# Patient Record
Sex: MALE | Race: WHITE | URBAN - METROPOLITAN AREA
[De-identification: names, ages, dates, MRNs, and addresses within clinical notes are randomized per-mention and may not be internally consistent; named-entity substitution may affect disease eponyms.]

---

## 2017-04-08 ENCOUNTER — OUTPATIENT (OUTPATIENT)
Dept: OUTPATIENT SERVICES | Facility: HOSPITAL | Age: 32
LOS: 1 days | Discharge: HOME | End: 2017-04-08

## 2017-04-08 DIAGNOSIS — Z96.7 PRESENCE OF OTHER BONE AND TENDON IMPLANTS: Chronic | ICD-10-CM

## 2017-04-08 DIAGNOSIS — Z98.89 OTHER SPECIFIED POSTPROCEDURAL STATES: Chronic | ICD-10-CM

## 2017-04-12 DIAGNOSIS — Z23 ENCOUNTER FOR IMMUNIZATION: ICD-10-CM

## 2017-06-04 ENCOUNTER — INPATIENT (INPATIENT)
Facility: HOSPITAL | Age: 32
LOS: 8 days | Discharge: HOME | End: 2017-06-13
Attending: NEUROLOGICAL SURGERY | Admitting: INTERNAL MEDICINE
Payer: COMMERCIAL

## 2017-06-04 VITALS — HEIGHT: 71 IN | WEIGHT: 164.91 LBS

## 2017-06-04 DIAGNOSIS — Z96.7 PRESENCE OF OTHER BONE AND TENDON IMPLANTS: Chronic | ICD-10-CM

## 2017-06-04 DIAGNOSIS — Z98.89 OTHER SPECIFIED POSTPROCEDURAL STATES: Chronic | ICD-10-CM

## 2017-06-04 DIAGNOSIS — K60.3 ANAL FISTULA: Chronic | ICD-10-CM

## 2017-06-04 LAB
ALBUMIN SERPL ELPH-MCNC: 3.4 G/DL — SIGNIFICANT CHANGE UP (ref 3.3–5)
ALP SERPL-CCNC: 145 U/L — HIGH (ref 40–120)
ALT FLD-CCNC: 26 U/L — SIGNIFICANT CHANGE UP (ref 12–78)
ANION GAP SERPL CALC-SCNC: 9 MMOL/L — SIGNIFICANT CHANGE UP (ref 5–17)
APTT BLD: 28.4 SEC — SIGNIFICANT CHANGE UP (ref 27.5–37.4)
AST SERPL-CCNC: 20 U/L — SIGNIFICANT CHANGE UP (ref 15–37)
BASOPHILS # BLD AUTO: 0.1 K/UL — SIGNIFICANT CHANGE UP (ref 0–0.2)
BASOPHILS NFR BLD AUTO: 0.4 % — SIGNIFICANT CHANGE UP (ref 0–2)
BILIRUB SERPL-MCNC: 0.8 MG/DL — SIGNIFICANT CHANGE UP (ref 0.2–1.2)
BUN SERPL-MCNC: 4 MG/DL — LOW (ref 7–23)
CALCIUM SERPL-MCNC: 9.8 MG/DL — SIGNIFICANT CHANGE UP (ref 8.5–10.1)
CHLORIDE SERPL-SCNC: 98 MMOL/L — SIGNIFICANT CHANGE UP (ref 96–108)
CO2 SERPL-SCNC: 28 MMOL/L — SIGNIFICANT CHANGE UP (ref 22–31)
CREAT SERPL-MCNC: 0.78 MG/DL — SIGNIFICANT CHANGE UP (ref 0.5–1.3)
EOSINOPHIL # BLD AUTO: 0.1 K/UL — SIGNIFICANT CHANGE UP (ref 0–0.5)
EOSINOPHIL NFR BLD AUTO: 0.4 % — SIGNIFICANT CHANGE UP (ref 0–6)
GLUCOSE SERPL-MCNC: 143 MG/DL — HIGH (ref 70–99)
HCT VFR BLD CALC: 47.6 % — SIGNIFICANT CHANGE UP (ref 39–50)
HGB BLD-MCNC: 17 G/DL — SIGNIFICANT CHANGE UP (ref 13–17)
LIDOCAIN IGE QN: 3216 U/L — HIGH (ref 73–393)
LYMPHOCYTES # BLD AUTO: 1.7 K/UL — SIGNIFICANT CHANGE UP (ref 1–3.3)
LYMPHOCYTES # BLD AUTO: 9.2 % — LOW (ref 13–44)
MCHC RBC-ENTMCNC: 35.8 GM/DL — SIGNIFICANT CHANGE UP (ref 32–36)
MCHC RBC-ENTMCNC: 38.1 PG — HIGH (ref 27–34)
MCV RBC AUTO: 106.4 FL — HIGH (ref 80–100)
MONOCYTES # BLD AUTO: 0.9 K/UL — SIGNIFICANT CHANGE UP (ref 0–0.9)
MONOCYTES NFR BLD AUTO: 4.7 % — SIGNIFICANT CHANGE UP (ref 2–14)
NEUTROPHILS # BLD AUTO: 15.7 K/UL — HIGH (ref 1.8–7.4)
NEUTROPHILS NFR BLD AUTO: 85.4 % — HIGH (ref 43–77)
PLATELET # BLD AUTO: 355 K/UL — SIGNIFICANT CHANGE UP (ref 150–400)
POTASSIUM SERPL-MCNC: 3.2 MMOL/L — LOW (ref 3.5–5.3)
POTASSIUM SERPL-SCNC: 3.2 MMOL/L — LOW (ref 3.5–5.3)
PROT SERPL-MCNC: 8 GM/DL — SIGNIFICANT CHANGE UP (ref 6–8.3)
RBC # BLD: 4.47 M/UL — SIGNIFICANT CHANGE UP (ref 4.2–5.8)
RBC # FLD: 13.3 % — SIGNIFICANT CHANGE UP (ref 10.3–14.5)
SODIUM SERPL-SCNC: 135 MMOL/L — SIGNIFICANT CHANGE UP (ref 135–145)
WBC # BLD: 18.4 K/UL — HIGH (ref 3.8–10.5)
WBC # FLD AUTO: 18.4 K/UL — HIGH (ref 3.8–10.5)

## 2017-06-04 PROCEDURE — 74177 CT ABD & PELVIS W/CONTRAST: CPT | Mod: 26

## 2017-06-04 PROCEDURE — 99285 EMERGENCY DEPT VISIT HI MDM: CPT

## 2017-06-04 RX ORDER — THIAMINE MONONITRATE (VIT B1) 100 MG
100 TABLET ORAL DAILY
Qty: 0 | Refills: 0 | Status: COMPLETED | OUTPATIENT
Start: 2017-06-04 | End: 2017-06-07

## 2017-06-04 RX ORDER — SODIUM CHLORIDE 9 MG/ML
3 INJECTION INTRAMUSCULAR; INTRAVENOUS; SUBCUTANEOUS ONCE
Qty: 0 | Refills: 0 | Status: COMPLETED | OUTPATIENT
Start: 2017-06-04 | End: 2017-06-04

## 2017-06-04 RX ORDER — SODIUM CHLORIDE 9 MG/ML
2000 INJECTION INTRAMUSCULAR; INTRAVENOUS; SUBCUTANEOUS
Qty: 0 | Refills: 0 | Status: DISCONTINUED | OUTPATIENT
Start: 2017-06-04 | End: 2017-06-04

## 2017-06-04 RX ORDER — MORPHINE SULFATE 50 MG/1
4 CAPSULE, EXTENDED RELEASE ORAL EVERY 6 HOURS
Qty: 0 | Refills: 0 | Status: DISCONTINUED | OUTPATIENT
Start: 2017-06-04 | End: 2017-06-04

## 2017-06-04 RX ORDER — FOLIC ACID 0.8 MG
1 TABLET ORAL DAILY
Qty: 0 | Refills: 0 | Status: DISCONTINUED | OUTPATIENT
Start: 2017-06-04 | End: 2017-06-13

## 2017-06-04 RX ORDER — HYDROMORPHONE HYDROCHLORIDE 2 MG/ML
1 INJECTION INTRAMUSCULAR; INTRAVENOUS; SUBCUTANEOUS ONCE
Qty: 0 | Refills: 0 | Status: DISCONTINUED | OUTPATIENT
Start: 2017-06-04 | End: 2017-06-04

## 2017-06-04 RX ORDER — MORPHINE SULFATE 50 MG/1
8 CAPSULE, EXTENDED RELEASE ORAL ONCE
Qty: 0 | Refills: 0 | Status: DISCONTINUED | OUTPATIENT
Start: 2017-06-04 | End: 2017-06-04

## 2017-06-04 RX ORDER — POTASSIUM CHLORIDE 20 MEQ
10 PACKET (EA) ORAL ONCE
Qty: 0 | Refills: 0 | Status: COMPLETED | OUTPATIENT
Start: 2017-06-04 | End: 2017-06-04

## 2017-06-04 RX ORDER — ONDANSETRON 8 MG/1
4 TABLET, FILM COATED ORAL ONCE
Qty: 0 | Refills: 0 | Status: COMPLETED | OUTPATIENT
Start: 2017-06-04 | End: 2017-06-04

## 2017-06-04 RX ORDER — NICOTINE POLACRILEX 2 MG
1 GUM BUCCAL DAILY
Qty: 0 | Refills: 0 | Status: DISCONTINUED | OUTPATIENT
Start: 2017-06-04 | End: 2017-06-13

## 2017-06-04 RX ORDER — ONDANSETRON 8 MG/1
4 TABLET, FILM COATED ORAL EVERY 6 HOURS
Qty: 0 | Refills: 0 | Status: DISCONTINUED | OUTPATIENT
Start: 2017-06-04 | End: 2017-06-13

## 2017-06-04 RX ORDER — SODIUM CHLORIDE 9 MG/ML
1000 INJECTION INTRAMUSCULAR; INTRAVENOUS; SUBCUTANEOUS ONCE
Qty: 0 | Refills: 0 | Status: COMPLETED | OUTPATIENT
Start: 2017-06-04 | End: 2017-06-04

## 2017-06-04 RX ORDER — ACETAMINOPHEN 500 MG
650 TABLET ORAL EVERY 6 HOURS
Qty: 0 | Refills: 0 | Status: DISCONTINUED | OUTPATIENT
Start: 2017-06-04 | End: 2017-06-13

## 2017-06-04 RX ORDER — MORPHINE SULFATE 50 MG/1
8 CAPSULE, EXTENDED RELEASE ORAL ONCE
Qty: 0 | Refills: 0 | Status: DISCONTINUED | OUTPATIENT
Start: 2017-06-04 | End: 2017-06-05

## 2017-06-04 RX ORDER — POTASSIUM CHLORIDE 20 MEQ
10 PACKET (EA) ORAL
Qty: 0 | Refills: 0 | Status: COMPLETED | OUTPATIENT
Start: 2017-06-04 | End: 2017-06-05

## 2017-06-04 RX ORDER — HYDROMORPHONE HYDROCHLORIDE 2 MG/ML
1 INJECTION INTRAMUSCULAR; INTRAVENOUS; SUBCUTANEOUS
Qty: 0 | Refills: 0 | Status: DISCONTINUED | OUTPATIENT
Start: 2017-06-04 | End: 2017-06-05

## 2017-06-04 RX ORDER — PANTOPRAZOLE SODIUM 20 MG/1
40 TABLET, DELAYED RELEASE ORAL
Qty: 0 | Refills: 0 | Status: DISCONTINUED | OUTPATIENT
Start: 2017-06-04 | End: 2017-06-04

## 2017-06-04 RX ORDER — FAMOTIDINE 10 MG/ML
20 INJECTION INTRAVENOUS ONCE
Qty: 0 | Refills: 0 | Status: COMPLETED | OUTPATIENT
Start: 2017-06-04 | End: 2017-06-04

## 2017-06-04 RX ORDER — MORPHINE SULFATE 50 MG/1
4 CAPSULE, EXTENDED RELEASE ORAL ONCE
Qty: 0 | Refills: 0 | Status: DISCONTINUED | OUTPATIENT
Start: 2017-06-04 | End: 2017-06-04

## 2017-06-04 RX ORDER — SODIUM CHLORIDE 9 MG/ML
2000 INJECTION, SOLUTION INTRAVENOUS
Qty: 0 | Refills: 0 | Status: DISCONTINUED | OUTPATIENT
Start: 2017-06-04 | End: 2017-06-06

## 2017-06-04 RX ADMIN — SODIUM CHLORIDE 3 MILLILITER(S): 9 INJECTION INTRAMUSCULAR; INTRAVENOUS; SUBCUTANEOUS at 17:01

## 2017-06-04 RX ADMIN — HYDROMORPHONE HYDROCHLORIDE 1 MILLIGRAM(S): 2 INJECTION INTRAMUSCULAR; INTRAVENOUS; SUBCUTANEOUS at 17:01

## 2017-06-04 RX ADMIN — HYDROMORPHONE HYDROCHLORIDE 1 MILLIGRAM(S): 2 INJECTION INTRAMUSCULAR; INTRAVENOUS; SUBCUTANEOUS at 18:49

## 2017-06-04 RX ADMIN — HYDROMORPHONE HYDROCHLORIDE 1 MILLIGRAM(S): 2 INJECTION INTRAMUSCULAR; INTRAVENOUS; SUBCUTANEOUS at 20:59

## 2017-06-04 RX ADMIN — ONDANSETRON 4 MILLIGRAM(S): 8 TABLET, FILM COATED ORAL at 17:01

## 2017-06-04 RX ADMIN — Medication 100 MILLIEQUIVALENT(S): at 22:51

## 2017-06-04 RX ADMIN — FAMOTIDINE 20 MILLIGRAM(S): 10 INJECTION INTRAVENOUS at 17:01

## 2017-06-04 RX ADMIN — MORPHINE SULFATE 4 MILLIGRAM(S): 50 CAPSULE, EXTENDED RELEASE ORAL at 18:48

## 2017-06-04 RX ADMIN — SODIUM CHLORIDE 1000 MILLILITER(S): 9 INJECTION INTRAMUSCULAR; INTRAVENOUS; SUBCUTANEOUS at 17:01

## 2017-06-04 RX ADMIN — MORPHINE SULFATE 8 MILLIGRAM(S): 50 CAPSULE, EXTENDED RELEASE ORAL at 22:44

## 2017-06-04 RX ADMIN — MORPHINE SULFATE 4 MILLIGRAM(S): 50 CAPSULE, EXTENDED RELEASE ORAL at 19:30

## 2017-06-04 RX ADMIN — SODIUM CHLORIDE 1000 MILLILITER(S): 9 INJECTION INTRAMUSCULAR; INTRAVENOUS; SUBCUTANEOUS at 20:59

## 2017-06-04 RX ADMIN — HYDROMORPHONE HYDROCHLORIDE 1 MILLIGRAM(S): 2 INJECTION INTRAMUSCULAR; INTRAVENOUS; SUBCUTANEOUS at 21:30

## 2017-06-04 NOTE — ED PROVIDER NOTE - OBJECTIVE STATEMENT
30 y/o M with hx of Crohn's disease and rectal fistula repair presents to the ED c/o gradually worsening mid to left sharp, cramping abd pain x several hours. Pt also notes n/v and frequent belching. He notes SOB on arrival in ED that has resolved. Pt states pain improved with medication in ED, but not returning. Currently pt has no other complaints and denies diarrhea, fever, and chills.

## 2017-06-04 NOTE — H&P ADULT - ATTENDING COMMENTS
Patient seen and examined after initial evaluation above by SIMRAN Stuart. Case discussed and reviewed in detail. Please note my plan below.     32 yo M with PMH of crohn's disease, h/o perianal fistula s/p repair in 2002, p/w abdominal pain / nausea / vomiting and unable to tolerate oral intake. Patient found to have pancreatitis on CT scan and elevated lipase level.    *SIRS 2/2 acute pancreatitis - likely 2/2 Etoh abuse  -Aggressive hydration  cc/hr for 2L and reasseess  -Elevated alk phos - abdominal U/S for further evaluation of CBD  -Triglyceride level  -Pain control  -NPO for now  -Zofran PRN    *H/o crohn's disease with perianal fistula s/p repair in 2002  -Perianal fistula tract noted on CT - stable?  -GI consult for further evaluation as patient has had poor outpatient f/u and prefers to f/u w/ new gastroenterologist    *Leukocytosis  -Likely reactive 2/2 pancreatitis  -Continue to monitor for now    *Alcohol withdrawal  -[facility] protocol  -MVI / Thiamine / Folic Acid    *Hypokalemia  -Replete and recheck in AM    *DVT ppx  -SCDs

## 2017-06-04 NOTE — ED PROVIDER NOTE - CONSTITUTIONAL, MLM
normal... Well appearing, well nourished, awake, alert, oriented to person, place, time/situation and in no moderate distress secondary to pain. No respiratory discomfort.

## 2017-06-04 NOTE — H&P ADULT - NSHPSOCIALHISTORY_GEN_ALL_CORE
Smokes 1/2 PPD  Drinks 4-6 beers every day and occasionally more than that  Denies any other recreational drug use  Lives at home

## 2017-06-04 NOTE — ED ADULT NURSE NOTE - OBJECTIVE STATEMENT
sudden onset of abdominal pain after vomiting this morning. patient unable to tolerate solids or liquids

## 2017-06-04 NOTE — ED PROVIDER NOTE - NS ED MD SCRIBE ATTENDING SCRIBE SECTIONS
HISTORY OF PRESENT ILLNESS/RESULTS/REVIEW OF SYSTEMS/PHYSICAL EXAM/DISPOSITION/PROGRESS NOTE/PAST MEDICAL/SURGICAL/SOCIAL HISTORY

## 2017-06-04 NOTE — ED PROVIDER NOTE - DETAILS:
The scribe's documentation has been prepared under my direction and personally reviewed by me in its entirety. I confirm that the note above accurately reflects all work, treatment, procedures, and medical decision making performed by me (Dr. Frausto).

## 2017-06-04 NOTE — H&P ADULT - ASSESSMENT
32 yo M with PMH significant for Crohn's disease (not on any meds at home), Hx of perianal fistula repair in 2002 presents to the ED c/o upper abdominal pain x 1 day.    # Pancreatitis: likely 2/2 to ETOH use  - Admit to Med-surg  - NPO  - IVF NS gentle hydration  - Pain meds/Zofran PRN  - GI consult    # Leucocytosis  - Likely  2/2 to inflammation  - No s/s of infection at present  - f/u UA  - Monitor WBC    # Hypokalemia  - K: 3.2  - replete and recheck    # CTAP: found to have left 10th and 11th rib fracture sub acute  - Denies chest pain or SOB at present  - Pain meds PRN    # CTAP: low attenuation lesion within the left kidney  - outpatient f/u with PMD after discharge    # Crohn's disease  - stable    # Nicotine dependence  - Counselled on cessation  - Nicotine patch    # Etoh use  - counselled on cessation    # DVT Ppx  - SCD's    Case d/w      - 30 yo M with PMH significant for Crohn's disease (not on any meds at home), Hx of perianal fistula repair in 2002 presents to the ED c/o upper abdominal pain x 1 day.    # Pancreatitis: likely 2/2 to ETOH use  - Admit to Med-surg  - NPO  - IVF LR gentle hydration  - Pain meds/Zofran PRN  - GI consult - Pt is requesting new GI doctor  - CIWA prorocol    # Leucocytosis  - Likely  2/2 to inflammation  - No s/s of infection at present  - f/u UA  - Monitor WBC    # Hypokalemia  - K: 3.2  - replete and recheck    # CTAP: found to have left 10th and 11th rib fracture sub acute  - Denies chest pain or SOB at present  - Pain meds PRN    # CTAP: low attenuation lesion within the left kidney  - outpatient f/u with PMD after discharge    # Crohn's disease  - stable    # Nicotine dependence  - Counselled on cessation  - Nicotine patch    # Etoh use  - counselled on cessation  - CIWA prorocol  - MVA/Thiamine/FA  - Seizure precaution    # DVT Ppx  - SCD's    Case d/w Dr. Smith 32 yo M with PMH significant for Crohn's disease (not on any meds at home), Hx of perianal fistula repair in 2002 presents to the ED c/o upper abdominal pain x 1 day.    # Pancreatitis: likely 2/2 to ETOH use  - Admit to Med-surg  - NPO  - IVF LR gentle hydration  - Pain meds/Zofran PRN  - GI consult - Pt is requesting new GI doctor  - US abdomen - ALP elevated, to eval. for gall stones  - triglyceride level  - CIWA prorocol    # Leucocytosis  - Likely  2/2 to inflammation  - No s/s of infection at present  - f/u UA  - Monitor WBC    # Hypokalemia  - K: 3.2  - replete and recheck    # CTAP: found to have left 10th and 11th rib fracture sub acute  - Denies chest pain or SOB at present  - Pain meds PRN    # CTAP: low attenuation lesion within the left kidney  - outpatient f/u with PMD after discharge    # Crohn's disease  - stable    # Nicotine dependence  - Counselled on cessation  - Nicotine patch    # Etoh use  - counselled on cessation  - CIWA prorocol  - MVA/Thiamine/FA  - Seizure precaution    # DVT Ppx  - SCD's    Case d/w Dr. Smith

## 2017-06-04 NOTE — ED PROVIDER NOTE - GASTROINTESTINAL, MLM
+Diffuse abd tenderness, some guarding, no rebound. Hypoactive bowel sounds, normal pitch. No CVA tenderness.

## 2017-06-04 NOTE — H&P ADULT - PSH
History of colonoscopy  (2014)  Perianal fistula  repair in 2002  S/P ORIF (open reduction internal fixation) fracture  (Right ankle, 2014)

## 2017-06-04 NOTE — H&P ADULT - HISTORY OF PRESENT ILLNESS
32 yo M with PMH significant for Crohn's disease (not on any meds at home), Hx of perianal fistula repair in 2002 presents to the ED c/o upper abdominal pain x 1 day. Pt states that he is having sharp, bloating upper abdominal pain, 8/10 in severity, radiating to the back, associated with N/V. Pt could not tolerate any food or drink today.Denies blood in stool or urine. Denies fever or chills. Denies chest pain, palpitations or SOB. Pt drinks 4-6 beers every day and occasionally more than that. Smokes 1/2 PPD.    CTAP found to have acute edematous pancreatitis. Chronic inflammatory bowel disease and perianal fistula tracks noted.    In the ED:  Meds: Dilaudid 1 mg IV x 2, Morphine 4 mg IV x 1, Pepcid, Zofran, IVF NS 2L bolus

## 2017-06-04 NOTE — ED STATDOCS - MEDICAL DECISION MAKING DETAILS
I, Gregorio Perez, performed the initial face to face bedside interview with this patient regarding history of present illness and determined that the patient should be seen in the main ED.  The history, was documented by the scribe in my presence and I attest to the accuracy of the documentation.

## 2017-06-04 NOTE — H&P ADULT - NSHPPHYSICALEXAM_GEN_ALL_CORE
Vital Signs Last 24 Hrs  T(C): 36.8, Max: 36.8 (06-04 @ 15:51)  T(F): 98.3, Max: 98.3 (06-04 @ 15:51)  HR: 92 (92 - 92)  BP: 143/102 (143/102 - 143/102)  RR: 18 (18 - 18)  SpO2: 100% (100% - 100%)

## 2017-06-05 LAB
ALBUMIN SERPL ELPH-MCNC: 2.8 G/DL — LOW (ref 3.3–5)
ALP SERPL-CCNC: 110 U/L — SIGNIFICANT CHANGE UP (ref 40–120)
ALT FLD-CCNC: 22 U/L — SIGNIFICANT CHANGE UP (ref 12–78)
ANION GAP SERPL CALC-SCNC: 7 MMOL/L — SIGNIFICANT CHANGE UP (ref 5–17)
APTT BLD: 26.1 SEC — LOW (ref 27.5–37.4)
AST SERPL-CCNC: 16 U/L — SIGNIFICANT CHANGE UP (ref 15–37)
BASOPHILS # BLD AUTO: 0.1 K/UL — SIGNIFICANT CHANGE UP (ref 0–0.2)
BASOPHILS NFR BLD AUTO: 0.4 % — SIGNIFICANT CHANGE UP (ref 0–2)
BILIRUB DIRECT SERPL-MCNC: 0.2 MG/DL — SIGNIFICANT CHANGE UP (ref 0–0.2)
BILIRUB INDIRECT FLD-MCNC: 0.6 MG/DL — SIGNIFICANT CHANGE UP (ref 0.2–1)
BILIRUB SERPL-MCNC: 0.8 MG/DL — SIGNIFICANT CHANGE UP (ref 0.2–1.2)
BUN SERPL-MCNC: 4 MG/DL — LOW (ref 7–23)
CALCIUM SERPL-MCNC: 8.5 MG/DL — SIGNIFICANT CHANGE UP (ref 8.5–10.1)
CHLORIDE SERPL-SCNC: 103 MMOL/L — SIGNIFICANT CHANGE UP (ref 96–108)
CHOLEST SERPL-MCNC: 126 MG/DL — SIGNIFICANT CHANGE UP (ref 10–199)
CO2 SERPL-SCNC: 26 MMOL/L — SIGNIFICANT CHANGE UP (ref 22–31)
CREAT SERPL-MCNC: 0.54 MG/DL — SIGNIFICANT CHANGE UP (ref 0.5–1.3)
EOSINOPHIL # BLD AUTO: 0.1 K/UL — SIGNIFICANT CHANGE UP (ref 0–0.5)
EOSINOPHIL NFR BLD AUTO: 0.6 % — SIGNIFICANT CHANGE UP (ref 0–6)
FOLATE SERPL-MCNC: 3.3 NG/ML — LOW (ref 4.8–24.2)
GLUCOSE SERPL-MCNC: 89 MG/DL — SIGNIFICANT CHANGE UP (ref 70–99)
HCT VFR BLD CALC: 43.3 % — SIGNIFICANT CHANGE UP (ref 39–50)
HDLC SERPL-MCNC: 43 MG/DL — SIGNIFICANT CHANGE UP (ref 40–125)
HGB BLD-MCNC: 16 G/DL — SIGNIFICANT CHANGE UP (ref 13–17)
INR BLD: 1.03 RATIO — SIGNIFICANT CHANGE UP (ref 0.88–1.16)
LIPID PNL WITH DIRECT LDL SERPL: 60 MG/DL — SIGNIFICANT CHANGE UP
LYMPHOCYTES # BLD AUTO: 1.1 K/UL — SIGNIFICANT CHANGE UP (ref 1–3.3)
LYMPHOCYTES # BLD AUTO: 7.1 % — LOW (ref 13–44)
MAGNESIUM SERPL-MCNC: 2.2 MG/DL — SIGNIFICANT CHANGE UP (ref 1.6–2.6)
MCHC RBC-ENTMCNC: 36.9 GM/DL — HIGH (ref 32–36)
MCHC RBC-ENTMCNC: 39.5 PG — HIGH (ref 27–34)
MCV RBC AUTO: 107.1 FL — HIGH (ref 80–100)
MONOCYTES # BLD AUTO: 1.2 K/UL — HIGH (ref 0–0.9)
MONOCYTES NFR BLD AUTO: 7.8 % — SIGNIFICANT CHANGE UP (ref 2–14)
NEUTROPHILS # BLD AUTO: 12.6 K/UL — HIGH (ref 1.8–7.4)
NEUTROPHILS NFR BLD AUTO: 84.1 % — HIGH (ref 43–77)
PHOSPHATE SERPL-MCNC: 2.3 MG/DL — LOW (ref 2.5–4.5)
PLATELET # BLD AUTO: 316 K/UL — SIGNIFICANT CHANGE UP (ref 150–400)
POTASSIUM SERPL-MCNC: 3.7 MMOL/L — SIGNIFICANT CHANGE UP (ref 3.5–5.3)
POTASSIUM SERPL-SCNC: 3.7 MMOL/L — SIGNIFICANT CHANGE UP (ref 3.5–5.3)
PROT SERPL-MCNC: 6.5 GM/DL — SIGNIFICANT CHANGE UP (ref 6–8.3)
PROTHROM AB SERPL-ACNC: 11.1 SEC — SIGNIFICANT CHANGE UP (ref 9.8–12.7)
RBC # BLD: 4.05 M/UL — LOW (ref 4.2–5.8)
RBC # FLD: 13.6 % — SIGNIFICANT CHANGE UP (ref 10.3–14.5)
SODIUM SERPL-SCNC: 136 MMOL/L — SIGNIFICANT CHANGE UP (ref 135–145)
TOTAL CHOLESTEROL/HDL RATIO MEASUREMENT: 2.9 RATIO — LOW (ref 3.4–9.6)
TRIGL SERPL-MCNC: 113 MG/DL — SIGNIFICANT CHANGE UP (ref 10–149)
WBC # BLD: 15 K/UL — HIGH (ref 3.8–10.5)
WBC # FLD AUTO: 15 K/UL — HIGH (ref 3.8–10.5)

## 2017-06-05 PROCEDURE — 76700 US EXAM ABDOM COMPLETE: CPT | Mod: 26

## 2017-06-05 RX ORDER — HYDROMORPHONE HYDROCHLORIDE 2 MG/ML
1 INJECTION INTRAMUSCULAR; INTRAVENOUS; SUBCUTANEOUS EVERY 4 HOURS
Qty: 0 | Refills: 0 | Status: DISCONTINUED | OUTPATIENT
Start: 2017-06-05 | End: 2017-06-06

## 2017-06-05 RX ADMIN — Medication 2 MILLIGRAM(S): at 06:24

## 2017-06-05 RX ADMIN — SODIUM CHLORIDE 200 MILLILITER(S): 9 INJECTION, SOLUTION INTRAVENOUS at 06:25

## 2017-06-05 RX ADMIN — Medication 100 MILLIGRAM(S): at 12:10

## 2017-06-05 RX ADMIN — Medication 1 MILLIGRAM(S): at 12:10

## 2017-06-05 RX ADMIN — Medication 1 PATCH: at 12:12

## 2017-06-05 RX ADMIN — HYDROMORPHONE HYDROCHLORIDE 1 MILLIGRAM(S): 2 INJECTION INTRAMUSCULAR; INTRAVENOUS; SUBCUTANEOUS at 07:45

## 2017-06-05 RX ADMIN — HYDROMORPHONE HYDROCHLORIDE 1 MILLIGRAM(S): 2 INJECTION INTRAMUSCULAR; INTRAVENOUS; SUBCUTANEOUS at 11:01

## 2017-06-05 RX ADMIN — HYDROMORPHONE HYDROCHLORIDE 1 MILLIGRAM(S): 2 INJECTION INTRAMUSCULAR; INTRAVENOUS; SUBCUTANEOUS at 10:46

## 2017-06-05 RX ADMIN — HYDROMORPHONE HYDROCHLORIDE 1 MILLIGRAM(S): 2 INJECTION INTRAMUSCULAR; INTRAVENOUS; SUBCUTANEOUS at 00:09

## 2017-06-05 RX ADMIN — HYDROMORPHONE HYDROCHLORIDE 1 MILLIGRAM(S): 2 INJECTION INTRAMUSCULAR; INTRAVENOUS; SUBCUTANEOUS at 02:53

## 2017-06-05 RX ADMIN — Medication 2 MILLIGRAM(S): at 01:36

## 2017-06-05 RX ADMIN — HYDROMORPHONE HYDROCHLORIDE 1 MILLIGRAM(S): 2 INJECTION INTRAMUSCULAR; INTRAVENOUS; SUBCUTANEOUS at 17:27

## 2017-06-05 RX ADMIN — HYDROMORPHONE HYDROCHLORIDE 1 MILLIGRAM(S): 2 INJECTION INTRAMUSCULAR; INTRAVENOUS; SUBCUTANEOUS at 17:42

## 2017-06-05 RX ADMIN — SODIUM CHLORIDE 200 MILLILITER(S): 9 INJECTION, SOLUTION INTRAVENOUS at 14:26

## 2017-06-05 RX ADMIN — HYDROMORPHONE HYDROCHLORIDE 1 MILLIGRAM(S): 2 INJECTION INTRAMUSCULAR; INTRAVENOUS; SUBCUTANEOUS at 08:00

## 2017-06-05 RX ADMIN — ONDANSETRON 4 MILLIGRAM(S): 8 TABLET, FILM COATED ORAL at 23:56

## 2017-06-05 RX ADMIN — ONDANSETRON 4 MILLIGRAM(S): 8 TABLET, FILM COATED ORAL at 00:15

## 2017-06-05 RX ADMIN — HYDROMORPHONE HYDROCHLORIDE 1 MILLIGRAM(S): 2 INJECTION INTRAMUSCULAR; INTRAVENOUS; SUBCUTANEOUS at 20:34

## 2017-06-05 RX ADMIN — HYDROMORPHONE HYDROCHLORIDE 1 MILLIGRAM(S): 2 INJECTION INTRAMUSCULAR; INTRAVENOUS; SUBCUTANEOUS at 23:56

## 2017-06-05 RX ADMIN — HYDROMORPHONE HYDROCHLORIDE 1 MILLIGRAM(S): 2 INJECTION INTRAMUSCULAR; INTRAVENOUS; SUBCUTANEOUS at 13:55

## 2017-06-05 RX ADMIN — Medication 100 MILLIEQUIVALENT(S): at 01:36

## 2017-06-05 RX ADMIN — Medication 1 TABLET(S): at 12:10

## 2017-06-05 RX ADMIN — Medication 100 MILLIEQUIVALENT(S): at 00:20

## 2017-06-05 RX ADMIN — ONDANSETRON 4 MILLIGRAM(S): 8 TABLET, FILM COATED ORAL at 09:20

## 2017-06-05 RX ADMIN — SODIUM CHLORIDE 200 MILLILITER(S): 9 INJECTION, SOLUTION INTRAVENOUS at 20:33

## 2017-06-05 RX ADMIN — HYDROMORPHONE HYDROCHLORIDE 1 MILLIGRAM(S): 2 INJECTION INTRAMUSCULAR; INTRAVENOUS; SUBCUTANEOUS at 13:40

## 2017-06-05 RX ADMIN — ONDANSETRON 4 MILLIGRAM(S): 8 TABLET, FILM COATED ORAL at 15:43

## 2017-06-05 NOTE — CONSULT NOTE ADULT - ASSESSMENT
32yo male with etoh pancreatitis  hx crohns no on active treatment  IV fluids hydration  pain control  NPO for now

## 2017-06-05 NOTE — CONSULT NOTE ADULT - SUBJECTIVE AND OBJECTIVE BOX
Patient is a 31y old  Male who presents with a chief complaint of c/o abdominal pain x 1 day (04 Jun 2017 22:11)      HPI:  30 yo M with PMH significant for Crohn's disease (not on any meds at home), Hx of perianal fistula repair in 2002 presents to the ED c/o upper abdominal pain x 1 day. Pt states that he is having sharp, bloating upper abdominal pain, 8/10 in severity, radiating to the back, associated with N/V. Pt could not tolerate any food or drink today.Denies blood in stool or urine. Denies fever or chills. Denies chest pain, palpitations or SOB. Pt drinks 4-6 beers every day and occasionally more than that. Smokes 1/2 PPD.    CTAP found to have acute edematous pancreatitis. Chronic inflammatory bowel disease and perianal fistula tracks noted.  Pt still with significant pain  no issues with crohns recently by report      PAST MEDICAL & SURGICAL HISTORY:  Crohn&#x27;s disease of large intestine without complication: (No current flare up)  Perianal fistula: repair in 2002  History of colonoscopy: (2014)  S/P ORIF (open reduction internal fixation) fracture: (Right ankle, 2014)      MEDICATIONS  (STANDING):  nicotine -   7 mG/24Hr(s) Patch 1patch Transdermal daily  lactated ringers. 2000milliLiter(s) IV Continuous <Continuous>  folic acid 1milliGRAM(s) Oral daily  multivitamin 1Tablet(s) Oral daily  thiamine 100milliGRAM(s) Oral daily    MEDICATIONS  (PRN):  acetaminophen   Tablet 650milliGRAM(s) Oral every 6 hours PRN pain and fever  aluminum hydroxide/magnesium hydroxide/simethicone Suspension 30milliLiter(s) Oral every 4 hours PRN Dyspepsia  ondansetron Injectable 4milliGRAM(s) IV Push every 6 hours PRN Nausea and/or Vomiting  HYDROmorphone  Injectable 1milliGRAM(s) IV Push every 3 hours PRN Severe Pain (7 - 10)  LORazepam   Injectable 2milliGRAM(s) IV Push every 1 hour PRN CIWA-Ar score 8 or greater    Allergies  No Known All  SOCIAL HISTORY: signficant etoh for years, 1/2 ppd tob    FAMILY HISTORY:  Diabetes mellitus (Father)      REVIEW OF SYSTEMS:    CONSTITUTIONAL: No weakness, fevers or chills  EYES/ENT: No visual changes;  No vertigo or throat pain   NECK: No pain or stiffness  RESPIRATORY: No cough, wheezing, hemoptysis; No shortness of breath  CARDIOVASCULAR: No chest pain or palpitations  GASTROINTESTINAL: severe epigastric pain, nausea  GENITOURINARY: No dysuria, frequency or hematuria  NEUROLOGICAL: No numbness or weakness  SKIN: No itching, burning, rashes, or lesions   PSYCH: Normal mood and affect  All other review of systems is negative unless indicated above.    Vital Signs Last 24 Hrs  T(C): 37.1, Max: 37.1 (06-05 @ 15:01)  T(F): 98.7, Max: 98.7 (06-05 @ 15:01)  HR: 100 (84 - 106)  BP: 123/86 (123/86 - 141/99)  BP(mean): --  RR: 14 (14 - 18)  SpO2: 95% (95% - 100%)    PHYSICAL EXAM:    Constitutional: NAD, well-developed  HEENT: MMM  Neck: No LAD  Respiratory: CTAB  Cardiovascular: S1 and S2, RRR, no M/G/R  Gastrointestinal: BS+, mildly distended, tender epigastrium  Extremities: No peripheral edema  Vascular: 2+ peripheral pulses  Neurological: A/O x 3, no focal deficits  Psychiatric: Normal mood, normal affect  Skin: No rashes    LABS:                        16.0   15.0  )-----------( 316      ( 05 Jun 2017 07:09 )             43.3     06-05    136  |  103  |  4<L>  ----------------------------<  89  3.7   |  26  |  0.54    Ca    8.5      05 Jun 2017 07:09  Phos  2.3     06-05  Mg     2.2     06-05    TPro  6.5  /  Alb  2.8<L>  /  TBili  0.8  /  DBili  0.2  /  AST  16  /  ALT  22  /  AlkPhos  110  06-05    PT/INR - ( 05 Jun 2017 07:09 )   PT: 11.1 sec;   INR: 1.03 ratio         PTT - ( 05 Jun 2017 07:09 )  PTT:26.1 sec  LIVER FUNCTIONS - ( 05 Jun 2017 07:09 )  Alb: 2.8 g/dL / Pro: 6.5 gm/dL / ALK PHOS: 110 U/L / ALT: 22 U/L / AST: 16 U/L / GGT: x             RADIOLOGY & ADDITIONAL STUDIES:

## 2017-06-05 NOTE — PROGRESS NOTE ADULT - ASSESSMENT
30 yo M with PMH significant for Crohn's disease (not on any meds at home), Hx of perianal fistula repair in 2002 presents to the ED c/o upper abdominal pain x 1 day.    # Pancreatitis:   - NPO  - IVF  - Pain meds/Zofran PRN  - US abdomen - non obstructive  - triglyceride level : nl  - CIWA protocol. monitor for resp distress    # Leucocytosis  - Likely  2/2 to inflammation        # Hypokalemia      # CTAP: found to have left 10th and 11th rib fracture sub acute  - Denies chest pain or SOB at present      # CTAP: low attenuation lesion within the left kidney  - outpatient f/u with PMD after discharge    # Crohn's disease  - stable    # Nicotine dependence  - Counselled on cessation  - Nicotine patch    # Etoh use  - counselled on cessation  - [facility] protocol  - MVA/Thiamine/FA for suspected thiamine etoh related deficiency  - Seizure precaution

## 2017-06-05 NOTE — PROGRESS NOTE ADULT - SUBJECTIVE AND OBJECTIVE BOX
I:  32 yo M with PMH significant for Crohn's disease (not on any meds at home), Hx of perianal fistula repair in 2002 presents to the ED c/o upper abdominal pain x 1 day. Pt states that he is having sharp, bloating upper abdominal pain, 8/10 in severity, radiating to the back, associated with N/V. Drinks 4 25 0z beers daily. CT c/w pancreatitis. Lipase 3000.     U/S negative for obstructive findings. no ductal dilation.      Comfortable at [employer]. Had 1 epsiode of diarrhea today. No concern for withdrawal. Scored 3 this am. Denies HA. Visual disturbance, agitation/anxiety. LAst drink Saturday. Longest period of sobriety 6months of which he was able to stop quick etoh abruptly. Denies any hx of etoh withdrawal.               Vital Signs Last 24 Hrs  T(C): 37.1, Max: 37.1 (06-05 @ 15:01)  T(F): 98.7, Max: 98.7 (06-05 @ 15:01)  HR: 100 (84 - 106)  BP: 123/86 (123/86 - 141/99)  BP(mean): --  RR: 14 (14 - 18)  SpO2: 95% (95% - 100%)              PHYSICAL EXAM:    Constitutional: NAD, awake and alert, in NAD  HEENT: PERR EOMI  Respiratory: clear b/li  Cardiovascular: S1S2, RRR  Gastrointestinal: epigastric tenderness. BS+      MEDICATIONS:  MEDICATIONS  (STANDING):  nicotine -   7 mG/24Hr(s) Patch 1patch Transdermal daily  lactated ringers. 2000milliLiter(s) IV Continuous <Continuous>  folic acid 1milliGRAM(s) Oral daily  multivitamin 1Tablet(s) Oral daily  thiamine 100milliGRAM(s) Oral daily      LABS: All Labs Reviewed:                        16.0   15.0  )-----------( 316      ( 05 Jun 2017 07:09 )             43.3     06-05    136  |  103  |  4<L>  ----------------------------<  89  3.7   |  26  |  0.54    Ca    8.5      05 Jun 2017 07:09  Phos  2.3     06-05  Mg     2.2     06-05    TPro  6.5  /  Alb  2.8<L>  /  TBili  0.8  /  DBili  0.2  /  AST  16  /  ALT  22  /  AlkPhos  110  06-05    PT/INR - ( 05 Jun 2017 07:09 )   PT: 11.1 sec;   INR: 1.03 ratio         PTT - ( 05 Jun 2017 07:09 )  PTT:26.1 sec          Blood Culture:     RADIOLOGY/EKG:    CT AP  IMPRESSION:    Findings consistent with acute interstitial edematous pancreatitis.    Additional findings consistent with sequelae of chronic inflammatory   bowel disease, demonstrating relative stability since prior examination.    Left 10th and 11th posterior rib fractures with mild callus formation   suggesting sub-acuity, new since December 2016

## 2017-06-06 LAB
ANION GAP SERPL CALC-SCNC: 7 MMOL/L — SIGNIFICANT CHANGE UP (ref 5–17)
BUN SERPL-MCNC: 6 MG/DL — LOW (ref 7–23)
CALCIUM SERPL-MCNC: 8.3 MG/DL — LOW (ref 8.5–10.1)
CHLORIDE SERPL-SCNC: 101 MMOL/L — SIGNIFICANT CHANGE UP (ref 96–108)
CO2 SERPL-SCNC: 25 MMOL/L — SIGNIFICANT CHANGE UP (ref 22–31)
CREAT SERPL-MCNC: 0.51 MG/DL — SIGNIFICANT CHANGE UP (ref 0.5–1.3)
GLUCOSE SERPL-MCNC: 61 MG/DL — LOW (ref 70–99)
HCT VFR BLD CALC: 41.3 % — SIGNIFICANT CHANGE UP (ref 39–50)
HGB BLD-MCNC: 14.5 G/DL — SIGNIFICANT CHANGE UP (ref 13–17)
LIDOCAIN IGE QN: 779 U/L — HIGH (ref 73–393)
MCHC RBC-ENTMCNC: 35.1 GM/DL — SIGNIFICANT CHANGE UP (ref 32–36)
MCHC RBC-ENTMCNC: 38 PG — HIGH (ref 27–34)
MCV RBC AUTO: 108.1 FL — HIGH (ref 80–100)
PLATELET # BLD AUTO: 291 K/UL — SIGNIFICANT CHANGE UP (ref 150–400)
POTASSIUM SERPL-MCNC: 3.8 MMOL/L — SIGNIFICANT CHANGE UP (ref 3.5–5.3)
POTASSIUM SERPL-SCNC: 3.8 MMOL/L — SIGNIFICANT CHANGE UP (ref 3.5–5.3)
RBC # BLD: 3.82 M/UL — LOW (ref 4.2–5.8)
RBC # FLD: 13.6 % — SIGNIFICANT CHANGE UP (ref 10.3–14.5)
SODIUM SERPL-SCNC: 133 MMOL/L — LOW (ref 135–145)
WBC # BLD: 8.5 K/UL — SIGNIFICANT CHANGE UP (ref 3.8–10.5)
WBC # FLD AUTO: 8.5 K/UL — SIGNIFICANT CHANGE UP (ref 3.8–10.5)

## 2017-06-06 RX ORDER — HYDROMORPHONE HYDROCHLORIDE 2 MG/ML
1 INJECTION INTRAMUSCULAR; INTRAVENOUS; SUBCUTANEOUS
Qty: 0 | Refills: 0 | Status: DISCONTINUED | OUTPATIENT
Start: 2017-06-06 | End: 2017-06-08

## 2017-06-06 RX ORDER — SODIUM CHLORIDE 9 MG/ML
1000 INJECTION, SOLUTION INTRAVENOUS
Qty: 0 | Refills: 0 | Status: DISCONTINUED | OUTPATIENT
Start: 2017-06-06 | End: 2017-06-08

## 2017-06-06 RX ADMIN — HYDROMORPHONE HYDROCHLORIDE 1 MILLIGRAM(S): 2 INJECTION INTRAMUSCULAR; INTRAVENOUS; SUBCUTANEOUS at 20:29

## 2017-06-06 RX ADMIN — HYDROMORPHONE HYDROCHLORIDE 1 MILLIGRAM(S): 2 INJECTION INTRAMUSCULAR; INTRAVENOUS; SUBCUTANEOUS at 21:40

## 2017-06-06 RX ADMIN — SODIUM CHLORIDE 150 MILLILITER(S): 9 INJECTION, SOLUTION INTRAVENOUS at 17:05

## 2017-06-06 RX ADMIN — Medication 1 TABLET(S): at 11:36

## 2017-06-06 RX ADMIN — ONDANSETRON 4 MILLIGRAM(S): 8 TABLET, FILM COATED ORAL at 12:44

## 2017-06-06 RX ADMIN — HYDROMORPHONE HYDROCHLORIDE 1 MILLIGRAM(S): 2 INJECTION INTRAMUSCULAR; INTRAVENOUS; SUBCUTANEOUS at 05:18

## 2017-06-06 RX ADMIN — SODIUM CHLORIDE 200 MILLILITER(S): 9 INJECTION, SOLUTION INTRAVENOUS at 11:35

## 2017-06-06 RX ADMIN — HYDROMORPHONE HYDROCHLORIDE 1 MILLIGRAM(S): 2 INJECTION INTRAMUSCULAR; INTRAVENOUS; SUBCUTANEOUS at 23:37

## 2017-06-06 RX ADMIN — SODIUM CHLORIDE 150 MILLILITER(S): 9 INJECTION, SOLUTION INTRAVENOUS at 23:55

## 2017-06-06 RX ADMIN — Medication 1 MILLIGRAM(S): at 11:36

## 2017-06-06 RX ADMIN — HYDROMORPHONE HYDROCHLORIDE 1 MILLIGRAM(S): 2 INJECTION INTRAMUSCULAR; INTRAVENOUS; SUBCUTANEOUS at 09:49

## 2017-06-06 RX ADMIN — ONDANSETRON 4 MILLIGRAM(S): 8 TABLET, FILM COATED ORAL at 06:52

## 2017-06-06 RX ADMIN — SODIUM CHLORIDE 200 MILLILITER(S): 9 INJECTION, SOLUTION INTRAVENOUS at 01:36

## 2017-06-06 RX ADMIN — ONDANSETRON 4 MILLIGRAM(S): 8 TABLET, FILM COATED ORAL at 18:37

## 2017-06-06 RX ADMIN — Medication 1 PATCH: at 11:36

## 2017-06-06 RX ADMIN — HYDROMORPHONE HYDROCHLORIDE 1 MILLIGRAM(S): 2 INJECTION INTRAMUSCULAR; INTRAVENOUS; SUBCUTANEOUS at 17:04

## 2017-06-06 RX ADMIN — HYDROMORPHONE HYDROCHLORIDE 1 MILLIGRAM(S): 2 INJECTION INTRAMUSCULAR; INTRAVENOUS; SUBCUTANEOUS at 13:37

## 2017-06-06 RX ADMIN — SODIUM CHLORIDE 200 MILLILITER(S): 9 INJECTION, SOLUTION INTRAVENOUS at 06:43

## 2017-06-06 RX ADMIN — Medication 100 MILLIGRAM(S): at 11:36

## 2017-06-06 NOTE — PROGRESS NOTE ADULT - ASSESSMENT
32 yo M with PMH significant for Crohn's disease, Hx of perianal fistula repair in 2002 presents to the ED c/o upper abdominal pain x 1 day.    # Pancreatitis:   - NPO  - IVF  - Pain meds/Zofran PRN  - US abdomen - non obstructive  - triglyceride level : nl  - [facility] protocol. monitor for resp distress    # Leucocytosis  - Likely  2/2 to inflammation    # Hypokalemia  -resolved, potassium 3.8    # CTAP: found to have left 10th and 11th rib fracture sub acute  - Denies chest pain or SOB at present      # CTAP: low attenuation lesion within the left kidney  - outpatient f/u with PMD after discharge    # Crohn's disease  - stable, on no medications    # Nicotine dependence  - Counselled on cessation  - Nicotine patch    # Etoh use  - counselled on cessation  - [facility] protocol  - MVA/Thiamine/FA for suspected thiamine etoh related deficiency  - Seizure precaution

## 2017-06-06 NOTE — PROGRESS NOTE ADULT - ASSESSMENT
30 yo man admitted with pancreatitis. Suspected due to alcohol, but also has GB sludge. LFTs are normal. Still with pain.    -NPO  -IVF; can reduce rate to 150 cc/hr since nice improvement in hct and bun.  -Pain management  -Trend labs

## 2017-06-06 NOTE — PROGRESS NOTE ADULT - SUBJECTIVE AND OBJECTIVE BOX
HPI:  30 yo man admitted with abdominal pain, n/v. CT notable for acute pancreatitis, suspected related to alcohol use. Patient has history of Crohn's disease. Not currently on medication or following with a gastroenterologist. 3 loose BM today, no bleedin in the stool. Perirectal fistulas on CT, history of repair of fistulas in 2002.     No significant improvement in pain today, about the same. No further n/v. No fever.     MEDICATIONS  (STANDING):  nicotine -   7 mG/24Hr(s) Patch 1patch Transdermal daily  lactated ringers. 2000milliLiter(s) IV Continuous <Continuous>  folic acid 1milliGRAM(s) Oral daily  multivitamin 1Tablet(s) Oral daily  thiamine 100milliGRAM(s) Oral daily    MEDICATIONS  (PRN):  acetaminophen   Tablet 650milliGRAM(s) Oral every 6 hours PRN pain and fever  aluminum hydroxide/magnesium hydroxide/simethicone Suspension 30milliLiter(s) Oral every 4 hours PRN Dyspepsia  ondansetron Injectable 4milliGRAM(s) IV Push every 6 hours PRN Nausea and/or Vomiting  LORazepam   Injectable 2milliGRAM(s) IV Push every 1 hour PRN CIWA-Ar score 8 or greater  HYDROmorphone  Injectable 1milliGRAM(s) IV Push every 3 hours PRN Severe Pain (7 - 10)      Allergies    No Known Allergies    Intolerances        REVIEW OF SYSTEMS    General: no fever    HEENT: no icterus    Respiratory and Thorax: no SOB  	  Cardiovascular: no CP    Gastrointestinal: as above    Skin: no jaundice      Vital Signs Last 24 Hrs  T(C): 36.7, Max: 36.8 (06-05 @ 17:56)  T(F): 98.1, Max: 98.3 (06-05 @ 17:56)  HR: 103 (96 - 118)  BP: 116/70 (116/70 - 134/85)  BP(mean): --  RR: 17 (14 - 17)  SpO2: 98% (95% - 98%)    PHYSICAL EXAM:    Constitutional: NAD    HEENT: anicteric    Respiratory: CTA BL    Cardiovascular:  RRR    Gastrointestinal: soft ND +BS epigastric tenderness    Extremities: no LE edema    Neuro: no focal deficits    Skin: no jaundice      LABS:                        14.5   8.5   )-----------( 291      ( 06 Jun 2017 06:43 )             41.3     06-06    133<L>  |  101  |  6<L>  ----------------------------<  61<L>  3.8   |  25  |  0.51    Ca    8.3<L>      06 Jun 2017 06:43  Phos  2.3     06-05  Mg     2.2     06-05    TPro  6.5  /  Alb  2.8<L>  /  TBili  0.8  /  DBili  0.2  /  AST  16  /  ALT  22  /  AlkPhos  110  06-05    PT/INR - ( 05 Jun 2017 07:09 )   PT: 11.1 sec;   INR: 1.03 ratio         PTT - ( 05 Jun 2017 07:09 )  PTT:26.1 sec  LIVER FUNCTIONS - ( 05 Jun 2017 07:09 )  Alb: 2.8 g/dL / Pro: 6.5 gm/dL / ALK PHOS: 110 U/L / ALT: 22 U/L / AST: 16 U/L / GGT: x             RADIOLOGY & ADDITIONAL STUDIES:

## 2017-06-06 NOTE — PROGRESS NOTE ADULT - SUBJECTIVE AND OBJECTIVE BOX
HPI:  30 yo M with PMH significant for Crohn's disease, Hx of perianal fistula repair in 2002 presents to the ED c/o upper abdominal pain x 1 day. Initially pt had sharp, bloating upper abdominal pain, 8/10 in severity, radiating to the back, associated with N/V. Drinks 4 25 0z beers daily. CT c/w pancreatitis. Lipase 3000.     U/S negative for obstructive findings. no ductal dilation.      6/6/17: C/o epigastric pain on a scale of 13/10. Pain is less than yesterday. Denies tremors, fever, chills, diarrhea, vomiting, hx of etoh withdrawal.   ROS: all other ROS negative unless listed above.      Vital Signs Last 24 Hrs  T(C): 36.7, Max: 37.1 (06-05 @ 15:01)  T(F): 98.1, Max: 98.7 (06-05 @ 15:01)  HR: 103 (96 - 118)  BP: 116/70 (116/70 - 134/85)  BP(mean): --  RR: 17 (14 - 17)  SpO2: 98% (95% - 98%)      PHYSICAL EXAM:    Constitutional: NAD, awake and alert, in NAD  HEENT: PERR EOMI  Respiratory: clear b/li  Cardiovascular: S1S2, RRR  Gastrointestinal: epigastric tenderness. BS+      MEDICATIONS:  MEDICATIONS  (STANDING):  nicotine -   7 mG/24Hr(s) Patch 1patch Transdermal daily  lactated ringers. 2000milliLiter(s) IV Continuous <Continuous>  folic acid 1milliGRAM(s) Oral daily  multivitamin 1Tablet(s) Oral daily  thiamine 100milliGRAM(s) Oral daily    MEDICATIONS (PRN):  Tylenol 650mg po q 6 hours as needed for pain and fever  zofran 4mg IV q 6 hours for nausea   Dilaudid 4m IV push q 6 hours as needed for severe pain (7-10)      LABS: All labs reviewed:                              14.5   8.5   )-----------( 291      ( 06 Jun 2017 06:43 )             41.3     06-06    133<L>  |  101  |  6<L>  ----------------------------<  61<L>  3.8   |  25  |  0.51    Ca    8.3<L>      06 Jun 2017 06:43  Phos  2.3     06-05  Mg     2.2     06-05    TPro  6.5  /  Alb  2.8<L>  /  TBili  0.8  /  DBili  0.2  /  AST  16  /  ALT  22  /  AlkPhos  110  06-05        LIVER FUNCTIONS - ( 05 Jun 2017 07:09 )  Alb: 2.8 g/dL / Pro: 6.5 gm/dL / ALK PHOS: 110 U/L / ALT: 22 U/L / AST: 16 U/L / GGT: x           PT/INR - ( 05 Jun 2017 07:09 )   PT: 11.1 sec;   INR: 1.03 ratio         PTT - ( 05 Jun 2017 07:09 )  PTT:26.1 sec        RADIOLOGY/EKG:    CT AP  IMPRESSION:    Findings consistent with acute interstitial edematous pancreatitis.    Additional findings consistent with sequelae of chronic inflammatory   bowel disease, demonstrating relative stability since prior examination.    Left 10th and 11th posterior rib fractures with mild callus formation   suggesting sub-acuity, new since December 2016

## 2017-06-07 RX ADMIN — Medication 1 PATCH: at 11:12

## 2017-06-07 RX ADMIN — Medication 1 TABLET(S): at 11:12

## 2017-06-07 RX ADMIN — Medication 100 MILLIGRAM(S): at 11:12

## 2017-06-07 RX ADMIN — Medication 1 MILLIGRAM(S): at 11:12

## 2017-06-07 RX ADMIN — HYDROMORPHONE HYDROCHLORIDE 1 MILLIGRAM(S): 2 INJECTION INTRAMUSCULAR; INTRAVENOUS; SUBCUTANEOUS at 16:52

## 2017-06-07 RX ADMIN — HYDROMORPHONE HYDROCHLORIDE 1 MILLIGRAM(S): 2 INJECTION INTRAMUSCULAR; INTRAVENOUS; SUBCUTANEOUS at 04:20

## 2017-06-07 RX ADMIN — SODIUM CHLORIDE 150 MILLILITER(S): 9 INJECTION, SOLUTION INTRAVENOUS at 06:52

## 2017-06-07 RX ADMIN — HYDROMORPHONE HYDROCHLORIDE 1 MILLIGRAM(S): 2 INJECTION INTRAMUSCULAR; INTRAVENOUS; SUBCUTANEOUS at 20:33

## 2017-06-07 RX ADMIN — ONDANSETRON 4 MILLIGRAM(S): 8 TABLET, FILM COATED ORAL at 15:25

## 2017-06-07 RX ADMIN — ONDANSETRON 4 MILLIGRAM(S): 8 TABLET, FILM COATED ORAL at 22:34

## 2017-06-07 RX ADMIN — HYDROMORPHONE HYDROCHLORIDE 1 MILLIGRAM(S): 2 INJECTION INTRAMUSCULAR; INTRAVENOUS; SUBCUTANEOUS at 03:21

## 2017-06-07 RX ADMIN — Medication 1 PATCH: at 11:15

## 2017-06-07 RX ADMIN — ONDANSETRON 4 MILLIGRAM(S): 8 TABLET, FILM COATED ORAL at 09:29

## 2017-06-07 RX ADMIN — SODIUM CHLORIDE 150 MILLILITER(S): 9 INJECTION, SOLUTION INTRAVENOUS at 20:25

## 2017-06-07 RX ADMIN — HYDROMORPHONE HYDROCHLORIDE 1 MILLIGRAM(S): 2 INJECTION INTRAMUSCULAR; INTRAVENOUS; SUBCUTANEOUS at 13:21

## 2017-06-07 RX ADMIN — SODIUM CHLORIDE 150 MILLILITER(S): 9 INJECTION, SOLUTION INTRAVENOUS at 13:25

## 2017-06-07 RX ADMIN — HYDROMORPHONE HYDROCHLORIDE 1 MILLIGRAM(S): 2 INJECTION INTRAMUSCULAR; INTRAVENOUS; SUBCUTANEOUS at 13:50

## 2017-06-07 RX ADMIN — HYDROMORPHONE HYDROCHLORIDE 1 MILLIGRAM(S): 2 INJECTION INTRAMUSCULAR; INTRAVENOUS; SUBCUTANEOUS at 01:00

## 2017-06-07 RX ADMIN — HYDROMORPHONE HYDROCHLORIDE 1 MILLIGRAM(S): 2 INJECTION INTRAMUSCULAR; INTRAVENOUS; SUBCUTANEOUS at 17:56

## 2017-06-07 RX ADMIN — ONDANSETRON 4 MILLIGRAM(S): 8 TABLET, FILM COATED ORAL at 01:00

## 2017-06-07 RX ADMIN — HYDROMORPHONE HYDROCHLORIDE 1 MILLIGRAM(S): 2 INJECTION INTRAMUSCULAR; INTRAVENOUS; SUBCUTANEOUS at 07:36

## 2017-06-07 RX ADMIN — HYDROMORPHONE HYDROCHLORIDE 1 MILLIGRAM(S): 2 INJECTION INTRAMUSCULAR; INTRAVENOUS; SUBCUTANEOUS at 10:27

## 2017-06-07 RX ADMIN — HYDROMORPHONE HYDROCHLORIDE 1 MILLIGRAM(S): 2 INJECTION INTRAMUSCULAR; INTRAVENOUS; SUBCUTANEOUS at 06:47

## 2017-06-07 RX ADMIN — HYDROMORPHONE HYDROCHLORIDE 1 MILLIGRAM(S): 2 INJECTION INTRAMUSCULAR; INTRAVENOUS; SUBCUTANEOUS at 21:07

## 2017-06-07 RX ADMIN — HYDROMORPHONE HYDROCHLORIDE 1 MILLIGRAM(S): 2 INJECTION INTRAMUSCULAR; INTRAVENOUS; SUBCUTANEOUS at 10:45

## 2017-06-07 NOTE — PROGRESS NOTE ADULT - SUBJECTIVE AND OBJECTIVE BOX
HPI:  30 yo M with PMH significant for Crohn's disease, Hx of perianal fistula repair in 2002 presents to the ED c/o upper abdominal pain x 1 day. Initially pt had sharp, bloating upper abdominal pain, 8/10 in severity, radiating to the back, associated with N/V. Drinks 4 25 0z beers daily. CT c/w pancreatitis. Lipase 3000.     U/S negative for obstructive findings. no ductal dilation.      6/6/17: C/o epigastric pain on a scale of 13/10. Pain is less than yesterday. Denies tremors, fever, chills, diarrhea, vomiting, hx of etoh withdrawal.   6/7/17: States his pain is better than yesterday, now on a scale of 9/10. Had an episode of nausea relived by zofran. Denies tremors, fever, chills, vomiting, diarrhea.    ROS: all other ROS negative unless listed above.    Vital Signs Last 24 Hrs  T(C): 36.4, Max: 37.1 (06-06 @ 17:05)  T(F): 97.6, Max: 98.8 (06-06 @ 17:05)  HR: 87 (87 - 101)  BP: 113/64 (112/64 - 121/73)  BP(mean): --  RR: 16 (16 - 17)  SpO2: 96% (95% - 98%)        PHYSICAL EXAM:    Constitutional: NAD, awake and alert, in NAD  HEENT: PERR EOMI  Respiratory: clear b/li  Cardiovascular: S1S2, RRR  Gastrointestinal: epigastric tenderness. BS+      MEDICATIONS:  MEDICATIONS  (STANDING):  nicotine -   7 mG/24Hr(s) Patch 1patch Transdermal daily  lactated ringers. 2000milliLiter(s) IV Continuous <Continuous>  folic acid 1milliGRAM(s) Oral daily  multivitamin 1Tablet(s) Oral daily  thiamine 100milliGRAM(s) Oral daily    MEDICATIONS (PRN):  Tylenol 650mg po q 6 hours as needed for pain and fever  zofran 4mg IV q 6 hours for nausea   Dilaudid 4m IV push q 6 hours as needed for severe pain (7-10)      LABS: All labs reviewed:                                  14.5   8.5   )-----------( 291      ( 06 Jun 2017 06:43 )             41.3     06-06    133<L>  |  101  |  6<L>  ----------------------------<  61<L>  3.8   |  25  |  0.51    Ca    8.3<L>      06 Jun 2017 06:43              RADIOLOGY/EKG:    CT AP  IMPRESSION:    Findings consistent with acute interstitial edematous pancreatitis.    Additional findings consistent with sequelae of chronic inflammatory   bowel disease, demonstrating relative stability since prior examination.    Left 10th and 11th posterior rib fractures with mild callus formation   suggesting sub-acuity, new since December 2016

## 2017-06-07 NOTE — PROGRESS NOTE ADULT - SUBJECTIVE AND OBJECTIVE BOX
Patient is a 31y old  Male who presents with a chief complaint of c/o abdominal pain x 1 day (04 Jun 2017 22:11)      HPI:  30 yo M with PMH significant for Crohn's disease (not on any meds at home), Hx of perianal fistula repair in 2002 presents to the ED c/o upper abdominal pain x 1 day. Pt states that he is having sharp, bloating upper abdominal pain, 8/10 in severity, radiating to the back, associated with N/V. Pt could not tolerate any food or drink today.Denies blood in stool or urine. Denies fever or chills. Denies chest pain, palpitations or SOB. Pt drinks 4-6 beers every day and occasionally more than that. Smokes 1/2 PPD.    CTAP found to have acute edematous pancreatitis. Chronic inflammatory bowel disease and perianal fistula tracks noted.    Pain is improved, but not resolved. Lipase is down.     In the ED:  Meds: Dilaudid 1 mg IV x 2, Morphine 4 mg IV x 1, Pepcid, Zofran, IVF NS 2L bolus (04 Jun 2017 22:11)      PAST MEDICAL & SURGICAL HISTORY:  Crohn&#x27;s disease of large intestine without complication: (No current flare up)  Perianal fistula: repair in 2002  History of colonoscopy: (2014)  S/P ORIF (open reduction internal fixation) fracture: (Right ankle, 2014)      MEDICATIONS  (STANDING):  nicotine -   7 mG/24Hr(s) Patch 1patch Transdermal daily  folic acid 1milliGRAM(s) Oral daily  multivitamin 1Tablet(s) Oral daily  lactated ringers. 1000milliLiter(s) IV Continuous <Continuous>    MEDICATIONS  (PRN):  acetaminophen   Tablet 650milliGRAM(s) Oral every 6 hours PRN pain and fever  aluminum hydroxide/magnesium hydroxide/simethicone Suspension 30milliLiter(s) Oral every 4 hours PRN Dyspepsia  ondansetron Injectable 4milliGRAM(s) IV Push every 6 hours PRN Nausea and/or Vomiting  LORazepam   Injectable 2milliGRAM(s) IV Push every 1 hour PRN CIWA-Ar score 8 or greater  HYDROmorphone  Injectable 1milliGRAM(s) IV Push every 3 hours PRN Severe Pain (7 - 10)      Allergies    No Known Allergies    Intolerances        REVIEW OF SYSTEMS:    CONSTITUTIONAL: No weakness, fevers or chills  RESPIRATORY: No cough, wheezing, hemoptysis; No shortness of breath  CARDIOVASCULAR: No chest pain or palpitations  GASTROINTESTINAL: No abdominal or epigastric pain. No nausea, vomiting, or hematemesis; No diarrhea or constipation. No melena or hematochezia.  All other review of systems is negative unless indicated above.    Vital Signs Last 24 Hrs  T(C): 36.4, Max: 37.1 (06-06 @ 17:05)  T(F): 97.6, Max: 98.8 (06-06 @ 17:05)  HR: 87 (87 - 101)  BP: 113/64 (112/64 - 121/76)  BP(mean): --  RR: 16 (16 - 17)  SpO2: 96% (95% - 98%)    PHYSICAL EXAM:    Constitutional: NAD, well-developed  Respiratory: CTAB  Cardiovascular: S1 and S2, RRR, no M/G/R  Gastrointestinal: BS+, soft, NT/ND  Extremities: No peripheral edema  Psychiatric: Normal mood, normal affect  Skin: No rashes    LABS:                        14.5   8.5   )-----------( 291      ( 06 Jun 2017 06:43 )             41.3     06-06    133<L>  |  101  |  6<L>  ----------------------------<  61<L>  3.8   |  25  |  0.51    Ca    8.3<L>      06 Jun 2017 06:43            RADIOLOGY & ADDITIONAL STUDIES:

## 2017-06-07 NOTE — PROGRESS NOTE ADULT - ASSESSMENT
32 yo M with PMH significant for Crohn's disease, Hx of perianal fistula repair in 2002 presents to the ED c/o upper abdominal pain x 1 day.    # Pancreatitis:   - Keep NPO as per GI  - IVF  - Pain meds/Zofran PRN  - US abdomen - non obstructive  - triglyceride level : nl  - CIWA protocol. monitor for resp distress    # Leucocytosis  - resolved, wbc 8.5  -monitor cbc    # Hypokalemia  -resolved, potassium 3.8  -monitor cbc    # CTAP: found to have left 10th and 11th rib fracture sub acute  - Denies chest pain or SOB at present      # CTAP: low attenuation lesion within the left kidney  - outpatient f/u with PMD after discharge    # Crohn's disease  - stable, on no medications    # Nicotine dependence  - Counselled on cessation  - Nicotine patch    # Etoh use  - counselled on cessation  - [facility] protocol  - MVA/Thiamine/FA for suspected thiamine etoh related deficiency  - Seizure precaution 30 yo M with PMH significant for Crohn's disease, Hx of perianal fistula repair in 2002 presents to the ED c/o upper abdominal pain x 1 day.    # Pancreatitis:   - Keep NPO as per GI  - IVF  - Pain meds/Zofran PRN  - US abdomen - non obstructive  - triglyceride level : nl  - [facility] protocol. monitor for resp distress  - possible trial of clears tomorrow     # Leucocytosis  - resolved, wbc 8.5  -monitor cbc    # Hypokalemia  -resolved, potassium 3.8  -monitor cbc    # CTAP: found to have left 10th and 11th rib fracture sub acute  - Denies chest pain or SOB at present      # CTAP: low attenuation lesion within the left kidney  - outpatient f/u with PMD after discharge    # Crohn's disease  - stable, on no medications    # Nicotine dependence  - Counselled on cessation  - Nicotine patch    # Etoh use  - counselled on cessation  - [facility] protocol  - MVA/Thiamine/FA for suspected thiamine etoh related deficiency  - Seizure precaution

## 2017-06-08 RX ORDER — SODIUM CHLORIDE 9 MG/ML
1000 INJECTION INTRAMUSCULAR; INTRAVENOUS; SUBCUTANEOUS
Qty: 0 | Refills: 0 | Status: DISCONTINUED | OUTPATIENT
Start: 2017-06-08 | End: 2017-06-09

## 2017-06-08 RX ORDER — SODIUM,POTASSIUM PHOSPHATES 278-250MG
1 POWDER IN PACKET (EA) ORAL
Qty: 0 | Refills: 0 | Status: DISCONTINUED | OUTPATIENT
Start: 2017-06-08 | End: 2017-06-13

## 2017-06-08 RX ADMIN — HYDROMORPHONE HYDROCHLORIDE 1 MILLIGRAM(S): 2 INJECTION INTRAMUSCULAR; INTRAVENOUS; SUBCUTANEOUS at 09:30

## 2017-06-08 RX ADMIN — HYDROMORPHONE HYDROCHLORIDE 1 MILLIGRAM(S): 2 INJECTION INTRAMUSCULAR; INTRAVENOUS; SUBCUTANEOUS at 01:20

## 2017-06-08 RX ADMIN — Medication 500 MILLIGRAM(S): at 23:44

## 2017-06-08 RX ADMIN — Medication 1 TABLET(S): at 18:09

## 2017-06-08 RX ADMIN — ONDANSETRON 4 MILLIGRAM(S): 8 TABLET, FILM COATED ORAL at 05:04

## 2017-06-08 RX ADMIN — Medication 1 TABLET(S): at 11:08

## 2017-06-08 RX ADMIN — HYDROMORPHONE HYDROCHLORIDE 1 MILLIGRAM(S): 2 INJECTION INTRAMUSCULAR; INTRAVENOUS; SUBCUTANEOUS at 06:00

## 2017-06-08 RX ADMIN — HYDROMORPHONE HYDROCHLORIDE 1 MILLIGRAM(S): 2 INJECTION INTRAMUSCULAR; INTRAVENOUS; SUBCUTANEOUS at 05:04

## 2017-06-08 RX ADMIN — HYDROMORPHONE HYDROCHLORIDE 1 MILLIGRAM(S): 2 INJECTION INTRAMUSCULAR; INTRAVENOUS; SUBCUTANEOUS at 09:28

## 2017-06-08 RX ADMIN — Medication 1 TABLET(S): at 13:19

## 2017-06-08 RX ADMIN — SODIUM CHLORIDE 150 MILLILITER(S): 9 INJECTION, SOLUTION INTRAVENOUS at 10:36

## 2017-06-08 RX ADMIN — ONDANSETRON 4 MILLIGRAM(S): 8 TABLET, FILM COATED ORAL at 11:08

## 2017-06-08 RX ADMIN — ONDANSETRON 4 MILLIGRAM(S): 8 TABLET, FILM COATED ORAL at 16:44

## 2017-06-08 RX ADMIN — SODIUM CHLORIDE 85 MILLILITER(S): 9 INJECTION INTRAMUSCULAR; INTRAVENOUS; SUBCUTANEOUS at 16:57

## 2017-06-08 RX ADMIN — Medication 1 TABLET(S): at 21:19

## 2017-06-08 RX ADMIN — Medication 1 PATCH: at 11:08

## 2017-06-08 RX ADMIN — HYDROMORPHONE HYDROCHLORIDE 1 MILLIGRAM(S): 2 INJECTION INTRAMUSCULAR; INTRAVENOUS; SUBCUTANEOUS at 02:17

## 2017-06-08 RX ADMIN — ONDANSETRON 4 MILLIGRAM(S): 8 TABLET, FILM COATED ORAL at 23:10

## 2017-06-08 RX ADMIN — SODIUM CHLORIDE 150 MILLILITER(S): 9 INJECTION, SOLUTION INTRAVENOUS at 03:23

## 2017-06-08 RX ADMIN — Medication 1 MILLIGRAM(S): at 11:08

## 2017-06-08 RX ADMIN — Medication 1 PATCH: at 11:30

## 2017-06-08 RX ADMIN — Medication 650 MILLIGRAM(S): at 13:19

## 2017-06-08 NOTE — PROGRESS NOTE ADULT - SUBJECTIVE AND OBJECTIVE BOX
HPI:  30 yo man admitted with abdominal pain, n/v. CT notable for acute pancreatitis, suspected related to alcohol use. Patient has history of Crohn's disease. Not currently on medication or following with a gastroenterologist. 3 loose BM today, no bleedin in the stool. Perirectal fistulas on CT, history of repair of fistulas in 2002.     Pain improved, taking pain meds for sleep. No n/v. No fever      MEDICATIONS  (STANDING):  nicotine -   7 mG/24Hr(s) Patch 1patch Transdermal daily  folic acid 1milliGRAM(s) Oral daily  multivitamin 1Tablet(s) Oral daily  lactated ringers. 1000milliLiter(s) IV Continuous <Continuous>    MEDICATIONS  (PRN):  acetaminophen   Tablet 650milliGRAM(s) Oral every 6 hours PRN pain and fever  aluminum hydroxide/magnesium hydroxide/simethicone Suspension 30milliLiter(s) Oral every 4 hours PRN Dyspepsia  ondansetron Injectable 4milliGRAM(s) IV Push every 6 hours PRN Nausea and/or Vomiting  LORazepam   Injectable 2milliGRAM(s) IV Push every 1 hour PRN CIWA-Ar score 8 or greater  HYDROmorphone  Injectable 1milliGRAM(s) IV Push every 3 hours PRN Severe Pain (7 - 10)      Allergies    No Known Allergies    Intolerances        REVIEW OF SYSTEMS    General: no fever    HEENT: no icterus    Respiratory and Thorax: no SOB  	  Cardiovascular: no CP    Gastrointestinal: as above    Skin: no jaundice      Vital Signs Last 24 Hrs  T(C): 36.3, Max: 36.7 (06-07 @ 16:44)  T(F): 97.4, Max: 98 (06-07 @ 16:44)  HR: 82 (81 - 90)  BP: 131/77 (113/64 - 131/77)  BP(mean): --  RR: 16 (16 - 17)  SpO2: 97% (96% - 98%)    PHYSICAL EXAM:    Constitutional: NAD    HEENT: anicteric    Respiratory: CTA BL    Cardiovascular:  RRR    Gastrointestinal: soft ND +BS NTTP    Extremities: no LE edema    Neuro: no focal deficits    Skin: no jaundice      LABS:                RADIOLOGY & ADDITIONAL STUDIES:

## 2017-06-08 NOTE — DIETITIAN INITIAL EVALUATION ADULT. - NUTRITIONGOAL OUTCOME3
patient will adhere to low fat diet (<55-65gm/day), alcohol cessation Patient will adhere to low fat diet (<55-65gm/day), alcohol cessation

## 2017-06-08 NOTE — DIETITIAN INITIAL EVALUATION ADULT. - ORAL INTAKE PTA
abdominal pain prior to admission/poor poor/abdominal pain prior to admission and vomiting 6/4/17-6/5/17

## 2017-06-08 NOTE — DIETITIAN INITIAL EVALUATION ADULT. - PERTINENT LABORATORY DATA
6/6: Lipase: 779 (H), Na+: 133 (L), BUN: 6 (L), Glucose: 61 (L), Ca+: 8.3 (L), 6/5: Folate: 3.3 (L), Phos: 2.3 (L), Albumin: 2.8 (L)

## 2017-06-08 NOTE — PROGRESS NOTE ADULT - ASSESSMENT
32 yo man admitted with pancreatitis. Suspected due to alcohol, but also has GB sludge. LFTs are normal. Still with pain.    -Clear liquids  -Can DC IVF if tolerating clears  -Wean pain meds  -Trend labs

## 2017-06-08 NOTE — PROGRESS NOTE ADULT - SUBJECTIVE AND OBJECTIVE BOX
CHIEF COMPLAINT/diagnosis: abdompain/ pancreatitis    SUBJECTIVE: no compalints    REVIEW OF SYSTEMS:    CONSTITUTIONAL: No weakness, fevers or chills  EYES/ENT: No visual changes;  No vertigo or throat pain   NECK: No pain or stiffness  RESPIRATORY: No cough, wheezing, hemoptysis; No shortness of breath  CARDIOVASCULAR: No chest pain or palpitations  GASTROINTESTINAL: No abdominal or epigastric pain. No nausea, vomiting, or hematemesis; No diarrhea or constipation. No melena or hematochezia.  GENITOURINARY: No dysuria, frequency or hematuria  NEUROLOGICAL: No numbness or weakness  SKIN: No itching, burning, rashes, or lesions   All other review of systems is negative unless indicated above    Vital Signs Last 24 Hrs  T(C): 36.6, Max: 36.7 (06-07 @ 16:44)  T(F): 97.8, Max: 98 (06-07 @ 16:44)  HR: 78 (78 - 90)  BP: 117/66 (117/66 - 131/77)  BP(mean): --  RR: 16 (16 - 17)  SpO2: 98% (97% - 98%)    I&O's Summary    I & Os for current day (as of 08 Jun 2017 15:42)  =============================================  IN: 2000 ml / OUT: 0 ml / NET: 2000 ml      CAPILLARY BLOOD GLUCOSE      PHYSICAL EXAM:    Constitutional: NAD, awake and alert, well-developed  HEENT: PERR, EOMI, Normal Hearing, MMM  Neck: Soft and supple, No LAD, No JVD  Respiratory: Breath sounds are clear bilaterally, No wheezing, rales or rhonchi  Cardiovascular: S1 and S2, regular rate and rhythm, no Murmurs, gallops or rubs  Gastrointestinal: Bowel Sounds present, soft, nontender, nondistended, no guarding, no rebound  Extremities: No peripheral edema  Vascular: 2+ peripheral pulses  Neurological: A/O x 3, no focal deficits  Musculoskeletal: 5/5 strength b/l upper and lower extremities  Skin: No rashes    MEDICATIONS:  MEDICATIONS  (STANDING):  nicotine -   7 mG/24Hr(s) Patch 1patch Transdermal daily  folic acid 1milliGRAM(s) Oral daily  multivitamin 1Tablet(s) Oral daily  potassium acid phosphate/sodium acid phosphate tablet (K-PHOS No. 2) 1Tablet(s) Oral four times a day with meals  sodium chloride 0.9%. 1000milliLiter(s) IV Continuous <Continuous>      LABS: All Labs Reviewed:              HPI:  30 yo M with PMH significant for Crohn's disease, Hx of perianal fistula repair in 2002 presents to the ED c/o upper abdominal pain x 1 day. Initially pt had sharp, bloating upper abdominal pain, 8/10 in severity, radiating to the back, associated with N/V. Drinks 4 25 0z beers daily. CT c/w pancreatitis. Lipase 3000.   U/S negative for obstructive findings. no ductal dilation.        Assessment and plan: 		      1-Pancreatitis:   -Lipase trending down 3216 >> 779  -advance diet today  -c/w gentle hydration througth the night   -repeat labs in AM  -[patient still c/o mild nausea, no vommitting however  -continues to ask for pain medications, despite benign physical exam. Discontinue dialuadid, no morphine of any kind.   -percocet PRN and acetaminophen.   -will add naproxen prn as well    2-Leucocytosis  - resolved, wbc 8.5  -monitor cbc    3-Hypokalemia  -resolved,    4-found to have left 10th and 11th rib fracture sub acute  - Denies chest pain or SOB at present      5- low attenuation lesion within the left kidney  - outpatient f/u with PMD after discharge    6-Crohn's disease  - stable, on no medications    7-Nicotine dependence  - Counselled on cessation  - Nicotine patch    8-Etoh use  - counselled on cessation  - [facility] protocol  - MVA/Thiamine/FA for suspected thiamine etoh related deficiency  - Seizure precaution    9- DVt proph- scds, ambulate

## 2017-06-08 NOTE — DIETITIAN INITIAL EVALUATION ADULT. - OTHER INFO
32 yo M with PMH significant for Crohn's disease, Hx of perianal fistula repair in 2002 presents to the ED c/o upper abdominal pain x 1 day. Initially pt had sharp, bloating upper abdominal pain, 8/10 in severity, radiating to the back, associated with N/V. Drinks 4 25 oz beers daily. CT c/w pancreatitis. Lipase 3000. 32 yo M with PMH significant for Crohn's disease, Hx of perianal fistula repair in 2002 presents to the ED c/o upper abdominal pain x 1 day. Initially pt had sharp, bloating upper abdominal pain, 8/10 in severity, radiating to the back, associated with N/V. Drinks 4-5 12oz beers daily. CT c/w pancreatitis. Lipase 3000. 30 yo M with PMH significant for Crohn's disease, Hx of perianal fistula repair in 2002 presents to the ED c/o upper abdominal pain x 1 day. Initially pt had sharp, bloating upper abdominal pain, 8/10 in severity, radiating to the back, associated with N/V. Drinks 4-5 12oz beers daily. CT c/w pancreatitis. Lipase 3000. Patient has hx of crohns, does not report following any specific diet at home; normally has cereal/eggs for breakfast, sandwich for lunch and meat with vegetable for dinner- does not eat fried/fast food- no intolerance to milk products.

## 2017-06-08 NOTE — DIETITIAN INITIAL EVALUATION ADULT. - SIGNS/SYMPTOMS
self reported intake of 4 25 oz cans of beer daily elevated lipase (779), abdominal pain self reported intake of 4-5 12oz. cans of beer daily elevated lipase (779), abdominal pain and vomiting PTA

## 2017-06-09 LAB
ALBUMIN SERPL ELPH-MCNC: 2.6 G/DL — LOW (ref 3.3–5)
ALP SERPL-CCNC: 86 U/L — SIGNIFICANT CHANGE UP (ref 40–120)
ALT FLD-CCNC: 8 U/L — LOW (ref 12–78)
AMYLASE P1 CFR SERPL: 91 U/L — SIGNIFICANT CHANGE UP (ref 25–115)
ANION GAP SERPL CALC-SCNC: 6 MMOL/L — SIGNIFICANT CHANGE UP (ref 5–17)
AST SERPL-CCNC: 15 U/L — SIGNIFICANT CHANGE UP (ref 15–37)
BILIRUB SERPL-MCNC: 0.3 MG/DL — SIGNIFICANT CHANGE UP (ref 0.2–1.2)
BUN SERPL-MCNC: 2 MG/DL — LOW (ref 7–23)
CALCIUM SERPL-MCNC: 8.8 MG/DL — SIGNIFICANT CHANGE UP (ref 8.5–10.1)
CHLORIDE SERPL-SCNC: 104 MMOL/L — SIGNIFICANT CHANGE UP (ref 96–108)
CO2 SERPL-SCNC: 29 MMOL/L — SIGNIFICANT CHANGE UP (ref 22–31)
CREAT SERPL-MCNC: 0.69 MG/DL — SIGNIFICANT CHANGE UP (ref 0.5–1.3)
GLUCOSE SERPL-MCNC: 117 MG/DL — HIGH (ref 70–99)
LIDOCAIN IGE QN: 1238 U/L — HIGH (ref 73–393)
POTASSIUM SERPL-MCNC: 3.8 MMOL/L — SIGNIFICANT CHANGE UP (ref 3.5–5.3)
POTASSIUM SERPL-SCNC: 3.8 MMOL/L — SIGNIFICANT CHANGE UP (ref 3.5–5.3)
PROT SERPL-MCNC: 6.7 GM/DL — SIGNIFICANT CHANGE UP (ref 6–8.3)
SODIUM SERPL-SCNC: 139 MMOL/L — SIGNIFICANT CHANGE UP (ref 135–145)

## 2017-06-09 RX ORDER — MORPHINE SULFATE 50 MG/1
2 CAPSULE, EXTENDED RELEASE ORAL EVERY 6 HOURS
Qty: 0 | Refills: 0 | Status: DISCONTINUED | OUTPATIENT
Start: 2017-06-09 | End: 2017-06-12

## 2017-06-09 RX ORDER — ZOLPIDEM TARTRATE 10 MG/1
5 TABLET ORAL AT BEDTIME
Qty: 0 | Refills: 0 | Status: DISCONTINUED | OUTPATIENT
Start: 2017-06-09 | End: 2017-06-13

## 2017-06-09 RX ORDER — DIPHENHYDRAMINE HCL 50 MG
25 CAPSULE ORAL ONCE
Qty: 0 | Refills: 0 | Status: COMPLETED | OUTPATIENT
Start: 2017-06-09 | End: 2017-06-09

## 2017-06-09 RX ORDER — SODIUM CHLORIDE 9 MG/ML
1000 INJECTION INTRAMUSCULAR; INTRAVENOUS; SUBCUTANEOUS
Qty: 0 | Refills: 0 | Status: DISCONTINUED | OUTPATIENT
Start: 2017-06-09 | End: 2017-06-10

## 2017-06-09 RX ADMIN — MORPHINE SULFATE 2 MILLIGRAM(S): 50 CAPSULE, EXTENDED RELEASE ORAL at 18:52

## 2017-06-09 RX ADMIN — ONDANSETRON 4 MILLIGRAM(S): 8 TABLET, FILM COATED ORAL at 23:22

## 2017-06-09 RX ADMIN — Medication 1 TABLET(S): at 16:38

## 2017-06-09 RX ADMIN — Medication 1 PATCH: at 12:09

## 2017-06-09 RX ADMIN — Medication 1 TABLET(S): at 20:56

## 2017-06-09 RX ADMIN — Medication 1 TABLET(S): at 10:24

## 2017-06-09 RX ADMIN — SODIUM CHLORIDE 150 MILLILITER(S): 9 INJECTION INTRAMUSCULAR; INTRAVENOUS; SUBCUTANEOUS at 15:16

## 2017-06-09 RX ADMIN — Medication 1 TABLET(S): at 12:10

## 2017-06-09 RX ADMIN — Medication 1 TABLET(S): at 13:48

## 2017-06-09 RX ADMIN — SODIUM CHLORIDE 150 MILLILITER(S): 9 INJECTION INTRAMUSCULAR; INTRAVENOUS; SUBCUTANEOUS at 20:56

## 2017-06-09 RX ADMIN — MORPHINE SULFATE 2 MILLIGRAM(S): 50 CAPSULE, EXTENDED RELEASE ORAL at 12:19

## 2017-06-09 RX ADMIN — Medication 1 MILLIGRAM(S): at 12:10

## 2017-06-09 RX ADMIN — ONDANSETRON 4 MILLIGRAM(S): 8 TABLET, FILM COATED ORAL at 16:36

## 2017-06-09 RX ADMIN — Medication 25 MILLIGRAM(S): at 00:12

## 2017-06-09 RX ADMIN — Medication 1 PATCH: at 11:30

## 2017-06-09 RX ADMIN — ONDANSETRON 4 MILLIGRAM(S): 8 TABLET, FILM COATED ORAL at 10:19

## 2017-06-09 RX ADMIN — Medication 500 MILLIGRAM(S): at 20:56

## 2017-06-09 RX ADMIN — ZOLPIDEM TARTRATE 5 MILLIGRAM(S): 10 TABLET ORAL at 23:29

## 2017-06-09 RX ADMIN — Medication 1 PATCH: at 12:30

## 2017-06-09 NOTE — PROGRESS NOTE ADULT - ASSESSMENT
32 yo man admitted with pancreatitis. Suspected due to alcohol, but also has GB sludge. LFTs are normal.     -Low fat diet  -Can DC IVF if  -Fluctuating lipase is of unclear signficance. Not a good marker of progression/resolution of pancreatitis. It is also unclear if patient truly needs pain meds. No leukocytosis or fevers.   -Trend labs

## 2017-06-09 NOTE — PROGRESS NOTE ADULT - SUBJECTIVE AND OBJECTIVE BOX
CHIEF COMPLAINT/diagnosis: acute pancreatiitis    SUBJECTIVE: c/o ongoing epigastric and right upper abdominal pains    REVIEW OF SYSTEMS:    CONSTITUTIONAL: No weakness, fevers or chills  EYES/ENT: No visual changes;  No vertigo or throat pain   NECK: No pain or stiffness  RESPIRATORY: No cough, wheezing, hemoptysis; No shortness of breath  CARDIOVASCULAR: No chest pain or palpitations  GASTROINTESTINAL: No abdominal or epigastric pain. No nausea, vomiting, or hematemesis; No diarrhea or constipation. No melena or hematochezia.  GENITOURINARY: No dysuria, frequency or hematuria  NEUROLOGICAL: No numbness or weakness  SKIN: No itching, burning, rashes, or lesions   All other review of systems is negative unless indicated above    Vital Signs Last 24 Hrs  T(C): 36.8, Max: 36.9 (06-09 @ 11:22)  T(F): 98.2, Max: 98.4 (06-09 @ 11:22)  HR: 72 (72 - 81)  BP: 128/80 (117/82 - 128/80)  BP(mean): --  RR: 18 (18 - 18)  SpO2: 99% (98% - 99%)    I&O's Summary      CAPILLARY BLOOD GLUCOSE      PHYSICAL EXAM:    Constitutional: NAD, awake and alert, well-developed  HEENT: PERR, EOMI, Normal Hearing, MMM  Neck: Soft and supple, No LAD, No JVD  Respiratory: Breath sounds are clear bilaterally, No wheezing, rales or rhonchi  Cardiovascular: S1 and S2, regular rate and rhythm, no Murmurs, gallops or rubs  Gastrointestinal: Bowel Sounds present, soft, tender in epigastric region,  nondistended, no guarding, no rebound  Extremities: No peripheral edema  Vascular: 2+ peripheral pulses  Neurological: A/O x 3, no focal deficits  Musculoskeletal: 5/5 strength b/l upper and lower extremities  Skin: No rashes    MEDICATIONS:  MEDICATIONS  (STANDING):  nicotine -   7 mG/24Hr(s) Patch 1patch Transdermal daily  folic acid 1milliGRAM(s) Oral daily  multivitamin 1Tablet(s) Oral daily  potassium acid phosphate/sodium acid phosphate tablet (K-PHOS No. 2) 1Tablet(s) Oral four times a day with meals  sodium chloride 0.9%. 1000milliLiter(s) IV Continuous <Continuous>  zolpidem 5milliGRAM(s) Oral at bedtime      LABS: All Labs Reviewed:    06-09    139  |  104  |  2<L>  ----------------------------<  117<H>  3.8   |  29  |  0.69    Ca    8.8      09 Jun 2017 07:20    TPro  6.7  /  Alb  2.6<L>  /  TBili  0.3  /  DBili  x   /  AST  15  /  ALT  8<L>  /  AlkPhos  86  06-09          Blood Culture: 06-05 @ 00:33  Organism --  Gram Stain Blood -- Gram Stain --  Specimen Source .Blood None  Culture-Blood --          Assessment and plan:    32 yo M with PMH significant for Crohn's disease, Hx of perianal fistula repair in 2002 presents to the ED c/o upper abdominal pain x 1 day. Initially pt had sharp, bloating upper abdominal pain, 8/10 in severity, radiating to the back, associated with N/V. Drinks 4 25 0z beers daily. CT c/w pancreatitis. Lipase 3000.   U/S negative for obstructive findings. no ductal dilation.       		    1-Pancreatitis:   -Lipase trending down 3216 >> 779>>1200 after advancing diet  -taperback to full liquid diet  -c/w fluids 150cc/hr  -repeat labs in AM  -today increased pain in the epigastric region  -percocet PRN and acetaminophen.  ivp prn morphine  -will add naproxen prn as well    2-Leucocytosis  - resolved, wbc 8.5  -monitor cbc    3-Hypokalemia  -resolved,    4-found to have left 10th and 11th rib fracture sub acute  - Denies chest pain or SOB at present      5- low attenuation lesion within the left kidney  - outpatient f/u with PMD after discharge    6-Crohn's disease  - stable, on no medications    7-Nicotine dependence  - Counselled on cessation  - Nicotine patch    8-Etoh use  - counselled on cessation  - CIWA protocol  - MVA/Thiamine/FA for suspected thiamine etoh related deficiency  - Seizure precaution    9- DVt proph- scds, ambulate

## 2017-06-09 NOTE — PROGRESS NOTE ADULT - SUBJECTIVE AND OBJECTIVE BOX
HPI:    30 yo man admitted with abdominal pain, n/v. CT notable for acute pancreatitis, suspected related to alcohol use. Patient has history of Crohn's disease. Not currently on medication or following with a gastroenterologist.     Had regular diet this am. Had some pain after eating sour cream with eggs. 3 loose bowel movements this am.     MEDICATIONS  (STANDING):  nicotine -   7 mG/24Hr(s) Patch 1patch Transdermal daily  folic acid 1milliGRAM(s) Oral daily  multivitamin 1Tablet(s) Oral daily  potassium acid phosphate/sodium acid phosphate tablet (K-PHOS No. 2) 1Tablet(s) Oral four times a day with meals  sodium chloride 0.9%. 1000milliLiter(s) IV Continuous <Continuous>    MEDICATIONS  (PRN):  acetaminophen   Tablet 650milliGRAM(s) Oral every 6 hours PRN pain and fever  aluminum hydroxide/magnesium hydroxide/simethicone Suspension 30milliLiter(s) Oral every 4 hours PRN Dyspepsia  ondansetron Injectable 4milliGRAM(s) IV Push every 6 hours PRN Nausea and/or Vomiting  oxyCODONE  5 mG/acetaminophen 325 mG 1Tablet(s) Oral every 12 hours PRN Severe Pain (7 - 10)  naproxen 500milliGRAM(s) Oral every 8 hours PRN pain  morphine  - Injectable 2milliGRAM(s) IV Push every 6 hours PRN Severe Pain (7 - 10)      Allergies    No Known Allergies    Intolerances        REVIEW OF SYSTEMS    General: no fever    HEENT: no icterus    Respiratory and Thorax: no SOB  	  Cardiovascular: no CP    Gastrointestinal: as above    Skin: no jaundice      Vital Signs Last 24 Hrs  T(C): 36.3, Max: 37.2 (06-08 @ 17:14)  T(F): 97.4, Max: 98.9 (06-08 @ 17:14)  HR: 72 (72 - 75)  BP: 120/85 (117/76 - 120/85)  BP(mean): --  RR: 18 (17 - 18)  SpO2: 98% (98% - 98%)    PHYSICAL EXAM:    Constitutional: NAD    HEENT: anicteric    Respiratory: CTA BL    Cardiovascular:  RRR    Gastrointestinal: soft ND +BS NTTP    Extremities: no LE edema    Neuro: no focal deficits    Skin: no jaundice      LABS:    06-09    139  |  104  |  2<L>  ----------------------------<  117<H>  3.8   |  29  |  0.69    Ca    8.8      09 Jun 2017 07:20    TPro  6.7  /  Alb  2.6<L>  /  TBili  0.3  /  DBili  x   /  AST  15  /  ALT  8<L>  /  AlkPhos  86  06-09      LIVER FUNCTIONS - ( 09 Jun 2017 07:20 )  Alb: 2.6 g/dL / Pro: 6.7 gm/dL / ALK PHOS: 86 U/L / ALT: 8 U/L / AST: 15 U/L / GGT: x             RADIOLOGY & ADDITIONAL STUDIES:

## 2017-06-10 LAB
AMYLASE P1 CFR SERPL: 91 U/L — SIGNIFICANT CHANGE UP (ref 25–115)
CULTURE RESULTS: SIGNIFICANT CHANGE UP
CULTURE RESULTS: SIGNIFICANT CHANGE UP
LIDOCAIN IGE QN: 1465 U/L — HIGH (ref 73–393)
SPECIMEN SOURCE: SIGNIFICANT CHANGE UP
SPECIMEN SOURCE: SIGNIFICANT CHANGE UP

## 2017-06-10 RX ORDER — SODIUM CHLORIDE 9 MG/ML
1000 INJECTION INTRAMUSCULAR; INTRAVENOUS; SUBCUTANEOUS
Qty: 0 | Refills: 0 | Status: DISCONTINUED | OUTPATIENT
Start: 2017-06-10 | End: 2017-06-13

## 2017-06-10 RX ORDER — HYDROMORPHONE HYDROCHLORIDE 2 MG/ML
0.5 INJECTION INTRAMUSCULAR; INTRAVENOUS; SUBCUTANEOUS EVERY 6 HOURS
Qty: 0 | Refills: 0 | Status: DISCONTINUED | OUTPATIENT
Start: 2017-06-10 | End: 2017-06-11

## 2017-06-10 RX ADMIN — Medication 1 PATCH: at 11:32

## 2017-06-10 RX ADMIN — Medication 500 MILLIGRAM(S): at 21:00

## 2017-06-10 RX ADMIN — Medication 1 MILLIGRAM(S): at 11:32

## 2017-06-10 RX ADMIN — HYDROMORPHONE HYDROCHLORIDE 0.5 MILLIGRAM(S): 2 INJECTION INTRAMUSCULAR; INTRAVENOUS; SUBCUTANEOUS at 22:25

## 2017-06-10 RX ADMIN — ONDANSETRON 4 MILLIGRAM(S): 8 TABLET, FILM COATED ORAL at 05:23

## 2017-06-10 RX ADMIN — SODIUM CHLORIDE 85 MILLILITER(S): 9 INJECTION INTRAMUSCULAR; INTRAVENOUS; SUBCUTANEOUS at 18:18

## 2017-06-10 RX ADMIN — MORPHINE SULFATE 2 MILLIGRAM(S): 50 CAPSULE, EXTENDED RELEASE ORAL at 07:34

## 2017-06-10 RX ADMIN — Medication 1 TABLET(S): at 18:16

## 2017-06-10 RX ADMIN — Medication 1 TABLET(S): at 09:32

## 2017-06-10 RX ADMIN — ZOLPIDEM TARTRATE 5 MILLIGRAM(S): 10 TABLET ORAL at 22:24

## 2017-06-10 RX ADMIN — SODIUM CHLORIDE 150 MILLILITER(S): 9 INJECTION INTRAMUSCULAR; INTRAVENOUS; SUBCUTANEOUS at 05:11

## 2017-06-10 RX ADMIN — Medication 1 PATCH: at 11:34

## 2017-06-10 RX ADMIN — ONDANSETRON 4 MILLIGRAM(S): 8 TABLET, FILM COATED ORAL at 17:21

## 2017-06-10 RX ADMIN — MORPHINE SULFATE 2 MILLIGRAM(S): 50 CAPSULE, EXTENDED RELEASE ORAL at 01:43

## 2017-06-10 RX ADMIN — Medication 500 MILLIGRAM(S): at 20:23

## 2017-06-10 RX ADMIN — Medication 1 TABLET(S): at 15:25

## 2017-06-10 RX ADMIN — Medication 500 MILLIGRAM(S): at 09:32

## 2017-06-10 RX ADMIN — SODIUM CHLORIDE 150 MILLILITER(S): 9 INJECTION INTRAMUSCULAR; INTRAVENOUS; SUBCUTANEOUS at 11:32

## 2017-06-10 RX ADMIN — HYDROMORPHONE HYDROCHLORIDE 0.5 MILLIGRAM(S): 2 INJECTION INTRAMUSCULAR; INTRAVENOUS; SUBCUTANEOUS at 21:31

## 2017-06-10 RX ADMIN — HYDROMORPHONE HYDROCHLORIDE 0.5 MILLIGRAM(S): 2 INJECTION INTRAMUSCULAR; INTRAVENOUS; SUBCUTANEOUS at 15:24

## 2017-06-10 RX ADMIN — Medication 1 TABLET(S): at 21:31

## 2017-06-10 RX ADMIN — Medication 1 TABLET(S): at 11:32

## 2017-06-10 RX ADMIN — SODIUM CHLORIDE 85 MILLILITER(S): 9 INJECTION INTRAMUSCULAR; INTRAVENOUS; SUBCUTANEOUS at 15:27

## 2017-06-10 RX ADMIN — ONDANSETRON 4 MILLIGRAM(S): 8 TABLET, FILM COATED ORAL at 11:40

## 2017-06-10 NOTE — PROGRESS NOTE ADULT - SUBJECTIVE AND OBJECTIVE BOX
CHIEF COMPLAINT/Diagnosis: Acute pancreatitis/ abdominal pain    SUBJECTIVE: c/o ongoing abdominal pain, worse today    REVIEW OF SYSTEMS:    CONSTITUTIONAL: No weakness, fevers or chills  EYES/ENT: No visual changes;  No vertigo or throat pain   NECK: No pain or stiffness  RESPIRATORY: No cough, wheezing, hemoptysis; No shortness of breath  CARDIOVASCULAR: No chest pain or palpitations  GASTROINTESTINAL: No abdominal or epigastric pain. No nausea, vomiting, or hematemesis; No diarrhea or constipation. No melena or hematochezia.  GENITOURINARY: No dysuria, frequency or hematuria  NEUROLOGICAL: No numbness or weakness  SKIN: No itching, burning, rashes, or lesions   All other review of systems is negative unless indicated above    Vital Signs Last 24 Hrs  T(C): 36.7, Max: 36.8 (06-09 @ 17:44)  T(F): 98.1, Max: 98.2 (06-09 @ 17:44)  HR: 78 (71 - 78)  BP: 113/77 (101/71 - 128/80)  BP(mean): --  RR: 18 (17 - 18)  SpO2: 96% (96% - 99%)    I&O's Summary      CAPILLARY BLOOD GLUCOSE      PHYSICAL EXAM:    Constitutional: NAD, awake and alert, well-developed  HEENT: PERR, EOMI, Normal Hearing, MMM  Neck: Soft and supple, No LAD, No JVD  Respiratory: Breath sounds are clear bilaterally, No wheezing, rales or rhonchi  Cardiovascular: S1 and S2, regular rate and rhythm, no Murmurs, gallops or rubs  Gastrointestinal: tender in epigastric, no guarding  Extremities: No peripheral edema  Vascular: 2+ peripheral pulses  Neurological: A/O x 3, no focal deficits  Musculoskeletal: 5/5 strength b/l upper and lower extremities  Skin: No rashes    MEDICATIONS:  MEDICATIONS  (STANDING):  nicotine -   7 mG/24Hr(s) Patch 1patch Transdermal daily  folic acid 1milliGRAM(s) Oral daily  multivitamin 1Tablet(s) Oral daily  potassium acid phosphate/sodium acid phosphate tablet (K-PHOS No. 2) 1Tablet(s) Oral four times a day with meals  zolpidem 5milliGRAM(s) Oral at bedtime  sodium chloride 0.9%. 1000milliLiter(s) IV Continuous <Continuous>      LABS: All Labs Reviewed:    06-09    139  |  104  |  2<L>  ----------------------------<  117<H>  3.8   |  29  |  0.69    Ca    8.8      09 Jun 2017 07:20    TPro  6.7  /  Alb  2.6<L>  /  TBili  0.3  /  DBili  x   /  AST  15  /  ALT  8<L>  /  AlkPhos  86  06-09          Blood Culture:           Assessment and plan:    30 yo M with PMH significant for Crohn's disease, Hx of perianal fistula repair in 2002 presents to the ED c/o upper abdominal pain x 1 day. Initially pt had sharp, bloating upper abdominal pain, 8/10 in severity, radiating to the back, associated with N/V. Drinks 4 25 0z beers daily. CT c/w pancreatitis. Lipase 3000.   U/S negative for obstructive findings. no ductal dilation.       		    1-Pancreatitis:   -Lipase trending down 3216 >> 779>>1200 after advancing diet >> 1400 (full liquid diet)  -taperback full liquid diet to clears again  -c/w fluids 85cc/hr  -repeat labs in AM  -today increased pain in the epigastric region  -percocet PRN and acetaminophen.  ivp prn morphine  -will add naproxen prn as well    2-Leucocytosis  - resolved, wbc 8.5  -monitor cbc    3-Hypokalemia  -resolved,    4-found to have left 10th and 11th rib fracture sub acute  - Denies chest pain or SOB at present      5- low attenuation lesion within the left kidney  - outpatient f/u with PMD after discharge    6-Crohn's disease  - stable, on no medications    7-Nicotine dependence  - Counselled on cessation  - Nicotine patch    8-Etoh use  - counselled on cessation  - CIWA protocol  - MVA/Thiamine/FA for suspected thiamine etoh related deficiency  - Seizure precaution    9- DVt proph- scds, ambulate

## 2017-06-11 LAB
ALBUMIN SERPL ELPH-MCNC: 2.4 G/DL — LOW (ref 3.3–5)
ALP SERPL-CCNC: 81 U/L — SIGNIFICANT CHANGE UP (ref 40–120)
ALT FLD-CCNC: 13 U/L — SIGNIFICANT CHANGE UP (ref 12–78)
AMYLASE P1 CFR SERPL: 96 U/L — SIGNIFICANT CHANGE UP (ref 25–115)
ANION GAP SERPL CALC-SCNC: 6 MMOL/L — SIGNIFICANT CHANGE UP (ref 5–17)
AST SERPL-CCNC: 16 U/L — SIGNIFICANT CHANGE UP (ref 15–37)
BILIRUB SERPL-MCNC: 0.2 MG/DL — SIGNIFICANT CHANGE UP (ref 0.2–1.2)
BUN SERPL-MCNC: 2 MG/DL — LOW (ref 7–23)
CALCIUM SERPL-MCNC: 8.6 MG/DL — SIGNIFICANT CHANGE UP (ref 8.5–10.1)
CHLORIDE SERPL-SCNC: 106 MMOL/L — SIGNIFICANT CHANGE UP (ref 96–108)
CO2 SERPL-SCNC: 29 MMOL/L — SIGNIFICANT CHANGE UP (ref 22–31)
CREAT SERPL-MCNC: 0.68 MG/DL — SIGNIFICANT CHANGE UP (ref 0.5–1.3)
GLUCOSE SERPL-MCNC: 95 MG/DL — SIGNIFICANT CHANGE UP (ref 70–99)
HCT VFR BLD CALC: 40 % — SIGNIFICANT CHANGE UP (ref 39–50)
HGB BLD-MCNC: 14.7 G/DL — SIGNIFICANT CHANGE UP (ref 13–17)
LIDOCAIN IGE QN: 1263 U/L — HIGH (ref 73–393)
MCHC RBC-ENTMCNC: 36.6 GM/DL — HIGH (ref 32–36)
MCHC RBC-ENTMCNC: 38.6 PG — HIGH (ref 27–34)
MCV RBC AUTO: 105.5 FL — HIGH (ref 80–100)
PLATELET # BLD AUTO: 404 K/UL — HIGH (ref 150–400)
POTASSIUM SERPL-MCNC: 3.7 MMOL/L — SIGNIFICANT CHANGE UP (ref 3.5–5.3)
POTASSIUM SERPL-SCNC: 3.7 MMOL/L — SIGNIFICANT CHANGE UP (ref 3.5–5.3)
PROT SERPL-MCNC: 6.5 GM/DL — SIGNIFICANT CHANGE UP (ref 6–8.3)
RBC # BLD: 3.79 M/UL — LOW (ref 4.2–5.8)
RBC # FLD: 12.9 % — SIGNIFICANT CHANGE UP (ref 10.3–14.5)
SODIUM SERPL-SCNC: 141 MMOL/L — SIGNIFICANT CHANGE UP (ref 135–145)
WBC # BLD: 9.3 K/UL — SIGNIFICANT CHANGE UP (ref 3.8–10.5)
WBC # FLD AUTO: 9.3 K/UL — SIGNIFICANT CHANGE UP (ref 3.8–10.5)

## 2017-06-11 RX ADMIN — Medication 1 TABLET(S): at 09:50

## 2017-06-11 RX ADMIN — SODIUM CHLORIDE 85 MILLILITER(S): 9 INJECTION INTRAMUSCULAR; INTRAVENOUS; SUBCUTANEOUS at 16:40

## 2017-06-11 RX ADMIN — Medication 1 PATCH: at 12:20

## 2017-06-11 RX ADMIN — Medication 1 TABLET(S): at 12:20

## 2017-06-11 RX ADMIN — Medication 1 MILLIGRAM(S): at 12:20

## 2017-06-11 RX ADMIN — Medication 1 TABLET(S): at 18:39

## 2017-06-11 RX ADMIN — MORPHINE SULFATE 2 MILLIGRAM(S): 50 CAPSULE, EXTENDED RELEASE ORAL at 12:20

## 2017-06-11 RX ADMIN — MORPHINE SULFATE 2 MILLIGRAM(S): 50 CAPSULE, EXTENDED RELEASE ORAL at 18:37

## 2017-06-11 RX ADMIN — ONDANSETRON 4 MILLIGRAM(S): 8 TABLET, FILM COATED ORAL at 14:09

## 2017-06-11 RX ADMIN — Medication 1 TABLET(S): at 21:05

## 2017-06-11 RX ADMIN — ONDANSETRON 4 MILLIGRAM(S): 8 TABLET, FILM COATED ORAL at 21:04

## 2017-06-11 RX ADMIN — SODIUM CHLORIDE 85 MILLILITER(S): 9 INJECTION INTRAMUSCULAR; INTRAVENOUS; SUBCUTANEOUS at 05:05

## 2017-06-11 RX ADMIN — Medication 1 PATCH: at 14:13

## 2017-06-11 RX ADMIN — ONDANSETRON 4 MILLIGRAM(S): 8 TABLET, FILM COATED ORAL at 06:59

## 2017-06-11 RX ADMIN — ZOLPIDEM TARTRATE 5 MILLIGRAM(S): 10 TABLET ORAL at 21:05

## 2017-06-11 NOTE — PROGRESS NOTE ADULT - SUBJECTIVE AND OBJECTIVE BOX
CHIEF COMPLAINT:    SUBJECTIVE:     REVIEW OF SYSTEMS:    CONSTITUTIONAL: No weakness, fevers or chills  EYES/ENT: No visual changes;  No vertigo or throat pain   NECK: No pain or stiffness  RESPIRATORY: No cough, wheezing, hemoptysis; No shortness of breath  CARDIOVASCULAR: No chest pain or palpitations  GASTROINTESTINAL: No abdominal or epigastric pain. No nausea, vomiting, or hematemesis; No diarrhea or constipation. No melena or hematochezia.  GENITOURINARY: No dysuria, frequency or hematuria  NEUROLOGICAL: No numbness or weakness  SKIN: No itching, burning, rashes, or lesions   All other review of systems is negative unless indicated above    Vital Signs Last 24 Hrs  T(C): 36.7, Max: 36.8 (06-10 @ 17:15)  T(F): 98.1, Max: 98.3 (06-10 @ 17:15)  HR: 71 (71 - 78)  BP: 124/77 (104/82 - 124/77)  BP(mean): --  RR: 16 (16 - 18)  SpO2: 97% (97% - 100%)    I&O's Summary    I & Os for current day (as of 11 Jun 2017 14:48)  =============================================  IN: 606 ml / OUT: 0 ml / NET: 606 ml      CAPILLARY BLOOD GLUCOSE      PHYSICAL EXAM:    Constitutional: NAD, awake and alert, well-developed  HEENT: PERR, EOMI, Normal Hearing, MMM  Neck: Soft and supple, No LAD, No JVD  Respiratory: Breath sounds are clear bilaterally, No wheezing, rales or rhonchi  Cardiovascular: S1 and S2, regular rate and rhythm, no Murmurs, gallops or rubs  Gastrointestinal: epigastric pain on palpation  Extremities: No peripheral edema  Vascular: 2+ peripheral pulses  Neurological: A/O x 3, no focal deficits  Musculoskeletal: 5/5 strength b/l upper and lower extremities  Skin: No rashes    MEDICATIONS:  MEDICATIONS  (STANDING):  nicotine -   7 mG/24Hr(s) Patch 1patch Transdermal daily  folic acid 1milliGRAM(s) Oral daily  multivitamin 1Tablet(s) Oral daily  potassium acid phosphate/sodium acid phosphate tablet (K-PHOS No. 2) 1Tablet(s) Oral four times a day with meals  zolpidem 5milliGRAM(s) Oral at bedtime  sodium chloride 0.9%. 1000milliLiter(s) IV Continuous <Continuous>      LABS: All Labs Reviewed:                        14.7   9.3   )-----------( 404      ( 11 Jun 2017 06:48 )             40.0     06-11    141  |  106  |  2<L>  ----------------------------<  95  3.7   |  29  |  0.68    Ca    8.6      11 Jun 2017 06:48    TPro  6.5  /  Alb  2.4<L>  /  TBili  0.2  /  DBili  x   /  AST  16  /  ALT  13  /  AlkPhos  81  06-11            Assessment and plan:    32 yo M with PMH significant for Crohn's disease, Hx of perianal fistula repair in 2002 presents to the ED c/o upper abdominal pain x 1 day. Initially pt had sharp, bloating upper abdominal pain, 8/10 in severity, radiating to the back, associated with N/V. Drinks 4 25 0z beers daily. CT c/w pancreatitis. Lipase 3000.   U/S negative for obstructive findings. no ductal dilation.       		    1-Pancreatitis:   -Lipase trending down 3216 >> 779>>1200 (after advancing diet) >> 1400 (full liquid diet) >> 1263 (clears)  -c/w clears again  -c/w fluids 85cc/hr  -repeat labs in AM  -today increased pain in the epigastric region  -percocet PRN and acetaminophen.  ivp prn morphine  -will add naproxen prn as well  -ct abd/pelvis w/ oral contrast ordered by GI  -GI recco appreciated    2-Leucocytosis  - resolved, wbc 8.5  -monitor cbc    3-Hypokalemia  -resolved,    4-found to have left 10th and 11th rib fracture sub acute  - Denies chest pain or SOB at present      5- low attenuation lesion within the left kidney  - outpatient f/u with PMD after discharge    6-Crohn's disease  - stable, on no medications    7-Nicotine dependence  - Counselled on cessation  - Nicotine patch    8-Etoh use  - counselled on cessation  - [facility] protocol  - MVA/Thiamine/FA for suspected thiamine etoh related deficiency  - Seizure precaution    9- DVt proph- scds, ambulate CHIEF COMPLAINT/Diagnosis: abdominal pain/ pancreatitis    SUBJECTIVE: ongoing c/o abdominal pains    REVIEW OF SYSTEMS:    CONSTITUTIONAL: No weakness, fevers or chills  EYES/ENT: No visual changes;  No vertigo or throat pain   NECK: No pain or stiffness  RESPIRATORY: No cough, wheezing, hemoptysis; No shortness of breath  CARDIOVASCULAR: No chest pain or palpitations  GASTROINTESTINAL: No abdominal or epigastric pain. No nausea, vomiting, or hematemesis; No diarrhea or constipation. No melena or hematochezia.  GENITOURINARY: No dysuria, frequency or hematuria  NEUROLOGICAL: No numbness or weakness  SKIN: No itching, burning, rashes, or lesions   All other review of systems is negative unless indicated above    Vital Signs Last 24 Hrs  T(C): 36.7, Max: 36.8 (06-10 @ 17:15)  T(F): 98.1, Max: 98.3 (06-10 @ 17:15)  HR: 71 (71 - 78)  BP: 124/77 (104/82 - 124/77)  BP(mean): --  RR: 16 (16 - 18)  SpO2: 97% (97% - 100%)    I&O's Summary    I & Os for current day (as of 11 Jun 2017 14:48)  =============================================  IN: 606 ml / OUT: 0 ml / NET: 606 ml      CAPILLARY BLOOD GLUCOSE      PHYSICAL EXAM:    Constitutional: NAD, awake and alert, well-developed  HEENT: PERR, EOMI, Normal Hearing, MMM  Neck: Soft and supple, No LAD, No JVD  Respiratory: Breath sounds are clear bilaterally, No wheezing, rales or rhonchi  Cardiovascular: S1 and S2, regular rate and rhythm, no Murmurs, gallops or rubs  Gastrointestinal: epigastric pain on palpation  Extremities: No peripheral edema  Vascular: 2+ peripheral pulses  Neurological: A/O x 3, no focal deficits  Musculoskeletal: 5/5 strength b/l upper and lower extremities  Skin: No rashes    MEDICATIONS:  MEDICATIONS  (STANDING):  nicotine -   7 mG/24Hr(s) Patch 1patch Transdermal daily  folic acid 1milliGRAM(s) Oral daily  multivitamin 1Tablet(s) Oral daily  potassium acid phosphate/sodium acid phosphate tablet (K-PHOS No. 2) 1Tablet(s) Oral four times a day with meals  zolpidem 5milliGRAM(s) Oral at bedtime  sodium chloride 0.9%. 1000milliLiter(s) IV Continuous <Continuous>      LABS: All Labs Reviewed:                        14.7   9.3   )-----------( 404      ( 11 Jun 2017 06:48 )             40.0     06-11    141  |  106  |  2<L>  ----------------------------<  95  3.7   |  29  |  0.68    Ca    8.6      11 Jun 2017 06:48    TPro  6.5  /  Alb  2.4<L>  /  TBili  0.2  /  DBili  x   /  AST  16  /  ALT  13  /  AlkPhos  81  06-11            Assessment and plan:    32 yo M with PMH significant for Crohn's disease, Hx of perianal fistula repair in 2002 presents to the ED c/o upper abdominal pain x 1 day. Initially pt had sharp, bloating upper abdominal pain, 8/10 in severity, radiating to the back, associated with N/V. Drinks 4 25 0z beers daily. CT c/w pancreatitis. Lipase 3000.   U/S negative for obstructive findings. no ductal dilation.       		    1-Pancreatitis:   -Lipase trending down 3216 >> 779>>1200 (after advancing diet) >> 1400 (full liquid diet) >> 1263 (clears)  -c/w clears again  -c/w fluids 85cc/hr  -repeat labs in AM  -today increased pain in the epigastric region  -percocet PRN and acetaminophen.  ivp prn morphine  -will add naproxen prn as well  -ct abd/pelvis w/ oral contrast ordered by GI  -GI recco appreciated    2-Leucocytosis  - resolved, wbc 8.5  -monitor cbc    3-Hypokalemia  -resolved,    4-found to have left 10th and 11th rib fracture sub acute  - Denies chest pain or SOB at present      5- low attenuation lesion within the left kidney  - outpatient f/u with PMD after discharge    6-Crohn's disease  - stable, on no medications    7-Nicotine dependence  - Counselled on cessation  - Nicotine patch    8-Etoh use  - counselled on cessation  - [facility] protocol  - MVA/Thiamine/FA for suspected thiamine etoh related deficiency  - Seizure precaution    9- DVt proph- scds, ambulate    10-Social- wife at bedside, very concerned about  and his drinking habits. At this time they would like for inpatient rehab, at [facility] in [place] for rehab CHIEF COMPLAINT/Diagnosis: abdominal pain/ pancreatitis    SUBJECTIVE: ongoing c/o abdominal pains    REVIEW OF SYSTEMS:    CONSTITUTIONAL: No weakness, fevers or chills  EYES/ENT: No visual changes;  No vertigo or throat pain   NECK: No pain or stiffness  RESPIRATORY: No cough, wheezing, hemoptysis; No shortness of breath  CARDIOVASCULAR: No chest pain or palpitations  GASTROINTESTINAL: No abdominal or epigastric pain. No nausea, vomiting, or hematemesis; No diarrhea or constipation. No melena or hematochezia.  GENITOURINARY: No dysuria, frequency or hematuria  NEUROLOGICAL: No numbness or weakness  SKIN: No itching, burning, rashes, or lesions   All other review of systems is negative unless indicated above    Vital Signs Last 24 Hrs  T(C): 36.7, Max: 36.8 (06-10 @ 17:15)  T(F): 98.1, Max: 98.3 (06-10 @ 17:15)  HR: 71 (71 - 78)  BP: 124/77 (104/82 - 124/77)  BP(mean): --  RR: 16 (16 - 18)  SpO2: 97% (97% - 100%)    I&O's Summary    I & Os for current day (as of 11 Jun 2017 14:48)  =============================================  IN: 606 ml / OUT: 0 ml / NET: 606 ml      CAPILLARY BLOOD GLUCOSE      PHYSICAL EXAM:    Constitutional: NAD, awake and alert, well-developed  HEENT: PERR, EOMI, Normal Hearing, MMM  Neck: Soft and supple, No LAD, No JVD  Respiratory: Breath sounds are clear bilaterally, No wheezing, rales or rhonchi  Cardiovascular: S1 and S2, regular rate and rhythm, no Murmurs, gallops or rubs  Gastrointestinal: epigastric pain on palpation  Extremities: No peripheral edema  Vascular: 2+ peripheral pulses  Neurological: A/O x 3, no focal deficits  Musculoskeletal: 5/5 strength b/l upper and lower extremities  Skin: No rashes    MEDICATIONS:  MEDICATIONS  (STANDING):  nicotine -   7 mG/24Hr(s) Patch 1patch Transdermal daily  folic acid 1milliGRAM(s) Oral daily  multivitamin 1Tablet(s) Oral daily  potassium acid phosphate/sodium acid phosphate tablet (K-PHOS No. 2) 1Tablet(s) Oral four times a day with meals  zolpidem 5milliGRAM(s) Oral at bedtime  sodium chloride 0.9%. 1000milliLiter(s) IV Continuous <Continuous>      LABS: All Labs Reviewed:                        14.7   9.3   )-----------( 404      ( 11 Jun 2017 06:48 )             40.0     06-11    141  |  106  |  2<L>  ----------------------------<  95  3.7   |  29  |  0.68    Ca    8.6      11 Jun 2017 06:48    TPro  6.5  /  Alb  2.4<L>  /  TBili  0.2  /  DBili  x   /  AST  16  /  ALT  13  /  AlkPhos  81  06-11            Assessment and plan:    32 yo M with PMH significant for Crohn's disease, Hx of perianal fistula repair in 2002 presents to the ED c/o upper abdominal pain x 1 day. Initially pt had sharp, bloating upper abdominal pain, 8/10 in severity, radiating to the back, associated with N/V. Drinks 4 25 0z beers daily. CT c/w pancreatitis. Lipase 3000.   U/S negative for obstructive findings. no ductal dilation.       		    1-Pancreatitis:   -Lipase trending down 3216 >> 779>>1200 (after advancing diet) >> 1400 (full liquid diet) >> 1263 (clears) >>  1160 (clears)   -c/w clears again  -c/w fluids 85cc/hr  -repeat labs in AM  -today increased pain in the epigastric region  -percocet PRN and acetaminophen.  ivp prn morphine  -will add naproxen prn as well  -ct abd/pelvis w/ oral contrast shows resolution of pancreatitis and does not reveal any reasons for his abdominal ciomplaints.  -in light of CT findings, and downgoing lipse, d/c morphine and dilaudid (patient has been asking for this frequently despite looking comfrotable at bedside); advance diet to full liquid and for dinner to soft textured low fat diet  -GI recco appreciated    2-Leucocytosis  - resolved, wbc 8.5  -monitor cbc    3-Hypokalemia  -resolved,    4-found to have left 10th and 11th rib fracture sub acute  - Denies chest pain or SOB at present      5- low attenuation lesion within the left kidney  - outpatient f/u with PMD after discharge    6-Crohn's disease  - stable, on no medications    7-Nicotine dependence  - Counselled on cessation  - Nicotine patch    8-Etoh use  - counselled on cessation  - [facility] protocol  - MVA/Thiamine/FA for suspected thiamine etoh related deficiency  - Seizure precaution    9- DVt proph- scds, ambulate    10-Social- wife at bedside, very concerned about  and his drinking habits. At this time they would like for inpatient rehab, at [facility] in [place] for rehab

## 2017-06-11 NOTE — PROGRESS NOTE ADULT - SUBJECTIVE AND OBJECTIVE BOX
Patient is a 31y old  Male who presents with a chief complaint of c/o abdominal pain x 1 day (04 Jun 2017 22:11)      HPI:  30 yo M with PMH significant for Crohn's disease (not on any meds at home), Hx of perianal fistula repair in 2002 presents to the ED c/o upper abdominal pain x 1 day. Pt states that he is having sharp, bloating upper abdominal pain, 8/10 in severity, radiating to the back, associated with N/V. Pt could not tolerate any food or drink today.Denies blood in stool or urine. Denies fever or chills. Denies chest pain, palpitations or SOB. Pt drinks 4-6 beers every day and occasionally more than that. Smokes 1/2 PPD.    CTAP found to have acute edematous pancreatitis. Chronic inflammatory bowel disease and perianal fistula tracks noted.    Patient with recurrent pain after advancing diet.     In the ED:  Meds: Dilaudid 1 mg IV x 2, Morphine 4 mg IV x 1, Pepcid, Zofran, IVF NS 2L bolus (04 Jun 2017 22:11)      PAST MEDICAL & SURGICAL HISTORY:  Crohn&#x27;s disease of large intestine without complication: (No current flare up)  Perianal fistula: repair in 2002  History of colonoscopy: (2014)  S/P ORIF (open reduction internal fixation) fracture: (Right ankle, 2014)      MEDICATIONS  (STANDING):  nicotine -   7 mG/24Hr(s) Patch 1patch Transdermal daily  folic acid 1milliGRAM(s) Oral daily  multivitamin 1Tablet(s) Oral daily  potassium acid phosphate/sodium acid phosphate tablet (K-PHOS No. 2) 1Tablet(s) Oral four times a day with meals  zolpidem 5milliGRAM(s) Oral at bedtime  sodium chloride 0.9%. 1000milliLiter(s) IV Continuous <Continuous>    MEDICATIONS  (PRN):  acetaminophen   Tablet 650milliGRAM(s) Oral every 6 hours PRN pain and fever  aluminum hydroxide/magnesium hydroxide/simethicone Suspension 30milliLiter(s) Oral every 4 hours PRN Dyspepsia  ondansetron Injectable 4milliGRAM(s) IV Push every 6 hours PRN Nausea and/or Vomiting  oxyCODONE  5 mG/acetaminophen 325 mG 1Tablet(s) Oral every 12 hours PRN Severe Pain (7 - 10)  naproxen 500milliGRAM(s) Oral every 8 hours PRN pain  morphine  - Injectable 2milliGRAM(s) IV Push every 6 hours PRN Severe Pain (7 - 10)  HYDROmorphone  Injectable 0.5milliGRAM(s) IV Push every 6 hours PRN Severe Pain (7 - 10)      Allergies    No Known Allergies    Intolerances        REVIEW OF SYSTEMS:    CONSTITUTIONAL: No weakness, fevers or chills  RESPIRATORY: No cough, wheezing, hemoptysis; No shortness of breath  CARDIOVASCULAR: No chest pain or palpitations  GASTROINTESTINAL: No abdominal or epigastric pain. No nausea, vomiting, or hematemesis; No diarrhea or constipation. No melena or hematochezia.  All other review of systems is negative unless indicated above.    Vital Signs Last 24 Hrs  T(C): 36.4, Max: 36.8 (06-10 @ 17:15)  T(F): 97.6, Max: 98.3 (06-10 @ 17:15)  HR: 73 (73 - 78)  BP: 104/82 (104/82 - 116/73)  BP(mean): --  RR: 18 (18 - 18)  SpO2: 100% (96% - 100%)    PHYSICAL EXAM:    Constitutional: NAD, well-developed  Respiratory: CTAB  Cardiovascular: S1 and S2, RRR, no M/G/R  Gastrointestinal: tender in RUQ  Extremities: No peripheral edema  Psychiatric: Normal mood, normal affect  Skin: No rashes    LABS:                        14.7   9.3   )-----------( 404      ( 11 Jun 2017 06:48 )             40.0     06-11    141  |  106  |  2<L>  ----------------------------<  95  3.7   |  29  |  0.68    Ca    8.6      11 Jun 2017 06:48    TPro  6.5  /  Alb  2.4<L>  /  TBili  0.2  /  DBili  x   /  AST  16  /  ALT  13  /  AlkPhos  81  06-11      LIVER FUNCTIONS - ( 11 Jun 2017 06:48 )  Alb: 2.4 g/dL / Pro: 6.5 gm/dL / ALK PHOS: 81 U/L / ALT: 13 U/L / AST: 16 U/L / GGT: x             RADIOLOGY & ADDITIONAL STUDIES:

## 2017-06-12 LAB
ALBUMIN SERPL ELPH-MCNC: 2.8 G/DL — LOW (ref 3.3–5)
ALP SERPL-CCNC: 93 U/L — SIGNIFICANT CHANGE UP (ref 40–120)
ALT FLD-CCNC: 19 U/L — SIGNIFICANT CHANGE UP (ref 12–78)
AMYLASE P1 CFR SERPL: 106 U/L — SIGNIFICANT CHANGE UP (ref 25–115)
ANION GAP SERPL CALC-SCNC: 7 MMOL/L — SIGNIFICANT CHANGE UP (ref 5–17)
AST SERPL-CCNC: 17 U/L — SIGNIFICANT CHANGE UP (ref 15–37)
BILIRUB SERPL-MCNC: 0.3 MG/DL — SIGNIFICANT CHANGE UP (ref 0.2–1.2)
BUN SERPL-MCNC: 3 MG/DL — LOW (ref 7–23)
CALCIUM SERPL-MCNC: 9.2 MG/DL — SIGNIFICANT CHANGE UP (ref 8.5–10.1)
CHLORIDE SERPL-SCNC: 103 MMOL/L — SIGNIFICANT CHANGE UP (ref 96–108)
CO2 SERPL-SCNC: 28 MMOL/L — SIGNIFICANT CHANGE UP (ref 22–31)
CREAT SERPL-MCNC: 0.65 MG/DL — SIGNIFICANT CHANGE UP (ref 0.5–1.3)
GLUCOSE SERPL-MCNC: 77 MG/DL — SIGNIFICANT CHANGE UP (ref 70–99)
LIDOCAIN IGE QN: 1160 U/L — HIGH (ref 73–393)
POTASSIUM SERPL-MCNC: 3.8 MMOL/L — SIGNIFICANT CHANGE UP (ref 3.5–5.3)
POTASSIUM SERPL-SCNC: 3.8 MMOL/L — SIGNIFICANT CHANGE UP (ref 3.5–5.3)
PROT SERPL-MCNC: 7.6 GM/DL — SIGNIFICANT CHANGE UP (ref 6–8.3)
SODIUM SERPL-SCNC: 138 MMOL/L — SIGNIFICANT CHANGE UP (ref 135–145)

## 2017-06-12 PROCEDURE — 74176 CT ABD & PELVIS W/O CONTRAST: CPT | Mod: 26

## 2017-06-12 RX ORDER — MORPHINE SULFATE 50 MG/1
2 CAPSULE, EXTENDED RELEASE ORAL ONCE
Qty: 0 | Refills: 0 | Status: DISCONTINUED | OUTPATIENT
Start: 2017-06-12 | End: 2017-06-12

## 2017-06-12 RX ADMIN — MORPHINE SULFATE 2 MILLIGRAM(S): 50 CAPSULE, EXTENDED RELEASE ORAL at 21:37

## 2017-06-12 RX ADMIN — ZOLPIDEM TARTRATE 5 MILLIGRAM(S): 10 TABLET ORAL at 21:37

## 2017-06-12 RX ADMIN — MORPHINE SULFATE 2 MILLIGRAM(S): 50 CAPSULE, EXTENDED RELEASE ORAL at 10:13

## 2017-06-12 RX ADMIN — ONDANSETRON 4 MILLIGRAM(S): 8 TABLET, FILM COATED ORAL at 12:51

## 2017-06-12 RX ADMIN — MORPHINE SULFATE 2 MILLIGRAM(S): 50 CAPSULE, EXTENDED RELEASE ORAL at 17:58

## 2017-06-12 RX ADMIN — Medication 1 TABLET(S): at 21:37

## 2017-06-12 RX ADMIN — Medication 1 MILLIGRAM(S): at 12:47

## 2017-06-12 RX ADMIN — Medication 1 PATCH: at 12:55

## 2017-06-12 RX ADMIN — MORPHINE SULFATE 2 MILLIGRAM(S): 50 CAPSULE, EXTENDED RELEASE ORAL at 01:49

## 2017-06-12 RX ADMIN — ONDANSETRON 4 MILLIGRAM(S): 8 TABLET, FILM COATED ORAL at 05:54

## 2017-06-12 RX ADMIN — Medication 1 TABLET(S): at 17:51

## 2017-06-12 RX ADMIN — Medication 1 TABLET(S): at 12:53

## 2017-06-12 RX ADMIN — SODIUM CHLORIDE 85 MILLILITER(S): 9 INJECTION INTRAMUSCULAR; INTRAVENOUS; SUBCUTANEOUS at 17:50

## 2017-06-12 RX ADMIN — SODIUM CHLORIDE 85 MILLILITER(S): 9 INJECTION INTRAMUSCULAR; INTRAVENOUS; SUBCUTANEOUS at 05:20

## 2017-06-12 RX ADMIN — Medication 1 TABLET(S): at 12:46

## 2017-06-12 NOTE — PROGRESS NOTE ADULT - SUBJECTIVE AND OBJECTIVE BOX
Patient is a 31y old  Male who presents with a chief complaint of c/o abdominal pain x 1 day (04 Jun 2017 22:11)      HPI:  Pt still with signficant pain  for repeat ct scan today    MEDICATIONS  (STANDING):  nicotine -   7 mG/24Hr(s) Patch 1patch Transdermal daily  folic acid 1milliGRAM(s) Oral daily  multivitamin 1Tablet(s) Oral daily  potassium acid phosphate/sodium acid phosphate tablet (K-PHOS No. 2) 1Tablet(s) Oral four times a day with meals  zolpidem 5milliGRAM(s) Oral at bedtime  sodium chloride 0.9%. 1000milliLiter(s) IV Continuous <Continuous>    MEDICATIONS  (PRN):  acetaminophen   Tablet 650milliGRAM(s) Oral every 6 hours PRN pain and fever  aluminum hydroxide/magnesium hydroxide/simethicone Suspension 30milliLiter(s) Oral every 4 hours PRN Dyspepsia  ondansetron Injectable 4milliGRAM(s) IV Push every 6 hours PRN Nausea and/or Vomiting  oxyCODONE  5 mG/acetaminophen 325 mG 1Tablet(s) Oral every 12 hours PRN Severe Pain (7 - 10)  naproxen 500milliGRAM(s) Oral every 8 hours PRN pain  morphine  - Injectable 2milliGRAM(s) IV Push every 6 hours PRN Severe Pain (7 - 10)    Allergie  No Known Allergies    REVIEW OF SYSTEMS:    CONSTITUTIONAL: No weakness, fevers or chills  RESPIRATORY: No cough, wheezing, hemoptysis; No shortness of breath  CARDIOVASCULAR: No chest pain or palpitations  GASTROINTESTINAL: severe epigastric pain and some nausea  All other review of systems is negative unless indicated above.    Vital Signs Last 24 Hrs  T(C): 36.9, Max: 37.1 (06-11 @ 17:37)  T(F): 98.4, Max: 98.7 (06-11 @ 17:37)  HR: 73 (71 - 85)  BP: 116/69 (116/69 - 124/77)  BP(mean): --  RR: 18 (16 - 18)  SpO2: 100% (97% - 100%)    PHYSICAL EXAM:    Constitutional: NAD, well-developed  Respiratory: CTAB  Cardiovascular: S1 and S2, RRR, no M/G/R  Gastrointestinal: BS+, soft, tender epigastrium without change      LABS:                        14.7   9.3   )-----------( 404      ( 11 Jun 2017 06:48 )             40.0     06-11    141  |  106  |  2<L>  ----------------------------<  95  3.7   |  29  |  0.68    Ca    8.6      11 Jun 2017 06:48    TPro  6.5  /  Alb  2.4<L>  /  TBili  0.2  /  DBili  x   /  AST  16  /  ALT  13  /  AlkPhos  81  06-11      LIVER FUNCTIONS - ( 11 Jun 2017 06:48 )  Alb: 2.4 g/dL / Pro: 6.5 gm/dL / ALK PHOS: 81 U/L / ALT: 13 U/L / AST: 16 U/L / GGT: x             RADIOLOGY & ADDITIONAL STUDIES:

## 2017-06-13 VITALS
TEMPERATURE: 98 F | HEART RATE: 98 BPM | DIASTOLIC BLOOD PRESSURE: 67 MMHG | RESPIRATION RATE: 18 BRPM | SYSTOLIC BLOOD PRESSURE: 117 MMHG | OXYGEN SATURATION: 97 %

## 2017-06-13 RX ADMIN — Medication 1 TABLET(S): at 08:55

## 2017-06-13 RX ADMIN — Medication 1 TABLET(S): at 12:41

## 2017-06-13 RX ADMIN — Medication 1 TABLET(S): at 12:42

## 2017-06-13 RX ADMIN — SODIUM CHLORIDE 85 MILLILITER(S): 9 INJECTION INTRAMUSCULAR; INTRAVENOUS; SUBCUTANEOUS at 06:42

## 2017-06-13 RX ADMIN — Medication 1 MILLIGRAM(S): at 12:42

## 2017-06-13 RX ADMIN — ONDANSETRON 4 MILLIGRAM(S): 8 TABLET, FILM COATED ORAL at 05:26

## 2017-06-13 NOTE — DISCHARGE NOTE ADULT - HOSPITAL COURSE
Vital Signs Last 24 Hrs  T(C): 36.9, Max: 37.1 (06-12 @ 17:25)  T(F): 98.5, Max: 98.8 (06-12 @ 17:25)  HR: 98 (79 - 98)  BP: 117/67 (117/67 - 123/76)  BP(mean): --  RR: 18 (18 - 18)  SpO2: 97% (95% - 98%)    HEENT:   pupils equal and reactive, EOMI, no oropharyngeal lesions, erythema, exudates, oral thrush    NECK:   supple, no carotid bruits, no palpable lymph nodes, no thyromegaly    CV:  +S1, +S2, regular, no murmurs or rubs    RESP:   lungs clear to auscultation bilaterally, no wheezing, rales, rhonchi, good air entry bilaterally    BREAST:  not examined    GI:  abdomen soft, non-tender, non-distended, normal BS, no bruits, no abdominal masses, no palpable masses    RECTAL:  not examined    :  not examined    MSK:   normal muscle tone, no atrophy, no rigidity, no contractions    EXT:   no clubbing, no cyanosis, no edema, no calf pain, swelling or erythema    VASCULAR:  pulses equal and symmetric in the upper and lower extremities    NEURO:  AAOX3, no focal neurological deficits, follows all commands, able to move extremities spontaneously    SKIN:  no ulcers, lesions or rashes        Hospital course:    30 yo M with PMH significant for Crohn's disease, Hx of perianal fistula repair in 2002 presents to the ED c/o upper abdominal pain x 1 day. Initially pt had sharp, bloating upper abdominal pain, 8/10 in severity, radiating to the back, associated with N/V. Drinks 4 25 0z beers daily. CT c/w pancreatitis. Lipase 3000.   U/S negative for obstructive findings. no ductal dilation. Admitted for treatment of acute pancreatitis. Started on aggressive hydration. CT scan was repeated after several days for patient w/ongoing c/o pain and requesting frequent dilaudid. CT shows resolving pancreatitis and no intraabdominal pathology to explain his compalints. Examined by Gasteroenterology services. Anika is medically cleared by medicine and GI  services. He is advised for f/u outpatient w/ GI. Continue w/ oral hydration and increase consistency of solids as tolerated. Here is tolerating w/ no complaints, low fat cholesterol  low fiber diet. He is educated on ETOH cessation and ciggerette cessation.   Medically cleared for discharge.

## 2017-06-13 NOTE — DISCHARGE NOTE ADULT - MEDICATION SUMMARY - MEDICATIONS TO TAKE
I will START or STAY ON the medications listed below when I get home from the hospital:    Percocet 5/325 oral tablet  -- 1 tab(s) by mouth every 6 hours, As Needed  -- Indication: For Pain    Advil 200 mg oral tablet  -- 1 tab(s) by mouth every 6 hours, As Needed  -- Indication: For Pain

## 2017-06-13 NOTE — PROGRESS NOTE ADULT - SUBJECTIVE AND OBJECTIVE BOX
HPI:  30 yo man admitted with abdominal pain, n/v. CT notable for acute pancreatitis, suspected related to alcohol use. Patient has history of Crohn's disease. Not currently on medication or following with a gastroenterologist.     Patient complains of ongoing abdominal pain. CT shows resolved pancreatitis, chronic Crohn's findings. No n/v. Tolerating PO. No fever.       MEDICATIONS  (STANDING):  nicotine -   7 mG/24Hr(s) Patch 1patch Transdermal daily  folic acid 1milliGRAM(s) Oral daily  multivitamin 1Tablet(s) Oral daily  potassium acid phosphate/sodium acid phosphate tablet (K-PHOS No. 2) 1Tablet(s) Oral four times a day with meals  zolpidem 5milliGRAM(s) Oral at bedtime  sodium chloride 0.9%. 1000milliLiter(s) IV Continuous <Continuous>    MEDICATIONS  (PRN):  acetaminophen   Tablet 650milliGRAM(s) Oral every 6 hours PRN pain and fever  aluminum hydroxide/magnesium hydroxide/simethicone Suspension 30milliLiter(s) Oral every 4 hours PRN Dyspepsia  ondansetron Injectable 4milliGRAM(s) IV Push every 6 hours PRN Nausea and/or Vomiting  oxyCODONE  5 mG/acetaminophen 325 mG 1Tablet(s) Oral every 12 hours PRN Severe Pain (7 - 10)  naproxen 500milliGRAM(s) Oral every 8 hours PRN pain      Allergies    No Known Allergies    Intolerances        REVIEW OF SYSTEMS    General: no fever    HEENT: no icterus    Respiratory and Thorax: no SOB  	  Cardiovascular: no CP    Gastrointestinal: as above    Skin: no jaundice      Vital Signs Last 24 Hrs  T(C): 36.9, Max: 37.1 (06-12 @ 17:25)  T(F): 98.5, Max: 98.8 (06-12 @ 17:25)  HR: 98 (79 - 98)  BP: 117/67 (117/67 - 123/76)  BP(mean): --  RR: 18 (18 - 18)  SpO2: 97% (95% - 98%)    PHYSICAL EXAM:    Constitutional: NAD    HEENT: anicteric    Respiratory: CTA BL    Cardiovascular:  RRR    Gastrointestinal: soft ND +BS NTTP    Extremities: no LE edema    Neuro: no focal deficits    Skin: no jaundice      LABS:    06-12    138  |  103  |  3<L>  ----------------------------<  77  3.8   |  28  |  0.65    Ca    9.2      12 Jun 2017 12:12    TPro  7.6  /  Alb  2.8<L>  /  TBili  0.3  /  DBili  x   /  AST  17  /  ALT  19  /  AlkPhos  93  06-12      LIVER FUNCTIONS - ( 12 Jun 2017 12:12 )  Alb: 2.8 g/dL / Pro: 7.6 gm/dL / ALK PHOS: 93 U/L / ALT: 19 U/L / AST: 17 U/L / GGT: x             RADIOLOGY & ADDITIONAL STUDIES:  EXAM:  CT ABDOMEN AND PELVIS OC                            PROCEDURE DATE:  06/12/2017        INTERPRETATION:  Clinical information: Diffuse abdominal pain. Evaluate   for recurrent pancreatitis.    COMPARISON: June 04, 2017    PROCEDURE:   CT of the Abdomen and Pelvis was performed without intravenous contrast.   Intravenous contrast: None.  Oral contrast: positive contrast was administered.  Sagittal and coronal reformats were performed.    FINDINGS:    LOWER CHEST: Within normal limits.    LIVER: Within normal limits.  SPLEEN: Within normal limits.  PANCREAS: Resolution of peripancreatic inflammatory changes. No   peripancreatic fluid collection.  GALLBLADDER: Within normal limits.  BILE DUCTS: Normal caliber.  ADRENALS: Within normal limits.  KIDNEYS/URETERS: Within normal limits. No renal stones.    RETROPERITONEUM: No lymphadenopathy.    VESSELS:  Within normal limits.    BOWEL: No bowel obstruction. Appendix is normal. Unchanged wall   thickening involving a long segment of distal ileum, extending to the   ileocecal junction. No extraluminal fluid collections. No bowel   obstruction. Multiple sinus tracts in the right ischiorectal fossa.  PERITONEUM: No ascites or pneumoperitoneum. Multiple right-sided   mesenteric lymph nodes, most likely reactive.    REPRODUCTIVE ORGANS: Unremarkable prostate and seminal vesicles.  BLADDER: Underdistended, limiting evaluation.    ABDOMINAL WALL: Within normal limits.  BONES: Subacute or chronic left 10th and 11th rib fractures are unchanged.    IMPRESSION:   Resolution of acute pancreatitis as compared with June 04, 2017.    Unchanged ileitis compatible with stated history of Crohn's disease.    Multiple sinus tracts in the right right ischiorectal fossa compatible   with perianal Crohn's.

## 2017-06-13 NOTE — PROGRESS NOTE ADULT - ASSESSMENT
30 yo man admitted with pancreatitis. Suspected due to alcohol, but also has GB sludge. LFTs are normal.     -Low fat diet  -Patient needs outpt follow up for long term Crohn's management. Should see us in follow up within 2 weeks [phone]  -Will sign off, please recall with ongoing questions or concerns.

## 2017-06-13 NOTE — DISCHARGE NOTE ADULT - CARE PROVIDER_API CALL
Pam Moy (DO), Gastroenterology; Internal Medicine  [address] [place] [address]  Phone: [phone]  Fax: [phone]

## 2017-06-13 NOTE — DISCHARGE NOTE ADULT - CARE PLAN
Principal Discharge DX:	Alcohol-induced acute pancreatitis with uninfected necrosis  Goal:	c/w oral hydration and increase solid consistency as tolerated; low fat low fiber diet  Instructions for follow-up, activity and diet:	c/w oral hydration and increase solid consistency as tolerated; low fat low fiber diet

## 2017-06-13 NOTE — PROGRESS NOTE ADULT - PROVIDER SPECIALTY LIST ADULT
Gastroenterology
Hospitalist

## 2017-06-16 DIAGNOSIS — K50.90 CROHN'S DISEASE, UNSPECIFIED, WITHOUT COMPLICATIONS: ICD-10-CM

## 2017-06-16 DIAGNOSIS — F17.210 NICOTINE DEPENDENCE, CIGARETTES, UNCOMPLICATED: ICD-10-CM

## 2017-06-16 DIAGNOSIS — K85.21 ALCOHOL INDUCED ACUTE PANCREATITIS WITH UNINFECTED NECROSIS: ICD-10-CM

## 2017-06-16 DIAGNOSIS — E51.8 OTHER MANIFESTATIONS OF THIAMINE DEFICIENCY: ICD-10-CM

## 2017-06-16 DIAGNOSIS — K82.8 OTHER SPECIFIED DISEASES OF GALLBLADDER: ICD-10-CM

## 2017-06-16 DIAGNOSIS — N28.89 OTHER SPECIFIED DISORDERS OF KIDNEY AND URETER: ICD-10-CM

## 2017-06-16 DIAGNOSIS — R10.9 UNSPECIFIED ABDOMINAL PAIN: ICD-10-CM

## 2017-06-16 DIAGNOSIS — E87.6 HYPOKALEMIA: ICD-10-CM

## 2017-06-16 DIAGNOSIS — F10.239 ALCOHOL DEPENDENCE WITH WITHDRAWAL, UNSPECIFIED: ICD-10-CM

## 2017-08-31 NOTE — DISCHARGE NOTE ADULT - MEDICATION SUMMARY - MEDICATIONS TO CHANGE
I will SWITCH the dose or number of times a day I take the medications listed below when I get home from the hospital:  None rolling walker

## 2018-06-27 ENCOUNTER — EMERGENCY (EMERGENCY)
Facility: HOSPITAL | Age: 33
LOS: 0 days | Discharge: HOME | End: 2018-06-28
Attending: EMERGENCY MEDICINE | Admitting: EMERGENCY MEDICINE
Payer: COMMERCIAL

## 2018-06-27 VITALS — WEIGHT: 175.05 LBS | HEIGHT: 71 IN
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
## 2018-06-27 DIAGNOSIS — Z96.7 PRESENCE OF OTHER BONE AND TENDON IMPLANTS: Chronic | ICD-10-CM

## 2018-06-27 DIAGNOSIS — K60.3 ANAL FISTULA: Chronic | ICD-10-CM

## 2018-06-27 DIAGNOSIS — S61.531A PUNCTURE WOUND WITHOUT FOREIGN BODY OF RIGHT WRIST, INITIAL ENCOUNTER: ICD-10-CM

## 2018-06-27 DIAGNOSIS — Z98.89 OTHER SPECIFIED POSTPROCEDURAL STATES: Chronic | ICD-10-CM

## 2018-06-27 DIAGNOSIS — S69.81XA OTHER SPECIFIED INJURIES OF RIGHT WRIST, HAND AND FINGER(S), INITIAL ENCOUNTER: ICD-10-CM

## 2018-06-27 LAB
ALBUMIN SERPL ELPH-MCNC: 3.2 G/DL — LOW (ref 3.3–5)
ALP SERPL-CCNC: 98 U/L — SIGNIFICANT CHANGE UP (ref 40–120)
ALT FLD-CCNC: 21 U/L — SIGNIFICANT CHANGE UP (ref 12–78)
ANION GAP SERPL CALC-SCNC: 7 MMOL/L — SIGNIFICANT CHANGE UP (ref 5–17)
AST SERPL-CCNC: 23 U/L — SIGNIFICANT CHANGE UP (ref 15–37)
BASOPHILS # BLD AUTO: 0.05 K/UL — SIGNIFICANT CHANGE UP (ref 0–0.2)
BASOPHILS NFR BLD AUTO: 0.4 % — SIGNIFICANT CHANGE UP (ref 0–2)
BILIRUB SERPL-MCNC: 0.3 MG/DL — SIGNIFICANT CHANGE UP (ref 0.2–1.2)
BUN SERPL-MCNC: 5 MG/DL — LOW (ref 7–23)
CALCIUM SERPL-MCNC: 8.5 MG/DL — SIGNIFICANT CHANGE UP (ref 8.5–10.1)
CHLORIDE SERPL-SCNC: 104 MMOL/L — SIGNIFICANT CHANGE UP (ref 96–108)
CO2 SERPL-SCNC: 27 MMOL/L — SIGNIFICANT CHANGE UP (ref 22–31)
CREAT SERPL-MCNC: 0.78 MG/DL — SIGNIFICANT CHANGE UP (ref 0.5–1.3)
EOSINOPHIL # BLD AUTO: 0.17 K/UL — SIGNIFICANT CHANGE UP (ref 0–0.5)
EOSINOPHIL NFR BLD AUTO: 1.4 % — SIGNIFICANT CHANGE UP (ref 0–6)
ERYTHROCYTE [SEDIMENTATION RATE] IN BLOOD: 10 MM/HR — SIGNIFICANT CHANGE UP (ref 0–15)
GLUCOSE SERPL-MCNC: 96 MG/DL — SIGNIFICANT CHANGE UP (ref 70–99)
HCT VFR BLD CALC: 47.2 % — SIGNIFICANT CHANGE UP (ref 39–50)
HGB BLD-MCNC: 16.2 G/DL — SIGNIFICANT CHANGE UP (ref 13–17)
IMM GRANULOCYTES NFR BLD AUTO: 0.4 % — SIGNIFICANT CHANGE UP (ref 0–1.5)
LYMPHOCYTES # BLD AUTO: 2.86 K/UL — SIGNIFICANT CHANGE UP (ref 1–3.3)
LYMPHOCYTES # BLD AUTO: 24.3 % — SIGNIFICANT CHANGE UP (ref 13–44)
MCHC RBC-ENTMCNC: 34.3 GM/DL — SIGNIFICANT CHANGE UP (ref 32–36)
MCHC RBC-ENTMCNC: 35.4 PG — HIGH (ref 27–34)
MCV RBC AUTO: 103.3 FL — HIGH (ref 80–100)
MONOCYTES # BLD AUTO: 1.09 K/UL — HIGH (ref 0–0.9)
MONOCYTES NFR BLD AUTO: 9.3 % — SIGNIFICANT CHANGE UP (ref 2–14)
NEUTROPHILS # BLD AUTO: 7.55 K/UL — HIGH (ref 1.8–7.4)
NEUTROPHILS NFR BLD AUTO: 64.2 % — SIGNIFICANT CHANGE UP (ref 43–77)
NRBC # BLD: 0 /100 WBCS — SIGNIFICANT CHANGE UP (ref 0–0)
PLATELET # BLD AUTO: 301 K/UL — SIGNIFICANT CHANGE UP (ref 150–400)
POTASSIUM SERPL-MCNC: 4 MMOL/L — SIGNIFICANT CHANGE UP (ref 3.5–5.3)
POTASSIUM SERPL-SCNC: 4 MMOL/L — SIGNIFICANT CHANGE UP (ref 3.5–5.3)
PROT SERPL-MCNC: 7.5 GM/DL — SIGNIFICANT CHANGE UP (ref 6–8.3)
RBC # BLD: 4.57 M/UL — SIGNIFICANT CHANGE UP (ref 4.2–5.8)
RBC # FLD: 15.6 % — HIGH (ref 10.3–14.5)
SODIUM SERPL-SCNC: 138 MMOL/L — SIGNIFICANT CHANGE UP (ref 135–145)
WBC # BLD: 11.77 K/UL — HIGH (ref 3.8–10.5)
WBC # FLD AUTO: 11.77 K/UL — HIGH (ref 3.8–10.5)

## 2018-06-27 PROCEDURE — 29125 APPL SHORT ARM SPLINT STATIC: CPT | Mod: RT

## 2018-06-27 PROCEDURE — 73090 X-RAY EXAM OF FOREARM: CPT | Mod: 26,RT

## 2018-06-27 PROCEDURE — 99284 EMERGENCY DEPT VISIT MOD MDM: CPT | Mod: 25

## 2018-06-27 RX ORDER — TETANUS TOXOID, REDUCED DIPHTHERIA TOXOID AND ACELLULAR PERTUSSIS VACCINE, ADSORBED 5; 2.5; 8; 8; 2.5 [IU]/.5ML; [IU]/.5ML; UG/.5ML; UG/.5ML; UG/.5ML
0.5 SUSPENSION INTRAMUSCULAR ONCE
Qty: 0 | Refills: 0 | Status: DISCONTINUED | OUTPATIENT
Start: 2018-06-27 | End: 2018-06-27

## 2018-06-27 RX ORDER — CEPHALEXIN 500 MG
1 CAPSULE ORAL
Qty: 20 | Refills: 0 | OUTPATIENT
Start: 2018-06-27 | End: 2018-07-01

## 2018-06-27 RX ORDER — KETOROLAC TROMETHAMINE 30 MG/ML
30 SYRINGE (ML) INJECTION ONCE
Qty: 0 | Refills: 0 | Status: DISCONTINUED | OUTPATIENT
Start: 2018-06-27 | End: 2018-06-27

## 2018-06-27 RX ADMIN — Medication 30 MILLIGRAM(S): at 22:16

## 2018-06-27 RX ADMIN — Medication 100 MILLIGRAM(S): at 22:15

## 2018-06-27 NOTE — ED ADULT TRIAGE NOTE - CHIEF COMPLAINT QUOTE
pt states right wrist puncture wound 11AM after a piece of wood with a nail fell onto wrist. Pt removed nail and states wound began to bleed 20min after injury, c/o wrist pain and decreased ROM due to pain. Strong radial pulses.

## 2018-06-27 NOTE — ED STATDOCS - PROGRESS NOTE DETAILS
signed Meghana Torres PA-C Pt seen initially in intake by Dr. Thornton.   Pt c/o right wrist pain after a wood plan with a nail in it fell onto his right wrist, radial aspect around 10am at work today, puncturing his wrist. Right hand dominant. Nail did not get stuck or seem to penetrate very deeply, but pt c/o pain in wrist with movement. Gross motor/sensation intact +puncture wound over distal radius. 2+ radial pulse. No significant findings on labwork or imaging. tetanus UTD. Plan IV abx, splint for 1-3 days, DC home on PO abx with hand f/u. return precautions given. Pt feeling well, agrees with DC and plan of care. signed Meghana Torres PA-C Pt seen initially in intake by Dr. Thornton.   Pt c/o right wrist pain after a wood plan with a nail in it fell onto his right wrist, radial aspect around 10am at work today, puncturing his wrist. Right hand dominant. Nail did not get stuck or seem to penetrate very deeply, but pt c/o pain in wrist with movement. Gross motor/sensation intact +puncture wound over distal radius. 2+ radial pulse. No significant findings on labwork or imaging. tetanus UTD. Plan IV abx, splint for 2-3 days, DC home on PO abx with hand f/u. return precautions given. Pt feeling well, agrees with DC and plan of care.

## 2018-06-27 NOTE — ED STATDOCS - MUSCULOSKELETAL, MLM
+puncture wound to R wrist, with surrounding edema, and redness, pain with ROM. range of motion is not limited and there is no muscle tenderness.

## 2018-06-27 NOTE — ED STATDOCS - OBJECTIVE STATEMENT
33 y/o M presents to the ED c/o wound puncture to R wrist and R wrist pain. Pt notes this morning around 10am wood fell on him with the nail still intact. Patient is unable to move R wrist.

## 2018-06-27 NOTE — ED ADULT NURSE NOTE - FALLEN IN THE PAST
"[facility] Medicine  Discharge Summary      Patient Name: Douglas Melendez  MRN: [id number]  Admission Date: 6/25/2017  Hospital Length of Stay: 2 days  Discharge Date and Time: No discharge date for patient encounter.  Discharging Provider: Rupa Moreira MD  Primary Care Provider: Abimael Posada Jr, MD    Reason for Admission: Altered mental status    HPI:   Douglas is a 15m old M with a PMH breath holding spells and laryngomalacia s/p supraglottoplasty w Dr Lord in April 2017 who presents as transfer from [place] with complaint of first time seizure. Per mom, since the supraglottoplasty Douglas has had "a few colds" and otitis media during which he has staring spells. Spells are brief and she can usually pull him out of it by calling his name. This past Tuesday (6/20), mom noticed a cough so she brought him to PCP, who diagnosed bronchitis and gave him a daily rx for azithromycin. He took the medication on 6/21 and 6/22 only. Around 3pm on Friday 6/23, mom brought Douglas back to PCP's office because "he didn't look right" where he started having seizure-like activity. He was transferred directly to [facility] from PCP where he had a 15 minute long episode of upper and lower extremity contracture and "quivering" with a blank stare. No recent fevers, vomiting, new rashes, diarrhea at home. This was his first tonic-clonic seizure-like episode. He was febrile to 102.9 rectally upon arrival to ED. Per admit documentation from [facility] physician Dr Hanson, medications received in [facility] ED include: IV Phenobarbitol, IV Epinephrine, IV Valium, IV ketamine, and a failed intubation attempt. He was then air lifted to [facility], kept on non-rebreather facemask. Weaned to RA by early AM 6/24. He then continued to receive q4 racemic-epi, q6 IV decadron, and BID nexium. Transferred from [facility] to [facility] for neurology " evaluation.    Procedure(s) (LRB):  IMAGING-(MRI) (N/A)      Indwelling Lines/Drains at time of discharge:   Lines/Drains/Airways          No matching active lines, drains, or airways          Hospital Course: [facility] ED: IV Phenobarbitol, IV Epinephrine, IV Valium, IV ketamine, and a failed intubation attempt.    [facility]: racemic-epi q4, IV decadron q6, IV rocephin BID, Fosphenytoin x1 and BID nexium. Initial CMP within normal limits. CT head, CXR, Xray neck all read as normal.     [facility] inpatient course:  Patient initially somnolent and irritable likely 2/2 multiple medications administered at OSH. Per report, it seems he received multiple antiepileptic drugs in the two hospitals prior to his transfer- to include phenobarbital, valium at ketamine at [facility], then Fosphenytoin, as well as Dexamethasone at hospitals. At [facility], 1H vEEG during this oversedated state was interpreted as normal. MRI of brain showed benign arachnoid cyst, otherwise unremarkable. By time of discharge, he was still slightly less coordinated than usual, per mother, but interacting appropriately, with appropriate truncal control and neurologic status for age and developmental stage.  Dr. Antonio with Peds Neurology was consulted and recommendations were followed for Douglas's inpt workup: No initiation of antiepileptics at this time, imaging normal, and pt should be provided with rectal diazepam as an outpt. Will plan for outpt follow-up with his PCP and Neurology.    Most likely diagnosis at this time of initial event is complex febrile seizure, although fragmented records from outside hospitals make definitive assessment difficult.      Consults:   Consults         Status Ordering Provider     Inpatient consult to Pediatric Neurology  Once     Provider:  (Not yet assigned)    Acknowledged ROSA YOST          Significant Labs:     EE17  A waking EEG is submitted in  this 15-month-old.  The waking posterior rhythm is   5 cycles per second.  No stimulating procedures were done.  Sleep is not seen.    There are no significant asymmetries or paroxysmal discharges.  There is a great   deal of movement, muscle and electrode artifact.     IMPRESSION:  Normal waking EEG.    Significant Imaging:   MRI Brain W/WO  CLINICAL INDICATION: 1 year old M with altered mental status, possible seizure      TECHNIQUE: Multiplanar multisequence MR imaging of the brain was performed before and after the administration of 1 cc of gadavist intravenous contrast.    COMPARISON: No priors.    FINDINGS:    The ventricles are normal in size, without evidence of hydrocephalus.    There is a small arachnoid cyst along anterior margin of the left middle cranial fossa, without significant mass effect.  No extra-axial blood or mass lesion.    The brain otherwise appears within normal limits.  No neural migrational or cortical organization abnormalities identified.  The hippocampi are unremarkable.  No signal abnormality to suggest active seizures.  No parenchymal mass, hemorrhage, edema or infarction.  No abnormal enhancement.    The T2 skull base flow voids are preserved. Bone marrow signal intensity is unremarkable.   Impression       The brain appears within normal limits. No evidence of acute intracranial pathology.         Pending Diagnostic Studies:     None          Final Active Diagnoses:    Diagnosis Date Noted POA    PRINCIPAL PROBLEM:  Seizure [R56.9] 06/24/2017 Yes    Gastroesophageal reflux disease [K21.9] 02/15/2017 Yes      Problems Resolved During this Admission:    Diagnosis Date Noted Date Resolved POA    Transient alteration of awareness [R40.4] 06/25/2017 06/27/2017 Yes        Discharged Condition: stable    Disposition: Home or Self Care    Follow Up:  Follow-up Information     Abimael Posada Jr, MD On 6/30/2017.    Specialty:  Pediatrics  Why:  at 8:30  Contact  information:  [address] [place] [address]  [phone]             Vlad Antonio II, MD On 8/3/2017.    Specialty:  Pediatric Neurology  Why:  at 1:40 for follow up  Contact information:  [address]  [facility] [address]  [phone]                 Patient Instructions:     Diet general     Activity as tolerated     Call MD for:  temperature >100.4     Call MD for:  increased confusion or weakness     Call MD for:  severe uncontrolled pain     Call MD for:  persistent nausea and vomiting or diarrhea       Medications:  Reconciled Home Medications:   Current Discharge Medication List      START taking these medications    Details   diazePAM 5-7.5-10 mg (DIASTAT ACUDIAL) 5-7.5-10 mg Kit Administer 5mg per rectum for any seizure lasting longer than 5 minutes. Then go to the nearest ER for evaluation.  Qty: 1 each, Refills: 5         CONTINUE these medications which have NOT CHANGED    Details   acetaminophen (TYLENOL) 100 mg/mL suspension Take 1 mL (100 mg total) by mouth every 4 (four) hours as needed for Pain.  Refills: 0      ibuprofen 50 mg/1.25 mL DrpS Take 10 mg/kg by mouth every 8 (eight) hours as needed.  Refills: 0      NEXIUM PACKET 20 mg GrPS       omeprazole magnesium 2.5 mg SuDR Take 10 mg by mouth 2 (two) times daily.       SODIUM CHLORIDE FOR INHALATION (SODIUM CHLORIDE 0.9%) 0.9 % nebulizer solution               Rupa Moreira MD  Pediatric Hospital Medicine  [facility]   no

## 2018-06-27 NOTE — ED ADULT NURSE NOTE - OBJECTIVE STATEMENT
Pt alert and oriented x3. pt states screw punctured Rt wrist today, pt c/o pain at the site and decreased ROM. Pt denies fevers or chills. Bleeding controlled at this time.

## 2018-06-28 VITALS
SYSTOLIC BLOOD PRESSURE: 120 MMHG | OXYGEN SATURATION: 100 % | TEMPERATURE: 98 F | DIASTOLIC BLOOD PRESSURE: 70 MMHG | RESPIRATION RATE: 18 BRPM | HEART RATE: 84 BPM

## 2018-06-28 LAB — CRP SERPL-MCNC: 0.3 MG/DL — SIGNIFICANT CHANGE UP (ref 0–0.4)

## 2018-06-28 RX ADMIN — Medication 30 MILLIGRAM(S): at 00:16

## 2018-08-27 ENCOUNTER — INPATIENT (INPATIENT)
Facility: HOSPITAL | Age: 33
LOS: 3 days | Discharge: HOME | End: 2018-08-31
Attending: INTERNAL MEDICINE | Admitting: INTERNAL MEDICINE
Payer: COMMERCIAL

## 2018-08-27 VITALS — WEIGHT: 154.98 LBS | HEIGHT: 71 IN

## 2018-08-27 DIAGNOSIS — Z98.89 OTHER SPECIFIED POSTPROCEDURAL STATES: Chronic | ICD-10-CM

## 2018-08-27 DIAGNOSIS — K85.90 ACUTE PANCREATITIS WITHOUT NECROSIS OR INFECTION, UNSPECIFIED: ICD-10-CM

## 2018-08-27 DIAGNOSIS — Z96.7 PRESENCE OF OTHER BONE AND TENDON IMPLANTS: Chronic | ICD-10-CM

## 2018-08-27 DIAGNOSIS — K60.3 ANAL FISTULA: Chronic | ICD-10-CM

## 2018-08-27 LAB
ALBUMIN SERPL ELPH-MCNC: 3.2 G/DL — LOW (ref 3.3–5)
ALP SERPL-CCNC: 105 U/L — SIGNIFICANT CHANGE UP (ref 40–120)
ALT FLD-CCNC: 27 U/L — SIGNIFICANT CHANGE UP (ref 12–78)
ANION GAP SERPL CALC-SCNC: 6 MMOL/L — SIGNIFICANT CHANGE UP (ref 5–17)
APPEARANCE UR: CLEAR — SIGNIFICANT CHANGE UP
APTT BLD: 26.3 SEC — LOW (ref 27.5–37.4)
AST SERPL-CCNC: 18 U/L — SIGNIFICANT CHANGE UP (ref 15–37)
BASOPHILS # BLD AUTO: 0.04 K/UL — SIGNIFICANT CHANGE UP (ref 0–0.2)
BASOPHILS NFR BLD AUTO: 0.3 % — SIGNIFICANT CHANGE UP (ref 0–2)
BILIRUB SERPL-MCNC: 0.4 MG/DL — SIGNIFICANT CHANGE UP (ref 0.2–1.2)
BILIRUB UR-MCNC: NEGATIVE — SIGNIFICANT CHANGE UP
BUN SERPL-MCNC: 8 MG/DL — SIGNIFICANT CHANGE UP (ref 7–23)
CALCIUM SERPL-MCNC: 8.5 MG/DL — SIGNIFICANT CHANGE UP (ref 8.5–10.1)
CHLORIDE SERPL-SCNC: 104 MMOL/L — SIGNIFICANT CHANGE UP (ref 96–108)
CO2 SERPL-SCNC: 26 MMOL/L — SIGNIFICANT CHANGE UP (ref 22–31)
COLOR SPEC: YELLOW — SIGNIFICANT CHANGE UP
CREAT SERPL-MCNC: 0.8 MG/DL — SIGNIFICANT CHANGE UP (ref 0.5–1.3)
DIFF PNL FLD: NEGATIVE — SIGNIFICANT CHANGE UP
EOSINOPHIL # BLD AUTO: 0.16 K/UL — SIGNIFICANT CHANGE UP (ref 0–0.5)
EOSINOPHIL NFR BLD AUTO: 1.3 % — SIGNIFICANT CHANGE UP (ref 0–6)
GLUCOSE SERPL-MCNC: 128 MG/DL — HIGH (ref 70–99)
GLUCOSE UR QL: NEGATIVE MG/DL — SIGNIFICANT CHANGE UP
HCT VFR BLD CALC: 44.2 % — SIGNIFICANT CHANGE UP (ref 39–50)
HGB BLD-MCNC: 15.6 G/DL — SIGNIFICANT CHANGE UP (ref 13–17)
IMM GRANULOCYTES NFR BLD AUTO: 0.7 % — SIGNIFICANT CHANGE UP (ref 0–1.5)
INR BLD: 1.04 RATIO — SIGNIFICANT CHANGE UP (ref 0.88–1.16)
KETONES UR-MCNC: NEGATIVE — SIGNIFICANT CHANGE UP
LEUKOCYTE ESTERASE UR-ACNC: NEGATIVE — SIGNIFICANT CHANGE UP
LIDOCAIN IGE QN: 1147 U/L — HIGH (ref 73–393)
LYMPHOCYTES # BLD AUTO: 1.61 K/UL — SIGNIFICANT CHANGE UP (ref 1–3.3)
LYMPHOCYTES # BLD AUTO: 13.2 % — SIGNIFICANT CHANGE UP (ref 13–44)
MCHC RBC-ENTMCNC: 35.3 GM/DL — SIGNIFICANT CHANGE UP (ref 32–36)
MCHC RBC-ENTMCNC: 37.3 PG — HIGH (ref 27–34)
MCV RBC AUTO: 105.7 FL — HIGH (ref 80–100)
MONOCYTES # BLD AUTO: 1.09 K/UL — HIGH (ref 0–0.9)
MONOCYTES NFR BLD AUTO: 9 % — SIGNIFICANT CHANGE UP (ref 2–14)
NEUTROPHILS # BLD AUTO: 9.18 K/UL — HIGH (ref 1.8–7.4)
NEUTROPHILS NFR BLD AUTO: 75.5 % — SIGNIFICANT CHANGE UP (ref 43–77)
NITRITE UR-MCNC: NEGATIVE — SIGNIFICANT CHANGE UP
NRBC # BLD: 0 /100 WBCS — SIGNIFICANT CHANGE UP (ref 0–0)
PH UR: 7 — SIGNIFICANT CHANGE UP (ref 5–8)
PLATELET # BLD AUTO: 325 K/UL — SIGNIFICANT CHANGE UP (ref 150–400)
POTASSIUM SERPL-MCNC: 3.5 MMOL/L — SIGNIFICANT CHANGE UP (ref 3.5–5.3)
POTASSIUM SERPL-SCNC: 3.5 MMOL/L — SIGNIFICANT CHANGE UP (ref 3.5–5.3)
PROT SERPL-MCNC: 7.4 GM/DL — SIGNIFICANT CHANGE UP (ref 6–8.3)
PROT UR-MCNC: NEGATIVE MG/DL — SIGNIFICANT CHANGE UP
PROTHROM AB SERPL-ACNC: 11.2 SEC — SIGNIFICANT CHANGE UP (ref 9.8–12.7)
RBC # BLD: 4.18 M/UL — LOW (ref 4.2–5.8)
RBC # FLD: 14.7 % — HIGH (ref 10.3–14.5)
SODIUM SERPL-SCNC: 136 MMOL/L — SIGNIFICANT CHANGE UP (ref 135–145)
SP GR SPEC: 1 — LOW (ref 1.01–1.02)
UROBILINOGEN FLD QL: NEGATIVE MG/DL — SIGNIFICANT CHANGE UP
WBC # BLD: 12.16 K/UL — HIGH (ref 3.8–10.5)
WBC # FLD AUTO: 12.16 K/UL — HIGH (ref 3.8–10.5)

## 2018-08-27 PROCEDURE — 74177 CT ABD & PELVIS W/CONTRAST: CPT | Mod: 26

## 2018-08-27 PROCEDURE — 99285 EMERGENCY DEPT VISIT HI MDM: CPT

## 2018-08-27 RX ORDER — HYDROMORPHONE HYDROCHLORIDE 2 MG/ML
1 INJECTION INTRAMUSCULAR; INTRAVENOUS; SUBCUTANEOUS ONCE
Qty: 0 | Refills: 0 | Status: DISCONTINUED | OUTPATIENT
Start: 2018-08-27 | End: 2018-08-27

## 2018-08-27 RX ORDER — SODIUM CHLORIDE 9 MG/ML
1000 INJECTION INTRAMUSCULAR; INTRAVENOUS; SUBCUTANEOUS ONCE
Qty: 0 | Refills: 0 | Status: COMPLETED | OUTPATIENT
Start: 2018-08-27 | End: 2018-08-27

## 2018-08-27 RX ORDER — ACETAMINOPHEN 500 MG
650 TABLET ORAL EVERY 6 HOURS
Qty: 0 | Refills: 0 | Status: DISCONTINUED | OUTPATIENT
Start: 2018-08-27 | End: 2018-08-31

## 2018-08-27 RX ORDER — SODIUM CHLORIDE 9 MG/ML
1000 INJECTION, SOLUTION INTRAVENOUS
Qty: 0 | Refills: 0 | Status: DISCONTINUED | OUTPATIENT
Start: 2018-08-27 | End: 2018-08-28

## 2018-08-27 RX ORDER — SODIUM CHLORIDE 9 MG/ML
3 INJECTION INTRAMUSCULAR; INTRAVENOUS; SUBCUTANEOUS ONCE
Qty: 0 | Refills: 0 | Status: COMPLETED | OUTPATIENT
Start: 2018-08-27 | End: 2018-08-27

## 2018-08-27 RX ORDER — MORPHINE SULFATE 50 MG/1
8 CAPSULE, EXTENDED RELEASE ORAL ONCE
Qty: 0 | Refills: 0 | Status: DISCONTINUED | OUTPATIENT
Start: 2018-08-27 | End: 2018-08-27

## 2018-08-27 RX ORDER — ONDANSETRON 8 MG/1
4 TABLET, FILM COATED ORAL ONCE
Qty: 0 | Refills: 0 | Status: COMPLETED | OUTPATIENT
Start: 2018-08-27 | End: 2018-08-27

## 2018-08-27 RX ORDER — MORPHINE SULFATE 50 MG/1
4 CAPSULE, EXTENDED RELEASE ORAL EVERY 4 HOURS
Qty: 0 | Refills: 0 | Status: DISCONTINUED | OUTPATIENT
Start: 2018-08-27 | End: 2018-08-27

## 2018-08-27 RX ORDER — HYDROMORPHONE HYDROCHLORIDE 2 MG/ML
1 INJECTION INTRAMUSCULAR; INTRAVENOUS; SUBCUTANEOUS EVERY 4 HOURS
Qty: 0 | Refills: 0 | Status: DISCONTINUED | OUTPATIENT
Start: 2018-08-27 | End: 2018-08-28

## 2018-08-27 RX ORDER — ONDANSETRON 8 MG/1
4 TABLET, FILM COATED ORAL EVERY 6 HOURS
Qty: 0 | Refills: 0 | Status: DISCONTINUED | OUTPATIENT
Start: 2018-08-27 | End: 2018-08-28

## 2018-08-27 RX ORDER — LANOLIN ALCOHOL/MO/W.PET/CERES
3 CREAM (GRAM) TOPICAL AT BEDTIME
Qty: 0 | Refills: 0 | Status: DISCONTINUED | OUTPATIENT
Start: 2018-08-27 | End: 2018-08-31

## 2018-08-27 RX ORDER — HYDROMORPHONE HYDROCHLORIDE 2 MG/ML
0.5 INJECTION INTRAMUSCULAR; INTRAVENOUS; SUBCUTANEOUS ONCE
Qty: 0 | Refills: 0 | Status: DISCONTINUED | OUTPATIENT
Start: 2018-08-27 | End: 2018-08-27

## 2018-08-27 RX ADMIN — SODIUM CHLORIDE 1000 MILLILITER(S): 9 INJECTION INTRAMUSCULAR; INTRAVENOUS; SUBCUTANEOUS at 07:38

## 2018-08-27 RX ADMIN — HYDROMORPHONE HYDROCHLORIDE 1 MILLIGRAM(S): 2 INJECTION INTRAMUSCULAR; INTRAVENOUS; SUBCUTANEOUS at 14:40

## 2018-08-27 RX ADMIN — HYDROMORPHONE HYDROCHLORIDE 1 MILLIGRAM(S): 2 INJECTION INTRAMUSCULAR; INTRAVENOUS; SUBCUTANEOUS at 08:40

## 2018-08-27 RX ADMIN — MORPHINE SULFATE 4 MILLIGRAM(S): 50 CAPSULE, EXTENDED RELEASE ORAL at 14:19

## 2018-08-27 RX ADMIN — MORPHINE SULFATE 8 MILLIGRAM(S): 50 CAPSULE, EXTENDED RELEASE ORAL at 00:08

## 2018-08-27 RX ADMIN — HYDROMORPHONE HYDROCHLORIDE 1 MILLIGRAM(S): 2 INJECTION INTRAMUSCULAR; INTRAVENOUS; SUBCUTANEOUS at 19:02

## 2018-08-27 RX ADMIN — HYDROMORPHONE HYDROCHLORIDE 1 MILLIGRAM(S): 2 INJECTION INTRAMUSCULAR; INTRAVENOUS; SUBCUTANEOUS at 17:23

## 2018-08-27 RX ADMIN — HYDROMORPHONE HYDROCHLORIDE 1 MILLIGRAM(S): 2 INJECTION INTRAMUSCULAR; INTRAVENOUS; SUBCUTANEOUS at 00:00

## 2018-08-27 RX ADMIN — HYDROMORPHONE HYDROCHLORIDE 1 MILLIGRAM(S): 2 INJECTION INTRAMUSCULAR; INTRAVENOUS; SUBCUTANEOUS at 22:58

## 2018-08-27 RX ADMIN — ONDANSETRON 4 MILLIGRAM(S): 8 TABLET, FILM COATED ORAL at 10:32

## 2018-08-27 RX ADMIN — HYDROMORPHONE HYDROCHLORIDE 0.5 MILLIGRAM(S): 2 INJECTION INTRAMUSCULAR; INTRAVENOUS; SUBCUTANEOUS at 21:35

## 2018-08-27 RX ADMIN — MORPHINE SULFATE 8 MILLIGRAM(S): 50 CAPSULE, EXTENDED RELEASE ORAL at 07:37

## 2018-08-27 RX ADMIN — ONDANSETRON 4 MILLIGRAM(S): 8 TABLET, FILM COATED ORAL at 15:41

## 2018-08-27 RX ADMIN — SODIUM CHLORIDE 200 MILLILITER(S): 9 INJECTION, SOLUTION INTRAVENOUS at 19:44

## 2018-08-27 RX ADMIN — ONDANSETRON 4 MILLIGRAM(S): 8 TABLET, FILM COATED ORAL at 07:39

## 2018-08-27 RX ADMIN — SODIUM CHLORIDE 3 MILLILITER(S): 9 INJECTION INTRAMUSCULAR; INTRAVENOUS; SUBCUTANEOUS at 07:40

## 2018-08-27 RX ADMIN — HYDROMORPHONE HYDROCHLORIDE 1 MILLIGRAM(S): 2 INJECTION INTRAMUSCULAR; INTRAVENOUS; SUBCUTANEOUS at 17:32

## 2018-08-27 RX ADMIN — HYDROMORPHONE HYDROCHLORIDE 1 MILLIGRAM(S): 2 INJECTION INTRAMUSCULAR; INTRAVENOUS; SUBCUTANEOUS at 18:45

## 2018-08-27 RX ADMIN — Medication 3 MILLIGRAM(S): at 22:08

## 2018-08-27 RX ADMIN — MORPHINE SULFATE 4 MILLIGRAM(S): 50 CAPSULE, EXTENDED RELEASE ORAL at 12:43

## 2018-08-27 RX ADMIN — HYDROMORPHONE HYDROCHLORIDE 1 MILLIGRAM(S): 2 INJECTION INTRAMUSCULAR; INTRAVENOUS; SUBCUTANEOUS at 09:00

## 2018-08-27 RX ADMIN — HYDROMORPHONE HYDROCHLORIDE 1 MILLIGRAM(S): 2 INJECTION INTRAMUSCULAR; INTRAVENOUS; SUBCUTANEOUS at 10:32

## 2018-08-27 RX ADMIN — HYDROMORPHONE HYDROCHLORIDE 0.5 MILLIGRAM(S): 2 INJECTION INTRAMUSCULAR; INTRAVENOUS; SUBCUTANEOUS at 22:05

## 2018-08-27 RX ADMIN — SODIUM CHLORIDE 200 MILLILITER(S): 9 INJECTION, SOLUTION INTRAVENOUS at 14:24

## 2018-08-27 RX ADMIN — HYDROMORPHONE HYDROCHLORIDE 1 MILLIGRAM(S): 2 INJECTION INTRAMUSCULAR; INTRAVENOUS; SUBCUTANEOUS at 14:24

## 2018-08-27 NOTE — ED ADULT NURSE NOTE - OBJECTIVE STATEMENT
32 y/o male awake alert and oriented x4 presents to ED c/o generalized abd pain which woke him up from sleep around 2am this morning. Pt states he went to bed feeling well and woke up with generalized abd pain radiating from upper abd to lower abd. Denies nausea, vomiting, diarrhea, fever/chills, recent travel or urinary sx. pt describe pain as sharp shooting pain. No medication taken at home for pain. 32 y/o male awake alert and oriented x4 presents to ED c/o generalized abd pain which woke him up from sleep around 2am this morning. Pt states he went to bed feeling well and woke up with generalized abd pain radiating from upper abd to lower abd. Denies nausea, vomiting, diarrhea, fever/chills, recent travel or urinary sx. pt describe pain as sharp shooting pain. No medication taken at home for pain. hx of Crohn's

## 2018-08-27 NOTE — H&P ADULT - NSHPPHYSICALEXAM_GEN_ALL_CORE
Vital Signs Last 24 Hrs  T(C): 36.8 (27 Aug 2018 06:31), Max: 36.8 (27 Aug 2018 06:31)  T(F): 98.2 (27 Aug 2018 06:31), Max: 98.2 (27 Aug 2018 06:31)  HR: 96 (27 Aug 2018 06:31) (96 - 96)  BP: 151/96 (27 Aug 2018 06:31) (151/96 - 151/96)  BP(mean): --  RR: --  SpO2: 100% (27 Aug 2018 06:31) (100% - 100%)    PHYSICAL EXAM:    Constitutional: NAD, awake and alert, well-developed  HEENT: PERR, EOMI, Normal Hearing, MMM  Neck: Soft and supple  Respiratory: Breath sounds are clear bilaterally, No wheezing, rales or rhonchi  Cardiovascular: S1 and S2, regular rate and rhythm, no Murmurs, gallops or rubs  Gastrointestinal: Bowel Sounds present, soft, + abd tenderness, nondistended, no guarding, no rebound  Extremities: No peripheral edema  Neurological: A/O x 3, no focal deficits in my limited exam  Skin: No rashes

## 2018-08-27 NOTE — ED PROVIDER NOTE - OBJECTIVE STATEMENT
33M hx Crohns (no flares for several years) here with 2d of progressive abdominal pain, nausea, constipation.  No fever/chills. No urinary c/o.  No prior surgeries.

## 2018-08-27 NOTE — ED ADULT NURSE NOTE - NSIMPLEMENTINTERV_GEN_ALL_ED
Implemented All Universal Safety Interventions:  [place] to call system. Call bell, personal items and telephone within reach. Instruct patient to call for assistance. Room bathroom lighting operational. Non-slip footwear when patient is off stretcher. Physically safe environment: no spills, clutter or unnecessary equipment. Stretcher in lowest position, wheels locked, appropriate side rails in place.

## 2018-08-27 NOTE — H&P ADULT - ASSESSMENT
33 year old man with Crohn's p/w abdominal pain secondary to acute pancreatitis.     Acute pancreatitis: Unknown etiology  -IVFs  -NPO  -supportive care/pain control  -AM labs - trend leukocytosis  -trend lipase  -f/u lipid panel  -GI eval    Crohn's with chronic distal enteritis and right-sided perianal fistulae:  -GI eval    DVT ppx:  -early ambulation

## 2018-08-27 NOTE — ED PROVIDER NOTE - PRINCIPAL DIAGNOSIS
Lower abdominal pain Acute pancreatitis, unspecified complication status, unspecified pancreatitis type

## 2018-08-27 NOTE — ED ADULT TRIAGE NOTE - CHIEF COMPLAINT QUOTE
pt presents to ED with complaints of B/L upper abdominal pain. pt endorses nausea and denies vomiting. pt states symptoms began at approximately 0200 this am and has become progressively worsening.

## 2018-08-27 NOTE — H&P ADULT - HISTORY OF PRESENT ILLNESS
33 year old man with PMHx of Crohn's (not on any meds at home), perianal fistula repair in 2002, hx of pancreatitis presents with worsening abdominal pain since last night.  Pt states he was getting ready to go to work today but had persistent abdominal pain since last night, worsening so he came to ED for further evaluation.  Per pt he is not on any meds and has not made any dietary changes or took any new meds over last several days.   He admits to occasional alcohol use but no binge episodes recently.  He states he has associated chills but no fever, nausea or vomiting.  No new changes in bowel movements.      In ED: Found to have pancreatitis, IVFs and supportive care given.

## 2018-08-27 NOTE — ED PROVIDER NOTE - CARE PLAN
Principal Discharge DX:	Lower abdominal pain  Secondary Diagnosis:	Crohn's disease of large intestine without complication Principal Discharge DX:	Acute pancreatitis, unspecified complication status, unspecified pancreatitis type  Secondary Diagnosis:	Crohn's disease of large intestine without complication

## 2018-08-27 NOTE — CONSULT NOTE ADULT - ASSESSMENT
34 y/o male admitted with abdominal pain and nausea. Hx of crohn's disease and alcoholic induced pancreatitis.     Impression:  Acute mild pancreatitis of unknown etiology r/o ?gallstones, doubt alcohol induced/medication.  Crohn's Disease.    Plan:  NPO.  IVFs 200ml/hr.  Nausea- zofran as needed.  Will obtain sonogram to r/o gallstone pancreatitis.  Check lipid panel, IGG4 r/o AIP.    Discussed with patient, Dr. Elam, and Dr. Amaya.

## 2018-08-27 NOTE — CONSULT NOTE ADULT - SUBJECTIVE AND OBJECTIVE BOX
Patient is a 33y old  Male who presents with a chief complaint of Abdominal Pain. (27 Aug 2018 12:04)    HPI:  33 year old man with PMHx of Crohn's (not on any meds at home), perianal fistula repair in 2002, hx of pancreatitis presents with worsening abdominal pain since last night.  Pt states he was getting ready to go to work today but had persistent abdominal pain since last night, worsening so he came to ED for further evaluation.  Per pt he is not on any meds and has not made any dietary changes or took any new meds over last several days.   He admits to occasional alcohol use but no binge episodes recently.  He states he has associated chills but no fever, nausea or vomiting.  No new changes in bowel movements.      In ED: Found to have pancreatitis, IVFs and supportive care given. (27 Aug 2018 12:04)    8/27/2018-  Pt still with periumbilical pain with radiation to the right of his umbilicus area.  + nausea, denies any vomiting  Reports history of alcoholic induced pancreatitis, but now pt only drinks occasional.  No alcoholic beverages in the past several weeks.  Denies any new medications.  Reports last colonoscopy was about 1 year ago and was noted to have crohn's disease.  Currently on no crohn's medication at this time.  Reports 2-3 BMs per day that are soft, denies any diarrhea or rectal bleeding.     PAST MEDICAL & SURGICAL HISTORY:  Crohn's disease of large intestine without complication: (No current flare up)  Perianal fistula: repair in 2002  History of colonoscopy: (2014)  S/P ORIF (open reduction internal fixation) fracture: (Right ankle, 2014)    MEDICATIONS  (STANDING):  sodium chloride 0.9% 1000 milliLiter(s) (200 mL/Hr) IV Continuous <Continuous>    MEDICATIONS  (PRN):  acetaminophen   Tablet. 650 milliGRAM(s) Oral every 6 hours PRN Moderate Pain (4 - 6)  HYDROmorphone  Injectable 1 milliGRAM(s) IV Push every 4 hours PRN Severe Pain (7 - 10)    Allergies  No Known Allergies    FAMILY HISTORY:  Diabetes mellitus (Father)    REVIEW OF SYSTEMS:  CONSTITUTIONAL: No weakness, fevers or chills  EYES/ENT: No visual changes;  No vertigo or throat pain   NECK: No pain or stiffness  RESPIRATORY: No cough, wheezing, hemoptysis; No shortness of breath  CARDIOVASCULAR: No chest pain or palpitations  GASTROINTESTINAL: Per HPI.   GENITOURINARY: No dysuria, frequency or hematuria  NEUROLOGICAL: No numbness or weakness  SKIN: No itching, burning, rashes, or lesions   PSYCH: Normal mood and affect  All other review of systems is negative unless indicated above.    Vital Signs Last 24 Hrs  T(C): 36.7 (27 Aug 2018 14:19), Max: 36.8 (27 Aug 2018 06:31)  T(F): 98.1 (27 Aug 2018 14:19), Max: 98.2 (27 Aug 2018 06:31)  HR: 77 (27 Aug 2018 14:19) (77 - 96)  BP: 135/87 (27 Aug 2018 14:19) (135/87 - 151/96)  BP(mean): --  RR: 16 (27 Aug 2018 14:19) (16 - 16)  SpO2: 98% (27 Aug 2018 14:19) (98% - 100%)    PHYSICAL EXAM:  Constitutional: NAD, well-developed  HEENT: MMM  Neck: No LAD  Respiratory: CTAB  Cardiovascular: S1 and S2, RRR, no M/G/R  Gastrointestinal: Periumbilical/ right sided abdominal tenderness, BS+, soft, ND.   Extremities: No peripheral edema  Vascular: 2+ peripheral pulses  Neurological: A/O x 3, no focal deficits  Psychiatric: Normal mood, normal affect  Skin: No rashes    LABS:                        15.6   12.16 )-----------( 325      ( 27 Aug 2018 07:00 )             44.2     08-27    136  |  104  |  8   ----------------------------<  128<H>  3.5   |  26  |  0.80    Ca    8.5      27 Aug 2018 07:00    TPro  7.4  /  Alb  3.2<L>  /  TBili  0.4  /  DBili  x   /  AST  18  /  ALT  27  /  AlkPhos  105  08-27    PT/INR - ( 27 Aug 2018 07:00 )   PT: 11.2 sec;   INR: 1.04 ratio         PTT - ( 27 Aug 2018 07:00 )  PTT:26.3 sec  LIVER FUNCTIONS - ( 27 Aug 2018 07:00 )  Alb: 3.2 g/dL / Pro: 7.4 gm/dL / ALK PHOS: 105 U/L / ALT: 27 U/L / AST: 18 U/L / GGT: x             RADIOLOGY & ADDITIONAL STUDIES:

## 2018-08-27 NOTE — H&P ADULT - NSHPLABSRESULTS_GEN_ALL_CORE
15.6   12.16 )-----------( 325      ( 27 Aug 2018 07:00 )             44.2         136  |  104  |  8   ----------------------------<  128<H>  3.5   |  26  |  0.80    Ca    8.5      27 Aug 2018 07:00    TPro  7.4  /  Alb  3.2<L>  /  TBili  0.4  /  DBili  x   /  AST  18  /  ALT  27  /  AlkPhos  105      CAPILLARY BLOOD GLUCOSE        LIVER FUNCTIONS - ( 27 Aug 2018 07:00 )  Alb: 3.2 g/dL / Pro: 7.4 gm/dL / ALK PHOS: 105 U/L / ALT: 27 U/L / AST: 18 U/L / GGT: x           PT/INR - ( 27 Aug 2018 07:00 )   PT: 11.2 sec;   INR: 1.04 ratio         PTT - ( 27 Aug 2018 07:00 )  PTT:26.3 sec  Urinalysis Basic - ( 27 Aug 2018 09:53 )    Color: Yellow / Appearance: Clear / S.005 / pH: x  Gluc: x / Ketone: Negative  / Bili: Negative / Urobili: Negative mg/dL   Blood: x / Protein: Negative mg/dL / Nitrite: Negative   Leuk Esterase: Negative / RBC: x / WBC x   Sq Epi: x / Non Sq Epi: x / Bacteria: x      < from: CT Abdomen and Pelvis w/ Oral Cont and w/ IV Cont (18 @ 09:22) >    IMPRESSION:     Mild acute pancreatitis.  Chronic distal enteritis compatible with history of Crohn's disease.  Right-sided perianal fistulae.

## 2018-08-28 LAB
ADD ON TEST-SPECIMEN IN LAB: SIGNIFICANT CHANGE UP
ALBUMIN SERPL ELPH-MCNC: 2.9 G/DL — LOW (ref 3.3–5)
ALP SERPL-CCNC: 95 U/L — SIGNIFICANT CHANGE UP (ref 40–120)
ALT FLD-CCNC: 20 U/L — SIGNIFICANT CHANGE UP (ref 12–78)
AMYLASE P1 CFR SERPL: 389 U/L — HIGH (ref 25–115)
ANION GAP SERPL CALC-SCNC: 10 MMOL/L — SIGNIFICANT CHANGE UP (ref 5–17)
AST SERPL-CCNC: 16 U/L — SIGNIFICANT CHANGE UP (ref 15–37)
BASOPHILS # BLD AUTO: 0.04 K/UL — SIGNIFICANT CHANGE UP (ref 0–0.2)
BASOPHILS NFR BLD AUTO: 0.3 % — SIGNIFICANT CHANGE UP (ref 0–2)
BILIRUB SERPL-MCNC: 0.7 MG/DL — SIGNIFICANT CHANGE UP (ref 0.2–1.2)
BUN SERPL-MCNC: 5 MG/DL — LOW (ref 7–23)
CALCIUM SERPL-MCNC: 8.2 MG/DL — LOW (ref 8.5–10.1)
CHLORIDE SERPL-SCNC: 105 MMOL/L — SIGNIFICANT CHANGE UP (ref 96–108)
CHOLEST SERPL-MCNC: 126 MG/DL — SIGNIFICANT CHANGE UP (ref 10–199)
CO2 SERPL-SCNC: 23 MMOL/L — SIGNIFICANT CHANGE UP (ref 22–31)
CREAT SERPL-MCNC: 0.58 MG/DL — SIGNIFICANT CHANGE UP (ref 0.5–1.3)
EOSINOPHIL # BLD AUTO: 0.06 K/UL — SIGNIFICANT CHANGE UP (ref 0–0.5)
EOSINOPHIL NFR BLD AUTO: 0.4 % — SIGNIFICANT CHANGE UP (ref 0–6)
ERYTHROCYTE [SEDIMENTATION RATE] IN BLOOD: 17 MM/HR — HIGH (ref 0–15)
GLUCOSE SERPL-MCNC: 59 MG/DL — LOW (ref 70–99)
HCT VFR BLD CALC: 43.8 % — SIGNIFICANT CHANGE UP (ref 39–50)
HDLC SERPL-MCNC: 45 MG/DL — SIGNIFICANT CHANGE UP
HGB BLD-MCNC: 15.1 G/DL — SIGNIFICANT CHANGE UP (ref 13–17)
IMM GRANULOCYTES NFR BLD AUTO: 0.9 % — SIGNIFICANT CHANGE UP (ref 0–1.5)
LIDOCAIN IGE QN: 2679 U/L — HIGH (ref 73–393)
LIPID PNL WITH DIRECT LDL SERPL: 57 MG/DL — SIGNIFICANT CHANGE UP
LYMPHOCYTES # BLD AUTO: 1.5 K/UL — SIGNIFICANT CHANGE UP (ref 1–3.3)
LYMPHOCYTES # BLD AUTO: 9.8 % — LOW (ref 13–44)
MCHC RBC-ENTMCNC: 34.5 GM/DL — SIGNIFICANT CHANGE UP (ref 32–36)
MCHC RBC-ENTMCNC: 37 PG — HIGH (ref 27–34)
MCV RBC AUTO: 107.4 FL — HIGH (ref 80–100)
MONOCYTES # BLD AUTO: 1.14 K/UL — HIGH (ref 0–0.9)
MONOCYTES NFR BLD AUTO: 7.5 % — SIGNIFICANT CHANGE UP (ref 2–14)
NEUTROPHILS # BLD AUTO: 12.39 K/UL — HIGH (ref 1.8–7.4)
NEUTROPHILS NFR BLD AUTO: 81.1 % — HIGH (ref 43–77)
NRBC # BLD: 0 /100 WBCS — SIGNIFICANT CHANGE UP (ref 0–0)
PLATELET # BLD AUTO: 291 K/UL — SIGNIFICANT CHANGE UP (ref 150–400)
POTASSIUM SERPL-MCNC: 4.1 MMOL/L — SIGNIFICANT CHANGE UP (ref 3.5–5.3)
POTASSIUM SERPL-SCNC: 4.1 MMOL/L — SIGNIFICANT CHANGE UP (ref 3.5–5.3)
PROT SERPL-MCNC: 7.1 GM/DL — SIGNIFICANT CHANGE UP (ref 6–8.3)
RBC # BLD: 4.08 M/UL — LOW (ref 4.2–5.8)
RBC # FLD: 14.6 % — HIGH (ref 10.3–14.5)
SODIUM SERPL-SCNC: 138 MMOL/L — SIGNIFICANT CHANGE UP (ref 135–145)
TOTAL CHOLESTEROL/HDL RATIO MEASUREMENT: 2.8 RATIO — LOW (ref 3.4–9.6)
TRIGL SERPL-MCNC: 119 MG/DL — SIGNIFICANT CHANGE UP (ref 10–149)
WBC # BLD: 15.27 K/UL — HIGH (ref 3.8–10.5)
WBC # FLD AUTO: 15.27 K/UL — HIGH (ref 3.8–10.5)

## 2018-08-28 PROCEDURE — 76700 US EXAM ABDOM COMPLETE: CPT | Mod: 26

## 2018-08-28 RX ORDER — HYDROMORPHONE HYDROCHLORIDE 2 MG/ML
0.5 INJECTION INTRAMUSCULAR; INTRAVENOUS; SUBCUTANEOUS ONCE
Qty: 0 | Refills: 0 | Status: DISCONTINUED | OUTPATIENT
Start: 2018-08-28 | End: 2018-08-28

## 2018-08-28 RX ORDER — SODIUM CHLORIDE 9 MG/ML
1000 INJECTION, SOLUTION INTRAVENOUS
Qty: 0 | Refills: 0 | Status: DISCONTINUED | OUTPATIENT
Start: 2018-08-28 | End: 2018-08-31

## 2018-08-28 RX ORDER — HYDROMORPHONE HYDROCHLORIDE 2 MG/ML
1 INJECTION INTRAMUSCULAR; INTRAVENOUS; SUBCUTANEOUS
Qty: 0 | Refills: 0 | Status: DISCONTINUED | OUTPATIENT
Start: 2018-08-28 | End: 2018-08-31

## 2018-08-28 RX ORDER — ONDANSETRON 8 MG/1
8 TABLET, FILM COATED ORAL EVERY 6 HOURS
Qty: 0 | Refills: 0 | Status: DISCONTINUED | OUTPATIENT
Start: 2018-08-28 | End: 2018-08-31

## 2018-08-28 RX ADMIN — HYDROMORPHONE HYDROCHLORIDE 1 MILLIGRAM(S): 2 INJECTION INTRAMUSCULAR; INTRAVENOUS; SUBCUTANEOUS at 03:31

## 2018-08-28 RX ADMIN — SODIUM CHLORIDE 200 MILLILITER(S): 9 INJECTION, SOLUTION INTRAVENOUS at 05:39

## 2018-08-28 RX ADMIN — Medication 3 MILLIGRAM(S): at 21:11

## 2018-08-28 RX ADMIN — SODIUM CHLORIDE 150 MILLILITER(S): 9 INJECTION, SOLUTION INTRAVENOUS at 22:22

## 2018-08-28 RX ADMIN — SODIUM CHLORIDE 200 MILLILITER(S): 9 INJECTION, SOLUTION INTRAVENOUS at 07:27

## 2018-08-28 RX ADMIN — HYDROMORPHONE HYDROCHLORIDE 1 MILLIGRAM(S): 2 INJECTION INTRAMUSCULAR; INTRAVENOUS; SUBCUTANEOUS at 19:11

## 2018-08-28 RX ADMIN — HYDROMORPHONE HYDROCHLORIDE 1 MILLIGRAM(S): 2 INJECTION INTRAMUSCULAR; INTRAVENOUS; SUBCUTANEOUS at 03:01

## 2018-08-28 RX ADMIN — HYDROMORPHONE HYDROCHLORIDE 1 MILLIGRAM(S): 2 INJECTION INTRAMUSCULAR; INTRAVENOUS; SUBCUTANEOUS at 07:51

## 2018-08-28 RX ADMIN — ONDANSETRON 8 MILLIGRAM(S): 8 TABLET, FILM COATED ORAL at 22:23

## 2018-08-28 RX ADMIN — HYDROMORPHONE HYDROCHLORIDE 1 MILLIGRAM(S): 2 INJECTION INTRAMUSCULAR; INTRAVENOUS; SUBCUTANEOUS at 22:46

## 2018-08-28 RX ADMIN — HYDROMORPHONE HYDROCHLORIDE 1 MILLIGRAM(S): 2 INJECTION INTRAMUSCULAR; INTRAVENOUS; SUBCUTANEOUS at 15:52

## 2018-08-28 RX ADMIN — HYDROMORPHONE HYDROCHLORIDE 1 MILLIGRAM(S): 2 INJECTION INTRAMUSCULAR; INTRAVENOUS; SUBCUTANEOUS at 16:22

## 2018-08-28 RX ADMIN — HYDROMORPHONE HYDROCHLORIDE 1 MILLIGRAM(S): 2 INJECTION INTRAMUSCULAR; INTRAVENOUS; SUBCUTANEOUS at 07:21

## 2018-08-28 RX ADMIN — HYDROMORPHONE HYDROCHLORIDE 1 MILLIGRAM(S): 2 INJECTION INTRAMUSCULAR; INTRAVENOUS; SUBCUTANEOUS at 12:47

## 2018-08-28 RX ADMIN — HYDROMORPHONE HYDROCHLORIDE 1 MILLIGRAM(S): 2 INJECTION INTRAMUSCULAR; INTRAVENOUS; SUBCUTANEOUS at 19:41

## 2018-08-28 RX ADMIN — HYDROMORPHONE HYDROCHLORIDE 0.5 MILLIGRAM(S): 2 INJECTION INTRAMUSCULAR; INTRAVENOUS; SUBCUTANEOUS at 10:43

## 2018-08-28 RX ADMIN — HYDROMORPHONE HYDROCHLORIDE 1 MILLIGRAM(S): 2 INJECTION INTRAMUSCULAR; INTRAVENOUS; SUBCUTANEOUS at 22:16

## 2018-08-28 RX ADMIN — HYDROMORPHONE HYDROCHLORIDE 1 MILLIGRAM(S): 2 INJECTION INTRAMUSCULAR; INTRAVENOUS; SUBCUTANEOUS at 13:17

## 2018-08-28 RX ADMIN — ONDANSETRON 8 MILLIGRAM(S): 8 TABLET, FILM COATED ORAL at 16:01

## 2018-08-28 RX ADMIN — SODIUM CHLORIDE 150 MILLILITER(S): 9 INJECTION, SOLUTION INTRAVENOUS at 19:46

## 2018-08-28 RX ADMIN — ONDANSETRON 4 MILLIGRAM(S): 8 TABLET, FILM COATED ORAL at 07:27

## 2018-08-28 RX ADMIN — ONDANSETRON 4 MILLIGRAM(S): 8 TABLET, FILM COATED ORAL at 01:12

## 2018-08-28 RX ADMIN — HYDROMORPHONE HYDROCHLORIDE 0.5 MILLIGRAM(S): 2 INJECTION INTRAMUSCULAR; INTRAVENOUS; SUBCUTANEOUS at 10:13

## 2018-08-28 NOTE — PROGRESS NOTE ADULT - ASSESSMENT
32 y/o male admitted with abdominal pain and nausea. Hx of crohn's disease and alcoholic induced pancreatitis.     Impression:  Acute mild pancreatitis of unknown etiology r/o ?gallstones, doubt alcohol induced/medication.  Crohn's Disease.    Plan:  NPO/IVFs.   Nausea and pain control.  Ct scan noted.  Awaiting abdominal sonogram to r/o gallstone/sludge pancreatitis.  Pt had similar episode about 1 year ago and was noted to have sludge on sonogram-possible etiology of pancreatitis?    Discussed with patient, Dr. Elam. 32 y/o male admitted with abdominal pain and nausea. Hx of crohn's disease and alcoholic induced pancreatitis.     Impression:  Acute mild pancreatitis of unknown etiology r/o ?gallstones, doubt alcohol induced/medication.  Crohn's Disease.    Plan:  NPO/IVFs.   Nausea and pain control.  Ct scan noted.  WBC/lipase trending up.   Awaiting abdominal sonogram to r/o gallstone/sludge pancreatitis.  Pt had similar episode about 1 year ago and was noted to have sludge on sonogram-possible etiology of pancreatitis?    Discussed with patient, Dr. Elam. 32 y/o male admitted with abdominal pain and nausea. Hx of crohn's disease and alcoholic induced pancreatitis.     Impression:  Acute mild pancreatitis of unknown etiology r/o ?gallstones, doubt alcohol induced/medication.  Crohn's Disease.    Plan:  NPO/IVFs.   Nausea and pain control.  Ct scan noted.  WBC/lipase trending up-->will check sed rate, crohn's may be playing a role?   Awaiting abdominal sonogram to r/o gallstone/sludge pancreatitis.  Pt had similar episode about 1 year ago and was noted to have sludge on sonogram-possible etiology of pancreatitis?    Discussed with patient, Dr. Elam.

## 2018-08-29 LAB
ALBUMIN SERPL ELPH-MCNC: 2.6 G/DL — LOW (ref 3.3–5)
ALP SERPL-CCNC: 77 U/L — SIGNIFICANT CHANGE UP (ref 40–120)
ALT FLD-CCNC: 15 U/L — SIGNIFICANT CHANGE UP (ref 12–78)
AMYLASE P1 CFR SERPL: 138 U/L — HIGH (ref 25–115)
ANION GAP SERPL CALC-SCNC: 7 MMOL/L — SIGNIFICANT CHANGE UP (ref 5–17)
AST SERPL-CCNC: 12 U/L — LOW (ref 15–37)
BASOPHILS # BLD AUTO: 0.04 K/UL — SIGNIFICANT CHANGE UP (ref 0–0.2)
BASOPHILS NFR BLD AUTO: 0.4 % — SIGNIFICANT CHANGE UP (ref 0–2)
BILIRUB SERPL-MCNC: 0.5 MG/DL — SIGNIFICANT CHANGE UP (ref 0.2–1.2)
BUN SERPL-MCNC: 7 MG/DL — SIGNIFICANT CHANGE UP (ref 7–23)
CALCIUM SERPL-MCNC: 8.2 MG/DL — LOW (ref 8.5–10.1)
CHLORIDE SERPL-SCNC: 106 MMOL/L — SIGNIFICANT CHANGE UP (ref 96–108)
CO2 SERPL-SCNC: 22 MMOL/L — SIGNIFICANT CHANGE UP (ref 22–31)
CREAT SERPL-MCNC: 0.65 MG/DL — SIGNIFICANT CHANGE UP (ref 0.5–1.3)
EOSINOPHIL # BLD AUTO: 0.13 K/UL — SIGNIFICANT CHANGE UP (ref 0–0.5)
EOSINOPHIL NFR BLD AUTO: 1.2 % — SIGNIFICANT CHANGE UP (ref 0–6)
GLUCOSE SERPL-MCNC: 49 MG/DL — LOW (ref 70–99)
HCT VFR BLD CALC: 39.4 % — SIGNIFICANT CHANGE UP (ref 39–50)
HGB BLD-MCNC: 13.6 G/DL — SIGNIFICANT CHANGE UP (ref 13–17)
IGG SERPL-MCNC: 953 MG/DL — SIGNIFICANT CHANGE UP (ref 700–1600)
IGG1 SER-MCNC: 574 MG/DL — SIGNIFICANT CHANGE UP (ref 248–810)
IGG2 SER-MCNC: 388 MG/DL — SIGNIFICANT CHANGE UP (ref 130–555)
IGG3 SER-MCNC: 66 MG/DL — SIGNIFICANT CHANGE UP (ref 15–102)
IGG4 SER-MCNC: 57 MG/DL — SIGNIFICANT CHANGE UP (ref 2–96)
IMM GRANULOCYTES NFR BLD AUTO: 0.4 % — SIGNIFICANT CHANGE UP (ref 0–1.5)
LIDOCAIN IGE QN: 563 U/L — HIGH (ref 73–393)
LYMPHOCYTES # BLD AUTO: 1.93 K/UL — SIGNIFICANT CHANGE UP (ref 1–3.3)
LYMPHOCYTES # BLD AUTO: 18 % — SIGNIFICANT CHANGE UP (ref 13–44)
MAGNESIUM SERPL-MCNC: 2.1 MG/DL — SIGNIFICANT CHANGE UP (ref 1.6–2.6)
MCHC RBC-ENTMCNC: 34.5 GM/DL — SIGNIFICANT CHANGE UP (ref 32–36)
MCHC RBC-ENTMCNC: 37.3 PG — HIGH (ref 27–34)
MCV RBC AUTO: 107.9 FL — HIGH (ref 80–100)
MONOCYTES # BLD AUTO: 1.25 K/UL — HIGH (ref 0–0.9)
MONOCYTES NFR BLD AUTO: 11.7 % — SIGNIFICANT CHANGE UP (ref 2–14)
NEUTROPHILS # BLD AUTO: 7.31 K/UL — SIGNIFICANT CHANGE UP (ref 1.8–7.4)
NEUTROPHILS NFR BLD AUTO: 68.3 % — SIGNIFICANT CHANGE UP (ref 43–77)
NRBC # BLD: 0 /100 WBCS — SIGNIFICANT CHANGE UP (ref 0–0)
PHOSPHATE SERPL-MCNC: 2.4 MG/DL — LOW (ref 2.5–4.5)
PLATELET # BLD AUTO: 263 K/UL — SIGNIFICANT CHANGE UP (ref 150–400)
POTASSIUM SERPL-MCNC: 4.8 MMOL/L — SIGNIFICANT CHANGE UP (ref 3.5–5.3)
POTASSIUM SERPL-SCNC: 4.8 MMOL/L — SIGNIFICANT CHANGE UP (ref 3.5–5.3)
PROT SERPL-MCNC: 6.4 GM/DL — SIGNIFICANT CHANGE UP (ref 6–8.3)
RBC # BLD: 3.65 M/UL — LOW (ref 4.2–5.8)
RBC # FLD: 14.3 % — SIGNIFICANT CHANGE UP (ref 10.3–14.5)
SODIUM SERPL-SCNC: 135 MMOL/L — SIGNIFICANT CHANGE UP (ref 135–145)
WBC # BLD: 10.7 K/UL — HIGH (ref 3.8–10.5)
WBC # FLD AUTO: 10.7 K/UL — HIGH (ref 3.8–10.5)

## 2018-08-29 PROCEDURE — 74176 CT ABD & PELVIS W/O CONTRAST: CPT | Mod: 26

## 2018-08-29 RX ORDER — BUDESONIDE, MICRONIZED 100 %
9 POWDER (GRAM) MISCELLANEOUS
Qty: 0 | Refills: 0 | Status: DISCONTINUED | OUTPATIENT
Start: 2018-08-29 | End: 2018-08-31

## 2018-08-29 RX ADMIN — ONDANSETRON 8 MILLIGRAM(S): 8 TABLET, FILM COATED ORAL at 21:28

## 2018-08-29 RX ADMIN — ONDANSETRON 8 MILLIGRAM(S): 8 TABLET, FILM COATED ORAL at 04:24

## 2018-08-29 RX ADMIN — HYDROMORPHONE HYDROCHLORIDE 1 MILLIGRAM(S): 2 INJECTION INTRAMUSCULAR; INTRAVENOUS; SUBCUTANEOUS at 05:05

## 2018-08-29 RX ADMIN — HYDROMORPHONE HYDROCHLORIDE 1 MILLIGRAM(S): 2 INJECTION INTRAMUSCULAR; INTRAVENOUS; SUBCUTANEOUS at 09:04

## 2018-08-29 RX ADMIN — HYDROMORPHONE HYDROCHLORIDE 1 MILLIGRAM(S): 2 INJECTION INTRAMUSCULAR; INTRAVENOUS; SUBCUTANEOUS at 18:22

## 2018-08-29 RX ADMIN — Medication 3 MILLIGRAM(S): at 21:34

## 2018-08-29 RX ADMIN — HYDROMORPHONE HYDROCHLORIDE 1 MILLIGRAM(S): 2 INJECTION INTRAMUSCULAR; INTRAVENOUS; SUBCUTANEOUS at 04:35

## 2018-08-29 RX ADMIN — Medication 9 MILLIGRAM(S): at 18:17

## 2018-08-29 RX ADMIN — HYDROMORPHONE HYDROCHLORIDE 1 MILLIGRAM(S): 2 INJECTION INTRAMUSCULAR; INTRAVENOUS; SUBCUTANEOUS at 21:29

## 2018-08-29 RX ADMIN — HYDROMORPHONE HYDROCHLORIDE 1 MILLIGRAM(S): 2 INJECTION INTRAMUSCULAR; INTRAVENOUS; SUBCUTANEOUS at 01:35

## 2018-08-29 RX ADMIN — HYDROMORPHONE HYDROCHLORIDE 1 MILLIGRAM(S): 2 INJECTION INTRAMUSCULAR; INTRAVENOUS; SUBCUTANEOUS at 13:38

## 2018-08-29 RX ADMIN — HYDROMORPHONE HYDROCHLORIDE 1 MILLIGRAM(S): 2 INJECTION INTRAMUSCULAR; INTRAVENOUS; SUBCUTANEOUS at 08:49

## 2018-08-29 RX ADMIN — HYDROMORPHONE HYDROCHLORIDE 1 MILLIGRAM(S): 2 INJECTION INTRAMUSCULAR; INTRAVENOUS; SUBCUTANEOUS at 02:05

## 2018-08-29 RX ADMIN — HYDROMORPHONE HYDROCHLORIDE 1 MILLIGRAM(S): 2 INJECTION INTRAMUSCULAR; INTRAVENOUS; SUBCUTANEOUS at 21:59

## 2018-08-29 NOTE — PROGRESS NOTE ADULT - ASSESSMENT
32 y/o male admitted with abdominal pain and nausea. Hx of crohn's disease and alcoholic induced pancreatitis.     Impression:  Acute mild pancreatitis of unknown etiology?  Crohn's Disease.    Plan:  NPO/IVFs.   Nausea and pain control.  WBC/lipase overall improving.   Ct scan noted--->will repeat ct scan today as I feel crohn's may be playing a role as patient is still with 9/10 pain and no improvement from admission.   If ct scan is improved, will advance to clears.     Discussed with patient, Dr. Hunt.

## 2018-08-30 LAB
ADD ON TEST-SPECIMEN IN LAB: SIGNIFICANT CHANGE UP
AMYLASE P1 CFR SERPL: 62 U/L — SIGNIFICANT CHANGE UP (ref 25–115)
ANION GAP SERPL CALC-SCNC: 10 MMOL/L — SIGNIFICANT CHANGE UP (ref 5–17)
BASOPHILS # BLD AUTO: 0.02 K/UL — SIGNIFICANT CHANGE UP (ref 0–0.2)
BASOPHILS NFR BLD AUTO: 0.2 % — SIGNIFICANT CHANGE UP (ref 0–2)
BUN SERPL-MCNC: 3 MG/DL — LOW (ref 7–23)
CALCIUM SERPL-MCNC: 8.5 MG/DL — SIGNIFICANT CHANGE UP (ref 8.5–10.1)
CHLORIDE SERPL-SCNC: 105 MMOL/L — SIGNIFICANT CHANGE UP (ref 96–108)
CO2 SERPL-SCNC: 24 MMOL/L — SIGNIFICANT CHANGE UP (ref 22–31)
CREAT SERPL-MCNC: 0.49 MG/DL — LOW (ref 0.5–1.3)
EOSINOPHIL # BLD AUTO: 0.09 K/UL — SIGNIFICANT CHANGE UP (ref 0–0.5)
EOSINOPHIL NFR BLD AUTO: 1 % — SIGNIFICANT CHANGE UP (ref 0–6)
GLUCOSE SERPL-MCNC: 81 MG/DL — SIGNIFICANT CHANGE UP (ref 70–99)
HCT VFR BLD CALC: 39.2 % — SIGNIFICANT CHANGE UP (ref 39–50)
HGB BLD-MCNC: 13.6 G/DL — SIGNIFICANT CHANGE UP (ref 13–17)
IMM GRANULOCYTES NFR BLD AUTO: 0.4 % — SIGNIFICANT CHANGE UP (ref 0–1.5)
LIDOCAIN IGE QN: 566 U/L — HIGH (ref 73–393)
LYMPHOCYTES # BLD AUTO: 1.47 K/UL — SIGNIFICANT CHANGE UP (ref 1–3.3)
LYMPHOCYTES # BLD AUTO: 16.3 % — SIGNIFICANT CHANGE UP (ref 13–44)
MAGNESIUM SERPL-MCNC: 2 MG/DL — SIGNIFICANT CHANGE UP (ref 1.6–2.6)
MCHC RBC-ENTMCNC: 34.7 GM/DL — SIGNIFICANT CHANGE UP (ref 32–36)
MCHC RBC-ENTMCNC: 36.9 PG — HIGH (ref 27–34)
MCV RBC AUTO: 106.2 FL — HIGH (ref 80–100)
MONOCYTES # BLD AUTO: 0.96 K/UL — HIGH (ref 0–0.9)
MONOCYTES NFR BLD AUTO: 10.6 % — SIGNIFICANT CHANGE UP (ref 2–14)
NEUTROPHILS # BLD AUTO: 6.44 K/UL — SIGNIFICANT CHANGE UP (ref 1.8–7.4)
NEUTROPHILS NFR BLD AUTO: 71.5 % — SIGNIFICANT CHANGE UP (ref 43–77)
NRBC # BLD: 0 /100 WBCS — SIGNIFICANT CHANGE UP (ref 0–0)
PHOSPHATE SERPL-MCNC: 2.1 MG/DL — LOW (ref 2.5–4.5)
PLATELET # BLD AUTO: 282 K/UL — SIGNIFICANT CHANGE UP (ref 150–400)
POTASSIUM SERPL-MCNC: 4 MMOL/L — SIGNIFICANT CHANGE UP (ref 3.5–5.3)
POTASSIUM SERPL-SCNC: 4 MMOL/L — SIGNIFICANT CHANGE UP (ref 3.5–5.3)
RBC # BLD: 3.69 M/UL — LOW (ref 4.2–5.8)
RBC # FLD: 14.1 % — SIGNIFICANT CHANGE UP (ref 10.3–14.5)
SODIUM SERPL-SCNC: 139 MMOL/L — SIGNIFICANT CHANGE UP (ref 135–145)
WBC # BLD: 9.02 K/UL — SIGNIFICANT CHANGE UP (ref 3.8–10.5)
WBC # FLD AUTO: 9.02 K/UL — SIGNIFICANT CHANGE UP (ref 3.8–10.5)

## 2018-08-30 RX ORDER — SODIUM,POTASSIUM PHOSPHATES 278-250MG
2 POWDER IN PACKET (EA) ORAL ONCE
Qty: 0 | Refills: 0 | Status: COMPLETED | OUTPATIENT
Start: 2018-08-30 | End: 2018-08-30

## 2018-08-30 RX ADMIN — HYDROMORPHONE HYDROCHLORIDE 1 MILLIGRAM(S): 2 INJECTION INTRAMUSCULAR; INTRAVENOUS; SUBCUTANEOUS at 16:38

## 2018-08-30 RX ADMIN — SODIUM CHLORIDE 150 MILLILITER(S): 9 INJECTION, SOLUTION INTRAVENOUS at 05:51

## 2018-08-30 RX ADMIN — HYDROMORPHONE HYDROCHLORIDE 1 MILLIGRAM(S): 2 INJECTION INTRAMUSCULAR; INTRAVENOUS; SUBCUTANEOUS at 05:48

## 2018-08-30 RX ADMIN — ONDANSETRON 8 MILLIGRAM(S): 8 TABLET, FILM COATED ORAL at 11:50

## 2018-08-30 RX ADMIN — HYDROMORPHONE HYDROCHLORIDE 1 MILLIGRAM(S): 2 INJECTION INTRAMUSCULAR; INTRAVENOUS; SUBCUTANEOUS at 12:25

## 2018-08-30 RX ADMIN — HYDROMORPHONE HYDROCHLORIDE 1 MILLIGRAM(S): 2 INJECTION INTRAMUSCULAR; INTRAVENOUS; SUBCUTANEOUS at 11:55

## 2018-08-30 RX ADMIN — HYDROMORPHONE HYDROCHLORIDE 1 MILLIGRAM(S): 2 INJECTION INTRAMUSCULAR; INTRAVENOUS; SUBCUTANEOUS at 21:01

## 2018-08-30 RX ADMIN — HYDROMORPHONE HYDROCHLORIDE 1 MILLIGRAM(S): 2 INJECTION INTRAMUSCULAR; INTRAVENOUS; SUBCUTANEOUS at 20:31

## 2018-08-30 RX ADMIN — ONDANSETRON 8 MILLIGRAM(S): 8 TABLET, FILM COATED ORAL at 05:48

## 2018-08-30 RX ADMIN — ONDANSETRON 8 MILLIGRAM(S): 8 TABLET, FILM COATED ORAL at 20:30

## 2018-08-30 RX ADMIN — Medication 2 PACKET(S): at 13:43

## 2018-08-30 RX ADMIN — HYDROMORPHONE HYDROCHLORIDE 1 MILLIGRAM(S): 2 INJECTION INTRAMUSCULAR; INTRAVENOUS; SUBCUTANEOUS at 16:08

## 2018-08-30 RX ADMIN — HYDROMORPHONE HYDROCHLORIDE 1 MILLIGRAM(S): 2 INJECTION INTRAMUSCULAR; INTRAVENOUS; SUBCUTANEOUS at 06:18

## 2018-08-30 RX ADMIN — HYDROMORPHONE HYDROCHLORIDE 1 MILLIGRAM(S): 2 INJECTION INTRAMUSCULAR; INTRAVENOUS; SUBCUTANEOUS at 23:54

## 2018-08-30 RX ADMIN — Medication 9 MILLIGRAM(S): at 11:57

## 2018-08-30 RX ADMIN — HYDROMORPHONE HYDROCHLORIDE 1 MILLIGRAM(S): 2 INJECTION INTRAMUSCULAR; INTRAVENOUS; SUBCUTANEOUS at 23:39

## 2018-08-30 RX ADMIN — Medication 3 MILLIGRAM(S): at 23:39

## 2018-08-30 NOTE — PROGRESS NOTE ADULT - ASSESSMENT
32yo male with pancreatitis, Crohn's disease  recent ct scan with improving pancreatitis but worsening crohns disease  we added budesonide to treat crohns  d/w pt regarding decreasing pain meds, and increasing diet  if continues to improve, possible d/c tomorrow

## 2018-08-31 ENCOUNTER — TRANSCRIPTION ENCOUNTER (OUTPATIENT)
Age: 33
End: 2018-08-31

## 2018-08-31 VITALS
TEMPERATURE: 98 F | OXYGEN SATURATION: 100 % | DIASTOLIC BLOOD PRESSURE: 77 MMHG | SYSTOLIC BLOOD PRESSURE: 117 MMHG | RESPIRATION RATE: 15 BRPM | HEART RATE: 79 BPM

## 2018-08-31 LAB
AMYLASE P1 CFR SERPL: 54 U/L — SIGNIFICANT CHANGE UP (ref 25–115)
ANION GAP SERPL CALC-SCNC: 6 MMOL/L — SIGNIFICANT CHANGE UP (ref 5–17)
BASOPHILS # BLD AUTO: 0.02 K/UL — SIGNIFICANT CHANGE UP (ref 0–0.2)
BASOPHILS NFR BLD AUTO: 0.2 % — SIGNIFICANT CHANGE UP (ref 0–2)
BUN SERPL-MCNC: 3 MG/DL — LOW (ref 7–23)
CALCIUM SERPL-MCNC: 8.7 MG/DL — SIGNIFICANT CHANGE UP (ref 8.5–10.1)
CHLORIDE SERPL-SCNC: 105 MMOL/L — SIGNIFICANT CHANGE UP (ref 96–108)
CO2 SERPL-SCNC: 27 MMOL/L — SIGNIFICANT CHANGE UP (ref 22–31)
CREAT SERPL-MCNC: 0.64 MG/DL — SIGNIFICANT CHANGE UP (ref 0.5–1.3)
EOSINOPHIL # BLD AUTO: 0.08 K/UL — SIGNIFICANT CHANGE UP (ref 0–0.5)
EOSINOPHIL NFR BLD AUTO: 0.8 % — SIGNIFICANT CHANGE UP (ref 0–6)
GLUCOSE SERPL-MCNC: 111 MG/DL — HIGH (ref 70–99)
HCT VFR BLD CALC: 37.8 % — LOW (ref 39–50)
HGB BLD-MCNC: 13.1 G/DL — SIGNIFICANT CHANGE UP (ref 13–17)
IMM GRANULOCYTES NFR BLD AUTO: 0.3 % — SIGNIFICANT CHANGE UP (ref 0–1.5)
LIDOCAIN IGE QN: 491 U/L — HIGH (ref 73–393)
LYMPHOCYTES # BLD AUTO: 2.25 K/UL — SIGNIFICANT CHANGE UP (ref 1–3.3)
LYMPHOCYTES # BLD AUTO: 23.5 % — SIGNIFICANT CHANGE UP (ref 13–44)
MAGNESIUM SERPL-MCNC: 1.8 MG/DL — SIGNIFICANT CHANGE UP (ref 1.6–2.6)
MCHC RBC-ENTMCNC: 34.7 GM/DL — SIGNIFICANT CHANGE UP (ref 32–36)
MCHC RBC-ENTMCNC: 36.5 PG — HIGH (ref 27–34)
MCV RBC AUTO: 105.3 FL — HIGH (ref 80–100)
MONOCYTES # BLD AUTO: 1.06 K/UL — HIGH (ref 0–0.9)
MONOCYTES NFR BLD AUTO: 11.1 % — SIGNIFICANT CHANGE UP (ref 2–14)
NEUTROPHILS # BLD AUTO: 6.15 K/UL — SIGNIFICANT CHANGE UP (ref 1.8–7.4)
NEUTROPHILS NFR BLD AUTO: 64.1 % — SIGNIFICANT CHANGE UP (ref 43–77)
NRBC # BLD: 0 /100 WBCS — SIGNIFICANT CHANGE UP (ref 0–0)
PHOSPHATE SERPL-MCNC: 3.1 MG/DL — SIGNIFICANT CHANGE UP (ref 2.5–4.5)
PLATELET # BLD AUTO: 278 K/UL — SIGNIFICANT CHANGE UP (ref 150–400)
POTASSIUM SERPL-MCNC: 3.9 MMOL/L — SIGNIFICANT CHANGE UP (ref 3.5–5.3)
POTASSIUM SERPL-SCNC: 3.9 MMOL/L — SIGNIFICANT CHANGE UP (ref 3.5–5.3)
RBC # BLD: 3.59 M/UL — LOW (ref 4.2–5.8)
RBC # FLD: 14.1 % — SIGNIFICANT CHANGE UP (ref 10.3–14.5)
SODIUM SERPL-SCNC: 138 MMOL/L — SIGNIFICANT CHANGE UP (ref 135–145)
WBC # BLD: 9.59 K/UL — SIGNIFICANT CHANGE UP (ref 3.8–10.5)
WBC # FLD AUTO: 9.59 K/UL — SIGNIFICANT CHANGE UP (ref 3.8–10.5)

## 2018-08-31 RX ORDER — BUDESONIDE, MICRONIZED 100 %
1 POWDER (GRAM) MISCELLANEOUS
Qty: 30 | Refills: 0 | OUTPATIENT
Start: 2018-08-31 | End: 2018-09-29

## 2018-08-31 RX ORDER — BUDESONIDE, MICRONIZED 100 %
3 POWDER (GRAM) MISCELLANEOUS
Qty: 90 | Refills: 0 | OUTPATIENT
Start: 2018-08-31 | End: 2018-09-29

## 2018-08-31 RX ADMIN — ONDANSETRON 8 MILLIGRAM(S): 8 TABLET, FILM COATED ORAL at 09:04

## 2018-08-31 RX ADMIN — SODIUM CHLORIDE 150 MILLILITER(S): 9 INJECTION, SOLUTION INTRAVENOUS at 03:51

## 2018-08-31 RX ADMIN — HYDROMORPHONE HYDROCHLORIDE 1 MILLIGRAM(S): 2 INJECTION INTRAMUSCULAR; INTRAVENOUS; SUBCUTANEOUS at 02:55

## 2018-08-31 RX ADMIN — HYDROMORPHONE HYDROCHLORIDE 1 MILLIGRAM(S): 2 INJECTION INTRAMUSCULAR; INTRAVENOUS; SUBCUTANEOUS at 02:40

## 2018-08-31 RX ADMIN — Medication 9 MILLIGRAM(S): at 09:06

## 2018-08-31 RX ADMIN — HYDROMORPHONE HYDROCHLORIDE 1 MILLIGRAM(S): 2 INJECTION INTRAMUSCULAR; INTRAVENOUS; SUBCUTANEOUS at 09:04

## 2018-08-31 RX ADMIN — ONDANSETRON 8 MILLIGRAM(S): 8 TABLET, FILM COATED ORAL at 02:16

## 2018-08-31 RX ADMIN — HYDROMORPHONE HYDROCHLORIDE 1 MILLIGRAM(S): 2 INJECTION INTRAMUSCULAR; INTRAVENOUS; SUBCUTANEOUS at 09:34

## 2018-08-31 NOTE — DISCHARGE NOTE ADULT - MEDICATION SUMMARY - MEDICATIONS TO TAKE
I will START or STAY ON the medications listed below when I get home from the hospital:    budesonide 9 mg oral tablet, extended release  -- 1 tab(s) by mouth once a day (in the morning)  -- Indication: For Crohn's disease

## 2018-08-31 NOTE — PROGRESS NOTE ADULT - ASSESSMENT
32 y/o male admitted with abdominal pain and nausea. Hx of crohn's disease and alcoholic induced pancreatitis.     Impression:  Acute mild pancreatitis of unknown etiology?  Crohn's Disease.    Plan:  Overall improving.   Tolerating diet.   Ok from GI standpoint to d/c home today on budesonide 9mg daily.  Pt to f/u as outpatient for crohn's.    Discussed with pt, Dr. Stuart, and Dr. Hunt.

## 2018-08-31 NOTE — DISCHARGE NOTE ADULT - HOSPITAL COURSE
33 year old man with PMHx of Crohn's (not on any meds at home), perianal fistula repair in 2002, hx of pancreatitis presents with worsening abdominal pain. Per pt he is not on any meds and has not made any dietary changes or took any new meds over last several days.   He admits to occasional alcohol use but no binge episodes recently.  He states he has associated chills but no fever, nausea or vomiting.  No new changes in bowel movements.      He was admitted with acute pancreatitis. Kept NPO/Given IVF/pain medications. Due to persistent pain, underwent repeat CT a/p which showed slight worsening ileitis c/w crohn's. His diet was slowly advanced and his ivf were titrated off. He is now tolerating po and will be discharged home. He was seen by GI while here. Etiology of pancreatitis remains unclear at this time, but he will f/u with GI for further w/u. He will also be discharged on budesonide and f/u with GI for further mx of crohn's.    Vital Signs Last 24 Hrs  T(C): 36.7 (31 Aug 2018 10:57), Max: 37.1 (30 Aug 2018 17:03)  T(F): 98.1 (31 Aug 2018 10:57), Max: 98.8 (30 Aug 2018 17:03)  HR: 79 (31 Aug 2018 10:57) (66 - 79)  BP: 117/77 (31 Aug 2018 10:57) (117/77 - 136/79)  RR: 15 (31 Aug 2018 10:57) (15 - 16)  SpO2: 100% (31 Aug 2018 10:57) (96% - 100%)    PHYSICAL EXAM:    GENERAL: Comfortable, no acute distress   HEAD:  Normocephalic, atraumatic  EYES: EOMI, PERRLA  HEENT: Moist mucous membranes  NECK: Supple, No JVD  NERVOUS SYSTEM:  Alert & Oriented X3, Motor Strength 5/5 B/L upper and lower extremities  CHEST/LUNG: Clear to auscultation bilaterally  HEART: Regular rate and rhythm  ABDOMEN: Soft, Nontender, Nondistended, Bowel sounds present  GENITOURINARY: Voiding, no palpable bladder  EXTREMITIES:   No clubbing, cyanosis, or edema  MUSCULOSKELTAL- No muscle tenderness, no joint tenderness  SKIN-no rash    LABS:                        13.1   9.59  )-----------( 278      ( 31 Aug 2018 06:07 )             37.8     08-31    138  |  105  |  3<L>  ----------------------------<  111<H>  3.9   |  27  |  0.64    < from: US Abdomen Complete (08.28.18 @ 13:30) >    IMPRESSION:       The gallbladder is unremarkable, without cholelithiasis. CBD is of normal   caliber.     CT Abdomen and Pelvis w/ Oral Cont (08.29.18 @ 12:01) >  IMPRESSION:    Distal/Terminal ileitis worse since abdominal/pelvic CT study of 8/27/18,   consistent with known Crohn's disease. No adjacent small bowel fat   stranding, fistula formation, or abscess. Reactive subcentimeter right   lower abdominal lymph nodes.    Improvement of mild pancreatitis since CT study of 8/27/18.    Right sided perianal fistula extending to the right ischial/buttock   region, as visualized on CT study of 6/12/17.     FINAL DIAGNOSIS  1. Acute pancreatitis  2. Crohn's with chronic distal enteritis and right-sided perianal fistulae    TIME taken for dc 65 min  plan d/w patient.

## 2018-08-31 NOTE — DISCHARGE NOTE ADULT - PLAN OF CARE
prevent recurrence follow up with GI in office to determine cause of pancreatitis. prevent complications. follow up with GI for treatment.

## 2018-08-31 NOTE — DISCHARGE NOTE ADULT - CARE PLAN
Principal Discharge DX:	Acute pancreatitis, unspecified complication status, unspecified pancreatitis type  Goal:	prevent recurrence  Assessment and plan of treatment:	follow up with GI in office to determine cause of pancreatitis.  Secondary Diagnosis:	Crohn disease  Goal:	prevent complications.  Assessment and plan of treatment:	follow up with GI for treatment.

## 2018-08-31 NOTE — DISCHARGE NOTE ADULT - CARE PROVIDER_API CALL
Daniel Hunt), Gastroenterology; Internal Medicine  [address] [place] [address]  Phone: [phone]  Fax: [phone]

## 2018-08-31 NOTE — DISCHARGE NOTE ADULT - PATIENT PORTAL LINK FT
You can access the [facility] Patient Portal, offered by [facility], by registering with the following website: [email or web address]

## 2018-09-05 DIAGNOSIS — K85.90 ACUTE PANCREATITIS WITHOUT NECROSIS OR INFECTION, UNSPECIFIED: ICD-10-CM

## 2018-09-05 DIAGNOSIS — K50.10 CROHN'S DISEASE OF LARGE INTESTINE WITHOUT COMPLICATIONS: ICD-10-CM

## 2018-09-05 DIAGNOSIS — K60.3 ANAL FISTULA: ICD-10-CM

## 2019-02-15 NOTE — ED PROVIDER NOTE - MUSCULOSKELETAL NEGATIVE STATEMENT, MLM
Gen: A&O x 3, NAD  Chest: CTA B/L  Cardiac: S1,S2  RRR  Abdomen: +BS; soft; Nontender, nondistended, no rebound or guarding   Extremities: Nontender, no edema
no back pain, no gout, no musculoskeletal pain, no neck pain, and no weakness.

## 2019-04-01 ENCOUNTER — INPATIENT (INPATIENT)
Facility: HOSPITAL | Age: 34
LOS: 6 days | Discharge: HOME | End: 2019-04-08
Attending: FAMILY MEDICINE | Admitting: FAMILY MEDICINE
Payer: COMMERCIAL

## 2019-04-01 VITALS — WEIGHT: 156.97 LBS | HEIGHT: 71 IN

## 2019-04-01 DIAGNOSIS — Z96.7 PRESENCE OF OTHER BONE AND TENDON IMPLANTS: Chronic | ICD-10-CM

## 2019-04-01 DIAGNOSIS — Z98.89 OTHER SPECIFIED POSTPROCEDURAL STATES: Chronic | ICD-10-CM

## 2019-04-01 DIAGNOSIS — K50.90 CROHN'S DISEASE, UNSPECIFIED, WITHOUT COMPLICATIONS: ICD-10-CM

## 2019-04-01 DIAGNOSIS — K60.3 ANAL FISTULA: Chronic | ICD-10-CM

## 2019-04-01 DIAGNOSIS — E87.1 HYPO-OSMOLALITY AND HYPONATREMIA: ICD-10-CM

## 2019-04-01 DIAGNOSIS — K85.90 ACUTE PANCREATITIS WITHOUT NECROSIS OR INFECTION, UNSPECIFIED: ICD-10-CM

## 2019-04-01 DIAGNOSIS — D72.829 ELEVATED WHITE BLOOD CELL COUNT, UNSPECIFIED: ICD-10-CM

## 2019-04-01 DIAGNOSIS — E87.6 HYPOKALEMIA: ICD-10-CM

## 2019-04-01 LAB
ALBUMIN SERPL ELPH-MCNC: 3.2 G/DL — LOW (ref 3.3–5)
ALBUMIN SERPL ELPH-MCNC: 3.2 G/DL — LOW (ref 3.3–5)
ALP SERPL-CCNC: 142 U/L — HIGH (ref 40–120)
ALP SERPL-CCNC: 159 U/L — HIGH (ref 40–120)
ALT FLD-CCNC: 20 U/L — SIGNIFICANT CHANGE UP (ref 12–78)
ALT FLD-CCNC: 23 U/L — SIGNIFICANT CHANGE UP (ref 12–78)
ANION GAP SERPL CALC-SCNC: 10 MMOL/L — SIGNIFICANT CHANGE UP (ref 5–17)
ANION GAP SERPL CALC-SCNC: 9 MMOL/L — SIGNIFICANT CHANGE UP (ref 5–17)
APPEARANCE UR: CLEAR — SIGNIFICANT CHANGE UP
APTT BLD: 29.5 SEC — SIGNIFICANT CHANGE UP (ref 27.5–36.3)
AST SERPL-CCNC: 14 U/L — LOW (ref 15–37)
AST SERPL-CCNC: 18 U/L — SIGNIFICANT CHANGE UP (ref 15–37)
BACTERIA # UR AUTO: ABNORMAL
BASOPHILS # BLD AUTO: 0.05 K/UL — SIGNIFICANT CHANGE UP (ref 0–0.2)
BASOPHILS # BLD AUTO: 0.06 K/UL — SIGNIFICANT CHANGE UP (ref 0–0.2)
BASOPHILS NFR BLD AUTO: 0.3 % — SIGNIFICANT CHANGE UP (ref 0–2)
BASOPHILS NFR BLD AUTO: 0.4 % — SIGNIFICANT CHANGE UP (ref 0–2)
BILIRUB SERPL-MCNC: 0.7 MG/DL — SIGNIFICANT CHANGE UP (ref 0.2–1.2)
BILIRUB SERPL-MCNC: 0.7 MG/DL — SIGNIFICANT CHANGE UP (ref 0.2–1.2)
BILIRUB UR-MCNC: NEGATIVE — SIGNIFICANT CHANGE UP
BUN SERPL-MCNC: 4 MG/DL — LOW (ref 7–23)
BUN SERPL-MCNC: 6 MG/DL — LOW (ref 7–23)
CALCIUM SERPL-MCNC: 9.1 MG/DL — SIGNIFICANT CHANGE UP (ref 8.5–10.1)
CALCIUM SERPL-MCNC: 9.4 MG/DL — SIGNIFICANT CHANGE UP (ref 8.5–10.1)
CHLORIDE SERPL-SCNC: 101 MMOL/L — SIGNIFICANT CHANGE UP (ref 96–108)
CHLORIDE SERPL-SCNC: 101 MMOL/L — SIGNIFICANT CHANGE UP (ref 96–108)
CO2 SERPL-SCNC: 23 MMOL/L — SIGNIFICANT CHANGE UP (ref 22–31)
CO2 SERPL-SCNC: 27 MMOL/L — SIGNIFICANT CHANGE UP (ref 22–31)
COLOR SPEC: YELLOW — SIGNIFICANT CHANGE UP
CREAT SERPL-MCNC: 0.66 MG/DL — SIGNIFICANT CHANGE UP (ref 0.5–1.3)
CREAT SERPL-MCNC: 0.82 MG/DL — SIGNIFICANT CHANGE UP (ref 0.5–1.3)
DIFF PNL FLD: ABNORMAL
EOSINOPHIL # BLD AUTO: 0.04 K/UL — SIGNIFICANT CHANGE UP (ref 0–0.5)
EOSINOPHIL # BLD AUTO: 0.05 K/UL — SIGNIFICANT CHANGE UP (ref 0–0.5)
EOSINOPHIL NFR BLD AUTO: 0.2 % — SIGNIFICANT CHANGE UP (ref 0–6)
EOSINOPHIL NFR BLD AUTO: 0.3 % — SIGNIFICANT CHANGE UP (ref 0–6)
EPI CELLS # UR: SIGNIFICANT CHANGE UP
GLUCOSE SERPL-MCNC: 106 MG/DL — HIGH (ref 70–99)
GLUCOSE SERPL-MCNC: 127 MG/DL — HIGH (ref 70–99)
GLUCOSE UR QL: NEGATIVE MG/DL — SIGNIFICANT CHANGE UP
HCT VFR BLD CALC: 49.3 % — SIGNIFICANT CHANGE UP (ref 39–50)
HCT VFR BLD CALC: 49.6 % — SIGNIFICANT CHANGE UP (ref 39–50)
HGB BLD-MCNC: 16.8 G/DL — SIGNIFICANT CHANGE UP (ref 13–17)
HGB BLD-MCNC: 17 G/DL — SIGNIFICANT CHANGE UP (ref 13–17)
IMM GRANULOCYTES NFR BLD AUTO: 0.5 % — SIGNIFICANT CHANGE UP (ref 0–1.5)
IMM GRANULOCYTES NFR BLD AUTO: 0.7 % — SIGNIFICANT CHANGE UP (ref 0–1.5)
INR BLD: 1.08 RATIO — SIGNIFICANT CHANGE UP (ref 0.88–1.16)
KETONES UR-MCNC: ABNORMAL
LACTATE SERPL-SCNC: 1.7 MMOL/L — SIGNIFICANT CHANGE UP (ref 0.7–2)
LEUKOCYTE ESTERASE UR-ACNC: ABNORMAL
LIDOCAIN IGE QN: 1524 U/L — HIGH (ref 73–393)
LYMPHOCYTES # BLD AUTO: 1.41 K/UL — SIGNIFICANT CHANGE UP (ref 1–3.3)
LYMPHOCYTES # BLD AUTO: 1.66 K/UL — SIGNIFICANT CHANGE UP (ref 1–3.3)
LYMPHOCYTES # BLD AUTO: 10.9 % — LOW (ref 13–44)
LYMPHOCYTES # BLD AUTO: 7.7 % — LOW (ref 13–44)
MAGNESIUM SERPL-MCNC: 1.7 MG/DL — SIGNIFICANT CHANGE UP (ref 1.6–2.6)
MCHC RBC-ENTMCNC: 33.9 GM/DL — SIGNIFICANT CHANGE UP (ref 32–36)
MCHC RBC-ENTMCNC: 34.5 GM/DL — SIGNIFICANT CHANGE UP (ref 32–36)
MCHC RBC-ENTMCNC: 34.7 PG — HIGH (ref 27–34)
MCHC RBC-ENTMCNC: 34.9 PG — HIGH (ref 27–34)
MCV RBC AUTO: 100.6 FL — HIGH (ref 80–100)
MCV RBC AUTO: 102.9 FL — HIGH (ref 80–100)
MONOCYTES # BLD AUTO: 0.98 K/UL — HIGH (ref 0–0.9)
MONOCYTES # BLD AUTO: 1.05 K/UL — HIGH (ref 0–0.9)
MONOCYTES NFR BLD AUTO: 5.3 % — SIGNIFICANT CHANGE UP (ref 2–14)
MONOCYTES NFR BLD AUTO: 6.9 % — SIGNIFICANT CHANGE UP (ref 2–14)
NEUTROPHILS # BLD AUTO: 12.39 K/UL — HIGH (ref 1.8–7.4)
NEUTROPHILS # BLD AUTO: 15.79 K/UL — HIGH (ref 1.8–7.4)
NEUTROPHILS NFR BLD AUTO: 81 % — HIGH (ref 43–77)
NEUTROPHILS NFR BLD AUTO: 85.8 % — HIGH (ref 43–77)
NITRITE UR-MCNC: NEGATIVE — SIGNIFICANT CHANGE UP
NRBC # BLD: 0 /100 WBCS — SIGNIFICANT CHANGE UP (ref 0–0)
NRBC # BLD: 0 /100 WBCS — SIGNIFICANT CHANGE UP (ref 0–0)
PH UR: 7 — SIGNIFICANT CHANGE UP (ref 5–8)
PLATELET # BLD AUTO: 371 K/UL — SIGNIFICANT CHANGE UP (ref 150–400)
PLATELET # BLD AUTO: 394 K/UL — SIGNIFICANT CHANGE UP (ref 150–400)
POTASSIUM SERPL-MCNC: 3.4 MMOL/L — LOW (ref 3.5–5.3)
POTASSIUM SERPL-MCNC: 4.3 MMOL/L — SIGNIFICANT CHANGE UP (ref 3.5–5.3)
POTASSIUM SERPL-SCNC: 3.4 MMOL/L — LOW (ref 3.5–5.3)
POTASSIUM SERPL-SCNC: 4.3 MMOL/L — SIGNIFICANT CHANGE UP (ref 3.5–5.3)
PROT SERPL-MCNC: 7.6 GM/DL — SIGNIFICANT CHANGE UP (ref 6–8.3)
PROT SERPL-MCNC: 8 GM/DL — SIGNIFICANT CHANGE UP (ref 6–8.3)
PROT UR-MCNC: 15 MG/DL
PROTHROM AB SERPL-ACNC: 12 SEC — SIGNIFICANT CHANGE UP (ref 10–12.9)
RBC # BLD: 4.82 M/UL — SIGNIFICANT CHANGE UP (ref 4.2–5.8)
RBC # BLD: 4.9 M/UL — SIGNIFICANT CHANGE UP (ref 4.2–5.8)
RBC # FLD: 14.6 % — HIGH (ref 10.3–14.5)
RBC # FLD: 14.6 % — HIGH (ref 10.3–14.5)
RBC CASTS # UR COMP ASSIST: ABNORMAL /HPF (ref 0–4)
SODIUM SERPL-SCNC: 134 MMOL/L — LOW (ref 135–145)
SODIUM SERPL-SCNC: 137 MMOL/L — SIGNIFICANT CHANGE UP (ref 135–145)
SP GR SPEC: 1.01 — SIGNIFICANT CHANGE UP (ref 1.01–1.02)
UROBILINOGEN FLD QL: NEGATIVE MG/DL — SIGNIFICANT CHANGE UP
WBC # BLD: 15.29 K/UL — HIGH (ref 3.8–10.5)
WBC # BLD: 18.39 K/UL — HIGH (ref 3.8–10.5)
WBC # FLD AUTO: 15.29 K/UL — HIGH (ref 3.8–10.5)
WBC # FLD AUTO: 18.39 K/UL — HIGH (ref 3.8–10.5)
WBC UR QL: SIGNIFICANT CHANGE UP

## 2019-04-01 PROCEDURE — 76700 US EXAM ABDOM COMPLETE: CPT | Mod: 26

## 2019-04-01 PROCEDURE — 93010 ELECTROCARDIOGRAM REPORT: CPT

## 2019-04-01 PROCEDURE — 99285 EMERGENCY DEPT VISIT HI MDM: CPT

## 2019-04-01 PROCEDURE — 74177 CT ABD & PELVIS W/CONTRAST: CPT | Mod: 26

## 2019-04-01 RX ORDER — HYDROMORPHONE HYDROCHLORIDE 2 MG/ML
1 INJECTION INTRAMUSCULAR; INTRAVENOUS; SUBCUTANEOUS ONCE
Qty: 0 | Refills: 0 | Status: DISCONTINUED | OUTPATIENT
Start: 2019-04-01 | End: 2019-04-01

## 2019-04-01 RX ORDER — HYDROMORPHONE HYDROCHLORIDE 2 MG/ML
2 INJECTION INTRAMUSCULAR; INTRAVENOUS; SUBCUTANEOUS ONCE
Qty: 0 | Refills: 0 | Status: DISCONTINUED | OUTPATIENT
Start: 2019-04-01 | End: 2019-04-01

## 2019-04-01 RX ORDER — ENOXAPARIN SODIUM 100 MG/ML
40 INJECTION SUBCUTANEOUS EVERY 24 HOURS
Qty: 0 | Refills: 0 | Status: DISCONTINUED | OUTPATIENT
Start: 2019-04-01 | End: 2019-04-08

## 2019-04-01 RX ORDER — ONDANSETRON 8 MG/1
4 TABLET, FILM COATED ORAL ONCE
Qty: 0 | Refills: 0 | Status: COMPLETED | OUTPATIENT
Start: 2019-04-01 | End: 2019-04-01

## 2019-04-01 RX ORDER — HYDROMORPHONE HYDROCHLORIDE 2 MG/ML
1 INJECTION INTRAMUSCULAR; INTRAVENOUS; SUBCUTANEOUS EVERY 6 HOURS
Qty: 0 | Refills: 0 | Status: DISCONTINUED | OUTPATIENT
Start: 2019-04-01 | End: 2019-04-01

## 2019-04-01 RX ORDER — SODIUM CHLORIDE 9 MG/ML
1000 INJECTION INTRAMUSCULAR; INTRAVENOUS; SUBCUTANEOUS
Qty: 0 | Refills: 0 | Status: DISCONTINUED | OUTPATIENT
Start: 2019-04-01 | End: 2019-04-06

## 2019-04-01 RX ORDER — HYDROMORPHONE HYDROCHLORIDE 2 MG/ML
1 INJECTION INTRAMUSCULAR; INTRAVENOUS; SUBCUTANEOUS EVERY 6 HOURS
Qty: 0 | Refills: 0 | Status: DISCONTINUED | OUTPATIENT
Start: 2019-04-01 | End: 2019-04-06

## 2019-04-01 RX ORDER — METOCLOPRAMIDE HCL 10 MG
10 TABLET ORAL ONCE
Qty: 0 | Refills: 0 | Status: COMPLETED | OUTPATIENT
Start: 2019-04-01 | End: 2019-04-01

## 2019-04-01 RX ORDER — SODIUM CHLORIDE 9 MG/ML
1000 INJECTION INTRAMUSCULAR; INTRAVENOUS; SUBCUTANEOUS
Qty: 0 | Refills: 0 | Status: DISCONTINUED | OUTPATIENT
Start: 2019-04-01 | End: 2019-04-02

## 2019-04-01 RX ORDER — PANTOPRAZOLE SODIUM 20 MG/1
40 TABLET, DELAYED RELEASE ORAL DAILY
Qty: 0 | Refills: 0 | Status: DISCONTINUED | OUTPATIENT
Start: 2019-04-01 | End: 2019-04-08

## 2019-04-01 RX ORDER — HYDROMORPHONE HYDROCHLORIDE 2 MG/ML
0.5 INJECTION INTRAMUSCULAR; INTRAVENOUS; SUBCUTANEOUS ONCE
Qty: 0 | Refills: 0 | Status: DISCONTINUED | OUTPATIENT
Start: 2019-04-01 | End: 2019-04-01

## 2019-04-01 RX ORDER — HYDROMORPHONE HYDROCHLORIDE 2 MG/ML
1 INJECTION INTRAMUSCULAR; INTRAVENOUS; SUBCUTANEOUS
Qty: 0 | Refills: 0 | Status: DISCONTINUED | OUTPATIENT
Start: 2019-04-01 | End: 2019-04-06

## 2019-04-01 RX ORDER — ONDANSETRON 8 MG/1
4 TABLET, FILM COATED ORAL EVERY 6 HOURS
Qty: 0 | Refills: 0 | Status: DISCONTINUED | OUTPATIENT
Start: 2019-04-01 | End: 2019-04-08

## 2019-04-01 RX ORDER — SODIUM CHLORIDE 9 MG/ML
1000 INJECTION, SOLUTION INTRAVENOUS ONCE
Qty: 0 | Refills: 0 | Status: COMPLETED | OUTPATIENT
Start: 2019-04-01 | End: 2019-04-01

## 2019-04-01 RX ORDER — ACETAMINOPHEN 500 MG
1000 TABLET ORAL ONCE
Qty: 0 | Refills: 0 | Status: COMPLETED | OUTPATIENT
Start: 2019-04-01 | End: 2019-04-01

## 2019-04-01 RX ORDER — SODIUM CHLORIDE 9 MG/ML
1000 INJECTION, SOLUTION INTRAVENOUS
Qty: 0 | Refills: 0 | Status: DISCONTINUED | OUTPATIENT
Start: 2019-04-01 | End: 2019-04-01

## 2019-04-01 RX ADMIN — SODIUM CHLORIDE 1000 MILLILITER(S): 9 INJECTION, SOLUTION INTRAVENOUS at 14:27

## 2019-04-01 RX ADMIN — HYDROMORPHONE HYDROCHLORIDE 1 MILLIGRAM(S): 2 INJECTION INTRAMUSCULAR; INTRAVENOUS; SUBCUTANEOUS at 18:16

## 2019-04-01 RX ADMIN — HYDROMORPHONE HYDROCHLORIDE 1 MILLIGRAM(S): 2 INJECTION INTRAMUSCULAR; INTRAVENOUS; SUBCUTANEOUS at 13:27

## 2019-04-01 RX ADMIN — ONDANSETRON 4 MILLIGRAM(S): 8 TABLET, FILM COATED ORAL at 13:27

## 2019-04-01 RX ADMIN — ONDANSETRON 4 MILLIGRAM(S): 8 TABLET, FILM COATED ORAL at 11:34

## 2019-04-01 RX ADMIN — Medication 400 MILLIGRAM(S): at 10:32

## 2019-04-01 RX ADMIN — HYDROMORPHONE HYDROCHLORIDE 1 MILLIGRAM(S): 2 INJECTION INTRAMUSCULAR; INTRAVENOUS; SUBCUTANEOUS at 17:18

## 2019-04-01 RX ADMIN — SODIUM CHLORIDE 1000 MILLILITER(S): 9 INJECTION INTRAMUSCULAR; INTRAVENOUS; SUBCUTANEOUS at 22:00

## 2019-04-01 RX ADMIN — Medication 40 MILLIGRAM(S): at 21:59

## 2019-04-01 RX ADMIN — Medication 40 MILLIGRAM(S): at 15:59

## 2019-04-01 RX ADMIN — SODIUM CHLORIDE 125 MILLILITER(S): 9 INJECTION INTRAMUSCULAR; INTRAVENOUS; SUBCUTANEOUS at 16:48

## 2019-04-01 RX ADMIN — HYDROMORPHONE HYDROCHLORIDE 0.5 MILLIGRAM(S): 2 INJECTION INTRAMUSCULAR; INTRAVENOUS; SUBCUTANEOUS at 10:07

## 2019-04-01 RX ADMIN — HYDROMORPHONE HYDROCHLORIDE 1 MILLIGRAM(S): 2 INJECTION INTRAMUSCULAR; INTRAVENOUS; SUBCUTANEOUS at 16:48

## 2019-04-01 RX ADMIN — HYDROMORPHONE HYDROCHLORIDE 1 MILLIGRAM(S): 2 INJECTION INTRAMUSCULAR; INTRAVENOUS; SUBCUTANEOUS at 11:49

## 2019-04-01 RX ADMIN — ONDANSETRON 4 MILLIGRAM(S): 8 TABLET, FILM COATED ORAL at 16:48

## 2019-04-01 RX ADMIN — PANTOPRAZOLE SODIUM 40 MILLIGRAM(S): 20 TABLET, DELAYED RELEASE ORAL at 22:00

## 2019-04-01 RX ADMIN — HYDROMORPHONE HYDROCHLORIDE 2 MILLIGRAM(S): 2 INJECTION INTRAMUSCULAR; INTRAVENOUS; SUBCUTANEOUS at 20:50

## 2019-04-01 RX ADMIN — Medication 1000 MILLIGRAM(S): at 10:45

## 2019-04-01 RX ADMIN — SODIUM CHLORIDE 1000 MILLILITER(S): 9 INJECTION, SOLUTION INTRAVENOUS at 10:06

## 2019-04-01 RX ADMIN — HYDROMORPHONE HYDROCHLORIDE 1 MILLIGRAM(S): 2 INJECTION INTRAMUSCULAR; INTRAVENOUS; SUBCUTANEOUS at 13:42

## 2019-04-01 RX ADMIN — Medication 10 MILLIGRAM(S): at 10:32

## 2019-04-01 RX ADMIN — HYDROMORPHONE HYDROCHLORIDE 0.5 MILLIGRAM(S): 2 INJECTION INTRAMUSCULAR; INTRAVENOUS; SUBCUTANEOUS at 10:22

## 2019-04-01 RX ADMIN — HYDROMORPHONE HYDROCHLORIDE 1 MILLIGRAM(S): 2 INJECTION INTRAMUSCULAR; INTRAVENOUS; SUBCUTANEOUS at 14:29

## 2019-04-01 RX ADMIN — ONDANSETRON 4 MILLIGRAM(S): 8 TABLET, FILM COATED ORAL at 19:41

## 2019-04-01 RX ADMIN — HYDROMORPHONE HYDROCHLORIDE 1 MILLIGRAM(S): 2 INJECTION INTRAMUSCULAR; INTRAVENOUS; SUBCUTANEOUS at 11:34

## 2019-04-01 NOTE — H&P ADULT - NSHPPHYSICALEXAM_GEN_ALL_CORE
Physical Exam:   GENERAL APPEARANCE:  NAD, hemodynamically stable  T(C): 36.5 (04-01-19 @ 09:10), Max: 36.5 (04-01-19 @ 09:10)  HR: 104 (04-01-19 @ 09:10) (104 - 104)  BP: 149/100 (04-01-19 @ 09:10) (149/100 - 149/100)  RR: 22 (04-01-19 @ 09:10) (22 - 22)  SpO2: 100% (04-01-19 @ 09:10) (100% - 100%)  HEENT:  Head is normocephalic    Skin:  decreased skin turgor  NECK:  Supple without lymphadenopathy.   HEART:  Regular rate and rhythm. normal S1 and S2, No M/R/G  LUNGS:  Good ins/exp effort, no W/R/R/C  ABDOMEN: diffused abdominal pain upon deep palpation  EXTREMITIES:  Without cyanosis, clubbing or edema.   NEUROLOGICAL:  Gross nonfocal

## 2019-04-01 NOTE — ED PROVIDER NOTE - OBJECTIVE STATEMENT
32 y/o male with a PMHx of Crohn's disease, pancreatitis presents to the ED c/o abd pain starting 6am today. +diarrhea. Denies n/v, fever, testicular pain. No other complaints at this time.

## 2019-04-01 NOTE — H&P ADULT - NSHPREVIEWOFSYSTEMS_GEN_ALL_CORE
REVIEW OF SYSTEMS:  General: NAD, hemodynamically stable   HEENT:  Eyes:  No visual loss, blurred vision, double vision or yellow sclerae. Ears, Nose, Throat:  No hearing loss, sneezing, congestion, runny nose or sore throat.  SKIN:  No rash or itching.  CARDIOVASCULAR:  No chest pain, chest pressure or chest discomfort   RESPIRATORY:  No shortness of breath, cough or sputum.  GASTROINTESTINAL:  severe abdominal pain, diarrhea  NEUROLOGICAL:  No headache, dizziness, syncope, paralysis, ataxia, numbness or tingling in the extremities. No change in bowel or bladder control.  MUSCULOSKELETAL:  No muscle, back pain, joint pain or stiffness.  HEMATOLOGIC:  No anemia, bleeding or bruising.  LYMPHATICS:  No enlarged nodes. No history of splenectomy.  ENDOCRINOLOGIC:  No reports of sweating, cold or heat intolerance. No polyuria or polydipsia.  ALLERGIES:  No history of asthma, hives, eczema or rhinitis.

## 2019-04-01 NOTE — ED PROVIDER NOTE - CARE PLAN
Principal Discharge DX:	Abdominal pain Principal Discharge DX:	Pancreatitis  Secondary Diagnosis:	Crohns disease

## 2019-04-01 NOTE — H&P ADULT - NSHPLABSRESULTS_GEN_ALL_CORE
CBC Full  -  ( 01 Apr 2019 10:04 )  WBC Count : 15.29 K/uL  RBC Count : 4.90 M/uL  Hemoglobin : 17.0 g/dL  Hematocrit : 49.3 %  Platelet Count - Automated : 394 K/uL      PT/INR - ( 01 Apr 2019 10:04 )   PT: 12.0 sec;   INR: 1.08 ratio         PTT - ( 01 Apr 2019 10:04 )  PTT:29.5 sec    04-01    134<L>  |  101  |  6<L>  ----------------------------<  127<H>  3.4<L>   |  23  |  0.82    Ca    9.4      01 Apr 2019 10:04  Mg     1.7     04-01    TPro  8.0  /  Alb  3.2<L>  /  TBili  0.7  /  DBili  x   /  AST  18  /  ALT  23  /  AlkPhos  159<H>  04-01

## 2019-04-01 NOTE — H&P ADULT - NSICDXPASTMEDICALHX_GEN_ALL_CORE_FT
PAST MEDICAL HISTORY:  Crohn's disease of large intestine without complication (No current flare up)

## 2019-04-01 NOTE — CHART NOTE - NSCHARTNOTEFT_GEN_A_CORE
asked to see pt for increased pain    Brief chart review done  1) acute pancreatiis w prior hx  2) exacerbation of Crohn's disease in  distal transverse colon,  entire descending colon and sigmoid colon and rectum. Some   thickening in the distal loops of small bowel and cecum      ROS  +pain R mid epigastrium to R flank and back  + nausea    Vital Signs Last 24 Hrs  T(C): 36.8 (01 Apr 2019 20:44), Max: 36.8 (01 Apr 2019 20:44)  T(F): 98.3 (01 Apr 2019 20:44), Max: 98.3 (01 Apr 2019 20:44)  HR: 101 (01 Apr 2019 20:44) (74 - 104)  BP: 146/90 (01 Apr 2019 20:44) (124/89 - 151/91)  BP(mean): --  RR: 16 (01 Apr 2019 20:44) (16 - 22)  SpO2: 100% (01 Apr 2019 20:44) (96% - 100%)    Pt appears uncomfortable  HEENT + flushed face w +dry oral mucosa  Chest clear  Cor RR  Abd + bowel sounds +epigastric and RUQ and LUQ tenderness  no guarding or rebound    Imaging reviewed    1. Increased IVF 1 L bolus then 200 cc/hr  2. to get solumedrol tonight on floor  3. added protonix 40 mg IV  4. warm compresses to abd q 4 hrs  5. given macrocytosis added B12 and folate to am labs    25 min

## 2019-04-01 NOTE — H&P ADULT - NSICDXPASTSURGICALHX_GEN_ALL_CORE_FT
PAST SURGICAL HISTORY:  History of colonoscopy (2014)    Perianal fistula repair in 2002    S/P ORIF (open reduction internal fixation) fracture (Right ankle, 2014)

## 2019-04-01 NOTE — H&P ADULT - NSICDXFAMILYHX_GEN_ALL_CORE_FT
FAMILY HISTORY:  Father  Still living? Yes, Estimated age: 61-70  Diabetes mellitus, Age at diagnosis: 51-60

## 2019-04-01 NOTE — ED PROVIDER NOTE - PROGRESS NOTE DETAILS
pt not tolerating po contrast, will scan without po contrast Goyo PAIZ - patient with elevated lipase, vomiting in ER. pain severe. will give 3rd dose of Dilaudid, Zofran and await ct results prior to admission.

## 2019-04-01 NOTE — CONSULT NOTE ADULT - SUBJECTIVE AND OBJECTIVE BOX
Patient is a 33y old  Male who presents with a chief complaint of Abdominal pain (01 Apr 2019 14:41)      HPI:  Patient is a 33 year old man with PMHx of Crohn's (previously on Entocort, perianal fistula repair in 2002, denies any previous colonoscopies, hx of pancreatitis admitted 4/1 with increased abdominal pain since this am. Pt feels similar to how he did on prior admission last year.  He had been doing well on budesonide but ran out few months ago. Denies any associated symptoms of fevers, chills or shakes. No vomiting. (+) diarrhea, Notes of infrequent anal leakage due to anal fistula which is somewhat chronic for him    PAST MEDICAL & SURGICAL HISTORY:  Crohn's disease of large intestine without complication: (No current flare up)  Perianal fistula: repair in 2002  History of colonoscopy: (2014)  S/P ORIF (open reduction internal fixation) fracture: (Right ankle, 2014)    MEDICATIONS  (STANDING):  enoxaparin Injectable 40 milliGRAM(s) SubCutaneous every 24 hours  methylPREDNISolone sodium succinate Injectable 40 milliGRAM(s) IV Push every 8 hours  sodium chloride 0.9%. 1000 milliLiter(s) (125 mL/Hr) IV Continuous <Continuous>    MEDICATIONS  (PRN):  HYDROmorphone  Injectable 1 milliGRAM(s) IV Push every 6 hours PRN Severe Pain (7 - 10)  ondansetron Injectable 4 milliGRAM(s) IV Push every 6 hours PRN Nausea and/or Vomiting    Allergies  No Known Allergies    SOCIAL HISTORY: no significant etoh, drugs    FAMILY HISTORY:  Diabetes mellitus (Father)    REVIEW OF SYSTEMS:    CONSTITUTIONAL: No weakness, fevers or chills  EYES/ENT: No visual changes;  No vertigo or throat pain   NECK: No pain or stiffness  RESPIRATORY: No cough, wheezing, hemoptysis; No shortness of breath  CARDIOVASCULAR: No chest pain or palpitations  GASTROINTESTINAL: as above  GENITOURINARY: No dysuria, frequency or hematuria  NEUROLOGICAL: No numbness or weakness  SKIN: No itching, burning, rashes, or lesions   PSYCH: Normal mood and affect  All other review of systems is negative unless indicated above.    Vital Signs Last 24 Hrs  T(C): 36.2 (01 Apr 2019 16:52), Max: 36.5 (01 Apr 2019 09:10)  T(F): 97.2 (01 Apr 2019 16:52), Max: 97.7 (01 Apr 2019 09:10)  HR: 74 (01 Apr 2019 16:52) (74 - 104)  BP: 124/89 (01 Apr 2019 16:52) (124/89 - 150/84)  BP(mean): --  RR: 16 (01 Apr 2019 16:52) (16 - 22)  SpO2: 98% (01 Apr 2019 16:52) (96% - 100%)    PHYSICAL EXAM:    Constitutional: NAD, appears uncomfortable  HEENT: MMM  Neck: No LAD  Respiratory: CTAB  Cardiovascular: S1 and S2, RRR  Gastrointestinal: BS+, soft, tender to palpation  Extremities: No peripheral edema  Vascular: 2+ peripheral pulses  Neurological: A/O x 3, no focal deficits  Psychiatric: Normal mood, normal affect  Skin: No rashes    LABS:                        16.8   18.39 )-----------( 371      ( 01 Apr 2019 15:57 )             49.6     04-01    137  |  101  |  4<L>  ----------------------------<  106<H>  4.3   |  27  |  0.66    Ca    9.1      01 Apr 2019 15:57  Mg     1.7     04-01    TPro  7.6  /  Alb  3.2<L>  /  TBili  0.7  /  DBili  x   /  AST  14<L>  /  ALT  20  /  AlkPhos  142<H>  04-01    PT/INR - ( 01 Apr 2019 10:04 )   PT: 12.0 sec;   INR: 1.08 ratio         PTT - ( 01 Apr 2019 10:04 )  PTT:29.5 sec  LIVER FUNCTIONS - ( 01 Apr 2019 15:57 )  Alb: 3.2 g/dL / Pro: 7.6 gm/dL / ALK PHOS: 142 U/L / ALT: 20 U/L / AST: 14 U/L / GGT: x             RADIOLOGY & ADDITIONAL STUDIES:

## 2019-04-01 NOTE — ED PROVIDER NOTE - ATTENDING CONTRIBUTION TO CARE
I, Christine Pritchett MD, personally saw the patient with resident.  I have personally performed a face to face diagnostic evaluation on this patient.  I have reviewed the resident note and agree with the history, exam, and plan of care, except as noted.

## 2019-04-01 NOTE — ED PROVIDER NOTE - CONSTITUTIONAL, MLM
normal... Uncomfortable male laying in bed, well nourished, awake, alert, oriented to person, place, time/situation and in no apparent distress.

## 2019-04-01 NOTE — ED ADULT NURSE NOTE - ED STAT RN HANDOFF DETAILS
Principal Discharge DX:	Facial laceration, initial encounter  Assessment and plan of treatment:	-- Keep the wound completely dry for the first 2 days. After that, you may get it wet but do not scrub or soak.  -- Follow up in 3 days for suture removal. Keep in mind that even after sutures are removed, the skin will not be at full strength for several more weeks, so be cautious with activities that may cause the area to open up again.  -- To ensure that the scar is as small as possible, keep the area covered and out of the sun for the next 6 months.  -- Return to ER immediately for significant bleeding, swelling/redness/worsening pain, pus coming from the wound, fevers/chills, redness extending away from the wound, or for any concerns.
Kristel MATTHEWS

## 2019-04-01 NOTE — H&P ADULT - HISTORY OF PRESENT ILLNESS
Patient is a 33 year old man with PMHx of Crohn's (previously on Entocort, perianal fistula repair in 2002, denies any previous colonoscopies), hx of pancreatitis admitted 4/1 with increased abdominal pain since this am. Patient's onset of symptoms sudden this am. Similar to previous admission in the hospital. Patient denies of being on any meds. Notes of no previous colonoscopies. No dietary changes. No abdominal traumas. Patient denies any ETOH use / illicit drug use. Denies any associated symptoms of fevers, chills or shakes. No vomiting. 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 Patient is a 33 year old man with PMHx of Crohn's (previously on Entocort, perianal fistula repair in 2002, denies any previous colonoscopies), hx of pancreatitis admitted 4/1 with increased abdominal pain since this am. Patient's onset of symptoms sudden this am. Similar to previous admission in the hospital. Patient denies of being on any meds. Notes of no previous colonoscopies. No dietary changes. No abdominal traumas. Patient denies any ETOH use / illicit drug use. Denies any associated symptoms of fevers, chills or shakes. No vomiting. 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 Care discussed with Dr. Campo. Patient had a poor follow up as an outpatient.

## 2019-04-01 NOTE — ED PROVIDER NOTE - CLINICAL SUMMARY MEDICAL DECISION MAKING FREE TEXT BOX
32 y/o make with hx of Crohn's presents with abd pain and diarrhea. Plan: CT, labs, medicate, reassess. Differential includes exacerbation of Crohn's, abscess, bowel obstruction. Will reassess closely.

## 2019-04-01 NOTE — H&P ADULT - PROBLEM SELECTOR PLAN 2
# Acute pancreatitis  - this is a second episode of pancreatitis  - former etoh use ( denies any etoh use now)  - CT no gb pathology, pending sonogram of the abdomen  r/o gallstone pancreatitis.  - Check lipid panel, IGG4 r/o AIP  - aggressive hydration  - pain management  - NPO for now  -

## 2019-04-01 NOTE — ED ADULT TRIAGE NOTE - CHIEF COMPLAINT QUOTE
RUQ pain started last night worsening today. Denies nausea, vomiting, fever/chills.  hx pancreatitis, Crohn disease.

## 2019-04-01 NOTE — H&P ADULT - PROBLEM SELECTOR PLAN 1
Crohn's with chronic distal enteritis and right-sided perianal fistulae:  -slight worsening of ileitis on CT scan  - patient previously on entocort # Crohn's exacerbation with chronic distal enteritis and right-sided perianal fistulae  - patient previously on entocort  - notes of never having a colonoscopy  - Followed up with NP Gayla Beltrán in the past  - IV hydration  - NPO for now  - Initiated pain management  - Started on IV solumedrol # Crohn's exacerbation with chronic distal enteritis and right-sided perianal fistulae  - patient previously on entocort  - notes of never having a colonoscopy  - IV hydration  - NPO for now  - Initiated pain management  - Started on IV solumedrol

## 2019-04-02 LAB
CHOLEST SERPL-MCNC: 116 MG/DL — SIGNIFICANT CHANGE UP (ref 10–199)
CULTURE RESULTS: NO GROWTH — SIGNIFICANT CHANGE UP
FOLATE SERPL-MCNC: 4 NG/ML — LOW
HDLC SERPL-MCNC: 47 MG/DL — SIGNIFICANT CHANGE UP
LIPID PNL WITH DIRECT LDL SERPL: 55 MG/DL — SIGNIFICANT CHANGE UP
SPECIMEN SOURCE: SIGNIFICANT CHANGE UP
TOTAL CHOLESTEROL/HDL RATIO MEASUREMENT: 2.5 RATIO — LOW (ref 3.4–9.6)
TRIGL SERPL-MCNC: 71 MG/DL — SIGNIFICANT CHANGE UP (ref 10–149)
VIT B12 SERPL-MCNC: 249 PG/ML — SIGNIFICANT CHANGE UP (ref 232–1245)

## 2019-04-02 RX ORDER — ZOLPIDEM TARTRATE 10 MG/1
5 TABLET ORAL AT BEDTIME
Qty: 0 | Refills: 0 | Status: DISCONTINUED | OUTPATIENT
Start: 2019-04-02 | End: 2019-04-08

## 2019-04-02 RX ORDER — ACETAMINOPHEN 500 MG
1000 TABLET ORAL ONCE
Qty: 0 | Refills: 0 | Status: COMPLETED | OUTPATIENT
Start: 2019-04-02 | End: 2019-04-02

## 2019-04-02 RX ORDER — HYDROMORPHONE HYDROCHLORIDE 2 MG/ML
2 INJECTION INTRAMUSCULAR; INTRAVENOUS; SUBCUTANEOUS ONCE
Qty: 0 | Refills: 0 | Status: DISCONTINUED | OUTPATIENT
Start: 2019-04-02 | End: 2019-04-02

## 2019-04-02 RX ORDER — HYDROMORPHONE HYDROCHLORIDE 2 MG/ML
1 INJECTION INTRAMUSCULAR; INTRAVENOUS; SUBCUTANEOUS ONCE
Qty: 0 | Refills: 0 | Status: DISCONTINUED | OUTPATIENT
Start: 2019-04-02 | End: 2019-04-02

## 2019-04-02 RX ADMIN — HYDROMORPHONE HYDROCHLORIDE 1 MILLIGRAM(S): 2 INJECTION INTRAMUSCULAR; INTRAVENOUS; SUBCUTANEOUS at 11:45

## 2019-04-02 RX ADMIN — ONDANSETRON 4 MILLIGRAM(S): 8 TABLET, FILM COATED ORAL at 14:17

## 2019-04-02 RX ADMIN — Medication 40 MILLIGRAM(S): at 14:17

## 2019-04-02 RX ADMIN — Medication 40 MILLIGRAM(S): at 22:07

## 2019-04-02 RX ADMIN — ONDANSETRON 4 MILLIGRAM(S): 8 TABLET, FILM COATED ORAL at 02:50

## 2019-04-02 RX ADMIN — SODIUM CHLORIDE 1000 MILLILITER(S): 9 INJECTION INTRAMUSCULAR; INTRAVENOUS; SUBCUTANEOUS at 18:41

## 2019-04-02 RX ADMIN — HYDROMORPHONE HYDROCHLORIDE 1 MILLIGRAM(S): 2 INJECTION INTRAMUSCULAR; INTRAVENOUS; SUBCUTANEOUS at 11:28

## 2019-04-02 RX ADMIN — Medication 40 MILLIGRAM(S): at 05:10

## 2019-04-02 RX ADMIN — HYDROMORPHONE HYDROCHLORIDE 1 MILLIGRAM(S): 2 INJECTION INTRAMUSCULAR; INTRAVENOUS; SUBCUTANEOUS at 21:15

## 2019-04-02 RX ADMIN — HYDROMORPHONE HYDROCHLORIDE 1 MILLIGRAM(S): 2 INJECTION INTRAMUSCULAR; INTRAVENOUS; SUBCUTANEOUS at 17:12

## 2019-04-02 RX ADMIN — ONDANSETRON 4 MILLIGRAM(S): 8 TABLET, FILM COATED ORAL at 22:07

## 2019-04-02 RX ADMIN — Medication 400 MILLIGRAM(S): at 17:14

## 2019-04-02 RX ADMIN — ENOXAPARIN SODIUM 40 MILLIGRAM(S): 100 INJECTION SUBCUTANEOUS at 17:14

## 2019-04-02 RX ADMIN — SODIUM CHLORIDE 1000 MILLILITER(S): 9 INJECTION INTRAMUSCULAR; INTRAVENOUS; SUBCUTANEOUS at 07:08

## 2019-04-02 RX ADMIN — ZOLPIDEM TARTRATE 5 MILLIGRAM(S): 10 TABLET ORAL at 22:07

## 2019-04-02 RX ADMIN — HYDROMORPHONE HYDROCHLORIDE 1 MILLIGRAM(S): 2 INJECTION INTRAMUSCULAR; INTRAVENOUS; SUBCUTANEOUS at 09:51

## 2019-04-02 RX ADMIN — HYDROMORPHONE HYDROCHLORIDE 2 MILLIGRAM(S): 2 INJECTION INTRAMUSCULAR; INTRAVENOUS; SUBCUTANEOUS at 00:57

## 2019-04-02 RX ADMIN — Medication 1000 MILLIGRAM(S): at 17:30

## 2019-04-02 RX ADMIN — HYDROMORPHONE HYDROCHLORIDE 1 MILLIGRAM(S): 2 INJECTION INTRAMUSCULAR; INTRAVENOUS; SUBCUTANEOUS at 14:17

## 2019-04-02 RX ADMIN — HYDROMORPHONE HYDROCHLORIDE 1 MILLIGRAM(S): 2 INJECTION INTRAMUSCULAR; INTRAVENOUS; SUBCUTANEOUS at 14:35

## 2019-04-02 RX ADMIN — HYDROMORPHONE HYDROCHLORIDE 2 MILLIGRAM(S): 2 INJECTION INTRAMUSCULAR; INTRAVENOUS; SUBCUTANEOUS at 05:43

## 2019-04-02 RX ADMIN — PANTOPRAZOLE SODIUM 40 MILLIGRAM(S): 20 TABLET, DELAYED RELEASE ORAL at 11:28

## 2019-04-02 RX ADMIN — SODIUM CHLORIDE 1000 MILLILITER(S): 9 INJECTION INTRAMUSCULAR; INTRAVENOUS; SUBCUTANEOUS at 12:18

## 2019-04-02 RX ADMIN — ONDANSETRON 4 MILLIGRAM(S): 8 TABLET, FILM COATED ORAL at 08:28

## 2019-04-02 RX ADMIN — SODIUM CHLORIDE 1000 MILLILITER(S): 9 INJECTION INTRAMUSCULAR; INTRAVENOUS; SUBCUTANEOUS at 02:49

## 2019-04-02 RX ADMIN — HYDROMORPHONE HYDROCHLORIDE 1 MILLIGRAM(S): 2 INJECTION INTRAMUSCULAR; INTRAVENOUS; SUBCUTANEOUS at 17:30

## 2019-04-02 RX ADMIN — HYDROMORPHONE HYDROCHLORIDE 1 MILLIGRAM(S): 2 INJECTION INTRAMUSCULAR; INTRAVENOUS; SUBCUTANEOUS at 10:10

## 2019-04-02 RX ADMIN — HYDROMORPHONE HYDROCHLORIDE 1 MILLIGRAM(S): 2 INJECTION INTRAMUSCULAR; INTRAVENOUS; SUBCUTANEOUS at 20:45

## 2019-04-02 NOTE — PROGRESS NOTE ADULT - SUBJECTIVE AND OBJECTIVE BOX
Patient is a 33 year old man with PMHx of Crohn's (previously on Entocort, perianal fistula repair in , denies any previous colonoscopies), hx of pancreatitis admitted  with increased abdominal pain since this am. Patient's onset of symptoms sudden this am. Similar to previous admission in the hospital. Patient denies of being on any meds. Notes of no previous colonoscopies. No dietary changes. No abdominal traumas. Patient denies any ETOH use / illicit drug use. Denies any associated symptoms of fevers, chills or shakes. No vomiting. 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 Care discussed with Dr. Campo. Patient had a poor follow up as an outpatient.      - still with abd pain     ROS:   All 10 systems reviewed and found to be negative with the exception of what has been described above.    Vital Signs Last 24 Hrs  T(C): 37 (2019 11:26), Max: 37 (2019 11:26)  T(F): 98.6 (2019 11:26), Max: 98.6 (2019 11:26)  HR: 82 (2019 11:30) (68 - 101)  BP: 140/95 (2019 11:30) (118/60 - 151/91)  BP(mean): --  RR: 18 (2019 11:30) (16 - 19)  SpO2: 97% (2019 11:30) (96% - 100%)    GEN: lying in bed, NAD  HEENT:   NC/AT, pupils equal and reactive, EOMI  CV:  +S1, +S2, RRR  RESP:   lungs clear to auscultation bilaterally, no wheeze, rales, rhonchi   BREAST:  not examined  GI:  + BS diffuse epigastric tenderness, ND  MSK:   normal muscle tone  NEURO:  AAOX3, no focal neurological deficits  SKIN:  no rashes	                              16.8   18.39 )-----------( 371      ( 2019 15:57 )             49.6         137  |  101  |  4<L>  ----------------------------<  106<H>  4.3   |  27  |  0.66    Ca    9.1      2019 15:57  Mg     1.7     04-    TPro  7.6  /  Alb  3.2<L>  /  TBili  0.7  /  DBili  x   /  AST  14<L>  /  ALT  20  /  AlkPhos  142<H>  04        LIVER FUNCTIONS - ( 2019 15:57 )  Alb: 3.2 g/dL / Pro: 7.6 gm/dL / ALK PHOS: 142 U/L / ALT: 20 U/L / AST: 14 U/L / GGT: x           PT/INR - ( 2019 10:04 )   PT: 12.0 sec;   INR: 1.08 ratio         PTT - ( 2019 10:04 )  PTT:29.5 sec  Urinalysis Basic - ( 2019 21:25 )    Color: Yellow / Appearance: Clear / S.010 / pH: x  Gluc: x / Ketone: Moderate  / Bili: Negative / Urobili: Negative mg/dL   Blood: x / Protein: 15 mg/dL / Nitrite: Negative   Leuk Esterase: Trace / RBC: 3-5 /HPF / WBC 0-2   Sq Epi: x / Non Sq Epi: Occasional / Bacteria: Occasional      # Crohns disease with acute exacerbation with chronic distal enteritis and right-sided perianal fistulae  - patient previously on entocort  - notes of never having a colonoscopy  - IV hydration  - NPO for now  - Initiated pain management  - gi eval noted and will continue iv steroids    # acute Pancreatitis  - pt still with severe pain and will adjust to dilaudid 1 mg iv q3h  - iv tylenol X 1   - former etoh use ( denies any etoh use now)  - CT no gb pathology, pending sonogram of the abdomen  r/o gallstone pancreatitis.  - Check lipid panel, IGG4 r/o AIP  - aggressive hydration  - pain management  - NPO for now  - GI eval noted      # Leukocytosis  - secondary to underlying Crohn's dz and worsening 2/2 steroids    # Hyponatremia  - secondary to dehydration in the setting of poor po intake.     # Hypokalemia  - secondary to aggressive hydration in the ED / Poor po intake  - replete.

## 2019-04-02 NOTE — PROGRESS NOTE ADULT - SUBJECTIVE AND OBJECTIVE BOX
Patient is a 33y old  Male who presents with a chief complaint of Abdominal pain (01 Apr 2019 17:41)      HPI:  pt had tough night with pain  got some rest early this am  pain is upper and moves around to back    PAST MEDICAL & SURGICAL HISTORY:  Crohn's disease of large intestine without complication: (No current flare up)  Perianal fistula: repair in 2002  History of colonoscopy: (2014)  S/P ORIF (open reduction internal fixation) fracture: (Right ankle, 2014)    MEDICATIONS  (STANDING):  enoxaparin Injectable 40 milliGRAM(s) SubCutaneous every 24 hours  methylPREDNISolone sodium succinate Injectable 40 milliGRAM(s) IV Push every 8 hours  pantoprazole  Injectable 40 milliGRAM(s) IV Push daily  sodium chloride 0.9%. 1000 milliLiter(s) (1000 mL/Hr) IV Continuous <Continuous>  sodium chloride 0.9%. 1000 milliLiter(s) (125 mL/Hr) IV Continuous <Continuous>    MEDICATIONS  (PRN):  HYDROmorphone  Injectable 1 milliGRAM(s) IV Push every 6 hours PRN Moderate Pain (4 - 6)  HYDROmorphone  Injectable 2 milliGRAM(s) IV Push every 6 hours PRN Severe Pain (7 - 10)  ondansetron Injectable 4 milliGRAM(s) IV Push every 6 hours PRN Nausea and/or Vomiting    Allergies  No Known Allergies    REVIEW OF SYSTEMS:    CONSTITUTIONAL: No weakness, fevers or chills  RESPIRATORY: No cough, wheezing, hemoptysis; No shortness of breath  CARDIOVASCULAR: No chest pain or palpitations  GASTROINTESTINAL: as above  All other review of systems is negative unless indicated above.    Vital Signs Last 24 Hrs  T(C): 36.4 (02 Apr 2019 05:41), Max: 36.8 (01 Apr 2019 20:44)  T(F): 97.5 (02 Apr 2019 05:41), Max: 98.3 (01 Apr 2019 20:44)  HR: 84 (02 Apr 2019 05:41) (74 - 104)  BP: 118/60 (02 Apr 2019 05:41) (118/60 - 151/91)  BP(mean): --  RR: 18 (02 Apr 2019 05:41) (16 - 22)  SpO2: 97% (02 Apr 2019 05:41) (96% - 100%)    PHYSICAL EXAM:    Constitutional: NAD  Respiratory: CTAB  Cardiovascular: S1 and S2, RRR, no M/G/R  Gastrointestinal: BS+, mild distended, tender upper more than lower abdomen      LABS:                        16.8   18.39 )-----------( 371      ( 01 Apr 2019 15:57 )             49.6     04-01    137  |  101  |  4<L>  ----------------------------<  106<H>  4.3   |  27  |  0.66    Ca    9.1      01 Apr 2019 15:57  Mg     1.7     04-01    TPro  7.6  /  Alb  3.2<L>  /  TBili  0.7  /  DBili  x   /  AST  14<L>  /  ALT  20  /  AlkPhos  142<H>  04-01    PT/INR - ( 01 Apr 2019 10:04 )   PT: 12.0 sec;   INR: 1.08 ratio         PTT - ( 01 Apr 2019 10:04 )  PTT:29.5 sec  LIVER FUNCTIONS - ( 01 Apr 2019 15:57 )  Alb: 3.2 g/dL / Pro: 7.6 gm/dL / ALK PHOS: 142 U/L / ALT: 20 U/L / AST: 14 U/L / GGT: x             RADIOLOGY & ADDITIONAL STUDIES:

## 2019-04-02 NOTE — PROGRESS NOTE ADULT - ASSESSMENT
34yo male with Crohn's, pancreatitis  NPO  iv fluids at 200 cc/hr  pain meds, anti-emetics  iv steroids for crohn's flare

## 2019-04-03 LAB
ALBUMIN SERPL ELPH-MCNC: 2.4 G/DL — LOW (ref 3.3–5)
ALP SERPL-CCNC: 88 U/L — SIGNIFICANT CHANGE UP (ref 40–120)
ALT FLD-CCNC: 12 U/L — SIGNIFICANT CHANGE UP (ref 12–78)
AMYLASE P1 CFR SERPL: 266 U/L — HIGH (ref 25–115)
ANION GAP SERPL CALC-SCNC: 9 MMOL/L — SIGNIFICANT CHANGE UP (ref 5–17)
AST SERPL-CCNC: 8 U/L — LOW (ref 15–37)
BILIRUB SERPL-MCNC: 0.6 MG/DL — SIGNIFICANT CHANGE UP (ref 0.2–1.2)
BUN SERPL-MCNC: 9 MG/DL — SIGNIFICANT CHANGE UP (ref 7–23)
CALCIUM SERPL-MCNC: 8.2 MG/DL — LOW (ref 8.5–10.1)
CHLORIDE SERPL-SCNC: 103 MMOL/L — SIGNIFICANT CHANGE UP (ref 96–108)
CO2 SERPL-SCNC: 25 MMOL/L — SIGNIFICANT CHANGE UP (ref 22–31)
CREAT SERPL-MCNC: 0.57 MG/DL — SIGNIFICANT CHANGE UP (ref 0.5–1.3)
GLUCOSE SERPL-MCNC: 95 MG/DL — SIGNIFICANT CHANGE UP (ref 70–99)
IGG4 SER-MCNC: 54.8 MG/DL — SIGNIFICANT CHANGE UP (ref 2.4–121)
LIDOCAIN IGE QN: 1305 U/L — HIGH (ref 73–393)
POTASSIUM SERPL-MCNC: 4.2 MMOL/L — SIGNIFICANT CHANGE UP (ref 3.5–5.3)
POTASSIUM SERPL-SCNC: 4.2 MMOL/L — SIGNIFICANT CHANGE UP (ref 3.5–5.3)
PROT SERPL-MCNC: 6.2 GM/DL — SIGNIFICANT CHANGE UP (ref 6–8.3)
SODIUM SERPL-SCNC: 137 MMOL/L — SIGNIFICANT CHANGE UP (ref 135–145)

## 2019-04-03 RX ORDER — SIMETHICONE 80 MG/1
80 TABLET, CHEWABLE ORAL THREE TIMES A DAY
Qty: 0 | Refills: 0 | Status: DISCONTINUED | OUTPATIENT
Start: 2019-04-03 | End: 2019-04-08

## 2019-04-03 RX ADMIN — HYDROMORPHONE HYDROCHLORIDE 1 MILLIGRAM(S): 2 INJECTION INTRAMUSCULAR; INTRAVENOUS; SUBCUTANEOUS at 09:02

## 2019-04-03 RX ADMIN — Medication 40 MILLIGRAM(S): at 15:08

## 2019-04-03 RX ADMIN — HYDROMORPHONE HYDROCHLORIDE 1 MILLIGRAM(S): 2 INJECTION INTRAMUSCULAR; INTRAVENOUS; SUBCUTANEOUS at 10:37

## 2019-04-03 RX ADMIN — Medication 40 MILLIGRAM(S): at 05:50

## 2019-04-03 RX ADMIN — ZOLPIDEM TARTRATE 5 MILLIGRAM(S): 10 TABLET ORAL at 23:39

## 2019-04-03 RX ADMIN — PANTOPRAZOLE SODIUM 40 MILLIGRAM(S): 20 TABLET, DELAYED RELEASE ORAL at 12:07

## 2019-04-03 RX ADMIN — HYDROMORPHONE HYDROCHLORIDE 1 MILLIGRAM(S): 2 INJECTION INTRAMUSCULAR; INTRAVENOUS; SUBCUTANEOUS at 14:15

## 2019-04-03 RX ADMIN — HYDROMORPHONE HYDROCHLORIDE 1 MILLIGRAM(S): 2 INJECTION INTRAMUSCULAR; INTRAVENOUS; SUBCUTANEOUS at 04:50

## 2019-04-03 RX ADMIN — HYDROMORPHONE HYDROCHLORIDE 1 MILLIGRAM(S): 2 INJECTION INTRAMUSCULAR; INTRAVENOUS; SUBCUTANEOUS at 15:13

## 2019-04-03 RX ADMIN — SODIUM CHLORIDE 1000 MILLILITER(S): 9 INJECTION INTRAMUSCULAR; INTRAVENOUS; SUBCUTANEOUS at 22:01

## 2019-04-03 RX ADMIN — Medication 40 MILLIGRAM(S): at 21:58

## 2019-04-03 RX ADMIN — SODIUM CHLORIDE 1000 MILLILITER(S): 9 INJECTION INTRAMUSCULAR; INTRAVENOUS; SUBCUTANEOUS at 04:51

## 2019-04-03 RX ADMIN — HYDROMORPHONE HYDROCHLORIDE 1 MILLIGRAM(S): 2 INJECTION INTRAMUSCULAR; INTRAVENOUS; SUBCUTANEOUS at 21:59

## 2019-04-03 RX ADMIN — HYDROMORPHONE HYDROCHLORIDE 1 MILLIGRAM(S): 2 INJECTION INTRAMUSCULAR; INTRAVENOUS; SUBCUTANEOUS at 12:08

## 2019-04-03 RX ADMIN — HYDROMORPHONE HYDROCHLORIDE 1 MILLIGRAM(S): 2 INJECTION INTRAMUSCULAR; INTRAVENOUS; SUBCUTANEOUS at 22:30

## 2019-04-03 RX ADMIN — SODIUM CHLORIDE 1000 MILLILITER(S): 9 INJECTION INTRAMUSCULAR; INTRAVENOUS; SUBCUTANEOUS at 10:09

## 2019-04-03 RX ADMIN — ENOXAPARIN SODIUM 40 MILLIGRAM(S): 100 INJECTION SUBCUTANEOUS at 18:02

## 2019-04-03 RX ADMIN — ONDANSETRON 4 MILLIGRAM(S): 8 TABLET, FILM COATED ORAL at 04:51

## 2019-04-03 RX ADMIN — SODIUM CHLORIDE 1000 MILLILITER(S): 9 INJECTION INTRAMUSCULAR; INTRAVENOUS; SUBCUTANEOUS at 16:31

## 2019-04-03 RX ADMIN — HYDROMORPHONE HYDROCHLORIDE 1 MILLIGRAM(S): 2 INJECTION INTRAMUSCULAR; INTRAVENOUS; SUBCUTANEOUS at 18:43

## 2019-04-03 RX ADMIN — HYDROMORPHONE HYDROCHLORIDE 1 MILLIGRAM(S): 2 INJECTION INTRAMUSCULAR; INTRAVENOUS; SUBCUTANEOUS at 04:20

## 2019-04-03 RX ADMIN — ONDANSETRON 4 MILLIGRAM(S): 8 TABLET, FILM COATED ORAL at 10:09

## 2019-04-03 RX ADMIN — SIMETHICONE 80 MILLIGRAM(S): 80 TABLET, CHEWABLE ORAL at 18:10

## 2019-04-03 NOTE — PROGRESS NOTE ADULT - SUBJECTIVE AND OBJECTIVE BOX
Patient is a 33 year old man with PMHx of Crohn's (previously on Entocort, perianal fistula repair in , denies any previous colonoscopies), hx of pancreatitis admitted  with increased abdominal pain since this am. Patient's onset of symptoms sudden this am. Similar to previous admission in the hospital. Patient denies of being on any meds. Notes of no previous colonoscopies. No dietary changes. No abdominal traumas. Patient denies any ETOH use / illicit drug use. Denies any associated symptoms of fevers, chills or shakes. No vomiting. 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 Care discussed with Dr. Campo. Patient had a poor follow up as an outpatient.      - still with abd pain   4/3 - feeling better today but still requiring IV Dilaudid for pain control. no n/v/d    ROS:   All 10 systems reviewed and found to be negative with the exception of what has been described above.    Vital Signs Last 24 Hrs  T(C): 36.8 (2019 11:26), Max: 36.8 (2019 11:26)  T(F): 98.2 (2019 11:26), Max: 98.2 (2019 11:26)  HR: 79 (2019 11:26) (65 - 82)  BP: 109/69 (2019 11:26) (109/69 - 138/86)  BP(mean): --  RR: 17 (2019 11:26) (17 - 18)  SpO2: 96% (2019 11:26) (95% - 98%)    GEN: lying in bed, NAD  HEENT:   NC/AT, pupils equal and reactive, EOMI  CV:  +S1, +S2, RRR  RESP:   lungs clear to auscultation bilaterally, no wheeze, rales, rhonchi   BREAST:  not examined  GI:  + BS diffuse epigastric tenderness, ND  MSK:   normal muscle tone  NEURO:  AAOX3, no focal neurological deficits  SKIN:  no rashes	                              16.8   18.39 )-----------( 371      ( 2019 15:57 )             49.6     04-03    137  |  103  |  9   ----------------------------<  95  4.2   |  25  |  0.57    Ca    8.2<L>      2019 06:26    TPro  6.2  /  Alb  2.4<L>  /  TBili  0.6  /  DBili  x   /  AST  8<L>  /  ALT  12  /  AlkPhos  88  04-03        LIVER FUNCTIONS - ( 2019 06:26 )  Alb: 2.4 g/dL / Pro: 6.2 gm/dL / ALK PHOS: 88 U/L / ALT: 12 U/L / AST: 8 U/L / GGT: x             Urinalysis Basic - ( 2019 21:25 )    Color: Yellow / Appearance: Clear / S.010 / pH: x  Gluc: x / Ketone: Moderate  / Bili: Negative / Urobili: Negative mg/dL   Blood: x / Protein: 15 mg/dL / Nitrite: Negative   Leuk Esterase: Trace / RBC: 3-5 /HPF / WBC 0-2   Sq Epi: x / Non Sq Epi: Occasional / Bacteria: Occasional      # Crohns disease with acute exacerbation with chronic distal enteritis and right-sided perianal fistulae  - patient previously on entocort  - notes of never having a colonoscopy  - IV hydration  - NPO for now and possible clears in AM if pain improves  - gi eval noted and will continue iv steroids    # acute Pancreatitis  - pt still with severe pain and will continue on dilaudid 1 mg iv q3h  - iv tylenol X 1   - former etoh use ( denies any etoh use now)  - CT no gb pathology, pending sonogram of the abdomen  r/o gallstone pancreatitis.  - lipid panel, IGG4 r/o AIP - both nl   - aggressive hydration  - pain management  - NPO for now - if pain improves then clears in AM   - GI eval noted    # Leukocytosis  - secondary to underlying Crohn's dz and worsening 2/2 steroids    # Hyponatremia- resolved    # Hypokalemia - resolved

## 2019-04-03 NOTE — PROGRESS NOTE ADULT - SUBJECTIVE AND OBJECTIVE BOX
Patient is a 33y old  Male who presents with a chief complaint of Abdominal pain (02 Apr 2019 15:02)      HPI:  Patient is a 33 year old man with PMHx of Crohn's (previously on Entocort, perianal fistula repair in 2002, denies any previous colonoscopies), hx of pancreatitis admitted 4/1 with increased abdominal pain since this am. Patient's onset of symptoms sudden this am. Similar to previous admission in the hospital. Patient denies of being on any meds. Notes of no previous colonoscopies. No dietary changes. No abdominal traumas. Patient denies any ETOH use / illicit drug use. Denies any associated symptoms of fevers, chills or shakes. No vomiting. 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 Care discussed with Dr. Campo. Patient had a poor follow up as an outpatient. (01 Apr 2019 14:41)    Patient improved today. Less pain.      PAST MEDICAL & SURGICAL HISTORY:  Crohn's disease of large intestine without complication: (No current flare up)  Perianal fistula: repair in 2002  History of colonoscopy: (2014)  S/P ORIF (open reduction internal fixation) fracture: (Right ankle, 2014)      MEDICATIONS  (STANDING):  enoxaparin Injectable 40 milliGRAM(s) SubCutaneous every 24 hours  methylPREDNISolone sodium succinate Injectable 40 milliGRAM(s) IV Push every 8 hours  pantoprazole  Injectable 40 milliGRAM(s) IV Push daily  sodium chloride 0.9%. 1000 milliLiter(s) (1000 mL/Hr) IV Continuous <Continuous>    MEDICATIONS  (PRN):  HYDROmorphone  Injectable 1 milliGRAM(s) IV Push every 6 hours PRN Moderate Pain (4 - 6)  HYDROmorphone  Injectable 1 milliGRAM(s) IV Push every 3 hours PRN Severe Pain (7 - 10)  ondansetron Injectable 4 milliGRAM(s) IV Push every 6 hours PRN Nausea and/or Vomiting  zolpidem 5 milliGRAM(s) Oral at bedtime PRN Insomnia      Allergies    No Known Allergies    Intolerances            Vital Signs Last 24 Hrs  T(C): 36.6 (03 Apr 2019 04:20), Max: 37 (02 Apr 2019 11:26)  T(F): 97.9 (03 Apr 2019 04:20), Max: 98.6 (02 Apr 2019 11:26)  HR: 75 (03 Apr 2019 08:49) (65 - 92)  BP: 126/91 (03 Apr 2019 08:49) (126/91 - 140/95)  BP(mean): --  RR: 17 (03 Apr 2019 04:20) (17 - 18)  SpO2: 95% (03 Apr 2019 04:20) (95% - 98%)    Respiratory: CTAB  Cardiovascular: S1 and S2, RRR, no M/G/R  Gastrointestinal: BS+, soft, minimal tenderness  LABS:                        16.8   18.39 )-----------( 371      ( 01 Apr 2019 15:57 )             49.6     04-03    137  |  103  |  9   ----------------------------<  95  4.2   |  25  |  0.57    Ca    8.2<L>      03 Apr 2019 06:26  Mg     1.7     04-01    TPro  6.2  /  Alb  2.4<L>  /  TBili  0.6  /  DBili  x   /  AST  8<L>  /  ALT  12  /  AlkPhos  88  04-03    PT/INR - ( 01 Apr 2019 10:04 )   PT: 12.0 sec;   INR: 1.08 ratio         PTT - ( 01 Apr 2019 10:04 )  PTT:29.5 sec  LIVER FUNCTIONS - ( 03 Apr 2019 06:26 )  Alb: 2.4 g/dL / Pro: 6.2 gm/dL / ALK PHOS: 88 U/L / ALT: 12 U/L / AST: 8 U/L / GGT: x             RADIOLOGY & ADDITIONAL STUDIES:

## 2019-04-03 NOTE — PROGRESS NOTE ADULT - ASSESSMENT
34 yo male with pancreatitis and Crohn's disease. Patient feels significantly better. WBC is still elevated ?steroids. Would continue pain control and IVF.

## 2019-04-04 LAB
ALBUMIN SERPL ELPH-MCNC: 2.1 G/DL — LOW (ref 3.3–5)
ALP SERPL-CCNC: 72 U/L — SIGNIFICANT CHANGE UP (ref 40–120)
ALT FLD-CCNC: 12 U/L — SIGNIFICANT CHANGE UP (ref 12–78)
ANION GAP SERPL CALC-SCNC: 8 MMOL/L — SIGNIFICANT CHANGE UP (ref 5–17)
AST SERPL-CCNC: 6 U/L — LOW (ref 15–37)
BILIRUB SERPL-MCNC: 0.4 MG/DL — SIGNIFICANT CHANGE UP (ref 0.2–1.2)
BUN SERPL-MCNC: 9 MG/DL — SIGNIFICANT CHANGE UP (ref 7–23)
CALCIUM SERPL-MCNC: 7.9 MG/DL — LOW (ref 8.5–10.1)
CHLORIDE SERPL-SCNC: 103 MMOL/L — SIGNIFICANT CHANGE UP (ref 96–108)
CO2 SERPL-SCNC: 23 MMOL/L — SIGNIFICANT CHANGE UP (ref 22–31)
CREAT SERPL-MCNC: 0.51 MG/DL — SIGNIFICANT CHANGE UP (ref 0.5–1.3)
GLUCOSE SERPL-MCNC: 108 MG/DL — HIGH (ref 70–99)
HCT VFR BLD CALC: 44.3 % — SIGNIFICANT CHANGE UP (ref 39–50)
HGB BLD-MCNC: 14.5 G/DL — SIGNIFICANT CHANGE UP (ref 13–17)
LIDOCAIN IGE QN: 642 U/L — HIGH (ref 73–393)
MCHC RBC-ENTMCNC: 32.7 GM/DL — SIGNIFICANT CHANGE UP (ref 32–36)
MCHC RBC-ENTMCNC: 33.9 PG — SIGNIFICANT CHANGE UP (ref 27–34)
MCV RBC AUTO: 103.5 FL — HIGH (ref 80–100)
NRBC # BLD: 0 /100 WBCS — SIGNIFICANT CHANGE UP (ref 0–0)
PLATELET # BLD AUTO: 295 K/UL — SIGNIFICANT CHANGE UP (ref 150–400)
POTASSIUM SERPL-MCNC: 4.2 MMOL/L — SIGNIFICANT CHANGE UP (ref 3.5–5.3)
POTASSIUM SERPL-SCNC: 4.2 MMOL/L — SIGNIFICANT CHANGE UP (ref 3.5–5.3)
PROT SERPL-MCNC: 5.6 GM/DL — LOW (ref 6–8.3)
RBC # BLD: 4.28 M/UL — SIGNIFICANT CHANGE UP (ref 4.2–5.8)
RBC # FLD: 14.6 % — HIGH (ref 10.3–14.5)
SODIUM SERPL-SCNC: 134 MMOL/L — LOW (ref 135–145)
WBC # BLD: 8.06 K/UL — SIGNIFICANT CHANGE UP (ref 3.8–10.5)
WBC # FLD AUTO: 8.06 K/UL — SIGNIFICANT CHANGE UP (ref 3.8–10.5)

## 2019-04-04 RX ADMIN — SODIUM CHLORIDE 1000 MILLILITER(S): 9 INJECTION INTRAMUSCULAR; INTRAVENOUS; SUBCUTANEOUS at 03:04

## 2019-04-04 RX ADMIN — SODIUM CHLORIDE 1000 MILLILITER(S): 9 INJECTION INTRAMUSCULAR; INTRAVENOUS; SUBCUTANEOUS at 12:15

## 2019-04-04 RX ADMIN — HYDROMORPHONE HYDROCHLORIDE 1 MILLIGRAM(S): 2 INJECTION INTRAMUSCULAR; INTRAVENOUS; SUBCUTANEOUS at 12:25

## 2019-04-04 RX ADMIN — HYDROMORPHONE HYDROCHLORIDE 1 MILLIGRAM(S): 2 INJECTION INTRAMUSCULAR; INTRAVENOUS; SUBCUTANEOUS at 09:20

## 2019-04-04 RX ADMIN — ONDANSETRON 4 MILLIGRAM(S): 8 TABLET, FILM COATED ORAL at 21:31

## 2019-04-04 RX ADMIN — HYDROMORPHONE HYDROCHLORIDE 1 MILLIGRAM(S): 2 INJECTION INTRAMUSCULAR; INTRAVENOUS; SUBCUTANEOUS at 22:00

## 2019-04-04 RX ADMIN — Medication 40 MILLIGRAM(S): at 17:41

## 2019-04-04 RX ADMIN — HYDROMORPHONE HYDROCHLORIDE 1 MILLIGRAM(S): 2 INJECTION INTRAMUSCULAR; INTRAVENOUS; SUBCUTANEOUS at 09:11

## 2019-04-04 RX ADMIN — HYDROMORPHONE HYDROCHLORIDE 1 MILLIGRAM(S): 2 INJECTION INTRAMUSCULAR; INTRAVENOUS; SUBCUTANEOUS at 18:25

## 2019-04-04 RX ADMIN — PANTOPRAZOLE SODIUM 40 MILLIGRAM(S): 20 TABLET, DELAYED RELEASE ORAL at 09:10

## 2019-04-04 RX ADMIN — SODIUM CHLORIDE 1000 MILLILITER(S): 9 INJECTION INTRAMUSCULAR; INTRAVENOUS; SUBCUTANEOUS at 07:50

## 2019-04-04 RX ADMIN — HYDROMORPHONE HYDROCHLORIDE 1 MILLIGRAM(S): 2 INJECTION INTRAMUSCULAR; INTRAVENOUS; SUBCUTANEOUS at 06:57

## 2019-04-04 RX ADMIN — HYDROMORPHONE HYDROCHLORIDE 1 MILLIGRAM(S): 2 INJECTION INTRAMUSCULAR; INTRAVENOUS; SUBCUTANEOUS at 02:07

## 2019-04-04 RX ADMIN — HYDROMORPHONE HYDROCHLORIDE 1 MILLIGRAM(S): 2 INJECTION INTRAMUSCULAR; INTRAVENOUS; SUBCUTANEOUS at 12:15

## 2019-04-04 RX ADMIN — ENOXAPARIN SODIUM 40 MILLIGRAM(S): 100 INJECTION SUBCUTANEOUS at 17:41

## 2019-04-04 RX ADMIN — HYDROMORPHONE HYDROCHLORIDE 1 MILLIGRAM(S): 2 INJECTION INTRAMUSCULAR; INTRAVENOUS; SUBCUTANEOUS at 21:30

## 2019-04-04 RX ADMIN — Medication 40 MILLIGRAM(S): at 05:11

## 2019-04-04 RX ADMIN — HYDROMORPHONE HYDROCHLORIDE 1 MILLIGRAM(S): 2 INJECTION INTRAMUSCULAR; INTRAVENOUS; SUBCUTANEOUS at 01:26

## 2019-04-04 RX ADMIN — ZOLPIDEM TARTRATE 5 MILLIGRAM(S): 10 TABLET ORAL at 22:37

## 2019-04-04 RX ADMIN — SODIUM CHLORIDE 1000 MILLILITER(S): 9 INJECTION INTRAMUSCULAR; INTRAVENOUS; SUBCUTANEOUS at 17:40

## 2019-04-04 RX ADMIN — ONDANSETRON 4 MILLIGRAM(S): 8 TABLET, FILM COATED ORAL at 15:17

## 2019-04-04 RX ADMIN — ONDANSETRON 4 MILLIGRAM(S): 8 TABLET, FILM COATED ORAL at 05:11

## 2019-04-04 RX ADMIN — HYDROMORPHONE HYDROCHLORIDE 1 MILLIGRAM(S): 2 INJECTION INTRAMUSCULAR; INTRAVENOUS; SUBCUTANEOUS at 05:12

## 2019-04-04 RX ADMIN — HYDROMORPHONE HYDROCHLORIDE 1 MILLIGRAM(S): 2 INJECTION INTRAMUSCULAR; INTRAVENOUS; SUBCUTANEOUS at 15:35

## 2019-04-04 RX ADMIN — HYDROMORPHONE HYDROCHLORIDE 1 MILLIGRAM(S): 2 INJECTION INTRAMUSCULAR; INTRAVENOUS; SUBCUTANEOUS at 15:17

## 2019-04-04 RX ADMIN — SODIUM CHLORIDE 1000 MILLILITER(S): 9 INJECTION INTRAMUSCULAR; INTRAVENOUS; SUBCUTANEOUS at 22:40

## 2019-04-04 NOTE — PROGRESS NOTE ADULT - SUBJECTIVE AND OBJECTIVE BOX
Patient is a 33y old  Male who presents with a chief complaint of Abdominal pain (03 Apr 2019 14:36)      HPI:  pt resting comfortably  pain decreasing    PAST MEDICAL & SURGICAL HISTORY:  Crohn's disease of large intestine without complication: (No current flare up)  Perianal fistula: repair in 2002  History of colonoscopy: (2014)  S/P ORIF (open reduction internal fixation) fracture: (Right ankle, 2014)    MEDICATIONS  (STANDING):  enoxaparin Injectable 40 milliGRAM(s) SubCutaneous every 24 hours  methylPREDNISolone sodium succinate Injectable 40 milliGRAM(s) IV Push every 12 hours  pantoprazole  Injectable 40 milliGRAM(s) IV Push daily  sodium chloride 0.9%. 1000 milliLiter(s) (1000 mL/Hr) IV Continuous <Continuous>    MEDICATIONS  (PRN):  HYDROmorphone  Injectable 1 milliGRAM(s) IV Push every 6 hours PRN Moderate Pain (4 - 6)  HYDROmorphone  Injectable 1 milliGRAM(s) IV Push every 3 hours PRN Severe Pain (7 - 10)  ondansetron Injectable 4 milliGRAM(s) IV Push every 6 hours PRN Nausea and/or Vomiting  simethicone 80 milliGRAM(s) Chew three times a day PRN Gas  zolpidem 5 milliGRAM(s) Oral at bedtime PRN Insomnia    Allergies  No Known Allergies      REVIEW OF SYSTEMS:    CONSTITUTIONAL: No weakness, fevers or chills  RESPIRATORY: No cough, wheezing, hemoptysis; No shortness of breath  CARDIOVASCULAR: No chest pain or palpitations  GASTROINTESTINAL: less epigastric pain  All other review of systems is negative unless indicated above.    Vital Signs Last 24 Hrs  T(C): 36.4 (04 Apr 2019 04:57), Max: 36.9 (03 Apr 2019 18:49)  T(F): 97.6 (04 Apr 2019 04:57), Max: 98.4 (03 Apr 2019 18:49)  HR: 64 (04 Apr 2019 04:57) (58 - 79)  BP: 113/69 (04 Apr 2019 04:57) (104/68 - 134/85)  BP(mean): --  RR: 16 (04 Apr 2019 04:57) (16 - 17)  SpO2: 96% (04 Apr 2019 04:57) (96% - 97%)    PHYSICAL EXAM:    Constitutional: NAD  Respiratory: CTAB  Cardiovascular: S1 and S2  Gastrointestinal: BS+, soft, much less tender      LABS:    04-03    137  |  103  |  9   ----------------------------<  95  4.2   |  25  |  0.57    Ca    8.2<L>      03 Apr 2019 06:26    TPro  6.2  /  Alb  2.4<L>  /  TBili  0.6  /  DBili  x   /  AST  8<L>  /  ALT  12  /  AlkPhos  88  04-03      LIVER FUNCTIONS - ( 03 Apr 2019 06:26 )  Alb: 2.4 g/dL / Pro: 6.2 gm/dL / ALK PHOS: 88 U/L / ALT: 12 U/L / AST: 8 U/L / GGT: x             RADIOLOGY & ADDITIONAL STUDIES:

## 2019-04-04 NOTE — PROGRESS NOTE ADULT - ASSESSMENT
# Crohns disease with acute exacerbation with chronic distal enteritis and right-sided perianal fistulae  - patient previously on entocort  - notes of never having a colonoscopy  - IV hydration  - NPO for now and possible clears in AM if pain improves  - gi eval noted and will continue iv steroids  # acute Pancreatitis  - pt still with severe pain and will continue on dilaudid 1 mg iv q3h  - former etoh use ( denies any etoh use now)  - CT no gb pathology, pending sonogram of the abdomen  r/o gallstone pancreatitis.  - lipid panel, IGG4 r/o AIP - both nl   - aggressive hydration  - pain management  - NPO for now - if pain improves then clears in AM   - GI eval noted    # Leukocytosis  - secondary to underlying Crohn's dz and worsening 2/2 steroids  - resolved    # Hyponatremia- resolved    # Hypokalemia - resolved

## 2019-04-04 NOTE — PROGRESS NOTE ADULT - SUBJECTIVE AND OBJECTIVE BOX
INTERVAL HPI/OVERNIGHT EVENTS:  Patient is a 33 year old man with PMHx of Crohn's (previously on Entocort, perianal fistula repair in 2002, denies any previous colonoscopies), hx of pancreatitis admitted 4/1 with increased abdominal pain since this am. Patient's onset of symptoms sudden this am. Similar to previous admission in the hospital. Patient denies of being on any meds. Notes of no previous colonoscopies. No dietary changes. No abdominal traumas. Patient denies any ETOH use / illicit drug use. Denies any associated symptoms of fevers, chills or shakes. No vomiting. 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 Care discussed with Dr. Campo. Patient had a poor follow up as an outpatient.      4/2- still with abd pain   4/3 - feeling better today but still requiring IV Dilaudid for pain control. no n/v/d  4/4/19- patient seen and examined at bedside. States he had clears for breakfast and lunch and had increased nausea and abd pain, patient made NPO again. Still requiring IV pain meds, but appears comfortable    ROS:   All 10 systems reviewed and found to be negative with the exception of what has been described above.      MEDICATIONS  (STANDING):  enoxaparin Injectable 40 milliGRAM(s) SubCutaneous every 24 hours  methylPREDNISolone sodium succinate Injectable 40 milliGRAM(s) IV Push every 12 hours  pantoprazole  Injectable 40 milliGRAM(s) IV Push daily  sodium chloride 0.9%. 1000 milliLiter(s) (1000 mL/Hr) IV Continuous <Continuous>    MEDICATIONS  (PRN):  HYDROmorphone  Injectable 1 milliGRAM(s) IV Push every 6 hours PRN Moderate Pain (4 - 6)  HYDROmorphone  Injectable 1 milliGRAM(s) IV Push every 3 hours PRN Severe Pain (7 - 10)  ondansetron Injectable 4 milliGRAM(s) IV Push every 6 hours PRN Nausea and/or Vomiting  simethicone 80 milliGRAM(s) Chew three times a day PRN Gas  zolpidem 5 milliGRAM(s) Oral at bedtime PRN Insomnia      Allergies    No Known Allergies    Intolerances        Vital Signs Last 24 Hrs  T(C): 37 (04 Apr 2019 11:08), Max: 37 (04 Apr 2019 11:08)  T(F): 98.6 (04 Apr 2019 11:08), Max: 98.6 (04 Apr 2019 11:08)  HR: 77 (04 Apr 2019 11:08) (58 - 77)  BP: 137/90 (04 Apr 2019 11:08) (104/68 - 137/90)  BP(mean): --  RR: 16 (04 Apr 2019 11:08) (16 - 16)  SpO2: 98% (04 Apr 2019 11:08) (96% - 98%)    04-03 @ 07:01  -  04-04 @ 07:00  --------------------------------------------------------  IN: 2000 mL / OUT: 0 mL / NET: 2000 mL    04-04 @ 07:01  -  04-04 @ 15:50  --------------------------------------------------------  IN: 2000 mL / OUT: 0 mL / NET: 2000 mL      Physical Exam:  General: WN/WD NAD  Neurology: A&Ox3, nonfocal, CHANG x 4  Respiratory: CTA B/L  CV: RRR, S1S2, no murmurs, rubs or gallops  Abdominal: Soft, +tender to palpation epigastric region, no rebound, no guarding, ND +BS  Extremities: No edema, + peripheral pulses      LABS:                        14.5   8.06  )-----------( 295      ( 04 Apr 2019 07:11 )             44.3     04-04    134<L>  |  103  |  9   ----------------------------<  108<H>  4.2   |  23  |  0.51    Ca    7.9<L>      04 Apr 2019 07:11    TPro  5.6<L>  /  Alb  2.1<L>  /  TBili  0.4  /  DBili  x   /  AST  6<L>  /  ALT  12  /  AlkPhos  72  04-04          RADIOLOGY & ADDITIONAL TESTS:

## 2019-04-05 LAB
ANION GAP SERPL CALC-SCNC: 8 MMOL/L — SIGNIFICANT CHANGE UP (ref 5–17)
BUN SERPL-MCNC: 8 MG/DL — SIGNIFICANT CHANGE UP (ref 7–23)
CALCIUM SERPL-MCNC: 7.8 MG/DL — LOW (ref 8.5–10.1)
CHLORIDE SERPL-SCNC: 105 MMOL/L — SIGNIFICANT CHANGE UP (ref 96–108)
CO2 SERPL-SCNC: 25 MMOL/L — SIGNIFICANT CHANGE UP (ref 22–31)
CREAT SERPL-MCNC: 0.52 MG/DL — SIGNIFICANT CHANGE UP (ref 0.5–1.3)
GLUCOSE SERPL-MCNC: 112 MG/DL — HIGH (ref 70–99)
LIDOCAIN IGE QN: 568 U/L — HIGH (ref 73–393)
POTASSIUM SERPL-MCNC: 3.9 MMOL/L — SIGNIFICANT CHANGE UP (ref 3.5–5.3)
POTASSIUM SERPL-SCNC: 3.9 MMOL/L — SIGNIFICANT CHANGE UP (ref 3.5–5.3)
SODIUM SERPL-SCNC: 138 MMOL/L — SIGNIFICANT CHANGE UP (ref 135–145)

## 2019-04-05 RX ORDER — HYDROMORPHONE HYDROCHLORIDE 2 MG/ML
1 INJECTION INTRAMUSCULAR; INTRAVENOUS; SUBCUTANEOUS ONCE
Qty: 0 | Refills: 0 | Status: DISCONTINUED | OUTPATIENT
Start: 2019-04-05 | End: 2019-04-05

## 2019-04-05 RX ORDER — HYDROMORPHONE HYDROCHLORIDE 2 MG/ML
0.5 INJECTION INTRAMUSCULAR; INTRAVENOUS; SUBCUTANEOUS EVERY 4 HOURS
Qty: 0 | Refills: 0 | Status: DISCONTINUED | OUTPATIENT
Start: 2019-04-05 | End: 2019-04-08

## 2019-04-05 RX ADMIN — HYDROMORPHONE HYDROCHLORIDE 1 MILLIGRAM(S): 2 INJECTION INTRAMUSCULAR; INTRAVENOUS; SUBCUTANEOUS at 12:35

## 2019-04-05 RX ADMIN — HYDROMORPHONE HYDROCHLORIDE 1 MILLIGRAM(S): 2 INJECTION INTRAMUSCULAR; INTRAVENOUS; SUBCUTANEOUS at 19:17

## 2019-04-05 RX ADMIN — ONDANSETRON 4 MILLIGRAM(S): 8 TABLET, FILM COATED ORAL at 11:42

## 2019-04-05 RX ADMIN — HYDROMORPHONE HYDROCHLORIDE 1 MILLIGRAM(S): 2 INJECTION INTRAMUSCULAR; INTRAVENOUS; SUBCUTANEOUS at 15:45

## 2019-04-05 RX ADMIN — HYDROMORPHONE HYDROCHLORIDE 1 MILLIGRAM(S): 2 INJECTION INTRAMUSCULAR; INTRAVENOUS; SUBCUTANEOUS at 16:00

## 2019-04-05 RX ADMIN — ZOLPIDEM TARTRATE 5 MILLIGRAM(S): 10 TABLET ORAL at 22:38

## 2019-04-05 RX ADMIN — SODIUM CHLORIDE 1000 MILLILITER(S): 9 INJECTION INTRAMUSCULAR; INTRAVENOUS; SUBCUTANEOUS at 05:25

## 2019-04-05 RX ADMIN — HYDROMORPHONE HYDROCHLORIDE 1 MILLIGRAM(S): 2 INJECTION INTRAMUSCULAR; INTRAVENOUS; SUBCUTANEOUS at 06:26

## 2019-04-05 RX ADMIN — SODIUM CHLORIDE 100 MILLILITER(S): 9 INJECTION INTRAMUSCULAR; INTRAVENOUS; SUBCUTANEOUS at 11:43

## 2019-04-05 RX ADMIN — HYDROMORPHONE HYDROCHLORIDE 1 MILLIGRAM(S): 2 INJECTION INTRAMUSCULAR; INTRAVENOUS; SUBCUTANEOUS at 02:48

## 2019-04-05 RX ADMIN — HYDROMORPHONE HYDROCHLORIDE 1 MILLIGRAM(S): 2 INJECTION INTRAMUSCULAR; INTRAVENOUS; SUBCUTANEOUS at 17:56

## 2019-04-05 RX ADMIN — ONDANSETRON 4 MILLIGRAM(S): 8 TABLET, FILM COATED ORAL at 05:20

## 2019-04-05 RX ADMIN — HYDROMORPHONE HYDROCHLORIDE 1 MILLIGRAM(S): 2 INJECTION INTRAMUSCULAR; INTRAVENOUS; SUBCUTANEOUS at 02:08

## 2019-04-05 RX ADMIN — ENOXAPARIN SODIUM 40 MILLIGRAM(S): 100 INJECTION SUBCUTANEOUS at 17:57

## 2019-04-05 RX ADMIN — PANTOPRAZOLE SODIUM 40 MILLIGRAM(S): 20 TABLET, DELAYED RELEASE ORAL at 11:37

## 2019-04-05 RX ADMIN — ONDANSETRON 4 MILLIGRAM(S): 8 TABLET, FILM COATED ORAL at 17:32

## 2019-04-05 RX ADMIN — HYDROMORPHONE HYDROCHLORIDE 1 MILLIGRAM(S): 2 INJECTION INTRAMUSCULAR; INTRAVENOUS; SUBCUTANEOUS at 21:36

## 2019-04-05 RX ADMIN — HYDROMORPHONE HYDROCHLORIDE 1 MILLIGRAM(S): 2 INJECTION INTRAMUSCULAR; INTRAVENOUS; SUBCUTANEOUS at 18:52

## 2019-04-05 RX ADMIN — Medication 40 MILLIGRAM(S): at 17:31

## 2019-04-05 RX ADMIN — HYDROMORPHONE HYDROCHLORIDE 1 MILLIGRAM(S): 2 INJECTION INTRAMUSCULAR; INTRAVENOUS; SUBCUTANEOUS at 09:15

## 2019-04-05 RX ADMIN — HYDROMORPHONE HYDROCHLORIDE 1 MILLIGRAM(S): 2 INJECTION INTRAMUSCULAR; INTRAVENOUS; SUBCUTANEOUS at 05:19

## 2019-04-05 RX ADMIN — HYDROMORPHONE HYDROCHLORIDE 1 MILLIGRAM(S): 2 INJECTION INTRAMUSCULAR; INTRAVENOUS; SUBCUTANEOUS at 09:30

## 2019-04-05 RX ADMIN — HYDROMORPHONE HYDROCHLORIDE 1 MILLIGRAM(S): 2 INJECTION INTRAMUSCULAR; INTRAVENOUS; SUBCUTANEOUS at 22:13

## 2019-04-05 RX ADMIN — Medication 40 MILLIGRAM(S): at 05:19

## 2019-04-05 RX ADMIN — HYDROMORPHONE HYDROCHLORIDE 1 MILLIGRAM(S): 2 INJECTION INTRAMUSCULAR; INTRAVENOUS; SUBCUTANEOUS at 18:15

## 2019-04-05 RX ADMIN — HYDROMORPHONE HYDROCHLORIDE 1 MILLIGRAM(S): 2 INJECTION INTRAMUSCULAR; INTRAVENOUS; SUBCUTANEOUS at 12:21

## 2019-04-05 RX ADMIN — SODIUM CHLORIDE 100 MILLILITER(S): 9 INJECTION INTRAMUSCULAR; INTRAVENOUS; SUBCUTANEOUS at 15:47

## 2019-04-05 NOTE — PROGRESS NOTE ADULT - SUBJECTIVE AND OBJECTIVE BOX
Patient is a 33y old  Male who presents with a chief complaint of Abdominal pain (04 Apr 2019 15:49)      HPI:  Patient is a 33 year old man with PMHx of Crohn's (previously on Entocort, perianal fistula repair in 2002, denies any previous colonoscopies), hx of pancreatitis admitted 4/1 with increased abdominal pain since this am. Patient's onset of symptoms sudden this am. Similar to previous admission in the hospital. Patient denies of being on any meds. Notes of no previous colonoscopies. No dietary changes. No abdominal traumas. Patient denies any ETOH use / illicit drug use. Denies any associated symptoms of fevers, chills or shakes. No vomiting. 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 Care discussed with Dr. Campo. Patient had a poor follow up as an outpatient. (01 Apr 2019 14:41)    Patient had increased pain and diarrhea. Numbers improved.       PAST MEDICAL & SURGICAL HISTORY:  Crohn's disease of large intestine without complication: (No current flare up)  Perianal fistula: repair in 2002  History of colonoscopy: (2014)  S/P ORIF (open reduction internal fixation) fracture: (Right ankle, 2014)      MEDICATIONS  (STANDING):  enoxaparin Injectable 40 milliGRAM(s) SubCutaneous every 24 hours  methylPREDNISolone sodium succinate Injectable 40 milliGRAM(s) IV Push every 12 hours  pantoprazole  Injectable 40 milliGRAM(s) IV Push daily  sodium chloride 0.9%. 1000 milliLiter(s) (1000 mL/Hr) IV Continuous <Continuous>    MEDICATIONS  (PRN):  HYDROmorphone  Injectable 1 milliGRAM(s) IV Push every 6 hours PRN Moderate Pain (4 - 6)  HYDROmorphone  Injectable 1 milliGRAM(s) IV Push every 3 hours PRN Severe Pain (7 - 10)  ondansetron Injectable 4 milliGRAM(s) IV Push every 6 hours PRN Nausea and/or Vomiting  simethicone 80 milliGRAM(s) Chew three times a day PRN Gas  zolpidem 5 milliGRAM(s) Oral at bedtime PRN Insomnia      Allergies    No Known Allergies    Intolerances        Vital Signs Last 24 Hrs  T(C): 36.3 (05 Apr 2019 05:10), Max: 37 (04 Apr 2019 11:08)  T(F): 97.4 (05 Apr 2019 05:10), Max: 98.6 (04 Apr 2019 11:08)  HR: 58 (05 Apr 2019 09:10) (58 - 77)  BP: 147/81 (05 Apr 2019 09:10) (121/75 - 160/89)  BP(mean): --  RR: 14 (05 Apr 2019 09:10) (14 - 16)  SpO2: 100% (05 Apr 2019 05:10) (98% - 100%)    PHYSICAL EXAM:      Respiratory: CTAB  Cardiovascular: S1 and S2, RRR, no M/G/R  Gastrointestinal: BS+, soft, NT/ND      LABS:                        14.5   8.06  )-----------( 295      ( 04 Apr 2019 07:11 )             44.3     04-05    138  |  105  |  8   ----------------------------<  112<H>  3.9   |  25  |  0.52    Ca    7.8<L>      05 Apr 2019 07:25    TPro  5.6<L>  /  Alb  2.1<L>  /  TBili  0.4  /  DBili  x   /  AST  6<L>  /  ALT  12  /  AlkPhos  72  04-04      LIVER FUNCTIONS - ( 04 Apr 2019 07:11 )  Alb: 2.1 g/dL / Pro: 5.6 gm/dL / ALK PHOS: 72 U/L / ALT: 12 U/L / AST: 6 U/L / GGT: x             RADIOLOGY & ADDITIONAL STUDIES:

## 2019-04-05 NOTE — PROGRESS NOTE ADULT - ASSESSMENT
# Crohns disease with acute exacerbation with chronic distal enteritis and right-sided perianal fistulae  - patient previously on entocort  - notes of never having a colonoscopy  - IV hydration- will decrease dose  - low fat diet  - gi eval noted and will continue iv steroids    # acute Pancreatitis  - pt still with severe pain and will continue on dilaudid 1 mg iv q3h  - former etoh use ( denies any etoh use now)  - CT no gb pathology, pending sonogram of the abdomen  r/o gallstone pancreatitis.  - lipid panel, IGG4 r/o AIP - both nl   - aggressive hydration  - pain management  - GI eval noted    # Leukocytosis  - secondary to underlying Crohn's dz and worsening 2/2 steroids  - resolved

## 2019-04-05 NOTE — PROGRESS NOTE ADULT - ASSESSMENT
32 yo male with Crohn's disease and pancreatitis. Patient would like trial of low fat diet. Will hold on further taper of steroids at this time.

## 2019-04-05 NOTE — PROGRESS NOTE ADULT - SUBJECTIVE AND OBJECTIVE BOX
INTERVAL HPI/OVERNIGHT EVENTS:  Patient is a 33 year old man with PMHx of Crohn's (previously on Entocort, perianal fistula repair in 2002, denies any previous colonoscopies), hx of pancreatitis admitted 4/1 with increased abdominal pain since this am. Patient's onset of symptoms sudden this am. Similar to previous admission in the hospital. Patient denies of being on any meds. Notes of no previous colonoscopies. No dietary changes. No abdominal traumas. Patient denies any ETOH use / illicit drug use. Denies any associated symptoms of fevers, chills or shakes. No vomiting. 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 Care discussed with Dr. Campo. Patient had a poor follow up as an outpatient.      4/2- still with abd pain   4/3 - feeling better today but still requiring IV Dilaudid for pain control. no n/v/d  4/4/19- patient seen and examined at bedside. States he had clears for breakfast and lunch and had increased nausea and abd pain, patient made NPO again. Still requiring IV pain meds, but appears comfortable  4/5/19- Patient seen and examined at bedside. States he still has pain in his abdomen, but overall appears comfortable, patient tolerating regular low fat diet.    ROS:   All 10 systems reviewed and found to be negative with the exception of what has been described above.      MEDICATIONS  (STANDING):  enoxaparin Injectable 40 milliGRAM(s) SubCutaneous every 24 hours  methylPREDNISolone sodium succinate Injectable 40 milliGRAM(s) IV Push every 12 hours  pantoprazole  Injectable 40 milliGRAM(s) IV Push daily  sodium chloride 0.9%. 1000 milliLiter(s) (100 mL/Hr) IV Continuous <Continuous>    MEDICATIONS  (PRN):  HYDROmorphone  Injectable 1 milliGRAM(s) IV Push every 6 hours PRN Moderate Pain (4 - 6)  HYDROmorphone  Injectable 1 milliGRAM(s) IV Push every 3 hours PRN Severe Pain (7 - 10)  ondansetron Injectable 4 milliGRAM(s) IV Push every 6 hours PRN Nausea and/or Vomiting  simethicone 80 milliGRAM(s) Chew three times a day PRN Gas  zolpidem 5 milliGRAM(s) Oral at bedtime PRN Insomnia      Allergies    No Known Allergies    Intolerances      Vital Signs Last 24 Hrs  T(C): 36.3 (05 Apr 2019 15:32), Max: 36.9 (05 Apr 2019 11:04)  T(F): 97.4 (05 Apr 2019 15:32), Max: 98.5 (05 Apr 2019 11:04)  HR: 75 (05 Apr 2019 15:32) (58 - 75)  BP: 150/91 (05 Apr 2019 15:32) (112/68 - 160/89)  BP(mean): --  RR: 16 (05 Apr 2019 15:32) (14 - 16)  SpO2: 99% (05 Apr 2019 15:32) (98% - 100%)    04-04 @ 07:01  -  04-05 @ 07:00  --------------------------------------------------------  IN: 4950 mL / OUT: 0 mL / NET: 4950 mL    04-05 @ 07:01  -  04-05 @ 16:39  --------------------------------------------------------  IN: 900 mL / OUT: 0 mL / NET: 900 mL      Physical Exam:  General: WN/WD NAD  Neurology: A&Ox3, nonfocal, CHANG x 4  Respiratory: CTA B/L  CV: RRR, S1S2, no murmurs, rubs or gallops  Abdominal: Soft, +tenderness to palpation mid abdomen, no rebound, no guarding, ND +BS  Extremities: No edema, + peripheral pulses      LABS:                        14.5   8.06  )-----------( 295      ( 04 Apr 2019 07:11 )             44.3     04-05    138  |  105  |  8   ----------------------------<  112<H>  3.9   |  25  |  0.52    Ca    7.8<L>      05 Apr 2019 07:25    TPro  5.6<L>  /  Alb  2.1<L>  /  TBili  0.4  /  DBili  x   /  AST  6<L>  /  ALT  12  /  AlkPhos  72  04-04          RADIOLOGY & ADDITIONAL TESTS:

## 2019-04-06 PROCEDURE — 74177 CT ABD & PELVIS W/CONTRAST: CPT | Mod: 26

## 2019-04-06 RX ORDER — DOCUSATE SODIUM 100 MG
100 CAPSULE ORAL ONCE
Qty: 0 | Refills: 0 | Status: COMPLETED | OUTPATIENT
Start: 2019-04-06 | End: 2019-04-06

## 2019-04-06 RX ORDER — ACETAMINOPHEN 500 MG
1000 TABLET ORAL ONCE
Qty: 0 | Refills: 0 | Status: COMPLETED | OUTPATIENT
Start: 2019-04-06 | End: 2019-04-06

## 2019-04-06 RX ORDER — SENNA PLUS 8.6 MG/1
1 TABLET ORAL ONCE
Qty: 0 | Refills: 0 | Status: COMPLETED | OUTPATIENT
Start: 2019-04-06 | End: 2019-04-06

## 2019-04-06 RX ORDER — LANOLIN ALCOHOL/MO/W.PET/CERES
5 CREAM (GRAM) TOPICAL AT BEDTIME
Qty: 0 | Refills: 0 | Status: DISCONTINUED | OUTPATIENT
Start: 2019-04-06 | End: 2019-04-08

## 2019-04-06 RX ORDER — HYDROMORPHONE HYDROCHLORIDE 2 MG/ML
1 INJECTION INTRAMUSCULAR; INTRAVENOUS; SUBCUTANEOUS EVERY 4 HOURS
Qty: 0 | Refills: 0 | Status: DISCONTINUED | OUTPATIENT
Start: 2019-04-06 | End: 2019-04-07

## 2019-04-06 RX ORDER — SODIUM CHLORIDE 9 MG/ML
1000 INJECTION INTRAMUSCULAR; INTRAVENOUS; SUBCUTANEOUS
Qty: 0 | Refills: 0 | Status: DISCONTINUED | OUTPATIENT
Start: 2019-04-06 | End: 2019-04-08

## 2019-04-06 RX ADMIN — HYDROMORPHONE HYDROCHLORIDE 1 MILLIGRAM(S): 2 INJECTION INTRAMUSCULAR; INTRAVENOUS; SUBCUTANEOUS at 16:00

## 2019-04-06 RX ADMIN — PANTOPRAZOLE SODIUM 40 MILLIGRAM(S): 20 TABLET, DELAYED RELEASE ORAL at 11:33

## 2019-04-06 RX ADMIN — Medication 1000 MILLIGRAM(S): at 20:15

## 2019-04-06 RX ADMIN — HYDROMORPHONE HYDROCHLORIDE 1 MILLIGRAM(S): 2 INJECTION INTRAMUSCULAR; INTRAVENOUS; SUBCUTANEOUS at 23:04

## 2019-04-06 RX ADMIN — Medication 400 MILLIGRAM(S): at 19:33

## 2019-04-06 RX ADMIN — HYDROMORPHONE HYDROCHLORIDE 0.5 MILLIGRAM(S): 2 INJECTION INTRAMUSCULAR; INTRAVENOUS; SUBCUTANEOUS at 20:54

## 2019-04-06 RX ADMIN — HYDROMORPHONE HYDROCHLORIDE 0.5 MILLIGRAM(S): 2 INJECTION INTRAMUSCULAR; INTRAVENOUS; SUBCUTANEOUS at 18:00

## 2019-04-06 RX ADMIN — HYDROMORPHONE HYDROCHLORIDE 1 MILLIGRAM(S): 2 INJECTION INTRAMUSCULAR; INTRAVENOUS; SUBCUTANEOUS at 03:36

## 2019-04-06 RX ADMIN — HYDROMORPHONE HYDROCHLORIDE 1 MILLIGRAM(S): 2 INJECTION INTRAMUSCULAR; INTRAVENOUS; SUBCUTANEOUS at 23:25

## 2019-04-06 RX ADMIN — HYDROMORPHONE HYDROCHLORIDE 0.5 MILLIGRAM(S): 2 INJECTION INTRAMUSCULAR; INTRAVENOUS; SUBCUTANEOUS at 05:35

## 2019-04-06 RX ADMIN — SENNA PLUS 1 TABLET(S): 8.6 TABLET ORAL at 06:11

## 2019-04-06 RX ADMIN — HYDROMORPHONE HYDROCHLORIDE 1 MILLIGRAM(S): 2 INJECTION INTRAMUSCULAR; INTRAVENOUS; SUBCUTANEOUS at 04:07

## 2019-04-06 RX ADMIN — HYDROMORPHONE HYDROCHLORIDE 0.5 MILLIGRAM(S): 2 INJECTION INTRAMUSCULAR; INTRAVENOUS; SUBCUTANEOUS at 17:42

## 2019-04-06 RX ADMIN — HYDROMORPHONE HYDROCHLORIDE 1 MILLIGRAM(S): 2 INJECTION INTRAMUSCULAR; INTRAVENOUS; SUBCUTANEOUS at 01:05

## 2019-04-06 RX ADMIN — HYDROMORPHONE HYDROCHLORIDE 0.5 MILLIGRAM(S): 2 INJECTION INTRAMUSCULAR; INTRAVENOUS; SUBCUTANEOUS at 06:13

## 2019-04-06 RX ADMIN — Medication 5 MILLIGRAM(S): at 23:04

## 2019-04-06 RX ADMIN — HYDROMORPHONE HYDROCHLORIDE 0.5 MILLIGRAM(S): 2 INJECTION INTRAMUSCULAR; INTRAVENOUS; SUBCUTANEOUS at 21:13

## 2019-04-06 RX ADMIN — HYDROMORPHONE HYDROCHLORIDE 1 MILLIGRAM(S): 2 INJECTION INTRAMUSCULAR; INTRAVENOUS; SUBCUTANEOUS at 07:16

## 2019-04-06 RX ADMIN — ONDANSETRON 4 MILLIGRAM(S): 8 TABLET, FILM COATED ORAL at 19:48

## 2019-04-06 RX ADMIN — Medication 40 MILLIGRAM(S): at 05:35

## 2019-04-06 RX ADMIN — ENOXAPARIN SODIUM 40 MILLIGRAM(S): 100 INJECTION SUBCUTANEOUS at 23:14

## 2019-04-06 RX ADMIN — HYDROMORPHONE HYDROCHLORIDE 1 MILLIGRAM(S): 2 INJECTION INTRAMUSCULAR; INTRAVENOUS; SUBCUTANEOUS at 11:28

## 2019-04-06 RX ADMIN — HYDROMORPHONE HYDROCHLORIDE 1 MILLIGRAM(S): 2 INJECTION INTRAMUSCULAR; INTRAVENOUS; SUBCUTANEOUS at 15:46

## 2019-04-06 RX ADMIN — ONDANSETRON 4 MILLIGRAM(S): 8 TABLET, FILM COATED ORAL at 13:54

## 2019-04-06 RX ADMIN — HYDROMORPHONE HYDROCHLORIDE 1 MILLIGRAM(S): 2 INJECTION INTRAMUSCULAR; INTRAVENOUS; SUBCUTANEOUS at 11:45

## 2019-04-06 RX ADMIN — HYDROMORPHONE HYDROCHLORIDE 1 MILLIGRAM(S): 2 INJECTION INTRAMUSCULAR; INTRAVENOUS; SUBCUTANEOUS at 00:41

## 2019-04-06 RX ADMIN — SODIUM CHLORIDE 100 MILLILITER(S): 9 INJECTION INTRAMUSCULAR; INTRAVENOUS; SUBCUTANEOUS at 00:40

## 2019-04-06 RX ADMIN — Medication 20 MILLIGRAM(S): at 17:43

## 2019-04-06 RX ADMIN — ONDANSETRON 4 MILLIGRAM(S): 8 TABLET, FILM COATED ORAL at 00:45

## 2019-04-06 RX ADMIN — Medication 100 MILLIGRAM(S): at 06:11

## 2019-04-06 RX ADMIN — SODIUM CHLORIDE 75 MILLILITER(S): 9 INJECTION INTRAMUSCULAR; INTRAVENOUS; SUBCUTANEOUS at 15:45

## 2019-04-06 NOTE — PROGRESS NOTE ADULT - SUBJECTIVE AND OBJECTIVE BOX
Patient is a 33y old  Male who presents with a chief complaint of Abdominal pain (06 Apr 2019 13:06)      Subective:  Still with pain across upper abdomen. No vomiting or BMs.    PAST MEDICAL & SURGICAL HISTORY:  Crohn's disease of large intestine without complication: (No current flare up)  Perianal fistula: repair in 2002  History of colonoscopy: (2014)  S/P ORIF (open reduction internal fixation) fracture: (Right ankle, 2014)      MEDICATIONS  (STANDING):  enoxaparin Injectable 40 milliGRAM(s) SubCutaneous every 24 hours  melatonin 5 milliGRAM(s) Oral at bedtime  methylPREDNISolone sodium succinate Injectable 40 milliGRAM(s) IV Push every 12 hours  pantoprazole  Injectable 40 milliGRAM(s) IV Push daily    MEDICATIONS  (PRN):  HYDROmorphone  Injectable 0.5 milliGRAM(s) IV Push every 4 hours PRN breakthrough pain  HYDROmorphone  Injectable 1 milliGRAM(s) IV Push every 4 hours PRN Severe Pain (7 - 10)  ondansetron Injectable 4 milliGRAM(s) IV Push every 6 hours PRN Nausea and/or Vomiting  simethicone 80 milliGRAM(s) Chew three times a day PRN Gas  zolpidem 5 milliGRAM(s) Oral at bedtime PRN Insomnia      REVIEW OF SYSTEMS:    RESPIRATORY: No shortness of breath  CARDIOVASCULAR: No chest pain  All other review of systems is negative unless indicated above.    Vital Signs Last 24 Hrs  T(C): 36.3 (06 Apr 2019 11:37), Max: 36.7 (05 Apr 2019 21:35)  T(F): 97.3 (06 Apr 2019 11:37), Max: 98.1 (05 Apr 2019 21:35)  HR: 60 (06 Apr 2019 11:37) (50 - 75)  BP: 158/97 (06 Apr 2019 11:37) (129/75 - 158/97)  BP(mean): --  RR: 16 (06 Apr 2019 11:37) (16 - 16)  SpO2: 100% (06 Apr 2019 11:37) (97% - 100%)    PHYSICAL EXAM:    Constitutional: NAD, well-developed  Respiratory: CTAB  Cardiovascular: S1 and S2, RRR  Gastrointestinal: BS+, soft, mod epig tend  Extremities: No peripheral edema  Psychiatric: Normal mood, normal affect    LABS:    04-05    138  |  105  |  8   ----------------------------<  112<H>  3.9   |  25  |  0.52    Ca    7.8<L>      05 Apr 2019 07:25            RADIOLOGY & ADDITIONAL STUDIES:

## 2019-04-06 NOTE — PROGRESS NOTE ADULT - ASSESSMENT
Imp:  Acute pancreatitis  "Crohns flare" although doubt this is really the active issue    Rec:  Change steroids to PO  Resume fluids

## 2019-04-06 NOTE — PROGRESS NOTE ADULT - ASSESSMENT
# Crohns disease with acute exacerbation with chronic distal enteritis and right-sided perianal fistulae  - patient previously on entocort  - notes of never having a colonoscopy  - low fat diet- decreased to full liquid diet as patient complaining of pain  - gi eval noted and will continue iv steroids    # acute Pancreatitis  - pt still with severe pain, but ambulating halls, pain slightly improved, will decreased IV dilaudid to Q4H  - former etoh use ( denies any etoh use now)  - CT no gb pathology, pending sonogram of the abdomen  r/o gallstone pancreatitis.  - lipid panel, IGG4 r/o AIP - both nl   - aggressive hydration  - pain management  - GI eval noted    # Leukocytosis  - secondary to underlying Crohn's dz and worsening 2/2 steroids  - resolved

## 2019-04-07 DIAGNOSIS — N30.90 CYSTITIS, UNSPECIFIED WITHOUT HEMATURIA: ICD-10-CM

## 2019-04-07 LAB
ANION GAP SERPL CALC-SCNC: 6 MMOL/L — SIGNIFICANT CHANGE UP (ref 5–17)
BUN SERPL-MCNC: 7 MG/DL — SIGNIFICANT CHANGE UP (ref 7–23)
CALCIUM SERPL-MCNC: 8.6 MG/DL — SIGNIFICANT CHANGE UP (ref 8.5–10.1)
CHLORIDE SERPL-SCNC: 102 MMOL/L — SIGNIFICANT CHANGE UP (ref 96–108)
CO2 SERPL-SCNC: 32 MMOL/L — HIGH (ref 22–31)
CREAT SERPL-MCNC: 0.66 MG/DL — SIGNIFICANT CHANGE UP (ref 0.5–1.3)
GLUCOSE SERPL-MCNC: 97 MG/DL — SIGNIFICANT CHANGE UP (ref 70–99)
LIDOCAIN IGE QN: 598 U/L — HIGH (ref 73–393)
POTASSIUM SERPL-MCNC: 3.5 MMOL/L — SIGNIFICANT CHANGE UP (ref 3.5–5.3)
POTASSIUM SERPL-SCNC: 3.5 MMOL/L — SIGNIFICANT CHANGE UP (ref 3.5–5.3)
SODIUM SERPL-SCNC: 140 MMOL/L — SIGNIFICANT CHANGE UP (ref 135–145)

## 2019-04-07 PROCEDURE — 99221 1ST HOSP IP/OBS SF/LOW 40: CPT

## 2019-04-07 RX ORDER — HYDROMORPHONE HYDROCHLORIDE 2 MG/ML
2 INJECTION INTRAMUSCULAR; INTRAVENOUS; SUBCUTANEOUS EVERY 4 HOURS
Qty: 0 | Refills: 0 | Status: DISCONTINUED | OUTPATIENT
Start: 2019-04-07 | End: 2019-04-08

## 2019-04-07 RX ORDER — ACETAMINOPHEN 500 MG
1000 TABLET ORAL ONCE
Qty: 0 | Refills: 0 | Status: COMPLETED | OUTPATIENT
Start: 2019-04-07 | End: 2019-04-07

## 2019-04-07 RX ORDER — CEFTRIAXONE 500 MG/1
INJECTION, POWDER, FOR SOLUTION INTRAMUSCULAR; INTRAVENOUS
Qty: 0 | Refills: 0 | Status: DISCONTINUED | OUTPATIENT
Start: 2019-04-07 | End: 2019-04-08

## 2019-04-07 RX ORDER — CEFTRIAXONE 500 MG/1
1000 INJECTION, POWDER, FOR SOLUTION INTRAMUSCULAR; INTRAVENOUS ONCE
Qty: 0 | Refills: 0 | Status: COMPLETED | OUTPATIENT
Start: 2019-04-07 | End: 2019-04-07

## 2019-04-07 RX ORDER — CEFTRIAXONE 500 MG/1
1000 INJECTION, POWDER, FOR SOLUTION INTRAMUSCULAR; INTRAVENOUS EVERY 24 HOURS
Qty: 0 | Refills: 0 | Status: DISCONTINUED | OUTPATIENT
Start: 2019-04-08 | End: 2019-04-08

## 2019-04-07 RX ORDER — CEFTRIAXONE 500 MG/1
INJECTION, POWDER, FOR SOLUTION INTRAMUSCULAR; INTRAVENOUS
Qty: 0 | Refills: 0 | Status: DISCONTINUED | OUTPATIENT
Start: 2019-04-07 | End: 2019-04-07

## 2019-04-07 RX ADMIN — HYDROMORPHONE HYDROCHLORIDE 2 MILLIGRAM(S): 2 INJECTION INTRAMUSCULAR; INTRAVENOUS; SUBCUTANEOUS at 18:36

## 2019-04-07 RX ADMIN — Medication 1000 MILLIGRAM(S): at 06:44

## 2019-04-07 RX ADMIN — CEFTRIAXONE 1000 MILLIGRAM(S): 500 INJECTION, POWDER, FOR SOLUTION INTRAMUSCULAR; INTRAVENOUS at 15:23

## 2019-04-07 RX ADMIN — Medication 400 MILLIGRAM(S): at 05:35

## 2019-04-07 RX ADMIN — SODIUM CHLORIDE 75 MILLILITER(S): 9 INJECTION INTRAMUSCULAR; INTRAVENOUS; SUBCUTANEOUS at 21:39

## 2019-04-07 RX ADMIN — HYDROMORPHONE HYDROCHLORIDE 0.5 MILLIGRAM(S): 2 INJECTION INTRAMUSCULAR; INTRAVENOUS; SUBCUTANEOUS at 06:57

## 2019-04-07 RX ADMIN — HYDROMORPHONE HYDROCHLORIDE 1 MILLIGRAM(S): 2 INJECTION INTRAMUSCULAR; INTRAVENOUS; SUBCUTANEOUS at 03:30

## 2019-04-07 RX ADMIN — HYDROMORPHONE HYDROCHLORIDE 2 MILLIGRAM(S): 2 INJECTION INTRAMUSCULAR; INTRAVENOUS; SUBCUTANEOUS at 13:14

## 2019-04-07 RX ADMIN — ONDANSETRON 4 MILLIGRAM(S): 8 TABLET, FILM COATED ORAL at 06:57

## 2019-04-07 RX ADMIN — ENOXAPARIN SODIUM 40 MILLIGRAM(S): 100 INJECTION SUBCUTANEOUS at 18:06

## 2019-04-07 RX ADMIN — HYDROMORPHONE HYDROCHLORIDE 2 MILLIGRAM(S): 2 INJECTION INTRAMUSCULAR; INTRAVENOUS; SUBCUTANEOUS at 21:46

## 2019-04-07 RX ADMIN — HYDROMORPHONE HYDROCHLORIDE 0.5 MILLIGRAM(S): 2 INJECTION INTRAMUSCULAR; INTRAVENOUS; SUBCUTANEOUS at 11:43

## 2019-04-07 RX ADMIN — Medication 5 MILLIGRAM(S): at 21:40

## 2019-04-07 RX ADMIN — HYDROMORPHONE HYDROCHLORIDE 2 MILLIGRAM(S): 2 INJECTION INTRAMUSCULAR; INTRAVENOUS; SUBCUTANEOUS at 18:06

## 2019-04-07 RX ADMIN — HYDROMORPHONE HYDROCHLORIDE 2 MILLIGRAM(S): 2 INJECTION INTRAMUSCULAR; INTRAVENOUS; SUBCUTANEOUS at 22:30

## 2019-04-07 RX ADMIN — HYDROMORPHONE HYDROCHLORIDE 0.5 MILLIGRAM(S): 2 INJECTION INTRAMUSCULAR; INTRAVENOUS; SUBCUTANEOUS at 15:22

## 2019-04-07 RX ADMIN — HYDROMORPHONE HYDROCHLORIDE 0.5 MILLIGRAM(S): 2 INJECTION INTRAMUSCULAR; INTRAVENOUS; SUBCUTANEOUS at 07:27

## 2019-04-07 RX ADMIN — HYDROMORPHONE HYDROCHLORIDE 0.5 MILLIGRAM(S): 2 INJECTION INTRAMUSCULAR; INTRAVENOUS; SUBCUTANEOUS at 19:28

## 2019-04-07 RX ADMIN — HYDROMORPHONE HYDROCHLORIDE 0.5 MILLIGRAM(S): 2 INJECTION INTRAMUSCULAR; INTRAVENOUS; SUBCUTANEOUS at 20:01

## 2019-04-07 RX ADMIN — HYDROMORPHONE HYDROCHLORIDE 0.5 MILLIGRAM(S): 2 INJECTION INTRAMUSCULAR; INTRAVENOUS; SUBCUTANEOUS at 23:34

## 2019-04-07 RX ADMIN — HYDROMORPHONE HYDROCHLORIDE 1 MILLIGRAM(S): 2 INJECTION INTRAMUSCULAR; INTRAVENOUS; SUBCUTANEOUS at 04:00

## 2019-04-07 RX ADMIN — Medication 20 MILLIGRAM(S): at 06:44

## 2019-04-07 RX ADMIN — PANTOPRAZOLE SODIUM 40 MILLIGRAM(S): 20 TABLET, DELAYED RELEASE ORAL at 11:15

## 2019-04-07 RX ADMIN — HYDROMORPHONE HYDROCHLORIDE 0.5 MILLIGRAM(S): 2 INJECTION INTRAMUSCULAR; INTRAVENOUS; SUBCUTANEOUS at 02:15

## 2019-04-07 RX ADMIN — HYDROMORPHONE HYDROCHLORIDE 0.5 MILLIGRAM(S): 2 INJECTION INTRAMUSCULAR; INTRAVENOUS; SUBCUTANEOUS at 15:52

## 2019-04-07 RX ADMIN — ONDANSETRON 4 MILLIGRAM(S): 8 TABLET, FILM COATED ORAL at 13:09

## 2019-04-07 RX ADMIN — HYDROMORPHONE HYDROCHLORIDE 1 MILLIGRAM(S): 2 INJECTION INTRAMUSCULAR; INTRAVENOUS; SUBCUTANEOUS at 08:31

## 2019-04-07 RX ADMIN — HYDROMORPHONE HYDROCHLORIDE 0.5 MILLIGRAM(S): 2 INJECTION INTRAMUSCULAR; INTRAVENOUS; SUBCUTANEOUS at 11:13

## 2019-04-07 RX ADMIN — HYDROMORPHONE HYDROCHLORIDE 2 MILLIGRAM(S): 2 INJECTION INTRAMUSCULAR; INTRAVENOUS; SUBCUTANEOUS at 13:44

## 2019-04-07 RX ADMIN — HYDROMORPHONE HYDROCHLORIDE 0.5 MILLIGRAM(S): 2 INJECTION INTRAMUSCULAR; INTRAVENOUS; SUBCUTANEOUS at 02:00

## 2019-04-07 RX ADMIN — HYDROMORPHONE HYDROCHLORIDE 1 MILLIGRAM(S): 2 INJECTION INTRAMUSCULAR; INTRAVENOUS; SUBCUTANEOUS at 08:01

## 2019-04-07 NOTE — CONSULT NOTE ADULT - ASSESSMENT
Initial urine culture was negative.   Symptoms started now- repeat urine culture.   Continue antibiotics, follow cultures, treat per sensitivity.
32yo female with Crohns flare, pancreatitis    pt tends to have flare with associated pancreatitis  plan npo, iv steroids    pain control seems inadequate at this time, consider increase to dilaudid 2mg q6    plan to induce remission in hospital with plan for outpt f/u with his new gi md dr wong in [place] for insurance reasons

## 2019-04-07 NOTE — PROGRESS NOTE ADULT - SUBJECTIVE AND OBJECTIVE BOX
INTERVAL HPI/OVERNIGHT EVENTS:    Patient is a 33 year old man with PMHx of Crohn's (previously on Entocort, perianal fistula repair in 2002, denies any previous colonoscopies), hx of pancreatitis admitted 4/1 with increased abdominal pain since this am. Patient's onset of symptoms sudden this am. Similar to previous admission in the hospital. Patient denies of being on any meds. Notes of no previous colonoscopies. No dietary changes. No abdominal traumas. Patient denies any ETOH use / illicit drug use. Denies any associated symptoms of fevers, chills or shakes. No vomiting. 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 Care discussed with Dr. Campo. Patient had a poor follow up as an outpatient.      4/2- still with abd pain   4/3 - feeling better today but still requiring IV Dilaudid for pain control. no n/v/d  4/4/19- patient seen and examined at bedside. States he had clears for breakfast and lunch and had increased nausea and abd pain, patient made NPO again. Still requiring IV pain meds, but appears comfortable  4/5/19- Patient seen and examined at bedside. States he still has pain in his abdomen, but overall appears comfortable, patient tolerating regular low fat diet.  4/6/19- Patient seen and examined at bedside. States he still has sharp pains in his abdomen that come and go. States he was able to eat oatmeal today.  4/7/19- Patient seen and examined at bedside. Patient still complaining of severe abd pain. Had CT abd/pelvis yesterday which showed marked improvement in peripancreatic phlegmon. Still showed possible enteritis and possible cystitis/seminal vesiculitis. Patient does endorse some pelvic pain, urinary urgency.    ROS:   All 10 systems reviewed and found to be negative with the exception of what has been described above.      MEDICATIONS  (STANDING):  cefTRIAXone   IVPB      enoxaparin Injectable 40 milliGRAM(s) SubCutaneous every 24 hours  melatonin 5 milliGRAM(s) Oral at bedtime  pantoprazole  Injectable 40 milliGRAM(s) IV Push daily  predniSONE   Tablet 20 milliGRAM(s) Oral daily  sodium chloride 0.9%. 1000 milliLiter(s) (75 mL/Hr) IV Continuous <Continuous>    MEDICATIONS  (PRN):  HYDROmorphone   Tablet 2 milliGRAM(s) Oral every 4 hours PRN Severe Pain (7 - 10)  HYDROmorphone  Injectable 0.5 milliGRAM(s) IV Push every 4 hours PRN breakthrough pain  ondansetron Injectable 4 milliGRAM(s) IV Push every 6 hours PRN Nausea and/or Vomiting  simethicone 80 milliGRAM(s) Chew three times a day PRN Gas  zolpidem 5 milliGRAM(s) Oral at bedtime PRN Insomnia      Allergies    No Known Allergies    Intolerances        Vital Signs Last 24 Hrs  T(C): 36.2 (07 Apr 2019 11:23), Max: 36.8 (06 Apr 2019 21:06)  T(F): 97.2 (07 Apr 2019 11:23), Max: 98.3 (06 Apr 2019 21:06)  HR: 65 (07 Apr 2019 11:23) (55 - 65)  BP: 127/89 (07 Apr 2019 11:23) (123/73 - 154/94)  BP(mean): --  RR: 16 (07 Apr 2019 11:23) (16 - 16)  SpO2: 96% (07 Apr 2019 11:23) (96% - 100%)    04-06 @ 07:01  -  04-07 @ 07:00  --------------------------------------------------------  IN: 0 mL / OUT: 1 mL / NET: -1 mL      Physical Exam:  General: WN/WD NAD  Neurology: A&Ox3, nonfocal, CHANG x 4  Respiratory: CTA B/L  CV: RRR, S1S2, no murmurs, rubs or gallops  Abdominal: Soft, +tenderness to palpation mid abdomen, no rebound, no guarding, ND +BS  Extremities: No edema, + peripheral pulses      LABS:    04-07    140  |  102  |  7   ----------------------------<  97  3.5   |  32<H>  |  0.66    Ca    8.6      07 Apr 2019 05:42            RADIOLOGY & ADDITIONAL TESTS:

## 2019-04-07 NOTE — PROGRESS NOTE ADULT - ASSESSMENT
# Crohns disease with acute exacerbation with chronic distal enteritis and right-sided perianal fistulae  - patient previously on entocort  - notes of never having a colonoscopy  - low fat diet- decreased to full liquid diet as patient complaining of pain- now restarting low fiber diet  - gi eval noted and will continue iv steroids    # acute Pancreatitis  - pt still with severe pain, but ambulating halls, pain slightly improved, IV dilaudid switched to PO. Patient appears to be walking hallways without much discomfort  - former etoh use ( denies any etoh use now)  - CT no gb pathology, pending sonogram of the abdomen  r/o gallstone pancreatitis.  - lipid panel, IGG4 r/o AIP - both nl   - aggressive hydration  - pain management  - GI eval noted    # Leukocytosis  - secondary to underlying Crohn's dz and worsening 2/2 steroids  - resolved

## 2019-04-07 NOTE — CONSULT NOTE ADULT - SUBJECTIVE AND OBJECTIVE BOX
CHIEF COMPLAINT:    HISTORY OF PRESENT ILLNESS:    PAST MEDICAL & SURGICAL HISTORY:  Crohn's disease of large intestine without complication: (No current flare up)  Perianal fistula: repair in 2002  History of colonoscopy: (2014)  S/P ORIF (open reduction internal fixation) fracture: (Right ankle, 2014)      REVIEW OF SYSTEMS:    CONSTITUTIONAL: No weakness, fevers or chills  EYES/ENT: No visual changes;  No vertigo or throat pain   NECK: No pain or stiffness  RESPIRATORY: No cough, wheezing, hemoptysis, No shortness of breath  CARDIOVASCULAR: No chest pain or palpitations  GASTROINTESTINAL: No abdominal or flank pain, No nausea, vomiting, diarrhea or constipation  GENITOURINARY: See HPI  NEUROLOGICAL: No numbness or weakness  SKIN: No rashes or lesions   All other review of systems is negative unless indicated above.    MEDICATIONS  (STANDING):  cefTRIAXone Injectable.      enoxaparin Injectable 40 milliGRAM(s) SubCutaneous every 24 hours  melatonin 5 milliGRAM(s) Oral at bedtime  pantoprazole  Injectable 40 milliGRAM(s) IV Push daily  predniSONE   Tablet 20 milliGRAM(s) Oral daily  sodium chloride 0.9%. 1000 milliLiter(s) (75 mL/Hr) IV Continuous <Continuous>    MEDICATIONS  (PRN):  HYDROmorphone   Tablet 2 milliGRAM(s) Oral every 4 hours PRN Severe Pain (7 - 10)  HYDROmorphone  Injectable 0.5 milliGRAM(s) IV Push every 4 hours PRN breakthrough pain  ondansetron Injectable 4 milliGRAM(s) IV Push every 6 hours PRN Nausea and/or Vomiting  simethicone 80 milliGRAM(s) Chew three times a day PRN Gas  zolpidem 5 milliGRAM(s) Oral at bedtime PRN Insomnia      Allergies    No Known Allergies    Intolerances        SOCIAL HISTORY:    FAMILY HISTORY:  Diabetes mellitus (Father)      Vital Signs Last 24 Hrs  T(C): 36.7 (07 Apr 2019 21:06), Max: 36.7 (07 Apr 2019 21:06)  T(F): 98 (07 Apr 2019 21:06), Max: 98 (07 Apr 2019 21:06)  HR: 63 (07 Apr 2019 21:06) (55 - 76)  BP: 129/78 (07 Apr 2019 21:06) (123/73 - 145/85)  BP(mean): --  RR: 16 (07 Apr 2019 21:06) (16 - 16)  SpO2: 99% (07 Apr 2019 21:06) (96% - 99%)    PHYSICAL EXAM:    Constitutional: No acute distress  HEENT: EOMI, Normal Hearing  Neck: Supple  Back: No costovertebral angle tenderness  Respiratory: Normal respiratory effort    Cardiovascular: Normal peripheral circulation   Abd: Soft, non distended, non tender  : Normal urethra, ***circumcised penis, bilateral normal to palpate  SHELLIE: Prostate- *** gms, non tender, no nodules  Extremities: No peripheral edema  Neurological: No focal deficits  Psychiatric: Normal mood, normal affect  Musculoskeletal: Moving all 4 extremities  Skin: No rashes    LABS:    04-07    140  |  102  |  7   ----------------------------<  97  3.5   |  32<H>  |  0.66    Ca    8.6      07 Apr 2019 05:42          Urine Culture:     RADIOLOGY & ADDITIONAL STUDIES: CHIEF COMPLAINT:  Cystitis    HISTORY OF PRESENT ILLNESS:  33 year old man admitted with increased abdominal pain. Being managed for Pancreatitis.  Had CT abd/pelvis yesterday which showed marked improvement in peripancreatic phlegmon. Still showed possible enteritis and possible cystitis/seminal vesiculitis.   Pt c/o urinary urgency, frequency and suprapubic pain.    PAST MEDICAL & SURGICAL HISTORY:  Crohn's disease of large intestine without complication: (No current flare up)  Perianal fistula: repair in 2002  History of colonoscopy: (2014)  S/P ORIF (open reduction internal fixation) fracture: (Right ankle, 2014)      REVIEW OF SYSTEMS:  All other review of systems is negative unless indicated above.    MEDICATIONS  (STANDING):  cefTRIAXone Injectable.      enoxaparin Injectable 40 milliGRAM(s) SubCutaneous every 24 hours  melatonin 5 milliGRAM(s) Oral at bedtime  pantoprazole  Injectable 40 milliGRAM(s) IV Push daily  predniSONE   Tablet 20 milliGRAM(s) Oral daily  sodium chloride 0.9%. 1000 milliLiter(s) (75 mL/Hr) IV Continuous <Continuous>    MEDICATIONS  (PRN):  HYDROmorphone   Tablet 2 milliGRAM(s) Oral every 4 hours PRN Severe Pain (7 - 10)  HYDROmorphone  Injectable 0.5 milliGRAM(s) IV Push every 4 hours PRN breakthrough pain  ondansetron Injectable 4 milliGRAM(s) IV Push every 6 hours PRN Nausea and/or Vomiting  simethicone 80 milliGRAM(s) Chew three times a day PRN Gas  zolpidem 5 milliGRAM(s) Oral at bedtime PRN Insomnia      Allergies    No Known Allergies    Intolerances        SOCIAL HISTORY:    FAMILY HISTORY:  Diabetes mellitus (Father)      Vital Signs Last 24 Hrs  T(C): 36.7 (07 Apr 2019 21:06), Max: 36.7 (07 Apr 2019 21:06)  T(F): 98 (07 Apr 2019 21:06), Max: 98 (07 Apr 2019 21:06)  HR: 63 (07 Apr 2019 21:06) (55 - 76)  BP: 129/78 (07 Apr 2019 21:06) (123/73 - 145/85)  BP(mean): --  RR: 16 (07 Apr 2019 21:06) (16 - 16)  SpO2: 99% (07 Apr 2019 21:06) (96% - 99%)    PHYSICAL EXAM:    Constitutional: No acute distress  HEENT: EOMI, Normal Hearing  Neck: Supple  Back: No costovertebral angle tenderness  Respiratory: Normal respiratory effort    Cardiovascular: Normal peripheral circulation   Abd: Soft, non distended, non tender  : Normal urethra, penis, bilateral testis normal to palpate  Extremities: No peripheral edema  Neurological: No focal deficits  Psychiatric: Normal mood, normal affect  Musculoskeletal: Moving all 4 extremities  Skin: No rashes    LABS:    04-07    140  |  102  |  7   ----------------------------<  97  3.5   |  32<H>  |  0.66    Ca    8.6      07 Apr 2019 05:42

## 2019-04-07 NOTE — PROGRESS NOTE ADULT - ASSESSMENT
32yo male with Crohn's, pancreatitis    CT scan with improving pancreatitis, persistent crohns  continue iv steroids  pain control becoming an issue as still requiring high doses - we discussed need to taper to find oral regimen on which he can be d/c

## 2019-04-07 NOTE — PROGRESS NOTE ADULT - SUBJECTIVE AND OBJECTIVE BOX
Patient is a 33y old  Male who presents with a chief complaint of Abdominal pain (06 Apr 2019 14:49)    HPI:  pt overall improved with decreased pain  he gets waves of severe pain at times exacerbated by eating    PAST MEDICAL & SURGICAL HISTORY:  Crohn's disease of large intestine without complication: (No current flare up)  Perianal fistula: repair in 2002  History of colonoscopy: (2014)  S/P ORIF (open reduction internal fixation) fracture: (Right ankle, 2014)    MEDICATIONS  (STANDING):  enoxaparin Injectable 40 milliGRAM(s) SubCutaneous every 24 hours  melatonin 5 milliGRAM(s) Oral at bedtime  pantoprazole  Injectable 40 milliGRAM(s) IV Push daily  predniSONE   Tablet 20 milliGRAM(s) Oral daily  sodium chloride 0.9%. 1000 milliLiter(s) (75 mL/Hr) IV Continuous <Continuous>    MEDICATIONS  (PRN):  HYDROmorphone  Injectable 0.5 milliGRAM(s) IV Push every 4 hours PRN breakthrough pain  HYDROmorphone  Injectable 1 milliGRAM(s) IV Push every 4 hours PRN Severe Pain (7 - 10)  ondansetron Injectable 4 milliGRAM(s) IV Push every 6 hours PRN Nausea and/or Vomiting  simethicone 80 milliGRAM(s) Chew three times a day PRN Gas  zolpidem 5 milliGRAM(s) Oral at bedtime PRN Insomnia    Allergies  No Known Allergies  REVIEW OF SYSTEMS:    CONSTITUTIONAL: No weakness, fevers or chills  RESPIRATORY: No cough, wheezing, hemoptysis; No shortness of breath  CARDIOVASCULAR: No chest pain or palpitations  GASTROINTESTINAL: as above  All other review of systems is negative unless indicated above.    Vital Signs Last 24 Hrs  T(C): 36.4 (07 Apr 2019 03:57), Max: 36.8 (06 Apr 2019 21:06)  T(F): 97.5 (07 Apr 2019 03:57), Max: 98.3 (06 Apr 2019 21:06)  HR: 55 (07 Apr 2019 07:05) (55 - 64)  BP: 145/85 (07 Apr 2019 07:05) (123/73 - 158/97)  BP(mean): --  RR: 16 (07 Apr 2019 03:57) (16 - 16)  SpO2: 98% (07 Apr 2019 03:57) (96% - 100%)    PHYSICAL EXAM:  Constitutional: NAD, well-developed  Respiratory: CTA  Cardiovascular: S1 and S2  Gastrointestinal: BS+, soft, mildly tender epigastrium    LABS:    04-07    140  |  102  |  7   ----------------------------<  97  3.5   |  32<H>  |  0.66    Ca    8.6      07 Apr 2019 05:42            RADIOLOGY & ADDITIONAL STUDIES:

## 2019-04-08 ENCOUNTER — TRANSCRIPTION ENCOUNTER (OUTPATIENT)
Age: 34
End: 2019-04-08

## 2019-04-08 VITALS
SYSTOLIC BLOOD PRESSURE: 110 MMHG | TEMPERATURE: 98 F | OXYGEN SATURATION: 99 % | HEART RATE: 85 BPM | DIASTOLIC BLOOD PRESSURE: 67 MMHG | RESPIRATION RATE: 17 BRPM

## 2019-04-08 LAB
ANION GAP SERPL CALC-SCNC: 8 MMOL/L — SIGNIFICANT CHANGE UP (ref 5–17)
BUN SERPL-MCNC: 10 MG/DL — SIGNIFICANT CHANGE UP (ref 7–23)
CALCIUM SERPL-MCNC: 8.1 MG/DL — LOW (ref 8.5–10.1)
CHLORIDE SERPL-SCNC: 105 MMOL/L — SIGNIFICANT CHANGE UP (ref 96–108)
CO2 SERPL-SCNC: 27 MMOL/L — SIGNIFICANT CHANGE UP (ref 22–31)
CREAT SERPL-MCNC: 0.71 MG/DL — SIGNIFICANT CHANGE UP (ref 0.5–1.3)
GLUCOSE SERPL-MCNC: 116 MG/DL — HIGH (ref 70–99)
HCT VFR BLD CALC: 43.3 % — SIGNIFICANT CHANGE UP (ref 39–50)
HGB BLD-MCNC: 14.6 G/DL — SIGNIFICANT CHANGE UP (ref 13–17)
MCHC RBC-ENTMCNC: 33.7 GM/DL — SIGNIFICANT CHANGE UP (ref 32–36)
MCHC RBC-ENTMCNC: 34.2 PG — HIGH (ref 27–34)
MCV RBC AUTO: 101.4 FL — HIGH (ref 80–100)
NRBC # BLD: 0 /100 WBCS — SIGNIFICANT CHANGE UP (ref 0–0)
PLATELET # BLD AUTO: 343 K/UL — SIGNIFICANT CHANGE UP (ref 150–400)
POTASSIUM SERPL-MCNC: 3 MMOL/L — LOW (ref 3.5–5.3)
POTASSIUM SERPL-SCNC: 3 MMOL/L — LOW (ref 3.5–5.3)
RBC # BLD: 4.27 M/UL — SIGNIFICANT CHANGE UP (ref 4.2–5.8)
RBC # FLD: 14.2 % — SIGNIFICANT CHANGE UP (ref 10.3–14.5)
SODIUM SERPL-SCNC: 140 MMOL/L — SIGNIFICANT CHANGE UP (ref 135–145)
WBC # BLD: 10.77 K/UL — HIGH (ref 3.8–10.5)
WBC # FLD AUTO: 10.77 K/UL — HIGH (ref 3.8–10.5)

## 2019-04-08 RX ORDER — PANTOPRAZOLE SODIUM 20 MG/1
40 TABLET, DELAYED RELEASE ORAL DAILY
Qty: 0 | Refills: 0 | Status: DISCONTINUED | OUTPATIENT
Start: 2019-04-08 | End: 2019-04-08

## 2019-04-08 RX ORDER — CEFUROXIME AXETIL 250 MG
1 TABLET ORAL
Qty: 8 | Refills: 0 | OUTPATIENT
Start: 2019-04-08 | End: 2019-04-11

## 2019-04-08 RX ORDER — PANTOPRAZOLE SODIUM 20 MG/1
1 TABLET, DELAYED RELEASE ORAL
Qty: 14 | Refills: 0
Start: 2019-04-08 | End: 2019-04-21

## 2019-04-08 RX ORDER — HYDROMORPHONE HYDROCHLORIDE 2 MG/ML
1 INJECTION INTRAMUSCULAR; INTRAVENOUS; SUBCUTANEOUS
Qty: 20 | Refills: 0
Start: 2019-04-08 | End: 2019-04-12

## 2019-04-08 RX ORDER — SIMETHICONE 80 MG/1
1 TABLET, CHEWABLE ORAL
Qty: 30 | Refills: 0
Start: 2019-04-08 | End: 2019-04-17

## 2019-04-08 RX ORDER — LANOLIN ALCOHOL/MO/W.PET/CERES
1 CREAM (GRAM) TOPICAL
Qty: 0 | Refills: 0 | DISCHARGE
Start: 2019-04-08

## 2019-04-08 RX ORDER — ACETAMINOPHEN 500 MG
1000 TABLET ORAL ONCE
Qty: 0 | Refills: 0 | Status: COMPLETED | OUTPATIENT
Start: 2019-04-08 | End: 2019-04-08

## 2019-04-08 RX ORDER — DIPHENHYDRAMINE HCL 50 MG
1 CAPSULE ORAL
Qty: 0 | Refills: 0 | COMMUNITY

## 2019-04-08 RX ORDER — POTASSIUM CHLORIDE 20 MEQ
40 PACKET (EA) ORAL EVERY 4 HOURS
Qty: 0 | Refills: 0 | Status: COMPLETED | OUTPATIENT
Start: 2019-04-08 | End: 2019-04-08

## 2019-04-08 RX ORDER — ASPIRIN/ACETAMINOPHEN/CAFFEINE 250-250-65
30 TABLET ORAL
Qty: 0 | Refills: 0 | COMMUNITY

## 2019-04-08 RX ADMIN — SODIUM CHLORIDE 75 MILLILITER(S): 9 INJECTION INTRAMUSCULAR; INTRAVENOUS; SUBCUTANEOUS at 09:04

## 2019-04-08 RX ADMIN — Medication 20 MILLIGRAM(S): at 06:19

## 2019-04-08 RX ADMIN — PANTOPRAZOLE SODIUM 40 MILLIGRAM(S): 20 TABLET, DELAYED RELEASE ORAL at 13:28

## 2019-04-08 RX ADMIN — HYDROMORPHONE HYDROCHLORIDE 2 MILLIGRAM(S): 2 INJECTION INTRAMUSCULAR; INTRAVENOUS; SUBCUTANEOUS at 01:13

## 2019-04-08 RX ADMIN — Medication 40 MILLIEQUIVALENT(S): at 10:27

## 2019-04-08 RX ADMIN — ONDANSETRON 4 MILLIGRAM(S): 8 TABLET, FILM COATED ORAL at 10:27

## 2019-04-08 RX ADMIN — HYDROMORPHONE HYDROCHLORIDE 0.5 MILLIGRAM(S): 2 INJECTION INTRAMUSCULAR; INTRAVENOUS; SUBCUTANEOUS at 09:15

## 2019-04-08 RX ADMIN — HYDROMORPHONE HYDROCHLORIDE 2 MILLIGRAM(S): 2 INJECTION INTRAMUSCULAR; INTRAVENOUS; SUBCUTANEOUS at 06:22

## 2019-04-08 RX ADMIN — HYDROMORPHONE HYDROCHLORIDE 0.5 MILLIGRAM(S): 2 INJECTION INTRAMUSCULAR; INTRAVENOUS; SUBCUTANEOUS at 04:00

## 2019-04-08 RX ADMIN — Medication 40 MILLIEQUIVALENT(S): at 13:28

## 2019-04-08 RX ADMIN — Medication 400 MILLIGRAM(S): at 04:26

## 2019-04-08 RX ADMIN — HYDROMORPHONE HYDROCHLORIDE 0.5 MILLIGRAM(S): 2 INJECTION INTRAMUSCULAR; INTRAVENOUS; SUBCUTANEOUS at 09:01

## 2019-04-08 RX ADMIN — HYDROMORPHONE HYDROCHLORIDE 0.5 MILLIGRAM(S): 2 INJECTION INTRAMUSCULAR; INTRAVENOUS; SUBCUTANEOUS at 03:30

## 2019-04-08 RX ADMIN — HYDROMORPHONE HYDROCHLORIDE 2 MILLIGRAM(S): 2 INJECTION INTRAMUSCULAR; INTRAVENOUS; SUBCUTANEOUS at 02:20

## 2019-04-08 RX ADMIN — HYDROMORPHONE HYDROCHLORIDE 0.5 MILLIGRAM(S): 2 INJECTION INTRAMUSCULAR; INTRAVENOUS; SUBCUTANEOUS at 00:10

## 2019-04-08 RX ADMIN — Medication 1000 MILLIGRAM(S): at 04:50

## 2019-04-08 NOTE — DISCHARGE NOTE PROVIDER - CARE PROVIDER_API CALL
Daniel Freeman)  Gastroenterology; Internal Medicine  [address], [address]  [place] [address]  Phone: [phone]  Fax: [phone]  Follow Up Time: 1 week    Lito Crawley ()  Family Medicine; Geriatric Medicine  [address]  [place] [address]  Phone: [phone]  Fax: [phone]  Follow Up Time: 2 weeks

## 2019-04-08 NOTE — PROGRESS NOTE ADULT - REASON FOR ADMISSION
Abdominal pain

## 2019-04-08 NOTE — PROGRESS NOTE ADULT - SUBJECTIVE AND OBJECTIVE BOX
Patient is a 33y old  Male who presents with a chief complaint of Abdominal pain (07 Apr 2019 23:06)      HPI:  pt feeling better this AM  had BM yesterday  less pain    PAST MEDICAL & SURGICAL HISTORY:  Crohn's disease of large intestine without complication: (No current flare up)  Perianal fistula: repair in 2002  History of colonoscopy: (2014)  S/P ORIF (open reduction internal fixation) fracture: (Right ankle, 2014)    MEDICATIONS  (STANDING):  cefTRIAXone Injectable. 1000 milliGRAM(s) IV Push every 24 hours  cefTRIAXone Injectable.      enoxaparin Injectable 40 milliGRAM(s) SubCutaneous every 24 hours  melatonin 5 milliGRAM(s) Oral at bedtime  pantoprazole  Injectable 40 milliGRAM(s) IV Push daily  potassium chloride    Tablet ER 40 milliEquivalent(s) Oral every 4 hours  predniSONE   Tablet 20 milliGRAM(s) Oral daily  sodium chloride 0.9%. 1000 milliLiter(s) (75 mL/Hr) IV Continuous <Continuous>    MEDICATIONS  (PRN):  HYDROmorphone   Tablet 2 milliGRAM(s) Oral every 4 hours PRN Severe Pain (7 - 10)  HYDROmorphone  Injectable 0.5 milliGRAM(s) IV Push every 4 hours PRN breakthrough pain  ondansetron Injectable 4 milliGRAM(s) IV Push every 6 hours PRN Nausea and/or Vomiting  simethicone 80 milliGRAM(s) Chew three times a day PRN Gas  zolpidem 5 milliGRAM(s) Oral at bedtime PRN Insomnia    Allergies  No Known Allergies    REVIEW OF SYSTEMS:    CONSTITUTIONAL: No weakness, fevers or chills  RESPIRATORY: No cough, wheezing, hemoptysis; No shortness of breath  CARDIOVASCULAR: No chest pain or palpitations  GASTROINTESTINAL: as above  All other review of systems is negative unless indicated above.    Vital Signs Last 24 Hrs  T(C): 36.6 (08 Apr 2019 05:58), Max: 36.7 (07 Apr 2019 21:06)  T(F): 97.8 (08 Apr 2019 05:58), Max: 98 (07 Apr 2019 21:06)  HR: 79 (08 Apr 2019 09:15) (60 - 79)  BP: 115/77 (08 Apr 2019 09:15) (115/77 - 129/89)  BP(mean): --  RR: 18 (08 Apr 2019 09:15) (16 - 18)  SpO2: 99% (08 Apr 2019 09:15) (96% - 99%)    PHYSICAL EXAM:    Constitutional: NAD, well-developed  Respiratory: CTAB  Cardiovascular: S1 and S2, RRR  Gastrointestinal: BS+, soft, NT/ND      LABS:                        14.6   10.77 )-----------( 343      ( 08 Apr 2019 05:05 )             43.3     04-08    140  |  105  |  10  ----------------------------<  116<H>  3.0<L>   |  27  |  0.71    Ca    8.1<L>      08 Apr 2019 05:05            RADIOLOGY & ADDITIONAL STUDIES:

## 2019-04-08 NOTE — PROGRESS NOTE ADULT - ASSESSMENT
34yo male with Crohn's flare, pancreatitis  pt improving and anticipate d/c today  would plan for 7 days of pain meds, prednisone 40mg daily and to f/u with Dr Freeman for further w/u and mgmt  d/w pt and Dr Chan

## 2019-04-08 NOTE — DISCHARGE NOTE PROVIDER - PROVIDER TOKENS
PROVIDER:[TOKEN:[[id number]],FOLLOWUP:[1 week]],PROVIDER:[TOKEN:[[id number]],FOLLOWUP:[2 weeks]]

## 2019-04-08 NOTE — DISCHARGE NOTE PROVIDER - HOSPITAL COURSE
Patient is a 33 year old man with PMHx of Crohn's (previously on Entocort, perianal fistula repair in 2002, denies any previous colonoscopies), hx of pancreatitis admitted 4/1 with increased abdominal pain since this am. Patient's onset of symptoms sudden this am. Similar to previous admission in the hospital. Patient denies of being on any meds. Notes of no previous colonoscopies. No dietary changes. No abdominal traumas. Patient denies any ETOH use / illicit drug use. Denies any associated symptoms of fevers, chills or shakes. No vomiting. 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 Care discussed with Dr. Campo. Patient had a poor follow up as an outpatient.          4/2- still with abd pain     4/3 - feeling better today but still requiring IV Dilaudid for pain control. no n/v/d    4/4/19- patient seen and examined at bedside. States he had clears for breakfast and lunch and had increased nausea and abd pain, patient made NPO again. Still requiring IV pain meds, but appears comfortable    4/5/19- Patient seen and examined at bedside. States he still has pain in his abdomen, but overall appears comfortable, patient tolerating regular low fat diet.    4/6/19- Patient seen and examined at bedside. States he still has sharp pains in his abdomen that come and go. States he was able to eat oatmeal today.    4/7/19- Patient seen and examined at bedside. Patient still complaining of severe abd pain. Had CT abd/pelvis yesterday which showed marked improvement in peripancreatic phlegmon. Still showed possible enteritis and possible cystitis/seminal vesiculitis. Patient does endorse some pelvic pain, urinary urgency.    4/8/19- Patient seen and examined at bedside. States he feels better today, was able to sleep, was able to eat chicken last night, pain controlled. Spoke with GI, patient to follow up with his gastroenterologist as outpatient.        Physical Exam:    General: WN/WD NAD    Neurology: A&Ox3, nonfocal, CHANG x 4    Respiratory: CTA B/L    CV: RRR, S1S2, no murmurs, rubs or gallops    Abdominal: Soft, NT, no rebound, no guarding, ND +BS    Extremities: No edema, + peripheral pulses        # Crohns disease with acute exacerbation with chronic distal enteritis and right-sided perianal fistulae    - patient previously on entocort    - notes of never having a colonoscopy    - low fat diet- decreased to full liquid diet as patient complaining of pain- now restarting low fiber diet    - gi eval noted and will continue steroids    - patient to follow up with outpatient gi for likely colonoscopy and treatment of Crohns        # acute Pancreatitis    - pt still with severe pain, but ambulating halls, pain slightly improved, IV dilaudid switched to PO. Patient appears to be walking hallways without much discomfort    - former etoh use ( denies any etoh use now)    - CT no gb pathology, pending sonogram of the abdomen  r/o gallstone pancreatitis.    - lipid panel, IGG4 r/o AIP - both nl     - aggressive hydration    - pain management    - GI eval noted        # Leukocytosis    - secondary to underlying Crohn's dz and 2/2 steroids        #Cystitis    -Complete course of antibiotics    -Follow up with Urology within 1 week of discharge        Case discussed with GI, patient    Total time spent on discharge: 42 minutes

## 2019-04-08 NOTE — DISCHARGE NOTE PROVIDER - NSDCCPCAREPLAN_GEN_ALL_CORE_FT
PRINCIPAL DISCHARGE DIAGNOSIS  Diagnosis: Pancreatitis  Assessment and Plan of Treatment: -Resolved  -Encourage PO water intake  -Continue pain meds as needed  -Follow up with GI within 1 week of discharge, will likely need colonoscopy      SECONDARY DISCHARGE DIAGNOSES  Diagnosis: Crohns disease  Assessment and Plan of Treatment: -Continue steroids  -Follow up with GI within 1 week  -You will need to be tapered of the steroids

## 2019-04-08 NOTE — DISCHARGE NOTE NURSING/CASE MANAGEMENT/SOCIAL WORK - NSDCDPATPORTLINK_GEN_ALL_CORE
You can access the [facility] Patient Portal, offered by [facility], by registering with the following website: [email or web address]

## 2019-04-09 LAB
CULTURE RESULTS: NO GROWTH — SIGNIFICANT CHANGE UP
SPECIMEN SOURCE: SIGNIFICANT CHANGE UP

## 2019-04-10 DIAGNOSIS — R10.9 UNSPECIFIED ABDOMINAL PAIN: ICD-10-CM

## 2019-04-10 DIAGNOSIS — D72.829 ELEVATED WHITE BLOOD CELL COUNT, UNSPECIFIED: ICD-10-CM

## 2019-04-10 DIAGNOSIS — E87.6 HYPOKALEMIA: ICD-10-CM

## 2019-04-10 DIAGNOSIS — K60.3 ANAL FISTULA: ICD-10-CM

## 2019-04-10 DIAGNOSIS — N30.90 CYSTITIS, UNSPECIFIED WITHOUT HEMATURIA: ICD-10-CM

## 2019-04-10 DIAGNOSIS — K85.90 ACUTE PANCREATITIS WITHOUT NECROSIS OR INFECTION, UNSPECIFIED: ICD-10-CM

## 2019-04-10 DIAGNOSIS — E87.1 HYPO-OSMOLALITY AND HYPONATREMIA: ICD-10-CM

## 2019-04-10 DIAGNOSIS — K50.10 CROHN'S DISEASE OF LARGE INTESTINE WITHOUT COMPLICATIONS: ICD-10-CM

## 2019-05-03 ENCOUNTER — TRANSCRIPTION ENCOUNTER (OUTPATIENT)
Age: 34
End: 2019-05-03

## 2019-05-03 ENCOUNTER — INPATIENT (INPATIENT)
Facility: HOSPITAL | Age: 34
LOS: 1 days | Discharge: HOME | End: 2019-05-05
Attending: INTERNAL MEDICINE | Admitting: INTERNAL MEDICINE
Payer: COMMERCIAL

## 2019-05-03 VITALS — WEIGHT: 166.89 LBS | HEIGHT: 71 IN

## 2019-05-03 DIAGNOSIS — Z98.89 OTHER SPECIFIED POSTPROCEDURAL STATES: Chronic | ICD-10-CM

## 2019-05-03 DIAGNOSIS — Z96.7 PRESENCE OF OTHER BONE AND TENDON IMPLANTS: Chronic | ICD-10-CM

## 2019-05-03 DIAGNOSIS — K60.3 ANAL FISTULA: Chronic | ICD-10-CM

## 2019-05-03 LAB
ALBUMIN SERPL ELPH-MCNC: 3.5 G/DL — SIGNIFICANT CHANGE UP (ref 3.3–5)
ALP SERPL-CCNC: 115 U/L — SIGNIFICANT CHANGE UP (ref 40–120)
ALT FLD-CCNC: 57 U/L — SIGNIFICANT CHANGE UP (ref 12–78)
ANION GAP SERPL CALC-SCNC: 6 MMOL/L — SIGNIFICANT CHANGE UP (ref 5–17)
APPEARANCE UR: CLEAR — SIGNIFICANT CHANGE UP
APTT BLD: 26.5 SEC — LOW (ref 27.5–36.3)
AST SERPL-CCNC: 25 U/L — SIGNIFICANT CHANGE UP (ref 15–37)
BACTERIA # UR AUTO: ABNORMAL
BASOPHILS # BLD AUTO: 0.04 K/UL — SIGNIFICANT CHANGE UP (ref 0–0.2)
BASOPHILS NFR BLD AUTO: 0.3 % — SIGNIFICANT CHANGE UP (ref 0–2)
BILIRUB SERPL-MCNC: 0.4 MG/DL — SIGNIFICANT CHANGE UP (ref 0.2–1.2)
BILIRUB UR-MCNC: NEGATIVE — SIGNIFICANT CHANGE UP
BLD GP AB SCN SERPL QL: SIGNIFICANT CHANGE UP
BUN SERPL-MCNC: 10 MG/DL — SIGNIFICANT CHANGE UP (ref 7–23)
CALCIUM SERPL-MCNC: 9 MG/DL — SIGNIFICANT CHANGE UP (ref 8.5–10.1)
CHLORIDE SERPL-SCNC: 102 MMOL/L — SIGNIFICANT CHANGE UP (ref 96–108)
CO2 SERPL-SCNC: 27 MMOL/L — SIGNIFICANT CHANGE UP (ref 22–31)
COLOR SPEC: YELLOW — SIGNIFICANT CHANGE UP
COMMENT - URINE: SIGNIFICANT CHANGE UP
CREAT SERPL-MCNC: 0.88 MG/DL — SIGNIFICANT CHANGE UP (ref 0.5–1.3)
DIFF PNL FLD: NEGATIVE — SIGNIFICANT CHANGE UP
EOSINOPHIL # BLD AUTO: 0.03 K/UL — SIGNIFICANT CHANGE UP (ref 0–0.5)
EOSINOPHIL NFR BLD AUTO: 0.2 % — SIGNIFICANT CHANGE UP (ref 0–6)
EPI CELLS # UR: SIGNIFICANT CHANGE UP
GLUCOSE SERPL-MCNC: 135 MG/DL — HIGH (ref 70–99)
GLUCOSE UR QL: NEGATIVE MG/DL — SIGNIFICANT CHANGE UP
HCT VFR BLD CALC: 43.9 % — SIGNIFICANT CHANGE UP (ref 39–50)
HGB BLD-MCNC: 14.8 G/DL — SIGNIFICANT CHANGE UP (ref 13–17)
IMM GRANULOCYTES NFR BLD AUTO: 0.6 % — SIGNIFICANT CHANGE UP (ref 0–1.5)
INR BLD: 0.94 RATIO — SIGNIFICANT CHANGE UP (ref 0.88–1.16)
KETONES UR-MCNC: ABNORMAL
LACTATE SERPL-SCNC: 1.2 MMOL/L — SIGNIFICANT CHANGE UP (ref 0.7–2)
LEUKOCYTE ESTERASE UR-ACNC: ABNORMAL
LIDOCAIN IGE QN: 608 U/L — HIGH (ref 73–393)
LYMPHOCYTES # BLD AUTO: 1.49 K/UL — SIGNIFICANT CHANGE UP (ref 1–3.3)
LYMPHOCYTES # BLD AUTO: 9.4 % — LOW (ref 13–44)
MCHC RBC-ENTMCNC: 33.7 GM/DL — SIGNIFICANT CHANGE UP (ref 32–36)
MCHC RBC-ENTMCNC: 33.9 PG — SIGNIFICANT CHANGE UP (ref 27–34)
MCV RBC AUTO: 100.5 FL — HIGH (ref 80–100)
MONOCYTES # BLD AUTO: 0.85 K/UL — SIGNIFICANT CHANGE UP (ref 0–0.9)
MONOCYTES NFR BLD AUTO: 5.4 % — SIGNIFICANT CHANGE UP (ref 2–14)
NEUTROPHILS # BLD AUTO: 13.37 K/UL — HIGH (ref 1.8–7.4)
NEUTROPHILS NFR BLD AUTO: 84.1 % — HIGH (ref 43–77)
NITRITE UR-MCNC: NEGATIVE — SIGNIFICANT CHANGE UP
NRBC # BLD: 0 /100 WBCS — SIGNIFICANT CHANGE UP (ref 0–0)
PH UR: 5 — SIGNIFICANT CHANGE UP (ref 5–8)
PLATELET # BLD AUTO: 314 K/UL — SIGNIFICANT CHANGE UP (ref 150–400)
POTASSIUM SERPL-MCNC: 4 MMOL/L — SIGNIFICANT CHANGE UP (ref 3.5–5.3)
POTASSIUM SERPL-SCNC: 4 MMOL/L — SIGNIFICANT CHANGE UP (ref 3.5–5.3)
PROT SERPL-MCNC: 7.5 GM/DL — SIGNIFICANT CHANGE UP (ref 6–8.3)
PROT UR-MCNC: 15 MG/DL
PROTHROM AB SERPL-ACNC: 10.4 SEC — SIGNIFICANT CHANGE UP (ref 10–12.9)
RBC # BLD: 4.37 M/UL — SIGNIFICANT CHANGE UP (ref 4.2–5.8)
RBC # FLD: 14.2 % — SIGNIFICANT CHANGE UP (ref 10.3–14.5)
RBC CASTS # UR COMP ASSIST: NEGATIVE /HPF — SIGNIFICANT CHANGE UP (ref 0–4)
SODIUM SERPL-SCNC: 135 MMOL/L — SIGNIFICANT CHANGE UP (ref 135–145)
SP GR SPEC: 1.02 — SIGNIFICANT CHANGE UP (ref 1.01–1.02)
TYPE + AB SCN PNL BLD: SIGNIFICANT CHANGE UP
UROBILINOGEN FLD QL: 1 MG/DL
WBC # BLD: 15.87 K/UL — HIGH (ref 3.8–10.5)
WBC # FLD AUTO: 15.87 K/UL — HIGH (ref 3.8–10.5)
WBC UR QL: ABNORMAL

## 2019-05-03 PROCEDURE — 93010 ELECTROCARDIOGRAM REPORT: CPT

## 2019-05-03 PROCEDURE — 74177 CT ABD & PELVIS W/CONTRAST: CPT | Mod: 26

## 2019-05-03 PROCEDURE — 99285 EMERGENCY DEPT VISIT HI MDM: CPT | Mod: 25

## 2019-05-03 RX ORDER — PIPERACILLIN AND TAZOBACTAM 4; .5 G/20ML; G/20ML
3.38 INJECTION, POWDER, LYOPHILIZED, FOR SOLUTION INTRAVENOUS ONCE
Qty: 0 | Refills: 0 | Status: COMPLETED | OUTPATIENT
Start: 2019-05-03 | End: 2019-05-03

## 2019-05-03 RX ORDER — MORPHINE SULFATE 50 MG/1
4 CAPSULE, EXTENDED RELEASE ORAL ONCE
Qty: 0 | Refills: 0 | Status: DISCONTINUED | OUTPATIENT
Start: 2019-05-03 | End: 2019-05-03

## 2019-05-03 RX ORDER — FAMOTIDINE 10 MG/ML
20 INJECTION INTRAVENOUS ONCE
Qty: 0 | Refills: 0 | Status: COMPLETED | OUTPATIENT
Start: 2019-05-03 | End: 2019-05-03

## 2019-05-03 RX ORDER — POTASSIUM CHLORIDE 20 MEQ
10 PACKET (EA) ORAL ONCE
Qty: 0 | Refills: 0 | Status: COMPLETED | OUTPATIENT
Start: 2019-05-03 | End: 2019-05-03

## 2019-05-03 RX ORDER — ONDANSETRON 8 MG/1
4 TABLET, FILM COATED ORAL ONCE
Qty: 0 | Refills: 0 | Status: COMPLETED | OUTPATIENT
Start: 2019-05-03 | End: 2019-05-03

## 2019-05-03 RX ORDER — MORPHINE SULFATE 50 MG/1
6 CAPSULE, EXTENDED RELEASE ORAL ONCE
Qty: 0 | Refills: 0 | Status: DISCONTINUED | OUTPATIENT
Start: 2019-05-03 | End: 2019-05-03

## 2019-05-03 RX ORDER — SODIUM CHLORIDE 9 MG/ML
3 INJECTION INTRAMUSCULAR; INTRAVENOUS; SUBCUTANEOUS ONCE
Qty: 0 | Refills: 0 | Status: COMPLETED | OUTPATIENT
Start: 2019-05-03 | End: 2019-05-03

## 2019-05-03 RX ORDER — SODIUM CHLORIDE 9 MG/ML
1000 INJECTION INTRAMUSCULAR; INTRAVENOUS; SUBCUTANEOUS ONCE
Qty: 0 | Refills: 0 | Status: COMPLETED | OUTPATIENT
Start: 2019-05-03 | End: 2019-05-03

## 2019-05-03 RX ORDER — POTASSIUM CHLORIDE 20 MEQ
40 PACKET (EA) ORAL ONCE
Qty: 0 | Refills: 0 | Status: COMPLETED | OUTPATIENT
Start: 2019-05-03 | End: 2019-05-03

## 2019-05-03 RX ADMIN — ONDANSETRON 4 MILLIGRAM(S): 8 TABLET, FILM COATED ORAL at 22:34

## 2019-05-03 RX ADMIN — SODIUM CHLORIDE 3 MILLILITER(S): 9 INJECTION INTRAMUSCULAR; INTRAVENOUS; SUBCUTANEOUS at 22:35

## 2019-05-03 RX ADMIN — MORPHINE SULFATE 4 MILLIGRAM(S): 50 CAPSULE, EXTENDED RELEASE ORAL at 22:35

## 2019-05-03 RX ADMIN — FAMOTIDINE 20 MILLIGRAM(S): 10 INJECTION INTRAVENOUS at 22:34

## 2019-05-03 RX ADMIN — PIPERACILLIN AND TAZOBACTAM 200 GRAM(S): 4; .5 INJECTION, POWDER, LYOPHILIZED, FOR SOLUTION INTRAVENOUS at 23:43

## 2019-05-03 RX ADMIN — SODIUM CHLORIDE 1000 MILLILITER(S): 9 INJECTION INTRAMUSCULAR; INTRAVENOUS; SUBCUTANEOUS at 22:35

## 2019-05-03 RX ADMIN — Medication 40 MILLIEQUIVALENT(S): at 23:14

## 2019-05-03 RX ADMIN — MORPHINE SULFATE 6 MILLIGRAM(S): 50 CAPSULE, EXTENDED RELEASE ORAL at 23:42

## 2019-05-03 RX ADMIN — MORPHINE SULFATE 4 MILLIGRAM(S): 50 CAPSULE, EXTENDED RELEASE ORAL at 22:55

## 2019-05-03 NOTE — ED ADULT NURSE NOTE - OBJECTIVE STATEMENT
Pt presents to ED for abdominal pain beginning this afternoon accompanied by nausea and diarrhea. Pt denies recent sickness, and fever.  Pt has hx of pancreatitis and chron's disease.

## 2019-05-03 NOTE — ED PROVIDER NOTE - OBJECTIVE STATEMENT
32 yo male with a PMH of Crohn's and pancreatitis presents with epigastric pain radiating to the RUQ since this morning. Pain intermittent, Nothing that relieves or exacerbates the pain. +nausea and diarrhea. Feels similar to past episodes of pancreatitis but has been sober for 2 years. Denies fever, v/c, cp, sob, urinary complaints. OMD= martha 34 yo male with a PMH of Crohn's and pancreatitis presents with epigastric pain radiating to the RUQ since this morning. Pain intermittent, Nothing that relieves or exacerbates the pain. +nausea and diarrhea. Feels similar to past episodes of pancreatitis but has been sober for 2 years. Denies fever, v/c, cp, sob, urinary complaints. PMD= Misbah

## 2019-05-03 NOTE — ED PROVIDER NOTE - ATTENDING CONTRIBUTION TO CARE
I, Star Ngo, performed the initial face to face bedside interview with this patient regarding history of present illness, review of symptoms and relevant past medical, social and family history.  I completed an independent physical examination.  I was the initial provider who evaluated this patient. I have signed out the follow up of any pending tests (i.e. labs, radiological studies) to the ACP.  I have communicated the patient’s plan of care and disposition with the ACP.      pt with h/o pancreatitis c/o epigastric pain with n/v/d c/w his previous episodes.    pt with mild TTP of upper abd.   no rebound or guarding.    will check CT, EKG, labs, UA, meds, IVF and reeval

## 2019-05-03 NOTE — ED ADULT TRIAGE NOTE - CHIEF COMPLAINT QUOTE
Pt presents to ER c/o abd pain/diarrhea. Hx of pancreatitis. Onset of symptoms began today. Sent from urgent care

## 2019-05-03 NOTE — ED STATDOCS - PROGRESS NOTE DETAILS
Katerina GÓMEZ for ED attending, Doctor Contino. 34 y/o male with a PMHx of Crohn's disease, pancreatitis presents to the ED c/o gradual onset, constant, sharp, upper mid abd since this afternoon. +nausea. +diarrhea. No vomiting. Notes sx are similar to past pancreatitis. Pt was admitted to  for 8 days for pancreatitis last month. PMD- Dr. Neumann. Will send pt to main ED for further evaluation.

## 2019-05-03 NOTE — ED PROVIDER NOTE - CLINICAL SUMMARY MEDICAL DECISION MAKING FREE TEXT BOX
32 yo male presents with abd pain, nausea, diarrhea. CT, labs, IVF, meds, Reeval. -Roc Nguyen PA-C 34 yo male presents with abd pain, nausea, diarrhea. CT, labs, IVF, meds, Reeval. -Roc Nguyen PA-C    agree with A/P.  Dr Ngo 34 yo male presents with abd pain, nausea, diarrhea. CT, labs, IVF, meds, Reeval. -Roc Nguyen PA-C    Ct shows signs of pancreatitis and proctitis. Pt aware and states that he has been having some yellow discharge coming from the area as well. Pt was 4mg of morphine followed by 6mg of morphine without relief od the pain. Zosyn ordered, IVF given, Pt to be admitted for IVF, pain control and IV abx for proctitis. Pt aware and agrees with plan. -Roc Nguyen PA-C     agree with A/P.  Dr Ngo

## 2019-05-03 NOTE — ED ADULT NURSE NOTE - NSIMPLEMENTINTERV_GEN_ALL_ED
Implemented All Universal Safety Interventions:  [place] to call system. Call bell, personal items and telephone within reach. Instruct patient to call for assistance. Room bathroom lighting operational. Non-slip footwear when patient is off stretcher. Physically safe environment: no spills, clutter or unnecessary equipment. Stretcher in lowest position, wheels locked, appropriate side rails in place.

## 2019-05-03 NOTE — ED PROVIDER NOTE - CARE PLAN
Principal Discharge DX:	Acute pancreatitis, unspecified complication status, unspecified pancreatitis type  Secondary Diagnosis:	Proctitis

## 2019-05-04 RX ORDER — DOCUSATE SODIUM 100 MG
100 CAPSULE ORAL THREE TIMES A DAY
Qty: 0 | Refills: 0 | Status: DISCONTINUED | OUTPATIENT
Start: 2019-05-04 | End: 2019-05-05

## 2019-05-04 RX ORDER — MORPHINE SULFATE 50 MG/1
2 CAPSULE, EXTENDED RELEASE ORAL EVERY 4 HOURS
Qty: 0 | Refills: 0 | Status: DISCONTINUED | OUTPATIENT
Start: 2019-05-04 | End: 2019-05-04

## 2019-05-04 RX ORDER — ONDANSETRON 8 MG/1
4 TABLET, FILM COATED ORAL EVERY 6 HOURS
Qty: 0 | Refills: 0 | Status: DISCONTINUED | OUTPATIENT
Start: 2019-05-04 | End: 2019-05-05

## 2019-05-04 RX ORDER — SODIUM CHLORIDE 9 MG/ML
1000 INJECTION INTRAMUSCULAR; INTRAVENOUS; SUBCUTANEOUS ONCE
Qty: 0 | Refills: 0 | Status: COMPLETED | OUTPATIENT
Start: 2019-05-04 | End: 2019-05-04

## 2019-05-04 RX ORDER — PANTOPRAZOLE SODIUM 20 MG/1
40 TABLET, DELAYED RELEASE ORAL
Qty: 0 | Refills: 0 | Status: DISCONTINUED | OUTPATIENT
Start: 2019-05-04 | End: 2019-05-05

## 2019-05-04 RX ORDER — KETOROLAC TROMETHAMINE 30 MG/ML
30 SYRINGE (ML) INJECTION EVERY 6 HOURS
Qty: 0 | Refills: 0 | Status: DISCONTINUED | OUTPATIENT
Start: 2019-05-04 | End: 2019-05-05

## 2019-05-04 RX ORDER — ACETAMINOPHEN 500 MG
1000 TABLET ORAL ONCE
Qty: 0 | Refills: 0 | Status: COMPLETED | OUTPATIENT
Start: 2019-05-04 | End: 2019-05-04

## 2019-05-04 RX ORDER — HYDROMORPHONE HYDROCHLORIDE 2 MG/ML
1 INJECTION INTRAMUSCULAR; INTRAVENOUS; SUBCUTANEOUS ONCE
Qty: 0 | Refills: 0 | Status: DISCONTINUED | OUTPATIENT
Start: 2019-05-04 | End: 2019-05-04

## 2019-05-04 RX ORDER — ACETAMINOPHEN 500 MG
650 TABLET ORAL EVERY 6 HOURS
Qty: 0 | Refills: 0 | Status: DISCONTINUED | OUTPATIENT
Start: 2019-05-04 | End: 2019-05-05

## 2019-05-04 RX ORDER — ONDANSETRON 8 MG/1
4 TABLET, FILM COATED ORAL EVERY 6 HOURS
Qty: 0 | Refills: 0 | Status: DISCONTINUED | OUTPATIENT
Start: 2019-05-04 | End: 2019-05-04

## 2019-05-04 RX ORDER — LANOLIN ALCOHOL/MO/W.PET/CERES
5 CREAM (GRAM) TOPICAL AT BEDTIME
Qty: 0 | Refills: 0 | Status: DISCONTINUED | OUTPATIENT
Start: 2019-05-04 | End: 2019-05-05

## 2019-05-04 RX ORDER — SODIUM CHLORIDE 9 MG/ML
1000 INJECTION INTRAMUSCULAR; INTRAVENOUS; SUBCUTANEOUS
Qty: 0 | Refills: 0 | Status: DISCONTINUED | OUTPATIENT
Start: 2019-05-04 | End: 2019-05-05

## 2019-05-04 RX ORDER — HYDROMORPHONE HYDROCHLORIDE 2 MG/ML
0.5 INJECTION INTRAMUSCULAR; INTRAVENOUS; SUBCUTANEOUS EVERY 4 HOURS
Qty: 0 | Refills: 0 | Status: DISCONTINUED | OUTPATIENT
Start: 2019-05-04 | End: 2019-05-05

## 2019-05-04 RX ORDER — MORPHINE SULFATE 50 MG/1
6 CAPSULE, EXTENDED RELEASE ORAL ONCE
Qty: 0 | Refills: 0 | Status: DISCONTINUED | OUTPATIENT
Start: 2019-05-04 | End: 2019-05-04

## 2019-05-04 RX ORDER — TRAZODONE HCL 50 MG
100 TABLET ORAL AT BEDTIME
Qty: 0 | Refills: 0 | Status: COMPLETED | OUTPATIENT
Start: 2019-05-04 | End: 2019-05-04

## 2019-05-04 RX ADMIN — HYDROMORPHONE HYDROCHLORIDE 1 MILLIGRAM(S): 2 INJECTION INTRAMUSCULAR; INTRAVENOUS; SUBCUTANEOUS at 03:54

## 2019-05-04 RX ADMIN — Medication 1000 MILLIGRAM(S): at 06:17

## 2019-05-04 RX ADMIN — HYDROMORPHONE HYDROCHLORIDE 0.5 MILLIGRAM(S): 2 INJECTION INTRAMUSCULAR; INTRAVENOUS; SUBCUTANEOUS at 22:11

## 2019-05-04 RX ADMIN — Medication 30 MILLIGRAM(S): at 19:48

## 2019-05-04 RX ADMIN — SODIUM CHLORIDE 1000 MILLILITER(S): 9 INJECTION INTRAMUSCULAR; INTRAVENOUS; SUBCUTANEOUS at 01:58

## 2019-05-04 RX ADMIN — MORPHINE SULFATE 2 MILLIGRAM(S): 50 CAPSULE, EXTENDED RELEASE ORAL at 08:05

## 2019-05-04 RX ADMIN — ONDANSETRON 4 MILLIGRAM(S): 8 TABLET, FILM COATED ORAL at 07:50

## 2019-05-04 RX ADMIN — Medication 400 MILLIGRAM(S): at 05:58

## 2019-05-04 RX ADMIN — Medication 5 MILLIGRAM(S): at 21:40

## 2019-05-04 RX ADMIN — MORPHINE SULFATE 6 MILLIGRAM(S): 50 CAPSULE, EXTENDED RELEASE ORAL at 02:19

## 2019-05-04 RX ADMIN — PANTOPRAZOLE SODIUM 40 MILLIGRAM(S): 20 TABLET, DELAYED RELEASE ORAL at 12:20

## 2019-05-04 RX ADMIN — HYDROMORPHONE HYDROCHLORIDE 0.5 MILLIGRAM(S): 2 INJECTION INTRAMUSCULAR; INTRAVENOUS; SUBCUTANEOUS at 18:22

## 2019-05-04 RX ADMIN — Medication 10 MILLIEQUIVALENT(S): at 01:00

## 2019-05-04 RX ADMIN — HYDROMORPHONE HYDROCHLORIDE 1 MILLIGRAM(S): 2 INJECTION INTRAMUSCULAR; INTRAVENOUS; SUBCUTANEOUS at 03:31

## 2019-05-04 RX ADMIN — Medication 100 MILLIGRAM(S): at 21:40

## 2019-05-04 RX ADMIN — HYDROMORPHONE HYDROCHLORIDE 1 MILLIGRAM(S): 2 INJECTION INTRAMUSCULAR; INTRAVENOUS; SUBCUTANEOUS at 11:18

## 2019-05-04 RX ADMIN — SODIUM CHLORIDE 1000 MILLILITER(S): 9 INJECTION INTRAMUSCULAR; INTRAVENOUS; SUBCUTANEOUS at 02:23

## 2019-05-04 RX ADMIN — Medication 30 MILLIGRAM(S): at 14:28

## 2019-05-04 RX ADMIN — ONDANSETRON 4 MILLIGRAM(S): 8 TABLET, FILM COATED ORAL at 18:38

## 2019-05-04 RX ADMIN — HYDROMORPHONE HYDROCHLORIDE 0.5 MILLIGRAM(S): 2 INJECTION INTRAMUSCULAR; INTRAVENOUS; SUBCUTANEOUS at 13:52

## 2019-05-04 RX ADMIN — Medication 30 MILLIGRAM(S): at 11:38

## 2019-05-04 RX ADMIN — MORPHINE SULFATE 6 MILLIGRAM(S): 50 CAPSULE, EXTENDED RELEASE ORAL at 00:12

## 2019-05-04 RX ADMIN — SODIUM CHLORIDE 125 MILLILITER(S): 9 INJECTION INTRAMUSCULAR; INTRAVENOUS; SUBCUTANEOUS at 10:02

## 2019-05-04 RX ADMIN — HYDROMORPHONE HYDROCHLORIDE 1 MILLIGRAM(S): 2 INJECTION INTRAMUSCULAR; INTRAVENOUS; SUBCUTANEOUS at 09:35

## 2019-05-04 RX ADMIN — MORPHINE SULFATE 2 MILLIGRAM(S): 50 CAPSULE, EXTENDED RELEASE ORAL at 07:50

## 2019-05-04 RX ADMIN — HYDROMORPHONE HYDROCHLORIDE 0.5 MILLIGRAM(S): 2 INJECTION INTRAMUSCULAR; INTRAVENOUS; SUBCUTANEOUS at 17:55

## 2019-05-04 RX ADMIN — HYDROMORPHONE HYDROCHLORIDE 0.5 MILLIGRAM(S): 2 INJECTION INTRAMUSCULAR; INTRAVENOUS; SUBCUTANEOUS at 14:28

## 2019-05-04 RX ADMIN — Medication 20 MILLIGRAM(S): at 12:20

## 2019-05-04 RX ADMIN — HYDROMORPHONE HYDROCHLORIDE 0.5 MILLIGRAM(S): 2 INJECTION INTRAMUSCULAR; INTRAVENOUS; SUBCUTANEOUS at 22:41

## 2019-05-04 RX ADMIN — SODIUM CHLORIDE 125 MILLILITER(S): 9 INJECTION INTRAMUSCULAR; INTRAVENOUS; SUBCUTANEOUS at 17:19

## 2019-05-04 RX ADMIN — Medication 100 MILLIEQUIVALENT(S): at 00:02

## 2019-05-04 RX ADMIN — Medication 30 MILLIGRAM(S): at 20:37

## 2019-05-04 RX ADMIN — PIPERACILLIN AND TAZOBACTAM 3.38 GRAM(S): 4; .5 INJECTION, POWDER, LYOPHILIZED, FOR SOLUTION INTRAVENOUS at 00:00

## 2019-05-04 NOTE — ED ADULT NURSE REASSESSMENT NOTE - NS ED NURSE REASSESS COMMENT FT1
Assumed care of patient. No signs of acute distress noted. Patient resting comfortably.
Patient care received from BYRON OROZCO. Patient A&Ox4, hostile, resting in bed. Reports nausea and RUQ stabbing pain 9/10; MD Domígnuez notified; awaiting orders. Wait time explained, hospitalist consult pending. No s/s of distress present. Safety & comfort measures in place, will continue to monitor.
Patient remains as previously assessed. Resting in bed, no s/s of distress present. Hand-off report to RN Taryn, awaiting transport to . Safety & comfort measures in place, purposeful active rounding on my time.
pt received from previous RN Alisa. AOX3, denies pain. no s/s of acute distress noted. vss. pt changed, cleaned, turned and positioned. pending admission orders. POC reviewed with pt, verbalizes understanding. call bell in reach, instructed to call for assistance. will continue to monitor
pt received from previous RN Jahaira. AOX3, medicated for pain. vss. no s/s of acute distress noted. pt pending admission orders. POC reviewed with pt, verbalizes understanding. call bell in reach. will continue to monitor
rounded on pt. vss. pt c/o abdominal pain. MD Stokes notified, iv tylenol ordered and given. pt pending admission orders, POC reinforced with pt. will continue to monitor

## 2019-05-04 NOTE — H&P ADULT - ASSESSMENT
Patient is a 33 year old man with PMHx of Crohn's (previously on Entocort, perianal fistula repair in 2002, denies any previous colonoscopies), hx of pancreatitis , remote hx of etoh abuse (no relapse for several years), dishcarged 4/19 for acute flare up of chrons disease, now presents with abdominal pain.  Pain radiating to the RUQ since this morning. Pain intermittent, Nothing that relieves or exacerbates the pain. +nausea and diarrhea.  Feels similar to past episodes of pancreatitis but has been sober for 2 years. Denies fever, chills, dysuria.      Found to have wbc of 15, Lipase of 608  Found to have CT abd/pelvis consistent with acute pancreatitis and severe chronis disease with persistent, active proctitis with severe perianal Crohn's   disease as evidenced by multiple perianal fistulas, one of which may be extending anteriorly towards the prostate      1-Abdominal Pain likley secondary to flare of acute pancreatitis  -start iv fluids ns@125cc/hr   -dialudid for pain- at bedside patient in sign pain, clearly uncomfortable in bed  -zofran for nausea    2-Severe chrons disease, unclear if patient is in acute flare up?  -Ct findings with on going severe chrons disease with fistula developement in the prostate?? >> will consult GI Dr. Campo  and Dr. Kennedy for urology  -benignon currently on oral prednisone taper , he is down to 15mg. will c/w 20mg dialy prednisone for now.     3-DVt prophylaxis- young patient, to ambulate.

## 2019-05-04 NOTE — H&P ADULT - NSHPPHYSICALEXAM_GEN_ALL_CORE
Vital Signs Last 24 Hrs  T(C): 36.7 (04 May 2019 09:24), Max: 36.8 (04 May 2019 00:56)  T(F): 98 (04 May 2019 09:24), Max: 98.2 (04 May 2019 00:56)  HR: 68 (04 May 2019 09:24) (62 - 100)  BP: 110/74 (04 May 2019 09:24) (110/74 - 138/106)  BP(mean): --  RR: 16 (04 May 2019 09:24) (15 - 17)  SpO2: 94% (04 May 2019 09:24) (94% - 100%)    HEENT:   pupils equal and reactive, EOMI, no oropharyngeal lesions, erythema, exudates, oral thrush    NECK:   supple, no carotid bruits, no palpable lymph nodes, no thyromegaly    CV:  +S1, +S2, regular, no murmurs or rubs    RESP:   lungs clear to auscultation bilaterally, no wheezing, rales, rhonchi, good air entry bilaterally    BREAST:  not examined    GI:  abdomen soft, non-tender, non-distended, normal BS, no bruits, no abdominal masses, no palpable masses    RECTAL:  not examined    :  not examined    MSK:   normal muscle tone, no atrophy, no rigidity, no contractions    EXT:   no clubbing, no cyanosis, no edema, no calf pain, swelling or erythema    VASCULAR:  pulses equal and symmetric in the upper and lower extremities    NEURO:  AAOX3, no focal neurological deficits, follows all commands, able to move extremities spontaneously    SKIN:  no ulcers, lesions or rashes

## 2019-05-04 NOTE — H&P ADULT - HISTORY OF PRESENT ILLNESS
34 yo male with a PMH of Crohn's and pancreatitis presents with epigastric pain radiating to the RUQ since this morning. Pain intermittent, Nothing that relieves or exacerbates the pain. +nausea and diarrhea. Feels similar to past episodes of pancreatitis but has been sober for 2 years. Denies fever, v/c, cp, sob, urinary complaints. Patient is a 33 year old man with PMHx of Crohn's (previously on Entocort, perianal fistula repair in 2002, denies any previous colonoscopies), hx of pancreatitis , remote hx of etoh abuse (no relapse for several years), dishcarged 4/19 for acute flare up of chrons disease, now presents with abdominal pain.  Pain radiating to the RUQ since this morning. Pain intermittent, Nothing that relieves or exacerbates the pain. +nausea and diarrhea.  Feels similar to past episodes of pancreatitis but has been sober for 2 years. Denies fever, chills, dysuria.

## 2019-05-04 NOTE — H&P ADULT - NSHPSOCIALHISTORY_GEN_ALL_CORE
patient is non smoker, no illicit drug use; previous hx of etoh abuse has been clearn for several years

## 2019-05-04 NOTE — H&P ADULT - NSHPLABSRESULTS_GEN_ALL_CORE
Urinalysis Basic - ( 03 May 2019 22:21 )    Color: Yellow / Appearance: Clear / S.025 / pH: x  Gluc: x / Ketone: Trace  / Bili: Negative / Urobili: 1 mg/dL   Blood: x / Protein: 15 mg/dL / Nitrite: Negative   Leuk Esterase: Trace / RBC: Negative /HPF / WBC 6-10   Sq Epi: x / Non Sq Epi: Occasional / Bacteria: Occasional    03 May 2019 22:21    135    |  102    |  10     ----------------------------<  135    4.0     |  27     |  0.88     Ca    9.0        03 May 2019 22:21    TPro  7.5    /  Alb  3.5    /  TBili  0.4    /  DBili  x      /  AST  25     /  ALT  57     /  AlkPhos  115    03 May 2019 22:21  LIVER FUNCTIONS - ( 03 May 2019 22:21 )  Alb: 3.5 g/dL / Pro: 7.5 gm/dL / ALK PHOS: 115 U/L / ALT: 57 U/L / AST: 25 U/L / GGT: x         PT/INR - ( 03 May 2019 22:21 )   PT: 10.4 sec;   INR: 0.94 ratio         PTT - ( 03 May 2019 22:21 )  PTT:26.5 secCBC Full  -  ( 03 May 2019 22:21 )  WBC Count : 15.87 K/uL  Hemoglobin : 14.8 g/dL  Hematocrit : 43.9 %  Platelet Count - Automated : 314 K/uL  Mean Cell Volume : 100.5 fl  Mean Cell Hemoglobin : 33.9 pg  Mean Cell Hemoglobin Concentration : 33.7 gm/dL  Auto Neutrophil # : 13.37 K/uL  Auto Lymphocyte # : 1.49 K/uL  Auto Monocyte # : 0.85 K/uL  Auto Eosinophil # : 0.03 K/uL  Auto Basophil # : 0.04 K/uL  Auto Neutrophil % : 84.1 %  Auto Lymphocyte % : 9.4 %  Auto Monocyte % : 5.4 %  Auto Eosinophil % : 0.2 %  Auto Basophil % : 0.3 %

## 2019-05-05 ENCOUNTER — TRANSCRIPTION ENCOUNTER (OUTPATIENT)
Age: 34
End: 2019-05-05

## 2019-05-05 VITALS
SYSTOLIC BLOOD PRESSURE: 119 MMHG | HEART RATE: 78 BPM | TEMPERATURE: 99 F | OXYGEN SATURATION: 99 % | DIASTOLIC BLOOD PRESSURE: 79 MMHG | RESPIRATION RATE: 17 BRPM

## 2019-05-05 LAB
ANION GAP SERPL CALC-SCNC: 7 MMOL/L — SIGNIFICANT CHANGE UP (ref 5–17)
BASOPHILS # BLD AUTO: 0.03 K/UL — SIGNIFICANT CHANGE UP (ref 0–0.2)
BASOPHILS NFR BLD AUTO: 0.2 % — SIGNIFICANT CHANGE UP (ref 0–2)
BUN SERPL-MCNC: 11 MG/DL — SIGNIFICANT CHANGE UP (ref 7–23)
CALCIUM SERPL-MCNC: 8.1 MG/DL — LOW (ref 8.5–10.1)
CHLORIDE SERPL-SCNC: 105 MMOL/L — SIGNIFICANT CHANGE UP (ref 96–108)
CO2 SERPL-SCNC: 23 MMOL/L — SIGNIFICANT CHANGE UP (ref 22–31)
CREAT SERPL-MCNC: 0.59 MG/DL — SIGNIFICANT CHANGE UP (ref 0.5–1.3)
EOSINOPHIL # BLD AUTO: 0.05 K/UL — SIGNIFICANT CHANGE UP (ref 0–0.5)
EOSINOPHIL NFR BLD AUTO: 0.3 % — SIGNIFICANT CHANGE UP (ref 0–6)
GLUCOSE SERPL-MCNC: 57 MG/DL — LOW (ref 70–99)
HCT VFR BLD CALC: 37.7 % — LOW (ref 39–50)
HGB BLD-MCNC: 12.5 G/DL — LOW (ref 13–17)
IMM GRANULOCYTES NFR BLD AUTO: 0.8 % — SIGNIFICANT CHANGE UP (ref 0–1.5)
LIDOCAIN IGE QN: 963 U/L — HIGH (ref 73–393)
LYMPHOCYTES # BLD AUTO: 1.63 K/UL — SIGNIFICANT CHANGE UP (ref 1–3.3)
LYMPHOCYTES # BLD AUTO: 10.6 % — LOW (ref 13–44)
MCHC RBC-ENTMCNC: 33.2 GM/DL — SIGNIFICANT CHANGE UP (ref 32–36)
MCHC RBC-ENTMCNC: 34.1 PG — HIGH (ref 27–34)
MCV RBC AUTO: 102.7 FL — HIGH (ref 80–100)
MONOCYTES # BLD AUTO: 0.88 K/UL — SIGNIFICANT CHANGE UP (ref 0–0.9)
MONOCYTES NFR BLD AUTO: 5.7 % — SIGNIFICANT CHANGE UP (ref 2–14)
NEUTROPHILS # BLD AUTO: 12.63 K/UL — HIGH (ref 1.8–7.4)
NEUTROPHILS NFR BLD AUTO: 82.4 % — HIGH (ref 43–77)
NRBC # BLD: 0 /100 WBCS — SIGNIFICANT CHANGE UP (ref 0–0)
PLATELET # BLD AUTO: 272 K/UL — SIGNIFICANT CHANGE UP (ref 150–400)
POTASSIUM SERPL-MCNC: 3.8 MMOL/L — SIGNIFICANT CHANGE UP (ref 3.5–5.3)
POTASSIUM SERPL-SCNC: 3.8 MMOL/L — SIGNIFICANT CHANGE UP (ref 3.5–5.3)
RBC # BLD: 3.67 M/UL — LOW (ref 4.2–5.8)
RBC # FLD: 14 % — SIGNIFICANT CHANGE UP (ref 10.3–14.5)
SODIUM SERPL-SCNC: 135 MMOL/L — SIGNIFICANT CHANGE UP (ref 135–145)
WBC # BLD: 15.34 K/UL — HIGH (ref 3.8–10.5)
WBC # FLD AUTO: 15.34 K/UL — HIGH (ref 3.8–10.5)

## 2019-05-05 RX ORDER — SODIUM CHLORIDE 9 MG/ML
1000 INJECTION INTRAMUSCULAR; INTRAVENOUS; SUBCUTANEOUS
Qty: 0 | Refills: 0 | Status: DISCONTINUED | OUTPATIENT
Start: 2019-05-05 | End: 2019-05-05

## 2019-05-05 RX ORDER — HYDROMORPHONE HYDROCHLORIDE 2 MG/ML
0.5 INJECTION INTRAMUSCULAR; INTRAVENOUS; SUBCUTANEOUS ONCE
Qty: 0 | Refills: 0 | Status: DISCONTINUED | OUTPATIENT
Start: 2019-05-05 | End: 2019-05-05

## 2019-05-05 RX ORDER — ALPRAZOLAM 0.25 MG
0.5 TABLET ORAL ONCE
Qty: 0 | Refills: 0 | Status: DISCONTINUED | OUTPATIENT
Start: 2019-05-05 | End: 2019-05-05

## 2019-05-05 RX ORDER — SODIUM CHLORIDE 9 MG/ML
1000 INJECTION INTRAMUSCULAR; INTRAVENOUS; SUBCUTANEOUS ONCE
Qty: 0 | Refills: 0 | Status: COMPLETED | OUTPATIENT
Start: 2019-05-05 | End: 2019-05-05

## 2019-05-05 RX ADMIN — HYDROMORPHONE HYDROCHLORIDE 0.5 MILLIGRAM(S): 2 INJECTION INTRAMUSCULAR; INTRAVENOUS; SUBCUTANEOUS at 06:01

## 2019-05-05 RX ADMIN — HYDROMORPHONE HYDROCHLORIDE 0.5 MILLIGRAM(S): 2 INJECTION INTRAMUSCULAR; INTRAVENOUS; SUBCUTANEOUS at 10:49

## 2019-05-05 RX ADMIN — HYDROMORPHONE HYDROCHLORIDE 0.5 MILLIGRAM(S): 2 INJECTION INTRAMUSCULAR; INTRAVENOUS; SUBCUTANEOUS at 11:55

## 2019-05-05 RX ADMIN — HYDROMORPHONE HYDROCHLORIDE 0.5 MILLIGRAM(S): 2 INJECTION INTRAMUSCULAR; INTRAVENOUS; SUBCUTANEOUS at 06:38

## 2019-05-05 RX ADMIN — PANTOPRAZOLE SODIUM 40 MILLIGRAM(S): 20 TABLET, DELAYED RELEASE ORAL at 06:01

## 2019-05-05 RX ADMIN — Medication 20 MILLIGRAM(S): at 06:00

## 2019-05-05 RX ADMIN — SODIUM CHLORIDE 150 MILLILITER(S): 9 INJECTION INTRAMUSCULAR; INTRAVENOUS; SUBCUTANEOUS at 11:57

## 2019-05-05 RX ADMIN — HYDROMORPHONE HYDROCHLORIDE 0.5 MILLIGRAM(S): 2 INJECTION INTRAMUSCULAR; INTRAVENOUS; SUBCUTANEOUS at 02:08

## 2019-05-05 RX ADMIN — SODIUM CHLORIDE 1333.33 MILLILITER(S): 9 INJECTION INTRAMUSCULAR; INTRAVENOUS; SUBCUTANEOUS at 13:56

## 2019-05-05 RX ADMIN — HYDROMORPHONE HYDROCHLORIDE 0.5 MILLIGRAM(S): 2 INJECTION INTRAMUSCULAR; INTRAVENOUS; SUBCUTANEOUS at 02:39

## 2019-05-05 RX ADMIN — Medication 0.5 MILLIGRAM(S): at 11:55

## 2019-05-05 NOTE — DISCHARGE NOTE NURSING/CASE MANAGEMENT/SOCIAL WORK - NSDCDPATPORTLINK_GEN_ALL_CORE
You can access the [facility] Patient Portal, offered by [facility], by registering with the following website: [email or web address]

## 2019-05-05 NOTE — DISCHARGE NOTE PROVIDER - CARE PROVIDER_API CALL
Daniel Roberts (MD)  Gastroenterology; Internal Medicine  [address] [place] [address]  Phone: [phone]  Fax: [phone]  Follow Up Time:

## 2019-05-05 NOTE — CONSULT NOTE ADULT - ASSESSMENT
Imp:  CT and labs reviewed  Strongly doubt signficant active acute pancreatitis    Rec:  Advance diet  Assuming tolerates, food, can go to f/u with Dr. Roberts tomorrow for fistulizing crohn's dz treatment
1. Possible rectourethral/prostatic fistula 2/2 Crohn's disease    Plan:    -low likelihood of a rectourethral/prostatic fistula given lack of any clinical findings to support such a diagnosis. Thus, I do not believe any further work-up or any urologic intervention is warranted at this time. If he develops pneumaturia or fecaluria, then he may warrant further work-up.   -to follow up as outpatient 1 week after discharge for re-assessment    Thank you for allowing me to assist in the care of this patient  Please feel free to call with any questions/concerns    Bobby Lara MD  [facility]  W: [phone]

## 2019-05-05 NOTE — DISCHARGE NOTE PROVIDER - HOSPITAL COURSE
Vital Signs Last 24 Hrs    T(C): 37.2 (05 May 2019 11:22), Max: 37.2 (05 May 2019 11:22)    T(F): 99 (05 May 2019 11:22), Max: 99 (05 May 2019 11:22)    HR: 78 (05 May 2019 11:22) (60 - 78)    BP: 119/79 (05 May 2019 11:22) (101/62 - 125/77)    BP(mean): --    RR: 17 (05 May 2019 11:) (17 - 17)    SpO2: 99% (05 May 2019 11:22) (95% - 99%)        HEENT:   pupils equal and reactive, EOMI, no oropharyngeal lesions, erythema, exudates, oral thrush        NECK:   supple, no carotid bruits, no palpable lymph nodes, no thyromegaly        CV:  +S1, +S2, regular, no murmurs or rubs        RESP:   lungs clear to auscultation bilaterally, no wheezing, rales, rhonchi, good air entry bilaterally        BREAST:  not examined        GI:  abdomen soft, non-tender, non-distended, normal BS, no bruits, no abdominal masses, no palpable masses        RECTAL:  not examined        :  not examined        MSK:   normal muscle tone, no atrophy, no rigidity, no contractions        EXT:   no clubbing, no cyanosis, no edema, no calf pain, swelling or erythema        VASCULAR:  pulses equal and symmetric in the upper and lower extremities        NEURO:  AAOX3, no focal neurological deficits, follows all commands, able to move extremities spontaneously        SKIN:  no ulcers, lesions or rashes        Urinalysis Basic - ( 03 May 2019 22:21 )        Color: Yellow / Appearance: Clear / S.025 / pH: x    Gluc: x / Ketone: Trace  / Bili: Negative / Urobili: 1 mg/dL     Blood: x / Protein: 15 mg/dL / Nitrite: Negative     Leuk Esterase: Trace / RBC: Negative /HPF / WBC 6-10     Sq Epi: x / Non Sq Epi: Occasional / Bacteria: Occasional        05 May 2019 08:03        135    |  105    |  11       ----------------------------<  57       3.8     |  23     |  0.59         Ca    8.1        05 May 2019 08:03        TPro  7.5    /  Alb  3.5    /  TBili  0.4    /  DBili  x      /  AST  25     /  ALT  57     /  AlkPhos  115    03 May 2019 22:21    LIVER FUNCTIONS - ( 03 May 2019 22:21 )    Alb: 3.5 g/dL / Pro: 7.5 gm/dL / ALK PHOS: 115 U/L / ALT: 57 U/L / AST: 25 U/L / GGT: x           PT/INR - ( 03 May 2019 22:21 )   PT: 10.4 sec;   INR: 0.94 ratio               PTT - ( 03 May 2019 22:21 )  PTT:26.5 secCBC Full  -  ( 05 May 2019 08:03 )    WBC Count : 15.34 K/uL    Hemoglobin : 12.5 g/dL    Hematocrit : 37.7 %    Platelet Count - Automated : 272 K/uL    Mean Cell Volume : 102.7 fl    Mean Cell Hemoglobin : 34.1 pg    Mean Cell Hemoglobin Concentration : 33.2 gm/dL                Hospital Course:    	    Patient is a 33 year old man with PMHx of Crohn's (previously on Entocort, perianal fistula repair in , denies any previous colonoscopies), hx of pancreatitis , remote hx of etoh abuse (no relapse for several years), dishcarged  for acute flare up of chrons disease, now presents with abdominal pain.  Pain radiating to the RUQ since this morning. Pain intermittent, Nothing that relieves or exacerbates the pain. +nausea and diarrhea.  Feels similar to past episodes of pancreatitis but has been sober for 2 years.         Found to have wbc of 15, Lipase of 608    Found to have CT abd/pelvis consistent with acute pancreatitis and severe chronis disease with persistent, active proctitis with severe perianal Crohn's     disease as evidenced by multiple perianal fistulas, one of which may be extending anteriorly towards the prostate            Patient admitted with Abdominal Pain likley secondary to flare of acute pancreatitis vs ongoing chrons disease symptoms that have not been treated. Patient has appoitnment [place] with Dr. Roberts    from GI to start treatment.  Omainnan evaluated by urology and GI for his CT findings. Urolgist felt no acute treatment needed as patient has no urinary symptoms. Out patient f/u is reccomended.     GI felt patients does not have true pancreatitis and also recommend     outpatient followup.  Reccomended for davidsonn to stay another 24 hours for iv fluids but he is anxious to return home especially given that GI felt no acute intervention    to be done inpatient.  Patient will be discharged with outpatient f/u with his GI doctor, whom he has a appointment  with ben .

## 2019-05-05 NOTE — DISCHARGE NOTE PROVIDER - NSDCCPCAREPLAN_GEN_ALL_CORE_FT
PRINCIPAL DISCHARGE DIAGNOSIS  Diagnosis: Abdominal pain  Assessment and Plan of Treatment: follow up outpatient with your Gastroenterolgist for [facility] appointment with Dr. Roberts

## 2019-05-05 NOTE — CONSULT NOTE ADULT - SUBJECTIVE AND OBJECTIVE BOX
HPI:  Patient is a 33 year old man with PMHx of Crohn's (previously on Entocort, perianal fistula repair in 2002, denies any previous colonoscopies), hx of pancreatitis , remote hx of etoh abuse (no relapse for several years), dishcarged 4/19 for acute flare up of chrons disease, now presents with abdominal pain.  Pain radiating to the RUQ since this morning. Pain intermittent, Nothing that relieves or exacerbates the pain. +nausea and diarrhea.  Feels similar to past episodes of pancreatitis but has been sober for 2 years. Denies fever, chills, dysuria. (04 May 2019 08:20)  -------------------------------  Patient reports last drink was prior to last admission here. No N/V and he's hungry, wants to eat.  Has appointment tomorrow with Dr. Roberts.    PAST MEDICAL & SURGICAL HISTORY:  Crohn's disease of large intestine without complication: (No current flare up)  Perianal fistula: repair in 2002  History of colonoscopy: (2014)  S/P ORIF (open reduction internal fixation) fracture: (Right ankle, 2014)      Home Medications:  melatonin 5 mg oral tablet: 1 tab(s) orally once a day (at bedtime) (08 Apr 2019 12:52)      MEDICATIONS  (STANDING):  docusate sodium 100 milliGRAM(s) Oral three times a day  melatonin 5 milliGRAM(s) Oral at bedtime  pantoprazole    Tablet 40 milliGRAM(s) Oral before breakfast  predniSONE   Tablet 20 milliGRAM(s) Oral daily  sodium chloride 0.9%. 1000 milliLiter(s) (150 mL/Hr) IV Continuous <Continuous>    MEDICATIONS  (PRN):  acetaminophen   Tablet .. 650 milliGRAM(s) Oral every 6 hours PRN Temp greater or equal to 38C (100.4F), Mild Pain (1 - 3)  HYDROmorphone  Injectable 0.5 milliGRAM(s) IV Push every 4 hours PRN Severe Pain (7 - 10)  ketorolac   Injectable 30 milliGRAM(s) IV Push every 6 hours PRN Moderate Pain (4 - 6)  ondansetron Injectable 4 milliGRAM(s) IV Push every 6 hours PRN Nausea and/or Vomiting      Allergies    No Known Allergies    Intolerances        SOCIAL HISTORY:    FAMILY HISTORY:  Diabetes mellitus (Father)      ROS  As above  Otherwise unremarkable    Vital Signs Last 24 Hrs  T(C): 37.2 (05 May 2019 11:22), Max: 37.2 (05 May 2019 11:22)  T(F): 99 (05 May 2019 11:22), Max: 99 (05 May 2019 11:22)  HR: 78 (05 May 2019 11:22) (60 - 78)  BP: 119/79 (05 May 2019 11:22) (101/62 - 125/77)  BP(mean): --  RR: 17 (05 May 2019 11:22) (17 - 17)  SpO2: 99% (05 May 2019 11:22) (95% - 99%)    Constitutional: NAD, well-developed  Respiratory: CTAB  Cardiovascular: S1 and S2, RRR  Gastrointestinal: BS+, soft, NT/ND  Extremities: No peripheral edema  Psychiatric: Normal mood, normal affect  Skin: No rashes    LABS:                        12.5   15.34 )-----------( 272      ( 05 May 2019 08:03 )             37.7     05-05    135  |  105  |  11  ----------------------------<  57<L>  3.8   |  23  |  0.59    Ca    8.1<L>      05 May 2019 08:03    TPro  7.5  /  Alb  3.5  /  TBili  0.4  /  DBili  x   /  AST  25  /  ALT  57  /  AlkPhos  115  05-03    PT/INR - ( 03 May 2019 22:21 )   PT: 10.4 sec;   INR: 0.94 ratio         PTT - ( 03 May 2019 22:21 )  PTT:26.5 sec  LIVER FUNCTIONS - ( 03 May 2019 22:21 )  Alb: 3.5 g/dL / Pro: 7.5 gm/dL / ALK PHOS: 115 U/L / ALT: 57 U/L / AST: 25 U/L / GGT: x             RADIOLOGY & ADDITIONAL STUDIES:
UROLOGY CONSULT    HPI: patient is 33y Male with history of crohn's dx, acute pancreatitis, and perianal fistulas who presented to ER with abdominal pain, nausea, and diarrhea. CTAP showed a possible fistula involving prostate/urethra. He denies any dysuria, hematuria, incontinence, pneumaturia, fecaluria. No fevers, chills. No voiding complaints. No history of recurrent UTIs or prostatitis. UA unchanged from last admission and urine culture from 4/7/19 was negative      PMH/PSH  PANCREATITIS  Diabetes mellitus (Father)  Crohn's disease of large intestine without complication  Perianal fistula  History of colonoscopy  S/P ORIF (open reduction internal fixation) fracture      Family History:  Diabetes mellitus (Father)      Allergies  No Known Allergies      Medications  acetaminophen   Tablet .. 650 milliGRAM(s) Oral every 6 hours PRN  docusate sodium 100 milliGRAM(s) Oral three times a day  HYDROmorphone  Injectable 0.5 milliGRAM(s) IV Push every 4 hours PRN  ketorolac   Injectable 30 milliGRAM(s) IV Push every 6 hours PRN  melatonin 5 milliGRAM(s) Oral at bedtime  ondansetron Injectable 4 milliGRAM(s) IV Push every 6 hours PRN  pantoprazole    Tablet 40 milliGRAM(s) Oral before breakfast  predniSONE   Tablet 20 milliGRAM(s) Oral daily  sodium chloride 0.9%. 1000 milliLiter(s) IV Continuous <Continuous>      ROS    General: No weight change, fevers, chills, night sweats, fatigue, malaise  Skin: no new skin lesions, hair changes, prutitus  Ophthalmologic: No eye pain,  swelling,  redness, discharge, vision changes  ENMT: No hearing changes,  ear pain,  nasal congestion, sinus pain,  hoarseness, dysphagia, rhinorrhea	  Respiratory and Thorax: No cough, sputum, dyspnea, wheezing  Cardiovascular: No chest pain, SOB, PND, dyspnea on exertion, orthopnea, edema, palpitations  Gastrointestinal: +nausea No diarrhea, constipation.   Genitourinary: see above  Musculoskeletal:	No arthralgias, myalgias, joint swelling, joint stiffness, back pain  Neurological:	 No weakness, numbness, paresthesias, syncope, loss of consciousness, headache, dizziness  Psychiatric: No anxiety, depression, delusions. No SI/HI/AH/VH.  Hematology/Lymphatics:	No bruising, lymphadenopathy  Endocrine: No temperature intolerance    Vital Signs Last 24 Hrs  T(C): 36.7 (04 May 2019 09:24), Max: 36.8 (04 May 2019 00:56)  T(F): 98 (04 May 2019 09:24), Max: 98.2 (04 May 2019 00:56)  HR: 67 (04 May 2019 13:49) (62 - 100)  BP: 101/62 (04 May 2019 13:49) (101/62 - 138/106)  BP(mean): --  RR: 16 (04 May 2019 09:24) (15 - 17)  SpO2: 94% (04 May 2019 09:24) (94% - 100%)    PHYSICAL EXAM:  Constitutional:  alert and cooperative, NAD, lying in bed comfortably   Eyes: EOMI, vision is grossly intact  Neck: neck supple  Back: no CVA tenderness bilaterally  Respiratory: no labored breathing  Cardiovascular: no peripheral edema, cyanosis, or pallor. Extremities are warm and well perfused.   Gastrointestinal: soft, non-tender, non-distended, no guarding, no rebound tenderness. Bladder non-palpable  Genitourinary: normal phallus - no urethral discharge, bilaterally descended testicles - no masses, non-tender.   Extremities: no significant deformity or joint abnormality.   Neurological: no focal deficits. A&O x3  Skin: normal skin color, texture and turgor  Musculoskeletal: range of motion intact in all 4 extremities  Psychiatric: normal mood and affect        I/O      Labs:  CBC Full  -  ( 03 May 2019 22:21 )  WBC Count : 15.87 K/uL  Hemoglobin : 14.8 g/dL  Hematocrit : 43.9 %  Platelet Count - Automated : 314 K/uL  Mean Cell Volume : 100.5 fl  Mean Cell Hemoglobin : 33.9 pg  Mean Cell Hemoglobin Concentration : 33.7 gm/dL  Auto Neutrophil # : 13.37 K/uL  Auto Lymphocyte # : 1.49 K/uL  Auto Monocyte # : 0.85 K/uL  Auto Eosinophil # : 0.03 K/uL  Auto Basophil # : 0.04 K/uL  Auto Neutrophil % : 84.1 %  Auto Lymphocyte % : 9.4 %  Auto Monocyte % : 5.4 %  Auto Eosinophil % : 0.2 %  Auto Basophil % : 0.3 %    05-03    135  |  102  |  10  ----------------------------<  135<H>  4.0   |  27  |  0.88    Ca    9.0      03 May 2019 22:21    TPro  7.5  /  Alb  3.5  /  TBili  0.4  /  DBili  x   /  AST  25  /  ALT  57  /  AlkPhos  115  05-03      CT scan reviewed

## 2019-05-09 DIAGNOSIS — K50.113 CROHN'S DISEASE OF LARGE INTESTINE WITH FISTULA: ICD-10-CM

## 2019-05-09 DIAGNOSIS — R10.9 UNSPECIFIED ABDOMINAL PAIN: ICD-10-CM

## 2019-05-09 DIAGNOSIS — K85.90 ACUTE PANCREATITIS WITHOUT NECROSIS OR INFECTION, UNSPECIFIED: ICD-10-CM

## 2019-05-09 DIAGNOSIS — Z79.52 LONG TERM (CURRENT) USE OF SYSTEMIC STEROIDS: ICD-10-CM

## 2019-06-28 NOTE — PATIENT PROFILE ADULT. - PATIENT REPRESENTATIVE: ( YOU CAN CHOOSE ANY PERSON THAT CAN ASSIST YOU WITH YOUR HEALTH CARE PREFERENCES, DOES NOT HAVE TO BE A SPOUSE, IMMEDIATE FAMILY OR SIGNIFICANT OTHER/PARTNER)
Received patient on AIRVO 40L 95%, patient was stable and tolerating. Patient started to desaturate into the 70's per RN, patient was placed on BIPAP 12/6 100%, patient tolerated for about an hour.  Patient stated she felt like she could not catch her b Declines

## 2019-07-05 PROBLEM — K85.90 ACUTE PANCREATITIS WITHOUT NECROSIS OR INFECTION, UNSPECIFIED: Chronic | Status: ACTIVE | Noted: 2019-05-05

## 2019-07-12 ENCOUNTER — APPOINTMENT (OUTPATIENT)
Dept: GASTROENTEROLOGY | Facility: CLINIC | Age: 34
End: 2019-07-12
Payer: COMMERCIAL

## 2019-07-12 ENCOUNTER — LABORATORY RESULT (OUTPATIENT)
Age: 34
End: 2019-07-12

## 2019-07-12 VITALS
WEIGHT: 155 LBS | DIASTOLIC BLOOD PRESSURE: 64 MMHG | TEMPERATURE: 98.1 F | SYSTOLIC BLOOD PRESSURE: 118 MMHG | HEART RATE: 100 BPM | HEIGHT: 71 IN | OXYGEN SATURATION: 98 % | BODY MASS INDEX: 21.7 KG/M2 | RESPIRATION RATE: 16 BRPM

## 2019-07-12 DIAGNOSIS — K52.9 NONINFECTIVE GASTROENTERITIS AND COLITIS, UNSPECIFIED: ICD-10-CM

## 2019-07-12 DIAGNOSIS — Z87.898 PERSONAL HISTORY OF OTHER SPECIFIED CONDITIONS: ICD-10-CM

## 2019-07-12 DIAGNOSIS — Z87.19 PERSONAL HISTORY OF OTHER DISEASES OF THE DIGESTIVE SYSTEM: ICD-10-CM

## 2019-07-12 DIAGNOSIS — Z78.9 OTHER SPECIFIED HEALTH STATUS: ICD-10-CM

## 2019-07-12 DIAGNOSIS — F17.210 NICOTINE DEPENDENCE, CIGARETTES, UNCOMPLICATED: ICD-10-CM

## 2019-07-12 DIAGNOSIS — Z83.79 FAMILY HISTORY OF OTHER DISEASES OF THE DIGESTIVE SYSTEM: ICD-10-CM

## 2019-07-12 DIAGNOSIS — R10.9 UNSPECIFIED ABDOMINAL PAIN: ICD-10-CM

## 2019-07-12 PROCEDURE — 99205 OFFICE O/P NEW HI 60 MIN: CPT

## 2019-07-12 NOTE — REASON FOR VISIT
[Consultation] : a consultation visit [Spouse] : spouse [FreeTextEntry1] : Crohn's disease with fistula\par Pancreatitis

## 2019-07-12 NOTE — PHYSICAL EXAM
[General Appearance - In No Acute Distress] : in no acute distress [General Appearance - Alert] : alert [Extraocular Movements] : extraocular movements were intact [Sclera] : the sclera and conjunctiva were normal [PERRL With Normal Accommodation] : pupils were equal in size, round, and reactive to light [Outer Ear] : the ears and nose were normal in appearance [Oropharynx] : the oropharynx was normal [Neck Appearance] : the appearance of the neck was normal [Neck Cervical Mass (___cm)] : no neck mass was observed [Thyroid Nodule] : there were no palpable thyroid nodules [Jugular Venous Distention Increased] : there was no jugular-venous distention [Thyroid Diffuse Enlargement] : the thyroid was not enlarged [Auscultation Breath Sounds / Voice Sounds] : lungs were clear to auscultation bilaterally [Heart Sounds] : normal S1 and S2 [Heart Sounds Gallop] : no gallops [Heart Rate And Rhythm] : heart rate was normal and rhythm regular [Murmurs] : no murmurs [Heart Sounds Pericardial Friction Rub] : no pericardial rub [Edema] : there was no peripheral edema [Bowel Sounds] : normal bowel sounds [Abdomen Soft] : soft [Abdomen Tenderness] : non-tender [Abdomen Mass (___ Cm)] : no abdominal mass palpated [Cervical Lymph Nodes Enlarged Posterior Bilaterally] : posterior cervical [Cervical Lymph Nodes Enlarged Anterior Bilaterally] : anterior cervical [Axillary Lymph Nodes Enlarged Bilaterally] : axillary [Supraclavicular Lymph Nodes Enlarged Bilaterally] : supraclavicular [Inguinal Lymph Nodes Enlarged Bilaterally] : inguinal [Femoral Lymph Nodes Enlarged Bilaterally] : femoral [No CVA Tenderness] : no ~M costovertebral angle tenderness [No Spinal Tenderness] : no spinal tenderness [Nail Clubbing] : no clubbing  or cyanosis of the fingernails [Abnormal Walk] : normal gait [Motor Tone] : muscle strength and tone were normal [Skin Color & Pigmentation] : normal skin color and pigmentation [Musculoskeletal - Swelling] : no joint swelling seen [Skin Turgor] : normal skin turgor [] : no rash [Impaired Insight] : insight and judgment were intact [Oriented To Time, Place, And Person] : oriented to person, place, and time [Affect] : the affect was normal [FreeTextEntry1] : Extensive fistulas, drainage, erythema of surrounding skin, mucous

## 2019-07-12 NOTE — HISTORY OF PRESENT ILLNESS
[de-identified] : 33-year-old male, smoker, 20 year history of Crohn's disease presents for evaluation.\par Initial presentation with diarrhea pain vomiting weight loss. Has been treated on and off for many years with prednisone, Asacol, developing perianal fistula is approximately 7-8 years ago.\par Single treatment with Remicade, poorly tolerated, infusion followed several hours later by severe joint pains requiring return to the hospital. Patient cannot recall any efficacy.\par No other history of immunotherapy\par Patient generally without Crohn's hospitalizations\par Patient without history of Crohn surgery other than one time placement of seton by doctor in [facility]\par \par Patient currently reports 3 loose stools a day, lower abdominal pain, without bleeding\par Fluctuating weight\par \par Most recent scans show persistence of fistula A., thickening of the terminal ileum\par Colonoscopy last month, report unavailable at this time joint disease activity, possible stenosis of the terminal ileum\par \par Patient also with a history recently of pancreatitis. Initial bout last year associated with chronic heavy drinking of alcohol, recurrence is noted.  Most recent hospitalization in May, lipase 963.  Imaging showing thickening of the pancreas but resolution of phlegmon \par \par Patient states his most recent episode of pancreatitis occurred during a period of abstinence\par \par Patient states that he can tell the difference between his pancreatitis pain which is mid upper abdomen when it occurs, versus Crohn's pain which is lower abdomen\par \par Blood testing for IgG for normal, 57\par \par Social history smoker,

## 2019-07-12 NOTE — ASSESSMENT
[FreeTextEntry1] : Active Crohn's disease with perianal fistula\par Cannot rule out perianal abscess\par History of recurrent pancreatitis, even since abstinence from alcohol\par Suspect chronic pancreatitis\par \par Plan\par Advanced endoscopist, pancreas evaluation\par Consideration for endoscopic sonogram\par Blood testing to rule out tuberculosis hepatitis B as a prelude to biologic therapy.\par Despite his prior reaction to Remicade have recommended Humira as alternative, best supplemental evidence for healing of perianal fistulae in addition to luminal Crohn's disease\par Have recommended colorectal surgical evaluation for possible replacement of setons prior to initiation of any biologic therapy\par \par Patient referred by Dr. Daniel Roberts

## 2019-07-16 ENCOUNTER — MESSAGE (OUTPATIENT)
Age: 34
End: 2019-07-16

## 2019-07-16 DIAGNOSIS — K50.113 CROHN'S DISEASE OF LARGE INTESTINE WITH FISTULA: ICD-10-CM

## 2019-07-16 LAB
25(OH)D3 SERPL-MCNC: 56.1 NG/ML
ALBUMIN SERPL ELPH-MCNC: 4.2 G/DL
ALP BLD-CCNC: 78 U/L
ALT SERPL-CCNC: 43 U/L
ANION GAP SERPL CALC-SCNC: 16 MMOL/L
AST SERPL-CCNC: 44 U/L
BASOPHILS # BLD AUTO: 0.05 K/UL
BASOPHILS NFR BLD AUTO: 0.5 %
BILIRUB SERPL-MCNC: 0.4 MG/DL
BUN SERPL-MCNC: 6 MG/DL
CALCIUM SERPL-MCNC: 9.3 MG/DL
CHLORIDE SERPL-SCNC: 103 MMOL/L
CO2 SERPL-SCNC: 22 MMOL/L
CREAT SERPL-MCNC: 0.62 MG/DL
CRP SERPL-MCNC: 1.44 MG/DL
EOSINOPHIL # BLD AUTO: 0.14 K/UL
EOSINOPHIL NFR BLD AUTO: 1.3 %
GLUCOSE SERPL-MCNC: 101 MG/DL
HBV CORE IGG+IGM SER QL: NONREACTIVE
HBV SURFACE AB SER QL: NONREACTIVE
HBV SURFACE AG SER QL: NONREACTIVE
HCT VFR BLD CALC: 44.8 %
HGB BLD-MCNC: 14 G/DL
IMM GRANULOCYTES NFR BLD AUTO: 0.4 %
LYMPHOCYTES # BLD AUTO: 2.13 K/UL
LYMPHOCYTES NFR BLD AUTO: 19.5 %
M TB IFN-G BLD-IMP: NEGATIVE
MAN DIFF?: NORMAL
MCHC RBC-ENTMCNC: 31.3 GM/DL
MCHC RBC-ENTMCNC: 33 PG
MCV RBC AUTO: 105.7 FL
MONOCYTES # BLD AUTO: 0.93 K/UL
MONOCYTES NFR BLD AUTO: 8.5 %
NEUTROPHILS # BLD AUTO: 7.61 K/UL
NEUTROPHILS NFR BLD AUTO: 69.8 %
PLATELET # BLD AUTO: 310 K/UL
POTASSIUM SERPL-SCNC: 3.9 MMOL/L
PROT SERPL-MCNC: 7.3 G/DL
QUANTIFERON TB PLUS MITOGEN MINUS NIL: 2.85 IU/ML
QUANTIFERON TB PLUS NIL: 0.02 IU/ML
QUANTIFERON TB PLUS TB1 MINUS NIL: -0.01 IU/ML
QUANTIFERON TB PLUS TB2 MINUS NIL: -0.01 IU/ML
RBC # BLD: 4.24 M/UL
RBC # FLD: 16.2 %
SODIUM SERPL-SCNC: 141 MMOL/L
VIT B12 SERPL-MCNC: 287 PG/ML
WBC # FLD AUTO: 10.9 K/UL

## 2019-07-16 RX ORDER — ADALIMUMAB 40MG/0.8ML
40 KIT SUBCUTANEOUS
Qty: 1 | Refills: 0 | Status: ACTIVE | COMMUNITY
Start: 2019-07-16 | End: 1900-01-01

## 2019-07-18 ENCOUNTER — MESSAGE (OUTPATIENT)
Age: 34
End: 2019-07-18

## 2019-07-31 ENCOUNTER — APPOINTMENT (OUTPATIENT)
Dept: COLORECTAL SURGERY | Facility: CLINIC | Age: 34
End: 2019-07-31

## 2019-10-31 ENCOUNTER — EMERGENCY (EMERGENCY)
Facility: HOSPITAL | Age: 34
LOS: 0 days | Discharge: HOME | End: 2019-11-01
Attending: EMERGENCY MEDICINE
Payer: COMMERCIAL

## 2019-10-31 VITALS
DIASTOLIC BLOOD PRESSURE: 99 MMHG | RESPIRATION RATE: 18 BRPM | SYSTOLIC BLOOD PRESSURE: 141 MMHG | TEMPERATURE: 99 F | OXYGEN SATURATION: 99 % | HEART RATE: 124 BPM

## 2019-10-31 DIAGNOSIS — Z98.89 OTHER SPECIFIED POSTPROCEDURAL STATES: Chronic | ICD-10-CM

## 2019-10-31 DIAGNOSIS — K50.90 CROHN'S DISEASE, UNSPECIFIED, WITHOUT COMPLICATIONS: ICD-10-CM

## 2019-10-31 DIAGNOSIS — R68.84 JAW PAIN: ICD-10-CM
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
## 2019-10-31 DIAGNOSIS — F17.200 NICOTINE DEPENDENCE, UNSPECIFIED, UNCOMPLICATED: ICD-10-CM

## 2019-10-31 DIAGNOSIS — S02.5XXA FRACTURE OF TOOTH (TRAUMATIC), INITIAL ENCOUNTER FOR CLOSED FRACTURE: ICD-10-CM

## 2019-10-31 DIAGNOSIS — K60.3 ANAL FISTULA: Chronic | ICD-10-CM

## 2019-10-31 DIAGNOSIS — Z96.7 PRESENCE OF OTHER BONE AND TENDON IMPLANTS: Chronic | ICD-10-CM

## 2019-10-31 DIAGNOSIS — L02.411 CUTANEOUS ABSCESS OF RIGHT AXILLA: ICD-10-CM
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
## 2019-10-31 DIAGNOSIS — R10.9 UNSPECIFIED ABDOMINAL PAIN: ICD-10-CM

## 2019-10-31 LAB
BASOPHILS # BLD AUTO: 0.05 K/UL — SIGNIFICANT CHANGE UP (ref 0–0.2)
BASOPHILS NFR BLD AUTO: 0.4 % — SIGNIFICANT CHANGE UP (ref 0–2)
EOSINOPHIL # BLD AUTO: 0.15 K/UL — SIGNIFICANT CHANGE UP (ref 0–0.5)
EOSINOPHIL NFR BLD AUTO: 1.2 % — SIGNIFICANT CHANGE UP (ref 0–6)
HCT VFR BLD CALC: 38.9 % — LOW (ref 39–50)
HGB BLD-MCNC: 13 G/DL — SIGNIFICANT CHANGE UP (ref 13–17)
IMM GRANULOCYTES NFR BLD AUTO: 0.7 % — SIGNIFICANT CHANGE UP (ref 0–1.5)
LYMPHOCYTES # BLD AUTO: 19.9 % — SIGNIFICANT CHANGE UP (ref 13–44)
LYMPHOCYTES # BLD AUTO: 2.53 K/UL — SIGNIFICANT CHANGE UP (ref 1–3.3)
MCHC RBC-ENTMCNC: 33.3 PG — SIGNIFICANT CHANGE UP (ref 27–34)
MCHC RBC-ENTMCNC: 33.4 GM/DL — SIGNIFICANT CHANGE UP (ref 32–36)
MCV RBC AUTO: 99.7 FL — SIGNIFICANT CHANGE UP (ref 80–100)
MONOCYTES # BLD AUTO: 1.43 K/UL — HIGH (ref 0–0.9)
MONOCYTES NFR BLD AUTO: 11.2 % — SIGNIFICANT CHANGE UP (ref 2–14)
NEUTROPHILS # BLD AUTO: 8.48 K/UL — HIGH (ref 1.8–7.4)
NEUTROPHILS NFR BLD AUTO: 66.6 % — SIGNIFICANT CHANGE UP (ref 43–77)
PLATELET # BLD AUTO: 386 K/UL — SIGNIFICANT CHANGE UP (ref 150–400)
RBC # BLD: 3.9 M/UL — LOW (ref 4.2–5.8)
RBC # FLD: 14.3 % — SIGNIFICANT CHANGE UP (ref 10.3–14.5)
WBC # BLD: 12.73 K/UL — HIGH (ref 3.8–10.5)
WBC # FLD AUTO: 12.73 K/UL — HIGH (ref 3.8–10.5)

## 2019-10-31 PROCEDURE — 96376 TX/PRO/DX INJ SAME DRUG ADON: CPT

## 2019-10-31 PROCEDURE — 96361 HYDRATE IV INFUSION ADD-ON: CPT | Mod: XU

## 2019-10-31 PROCEDURE — 83690 ASSAY OF LIPASE: CPT

## 2019-10-31 PROCEDURE — 80053 COMPREHEN METABOLIC PANEL: CPT

## 2019-10-31 PROCEDURE — 96375 TX/PRO/DX INJ NEW DRUG ADDON: CPT

## 2019-10-31 PROCEDURE — 85025 COMPLETE CBC W/AUTO DIFF WBC: CPT

## 2019-10-31 PROCEDURE — 10060 I&D ABSCESS SIMPLE/SINGLE: CPT

## 2019-10-31 PROCEDURE — 96374 THER/PROPH/DIAG INJ IV PUSH: CPT | Mod: XU

## 2019-10-31 PROCEDURE — 99285 EMERGENCY DEPT VISIT HI MDM: CPT

## 2019-10-31 PROCEDURE — 71046 X-RAY EXAM CHEST 2 VIEWS: CPT | Mod: 26

## 2019-10-31 PROCEDURE — 99284 EMERGENCY DEPT VISIT MOD MDM: CPT | Mod: 25

## 2019-10-31 PROCEDURE — 36415 COLL VENOUS BLD VENIPUNCTURE: CPT

## 2019-10-31 PROCEDURE — 71046 X-RAY EXAM CHEST 2 VIEWS: CPT

## 2019-10-31 RX ORDER — SODIUM CHLORIDE 9 MG/ML
1000 INJECTION INTRAMUSCULAR; INTRAVENOUS; SUBCUTANEOUS ONCE
Refills: 0 | Status: COMPLETED | OUTPATIENT
Start: 2019-10-31 | End: 2019-10-31

## 2019-10-31 RX ORDER — ACETAMINOPHEN 500 MG
975 TABLET ORAL ONCE
Refills: 0 | Status: COMPLETED | OUTPATIENT
Start: 2019-10-31 | End: 2019-10-31

## 2019-10-31 RX ORDER — OXYCODONE HYDROCHLORIDE 5 MG/1
5 TABLET ORAL ONCE
Refills: 0 | Status: DISCONTINUED | OUTPATIENT
Start: 2019-10-31 | End: 2019-10-31

## 2019-10-31 RX ORDER — METRONIDAZOLE 500 MG
500 TABLET ORAL ONCE
Refills: 0 | Status: COMPLETED | OUTPATIENT
Start: 2019-10-31 | End: 2019-10-31

## 2019-10-31 RX ORDER — KETOROLAC TROMETHAMINE 30 MG/ML
15 SYRINGE (ML) INJECTION ONCE
Refills: 0 | Status: DISCONTINUED | OUTPATIENT
Start: 2019-10-31 | End: 2019-10-31

## 2019-10-31 RX ORDER — CIPROFLOXACIN LACTATE 400MG/40ML
500 VIAL (ML) INTRAVENOUS EVERY 12 HOURS
Refills: 0 | Status: DISCONTINUED | OUTPATIENT
Start: 2019-10-31 | End: 2019-11-01

## 2019-10-31 RX ADMIN — OXYCODONE HYDROCHLORIDE 5 MILLIGRAM(S): 5 TABLET ORAL at 00:48

## 2019-10-31 RX ADMIN — SODIUM CHLORIDE 1000 MILLILITER(S): 9 INJECTION INTRAMUSCULAR; INTRAVENOUS; SUBCUTANEOUS at 23:49

## 2019-10-31 RX ADMIN — OXYCODONE HYDROCHLORIDE 5 MILLIGRAM(S): 5 TABLET ORAL at 23:48

## 2019-10-31 RX ADMIN — Medication 15 MILLIGRAM(S): at 23:48

## 2019-10-31 NOTE — ED ADULT NURSE NOTE - OBJECTIVE STATEMENT
pt presents to the ED with, left jaw pain, left axillary pain, mid abdominal pain, pt has a history of crohn's disease, pt believes this is a crohn's flare up, pt reports diarrhea, denies nausea and vomiting, cyst noted to left axillary,

## 2019-10-31 NOTE — ED PROVIDER NOTE - CLINICAL SUMMARY MEDICAL DECISION MAKING FREE TEXT BOX
L lower 3rd molar is chipped no w/o surrounding erythema or abscess, will tx w/ ppx abx. has R axilla abscess that was drained. having mild abdominal pain, diarrhea & feels as if he's having a mild crohn's flare. pt is scheduled to start humera tomorrow & has GI f/u. pt doesn't feel this crohns flare is bad & he doesn't think it needs admission as it has needed in past

## 2019-10-31 NOTE — ED PROVIDER NOTE - NSFOLLOWUPINSTRUCTIONS_ED_ALL_ED_FT
FOLLOW UP WITH PMD & GI  WITHIN 1-2DAYS, CALL TO MAKE APPOINTMENT  COME BACK TO ED IF YOUR CONDITION WORSENS OR IF YOU DEVELOP FEVER GREATER THAN 100.4F, CHEST PAIN,  SHORTNESS OF BREATH OR ANY OTHER SYMPTOMS CONCERNING TO YOU  TAKE TYLENOL (ACETAMINOPHEN) 650 MG EVERY 6 HOURS AS NEEDED FOR PAIN  TAKE IBUPROFEN (MOTRIN)  600 MG EVERY 6 HOURS AS NEEDED FOR PAIN  IF YOU WERE PRESRCIBED ANY MEDICATIONS FROM TODAY'S VISIT, TAKE THEM AS DIRECTED (AUGMENTIN, FLAGYL, OXYCODONE)    Toothache    WHAT YOU NEED TO KNOW:    A toothache is pain that is caused by irritation of the nerves in the center of your tooth. The irritation may be caused by several problems, such as a cavity, an infection, a cracked tooth, or gum disease. Tooth Anatomy         DISCHARGE INSTRUCTIONS:    Return to the emergency department if:     You have trouble breathing or swallowing.       You have swelling in your face or neck.     Contact your dentist if:     You have a fever and chills.       You have trouble opening or closing your mouth.       You have swelling around your tooth.       You have questions or concerns about your condition or care.    Medicines: You may need any of the following:     NSAIDs, such as ibuprofen, help decrease swelling, pain, and fever. This medicine is available with or without a doctor's order. NSAIDs can cause stomach bleeding or kidney problems in certain people. If you take blood thinner medicine, always ask if NSAIDs are safe for you. Always read the medicine label and follow directions. Do not give these medicines to children under 6 months of age without direction from your child's healthcare provider.      Acetaminophen decreases pain and fever. It is available without a doctor's order. Ask how much to take and how often to take it. Follow directions. Acetaminophen can cause liver damage if not taken correctly.      Prescription pain medicine may be given. Ask your healthcare provider how to take this medicine safely. Some prescription pain medicines contain acetaminophen. Do not take other medicines that contain acetaminophen without talking to your healthcare provider. Too much acetaminophen may cause liver damage. Prescription pain medicine may cause constipation. Ask your healthcare provider how to prevent or treat constipation.       Antibiotics help treat or prevent a bacterial infection.       Take your medicine as directed. Contact your healthcare provider if you think your medicine is not helping or if you have side effects. Tell him of her if you are allergic to any medicine. Keep a list of the medicines, vitamins, and herbs you take. Include the amounts, and when and why you take them. Bring the list or the pill bottles to follow-up visits. Carry your medicine list with you in case of an emergency.    Self-care:     Rinse your mouth with warm salt water 4 times a day or as directed.       Eat soft foods to help relieve pain caused by chewing.       Apply ice on your jaw or cheek for 15 to 20 minutes every hour or as directed. Use an ice pack, or put crushed ice in a plastic bag. Cover it with a towel before you apply it. Ice helps prevent tissue damage and decreases swelling and pain.    Help prevent a toothache:     Brush your teeth at least 2 times a day.      Use dental floss to clean between your teeth at least 1 time a day.      See your dentist regularly every 6 months for dental cleanings and oral exams.    Follow up with your dentist as directed: You may be referred to a dental surgeon. Write down your questions so you remember to ask them during your visit.     Skin Abscess  ImageA skin abscess is an infected area on or under your skin that contains pus and other material. An abscess can happen almost anywhere on your body. Some abscesses break open (rupture) on their own. Most continue to get worse unless they are treated. The infection can spread deeper into the body and into your blood, which can make you feel sick. Treatment usually involves draining the abscess.    Follow these instructions at home:  Abscess Care     If you have an abscess that has not drained, place a warm, clean, wet washcloth over the abscess several times a day. Do this as told by your doctor.  Follow instructions from your doctor about how to take care of your abscess. Make sure you:    Cover the abscess with a bandage (dressing).  Change your bandage or gauze as told by your doctor.  Wash your hands with soap and water before you change the bandage or gauze. If you cannot use soap and water, use hand .    Check your abscess every day for signs that the infection is getting worse. Check for:    More redness, swelling, or pain.  More fluid or blood.  Warmth.  More pus or a bad smell.    Medicines     Image   Take over-the-counter and prescription medicines only as told by your doctor.  If you were prescribed an antibiotic medicine, take it as told by your doctor. Do not stop taking the antibiotic even if you start to feel better.  General instructions     To avoid spreading the infection:    Do not share personal care items, towels, or hot tubs with others.  Avoid making skin-to-skin contact with other people.    Keep all follow-up visits as told by your doctor. This is important.  Contact a doctor if:  You have more redness, swelling, or pain around your abscess.  You have more fluid or blood coming from your abscess.  Your abscess feels warm when you touch it.  You have more pus or a bad smell coming from your abscess.  You have a fever.  Your muscles ache.  You have chills.  You feel sick.  Get help right away if:  You have very bad (severe) pain.  You see red streaks on your skin spreading away from the abscess.

## 2019-10-31 NOTE — ED PROVIDER NOTE - PHYSICAL EXAMINATION
*GEN: No acute distress, well appearing; A+O x3   *HEAD: Normocephalic, Atraumatic  *EYES/NOSE: PERRL & EOMI b/l  *THROAT: airway patent, moist mucous membranes; L lower 3rd molar is chipped no w/o surrounding erythema or abscess  *NECK: Neck supple, no masses  *PULMONARY: CTA b/l, symmetric breath sounds.   *CARDIAC: s1s2, regular rhythm, no Murmur  *ABDOMEN:  Non Tender, Non Distended, soft, no guarding, no rebound, no masses   *BACK: no CVA tenderness, Normal  spine   *EXTREMITIES: symmetric pulses, 2+ dp & radial pulses, capillary refill < 2 seconds, no cyanosis, no edema   *SKIN: no rash or bruising   *NEUROLOGIC: alert, CN 2-12 intact, moves all 4 extremities, full active & passive ROM in all extremities, normal baseline gait  *PSYCH: insight and judgment nl, memory nl, affect nl, thought nl

## 2019-10-31 NOTE — ED PROVIDER NOTE - CARE PLAN
Principal Discharge DX:	Crohn disease  Secondary Diagnosis:	Abscess of axilla, right  Secondary Diagnosis:	Chipped tooth

## 2019-10-31 NOTE — ED ADULT NURSE NOTE - CHPI ED NUR SYMPTOMS NEG
no fever/no dizziness/no nausea/no weakness/no chills/no vomiting/no tingling/no decreased eating/drinking

## 2019-10-31 NOTE — ED PROVIDER NOTE - PATIENT PORTAL LINK FT
You can access the FollowMyHealth Patient Portal offered by [facility] by registering at the following website: [email or web address]. By joining [employer]’s FollowMyHealth portal, you will also be able to view your health information using other applications (apps) compatible with our system.

## 2019-10-31 NOTE — ED ADULT NURSE NOTE - NSIMPLEMENTINTERV_GEN_ALL_ED
Implemented All Universal Safety Interventions:  [place] to call system. Call bell, personal items and telephone within reach. Instruct patient to call for assistance. Room bathroom lighting operational. Non-slip footwear when patient is off stretcher. Physically safe environment: no spills, clutter or unnecessary equipment. Stretcher in lowest position, wheels locked, appropriate side rails in place.

## 2019-10-31 NOTE — ED PROVIDER NOTE - OBJECTIVE STATEMENT
abd pain, diarrhea from mild chrohn's, scheduled to start humera tomorrow & has GI f/u  R axilla abscess vs cyst  L lower 3rd molar chipped from cavity Pertinent HPI/PMH/PSH/FHx/SHx and Review of Systems contained within  HPI:  yo  p/w CC L sided jaw pain x1day, new onset. also feels like L lower 3rd molar is chipped. also states noted a painful cyst/abscess r axillary region. also having mild abdominal pain, diarrhea & feels as if he's having a mild crohn's flare. pt is scheduled to start humera tomorrow & has GI f/u  PMH/PSH relevant for:  ROS negative for: fever, Chest pain, SOB, Nausea, vomiting, dysuria    FamilyHx and SocialHx not otherwise contributory

## 2019-10-31 NOTE — ED ADULT TRIAGE NOTE - CHIEF COMPLAINT QUOTE
left side jaw pain radiating to ear since 10pm, possible related to cracked tooth.   also complains of abdominal pain left side jaw pain radiating to ear since 10pm, possible related to cracked tooth.   also complains of abdominal pain with diarrhea, posible crohns flare up

## 2019-10-31 NOTE — ED ADULT NURSE NOTE - CHIEF COMPLAINT QUOTE
left side jaw pain radiating to ear since 10pm, possible related to cracked tooth.   also complains of abdominal pain with diarrhea, posible crohns flare up

## 2019-10-31 NOTE — ED PROVIDER NOTE - NS ED ROS FT
Review of Systems:  	•	CONSTITUTIONAL: no fever  	•	SKIN: no rash; + dental pain  	•	RESPIRATORY: no shortness of breath  	•	CARDIAC: no chest pain, no palpitations  	•	GI:  +abd pain, +nausea, +vomiting, +diarrhea  	•	GENITO-URINARY:  no dysuria; no hematuria    	•	MUSCULOSKELETAL:  no back pain  	•	NEUROLOGIC: no weakness  	•	ALLERGY: no rhinitis  	•	PSYSCHIATRIC: no anxiety

## 2019-11-01 VITALS
TEMPERATURE: 98 F | RESPIRATION RATE: 16 BRPM | SYSTOLIC BLOOD PRESSURE: 113 MMHG | DIASTOLIC BLOOD PRESSURE: 72 MMHG | OXYGEN SATURATION: 100 % | HEART RATE: 89 BPM

## 2019-11-01 LAB
ALBUMIN SERPL ELPH-MCNC: 2.9 G/DL — LOW (ref 3.3–5)
ALP SERPL-CCNC: 79 U/L — SIGNIFICANT CHANGE UP (ref 40–120)
ALT FLD-CCNC: 21 U/L — SIGNIFICANT CHANGE UP (ref 12–78)
ANION GAP SERPL CALC-SCNC: 7 MMOL/L — SIGNIFICANT CHANGE UP (ref 5–17)
AST SERPL-CCNC: 17 U/L — SIGNIFICANT CHANGE UP (ref 15–37)
BILIRUB SERPL-MCNC: 0.1 MG/DL — LOW (ref 0.2–1.2)
BUN SERPL-MCNC: 9 MG/DL — SIGNIFICANT CHANGE UP (ref 7–23)
CALCIUM SERPL-MCNC: 9.1 MG/DL — SIGNIFICANT CHANGE UP (ref 8.5–10.1)
CHLORIDE SERPL-SCNC: 106 MMOL/L — SIGNIFICANT CHANGE UP (ref 96–108)
CO2 SERPL-SCNC: 27 MMOL/L — SIGNIFICANT CHANGE UP (ref 22–31)
CREAT SERPL-MCNC: 1.01 MG/DL — SIGNIFICANT CHANGE UP (ref 0.5–1.3)
GLUCOSE SERPL-MCNC: 121 MG/DL — HIGH (ref 70–99)
LIDOCAIN IGE QN: 131 U/L — SIGNIFICANT CHANGE UP (ref 73–393)
POTASSIUM SERPL-MCNC: 3.7 MMOL/L — SIGNIFICANT CHANGE UP (ref 3.5–5.3)
POTASSIUM SERPL-SCNC: 3.7 MMOL/L — SIGNIFICANT CHANGE UP (ref 3.5–5.3)
PROT SERPL-MCNC: 7.5 GM/DL — SIGNIFICANT CHANGE UP (ref 6–8.3)
SODIUM SERPL-SCNC: 140 MMOL/L — SIGNIFICANT CHANGE UP (ref 135–145)

## 2019-11-01 RX ORDER — METRONIDAZOLE 500 MG
1 TABLET ORAL
Qty: 21 | Refills: 0
Start: 2019-11-01 | End: 2019-11-07

## 2019-11-01 RX ORDER — IBUPROFEN 200 MG
1 TABLET ORAL
Qty: 20 | Refills: 0
Start: 2019-11-01 | End: 2019-11-05

## 2019-11-01 RX ORDER — KETOROLAC TROMETHAMINE 30 MG/ML
15 SYRINGE (ML) INJECTION ONCE
Refills: 0 | Status: DISCONTINUED | OUTPATIENT
Start: 2019-11-01 | End: 2019-11-01

## 2019-11-01 RX ORDER — ONDANSETRON 8 MG/1
4 TABLET, FILM COATED ORAL ONCE
Refills: 0 | Status: COMPLETED | OUTPATIENT
Start: 2019-11-01 | End: 2019-11-01

## 2019-11-01 RX ORDER — OXYCODONE HYDROCHLORIDE 5 MG/1
1 TABLET ORAL
Qty: 12 | Refills: 0
Start: 2019-11-01 | End: 2019-11-04

## 2019-11-01 RX ADMIN — Medication 500 MILLIGRAM(S): at 00:16

## 2019-11-01 RX ADMIN — SODIUM CHLORIDE 1000 MILLILITER(S): 9 INJECTION INTRAMUSCULAR; INTRAVENOUS; SUBCUTANEOUS at 00:49

## 2019-11-01 RX ADMIN — ONDANSETRON 4 MILLIGRAM(S): 8 TABLET, FILM COATED ORAL at 00:59

## 2019-11-01 RX ADMIN — Medication 15 MILLIGRAM(S): at 00:59

## 2019-11-01 RX ADMIN — Medication 975 MILLIGRAM(S): at 00:15

## 2019-11-01 RX ADMIN — Medication 15 MILLIGRAM(S): at 00:15

## 2019-11-02 ENCOUNTER — EMERGENCY (EMERGENCY)
Facility: HOSPITAL | Age: 34
LOS: 0 days | Discharge: HOME | End: 2019-11-02
Attending: EMERGENCY MEDICINE
Payer: COMMERCIAL

## 2019-11-02 VITALS
HEART RATE: 103 BPM | SYSTOLIC BLOOD PRESSURE: 142 MMHG | OXYGEN SATURATION: 100 % | DIASTOLIC BLOOD PRESSURE: 103 MMHG | RESPIRATION RATE: 16 BRPM

## 2019-11-02 VITALS
SYSTOLIC BLOOD PRESSURE: 138 MMHG | OXYGEN SATURATION: 96 % | HEART RATE: 91 BPM | RESPIRATION RATE: 18 BRPM | DIASTOLIC BLOOD PRESSURE: 95 MMHG

## 2019-11-02 DIAGNOSIS — Z96.7 PRESENCE OF OTHER BONE AND TENDON IMPLANTS: Chronic | ICD-10-CM

## 2019-11-02 DIAGNOSIS — K02.9 DENTAL CARIES, UNSPECIFIED: ICD-10-CM

## 2019-11-02 DIAGNOSIS — K04.7 PERIAPICAL ABSCESS WITHOUT SINUS: ICD-10-CM

## 2019-11-02 DIAGNOSIS — K60.3 ANAL FISTULA: Chronic | ICD-10-CM

## 2019-11-02 DIAGNOSIS — Z98.89 OTHER SPECIFIED POSTPROCEDURAL STATES: Chronic | ICD-10-CM

## 2019-11-02 DIAGNOSIS — R68.84 JAW PAIN: ICD-10-CM

## 2019-11-02 PROCEDURE — 99283 EMERGENCY DEPT VISIT LOW MDM: CPT

## 2019-11-02 PROCEDURE — 96372 THER/PROPH/DIAG INJ SC/IM: CPT

## 2019-11-02 PROCEDURE — 99283 EMERGENCY DEPT VISIT LOW MDM: CPT | Mod: 25

## 2019-11-02 RX ORDER — OXYCODONE AND ACETAMINOPHEN 5; 325 MG/1; MG/1
2 TABLET ORAL ONCE
Refills: 0 | Status: DISCONTINUED | OUTPATIENT
Start: 2019-11-02 | End: 2019-11-02

## 2019-11-02 RX ORDER — OXYCODONE HYDROCHLORIDE 5 MG/1
2 TABLET ORAL
Qty: 24 | Refills: 0
Start: 2019-11-02 | End: 2019-11-04

## 2019-11-02 RX ORDER — KETOROLAC TROMETHAMINE 30 MG/ML
60 SYRINGE (ML) INJECTION ONCE
Refills: 0 | Status: DISCONTINUED | OUTPATIENT
Start: 2019-11-02 | End: 2019-11-02

## 2019-11-02 RX ADMIN — OXYCODONE AND ACETAMINOPHEN 2 TABLET(S): 5; 325 TABLET ORAL at 08:09

## 2019-11-02 RX ADMIN — Medication 60 MILLIGRAM(S): at 08:01

## 2019-11-02 NOTE — ED PROVIDER NOTE - PATIENT PORTAL LINK FT
You can access the FollowMyHealth Patient Portal offered by [facility] by registering at the following website: [email or web address]. By joining [employer]’s FollowMyHealth portal, you will also be able to view your health information using other applications (apps) compatible with our system.

## 2019-11-02 NOTE — ED ADULT NURSE REASSESSMENT NOTE - NS ED NURSE REASSESS COMMENT FT1
Pt A&O x 3, recvd pt from RN in bed, c/o dental pain radiating to jaw and L ear x 3 days. Denies CP, dizziness, SOB or changes in vision. Hx Crohns

## 2019-11-02 NOTE — ED PROVIDER NOTE - CLINICAL SUMMARY MEDICAL DECISION MAKING FREE TEXT BOX
pt with dental abscess on amoxicillin will give pain medication and refer to dental at [place] today

## 2019-11-02 NOTE — ED ADULT NURSE NOTE - OBJECTIVE STATEMENT
Was seen in  [facility] 10/31/19 for cracked tooth to lower left. Was prescribed ABX, motrin & oxycodone. Return today with same c/o left jaw, tooth, facial pain. Took Motrin & oxycodone 5mg 0330 with no relief.

## 2019-11-02 NOTE — ED ADULT TRIAGE NOTE - CHIEF COMPLAINT QUOTE
pt returned to ED c/o worsening jaw pain from cracked tooth. pt states no relief from ibuprofen and oxycodone.

## 2019-11-02 NOTE — ED PROVIDER NOTE - ENMT, MLM
Airway patent, dental caries throughout teeth, left  posterior molar with fracture and significant decay with swelling of gumb and face

## 2019-11-02 NOTE — ED PROVIDER NOTE - OBJECTIVE STATEMENT
35 yo male pw left sided jaw pain for 1 week. Pt was seen here 1 day ago for the same complaint and placed on antibiotics, motrin and percocet. He is out of percocet as he received 4 pills.  Pt has multiple poor dentition with dental caries. no fever

## 2019-11-02 NOTE — ED ADULT NURSE NOTE - PSH
Intact
History of colonoscopy  (2014)  Perianal fistula  repair in 2002  S/P ORIF (open reduction internal fixation) fracture  (Right ankle, 2014)

## 2020-07-07 ENCOUNTER — EMERGENCY (EMERGENCY)
Facility: HOSPITAL | Age: 35
LOS: 0 days | Discharge: HOME | End: 2020-07-07
Payer: COMMERCIAL

## 2020-07-07 VITALS
DIASTOLIC BLOOD PRESSURE: 82 MMHG | OXYGEN SATURATION: 95 % | RESPIRATION RATE: 19 BRPM | SYSTOLIC BLOOD PRESSURE: 116 MMHG | HEART RATE: 101 BPM | TEMPERATURE: 98 F

## 2020-07-07 DIAGNOSIS — B34.9 VIRAL INFECTION, UNSPECIFIED: ICD-10-CM

## 2020-07-07 DIAGNOSIS — R05 COUGH: ICD-10-CM

## 2020-07-07 DIAGNOSIS — K60.3 ANAL FISTULA: Chronic | ICD-10-CM

## 2020-07-07 DIAGNOSIS — Z20.828 CONTACT WITH AND (SUSPECTED) EXPOSURE TO OTHER VIRAL COMMUNICABLE DISEASES: ICD-10-CM

## 2020-07-07 DIAGNOSIS — Z98.89 OTHER SPECIFIED POSTPROCEDURAL STATES: Chronic | ICD-10-CM

## 2020-07-07 DIAGNOSIS — Z96.7 PRESENCE OF OTHER BONE AND TENDON IMPLANTS: Chronic | ICD-10-CM

## 2020-07-07 PROCEDURE — 99283 EMERGENCY DEPT VISIT LOW MDM: CPT

## 2020-07-07 PROCEDURE — U0003: CPT

## 2020-07-07 NOTE — ED STATDOCS - OBJECTIVE STATEMENT
34 yr. old male PMH: Crohns Disease presents to ED with no fever, + cough, no  runny nose,no  body aches, no sore throat x 7days. Pt recently exposed to COVID-19. Pt here for testing.

## 2020-07-07 NOTE — ED ADULT NURSE NOTE - OBJECTIVE STATEMENT
PATIENT WAS TREATED, EVALUATED AND DISCHARGED BY INTAKE PROVIDER. PLEASE SEE PROVIDER NOTE FOR ASSESSMENT.  DISCHARGE INSTRUCTIONS REVIEWED WITH PATIENT VERBALLY, PT VERBALIZED UNDERSTANDING OF DISCHARGE INSTRUCTIONS. PAPER COPY OF DISCHARGE INSTRUCTIONS GIVEN TO PATIENT WITH SELF DIANA AND COVID 19 INFORMATION.

## 2020-07-08 LAB — SARS-COV-2 RNA SPEC QL NAA+PROBE: SIGNIFICANT CHANGE UP

## 2020-11-03 NOTE — PROGRESS NOTE ADULT - SUBJECTIVE AND OBJECTIVE BOX
Pt here to discuss plans for birth.  She has h/o shoulder dystocia in prior pregnancy, so knows she is at risk for recurrence in this pregnancy.  She is 38+5 today, though the rooming sheet was mismarked with GA 32+ wks rather than her actual GA.      She is worried about her decision and is looking for guidance re: spont vaginal delivery vs IOL vs scheduled c/s for her h/o shoulder dystocia in the past.     Past Medical History:   Diagnosis Date     Allergic rhinitis, cause unspecified      Uncomplicated asthma      Past Surgical History:   Procedure Laterality Date     APPENDECTOMY       appendicitis  2019     C ORAL SURGERY PROCEDURE      [place] teeth     ObHX:  w/ >100 second dystocia w/o birth injury.    Ultrasound from 10/16/2020: EFW: 80% w/ AC 98% and BPD 95%.    /79   Pulse 102   Wt 79.8 kg (176 lb)   LMP 2020   Breastfeeding No   BMI 29.29 kg/m    See flow    A/p: 29 yo G 3  at 38+5 wks, here for recommendations re: primary c/s vs IOL vs  d/t h/o shoulder dystocia in prior pregnancy.    We discussed at length the difficulty in predicting recurrent shoulder dystocia. Certainly patients with a prior shoulder dystocia can opt to schedule a primary . I did offer this to her today. She has a repeat ultrasound scheduled tomorrow. This ultrasound is to reevaluate fetal growth. Her prior ultrasound showed a normally grown baby in the 80th percentile, however abdominal circumference is measuring greater than the 99th percentile. This certainly is of concern given the prior shoulder dystocia and the body habitus of a baby with a large AC, which I explained today as well. She was not ready today to schedule a primary  section. Plan as of the end of this appointment is to follow-up after the ultrasound is complete to allow for scheduling of  section if she desires.    Barbara Castañeda MD, FACOG  (she/her/hers)    Department of Ob/Gyn/Women's  Health  [facility]  [place] [facility]  [address] [place] [address]  [email or web address]  p. [phone]  f. [phone]         INTERVAL HPI/OVERNIGHT EVENTS:  Patient is a 33 year old man with PMHx of Crohn's (previously on Entocort, perianal fistula repair in 2002, denies any previous colonoscopies), hx of pancreatitis admitted 4/1 with increased abdominal pain since this am. Patient's onset of symptoms sudden this am. Similar to previous admission in the hospital. Patient denies of being on any meds. Notes of no previous colonoscopies. No dietary changes. No abdominal traumas. Patient denies any ETOH use / illicit drug use. Denies any associated symptoms of fevers, chills or shakes. No vomiting. 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 Care discussed with Dr. Campo. Patient had a poor follow up as an outpatient.      4/2- still with abd pain   4/3 - feeling better today but still requiring IV Dilaudid for pain control. no n/v/d  4/4/19- patient seen and examined at bedside. States he had clears for breakfast and lunch and had increased nausea and abd pain, patient made NPO again. Still requiring IV pain meds, but appears comfortable  4/5/19- Patient seen and examined at bedside. States he still has pain in his abdomen, but overall appears comfortable, patient tolerating regular low fat diet.  4/6/19- Patient seen and examined at bedside. States he still has sharp pains in his abdomen that come and go. States he was able to eat oatmeal today.    ROS:   All 10 systems reviewed and found to be negative with the exception of what has been described above.        MEDICATIONS  (STANDING):  enoxaparin Injectable 40 milliGRAM(s) SubCutaneous every 24 hours  melatonin 5 milliGRAM(s) Oral at bedtime  methylPREDNISolone sodium succinate Injectable 40 milliGRAM(s) IV Push every 12 hours  pantoprazole  Injectable 40 milliGRAM(s) IV Push daily    MEDICATIONS  (PRN):  HYDROmorphone  Injectable 0.5 milliGRAM(s) IV Push every 4 hours PRN breakthrough pain  HYDROmorphone  Injectable 1 milliGRAM(s) IV Push every 4 hours PRN Severe Pain (7 - 10)  ondansetron Injectable 4 milliGRAM(s) IV Push every 6 hours PRN Nausea and/or Vomiting  simethicone 80 milliGRAM(s) Chew three times a day PRN Gas  zolpidem 5 milliGRAM(s) Oral at bedtime PRN Insomnia      Allergies    No Known Allergies    Intolerances        Vital Signs Last 24 Hrs  T(C): 36.3 (06 Apr 2019 11:37), Max: 36.7 (05 Apr 2019 21:35)  T(F): 97.3 (06 Apr 2019 11:37), Max: 98.1 (05 Apr 2019 21:35)  HR: 60 (06 Apr 2019 11:37) (50 - 75)  BP: 158/97 (06 Apr 2019 11:37) (129/75 - 158/97)  BP(mean): --  RR: 16 (06 Apr 2019 11:37) (16 - 16)  SpO2: 100% (06 Apr 2019 11:37) (97% - 100%)    04-05 @ 07:01  -  04-06 @ 07:00  --------------------------------------------------------  IN: 900 mL / OUT: 0 mL / NET: 900 mL      Physical Exam:  General: WN/WD NAD  Neurology: A&Ox3, nonfocal, CHANG x 4  Respiratory: CTA B/L  CV: RRR, S1S2, no murmurs, rubs or gallops  Abdominal: Soft, +tenderness to palpation mid abdomen, no rebound, no guarding, ND +BS  Extremities: No edema, + peripheral pulses      LABS:    04-05    138  |  105  |  8   ----------------------------<  112<H>  3.9   |  25  |  0.52    Ca    7.8<L>      05 Apr 2019 07:25            RADIOLOGY & ADDITIONAL TESTS:

## 2020-11-25 NOTE — ED PROVIDER NOTE - NS ED SCRIBE STATEMENT
Airway    Date/Time: 11/25/2020 10:08 AM  Performed by: Roberto Méndez M.D.  Authorized by: Roberto Méndez Attending

## 2021-01-11 NOTE — PATIENT PROFILE ADULT. - PROVIDER NOTIFICATION
----- Message from Jesse Thomas MD sent at 1/9/2021  2:00 PM EST -----  Normal mammogram-please notify
Patient called and results given.
Declines

## 2021-05-28 NOTE — ED PROVIDER NOTE - PROGRESS NOTE DETAILS
Discussed the importance of core strengthening, ROM, stretching exercises with the patient and how each of these entities is important in decreasing pain.  Explained to the patient that the purpose of physical therapy is to teach the patient a home exercise program.  These exercises need to be performed every day in order to decrease pain and prevent future occurrences of pain.        ~Please call Nurse Navigation line [phone] with any questions or concerns about your treatment plan, if symptoms worsen and you would like to be seen urgently, or if you have problems controlling bladder and bowel function.  ~Follow Up Appointment time slots with Swati Valencia CNP with [facility], are also available at the [facility] location near [facility] on the first and third THURSDAY afternoons of each month.    
Pt without urinary complaints, no flank pain, fever, no back pain. Even though, UA shows 6-10 WBC, likely not a UTI. -Roc Nguyen PA-C

## 2021-08-18 RX ORDER — OXYCODONE HYDROCHLORIDE 5 MG/1
2 TABLET ORAL
Qty: 0 | Refills: 0 | DISCHARGE
Start: 2021-08-18 | End: 2021-08-27

## 2021-08-18 RX ORDER — CYCLOBENZAPRINE HYDROCHLORIDE 10 MG/1
1 TABLET, FILM COATED ORAL
Qty: 0 | Refills: 0 | DISCHARGE
Start: 2021-08-18

## 2021-08-23 ENCOUNTER — INPATIENT (INPATIENT)
Facility: HOSPITAL | Age: 36
LOS: 2 days | Discharge: HOME | DRG: 387 | End: 2021-08-26
Attending: INTERNAL MEDICINE | Admitting: STUDENT IN AN ORGANIZED HEALTH CARE EDUCATION/TRAINING PROGRAM
Payer: COMMERCIAL

## 2021-08-23 VITALS — WEIGHT: 149.91 LBS | HEIGHT: 70 IN

## 2021-08-23 DIAGNOSIS — Z98.89 OTHER SPECIFIED POSTPROCEDURAL STATES: Chronic | ICD-10-CM

## 2021-08-23 DIAGNOSIS — Z96.7 PRESENCE OF OTHER BONE AND TENDON IMPLANTS: Chronic | ICD-10-CM

## 2021-08-23 DIAGNOSIS — K60.3 ANAL FISTULA: Chronic | ICD-10-CM

## 2021-08-23 LAB
ALBUMIN SERPL ELPH-MCNC: 3 G/DL — LOW (ref 3.3–5)
ALP SERPL-CCNC: 142 U/L — HIGH (ref 40–120)
ALT FLD-CCNC: 23 U/L — SIGNIFICANT CHANGE UP (ref 12–78)
ANION GAP SERPL CALC-SCNC: 4 MMOL/L — LOW (ref 5–17)
APTT BLD: 31 SEC — SIGNIFICANT CHANGE UP (ref 27.5–35.5)
AST SERPL-CCNC: 14 U/L — LOW (ref 15–37)
BASOPHILS # BLD AUTO: 0.42 K/UL — HIGH (ref 0–0.2)
BASOPHILS NFR BLD AUTO: 2 % — SIGNIFICANT CHANGE UP (ref 0–2)
BILIRUB SERPL-MCNC: 0.1 MG/DL — LOW (ref 0.2–1.2)
BUN SERPL-MCNC: 5 MG/DL — LOW (ref 7–23)
CALCIUM SERPL-MCNC: 9.5 MG/DL — SIGNIFICANT CHANGE UP (ref 8.5–10.1)
CHLORIDE SERPL-SCNC: 103 MMOL/L — SIGNIFICANT CHANGE UP (ref 96–108)
CO2 SERPL-SCNC: 29 MMOL/L — SIGNIFICANT CHANGE UP (ref 22–31)
CREAT SERPL-MCNC: 0.75 MG/DL — SIGNIFICANT CHANGE UP (ref 0.5–1.3)
EOSINOPHIL # BLD AUTO: 0 K/UL — SIGNIFICANT CHANGE UP (ref 0–0.5)
EOSINOPHIL NFR BLD AUTO: 0 % — SIGNIFICANT CHANGE UP (ref 0–6)
GLUCOSE SERPL-MCNC: 139 MG/DL — HIGH (ref 70–99)
HCT VFR BLD CALC: 33.8 % — LOW (ref 39–50)
HGB BLD-MCNC: 11.2 G/DL — LOW (ref 13–17)
INR BLD: 1.04 RATIO — SIGNIFICANT CHANGE UP (ref 0.88–1.16)
LACTATE SERPL-SCNC: 1.4 MMOL/L — SIGNIFICANT CHANGE UP (ref 0.7–2)
LYMPHOCYTES # BLD AUTO: 17 % — SIGNIFICANT CHANGE UP (ref 13–44)
LYMPHOCYTES # BLD AUTO: 3.6 K/UL — HIGH (ref 1–3.3)
MCHC RBC-ENTMCNC: 33.1 GM/DL — SIGNIFICANT CHANGE UP (ref 32–36)
MCHC RBC-ENTMCNC: 33.8 PG — SIGNIFICANT CHANGE UP (ref 27–34)
MCV RBC AUTO: 102.1 FL — HIGH (ref 80–100)
MONOCYTES # BLD AUTO: 0.85 K/UL — SIGNIFICANT CHANGE UP (ref 0–0.9)
MONOCYTES NFR BLD AUTO: 4 % — SIGNIFICANT CHANGE UP (ref 2–14)
NEUTROPHILS # BLD AUTO: 15.9 K/UL — HIGH (ref 1.8–7.4)
NEUTROPHILS NFR BLD AUTO: 75 % — SIGNIFICANT CHANGE UP (ref 43–77)
NRBC # BLD: SIGNIFICANT CHANGE UP /100 WBCS (ref 0–0)
PLATELET # BLD AUTO: 583 K/UL — HIGH (ref 150–400)
POTASSIUM SERPL-MCNC: 3.8 MMOL/L — SIGNIFICANT CHANGE UP (ref 3.5–5.3)
POTASSIUM SERPL-SCNC: 3.8 MMOL/L — SIGNIFICANT CHANGE UP (ref 3.5–5.3)
PROT SERPL-MCNC: 9 GM/DL — HIGH (ref 6–8.3)
PROTHROM AB SERPL-ACNC: 12 SEC — SIGNIFICANT CHANGE UP (ref 10.6–13.6)
RBC # BLD: 3.31 M/UL — LOW (ref 4.2–5.8)
RBC # FLD: 14.4 % — SIGNIFICANT CHANGE UP (ref 10.3–14.5)
SODIUM SERPL-SCNC: 136 MMOL/L — SIGNIFICANT CHANGE UP (ref 135–145)
WBC # BLD: 21.2 K/UL — HIGH (ref 3.8–10.5)
WBC # FLD AUTO: 21.2 K/UL — HIGH (ref 3.8–10.5)

## 2021-08-23 PROCEDURE — 99285 EMERGENCY DEPT VISIT HI MDM: CPT

## 2021-08-23 RX ORDER — SODIUM CHLORIDE 9 MG/ML
1000 INJECTION INTRAMUSCULAR; INTRAVENOUS; SUBCUTANEOUS ONCE
Refills: 0 | Status: COMPLETED | OUTPATIENT
Start: 2021-08-23 | End: 2021-08-23

## 2021-08-23 RX ORDER — ONDANSETRON 8 MG/1
4 TABLET, FILM COATED ORAL ONCE
Refills: 0 | Status: COMPLETED | OUTPATIENT
Start: 2021-08-23 | End: 2021-08-23

## 2021-08-23 RX ORDER — MORPHINE SULFATE 50 MG/1
4 CAPSULE, EXTENDED RELEASE ORAL ONCE
Refills: 0 | Status: DISCONTINUED | OUTPATIENT
Start: 2021-08-23 | End: 2021-08-23

## 2021-08-23 RX ORDER — ERTAPENEM SODIUM 1 G/1
1000 INJECTION, POWDER, LYOPHILIZED, FOR SOLUTION INTRAMUSCULAR; INTRAVENOUS ONCE
Refills: 0 | Status: COMPLETED | OUTPATIENT
Start: 2021-08-23 | End: 2021-08-23

## 2021-08-23 RX ORDER — CEFTRIAXONE 500 MG/1
1000 INJECTION, POWDER, FOR SOLUTION INTRAMUSCULAR; INTRAVENOUS ONCE
Refills: 0 | Status: COMPLETED | OUTPATIENT
Start: 2021-08-23 | End: 2021-08-23

## 2021-08-23 RX ADMIN — CEFTRIAXONE 1000 MILLIGRAM(S): 500 INJECTION, POWDER, FOR SOLUTION INTRAMUSCULAR; INTRAVENOUS at 22:26

## 2021-08-23 RX ADMIN — MORPHINE SULFATE 4 MILLIGRAM(S): 50 CAPSULE, EXTENDED RELEASE ORAL at 22:27

## 2021-08-23 RX ADMIN — ONDANSETRON 4 MILLIGRAM(S): 8 TABLET, FILM COATED ORAL at 22:27

## 2021-08-23 RX ADMIN — SODIUM CHLORIDE 1000 MILLILITER(S): 9 INJECTION INTRAMUSCULAR; INTRAVENOUS; SUBCUTANEOUS at 22:32

## 2021-08-23 RX ADMIN — SODIUM CHLORIDE 1000 MILLILITER(S): 9 INJECTION INTRAMUSCULAR; INTRAVENOUS; SUBCUTANEOUS at 22:28

## 2021-08-23 NOTE — ED STATDOCS - CLINICAL SUMMARY MEDICAL DECISION MAKING FREE TEXT BOX
CBC, blood culture, lactate, pain control prn, CT abd/pelvis with IV contrast CBC, blood culture, lactate, pain control prn, CT abd/pelvis with IV contrast.      PA: Patient is a 37 y/o male with hx of pancreatitis, hx of rectal fistula recently drained at [place] this month, also admitted for pancreatitis recently presents to [facility] c/o rectal pain. Labs/CT scan still pending. Care transitioned to SIMRAN Pearce. ~Jackson Dillon PA-C

## 2021-08-23 NOTE — ED STATDOCS - PHYSICAL EXAMINATION
PA NOTE: GEN: AOX3, NAD. HEENT: Throat clear. Airway is patent. EYES: PERRLA. EOMI. Head: NC/AT. NECK: Supple, No JVD. FROM. C-spine non-tender. CV:S1S2, RRR, LUNGS: Non-labored breathing, no tachypnea. O2sat 100% RA. CTA b/l. No w/r/r. CHEST: Equal chest expansion and rise. No deformity. ABD: +LLQ colostomy bag. ABD is soft. Mild diffuse tenderness. No rebound, no guarding. No CVAT. Rectal exam as per dr. Haque. EXT: No e/c/c. 2+ distal pulses. SKIN: No rashes. NEURO: No focal deficits. CN II-XII intact. FROM. 5/5 motor and sensory. ~Jackson Dillon PA-C

## 2021-08-23 NOTE — ED STATDOCS - OBJECTIVE STATEMENT
35 y/o male with hx of pancreatitis, hx of rectal fistula recently drained at [place] this month, also admitted for pancreatitis recently c/o rectal pain. Pt has colostomy placed. States he has been under increased stress recently and today pain intensified, no fever, no abx recently. Surgeon: Dr. Dimitri Ortega.

## 2021-08-23 NOTE — ED STATDOCS - NSICDXPASTMEDICALHX_GEN_ALL_CORE_FT
PAST MEDICAL HISTORY:  Crohn's disease of large intestine without complication (No current flare up)    Pancreatitis

## 2021-08-23 NOTE — ED ADULT TRIAGE NOTE - CHIEF COMPLAINT QUOTE
rectal pain started this afternoon. + chills, nausea Denies fever, vomiting, diarrhea. hx rectal fistula, crohn's disease

## 2021-08-23 NOTE — ED ADULT NURSE NOTE - OBJECTIVE STATEMENT
Patient having pain from his rectal fistulas.  Patient sleeping but arouseable and drowsy.  Patient denies taking any medications hx of crohns disease with colostomy bag.

## 2021-08-23 NOTE — ED STATDOCS - PROGRESS NOTE DETAILS
PA: Patient is a 37 y/o male with hx of pancreatitis, hx of rectal fistula recently drained at [place] this month, also admitted for pancreatitis recently presents to [facility] c/o rectal pain. Pt has a LLQ colostomy bag. Patient states he has been under increased stress recently and today pain intensified, no fever, no abx recently. Surgeon: Dr. Dimitri Ortega. ~Jackson Dillon PA-C   Patient seen and evaluated in [facility] Will get labs, cultures, CT, pain control, IVF. Reassess. ~Jackson Dillon PA-C Labs/CT still pending. Care transitioned to SIMRAN Pearce. ~Jackson Dillon PA-C Discussed case with Dr. Perez (colorectal surgery) and Dr. Elam (GI).  Colorectal surgery will eval patient but advise to admit to medicine.  Dr. Elam agrees with pain control, IV fluids and antibiotics.  Louis Haskins, DO

## 2021-08-23 NOTE — ED ADULT NURSE NOTE - NS ED NURSE RECORD ANOTHER VITAL SIGN
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  At the time of the patient's colonoscopy/polypectomy in July the polyp was removed with snare polypectomy technique and a residual portion of the polyp was cauterized.  The patient returned today to rule out the possibility of residual polyp with high-grade dysplasia.    The patient's bowel prep was very good with only small amounts of liquid stool, easily aspirated, found in the ascending colon.  The patient had no residual rectal polyp after careful examination of the rectum.    Recommendation:  1.   Follow-up colonoscopy in 1 year.    Hermann Patel MD    Yes

## 2021-08-23 NOTE — ED STATDOCS - MUSCULOSKELETAL, MLM
range of motion is not limited and there is no muscle tenderness. rectal exam performed with induration and tenderness around anus with drains placed

## 2021-08-23 NOTE — ED STATDOCS - ATTENDING CONTRIBUTION TO CARE
I, Louis Haskins, performed the initial face to face bedside interview with this patient regarding history of present illness, review of symptoms and relevant past medical, social and family history.  I completed an independent physical examination.  I was the initial provider who evaluated this patient. I have signed out the follow up of any pending tests (i.e. labs, radiological studies) to the ACP.  I have communicated the patient’s plan of care and disposition with the ACP.  The history, relevant review of systems, past medical and surgical history, medical decision making, and physical examination was documented by the scribe in my presence and I attest to the accuracy of the documentation.

## 2021-08-24 DIAGNOSIS — K50.113 CROHN'S DISEASE OF LARGE INTESTINE WITH FISTULA: ICD-10-CM

## 2021-08-24 LAB
ANION GAP SERPL CALC-SCNC: 5 MMOL/L — SIGNIFICANT CHANGE UP (ref 5–17)
APPEARANCE UR: CLEAR — SIGNIFICANT CHANGE UP
BILIRUB UR-MCNC: NEGATIVE — SIGNIFICANT CHANGE UP
BUN SERPL-MCNC: 3 MG/DL — LOW (ref 7–23)
CALCIUM SERPL-MCNC: 9.3 MG/DL — SIGNIFICANT CHANGE UP (ref 8.5–10.1)
CHLORIDE SERPL-SCNC: 111 MMOL/L — HIGH (ref 96–108)
CO2 SERPL-SCNC: 25 MMOL/L — SIGNIFICANT CHANGE UP (ref 22–31)
COLOR SPEC: YELLOW — SIGNIFICANT CHANGE UP
CREAT SERPL-MCNC: 0.49 MG/DL — LOW (ref 0.5–1.3)
DIFF PNL FLD: NEGATIVE — SIGNIFICANT CHANGE UP
GLUCOSE SERPL-MCNC: 106 MG/DL — HIGH (ref 70–99)
GLUCOSE UR QL: NEGATIVE MG/DL — SIGNIFICANT CHANGE UP
HCT VFR BLD CALC: 38.4 % — LOW (ref 39–50)
HGB BLD-MCNC: 12.8 G/DL — LOW (ref 13–17)
KETONES UR-MCNC: NEGATIVE — SIGNIFICANT CHANGE UP
LEUKOCYTE ESTERASE UR-ACNC: NEGATIVE — SIGNIFICANT CHANGE UP
MCHC RBC-ENTMCNC: 33.3 GM/DL — SIGNIFICANT CHANGE UP (ref 32–36)
MCHC RBC-ENTMCNC: 34 PG — SIGNIFICANT CHANGE UP (ref 27–34)
MCV RBC AUTO: 101.9 FL — HIGH (ref 80–100)
NITRITE UR-MCNC: NEGATIVE — SIGNIFICANT CHANGE UP
PH UR: 6 — SIGNIFICANT CHANGE UP (ref 5–8)
PLATELET # BLD AUTO: 422 K/UL — HIGH (ref 150–400)
POTASSIUM SERPL-MCNC: 3.7 MMOL/L — SIGNIFICANT CHANGE UP (ref 3.5–5.3)
POTASSIUM SERPL-SCNC: 3.7 MMOL/L — SIGNIFICANT CHANGE UP (ref 3.5–5.3)
PROT UR-MCNC: 15 MG/DL
RBC # BLD: 3.77 M/UL — LOW (ref 4.2–5.8)
RBC # FLD: 14.6 % — HIGH (ref 10.3–14.5)
SARS-COV-2 RNA SPEC QL NAA+PROBE: SIGNIFICANT CHANGE UP
SODIUM SERPL-SCNC: 141 MMOL/L — SIGNIFICANT CHANGE UP (ref 135–145)
SP GR SPEC: 1.01 — SIGNIFICANT CHANGE UP (ref 1.01–1.02)
UROBILINOGEN FLD QL: NEGATIVE MG/DL — SIGNIFICANT CHANGE UP
WBC # BLD: 17.06 K/UL — HIGH (ref 3.8–10.5)
WBC # FLD AUTO: 17.06 K/UL — HIGH (ref 3.8–10.5)

## 2021-08-24 PROCEDURE — 80048 BASIC METABOLIC PNL TOTAL CA: CPT

## 2021-08-24 PROCEDURE — 72197 MRI PELVIS W/O & W/DYE: CPT

## 2021-08-24 PROCEDURE — 85652 RBC SED RATE AUTOMATED: CPT

## 2021-08-24 PROCEDURE — 85027 COMPLETE CBC AUTOMATED: CPT

## 2021-08-24 PROCEDURE — 93010 ELECTROCARDIOGRAM REPORT: CPT

## 2021-08-24 PROCEDURE — A9579: CPT

## 2021-08-24 PROCEDURE — 74177 CT ABD & PELVIS W/CONTRAST: CPT | Mod: 26,MA

## 2021-08-24 PROCEDURE — 99223 1ST HOSP IP/OBS HIGH 75: CPT

## 2021-08-24 PROCEDURE — 81001 URINALYSIS AUTO W/SCOPE: CPT

## 2021-08-24 PROCEDURE — 87086 URINE CULTURE/COLONY COUNT: CPT

## 2021-08-24 PROCEDURE — 86769 SARS-COV-2 COVID-19 ANTIBODY: CPT

## 2021-08-24 PROCEDURE — 36415 COLL VENOUS BLD VENIPUNCTURE: CPT

## 2021-08-24 PROCEDURE — 86140 C-REACTIVE PROTEIN: CPT

## 2021-08-24 RX ORDER — PIPERACILLIN AND TAZOBACTAM 4; .5 G/20ML; G/20ML
3.38 INJECTION, POWDER, LYOPHILIZED, FOR SOLUTION INTRAVENOUS EVERY 8 HOURS
Refills: 0 | Status: DISCONTINUED | OUTPATIENT
Start: 2021-08-24 | End: 2021-08-26

## 2021-08-24 RX ORDER — OXYCODONE HYDROCHLORIDE 5 MG/1
5 TABLET ORAL EVERY 4 HOURS
Refills: 0 | Status: DISCONTINUED | OUTPATIENT
Start: 2021-08-24 | End: 2021-08-25

## 2021-08-24 RX ORDER — MORPHINE SULFATE 50 MG/1
4 CAPSULE, EXTENDED RELEASE ORAL ONCE
Refills: 0 | Status: DISCONTINUED | OUTPATIENT
Start: 2021-08-24 | End: 2021-08-24

## 2021-08-24 RX ORDER — NICOTINE POLACRILEX 2 MG
1 GUM BUCCAL DAILY
Refills: 0 | Status: DISCONTINUED | OUTPATIENT
Start: 2021-08-24 | End: 2021-08-26

## 2021-08-24 RX ORDER — ONDANSETRON 8 MG/1
4 TABLET, FILM COATED ORAL EVERY 8 HOURS
Refills: 0 | Status: DISCONTINUED | OUTPATIENT
Start: 2021-08-24 | End: 2021-08-26

## 2021-08-24 RX ORDER — MORPHINE SULFATE 50 MG/1
4 CAPSULE, EXTENDED RELEASE ORAL EVERY 4 HOURS
Refills: 0 | Status: DISCONTINUED | OUTPATIENT
Start: 2021-08-24 | End: 2021-08-25

## 2021-08-24 RX ORDER — KETOROLAC TROMETHAMINE 30 MG/ML
15 SYRINGE (ML) INJECTION EVERY 6 HOURS
Refills: 0 | Status: DISCONTINUED | OUTPATIENT
Start: 2021-08-24 | End: 2021-08-26

## 2021-08-24 RX ORDER — LANOLIN ALCOHOL/MO/W.PET/CERES
3 CREAM (GRAM) TOPICAL AT BEDTIME
Refills: 0 | Status: DISCONTINUED | OUTPATIENT
Start: 2021-08-24 | End: 2021-08-26

## 2021-08-24 RX ORDER — CYCLOBENZAPRINE HYDROCHLORIDE 10 MG/1
10 TABLET, FILM COATED ORAL THREE TIMES A DAY
Refills: 0 | Status: DISCONTINUED | OUTPATIENT
Start: 2021-08-24 | End: 2021-08-26

## 2021-08-24 RX ORDER — PIPERACILLIN AND TAZOBACTAM 4; .5 G/20ML; G/20ML
3.38 INJECTION, POWDER, LYOPHILIZED, FOR SOLUTION INTRAVENOUS ONCE
Refills: 0 | Status: COMPLETED | OUTPATIENT
Start: 2021-08-24 | End: 2021-08-24

## 2021-08-24 RX ORDER — SODIUM CHLORIDE 9 MG/ML
1000 INJECTION INTRAMUSCULAR; INTRAVENOUS; SUBCUTANEOUS
Refills: 0 | Status: DISCONTINUED | OUTPATIENT
Start: 2021-08-24 | End: 2021-08-25

## 2021-08-24 RX ORDER — ACETAMINOPHEN 500 MG
650 TABLET ORAL EVERY 6 HOURS
Refills: 0 | Status: DISCONTINUED | OUTPATIENT
Start: 2021-08-24 | End: 2021-08-26

## 2021-08-24 RX ORDER — MORPHINE SULFATE 50 MG/1
2 CAPSULE, EXTENDED RELEASE ORAL ONCE
Refills: 0 | Status: DISCONTINUED | OUTPATIENT
Start: 2021-08-24 | End: 2021-08-24

## 2021-08-24 RX ADMIN — MORPHINE SULFATE 4 MILLIGRAM(S): 50 CAPSULE, EXTENDED RELEASE ORAL at 19:41

## 2021-08-24 RX ADMIN — SODIUM CHLORIDE 125 MILLILITER(S): 9 INJECTION INTRAMUSCULAR; INTRAVENOUS; SUBCUTANEOUS at 22:10

## 2021-08-24 RX ADMIN — Medication 15 MILLIGRAM(S): at 21:27

## 2021-08-24 RX ADMIN — ERTAPENEM SODIUM 120 MILLIGRAM(S): 1 INJECTION, POWDER, LYOPHILIZED, FOR SOLUTION INTRAMUSCULAR; INTRAVENOUS at 00:54

## 2021-08-24 RX ADMIN — Medication 3 MILLIGRAM(S): at 21:29

## 2021-08-24 RX ADMIN — PIPERACILLIN AND TAZOBACTAM 200 GRAM(S): 4; .5 INJECTION, POWDER, LYOPHILIZED, FOR SOLUTION INTRAVENOUS at 09:43

## 2021-08-24 RX ADMIN — MORPHINE SULFATE 4 MILLIGRAM(S): 50 CAPSULE, EXTENDED RELEASE ORAL at 09:42

## 2021-08-24 RX ADMIN — MORPHINE SULFATE 4 MILLIGRAM(S): 50 CAPSULE, EXTENDED RELEASE ORAL at 15:16

## 2021-08-24 RX ADMIN — CYCLOBENZAPRINE HYDROCHLORIDE 10 MILLIGRAM(S): 10 TABLET, FILM COATED ORAL at 21:30

## 2021-08-24 RX ADMIN — MORPHINE SULFATE 4 MILLIGRAM(S): 50 CAPSULE, EXTENDED RELEASE ORAL at 23:56

## 2021-08-24 RX ADMIN — SODIUM CHLORIDE 125 MILLILITER(S): 9 INJECTION INTRAMUSCULAR; INTRAVENOUS; SUBCUTANEOUS at 14:09

## 2021-08-24 RX ADMIN — PIPERACILLIN AND TAZOBACTAM 25 GRAM(S): 4; .5 INJECTION, POWDER, LYOPHILIZED, FOR SOLUTION INTRAVENOUS at 15:38

## 2021-08-24 RX ADMIN — PIPERACILLIN AND TAZOBACTAM 25 GRAM(S): 4; .5 INJECTION, POWDER, LYOPHILIZED, FOR SOLUTION INTRAVENOUS at 21:27

## 2021-08-24 RX ADMIN — MORPHINE SULFATE 2 MILLIGRAM(S): 50 CAPSULE, EXTENDED RELEASE ORAL at 04:23

## 2021-08-24 NOTE — H&P ADULT - BIRTH SEX
Detail Level: Generalized Price (Do Not Change): 0.00 Instructions: This plan will send the code FBSE to the PM system.  DO NOT or CHANGE the price. Male

## 2021-08-24 NOTE — H&P ADULT - NSHPPHYSICALEXAM_GEN_ALL_CORE
Vital Signs Last 24 Hrs  T(C): 36.8 (24 Aug 2021 06:23), Max: 37 (23 Aug 2021 20:11)  T(F): 98.2 (24 Aug 2021 06:23), Max: 98.6 (23 Aug 2021 20:11)  HR: 80 (24 Aug 2021 06:23) (72 - 108)  BP: 120/90 (24 Aug 2021 06:23) (99/70 - 120/90)  BP(mean): 99 (24 Aug 2021 06:23) (71 - 99)  RR: 16 (24 Aug 2021 06:23) (14 - 18)  SpO2: 100% (24 Aug 2021 06:23) (99% - 100%)

## 2021-08-24 NOTE — CONSULT NOTE ADULT - ATTENDING COMMENTS
Patient seen and examined. He has severe Crohn's disease and underwent diverting ileostomy and placement of seton in June by Dr. Ortega at [facility]. He did well initially but states he has been in the hospital for the better part of the past month for similar issues he presented with on this admission. He was at [facility] followed by [facility] and was just discharged on 8/18. He has multiple perianal fistulas with bilateral induration and pus actively draining from the area. There were no undrained collections on the CT scan but recommend MRI for better evaluation. If he just has draining fistulas, then there is no need for surgical intervention especially if he is already diverted and has setons. However, if there are any undrained collections despite current setons, he may need exam under anesthesia with further drainage. Will await MRI. Agree with IV antibiotics. Sitz baths may help.

## 2021-08-24 NOTE — H&P ADULT - ASSESSMENT
#Rectal pain 2/2 proctitis with multiple perianal fistulas 2/2 Crohn's  Admit to med-surg  CRS eval appreciated  NPO  IVF  IV ZOsyn- ID eval for further abx  Pain meds prn  Cultures taken  OR if needed per CRS team  GI eval DR Elam    #DVT proph- on hold for OR    #COVID neg, NOT vaccinated    ##Dispo- inpatient admit. D/w pt

## 2021-08-24 NOTE — H&P ADULT - HISTORY OF PRESENT ILLNESS
37yo/M with [facility] Crohn's colitis s/p ileostomy currently not on any treatment presented with worsening of rectal pain for few days. Patient was diagnosed with Crohn's at age of 13yo, has been on Humira and Asacol before, but failed the treatment and currently not on any meds. He underwent ileostomy placement about 3 months ago. Came in last night for worsening of rectal pain. Denies fever/chills. NOT vaccinated

## 2021-08-24 NOTE — H&P ADULT - NSHPLABSRESULTS_GEN_ALL_CORE
11.2   21.20 )-----------( 583      ( 23 Aug 2021 22:09 )             33.8     23 Aug 2021 22:09    136    |  103    |  5      ----------------------------<  139    3.8     |  29     |  0.75     Ca    9.5        23 Aug 2021 22:09    TPro  9.0    /  Alb  3.0    /  TBili  0.1    /  DBili  x      /  AST  14     /  ALT  23     /  AlkPhos  142    23 Aug 2021 22:09    LIVER FUNCTIONS - ( 23 Aug 2021 22:09 )  Alb: 3.0 g/dL / Pro: 9.0 gm/dL / ALK PHOS: 142 U/L / ALT: 23 U/L / AST: 14 U/L / GGT: x           PT/INR - ( 23 Aug 2021 22:09 )   PT: 12.0 sec;   INR: 1.04 ratio      PTT - ( 23 Aug 2021 22:09 )  PTT:31.0 sec    Urinalysis Basic - ( 23 Aug 2021 04:25 )  Color: Yellow / Appearance: Clear / S.010 / pH: x  Gluc: x / Ketone: Negative  / Bili: Negative / Urobili: Negative mg/dL   Blood: x / Protein: 15 mg/dL / Nitrite: Negative   Leuk Esterase: Negative / RBC: Negative /HPF / WBC 0-2   Sq Epi: x / Non Sq Epi: Negative / Bacteria: Negative

## 2021-08-25 LAB
ANION GAP SERPL CALC-SCNC: 5 MMOL/L — SIGNIFICANT CHANGE UP (ref 5–17)
BUN SERPL-MCNC: 3 MG/DL — LOW (ref 7–23)
CALCIUM SERPL-MCNC: 9 MG/DL — SIGNIFICANT CHANGE UP (ref 8.5–10.1)
CHLORIDE SERPL-SCNC: 110 MMOL/L — HIGH (ref 96–108)
CO2 SERPL-SCNC: 25 MMOL/L — SIGNIFICANT CHANGE UP (ref 22–31)
COVID-19 SPIKE DOMAIN AB INTERP: NEGATIVE — SIGNIFICANT CHANGE UP
COVID-19 SPIKE DOMAIN ANTIBODY RESULT: 0.4 U/ML — SIGNIFICANT CHANGE UP
CREAT SERPL-MCNC: 0.55 MG/DL — SIGNIFICANT CHANGE UP (ref 0.5–1.3)
CULTURE RESULTS: NO GROWTH — SIGNIFICANT CHANGE UP
GLUCOSE SERPL-MCNC: 88 MG/DL — SIGNIFICANT CHANGE UP (ref 70–99)
HCT VFR BLD CALC: 35.6 % — LOW (ref 39–50)
HGB BLD-MCNC: 11.7 G/DL — LOW (ref 13–17)
MCHC RBC-ENTMCNC: 32.9 GM/DL — SIGNIFICANT CHANGE UP (ref 32–36)
MCHC RBC-ENTMCNC: 33.4 PG — SIGNIFICANT CHANGE UP (ref 27–34)
MCV RBC AUTO: 101.7 FL — HIGH (ref 80–100)
PLATELET # BLD AUTO: 446 K/UL — HIGH (ref 150–400)
POTASSIUM SERPL-MCNC: 3.5 MMOL/L — SIGNIFICANT CHANGE UP (ref 3.5–5.3)
POTASSIUM SERPL-SCNC: 3.5 MMOL/L — SIGNIFICANT CHANGE UP (ref 3.5–5.3)
RBC # BLD: 3.5 M/UL — LOW (ref 4.2–5.8)
RBC # FLD: 14.6 % — HIGH (ref 10.3–14.5)
SARS-COV-2 IGG+IGM SERPL QL IA: 0.4 U/ML — SIGNIFICANT CHANGE UP
SARS-COV-2 IGG+IGM SERPL QL IA: NEGATIVE — SIGNIFICANT CHANGE UP
SODIUM SERPL-SCNC: 140 MMOL/L — SIGNIFICANT CHANGE UP (ref 135–145)
SPECIMEN SOURCE: SIGNIFICANT CHANGE UP
WBC # BLD: 14.41 K/UL — HIGH (ref 3.8–10.5)
WBC # FLD AUTO: 14.41 K/UL — HIGH (ref 3.8–10.5)

## 2021-08-25 PROCEDURE — 99232 SBSQ HOSP IP/OBS MODERATE 35: CPT

## 2021-08-25 PROCEDURE — 72197 MRI PELVIS W/O & W/DYE: CPT | Mod: 26

## 2021-08-25 RX ORDER — HYDROMORPHONE HYDROCHLORIDE 2 MG/ML
1 INJECTION INTRAMUSCULAR; INTRAVENOUS; SUBCUTANEOUS EVERY 6 HOURS
Refills: 0 | Status: DISCONTINUED | OUTPATIENT
Start: 2021-08-25 | End: 2021-08-26

## 2021-08-25 RX ORDER — HYDROMORPHONE HYDROCHLORIDE 2 MG/ML
0.5 INJECTION INTRAMUSCULAR; INTRAVENOUS; SUBCUTANEOUS EVERY 6 HOURS
Refills: 0 | Status: DISCONTINUED | OUTPATIENT
Start: 2021-08-25 | End: 2021-08-26

## 2021-08-25 RX ADMIN — MORPHINE SULFATE 4 MILLIGRAM(S): 50 CAPSULE, EXTENDED RELEASE ORAL at 04:11

## 2021-08-25 RX ADMIN — HYDROMORPHONE HYDROCHLORIDE 1 MILLIGRAM(S): 2 INJECTION INTRAMUSCULAR; INTRAVENOUS; SUBCUTANEOUS at 17:32

## 2021-08-25 RX ADMIN — Medication 1 PATCH: at 21:44

## 2021-08-25 RX ADMIN — Medication 1 PATCH: at 09:20

## 2021-08-25 RX ADMIN — MORPHINE SULFATE 4 MILLIGRAM(S): 50 CAPSULE, EXTENDED RELEASE ORAL at 09:15

## 2021-08-25 RX ADMIN — CYCLOBENZAPRINE HYDROCHLORIDE 10 MILLIGRAM(S): 10 TABLET, FILM COATED ORAL at 05:38

## 2021-08-25 RX ADMIN — CYCLOBENZAPRINE HYDROCHLORIDE 10 MILLIGRAM(S): 10 TABLET, FILM COATED ORAL at 20:38

## 2021-08-25 RX ADMIN — HYDROMORPHONE HYDROCHLORIDE 0.5 MILLIGRAM(S): 2 INJECTION INTRAMUSCULAR; INTRAVENOUS; SUBCUTANEOUS at 19:47

## 2021-08-25 RX ADMIN — Medication 15 MILLIGRAM(S): at 05:38

## 2021-08-25 RX ADMIN — PIPERACILLIN AND TAZOBACTAM 25 GRAM(S): 4; .5 INJECTION, POWDER, LYOPHILIZED, FOR SOLUTION INTRAVENOUS at 13:35

## 2021-08-25 RX ADMIN — PIPERACILLIN AND TAZOBACTAM 25 GRAM(S): 4; .5 INJECTION, POWDER, LYOPHILIZED, FOR SOLUTION INTRAVENOUS at 05:29

## 2021-08-25 RX ADMIN — PIPERACILLIN AND TAZOBACTAM 25 GRAM(S): 4; .5 INJECTION, POWDER, LYOPHILIZED, FOR SOLUTION INTRAVENOUS at 21:38

## 2021-08-25 RX ADMIN — SODIUM CHLORIDE 125 MILLILITER(S): 9 INJECTION INTRAMUSCULAR; INTRAVENOUS; SUBCUTANEOUS at 05:38

## 2021-08-25 RX ADMIN — CYCLOBENZAPRINE HYDROCHLORIDE 10 MILLIGRAM(S): 10 TABLET, FILM COATED ORAL at 13:34

## 2021-08-25 RX ADMIN — Medication 15 MILLIGRAM(S): at 21:40

## 2021-08-25 RX ADMIN — HYDROMORPHONE HYDROCHLORIDE 1 MILLIGRAM(S): 2 INJECTION INTRAMUSCULAR; INTRAVENOUS; SUBCUTANEOUS at 11:39

## 2021-08-25 NOTE — CONSULT NOTE ADULT - SUBJECTIVE AND OBJECTIVE BOX
Patient is a 36y old  Male who presents with a chief complaint of Worsening of rectal pain (25 Aug 2021 09:10)    HPI:  37yo/M with PMH Crohn's colitis s/p ileostomy placement of seton 6/2021 - recently hospitalized at [facility] and discharged on 8/18, currently not on any treatment presented with worsening of rectal pain for few days. Patient was diagnosed with Crohn's at age of 13yo, has been on Humira and Asacol before, but failed the treatment and currently not on any meds. He underwent ileostomy placement about 3 months ago. Came in last night for worsening of rectal pain. Denies fever/chills. NOT vaccinated. Here wbc ct 21, Ct abd/pelvis remarkable for active proctitis with multiple perianal fistulas with extension toward the anterior perineum, was eval by CRS and noted with multiple perianal fistulas with induration and pus draining from area, was given IV zosyn and awaiting MRI for for further eval.    PMH: as above  PSH: as above  Meds: per reconciliation sheet, noted below  MEDICATIONS  (STANDING):  nicotine -  14 mG/24Hr(s) Patch 1 patch Transdermal daily  piperacillin/tazobactam IVPB.. 3.375 Gram(s) IV Intermittent every 8 hours  sodium chloride 0.9%. 1000 milliLiter(s) (125 mL/Hr) IV Continuous <Continuous>    Allergies    No Known Allergies    Intolerances      Social: no smoking, no alcohol, no illegal drugs; no recent travel, no exposure to TB  FAMILY HISTORY:  Diabetes mellitus (Father)       no history of premature cardiovascular disease in first degree relatives    ROS: the patient denies fever, no chills, no HA, no dizziness, no sore throat, no blurry vision, no CP, no palpitations, no SOB, no cough, no abdominal pain, no diarrhea, no N/V, no dysuria, no leg pain, no claudication, no rash, no joint aches, + rectal pain, no night sweats    All other systems reviewed and are negative    Vital Signs Last 24 Hrs  T(C): 36.5 (25 Aug 2021 07:38), Max: 36.7 (24 Aug 2021 15:32)  T(F): 97.7 (25 Aug 2021 07:38), Max: 98 (24 Aug 2021 15:32)  HR: 72 (25 Aug 2021 07:38) (71 - 79)  BP: 104/62 (25 Aug 2021 07:38) (102/66 - 113/72)  BP(mean): --  RR: 18 (25 Aug 2021 07:38) (17 - 18)  SpO2: 100% (25 Aug 2021 07:38) (99% - 100%)  Daily         PE:  Constitutional: NAD  HEENT: NC/AT, EOMI, PERRLA, conjunctivae clear; ears and nose atraumatic; pharynx benign  Neck: supple; thyroid not palpable  Back: no tenderness  Respiratory: respiratory effort normal; clear to auscultation  Cardiovascular: S1S2 regular, no murmurs  Abdomen: soft, not tender, not distended, positive BS; multiple draining perianal fistulas + induration  Genitourinary: no suprapubic tenderness  Lymphatic: no LN palpable  Musculoskeletal: no muscle tenderness, no joint swelling or tenderness  Extremities: no pedal edema  Neurological/ Psychiatric: AxOx3, Judgement and insight normal;  moving all extremities  Skin: no rashes; no palpable lesions    Labs: all available labs reviewed                        11.7   14.41 )-----------( 446      ( 25 Aug 2021 07:07 )             35.6     08-25    140  |  110<H>  |  3<L>  ----------------------------<  88  3.5   |  25  |  0.55    Ca    9.0      25 Aug 2021 07:07    TPro  9.0<H>  /  Alb  3.0<L>  /  TBili  0.1<L>  /  DBili  x   /  AST  14<L>  /  ALT  23  /  AlkPhos  142<H>  08-23     LIVER FUNCTIONS - ( 23 Aug 2021 22:09 )  Alb: 3.0 g/dL / Pro: 9.0 gm/dL / ALK PHOS: 142 U/L / ALT: 23 U/L / AST: 14 U/L / GGT: x                 Radiology: all available radiological tests reviewed  < from: CT Abdomen and Pelvis w/ IV Cont (08.24.21 @ 00:17) >  EXAM:  CT ABDOMEN AND PELVIS IC                            PROCEDURE DATE:  08/24/2021          INTERPRETATION:  CLINICAL INFORMATION: Abdominal pain. History of Crohn's disease as rectal fistula.    COMPARISON: CT abdomen pelvis 5/3/2019.    CONTRAST/COMPLICATIONS:  IV Contrast: Omnipaque 350  90 cc administered   10 cc discarded  Oral Contrast: NONE  Complications: None reported at time of study completion    PROCEDURE:  CT of the Abdomen and Pelvis was performed.  Sagittal and coronal reformats were performed.    FINDINGS:  LOWER CHEST: Right lower lobe air cyst.    LIVER: Within normal limits.  BILE DUCTS: Normal caliber.  GALLBLADDER: Within normal limits.  SPLEEN: Within normal limits.  PANCREAS: Within normal limits.  ADRENALS: Within normal limits.  KIDNEYS/URETERS: Within normal limits.    BLADDER: Underdistended urinary bladder to the level of the umbilicus.  REPRODUCTIVE ORGANS: Prostate within normal limits.    BOWEL: Right lower quadrant ileostomy. Rectal wall thickening and perirectal inflammation. Persistent multiple, bilateral perianal fistulas with new extension toward the anterior perineum. 2 new anal setons are in place. No bowel obstruction. Appendix is normal.  PERITONEUM: No ascites.  VESSELS: Within normal limits.  RETROPERITONEUM/LYMPH NODES: No lymphadenopathy.  ABDOMINAL WALL: Within normal limits.  BONES: Within normal limits.    IMPRESSION:    Active proctitis with multiple Crohn's-related perianal fistulas. There is new extension of tumor the fistulous towards the anterior perineum.    Marked urinary bladder distention.      Advanced directives addressed: full resuscitation  
37 y/o male with hx of pancreatitis, hx of rectal fistula recently drained at [place] this month, also admitted for pancreatitis recently c/o rectal pain. He has fistuletomy and seton placement on June. HE came with rectal pain for 1 day and increasing drainage from the perianal area. CT scan done and showed:     ICU Vital Signs Last 24 Hrs  T(C): 36.8 (24 Aug 2021 06:23), Max: 37 (23 Aug 2021 20:11)  T(F): 98.2 (24 Aug 2021 06:23), Max: 98.6 (23 Aug 2021 20:11)  HR: 80 (24 Aug 2021 06:23) (72 - 108)  BP: 120/90 (24 Aug 2021 06:23) (99/70 - 120/90)  BP(mean): 99 (24 Aug 2021 06:23) (71 - 99)  ABP: --  ABP(mean): --  RR: 16 (24 Aug 2021 06:23) (14 - 18)  SpO2: 100% (24 Aug 2021 06:23) (99% - 100%)      · CONSTITUTIONAL: well appearing and in no apparent distress.  · EYES: clear bilaterally.  Pupils equal, round, and reactive to light.  · ENMT: Nasal mucosa clear.  Mouth with normal mucosa  Throat has no vesicles, no oropharyngeal exudates and uvula is midline.  · CARDIAC: normal rate, regular rhythm, and no murmur.  · RESPIRATORY: breath sounds clear and equal bilaterally.  · GASTROINTESTINAL: abdomen soft, non-tender, and non-distended. Bowel sounds present. +LLQ colostomy  · MUSCULOSKELETAL: range of motion is not limited and there is no muscle tenderness. rectal exam performed with induration and tenderness around anus with drains placed  · NEUROLOGICAL: sensation is normal and strength is normal.  · SKIN: skin normal color for race, warm, dry and intact.                            11.2   21.20 )-----------( 583      ( 23 Aug 2021 22:09 )             33.8   08-23    136  |  103  |  5<L>  ----------------------------<  139<H>  3.8   |  29  |  0.75    Ca    9.5      23 Aug 2021 22:09    TPro  9.0<H>  /  Alb  3.0<L>  /  TBili  0.1<L>  /  DBili  x   /  AST  14<L>  /  ALT  23  /  AlkPhos  142<H>  08-23    PT/INR - ( 23 Aug 2021 22:09 )   PT: 12.0 sec;   INR: 1.04 ratio         PTT - ( 23 Aug 2021 22:09 )  PTT:31.0 sec    Imaging:     EXAM: CT ABDOMEN AND PELVIS IC      PROCEDURE DATE: 08/24/2021        INTERPRETATION: CLINICAL INFORMATION: Abdominal pain. History of Crohn's disease as rectal fistula.    COMPARISON: CT abdomen pelvis 5/3/2019.    CONTRAST/COMPLICATIONS:  IV Contrast: Omnipaque 350 90 cc administered 10 cc discarded  Oral Contrast: NONE  Complications: None reported at time of study completion    PROCEDURE:  CT of the Abdomen and Pelvis was performed.  Sagittal and coronal reformats were performed.    FINDINGS:  LOWER CHEST: Right lower lobe air cyst.    LIVER: Within normal limits.  BILE DUCTS: Normal caliber.  GALLBLADDER: Within normal limits.  SPLEEN: Within normal limits.  PANCREAS: Within normal limits.  ADRENALS: Within normal limits.  KIDNEYS/URETERS: Within normal limits.    BLADDER: Underdistended urinary bladder to the level of the umbilicus.  REPRODUCTIVE ORGANS: Prostate within normal limits.    BOWEL: Right lower quadrant ileostomy. Rectal wall thickening and perirectal inflammation. Persistent multiple, bilateral perianal fistulas with new extension toward the anterior perineum. 2 new anal setons are in place. No bowel obstruction. Appendix is normal.  PERITONEUM: No ascites.  VESSELS: Within normal limits.  RETROPERITONEUM/LYMPH NODES: No lymphadenopathy.  ABDOMINAL WALL: Within normal limits.  BONES: Within normal limits.    IMPRESSION:  Active proctitis with multiple Crohn's-related perianal fistulas. There is new extension of tumor the fistulous towards the anterior perineum.    Marked urinary bladder distention.    
Patient is a 36y old  Male who presents with a chief complaint of Worsening of rectal pain (24 Aug 2021 09:35)    36 year old man with fistulizing Crohn's colitis diagnosed 22 years ago, previously on humira with treatment failure no longer on meds, with recent ileostomy for diversion, multiple seton placements for ray-anal fistulas, here for rectal pain.     Patient not interested in talking too much during my evalation. States he has rectal pain. Not clear why he didn't go to the hospital where is primary GI/surgeon are. He denies fevers or chills. No nausea or vomiting. He is radha to sit/lie down it's just very painful      PAST MEDICAL & SURGICAL HISTORY:  Crohn's as above    Pancreatitis    S/P ORIF (open reduction internal fixation) fracture  (Right ankle, 2014)    History of colonoscopy  (2014)    PSH:   Perianal fistula  repair in 2002        MEDICATIONS  (STANDING):  nicotine -  14 mG/24Hr(s) Patch 1 patch Transdermal daily  piperacillin/tazobactam IVPB.. 3.375 Gram(s) IV Intermittent every 8 hours  sodium chloride 0.9%. 1000 milliLiter(s) (125 mL/Hr) IV Continuous <Continuous>    MEDICATIONS  (PRN):  acetaminophen   Tablet .. 650 milliGRAM(s) Oral every 6 hours PRN Temp greater or equal to 38.5C (101.3F), Mild Pain (1 - 3)  aluminum hydroxide/magnesium hydroxide/simethicone Suspension 30 milliLiter(s) Oral every 4 hours PRN Dyspepsia  cyclobenzaprine 10 milliGRAM(s) Oral three times a day PRN Muscle Spasm  ketorolac   Injectable 15 milliGRAM(s) IV Push every 6 hours PRN Moderate Pain (4 - 6)  melatonin 3 milliGRAM(s) Oral at bedtime PRN Insomnia  morphine  - Injectable 4 milliGRAM(s) IV Push every 4 hours PRN Severe Pain (7 - 10)  ondansetron Injectable 4 milliGRAM(s) IV Push every 8 hours PRN Nausea and/or Vomiting  oxyCODONE    IR 5 milliGRAM(s) Oral every 4 hours PRN Moderate Pain (4 - 6)      Allergies    No Known Allergies    Intolerances        SOCIAL HISTORY: +smoker, social drinking, no drugs    FAMILY HISTORY:  Diabetes mellitus (Father)        REVIEW OF SYSTEMS:  Rectal pain, otherwise denies an other symptoms    Vital Signs Last 24 Hrs  T(C): 36.7 (24 Aug 2021 15:32), Max: 37 (24 Aug 2021 09:47)  T(F): 98 (24 Aug 2021 15:32), Max: 98.6 (24 Aug 2021 09:47)  HR: 79 (24 Aug 2021 15:32) (72 - 80)  BP: 113/72 (24 Aug 2021 15:32) (101/67 - 127/79)  BP(mean): 99 (24 Aug 2021 06:23) (71 - 99)  RR: 18 (24 Aug 2021 15:32) (14 - 18)  SpO2: 99% (24 Aug 2021 15:32) (99% - 100%)    PHYSICAL EXAM:  Gen: NAD, lying in bed, not wanting to be woken up  Abd: soft  Rectal: would not let me examine      LABS:                        12.8   17.06 )-----------( 422      ( 24 Aug 2021 12:12 )             38.4     08-24    141  |  111<H>  |  3<L>  ----------------------------<  106<H>  3.7   |  25  |  0.49<L>    Ca    9.3      24 Aug 2021 12:12    TPro  9.0<H>  /  Alb  3.0<L>  /  TBili  0.1<L>  /  DBili  x   /  AST  14<L>  /  ALT  23  /  AlkPhos  142<H>  08-23    PT/INR - ( 23 Aug 2021 22:09 )   PT: 12.0 sec;   INR: 1.04 ratio         PTT - ( 23 Aug 2021 22:09 )  PTT:31.0 sec  LIVER FUNCTIONS - ( 23 Aug 2021 22:09 )  Alb: 3.0 g/dL / Pro: 9.0 gm/dL / ALK PHOS: 142 U/L / ALT: 23 U/L / AST: 14 U/L / GGT: x             RADIOLOGY & ADDITIONAL STUDIES: reviewed, multiple setons/fistulous tracts

## 2021-08-25 NOTE — CONSULT NOTE ADULT - ASSESSMENT
37 yo M with crhon's disease with multiple perianal fistulas    Plan:  NPO  IVF  IV abx  Pain control  May need surgical intervention    Plan was discussed with Dr. Perez  
35yo/M with PMH Crohn's colitis s/p ileostomy placement of seton 6/2021 - recently hospitalized at [facility] and discharged on 8/18, currently not on any treatment presented with worsening of rectal pain for few days. Patient was diagnosed with Crohn's at age of 13yo, has been on Humira and Asacol before, but failed the treatment and currently not on any meds. He underwent ileostomy placement about 3 months ago. Came in last night for worsening of rectal pain. Denies fever/chills. NOT vaccinated. Here wbc ct 21, Ct abd/pelvis remarkable for active proctitis with multiple perianal fistulas with extension toward the anterior perineum, was eval by CRS and noted with multiple perianal fistulas with induration and pus draining from area, was given IV zosyn and awaiting MRI for for further eval.    1. proctitis. perianal fistulas with drainage. leukocytosis. crohns colitis  - imaging reviewed  - CRS, GI eval noted   - f/u MRI for further eval  - agree with IV zosyn 3.3758qh  - f/u cultures  - monitor temps  - tolerating abx well so far; no side effects noted  - reason for abx use and side effects reviewed with patient  - supportive care  - fu cbc    2. other issues - care per medicine   
36 year old man with fistulizing Crohn's colitis diagnosed 22 years ago, previously on humira with treatment failure no longer on meds, with recent ileostomy for diversion, multiple seton placements for ray-anal fistulas, here for rectal pain.     Patient not wanting to participate much in history or exam. Based on imaging and his complaints, patient with risk for perirectal abscess or other intrapelvic/abdominal process. Would recommend pelvic MRI with contrast to evaluate fistulous tracts and to r/o abscess which CT's can miss in IBD in the pelvis/ray-rectal area.     Make sure patient on chemical dvt ppx as he has IBD and high risk for clot.     ESR/CRP.     Appreciate and agree iwth colorectal recs.

## 2021-08-26 ENCOUNTER — TRANSCRIPTION ENCOUNTER (OUTPATIENT)
Age: 36
End: 2021-08-26

## 2021-08-26 VITALS
HEART RATE: 76 BPM | OXYGEN SATURATION: 98 % | SYSTOLIC BLOOD PRESSURE: 115 MMHG | TEMPERATURE: 97 F | DIASTOLIC BLOOD PRESSURE: 72 MMHG | RESPIRATION RATE: 18 BRPM

## 2021-08-26 LAB
ANION GAP SERPL CALC-SCNC: 5 MMOL/L — SIGNIFICANT CHANGE UP (ref 5–17)
BUN SERPL-MCNC: 9 MG/DL — SIGNIFICANT CHANGE UP (ref 7–23)
CALCIUM SERPL-MCNC: 9.1 MG/DL — SIGNIFICANT CHANGE UP (ref 8.5–10.1)
CHLORIDE SERPL-SCNC: 107 MMOL/L — SIGNIFICANT CHANGE UP (ref 96–108)
CO2 SERPL-SCNC: 26 MMOL/L — SIGNIFICANT CHANGE UP (ref 22–31)
CREAT SERPL-MCNC: 0.8 MG/DL — SIGNIFICANT CHANGE UP (ref 0.5–1.3)
CRP SERPL-MCNC: 25 MG/L — HIGH
ERYTHROCYTE [SEDIMENTATION RATE] IN BLOOD: 88 MM/HR — HIGH (ref 0–15)
GLUCOSE SERPL-MCNC: 174 MG/DL — HIGH (ref 70–99)
HCT VFR BLD CALC: 38.5 % — LOW (ref 39–50)
HGB BLD-MCNC: 12.4 G/DL — LOW (ref 13–17)
MCHC RBC-ENTMCNC: 32.2 GM/DL — SIGNIFICANT CHANGE UP (ref 32–36)
MCHC RBC-ENTMCNC: 33 PG — SIGNIFICANT CHANGE UP (ref 27–34)
MCV RBC AUTO: 102.4 FL — HIGH (ref 80–100)
PLATELET # BLD AUTO: 497 K/UL — HIGH (ref 150–400)
POTASSIUM SERPL-MCNC: 3.6 MMOL/L — SIGNIFICANT CHANGE UP (ref 3.5–5.3)
POTASSIUM SERPL-SCNC: 3.6 MMOL/L — SIGNIFICANT CHANGE UP (ref 3.5–5.3)
RBC # BLD: 3.76 M/UL — LOW (ref 4.2–5.8)
RBC # FLD: 14.5 % — SIGNIFICANT CHANGE UP (ref 10.3–14.5)
SODIUM SERPL-SCNC: 138 MMOL/L — SIGNIFICANT CHANGE UP (ref 135–145)
WBC # BLD: 14.26 K/UL — HIGH (ref 3.8–10.5)
WBC # FLD AUTO: 14.26 K/UL — HIGH (ref 3.8–10.5)

## 2021-08-26 PROCEDURE — 99239 HOSP IP/OBS DSCHRG MGMT >30: CPT

## 2021-08-26 PROCEDURE — 99232 SBSQ HOSP IP/OBS MODERATE 35: CPT

## 2021-08-26 RX ORDER — OXYCODONE HYDROCHLORIDE 5 MG/1
1 TABLET ORAL
Qty: 3 | Refills: 0
Start: 2021-08-26 | End: 2021-08-26

## 2021-08-26 RX ORDER — METRONIDAZOLE 500 MG
1 TABLET ORAL
Qty: 15 | Refills: 0
Start: 2021-08-26 | End: 2021-08-30

## 2021-08-26 RX ORDER — MOXIFLOXACIN HYDROCHLORIDE TABLETS, 400 MG 400 MG/1
1 TABLET, FILM COATED ORAL
Qty: 10 | Refills: 0
Start: 2021-08-26 | End: 2021-08-30

## 2021-08-26 RX ADMIN — PIPERACILLIN AND TAZOBACTAM 25 GRAM(S): 4; .5 INJECTION, POWDER, LYOPHILIZED, FOR SOLUTION INTRAVENOUS at 05:35

## 2021-08-26 RX ADMIN — Medication 1 PATCH: at 09:34

## 2021-08-26 RX ADMIN — HYDROMORPHONE HYDROCHLORIDE 1 MILLIGRAM(S): 2 INJECTION INTRAMUSCULAR; INTRAVENOUS; SUBCUTANEOUS at 01:38

## 2021-08-26 RX ADMIN — PIPERACILLIN AND TAZOBACTAM 25 GRAM(S): 4; .5 INJECTION, POWDER, LYOPHILIZED, FOR SOLUTION INTRAVENOUS at 13:23

## 2021-08-26 RX ADMIN — Medication 1 PATCH: at 05:59

## 2021-08-26 RX ADMIN — HYDROMORPHONE HYDROCHLORIDE 1 MILLIGRAM(S): 2 INJECTION INTRAMUSCULAR; INTRAVENOUS; SUBCUTANEOUS at 10:30

## 2021-08-26 RX ADMIN — Medication 1 PATCH: at 09:33

## 2021-08-26 RX ADMIN — HYDROMORPHONE HYDROCHLORIDE 0.5 MILLIGRAM(S): 2 INJECTION INTRAMUSCULAR; INTRAVENOUS; SUBCUTANEOUS at 05:34

## 2021-08-26 RX ADMIN — HYDROMORPHONE HYDROCHLORIDE 1 MILLIGRAM(S): 2 INJECTION INTRAMUSCULAR; INTRAVENOUS; SUBCUTANEOUS at 09:32

## 2021-08-26 RX ADMIN — Medication 15 MILLIGRAM(S): at 13:23

## 2021-08-26 RX ADMIN — Medication 15 MILLIGRAM(S): at 14:23

## 2021-08-26 NOTE — PROGRESS NOTE ADULT - ASSESSMENT
37 y/o male w/ pmhx of crohn's disease presenting for severe rectal pain and discharge to the rectum.     #Rectal pain / leukocytosis / perianal fistulas / Crohn's dusease   MRI - Bilateral intersphincteric anal fistulas, each with multiple branching tracts.  cont w/ IV zosyn per ID   prn analgesia and antiemetics   blood cultures and urine culture negative   may require OR for fistula eradication - medically optimized for surgery   ID consult, Colorectal consult and GI consult     #COVID neg, NOT vaccinated  
37yo/M with PMH Crohn's colitis s/p ileostomy placement of seton 6/2021 - recently hospitalized at [facility] and discharged on 8/18, currently not on any treatment presented with worsening of rectal pain for few days. Patient was diagnosed with Crohn's at age of 15yo, has been on Humira and Asacol before, but failed the treatment and currently not on any meds. He underwent ileostomy placement about 3 months ago. Came in last night for worsening of rectal pain. Denies fever/chills. NOT vaccinated. Here wbc ct 21, Ct abd/pelvis remarkable for active proctitis with multiple perianal fistulas with extension toward the anterior perineum, was eval by CRS and noted with multiple perianal fistulas with induration and pus draining from area, was given IV zosyn and awaiting MRI for for further eval.    1. proctitis. perianal fistulas with drainage. leukocytosis. crohns colitis  - imaging reviewed  - CRS, GI.surgical eval noted   - on IV zosyn 3.3758qh #2-3  - urine cx/blood cx no growth  - monitor temps  - tolerating abx well so far; no side effects noted  - reason for abx use and side effects reviewed with patient  - dc with cipro/flagyl 10-14 day course - plan for gi/surgical follow up as outpt  - supportive care  - fu cbc    2. other issues - care per medicine   
35 yo M with Crohn's disease with multiple perianal fistulas with seton drain in place. MRI showed Bilateral intersphincteric anal fistulas, each with multiple branching tracts.  Rectosigmoid colitis/proctitis.    Plan:  Recommend regular diet  IV abx  Pain control PRN  DVT ppx: mechanical and chemical  Place abd pad to collect drainage in anal region  Replete electrolytes PRN  Ambulation  No surgical intervention at this time. Reconsult if needed. Thanks for the consult.  Patient can follow up with Colorectal Surgery as an outpatient upon discharge  Rest of management as per primary team.    Plan was discussed with Dr. Orellana.
37 y/o male w/ pmhx of crohn's disease presenting for severe rectal pain and discharge to the rectum.     #Rectal pain / leukocytosis / perianal fistulas / Crohn's   MRI - Bilateral intersphincteric anal fistulas, each with multiple branching tracts.  cont w/ IV zosyn per ID   prn analgesia and antiemetics   blood cultures pending   may require OR for fistula eradication    ID consult, Colorectal consult and GI consult     #COVID neg, NOT vaccinated  
37 yo M with Crohn's disease with multiple perianal fistulas with seton drain in place.     Plan:  Pelvic MRI with contrast  Recommend regular diet  DC IVF  IV abx  Pain control PRN  Recommend DVT ppx: mechanical and chemical  Pt may need surgical intervention  Replete electrolytes PRN  Ambulation  Rest of management as per primary team    Plan was discussed with Dr. Orellana.

## 2021-08-26 NOTE — DISCHARGE NOTE NURSING/CASE MANAGEMENT/SOCIAL WORK - NSDCPEFALRISK_GEN_ALL_CORE
For information on Fall & injury Prevention, visit [email or web address]

## 2021-08-26 NOTE — DISCHARGE NOTE PROVIDER - NSDCCPCAREPLAN_GEN_ALL_CORE_FT
PRINCIPAL DISCHARGE DIAGNOSIS  Diagnosis: Crohn's disease of perianal region with fistula  Assessment and Plan of Treatment: - you were admitted to the hospital due to abdominal pain and were found to have rectal fistulas due to chronic Crohns disease.   - please maintain a low fiber diet.   - please stay well hydrated.   - please continue to take antibiotics CIPRO 500mg TWICE A DAY and FLAGYL 500mg THREE TIMES A DAY for 5 days. (new medication sent to your pharmacy)   - Please follow up with your GI doctor in the next week.   *** Monitor for the follow signs and symptoms and contact your primary care provider or go to the ER if symptoms are severe. ***  fever, chills, weakness, fainting, vomiting, worsening abdominal pain, blood in stool or dark tarry stools, or if new symptoms arise.

## 2021-08-26 NOTE — DISCHARGE NOTE NURSING/CASE MANAGEMENT/SOCIAL WORK - PATIENT PORTAL LINK FT
You can access the FollowMyHealth Patient Portal offered by [facility] by registering at the following website: [email or web address]. By joining [email or web address]’s FollowMyHealth portal, you will also be able to view your health information using other applications (apps) compatible with our system.

## 2021-08-26 NOTE — DISCHARGE NOTE PROVIDER - NSDCMRMEDTOKEN_GEN_ALL_CORE_FT
aspirin 325 mg oral tablet: 1 tab(s) orally once a day, As Needed for pain  Cipro 500 mg oral tablet: 1 tab(s) orally 2 times a day   cyclobenzaprine 5 mg oral tablet: 1 tab(s) orally 3 times a day  oxyCODONE 5 mg oral tablet: 2 tab(s) orally every 4 hours  Tylenol 500 mg oral tablet: 2 tab(s) orally every other day, As Needed  ***per patient***   aspirin 325 mg oral tablet: 1 tab(s) orally once a day, As Needed for pain  Cipro 500 mg oral tablet: 1 tab(s) orally 2 times a day   cyclobenzaprine 5 mg oral tablet: 1 tab(s) orally 3 times a day  metroNIDAZOLE 500 mg oral tablet: 1 tab(s) orally 3 times a day   oxyCODONE 5 mg oral tablet: 2 tab(s) orally every 4 hours  Tylenol 500 mg oral tablet: 2 tab(s) orally every other day, As Needed  ***per patient***

## 2021-08-26 NOTE — PROGRESS NOTE ADULT - SUBJECTIVE AND OBJECTIVE BOX
HPI:  37yo/M with PMH Crohn's colitis s/p ileostomy currently not on any treatment presented with worsening of rectal pain for few days. Patient was diagnosed with Crohn's at age of 15yo, has been on Humira and Asacol before, but failed the treatment and currently not on any meds. He underwent ileostomy placement about 3 months ago as well as drainage tubes in rectum. Came in last night for worsening of rectal pain and purulent drainage. Denies fever/chills. NOT vaccinated (24 Aug 2021 09:35)    8/25/21: laying in bed, unable to lay on back due to severe rectal pain. normal ostomy output.     REVIEW OF SYSTEMS:    CONSTITUTIONAL: No weakness, fevers or chills  EYES/ENT: No visual changes;  No vertigo or throat pain   NECK: No pain or stiffness  RESPIRATORY: No cough, wheezing, hemoptysis; No shortness of breath  CARDIOVASCULAR: No chest pain or palpitations  GASTROINTESTINAL: + rectal pain with drainage.  No abdominal or epigastric pain. No nausea, vomiting, or hematemesis; No diarrhea or constipation. No melena or hematochezia.  GENITOURINARY: No dysuria, frequency or hematuria  NEUROLOGICAL: No numbness or weakness  SKIN: No itching, burning, rashes, or lesions   All other review of systems is negative unless indicated above      Vital Signs Last 24 Hrs  T(C): 36.6 (25 Aug 2021 16:49), Max: 36.6 (25 Aug 2021 16:49)  T(F): 97.9 (25 Aug 2021 16:49), Max: 97.9 (25 Aug 2021 16:49)  HR: 89 (25 Aug 2021 16:49) (71 - 89)  BP: 132/77 (25 Aug 2021 16:49) (102/66 - 132/77)  BP(mean): 88 (25 Aug 2021 16:49) (88 - 88)  RR: 18 (25 Aug 2021 16:49) (17 - 18)  SpO2: 100% (25 Aug 2021 16:49) (99% - 100%)      PHYSICAL EXAM:    Constitutional: NAD, awake and alert, well-developed  HEENT: PERR, EOMI, Normal Hearing, MMM  Neck: Soft and supple, No LAD, No JVD  Respiratory: Breath sounds are clear bilaterally, No wheezing, rales or rhonchi  Cardiovascular: S1 and S2, regular rate and rhythm, no Murmurs, gallops or rubs  Gastrointestinal: Bowel Sounds present, soft, nontender, nondistended, no guarding, no rebound. ostomy red, +stool. rectum with erythema, evidence of drain tube with surrounding purulence   Extremities: No peripheral edema  Vascular: 2+ peripheral pulses  Neurological: A/O x 3, no focal deficits  Musculoskeletal: 5/5 strength b/l upper and lower extremities  Skin: right axilla w/ raised cyst without drainage or discharge.  No rashes    MEDICATIONS  (STANDING):  nicotine -  14 mG/24Hr(s) Patch 1 patch Transdermal daily  piperacillin/tazobactam IVPB.. 3.375 Gram(s) IV Intermittent every 8 hours    MEDICATIONS  (PRN):  acetaminophen   Tablet .. 650 milliGRAM(s) Oral every 6 hours PRN Temp greater or equal to 38.5C (101.3F), Mild Pain (1 - 3)  aluminum hydroxide/magnesium hydroxide/simethicone Suspension 30 milliLiter(s) Oral every 4 hours PRN Dyspepsia  cyclobenzaprine 10 milliGRAM(s) Oral three times a day PRN Muscle Spasm  HYDROmorphone  Injectable 0.5 milliGRAM(s) IV Push every 6 hours PRN Moderate Pain (4 - 6)  HYDROmorphone  Injectable 1 milliGRAM(s) IV Push every 6 hours PRN Severe Pain (7 - 10)  ketorolac   Injectable 15 milliGRAM(s) IV Push every 6 hours PRN Moderate Pain (4 - 6)  melatonin 3 milliGRAM(s) Oral at bedtime PRN Insomnia  ondansetron Injectable 4 milliGRAM(s) IV Push every 8 hours PRN Nausea and/or Vomiting      LABS: All Labs Reviewed:                        11.7   14.41 )-----------( 446      ( 25 Aug 2021 07:07 )             35.6     08-25    140  |  110<H>  |  3<L>  ----------------------------<  88  3.5   |  25  |  0.55    Ca    9.0      25 Aug 2021 07:07    TPro  9.0<H>  /  Alb  3.0<L>  /  TBili  0.1<L>  /  DBili  x   /  AST  14<L>  /  ALT  23  /  AlkPhos  142<H>  08-23    PT/INR - ( 23 Aug 2021 22:09 )   PT: 12.0 sec;   INR: 1.04 ratio    PTT - ( 23 Aug 2021 22:09 )  PTT:31.0 sec      RADIOLOGY/EKG:  < from: MR Pelvis w/wo IV Cont (08.25.21 @ 15:26) >  IMPRESSION:  Bilateral intersphincteric anal fistulas, each with multiple branching tracts.  Rectosigmoid colitis/proctitis possibly due to Crohn's disease.    --- End of Report ---  MERLY MONTES DE OCA JR, MD; Attending Radiologist  This document has been electronically signed. Aug 25 2021  4:02PM    < end of copied text >          
Pt. seen and examined at bedside this morning. Patient complaining of drainage at anus. Patient denies fever, chest pain, SOB, nausea, vomiting, diarrhea or constipation. No acute events overnight.    Vitals:  T(C): 36.5 (08-25 @ 07:38), Max: 37 (08-24 @ 09:47)  HR: 72 (08-25 @ 07:38) (71 - 79)  BP: 104/62 (08-25 @ 07:38) (102/66 - 127/79)  RR: 18 (08-25 @ 07:38) (16 - 18)  SpO2: 100% (08-25 @ 07:38) (99% - 100%)      Physical Exam:  General: AAOx3, Well developed, NAD  Chest: Normal respiratory effort  Heart: RRR  Abdomen: Soft, NTND, ostomy with dark brown liquid stool  Rectal: seton in place with fibrinous exudate  Neuro/Psych: No localized deficits. Normal speech, normal tone  Skin: Normal, no rashes, no lesions noted.   Extremities: Warm, well perfused, no edema, Pulses intact    08-25 @ 07:07                    11.7  CBC: 14.41>)-------(<446                     35.6                 140 | 110 | 3    CMP:  ----------------------< 88               3.5 | 25 | 0.55                      Ca:9.0  Phos:-  Mg:-               -|      |-        LFTs:  ------|-|-----             -|      |-  08-24 @ 12:12                    12.8  CBC: 17.06>)-------(<422                     38.4                 141 | 111 | 3    CMP:  ----------------------< 106               3.7 | 25 | 0.49                      Ca:9.3  Phos:-  Mg:-               -|      |-        LFTs:  ------|-|-----             -|      |-  08-23 @ 22:09                    11.2  CBC: 21.20>)-------(<583                     33.8                 136 | 103 | 5    CMP:  ----------------------< 139               3.8 | 29 | 0.75                      Ca:9.5  Phos:-  Mg:-               0.1|      |14        LFTs:  ------|142|-----             -|      |-      Culture - Blood (collected 08-23-21 @ 22:09)  Source: .Blood None  Preliminary Report (08-25-21 @ 06:01):    No growth to date.    Culture - Blood (collected 08-23-21 @ 22:09)  Source: .Blood None  Preliminary Report (08-25-21 @ 06:01):    No growth to date.    Culture - Urine (collected 08-23-21 @ 04:25)  Source: Clean Catch Clean Catch (Midstream)  Final Report (08-25-21 @ 07:09):    No growth      Current Inpatient Medications:  acetaminophen   Tablet .. 650 milliGRAM(s) Oral every 6 hours PRN  aluminum hydroxide/magnesium hydroxide/simethicone Suspension 30 milliLiter(s) Oral every 4 hours PRN  cyclobenzaprine 10 milliGRAM(s) Oral three times a day PRN  ketorolac   Injectable 15 milliGRAM(s) IV Push every 6 hours PRN  melatonin 3 milliGRAM(s) Oral at bedtime PRN  morphine  - Injectable 4 milliGRAM(s) IV Push every 4 hours PRN  nicotine -  14 mG/24Hr(s) Patch 1 patch Transdermal daily  ondansetron Injectable 4 milliGRAM(s) IV Push every 8 hours PRN  oxyCODONE    IR 5 milliGRAM(s) Oral every 4 hours PRN  piperacillin/tazobactam IVPB.. 3.375 Gram(s) IV Intermittent every 8 hours  sodium chloride 0.9%. 1000 milliLiter(s) (125 mL/Hr) IV Continuous <Continuous>      
Date of service: 08-26-21 @ 10:32    pt seen and examined  has rectal pain, drainage from fistulas  laying in bed, nad  afebrile    ROS: no fever or chills; denies dizziness, no HA, no SOB or cough, no abdominal pain, no diarrhea or constipation; no dysuria, no urinary frequency, no legs pain, no rashes    Vital Signs Last 24 Hrs  T(C): 36.3 (26 Aug 2021 08:53), Max: 36.6 (25 Aug 2021 16:49)  T(F): 97.4 (26 Aug 2021 08:53), Max: 97.9 (25 Aug 2021 16:49)  HR: 76 (26 Aug 2021 08:53) (76 - 90)  BP: 115/72 (26 Aug 2021 08:53) (101/64 - 132/77)  BP(mean): 72 (25 Aug 2021 22:09) (72 - 88)  RR: 18 (26 Aug 2021 08:53) (18 - 18)  SpO2: 98% (26 Aug 2021 08:53) (97% - 100%)      PE:  Constitutional: NAD  HEENT: NC/AT, EOMI, PERRLA, conjunctivae clear; ears and nose atraumatic; pharynx benign  Neck: supple; thyroid not palpable  Back: no tenderness  Respiratory: respiratory effort normal; clear to auscultation  Cardiovascular: S1S2 regular, no murmurs  Abdomen: soft, not tender, not distended, positive BS; multiple draining perianal fistulas + induration  Genitourinary: no suprapubic tenderness  Lymphatic: no LN palpable  Musculoskeletal: no muscle tenderness, no joint swelling or tenderness  Extremities: no pedal edema  Neurological/ Psychiatric: AxOx3, Judgement and insight normal;  moving all extremities  Skin: no rashes; no palpable lesions    Labs: all available labs reviewed                                   12.4 14.26 )-----------( 497      ( 26 Aug 2021 09:00 )             38.5     08-26    138  |  107  |  9   ----------------------------<  174<H>  3.6   |  26  |  0.80    Ca    9.1      26 Aug 2021 09:00        Cultures:   Culture - Blood (08.23.21 @ 22:09)   Specimen Source: .Blood None   Culture Results:   No growth to date.   Culture - Blood (08.23.21 @ 22:09)   Specimen Source: .Blood None   Culture Results:   No growth to date.     Culture - Urine (08.23.21 @ 04:25)   Specimen Source: Clean Catch Clean Catch (Midstream)   Culture Results:   No growth       Radiology: all available radiological tests reviewed  < from: CT Abdomen and Pelvis w/ IV Cont (08.24.21 @ 00:17) >  EXAM:  CT ABDOMEN AND PELVIS IC                            PROCEDURE DATE:  08/24/2021          INTERPRETATION:  CLINICAL INFORMATION: Abdominal pain. History of Crohn's disease as rectal fistula.    COMPARISON: CT abdomen pelvis 5/3/2019.    CONTRAST/COMPLICATIONS:  IV Contrast: Omnipaque 350  90 cc administered   10 cc discarded  Oral Contrast: NONE  Complications: None reported at time of study completion    PROCEDURE:  CT of the Abdomen and Pelvis was performed.  Sagittal and coronal reformats were performed.    FINDINGS:  LOWER CHEST: Right lower lobe air cyst.    LIVER: Within normal limits.  BILE DUCTS: Normal caliber.  GALLBLADDER: Within normal limits.  SPLEEN: Within normal limits.  PANCREAS: Within normal limits.  ADRENALS: Within normal limits.  KIDNEYS/URETERS: Within normal limits.    BLADDER: Underdistended urinary bladder to the level of the umbilicus.  REPRODUCTIVE ORGANS: Prostate within normal limits.    BOWEL: Right lower quadrant ileostomy. Rectal wall thickening and perirectal inflammation. Persistent multiple, bilateral perianal fistulas with new extension toward the anterior perineum. 2 new anal setons are in place. No bowel obstruction. Appendix is normal.  PERITONEUM: No ascites.  VESSELS: Within normal limits.  RETROPERITONEUM/LYMPH NODES: No lymphadenopathy.  ABDOMINAL WALL: Within normal limits.  BONES: Within normal limits.    IMPRESSION:    Active proctitis with multiple Crohn's-related perianal fistulas. There is new extension of tumor the fistulous towards the anterior perineum.    Marked urinary bladder distention.    < from: MR Pelvis w/wo IV Cont (08.25.21 @ 15:26) >  EXAM:  MR PELVIS WAW IC                            PROCEDURE DATE:  08/25/2021          INTERPRETATION:  CLINICAL INFORMATION: Crohn's. Perianal fistula.    COMPARISON: CT dated August 24, 2021    CONTRAST/COMPLICATIONS:  IV Contrast: Gadavist  6.5cc administered   1 cc discarded  Oral Contrast: NONE  Complications: None reported at time of study completion    PROCEDURE:  MRI of the pelvis was performed as per Anal Fistula Protocol with attention to the anal canal.  -    FINDINGS:    ANAL FISTULA:  Present: Yes    LOCATION:  Fistula #1:  MUCOSAL OPENING (based on axial images): Right posterior quadrant, with seton in place  EXTERNAL OPENING: Right gluteal fold.  CLASSIFICATION: Intersphincteric  ACTIVITY: Active tract (Fluid with peripheral enhancement)  COURSE OF FISTULOUS TRACT: Multiple tracts originating from the fistula, extending  A. Posterior medial to the gluteal fold  B. Superolateral blind-ending  C. Superiorly and laterally, blind-ending  D. Superiorly and medially connecting with a left-sided tract.    Fistula #2:  MUCOSAL OPENING (based on axial images): Left lateral rectum, with seton in place.  EXTERNAL OPENING: Left gluteal fold.  CLASSIFICATION: Intersphincteric  ACTIVITY: Active tract (Fluid with peripheral enhancement)  COURSE OF FISTULOUS TRACT: Multiple tracts originating from the fistula, extending  A. Anteriorly, blind-ending  B. Posteriorly, blind-ending  C. Superiorly and medially connecting with a right-sided tract.    DISTANCE FROM MUCOSAL DEFECT TO THE PERIANAL SKIN (measured on coronal imaging):  Right-sided fistulous tract 4.7 cm.  Left-sided fistulous tract 4.4 cm.    ADDITIONAL COMMENTS: Wall thickening of the rectum and sigmoid colon.    REPRODUCTIVE ORGANS: Within normal limits.  BLADDER: Within normal limits.  LYMPH NODES: No pelvic lymphadenopathy.    VISUALIZED PORTIONS:  PERITONEUM: No ascites.  VESSELS: Within normal limits.  ABDOMINAL WALL: Mild bilateral inguinal lymphadenopathy, most likely reactive.  BONES: Within normal limits.    IMPRESSION:  Bilateral intersphincteric anal fistulas, each with multiple branching tracts.  Rectosigmoid colitis/proctitis possibly due to Crohn's disease.      Advanced directives addressed: full resuscitation  
HPI:  37yo/M with PMH Crohn's colitis s/p ileostomy currently not on any treatment presented with worsening of rectal pain for few days. Patient was diagnosed with Crohn's at age of 15yo, has been on Humira and Asacol before, but failed the treatment and currently not on any meds. He underwent ileostomy placement about 3 months ago as well as drainage tubes in rectum. Came in last night for worsening of rectal pain and purulent drainage. Denies fever/chills. NOT vaccinated (24 Aug 2021 09:35)    8/25/21: laying in bed, unable to lay on back due to severe rectal pain. normal ostomy output.   8/26     REVIEW OF SYSTEMS:    CONSTITUTIONAL: No weakness, fevers or chills  EYES/ENT: No visual changes;  No vertigo or throat pain   NECK: No pain or stiffness  RESPIRATORY: No cough, wheezing, hemoptysis; No shortness of breath  CARDIOVASCULAR: No chest pain or palpitations  GASTROINTESTINAL: + rectal pain with drainage.  No abdominal or epigastric pain. No nausea, vomiting, or hematemesis; No diarrhea or constipation. No melena or hematochezia.  GENITOURINARY: No dysuria, frequency or hematuria  NEUROLOGICAL: No numbness or weakness  SKIN: No itching, burning, rashes, or lesions   All other review of systems is negative unless indicated above    Vital Signs Last 24 Hrs  T(C): 36.3 (26 Aug 2021 08:53), Max: 36.6 (25 Aug 2021 16:49)  T(F): 97.4 (26 Aug 2021 08:53), Max: 97.9 (25 Aug 2021 16:49)  HR: 76 (26 Aug 2021 08:53) (76 - 90)  BP: 115/72 (26 Aug 2021 08:53) (101/64 - 132/77)  BP(mean): 72 (25 Aug 2021 22:09) (72 - 88)  RR: 18 (26 Aug 2021 08:53) (18 - 18)  SpO2: 98% (26 Aug 2021 08:53) (97% - 100%)      PHYSICAL EXAM:    Constitutional: NAD, awake and alert, well-developed  HEENT: PERR, EOMI, Normal Hearing, MMM  Neck: Soft and supple, No LAD, No JVD  Respiratory: Breath sounds are clear bilaterally, No wheezing, rales or rhonchi  Cardiovascular: S1 and S2, regular rate and rhythm, no Murmurs, gallops or rubs  Gastrointestinal: Bowel Sounds present, soft, nontender, nondistended, no guarding, no rebound. ostomy red, +stool. rectum with erythema, evidence of drain tube with surrounding purulence   Extremities: No peripheral edema  Vascular: 2+ peripheral pulses  Neurological: A/O x 3, no focal deficits  Musculoskeletal: 5/5 strength b/l upper and lower extremities  Skin: right axilla w/ raised cyst without drainage or discharge.  No rashes    MEDICATIONS  (STANDING):  nicotine -  14 mG/24Hr(s) Patch 1 patch Transdermal daily  piperacillin/tazobactam IVPB.. 3.375 Gram(s) IV Intermittent every 8 hours    MEDICATIONS  (PRN):  acetaminophen   Tablet .. 650 milliGRAM(s) Oral every 6 hours PRN Temp greater or equal to 38.5C (101.3F), Mild Pain (1 - 3)  aluminum hydroxide/magnesium hydroxide/simethicone Suspension 30 milliLiter(s) Oral every 4 hours PRN Dyspepsia  cyclobenzaprine 10 milliGRAM(s) Oral three times a day PRN Muscle Spasm  HYDROmorphone  Injectable 0.5 milliGRAM(s) IV Push every 6 hours PRN Moderate Pain (4 - 6)  HYDROmorphone  Injectable 1 milliGRAM(s) IV Push every 6 hours PRN Severe Pain (7 - 10)  ketorolac   Injectable 15 milliGRAM(s) IV Push every 6 hours PRN Moderate Pain (4 - 6)  melatonin 3 milliGRAM(s) Oral at bedtime PRN Insomnia  ondansetron Injectable 4 milliGRAM(s) IV Push every 8 hours PRN Nausea and/or Vomiting    LABS: All Labs Reviewed:                        12.4   14.26 )-----------( 497      ( 26 Aug 2021 09:00 )             38.5     08-25    140  |  110<H>  |  3<L>  ----------------------------<  88  3.5   |  25  |  0.55    Ca    9.0      25 Aug 2021 07:07        RADIOLOGY/EKG:  < from: MR Pelvis w/wo IV Cont (08.25.21 @ 15:26) >  IMPRESSION:  Bilateral intersphincteric anal fistulas, each with multiple branching tracts.  Rectosigmoid colitis/proctitis possibly due to Crohn's disease.    --- End of Report ---  MERLY MONTES DE OCA JR, MD; Attending Radiologist  This document has been electronically signed. Aug 25 2021  4:02PM    < end of copied text >  
Pt. seen and examined at bedside this morning. Patient reports anal pain is unchanged. Feels the area is getting "bigger". He denies fever, chest pain, SOB, nausea, vomiting, diarrhea or constipation. No acute events overnight.    Vitals:  T(C): 36.3 (08-26 @ 08:53), Max: 36.6 (08-25 @ 16:49)  HR: 76 (08-26 @ 08:53) (76 - 90)  BP: 115/72 (08-26 @ 08:53) (101/64 - 132/77)  RR: 18 (08-26 @ 08:53) (18 - 18)  SpO2: 98% (08-26 @ 08:53) (97% - 100%)    Physical Exam:  General: AAOx3, Well developed, NAD  Chest: Normal respiratory effort  Heart: RRR  Abdomen: Soft, NTND, ostomy with dark brown liquid stool  Rectal: seton in place with purulent to serosanguineous drainage  Neuro/Psych: No localized deficits. Normal speech, normal tone  Skin: Normal, no rashes, no lesions noted.   Extremities: Warm, well perfused, no edema, Pulses intact    08-26 @ 09:00                    12.4  CBC: 14.26>)-------(<497                     38.5                 138 | 107 | 9    CMP:  ----------------------< 174               3.6 | 26 | 0.80                      Ca:9.1  Phos:-  Mg:-               -|      |-        LFTs:  ------|-|-----             -|      |-  08-25 @ 07:07                    11.7  CBC: 14.41>)-------(<446                     35.6                 140 | 110 | 3    CMP:  ----------------------< 88               3.5 | 25 | 0.55                      Ca:9.0  Phos:-  Mg:-               -|      |-        LFTs:  ------|-|-----             -|      |-      Culture - Blood (collected 08-23-21 @ 22:09)  Source: .Blood None  Preliminary Report (08-25-21 @ 06:01):    No growth to date.    Culture - Blood (collected 08-23-21 @ 22:09)  Source: .Blood None  Preliminary Report (08-25-21 @ 06:01):    No growth to date.    Culture - Urine (collected 08-23-21 @ 04:25)  Source: Clean Catch Clean Catch (Midstream)  Final Report (08-25-21 @ 07:09):    No growth      Current Inpatient Medications:  acetaminophen   Tablet .. 650 milliGRAM(s) Oral every 6 hours PRN  aluminum hydroxide/magnesium hydroxide/simethicone Suspension 30 milliLiter(s) Oral every 4 hours PRN  cyclobenzaprine 10 milliGRAM(s) Oral three times a day PRN  HYDROmorphone  Injectable 0.5 milliGRAM(s) IV Push every 6 hours PRN  HYDROmorphone  Injectable 1 milliGRAM(s) IV Push every 6 hours PRN  ketorolac   Injectable 15 milliGRAM(s) IV Push every 6 hours PRN  melatonin 3 milliGRAM(s) Oral at bedtime PRN  nicotine -  14 mG/24Hr(s) Patch 1 patch Transdermal daily  ondansetron Injectable 4 milliGRAM(s) IV Push every 8 hours PRN  piperacillin/tazobactam IVPB.. 3.375 Gram(s) IV Intermittent every 8 hours

## 2021-08-26 NOTE — DISCHARGE NOTE PROVIDER - HOSPITAL COURSE
hospital summary: pt is a 37 y/o male w/ pmhx of crohns diease  currentlynot on disease modifying agents, hx of ileostomy and rectal abscess , presenting for eval of severe pain and drainage to the rectum over past week. evaled by ID -Dr Vargas was on zosyn Q 8 hrs, will be dc on cipro/flagyl x 5 days. pt evaled by colorectal, no surgical intervention, cont w/ abx and out pt follow up. pt with an appointment with GI on 8/27.     Vital Signs Last 24 Hrs  T(C): 36.3 (26 Aug 2021 08:53), Max: 36.6 (25 Aug 2021 16:49)  T(F): 97.4 (26 Aug 2021 08:53), Max: 97.9 (25 Aug 2021 16:49)  HR: 76 (26 Aug 2021 08:53) (76 - 90)  BP: 115/72 (26 Aug 2021 08:53) (101/64 - 132/77)  BP(mean): 72 (25 Aug 2021 22:09) (72 - 88)  RR: 18 (26 Aug 2021 08:53) (18 - 18)  SpO2: 98% (26 Aug 2021 08:53) (97% - 100%)    ROS: cont w/ rectal pain, unchanged from previous day. all other systems negative.     LABS:                         12.4   14.26 )-----------( 497      ( 26 Aug 2021 09:00 )             38.5     08-26    138  |  107  |  9   ----------------------------<  174<H>  3.6   |  26  |  0.80    Ca    9.1      26 Aug 2021 09:00    PHYSICAL EXAM:    Constitutional: NAD, awake and alert, well-developed  HEENT: PERR, EOMI, Normal Hearing, MMM  Neck: Soft and supple, No LAD, No JVD  Respiratory: Breath sounds are clear bilaterally, No wheezing, rales or rhonchi  Cardiovascular: S1 and S2, regular rate and rhythm, no Murmurs, gallops or rubs  Gastrointestinal: Bowel Sounds present, soft, nontender, nondistended, no guarding, no rebound. ostomy red, +stool. rectum with erythema, evidence of drain tube with surrounding purulence   Extremities: No peripheral edema  Vascular: 2+ peripheral pulses  Neurological: A/O x 3, no focal deficits  Musculoskeletal: 5/5 strength b/l upper and lower extremities  Skin: right axilla w/ raised cyst without drainage or discharge.  No rashes      A&P  #Rectal pain & leukocytosis due to perianal fistulas  from Crohn's disease   MRI - Bilateral intersphincteric anal fistulas, each with multiple branching tracts.  s/p IV zosyn per ID switch to cipro/flagyl x 5 days.   prn analgesia and antiemetics   blood cultures and urine culture negative   may require OR for fistula eradication - no surgical interventions, will need out pt follow up and disease modifying agents per out pt provider   s/p ID consult, Colorectal consult and GI consult     #COVID neg, NOT vaccinated      above plan discussed with pt, surgical team , bedside RN and MD Amaya   time spent preparing dc 43 mins                                  cc: pain  hospital summary: pt is a 35 y/o male w/ pmhx of crohns diease  currentlynot on disease modifying agents, hx of ileostomy and rectal abscess , presenting for eval of severe pain and drainage to the rectum over past week. evaled by ID -Dr Vargas was on zosyn Q 8 hrs, will be dc on cipro/flagyl x 5 days. pt evaled by colorectal, no surgical intervention, cont w/ abx and out pt follow up. pt with an appointment with GI on 8/27.     Vital Signs Last 24 Hrs  T(C): 36.3 (26 Aug 2021 08:53), Max: 36.6 (25 Aug 2021 16:49)  T(F): 97.4 (26 Aug 2021 08:53), Max: 97.9 (25 Aug 2021 16:49)  HR: 76 (26 Aug 2021 08:53) (76 - 90)  BP: 115/72 (26 Aug 2021 08:53) (101/64 - 132/77)  BP(mean): 72 (25 Aug 2021 22:09) (72 - 88)  RR: 18 (26 Aug 2021 08:53) (18 - 18)  SpO2: 98% (26 Aug 2021 08:53) (97% - 100%)    ROS: cont w/ rectal pain, unchanged from previous day. all other systems negative.     LABS:                         12.4   14.26 )-----------( 497      ( 26 Aug 2021 09:00 )             38.5     08-26    138  |  107  |  9   ----------------------------<  174<H>  3.6   |  26  |  0.80    Ca    9.1      26 Aug 2021 09:00    PHYSICAL EXAM:    Constitutional: NAD, awake and alert, well-developed  HEENT: PERR, EOMI, Normal Hearing, MMM  Neck: Soft and supple, No LAD, No JVD  Respiratory: Breath sounds are clear bilaterally, No wheezing, rales or rhonchi  Cardiovascular: S1 and S2, regular rate and rhythm, no Murmurs, gallops or rubs  Gastrointestinal: Bowel Sounds present, soft, nontender, nondistended, no guarding, no rebound. ostomy red, +stool. rectum with erythema, evidence of drain tube with surrounding purulence   Extremities: No peripheral edema  Vascular: 2+ peripheral pulses  Neurological: A/O x 3, no focal deficits  Musculoskeletal: 5/5 strength b/l upper and lower extremities  Skin: right axilla w/ raised cyst without drainage or discharge.  No rashes      A&P  #Rectal pain & leukocytosis due to perianal fistulas  from Crohn's disease   MRI - Bilateral intersphincteric anal fistulas, each with multiple branching tracts.  s/p IV zosyn per ID switch to cipro/flagyl x 5 days.   prn analgesia and antiemetics   blood cultures and urine culture negative   may require OR for fistula eradication - no surgical interventions, will need out pt follow up and disease modifying agents per out pt provider   s/p ID consult, Colorectal consult and GI consult     #COVID neg, NOT vaccinated      above plan discussed with pt, surgical team , bedside RN and MD Amaya   time spent preparing dc 43 mins     Attending Attestation:  I agree with the assessment and plan of JENNIFER Orellana as stated and discussed.

## 2021-08-26 NOTE — PROGRESS NOTE ADULT - REASON FOR ADMISSION
Worsening of rectal pain

## 2021-08-29 LAB
CULTURE RESULTS: SIGNIFICANT CHANGE UP
CULTURE RESULTS: SIGNIFICANT CHANGE UP
SPECIMEN SOURCE: SIGNIFICANT CHANGE UP
SPECIMEN SOURCE: SIGNIFICANT CHANGE UP

## 2021-08-30 DIAGNOSIS — F17.200 NICOTINE DEPENDENCE, UNSPECIFIED, UNCOMPLICATED: ICD-10-CM

## 2021-08-30 DIAGNOSIS — Z93.2 ILEOSTOMY STATUS: ICD-10-CM

## 2021-08-30 DIAGNOSIS — K62.89 OTHER SPECIFIED DISEASES OF ANUS AND RECTUM: ICD-10-CM

## 2021-08-30 DIAGNOSIS — D72.829 ELEVATED WHITE BLOOD CELL COUNT, UNSPECIFIED: ICD-10-CM

## 2021-08-30 DIAGNOSIS — K50.113 CROHN'S DISEASE OF LARGE INTESTINE WITH FISTULA: ICD-10-CM

## 2021-09-28 NOTE — ED ADULT NURSE NOTE - NURSING ED SKIN COLOR
(02/07/2018). Restrictions  Position Activity Restriction  Other position/activity restrictions: Up as tolerated     Subjective   General  Chart Reviewed: Yes  Additional Pertinent Hx: Pt is a 59 y.o. male adm 9/20 with pneumonia 2/2 COVID. Pt presented to ED after testing positive for Covid on 9/18/2021 at urgent care. Patient has had symptoms since approximately 1 week ago. He has had significant fatigue and loss of appetite. Now has worsening shortness of breath - pulse ox 84% in ED. CXR:Coarse bilateral basilar predominant consolidation is compatible with atypical/viral pneumonia. Chest CT: neg PE.  LE dopplers: neg. PMH: BPH, TURP  Family / Caregiver Present: No  Referring Practitioner: Ashlee Farley,   Subjective  Subjective: Pt found supine. Agreeable to PT.   Pain Screening  Patient Currently in Pain: Denies    Orientation  Orientation  Overall Orientation Status: Within Functional Limits     Objective   Bed mobility  Supine to Sit: Supervision (HOB elevated)  Scooting: Supervision     Transfers  Sit to Stand: Stand by assistance  Stand to sit: Stand by assistance  Comment: Cues for correct hand placement     Ambulation 1  Device: Standard Walker  Other Apparatus: O2 (2 L)  Assistance: Contact guard assistance  Quality of Gait: Narrow TANIKA  Gait Deviations: Slow Yeny;Decreased step length;Decreased step height  Distance: 40 feet  Comments: O2 sats decrease to 84% following gait on 2 L O2, O2 sats increase to 92% with rest     Balance  Sitting - Static: Good  Standing - Static: Fair  Standing - Dynamic: Fair;- (With walker)     Exercises  Hip Flexion: B x 10  Knee Long Arc Quad: B x 10  Ankle Pumps: B x 10     Goals  Short term goals  Time Frame for Short term goals: By discharge  Short term goal 1: Sup to sit independent  ONGOING  Short term goal 2: Pt will transfer sit to stand supervision  ONGOING  Short term goal 3: Pt will amb >100' with LRAD supervision  [address]  Times per week: 2-5  Current Treatment Recommendations: Functional Mobility Training, Gait Training, Endurance Training, Patient/Caregiver Education & Training, Safety Education & Training  Safety Devices  Type of devices: Call light within reach, Nurse notified, Left in chair, Chair alarm in place     Therapy Time   Individual Concurrent Group Co-treatment   Time In 1445         Time Out 1515         Minutes 30           Timed Code Treatment Minutes:   30    Total Treatment Minutes:  [address] [facility],  normal for race

## 2022-01-05 ENCOUNTER — INPATIENT (INPATIENT)
Facility: HOSPITAL | Age: 37
LOS: 2 days | Discharge: HOME | DRG: 438 | End: 2022-01-08
Attending: INTERNAL MEDICINE | Admitting: INTERNAL MEDICINE
Payer: COMMERCIAL

## 2022-01-05 VITALS — WEIGHT: 149.91 LBS | HEIGHT: 70 IN

## 2022-01-05 DIAGNOSIS — Z98.89 OTHER SPECIFIED POSTPROCEDURAL STATES: Chronic | ICD-10-CM

## 2022-01-05 DIAGNOSIS — K85.90 ACUTE PANCREATITIS WITHOUT NECROSIS OR INFECTION, UNSPECIFIED: ICD-10-CM

## 2022-01-05 DIAGNOSIS — K60.3 ANAL FISTULA: Chronic | ICD-10-CM

## 2022-01-05 DIAGNOSIS — Z96.7 PRESENCE OF OTHER BONE AND TENDON IMPLANTS: Chronic | ICD-10-CM

## 2022-01-05 LAB
ALBUMIN SERPL ELPH-MCNC: 3.5 G/DL — SIGNIFICANT CHANGE UP (ref 3.3–5)
ALP SERPL-CCNC: 99 U/L — SIGNIFICANT CHANGE UP (ref 40–120)
ALT FLD-CCNC: 23 U/L — SIGNIFICANT CHANGE UP (ref 12–78)
ANION GAP SERPL CALC-SCNC: 8 MMOL/L — SIGNIFICANT CHANGE UP (ref 5–17)
APTT BLD: 25.7 SEC — LOW (ref 27.5–35.5)
AST SERPL-CCNC: 24 U/L — SIGNIFICANT CHANGE UP (ref 15–37)
BASOPHILS # BLD AUTO: 0.05 K/UL — SIGNIFICANT CHANGE UP (ref 0–0.2)
BASOPHILS NFR BLD AUTO: 0.2 % — SIGNIFICANT CHANGE UP (ref 0–2)
BILIRUB SERPL-MCNC: 0.3 MG/DL — SIGNIFICANT CHANGE UP (ref 0.2–1.2)
BUN SERPL-MCNC: 7 MG/DL — SIGNIFICANT CHANGE UP (ref 7–23)
CALCIUM SERPL-MCNC: 9.6 MG/DL — SIGNIFICANT CHANGE UP (ref 8.5–10.1)
CHLORIDE SERPL-SCNC: 98 MMOL/L — SIGNIFICANT CHANGE UP (ref 96–108)
CO2 SERPL-SCNC: 29 MMOL/L — SIGNIFICANT CHANGE UP (ref 22–31)
CREAT SERPL-MCNC: 0.92 MG/DL — SIGNIFICANT CHANGE UP (ref 0.5–1.3)
EOSINOPHIL # BLD AUTO: 0 K/UL — SIGNIFICANT CHANGE UP (ref 0–0.5)
EOSINOPHIL NFR BLD AUTO: 0 % — SIGNIFICANT CHANGE UP (ref 0–6)
GLUCOSE SERPL-MCNC: 164 MG/DL — HIGH (ref 70–99)
HCT VFR BLD CALC: 44 % — SIGNIFICANT CHANGE UP (ref 39–50)
HGB BLD-MCNC: 15.3 G/DL — SIGNIFICANT CHANGE UP (ref 13–17)
IMM GRANULOCYTES NFR BLD AUTO: 0.5 % — SIGNIFICANT CHANGE UP (ref 0–1.5)
INR BLD: 1.05 RATIO — SIGNIFICANT CHANGE UP (ref 0.88–1.16)
LIDOCAIN IGE QN: 3246 U/L — HIGH (ref 73–393)
LYMPHOCYTES # BLD AUTO: 1.17 K/UL — SIGNIFICANT CHANGE UP (ref 1–3.3)
LYMPHOCYTES # BLD AUTO: 5.3 % — LOW (ref 13–44)
MCHC RBC-ENTMCNC: 34.8 GM/DL — SIGNIFICANT CHANGE UP (ref 32–36)
MCHC RBC-ENTMCNC: 35.4 PG — HIGH (ref 27–34)
MCV RBC AUTO: 101.9 FL — HIGH (ref 80–100)
MONOCYTES # BLD AUTO: 1.84 K/UL — HIGH (ref 0–0.9)
MONOCYTES NFR BLD AUTO: 8.3 % — SIGNIFICANT CHANGE UP (ref 2–14)
NEUTROPHILS # BLD AUTO: 19.09 K/UL — HIGH (ref 1.8–7.4)
NEUTROPHILS NFR BLD AUTO: 85.7 % — HIGH (ref 43–77)
PLATELET # BLD AUTO: 347 K/UL — SIGNIFICANT CHANGE UP (ref 150–400)
POTASSIUM SERPL-MCNC: 3.7 MMOL/L — SIGNIFICANT CHANGE UP (ref 3.5–5.3)
POTASSIUM SERPL-SCNC: 3.7 MMOL/L — SIGNIFICANT CHANGE UP (ref 3.5–5.3)
PROT SERPL-MCNC: 8.5 GM/DL — HIGH (ref 6–8.3)
PROTHROM AB SERPL-ACNC: 12.1 SEC — SIGNIFICANT CHANGE UP (ref 10.6–13.6)
RBC # BLD: 4.32 M/UL — SIGNIFICANT CHANGE UP (ref 4.2–5.8)
RBC # FLD: 13.9 % — SIGNIFICANT CHANGE UP (ref 10.3–14.5)
SARS-COV-2 RNA SPEC QL NAA+PROBE: SIGNIFICANT CHANGE UP
SODIUM SERPL-SCNC: 135 MMOL/L — SIGNIFICANT CHANGE UP (ref 135–145)
WBC # BLD: 22.27 K/UL — HIGH (ref 3.8–10.5)
WBC # FLD AUTO: 22.27 K/UL — HIGH (ref 3.8–10.5)

## 2022-01-05 PROCEDURE — A9579: CPT

## 2022-01-05 PROCEDURE — 71045 X-RAY EXAM CHEST 1 VIEW: CPT

## 2022-01-05 PROCEDURE — 87086 URINE CULTURE/COLONY COUNT: CPT

## 2022-01-05 PROCEDURE — 71045 X-RAY EXAM CHEST 1 VIEW: CPT | Mod: 26

## 2022-01-05 PROCEDURE — 80053 COMPREHEN METABOLIC PANEL: CPT

## 2022-01-05 PROCEDURE — 81001 URINALYSIS AUTO W/SCOPE: CPT

## 2022-01-05 PROCEDURE — 83690 ASSAY OF LIPASE: CPT

## 2022-01-05 PROCEDURE — 85027 COMPLETE CBC AUTOMATED: CPT

## 2022-01-05 PROCEDURE — 83735 ASSAY OF MAGNESIUM: CPT

## 2022-01-05 PROCEDURE — 36415 COLL VENOUS BLD VENIPUNCTURE: CPT

## 2022-01-05 PROCEDURE — 80307 DRUG TEST PRSMV CHEM ANLYZR: CPT

## 2022-01-05 PROCEDURE — 84478 ASSAY OF TRIGLYCERIDES: CPT

## 2022-01-05 PROCEDURE — 82787 IGG 1 2 3 OR 4 EACH: CPT

## 2022-01-05 PROCEDURE — 74183 MRI ABD W/O CNTR FLWD CNTR: CPT

## 2022-01-05 PROCEDURE — 74177 CT ABD & PELVIS W/CONTRAST: CPT | Mod: 26,MA

## 2022-01-05 PROCEDURE — 93010 ELECTROCARDIOGRAM REPORT: CPT

## 2022-01-05 PROCEDURE — 76705 ECHO EXAM OF ABDOMEN: CPT

## 2022-01-05 PROCEDURE — 84100 ASSAY OF PHOSPHORUS: CPT

## 2022-01-05 PROCEDURE — 99285 EMERGENCY DEPT VISIT HI MDM: CPT

## 2022-01-05 RX ORDER — PIPERACILLIN AND TAZOBACTAM 4; .5 G/20ML; G/20ML
3.38 INJECTION, POWDER, LYOPHILIZED, FOR SOLUTION INTRAVENOUS EVERY 8 HOURS
Refills: 0 | Status: DISCONTINUED | OUTPATIENT
Start: 2022-01-05 | End: 2022-01-06

## 2022-01-05 RX ORDER — HYDROMORPHONE HYDROCHLORIDE 2 MG/ML
1 INJECTION INTRAMUSCULAR; INTRAVENOUS; SUBCUTANEOUS ONCE
Refills: 0 | Status: DISCONTINUED | OUTPATIENT
Start: 2022-01-05 | End: 2022-01-06

## 2022-01-05 RX ORDER — HYDROMORPHONE HYDROCHLORIDE 2 MG/ML
1 INJECTION INTRAMUSCULAR; INTRAVENOUS; SUBCUTANEOUS ONCE
Refills: 0 | Status: DISCONTINUED | OUTPATIENT
Start: 2022-01-05 | End: 2022-01-05

## 2022-01-05 RX ORDER — SODIUM CHLORIDE 9 MG/ML
1000 INJECTION INTRAMUSCULAR; INTRAVENOUS; SUBCUTANEOUS ONCE
Refills: 0 | Status: COMPLETED | OUTPATIENT
Start: 2022-01-05 | End: 2022-01-05

## 2022-01-05 RX ORDER — ONDANSETRON 8 MG/1
4 TABLET, FILM COATED ORAL ONCE
Refills: 0 | Status: COMPLETED | OUTPATIENT
Start: 2022-01-05 | End: 2022-01-05

## 2022-01-05 RX ORDER — ASPIRIN/CALCIUM CARB/MAGNESIUM 324 MG
1 TABLET ORAL
Qty: 0 | Refills: 0 | DISCHARGE

## 2022-01-05 RX ORDER — ACETAMINOPHEN 500 MG
2 TABLET ORAL
Qty: 0 | Refills: 0 | DISCHARGE

## 2022-01-05 RX ORDER — PIPERACILLIN AND TAZOBACTAM 4; .5 G/20ML; G/20ML
3.38 INJECTION, POWDER, LYOPHILIZED, FOR SOLUTION INTRAVENOUS ONCE
Refills: 0 | Status: COMPLETED | OUTPATIENT
Start: 2022-01-05 | End: 2022-01-05

## 2022-01-05 RX ADMIN — SODIUM CHLORIDE 1000 MILLILITER(S): 9 INJECTION INTRAMUSCULAR; INTRAVENOUS; SUBCUTANEOUS at 20:59

## 2022-01-05 RX ADMIN — HYDROMORPHONE HYDROCHLORIDE 1 MILLIGRAM(S): 2 INJECTION INTRAMUSCULAR; INTRAVENOUS; SUBCUTANEOUS at 19:04

## 2022-01-05 RX ADMIN — HYDROMORPHONE HYDROCHLORIDE 1 MILLIGRAM(S): 2 INJECTION INTRAMUSCULAR; INTRAVENOUS; SUBCUTANEOUS at 23:15

## 2022-01-05 RX ADMIN — ONDANSETRON 4 MILLIGRAM(S): 8 TABLET, FILM COATED ORAL at 19:04

## 2022-01-05 RX ADMIN — SODIUM CHLORIDE 1000 MILLILITER(S): 9 INJECTION INTRAMUSCULAR; INTRAVENOUS; SUBCUTANEOUS at 20:04

## 2022-01-05 RX ADMIN — HYDROMORPHONE HYDROCHLORIDE 1 MILLIGRAM(S): 2 INJECTION INTRAMUSCULAR; INTRAVENOUS; SUBCUTANEOUS at 20:59

## 2022-01-05 RX ADMIN — SODIUM CHLORIDE 1000 MILLILITER(S): 9 INJECTION INTRAMUSCULAR; INTRAVENOUS; SUBCUTANEOUS at 19:04

## 2022-01-05 NOTE — ED STATDOCS - PROGRESS NOTE DETAILS
Katerina Avila for Dr. Winter: 37 y/o M with a PMHx of colostomy bad, pancreatitis, chron's disease presents to the ED c/o abd pain. Pt reports no urine output today. Pt reports chills and sweats. Concerned for SBo given decreased urine output. Surgeon: Dr. Dimitri Fuentes. Will send pt to main ED for further evaluation.

## 2022-01-05 NOTE — ED PROVIDER NOTE - PROGRESS NOTE DETAILS
Spoke to AMADO Zamora who is familiar with patient. States intestinal inflammation may be chronic and pt should be treated for pancreatitis with fluids, pain meds, npo. He will see pt in hospital if own gi not affiliated since he is on call. Candida PAIZ

## 2022-01-05 NOTE — ED PROVIDER NOTE - OBJECTIVE STATEMENT
37 y/o male with a OMhx of Chron's disease and pancreatitis presents to the ED epigastric adnominal pain, non-radiating, associated with vomiting x2 today. No diarrhea. Ileostomy is functioning. Pt reports he had 1 drink yesterday.

## 2022-01-05 NOTE — ED PROVIDER NOTE - IV ALTEPLASE DOOR HIDDEN
Assessment/Plan:    Dyspnea on exertion  Patient's shortness of breath on exertion is slightly better but not much  I hear no expiratory wheezing  Will stop but that has I would nebulizers  I still see lower extremity edema and will increase patient's Lasix to 80 mg in the morning  I reviewed chest x-ray that to me showed no CHF, masses or infiltrates  Official reading is pending  I encouraged patient to get the echocardiogram done to see how his heart valves and heart function    Essential hypertension  Blood pressure is stable, will continue lisinopril, metoprolol and will increase patient's Lasix to 80 mg in the morning because he still has lower extremity edema and still feels out of breath with exertion  Other specified hypothyroidism  Patient's TSH was 18, increase levothyroxine to 125 mcg and will check TSH in about 6 weeks    Stage 3b chronic kidney disease  Lab Results   Component Value Date    EGFR 41 01/12/2021    EGFR 41 12/09/2020    EGFR 47 09/26/2020    CREATININE 1 72 (H) 01/12/2021    CREATININE 1 69 (H) 12/09/2020    CREATININE 1 54 (H) 09/26/2020       Patient has stage IIIB chronic disease  Renal function is at baseline  I will check renal function after increasing Lasix to 80 mg in the morning       Diagnoses and all orders for this visit:    Essential hypertension  -     Basic metabolic panel; Future  -     furosemide (LASIX) 40 mg tablet; Take 2 tablets (80 mg total) by mouth every morning    Other specified hypothyroidism  -     levothyroxine 125 mcg tablet; Take 1 tablet (125 mcg total) by mouth every morning  -     TSH, 3rd generation with Free T4 reflex; Future    Stage 3b chronic kidney disease    Dyspnea on exertion    Grief reaction    Severe obesity (BMI 35 0-39  9) with comorbidity ([facility] [address] )          Subjective:      Patient ID: Humberto Campa is a 77 y o  male  Patient presents for follow-up of shortness of breath on exertion    At last appointment I prescribed DuoNeb nebulizers, Pulmicort and started patient on Lasix  He noticed a small ulcers on his tongue and was wondering whether the due to the new medications  His shortness of breath on exertion is slightly improved but not much  He avoids salty food  Denies fevers, cough, wheezing  Lower extremity edema is slightly better  The following portions of the patient's history were reviewed and updated as appropriate: current medications, past family history, past medical history, past social history, past surgical history and problem list     Review of Systems   Respiratory: Positive for shortness of breath (On exertion)  Negative for cough and wheezing  Cardiovascular: Positive for leg swelling  Negative for chest pain and palpitations  Gastrointestinal: Negative for abdominal pain  Genitourinary: Negative for difficulty urinating  Psychiatric/Behavioral: Positive for dysphoric mood ( patient's wife  about a year ago and he still Mrs  Her a lot, feels depressed, we discussed medications but he declines medications today, he will call me if he will feel worse at which point will start medications)  Negative for suicidal ideas  Objective:    /72   Pulse 64   Resp 22   Ht 5' 11" (1 803 m)   Wt 129 kg (284 lb)   BMI 39 61 kg/m²      Physical Exam  HENT:      Head: Normocephalic  Mouth/Throat:      Pharynx: Oropharynx is clear  No oropharyngeal exudate or posterior oropharyngeal erythema  Eyes:      Conjunctiva/sclera: Conjunctivae normal    Neck:      Musculoskeletal: Neck supple  Cardiovascular:      Rate and Rhythm: Normal rate and regular rhythm  Heart sounds: No murmur  Pulmonary:      Effort: No respiratory distress  Breath sounds: No wheezing or rales  Abdominal:      General: Bowel sounds are normal       Tenderness: There is no abdominal tenderness  Skin:     General: Skin is warm and dry        Comments: Patient has trace bilateral lower leg edema Neurological:      Mental Status: He is alert and oriented to person, place, and time  Cranial Nerves: No cranial nerve deficit  Sherice Leiva MD  Depression Screening Follow-up Plan: Patient's depression screening was positive with a PHQ-2 score of 2  Their PHQ-9 score was   Continue regular follow-up with their psychologist/therapist/psychiatrist who is managing their mental health condition(s)  BMI Counseling: Body mass index is 39 61 kg/m²  The BMI is above normal  Nutrition recommendations include reducing portion sizes  show

## 2022-01-05 NOTE — ED PROVIDER NOTE - CLINICAL SUMMARY MEDICAL DECISION MAKING FREE TEXT BOX
Pt with hx of pancreatitis, chrone's disease here with epigastric pain and vomiting since today. Pt has probably pancreatitis, will get CT, IV fluids, pain medication.

## 2022-01-06 LAB
ADD ON TEST-SPECIMEN IN LAB: SIGNIFICANT CHANGE UP
ALBUMIN SERPL ELPH-MCNC: 2.9 G/DL — LOW (ref 3.3–5)
ALP SERPL-CCNC: 87 U/L — SIGNIFICANT CHANGE UP (ref 40–120)
ALT FLD-CCNC: 19 U/L — SIGNIFICANT CHANGE UP (ref 12–78)
ANION GAP SERPL CALC-SCNC: 5 MMOL/L — SIGNIFICANT CHANGE UP (ref 5–17)
APPEARANCE UR: CLEAR — SIGNIFICANT CHANGE UP
AST SERPL-CCNC: 23 U/L — SIGNIFICANT CHANGE UP (ref 15–37)
BILIRUB SERPL-MCNC: 0.6 MG/DL — SIGNIFICANT CHANGE UP (ref 0.2–1.2)
BILIRUB UR-MCNC: NEGATIVE — SIGNIFICANT CHANGE UP
BUN SERPL-MCNC: 4 MG/DL — LOW (ref 7–23)
CALCIUM SERPL-MCNC: 9 MG/DL — SIGNIFICANT CHANGE UP (ref 8.5–10.1)
CHLORIDE SERPL-SCNC: 97 MMOL/L — SIGNIFICANT CHANGE UP (ref 96–108)
CO2 SERPL-SCNC: 28 MMOL/L — SIGNIFICANT CHANGE UP (ref 22–31)
COLOR SPEC: YELLOW — SIGNIFICANT CHANGE UP
CREAT SERPL-MCNC: 0.57 MG/DL — SIGNIFICANT CHANGE UP (ref 0.5–1.3)
DIFF PNL FLD: NEGATIVE — SIGNIFICANT CHANGE UP
GLUCOSE SERPL-MCNC: 119 MG/DL — HIGH (ref 70–99)
GLUCOSE UR QL: NEGATIVE MG/DL — SIGNIFICANT CHANGE UP
HCT VFR BLD CALC: 39.1 % — SIGNIFICANT CHANGE UP (ref 39–50)
HGB BLD-MCNC: 13.7 G/DL — SIGNIFICANT CHANGE UP (ref 13–17)
KETONES UR-MCNC: NEGATIVE — SIGNIFICANT CHANGE UP
LEUKOCYTE ESTERASE UR-ACNC: NEGATIVE — SIGNIFICANT CHANGE UP
LIDOCAIN IGE QN: 1129 U/L — HIGH (ref 73–393)
MCHC RBC-ENTMCNC: 35 GM/DL — SIGNIFICANT CHANGE UP (ref 32–36)
MCHC RBC-ENTMCNC: 35.7 PG — HIGH (ref 27–34)
MCV RBC AUTO: 101.8 FL — HIGH (ref 80–100)
NITRITE UR-MCNC: NEGATIVE — SIGNIFICANT CHANGE UP
PH UR: 7 — SIGNIFICANT CHANGE UP (ref 5–8)
PLATELET # BLD AUTO: 276 K/UL — SIGNIFICANT CHANGE UP (ref 150–400)
POTASSIUM SERPL-MCNC: 3.2 MMOL/L — LOW (ref 3.5–5.3)
POTASSIUM SERPL-SCNC: 3.2 MMOL/L — LOW (ref 3.5–5.3)
PROT SERPL-MCNC: 7.2 GM/DL — SIGNIFICANT CHANGE UP (ref 6–8.3)
PROT UR-MCNC: 15 MG/DL
RBC # BLD: 3.84 M/UL — LOW (ref 4.2–5.8)
RBC # FLD: 13.6 % — SIGNIFICANT CHANGE UP (ref 10.3–14.5)
SODIUM SERPL-SCNC: 130 MMOL/L — LOW (ref 135–145)
SP GR SPEC: 1.01 — SIGNIFICANT CHANGE UP (ref 1.01–1.02)
TRIGL SERPL-MCNC: 72 MG/DL — SIGNIFICANT CHANGE UP
UROBILINOGEN FLD QL: NEGATIVE MG/DL — SIGNIFICANT CHANGE UP
WBC # BLD: 16.16 K/UL — HIGH (ref 3.8–10.5)
WBC # FLD AUTO: 16.16 K/UL — HIGH (ref 3.8–10.5)

## 2022-01-06 PROCEDURE — 99253 IP/OBS CNSLTJ NEW/EST LOW 45: CPT

## 2022-01-06 PROCEDURE — 99222 1ST HOSP IP/OBS MODERATE 55: CPT

## 2022-01-06 PROCEDURE — 12345: CPT | Mod: NC

## 2022-01-06 PROCEDURE — 76705 ECHO EXAM OF ABDOMEN: CPT | Mod: 26

## 2022-01-06 RX ORDER — ACETAMINOPHEN 500 MG
1000 TABLET ORAL ONCE
Refills: 0 | Status: COMPLETED | OUTPATIENT
Start: 2022-01-06 | End: 2022-01-06

## 2022-01-06 RX ORDER — HYDROMORPHONE HYDROCHLORIDE 2 MG/ML
1 INJECTION INTRAMUSCULAR; INTRAVENOUS; SUBCUTANEOUS EVERY 4 HOURS
Refills: 0 | Status: DISCONTINUED | OUTPATIENT
Start: 2022-01-06 | End: 2022-01-06

## 2022-01-06 RX ORDER — SODIUM CHLORIDE 9 MG/ML
1000 INJECTION INTRAMUSCULAR; INTRAVENOUS; SUBCUTANEOUS
Refills: 0 | Status: DISCONTINUED | OUTPATIENT
Start: 2022-01-06 | End: 2022-01-07

## 2022-01-06 RX ORDER — HYDROMORPHONE HYDROCHLORIDE 2 MG/ML
2 INJECTION INTRAMUSCULAR; INTRAVENOUS; SUBCUTANEOUS EVERY 4 HOURS
Refills: 0 | Status: DISCONTINUED | OUTPATIENT
Start: 2022-01-06 | End: 2022-01-08

## 2022-01-06 RX ORDER — ONDANSETRON 8 MG/1
4 TABLET, FILM COATED ORAL EVERY 8 HOURS
Refills: 0 | Status: DISCONTINUED | OUTPATIENT
Start: 2022-01-06 | End: 2022-01-08

## 2022-01-06 RX ORDER — PROCHLORPERAZINE MALEATE 5 MG
10 TABLET ORAL EVERY 6 HOURS
Refills: 0 | Status: DISCONTINUED | OUTPATIENT
Start: 2022-01-06 | End: 2022-01-08

## 2022-01-06 RX ORDER — HYDROMORPHONE HYDROCHLORIDE 2 MG/ML
1 INJECTION INTRAMUSCULAR; INTRAVENOUS; SUBCUTANEOUS
Refills: 0 | Status: DISCONTINUED | OUTPATIENT
Start: 2022-01-06 | End: 2022-01-08

## 2022-01-06 RX ORDER — POTASSIUM CHLORIDE 20 MEQ
10 PACKET (EA) ORAL
Refills: 0 | Status: COMPLETED | OUTPATIENT
Start: 2022-01-06 | End: 2022-01-06

## 2022-01-06 RX ORDER — MAGNESIUM SULFATE 500 MG/ML
1 VIAL (ML) INJECTION ONCE
Refills: 0 | Status: COMPLETED | OUTPATIENT
Start: 2022-01-06 | End: 2022-01-06

## 2022-01-06 RX ORDER — LANOLIN ALCOHOL/MO/W.PET/CERES
3 CREAM (GRAM) TOPICAL AT BEDTIME
Refills: 0 | Status: DISCONTINUED | OUTPATIENT
Start: 2022-01-06 | End: 2022-01-08

## 2022-01-06 RX ORDER — ACETAMINOPHEN 500 MG
650 TABLET ORAL EVERY 6 HOURS
Refills: 0 | Status: DISCONTINUED | OUTPATIENT
Start: 2022-01-06 | End: 2022-01-08

## 2022-01-06 RX ADMIN — HYDROMORPHONE HYDROCHLORIDE 2 MILLIGRAM(S): 2 INJECTION INTRAMUSCULAR; INTRAVENOUS; SUBCUTANEOUS at 09:07

## 2022-01-06 RX ADMIN — Medication 10 MILLIGRAM(S): at 10:07

## 2022-01-06 RX ADMIN — HYDROMORPHONE HYDROCHLORIDE 1 MILLIGRAM(S): 2 INJECTION INTRAMUSCULAR; INTRAVENOUS; SUBCUTANEOUS at 11:31

## 2022-01-06 RX ADMIN — HYDROMORPHONE HYDROCHLORIDE 2 MILLIGRAM(S): 2 INJECTION INTRAMUSCULAR; INTRAVENOUS; SUBCUTANEOUS at 13:46

## 2022-01-06 RX ADMIN — SODIUM CHLORIDE 125 MILLILITER(S): 9 INJECTION INTRAMUSCULAR; INTRAVENOUS; SUBCUTANEOUS at 04:00

## 2022-01-06 RX ADMIN — SODIUM CHLORIDE 125 MILLILITER(S): 9 INJECTION INTRAMUSCULAR; INTRAVENOUS; SUBCUTANEOUS at 09:56

## 2022-01-06 RX ADMIN — Medication 10 MILLIGRAM(S): at 20:29

## 2022-01-06 RX ADMIN — Medication 400 MILLIGRAM(S): at 06:19

## 2022-01-06 RX ADMIN — SODIUM CHLORIDE 125 MILLILITER(S): 9 INJECTION INTRAMUSCULAR; INTRAVENOUS; SUBCUTANEOUS at 01:29

## 2022-01-06 RX ADMIN — HYDROMORPHONE HYDROCHLORIDE 1 MILLIGRAM(S): 2 INJECTION INTRAMUSCULAR; INTRAVENOUS; SUBCUTANEOUS at 00:22

## 2022-01-06 RX ADMIN — HYDROMORPHONE HYDROCHLORIDE 2 MILLIGRAM(S): 2 INJECTION INTRAMUSCULAR; INTRAVENOUS; SUBCUTANEOUS at 22:42

## 2022-01-06 RX ADMIN — Medication 100 GRAM(S): at 13:52

## 2022-01-06 RX ADMIN — Medication 30 MILLILITER(S): at 01:30

## 2022-01-06 RX ADMIN — PIPERACILLIN AND TAZOBACTAM 25 GRAM(S): 4; .5 INJECTION, POWDER, LYOPHILIZED, FOR SOLUTION INTRAVENOUS at 07:30

## 2022-01-06 RX ADMIN — Medication 100 MILLIEQUIVALENT(S): at 09:57

## 2022-01-06 RX ADMIN — HYDROMORPHONE HYDROCHLORIDE 1 MILLIGRAM(S): 2 INJECTION INTRAMUSCULAR; INTRAVENOUS; SUBCUTANEOUS at 16:07

## 2022-01-06 RX ADMIN — HYDROMORPHONE HYDROCHLORIDE 1 MILLIGRAM(S): 2 INJECTION INTRAMUSCULAR; INTRAVENOUS; SUBCUTANEOUS at 18:22

## 2022-01-06 RX ADMIN — HYDROMORPHONE HYDROCHLORIDE 1 MILLIGRAM(S): 2 INJECTION INTRAMUSCULAR; INTRAVENOUS; SUBCUTANEOUS at 07:33

## 2022-01-06 RX ADMIN — HYDROMORPHONE HYDROCHLORIDE 1 MILLIGRAM(S): 2 INJECTION INTRAMUSCULAR; INTRAVENOUS; SUBCUTANEOUS at 04:57

## 2022-01-06 RX ADMIN — Medication 3 MILLIGRAM(S): at 20:30

## 2022-01-06 RX ADMIN — HYDROMORPHONE HYDROCHLORIDE 2 MILLIGRAM(S): 2 INJECTION INTRAMUSCULAR; INTRAVENOUS; SUBCUTANEOUS at 22:49

## 2022-01-06 RX ADMIN — Medication 100 MILLIEQUIVALENT(S): at 10:55

## 2022-01-06 RX ADMIN — PIPERACILLIN AND TAZOBACTAM 200 GRAM(S): 4; .5 INJECTION, POWDER, LYOPHILIZED, FOR SOLUTION INTRAVENOUS at 00:23

## 2022-01-06 RX ADMIN — Medication 100 MILLIEQUIVALENT(S): at 11:32

## 2022-01-06 RX ADMIN — SODIUM CHLORIDE 125 MILLILITER(S): 9 INJECTION INTRAMUSCULAR; INTRAVENOUS; SUBCUTANEOUS at 23:31

## 2022-01-06 RX ADMIN — ONDANSETRON 4 MILLIGRAM(S): 8 TABLET, FILM COATED ORAL at 04:57

## 2022-01-06 RX ADMIN — Medication 650 MILLIGRAM(S): at 01:30

## 2022-01-06 RX ADMIN — HYDROMORPHONE HYDROCHLORIDE 2 MILLIGRAM(S): 2 INJECTION INTRAMUSCULAR; INTRAVENOUS; SUBCUTANEOUS at 03:44

## 2022-01-06 NOTE — H&P ADULT - HISTORY OF PRESENT ILLNESS
35 y/o Male with a PMhx of Chron's disease and pancreatitis presents to the ED with complain of epigastric adnominal pain for 1 day. Pain is non-radiating, associated with vomiting x2 today. No diarrhea. Ileostomy is functioning. Patient reports he had 1 drink yesterday. Denies any diarrhea. No increase ileostomy output. No injury or fall.

## 2022-01-06 NOTE — CONSULT NOTE ADULT - ASSESSMENT
Imp:  I favor idiopathic pancreaitits with associated ileus as explanation for presentation    Rec:  Cont IV fluids -- current rate seems adequate based on H/H, BUN and creatinine  Recommend DVT proph  Stop Zosyn (no indication for pancreatitis)  Antibiotics for non-pancreatitis reasons per primary team  Check abdominal ultrasound, TGs

## 2022-01-06 NOTE — H&P ADULT - NSHPPHYSICALEXAM_GEN_ALL_CORE
Vital Signs Last 24 Hrs  T(C): 37.1 (06 Jan 2022 00:00), Max: 37.1 (06 Jan 2022 00:00)  T(F): 98.7 (06 Jan 2022 00:00), Max: 98.7 (06 Jan 2022 00:00)  HR: 80 (06 Jan 2022 00:00) (80 - 126)  BP: 150/92 (06 Jan 2022 00:00) (150/92 - 162/109)  BP(mean): 119 (05 Jan 2022 18:23) (119 - 120)  RR: 18 (06 Jan 2022 00:37) (14 - 18)  SpO2: 100% (06 Jan 2022 00:37) (88% - 100%)

## 2022-01-06 NOTE — ED ADULT NURSE REASSESSMENT NOTE - NS ED NURSE REASSESS COMMENT FT1
care assumed form BYRON Reid at 2220. pt. in pain, MD made aware, no other needs or complains at this time. pt. is alert and oriented. chart reviewed.

## 2022-01-06 NOTE — CONSULT NOTE ADULT - ASSESSMENT
36M PMH significant for Crohn's disease including severe perianal Crohn's and complexed fistulas s/p fecal diversion who presented for evaluation of epigastric pain and was found to pancreatitis. Patient reports that his pain has improved since presentation  Continue to ensure adequate pain control  Continue IVF fluids resuscitation  PO intake as tolerated  No current colon and rectal surgery interventions warranted at this time  Continue medical management of Crohn's disease  Management of Pancreatitis per primary and GI  We will continue to follow patient's course

## 2022-01-06 NOTE — CONSULT NOTE ADULT - SUBJECTIVE AND OBJECTIVE BOX
HPI:  35 y/o Male with a PMhx of Chron's disease and pancreatitis presents to the ED with complain of epigastric adnominal pain for 1 day. Pain is non-radiating, associated with vomiting x2 today. No diarrhea. Ileostomy is functioning. Patient reports he had 1 drink yesterday. Denies any diarrhea. No increase ileostomy output. No injury or fall.  (06 Jan 2022 01:09)  --------------------------------  CT in ER showed acute pancreatitis as well as evidence of colitis and ileus.  Labs notable for elevated lipase.  Denies alcohol use to me since 6 months ago.  Of note patient is on stelara for IBD for < 1 year -- followed by Dr. Pearl at [place].    PAST MEDICAL & SURGICAL HISTORY:  Crohn&#x27;s disease of large intestine without complication  (No current flare up)    Pancreatitis    S/P ORIF (open reduction internal fixation) fracture  (Right ankle, 2014)    History of colonoscopy  (2014)    Perianal fistula  repair in 2002        Home Medications:  Fluzone Quadrivalent [id number] intramuscular suspension: 0.5 milliliter(s) intramuscular once  ***Dose given in September 2021*** (05 Jan 2022 23:44)  Stelara PFS 90 mg/mL subcutaneous solution: 1 milliliter(s) subcutaneous every 2 months  ***last dose was about 1 month ago*** (05 Jan 2022 23:44)  Tylenol 500 mg oral tablet: 2 tab(s) orally every 6 hours, As Needed (05 Jan 2022 23:44)      MEDICATIONS  (STANDING):  potassium chloride  10 mEq/100 mL IVPB 10 milliEquivalent(s) IV Intermittent every 1 hour  sodium chloride 0.9%. 1000 milliLiter(s) (125 mL/Hr) IV Continuous <Continuous>    MEDICATIONS  (PRN):  acetaminophen     Tablet .. 650 milliGRAM(s) Oral every 6 hours PRN Temp greater or equal to 38C (100.4F), Mild Pain (1 - 3)  aluminum hydroxide/magnesium hydroxide/simethicone Suspension 30 milliLiter(s) Oral every 4 hours PRN Dyspepsia  HYDROmorphone  Injectable 2 milliGRAM(s) IV Push every 4 hours PRN Severe Pain (7 - 10)  HYDROmorphone  Injectable 1 milliGRAM(s) IV Push every 2 hours PRN Moderate Pain (4 - 6)  melatonin 3 milliGRAM(s) Oral at bedtime PRN Insomnia  ondansetron Injectable 4 milliGRAM(s) IV Push every 8 hours PRN Nausea and/or Vomiting  prochlorperazine   Injectable 10 milliGRAM(s) IV Push every 6 hours PRN N/V      Allergies    No Known Allergies    Intolerances        SOCIAL HISTORY:    FAMILY HISTORY:  Diabetes mellitus (Father)        ROS  As above  Otherwise unremarkable    Vital Signs Last 24 Hrs  T(C): 36.3 (06 Jan 2022 08:36), Max: 37.1 (06 Jan 2022 00:00)  T(F): 97.4 (06 Jan 2022 08:36), Max: 98.7 (06 Jan 2022 00:00)  HR: 87 (06 Jan 2022 08:00) (65 - 126)  BP: 138/67 (06 Jan 2022 08:00) (127/75 - 162/109)  BP(mean): 84 (06 Jan 2022 08:00) (84 - 120)  RR: 20 (06 Jan 2022 08:00) (13 - 22)  SpO2: 99% (06 Jan 2022 08:00) (88% - 100%)    Constitutional: NAD, well-developed  Respiratory: CTAB  Cardiovascular: S1 and S2, RRR  Gastrointestinal: BS+, soft, diffusely tender, more epigastric. Ileostomy in RLQ with air but no stool.  Extremities: No peripheral edema  Psychiatric: Normal mood, normal affect  Skin: No rashes    LABS:                        13.7   16.16 )-----------( 276      ( 06 Jan 2022 06:05 )             39.1     01-06    130<L>  |  97  |  4<L>  ----------------------------<  119<H>  3.2<L>   |  28  |  0.57    Ca    9.0      06 Jan 2022 06:05    TPro  7.2  /  Alb  2.9<L>  /  TBili  0.6  /  DBili  x   /  AST  23  /  ALT  19  /  AlkPhos  87  01-06    PT/INR - ( 05 Jan 2022 18:57 )   PT: 12.1 sec;   INR: 1.05 ratio         PTT - ( 05 Jan 2022 18:57 )  PTT:25.7 sec  LIVER FUNCTIONS - ( 06 Jan 2022 06:05 )  Alb: 2.9 g/dL / Pro: 7.2 gm/dL / ALK PHOS: 87 U/L / ALT: 19 U/L / AST: 23 U/L / GGT: x             RADIOLOGY & ADDITIONAL STUDIES:

## 2022-01-06 NOTE — PROVIDER CONTACT NOTE (OTHER) - NAME OF MD/NP/PA/DO NOTIFIED:
Dr. Lito Crawley
Dr. Zamora
MD Angie
L. facial droop. Contracted extremities. Not able to follow commands

## 2022-01-06 NOTE — PROGRESS NOTE ADULT - SUBJECTIVE AND OBJECTIVE BOX
CHIEF COMPLAINT/DIAGNOSIS: abdominal pain, acute pancreatitis    HPI: 35yo male with a PMHx of Crohn's disease and hx of pancreatitis presents to the ED with complain of epigastric adnominal pain for 1 day. Pain is non-radiating, associated with vomiting x2 today. No HA, dizziness, chest pain, SOB, vomiting, diarrhea. Ileostomy is functioning. Patient reports he had 1 drink yesterday. No increase ileostomy output. No injury or fall.     SUBJECTIVE:  1/6: Pt found sleeping. C/o abdominal pain despite multiple dose of dilaudid overnight and nausea. lightheadedness resolved. States that he follows Dr. Kramer GI as outpatient at [facility]. Denies dysuria, hematuria.    REVIEW OF SYSTEMS:  All other review of systems is negative unless indicated above    PHYSICAL EXAM:  Constitutional: NAD, awake and alert  HEENT: PERR, EOMI, dry mucous membranes  Respiratory: Breath sounds are clear bilaterally, No wheezing, rales or rhonchi  Cardiovascular: S1 and S2, regular rate and rhythm, no Murmurs  Gastrointestinal: Bowel Sounds present, tenderness to palpation in epigastric region. soft, nondistended, no guarding, no rebound  Extremities: No peripheral edema  Vascular: 2+ peripheral pulses  Neurological: A/O x 3, no focal deficits  Musculoskeletal: 5/5 strength b/l upper and lower extremities  Skin: No rashes    Vital Signs Last 24 Hrs  T(C): 36.3 (06 Jan 2022 08:36), Max: 37.1 (06 Jan 2022 00:00)  T(F): 97.4 (06 Jan 2022 08:36), Max: 98.7 (06 Jan 2022 00:00)  HR: 76 (06 Jan 2022 11:33) (65 - 126)  BP: 137/93 (06 Jan 2022 11:33) (127/66 - 162/109)  BP(mean): 102 (06 Jan 2022 11:33) (80 - 120)  RR: 15 (06 Jan 2022 11:33) (13 - 22)  SpO2: 97% (06 Jan 2022 11:33) (88% - 100%)    LABS: All Labs Reviewed:                        13.7   16.16 )-----------( 276      ( 06 Jan 2022 06:05 )             39.1     01-06    130<L>  |  97  |  4<L>  ----------------------------<  119<H>  3.2<L>   |  28  |  0.57    Ca    9.0      06 Jan 2022 06:05  Phos  3.2     01-06  Mg     1.5     01-06    TPro  7.2  /  Alb  2.9<L>  /  TBili  0.6  /  DBili  x   /  AST  23  /  ALT  19  /  AlkPhos  87  01-06    PT/INR - ( 05 Jan 2022 18:57 )   PT: 12.1 sec;   INR: 1.05 ratio       PTT - ( 05 Jan 2022 18:57 )  PTT:25.7 sec    RADIOLOGY:  < from: CT Abdomen and Pelvis w/ Oral Cont and w/ IV Cont (01.05.22 @ 21:35) >  IMPRESSION:  CT findings compatible with acute interstitial edematous pancreatitis. No   loculated fluid collections.    Redemonstration of rectal wall thickening and perirectal inflammatory   changes compatible with active proctitis, including multiple perianal   fistulas. Diffuse thickening of the transverse through sigmoid colon   compatible with chronic inflammatory bowel disease.    Underdistended although mildly thick-walled urinary bladder, correlate   with urinalysis to exclude UTI.    Moderate to marked distention of the stomach, no evidence for obstructing   mass lesion, recommend aspiration precautions.    Mild patchy subpleural groundglass opacities along the anterior aspects   of the right middle lobe and lingula, nonspecific, although concerning   for an infectious versus inflammatory process including particular   consideration to atypical viral pneumonias.    < end of copied text >    MEDICATIONS:  MEDICATIONS  (STANDING):  magnesium sulfate  IVPB 1 Gram(s) IV Intermittent once  sodium chloride 0.9%. 1000 milliLiter(s) (125 mL/Hr) IV Continuous <Continuous>    MEDICATIONS  (PRN):  acetaminophen     Tablet .. 650 milliGRAM(s) Oral every 6 hours PRN Temp greater or equal to 38C (100.4F), Mild Pain (1 - 3)  aluminum hydroxide/magnesium hydroxide/simethicone Suspension 30 milliLiter(s) Oral every 4 hours PRN Dyspepsia  HYDROmorphone  Injectable 2 milliGRAM(s) IV Push every 4 hours PRN Severe Pain (7 - 10)  HYDROmorphone  Injectable 1 milliGRAM(s) IV Push every 2 hours PRN Moderate Pain (4 - 6)  melatonin 3 milliGRAM(s) Oral at bedtime PRN Insomnia  ondansetron Injectable 4 milliGRAM(s) IV Push every 8 hours PRN Nausea and/or Vomiting  prochlorperazine   Injectable 10 milliGRAM(s) IV Push every 6 hours PRN N/V    ASSESSMENT AND PLAN:   35yo male with a PMHx of Crohn's disease and hx of pancreatitis presents to the ED with complain of epigastric adnominal pain for 1 day. Admitted for acute pancreatitis and proctitis.     #acute pancreatitis likely idiopathic vs gallstone  #leukocytosis  - CT abd pelvis results above  - NPO  - cont IV fluids  - dc zosyn as per GI  - triglyceride level pending  - RUQ US pending to eval for gallstones  - zofran prn nausea  - IV dilaudid prn pain control  - UA, Urine Cx pending  - leukocytes trending down, monitor WBC    #hypokalemia, hypomagnesemia   - repleated, recheck in AM    #Crohn's disease   #active proctitis   - pt on stelara, however non-compliant with meds and follow up  - pt states he still has drains in rectal area and is due to follow up with outpt [facility] surgeon  - colorectal sx consulted    #current smoker  - refused nicotine patch    #DVT ppx  - Improve score 0  - venodynes, ambulate as tolerated       CHIEF COMPLAINT/DIAGNOSIS: abdominal pain, acute pancreatitis    HPI: 35yo male with a PMHx of Crohn's disease and hx of pancreatitis presents to the ED with complain of epigastric adnominal pain for 1 day. Pain is non-radiating, associated with vomiting x2 today. No HA, dizziness, chest pain, SOB, vomiting, diarrhea. Ileostomy is functioning. Patient reports he had 1 drink yesterday. No increase ileostomy output. No injury or fall.     SUBJECTIVE:  1/6: Pt found sleeping. C/o abdominal pain despite multiple dose of dilaudid overnight and nausea. lightheadedness resolved. States that he follows Dr. Kramer GI as outpatient at [facility]. Denies dysuria, hematuria.    REVIEW OF SYSTEMS:  All other review of systems is negative unless indicated above    PHYSICAL EXAM:  Constitutional: NAD, awake and alert  HEENT: PERR, EOMI, dry mucous membranes  Respiratory: Breath sounds are clear bilaterally, No wheezing, rales or rhonchi  Cardiovascular: S1 and S2, regular rate and rhythm, no Murmurs  Gastrointestinal: Bowel Sounds present, tenderness to palpation in epigastric region. soft, nondistended, no guarding, no rebound  Extremities: No peripheral edema  Vascular: 2+ peripheral pulses  Neurological: A/O x 3, no focal deficits  Musculoskeletal: 5/5 strength b/l upper and lower extremities  Skin: No rashes    Vital Signs Last 24 Hrs  T(C): 36.3 (06 Jan 2022 08:36), Max: 37.1 (06 Jan 2022 00:00)  T(F): 97.4 (06 Jan 2022 08:36), Max: 98.7 (06 Jan 2022 00:00)  HR: 76 (06 Jan 2022 11:33) (65 - 126)  BP: 137/93 (06 Jan 2022 11:33) (127/66 - 162/109)  BP(mean): 102 (06 Jan 2022 11:33) (80 - 120)  RR: 15 (06 Jan 2022 11:33) (13 - 22)  SpO2: 97% (06 Jan 2022 11:33) (88% - 100%)    LABS: All Labs Reviewed:                        13.7   16.16 )-----------( 276      ( 06 Jan 2022 06:05 )             39.1     01-06    130<L>  |  97  |  4<L>  ----------------------------<  119<H>  3.2<L>   |  28  |  0.57    Ca    9.0      06 Jan 2022 06:05  Phos  3.2     01-06  Mg     1.5     01-06    TPro  7.2  /  Alb  2.9<L>  /  TBili  0.6  /  DBili  x   /  AST  23  /  ALT  19  /  AlkPhos  87  01-06    PT/INR - ( 05 Jan 2022 18:57 )   PT: 12.1 sec;   INR: 1.05 ratio       PTT - ( 05 Jan 2022 18:57 )  PTT:25.7 sec    RADIOLOGY:  < from: CT Abdomen and Pelvis w/ Oral Cont and w/ IV Cont (01.05.22 @ 21:35) >  IMPRESSION:  CT findings compatible with acute interstitial edematous pancreatitis. No   loculated fluid collections.    Redemonstration of rectal wall thickening and perirectal inflammatory   changes compatible with active proctitis, including multiple perianal   fistulas. Diffuse thickening of the transverse through sigmoid colon   compatible with chronic inflammatory bowel disease.    Underdistended although mildly thick-walled urinary bladder, correlate   with urinalysis to exclude UTI.    Moderate to marked distention of the stomach, no evidence for obstructing   mass lesion, recommend aspiration precautions.    Mild patchy subpleural groundglass opacities along the anterior aspects   of the right middle lobe and lingula, nonspecific, although concerning   for an infectious versus inflammatory process including particular   consideration to atypical viral pneumonias.    < end of copied text >    MEDICATIONS:  MEDICATIONS  (STANDING):  magnesium sulfate  IVPB 1 Gram(s) IV Intermittent once  sodium chloride 0.9%. 1000 milliLiter(s) (125 mL/Hr) IV Continuous <Continuous>    MEDICATIONS  (PRN):  acetaminophen     Tablet .. 650 milliGRAM(s) Oral every 6 hours PRN Temp greater or equal to 38C (100.4F), Mild Pain (1 - 3)  aluminum hydroxide/magnesium hydroxide/simethicone Suspension 30 milliLiter(s) Oral every 4 hours PRN Dyspepsia  HYDROmorphone  Injectable 2 milliGRAM(s) IV Push every 4 hours PRN Severe Pain (7 - 10)  HYDROmorphone  Injectable 1 milliGRAM(s) IV Push every 2 hours PRN Moderate Pain (4 - 6)  melatonin 3 milliGRAM(s) Oral at bedtime PRN Insomnia  ondansetron Injectable 4 milliGRAM(s) IV Push every 8 hours PRN Nausea and/or Vomiting  prochlorperazine   Injectable 10 milliGRAM(s) IV Push every 6 hours PRN N/V    ASSESSMENT AND PLAN:   35yo male with a PMHx of Crohn's disease and hx of pancreatitis presents to the ED with complain of epigastric adnominal pain for 1 day. Admitted for acute pancreatitis and proctitis.     #acute pancreatitis likely idiopathic vs gallstone  #leukocytosis  - CT abd pelvis results above  - NPO  - cont IV fluids  - dc zosyn as per GI  - triglyceride level pending  - RUQ US pending to eval for gallstones  - zofran prn nausea  - IV dilaudid prn pain control  - UA, Urine Cx pending  - leukocytes trending down, monitor WBC    #hypokalemia, hypomagnesemia   - repleated, recheck in AM    #hyponatremia  - suspected SIADH etiology 2/2 pain, nausea, narcotic use which are secretagogues for ADH    #Crohn's disease   #active proctitis   - pt on stelara, however non-compliant with meds and follow up  - pt states he still has drains in rectal area and is due to follow up with outpt [facility] surgeon  - colorectal sx consulted    #current smoker  - refused nicotine patch    #DVT ppx  - Improve score 0  - venodynes, ambulate as tolerated

## 2022-01-06 NOTE — PROVIDER CONTACT NOTE (OTHER) - SITUATION
Consult called in to MD Alcaraz
Dr. Tirado spoke to Dr. Zamora
no bcx prior to zosyn as per MD order.
notified office; spoke to Flor

## 2022-01-06 NOTE — PROGRESS NOTE ADULT - ATTENDING COMMENTS
Pt seen and examined with PA. A&P agreed and reviewed.   Discussed with pt, Dr. Zamora. Will obtain CRS consult.  Pt reports severe pain but observed in no acute distress sleeping with demanding IV pain meds on awakening.  On exam abdomen soft with + BS and RLQ ileostomy with stool.  Pt reports plan to reverse ileostomy and remove drains from rectum  Labs and imaging personally reviewed.  Will keep NPO, IVF, start tapering off pain meds.   Abx stopped as no evidence of acute infectious process.  As per GI poorly compliant.    Time spent 54 min

## 2022-01-06 NOTE — PATIENT PROFILE ADULT - FALL HARM RISK - HARM RISK INTERVENTIONS
Assistance with ambulation/Assistance OOB with selected safe patient handling equipment/Communicate Risk of Fall with Harm to all staff/Reinforce activity limits and safety measures with patient and family/Review medications for side effects contributing to fall risk/Sit up slowly, dangle for a short time, stand at bedside before walking/Tailored Fall Risk Interventions/Toileting schedule using arm’s reach rule for commode and bathroom/Visual Cue: Yellow wristband and red socks/Bed in lowest position, wheels locked, appropriate side rails in place/Call bell, personal items and telephone in reach/Instruct patient to call for assistance before getting out of bed or chair/Non-slip footwear when patient is out of bed/[place] to call system/Physically safe environment - no spills, clutter or unnecessary equipment/Purposeful Proactive Rounding/Room/bathroom lighting operational, light cord in reach

## 2022-01-06 NOTE — H&P ADULT - ASSESSMENT
A/P:    1.  Acute Pancreatitis  -keep NPO  -on IVF  -started on IV Antibiotics and IV pain meds  -follow GI consult  -follow repeat labs  -follow UA/Urine Cx    2.  SCD for DVT ppx    3.  Code status: Patient is in full code status.

## 2022-01-06 NOTE — CONSULT NOTE ADULT - SUBJECTIVE AND OBJECTIVE BOX
36M PMH significant for Crohn's disease including severe perianal Crohn's and complexed fistulas requiring fecal diversion via loop ileostomy (~7 months ago) and placement of draining setons for fistula management. Patient also has a history of pancreatitis which is his current working diagnosis. Patient presented with epigastric pain which began the morning of 1/5 and worsened to the point of debilitation by the afternoon. Patient decided to come to the ED for further evaluation. Pain is non-radiating, and associated with nausea and vomiting. Otherwise patient denies CP and SOB. Patients ileostomy remains functional.  Patient reports currently on Stelara for his Crohn's disease  Patient's Colon and rectal surgeon is Dr. Dimitri Ortega at [place], Gastroenterologists is Dr. Pearl at [place].      I&O's Detail    06 Jan 2022 07:01  -  06 Jan 2022 22:14  --------------------------------------------------------  IN:    sodium chloride 0.9%: 1180 mL  Total IN: 1180 mL    OUT:    Voided (mL): 1300 mL  Total OUT: 1300 mL    Total NET: -120 mL      Vital Signs Last 24 Hrs  T(C): 36.9 (06 Jan 2022 16:00), Max: 37.1 (06 Jan 2022 00:00)  T(F): 98.4 (06 Jan 2022 16:00), Max: 98.7 (06 Jan 2022 00:00)  HR: 78 (06 Jan 2022 15:20) (65 - 99)  BP: 126/73 (06 Jan 2022 15:20) (126/73 - 150/92)  BP(mean): 85 (06 Jan 2022 15:20) (80 - 114)  RR: 15 (06 Jan 2022 11:33) (13 - 22)  SpO2: 97% (06 Jan 2022 15:20) (88% - 100%)    Physical Exam  GEN: Lying in bed in mild to moderate discomfort  PULM: Non-labored respiration  CVS: Normal rate  ABD: Soft, tender in the epigastrium, focal guarding and rebound tenderness. Mild to moderate tenderness in LLQ, minimal tenderness RLQ.  : 2 setons noted. Seton from posterior midline to opening at the left lateral perianal region. Second seton from the posterior midline to the right posterior perianal region                        13.7   16.16 )-----------( 276      ( 06 Jan 2022 06:05 )             39.1     01-06    130<L>  |  97  |  4<L>  ----------------------------<  119<H>  3.2<L>   |  28  |  0.57    Ca    9.0      06 Jan 2022 06:05  Phos  3.2     01-06  Mg     1.5     01-06    TPro  7.2  /  Alb  2.9<L>  /  TBili  0.6  /  DBili  x   /  AST  23  /  ALT  19  /  AlkPhos  87  01-06      MEDICATIONS  (STANDING):  sodium chloride 0.9%. 1000 milliLiter(s) (125 mL/Hr) IV Continuous <Continuous>    MEDICATIONS  (PRN):  acetaminophen     Tablet .. 650 milliGRAM(s) Oral every 6 hours PRN Temp greater or equal to 38C (100.4F), Mild Pain (1 - 3)  aluminum hydroxide/magnesium hydroxide/simethicone Suspension 30 milliLiter(s) Oral every 4 hours PRN Dyspepsia  HYDROmorphone  Injectable 2 milliGRAM(s) IV Push every 4 hours PRN Severe Pain (7 - 10)  HYDROmorphone  Injectable 1 milliGRAM(s) IV Push every 2 hours PRN Moderate Pain (4 - 6)  melatonin 3 milliGRAM(s) Oral at bedtime PRN Insomnia  ondansetron Injectable 4 milliGRAM(s) IV Push every 8 hours PRN Nausea and/or Vomiting  prochlorperazine   Injectable 10 milliGRAM(s) IV Push every 6 hours PRN N/V      Imaging:    CT A/P: 1/5/2022:   BOWEL: Right lower quadrant ileostomy. Moderate to marked distention of the stomach containing oral contrast, without evidence for obstructing mass lesion. Diffuse wall thickening of the transverse through sigmoid colon characterized definitely by submucosal fat attenuation compatible with chronic inflammatory bowel disease, as well as rectal wall thickening and perirectal inflammatory fat stranding, including redemonstration of bilateral perianal fistulas with two anal setons in place. Mild air-filled prominence of small bowel loops, suggesting ileus, although no evidence for bowel obstruction. Appendix is normal.  PERITONEUM: No ascites.  VESSELS: Within normal limits.  RETROPERITONEUM/LYMPH NODES: No lymphadenopathy.  ABDOMINAL WALL: Within normal limits.  BONES: Old bilateral rib fractures. No acute or aggressive osseous lesions.    IMPRESSION:  CT findings compatible with acute interstitial edematous pancreatitis. No loculated fluid collections.    Readministration of rectal wall thickening and perirectal inflammatory changes compatible with active proctitis, including multiple perianal fistulas. Diffuse thickening of the transverse through sigmoid colon compatible with chronic inflammatory bowel disease.    Underdistended although mildly thick-walled urinary bladder, correlate with urinalysis to exclude UTI.    Moderate to marked distention of the stomach, no evidence for obstructing mass lesion, recommend aspiration precautions.    Mild patchy subpleural groundglass opacities along the anterior aspects of the right middle lobe and lingula, nonspecific, although concerning for an infectious versus inflammatory process including particular consideration to atypical viral pneumonias.      MRI: 8/25  Fistula #1:  MUCOSAL OPENING (based on axial images): Right posterior quadrant, with seton in place  EXTERNAL OPENING: Right gluteal fold.  CLASSIFICATION: Intersphincteric  ACTIVITY: Active tract (Fluid with peripheral enhancement)  COURSE OF FISTULOUS TRACT: Multiple tracts originating from the fistula, extending  A. Posterior medial to the gluteal fold  B. Superolateral blind-ending  C. Superiorly and laterally, blind-ending  D. Superiorly and medially connecting with a left-sided tract.    Fistula #2:  MUCOSAL OPENING (based on axial images): Left lateral rectum, with seton in place.  EXTERNAL OPENING: Left gluteal fold.  CLASSIFICATION: Intersphincteric  ACTIVITY: Active tract (Fluid with peripheral enhancement)  COURSE OF FISTULOUS TRACT: Multiple tracts originating from the fistula, extending  A. Anteriorly, blind-ending  B. Posteriorly, blind-ending  C. Superiorly and medially connecting with a right-sided tract.    DISTANCE FROM MUCOSAL DEFECT TO THE PERIANAL SKIN (measured on coronal imaging):  Right-sided fistulous tract 4.7 cm.  Left-sided fistulous tract 4.4 cm.    ADDITIONAL COMMENTS: Wall thickening of the rectum and sigmoid colon.    REPRODUCTIVE ORGANS: Within normal limits.  BLADDER: Within normal limits.  LYMPH NODES: No pelvic lymphadenopathy.    VISUALIZED PORTIONS:  PERITONEUM: No ascites.  VESSELS: Within normal limits.  ABDOMINAL WALL: Mild bilateral inguinal lymphadenopathy, most likely reactive.  BONES: Within normal limits.    IMPRESSION:  Bilateral intersphincteric anal fistulas, each with multiple branching tracts.  Rectosigmoid colitis/proctitis possibly due to Crohn's disease.    CT 8/24:  BOWEL: Right lower quadrant ileostomy. Rectal wall thickening and perirectal inflammation. Persistent multiple, bilateral perianal fistulas with new extension toward the anterior perineum. 2 new anal setons are in place. No bowel obstruction. Appendix is normal.  PERITONEUM: No ascites.  VESSELS: Within normal limits.  RETROPERITONEUM/LYMPH NODES: No lymphadenopathy.  ABDOMINAL WALL: Within normal limits.  BONES: Within normal limits.    IMPRESSION:  Active proctitis with multiple Crohn's-related perianal fistulas. There is new extension of tumor the fistulous towards the anterior perineum.    Marked urinary bladder distention.

## 2022-01-07 LAB
ADD ON TEST-SPECIMEN IN LAB: SIGNIFICANT CHANGE UP
ALBUMIN SERPL ELPH-MCNC: 2.3 G/DL — LOW (ref 3.3–5)
ALP SERPL-CCNC: 77 U/L — SIGNIFICANT CHANGE UP (ref 40–120)
ALT FLD-CCNC: 14 U/L — SIGNIFICANT CHANGE UP (ref 12–78)
ANION GAP SERPL CALC-SCNC: 5 MMOL/L — SIGNIFICANT CHANGE UP (ref 5–17)
AST SERPL-CCNC: 15 U/L — SIGNIFICANT CHANGE UP (ref 15–37)
BILIRUB SERPL-MCNC: 0.6 MG/DL — SIGNIFICANT CHANGE UP (ref 0.2–1.2)
BUN SERPL-MCNC: 10 MG/DL — SIGNIFICANT CHANGE UP (ref 7–23)
CALCIUM SERPL-MCNC: 8.4 MG/DL — LOW (ref 8.5–10.1)
CHLORIDE SERPL-SCNC: 101 MMOL/L — SIGNIFICANT CHANGE UP (ref 96–108)
CO2 SERPL-SCNC: 28 MMOL/L — SIGNIFICANT CHANGE UP (ref 22–31)
CREAT SERPL-MCNC: 0.65 MG/DL — SIGNIFICANT CHANGE UP (ref 0.5–1.3)
CULTURE RESULTS: SIGNIFICANT CHANGE UP
GLUCOSE SERPL-MCNC: 65 MG/DL — LOW (ref 70–99)
HCT VFR BLD CALC: 40 % — SIGNIFICANT CHANGE UP (ref 39–50)
HGB BLD-MCNC: 13.4 G/DL — SIGNIFICANT CHANGE UP (ref 13–17)
MAGNESIUM SERPL-MCNC: 2.2 MG/DL — SIGNIFICANT CHANGE UP (ref 1.6–2.6)
MCHC RBC-ENTMCNC: 33.5 GM/DL — SIGNIFICANT CHANGE UP (ref 32–36)
MCHC RBC-ENTMCNC: 35.2 PG — HIGH (ref 27–34)
MCV RBC AUTO: 105 FL — HIGH (ref 80–100)
PHOSPHATE SERPL-MCNC: 2.9 MG/DL — SIGNIFICANT CHANGE UP (ref 2.5–4.5)
PLATELET # BLD AUTO: 233 K/UL — SIGNIFICANT CHANGE UP (ref 150–400)
POTASSIUM SERPL-MCNC: 3.6 MMOL/L — SIGNIFICANT CHANGE UP (ref 3.5–5.3)
POTASSIUM SERPL-SCNC: 3.6 MMOL/L — SIGNIFICANT CHANGE UP (ref 3.5–5.3)
PROT SERPL-MCNC: 6.5 GM/DL — SIGNIFICANT CHANGE UP (ref 6–8.3)
RBC # BLD: 3.81 M/UL — LOW (ref 4.2–5.8)
RBC # FLD: 13.5 % — SIGNIFICANT CHANGE UP (ref 10.3–14.5)
SODIUM SERPL-SCNC: 134 MMOL/L — LOW (ref 135–145)
SPECIMEN SOURCE: SIGNIFICANT CHANGE UP
WBC # BLD: 12.92 K/UL — HIGH (ref 3.8–10.5)
WBC # FLD AUTO: 12.92 K/UL — HIGH (ref 3.8–10.5)

## 2022-01-07 PROCEDURE — 74183 MRI ABD W/O CNTR FLWD CNTR: CPT | Mod: 26

## 2022-01-07 PROCEDURE — 99232 SBSQ HOSP IP/OBS MODERATE 35: CPT

## 2022-01-07 RX ORDER — ZOLPIDEM TARTRATE 10 MG/1
5 TABLET ORAL AT BEDTIME
Refills: 0 | Status: DISCONTINUED | OUTPATIENT
Start: 2022-01-07 | End: 2022-01-08

## 2022-01-07 RX ORDER — SODIUM CHLORIDE 9 MG/ML
1000 INJECTION INTRAMUSCULAR; INTRAVENOUS; SUBCUTANEOUS
Refills: 0 | Status: DISCONTINUED | OUTPATIENT
Start: 2022-01-07 | End: 2022-01-07

## 2022-01-07 RX ADMIN — HYDROMORPHONE HYDROCHLORIDE 2 MILLIGRAM(S): 2 INJECTION INTRAMUSCULAR; INTRAVENOUS; SUBCUTANEOUS at 22:35

## 2022-01-07 RX ADMIN — HYDROMORPHONE HYDROCHLORIDE 1 MILLIGRAM(S): 2 INJECTION INTRAMUSCULAR; INTRAVENOUS; SUBCUTANEOUS at 18:00

## 2022-01-07 RX ADMIN — HYDROMORPHONE HYDROCHLORIDE 2 MILLIGRAM(S): 2 INJECTION INTRAMUSCULAR; INTRAVENOUS; SUBCUTANEOUS at 08:12

## 2022-01-07 RX ADMIN — Medication 10 MILLIGRAM(S): at 15:38

## 2022-01-07 RX ADMIN — HYDROMORPHONE HYDROCHLORIDE 1 MILLIGRAM(S): 2 INJECTION INTRAMUSCULAR; INTRAVENOUS; SUBCUTANEOUS at 14:14

## 2022-01-07 RX ADMIN — HYDROMORPHONE HYDROCHLORIDE 2 MILLIGRAM(S): 2 INJECTION INTRAMUSCULAR; INTRAVENOUS; SUBCUTANEOUS at 12:03

## 2022-01-07 RX ADMIN — HYDROMORPHONE HYDROCHLORIDE 1 MILLIGRAM(S): 2 INJECTION INTRAMUSCULAR; INTRAVENOUS; SUBCUTANEOUS at 10:45

## 2022-01-07 RX ADMIN — HYDROMORPHONE HYDROCHLORIDE 1 MILLIGRAM(S): 2 INJECTION INTRAMUSCULAR; INTRAVENOUS; SUBCUTANEOUS at 21:05

## 2022-01-07 RX ADMIN — SODIUM CHLORIDE 100 MILLILITER(S): 9 INJECTION INTRAMUSCULAR; INTRAVENOUS; SUBCUTANEOUS at 12:03

## 2022-01-07 RX ADMIN — HYDROMORPHONE HYDROCHLORIDE 1 MILLIGRAM(S): 2 INJECTION INTRAMUSCULAR; INTRAVENOUS; SUBCUTANEOUS at 17:31

## 2022-01-07 RX ADMIN — Medication 10 MILLIGRAM(S): at 08:07

## 2022-01-07 RX ADMIN — ZOLPIDEM TARTRATE 5 MILLIGRAM(S): 10 TABLET ORAL at 22:02

## 2022-01-07 RX ADMIN — HYDROMORPHONE HYDROCHLORIDE 2 MILLIGRAM(S): 2 INJECTION INTRAMUSCULAR; INTRAVENOUS; SUBCUTANEOUS at 03:38

## 2022-01-07 RX ADMIN — HYDROMORPHONE HYDROCHLORIDE 2 MILLIGRAM(S): 2 INJECTION INTRAMUSCULAR; INTRAVENOUS; SUBCUTANEOUS at 12:30

## 2022-01-07 RX ADMIN — HYDROMORPHONE HYDROCHLORIDE 1 MILLIGRAM(S): 2 INJECTION INTRAMUSCULAR; INTRAVENOUS; SUBCUTANEOUS at 14:30

## 2022-01-07 RX ADMIN — SODIUM CHLORIDE 100 MILLILITER(S): 9 INJECTION INTRAMUSCULAR; INTRAVENOUS; SUBCUTANEOUS at 17:30

## 2022-01-07 RX ADMIN — SODIUM CHLORIDE 125 MILLILITER(S): 9 INJECTION INTRAMUSCULAR; INTRAVENOUS; SUBCUTANEOUS at 06:21

## 2022-01-07 RX ADMIN — ONDANSETRON 4 MILLIGRAM(S): 8 TABLET, FILM COATED ORAL at 03:37

## 2022-01-07 RX ADMIN — HYDROMORPHONE HYDROCHLORIDE 2 MILLIGRAM(S): 2 INJECTION INTRAMUSCULAR; INTRAVENOUS; SUBCUTANEOUS at 08:40

## 2022-01-07 RX ADMIN — HYDROMORPHONE HYDROCHLORIDE 1 MILLIGRAM(S): 2 INJECTION INTRAMUSCULAR; INTRAVENOUS; SUBCUTANEOUS at 11:15

## 2022-01-07 RX ADMIN — HYDROMORPHONE HYDROCHLORIDE 1 MILLIGRAM(S): 2 INJECTION INTRAMUSCULAR; INTRAVENOUS; SUBCUTANEOUS at 20:31

## 2022-01-07 RX ADMIN — HYDROMORPHONE HYDROCHLORIDE 1 MILLIGRAM(S): 2 INJECTION INTRAMUSCULAR; INTRAVENOUS; SUBCUTANEOUS at 01:09

## 2022-01-07 RX ADMIN — HYDROMORPHONE HYDROCHLORIDE 2 MILLIGRAM(S): 2 INJECTION INTRAMUSCULAR; INTRAVENOUS; SUBCUTANEOUS at 18:09

## 2022-01-07 RX ADMIN — HYDROMORPHONE HYDROCHLORIDE 1 MILLIGRAM(S): 2 INJECTION INTRAMUSCULAR; INTRAVENOUS; SUBCUTANEOUS at 07:22

## 2022-01-07 RX ADMIN — HYDROMORPHONE HYDROCHLORIDE 2 MILLIGRAM(S): 2 INJECTION INTRAMUSCULAR; INTRAVENOUS; SUBCUTANEOUS at 22:02

## 2022-01-07 NOTE — PROGRESS NOTE ADULT - SUBJECTIVE AND OBJECTIVE BOX
Cheif complaints and Diagnosis:  acute pancreatitis/ chrons with perianal fistuals    Subjective:       REVIEW OF SYSTEMS:    CONSTITUTIONAL: No weakness, fevers or chills  EYES/ENT: No visual changes;  No vertigo or throat pain   NECK: No pain or stiffness  RESPIRATORY: No cough, wheezing, hemoptysis; No shortness of breath  CARDIOVASCULAR: No chest pain or palpitations  GASTROINTESTINAL: No abdominal or epigastric pain. No nausea, vomiting, or hematemesis; No diarrhea or constipation. No melena or hematochezia.  GENITOURINARY: No dysuria, frequency or hematuria  NEUROLOGICAL: No numbness or weakness  SKIN: No itching, burning, rashes, or lesions   All other review of systems is negative unless indicated above      Vital Signs Last 24 Hrs  T(C): 36.7 (2022 06:55), Max: 36.9 (2022 16:00)  T(F): 98.1 (2022 06:55), Max: 98.4 (2022 16:00)  HR: 70 (2022 06:55) (70 - 82)  BP: 123/84 (2022 06:55) (123/84 - 137/93)  BP(mean): 85 (2022 15:20) (85 - 102)  RR: 17 (2022 06:55) (15 - 17)  SpO2: 96% (2022 06:55) (96% - 97%)    HEENT:   pupils equal and reactive, EOMI, no oropharyngeal lesions, erythema, exudates, oral thrush    NECK:   supple, no carotid bruits, no palpable lymph nodes, no thyromegaly    CV:  +S1, +S2, regular, no murmurs or rubs    RESP:   lungs clear to auscultation bilaterally, no wheezing, rales, rhonchi, good air entry bilaterally    BREAST:  not examined    GI:  abdomen soft, non-tender, non-distended, normal BS, no bruits, no abdominal masses, no palpable masses    RECTAL:  not examined    :  not examined    MSK:   normal muscle tone, no atrophy, no rigidity, no contractions    EXT:   no clubbing, no cyanosis, no edema, no calf pain, swelling or erythema    VASCULAR:  pulses equal and symmetric in the upper and lower extremities    NEURO:  AAOX3, no focal neurological deficits, follows all commands, able to move extremities spontaneously    SKIN:  no ulcers, lesions or rashes    MEDICATIONS  (STANDING):  sodium chloride 0.9%. 1000 milliLiter(s) (75 mL/Hr) IV Continuous <Continuous>    MEDICATIONS  (PRN):  acetaminophen     Tablet .. 650 milliGRAM(s) Oral every 6 hours PRN Temp greater or equal to 38C (100.4F), Mild Pain (1 - 3)  aluminum hydroxide/magnesium hydroxide/simethicone Suspension 30 milliLiter(s) Oral every 4 hours PRN Dyspepsia  HYDROmorphone  Injectable 2 milliGRAM(s) IV Push every 4 hours PRN Severe Pain (7 - 10)  HYDROmorphone  Injectable 1 milliGRAM(s) IV Push every 2 hours PRN Moderate Pain (4 - 6)  melatonin 3 milliGRAM(s) Oral at bedtime PRN Insomnia  ondansetron Injectable 4 milliGRAM(s) IV Push every 8 hours PRN Nausea and/or Vomiting  prochlorperazine   Injectable 10 milliGRAM(s) IV Push every 6 hours PRN N/V      Urinalysis Basic - ( 2022 13:55 )    Color: Yellow / Appearance: Clear / S.010 / pH: x  Gluc: x / Ketone: Negative  / Bili: Negative / Urobili: Negative mg/dL   Blood: x / Protein: 15 mg/dL / Nitrite: Negative   Leuk Esterase: Negative / RBC: 0-2 /HPF / WBC 0-2   Sq Epi: x / Non Sq Epi: Negative / Bacteria: Negative    2022 06:05    130    |  97     |  4      ----------------------------<  119    3.2     |  28     |  0.57     Ca    9.0        2022 06:05  Phos  3.2       2022 06:05  Mg     1.5       2022 06:05    TPro  7.2    /  Alb  2.9    /  TBili  0.6    /  DBili  x      /  AST  23     /  ALT  19     /  AlkPhos  87     2022 06:05  LIVER FUNCTIONS - ( 2022 06:05 )  Alb: 2.9 g/dL / Pro: 7.2 gm/dL / ALK PHOS: 87 U/L / ALT: 19 U/L / AST: 23 U/L / GGT: x         PT/INR - ( 2022 18:57 )   PT: 12.1 sec;   INR: 1.05 ratio         PTT - ( 2022 18:57 )  PTT:25.7 secCBC Full  -  ( 2022 06:05 )  WBC Count : 16.16 K/uL  Hemoglobin : 13.7 g/dL  Hematocrit : 39.1 %  Platelet Count - Automated : 276 K/uL  Mean Cell Volume : 101.8 fl  Mean Cell Hemoglobin : 35.7 pg  Mean Cell Hemoglobin Concentration : 35.0 gm/dL        Blood, Urine: Negative ( @ 13:55)      PT/INR - ( 2022 18:57 )   PT: 12.1 sec;   INR: 1.05 ratio         PTT - ( 2022 18:57 )  PTT:25.7 sec          ASSESSMENT AND PLAN:    35yo male with a PMHx of Crohn's disease and hx of pancreatitis presents to the ED with complain of epigastric adnominal pain for 1 day. Admitted for acute pancreatitis and proctitis.     #acute pancreatitis likely idiopathic vs gallstone  #leukocytosis  - CT abd pelvis results above  -c/w ivf  -MRCP  -GI, colorectal appreciated    #hypokalemia, hypomagnesemia   - repleated, recheck in AM    #hyponatremia  - suspected SIADH etiology 2/2 pain, nausea, narcotic use which are secretagogues for ADH    #Crohn's disease   #active proctitis   - pt on stelara, however non-compliant with meds and follow up  - pt states he still has drains in rectal area and is due to follow up with outpt [facility] surgeon  - colorectal sx consulted    #current smoker  - refused nicotine patch    #DVT ppx  - Improve score 0  - venodynes, ambulate as tolerated Cheif complaints and Diagnosis:  acute pancreatitis/ chrons with perianal fistuals    Subjective: patient still c/o abdominal pain      REVIEW OF SYSTEMS:    CONSTITUTIONAL: No weakness, fevers or chills  EYES/ENT: No visual changes;  No vertigo or throat pain   NECK: No pain or stiffness  RESPIRATORY: No cough, wheezing, hemoptysis; No shortness of breath  CARDIOVASCULAR: No chest pain or palpitations  GASTROINTESTINAL: No abdominal or epigastric pain. No nausea, vomiting, or hematemesis; No diarrhea or constipation. No melena or hematochezia.  GENITOURINARY: No dysuria, frequency or hematuria  NEUROLOGICAL: No numbness or weakness  SKIN: No itching, burning, rashes, or lesions   All other review of systems is negative unless indicated above      Vital Signs Last 24 Hrs  T(C): 36.7 (2022 06:55), Max: 36.9 (2022 16:00)  T(F): 98.1 (2022 06:55), Max: 98.4 (2022 16:00)  HR: 70 (2022 06:55) (70 - 82)  BP: 123/84 (2022 06:55) (123/84 - 137/93)  BP(mean): 85 (2022 15:20) (85 - 102)  RR: 17 (2022 06:55) (15 - 17)  SpO2: 96% (2022 06:55) (96% - 97%)    HEENT:   pupils equal and reactive, EOMI, no oropharyngeal lesions, erythema, exudates, oral thrush    NECK:   supple, no carotid bruits, no palpable lymph nodes, no thyromegaly    CV:  +S1, +S2, regular, no murmurs or rubs    RESP:   lungs clear to auscultation bilaterally, no wheezing, rales, rhonchi, good air entry bilaterally    BREAST:  not examined    GI:  abdomen soft, non-tender, non-distended, normal BS, no bruits, no abdominal masses, no palpable masses    RECTAL:  not examined    :  not examined    MSK:   normal muscle tone, no atrophy, no rigidity, no contractions    EXT:   no clubbing, no cyanosis, no edema, no calf pain, swelling or erythema    VASCULAR:  pulses equal and symmetric in the upper and lower extremities    NEURO:  AAOX3, no focal neurological deficits, follows all commands, able to move extremities spontaneously    SKIN:  no ulcers, lesions or rashes    MEDICATIONS  (STANDING):  sodium chloride 0.9%. 1000 milliLiter(s) (75 mL/Hr) IV Continuous <Continuous>    MEDICATIONS  (PRN):  acetaminophen     Tablet .. 650 milliGRAM(s) Oral every 6 hours PRN Temp greater or equal to 38C (100.4F), Mild Pain (1 - 3)  aluminum hydroxide/magnesium hydroxide/simethicone Suspension 30 milliLiter(s) Oral every 4 hours PRN Dyspepsia  HYDROmorphone  Injectable 2 milliGRAM(s) IV Push every 4 hours PRN Severe Pain (7 - 10)  HYDROmorphone  Injectable 1 milliGRAM(s) IV Push every 2 hours PRN Moderate Pain (4 - 6)  melatonin 3 milliGRAM(s) Oral at bedtime PRN Insomnia  ondansetron Injectable 4 milliGRAM(s) IV Push every 8 hours PRN Nausea and/or Vomiting  prochlorperazine   Injectable 10 milliGRAM(s) IV Push every 6 hours PRN N/V      Urinalysis Basic - ( 2022 13:55 )    Color: Yellow / Appearance: Clear / S.010 / pH: x  Gluc: x / Ketone: Negative  / Bili: Negative / Urobili: Negative mg/dL   Blood: x / Protein: 15 mg/dL / Nitrite: Negative   Leuk Esterase: Negative / RBC: 0-2 /HPF / WBC 0-2   Sq Epi: x / Non Sq Epi: Negative / Bacteria: Negative    2022 06:05    130    |  97     |  4      ----------------------------<  119    3.2     |  28     |  0.57     Ca    9.0        2022 06:05  Phos  3.2       2022 06:05  Mg     1.5       2022 06:05    TPro  7.2    /  Alb  2.9    /  TBili  0.6    /  DBili  x      /  AST  23     /  ALT  19     /  AlkPhos  87     2022 06:05  LIVER FUNCTIONS - ( 2022 06:05 )  Alb: 2.9 g/dL / Pro: 7.2 gm/dL / ALK PHOS: 87 U/L / ALT: 19 U/L / AST: 23 U/L / GGT: x         PT/INR - ( 2022 18:57 )   PT: 12.1 sec;   INR: 1.05 ratio         PTT - ( 2022 18:57 )  PTT:25.7 secCBC Full  -  ( 2022 06:05 )  WBC Count : 16.16 K/uL  Hemoglobin : 13.7 g/dL  Hematocrit : 39.1 %  Platelet Count - Automated : 276 K/uL  Mean Cell Volume : 101.8 fl  Mean Cell Hemoglobin : 35.7 pg  Mean Cell Hemoglobin Concentration : 35.0 gm/dL        Blood, Urine: Negative ( @ 13:55)      PT/INR - ( 2022 18:57 )   PT: 12.1 sec;   INR: 1.05 ratio         PTT - ( 2022 18:57 )  PTT:25.7 sec          ASSESSMENT AND PLAN:    37yo male with a PMHx of Crohn's disease and hx of pancreatitis presents to the ED with complain of epigastric adnominal pain for 1 day. Admitted for acute pancreatitis and proctitis.     #acute pancreatitis   -seems to be idiopathic   -MRCP is wnl's  -Lipase is now WNL  -DC IVF tonight        #leukocytosis  - CT abd pelvis results above  -improving ; likley related to reactive due to acute pancreatitis.       #hypokalemia, hypomagnesemia   - repleated, recheck in AM    #hyponatremia  - suspected SIADH etiology 2/2 pain, nausea, narcotic use which are secretagogues for ADH    #Crohn's disease   #active proctitis   - pt on stelara, however non-compliant with meds and follow up  - pt states he still has drains in rectal area and is due to follow up with outpt [facility] surgeon  - colorectal sx consulted    #current smoker  - refused nicotine patch    #DVT ppx  - Improve score 0  - venodynes, ambulate as tolerated

## 2022-01-07 NOTE — DIETITIAN NUTRITION RISK NOTIFICATION - ADDITIONAL COMMENTS/DIETITIAN RECOMMENDATIONS
) Suggest advance diet to Clear Liquids to Full Liquids to consistent CHO w/ evening snacks, when medically feasible and as tolerated, 2) encourage protein-rich foods and maximize food preferences, 3) MVI w/ minerals daily to ensure 100% RDA met, 4) Monitor bowel movements, if no BM for >3 days, consider implementing bowel regimen, 5) consider adding thiamine and folic acid supplements 2/2 ETOH abuse. RD will continue to monitor PO intake, labs, hydration, and wt prn

## 2022-01-07 NOTE — PROGRESS NOTE ADULT - SUBJECTIVE AND OBJECTIVE BOX
No acute events overnight  Pain slightly improved      I&O's Detail    06 Jan 2022 07:01  -  07 Jan 2022 07:00  --------------------------------------------------------  IN:    sodium chloride 0.9%: 1180 mL  Total IN: 1180 mL    OUT:    Voided (mL): 1300 mL  Total OUT: 1300 mL    Total NET: -120 mL      Vital Signs Last 24 Hrs  T(C): 36.7 (07 Jan 2022 06:55), Max: 36.9 (06 Jan 2022 16:00)  T(F): 98.1 (07 Jan 2022 06:55), Max: 98.4 (06 Jan 2022 16:00)  HR: 70 (07 Jan 2022 06:55) (70 - 82)  BP: 123/84 (07 Jan 2022 06:55) (123/84 - 137/93)  BP(mean): 85 (06 Jan 2022 15:20) (85 - 102)  RR: 17 (07 Jan 2022 06:55) (15 - 17)  SpO2: 96% (07 Jan 2022 06:55) (96% - 97%)      Physical Exam  GEN: Lying in bed  PULM: Non-labored respiration  CVS: Normal rate  ABD: Soft, tender in the epigastrium, focal guarding and rebound tenderness. Mild to moderate tenderness in LLQ, minimal tenderness RLQ. Exam largely unchanged  : 2 setons noted. Seton from posterior midline to opening at the left lateral perianal region. Second seton from the posterior midline to the right posterior perianal region                             13.7   16.16 )-----------( 276      ( 06 Jan 2022 06:05 )             39.1     01-06    130<L>  |  97  |  4<L>  ----------------------------<  119<H>  3.2<L>   |  28  |  0.57    Ca    9.0      06 Jan 2022 06:05  Phos  3.2     01-06  Mg     1.5     01-06    TPro  7.2  /  Alb  2.9<L>  /  TBili  0.6  /  DBili  x   /  AST  23  /  ALT  19  /  AlkPhos  87  01-06      MEDICATIONS  (STANDING):  sodium chloride 0.9%. 1000 milliLiter(s) (75 mL/Hr) IV Continuous <Continuous>    MEDICATIONS  (PRN):  acetaminophen     Tablet .. 650 milliGRAM(s) Oral every 6 hours PRN Temp greater or equal to 38C (100.4F), Mild Pain (1 - 3)  aluminum hydroxide/magnesium hydroxide/simethicone Suspension 30 milliLiter(s) Oral every 4 hours PRN Dyspepsia  HYDROmorphone  Injectable 2 milliGRAM(s) IV Push every 4 hours PRN Severe Pain (7 - 10)  HYDROmorphone  Injectable 1 milliGRAM(s) IV Push every 2 hours PRN Moderate Pain (4 - 6)  melatonin 3 milliGRAM(s) Oral at bedtime PRN Insomnia  ondansetron Injectable 4 milliGRAM(s) IV Push every 8 hours PRN Nausea and/or Vomiting  prochlorperazine   Injectable 10 milliGRAM(s) IV Push every 6 hours PRN N/V

## 2022-01-07 NOTE — PROGRESS NOTE ADULT - ASSESSMENT
36M PMH significant for Crohn's disease including severe perianal Crohn's and complexed fistulas s/p fecal diversion who presented for evaluation of epigastric pain and was found to pancreatitis. Patient reports that his pain has improved since presentation  Continue to ensure adequate pain control  Continue IVF fluids resuscitation  PO intake as tolerated  No current colon and rectal surgery interventions warranted at this time  Continue medical management of Crohn's disease  Management of Pancreatitis per primary and GI  We will continue to follow patient's hospital course  
Imp:  Acute idiopathic pancreatitis  No gallstones.  Fatty liver on sono could suggest EtOH although patient denies  TGs normal    Rec:  Clears  Drop IV fluids to 75 cc/hr  MRCP to eval for etiology of pancreatitis  DVT proph deferred to primary medical team

## 2022-01-07 NOTE — DIETITIAN INITIAL EVALUATION ADULT. - PERTINENT MEDS FT
MEDICATIONS  (STANDING):  sodium chloride 0.9%. 1000 milliLiter(s) (100 mL/Hr) IV Continuous <Continuous>  zolpidem 5 milliGRAM(s) Oral at bedtime    MEDICATIONS  (PRN):  acetaminophen     Tablet .. 650 milliGRAM(s) Oral every 6 hours PRN Temp greater or equal to 38C (100.4F), Mild Pain (1 - 3)  aluminum hydroxide/magnesium hydroxide/simethicone Suspension 30 milliLiter(s) Oral every 4 hours PRN Dyspepsia  HYDROmorphone  Injectable 2 milliGRAM(s) IV Push every 4 hours PRN Severe Pain (7 - 10)  HYDROmorphone  Injectable 1 milliGRAM(s) IV Push every 2 hours PRN Moderate Pain (4 - 6)  melatonin 3 milliGRAM(s) Oral at bedtime PRN Insomnia  ondansetron Injectable 4 milliGRAM(s) IV Push every 8 hours PRN Nausea and/or Vomiting  prochlorperazine   Injectable 10 milliGRAM(s) IV Push every 6 hours PRN N/V

## 2022-01-07 NOTE — PROGRESS NOTE ADULT - SUBJECTIVE AND OBJECTIVE BOX
Patient is a 36y old  Male who presents with a chief complaint of Abdominal pain (06 Jan 2022 22:04)      Subective:  Feels much better - colostomy filling up with stool, no bleeding. No N/V. Pain much better.    PAST MEDICAL & SURGICAL HISTORY:  Crohn&#x27;s disease of large intestine without complication  (No current flare up)    Pancreatitis    S/P ORIF (open reduction internal fixation) fracture  (Right ankle, 2014)    History of colonoscopy  (2014)    Perianal fistula  repair in 2002        MEDICATIONS  (STANDING):  sodium chloride 0.9%. 1000 milliLiter(s) (75 mL/Hr) IV Continuous <Continuous>    MEDICATIONS  (PRN):  acetaminophen     Tablet .. 650 milliGRAM(s) Oral every 6 hours PRN Temp greater or equal to 38C (100.4F), Mild Pain (1 - 3)  aluminum hydroxide/magnesium hydroxide/simethicone Suspension 30 milliLiter(s) Oral every 4 hours PRN Dyspepsia  HYDROmorphone  Injectable 2 milliGRAM(s) IV Push every 4 hours PRN Severe Pain (7 - 10)  HYDROmorphone  Injectable 1 milliGRAM(s) IV Push every 2 hours PRN Moderate Pain (4 - 6)  melatonin 3 milliGRAM(s) Oral at bedtime PRN Insomnia  ondansetron Injectable 4 milliGRAM(s) IV Push every 8 hours PRN Nausea and/or Vomiting  prochlorperazine   Injectable 10 milliGRAM(s) IV Push every 6 hours PRN N/V      REVIEW OF SYSTEMS:    RESPIRATORY: No shortness of breath  CARDIOVASCULAR: No chest pain  All other review of systems is negative unless indicated above.    Vital Signs Last 24 Hrs  T(C): 36.7 (07 Jan 2022 06:55), Max: 36.9 (06 Jan 2022 16:00)  T(F): 98.1 (07 Jan 2022 06:55), Max: 98.4 (06 Jan 2022 16:00)  HR: 70 (07 Jan 2022 06:55) (70 - 99)  BP: 123/84 (07 Jan 2022 06:55) (123/84 - 137/93)  BP(mean): 85 (06 Jan 2022 15:20) (80 - 102)  RR: 17 (07 Jan 2022 06:55) (15 - 17)  SpO2: 96% (07 Jan 2022 06:55) (96% - 97%)    PHYSICAL EXAM:    Constitutional: NAD, well-developed  Respiratory: CTAB  Cardiovascular: S1 and S2, RRR  Gastrointestinal: BS+, soft, mild epig tend  Extremities: No peripheral edema  Psychiatric: Normal mood, normal affect    LABS:                        13.7   16.16 )-----------( 276      ( 06 Jan 2022 06:05 )             39.1     01-06    130<L>  |  97  |  4<L>  ----------------------------<  119<H>  3.2<L>   |  28  |  0.57    Ca    9.0      06 Jan 2022 06:05  Phos  3.2     01-06  Mg     1.5     01-06    TPro  7.2  /  Alb  2.9<L>  /  TBili  0.6  /  DBili  x   /  AST  23  /  ALT  19  /  AlkPhos  87  01-06    PT/INR - ( 05 Jan 2022 18:57 )   PT: 12.1 sec;   INR: 1.05 ratio         PTT - ( 05 Jan 2022 18:57 )  PTT:25.7 sec  LIVER FUNCTIONS - ( 06 Jan 2022 06:05 )  Alb: 2.9 g/dL / Pro: 7.2 gm/dL / ALK PHOS: 87 U/L / ALT: 19 U/L / AST: 23 U/L / GGT: x             RADIOLOGY & ADDITIONAL STUDIES:

## 2022-01-07 NOTE — DIETITIAN INITIAL EVALUATION ADULT. - ADD RECOMMEND
1) Suggest advance diet to Clear Liquids to Full Liquids to consistent CHO w/ evening snacks, when medically feasible and as tolerated, 2) encourage protein-rich foods and maximize food preferences, 3) MVI w/ minerals daily to ensure 100% RDA met, 4) Monitor bowel movements, if no BM for >3 days, consider implementing bowel regimen. RD will continue to monitor PO intake, labs, hydration, and wt prn. 1) Suggest advance diet to Clear Liquids to Full Liquids to consistent CHO w/ evening snacks, when medically feasible and as tolerated, 2) encourage protein-rich foods and maximize food preferences, 3) MVI w/ minerals daily to ensure 100% RDA met, 4) Monitor bowel movements, if no BM for >3 days, consider implementing bowel regimen, 6) consider adding thiamine and folic acid supplements 2/2 ETOH abuse. RD will continue to monitor PO intake, labs, hydration, and wt prn.

## 2022-01-07 NOTE — DIETITIAN INITIAL EVALUATION ADULT. - MALNUTRITION
Pt meets criteria for severe protein-calorie malnutrition in context of acute on chronic illness r/t decreased ability to meet increased nutrient needs 2/2 acute pancreatitis/ ileostomy/ ETOH abuse AEB severe muscle/ fat wasting, 10.3% wt loss x 6 mos, <75% intake x ~6 mos

## 2022-01-07 NOTE — DIETITIAN INITIAL EVALUATION ADULT. - ORAL INTAKE PTA/DIET HISTORY
Had ileostomy ~6 months ago and since then has been eating less 2/2 dietary restrictions. States eats 3 meals/ day.

## 2022-01-07 NOTE — DIETITIAN INITIAL EVALUATION ADULT. - OTHER INFO
35 y/o Male with a PMhx of Chron's disease and pancreatitis presents to the ED with complain of epigastric adnominal pain for 1 day. Pain is non-radiating, associated with vomiting x2 today. No diarrhea. Ileostomy is functioning. Patient reports he had 1 drink yesterday.    Advanced to clear liquids today. #. Current # - unintentional wt loss of 17#/ 10% x 6 months (severe and clinically significant). Noted w/ some moderate to severe muscle/ fat wasting. Appears very thin and skinny. Suggest advance diet to Clear Liquids to Full Liquids to consistent CHO w/ evening snack when medically feasible and as tolerated See recommendations below.

## 2022-01-07 NOTE — DIETITIAN INITIAL EVALUATION ADULT. - PERTINENT LABORATORY DATA
01-07    134<L>  |  101  |  10  ----------------------------<  65<L>  3.6   |  28  |  0.65    Ca    8.4<L>      07 Jan 2022 11:45  Phos  2.9     01-07  Mg     2.2     01-07    TPro  6.5  /  Alb  2.3<L>  /  TBili  0.6  /  DBili  x   /  AST  15  /  ALT  14  /  AlkPhos  77  01-07

## 2022-01-08 ENCOUNTER — TRANSCRIPTION ENCOUNTER (OUTPATIENT)
Age: 37
End: 2022-01-08

## 2022-01-08 VITALS
TEMPERATURE: 98 F | DIASTOLIC BLOOD PRESSURE: 75 MMHG | SYSTOLIC BLOOD PRESSURE: 110 MMHG | HEART RATE: 70 BPM | OXYGEN SATURATION: 99 %

## 2022-01-08 LAB
ALBUMIN SERPL ELPH-MCNC: 2.6 G/DL — LOW (ref 3.3–5)
ALP SERPL-CCNC: 79 U/L — SIGNIFICANT CHANGE UP (ref 40–120)
ALT FLD-CCNC: 14 U/L — SIGNIFICANT CHANGE UP (ref 12–78)
ANION GAP SERPL CALC-SCNC: 5 MMOL/L — SIGNIFICANT CHANGE UP (ref 5–17)
AST SERPL-CCNC: 13 U/L — LOW (ref 15–37)
BILIRUB SERPL-MCNC: 0.5 MG/DL — SIGNIFICANT CHANGE UP (ref 0.2–1.2)
BUN SERPL-MCNC: 6 MG/DL — LOW (ref 7–23)
CALCIUM SERPL-MCNC: 8.8 MG/DL — SIGNIFICANT CHANGE UP (ref 8.5–10.1)
CHLORIDE SERPL-SCNC: 95 MMOL/L — LOW (ref 96–108)
CO2 SERPL-SCNC: 31 MMOL/L — SIGNIFICANT CHANGE UP (ref 22–31)
CREAT SERPL-MCNC: 0.8 MG/DL — SIGNIFICANT CHANGE UP (ref 0.5–1.3)
GLUCOSE SERPL-MCNC: 279 MG/DL — HIGH (ref 70–99)
HCT VFR BLD CALC: 40.9 % — SIGNIFICANT CHANGE UP (ref 39–50)
HGB BLD-MCNC: 14.3 G/DL — SIGNIFICANT CHANGE UP (ref 13–17)
LIDOCAIN IGE QN: 79 U/L — SIGNIFICANT CHANGE UP (ref 73–393)
MCHC RBC-ENTMCNC: 35 GM/DL — SIGNIFICANT CHANGE UP (ref 32–36)
MCHC RBC-ENTMCNC: 36.1 PG — HIGH (ref 27–34)
MCV RBC AUTO: 103.3 FL — HIGH (ref 80–100)
PLATELET # BLD AUTO: 239 K/UL — SIGNIFICANT CHANGE UP (ref 150–400)
POTASSIUM SERPL-MCNC: 3.1 MMOL/L — LOW (ref 3.5–5.3)
POTASSIUM SERPL-SCNC: 3.1 MMOL/L — LOW (ref 3.5–5.3)
PROT SERPL-MCNC: 7.2 GM/DL — SIGNIFICANT CHANGE UP (ref 6–8.3)
RBC # BLD: 3.96 M/UL — LOW (ref 4.2–5.8)
RBC # FLD: 13.2 % — SIGNIFICANT CHANGE UP (ref 10.3–14.5)
SODIUM SERPL-SCNC: 131 MMOL/L — LOW (ref 135–145)
WBC # BLD: 12.68 K/UL — HIGH (ref 3.8–10.5)
WBC # FLD AUTO: 12.68 K/UL — HIGH (ref 3.8–10.5)

## 2022-01-08 PROCEDURE — 99239 HOSP IP/OBS DSCHRG MGMT >30: CPT

## 2022-01-08 RX ORDER — POTASSIUM CHLORIDE 20 MEQ
40 PACKET (EA) ORAL ONCE
Refills: 0 | Status: COMPLETED | OUTPATIENT
Start: 2022-01-08 | End: 2022-01-08

## 2022-01-08 RX ORDER — TRAMADOL HYDROCHLORIDE 50 MG/1
25 TABLET ORAL EVERY 6 HOURS
Refills: 0 | Status: DISCONTINUED | OUTPATIENT
Start: 2022-01-08 | End: 2022-01-08

## 2022-01-08 RX ORDER — INFLUENZA VIRUS VACCINE 15; 15; 15; 15 UG/.5ML; UG/.5ML; UG/.5ML; UG/.5ML
0.5 SUSPENSION INTRAMUSCULAR
Qty: 0 | Refills: 0 | DISCHARGE

## 2022-01-08 RX ADMIN — HYDROMORPHONE HYDROCHLORIDE 2 MILLIGRAM(S): 2 INJECTION INTRAMUSCULAR; INTRAVENOUS; SUBCUTANEOUS at 03:15

## 2022-01-08 RX ADMIN — HYDROMORPHONE HYDROCHLORIDE 1 MILLIGRAM(S): 2 INJECTION INTRAMUSCULAR; INTRAVENOUS; SUBCUTANEOUS at 00:57

## 2022-01-08 RX ADMIN — Medication 10 MILLIGRAM(S): at 10:11

## 2022-01-08 RX ADMIN — HYDROMORPHONE HYDROCHLORIDE 2 MILLIGRAM(S): 2 INJECTION INTRAMUSCULAR; INTRAVENOUS; SUBCUTANEOUS at 06:15

## 2022-01-08 RX ADMIN — Medication 40 MILLIEQUIVALENT(S): at 13:37

## 2022-01-08 RX ADMIN — HYDROMORPHONE HYDROCHLORIDE 1 MILLIGRAM(S): 2 INJECTION INTRAMUSCULAR; INTRAVENOUS; SUBCUTANEOUS at 00:27

## 2022-01-08 RX ADMIN — HYDROMORPHONE HYDROCHLORIDE 2 MILLIGRAM(S): 2 INJECTION INTRAMUSCULAR; INTRAVENOUS; SUBCUTANEOUS at 10:08

## 2022-01-08 RX ADMIN — HYDROMORPHONE HYDROCHLORIDE 2 MILLIGRAM(S): 2 INJECTION INTRAMUSCULAR; INTRAVENOUS; SUBCUTANEOUS at 02:45

## 2022-01-08 RX ADMIN — ONDANSETRON 4 MILLIGRAM(S): 8 TABLET, FILM COATED ORAL at 06:16

## 2022-01-08 RX ADMIN — HYDROMORPHONE HYDROCHLORIDE 2 MILLIGRAM(S): 2 INJECTION INTRAMUSCULAR; INTRAVENOUS; SUBCUTANEOUS at 06:20

## 2022-01-08 NOTE — DISCHARGE NOTE PROVIDER - CARE PROVIDER_API CALL
SHARON GOYAL  Gastroenterology  [address]  [place] [address]  Phone: [phone]  Fax: [phone]  Follow Up Time:

## 2022-01-08 NOTE — DISCHARGE NOTE PROVIDER - DETAILS OF MALNUTRITION DIAGNOSIS/DIAGNOSES
This patient has been assessed with a concern for Malnutrition and was treated during this hospitalization for the following Nutrition diagnosis/diagnoses:     -  01/07/2022: Severe protein-calorie malnutrition

## 2022-01-08 NOTE — CHART NOTE - NSCHARTNOTEFT_GEN_A_CORE
Pt's ileus resolved.  Pt advanced from clear liquid diet to regular diet  Pt ate full meal, and will be discharged.    Will continue to monitor while pt is in hospital.

## 2022-01-08 NOTE — DISCHARGE NOTE PROVIDER - NSDCMRMEDTOKEN_GEN_ALL_CORE_FT
Kimmy PFS 90 mg/mL subcutaneous solution: 1 milliliter(s) subcutaneous every 2 months  ***last dose was about 1 month ago***  Tylenol 500 mg oral tablet: 2 tab(s) orally every 6 hours, As Needed

## 2022-01-08 NOTE — DISCHARGE NOTE PROVIDER - NSDCCPCAREPLAN_GEN_ALL_CORE_FT
PRINCIPAL DISCHARGE DIAGNOSIS  Diagnosis: Acute pancreatitis  Assessment and Plan of Treatment:   *unclear etiology  *MRCP with in normal limits  *Follow up outpatient with Gastroenterologist  in 1 week  *Your Lipase is normal on discharge.      SECONDARY DISCHARGE DIAGNOSES  Diagnosis: Crohn disease  Assessment and Plan of Treatment:   *Follow up outpatient with your Colorectal surgeon

## 2022-01-08 NOTE — DISCHARGE NOTE PROVIDER - HOSPITAL COURSE
Vital Signs Last 24 Hrs  T(C): 36.8 (08 Jan 2022 09:23), Max: 36.8 (07 Jan 2022 20:14)  T(F): 98.2 (08 Jan 2022 09:23), Max: 98.3 (07 Jan 2022 20:14)  HR: 70 (08 Jan 2022 09:23) (63 - 75)  BP: 110/75 (08 Jan 2022 09:23) (110/75 - 134/84)  BP(mean): --  RR: 18 (08 Jan 2022 06:06) (18 - 19)  SpO2: 99% (08 Jan 2022 09:23) (94% - 99%)    HEENT:   pupils equal and reactive, EOMI, no oropharyngeal lesions, erythema, exudates, oral thrush    NECK:   supple, no carotid bruits, no palpable lymph nodes, no thyromegaly    CV:  +S1, +S2, regular, no murmurs or rubs    RESP:   lungs clear to auscultation bilaterally, no wheezing, rales, rhonchi, good air entry bilaterally    BREAST:  not examined    GI:  abdomen soft, non-tender, non-distended, normal BS, no bruits, no abdominal masses, no palpable masses    RECTAL:  not examined    :  not examined    MSK:   normal muscle tone, no atrophy, no rigidity, no contractions    EXT:   no clubbing, no cyanosis, no edema, no calf pain, swelling or erythema    VASCULAR:  pulses equal and symmetric in the upper and lower extremities    NEURO:  AAOX3, no focal neurological deficits, follows all commands, able to move extremities spontaneously    SKIN:  no ulcers, lesions or rashes          Hospital course:    35yo male with a PMHx of Crohn's disease and hx of pancreatitis presents to the ED with complain of epigastric adnominal pain for 1 day. Admitted for acute pancreatitis and proctitis.     #acute pancreatitis   -seems to be idiopathic   -MRCP is wnl's  -Lipase is now WNL  -DC IVF tonight        #hyponatremia  - suspected SIADH etiology 2/2 pain, nausea, narcotic use which are secretagogues for ADH  -improved      #Crohn's disease   #active proctitis   - pt on stelara, however non-compliant with meds and follow up  - pt states he still has drains in rectal area and is due to follow up with outpt [facility] surgeon  - colorectal sx consulted; no acute intervention.   -patient already has appointment with his colorectal doctor

## 2022-01-08 NOTE — DISCHARGE NOTE NURSING/CASE MANAGEMENT/SOCIAL WORK - PATIENT PORTAL LINK FT
You can access the FollowMyHealth Patient Portal offered by [facility] by registering at the following website: [email or web address]. By joining [email or web address]’s FollowMyHealth portal, you will also be able to view your health information using other applications (apps) compatible with our system.

## 2022-01-13 DIAGNOSIS — K56.7 ILEUS, UNSPECIFIED: ICD-10-CM

## 2022-01-13 DIAGNOSIS — Z91.19 PATIENT'S NONCOMPLIANCE WITH OTHER MEDICAL TREATMENT AND REGIMEN: ICD-10-CM

## 2022-01-13 DIAGNOSIS — F17.210 NICOTINE DEPENDENCE, CIGARETTES, UNCOMPLICATED: ICD-10-CM

## 2022-01-13 DIAGNOSIS — K50.90 CROHN'S DISEASE, UNSPECIFIED, WITHOUT COMPLICATIONS: ICD-10-CM

## 2022-01-13 DIAGNOSIS — Z93.2 ILEOSTOMY STATUS: ICD-10-CM

## 2022-01-13 DIAGNOSIS — Z91.14 PATIENT'S OTHER NONCOMPLIANCE WITH MEDICATION REGIMEN: ICD-10-CM

## 2022-01-13 DIAGNOSIS — K85.00 IDIOPATHIC ACUTE PANCREATITIS WITHOUT NECROSIS OR INFECTION: ICD-10-CM

## 2022-01-13 DIAGNOSIS — E83.42 HYPOMAGNESEMIA: ICD-10-CM

## 2022-01-13 DIAGNOSIS — K62.89 OTHER SPECIFIED DISEASES OF ANUS AND RECTUM: ICD-10-CM

## 2022-01-13 DIAGNOSIS — Z79.82 LONG TERM (CURRENT) USE OF ASPIRIN: ICD-10-CM

## 2022-01-13 DIAGNOSIS — E87.6 HYPOKALEMIA: ICD-10-CM

## 2022-01-13 DIAGNOSIS — E22.2 SYNDROME OF INAPPROPRIATE SECRETION OF ANTIDIURETIC HORMONE: ICD-10-CM

## 2022-01-13 DIAGNOSIS — E43 UNSPECIFIED SEVERE PROTEIN-CALORIE MALNUTRITION: ICD-10-CM

## 2022-02-01 NOTE — PATIENT PROFILE ADULT. - TEACHING/LEARNING FACTORS INFLUENCE READINESS TO LEARN
Prescription approved per [facility] Refill Protocol.    Lorenzo Marroquin RN   none

## 2022-02-23 ENCOUNTER — INPATIENT (INPATIENT)
Facility: HOSPITAL | Age: 37
LOS: 3 days | Discharge: HOME | DRG: 438 | End: 2022-02-27
Attending: FAMILY MEDICINE | Admitting: HOSPITALIST
Payer: COMMERCIAL

## 2022-02-23 VITALS — HEIGHT: 70 IN | WEIGHT: 149.91 LBS

## 2022-02-23 DIAGNOSIS — Z96.7 PRESENCE OF OTHER BONE AND TENDON IMPLANTS: Chronic | ICD-10-CM

## 2022-02-23 DIAGNOSIS — K52.9 NONINFECTIVE GASTROENTERITIS AND COLITIS, UNSPECIFIED: ICD-10-CM

## 2022-02-23 DIAGNOSIS — K60.3 ANAL FISTULA: Chronic | ICD-10-CM

## 2022-02-23 DIAGNOSIS — Z98.89 OTHER SPECIFIED POSTPROCEDURAL STATES: Chronic | ICD-10-CM

## 2022-02-23 LAB
ALBUMIN SERPL ELPH-MCNC: 3.3 G/DL — SIGNIFICANT CHANGE UP (ref 3.3–5)
ALP SERPL-CCNC: 112 U/L — SIGNIFICANT CHANGE UP (ref 40–120)
ALT FLD-CCNC: 42 U/L — SIGNIFICANT CHANGE UP (ref 12–78)
ANION GAP SERPL CALC-SCNC: 9 MMOL/L — SIGNIFICANT CHANGE UP (ref 5–17)
APPEARANCE UR: CLEAR — SIGNIFICANT CHANGE UP
AST SERPL-CCNC: 32 U/L — SIGNIFICANT CHANGE UP (ref 15–37)
BASOPHILS # BLD AUTO: 0.03 K/UL — SIGNIFICANT CHANGE UP (ref 0–0.2)
BASOPHILS NFR BLD AUTO: 0.2 % — SIGNIFICANT CHANGE UP (ref 0–2)
BILIRUB SERPL-MCNC: 0.6 MG/DL — SIGNIFICANT CHANGE UP (ref 0.2–1.2)
BILIRUB UR-MCNC: NEGATIVE — SIGNIFICANT CHANGE UP
BUN SERPL-MCNC: 14 MG/DL — SIGNIFICANT CHANGE UP (ref 7–23)
CALCIUM SERPL-MCNC: 9 MG/DL — SIGNIFICANT CHANGE UP (ref 8.5–10.1)
CHLORIDE SERPL-SCNC: 98 MMOL/L — SIGNIFICANT CHANGE UP (ref 96–108)
CO2 SERPL-SCNC: 26 MMOL/L — SIGNIFICANT CHANGE UP (ref 22–31)
COLOR SPEC: YELLOW — SIGNIFICANT CHANGE UP
CREAT SERPL-MCNC: 1.02 MG/DL — SIGNIFICANT CHANGE UP (ref 0.5–1.3)
DIFF PNL FLD: NEGATIVE — SIGNIFICANT CHANGE UP
EOSINOPHIL # BLD AUTO: 0.03 K/UL — SIGNIFICANT CHANGE UP (ref 0–0.5)
EOSINOPHIL NFR BLD AUTO: 0.2 % — SIGNIFICANT CHANGE UP (ref 0–6)
GLUCOSE SERPL-MCNC: 155 MG/DL — HIGH (ref 70–99)
GLUCOSE UR QL: NEGATIVE — SIGNIFICANT CHANGE UP
HCT VFR BLD CALC: 32.4 % — LOW (ref 39–50)
HGB BLD-MCNC: 10.9 G/DL — LOW (ref 13–17)
IMM GRANULOCYTES NFR BLD AUTO: 0.6 % — SIGNIFICANT CHANGE UP (ref 0–1.5)
KETONES UR-MCNC: NEGATIVE — SIGNIFICANT CHANGE UP
LEUKOCYTE ESTERASE UR-ACNC: NEGATIVE — SIGNIFICANT CHANGE UP
LIDOCAIN IGE QN: 817 U/L — HIGH (ref 73–393)
LYMPHOCYTES # BLD AUTO: 1.01 K/UL — SIGNIFICANT CHANGE UP (ref 1–3.3)
LYMPHOCYTES # BLD AUTO: 7.6 % — LOW (ref 13–44)
MCHC RBC-ENTMCNC: 33.6 GM/DL — SIGNIFICANT CHANGE UP (ref 32–36)
MCHC RBC-ENTMCNC: 35.6 PG — HIGH (ref 27–34)
MCV RBC AUTO: 105.9 FL — HIGH (ref 80–100)
MONOCYTES # BLD AUTO: 0.95 K/UL — HIGH (ref 0–0.9)
MONOCYTES NFR BLD AUTO: 7.1 % — SIGNIFICANT CHANGE UP (ref 2–14)
NEUTROPHILS # BLD AUTO: 11.26 K/UL — HIGH (ref 1.8–7.4)
NEUTROPHILS NFR BLD AUTO: 84.3 % — HIGH (ref 43–77)
NITRITE UR-MCNC: NEGATIVE — SIGNIFICANT CHANGE UP
PH UR: 6.5 — SIGNIFICANT CHANGE UP (ref 5–8)
PLATELET # BLD AUTO: 254 K/UL — SIGNIFICANT CHANGE UP (ref 150–400)
POTASSIUM SERPL-MCNC: 3.7 MMOL/L — SIGNIFICANT CHANGE UP (ref 3.5–5.3)
POTASSIUM SERPL-SCNC: 3.7 MMOL/L — SIGNIFICANT CHANGE UP (ref 3.5–5.3)
PROT SERPL-MCNC: 8.2 GM/DL — SIGNIFICANT CHANGE UP (ref 6–8.3)
PROT UR-MCNC: 15
RBC # BLD: 3.06 M/UL — LOW (ref 4.2–5.8)
RBC # FLD: 13.7 % — SIGNIFICANT CHANGE UP (ref 10.3–14.5)
SARS-COV-2 RNA SPEC QL NAA+PROBE: SIGNIFICANT CHANGE UP
SODIUM SERPL-SCNC: 133 MMOL/L — LOW (ref 135–145)
SP GR SPEC: 1 — LOW (ref 1.01–1.02)
UROBILINOGEN FLD QL: NEGATIVE — SIGNIFICANT CHANGE UP
WBC # BLD: 13.36 K/UL — HIGH (ref 3.8–10.5)
WBC # FLD AUTO: 13.36 K/UL — HIGH (ref 3.8–10.5)

## 2022-02-23 PROCEDURE — 74183 MRI ABD W/O CNTR FLWD CNTR: CPT

## 2022-02-23 PROCEDURE — 36415 COLL VENOUS BLD VENIPUNCTURE: CPT

## 2022-02-23 PROCEDURE — 80048 BASIC METABOLIC PNL TOTAL CA: CPT

## 2022-02-23 PROCEDURE — 99223 1ST HOSP IP/OBS HIGH 75: CPT

## 2022-02-23 PROCEDURE — 87507 IADNA-DNA/RNA PROBE TQ 12-25: CPT

## 2022-02-23 PROCEDURE — A9579: CPT

## 2022-02-23 PROCEDURE — 85025 COMPLETE CBC W/AUTO DIFF WBC: CPT

## 2022-02-23 PROCEDURE — 87493 C DIFF AMPLIFIED PROBE: CPT

## 2022-02-23 PROCEDURE — 74177 CT ABD & PELVIS W/CONTRAST: CPT | Mod: 26,MA

## 2022-02-23 PROCEDURE — 80053 COMPREHEN METABOLIC PANEL: CPT

## 2022-02-23 PROCEDURE — 83690 ASSAY OF LIPASE: CPT

## 2022-02-23 PROCEDURE — 85027 COMPLETE CBC AUTOMATED: CPT

## 2022-02-23 PROCEDURE — 99497 ADVNCD CARE PLAN 30 MIN: CPT | Mod: 25

## 2022-02-23 PROCEDURE — 99285 EMERGENCY DEPT VISIT HI MDM: CPT

## 2022-02-23 RX ORDER — SODIUM CHLORIDE 9 MG/ML
1000 INJECTION INTRAMUSCULAR; INTRAVENOUS; SUBCUTANEOUS ONCE
Refills: 0 | Status: COMPLETED | OUTPATIENT
Start: 2022-02-23 | End: 2022-02-23

## 2022-02-23 RX ORDER — LANOLIN ALCOHOL/MO/W.PET/CERES
5 CREAM (GRAM) TOPICAL AT BEDTIME
Refills: 0 | Status: DISCONTINUED | OUTPATIENT
Start: 2022-02-23 | End: 2022-02-27

## 2022-02-23 RX ORDER — ONDANSETRON 8 MG/1
4 TABLET, FILM COATED ORAL ONCE
Refills: 0 | Status: COMPLETED | OUTPATIENT
Start: 2022-02-23 | End: 2022-02-23

## 2022-02-23 RX ORDER — PIPERACILLIN AND TAZOBACTAM 4; .5 G/20ML; G/20ML
3.38 INJECTION, POWDER, LYOPHILIZED, FOR SOLUTION INTRAVENOUS ONCE
Refills: 0 | Status: COMPLETED | OUTPATIENT
Start: 2022-02-23 | End: 2022-02-23

## 2022-02-23 RX ORDER — HYDROMORPHONE HYDROCHLORIDE 2 MG/ML
1 INJECTION INTRAMUSCULAR; INTRAVENOUS; SUBCUTANEOUS EVERY 4 HOURS
Refills: 0 | Status: DISCONTINUED | OUTPATIENT
Start: 2022-02-23 | End: 2022-02-26

## 2022-02-23 RX ORDER — HYDROMORPHONE HYDROCHLORIDE 2 MG/ML
1 INJECTION INTRAMUSCULAR; INTRAVENOUS; SUBCUTANEOUS ONCE
Refills: 0 | Status: DISCONTINUED | OUTPATIENT
Start: 2022-02-23 | End: 2022-02-23

## 2022-02-23 RX ORDER — HYDROMORPHONE HYDROCHLORIDE 2 MG/ML
0.5 INJECTION INTRAMUSCULAR; INTRAVENOUS; SUBCUTANEOUS EVERY 6 HOURS
Refills: 0 | Status: DISCONTINUED | OUTPATIENT
Start: 2022-02-23 | End: 2022-02-26

## 2022-02-23 RX ORDER — HYDROMORPHONE HYDROCHLORIDE 2 MG/ML
0.5 INJECTION INTRAMUSCULAR; INTRAVENOUS; SUBCUTANEOUS EVERY 4 HOURS
Refills: 0 | Status: DISCONTINUED | OUTPATIENT
Start: 2022-02-23 | End: 2022-02-23

## 2022-02-23 RX ORDER — SODIUM CHLORIDE 9 MG/ML
1000 INJECTION INTRAMUSCULAR; INTRAVENOUS; SUBCUTANEOUS
Refills: 0 | Status: DISCONTINUED | OUTPATIENT
Start: 2022-02-23 | End: 2022-02-27

## 2022-02-23 RX ORDER — ONDANSETRON 8 MG/1
4 TABLET, FILM COATED ORAL EVERY 6 HOURS
Refills: 0 | Status: DISCONTINUED | OUTPATIENT
Start: 2022-02-23 | End: 2022-02-27

## 2022-02-23 RX ORDER — ACETAMINOPHEN 500 MG
650 TABLET ORAL EVERY 6 HOURS
Refills: 0 | Status: DISCONTINUED | OUTPATIENT
Start: 2022-02-23 | End: 2022-02-27

## 2022-02-23 RX ORDER — PIPERACILLIN AND TAZOBACTAM 4; .5 G/20ML; G/20ML
3.38 INJECTION, POWDER, LYOPHILIZED, FOR SOLUTION INTRAVENOUS EVERY 8 HOURS
Refills: 0 | Status: DISCONTINUED | OUTPATIENT
Start: 2022-02-23 | End: 2022-02-27

## 2022-02-23 RX ADMIN — ONDANSETRON 4 MILLIGRAM(S): 8 TABLET, FILM COATED ORAL at 12:47

## 2022-02-23 RX ADMIN — HYDROMORPHONE HYDROCHLORIDE 1 MILLIGRAM(S): 2 INJECTION INTRAMUSCULAR; INTRAVENOUS; SUBCUTANEOUS at 17:08

## 2022-02-23 RX ADMIN — HYDROMORPHONE HYDROCHLORIDE 1 MILLIGRAM(S): 2 INJECTION INTRAMUSCULAR; INTRAVENOUS; SUBCUTANEOUS at 21:23

## 2022-02-23 RX ADMIN — HYDROMORPHONE HYDROCHLORIDE 1 MILLIGRAM(S): 2 INJECTION INTRAMUSCULAR; INTRAVENOUS; SUBCUTANEOUS at 12:46

## 2022-02-23 RX ADMIN — SODIUM CHLORIDE 2000 MILLILITER(S): 9 INJECTION INTRAMUSCULAR; INTRAVENOUS; SUBCUTANEOUS at 17:08

## 2022-02-23 RX ADMIN — SODIUM CHLORIDE 150 MILLILITER(S): 9 INJECTION INTRAMUSCULAR; INTRAVENOUS; SUBCUTANEOUS at 21:35

## 2022-02-23 RX ADMIN — PIPERACILLIN AND TAZOBACTAM 25 GRAM(S): 4; .5 INJECTION, POWDER, LYOPHILIZED, FOR SOLUTION INTRAVENOUS at 21:35

## 2022-02-23 RX ADMIN — ONDANSETRON 4 MILLIGRAM(S): 8 TABLET, FILM COATED ORAL at 17:08

## 2022-02-23 RX ADMIN — HYDROMORPHONE HYDROCHLORIDE 0.5 MILLIGRAM(S): 2 INJECTION INTRAMUSCULAR; INTRAVENOUS; SUBCUTANEOUS at 18:48

## 2022-02-23 RX ADMIN — HYDROMORPHONE HYDROCHLORIDE 1 MILLIGRAM(S): 2 INJECTION INTRAMUSCULAR; INTRAVENOUS; SUBCUTANEOUS at 21:53

## 2022-02-23 RX ADMIN — ONDANSETRON 4 MILLIGRAM(S): 8 TABLET, FILM COATED ORAL at 21:01

## 2022-02-23 RX ADMIN — PIPERACILLIN AND TAZOBACTAM 200 GRAM(S): 4; .5 INJECTION, POWDER, LYOPHILIZED, FOR SOLUTION INTRAVENOUS at 17:09

## 2022-02-23 RX ADMIN — HYDROMORPHONE HYDROCHLORIDE 1 MILLIGRAM(S): 2 INJECTION INTRAMUSCULAR; INTRAVENOUS; SUBCUTANEOUS at 14:38

## 2022-02-23 RX ADMIN — SODIUM CHLORIDE 1000 MILLILITER(S): 9 INJECTION INTRAMUSCULAR; INTRAVENOUS; SUBCUTANEOUS at 12:47

## 2022-02-23 NOTE — ED STATDOCS - CLINICAL SUMMARY MEDICAL DECISION MAKING FREE TEXT BOX
Pt with abdominal pain, proctocolitis, with elevated lipase.  Supportive care, abx, admit to medicine.

## 2022-02-23 NOTE — ED STATDOCS - GASTROINTESTINAL, MLM
abdomen soft, non-tender, and non-distended. Bowel sounds present. +Epigastric and umbilical tenderness. +RLQ ostomy in place, no surrounding erythema or edema, mild output which is non-bloody. No abdominal distension, guarding or rebound.

## 2022-02-23 NOTE — H&P ADULT - CONVERSATION DETAILS
advance care planning and goals of care discussed. pt is a full code and accepts intubation and cpr should it be required with acceptance of trial ivf, abx, pressors.

## 2022-02-23 NOTE — H&P ADULT - HISTORY OF PRESENT ILLNESS
Pt is a 37 yo male with a pmh/o current everyday smoker, hyponatremia, Crohn's complicated by ostomy and perianal fistulas, who present to ED secondary to abdominal pain which began approx. 3 days ago, associated with nausea, now 10/10 in severity, sharp/shooting in nature, radiates to right side of abdomen, alleviated partially by Dilaudid in ED. Pt states sx feel similar to pancreatitis flare for which he was admitted in January however states that this time he noted fevers at home with Tmax 101 today. Pt states he has been compliant with follow up and last received Stelara on 2/12/22. States approx. two weeks ago drains removed from perianal fistulas and have continued to drain per usual without noted difference in output. Pt reports decreased output from ostomy and denies any darkening of stool or bright red blood in ostomy.

## 2022-02-23 NOTE — ED STATDOCS - PROGRESS NOTE DETAILS
Pt. is a 36 year old Male Hx Crohn's disease, perianal fistula, pancreatitis, presents to Ed with epigastric, periumbilical pain starting this morning.  Pt. recent hospitalization for acute pancreatitis.  Pt. also with nausea and sweats.  Pain sharp, radiating to back.  Lower ostomy output as per patient.  PMD, GI, at [facility].  Anabel Lombardi PA-C LIpase 817.  nAabel Lombardi PA-C LIpase 817.  Awaiting CT results.  Anabel Lombardi PA-C Numerous calls placed to radiology attempting to obtain reading on CT.  Patient has not urinated since arrival in ED.  Pt. updated on labs/CT pending and he is requesting additional pain management.  Anabel Lombardi PA-C LIpase 817.  Awaiting CT results.  second dose of pain management administered.   Anabel Lombardi PA-C Zosyn ordered.  Pt. still c/o pain.  Will admit to medicine.  Anabel Lombardi PA-C

## 2022-02-23 NOTE — H&P ADULT - ASSESSMENT
Pt is a 35 yo male with a pmh/o Crohn's disease complicated by fistulas, who is s/p removal of drains two weeks ago with additional recent treatment with Stelara and admission for pancreatitis/proctitis in January, who presents to ED with reported fevers at home, nausea, and abdominal pain, admitted for:    #Intractable abdominal pain with nausea  #Acute pancreatitis  #Proctocolitis  admit to med surg  pain control  zofran  IVF x 10hrs with plan for evaluation for continued need in AM  abx continued as pt reports fever and p/w leukocytosis  NPO with plan to advance diet as tolerated  Triglycerides, MRCP wnl in Jan at last admission  GI consulted- seen by Dr. Zamora on last admission  SCD for DVT ppx    #Perianal fistulas  recent removal of drains performed  colorectal sx consulted    #Hyponatremia  corrected: 134  suspected SIADH etiology, improved from prior  monitor on serum chem  on NaCL IVF    #Hyperglycemia  non fasting  f/u HbA1c  monitor on serum chem, pending trend and A1c start restricted diet/accuchecks/ISS    #Smoker  cessation counseling  declines nicotine replacement therapy

## 2022-02-23 NOTE — PHARMACOTHERAPY INTERVENTION NOTE - COMMENTS
Medication reconciliation completed.  Reviewed Medication list and confirmed med allergies with patient; confirmed with Dr. First Mednestor.

## 2022-02-23 NOTE — ED ADULT TRIAGE NOTE - CHIEF COMPLAINT QUOTE
pt c/o mid abdominal/ epigastric pain beginning around 9a. +nausea. denies diarrhea. hx- Crohns disease with ostomy

## 2022-02-23 NOTE — ED STATDOCS - OBJECTIVE STATEMENT
35 y/o male with PMHx of Crohn's disease, pancreatitis, perianal fistula s/p repair presents to the ED 37 y/o male with PMHx of Crohn's disease, pancreatitis, perianal fistula s/p repair presents to the ED c/o gradually worsening epigastric/periumbilical abdominal pain which started around 9 AM this morning. Also with associated sweats and nausea. Pt was recently seen in January for pancreatitis. Pt also with Hx of Crohn's. States this pain feels more like pancreatitis. Describes pain as a sharp, shooting, stabbing pain. States pain is starting to radiate to his back. States he is als0 having low output to his ostomy.  Denies fever, vomiting, diarrhea. GI: Dr. Damon at [facility].

## 2022-02-24 LAB
A1C WITH ESTIMATED AVERAGE GLUCOSE RESULT: 6.2 % — HIGH (ref 4–5.6)
ADD ON TEST-SPECIMEN IN LAB: SIGNIFICANT CHANGE UP
ALBUMIN SERPL ELPH-MCNC: 2.3 G/DL — LOW (ref 3.3–5)
ALP SERPL-CCNC: 92 U/L — SIGNIFICANT CHANGE UP (ref 40–120)
ALT FLD-CCNC: 26 U/L — SIGNIFICANT CHANGE UP (ref 12–78)
ANION GAP SERPL CALC-SCNC: 5 MMOL/L — SIGNIFICANT CHANGE UP (ref 5–17)
AST SERPL-CCNC: 19 U/L — SIGNIFICANT CHANGE UP (ref 15–37)
BASOPHILS # BLD AUTO: 0.02 K/UL — SIGNIFICANT CHANGE UP (ref 0–0.2)
BASOPHILS NFR BLD AUTO: 0.2 % — SIGNIFICANT CHANGE UP (ref 0–2)
BILIRUB SERPL-MCNC: 0.9 MG/DL — SIGNIFICANT CHANGE UP (ref 0.2–1.2)
BUN SERPL-MCNC: 9 MG/DL — SIGNIFICANT CHANGE UP (ref 7–23)
CALCIUM SERPL-MCNC: 8.1 MG/DL — LOW (ref 8.5–10.1)
CHLORIDE SERPL-SCNC: 104 MMOL/L — SIGNIFICANT CHANGE UP (ref 96–108)
CO2 SERPL-SCNC: 27 MMOL/L — SIGNIFICANT CHANGE UP (ref 22–31)
CREAT SERPL-MCNC: 0.76 MG/DL — SIGNIFICANT CHANGE UP (ref 0.5–1.3)
CULTURE RESULTS: SIGNIFICANT CHANGE UP
EOSINOPHIL # BLD AUTO: 0.06 K/UL — SIGNIFICANT CHANGE UP (ref 0–0.5)
EOSINOPHIL NFR BLD AUTO: 0.5 % — SIGNIFICANT CHANGE UP (ref 0–6)
ESTIMATED AVERAGE GLUCOSE: 131 MG/DL — HIGH (ref 68–114)
GLUCOSE SERPL-MCNC: 97 MG/DL — SIGNIFICANT CHANGE UP (ref 70–99)
HCT VFR BLD CALC: 36.8 % — LOW (ref 39–50)
HGB BLD-MCNC: 12.3 G/DL — LOW (ref 13–17)
IMM GRANULOCYTES NFR BLD AUTO: 0.5 % — SIGNIFICANT CHANGE UP (ref 0–1.5)
LYMPHOCYTES # BLD AUTO: 1.5 K/UL — SIGNIFICANT CHANGE UP (ref 1–3.3)
LYMPHOCYTES # BLD AUTO: 13.6 % — SIGNIFICANT CHANGE UP (ref 13–44)
MCHC RBC-ENTMCNC: 33.4 GM/DL — SIGNIFICANT CHANGE UP (ref 32–36)
MCHC RBC-ENTMCNC: 35.9 PG — HIGH (ref 27–34)
MCV RBC AUTO: 107.3 FL — HIGH (ref 80–100)
MONOCYTES # BLD AUTO: 0.85 K/UL — SIGNIFICANT CHANGE UP (ref 0–0.9)
MONOCYTES NFR BLD AUTO: 7.7 % — SIGNIFICANT CHANGE UP (ref 2–14)
NEUTROPHILS # BLD AUTO: 8.51 K/UL — HIGH (ref 1.8–7.4)
NEUTROPHILS NFR BLD AUTO: 77.5 % — HIGH (ref 43–77)
PLATELET # BLD AUTO: 270 K/UL — SIGNIFICANT CHANGE UP (ref 150–400)
POTASSIUM SERPL-MCNC: 3.7 MMOL/L — SIGNIFICANT CHANGE UP (ref 3.5–5.3)
POTASSIUM SERPL-SCNC: 3.7 MMOL/L — SIGNIFICANT CHANGE UP (ref 3.5–5.3)
PROT SERPL-MCNC: 6.2 GM/DL — SIGNIFICANT CHANGE UP (ref 6–8.3)
RBC # BLD: 3.43 M/UL — LOW (ref 4.2–5.8)
RBC # FLD: 13.6 % — SIGNIFICANT CHANGE UP (ref 10.3–14.5)
SODIUM SERPL-SCNC: 136 MMOL/L — SIGNIFICANT CHANGE UP (ref 135–145)
SPECIMEN SOURCE: SIGNIFICANT CHANGE UP
WBC # BLD: 11 K/UL — HIGH (ref 3.8–10.5)
WBC # FLD AUTO: 11 K/UL — HIGH (ref 3.8–10.5)

## 2022-02-24 PROCEDURE — 99254 IP/OBS CNSLTJ NEW/EST MOD 60: CPT

## 2022-02-24 PROCEDURE — 99233 SBSQ HOSP IP/OBS HIGH 50: CPT

## 2022-02-24 RX ORDER — SODIUM CHLORIDE 9 MG/ML
1000 INJECTION INTRAMUSCULAR; INTRAVENOUS; SUBCUTANEOUS
Refills: 0 | Status: DISCONTINUED | OUTPATIENT
Start: 2022-02-24 | End: 2022-02-27

## 2022-02-24 RX ORDER — ENOXAPARIN SODIUM 100 MG/ML
40 INJECTION SUBCUTANEOUS DAILY
Refills: 0 | Status: DISCONTINUED | OUTPATIENT
Start: 2022-02-24 | End: 2022-02-27

## 2022-02-24 RX ORDER — ONDANSETRON 8 MG/1
4 TABLET, FILM COATED ORAL ONCE
Refills: 0 | Status: COMPLETED | OUTPATIENT
Start: 2022-02-24 | End: 2022-02-24

## 2022-02-24 RX ADMIN — ONDANSETRON 4 MILLIGRAM(S): 8 TABLET, FILM COATED ORAL at 09:29

## 2022-02-24 RX ADMIN — HYDROMORPHONE HYDROCHLORIDE 1 MILLIGRAM(S): 2 INJECTION INTRAMUSCULAR; INTRAVENOUS; SUBCUTANEOUS at 01:25

## 2022-02-24 RX ADMIN — HYDROMORPHONE HYDROCHLORIDE 1 MILLIGRAM(S): 2 INJECTION INTRAMUSCULAR; INTRAVENOUS; SUBCUTANEOUS at 08:22

## 2022-02-24 RX ADMIN — HYDROMORPHONE HYDROCHLORIDE 1 MILLIGRAM(S): 2 INJECTION INTRAMUSCULAR; INTRAVENOUS; SUBCUTANEOUS at 04:49

## 2022-02-24 RX ADMIN — HYDROMORPHONE HYDROCHLORIDE 1 MILLIGRAM(S): 2 INJECTION INTRAMUSCULAR; INTRAVENOUS; SUBCUTANEOUS at 20:55

## 2022-02-24 RX ADMIN — ONDANSETRON 4 MILLIGRAM(S): 8 TABLET, FILM COATED ORAL at 16:16

## 2022-02-24 RX ADMIN — HYDROMORPHONE HYDROCHLORIDE 0.5 MILLIGRAM(S): 2 INJECTION INTRAMUSCULAR; INTRAVENOUS; SUBCUTANEOUS at 11:52

## 2022-02-24 RX ADMIN — SODIUM CHLORIDE 100 MILLILITER(S): 9 INJECTION INTRAMUSCULAR; INTRAVENOUS; SUBCUTANEOUS at 11:55

## 2022-02-24 RX ADMIN — HYDROMORPHONE HYDROCHLORIDE 0.5 MILLIGRAM(S): 2 INJECTION INTRAMUSCULAR; INTRAVENOUS; SUBCUTANEOUS at 12:13

## 2022-02-24 RX ADMIN — HYDROMORPHONE HYDROCHLORIDE 1 MILLIGRAM(S): 2 INJECTION INTRAMUSCULAR; INTRAVENOUS; SUBCUTANEOUS at 00:55

## 2022-02-24 RX ADMIN — HYDROMORPHONE HYDROCHLORIDE 1 MILLIGRAM(S): 2 INJECTION INTRAMUSCULAR; INTRAVENOUS; SUBCUTANEOUS at 08:45

## 2022-02-24 RX ADMIN — HYDROMORPHONE HYDROCHLORIDE 1 MILLIGRAM(S): 2 INJECTION INTRAMUSCULAR; INTRAVENOUS; SUBCUTANEOUS at 14:42

## 2022-02-24 RX ADMIN — PIPERACILLIN AND TAZOBACTAM 25 GRAM(S): 4; .5 INJECTION, POWDER, LYOPHILIZED, FOR SOLUTION INTRAVENOUS at 21:01

## 2022-02-24 RX ADMIN — HYDROMORPHONE HYDROCHLORIDE 0.5 MILLIGRAM(S): 2 INJECTION INTRAMUSCULAR; INTRAVENOUS; SUBCUTANEOUS at 18:02

## 2022-02-24 RX ADMIN — ONDANSETRON 4 MILLIGRAM(S): 8 TABLET, FILM COATED ORAL at 20:54

## 2022-02-24 RX ADMIN — HYDROMORPHONE HYDROCHLORIDE 1 MILLIGRAM(S): 2 INJECTION INTRAMUSCULAR; INTRAVENOUS; SUBCUTANEOUS at 21:30

## 2022-02-24 RX ADMIN — ONDANSETRON 4 MILLIGRAM(S): 8 TABLET, FILM COATED ORAL at 03:12

## 2022-02-24 RX ADMIN — HYDROMORPHONE HYDROCHLORIDE 0.5 MILLIGRAM(S): 2 INJECTION INTRAMUSCULAR; INTRAVENOUS; SUBCUTANEOUS at 18:17

## 2022-02-24 RX ADMIN — HYDROMORPHONE HYDROCHLORIDE 1 MILLIGRAM(S): 2 INJECTION INTRAMUSCULAR; INTRAVENOUS; SUBCUTANEOUS at 14:22

## 2022-02-24 RX ADMIN — PIPERACILLIN AND TAZOBACTAM 25 GRAM(S): 4; .5 INJECTION, POWDER, LYOPHILIZED, FOR SOLUTION INTRAVENOUS at 05:26

## 2022-02-24 RX ADMIN — HYDROMORPHONE HYDROCHLORIDE 1 MILLIGRAM(S): 2 INJECTION INTRAMUSCULAR; INTRAVENOUS; SUBCUTANEOUS at 04:19

## 2022-02-24 RX ADMIN — Medication 5 MILLIGRAM(S): at 23:01

## 2022-02-24 RX ADMIN — PIPERACILLIN AND TAZOBACTAM 25 GRAM(S): 4; .5 INJECTION, POWDER, LYOPHILIZED, FOR SOLUTION INTRAVENOUS at 14:04

## 2022-02-24 NOTE — CONSULT NOTE ADULT - REASON FOR ADMISSION
Intractable abd pain secondary to proctocolitis, pancreatitis
Intractable abd pain secondary to proctocolitis, pancreatitis

## 2022-02-24 NOTE — PROGRESS NOTE ADULT - SUBJECTIVE AND OBJECTIVE BOX
Cheif complaints and Diagnosis: abdominal pain/ acute pancreatitis/ chrons disease with perianal fistulas    Subjective:       REVIEW OF SYSTEMS:    CONSTITUTIONAL: No weakness, fevers or chills  EYES/ENT: No visual changes;  No vertigo or throat pain   NECK: No pain or stiffness  RESPIRATORY: No cough, wheezing, hemoptysis; No shortness of breath  CARDIOVASCULAR: No chest pain or palpitations  GASTROINTESTINAL: No abdominal or epigastric pain. No nausea, vomiting, or hematemesis; No diarrhea or constipation. No melena or hematochezia.  GENITOURINARY: No dysuria, frequency or hematuria  NEUROLOGICAL: No numbness or weakness  SKIN: No itching, burning, rashes, or lesions   All other review of systems is negative unless indicated above      Vital Signs Last 24 Hrs  T(C): 36.4 (2022 07:50), Max: 37 (2022 23:33)  T(F): 97.6 (2022 07:50), Max: 98.6 (2022 23:33)  HR: 60 (2022 07:50) (60 - 108)  BP: 108/60 (2022 07:50) (101/62 - 157/105)  BP(mean): 90 (2022 20:30) (90 - 116)  RR: 18 (2022 07:50) (18 - 20)  SpO2: 98% (2022 07:50) (98% - 100%)    HEENT:   pupils equal and reactive, EOMI, no oropharyngeal lesions, erythema, exudates, oral thrush    NECK:   supple, no carotid bruits, no palpable lymph nodes, no thyromegaly    CV:  +S1, +S2, regular, no murmurs or rubs    RESP:   lungs clear to auscultation bilaterally, no wheezing, rales, rhonchi, good air entry bilaterally    BREAST:  not examined    GI:  abdomen soft, non-tender, non-distended, normal BS, no bruits, no abdominal masses, no palpable masses    RECTAL:  not examined    :  not examined    MSK:   normal muscle tone, no atrophy, no rigidity, no contractions    EXT:   no clubbing, no cyanosis, no edema, no calf pain, swelling or erythema    VASCULAR:  pulses equal and symmetric in the upper and lower extremities    NEURO:  AAOX3, no focal neurological deficits, follows all commands, able to move extremities spontaneously    SKIN:  no ulcers, lesions or rashes    MEDICATIONS  (STANDING):  piperacillin/tazobactam IVPB.. 3.375 Gram(s) IV Intermittent every 8 hours  sodium chloride 0.9%. 1000 milliLiter(s) (150 mL/Hr) IV Continuous <Continuous>  sodium chloride 0.9%. 1000 milliLiter(s) (100 mL/Hr) IV Continuous <Continuous>    MEDICATIONS  (PRN):  acetaminophen     Tablet .. 650 milliGRAM(s) Oral every 6 hours PRN Temp greater or equal to 38C (100.4F), Mild Pain (1 - 3)  HYDROmorphone  Injectable 0.5 milliGRAM(s) IV Push every 6 hours PRN Moderate Pain (4 - 6)  HYDROmorphone  Injectable 1 milliGRAM(s) IV Push every 4 hours PRN Severe Pain (7 - 10)  melatonin 5 milliGRAM(s) Oral at bedtime PRN Insomnia  ondansetron Injectable 4 milliGRAM(s) IV Push every 6 hours PRN Nausea and/or Vomiting      Urinalysis Basic - ( 2022 16:40 )    Color: Yellow / Appearance: Clear / S.005 / pH: x  Gluc: x / Ketone: Negative  / Bili: Negative / Urobili: Negative   Blood: x / Protein: 15 / Nitrite: Negative   Leuk Esterase: Negative / RBC: Negative /HPF / WBC Negative   Sq Epi: x / Non Sq Epi: Negative / Bacteria: Negative    2022 12:31    133    |  98     |  14     ----------------------------<  155    3.7     |  26     |  1.02     Ca    9.0        2022 12:31    TPro  8.2    /  Alb  3.3    /  TBili  0.6    /  DBili  x      /  AST  32     /  ALT  42     /  AlkPhos  112    2022 12:31  LIVER FUNCTIONS - ( 2022 12:31 )  Alb: 3.3 g/dL / Pro: 8.2 gm/dL / ALK PHOS: 112 U/L / ALT: 42 U/L / AST: 32 U/L / GGT: x         CBC Full  -  ( 2022 08:40 )  WBC Count : 11.00 K/uL  Hemoglobin : 12.3 g/dL  Hematocrit : 36.8 %  Platelet Count - Automated : 270 K/uL  Mean Cell Volume : 107.3 fl  Mean Cell Hemoglobin : 35.9 pg  Mean Cell Hemoglobin Concentration : 33.4 gm/dL  Auto Neutrophil # : 8.51 K/uL  Auto Lymphocyte # : 1.50 K/uL  Auto Monocyte # : 0.85 K/uL  Auto Eosinophil # : 0.06 K/uL  Auto Basophil # : 0.02 K/uL  Auto Neutrophil % : 77.5 %  Auto Lymphocyte % : 13.6 %  Auto Monocyte % : 7.7 %  Auto Eosinophil % : 0.5 %  Auto Basophil % : 0.2 %        Blood, Urine: Negative ( @ 16:40)            Assessment and Plan:        Pt is a 35 yo male with a pmh/o Crohn's disease complicated by fistulas, who is s/p removal of drains two weeks ago with additional recent treatment with Stelara and admission for pancreatitis/proctitis in January, who presents to ED with reported fevers at home, nausea, and abdominal pain, admitted for:    #Intractable abdominal pain with nausea secondary to Acute pancreatitis  -c/w IV fluids   -Gi consult pending  -Lipase from this  AM pending; last night 817        #Perianal fistulas and chrons disease  recent removal of drains performed  colorectal sx consulted    #Hyponatremia  corrected: 134  suspected SIADH etiology, improved from prior  monitor on serum chem  on NaCL IVF    #Hyperglycemia  non fasting  f/u HbA1c  monitor on serum chem, pending trend and A1c start restricted diet/accuchecks/ISS    #Smoker  cessation counseling  declines nicotine replacement therapy       #dvt prophy- sc lovenox     Cheif complaints and Diagnosis: abdominal pain/ acute pancreatitis/ chrons disease with perianal fistulas    Subjective: patient c/o pain in abdomen      REVIEW OF SYSTEMS:    CONSTITUTIONAL: No weakness, fevers or chills  EYES/ENT: No visual changes;  No vertigo or throat pain   NECK: No pain or stiffness  RESPIRATORY: No cough, wheezing, hemoptysis; No shortness of breath  CARDIOVASCULAR: No chest pain or palpitations  GASTROINTESTINAL: No abdominal or epigastric pain. No nausea, vomiting, or hematemesis; No diarrhea or constipation. No melena or hematochezia.  GENITOURINARY: No dysuria, frequency or hematuria  NEUROLOGICAL: No numbness or weakness  SKIN: No itching, burning, rashes, or lesions   All other review of systems is negative unless indicated above      Vital Signs Last 24 Hrs  T(C): 36.4 (2022 07:50), Max: 37 (2022 23:33)  T(F): 97.6 (2022 07:50), Max: 98.6 (2022 23:33)  HR: 60 (2022 07:50) (60 - 108)  BP: 108/60 (2022 07:50) (101/62 - 157/105)  BP(mean): 90 (2022 20:30) (90 - 116)  RR: 18 (2022 07:50) (18 - 20)  SpO2: 98% (2022 07:50) (98% - 100%)    HEENT:   pupils equal and reactive, EOMI, no oropharyngeal lesions, erythema, exudates, oral thrush    NECK:   supple, no carotid bruits, no palpable lymph nodes, no thyromegaly    CV:  +S1, +S2, regular, no murmurs or rubs    RESP:   lungs clear to auscultation bilaterally, no wheezing, rales, rhonchi, good air entry bilaterally    BREAST:  not examined    GI:  abdomen soft, non-tender, non-distended, normal BS, no bruits, no abdominal masses, no palpable masses    RECTAL:  not examined    :  not examined    MSK:   normal muscle tone, no atrophy, no rigidity, no contractions    EXT:   no clubbing, no cyanosis, no edema, no calf pain, swelling or erythema    VASCULAR:  pulses equal and symmetric in the upper and lower extremities    NEURO:  AAOX3, no focal neurological deficits, follows all commands, able to move extremities spontaneously    SKIN:  no ulcers, lesions or rashes    MEDICATIONS  (STANDING):  piperacillin/tazobactam IVPB.. 3.375 Gram(s) IV Intermittent every 8 hours  sodium chloride 0.9%. 1000 milliLiter(s) (150 mL/Hr) IV Continuous <Continuous>  sodium chloride 0.9%. 1000 milliLiter(s) (100 mL/Hr) IV Continuous <Continuous>    MEDICATIONS  (PRN):  acetaminophen     Tablet .. 650 milliGRAM(s) Oral every 6 hours PRN Temp greater or equal to 38C (100.4F), Mild Pain (1 - 3)  HYDROmorphone  Injectable 0.5 milliGRAM(s) IV Push every 6 hours PRN Moderate Pain (4 - 6)  HYDROmorphone  Injectable 1 milliGRAM(s) IV Push every 4 hours PRN Severe Pain (7 - 10)  melatonin 5 milliGRAM(s) Oral at bedtime PRN Insomnia  ondansetron Injectable 4 milliGRAM(s) IV Push every 6 hours PRN Nausea and/or Vomiting      Urinalysis Basic - ( 2022 16:40 )    Color: Yellow / Appearance: Clear / S.005 / pH: x  Gluc: x / Ketone: Negative  / Bili: Negative / Urobili: Negative   Blood: x / Protein: 15 / Nitrite: Negative   Leuk Esterase: Negative / RBC: Negative /HPF / WBC Negative   Sq Epi: x / Non Sq Epi: Negative / Bacteria: Negative    2022 12:31    133    |  98     |  14     ----------------------------<  155    3.7     |  26     |  1.02     Ca    9.0        2022 12:31    TPro  8.2    /  Alb  3.3    /  TBili  0.6    /  DBili  x      /  AST  32     /  ALT  42     /  AlkPhos  112    2022 12:31  LIVER FUNCTIONS - ( 2022 12:31 )  Alb: 3.3 g/dL / Pro: 8.2 gm/dL / ALK PHOS: 112 U/L / ALT: 42 U/L / AST: 32 U/L / GGT: x         CBC Full  -  ( 2022 08:40 )  WBC Count : 11.00 K/uL  Hemoglobin : 12.3 g/dL  Hematocrit : 36.8 %  Platelet Count - Automated : 270 K/uL  Mean Cell Volume : 107.3 fl  Mean Cell Hemoglobin : 35.9 pg  Mean Cell Hemoglobin Concentration : 33.4 gm/dL  Auto Neutrophil # : 8.51 K/uL  Auto Lymphocyte # : 1.50 K/uL  Auto Monocyte # : 0.85 K/uL  Auto Eosinophil # : 0.06 K/uL  Auto Basophil # : 0.02 K/uL  Auto Neutrophil % : 77.5 %  Auto Lymphocyte % : 13.6 %  Auto Monocyte % : 7.7 %  Auto Eosinophil % : 0.5 %  Auto Basophil % : 0.2 %        Blood, Urine: Negative ( @ 16:40)            Assessment and Plan:        Pt is a 35 yo male with a pmh/o Crohn's disease complicated by fistulas, who is s/p removal of drains two weeks ago with additional recent treatment with Stelara and admission for pancreatitis/proctitis in January, who presents to ED with reported fevers at home, nausea, and abdominal pain, admitted for:    #Intractable abdominal pain with nausea secondary to Acute pancreatitis  -c/w IV fluids   -Gi consult appreciated  -Lipase from this  AM pending; last night 817 >>300's       #Perianal fistulas and chrons disease  recent removal of drains performed  colorectal sx consulted  -may need steroids after c.diff and gi pcr return     #Hyponatremia  corrected: 134  suspected SIADH etiology, improved from prior  monitor on serum chem  on NaCL IVF    #Hyperglycemia  non fasting  f/u HbA1c  monitor on serum chem, pending trend and A1c start restricted diet/accuchecks/ISS    #Smoker  cessation counseling  declines nicotine replacement therapy       #dvt prophy- sc lovenox

## 2022-02-24 NOTE — PATIENT PROFILE ADULT - FALL HARM RISK - HARM RISK INTERVENTIONS
Assistance with ambulation/Assistance OOB with selected safe patient handling equipment/Communicate Risk of Fall with Harm to all staff/Reinforce activity limits and safety measures with patient and family/Review medications for side effects contributing to fall risk/Sit up slowly, dangle for a short time, stand at bedside before walking/Tailored Fall Risk Interventions/Toileting schedule using arm’s reach rule for commode and bathroom/Visual Cue: Yellow wristband and red socks/Bed in lowest position, wheels locked, appropriate side rails in place/Call bell, personal items and telephone in reach/Instruct patient to call for assistance before getting out of bed or chair/Non-slip footwear when patient is out of bed/[place] to call system/Physically safe environment - no spills, clutter or unnecessary equipment/Purposeful Proactive Rounding/Room/bathroom lighting operational, light cord in reach

## 2022-02-24 NOTE — CONSULT NOTE ADULT - ASSESSMENT
35 YO M with Crohn's and chronic perianal fistula    Pt has only abdominal complaints at this time  Pt has a colorectal Surgeon that he wishes to proceed with all his care  Pt has a follow appoint with Dr Mascorro at [place] on Tuesday and wishes to keep that appointment  Continue to hydrate pt  No current Surgical interventions planned for this pt.   Continue medical management as per primary team      Case discussed with Dr Kang

## 2022-02-24 NOTE — CONSULT NOTE ADULT - ASSESSMENT
37 y/o male with hx of Crohn's disease on stellara, perianal fistulas admitted for abdominal pain, nausea, fevers, fatigue. He had most recent Stelara x 2 weeks ago, and recently had removal of perianal fistula drains. He is followed closely by GI at [facility]. He had admission in January for pancreatitis, and feels pain is similar.     CT scan on admission shows improving inflammatory changes with interstitial pancreatitis. Multiple perianal fistulas and proctocolitis seen.    PLAN  Trend lipase  Will send Cdiff & GI PCR  from ostomy to r/o infection. If stool studies negative can initiate steroid therapy.   LR @ 250/hour x 24 hours for pancreatitis  Pain control PRN  Antiemetics PRN  Advance diet as tolerated    Discussed with Dr. Arellano  37 y/o male with hx of Crohn's disease on stellara, perianal fistulas admitted for abdominal pain, nausea, , fatigue. He had most recent Stelara x 2 weeks ago, and recently had removal of perianal fistula drains. He is followed closely by GI at [facility]. He had admission in January for pancreatitis, and feels pain is similar.     CT scan on admission shows improving inflammatory changes with interstitial pancreatitis. Multiple perianal fistulas and proctocolitis seen.    PLAN  Will send Cdiff & GI PCR  from ostomy to r/o infection.   LR @ 250/hour x 24 hours for pancreatitis  Pain control PRN  Antiemetics PRN  Advance diet as tolerated    Discussed with Dr. Arellano

## 2022-02-24 NOTE — CONSULT NOTE ADULT - SUBJECTIVE AND OBJECTIVE BOX
Pt  Pt is a 35 yo male with a pmh/o current everyday smoker, hyponatremia, Crohn's complicated by ostomy and perianal fistulas, who present to ED secondary to abdominal pain which began approx. 3 days ago, associated with nausea, now 10/10 in severity, sharp/shooting in nature, radiates to right side of abdomen, alleviated partially by Dilaudid in ED. Pt states sx feel similar to pancreatitis flare for which he was admitted in January however states that this time he noted fevers at home with Tmax 101 this time. Pt states he has been compliant with follow up and last received Stelara on 2/12/22. Pt is known to the colorectal service. Pt has a colorectal Surgeon, Dr Mascorro at [place]. Pt just had his Seton removed from perianal fistulas on 2/10.  Pt reports decreased output from ostomy and denies any darkening of stool or bright red blood in ostomy.    Vital Signs Last 24 Hrs  T(C): 36.9 (24 Feb 2022 15:03), Max: 37 (23 Feb 2022 23:33)  T(F): 98.5 (24 Feb 2022 15:03), Max: 98.6 (23 Feb 2022 23:33)  HR: 60 (24 Feb 2022 15:03) (60 - 66)  BP: 103/64 (24 Feb 2022 15:03) (101/62 - 137/79)  BP(mean): 90 (23 Feb 2022 20:30) (90 - 90)  ABP: --  ABP(mean): --  RR: 18 (24 Feb 2022 15:03) (18 - 18)  SpO2: 97% (24 Feb 2022 15:03) (97% - 100%)    General: NAD,   Neck: Supple  Chest: Equal expansion bilaterally, equal breath sounds  CV: S1S2 Present  Abdomen: Soft, non distended, mild epigastric tender to palpation, non-tympanic, bowel sounds present  Extremities: Grossly symmetric

## 2022-02-24 NOTE — CONSULT NOTE ADULT - ATTENDING COMMENTS
36M PMH significant for Crohn's disease including severe perianal Crohn's and complexed fistulas requiring fecal diversion via loop ileostomy (~9 months ago) and placement of draining setons for fistula management which have since been removed. Patient also has a history of pancreatitis which is his current working diagnosis. Patient's Colon and rectal surgeon is Dr. Dimitri Ortega at [place], Gastroenterologists is Dr. Pearl at [place]. Patient has a follow up appointment with Dr. Ortega in 1 week.    Vital Signs Last 24 Hrs  T(C): 36.9 (24 Feb 2022 15:03), Max: 37 (23 Feb 2022 23:33)  T(F): 98.5 (24 Feb 2022 15:03), Max: 98.6 (23 Feb 2022 23:33)  HR: 60 (24 Feb 2022 15:03) (60 - 74)  BP: 103/64 (24 Feb 2022 15:03) (101/62 - 137/79)  BP(mean): 90 (23 Feb 2022 20:30) (90 - 95)  RR: 18 (24 Feb 2022 15:03) (18 - 18)  SpO2: 97% (24 Feb 2022 15:03) (97% - 100%)    Physical Exam  GEN: Lying in bed in mild to moderate discomfort  PULM: Non-labored respiration  CVS: Normal rate  ABD: Soft, tender in the epigastrium                          12.3   11.00 )-----------( 270      ( 24 Feb 2022 08:40 )             36.8     02-24    136  |  104  |  9   ----------------------------<  97  3.7   |  27  |  0.76    Ca    8.1<L>      24 Feb 2022 08:40    TPro  6.2  /  Alb  2.3<L>  /  TBili  0.9  /  DBili  x   /  AST  19  /  ALT  26  /  AlkPhos  92  02-24        36M PMH significant for Crohn's disease including severe perianal Crohn's and complexed fistulas s/p fecal diversion who presented for evaluation of epigastric pain and was found to pancreatitis. Patient seen and examined at bedside.    Continue to ensure adequate pain control  Continue IVF fluids resuscitation  PO intake as tolerated  No current colon and rectal surgery interventions warranted at this time  Continue medical management of Crohn's disease  Management of Pancreatitis per primary and GI  Patient has a follow up appointment with his Colon and rectal surgeon in 1 week  Please reconsult as needed
36 year old man with fistulizing Crohn's colitis, admitted with recurrent pancreatitis.     Pain due to paincreatitis, but want to make sure not having a flare as well as BM's changed (they are less though). 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   Daily esr, crp.   C diff, culture to r/o infection  Lactated ringer's at high rate, 250/hr for 24 hours.   Clears ok.   Would obtain MRI/MRCP to r/o PD stricture as a cause of pancreatitis as prior history. Don't think biliary duct not dilated and lft's normal. ?autoimmune pancreatitis (would need EUS FNA, not in the acute setting) vs due to meds (stelara?). Send Igg4 level.  Ensure chemical dvt ppx even if young/mobile as IBD pro-inflammatory condition, high risk of clotting.

## 2022-02-24 NOTE — CONSULT NOTE ADULT - SUBJECTIVE AND OBJECTIVE BOX
Patient is a 36y old  Male who presents with a chief complaint of Intractable abd pain secondary to proctocolitis, pancreatitis.    HPI:  36 M hx of hyponatremia, Crohn's complicated by ostomy and perianal fistulas admitted for abdominal pain. Pain began about 2 days ago, but worsened and became sever yesterday AM which prompted him to come to ED for eval. Describes pain as sharp stabbing 10/10 radiating to his right side. Notes he has had some decreased output from his ostomy site, but has not noted any change in contents or blood. Reports he was admitted for pancreatitis in January, and this pain feels similar. Feels more weak and tired. He states he is followed closely by GI Dr. Muñoz @ [facility] for his Crohn's. Had Stelara about 2 weeks ago, and had perianal fistula drains removed at that time. Pt denies headache, dizziness, chest pain, palpitations, cough, SOB, urinary, at this time.     PAST MEDICAL & SURGICAL HISTORY:  Crohn&#x27;s disease of large intestine without complication  (No current flare up)  Pancreatitis  S/P ORIF (open reduction internal fixation) fracture  (Right ankle, 2014)  History of colonoscopy  (2014)  Perianal fistula  repair in 2002    MEDICATIONS  (STANDING):  enoxaparin Injectable 40 milliGRAM(s) SubCutaneous daily  piperacillin/tazobactam IVPB.. 3.375 Gram(s) IV Intermittent every 8 hours  sodium chloride 0.9%. 1000 milliLiter(s) (150 mL/Hr) IV Continuous <Continuous>  sodium chloride 0.9%. 1000 milliLiter(s) (100 mL/Hr) IV Continuous <Continuous>    MEDICATIONS  (PRN):  acetaminophen     Tablet .. 650 milliGRAM(s) Oral every 6 hours PRN Temp greater or equal to 38C (100.4F), Mild Pain (1 - 3)  HYDROmorphone  Injectable 0.5 milliGRAM(s) IV Push every 6 hours PRN Moderate Pain (4 - 6)  HYDROmorphone  Injectable 1 milliGRAM(s) IV Push every 4 hours PRN Severe Pain (7 - 10)  melatonin 5 milliGRAM(s) Oral at bedtime PRN Insomnia  ondansetron Injectable 4 milliGRAM(s) IV Push every 6 hours PRN Nausea and/or Vomiting    Allergies  No Known Allergies    SOCIAL HISTORY:  current smoker, occasional ETOH use, occasional marijuana use  FAMILY HISTORY:  Diabetes mellitus (Father)    REVIEW OF SYSTEMS:  CONSTITUTIONAL:+ weakness, fevers or chills  EYES/ENT: No visual changes;  No vertigo or throat pain   NECK: No pain or stiffness  RESPIRATORY: No cough, wheezing, hemoptysis; No shortness of breath  CARDIOVASCULAR: No chest pain or palpitations  GASTROINTESTINAL: see HPI  GENITOURINARY: No dysuria, frequency or hematuria  NEUROLOGICAL: No numbness or weakness  SKIN: No itching, burning, rashes, or lesions   PSYCH: Normal mood and affect  All other review of systems is negative unless indicated above.  Vital Signs Last 24 Hrs  T(C): 36.4 (24 Feb 2022 07:50), Max: 37 (23 Feb 2022 23:33)  T(F): 97.6 (24 Feb 2022 07:50), Max: 98.6 (23 Feb 2022 23:33)  HR: 60 (24 Feb 2022 07:50) (60 - 108)  BP: 108/60 (24 Feb 2022 07:50) (101/62 - 157/105)  BP(mean): 90 (23 Feb 2022 20:30) (90 - 116)  RR: 18 (24 Feb 2022 07:50) (18 - 20)  SpO2: 98% (24 Feb 2022 07:50) (98% - 100%)    PHYSICAL EXAM:  Constitutional: thin, chronically ill appearing, uncomfortable appearing due to pain  HEENT: MMM  Respiratory: CTAB  Cardiovascular: S1 and S2, RRR, no M/G/R  Gastrointestinal: BS+,soft, diffuse abdominal tenderness, ostomy to RLQ, no guarding  Extremities: No peripheral edema  Vascular: 2+ peripheral pulses  Neurological: A/O x 3  Psychiatric: Normal mood, normal affect  Skin: No rashes    LABS:                        12.3   11.00 )-----------( 270      ( 24 Feb 2022 08:40 )             36.8     02-24    136  |  104  |  9   ----------------------------<  97  3.7   |  27  |  0.76    Ca    8.1<L>      24 Feb 2022 08:40    TPro  6.2  /  Alb  2.3<L>  /  TBili  0.9  /  DBili  x   /  AST  19  /  ALT  26  /  AlkPhos  92  02-24      LIVER FUNCTIONS - ( 24 Feb 2022 08:40 )  Alb: 2.3 g/dL / Pro: 6.2 gm/dL / ALK PHOS: 92 U/L / ALT: 26 U/L / AST: 19 U/L / GGT: x             RADIOLOGY & ADDITIONAL STUDIES:< from: CT Abdomen and Pelvis w/ Oral Cont and w/ IV Cont (02.23.22 @ 14:23) >  ACC: [id number] EXAM:  CT ABDOMEN AND PELVIS OC IC                          PROCEDURE DATE:  02/23/2022          INTERPRETATION:  CLINICAL INFORMATION: Generalized abdominal pain,   decreased ostomy output    COMPARISON: CT abdomen pelvis 1/5/2022, ultrasound abdomen 1/6/2022, MRI   abdomen 1/17/2022    CONTRAST/COMPLICATIONS:  IV Contrast: Omnipaque 350  90 cc administered   10 cc discarded  Oral Contrast: Omnipaque 300  Complications: None reported at time of study completion    PROCEDURE:  CT of the Abdomen and Pelvis was performed.  Sagittal and coronal reformats were performed.    FINDINGS:  LOWER CHEST: Clear.    LIVER: Normal.  BILE DUCTS: Nondilated.  GALLBLADDER: Normal.  SPLEEN: Normal.  PANCREAS: Improving inflammatory changes in association with interstitial   edematous pancreatitis.  ADRENALS: Normal.  KIDNEYS/URETERS: No calculi, hydronephrosis, or renal mass.    BLADDER: Normal.  REPRODUCTIVE ORGANS: Nonenlarged.    BOWEL: No obstruction. Right lower quadrant ileostomy again noted.   Proctocolitis of the rectosigmoid again seen with multiple perianal   fistulas. Interval removal of setons.  PERITONEUM: No free air or ascites.  VESSELS: Normal caliber aorta.  RETROPERITONEUM/LYMPH NODES: No adenopathy.  ABDOMINAL WALL: Postsurgical changes.  BONES: No acute bony abnormality.    IMPRESSION:  *  Improving inflammatory changes in association with interstitial   edematous pancreatitis. No collections.  *  Proctocolitis of the rectosigmoid again seen with multiple perianal   fistulas.  *  No bowel obstruction.    --- End of Report ---    PRAVEEN VENTURA MD; Attending Radiologist  This document has been electronically signed. Feb 23 2022  4:33PM    < end of copied text >   Patient is a 36y old  Male who presents with a chief complaint of Intractable abd pain secondary to proctocolitis, pancreatitis.    HPI:  36 M hx of hyponatremia, Crohn's colits (on sterlara, with fistulizing disease) complicated by ostomy and perianal fistulas admitted for abdominal pain.     Patient states his pain was sudden onset and began about 2 days ago (5/10), but worsened and became severe yesterday AM which prompted him to come to ED for eval. Describes pain as sharp stabbing 10/10 radiating to his right side. Associated nausea but no vomiting. No fevers or chills. Pain was sudden without known exacerbating factors, no alleviating factors other than pain meds.  Notes he has had some decreased output from his ostomy site, but has not noted any change in contents or blood. Reports he was admitted for pancreatitis in January, and this pain feels similar. Feels more weak and tired. He states he is followed closely by GI Dr. Muñoz @ [facility] for his Crohn's. Had Stelara about 2 weeks ago, and had perianal fistula drains removed at that time. Pt denies headache, dizziness, chest pain, palpitations, cough, SOB, urinary, at this time.     PAST MEDICAL & SURGICAL HISTORY:  Crohn's colitis  Pancreatitis  S/P ORIF (open reduction internal fixation) fracture  (Right ankle, 2014)  History of colonoscopy  (2014)  Perianal fistula  repair in 2002    MEDICATIONS  (STANDING):  enoxaparin Injectable 40 milliGRAM(s) SubCutaneous daily  piperacillin/tazobactam IVPB.. 3.375 Gram(s) IV Intermittent every 8 hours  sodium chloride 0.9%. 1000 milliLiter(s) (150 mL/Hr) IV Continuous <Continuous>  sodium chloride 0.9%. 1000 milliLiter(s) (100 mL/Hr) IV Continuous <Continuous>    MEDICATIONS  (PRN):  acetaminophen     Tablet .. 650 milliGRAM(s) Oral every 6 hours PRN Temp greater or equal to 38C (100.4F), Mild Pain (1 - 3)  HYDROmorphone  Injectable 0.5 milliGRAM(s) IV Push every 6 hours PRN Moderate Pain (4 - 6)  HYDROmorphone  Injectable 1 milliGRAM(s) IV Push every 4 hours PRN Severe Pain (7 - 10)  melatonin 5 milliGRAM(s) Oral at bedtime PRN Insomnia  ondansetron Injectable 4 milliGRAM(s) IV Push every 6 hours PRN Nausea and/or Vomiting    Allergies  No Known Allergies    SOCIAL HISTORY:  current smoker, occasional ETOH use, occasional marijuana use  FAMILY HISTORY:  Diabetes mellitus (Father)    REVIEW OF SYSTEMS:  CONSTITUTIONAL:+ weakness, fevers or chills  EYES/ENT: No visual changes;  No vertigo or throat pain   NECK: No pain or stiffness  RESPIRATORY: No cough, wheezing, hemoptysis; No shortness of breath  CARDIOVASCULAR: No chest pain or palpitations  GASTROINTESTINAL: see HPI  GENITOURINARY: No dysuria, frequency or hematuria  NEUROLOGICAL: No numbness or weakness  SKIN: No itching, burning, rashes, or lesions   PSYCH: Normal mood and affect  All other review of systems is negative unless indicated above.  Vital Signs Last 24 Hrs  T(C): 36.4 (24 Feb 2022 07:50), Max: 37 (23 Feb 2022 23:33)  T(F): 97.6 (24 Feb 2022 07:50), Max: 98.6 (23 Feb 2022 23:33)  HR: 60 (24 Feb 2022 07:50) (60 - 108)  BP: 108/60 (24 Feb 2022 07:50) (101/62 - 157/105)  BP(mean): 90 (23 Feb 2022 20:30) (90 - 116)  RR: 18 (24 Feb 2022 07:50) (18 - 20)  SpO2: 98% (24 Feb 2022 07:50) (98% - 100%)    PHYSICAL EXAM:  Constitutional: thin, chronically ill appearing, uncomfortable appearing due to pain  HEENT: MMM  Respiratory: CTAB  Cardiovascular: S1 and S2, RRR, no M/G/R  Gastrointestinal: BS+,soft, diffuse abdominal tenderness, ostomy to RLQ, no guarding  Extremities: No peripheral edema  Vascular: 2+ peripheral pulses  Neurological: A/O x 3  Psychiatric: Normal mood, normal affect  Skin: No rashes    LABS:                        12.3   11.00 )-----------( 270      ( 24 Feb 2022 08:40 )             36.8     02-24    136  |  104  |  9   ----------------------------<  97  3.7   |  27  |  0.76    Ca    8.1<L>      24 Feb 2022 08:40    TPro  6.2  /  Alb  2.3<L>  /  TBili  0.9  /  DBili  x   /  AST  19  /  ALT  26  /  AlkPhos  92  02-24      LIVER FUNCTIONS - ( 24 Feb 2022 08:40 )  Alb: 2.3 g/dL / Pro: 6.2 gm/dL / ALK PHOS: 92 U/L / ALT: 26 U/L / AST: 19 U/L / GGT: x             RADIOLOGY & ADDITIONAL STUDIES:< from: CT Abdomen and Pelvis w/ Oral Cont and w/ IV Cont (02.23.22 @ 14:23) >  ACC: [id number] EXAM:  CT ABDOMEN AND PELVIS OC IC                          PROCEDURE DATE:  02/23/2022          INTERPRETATION:  CLINICAL INFORMATION: Generalized abdominal pain,   decreased ostomy output    COMPARISON: CT abdomen pelvis 1/5/2022, ultrasound abdomen 1/6/2022, MRI   abdomen 1/17/2022    CONTRAST/COMPLICATIONS:  IV Contrast: Omnipaque 350  90 cc administered   10 cc discarded  Oral Contrast: Omnipaque 300  Complications: None reported at time of study completion    PROCEDURE:  CT of the Abdomen and Pelvis was performed.  Sagittal and coronal reformats were performed.    FINDINGS:  LOWER CHEST: Clear.    LIVER: Normal.  BILE DUCTS: Nondilated.  GALLBLADDER: Normal.  SPLEEN: Normal.  PANCREAS: Improving inflammatory changes in association with interstitial   edematous pancreatitis.  ADRENALS: Normal.  KIDNEYS/URETERS: No calculi, hydronephrosis, or renal mass.    BLADDER: Normal.  REPRODUCTIVE ORGANS: Nonenlarged.    BOWEL: No obstruction. Right lower quadrant ileostomy again noted.   Proctocolitis of the rectosigmoid again seen with multiple perianal   fistulas. Interval removal of setons.  PERITONEUM: No free air or ascites.  VESSELS: Normal caliber aorta.  RETROPERITONEUM/LYMPH NODES: No adenopathy.  ABDOMINAL WALL: Postsurgical changes.  BONES: No acute bony abnormality.    IMPRESSION:  *  Improving inflammatory changes in association with interstitial   edematous pancreatitis. No collections.  *  Proctocolitis of the rectosigmoid again seen with multiple perianal   fistulas.  *  No bowel obstruction.    --- End of Report ---    PRAVEEN VENTURA MD; Attending Radiologist  This document has been electronically signed. Feb 23 2022  4:33PM    < end of copied text >

## 2022-02-25 LAB
ALBUMIN SERPL ELPH-MCNC: 2.4 G/DL — LOW (ref 3.3–5)
ALP SERPL-CCNC: 99 U/L — SIGNIFICANT CHANGE UP (ref 40–120)
ALT FLD-CCNC: 24 U/L — SIGNIFICANT CHANGE UP (ref 12–78)
ANION GAP SERPL CALC-SCNC: 8 MMOL/L — SIGNIFICANT CHANGE UP (ref 5–17)
AST SERPL-CCNC: 19 U/L — SIGNIFICANT CHANGE UP (ref 15–37)
BILIRUB SERPL-MCNC: 0.7 MG/DL — SIGNIFICANT CHANGE UP (ref 0.2–1.2)
BUN SERPL-MCNC: 11 MG/DL — SIGNIFICANT CHANGE UP (ref 7–23)
C DIFF BY PCR RESULT: SIGNIFICANT CHANGE UP
C DIFF TOX GENS STL QL NAA+PROBE: SIGNIFICANT CHANGE UP
CALCIUM SERPL-MCNC: 8.3 MG/DL — LOW (ref 8.5–10.1)
CHLORIDE SERPL-SCNC: 103 MMOL/L — SIGNIFICANT CHANGE UP (ref 96–108)
CO2 SERPL-SCNC: 23 MMOL/L — SIGNIFICANT CHANGE UP (ref 22–31)
CREAT SERPL-MCNC: 0.62 MG/DL — SIGNIFICANT CHANGE UP (ref 0.5–1.3)
CULTURE RESULTS: SIGNIFICANT CHANGE UP
GLUCOSE SERPL-MCNC: 75 MG/DL — SIGNIFICANT CHANGE UP (ref 70–99)
HCT VFR BLD CALC: 36.6 % — LOW (ref 39–50)
HGB BLD-MCNC: 12.4 G/DL — LOW (ref 13–17)
LIDOCAIN IGE QN: 119 U/L — SIGNIFICANT CHANGE UP (ref 73–393)
MCHC RBC-ENTMCNC: 33.9 GM/DL — SIGNIFICANT CHANGE UP (ref 32–36)
MCHC RBC-ENTMCNC: 35.7 PG — HIGH (ref 27–34)
MCV RBC AUTO: 105.5 FL — HIGH (ref 80–100)
PLATELET # BLD AUTO: 269 K/UL — SIGNIFICANT CHANGE UP (ref 150–400)
POTASSIUM SERPL-MCNC: 3.6 MMOL/L — SIGNIFICANT CHANGE UP (ref 3.5–5.3)
POTASSIUM SERPL-SCNC: 3.6 MMOL/L — SIGNIFICANT CHANGE UP (ref 3.5–5.3)
PROT SERPL-MCNC: 6.4 GM/DL — SIGNIFICANT CHANGE UP (ref 6–8.3)
RBC # BLD: 3.47 M/UL — LOW (ref 4.2–5.8)
RBC # FLD: 13.2 % — SIGNIFICANT CHANGE UP (ref 10.3–14.5)
SODIUM SERPL-SCNC: 134 MMOL/L — LOW (ref 135–145)
SPECIMEN SOURCE: SIGNIFICANT CHANGE UP
WBC # BLD: 9.37 K/UL — SIGNIFICANT CHANGE UP (ref 3.8–10.5)
WBC # FLD AUTO: 9.37 K/UL — SIGNIFICANT CHANGE UP (ref 3.8–10.5)

## 2022-02-25 PROCEDURE — 99232 SBSQ HOSP IP/OBS MODERATE 35: CPT

## 2022-02-25 PROCEDURE — 99233 SBSQ HOSP IP/OBS HIGH 50: CPT

## 2022-02-25 RX ORDER — SODIUM CHLORIDE 9 MG/ML
1000 INJECTION, SOLUTION INTRAVENOUS
Refills: 0 | Status: DISCONTINUED | OUTPATIENT
Start: 2022-02-25 | End: 2022-02-26

## 2022-02-25 RX ORDER — OXYCODONE AND ACETAMINOPHEN 5; 325 MG/1; MG/1
1 TABLET ORAL ONCE
Refills: 0 | Status: DISCONTINUED | OUTPATIENT
Start: 2022-02-25 | End: 2022-02-25

## 2022-02-25 RX ADMIN — OXYCODONE AND ACETAMINOPHEN 1 TABLET(S): 5; 325 TABLET ORAL at 14:39

## 2022-02-25 RX ADMIN — Medication 5 MILLIGRAM(S): at 23:43

## 2022-02-25 RX ADMIN — ONDANSETRON 4 MILLIGRAM(S): 8 TABLET, FILM COATED ORAL at 18:50

## 2022-02-25 RX ADMIN — PIPERACILLIN AND TAZOBACTAM 25 GRAM(S): 4; .5 INJECTION, POWDER, LYOPHILIZED, FOR SOLUTION INTRAVENOUS at 05:21

## 2022-02-25 RX ADMIN — SODIUM CHLORIDE 250 MILLILITER(S): 9 INJECTION, SOLUTION INTRAVENOUS at 21:30

## 2022-02-25 RX ADMIN — PIPERACILLIN AND TAZOBACTAM 25 GRAM(S): 4; .5 INJECTION, POWDER, LYOPHILIZED, FOR SOLUTION INTRAVENOUS at 21:26

## 2022-02-25 RX ADMIN — HYDROMORPHONE HYDROCHLORIDE 1 MILLIGRAM(S): 2 INJECTION INTRAMUSCULAR; INTRAVENOUS; SUBCUTANEOUS at 01:17

## 2022-02-25 RX ADMIN — HYDROMORPHONE HYDROCHLORIDE 0.5 MILLIGRAM(S): 2 INJECTION INTRAMUSCULAR; INTRAVENOUS; SUBCUTANEOUS at 00:03

## 2022-02-25 RX ADMIN — HYDROMORPHONE HYDROCHLORIDE 0.5 MILLIGRAM(S): 2 INJECTION INTRAMUSCULAR; INTRAVENOUS; SUBCUTANEOUS at 10:13

## 2022-02-25 RX ADMIN — HYDROMORPHONE HYDROCHLORIDE 0.5 MILLIGRAM(S): 2 INJECTION INTRAMUSCULAR; INTRAVENOUS; SUBCUTANEOUS at 19:54

## 2022-02-25 RX ADMIN — PIPERACILLIN AND TAZOBACTAM 25 GRAM(S): 4; .5 INJECTION, POWDER, LYOPHILIZED, FOR SOLUTION INTRAVENOUS at 14:16

## 2022-02-25 RX ADMIN — HYDROMORPHONE HYDROCHLORIDE 1 MILLIGRAM(S): 2 INJECTION INTRAMUSCULAR; INTRAVENOUS; SUBCUTANEOUS at 06:19

## 2022-02-25 RX ADMIN — HYDROMORPHONE HYDROCHLORIDE 1 MILLIGRAM(S): 2 INJECTION INTRAMUSCULAR; INTRAVENOUS; SUBCUTANEOUS at 12:22

## 2022-02-25 RX ADMIN — HYDROMORPHONE HYDROCHLORIDE 1 MILLIGRAM(S): 2 INJECTION INTRAMUSCULAR; INTRAVENOUS; SUBCUTANEOUS at 01:49

## 2022-02-25 RX ADMIN — HYDROMORPHONE HYDROCHLORIDE 1 MILLIGRAM(S): 2 INJECTION INTRAMUSCULAR; INTRAVENOUS; SUBCUTANEOUS at 17:23

## 2022-02-25 RX ADMIN — ONDANSETRON 4 MILLIGRAM(S): 8 TABLET, FILM COATED ORAL at 08:49

## 2022-02-25 RX ADMIN — SODIUM CHLORIDE 250 MILLILITER(S): 9 INJECTION, SOLUTION INTRAVENOUS at 14:16

## 2022-02-25 RX ADMIN — HYDROMORPHONE HYDROCHLORIDE 1 MILLIGRAM(S): 2 INJECTION INTRAMUSCULAR; INTRAVENOUS; SUBCUTANEOUS at 05:20

## 2022-02-25 RX ADMIN — SODIUM CHLORIDE 100 MILLILITER(S): 9 INJECTION INTRAMUSCULAR; INTRAVENOUS; SUBCUTANEOUS at 10:08

## 2022-02-25 RX ADMIN — HYDROMORPHONE HYDROCHLORIDE 0.5 MILLIGRAM(S): 2 INJECTION INTRAMUSCULAR; INTRAVENOUS; SUBCUTANEOUS at 01:00

## 2022-02-25 RX ADMIN — HYDROMORPHONE HYDROCHLORIDE 1 MILLIGRAM(S): 2 INJECTION INTRAMUSCULAR; INTRAVENOUS; SUBCUTANEOUS at 21:28
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
## 2022-02-25 NOTE — DIETITIAN NUTRITION RISK NOTIFICATION - ADDITIONAL COMMENTS/DIETITIAN RECOMMENDATIONS
1. Advance diet to low fiber when medically feasible and as tolerated, 2. Encourage protein-rich foods and maximize food preferences, 3. Monitor bowel movements, if no BM for >3 days, consider implementing bowel regimen, 4. MVI w/ minerals daily to ensure 100% RDA met, 5. Thiamine 100 mg daily 2/2 malnutrition. RD will continue to monitor PO intake, labs, hydration, and wt prn.

## 2022-02-25 NOTE — DIETITIAN NUTRITION RISK NOTIFICATION - TREATMENT: THE FOLLOWING DIET HAS BEEN RECOMMENDED
Diet, Low Fiber (02-25-22 @ 14:45) [Pending Verification By Attending]  Diet, Minced and Moist (02-25-22 @ 14:34) [Active]  Diet, Regular (02-23-22 @ 21:08) [Available for Activation]  Diet, Clear Liquid (02-23-22 @ 21:08) [Available for Activation]

## 2022-02-25 NOTE — DIETITIAN INITIAL EVALUATION ADULT. - OTHER INFO
Pt is a 35 y/o male with PMH current everyday smoker, ETOH abuse, hyponatremia, Crohn's complicated by ostomy and perianal fistulas. Admitted to ED on 2/23 due to abdominal pain x 3 days, associated with nausea, sharp/shooting pain radiating to right side of abdomen, and 101 fever. Admitted for pancreatitis flare in January. States ~2 wk ago drains removed from perianal fistulas and have continued to drain per usual w/o noted difference in output. Reports decreased output from ostomy and denies any darkening of stool or bright red blood in ostomy.     Pt known to RD service, has hx of PCM in the past. Appears thin w/ Pt is a 37 y/o male with PMH current everyday smoker, ETOH abuse, hyponatremia, Crohn's complicated by ostomy and perianal fistulas. Admitted to ED on 2/23 due to abdominal pain x 3 days, associated with nausea, sharp/shooting pain radiating to right side of abdomen, and 101 fever. Admitted for pancreatitis flare in January. States ~2 wk ago drains removed from perianal fistulas and have continued to drain per usual w/o noted difference in output. Reports decreased output from ostomy and denies any darkening of stool or bright red blood in ostomy.     Pt known to RD service, has hx of PCM in the past. Appeared thin w/ some moderate to severe muscle/fat wasting. Diet just advanced to clear liquid after being NPO x 2 days. UBW stated at 160#, current wt documented at 150# - unintentional wt loss of 10#/ 6.3% x 6 months (not clinical sig). Suggest advancing diet from clear liquid to low fiber when medically feasible and as tolerated. Pt is a 35 y/o male with PMH current everyday smoker, ETOH abuse, hyponatremia, Crohn's complicated by ostomy and perianal fistulas. Admitted to ED on 2/23 due to abdominal pain x 3 days, associated with nausea, sharp/shooting pain radiating to right side of abdomen, and 101 fever. Admitted for pancreatitis flare in January. States ~2 wk ago drains removed from perianal fistulas and have continued to drain per usual w/o noted difference in output. Reports decreased output from ostomy and denies any darkening of stool or bright red blood in ostomy.     Pt known to RD service, has hx of PCM in the past. Appeared thin w/ some varieddegrees of muscle/fat wasting. Diet just advanced to clear liquid after being NPO x 2 days. UBW stated at 160#, current wt documented at 150# - unintentional wt loss of 10#/ 6.3% x 6 months (not clinical sig). Suggest advancing diet from clear liquid to low fiber when medically feasible and as tolerated. See other recommendations below.

## 2022-02-25 NOTE — DIETITIAN INITIAL EVALUATION ADULT. - PERTINENT MEDS FT
MEDICATIONS  (STANDING):  enoxaparin Injectable 40 milliGRAM(s) SubCutaneous daily  lactated ringers. 1000 milliLiter(s) (250 mL/Hr) IV Continuous <Continuous>  oxycodone    5 mG/acetaminophen 325 mG 1 Tablet(s) Oral once  piperacillin/tazobactam IVPB.. 3.375 Gram(s) IV Intermittent every 8 hours  sodium chloride 0.9%. 1000 milliLiter(s) (150 mL/Hr) IV Continuous <Continuous>  sodium chloride 0.9%. 1000 milliLiter(s) (100 mL/Hr) IV Continuous <Continuous>    MEDICATIONS  (PRN):  acetaminophen     Tablet .. 650 milliGRAM(s) Oral every 6 hours PRN Temp greater or equal to 38C (100.4F), Mild Pain (1 - 3)  HYDROmorphone  Injectable 0.5 milliGRAM(s) IV Push every 6 hours PRN Moderate Pain (4 - 6)  HYDROmorphone  Injectable 1 milliGRAM(s) IV Push every 4 hours PRN Severe Pain (7 - 10)  melatonin 5 milliGRAM(s) Oral at bedtime PRN Insomnia  ondansetron Injectable 4 milliGRAM(s) IV Push every 6 hours PRN Nausea and/or Vomiting

## 2022-02-25 NOTE — DIETITIAN INITIAL EVALUATION ADULT. - ETIOLOGY
r/t decreased ability to meet increased nutrient needs 2/2 Crohn's flare up and proctocolitis r/t decreased ability to meet increased nutrient needs 2/2 Crohn's flare up and proctocolitis/ ETOH abuse

## 2022-02-25 NOTE — DIETITIAN INITIAL EVALUATION ADULT. - MALNUTRITION
Pt meets criteria for moderate malnutrition in context of acute on chronic illness r/t decreased ability to meet increased nutrient needs 2/2 Crohn's flare up and proctocolitis AEB moderate to severe muscle/fat wasting Pt meets criteria for moderate malnutrition in context of acute on chronic illness Pt meets criteria for moderate malnutrition in context of acute on chronic on chronic illness Pt meets criteria for severe malnutrition in context of acute on chronic illness r/t decreased ability to meet increased nutrient needs 2/2 Crohn's flare up and proctocolitis/ ETOH abuse AEB moderate to severe muscle/fat wasting

## 2022-02-25 NOTE — DIETITIAN INITIAL EVALUATION ADULT. - ADD RECOMMEND
1. Advance diet to low fiber when medically feasible and as tolerated, 2. Encourage protein-rich foods and maximize food preferences, 3. Monitor bowel movements, if no BM for >3 days, consider implementing bowel regimen, 4. MVI w/ minerals daily to ensure 100% RDA met, 5. Thiamine 100 mg daily 2/2 malnutrition. RD will continue to monitor PO intake, labs, hydration, and wt prn. 1. Advance diet to low fiber when medically feasible and as tolerated, 2. Encourage protein-rich foods and maximize food preferences, 3. Monitor bowel movements, if no BM for >3 days, consider implementing bowel regimen, 4. MVI w/ minerals daily to ensure 100% RDA met, 5. Consider Thiamine 100 mg daily 2/2 malnutrition. RD will continue to monitor PO intake, labs, hydration, and wt prn.

## 2022-02-25 NOTE — PROGRESS NOTE ADULT - ATTENDING COMMENTS
39 year old man with fistulizing (perinal) crohn's colitis and recurrent pancreatitis, admitted with pancreatitis.     F/u MRI.   Try and advance diet.   Can slow IVF to maintenance.

## 2022-02-25 NOTE — DIETITIAN INITIAL EVALUATION ADULT. - ORAL INTAKE PTA/DIET HISTORY
States appetite was fair PTA. Consumes 3 meals a day, consuming >75% of each meal. Likes a variety of foods, not a "picky eater". States appetite was good PTA. Likes a variety of foods, consumes 3 meals a day, consuming >75% of each meal.

## 2022-02-25 NOTE — PROGRESS NOTE ADULT - SUBJECTIVE AND OBJECTIVE BOX
SHARON FLORIAN  36y  [id number]    CHIEF COMPLAINT: abdominal pain    INTERVAL HISTORY: Patient AOx3, c/o severe abdominal pain 12/10 "even after my half dose of that pain medication". Patient reports he did not sleep well overnight, when prompted by patient reports "I just don't sleep at night." Patient reports he still has decreased output from his ileostomy. Patient denies fevers, chills, headache, throat pain, CP, SOB, N/V, urinary complaints, back or leg pain.    ROS: All other systems reviewed and found to be negative with the exception of what has been described above.    VITALS: T(C): 36.5 (- @ 07:35), Max: 36.9 (- @ 15:03)  HR: 84 (- @ 07:35) (53 - 84)  BP: 109/71 (-22 @ 07:35) (101/62 - 117/63)  RR: 18 (- @ 07:35) (17 - 18)  SpO2: 97% (22 @ 07:35) (97% - 99%)  PHYSICAL EXAM: VITALS:  T(F): 97.7 (-- @ 07:35), Max: 98.5 (- @ 15:03)  HR: 84 (- @ 07:35) (53 - 84)  BP: 109/71 (- @ 07:35) (101/62 - 117/63)  RR: 18 (- @ 07:35) (17 - 18)  SpO2: 97% (22 @ 07:35) (97% - 99%)  Wt(kg): --    I&O's Summary      CAPILLARY BLOOD GLUCOSE          PHYSICAL EXAM:  GENERAL: thin appearing 35yo M male in pain  HEENT:  pupils equal and reactive, EOMI, no oropharyngeal lesions, erythema, exudates, oral thrush  NECK:   supple, no carotid bruits, no palpable lymph nodes, no thyromegaly  CV:  +S1, +S2, regular, no murmurs or rubs  RESP:   lungs clear to auscultation bilaterally, no wheezing, rales, rhonchi, good air entry bilaterally  BREAST:  not examined  GI:  RUQ ileostomy in place, dressing C/D/I, unable to assess stoma @ this time due to dressing covering site, patient provides self care, small amount of dark green liquid stool present in pouch, abdomen TTP, non-distended, normal BS, no bruits, no abdominal masses, no palpable masses  RECTAL:  not examined  :  not examined  MSK:   normal muscle tone, no atrophy, no rigidity, no contractions  EXT:  no clubbing, no cyanosis, no edema, no calf pain, swelling or erythema  VASCULAR:  pulses equal and symmetric in the upper and lower extremities  NEURO:  AAOX3, no focal neurological deficits, follows all commands, able to move extremities spontaneously  SKIN:  no ulcers, lesions or rashes    LABS:                            12.4   9.37  )-----------( 269      ( 2022 10:57 )             36.6     02-    134<L>  |  103  |  11  ----------------------------<  75  3.6   |  23  |  0.62    Ca    8.3<L>      2022 10:57    TPro  6.4  /  Alb  2.4<L>  /  TBili  0.7  /  DBili  x   /  AST  19  /  ALT  24  /  AlkPhos  99  02-25        LIVER FUNCTIONS - ( 2022 10:57 )  Alb: 2.4 g/dL / Pro: 6.4 gm/dL / ALK PHOS: 99 U/L / ALT: 24 U/L / AST: 19 U/L / GGT: x             Urinalysis Basic - ( 2022 16:40 )    Color: Yellow / Appearance: Clear / S.005 / pH: x  Gluc: x / Ketone: Negative  / Bili: Negative / Urobili: Negative   Blood: x / Protein: 15 / Nitrite: Negative   Leuk Esterase: Negative / RBC: Negative /HPF / WBC Negative   Sq Epi: x / Non Sq Epi: Negative / Bacteria: Negative      MICRO:  Culture Results:   GI PCR Results: NOT detected  *******Please Note:*******  GI panel PCR evaluates for:  Campylobacter, Plesiomonas shigelloides, Salmonella,  Vibrio, Yersinia enterocolitica, Enteroaggregative  Escherichia coli (EAEC), Enteropathogenic E.coli (EPEC),  Enterotoxigenic E. coli (ETEC) lt/st, Shiga-like  toxin-producing E. coli (STEC) stx1/stx2,  Shigella/ Enteroinvasive E. coli (EIEC), Cryptosporidium,  Cyclospora cayetanensis, Entamoeba histolytica,  Giardia lamblia, Adenovirus F 40/41, Astrovirus,  Norovirus GI/GII, Rotavirus A, Sapovirus ( @ 05:30)      IMAGING:    CARDIAC TESTING:    PROCEDURES:    MEDS: MEDICATIONS  (STANDING):  enoxaparin Injectable 40 milliGRAM(s) SubCutaneous daily  piperacillin/tazobactam IVPB.. 3.375 Gram(s) IV Intermittent every 8 hours  sodium chloride 0.9%. 1000 milliLiter(s) (150 mL/Hr) IV Continuous <Continuous>  sodium chloride 0.9%. 1000 milliLiter(s) (100 mL/Hr) IV Continuous <Continuous>    MEDICATIONS  (PRN):  acetaminophen     Tablet .. 650 milliGRAM(s) Oral every 6 hours PRN Temp greater or equal to 38C (100.4F), Mild Pain (1 - 3)  HYDROmorphone  Injectable 0.5 milliGRAM(s) IV Push every 6 hours PRN Moderate Pain (4 - 6)  HYDROmorphone  Injectable 1 milliGRAM(s) IV Push every 4 hours PRN Severe Pain (7 - 10)  melatonin 5 milliGRAM(s) Oral at bedtime PRN Insomnia  ondansetron Injectable 4 milliGRAM(s) IV Push every 6 hours PRN Nausea and/or Vomiting          Code status: Full code  DVT px: Lovenox  Dispo:

## 2022-02-25 NOTE — DIETITIAN INITIAL EVALUATION ADULT. - PERTINENT LABORATORY DATA
02-25    134<L>  |  103  |  11  ----------------------------<  75  3.6   |  23  |  0.62    Ca    8.3<L>      25 Feb 2022 10:57    TPro  6.4  /  Alb  2.4<L>  /  TBili  0.7  /  DBili  x   /  AST  19  /  ALT  24  /  AlkPhos  99  02-25

## 2022-02-25 NOTE — PROGRESS NOTE ADULT - SUBJECTIVE AND OBJECTIVE BOX
Patient is a 36y old  Male who presents with a chief complaint of Intractable abd pain secondary to proctocolitis, pancreatitis.    HPI : 36 M admitted for abdominal pain. Continues to have nausea and pain on eval this AM. Due for pain/nausea medication. Has not tried to advance diet yet.     MEDICATIONS  (STANDING):  enoxaparin Injectable 40 milliGRAM(s) SubCutaneous daily  piperacillin/tazobactam IVPB.. 3.375 Gram(s) IV Intermittent every 8 hours  sodium chloride 0.9%. 1000 milliLiter(s) (150 mL/Hr) IV Continuous <Continuous>  sodium chloride 0.9%. 1000 milliLiter(s) (100 mL/Hr) IV Continuous <Continuous>    MEDICATIONS  (PRN):  acetaminophen     Tablet .. 650 milliGRAM(s) Oral every 6 hours PRN Temp greater or equal to 38C (100.4F), Mild Pain (1 - 3)  HYDROmorphone  Injectable 0.5 milliGRAM(s) IV Push every 6 hours PRN Moderate Pain (4 - 6)  HYDROmorphone  Injectable 1 milliGRAM(s) IV Push every 4 hours PRN Severe Pain (7 - 10)  melatonin 5 milliGRAM(s) Oral at bedtime PRN Insomnia  ondansetron Injectable 4 milliGRAM(s) IV Push every 6 hours PRN Nausea and/or Vomiting    Vital Signs Last 24 Hrs  T(C): 36.5 (25 Feb 2022 07:35), Max: 36.9 (24 Feb 2022 15:03)  T(F): 97.7 (25 Feb 2022 07:35), Max: 98.5 (24 Feb 2022 15:03)  HR: 84 (25 Feb 2022 07:35) (53 - 84)  BP: 109/71 (25 Feb 2022 07:35) (101/62 - 117/63)  BP(mean): --  RR: 18 (25 Feb 2022 07:35) (17 - 18)  SpO2: 97% (25 Feb 2022 07:35) (97% - 99%)    PHYSICAL EXAM:  Constitutional: thin, chronically ill appearing, uncomfortable appearing due to pain  HEENT: MMM  Respiratory: CTAB  Cardiovascular: S1 and S2, RRR, no M/G/R  Gastrointestinal: BS+,soft, diffuse abdominal tenderness, ostomy to RLQ, no guarding  Extremities: No peripheral edema  Vascular: 2+ peripheral pulses  Neurological: A/O x 3  Psychiatric: Normal mood, normal affect  Skin: No rashes    LABS:                        12.4   9.37  )-----------( 269      ( 25 Feb 2022 10:57 )             36.6     02-25    134<L>  |  103  |  11  ----------------------------<  75  3.6   |  23  |  0.62    Ca    8.3<L>      25 Feb 2022 10:57    TPro  6.4  /  Alb  2.4<L>  /  TBili  0.7  /  DBili  x   /  AST  19  /  ALT  24  /  AlkPhos  99  02-25      LIVER FUNCTIONS - ( 25 Feb 2022 10:57 )  Alb: 2.4 g/dL / Pro: 6.4 gm/dL / ALK PHOS: 99 U/L / ALT: 24 U/L / AST: 19 U/L / GGT: x             RADIOLOGY & ADDITIONAL STUDIES: reviewed

## 2022-02-26 LAB
ANION GAP SERPL CALC-SCNC: 3 MMOL/L — LOW (ref 5–17)
BUN SERPL-MCNC: 5 MG/DL — LOW (ref 7–23)
CALCIUM SERPL-MCNC: 8.5 MG/DL — SIGNIFICANT CHANGE UP (ref 8.5–10.1)
CHLORIDE SERPL-SCNC: 106 MMOL/L — SIGNIFICANT CHANGE UP (ref 96–108)
CO2 SERPL-SCNC: 28 MMOL/L — SIGNIFICANT CHANGE UP (ref 22–31)
CREAT SERPL-MCNC: 0.6 MG/DL — SIGNIFICANT CHANGE UP (ref 0.5–1.3)
GLUCOSE SERPL-MCNC: 139 MG/DL — HIGH (ref 70–99)
HCT VFR BLD CALC: 36.7 % — LOW (ref 39–50)
HGB BLD-MCNC: 12.5 G/DL — LOW (ref 13–17)
MCHC RBC-ENTMCNC: 34.1 GM/DL — SIGNIFICANT CHANGE UP (ref 32–36)
MCHC RBC-ENTMCNC: 35.8 PG — HIGH (ref 27–34)
MCV RBC AUTO: 105.2 FL — HIGH (ref 80–100)
PLATELET # BLD AUTO: 271 K/UL — SIGNIFICANT CHANGE UP (ref 150–400)
POTASSIUM SERPL-MCNC: 3.8 MMOL/L — SIGNIFICANT CHANGE UP (ref 3.5–5.3)
POTASSIUM SERPL-SCNC: 3.8 MMOL/L — SIGNIFICANT CHANGE UP (ref 3.5–5.3)
RBC # BLD: 3.49 M/UL — LOW (ref 4.2–5.8)
RBC # FLD: 13.2 % — SIGNIFICANT CHANGE UP (ref 10.3–14.5)
SODIUM SERPL-SCNC: 137 MMOL/L — SIGNIFICANT CHANGE UP (ref 135–145)
WBC # BLD: 9.72 K/UL — SIGNIFICANT CHANGE UP (ref 3.8–10.5)
WBC # FLD AUTO: 9.72 K/UL — SIGNIFICANT CHANGE UP (ref 3.8–10.5)

## 2022-02-26 PROCEDURE — 74183 MRI ABD W/O CNTR FLWD CNTR: CPT | Mod: 26

## 2022-02-26 PROCEDURE — 99233 SBSQ HOSP IP/OBS HIGH 50: CPT

## 2022-02-26 PROCEDURE — 99232 SBSQ HOSP IP/OBS MODERATE 35: CPT

## 2022-02-26 RX ORDER — HYDROMORPHONE HYDROCHLORIDE 2 MG/ML
1 INJECTION INTRAMUSCULAR; INTRAVENOUS; SUBCUTANEOUS EVERY 6 HOURS
Refills: 0 | Status: DISCONTINUED | OUTPATIENT
Start: 2022-02-26 | End: 2022-02-27

## 2022-02-26 RX ORDER — HYDROMORPHONE HYDROCHLORIDE 2 MG/ML
1.5 INJECTION INTRAMUSCULAR; INTRAVENOUS; SUBCUTANEOUS EVERY 4 HOURS
Refills: 0 | Status: DISCONTINUED | OUTPATIENT
Start: 2022-02-26 | End: 2022-02-27

## 2022-02-26 RX ORDER — ZOLPIDEM TARTRATE 10 MG/1
5 TABLET ORAL AT BEDTIME
Refills: 0 | Status: DISCONTINUED | OUTPATIENT
Start: 2022-02-26 | End: 2022-02-27

## 2022-02-26 RX ADMIN — HYDROMORPHONE HYDROCHLORIDE 1 MILLIGRAM(S): 2 INJECTION INTRAMUSCULAR; INTRAVENOUS; SUBCUTANEOUS at 14:08

## 2022-02-26 RX ADMIN — SODIUM CHLORIDE 250 MILLILITER(S): 9 INJECTION, SOLUTION INTRAVENOUS at 04:35

## 2022-02-26 RX ADMIN — PIPERACILLIN AND TAZOBACTAM 25 GRAM(S): 4; .5 INJECTION, POWDER, LYOPHILIZED, FOR SOLUTION INTRAVENOUS at 07:51

## 2022-02-26 RX ADMIN — HYDROMORPHONE HYDROCHLORIDE 1 MILLIGRAM(S): 2 INJECTION INTRAMUSCULAR; INTRAVENOUS; SUBCUTANEOUS at 08:37

## 2022-02-26 RX ADMIN — ONDANSETRON 4 MILLIGRAM(S): 8 TABLET, FILM COATED ORAL at 15:25

## 2022-02-26 RX ADMIN — PIPERACILLIN AND TAZOBACTAM 25 GRAM(S): 4; .5 INJECTION, POWDER, LYOPHILIZED, FOR SOLUTION INTRAVENOUS at 21:28

## 2022-02-26 RX ADMIN — HYDROMORPHONE HYDROCHLORIDE 0.5 MILLIGRAM(S): 2 INJECTION INTRAMUSCULAR; INTRAVENOUS; SUBCUTANEOUS at 01:40

## 2022-02-26 RX ADMIN — HYDROMORPHONE HYDROCHLORIDE 0.5 MILLIGRAM(S): 2 INJECTION INTRAMUSCULAR; INTRAVENOUS; SUBCUTANEOUS at 07:45

## 2022-02-26 RX ADMIN — PIPERACILLIN AND TAZOBACTAM 25 GRAM(S): 4; .5 INJECTION, POWDER, LYOPHILIZED, FOR SOLUTION INTRAVENOUS at 14:10

## 2022-02-26 RX ADMIN — SODIUM CHLORIDE 100 MILLILITER(S): 9 INJECTION INTRAMUSCULAR; INTRAVENOUS; SUBCUTANEOUS at 16:15

## 2022-02-26 RX ADMIN — HYDROMORPHONE HYDROCHLORIDE 1.5 MILLIGRAM(S): 2 INJECTION INTRAMUSCULAR; INTRAVENOUS; SUBCUTANEOUS at 20:26

## 2022-02-26 RX ADMIN — HYDROMORPHONE HYDROCHLORIDE 1.5 MILLIGRAM(S): 2 INJECTION INTRAMUSCULAR; INTRAVENOUS; SUBCUTANEOUS at 11:45

## 2022-02-26 RX ADMIN — SODIUM CHLORIDE 100 MILLILITER(S): 9 INJECTION INTRAMUSCULAR; INTRAVENOUS; SUBCUTANEOUS at 07:46

## 2022-02-26 RX ADMIN — HYDROMORPHONE HYDROCHLORIDE 1.5 MILLIGRAM(S): 2 INJECTION INTRAMUSCULAR; INTRAVENOUS; SUBCUTANEOUS at 16:13

## 2022-02-26 RX ADMIN — HYDROMORPHONE HYDROCHLORIDE 1 MILLIGRAM(S): 2 INJECTION INTRAMUSCULAR; INTRAVENOUS; SUBCUTANEOUS at 22:07

## 2022-02-26 RX ADMIN — ONDANSETRON 4 MILLIGRAM(S): 8 TABLET, FILM COATED ORAL at 06:58

## 2022-02-26 RX ADMIN — HYDROMORPHONE HYDROCHLORIDE 1 MILLIGRAM(S): 2 INJECTION INTRAMUSCULAR; INTRAVENOUS; SUBCUTANEOUS at 04:34

## 2022-02-26 NOTE — PROGRESS NOTE ADULT - SUBJECTIVE AND OBJECTIVE BOX
Patient seen and examined:  Still with pain, not well controlled  as per patient. No fever.  Some pain after eating solids  yesterday.  Awaiting MRI Abdomen.    ROS: Negative except for above    MEDICATIONS  (STANDING):  enoxaparin Injectable 40 milliGRAM(s) SubCutaneous daily  piperacillin/tazobactam IVPB.. 3.375 Gram(s) IV Intermittent every 8 hours  sodium chloride 0.9%. 1000 milliLiter(s) (150 mL/Hr) IV Continuous <Continuous>  sodium chloride 0.9%. 1000 milliLiter(s) (100 mL/Hr) IV Continuous <Continuous>    MEDICATIONS  (PRN):  acetaminophen     Tablet .. 650 milliGRAM(s) Oral every 6 hours PRN Temp greater or equal to 38C (100.4F), Mild Pain (1 - 3)  HYDROmorphone  Injectable 1.5 milliGRAM(s) IV Push every 4 hours PRN Severe Pain (7 - 10)  HYDROmorphone  Injectable 1 milliGRAM(s) IV Push every 6 hours PRN Moderate Pain (4 - 6)  melatonin 5 milliGRAM(s) Oral at bedtime PRN Insomnia  ondansetron Injectable 4 milliGRAM(s) IV Push every 6 hours PRN Nausea and/or Vomiting  zolpidem 5 milliGRAM(s) Oral at bedtime PRN Insomnia      VITALS:  T(F): 98.2 (02-26-22 @ 07:27), Max: 98.5 (02-25-22 @ 15:04)  HR: 60 (02-26-22 @ 07:27) (58 - 60)  BP: 102/55 (02-26-22 @ 07:27) (101/61 - 112/64)  RR: 18 (02-26-22 @ 07:27) (18 - 18)  SpO2: 100% (02-26-22 @ 07:27) (98% - 100%)  Wt(kg): --    I&O's Summary      CAPILLARY BLOOD GLUCOSE          PHYSICAL EXAM:  GEN: In pain   HEENT:  pupils equal and reactive, EOMI, no oropharyngeal lesions, erythema, exudates, oral thrush  NECK:   supple, no carotid bruits, no palpable lymph nodes, no thyromegaly  CV:  +S1, +S2, regular, no murmurs or rubs  RESP:   lungs clear to auscultation bilaterally, no wheezing, rales, rhonchi, good air entry bilaterally  BREAST:  not examined  GI:  abdomen soft, non-tender, non-distended, normal BS, no bruits, no abdominal masses, no palpable masses  RECTAL:  not examined  :  not examined  MSK:   normal muscle tone, no atrophy, no rigidity, no contractions  EXT:  no clubbing, no cyanosis, no edema, no calf pain, swelling or erythema  VASCULAR:  pulses equal and symmetric in the upper and lower extremities  NEURO:  AAOX3, no focal neurological deficits, follows all commands, able to move extremities spontaneously  SKIN:  no ulcers, lesions or rashes    LABS:                            12.5   9.72  )-----------( 271      ( 26 Feb 2022 07:58 )             36.7     02-26    137  |  106  |  5<L>  ----------------------------<  139<H>  3.8   |  28  |  0.60    Ca    8.5      26 Feb 2022 07:58    TPro  6.4  /  Alb  2.4<L>  /  TBili  0.7  /  DBili  x   /  AST  19  /  ALT  24  /  AlkPhos  99  02-25        LIVER FUNCTIONS - ( 25 Feb 2022 10:57 )  Alb: 2.4 g/dL / Pro: 6.4 gm/dL / ALK PHOS: 99 U/L / ALT: 24 U/L / AST: 19 U/L / GGT: x           GI PCR Negative  C-Diff Negative

## 2022-02-26 NOTE — PROGRESS NOTE ADULT - SUBJECTIVE AND OBJECTIVE BOX
Patient is a 36y old  Male who presents with a chief complaint of Intractable abd pain secondary to proctocolitis, pancreatitis (26 Feb 2022 09:48)    Pt had worsenign pain with solid diet last night so went back to fulls today. Is tolerating them. No vomiting. No fevers. Went for MRI this AM.       MEDICATIONS  (STANDING):  enoxaparin Injectable 40 milliGRAM(s) SubCutaneous daily  piperacillin/tazobactam IVPB.. 3.375 Gram(s) IV Intermittent every 8 hours  sodium chloride 0.9%. 1000 milliLiter(s) (150 mL/Hr) IV Continuous <Continuous>  sodium chloride 0.9%. 1000 milliLiter(s) (100 mL/Hr) IV Continuous <Continuous>    MEDICATIONS  (PRN):  acetaminophen     Tablet .. 650 milliGRAM(s) Oral every 6 hours PRN Temp greater or equal to 38C (100.4F), Mild Pain (1 - 3)  HYDROmorphone  Injectable 1.5 milliGRAM(s) IV Push every 4 hours PRN Severe Pain (7 - 10)  HYDROmorphone  Injectable 1 milliGRAM(s) IV Push every 6 hours PRN Moderate Pain (4 - 6)  melatonin 5 milliGRAM(s) Oral at bedtime PRN Insomnia  ondansetron Injectable 4 milliGRAM(s) IV Push every 6 hours PRN Nausea and/or Vomiting  zolpidem 5 milliGRAM(s) Oral at bedtime PRN Insomnia      Vital Signs Last 24 Hrs  T(C): 36.8 (26 Feb 2022 07:27), Max: 36.9 (25 Feb 2022 15:04)  T(F): 98.2 (26 Feb 2022 07:27), Max: 98.5 (25 Feb 2022 15:04)  HR: 60 (26 Feb 2022 07:27) (58 - 60)  BP: 102/55 (26 Feb 2022 07:27) (101/61 - 112/64)  BP(mean): --  RR: 18 (26 Feb 2022 07:27) (18 - 18)  SpO2: 100% (26 Feb 2022 07:27) (98% - 100%)    PHYSICAL EXAM:    Constitutional: No acute distress, well-developed, non-toxic appearing, comfortable  HEENT: masked, good phonation, not icteric, bearded  Neck: supple, no lymphadenopathy  Gastrointestinal: soft, non-tender, non-distended, +bowel sounds, no rebound or guarding, +ostomy  Extremities: No peripheral edema, no cyanosis or clubbing  Vascular: 2+ peripheral pulses, no venous stasis  Neurological: A/O x 3, no focal deficits, no asterixis  Psychiatric: Normal mood, normal affect  Skin: No rashes, not jaundiced    LABS:                        12.5   9.72  )-----------( 271      ( 26 Feb 2022 07:58 )             36.7     02-26    137  |  106  |  5<L>  ----------------------------<  139<H>  3.8   |  28  |  0.60    Ca    8.5      26 Feb 2022 07:58    TPro  6.4  /  Alb  2.4<L>  /  TBili  0.7  /  DBili  x   /  AST  19  /  ALT  24  /  AlkPhos  99  02-25      LIVER FUNCTIONS - ( 25 Feb 2022 10:57 )  Alb: 2.4 g/dL / Pro: 6.4 gm/dL / ALK PHOS: 99 U/L / ALT: 24 U/L / AST: 19 U/L / GGT: x             RADIOLOGY & ADDITIONAL STUDIES: MRI reviewed, ?GB sludge ?PD narrowing in neck but will await official recs

## 2022-02-27 ENCOUNTER — TRANSCRIPTION ENCOUNTER (OUTPATIENT)
Age: 37
End: 2022-02-27

## 2022-02-27 VITALS
TEMPERATURE: 97 F | HEART RATE: 58 BPM | RESPIRATION RATE: 18 BRPM | OXYGEN SATURATION: 100 % | DIASTOLIC BLOOD PRESSURE: 55 MMHG | SYSTOLIC BLOOD PRESSURE: 101 MMHG

## 2022-02-27 LAB
ALBUMIN SERPL ELPH-MCNC: 2.6 G/DL — LOW (ref 3.3–5)
ALP SERPL-CCNC: 99 U/L — SIGNIFICANT CHANGE UP (ref 40–120)
ALT FLD-CCNC: 35 U/L — SIGNIFICANT CHANGE UP (ref 12–78)
ANION GAP SERPL CALC-SCNC: 4 MMOL/L — LOW (ref 5–17)
AST SERPL-CCNC: 30 U/L — SIGNIFICANT CHANGE UP (ref 15–37)
BILIRUB SERPL-MCNC: 0.2 MG/DL — SIGNIFICANT CHANGE UP (ref 0.2–1.2)
BUN SERPL-MCNC: 6 MG/DL — LOW (ref 7–23)
CALCIUM SERPL-MCNC: 9.1 MG/DL — SIGNIFICANT CHANGE UP (ref 8.5–10.1)
CHLORIDE SERPL-SCNC: 105 MMOL/L — SIGNIFICANT CHANGE UP (ref 96–108)
CO2 SERPL-SCNC: 29 MMOL/L — SIGNIFICANT CHANGE UP (ref 22–31)
CREAT SERPL-MCNC: 0.77 MG/DL — SIGNIFICANT CHANGE UP (ref 0.5–1.3)
GLUCOSE SERPL-MCNC: 138 MG/DL — HIGH (ref 70–99)
HCT VFR BLD CALC: 39.3 % — SIGNIFICANT CHANGE UP (ref 39–50)
HGB BLD-MCNC: 13.2 G/DL — SIGNIFICANT CHANGE UP (ref 13–17)
MCHC RBC-ENTMCNC: 33.6 GM/DL — SIGNIFICANT CHANGE UP (ref 32–36)
MCHC RBC-ENTMCNC: 35.8 PG — HIGH (ref 27–34)
MCV RBC AUTO: 106.5 FL — HIGH (ref 80–100)
PLATELET # BLD AUTO: 283 K/UL — SIGNIFICANT CHANGE UP (ref 150–400)
POTASSIUM SERPL-MCNC: 4.1 MMOL/L — SIGNIFICANT CHANGE UP (ref 3.5–5.3)
POTASSIUM SERPL-SCNC: 4.1 MMOL/L — SIGNIFICANT CHANGE UP (ref 3.5–5.3)
PROT SERPL-MCNC: 6.9 GM/DL — SIGNIFICANT CHANGE UP (ref 6–8.3)
RBC # BLD: 3.69 M/UL — LOW (ref 4.2–5.8)
RBC # FLD: 13.4 % — SIGNIFICANT CHANGE UP (ref 10.3–14.5)
SODIUM SERPL-SCNC: 138 MMOL/L — SIGNIFICANT CHANGE UP (ref 135–145)
WBC # BLD: 9.89 K/UL — SIGNIFICANT CHANGE UP (ref 3.8–10.5)
WBC # FLD AUTO: 9.89 K/UL — SIGNIFICANT CHANGE UP (ref 3.8–10.5)

## 2022-02-27 PROCEDURE — 99233 SBSQ HOSP IP/OBS HIGH 50: CPT

## 2022-02-27 PROCEDURE — 99231 SBSQ HOSP IP/OBS SF/LOW 25: CPT

## 2022-02-27 RX ORDER — OXYCODONE AND ACETAMINOPHEN 5; 325 MG/1; MG/1
1 TABLET ORAL
Qty: 20 | Refills: 0
Start: 2022-02-27

## 2022-02-27 RX ORDER — LANOLIN ALCOHOL/MO/W.PET/CERES
1 CREAM (GRAM) TOPICAL
Qty: 0 | Refills: 0 | DISCHARGE

## 2022-02-27 RX ORDER — ZOLPIDEM TARTRATE 10 MG/1
1 TABLET ORAL
Qty: 5 | Refills: 0
Start: 2022-02-27

## 2022-02-27 RX ADMIN — HYDROMORPHONE HYDROCHLORIDE 1 MILLIGRAM(S): 2 INJECTION INTRAMUSCULAR; INTRAVENOUS; SUBCUTANEOUS at 08:03

## 2022-02-27 RX ADMIN — PIPERACILLIN AND TAZOBACTAM 25 GRAM(S): 4; .5 INJECTION, POWDER, LYOPHILIZED, FOR SOLUTION INTRAVENOUS at 06:24

## 2022-02-27 RX ADMIN — SODIUM CHLORIDE 100 MILLILITER(S): 9 INJECTION INTRAMUSCULAR; INTRAVENOUS; SUBCUTANEOUS at 08:05

## 2022-02-27 RX ADMIN — HYDROMORPHONE HYDROCHLORIDE 1.5 MILLIGRAM(S): 2 INJECTION INTRAMUSCULAR; INTRAVENOUS; SUBCUTANEOUS at 00:47

## 2022-02-27 RX ADMIN — HYDROMORPHONE HYDROCHLORIDE 1.5 MILLIGRAM(S): 2 INJECTION INTRAMUSCULAR; INTRAVENOUS; SUBCUTANEOUS at 10:40

## 2022-02-27 RX ADMIN — ZOLPIDEM TARTRATE 5 MILLIGRAM(S): 10 TABLET ORAL at 02:47

## 2022-02-27 RX ADMIN — HYDROMORPHONE HYDROCHLORIDE 1.5 MILLIGRAM(S): 2 INJECTION INTRAMUSCULAR; INTRAVENOUS; SUBCUTANEOUS at 06:24

## 2022-02-27 RX ADMIN — HYDROMORPHONE HYDROCHLORIDE 1.5 MILLIGRAM(S): 2 INJECTION INTRAMUSCULAR; INTRAVENOUS; SUBCUTANEOUS at 06:40

## 2022-02-27 RX ADMIN — HYDROMORPHONE HYDROCHLORIDE 1.5 MILLIGRAM(S): 2 INJECTION INTRAMUSCULAR; INTRAVENOUS; SUBCUTANEOUS at 01:05

## 2022-02-27 NOTE — DISCHARGE NOTE PROVIDER - NSDCCPCAREPLAN_GEN_ALL_CORE_FT
PRINCIPAL DISCHARGE DIAGNOSIS  Diagnosis: Proctocolitis  Assessment and Plan of Treatment: Fu with you GI doctor      SECONDARY DISCHARGE DIAGNOSES  Diagnosis: Painless pancreatitis  Assessment and Plan of Treatment:     Diagnosis: Pancreatitis  Assessment and Plan of Treatment: Fu with GI for Endoscopy

## 2022-02-27 NOTE — DISCHARGE NOTE PROVIDER - HOSPITAL COURSE
Patient is a 35 yo M with a PMHx Crohn's disease complicated by perianal fistulas (s/p removal of drains two weeks ago with additional recent treatment with Stelara), pancreatitis, and proctitis presents to ED with reported fevers at home, nausea, and abdominal pain. Patient was found to have pancreatitis and proctocolitis on abdominal CT. Patient started on IV Zosyn in the ED for concern for intraabdominal infection. Patient consulted by GI and colorectal surgery teams, no acute actions to be taken at this time. Patient has scheduled follow up with Colorectal surgery Dr Dimitri Fuentes next week. Stool PCR sent, results negative. CDIFF stool sample results pending. Plan for IVF, PO trials with clear liquids and advance as tolerated, lab trending, MRI abdomen pending.      #Intractable abdominal pain with nausea secondary to Acute pancreatitis  -c/w IV fluids   -Lipase downtrending 817 > 319 > 119  - MRI abdomen- negative  - Increase Dilaudid for  pain control. Discussed risk and benefits   - Stable for DC home and FU with GI     #Perianal fistulas and Crohns disease  -recent removal of drains performed  -colorectal sx consulted: recs IVF, PO trial clear liquids, and to keep scheduled outpatient follow up with Dr Mascorro next week     #Hyponatremia 2/2 diarrhea  - Resolved   -continue to monitor with BMP    #Hyperglycemia  -HgbA1c: 6.2  -non fasting  2/23; 97 AM labs (2/24); BGL 75 AM labs (2/25)  -monitor on serum chem, pending trend and A1c start restricted diet/accuchecks/ISS    #Smoker  -cessation counseling  -declines nicotine replacement therapy

## 2022-02-27 NOTE — PROGRESS NOTE ADULT - REASON FOR ADMISSION
Intractable abd pain secondary to proctocolitis, pancreatitis

## 2022-02-27 NOTE — PROGRESS NOTE ADULT - SUBJECTIVE AND OBJECTIVE BOX
Patient is a 36y old  Male who presents with a chief complaint of Intractable abd pain secondary to proctocolitis, pancreatitis (26 Feb 2022 12:12)      HPI:  Pt is a 35 yo male with a pmh/o current everyday smoker, hyponatremia, Crohn's complicated by ostomy and perianal fistulas, who present to ED secondary to abdominal pain which began approx. 3 days ago, associated with nausea, now 10/10 in severity, sharp/shooting in nature, radiates to right side of abdomen, alleviated partially by Dilaudid in ED. Pt states sx feel similar to pancreatitis flare for which he was admitted in January however states that this time he noted fevers at home with Tmax 101 today. Pt states he has been compliant with follow up and last received Stelara on 2/12/22. States approx. two weeks ago drains removed from perianal fistulas and have continued to drain per usual without noted difference in output. Pt reports decreased output from ostomy and denies any darkening of stool or bright red blood in ostomy.  (23 Feb 2022 21:10)    Patient feels much improved. No complaints of abdominal discomfort. MRI - resolution of pancreatitis.     PAST MEDICAL & SURGICAL HISTORY:  Crohn&#x27;s disease of large intestine without complication  (No current flare up)    Pancreatitis    S/P ORIF (open reduction internal fixation) fracture  (Right ankle, 2014)    History of colonoscopy  (2014)    Perianal fistula  repair in 2002        MEDICATIONS  (STANDING):  enoxaparin Injectable 40 milliGRAM(s) SubCutaneous daily  piperacillin/tazobactam IVPB.. 3.375 Gram(s) IV Intermittent every 8 hours  sodium chloride 0.9%. 1000 milliLiter(s) (150 mL/Hr) IV Continuous <Continuous>  sodium chloride 0.9%. 1000 milliLiter(s) (100 mL/Hr) IV Continuous <Continuous>    MEDICATIONS  (PRN):  acetaminophen     Tablet .. 650 milliGRAM(s) Oral every 6 hours PRN Temp greater or equal to 38C (100.4F), Mild Pain (1 - 3)  HYDROmorphone  Injectable 1.5 milliGRAM(s) IV Push every 4 hours PRN Severe Pain (7 - 10)  HYDROmorphone  Injectable 1 milliGRAM(s) IV Push every 6 hours PRN Moderate Pain (4 - 6)  melatonin 5 milliGRAM(s) Oral at bedtime PRN Insomnia  ondansetron Injectable 4 milliGRAM(s) IV Push every 6 hours PRN Nausea and/or Vomiting  zolpidem 5 milliGRAM(s) Oral at bedtime PRN Insomnia      Allergies    No Known Allergies    Intolerances        Vital Signs Last 24 Hrs  T(C): 36.3 (27 Feb 2022 08:00), Max: 37.6 (26 Feb 2022 22:50)  T(F): 97.4 (27 Feb 2022 08:00), Max: 99.6 (26 Feb 2022 22:50)  HR: 58 (27 Feb 2022 08:00) (58 - 59)  BP: 101/55 (27 Feb 2022 08:00) (101/55 - 116/66)  BP(mean): --  RR: 18 (27 Feb 2022 08:00) (18 - 18)  SpO2: 100% (27 Feb 2022 08:00) (100% - 100%)    PHYSICAL EXAM:    Constitutional: NAD, well-developed  Respiratory: CTAB  Cardiovascular: S1 and S2, RRR, no M/G/R  Gastrointestinal: BS+, soft, NT/ND    LABS:                        12.5   9.72  )-----------( 271      ( 26 Feb 2022 07:58 )             36.7     02-26    137  |  106  |  5<L>  ----------------------------<  139<H>  3.8   |  28  |  0.60    Ca    8.5      26 Feb 2022 07:58    TPro  6.4  /  Alb  2.4<L>  /  TBili  0.7  /  DBili  x   /  AST  19  /  ALT  24  /  AlkPhos  99  02-25      LIVER FUNCTIONS - ( 25 Feb 2022 10:57 )  Alb: 2.4 g/dL / Pro: 6.4 gm/dL / ALK PHOS: 99 U/L / ALT: 24 U/L / AST: 19 U/L / GGT: x             RADIOLOGY & ADDITIONAL STUDIES:

## 2022-02-27 NOTE — DISCHARGE NOTE NURSING/CASE MANAGEMENT/SOCIAL WORK - PATIENT PORTAL LINK FT
You can access the FollowMyHealth Patient Portal offered by [facility] by registering at the following website: [email or web address]. By joining [employer]’s FollowMyHealth portal, you will also be able to view your health information using other applications (apps) compatible with our system.
76

## 2022-02-27 NOTE — PROGRESS NOTE ADULT - NUTRITIONAL ASSESSMENT
Right arm;
This patient has been assessed with a concern for Malnutrition and has been determined to have a diagnosis/diagnoses of Severe protein-calorie malnutrition.    This patient is being managed with:   Diet Minced and Moist-  Entered: Feb 25 2022  2:34PM    The following pending diet order is being considered for treatment of Severe protein-calorie malnutrition:  Diet Low Fiber-  Entered: Feb 25 2022  2:45PM  
This patient has been assessed with a concern for Malnutrition and has been determined to have a diagnosis/diagnoses of Severe protein-calorie malnutrition.    This patient is being managed with:   Diet Regular-  Entered: Feb 26 2022  3:56PM    The following pending diet order is being considered for treatment of Severe protein-calorie malnutrition:  Diet Low Fiber-  Entered: Feb 25 2022  2:45PM

## 2022-02-27 NOTE — DISCHARGE NOTE NURSING/CASE MANAGEMENT/SOCIAL WORK - NSDCPEFALRISK_GEN_ALL_CORE
For information on Fall & Injury Prevention, visit: [email or web address] OR  [email or web address] OR  https://www.cdc.gov/steadi/patient.html

## 2022-02-27 NOTE — DISCHARGE NOTE PROVIDER - NSDCMRMEDTOKEN_GEN_ALL_CORE_FT
oxycodone-acetaminophen 5 mg-325 mg oral tablet: 1 tab(s) orally 4 times a day MDD:4 tabs  Stelara PFS 90 mg/mL subcutaneous solution: 1 milliliter(s) subcutaneous every 2 months  ***last dose 02/19/2022***  Tylenol 500 mg oral tablet: 2 tab(s) orally every 6 hours, As Needed  zolpidem 5 mg oral tablet: 1 tab(s) orally once a day (at bedtime), As needed, Insomnia MDD:5 mg

## 2022-02-27 NOTE — PROGRESS NOTE ADULT - ASSESSMENT
36 year old man with Crohn's colitis with perianal disease, admitted with recurrent pancreatitis.     His prior IgG4 was normal, but that doesn't rule out AIP.   Will need EUS. He follows with GI at [facility] in [place], I know the interventionalists there I told him that upon discharge he should discuss with his primary GI about having an endoscopic ultrasound at some point.   F/u official read of MRI, maybe this will give us a reason for his recurrence, unless it's medication induced.   Advance diet as tolerated, continue fulls but explained to patient the diet advancement is purely up to him.   Dvt PPX in hospitalized IBD patients even if mobile as pro-inflammatory condition. 
37 y/o male with hx of Crohn's disease on stellara, perianal fistulas admitted for abdominal pain, nausea, , fatigue. He had most recent Stelara x 2 weeks ago, and recently had removal of perianal fistula drains. He is followed closely by GI at [facility]. He had admission in January for pancreatitis, and feels pain is similar. COntinues to have pain and nausea this AM.     CT scan on admission shows improving inflammatory changes with interstitial pancreatitis. Multiple perianal fistulas and proctocolitis seen.    PLAN  Will obtain MR abdomen with and without contrast to further eval pancreatitis  GI PCR negative, pending Cdiff  LR @ 250/hour x 24 hours for pancreatitis  Pain control PRN  Antiemetics PRN  Advance diet as tolerated    Discussed with Dr. Arellano 
Patient is a 35 yo M with a PMHx Crohn's disease complicated by perianal fistulas (s/p removal of drains two weeks ago with additional recent treatment with Stelara), pancreatitis, and proctitis presents to ED with reported fevers at home, nausea, and abdominal pain. Patient was found to have pancreatitis and proctocolitis on abdominal CT. Patient started on IV Zosyn in the ED for concern for intraabdominal infection. Patient consulted by GI and colorectal surgery teams, no acute actions to be taken at this time. Patient has scheduled follow up with Colorectal surgery Dr Dimitri Fuentes next week. Stool PCR sent, results negative. CDIFF stool sample results pending. Plan for IVF, PO trials with clear liquids and advance as tolerated, lab trending, MRI abdomen pending.      #Intractable abdominal pain with nausea secondary to Acute pancreatitis  -c/w IV fluids   -Lipase downtrending 817 > 319 > 119  - MRI abdomen pending per GI recs, r/o cause of pancreatitis (r/o pancreatic duct stricture vs autoimmune pancreatitis)  - Increase Dilaudid for  pain control. Discussed risk and benefits     #Perianal fistulas and Crohns disease  -recent removal of drains performed  -colorectal sx consulted: recs IVF, PO trial clear liquids, and to keep scheduled outpatient follow up with Dr Mascorro next week     #Hyponatremia 2/2 diarrhea  - Resolved   -continue to monitor with BMP    #Hyperglycemia  -HgbA1c: 6.2  -non fasting  2/23; 97 AM labs (2/24); BGL 75 AM labs (2/25)  -monitor on serum chem, pending trend and A1c start restricted diet/accuchecks/ISS    #Smoker  -cessation counseling  -declines nicotine replacement therapy     #DVT ppx  -Lovenox  -Patient refusing Lovenox patient is ambulatory   
Patient is a 37 yo M with a PMHx Crohn's disease complicated by perianal fistulas (s/p removal of drains two weeks ago with additional recent treatment with Stelara), pancreatitis, and proctitis presents to ED with reported fevers at home, nausea, and abdominal pain. Patient was found to have pancreatitis and proctocolitis on abdominal CT. Patient started on IV Zosyn in the ED for concern for intraabdominal infection. Patient consulted by GI and colorectal surgery teams, no acute actions to be taken at this time. Patient has scheduled follow up with Colorectal surgery Dr Dimitri Fuentes next week. Stool PCR sent, results negative. CDIFF stool sample results pending. Plan for IVF, PO trials with clear liquids and advance as tolerated, lab trending, MRI abdomen pending.      #Intractable abdominal pain with nausea secondary to Acute pancreatitis  -c/w IV fluids   -Lipase downtrending 817 > 319 > 119  - MRI abdomen pending per GI recs, r/o cause of pancreatitis (r/o pancreatic duct stricture vs autoimmune pancreatitis)    #Perianal fistulas and Crohns disease  -recent removal of drains performed  -colorectal sx consulted: recs IVF, PO trial clear liquids, and to keep scheduled outpatient follow up with Dr Mascorro next week   -may need steroids after c.diff and GI PCR return     #Hyponatremia 2/2 diarrhea  - Na 134 on ED arrival  - 2/24 Na+ 136  - 2/25 Na+ 134  -continue to monitor with BMP    #Hyperglycemia  -HgbA1c: 6.2  -non fasting  2/23; 97 AM labs (2/24); BGL 75 AM labs (2/25)  -monitor on serum chem, pending trend and A1c start restricted diet/accuchecks/ISS    #Smoker  -cessation counseling  -declines nicotine replacement therapy       #DVT ppx  -Lovenox  -Patient refusing Lovenox patient is ambulatory   
35 yo male with resolved pancreatitis. Patient is stable for discharge. Patient has a primary gastroenterologist (Dr Pearl) at [facility], who will follow up. Thanks!
Patient is a 37 yo M with a PMHx Crohn's disease complicated by perianal fistulas (s/p removal of drains two weeks ago with additional recent treatment with Stelara), pancreatitis, and proctitis presents to ED with reported fevers at home, nausea, and abdominal pain. Patient was found to have pancreatitis and proctocolitis on abdominal CT. Patient started on IV Zosyn in the ED for concern for intraabdominal infection. Patient consulted by GI and colorectal surgery teams, no acute actions to be taken at this time. Patient has scheduled follow up with Colorectal surgery Dr Dimitri Fuentes next week. Stool PCR sent, results negative. CDIFF stool sample results pending. Plan for IVF, PO trials with clear liquids and advance as tolerated, lab trending, MRI abdomen pending.      #Intractable abdominal pain with nausea secondary to Acute pancreatitis  -c/w IV fluids   -Lipase downtrending 817 > 319 > 119  - MRI abdomen- negative  - Increase Dilaudid for  pain control. Discussed risk and benefits   - Stable for DC home and FU with GI     #Perianal fistulas and Crohns disease  -recent removal of drains performed  -colorectal sx consulted: recs IVF, PO trial clear liquids, and to keep scheduled outpatient follow up with Dr Mascorro next week     #Hyponatremia 2/2 diarrhea  - Resolved   -continue to monitor with BMP    #Hyperglycemia  -HgbA1c: 6.2  -non fasting  2/23; 97 AM labs (2/24); BGL 75 AM labs (2/25)  -monitor on serum chem, pending trend and A1c start restricted diet/accuchecks/ISS    #Smoker  -cessation counseling  -declines nicotine replacement therapy     #DVT ppx  -Lovenox  -Patient refusing Lovenox patient is ambulatory

## 2022-02-27 NOTE — PROGRESS NOTE ADULT - SUBJECTIVE AND OBJECTIVE BOX
Patient seen and examined:  No acute events overnight.  Tolerating po and improving  pain. No nausea or vomiting.    ROS: Negative except for above    MEDICATIONS  (STANDING):  enoxaparin Injectable 40 milliGRAM(s) SubCutaneous daily  piperacillin/tazobactam IVPB.. 3.375 Gram(s) IV Intermittent every 8 hours  sodium chloride 0.9%. 1000 milliLiter(s) (150 mL/Hr) IV Continuous <Continuous>  sodium chloride 0.9%. 1000 milliLiter(s) (100 mL/Hr) IV Continuous <Continuous>    MEDICATIONS  (PRN):  acetaminophen     Tablet .. 650 milliGRAM(s) Oral every 6 hours PRN Temp greater or equal to 38C (100.4F), Mild Pain (1 - 3)  HYDROmorphone  Injectable 1.5 milliGRAM(s) IV Push every 4 hours PRN Severe Pain (7 - 10)  HYDROmorphone  Injectable 1 milliGRAM(s) IV Push every 6 hours PRN Moderate Pain (4 - 6)  melatonin 5 milliGRAM(s) Oral at bedtime PRN Insomnia  ondansetron Injectable 4 milliGRAM(s) IV Push every 6 hours PRN Nausea and/or Vomiting  zolpidem 5 milliGRAM(s) Oral at bedtime PRN Insomnia      VITALS:  T(F): 97.4 (02-27-22 @ 08:00), Max: 99.6 (02-26-22 @ 22:50)  HR: 58 (02-27-22 @ 08:00) (58 - 59)  BP: 101/55 (02-27-22 @ 08:00) (101/55 - 116/66)  RR: 18 (02-27-22 @ 08:00) (18 - 18)  SpO2: 100% (02-27-22 @ 08:00) (100% - 100%)  Wt(kg): --    I&O's Summary      CAPILLARY BLOOD GLUCOSE          PHYSICAL EXAM:  GEN: NAD  HEENT:  pupils equal and reactive, EOMI, no oropharyngeal lesions, erythema, exudates, oral thrush  NECK:   supple, no carotid bruits, no palpable lymph nodes, no thyromegaly  CV:  +S1, +S2, regular, no murmurs or rubs  RESP:   lungs clear to auscultation bilaterally, no wheezing, rales, rhonchi, good air entry bilaterally  BREAST:  not examined  GI:  abdomen soft, non-tender, non-distended, normal BS, no bruits, no abdominal masses, no palpable masses  RECTAL:  not examined  :  not examined  MSK:   normal muscle tone, no atrophy, no rigidity, no contractions  EXT:  no clubbing, no cyanosis, no edema, no calf pain, swelling or erythema  VASCULAR:  pulses equal and symmetric in the upper and lower extremities  NEURO:  AAOX3, no focal neurological deficits, follows all commands, able to move extremities spontaneously  SKIN:  no ulcers, lesions or rashes    LABS:                            13.2   9.89  )-----------( 283      ( 27 Feb 2022 07:54 )             39.3     02-27    138  |  105  |  6<L>  ----------------------------<  138<H>  4.1   |  29  |  0.77    Ca    9.1      27 Feb 2022 07:54    TPro  6.9  /  Alb  2.6<L>  /  TBili  0.2  /  DBili  x   /  AST  30  /  ALT  35  /  AlkPhos  99  02-27        LIVER FUNCTIONS - ( 27 Feb 2022 07:54 )  Alb: 2.6 g/dL / Pro: 6.9 gm/dL / ALK PHOS: 99 U/L / ALT: 35 U/L / AST: 30 U/L / GGT: x             < from: MR Abdomen w/wo IV Cont (02.26.22 @ 09:32) >    IMPRESSION:  *  Resolution of inflammatory edema in association with acute   pancreatitis. No collections. No mass.    < end of copied text >

## 2022-03-03 DIAGNOSIS — E87.1 HYPO-OSMOLALITY AND HYPONATREMIA: ICD-10-CM

## 2022-03-03 DIAGNOSIS — R73.9 HYPERGLYCEMIA, UNSPECIFIED: ICD-10-CM

## 2022-03-03 DIAGNOSIS — F17.210 NICOTINE DEPENDENCE, CIGARETTES, UNCOMPLICATED: ICD-10-CM

## 2022-03-03 DIAGNOSIS — K50.90 CROHN'S DISEASE, UNSPECIFIED, WITHOUT COMPLICATIONS: ICD-10-CM

## 2022-03-03 DIAGNOSIS — E87.2 ACIDOSIS: ICD-10-CM

## 2022-03-03 DIAGNOSIS — E43 UNSPECIFIED SEVERE PROTEIN-CALORIE MALNUTRITION: ICD-10-CM

## 2022-03-03 DIAGNOSIS — K85.90 ACUTE PANCREATITIS WITHOUT NECROSIS OR INFECTION, UNSPECIFIED: ICD-10-CM

## 2022-03-03 DIAGNOSIS — K60.3 ANAL FISTULA: ICD-10-CM

## 2022-03-03 DIAGNOSIS — Z93.2 ILEOSTOMY STATUS: ICD-10-CM

## 2022-05-06 NOTE — ED PROVIDER NOTE - CPE EDP ENMT NORM
Patient ambulated in hallway with RN. Patient steady. Patient and spouse verbalize readiness for discharge.     normal...

## 2022-08-24 NOTE — ED ADULT NURSE NOTE - HOW OFTEN DO YOU HAVE A DRINK CONTAINING ALCOHOL?
Continue with HCTZ 12.5 daily and Coreg 6.25 daily  Counseled patient on  • low-sodium diet of less than 2 g  • Aerobic activity 30 minutes a day 5 times a week  • Weight loss       Never

## 2022-11-05 NOTE — PROGRESS NOTE ADULT - ASSESSMENT
34yo male with Crohn's flare, pancreatitis    pt improving with less pain  decrease iv steroids to 40 q12  will start clears  if continued improvement, possible d/c in next 24-48 hours No

## 2023-01-09 NOTE — PATIENT PROFILE ADULT - ARE SIGNIFICANT INDICATORS COMPLETE.
Yes

## 2023-03-14 RX ADMIN — MORPHINE SULFATE 2 MILLIGRAM(S): 50 CAPSULE, EXTENDED RELEASE ORAL at 18:58

## 2023-03-20 ENCOUNTER — INPATIENT (INPATIENT)
Facility: HOSPITAL | Age: 38
LOS: 7 days | Discharge: HOME | DRG: 871 | End: 2023-03-28
Attending: HOSPITALIST | Admitting: FAMILY MEDICINE
Payer: COMMERCIAL

## 2023-03-20 VITALS — HEIGHT: 71 IN | WEIGHT: 154.98 LBS

## 2023-03-20 DIAGNOSIS — Z96.7 PRESENCE OF OTHER BONE AND TENDON IMPLANTS: Chronic | ICD-10-CM

## 2023-03-20 DIAGNOSIS — K60.3 ANAL FISTULA: Chronic | ICD-10-CM

## 2023-03-20 DIAGNOSIS — Z98.89 OTHER SPECIFIED POSTPROCEDURAL STATES: Chronic | ICD-10-CM

## 2023-03-20 DIAGNOSIS — K85.90 ACUTE PANCREATITIS WITHOUT NECROSIS OR INFECTION, UNSPECIFIED: ICD-10-CM

## 2023-03-20 LAB
ALBUMIN SERPL ELPH-MCNC: 3.2 G/DL — LOW (ref 3.3–5)
ALP SERPL-CCNC: 108 U/L — SIGNIFICANT CHANGE UP (ref 40–120)
ALT FLD-CCNC: 21 U/L — SIGNIFICANT CHANGE UP (ref 12–78)
ANION GAP SERPL CALC-SCNC: 6 MMOL/L — SIGNIFICANT CHANGE UP (ref 5–17)
APTT BLD: 27.8 SEC — SIGNIFICANT CHANGE UP (ref 27.5–35.5)
AST SERPL-CCNC: 23 U/L — SIGNIFICANT CHANGE UP (ref 15–37)
BASOPHILS # BLD AUTO: 0.05 K/UL — SIGNIFICANT CHANGE UP (ref 0–0.2)
BASOPHILS NFR BLD AUTO: 0.2 % — SIGNIFICANT CHANGE UP (ref 0–2)
BILIRUB SERPL-MCNC: 0.5 MG/DL — SIGNIFICANT CHANGE UP (ref 0.2–1.2)
BLD GP AB SCN SERPL QL: SIGNIFICANT CHANGE UP
BUN SERPL-MCNC: 6 MG/DL — LOW (ref 7–23)
CALCIUM SERPL-MCNC: 9.4 MG/DL — SIGNIFICANT CHANGE UP (ref 8.5–10.1)
CHLORIDE SERPL-SCNC: 101 MMOL/L — SIGNIFICANT CHANGE UP (ref 96–108)
CO2 SERPL-SCNC: 27 MMOL/L — SIGNIFICANT CHANGE UP (ref 22–31)
CREAT SERPL-MCNC: 0.62 MG/DL — SIGNIFICANT CHANGE UP (ref 0.5–1.3)
EGFR: 126 ML/MIN/1.73M2 — SIGNIFICANT CHANGE UP
EOSINOPHIL # BLD AUTO: 0.01 K/UL — SIGNIFICANT CHANGE UP (ref 0–0.5)
EOSINOPHIL NFR BLD AUTO: 0 % — SIGNIFICANT CHANGE UP (ref 0–6)
ETHANOL SERPL-MCNC: <10 MG/DL — SIGNIFICANT CHANGE UP (ref 0–10)
FLUAV AG NPH QL: SIGNIFICANT CHANGE UP
FLUBV AG NPH QL: SIGNIFICANT CHANGE UP
GLUCOSE SERPL-MCNC: 176 MG/DL — HIGH (ref 70–99)
HCT VFR BLD CALC: 45.9 % — SIGNIFICANT CHANGE UP (ref 39–50)
HGB BLD-MCNC: 15.9 G/DL — SIGNIFICANT CHANGE UP (ref 13–17)
IMM GRANULOCYTES NFR BLD AUTO: 0.5 % — SIGNIFICANT CHANGE UP (ref 0–0.9)
INR BLD: 1.04 RATIO — SIGNIFICANT CHANGE UP (ref 0.88–1.16)
LACTATE SERPL-SCNC: 0.8 MMOL/L — SIGNIFICANT CHANGE UP (ref 0.7–2)
LIDOCAIN IGE QN: 3675 U/L — HIGH (ref 73–393)
LYMPHOCYTES # BLD AUTO: 0.84 K/UL — LOW (ref 1–3.3)
LYMPHOCYTES # BLD AUTO: 4 % — LOW (ref 13–44)
MCHC RBC-ENTMCNC: 34.6 GM/DL — SIGNIFICANT CHANGE UP (ref 32–36)
MCHC RBC-ENTMCNC: 34.9 PG — HIGH (ref 27–34)
MCV RBC AUTO: 100.9 FL — HIGH (ref 80–100)
MONOCYTES # BLD AUTO: 1.14 K/UL — HIGH (ref 0–0.9)
MONOCYTES NFR BLD AUTO: 5.5 % — SIGNIFICANT CHANGE UP (ref 2–14)
NEUTROPHILS # BLD AUTO: 18.73 K/UL — HIGH (ref 1.8–7.4)
NEUTROPHILS NFR BLD AUTO: 89.8 % — HIGH (ref 43–77)
NT-PROBNP SERPL-SCNC: 17 PG/ML — SIGNIFICANT CHANGE UP (ref 0–125)
PLATELET # BLD AUTO: 301 K/UL — SIGNIFICANT CHANGE UP (ref 150–400)
POTASSIUM SERPL-MCNC: 3.3 MMOL/L — LOW (ref 3.5–5.3)
POTASSIUM SERPL-SCNC: 3.3 MMOL/L — LOW (ref 3.5–5.3)
PROT SERPL-MCNC: 8.8 GM/DL — HIGH (ref 6–8.3)
PROTHROM AB SERPL-ACNC: 12.1 SEC — SIGNIFICANT CHANGE UP (ref 10.5–13.4)
RBC # BLD: 4.55 M/UL — SIGNIFICANT CHANGE UP (ref 4.2–5.8)
RBC # FLD: 14.6 % — HIGH (ref 10.3–14.5)
RSV RNA NPH QL NAA+NON-PROBE: SIGNIFICANT CHANGE UP
SARS-COV-2 RNA SPEC QL NAA+PROBE: SIGNIFICANT CHANGE UP
SODIUM SERPL-SCNC: 134 MMOL/L — LOW (ref 135–145)
TROPONIN I, HIGH SENSITIVITY RESULT: 3.38 NG/L — SIGNIFICANT CHANGE UP
WBC # BLD: 20.88 K/UL — HIGH (ref 3.8–10.5)
WBC # FLD AUTO: 20.88 K/UL — HIGH (ref 3.8–10.5)

## 2023-03-20 PROCEDURE — 83735 ASSAY OF MAGNESIUM: CPT

## 2023-03-20 PROCEDURE — 71045 X-RAY EXAM CHEST 1 VIEW: CPT | Mod: 26

## 2023-03-20 PROCEDURE — 84100 ASSAY OF PHOSPHORUS: CPT

## 2023-03-20 PROCEDURE — 99285 EMERGENCY DEPT VISIT HI MDM: CPT

## 2023-03-20 PROCEDURE — 71275 CT ANGIOGRAPHY CHEST: CPT | Mod: 26,MA

## 2023-03-20 PROCEDURE — 74174 CTA ABD&PLVS W/CONTRAST: CPT | Mod: 26,MA

## 2023-03-20 PROCEDURE — A9579: CPT

## 2023-03-20 PROCEDURE — 83690 ASSAY OF LIPASE: CPT

## 2023-03-20 PROCEDURE — 83036 HEMOGLOBIN GLYCOSYLATED A1C: CPT

## 2023-03-20 PROCEDURE — 83605 ASSAY OF LACTIC ACID: CPT

## 2023-03-20 PROCEDURE — 0241U: CPT

## 2023-03-20 PROCEDURE — 85025 COMPLETE CBC W/AUTO DIFF WBC: CPT

## 2023-03-20 PROCEDURE — 80061 LIPID PANEL: CPT

## 2023-03-20 PROCEDURE — 80053 COMPREHEN METABOLIC PANEL: CPT

## 2023-03-20 PROCEDURE — 36415 COLL VENOUS BLD VENIPUNCTURE: CPT

## 2023-03-20 PROCEDURE — 85610 PROTHROMBIN TIME: CPT

## 2023-03-20 PROCEDURE — 85730 THROMBOPLASTIN TIME PARTIAL: CPT

## 2023-03-20 PROCEDURE — 85027 COMPLETE CBC AUTOMATED: CPT

## 2023-03-20 PROCEDURE — 82962 GLUCOSE BLOOD TEST: CPT

## 2023-03-20 PROCEDURE — 80048 BASIC METABOLIC PNL TOTAL CA: CPT

## 2023-03-20 PROCEDURE — 87040 BLOOD CULTURE FOR BACTERIA: CPT

## 2023-03-20 PROCEDURE — 72197 MRI PELVIS W/O & W/DYE: CPT

## 2023-03-20 RX ORDER — HYDROMORPHONE HYDROCHLORIDE 2 MG/ML
1 INJECTION INTRAMUSCULAR; INTRAVENOUS; SUBCUTANEOUS ONCE
Refills: 0 | Status: DISCONTINUED | OUTPATIENT
Start: 2023-03-20 | End: 2023-03-20

## 2023-03-20 RX ORDER — SODIUM CHLORIDE 9 MG/ML
1000 INJECTION, SOLUTION INTRAVENOUS ONCE
Refills: 0 | Status: COMPLETED | OUTPATIENT
Start: 2023-03-20 | End: 2023-03-20

## 2023-03-20 RX ORDER — SODIUM CHLORIDE 9 MG/ML
1000 INJECTION INTRAMUSCULAR; INTRAVENOUS; SUBCUTANEOUS ONCE
Refills: 0 | Status: COMPLETED | OUTPATIENT
Start: 2023-03-20 | End: 2023-03-20

## 2023-03-20 RX ORDER — USTEKINUMAB 45 MG/.5ML
1 INJECTION, SOLUTION SUBCUTANEOUS
Qty: 0 | Refills: 0 | DISCHARGE

## 2023-03-20 RX ORDER — ONDANSETRON 8 MG/1
4 TABLET, FILM COATED ORAL ONCE
Refills: 0 | Status: COMPLETED | OUTPATIENT
Start: 2023-03-20 | End: 2023-03-20

## 2023-03-20 RX ORDER — MORPHINE SULFATE 50 MG/1
4 CAPSULE, EXTENDED RELEASE ORAL ONCE
Refills: 0 | Status: DISCONTINUED | OUTPATIENT
Start: 2023-03-20 | End: 2023-03-20

## 2023-03-20 RX ORDER — POTASSIUM CHLORIDE 20 MEQ
40 PACKET (EA) ORAL ONCE
Refills: 0 | Status: COMPLETED | OUTPATIENT
Start: 2023-03-20 | End: 2023-03-20

## 2023-03-20 RX ORDER — ACETAMINOPHEN 500 MG
2 TABLET ORAL
Qty: 0 | Refills: 0 | DISCHARGE

## 2023-03-20 RX ORDER — INFLUENZA VIRUS VACCINE 15; 15; 15; 15 UG/.5ML; UG/.5ML; UG/.5ML; UG/.5ML
0.5 SUSPENSION INTRAMUSCULAR ONCE
Refills: 0 | Status: DISCONTINUED | OUTPATIENT
Start: 2023-03-20 | End: 2023-03-28

## 2023-03-20 RX ADMIN — HYDROMORPHONE HYDROCHLORIDE 1 MILLIGRAM(S): 2 INJECTION INTRAMUSCULAR; INTRAVENOUS; SUBCUTANEOUS at 23:57

## 2023-03-20 RX ADMIN — SODIUM CHLORIDE 1000 MILLILITER(S): 9 INJECTION, SOLUTION INTRAVENOUS at 19:01

## 2023-03-20 RX ADMIN — Medication 40 MILLIEQUIVALENT(S): at 20:17

## 2023-03-20 RX ADMIN — HYDROMORPHONE HYDROCHLORIDE 1 MILLIGRAM(S): 2 INJECTION INTRAMUSCULAR; INTRAVENOUS; SUBCUTANEOUS at 16:39

## 2023-03-20 RX ADMIN — HYDROMORPHONE HYDROCHLORIDE 1 MILLIGRAM(S): 2 INJECTION INTRAMUSCULAR; INTRAVENOUS; SUBCUTANEOUS at 16:19

## 2023-03-20 RX ADMIN — MORPHINE SULFATE 4 MILLIGRAM(S): 50 CAPSULE, EXTENDED RELEASE ORAL at 13:54

## 2023-03-20 RX ADMIN — HYDROMORPHONE HYDROCHLORIDE 1 MILLIGRAM(S): 2 INJECTION INTRAMUSCULAR; INTRAVENOUS; SUBCUTANEOUS at 14:53

## 2023-03-20 RX ADMIN — SODIUM CHLORIDE 1000 MILLILITER(S): 9 INJECTION INTRAMUSCULAR; INTRAVENOUS; SUBCUTANEOUS at 16:20

## 2023-03-20 RX ADMIN — MORPHINE SULFATE 4 MILLIGRAM(S): 50 CAPSULE, EXTENDED RELEASE ORAL at 16:19

## 2023-03-20 RX ADMIN — HYDROMORPHONE HYDROCHLORIDE 1 MILLIGRAM(S): 2 INJECTION INTRAMUSCULAR; INTRAVENOUS; SUBCUTANEOUS at 20:18

## 2023-03-20 RX ADMIN — HYDROMORPHONE HYDROCHLORIDE 1 MILLIGRAM(S): 2 INJECTION INTRAMUSCULAR; INTRAVENOUS; SUBCUTANEOUS at 23:37

## 2023-03-20 RX ADMIN — Medication 1 MILLIGRAM(S): at 16:19

## 2023-03-20 RX ADMIN — HYDROMORPHONE HYDROCHLORIDE 1 MILLIGRAM(S): 2 INJECTION INTRAMUSCULAR; INTRAVENOUS; SUBCUTANEOUS at 19:01

## 2023-03-20 RX ADMIN — ONDANSETRON 4 MILLIGRAM(S): 8 TABLET, FILM COATED ORAL at 13:54

## 2023-03-20 RX ADMIN — SODIUM CHLORIDE 1000 MILLILITER(S): 9 INJECTION INTRAMUSCULAR; INTRAVENOUS; SUBCUTANEOUS at 19:02

## 2023-03-20 RX ADMIN — SODIUM CHLORIDE 1000 MILLILITER(S): 9 INJECTION INTRAMUSCULAR; INTRAVENOUS; SUBCUTANEOUS at 13:54

## 2023-03-20 NOTE — ED ADULT TRIAGE NOTE - HEIGHT IN CM
----- Message from Roberto Vaughan MD sent at 8/16/2022 10:51 AM CDT -----  Regarding: Prescription  Please call the [facility]'s pharmacy and tell them that the Suprax prescription was and error, and to please cancel the prescription.    [id number] - [place], [place] - [address] [place] [facility] [place]    
Spoke with pharmacy will be canceled.  
180.34

## 2023-03-20 NOTE — ED PROVIDER NOTE - OBJECTIVE STATEMENT
38 y/o male with a PMHx Crohn's s/p colostomy, pancreatitis presents to the ED c/o left sided abd pain and SOB starting at 11am. Pt was at work when he had onset of CP. Smoker, 2ppd. Last EtOH use last night. Occasional marijuana user. No other complaints at this time. PCP: Dr. Crawley.

## 2023-03-20 NOTE — ED PROVIDER NOTE - PROGRESS NOTE DETAILS
CC:  Signout received from DR. Watkins at shift change to f/u CT reports & expected admission.  CT reports: no dissection, + acute pancreatitis w/o complication.  Dr. Ortez aware of med admission.  Further IVF & IV/po Potassium being ordered. CC:  Signout received from DR. Watkins at shift change to f/u CT reports & expected admission.  CT reports: no dissection, + acute pancreatitis w/o complication.  Dr. Ortez aware of med admission.  Further IVF & po Potassium being ordered.

## 2023-03-20 NOTE — ED PROVIDER NOTE - NS ED ROS FT
Constitutional: No fever or chills  Eyes: No visual changes  HEENT: No throat pain  CV: no chest pain  Resp: +SOB no cough  GI: No  nausea or vomiting. +abd pain   : No dysuria  MSK: No musculoskeletal pain  Skin: No rash  Neuro: No headache

## 2023-03-20 NOTE — ED PROVIDER NOTE - NS_EDPROVIDERDISPOUSERTYPE_ED_A_ED
Addended by: YUNIEL HE on: 3/15/2023 08:45 AM     Modules accepted: Orders     Scribe Attestation (For Scribes USE Only)... Attending Attestation (For Attendings USE Only).../Scribe Attestation (For Scribes USE Only).../Medical/PA/NP Students Attestation (For Medical/PA/NP Student USE Only)...

## 2023-03-20 NOTE — PATIENT PROFILE ADULT - FALL HARM RISK - HARM RISK INTERVENTIONS
Communicate Risk of Fall with Harm to all staff/Monitor for mental status changes/Monitor gait and stability/Reinforce activity limits and safety measures with patient and family/Reorient to person, place and time as needed/Review medications for side effects contributing to fall risk/Sit up slowly, dangle for a short time, stand at bedside before walking/Tailored Fall Risk Interventions/Use of alarms - bed, chair and/or voice tab/Visual Cue: Yellow wristband and red socks/Bed in lowest position, wheels locked, appropriate side rails in place/Call bell, personal items and telephone in reach/Instruct patient to call for assistance before getting out of bed or chair/Non-slip footwear when patient is out of bed/[place] to call system/Physically safe environment - no spills, clutter or unnecessary equipment/Purposeful Proactive Rounding/Room/bathroom lighting operational, light cord in reach

## 2023-03-20 NOTE — ED PROVIDER NOTE - CARE PLAN
Principal Discharge DX:	Pancreatitis   1 Principal Discharge DX:	Pancreatitis  Secondary Diagnosis:	Hypokalemia

## 2023-03-20 NOTE — ED STATDOCS - PROGRESS NOTE DETAILS
Katerina Andersen:  36 y/o male PMHx of Crohn's disease and pancreatitis presents ambulatory to ED s/p syncopal episode at work c/o sudden onset of Left sided chest pain that radiates to his back and difficulty breathing that began at 11AM today. Pt denies personal cardiac hx but reports his father has CAD and stents. Pt smokes 2 packs of cigarettes per day. EKG 12:06, 87 BPM, NSR. Will send pt to main ED for further evaluation.

## 2023-03-20 NOTE — ED PROVIDER NOTE - CLINICAL SUMMARY MEDICAL DECISION MAKING FREE TEXT BOX
36 y/o male daily drinker with sudden abd pain. Differential diagnosis includes PE vs dissection vs ACS vs pancreatitis. Will obtain EKG, labs, CT, pain meds, closely observe, reeval. 36 y/o male daily drinker with sudden abd pain. Differential diagnosis includes PE vs dissection vs ACS vs pancreatitis. Will obtain EKG, labs, CT, pain meds, closely observe, reeval.    2:30 pm pt with elevated lipase, await ct abd pel, plan TBA after ct results. MD SISSY

## 2023-03-20 NOTE — ED ADULT TRIAGE NOTE - CHIEF COMPLAINT QUOTE
patient ambulatory to ED c/o chest pain.  patient reports sudden onset of Left sided chest pain and difficulty breathing x 45 minutes.  denies cardiac hx.  ekg in progress.

## 2023-03-20 NOTE — ED PROVIDER NOTE - PHYSICAL EXAMINATION
Constitutional: Thin, ill appearing, NAD AOx3  Eyes: PERRL EOMI  Head: Normocephalic atraumatic  Mouth: MMM  Cardiac: regular rate and rhythm  Resp: Lungs CTAB  GI: Abd s/nd. TTP RUQ and LUQ with guarding RLQ colostomy clean dry and intact.   Neuro: CN2-12 grossly intact, CHANG x 4  Skin: No visible rashes

## 2023-03-20 NOTE — ED ADULT NURSE REASSESSMENT NOTE - NS ED NURSE REASSESS COMMENT FT1
Report given to 5S, admitting  called, order for diet and PRN pain medicine requested. As per admitting Dr, orders to be entered soon

## 2023-03-20 NOTE — ED PROVIDER NOTE - INTERNATIONAL TRAVEL
Subjective:       Patient ID: Genesis Medeiros is a 47 y.o. female.    Chief Complaint: No chief complaint on file.    HPI  Review of Systems      Objective:      Physical Exam    Assessment:       Problem List Items Addressed This Visit    None  Visit Diagnoses       Encounter for physical examination related to employment    -  Primary    Relevant Orders    Influenza - Quadrivalent *Preferred* (6 months+) (PF) (Completed)    Tdap Vaccine (Completed)    Hepatitis B Surface Antibody, Qual/Quant    Mumps, IgG Screen    Rubeola antibody IgG    Rubella antibody, IgG    Quantiferon Gold TB    Varicella Zoster Antibody, IgG            Plan:       See scanned documents in .           
No

## 2023-03-21 LAB
A1C WITH ESTIMATED AVERAGE GLUCOSE RESULT: 6.9 % — HIGH (ref 4–5.6)
ADD ON TEST-SPECIMEN IN LAB: SIGNIFICANT CHANGE UP
ALBUMIN SERPL ELPH-MCNC: 2.5 G/DL — LOW (ref 3.3–5)
ALP SERPL-CCNC: 87 U/L — SIGNIFICANT CHANGE UP (ref 40–120)
ALT FLD-CCNC: 16 U/L — SIGNIFICANT CHANGE UP (ref 12–78)
ANION GAP SERPL CALC-SCNC: 4 MMOL/L — LOW (ref 5–17)
APTT BLD: 28.4 SEC — SIGNIFICANT CHANGE UP (ref 27.5–35.5)
AST SERPL-CCNC: 15 U/L — SIGNIFICANT CHANGE UP (ref 15–37)
BASOPHILS # BLD AUTO: 0.03 K/UL — SIGNIFICANT CHANGE UP (ref 0–0.2)
BASOPHILS NFR BLD AUTO: 0.3 % — SIGNIFICANT CHANGE UP (ref 0–2)
BILIRUB SERPL-MCNC: 0.9 MG/DL — SIGNIFICANT CHANGE UP (ref 0.2–1.2)
BUN SERPL-MCNC: 3 MG/DL — LOW (ref 7–23)
CALCIUM SERPL-MCNC: 8.8 MG/DL — SIGNIFICANT CHANGE UP (ref 8.5–10.1)
CHLORIDE SERPL-SCNC: 103 MMOL/L — SIGNIFICANT CHANGE UP (ref 96–108)
CHOLEST SERPL-MCNC: 139 MG/DL — SIGNIFICANT CHANGE UP
CO2 SERPL-SCNC: 28 MMOL/L — SIGNIFICANT CHANGE UP (ref 22–31)
CREAT SERPL-MCNC: 0.5 MG/DL — SIGNIFICANT CHANGE UP (ref 0.5–1.3)
EGFR: 135 ML/MIN/1.73M2 — SIGNIFICANT CHANGE UP
EOSINOPHIL # BLD AUTO: 0.05 K/UL — SIGNIFICANT CHANGE UP (ref 0–0.5)
EOSINOPHIL NFR BLD AUTO: 0.4 % — SIGNIFICANT CHANGE UP (ref 0–6)
ESTIMATED AVERAGE GLUCOSE: 151 MG/DL — HIGH (ref 68–114)
GLUCOSE SERPL-MCNC: 154 MG/DL — HIGH (ref 70–99)
HCT VFR BLD CALC: 42.5 % — SIGNIFICANT CHANGE UP (ref 39–50)
HDLC SERPL-MCNC: 83 MG/DL — SIGNIFICANT CHANGE UP
HGB BLD-MCNC: 14.6 G/DL — SIGNIFICANT CHANGE UP (ref 13–17)
IMM GRANULOCYTES NFR BLD AUTO: 0.3 % — SIGNIFICANT CHANGE UP (ref 0–0.9)
INR BLD: 1.09 RATIO — SIGNIFICANT CHANGE UP (ref 0.88–1.16)
LIDOCAIN IGE QN: 900 U/L — HIGH (ref 73–393)
LIPID PNL WITH DIRECT LDL SERPL: 44 MG/DL — SIGNIFICANT CHANGE UP
LYMPHOCYTES # BLD AUTO: 0.69 K/UL — LOW (ref 1–3.3)
LYMPHOCYTES # BLD AUTO: 5.9 % — LOW (ref 13–44)
MAGNESIUM SERPL-MCNC: 1.4 MG/DL — LOW (ref 1.6–2.6)
MCHC RBC-ENTMCNC: 34.4 GM/DL — SIGNIFICANT CHANGE UP (ref 32–36)
MCHC RBC-ENTMCNC: 35 PG — HIGH (ref 27–34)
MCV RBC AUTO: 101.9 FL — HIGH (ref 80–100)
MONOCYTES # BLD AUTO: 0.91 K/UL — HIGH (ref 0–0.9)
MONOCYTES NFR BLD AUTO: 7.8 % — SIGNIFICANT CHANGE UP (ref 2–14)
NEUTROPHILS # BLD AUTO: 9.93 K/UL — HIGH (ref 1.8–7.4)
NEUTROPHILS NFR BLD AUTO: 85.3 % — HIGH (ref 43–77)
NON HDL CHOLESTEROL: 56 MG/DL — SIGNIFICANT CHANGE UP
PHOSPHATE SERPL-MCNC: 2.2 MG/DL — LOW (ref 2.5–4.5)
PLATELET # BLD AUTO: 237 K/UL — SIGNIFICANT CHANGE UP (ref 150–400)
POTASSIUM SERPL-MCNC: 3.2 MMOL/L — LOW (ref 3.5–5.3)
POTASSIUM SERPL-SCNC: 3.2 MMOL/L — LOW (ref 3.5–5.3)
PROT SERPL-MCNC: 7 GM/DL — SIGNIFICANT CHANGE UP (ref 6–8.3)
PROTHROM AB SERPL-ACNC: 12.7 SEC — SIGNIFICANT CHANGE UP (ref 10.5–13.4)
RBC # BLD: 4.17 M/UL — LOW (ref 4.2–5.8)
RBC # FLD: 14.5 % — SIGNIFICANT CHANGE UP (ref 10.3–14.5)
SODIUM SERPL-SCNC: 135 MMOL/L — SIGNIFICANT CHANGE UP (ref 135–145)
TRIGL SERPL-MCNC: 63 MG/DL — SIGNIFICANT CHANGE UP
WBC # BLD: 11.65 K/UL — HIGH (ref 3.8–10.5)
WBC # FLD AUTO: 11.65 K/UL — HIGH (ref 3.8–10.5)

## 2023-03-21 PROCEDURE — 99223 1ST HOSP IP/OBS HIGH 75: CPT

## 2023-03-21 PROCEDURE — 99222 1ST HOSP IP/OBS MODERATE 55: CPT

## 2023-03-21 PROCEDURE — 99233 SBSQ HOSP IP/OBS HIGH 50: CPT

## 2023-03-21 PROCEDURE — 12345: CPT | Mod: NC

## 2023-03-21 RX ORDER — PIPERACILLIN AND TAZOBACTAM 4; .5 G/20ML; G/20ML
3.38 INJECTION, POWDER, LYOPHILIZED, FOR SOLUTION INTRAVENOUS EVERY 8 HOURS
Refills: 0 | Status: DISCONTINUED | OUTPATIENT
Start: 2023-03-21 | End: 2023-03-21

## 2023-03-21 RX ORDER — SODIUM CHLORIDE 9 MG/ML
1750 INJECTION INTRAMUSCULAR; INTRAVENOUS; SUBCUTANEOUS
Refills: 0 | Status: DISCONTINUED | OUTPATIENT
Start: 2023-03-21 | End: 2023-03-22

## 2023-03-21 RX ORDER — SODIUM CHLORIDE 9 MG/ML
1000 INJECTION INTRAMUSCULAR; INTRAVENOUS; SUBCUTANEOUS
Refills: 0 | Status: DISCONTINUED | OUTPATIENT
Start: 2023-03-21 | End: 2023-03-22

## 2023-03-21 RX ORDER — POTASSIUM CHLORIDE 20 MEQ
40 PACKET (EA) ORAL EVERY 4 HOURS
Refills: 0 | Status: DISCONTINUED | OUTPATIENT
Start: 2023-03-21 | End: 2023-03-21

## 2023-03-21 RX ORDER — ONDANSETRON 8 MG/1
8 TABLET, FILM COATED ORAL EVERY 8 HOURS
Refills: 0 | Status: DISCONTINUED | OUTPATIENT
Start: 2023-03-21 | End: 2023-03-21

## 2023-03-21 RX ORDER — HYDROMORPHONE HYDROCHLORIDE 2 MG/ML
0.5 INJECTION INTRAMUSCULAR; INTRAVENOUS; SUBCUTANEOUS EVERY 4 HOURS
Refills: 0 | Status: DISCONTINUED | OUTPATIENT
Start: 2023-03-21 | End: 2023-03-23

## 2023-03-21 RX ORDER — ONDANSETRON 8 MG/1
8 TABLET, FILM COATED ORAL EVERY 8 HOURS
Refills: 0 | Status: DISCONTINUED | OUTPATIENT
Start: 2023-03-21 | End: 2023-03-28

## 2023-03-21 RX ORDER — THIAMINE MONONITRATE (VIT B1) 100 MG
100 TABLET ORAL DAILY
Refills: 0 | Status: COMPLETED | OUTPATIENT
Start: 2023-03-21 | End: 2023-03-23

## 2023-03-21 RX ORDER — ENOXAPARIN SODIUM 100 MG/ML
40 INJECTION SUBCUTANEOUS EVERY 24 HOURS
Refills: 0 | Status: DISCONTINUED | OUTPATIENT
Start: 2023-03-21 | End: 2023-03-28

## 2023-03-21 RX ORDER — FOLIC ACID 0.8 MG
1 TABLET ORAL DAILY
Refills: 0 | Status: DISCONTINUED | OUTPATIENT
Start: 2023-03-21 | End: 2023-03-28

## 2023-03-21 RX ORDER — ONDANSETRON 8 MG/1
4 TABLET, FILM COATED ORAL ONCE
Refills: 0 | Status: COMPLETED | OUTPATIENT
Start: 2023-03-21 | End: 2023-03-21

## 2023-03-21 RX ORDER — LANOLIN ALCOHOL/MO/W.PET/CERES
5 CREAM (GRAM) TOPICAL AT BEDTIME
Refills: 0 | Status: DISCONTINUED | OUTPATIENT
Start: 2023-03-21 | End: 2023-03-22

## 2023-03-21 RX ORDER — MAGNESIUM OXIDE 400 MG ORAL TABLET 241.3 MG
400 TABLET ORAL
Refills: 0 | Status: DISCONTINUED | OUTPATIENT
Start: 2023-03-21 | End: 2023-03-28

## 2023-03-21 RX ORDER — PIPERACILLIN AND TAZOBACTAM 4; .5 G/20ML; G/20ML
3.38 INJECTION, POWDER, LYOPHILIZED, FOR SOLUTION INTRAVENOUS ONCE
Refills: 0 | Status: COMPLETED | OUTPATIENT
Start: 2023-03-21 | End: 2023-03-21

## 2023-03-21 RX ORDER — POTASSIUM CHLORIDE 20 MEQ
10 PACKET (EA) ORAL
Refills: 0 | Status: COMPLETED | OUTPATIENT
Start: 2023-03-21 | End: 2023-03-21

## 2023-03-21 RX ORDER — SODIUM,POTASSIUM PHOSPHATES 278-250MG
2 POWDER IN PACKET (EA) ORAL ONCE
Refills: 0 | Status: COMPLETED | OUTPATIENT
Start: 2023-03-21 | End: 2023-03-21

## 2023-03-21 RX ORDER — ACETAMINOPHEN 500 MG
1000 TABLET ORAL ONCE
Refills: 0 | Status: COMPLETED | OUTPATIENT
Start: 2023-03-21 | End: 2023-03-21

## 2023-03-21 RX ADMIN — Medication 100 MILLIEQUIVALENT(S): at 13:51

## 2023-03-21 RX ADMIN — Medication 400 MILLIGRAM(S): at 11:57

## 2023-03-21 RX ADMIN — HYDROMORPHONE HYDROCHLORIDE 0.5 MILLIGRAM(S): 2 INJECTION INTRAMUSCULAR; INTRAVENOUS; SUBCUTANEOUS at 20:07

## 2023-03-21 RX ADMIN — Medication 100 MILLIEQUIVALENT(S): at 15:44

## 2023-03-21 RX ADMIN — ONDANSETRON 8 MILLIGRAM(S): 8 TABLET, FILM COATED ORAL at 12:04

## 2023-03-21 RX ADMIN — Medication 100 MILLIEQUIVALENT(S): at 12:05

## 2023-03-21 RX ADMIN — HYDROMORPHONE HYDROCHLORIDE 0.5 MILLIGRAM(S): 2 INJECTION INTRAMUSCULAR; INTRAVENOUS; SUBCUTANEOUS at 20:37

## 2023-03-21 RX ADMIN — HYDROMORPHONE HYDROCHLORIDE 0.5 MILLIGRAM(S): 2 INJECTION INTRAMUSCULAR; INTRAVENOUS; SUBCUTANEOUS at 16:16

## 2023-03-21 RX ADMIN — Medication 1 TABLET(S): at 11:38

## 2023-03-21 RX ADMIN — Medication 2 PACKET(S): at 11:37

## 2023-03-21 RX ADMIN — SODIUM CHLORIDE 175 MILLILITER(S): 9 INJECTION INTRAMUSCULAR; INTRAVENOUS; SUBCUTANEOUS at 01:21

## 2023-03-21 RX ADMIN — Medication 1000 MILLIGRAM(S): at 12:45

## 2023-03-21 RX ADMIN — Medication 1 MILLIGRAM(S): at 11:38

## 2023-03-21 RX ADMIN — PIPERACILLIN AND TAZOBACTAM 200 GRAM(S): 4; .5 INJECTION, POWDER, LYOPHILIZED, FOR SOLUTION INTRAVENOUS at 01:37

## 2023-03-21 RX ADMIN — ONDANSETRON 4 MILLIGRAM(S): 8 TABLET, FILM COATED ORAL at 04:16

## 2023-03-21 RX ADMIN — MAGNESIUM OXIDE 400 MG ORAL TABLET 400 MILLIGRAM(S): 241.3 TABLET ORAL at 11:39

## 2023-03-21 RX ADMIN — SODIUM CHLORIDE 175 MILLILITER(S): 9 INJECTION INTRAMUSCULAR; INTRAVENOUS; SUBCUTANEOUS at 18:19

## 2023-03-21 RX ADMIN — HYDROMORPHONE HYDROCHLORIDE 0.5 MILLIGRAM(S): 2 INJECTION INTRAMUSCULAR; INTRAVENOUS; SUBCUTANEOUS at 15:46

## 2023-03-21 RX ADMIN — MAGNESIUM OXIDE 400 MG ORAL TABLET 400 MILLIGRAM(S): 241.3 TABLET ORAL at 18:17

## 2023-03-21 RX ADMIN — HYDROMORPHONE HYDROCHLORIDE 0.5 MILLIGRAM(S): 2 INJECTION INTRAMUSCULAR; INTRAVENOUS; SUBCUTANEOUS at 08:30

## 2023-03-21 RX ADMIN — HYDROMORPHONE HYDROCHLORIDE 0.5 MILLIGRAM(S): 2 INJECTION INTRAMUSCULAR; INTRAVENOUS; SUBCUTANEOUS at 03:04

## 2023-03-21 RX ADMIN — Medication 1 MILLIGRAM(S): at 11:37

## 2023-03-21 RX ADMIN — Medication 5 MILLIGRAM(S): at 22:35

## 2023-03-21 RX ADMIN — PIPERACILLIN AND TAZOBACTAM 25 GRAM(S): 4; .5 INJECTION, POWDER, LYOPHILIZED, FOR SOLUTION INTRAVENOUS at 05:59

## 2023-03-21 RX ADMIN — HYDROMORPHONE HYDROCHLORIDE 0.5 MILLIGRAM(S): 2 INJECTION INTRAMUSCULAR; INTRAVENOUS; SUBCUTANEOUS at 08:00

## 2023-03-21 RX ADMIN — Medication 100 MILLIGRAM(S): at 11:38

## 2023-03-21 RX ADMIN — HYDROMORPHONE HYDROCHLORIDE 0.5 MILLIGRAM(S): 2 INJECTION INTRAMUSCULAR; INTRAVENOUS; SUBCUTANEOUS at 03:24

## 2023-03-21 NOTE — PROGRESS NOTE ADULT - ASSESSMENT
38 y/o M w/ PMH of crohn's disease complicated by perianal fistulas S/p diversion, pancreatitis, proctitis, p/w abdominal pain    *Acute pancreatitis  -CT: Increased peripancreatic fat stranding suggestive of acute pancreatitis  -Lipase trending down   -IVF  -NPO  -GI consult appreciated   -Lipid panel  -Pain control   -ETOH cessation     *Questionable sepsis 2/2 Crohn's disease w/ rectal wall thickening seen with multiple perianal fistulas on CT  - Given significant leukocytosis -> will start zosyn until further recommendations by colorectal   - ID consult appreciated   - F/u colorectal surgery     *Etoh abuse  -Monitor CIWA  -MVI / thiamine / folate   -Etoh cessation     *3.4 cm basilar right lower lobe bleb  -F/u outpatient pulm     *Hyponatremia  -monitor     *Hypokalemia  -Replete and recheck     *Tobacco cessation counseling  -Declines nicoderm     *DVT ppx   -SCDs    Case d/w team on IDR.

## 2023-03-21 NOTE — CONSULT NOTE ADULT - NS ATTEND AMEND GEN_ALL_CORE FT
37 year old with ETOH induced pancreatitis, complicated crohn's disease, with pancreatitis.     LR high rate.   Advance diet as tolerated.   Alcohol cessation/counselling  Withdrawal treatment

## 2023-03-21 NOTE — PROGRESS NOTE ADULT - SUBJECTIVE AND OBJECTIVE BOX
38 y/o M w/ PMH of crohn's disease complicated by perianal fistulas S/p diversion, pancreatitis, proctitis, p/w abdominal pain. States abdominal pain started yesterday in the LUQ, and radiated to the back. Pain was sharp and constant. Patient admitted with acute pancreatitis- started on IVF and antibiotics.    3/21 patient was seen and examined- complaining of headache and abdominal pain.     ROS:   All 10 systems reviewed and found to be negative with the exception of what has been described above.     Vital Signs Last 24 Hrs  T(C): 36.9 (21 Mar 2023 07:46), Max: 36.9 (21 Mar 2023 07:46)  T(F): 98.4 (21 Mar 2023 07:46), Max: 98.4 (21 Mar 2023 07:46)  HR: 74 (21 Mar 2023 07:46) (74 - 75)  BP: 132/87 (21 Mar 2023 07:46) (132/87 - 157/95)  BP(mean): --  RR: 18 (21 Mar 2023 07:46) (18 - 20)  SpO2: 100% (21 Mar 2023 07:46) (98% - 100%)    Parameters below as of 21 Mar 2023 07:46  Patient On (Oxygen Delivery Method): room air    PE:  Constitutional: NAD, laying in bed  HEENT: NC/AT  Back: no tenderness  Respiratory: respirations even and non labored, clear to auscutation   Cardiovascular: S1S2 regular, no murmurs  Abdomen: soft, not tender, not distended, positive BS  Genitourinary: voiding  Musculoskeletal: no muscle tenderness, no joint swelling or tenderness  Extremities: no pedal edema   Neurological: no focal deficits    MEDICATIONS  (STANDING):  enoxaparin Injectable 40 milliGRAM(s) SubCutaneous every 24 hours  folic acid 1 milliGRAM(s) Oral daily  influenza   Vaccine 0.5 milliLiter(s) IntraMuscular once  magnesium oxide 400 milliGRAM(s) Oral three times a day with meals  multivitamin 1 Tablet(s) Oral daily  potassium chloride  10 mEq/100 mL IVPB 10 milliEquivalent(s) IV Intermittent every 1 hour  sodium chloride 0.9%. 1750 milliLiter(s) (175 mL/Hr) IV Continuous <Continuous>  sodium chloride 0.9%. 1000 milliLiter(s) (175 mL/Hr) IV Continuous <Continuous>  thiamine 100 milliGRAM(s) Oral daily    MEDICATIONS  (PRN):  HYDROmorphone  Injectable 0.5 milliGRAM(s) IV Push every 4 hours PRN Moderate Pain (4 - 6)  LORazepam     Tablet 1 milliGRAM(s) Oral every 4 hours PRN CIWA-Ar score increase by 2 points and a total score of 7 or less  LORazepam     Tablet 2 milliGRAM(s) Oral every 4 hours PRN CIWA-Ar score 8 or greater  ondansetron Injectable 8 milliGRAM(s) IV Push every 8 hours PRN Nausea and/or Vomiting      03-21    135  |  103  |  3<L>  ----------------------------<  154<H>  3.2<L>   |  28  |  0.50    Ca    8.8      21 Mar 2023 08:42  Phos  2.2     03-21  Mg     1.4     03-21    TPro  7.0  /  Alb  2.5<L>  /  TBili  0.9  /  DBili  x   /  AST  15  /  ALT  16  /  AlkPhos  87  03-21                            14.6   11.65 )-----------( 237      ( 21 Mar 2023 08:42 )             42.5     Lipase, Serum: 900 U/L (03.21.23 @ 08:42)   Lipase, Serum: 3675 U/L (03.20.23 @ 13:29)     CT Angio Chest Aorta w/wo IV Cont (03.20.23 @ 15:31)   IMPRESSION:  No dissection.  Increased peripancreatic fat stranding suggestive of acute pancreatitis.        `

## 2023-03-21 NOTE — H&P ADULT - ASSESSMENT
38 y/o M w/ PMH of crohn's disease complicated by perianal fistulas S/p diversion, pancreatitis, proctitis, p/w abdominal pain    *SIRS 2/2 Acute pancreatitis  -CT: Increased peripancreatic fat stranding suggestive of acute pancreatitis  -Lipase = 3675  -IVF  -NPO  -GI consult  -Lipid panel  -Pain control   -EToh cessation     *Crohn's disease w/ rectal wall thickening seen with multiple perianal fistulas on CT  -Given significant leukocytosis -> will start zosyn until further recommendations by colorectal   -F/u colorectal surgery     *Etoh abuse  -Monitor CIWA  -MVI / thiamine / folate   -Etoh cessation     *3.4 cm basilar right lower lobe bleb  -F/u outpatient pulm     *Hyponatremia  -Recheck s/p IVF    *Hypokalemia  -Replete and recheck     *Tobacco cessation counseling  -Declines nicoderm     *DVT ppx   -SCDs 38 y/o M w/ PMH of crohn's disease complicated by perianal fistulas S/p diversion, pancreatitis, proctitis, p/w abdominal pain    *Acute pancreatitis  -CT: Increased peripancreatic fat stranding suggestive of acute pancreatitis  -Lipase = 3675  -IVF  -NPO  -GI consult  -Lipid panel  -Pain control   -EToh cessation     *Questionable sepsis 2/2 Crohn's disease w/ rectal wall thickening seen with multiple perianal fistulas on CT  -Given significant leukocytosis -> will start zosyn until further recommendations by colorectal   -F/u colorectal surgery     *Etoh abuse  -Monitor CIWA  -MVI / thiamine / folate   -Etoh cessation     *3.4 cm basilar right lower lobe bleb  -F/u outpatient pulm     *Hyponatremia  -Recheck s/p IVF    *Hypokalemia  -Replete and recheck     *Tobacco cessation counseling  -Declines nicoderm     *DVT ppx   -SCDs

## 2023-03-21 NOTE — H&P ADULT - HISTORY OF PRESENT ILLNESS
36 y/o M w/ PMH of crohn's disease complicated by perianal fistulas S/p diversion, pancreatitis, proctitis, p/w abdominal pain. States abdominal pain started yesterday in the LUQ, and radiated to the back. Pain was sharp and constant. Patient had associated nausea and chills, but denies vomiting. Denies cough, runny nose, sore throat, SOB. Patient states he does not take any home medications     PSH: RLQ ileostomy, R ankle ORIF, clavicle surgery, perianal fistula s/p diversion     Social Hx: Tobacco - currently smokes 1 ppd, etoh - 4-5 drinks per day, drugs: +marijuana use     Family Hx: Father - DM

## 2023-03-21 NOTE — CONSULT NOTE ADULT - SUBJECTIVE AND OBJECTIVE BOX
Patient is a 37y old  Male who presents with a chief complaint of abdominal pain    HPI:  This is a 37 year old male with Crohn's colitis complicated by ostomy and perianal fistulas admitted for abdominal pain.   Evaluated at bedside this morning. Endorses that pain began yesterday in LUQ radiating around back and still present on exam now. Radiates around back wrapping around left side. Associated symptoms include nausea, loss of appetite. Denies any exacerbating factors "it's just constant." Does state that this pain is similar to past episodes of pancreatitis. Admits to daily drinking- tequila 5+ drinks daily. Scoring 5 on CIWA protocol with obvious irritability and agitation. Refusing ativan from RN. Reports little relief from IV dilaudid. Reports decreased ostomy output due to limited PO. Followed closely by GI Dr. Muñoz @ [facility] for his Crohn's. Denies any change in consistency, denies blood. Does state that this pain is similar to past episodes of pancreatitis. Pt denies any sick contacts, recent travel, or consumption of raw/undercooked foods. Lipase 3600 upon admission. CT with pancreatitis without necrosis or fluid collection.      PAST MEDICAL & SURGICAL HISTORY:  Crohns disease     Pancreatitis      S/P ORIF (open reduction internal fixation) fracture  (Right ankle, 2014)      History of colonoscopy  (2014)      Perianal fistula  repair in 2002    MEDICATIONS  (STANDING):  folic acid 1 milliGRAM(s) Oral daily  influenza   Vaccine 0.5 milliLiter(s) IntraMuscular once  magnesium oxide 400 milliGRAM(s) Oral three times a day with meals  multivitamin 1 Tablet(s) Oral daily  potassium chloride  10 mEq/100 mL IVPB 10 milliEquivalent(s) IV Intermittent every 1 hour  sodium chloride 0.9%. 1750 milliLiter(s) (175 mL/Hr) IV Continuous <Continuous>  sodium chloride 0.9%. 1000 milliLiter(s) (175 mL/Hr) IV Continuous <Continuous>  thiamine 100 milliGRAM(s) Oral daily    MEDICATIONS  (PRN):  HYDROmorphone  Injectable 0.5 milliGRAM(s) IV Push every 4 hours PRN Moderate Pain (4 - 6)  LORazepam     Tablet 1 milliGRAM(s) Oral every 4 hours PRN CIWA-Ar score increase by 2 points and a total score of 7 or less  LORazepam     Tablet 2 milliGRAM(s) Oral every 4 hours PRN CIWA-Ar score 8 or greater  ondansetron Injectable 8 milliGRAM(s) IV Push every 8 hours PRN Nausea and/or Vomiting      Allergies    No Known Allergies    Intolerances        SOCIAL HISTORY:    FAMILY HISTORY:  Diabetes mellitus (Father)        REVIEW OF SYSTEMS:    CONSTITUTIONAL: No weakness, fevers or chills  EYES/ENT: No visual changes;  No vertigo or throat pain   NECK: No pain or stiffness  RESPIRATORY: No cough, wheezing, hemoptysis; No shortness of breath  CARDIOVASCULAR: No chest pain or palpitations  GASTROINTESTINAL: See HPI  GENITOURINARY: No dysuria, frequency or hematuria  NEUROLOGICAL: No numbness or weakness  SKIN: No itching, burning, rashes, or lesions   PSYCH: Normal mood and affect  All other review of systems is negative unless indicated above.    Vital Signs Last 24 Hrs  T(C): 36.9 (21 Mar 2023 07:46), Max: 36.9 (21 Mar 2023 07:46)  T(F): 98.4 (21 Mar 2023 07:46), Max: 98.4 (21 Mar 2023 07:46)  HR: 74 (21 Mar 2023 07:46) (74 - 75)  BP: 132/87 (21 Mar 2023 07:46) (132/87 - 157/95)  BP(mean): --  RR: 18 (21 Mar 2023 07:46) (18 - 20)  SpO2: 100% (21 Mar 2023 07:46) (98% - 100%)    Parameters below as of 21 Mar 2023 07:46  Patient On (Oxygen Delivery Method): room air        PHYSICAL EXAM:    Constitutional: No acute distress, well-developed, non-toxic appearing  HEENT: masked, good phonation, not icteric  Neck: supple, no lymphadenopathy  Respiratory: clear to ascultation bilaterally, no wheezing  Cardiovascular: S1 and S2, regular rate and rhythm, no murmurs rubs or gallops  Gastrointestinal: soft, diffusely tender b/l UQ, non-distended, +bowel sounds, no rebound or guarding, no surgical scars, RLQ ileostomy  Extremities: No peripheral edema, no cyanosis or clubbing  Vascular: 2+ peripheral pulses, no venous stasis  Neurological: A/O x 3, no focal deficits, no asterixis  Psychiatric: Normal mood, normal affect  Skin: No rashes, not jaundiced    LABS:                        14.6   11.65 )-----------( 237      ( 21 Mar 2023 08:42 )             42.5     03-21    135  |  103  |  3<L>  ----------------------------<  154<H>  3.2<L>   |  28  |  0.50    Ca    8.8      21 Mar 2023 08:42  Phos  2.2     03-21  Mg     1.4     03-21    TPro  7.0  /  Alb  2.5<L>  /  TBili  0.9  /  DBili  x   /  AST  15  /  ALT  16  /  AlkPhos  87  03-21    PT/INR - ( 21 Mar 2023 08:42 )   PT: 12.7 sec;   INR: 1.09 ratio         PTT - ( 21 Mar 2023 08:42 )  PTT:28.4 sec  LIVER FUNCTIONS - ( 21 Mar 2023 08:42 )  Alb: 2.5 g/dL / Pro: 7.0 gm/dL / ALK PHOS: 87 U/L / ALT: 16 U/L / AST: 15 U/L / GGT: x             RADIOLOGY & ADDITIONAL STUDIES:  IMPRESSION:  No dissection.    Increased peripancreatic fat stranding suggestive of acute pancreatitis. Patient is a 37y old  Male who presents with a chief complaint of abdominal pain    37 year old man with Crohn's colitis with perinal disease and ostomy, prior pancreatitis, ETOH, admitted with pancreatitis.     Patient states that yesterday had sudden onset LUQ pain. Pain sharp, radiates around to the back. pain is 8/10, constant, without known exacerbating or alleviating factors. Also with nausea, no appetite.  Admits to daily drinking- tequila 5+ drinks daily. Scoring 5 on CIWA protocol with obvious irritability and agitation. Refusing ativan from RN. Reports little relief from IV dilaudid.  Pt denies any sick contacts, recent travel, or consumption of raw/undercooked foods. Of note, reports decreased ostomy output due to limited PO. Followed closely by GI Dr. Muñoz @ [facility] for his Crohn's. Denies overt signs of GI bleeding like hematemesis, melena, hematochezia. Does state that this pain is similar to past episodes of pancreatitis.    PAST MEDICAL & SURGICAL HISTORY:  Crohns disease     ETOH Pancreatitis    S/P ORIF (open reduction internal fixation) fracture  (Right ankle, 2014)      History of colonoscopy  (2014)      Perianal fistula  repair in 2002    MEDICATIONS  (STANDING):  folic acid 1 milliGRAM(s) Oral daily  influenza   Vaccine 0.5 milliLiter(s) IntraMuscular once  magnesium oxide 400 milliGRAM(s) Oral three times a day with meals  multivitamin 1 Tablet(s) Oral daily  potassium chloride  10 mEq/100 mL IVPB 10 milliEquivalent(s) IV Intermittent every 1 hour  sodium chloride 0.9%. 1750 milliLiter(s) (175 mL/Hr) IV Continuous <Continuous>  sodium chloride 0.9%. 1000 milliLiter(s) (175 mL/Hr) IV Continuous <Continuous>  thiamine 100 milliGRAM(s) Oral daily    MEDICATIONS  (PRN):  HYDROmorphone  Injectable 0.5 milliGRAM(s) IV Push every 4 hours PRN Moderate Pain (4 - 6)  LORazepam     Tablet 1 milliGRAM(s) Oral every 4 hours PRN CIWA-Ar score increase by 2 points and a total score of 7 or less  LORazepam     Tablet 2 milliGRAM(s) Oral every 4 hours PRN CIWA-Ar score 8 or greater  ondansetron Injectable 8 milliGRAM(s) IV Push every 8 hours PRN Nausea and/or Vomiting      Allergies    No Known Allergies    Intolerances    SOCIAL HISTORY:  no smoking, no drugs, ETOH drinks at least 5 shots of tequila daily    FAMILY HISTORY:  Diabetes mellitus (Father)  no colon or stomach cancer    REVIEW OF SYSTEMS:    CONSTITUTIONAL: No weakness, fevers or chills  EYES/ENT: No visual changes;  No vertigo or throat pain   NECK: No pain or stiffness  RESPIRATORY: No cough, wheezing, hemoptysis; No shortness of breath  CARDIOVASCULAR: No chest pain or palpitations  GASTROINTESTINAL: See HPI  GENITOURINARY: No dysuria, frequency or hematuria  NEUROLOGICAL: No numbness or weakness  SKIN: No itching, burning, rashes, or lesions   PSYCH: Normal mood and affect  All other review of systems is negative unless indicated above.    Vital Signs Last 24 Hrs  T(C): 36.9 (21 Mar 2023 07:46), Max: 36.9 (21 Mar 2023 07:46)  T(F): 98.4 (21 Mar 2023 07:46), Max: 98.4 (21 Mar 2023 07:46)  HR: 74 (21 Mar 2023 07:46) (74 - 75)  BP: 132/87 (21 Mar 2023 07:46) (132/87 - 157/95)  BP(mean): --  RR: 18 (21 Mar 2023 07:46) (18 - 20)  SpO2: 100% (21 Mar 2023 07:46) (98% - 100%)    Parameters below as of 21 Mar 2023 07:46  Patient On (Oxygen Delivery Method): room air        PHYSICAL EXAM:    Constitutional: No acute distress, well-developed, non-toxic appearing  HEENT: masked, good phonation, not icteric  Neck: supple, no lymphadenopathy  Respiratory: clear to ascultation bilaterally, no wheezing  Cardiovascular: S1 and S2, regular rate and rhythm, no murmurs rubs or gallops  Gastrointestinal: soft, diffusely tender b/l UQ, non-distended, +bowel sounds, no rebound or guarding, + surgical scars, RLQ ileostomy  Extremities: No peripheral edema, no cyanosis or clubbing  Vascular: 2+ peripheral pulses, no venous stasis  Neurological: A/O x 3, no focal deficits, no asterixis  Psychiatric: Normal mood, normal affect  Skin: No rashes, not jaundiced    LABS:                        14.6   11.65 )-----------( 237      ( 21 Mar 2023 08:42 )             42.5     03-21    135  |  103  |  3<L>  ----------------------------<  154<H>  3.2<L>   |  28  |  0.50    Ca    8.8      21 Mar 2023 08:42  Phos  2.2     03-21  Mg     1.4     03-21    TPro  7.0  /  Alb  2.5<L>  /  TBili  0.9  /  DBili  x   /  AST  15  /  ALT  16  /  AlkPhos  87  03-21    PT/INR - ( 21 Mar 2023 08:42 )   PT: 12.7 sec;   INR: 1.09 ratio         PTT - ( 21 Mar 2023 08:42 )  PTT:28.4 sec  LIVER FUNCTIONS - ( 21 Mar 2023 08:42 )  Alb: 2.5 g/dL / Pro: 7.0 gm/dL / ALK PHOS: 87 U/L / ALT: 16 U/L / AST: 15 U/L / GGT: x        lipase 3600       RADIOLOGY & ADDITIONAL STUDIES:  IMPRESSION:  No dissection,pancreatitis    Increased peripancreatic fat stranding suggestive of acute pancreatitis.

## 2023-03-21 NOTE — CONSULT NOTE ADULT - SUBJECTIVE AND OBJECTIVE BOX
Patient is a 37y old  Male who presents with a chief complaint of abdominal pain (21 Mar 2023 00:43)    HPI:  38 y/o M w/ PMH of crohn's disease complicated by perianal fistulas S/p diversion, pancreatitis, proctitis, p/w abdominal pain. States abdominal pain started 3/19 in the LUQ, and radiated to the back. Pain was sharp and constant. Patient had associated nausea and chills, but denies vomiting. Denies cough, runny nose, sore throat, SOB. Patient states he does not take any home medications. Here imaging shows acute pancreatitis, was given IV zosyn d/t concern for infection.     PSH: RLQ ileostomy, R ankle ORIF, clavicle surgery, perianal fistula s/p diversion   PMH: as above    Meds: per reconciliation sheet, noted below  MEDICATIONS  (STANDING):  folic acid 1 milliGRAM(s) Oral daily  influenza   Vaccine 0.5 milliLiter(s) IntraMuscular once  magnesium oxide 400 milliGRAM(s) Oral three times a day with meals  multivitamin 1 Tablet(s) Oral daily  potassium chloride  10 mEq/100 mL IVPB 10 milliEquivalent(s) IV Intermittent every 1 hour  sodium chloride 0.9%. 1750 milliLiter(s) (175 mL/Hr) IV Continuous <Continuous>  sodium chloride 0.9%. 1000 milliLiter(s) (175 mL/Hr) IV Continuous <Continuous>  thiamine 100 milliGRAM(s) Oral daily    Allergies    No Known Allergies    Intolerances      Social: Tobacco - currently smokes 1 ppd, etoh - 4-5 drinks per day, drugs: +marijuana use   FAMILY HISTORY:  Diabetes mellitus (Father)       no history of premature cardiovascular disease in first degree relatives    ROS: the patient denies fever, no chills, no HA, no dizziness, no sore throat, no blurry vision, no CP, no palpitations, no SOB, no cough, + abdominal pain, no diarrhea, no N/V, no dysuria, no leg pain, no claudication, no rash, no joint aches, no rectal pain or bleeding, no night sweats  All other systems reviewed and are negative    Vital Signs Last 24 Hrs  T(C): 36.9 (21 Mar 2023 07:46), Max: 36.9 (21 Mar 2023 07:46)  T(F): 98.4 (21 Mar 2023 07:46), Max: 98.4 (21 Mar 2023 07:46)  HR: 74 (21 Mar 2023 07:46) (74 - 90)  BP: 132/87 (21 Mar 2023 07:46) (127/86 - 157/95)  BP(mean): 97 (20 Mar 2023 12:10) (97 - 97)  RR: 18 (21 Mar 2023 07:46) (18 - 22)  SpO2: 100% (21 Mar 2023 07:46) (98% - 100%)    Parameters below as of 21 Mar 2023 07:46  Patient On (Oxygen Delivery Method): room air      Daily     Daily     PE:  Constitutional: NAD  HEENT: NC/AT, EOMI, PERRLA, conjunctivae clear; ears and nose atraumatic; pharynx benign  Neck: supple; thyroid not palpable  Back: no tenderness  Respiratory: respiratory effort normal; clear to auscultation  Cardiovascular: S1S2 regular, no murmurs  Abdomen: soft, not tender, not distended, positive BS; liver and spleen WNL  Genitourinary: no suprapubic tenderness  Lymphatic: no LN palpable  Musculoskeletal: no muscle tenderness, no joint swelling or tenderness  Extremities: no pedal edema  Neurological/ Psychiatric: AxOx3, Judgement and insight normal;  moving all extremities  Skin: no rashes; no palpable lesions    Labs: all available labs reviewed                        14.6   11.65 )-----------( 237      ( 21 Mar 2023 08:42 )             42.5     03-21    135  |  103  |  3<L>  ----------------------------<  154<H>  3.2<L>   |  28  |  0.50    Ca    8.8      21 Mar 2023 08:42  Phos  2.2     03-21  Mg     1.4     03-21    TPro  7.0  /  Alb  2.5<L>  /  TBili  0.9  /  DBili  x   /  AST  15  /  ALT  16  /  AlkPhos  87  03-21     LIVER FUNCTIONS - ( 21 Mar 2023 08:42 )  Alb: 2.5 g/dL / Pro: 7.0 gm/dL / ALK PHOS: 87 U/L / ALT: 16 U/L / AST: 15 U/L / GGT: x                 Radiology: all available radiological tests reviewed  < from: CT Angio Abdomen and Pelvis w/ IV Cont (03.20.23 @ 15:30) >    ACC: [id number] EXAM:  CT ANGIO CHEST AORTA WAWIC   ORDERED BY: IVAN ESPINAL     ACC: [id number] EXAM:  CT ANGIO ABD PELV (W)AW IC   ORDERED BY: IVAN ESPINAL     PROCEDURE DATE:  03/20/2023          INTERPRETATION:  CLINICAL INFORMATION: Severe chest pain, history of   pancreatitis    COMPARISON: CT abdomen and pelvis 2/23/2022    CONTRAST/COMPLICATIONS:  IV Contrast: Omnipaque 350 90 cc administered   10 cc discarded  Oral Contrast: NONE  Complications: None reported at time of study completion    PROCEDURE:  CT Angiography of the Chest, Abdomen and Pelvis.  Precontrast imaging was performed through the chest followed by arterial   phase imaging of the chest, abdomen and pelvis.  Sagittal and coronal reformats were performed as well as 3D (MIP)   reconstructions.    FINDINGS:  CHEST:  LUNGS AND LARGE AIRWAYS: Patent central airways. 3.4 cm basilar right   lower lobe bleb. The lungs are otherwise clear.  PLEURA: No pleural effusion. No pneumothorax.  VESSELS: Normal aorta. No pulmonary embolus.  HEART: Heart size is normal. No pericardial effusion. Minimal left   circumflex coronary calcification.  MEDIASTINUM AND FELICE: No lymphadenopathy.  CHEST WALL AND LOWER NECK: Within normal limits.    ABDOMEN AND PELVIS:  LIVER: Mild hepatic steatosis.  BILE DUCTS: Normal caliber.  GALLBLADDER: Within normal limits.  SPLEEN: Within normal limits.  PANCREAS: Increased fat stranding surrounding the pancreas representing   acute pancreatitis. No evidence of necrosis. No collection.  ADRENALS: Within normal limits.  KIDNEYS/URETERS: Within normal limits.    BLADDER: Within normal limits.  REPRODUCTIVE ORGANS: Prostate within normal limits.    BOWEL: No obstruction. Right lower quadrant ileostomy again noted. Rectal   wall thickening again seen with multiple perianal fistulas.  PERITONEUM: No ascites.  VESSELS: No aortic aneurysm or dissection. The portal vein is not   evaluated secondary to arterial phase of the contrast.  RETROPERITONEUM/LYMPH NODES: No lymphadenopathy.  ABDOMINAL WALL: Right lower quadrant ileostomy.  BONES: Old fracture deformity of posterior left 10th rib.    IMPRESSION:  No dissection.    Increased peripancreatic fat stranding suggestive of acute pancreatitis.    --- End of Report ---            < end of copied text >    Advanced directives addressed: full resuscitation

## 2023-03-22 LAB
ANION GAP SERPL CALC-SCNC: 7 MMOL/L — SIGNIFICANT CHANGE UP (ref 5–17)
BUN SERPL-MCNC: 7 MG/DL — SIGNIFICANT CHANGE UP (ref 7–23)
CALCIUM SERPL-MCNC: 9 MG/DL — SIGNIFICANT CHANGE UP (ref 8.5–10.1)
CHLORIDE SERPL-SCNC: 101 MMOL/L — SIGNIFICANT CHANGE UP (ref 96–108)
CO2 SERPL-SCNC: 25 MMOL/L — SIGNIFICANT CHANGE UP (ref 22–31)
CREAT SERPL-MCNC: 0.52 MG/DL — SIGNIFICANT CHANGE UP (ref 0.5–1.3)
EGFR: 133 ML/MIN/1.73M2 — SIGNIFICANT CHANGE UP
GLUCOSE SERPL-MCNC: 65 MG/DL — LOW (ref 70–99)
HCT VFR BLD CALC: 44 % — SIGNIFICANT CHANGE UP (ref 39–50)
HGB BLD-MCNC: 15 G/DL — SIGNIFICANT CHANGE UP (ref 13–17)
MAGNESIUM SERPL-MCNC: 1.7 MG/DL — SIGNIFICANT CHANGE UP (ref 1.6–2.6)
MCHC RBC-ENTMCNC: 34.1 GM/DL — SIGNIFICANT CHANGE UP (ref 32–36)
MCHC RBC-ENTMCNC: 35.3 PG — HIGH (ref 27–34)
MCV RBC AUTO: 103.5 FL — HIGH (ref 80–100)
PHOSPHATE SERPL-MCNC: 2.4 MG/DL — LOW (ref 2.5–4.5)
PLATELET # BLD AUTO: 225 K/UL — SIGNIFICANT CHANGE UP (ref 150–400)
POTASSIUM SERPL-MCNC: 3.1 MMOL/L — LOW (ref 3.5–5.3)
POTASSIUM SERPL-SCNC: 3.1 MMOL/L — LOW (ref 3.5–5.3)
RBC # BLD: 4.25 M/UL — SIGNIFICANT CHANGE UP (ref 4.2–5.8)
RBC # FLD: 14.5 % — SIGNIFICANT CHANGE UP (ref 10.3–14.5)
SODIUM SERPL-SCNC: 133 MMOL/L — LOW (ref 135–145)
WBC # BLD: 9.77 K/UL — SIGNIFICANT CHANGE UP (ref 3.8–10.5)
WBC # FLD AUTO: 9.77 K/UL — SIGNIFICANT CHANGE UP (ref 3.8–10.5)

## 2023-03-22 PROCEDURE — 99232 SBSQ HOSP IP/OBS MODERATE 35: CPT | Mod: GC

## 2023-03-22 PROCEDURE — 99232 SBSQ HOSP IP/OBS MODERATE 35: CPT

## 2023-03-22 RX ORDER — ZOLPIDEM TARTRATE 10 MG/1
5 TABLET ORAL AT BEDTIME
Refills: 0 | Status: DISCONTINUED | OUTPATIENT
Start: 2023-03-22 | End: 2023-03-28

## 2023-03-22 RX ORDER — SODIUM CHLORIDE 9 MG/ML
1000 INJECTION INTRAMUSCULAR; INTRAVENOUS; SUBCUTANEOUS
Refills: 0 | Status: COMPLETED | OUTPATIENT
Start: 2023-03-22 | End: 2023-03-22

## 2023-03-22 RX ORDER — POTASSIUM CHLORIDE 20 MEQ
40 PACKET (EA) ORAL EVERY 4 HOURS
Refills: 0 | Status: COMPLETED | OUTPATIENT
Start: 2023-03-22 | End: 2023-03-22

## 2023-03-22 RX ADMIN — MAGNESIUM OXIDE 400 MG ORAL TABLET 400 MILLIGRAM(S): 241.3 TABLET ORAL at 07:48

## 2023-03-22 RX ADMIN — MAGNESIUM OXIDE 400 MG ORAL TABLET 400 MILLIGRAM(S): 241.3 TABLET ORAL at 11:32

## 2023-03-22 RX ADMIN — HYDROMORPHONE HYDROCHLORIDE 0.5 MILLIGRAM(S): 2 INJECTION INTRAMUSCULAR; INTRAVENOUS; SUBCUTANEOUS at 11:15

## 2023-03-22 RX ADMIN — MAGNESIUM OXIDE 400 MG ORAL TABLET 400 MILLIGRAM(S): 241.3 TABLET ORAL at 16:33

## 2023-03-22 RX ADMIN — HYDROMORPHONE HYDROCHLORIDE 0.5 MILLIGRAM(S): 2 INJECTION INTRAMUSCULAR; INTRAVENOUS; SUBCUTANEOUS at 20:50

## 2023-03-22 RX ADMIN — Medication 1 MILLIGRAM(S): at 10:39

## 2023-03-22 RX ADMIN — HYDROMORPHONE HYDROCHLORIDE 0.5 MILLIGRAM(S): 2 INJECTION INTRAMUSCULAR; INTRAVENOUS; SUBCUTANEOUS at 20:36

## 2023-03-22 RX ADMIN — Medication 1 TABLET(S): at 10:40

## 2023-03-22 RX ADMIN — HYDROMORPHONE HYDROCHLORIDE 0.5 MILLIGRAM(S): 2 INJECTION INTRAMUSCULAR; INTRAVENOUS; SUBCUTANEOUS at 00:43

## 2023-03-22 RX ADMIN — SODIUM CHLORIDE 75 MILLILITER(S): 9 INJECTION INTRAMUSCULAR; INTRAVENOUS; SUBCUTANEOUS at 12:12

## 2023-03-22 RX ADMIN — SODIUM CHLORIDE 175 MILLILITER(S): 9 INJECTION INTRAMUSCULAR; INTRAVENOUS; SUBCUTANEOUS at 05:42

## 2023-03-22 RX ADMIN — HYDROMORPHONE HYDROCHLORIDE 0.5 MILLIGRAM(S): 2 INJECTION INTRAMUSCULAR; INTRAVENOUS; SUBCUTANEOUS at 10:39

## 2023-03-22 RX ADMIN — HYDROMORPHONE HYDROCHLORIDE 0.5 MILLIGRAM(S): 2 INJECTION INTRAMUSCULAR; INTRAVENOUS; SUBCUTANEOUS at 15:21

## 2023-03-22 RX ADMIN — ONDANSETRON 8 MILLIGRAM(S): 8 TABLET, FILM COATED ORAL at 12:13

## 2023-03-22 RX ADMIN — HYDROMORPHONE HYDROCHLORIDE 0.5 MILLIGRAM(S): 2 INJECTION INTRAMUSCULAR; INTRAVENOUS; SUBCUTANEOUS at 14:55

## 2023-03-22 RX ADMIN — ZOLPIDEM TARTRATE 5 MILLIGRAM(S): 10 TABLET ORAL at 22:29

## 2023-03-22 RX ADMIN — Medication 40 MILLIEQUIVALENT(S): at 10:39

## 2023-03-22 RX ADMIN — HYDROMORPHONE HYDROCHLORIDE 0.5 MILLIGRAM(S): 2 INJECTION INTRAMUSCULAR; INTRAVENOUS; SUBCUTANEOUS at 00:13

## 2023-03-22 RX ADMIN — Medication 40 MILLIEQUIVALENT(S): at 13:51

## 2023-03-22 RX ADMIN — HYDROMORPHONE HYDROCHLORIDE 0.5 MILLIGRAM(S): 2 INJECTION INTRAMUSCULAR; INTRAVENOUS; SUBCUTANEOUS at 06:58

## 2023-03-22 RX ADMIN — Medication 100 MILLIGRAM(S): at 10:39

## 2023-03-22 NOTE — PROGRESS NOTE ADULT - ASSESSMENT
37 year old male with Crohn's colitis complicated by ostomy and perianal fistulas admitted for abdominal pain with history multiple hospitalization for etoh- pancreatitis    Imp: Acute etoh induced pancreatitis    With generally improved condition and better pain control  Tolerated PO with minimal nausea    Rec:  ::Stop IVF  ::CIWA protocol  ::Pain control  ::Antiemetics  ::May transition to PO pain meds and likely dc to home tomorrow  ::ETOH cessation/counseling  ::High risk VTE due to history IBD--->chemical prophylaxis recommended despite young and ambulatory

## 2023-03-22 NOTE — PROGRESS NOTE ADULT - ASSESSMENT
36 y/o M w/ PMH of crohn's disease complicated by perianal fistulas S/p diversion, pancreatitis, proctitis, p/w abdominal pain    *Acute pancreatitis  -CT: Increased peripancreatic fat stranding suggestive of acute pancreatitis  -Lipase trending down   -IVF-  -NPO- advanced to low fat   -GI consult appreciated   -Lipid panel  -Pain control   -ETOH cessation   - dc CIWA- not scoring   - Ambien PRN for insomnia     *Questionable sepsis 2/2 Crohn's disease w/ rectal wall thickening seen with multiple perianal fistulas on CT  - Given significant leukocytosis -> will start zosyn until further recommendations by colorectal   - ID consult appreciated   - F/u colorectal surgery     *Etoh abuse  -Monitor CIWA- dc   -MVI / thiamine / folate   -Etoh cessation     *3.4 cm basilar right lower lobe bleb  -F/u outpatient pulm     *Hyponatremia  -monitor     *Hypokalemia  -Replete and recheck     *Tobacco cessation counseling  -Declines nicoderm     *DVT ppx   -SCDs    Case d/w team on IDR. Possible dc in am.

## 2023-03-22 NOTE — PROGRESS NOTE ADULT - SUBJECTIVE AND OBJECTIVE BOX
36 y/o M w/ PMH of crohn's disease complicated by perianal fistulas S/p diversion, pancreatitis, proctitis, p/w abdominal pain. States abdominal pain started yesterday in the LUQ, and radiated to the back. Pain was sharp and constant. Patient admitted with acute pancreatitis- started on IVF and antibiotics.    3/21 patient was seen and examined- complaining of headache and abdominal pain.   3/22- patient was seen and examined. denies abdominal pain, tolerating diet     ROS:   All 10 systems reviewed and found to be negative with the exception of what has been described above.     Vital Signs Last 24 Hrs  T(C): 36.7 (22 Mar 2023 07:40), Max: 37.1 (21 Mar 2023 15:44)  T(F): 98.1 (22 Mar 2023 07:40), Max: 98.8 (21 Mar 2023 15:44)  HR: 74 (22 Mar 2023 10:30) (74 - 96)  BP: 118/77 (22 Mar 2023 10:30) (118/77 - 134/77)  BP(mean): 90 (21 Mar 2023 20:00) (90 - 90)  RR: 18 (22 Mar 2023 10:30) (18 - 18)  SpO2: 100% (22 Mar 2023 10:30) (98% - 100%)    Parameters below as of 22 Mar 2023 10:30  Patient On (Oxygen Delivery Method): room air        PE:  Constitutional: NAD, laying in bed  HEENT: NC/AT  Back: no tenderness  Respiratory: respirations even and non labored, clear to auscultation   Cardiovascular: S1S2 regular, no murmurs  Abdomen: soft, not tender, not distended, positive BS  Genitourinary: voiding  Musculoskeletal: no muscle tenderness, no joint swelling or tenderness  Extremities: no pedal edema   Neurological: no focal deficits    MEDICATIONS  (STANDING):  enoxaparin Injectable 40 milliGRAM(s) SubCutaneous every 24 hours  folic acid 1 milliGRAM(s) Oral daily  influenza   Vaccine 0.5 milliLiter(s) IntraMuscular once  magnesium oxide 400 milliGRAM(s) Oral three times a day with meals  multivitamin 1 Tablet(s) Oral daily  potassium chloride    Tablet ER 40 milliEquivalent(s) Oral every 4 hours  thiamine 100 milliGRAM(s) Oral daily    MEDICATIONS  (PRN):  HYDROmorphone  Injectable 0.5 milliGRAM(s) IV Push every 4 hours PRN Moderate Pain (4 - 6)  melatonin 5 milliGRAM(s) Oral at bedtime PRN Sleep  ondansetron Injectable 8 milliGRAM(s) IV Push every 8 hours PRN Nausea and/or Vomiting  zolpidem 5 milliGRAM(s) Oral at bedtime PRN Insomnia        03-22    133<L>  |  101  |  7   ----------------------------<  65<L>  3.1<L>   |  25  |  0.52    Ca    9.0      22 Mar 2023 06:54  Phos  2.4     03-22  Mg     1.7     03-22    TPro  7.0  /  Alb  2.5<L>  /  TBili  0.9  /  DBili  x   /  AST  15  /  ALT  16  /  AlkPhos  87  03-21                            15.0   9.77  )-----------( 225      ( 22 Mar 2023 06:54 )             44.0       03-21    135  |  103  |  3<L>  ----------------------------<  154<H>  3.2<L>   |  28  |  0.50    Ca    8.8      21 Mar 2023 08:42  Phos  2.2     03-21  Mg     1.4     03-21    TPro  7.0  /  Alb  2.5<L>  /  TBili  0.9  /  DBili  x   /  AST  15  /  ALT  16  /  AlkPhos  87  03-21                            14.6   11.65 )-----------( 237      ( 21 Mar 2023 08:42 )             42.5     Lipase, Serum: 900 U/L (03.21.23 @ 08:42)   Lipase, Serum: 3675 U/L (03.20.23 @ 13:29)     CT Angio Chest Aorta w/wo IV Cont (03.20.23 @ 15:31)   IMPRESSION:  No dissection.  Increased peripancreatic fat stranding suggestive of acute pancreatitis.        `

## 2023-03-22 NOTE — PROGRESS NOTE ADULT - SUBJECTIVE AND OBJECTIVE BOX
Patient is a 37y old  Male who presents with a chief complaint of abdominal pain    Followup: etoh- pancreatitis    Tolerated diet, "but I ate too fast" with reported nausea, to be medicated now. Denies vomiting. Reports pain as controlled on IV dilaudid      MEDICATIONS  (STANDING):  enoxaparin Injectable 40 milliGRAM(s) SubCutaneous every 24 hours  folic acid 1 milliGRAM(s) Oral daily  influenza   Vaccine 0.5 milliLiter(s) IntraMuscular once  magnesium oxide 400 milliGRAM(s) Oral three times a day with meals  multivitamin 1 Tablet(s) Oral daily  thiamine 100 milliGRAM(s) Oral daily    MEDICATIONS  (PRN):  HYDROmorphone  Injectable 0.5 milliGRAM(s) IV Push every 4 hours PRN Moderate Pain (4 - 6)  melatonin 5 milliGRAM(s) Oral at bedtime PRN Sleep  ondansetron Injectable 8 milliGRAM(s) IV Push every 8 hours PRN Nausea and/or Vomiting  zolpidem 5 milliGRAM(s) Oral at bedtime PRN Insomnia      Vital Signs Last 24 Hrs  T(C): 36.7 (22 Mar 2023 07:40), Max: 37.1 (21 Mar 2023 15:44)  T(F): 98.1 (22 Mar 2023 07:40), Max: 98.8 (21 Mar 2023 15:44)  HR: 74 (22 Mar 2023 10:30) (74 - 96)  BP: 118/77 (22 Mar 2023 10:30) (118/77 - 134/77)  BP(mean): 90 (21 Mar 2023 20:00) (90 - 90)  RR: 18 (22 Mar 2023 10:30) (18 - 18)  SpO2: 100% (22 Mar 2023 10:30) (98% - 100%)    Parameters below as of 22 Mar 2023 10:30  Patient On (Oxygen Delivery Method): room air        PHYSICAL EXAM:    Constitutional: No acute distress, well-developed, non-toxic appearing  HEENT: masked, good phonation, not icteric  Neck: supple, no lymphadenopathy  Respiratory: clear to ascultation bilaterally, no wheezing  Cardiovascular: S1 and S2, regular rate and rhythm, no murmurs rubs or gallops  Gastrointestinal: soft, LUQ tender, non-distended, +bowel sounds, no rebound or guarding, no surgical scars, no drains  Extremities: No peripheral edema, no cyanosis or clubbing  Vascular: 2+ peripheral pulses, no venous stasis  Neurological: A/O x 3, no focal deficits, no asterixis  Psychiatric: Normal mood, normal affect  Skin: No rashes, not jaundiced    LABS:                        15.0   9.77  )-----------( 225      ( 22 Mar 2023 06:54 )             44.0     03-22    133<L>  |  101  |  7   ----------------------------<  65<L>  3.1<L>   |  25  |  0.52    Ca    9.0      22 Mar 2023 06:54  Phos  2.4     03-22  Mg     1.7     03-22    TPro  7.0  /  Alb  2.5<L>  /  TBili  0.9  /  DBili  x   /  AST  15  /  ALT  16  /  AlkPhos  87  03-21    PT/INR - ( 21 Mar 2023 08:42 )   PT: 12.7 sec;   INR: 1.09 ratio         PTT - ( 21 Mar 2023 08:42 )  PTT:28.4 sec  LIVER FUNCTIONS - ( 21 Mar 2023 08:42 )  Alb: 2.5 g/dL / Pro: 7.0 gm/dL / ALK PHOS: 87 U/L / ALT: 16 U/L / AST: 15 U/L / GGT: x             RADIOLOGY & ADDITIONAL STUDIES: Patient is a 37y old  Male who presents with a chief complaint of abdominal pain    Followup: etoh- pancreatitis    Tolerated diet, "but I ate too fast" with reported nausea, to be medicated now. Denies vomiting. Reports pain as controlled on IV dilaudid      MEDICATIONS  (STANDING):  enoxaparin Injectable 40 milliGRAM(s) SubCutaneous every 24 hours  folic acid 1 milliGRAM(s) Oral daily  influenza   Vaccine 0.5 milliLiter(s) IntraMuscular once  magnesium oxide 400 milliGRAM(s) Oral three times a day with meals  multivitamin 1 Tablet(s) Oral daily  thiamine 100 milliGRAM(s) Oral daily    MEDICATIONS  (PRN):  HYDROmorphone  Injectable 0.5 milliGRAM(s) IV Push every 4 hours PRN Moderate Pain (4 - 6)  melatonin 5 milliGRAM(s) Oral at bedtime PRN Sleep  ondansetron Injectable 8 milliGRAM(s) IV Push every 8 hours PRN Nausea and/or Vomiting  zolpidem 5 milliGRAM(s) Oral at bedtime PRN Insomnia      Vital Signs Last 24 Hrs  T(C): 36.7 (22 Mar 2023 07:40), Max: 37.1 (21 Mar 2023 15:44)  T(F): 98.1 (22 Mar 2023 07:40), Max: 98.8 (21 Mar 2023 15:44)  HR: 74 (22 Mar 2023 10:30) (74 - 96)  BP: 118/77 (22 Mar 2023 10:30) (118/77 - 134/77)  BP(mean): 90 (21 Mar 2023 20:00) (90 - 90)  RR: 18 (22 Mar 2023 10:30) (18 - 18)  SpO2: 100% (22 Mar 2023 10:30) (98% - 100%)    Parameters below as of 22 Mar 2023 10:30  Patient On (Oxygen Delivery Method): room air        PHYSICAL EXAM:    Constitutional: No acute distress, well-developed, non-toxic appearing  HEENT: masked, good phonation, not icteric  Neck: supple, no lymphadenopathy  Respiratory: clear to ascultation bilaterally, no wheezing  Cardiovascular: S1 and S2, regular rate and rhythm, no murmurs rubs or gallops  Gastrointestinal: soft, LUQ tender, non-distended, +bowel sounds, no rebound or guarding,   Extremities: No peripheral edema, no cyanosis or clubbing  Vascular: 2+ peripheral pulses, no venous stasis  Neurological: A/O x 3, no focal deficits, no asterixis  Psychiatric: Normal mood, normal affect  Skin: No rashes, not jaundiced    LABS:                        15.0   9.77  )-----------( 225      ( 22 Mar 2023 06:54 )             44.0     03-22    133<L>  |  101  |  7   ----------------------------<  65<L>  3.1<L>   |  25  |  0.52    Ca    9.0      22 Mar 2023 06:54  Phos  2.4     03-22  Mg     1.7     03-22    TPro  7.0  /  Alb  2.5<L>  /  TBili  0.9  /  DBili  x   /  AST  15  /  ALT  16  /  AlkPhos  87  03-21    PT/INR - ( 21 Mar 2023 08:42 )   PT: 12.7 sec;   INR: 1.09 ratio         PTT - ( 21 Mar 2023 08:42 )  PTT:28.4 sec  LIVER FUNCTIONS - ( 21 Mar 2023 08:42 )  Alb: 2.5 g/dL / Pro: 7.0 gm/dL / ALK PHOS: 87 U/L / ALT: 16 U/L / AST: 15 U/L / GGT: x             RADIOLOGY & ADDITIONAL STUDIES:

## 2023-03-23 PROCEDURE — 99232 SBSQ HOSP IP/OBS MODERATE 35: CPT

## 2023-03-23 RX ORDER — HYDROMORPHONE HYDROCHLORIDE 2 MG/ML
2 INJECTION INTRAMUSCULAR; INTRAVENOUS; SUBCUTANEOUS EVERY 4 HOURS
Refills: 0 | Status: DISCONTINUED | OUTPATIENT
Start: 2023-03-23 | End: 2023-03-26

## 2023-03-23 RX ADMIN — HYDROMORPHONE HYDROCHLORIDE 0.5 MILLIGRAM(S): 2 INJECTION INTRAMUSCULAR; INTRAVENOUS; SUBCUTANEOUS at 05:36

## 2023-03-23 RX ADMIN — HYDROMORPHONE HYDROCHLORIDE 0.5 MILLIGRAM(S): 2 INJECTION INTRAMUSCULAR; INTRAVENOUS; SUBCUTANEOUS at 14:30

## 2023-03-23 RX ADMIN — MAGNESIUM OXIDE 400 MG ORAL TABLET 400 MILLIGRAM(S): 241.3 TABLET ORAL at 12:10

## 2023-03-23 RX ADMIN — HYDROMORPHONE HYDROCHLORIDE 0.5 MILLIGRAM(S): 2 INJECTION INTRAMUSCULAR; INTRAVENOUS; SUBCUTANEOUS at 01:20

## 2023-03-23 RX ADMIN — HYDROMORPHONE HYDROCHLORIDE 0.5 MILLIGRAM(S): 2 INJECTION INTRAMUSCULAR; INTRAVENOUS; SUBCUTANEOUS at 09:39

## 2023-03-23 RX ADMIN — MAGNESIUM OXIDE 400 MG ORAL TABLET 400 MILLIGRAM(S): 241.3 TABLET ORAL at 07:56

## 2023-03-23 RX ADMIN — Medication 100 MILLIGRAM(S): at 09:39

## 2023-03-23 RX ADMIN — Medication 1 MILLIGRAM(S): at 09:39

## 2023-03-23 RX ADMIN — ONDANSETRON 8 MILLIGRAM(S): 8 TABLET, FILM COATED ORAL at 13:52

## 2023-03-23 RX ADMIN — ONDANSETRON 8 MILLIGRAM(S): 8 TABLET, FILM COATED ORAL at 23:32

## 2023-03-23 RX ADMIN — HYDROMORPHONE HYDROCHLORIDE 0.5 MILLIGRAM(S): 2 INJECTION INTRAMUSCULAR; INTRAVENOUS; SUBCUTANEOUS at 13:52

## 2023-03-23 RX ADMIN — MAGNESIUM OXIDE 400 MG ORAL TABLET 400 MILLIGRAM(S): 241.3 TABLET ORAL at 17:00

## 2023-03-23 RX ADMIN — HYDROMORPHONE HYDROCHLORIDE 2 MILLIGRAM(S): 2 INJECTION INTRAMUSCULAR; INTRAVENOUS; SUBCUTANEOUS at 18:37

## 2023-03-23 RX ADMIN — HYDROMORPHONE HYDROCHLORIDE 2 MILLIGRAM(S): 2 INJECTION INTRAMUSCULAR; INTRAVENOUS; SUBCUTANEOUS at 23:32

## 2023-03-23 RX ADMIN — HYDROMORPHONE HYDROCHLORIDE 0.5 MILLIGRAM(S): 2 INJECTION INTRAMUSCULAR; INTRAVENOUS; SUBCUTANEOUS at 01:03

## 2023-03-23 RX ADMIN — Medication 1 TABLET(S): at 09:39

## 2023-03-23 RX ADMIN — HYDROMORPHONE HYDROCHLORIDE 2 MILLIGRAM(S): 2 INJECTION INTRAMUSCULAR; INTRAVENOUS; SUBCUTANEOUS at 19:15

## 2023-03-23 RX ADMIN — HYDROMORPHONE HYDROCHLORIDE 0.5 MILLIGRAM(S): 2 INJECTION INTRAMUSCULAR; INTRAVENOUS; SUBCUTANEOUS at 10:15

## 2023-03-23 NOTE — PROGRESS NOTE ADULT - SUBJECTIVE AND OBJECTIVE BOX
36 y/o M w/ PMH of crohn's disease complicated by perianal fistulas S/p diversion, pancreatitis, proctitis, p/w abdominal pain. States abdominal pain started yesterday in the LUQ, and radiated to the back. Pain was sharp and constant. Patient admitted with acute pancreatitis- started on IVF and antibiotics.    3/21 patient was seen and examined- complaining of headache and abdominal pain.   3/22- patient was seen and examined. denies abdominal pain, tolerating diet   3/23- complaining of abdominal pain after eating lunch, stated that felt pressure in his rectum- similar to when she was developing a fistula. +nausea.    ROS:   All 10 systems reviewed and found to be negative with the exception of what has been described above.     Vital Signs Last 24 Hrs  T(C): 36.6 (23 Mar 2023 08:06), Max: 36.8 (22 Mar 2023 16:12)  T(F): 97.9 (23 Mar 2023 08:06), Max: 98.2 (22 Mar 2023 16:12)  HR: 83 (23 Mar 2023 13:40) (78 - 92)  BP: 117/72 (23 Mar 2023 13:40) (111/70 - 126/63)  BP(mean): --  RR: 18 (23 Mar 2023 13:40) (18 - 18)  SpO2: 95% (23 Mar 2023 13:40) (94% - 100%)    Parameters below as of 23 Mar 2023 13:40  Patient On (Oxygen Delivery Method): room air      PE:  Constitutional: NAD, laying in bed  HEENT: NC/AT  Back: no tenderness  Respiratory: respirations even and non labored, clear to auscultation   Cardiovascular: S1S2 regular, no murmurs  Abdomen: soft, not tender, not distended, positive BS- ostomy intact.   Genitourinary: voiding  Musculoskeletal: no muscle tenderness, no joint swelling or tenderness  Extremities: no pedal edema   Neurological: no focal deficits    MEDICATIONS  (STANDING):  enoxaparin Injectable 40 milliGRAM(s) SubCutaneous every 24 hours  folic acid 1 milliGRAM(s) Oral daily  influenza   Vaccine 0.5 milliLiter(s) IntraMuscular once  magnesium oxide 400 milliGRAM(s) Oral three times a day with meals  multivitamin 1 Tablet(s) Oral daily    MEDICATIONS  (PRN):  HYDROmorphone  Injectable 0.5 milliGRAM(s) IV Push every 4 hours PRN Moderate Pain (4 - 6)  ondansetron Injectable 8 milliGRAM(s) IV Push every 8 hours PRN Nausea and/or Vomiting  zolpidem 5 milliGRAM(s) Oral at bedtime PRN Insomnia            03-22    133<L>  |  101  |  7   ----------------------------<  65<L>  3.1<L>   |  25  |  0.52    Ca    9.0      22 Mar 2023 06:54  Phos  2.4     03-22  Mg     1.7     03-22    TPro  7.0  /  Alb  2.5<L>  /  TBili  0.9  /  DBili  x   /  AST  15  /  ALT  16  /  AlkPhos  87  03-21                            15.0   9.77  )-----------( 225      ( 22 Mar 2023 06:54 )             44.0       03-21    135  |  103  |  3<L>  ----------------------------<  154<H>  3.2<L>   |  28  |  0.50    Ca    8.8      21 Mar 2023 08:42  Phos  2.2     03-21  Mg     1.4     03-21    TPro  7.0  /  Alb  2.5<L>  /  TBili  0.9  /  DBili  x   /  AST  15  /  ALT  16  /  AlkPhos  87  03-21                            14.6   11.65 )-----------( 237      ( 21 Mar 2023 08:42 )             42.5     Lipase, Serum: 900 U/L (03.21.23 @ 08:42)   Lipase, Serum: 3675 U/L (03.20.23 @ 13:29)     CT Angio Chest Aorta w/wo IV Cont (03.20.23 @ 15:31)   IMPRESSION:  No dissection.  Increased peripancreatic fat stranding suggestive of acute pancreatitis.        `

## 2023-03-23 NOTE — PROGRESS NOTE ADULT - ASSESSMENT
38 y/o M w/ PMH of crohn's disease complicated by perianal fistulas S/p diversion, pancreatitis, proctitis, p/w abdominal pain    *Acute pancreatitis  -CT: Increased peripancreatic fat stranding suggestive of acute pancreatitis  -Lipase trending down   -IVF- dc   -NPO- advanced to low fat - tolerating   -GI consult appreciated   -Lipid panel  -Pain control   -ETOH cessation   - dc CIWA- not scoring   - Ambien PRN for insomnia     *Questionable sepsis 2/2 Crohn's disease w/ rectal wall thickening seen with multiple perianal fistulas on CT  - Given significant leukocytosis -> will start zosyn until further recommendations by colorectal   - ID consult appreciated   - F/u colorectal surgery     *Etoh abuse  -Monitor CIWA- dc   -MVI / thiamine / folate   -Etoh cessation     *3.4 cm basilar right lower lobe bleb  -F/u outpatient pulm     *Hyponatremia  -monitor     *Hypokalemia  -Replete and recheck     *Tobacco cessation counseling  -Declines nicoderm     *DVT ppx   -SCDs    Case d/w team on IDR. Possible dc in am.

## 2023-03-24 LAB
ANION GAP SERPL CALC-SCNC: 5 MMOL/L — SIGNIFICANT CHANGE UP (ref 5–17)
BUN SERPL-MCNC: 6 MG/DL — LOW (ref 7–23)
CALCIUM SERPL-MCNC: 9.2 MG/DL — SIGNIFICANT CHANGE UP (ref 8.5–10.1)
CHLORIDE SERPL-SCNC: 104 MMOL/L — SIGNIFICANT CHANGE UP (ref 96–108)
CO2 SERPL-SCNC: 26 MMOL/L — SIGNIFICANT CHANGE UP (ref 22–31)
CREAT SERPL-MCNC: 0.64 MG/DL — SIGNIFICANT CHANGE UP (ref 0.5–1.3)
EGFR: 125 ML/MIN/1.73M2 — SIGNIFICANT CHANGE UP
GLUCOSE SERPL-MCNC: 166 MG/DL — HIGH (ref 70–99)
HCT VFR BLD CALC: 44.3 % — SIGNIFICANT CHANGE UP (ref 39–50)
HGB BLD-MCNC: 15 G/DL — SIGNIFICANT CHANGE UP (ref 13–17)
MCHC RBC-ENTMCNC: 33.9 GM/DL — SIGNIFICANT CHANGE UP (ref 32–36)
MCHC RBC-ENTMCNC: 34.9 PG — HIGH (ref 27–34)
MCV RBC AUTO: 103 FL — HIGH (ref 80–100)
PLATELET # BLD AUTO: 255 K/UL — SIGNIFICANT CHANGE UP (ref 150–400)
POTASSIUM SERPL-MCNC: 3.7 MMOL/L — SIGNIFICANT CHANGE UP (ref 3.5–5.3)
POTASSIUM SERPL-SCNC: 3.7 MMOL/L — SIGNIFICANT CHANGE UP (ref 3.5–5.3)
RBC # BLD: 4.3 M/UL — SIGNIFICANT CHANGE UP (ref 4.2–5.8)
RBC # FLD: 14.6 % — HIGH (ref 10.3–14.5)
SODIUM SERPL-SCNC: 135 MMOL/L — SIGNIFICANT CHANGE UP (ref 135–145)
WBC # BLD: 11.88 K/UL — HIGH (ref 3.8–10.5)
WBC # FLD AUTO: 11.88 K/UL — HIGH (ref 3.8–10.5)

## 2023-03-24 PROCEDURE — 99232 SBSQ HOSP IP/OBS MODERATE 35: CPT | Mod: GC

## 2023-03-24 PROCEDURE — 99233 SBSQ HOSP IP/OBS HIGH 50: CPT

## 2023-03-24 PROCEDURE — 99222 1ST HOSP IP/OBS MODERATE 55: CPT

## 2023-03-24 RX ORDER — MORPHINE SULFATE 50 MG/1
2 CAPSULE, EXTENDED RELEASE ORAL ONCE
Refills: 0 | Status: DISCONTINUED | OUTPATIENT
Start: 2023-03-24 | End: 2023-03-24

## 2023-03-24 RX ORDER — PIPERACILLIN AND TAZOBACTAM 4; .5 G/20ML; G/20ML
3.38 INJECTION, POWDER, LYOPHILIZED, FOR SOLUTION INTRAVENOUS EVERY 8 HOURS
Refills: 0 | Status: DISCONTINUED | OUTPATIENT
Start: 2023-03-24 | End: 2023-03-28

## 2023-03-24 RX ADMIN — HYDROMORPHONE HYDROCHLORIDE 2 MILLIGRAM(S): 2 INJECTION INTRAMUSCULAR; INTRAVENOUS; SUBCUTANEOUS at 10:25

## 2023-03-24 RX ADMIN — ONDANSETRON 8 MILLIGRAM(S): 8 TABLET, FILM COATED ORAL at 10:24

## 2023-03-24 RX ADMIN — HYDROMORPHONE HYDROCHLORIDE 2 MILLIGRAM(S): 2 INJECTION INTRAMUSCULAR; INTRAVENOUS; SUBCUTANEOUS at 17:03

## 2023-03-24 RX ADMIN — MAGNESIUM OXIDE 400 MG ORAL TABLET 400 MILLIGRAM(S): 241.3 TABLET ORAL at 16:33

## 2023-03-24 RX ADMIN — MAGNESIUM OXIDE 400 MG ORAL TABLET 400 MILLIGRAM(S): 241.3 TABLET ORAL at 13:57

## 2023-03-24 RX ADMIN — MAGNESIUM OXIDE 400 MG ORAL TABLET 400 MILLIGRAM(S): 241.3 TABLET ORAL at 10:25

## 2023-03-24 RX ADMIN — HYDROMORPHONE HYDROCHLORIDE 2 MILLIGRAM(S): 2 INJECTION INTRAMUSCULAR; INTRAVENOUS; SUBCUTANEOUS at 06:23

## 2023-03-24 RX ADMIN — MORPHINE SULFATE 2 MILLIGRAM(S): 50 CAPSULE, EXTENDED RELEASE ORAL at 13:46

## 2023-03-24 RX ADMIN — HYDROMORPHONE HYDROCHLORIDE 2 MILLIGRAM(S): 2 INJECTION INTRAMUSCULAR; INTRAVENOUS; SUBCUTANEOUS at 20:39

## 2023-03-24 RX ADMIN — Medication 1 TABLET(S): at 10:25

## 2023-03-24 RX ADMIN — ONDANSETRON 8 MILLIGRAM(S): 8 TABLET, FILM COATED ORAL at 21:26

## 2023-03-24 RX ADMIN — HYDROMORPHONE HYDROCHLORIDE 2 MILLIGRAM(S): 2 INJECTION INTRAMUSCULAR; INTRAVENOUS; SUBCUTANEOUS at 10:55

## 2023-03-24 RX ADMIN — ZOLPIDEM TARTRATE 5 MILLIGRAM(S): 10 TABLET ORAL at 00:06

## 2023-03-24 RX ADMIN — HYDROMORPHONE HYDROCHLORIDE 2 MILLIGRAM(S): 2 INJECTION INTRAMUSCULAR; INTRAVENOUS; SUBCUTANEOUS at 16:33

## 2023-03-24 RX ADMIN — HYDROMORPHONE HYDROCHLORIDE 2 MILLIGRAM(S): 2 INJECTION INTRAMUSCULAR; INTRAVENOUS; SUBCUTANEOUS at 00:37

## 2023-03-24 RX ADMIN — Medication 1 MILLIGRAM(S): at 10:25

## 2023-03-24 RX ADMIN — PIPERACILLIN AND TAZOBACTAM 25 GRAM(S): 4; .5 INJECTION, POWDER, LYOPHILIZED, FOR SOLUTION INTRAVENOUS at 22:40

## 2023-03-24 NOTE — DIETITIAN INITIAL EVALUATION ADULT - NS FNS WEIGHT CHANGE REASON
WJ=848 bpm, JVAF=928/104 mmhg, SpO2=98.0 %, Resp=26 B/min, EtCO2=31 mmHg, Apnea=0 Seconds, Pain=0, Bustillo=2 unintentional

## 2023-03-24 NOTE — CONSULT NOTE ADULT - ASSESSMENT
36 y/o M w/ PMH of crohn's disease complicated by perianal fistulas S/p diversion, pancreatitis, proctitis, p/w abdominal pain. States abdominal pain started 3/19 in the LUQ, and radiated to the back. Pain was sharp and constant. Patient had associated nausea and chills, but denies vomiting. Denies cough, runny nose, sore throat, SOB. Patient states he does not take any home medications. Here imaging shows acute pancreatitis, was given IV zosyn d/t concern for infection.     1. Acute pancreatitis. Leukocytosis. Alcohol withdrawal. Crohns disease  - would dc antibiotics  - no evidence of necrosis/abscess  - wbc ct reactive improving  - f/u cultures  - monitor temps  - pain control/hydration  - fu cbc  - supportive care    2. other issues - care per medicine 
37 year old male with Crohn's colitis complicated by ostomy and perianal fistulas admitted for abdominal pain with history multiple hospitalization for etoh- pancreatitis    CT consistent with pancreatitis, lipase 3600, and acute abdominal pain    Imp: Acute etoh induced pancreatitis    Rec:  ::High rate LR @200ml/hr x 24 hours  ::CIWA protocol  ::Pain control  ::Antiemetics  ::May trial clears, if dong PO fluids, may stop IVF  ::High risk VTE due to history IBD--->chemical prophylaxis recommended despite young and ambulatory    
37M PMH significant for Crohn's disease including severe perianal Crohn's (dx 22 years ago) and complexed fistulas s/p fecal diversion (colorectal Surgeon, Dr Mascorro at [place]), previously on humira with treatment failure no longer on meds, who presented for evaluation of epigastric pain and was found to pancreatitis.    Plan:  - obtain MRI pelvis to evaluate for perianal abscess  - pain control  - IV abx  - f/u GI reccs (consider infliximab)  - patients to follow-up with colorectal surgery at [facility] on discharge  - continue management per primary team  - colorectal surgery will follow    Discussed with Dr. Kang.

## 2023-03-24 NOTE — DIETITIAN INITIAL EVALUATION ADULT - PERTINENT LABORATORY DATA
03-24    135  |  104  |  6<L>  ----------------------------<  166<H>  3.7   |  26  |  0.64    Ca    9.2      24 Mar 2023 07:03    A1C with Estimated Average Glucose Result: 6.9 % (03-21-23 @ 08:42)

## 2023-03-24 NOTE — DIETITIAN INITIAL EVALUATION ADULT - NSFNSGIIOFT_GEN_A_CORE
I&O's Detail    23 Mar 2023 07:01  -  24 Mar 2023 07:00  --------------------------------------------------------  IN:    Oral Fluid: 300 mL  Total IN: 300 mL    OUT:  Total OUT: 0 mL    Total NET: 300 mL

## 2023-03-24 NOTE — PROGRESS NOTE ADULT - NS ATTEND AMEND GEN_ALL_CORE FT
37 year old man with ETOH induced pancreatitis, here for pancreatitis.     Much improved from a pancreatitis perspective. His issue now is his setons from his Crohn's colitis. Calling colorectal to evaluate and manage.     Of note, this note cosigned late due to ongoing [place] issues in [facility] weekend of March 25, 26th.
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     Continue advancing diet.   ETOH avoidance.

## 2023-03-24 NOTE — CONSULT NOTE ADULT - CONSULT REASON
discharge from rectum. bloody- pain in the rectum, abdominal pain- s/p ostomy- from chronic perianal fustula
abd pain
pancreaititis

## 2023-03-24 NOTE — CONSULT NOTE ADULT - SUBJECTIVE AND OBJECTIVE BOX
HPI: "38 y/o M w/ PMH of crohn's disease complicated by perianal fistulas S/p diversion, pancreatitis, proctitis, p/w abdominal pain. States abdominal pain started yesterday in the LUQ, and radiated to the back. Pain was sharp and constant. Patient had associated nausea and chills, but denies vomiting. Denies cough, runny nose, sore throat, SOB. Patient states he does not take any home medications     PSH: RLQ ileostomy, R ankle ORIF, clavicle surgery, perianal fistula s/p diversion     Social Hx: Tobacco - currently smokes 1 ppd, etoh - 4-5 drinks per day, drugs: +marijuana use     Family Hx: Father - DM"        PAST MEDICAL & SURGICAL HISTORY:  Crohn&#x27;s disease of large intestine without complication  (No current flare up)      Pancreatitis      S/P ORIF (open reduction internal fixation) fracture  (Right ankle, 2014)      History of colonoscopy  (2014)      Perianal fistula  repair in 2002          MEDICATIONS  (STANDING):  enoxaparin Injectable 40 milliGRAM(s) SubCutaneous every 24 hours  folic acid 1 milliGRAM(s) Oral daily  influenza   Vaccine 0.5 milliLiter(s) IntraMuscular once  magnesium oxide 400 milliGRAM(s) Oral three times a day with meals  multivitamin 1 Tablet(s) Oral daily    MEDICATIONS  (PRN):  HYDROmorphone   Tablet 2 milliGRAM(s) Oral every 4 hours PRN Moderate Pain (4 - 6)  ondansetron Injectable 8 milliGRAM(s) IV Push every 8 hours PRN Nausea and/or Vomiting  zolpidem 5 milliGRAM(s) Oral at bedtime PRN Insomnia      Allergies    No Known Allergies    Intolerances        SOCIAL HISTORY:    FAMILY HISTORY:  Diabetes mellitus (Father)        Physical Exam:  General: NAD, resting comfortably  HEENT: NC/AT, EOMI, normal hearing, no oral lesions, no LAD, neck supple  Pulmonary: normal resp effort, CTA-B  Cardiovascular: NSR, no murmurs  Abdominal: soft, ND/NT, no organomegaly  Extremities: WWP, normal strength, no clubbing/cyanosis/edema  Neuro: A/O x 3, CNs II-XII grossly intact, normal sensation, no focal deficits  Pulses: palpable distal pulses    Vital Signs Last 24 Hrs  T(C): 37 (24 Mar 2023 07:47), Max: 37.1 (23 Mar 2023 16:20)  T(F): 98.6 (24 Mar 2023 07:47), Max: 98.8 (23 Mar 2023 16:20)  HR: 73 (24 Mar 2023 07:47) (72 - 83)  BP: 98/74 (24 Mar 2023 07:47) (98/74 - 126/81)  BP(mean): 91 (24 Mar 2023 06:20) (91 - 94)  RR: 19 (24 Mar 2023 07:47) (18 - 19)  SpO2: 97% (24 Mar 2023 07:47) (95% - 99%)    Parameters below as of 24 Mar 2023 07:47  Patient On (Oxygen Delivery Method): room air        I&O's Summary    23 Mar 2023 07:01  -  24 Mar 2023 07:00  --------------------------------------------------------  IN: 300 mL / OUT: 0 mL / NET: 300 mL            LABS:                        15.0   11.88 )-----------( 255      ( 24 Mar 2023 07:03 )             44.3     03-24    135  |  104  |  6<L>  ----------------------------<  166<H>  3.7   |  26  |  0.64    Ca    9.2      24 Mar 2023 07:03          RADIOLOGY & ADDITIONAL STUDIES:    < from: CT Angio Abdomen and Pelvis w/ IV Cont (03.20.23 @ 15:30) >    ACC: [id number] EXAM:  CT ANGIO CHEST AORTA WAWIC   ORDERED BY: IVAN ESPINAL     ACC: [id number] EXAM:  CT ANGIO ABD PELV (W)AW IC   ORDERED BY: IVAN ESPINAL     PROCEDURE DATE:  03/20/2023          INTERPRETATION:  CLINICAL INFORMATION: Severe chest pain, history of   pancreatitis    COMPARISON: CT abdomen and pelvis 2/23/2022    CONTRAST/COMPLICATIONS:  IV Contrast: Omnipaque 350 90 cc administered   10 cc discarded  Oral Contrast: NONE  Complications: None reported at time of study completion    PROCEDURE:  CT Angiography of the Chest, Abdomen and Pelvis.  Precontrast imaging was performed through the chest followed by arterial   phase imaging of the chest, abdomen and pelvis.  Sagittal and coronal reformats were performed as well as 3D (MIP)   reconstructions.    FINDINGS:  CHEST:  LUNGS AND LARGE AIRWAYS: Patent central airways. 3.4 cm basilar right   lower lobe bleb. The lungs are otherwise clear.  PLEURA: No pleural effusion. No pneumothorax.  VESSELS: Normal aorta. No pulmonary embolus.  HEART: Heart size is normal. No pericardial effusion. Minimal left   circumflex coronary calcification.  MEDIASTINUM AND FELICE: No lymphadenopathy.  CHEST WALL AND LOWER NECK: Within normal limits.    ABDOMEN AND PELVIS:  LIVER: Mild hepatic steatosis.  BILE DUCTS: Normal caliber.  GALLBLADDER: Within normal limits.  SPLEEN: Within normal limits.  PANCREAS: Increased fat stranding surrounding the pancreas representing   acute pancreatitis. No evidence of necrosis. No collection.  ADRENALS: Within normal limits.  KIDNEYS/URETERS: Within normal limits.    BLADDER: Within normal limits.  REPRODUCTIVE ORGANS: Prostate within normal limits.    BOWEL: No obstruction. Right lower quadrant ileostomy again noted. Rectal   wall thickening again seen with multiple perianal fistulas.  PERITONEUM: No ascites.  VESSELS: No aortic aneurysm or dissection. The portal vein is not   evaluated secondary to arterial phase of the contrast.  RETROPERITONEUM/LYMPH NODES: No lymphadenopathy.  ABDOMINAL WALL: Right lower quadrant ileostomy.  BONES: Old fracture deformity of posterior left 10th rib.    IMPRESSION:  No dissection.    Increased peripancreatic fat stranding suggestive of acute pancreatitis.    --- End of Report ---            RENE PAIZ; Attending Radiologist  This document has been electronically signed. Mar 20 2023  4:06PM    < end of copied text >   HPI: "36 y/o M w/ PMH of crohn's disease complicated by perianal fistulas S/p diversion, pancreatitis, proctitis, p/w abdominal pain. States abdominal pain started yesterday in the LUQ, and radiated to the back. Pain was sharp and constant. Patient had associated nausea and chills, but denies vomiting. Denies cough, runny nose, sore throat, SOB. Patient states he does not take any home medications     PSH: RLQ ileostomy, R ankle ORIF, clavicle surgery, perianal fistula s/p diversion     Social Hx: Tobacco - currently smokes 1 ppd, etoh - 4-5 drinks per day, drugs: +marijuana use     Family Hx: Father - DM"        PAST MEDICAL & SURGICAL HISTORY:  Crohn's disease  Pancreatitis  S/P ORIF (open reduction internal fixation) fracture(Right ankle, 2014)  History of colonoscopy(2014)  Perianal fistularepair in 2002      MEDICATIONS  (STANDING):  enoxaparin Injectable 40 milliGRAM(s) SubCutaneous every 24 hours  folic acid 1 milliGRAM(s) Oral daily  influenza   Vaccine 0.5 milliLiter(s) IntraMuscular once  magnesium oxide 400 milliGRAM(s) Oral three times a day with meals  multivitamin 1 Tablet(s) Oral daily    MEDICATIONS  (PRN):  HYDROmorphone   Tablet 2 milliGRAM(s) Oral every 4 hours PRN Moderate Pain (4 - 6)  ondansetron Injectable 8 milliGRAM(s) IV Push every 8 hours PRN Nausea and/or Vomiting  zolpidem 5 milliGRAM(s) Oral at bedtime PRN Insomnia      Allergies    No Known Allergies    Intolerances        SOCIAL HISTORY:    FAMILY HISTORY:  Diabetes mellitus (Father)        Physical Exam:  General: NAD, resting comfortably  HEENT: NC/AT, EOMI, normal hearing, no oral lesions, no LAD, neck supple  Pulmonary: normal resp effort, CTA-B  Cardiovascular: NSR, no murmurs  Abdominal: soft, ND/NT, no organomegaly  Extremities: WWP, normal strength, no clubbing/cyanosis/edema  Neuro: A/O x 3, CNs II-XII grossly intact, normal sensation, no focal deficits  Pulses: palpable distal pulses    Vital Signs Last 24 Hrs  T(C): 37 (24 Mar 2023 07:47), Max: 37.1 (23 Mar 2023 16:20)  T(F): 98.6 (24 Mar 2023 07:47), Max: 98.8 (23 Mar 2023 16:20)  HR: 73 (24 Mar 2023 07:47) (72 - 83)  BP: 98/74 (24 Mar 2023 07:47) (98/74 - 126/81)  BP(mean): 91 (24 Mar 2023 06:20) (91 - 94)  RR: 19 (24 Mar 2023 07:47) (18 - 19)  SpO2: 97% (24 Mar 2023 07:47) (95% - 99%)    Parameters below as of 24 Mar 2023 07:47  Patient On (Oxygen Delivery Method): room air        I&O's Summary    23 Mar 2023 07:01  -  24 Mar 2023 07:00  --------------------------------------------------------  IN: 300 mL / OUT: 0 mL / NET: 300 mL            LABS:                        15.0   11.88 )-----------( 255      ( 24 Mar 2023 07:03 )             44.3     03-24    135  |  104  |  6<L>  ----------------------------<  166<H>  3.7   |  26  |  0.64    Ca    9.2      24 Mar 2023 07:03          RADIOLOGY & ADDITIONAL STUDIES:    < from: CT Angio Abdomen and Pelvis w/ IV Cont (03.20.23 @ 15:30) >    ACC: [id number] EXAM:  CT ANGIO CHEST AORTA WAWIC   ORDERED BY: IVAN ESPINAL     ACC: [id number] EXAM:  CT ANGIO ABD PELV (W)AW IC   ORDERED BY: IVAN ESPINAL     PROCEDURE DATE:  03/20/2023          INTERPRETATION:  CLINICAL INFORMATION: Severe chest pain, history of   pancreatitis    COMPARISON: CT abdomen and pelvis 2/23/2022    CONTRAST/COMPLICATIONS:  IV Contrast: Omnipaque 350 90 cc administered   10 cc discarded  Oral Contrast: NONE  Complications: None reported at time of study completion    PROCEDURE:  CT Angiography of the Chest, Abdomen and Pelvis.  Precontrast imaging was performed through the chest followed by arterial   phase imaging of the chest, abdomen and pelvis.  Sagittal and coronal reformats were performed as well as 3D (MIP)   reconstructions.    FINDINGS:  CHEST:  LUNGS AND LARGE AIRWAYS: Patent central airways. 3.4 cm basilar right   lower lobe bleb. The lungs are otherwise clear.  PLEURA: No pleural effusion. No pneumothorax.  VESSELS: Normal aorta. No pulmonary embolus.  HEART: Heart size is normal. No pericardial effusion. Minimal left   circumflex coronary calcification.  MEDIASTINUM AND FELICE: No lymphadenopathy.  CHEST WALL AND LOWER NECK: Within normal limits.    ABDOMEN AND PELVIS:  LIVER: Mild hepatic steatosis.  BILE DUCTS: Normal caliber.  GALLBLADDER: Within normal limits.  SPLEEN: Within normal limits.  PANCREAS: Increased fat stranding surrounding the pancreas representing   acute pancreatitis. No evidence of necrosis. No collection.  ADRENALS: Within normal limits.  KIDNEYS/URETERS: Within normal limits.    BLADDER: Within normal limits.  REPRODUCTIVE ORGANS: Prostate within normal limits.    BOWEL: No obstruction. Right lower quadrant ileostomy again noted. Rectal   wall thickening again seen with multiple perianal fistulas.  PERITONEUM: No ascites.  VESSELS: No aortic aneurysm or dissection. The portal vein is not   evaluated secondary to arterial phase of the contrast.  RETROPERITONEUM/LYMPH NODES: No lymphadenopathy.  ABDOMINAL WALL: Right lower quadrant ileostomy.  BONES: Old fracture deformity of posterior left 10th rib.    IMPRESSION:  No dissection.    Increased peripancreatic fat stranding suggestive of acute pancreatitis.    --- End of Report ---            RENE PAIZ; Attending Radiologist  This document has been electronically signed. Mar 20 2023  4:06PM    < end of copied text >   HPI: "38 y/o M w/ PMH of crohn's disease complicated by perianal fistulas S/p diversion, pancreatitis, proctitis, p/w abdominal pain. States abdominal pain started yesterday in the LUQ, and radiated to the back. Pain was sharp and constant. Patient had associated nausea and chills, but denies vomiting. Denies cough, runny nose, sore throat, SOB. Patient states he does not take any home medications     PSH: RLQ ileostomy, R ankle ORIF, clavicle surgery, perianal fistula s/p diversion     Social Hx: Tobacco - currently smokes 1 ppd, etoh - 4-5 drinks per day, drugs: +marijuana use     Family Hx: Father - DM"        PAST MEDICAL & SURGICAL HISTORY:  Crohn's disease  Pancreatitis  S/P ORIF (open reduction internal fixation) fracture(Right ankle, 2014)  History of colonoscopy(2014)  Perianal fistularepair in 2002      MEDICATIONS  (STANDING):  enoxaparin Injectable 40 milliGRAM(s) SubCutaneous every 24 hours  folic acid 1 milliGRAM(s) Oral daily  influenza   Vaccine 0.5 milliLiter(s) IntraMuscular once  magnesium oxide 400 milliGRAM(s) Oral three times a day with meals  multivitamin 1 Tablet(s) Oral daily    MEDICATIONS  (PRN):  HYDROmorphone   Tablet 2 milliGRAM(s) Oral every 4 hours PRN Moderate Pain (4 - 6)  ondansetron Injectable 8 milliGRAM(s) IV Push every 8 hours PRN Nausea and/or Vomiting  zolpidem 5 milliGRAM(s) Oral at bedtime PRN Insomnia      Allergies  No Known Allergies    FAMILY HISTORY:  Diabetes mellitus (Father)      Vital Signs Last 24 Hrs  T(C): 37 (24 Mar 2023 07:47), Max: 37.1 (23 Mar 2023 16:20)  T(F): 98.6 (24 Mar 2023 07:47), Max: 98.8 (23 Mar 2023 16:20)  HR: 73 (24 Mar 2023 07:47) (72 - 83)  BP: 98/74 (24 Mar 2023 07:47) (98/74 - 126/81)  BP(mean): 91 (24 Mar 2023 06:20) (91 - 94)  RR: 19 (24 Mar 2023 07:47) (18 - 19)  SpO2: 97% (24 Mar 2023 07:47) (95% - 99%)    Parameters below as of 24 Mar 2023 07:47  Patient On (Oxygen Delivery Method): room air      Physical Exam:  General: NAD, resting comfortably  HEENT: NC/AT, neck supple  Pulmonary: normal resp effort  Cardiovascular: NSR  Abdominal: soft, ND/NT, no organomegaly  Rectal: multiple perianal fistulas without purulence or bleeding, tenderness throughout anal verge, no fluctuance  Extremities: WWP, normal strength, no clubbing/cyanosis/edema  Neuro: A/O x 3, no focal deficits  Pulses: palpable distal pulses        I&O's Summary    23 Mar 2023 07:01  -  24 Mar 2023 07:00  --------------------------------------------------------  IN: 300 mL / OUT: 0 mL / NET: 300 mL            LABS:                        15.0   11.88 )-----------( 255      ( 24 Mar 2023 07:03 )             44.3     03-24    135  |  104  |  6<L>  ----------------------------<  166<H>  3.7   |  26  |  0.64    Ca    9.2      24 Mar 2023 07:03          RADIOLOGY & ADDITIONAL STUDIES:    < from: CT Angio Abdomen and Pelvis w/ IV Cont (03.20.23 @ 15:30) >    ACC: [id number] EXAM:  CT ANGIO CHEST AORTA WAWIC   ORDERED BY: IVAN ESPINAL     ACC: [id number] EXAM:  CT ANGIO ABD PELV (W)AW IC   ORDERED BY: IVAN ESPINAL     PROCEDURE DATE:  03/20/2023          INTERPRETATION:  CLINICAL INFORMATION: Severe chest pain, history of   pancreatitis    COMPARISON: CT abdomen and pelvis 2/23/2022    CONTRAST/COMPLICATIONS:  IV Contrast: Omnipaque 350 90 cc administered   10 cc discarded  Oral Contrast: NONE  Complications: None reported at time of study completion    PROCEDURE:  CT Angiography of the Chest, Abdomen and Pelvis.  Precontrast imaging was performed through the chest followed by arterial   phase imaging of the chest, abdomen and pelvis.  Sagittal and coronal reformats were performed as well as 3D (MIP)   reconstructions.    FINDINGS:  CHEST:  LUNGS AND LARGE AIRWAYS: Patent central airways. 3.4 cm basilar right   lower lobe bleb. The lungs are otherwise clear.  PLEURA: No pleural effusion. No pneumothorax.  VESSELS: Normal aorta. No pulmonary embolus.  HEART: Heart size is normal. No pericardial effusion. Minimal left   circumflex coronary calcification.  MEDIASTINUM AND FELICE: No lymphadenopathy.  CHEST WALL AND LOWER NECK: Within normal limits.    ABDOMEN AND PELVIS:  LIVER: Mild hepatic steatosis.  BILE DUCTS: Normal caliber.  GALLBLADDER: Within normal limits.  SPLEEN: Within normal limits.  PANCREAS: Increased fat stranding surrounding the pancreas representing   acute pancreatitis. No evidence of necrosis. No collection.  ADRENALS: Within normal limits.  KIDNEYS/URETERS: Within normal limits.    BLADDER: Within normal limits.  REPRODUCTIVE ORGANS: Prostate within normal limits.    BOWEL: No obstruction. Right lower quadrant ileostomy again noted. Rectal   wall thickening again seen with multiple perianal fistulas.  PERITONEUM: No ascites.  VESSELS: No aortic aneurysm or dissection. The portal vein is not   evaluated secondary to arterial phase of the contrast.  RETROPERITONEUM/LYMPH NODES: No lymphadenopathy.  ABDOMINAL WALL: Right lower quadrant ileostomy.  BONES: Old fracture deformity of posterior left 10th rib.    IMPRESSION:  No dissection.    Increased peripancreatic fat stranding suggestive of acute pancreatitis.    --- End of Report ---            RENE PAIZ; Attending Radiologist  This document has been electronically signed. Mar 20 2023  4:06PM    < end of copied text >

## 2023-03-24 NOTE — DIETITIAN INITIAL EVALUATION ADULT - OTHER INFO
36 y/o M with a PMHx of crohn's disease complicated by perianal fistulas S/p diversion, pancreatitis, proctitis, p/w abdominal pain. States abdominal pain started yesterday in the LUQ, and radiated to the back. Pain was sharp and constant. Patient had associated nausea and chills, but denies vomiting. Denies cough, runny nose, sore throat, SOB. Patient states he does not take any home medications. Admitted for acute pancreatitis, ETOH abuse, and questionable sepsis 2/2 Crohn's disease w/ rectal wall thickening seen with multiple perianal fistulas on CT. On CIWA protocol.     Reports good appetite/ intake; states that he ate 100% of breakfast this morning and has been tolerating his meals. Pt states that his ubw is 155-160# which has been a stable wt for him. RD obtained bedscale wt on 3/24 - 132#. Weight hx reviewed: 150# (taken by RD on 2/5/22; met criteria for severe malnutrition). Weight loss of 18# (12%) x 1 yr - not clinsig. Weight loss of 28# (18%) x ? time frame. Pt thin, frail, and jazzy appearing. NFPE reveals moderate to severe muscle/ fat wasting, pt meets criteria for PCM at this time. Encouraged high kcal/ high protein intake, pt declines all ONS options at this time despite max encouragement. Recommend to c/w Low Fat diet and supplementation of MVI, thiamine, and folate; encouraged and educated on low fat diet, compliance expected. Please see recommendations below.

## 2023-03-24 NOTE — DIETITIAN INITIAL EVALUATION ADULT - PERTINENT MEDS FT
MEDICATIONS  (STANDING):  enoxaparin Injectable 40 milliGRAM(s) SubCutaneous every 24 hours  folic acid 1 milliGRAM(s) Oral daily  influenza   Vaccine 0.5 milliLiter(s) IntraMuscular once  magnesium oxide 400 milliGRAM(s) Oral three times a day with meals  multivitamin 1 Tablet(s) Oral daily    MEDICATIONS  (PRN):  HYDROmorphone   Tablet 2 milliGRAM(s) Oral every 4 hours PRN Moderate Pain (4 - 6)  ondansetron Injectable 8 milliGRAM(s) IV Push every 8 hours PRN Nausea and/or Vomiting  zolpidem 5 milliGRAM(s) Oral at bedtime PRN Insomnia

## 2023-03-24 NOTE — CONSULT NOTE ADULT - ATTENDING COMMENTS
Patient seen and examined at bedside this morning. 37M with severe perianal Crohn's disease s/p diverting ileostomy and placement of seton in June 2021 by Dr. Ortega at [facility]. He has had multiple admissions for pancreatitis and has had continuous perianal Crohn's symptoms. He has multiple perianal fistulas with bilateral erythema, minimal purulent drainage and tenderness on exam. No obvious abscesses appreciated on exam, will obtain Pelvic MRI to evaluate fistula tracts and ensure no residual abscessed exist. Patient would likely benefit from replacing setons since they seem to have fallen out or removed. Should there be any undrained collections they will need to be addressed. Will await MRI.    Follow up MRI  IV antibiotics  Follow up with GI in regards to medical management of Crohn's

## 2023-03-24 NOTE — PROGRESS NOTE ADULT - ASSESSMENT
37 year old male with Crohn's colitis complicated by ostomy and perianal fistulas admitted for abdominal pain with history multiple hospitalization for etoh- pancreatitis    Imp: Acute etoh induced pancreatitis    With generally improved condition intermittent pain issues related to food intake  Chronic perianal fistulas with setons- now with rectal bleeding/pain    Rec:  ::Colorectal consult  ::Diet as dong  ::PO pain meds prn  ::ETOH cessation/counseling  ::High risk VTE due to history IBD--->chemical prophylaxis recommended despite young and ambulatory

## 2023-03-24 NOTE — DIETITIAN NUTRITION RISK NOTIFICATION - ADDITIONAL COMMENTS/DIETITIAN RECOMMENDATIONS
1) C/w Low Fat diet  2) Pt declines all ONS options despite max encouragement  3) Encourage protein-rich foods, maximize food preferences  4) Monitor bowel movements, if no BM for >3 days, consider implementing bowel regimen.  5) Obtain vitamin D 25OH level to assess nutriture   6) Please obtain weekly weights  7) C/w thiamine, folate, and MVI supplementation   8) Consider adding appetite stimulant such as Remeron or Marinol 2/2 chronically poor appetite/ PO intake  9) Confirm goals of care regarding nutrition support   RD will continue to monitor PO intake, labs, hydration, and wt prn.

## 2023-03-24 NOTE — DIETITIAN INITIAL EVALUATION ADULT - ORAL INTAKE PTA/DIET HISTORY
Reports good appetite/ intake pta. Normally consumes 2 meals/ day; does not follow any diet restrictions or use any ONS. States that he was having abdominal pain for a few days but it was not affecting his intake. Per chart review, Of note, "reports decreased ostomy output due to limited PO."

## 2023-03-24 NOTE — PROGRESS NOTE ADULT - SUBJECTIVE AND OBJECTIVE BOX
38 y/o M w/ PMH of crohn's disease complicated by perianal fistulas S/p diversion, pancreatitis, proctitis, p/w abdominal pain. States abdominal pain started yesterday in the LUQ, and radiated to the back. Pain was sharp and constant. Patient admitted with acute pancreatitis- started on IVF and antibiotics.    3/21 patient was seen and examined- complaining of headache and abdominal pain.   3/22- patient was seen and examined. denies abdominal pain, tolerating diet   3/23- complaining of abdominal pain after eating lunch, stated that felt pressure in his rectum- similar to when she was developing a fistula. +nausea.  3/24- complaining of rectal pain + bloody discharge from rectum. +abdominal pain +nausea    ROS:   All 10 systems reviewed and found to be negative with the exception of what has been described above.     Vital Signs Last 24 Hrs  T(C): 37 (24 Mar 2023 07:47), Max: 37.1 (23 Mar 2023 16:20)  T(F): 98.6 (24 Mar 2023 07:47), Max: 98.8 (23 Mar 2023 16:20)  HR: 73 (24 Mar 2023 07:47) (72 - 75)  BP: 98/74 (24 Mar 2023 07:47) (98/74 - 126/81)  BP(mean): 91 (24 Mar 2023 06:20) (91 - 94)  RR: 19 (24 Mar 2023 07:47) (18 - 19)  SpO2: 97% (24 Mar 2023 07:47) (97% - 99%)    Parameters below as of 24 Mar 2023 07:47  Patient On (Oxygen Delivery Method): room air      PE:  Constitutional: NAD, laying in bed  HEENT: NC/AT  Back: no tenderness  Respiratory: respirations even and non labored, clear to auscultation   Cardiovascular: S1S2 regular, no murmurs  Abdomen: soft, not tender, not distended, positive BS- ostomy intact.   Genitourinary: voiding  Musculoskeletal: no muscle tenderness, no joint swelling or tenderness  Extremities: no pedal edema   Neurological: no focal deficits    MEDICATIONS  (STANDING):  enoxaparin Injectable 40 milliGRAM(s) SubCutaneous every 24 hours  folic acid 1 milliGRAM(s) Oral daily  influenza   Vaccine 0.5 milliLiter(s) IntraMuscular once  magnesium oxide 400 milliGRAM(s) Oral three times a day with meals  multivitamin 1 Tablet(s) Oral daily    MEDICATIONS  (PRN):  HYDROmorphone   Tablet 2 milliGRAM(s) Oral every 4 hours PRN Moderate Pain (4 - 6)  ondansetron Injectable 8 milliGRAM(s) IV Push every 8 hours PRN Nausea and/or Vomiting  zolpidem 5 milliGRAM(s) Oral at bedtime PRN Insomnia    03-24    135  |  104  |  6<L>  ----------------------------<  166<H>  3.7   |  26  |  0.64    Ca    9.2      24 Mar 2023 07:03                          15.0   11.88 )-----------( 255      ( 24 Mar 2023 07:03 )             44.3       03-22    133<L>  |  101  |  7   ----------------------------<  65<L>  3.1<L>   |  25  |  0.52    Ca    9.0      22 Mar 2023 06:54  Phos  2.4     03-22  Mg     1.7     03-22    TPro  7.0  /  Alb  2.5<L>  /  TBili  0.9  /  DBili  x   /  AST  15  /  ALT  16  /  AlkPhos  87  03-21                            15.0   9.77  )-----------( 225      ( 22 Mar 2023 06:54 )             44.0       03-21    135  |  103  |  3<L>  ----------------------------<  154<H>  3.2<L>   |  28  |  0.50    Ca    8.8      21 Mar 2023 08:42  Phos  2.2     03-21  Mg     1.4     03-21    TPro  7.0  /  Alb  2.5<L>  /  TBili  0.9  /  DBili  x   /  AST  15  /  ALT  16  /  AlkPhos  87  03-21                            14.6   11.65 )-----------( 237      ( 21 Mar 2023 08:42 )             42.5     Lipase, Serum: 900 U/L (03.21.23 @ 08:42)   Lipase, Serum: 3675 U/L (03.20.23 @ 13:29)     CT Angio Chest Aorta w/wo IV Cont (03.20.23 @ 15:31)   IMPRESSION:  No dissection.  Increased peripancreatic fat stranding suggestive of acute pancreatitis.        `

## 2023-03-24 NOTE — PROGRESS NOTE ADULT - ASSESSMENT
36 y/o M w/ PMH of crohn's disease complicated by perianal fistulas S/p diversion, pancreatitis, proctitis, p/w abdominal pain    *Acute pancreatitis- resolving   -CT: Increased peripancreatic fat stranding suggestive of acute pancreatitis  -Lipase trending down   -IVF- dc   -NPO- advanced to low fat - +nausea post prandial   -GI consult appreciated   -Lipid panel  -Pain control   -ETOH cessation   - dc CIWA- not scoring   - Ambien PRN for insomnia   - colorectal consult for discharge from rectum   - MRI pelvis to check for perianal abscess     *Questionable sepsis 2/2 Crohn's disease w/ rectal wall thickening seen with multiple perianal fistulas on CT  - Given significant leukocytosis -> will start zosyn until further recommendations by colorectal   - ID consult appreciated   - F/u colorectal surgery     *Etoh abuse  -Monitor CIWA- dc   -MVI / thiamine / folate   -Etoh cessation     *3.4 cm basilar right lower lobe bleb  -F/u outpatient pulm     *Hyponatremia  -monitor     *Hypokalemia  -Replete and recheck     *Tobacco cessation counseling  -Declines nicoderm     *DVT ppx   -SCDs    Case d/w team on IDR.

## 2023-03-24 NOTE — PROGRESS NOTE ADULT - SUBJECTIVE AND OBJECTIVE BOX
Patient is a 37y old  Male who presents with a chief complaint of abdominal pain  Followup: pancreaititis    Seen at bedside. Patient endorsing pain yesterday after lunch time. Pain aggravated when too long of lapse between pain meds. Also admits rectal pressure/pain overnight with this morning expelled bloody drainage and ?seton dislodged and in bowl. Patient became tearful, father had MI last night and hospitalized [facility].      MEDICATIONS  (STANDING):  enoxaparin Injectable 40 milliGRAM(s) SubCutaneous every 24 hours  folic acid 1 milliGRAM(s) Oral daily  influenza   Vaccine 0.5 milliLiter(s) IntraMuscular once  magnesium oxide 400 milliGRAM(s) Oral three times a day with meals  multivitamin 1 Tablet(s) Oral daily    MEDICATIONS  (PRN):  HYDROmorphone   Tablet 2 milliGRAM(s) Oral every 4 hours PRN Moderate Pain (4 - 6)  ondansetron Injectable 8 milliGRAM(s) IV Push every 8 hours PRN Nausea and/or Vomiting  zolpidem 5 milliGRAM(s) Oral at bedtime PRN Insomnia      Vital Signs Last 24 Hrs  T(C): 37 (24 Mar 2023 07:47), Max: 37.1 (23 Mar 2023 16:20)  T(F): 98.6 (24 Mar 2023 07:47), Max: 98.8 (23 Mar 2023 16:20)  HR: 73 (24 Mar 2023 07:47) (72 - 83)  BP: 98/74 (24 Mar 2023 07:47) (98/74 - 126/81)  BP(mean): 91 (24 Mar 2023 06:20) (91 - 94)  RR: 19 (24 Mar 2023 07:47) (18 - 19)  SpO2: 97% (24 Mar 2023 07:47) (95% - 99%)    Parameters below as of 24 Mar 2023 07:47  Patient On (Oxygen Delivery Method): room air    PHYSICAL EXAM:    Constitutional: No acute distress, well-developed, non-toxic appearing  HEENT: masked, good phonation, not icteric  Neck: supple, no lymphadenopathy  Respiratory: clear to ascultation bilaterally, no wheezing  Cardiovascular: S1 and S2, regular rate and rhythm, no murmurs rubs or gallops  Gastrointestinal: soft, LUQ tender, non-distended, +bowel sounds, no rebound or guarding, no surgical scars, no drains  Extremities: No peripheral edema, no cyanosis or clubbing  Vascular: 2+ peripheral pulses, no venous stasis  Neurological: A/O x 3, no focal deficits, no asterixis  Psychiatric: Normal mood, normal affect  Skin: No rashes, not jaundiced    LABS:                        15.0   11.88 )-----------( 255      ( 24 Mar 2023 07:03 )             44.3     03-24    135  |  104  |  6<L>  ----------------------------<  166<H>  3.7   |  26  |  0.64    Ca    9.2      24 Mar 2023 07:03    RADIOLOGY & ADDITIONAL STUDIES: Patient is a 37y old  Male who presents with a chief complaint of abdominal pain  Followup: pancreaititis    Seen at bedside. Patient endorsing pain yesterday after lunch time. Pain aggravated when too long of lapse between pain meds. Also admits rectal pressure/pain overnight with this morning expelled bloody drainage and ?seton dislodged and in bowl. Patient became tearful, father had MI last night and hospitalized at [facility].      MEDICATIONS  (STANDING):  enoxaparin Injectable 40 milliGRAM(s) SubCutaneous every 24 hours  folic acid 1 milliGRAM(s) Oral daily  influenza   Vaccine 0.5 milliLiter(s) IntraMuscular once  magnesium oxide 400 milliGRAM(s) Oral three times a day with meals  multivitamin 1 Tablet(s) Oral daily    MEDICATIONS  (PRN):  HYDROmorphone   Tablet 2 milliGRAM(s) Oral every 4 hours PRN Moderate Pain (4 - 6)  ondansetron Injectable 8 milliGRAM(s) IV Push every 8 hours PRN Nausea and/or Vomiting  zolpidem 5 milliGRAM(s) Oral at bedtime PRN Insomnia      Vital Signs Last 24 Hrs  T(C): 37 (24 Mar 2023 07:47), Max: 37.1 (23 Mar 2023 16:20)  T(F): 98.6 (24 Mar 2023 07:47), Max: 98.8 (23 Mar 2023 16:20)  HR: 73 (24 Mar 2023 07:47) (72 - 83)  BP: 98/74 (24 Mar 2023 07:47) (98/74 - 126/81)  BP(mean): 91 (24 Mar 2023 06:20) (91 - 94)  RR: 19 (24 Mar 2023 07:47) (18 - 19)  SpO2: 97% (24 Mar 2023 07:47) (95% - 99%)    Parameters below as of 24 Mar 2023 07:47  Patient On (Oxygen Delivery Method): room air    PHYSICAL EXAM:    Constitutional: No acute distress, well-developed, non-toxic appearing  HEENT: masked, good phonation, not icteric  Neck: supple, no lymphadenopathy  Respiratory: clear to ascultation bilaterally, no wheezing  Cardiovascular: S1 and S2, regular rate and rhythm, no murmurs rubs or gallops  Gastrointestinal: soft, LUQ tender, non-distended, +bowel sounds, no rebound or guarding,  Rectal: seton drains  Extremities: No peripheral edema, no cyanosis or clubbing  Vascular: 2+ peripheral pulses, no venous stasis  Neurological: A/O x 3, no focal deficits, no asterixis  Psychiatric: Normal mood, normal affect  Skin: No rashes, not jaundiced    LABS:                        15.0   11.88 )-----------( 255      ( 24 Mar 2023 07:03 )             44.3     03-24    135  |  104  |  6<L>  ----------------------------<  166<H>  3.7   |  26  |  0.64    Ca    9.2      24 Mar 2023 07:03    RADIOLOGY & ADDITIONAL STUDIES:

## 2023-03-25 LAB
ANION GAP SERPL CALC-SCNC: 4 MMOL/L — LOW (ref 5–17)
BUN SERPL-MCNC: 7 MG/DL — SIGNIFICANT CHANGE UP (ref 7–23)
CALCIUM SERPL-MCNC: 9 MG/DL — SIGNIFICANT CHANGE UP (ref 8.5–10.1)
CHLORIDE SERPL-SCNC: 103 MMOL/L — SIGNIFICANT CHANGE UP (ref 96–108)
CO2 SERPL-SCNC: 26 MMOL/L — SIGNIFICANT CHANGE UP (ref 22–31)
CREAT SERPL-MCNC: 0.65 MG/DL — SIGNIFICANT CHANGE UP (ref 0.5–1.3)
EGFR: 124 ML/MIN/1.73M2 — SIGNIFICANT CHANGE UP
GLUCOSE SERPL-MCNC: 181 MG/DL — HIGH (ref 70–99)
HCT VFR BLD CALC: 41.6 % — SIGNIFICANT CHANGE UP (ref 39–50)
HGB BLD-MCNC: 14.2 G/DL — SIGNIFICANT CHANGE UP (ref 13–17)
MCHC RBC-ENTMCNC: 34.1 GM/DL — SIGNIFICANT CHANGE UP (ref 32–36)
MCHC RBC-ENTMCNC: 35.2 PG — HIGH (ref 27–34)
MCV RBC AUTO: 103.2 FL — HIGH (ref 80–100)
PLATELET # BLD AUTO: 269 K/UL — SIGNIFICANT CHANGE UP (ref 150–400)
POTASSIUM SERPL-MCNC: 3.9 MMOL/L — SIGNIFICANT CHANGE UP (ref 3.5–5.3)
POTASSIUM SERPL-SCNC: 3.9 MMOL/L — SIGNIFICANT CHANGE UP (ref 3.5–5.3)
RBC # BLD: 4.03 M/UL — LOW (ref 4.2–5.8)
RBC # FLD: 14.6 % — HIGH (ref 10.3–14.5)
SODIUM SERPL-SCNC: 133 MMOL/L — LOW (ref 135–145)
WBC # BLD: 11.56 K/UL — HIGH (ref 3.8–10.5)
WBC # FLD AUTO: 11.56 K/UL — HIGH (ref 3.8–10.5)

## 2023-03-25 PROCEDURE — 99233 SBSQ HOSP IP/OBS HIGH 50: CPT

## 2023-03-25 RX ORDER — HYDROMORPHONE HYDROCHLORIDE 2 MG/ML
2 INJECTION INTRAMUSCULAR; INTRAVENOUS; SUBCUTANEOUS ONCE
Refills: 0 | Status: DISCONTINUED | OUTPATIENT
Start: 2023-03-25 | End: 2023-03-25

## 2023-03-25 RX ORDER — KETOROLAC TROMETHAMINE 30 MG/ML
30 SYRINGE (ML) INJECTION ONCE
Refills: 0 | Status: DISCONTINUED | OUTPATIENT
Start: 2023-03-25 | End: 2023-03-25

## 2023-03-25 RX ADMIN — PIPERACILLIN AND TAZOBACTAM 25 GRAM(S): 4; .5 INJECTION, POWDER, LYOPHILIZED, FOR SOLUTION INTRAVENOUS at 13:15

## 2023-03-25 RX ADMIN — Medication 30 MILLIGRAM(S): at 21:19

## 2023-03-25 RX ADMIN — ZOLPIDEM TARTRATE 5 MILLIGRAM(S): 10 TABLET ORAL at 21:25

## 2023-03-25 RX ADMIN — HYDROMORPHONE HYDROCHLORIDE 2 MILLIGRAM(S): 2 INJECTION INTRAMUSCULAR; INTRAVENOUS; SUBCUTANEOUS at 20:00

## 2023-03-25 RX ADMIN — HYDROMORPHONE HYDROCHLORIDE 2 MILLIGRAM(S): 2 INJECTION INTRAMUSCULAR; INTRAVENOUS; SUBCUTANEOUS at 16:00

## 2023-03-25 RX ADMIN — Medication 1 TABLET(S): at 10:26

## 2023-03-25 RX ADMIN — ENOXAPARIN SODIUM 40 MILLIGRAM(S): 100 INJECTION SUBCUTANEOUS at 10:25

## 2023-03-25 RX ADMIN — HYDROMORPHONE HYDROCHLORIDE 2 MILLIGRAM(S): 2 INJECTION INTRAMUSCULAR; INTRAVENOUS; SUBCUTANEOUS at 20:30

## 2023-03-25 RX ADMIN — HYDROMORPHONE HYDROCHLORIDE 2 MILLIGRAM(S): 2 INJECTION INTRAMUSCULAR; INTRAVENOUS; SUBCUTANEOUS at 15:28

## 2023-03-25 RX ADMIN — HYDROMORPHONE HYDROCHLORIDE 2 MILLIGRAM(S): 2 INJECTION INTRAMUSCULAR; INTRAVENOUS; SUBCUTANEOUS at 13:15

## 2023-03-25 RX ADMIN — MAGNESIUM OXIDE 400 MG ORAL TABLET 400 MILLIGRAM(S): 241.3 TABLET ORAL at 16:47

## 2023-03-25 RX ADMIN — HYDROMORPHONE HYDROCHLORIDE 2 MILLIGRAM(S): 2 INJECTION INTRAMUSCULAR; INTRAVENOUS; SUBCUTANEOUS at 13:45

## 2023-03-25 RX ADMIN — PIPERACILLIN AND TAZOBACTAM 25 GRAM(S): 4; .5 INJECTION, POWDER, LYOPHILIZED, FOR SOLUTION INTRAVENOUS at 21:19

## 2023-03-25 RX ADMIN — ONDANSETRON 8 MILLIGRAM(S): 8 TABLET, FILM COATED ORAL at 16:47

## 2023-03-25 RX ADMIN — Medication 30 MILLIGRAM(S): at 21:34

## 2023-03-25 RX ADMIN — HYDROMORPHONE HYDROCHLORIDE 2 MILLIGRAM(S): 2 INJECTION INTRAMUSCULAR; INTRAVENOUS; SUBCUTANEOUS at 08:30

## 2023-03-25 RX ADMIN — MAGNESIUM OXIDE 400 MG ORAL TABLET 400 MILLIGRAM(S): 241.3 TABLET ORAL at 13:07

## 2023-03-25 RX ADMIN — MAGNESIUM OXIDE 400 MG ORAL TABLET 400 MILLIGRAM(S): 241.3 TABLET ORAL at 10:25

## 2023-03-25 RX ADMIN — HYDROMORPHONE HYDROCHLORIDE 2 MILLIGRAM(S): 2 INJECTION INTRAMUSCULAR; INTRAVENOUS; SUBCUTANEOUS at 09:00

## 2023-03-25 RX ADMIN — Medication 1 MILLIGRAM(S): at 10:25

## 2023-03-25 RX ADMIN — PIPERACILLIN AND TAZOBACTAM 25 GRAM(S): 4; .5 INJECTION, POWDER, LYOPHILIZED, FOR SOLUTION INTRAVENOUS at 06:00

## 2023-03-25 NOTE — PROGRESS NOTE ADULT - ASSESSMENT
36 y/o M w/ PMH of crohn's disease complicated by perianal fistulas S/p diversion, pancreatitis, proctitis, p/w abdominal pain    * Questionable sepsis 2/2 Crohn's disease w/ rectal wall thickening seen with multiple perianal fistulas on CT/rectal bleeding  - colorectal input noted  - hx of crohns with multiple fistulas  - MRI pelvis ordered by dr shivani gaitan  - pain meds prn   - Given significant leukocytosis -> will start zosyn until further recommendations by colorectal   - ID consult appreciated       *Acute pancreatitis- resolving   -CT: Increased peripancreatic fat stranding suggestive of acute pancreatitis  -Lipase trending down   -IVF- dc   -NPO- advanced to low fat - +nausea post prandial   -GI consult appreciated   -Lipid panel  -Pain control   -ETOH cessation   - dc CIWA- not scoring   - Ambien PRN for insomnia   - colorectal consult for discharge from rectum   - MRI pelvis to check for perianal abscess     *Questionable sepsis 2/2 Crohn's disease w/ rectal wall thickening seen with multiple perianal fistulas on CT  - Given significant leukocytosis -> will start zosyn until further recommendations by colorectal   - ID consult appreciated   - F/u colorectal surgery     *Etoh abuse  -Monitor CIWA- dc   -MVI / thiamine / folate   -Etoh cessation     *3.4 cm basilar right lower lobe bleb  -F/u outpatient pulm     *Hyponatremia  -monitor     *Hypokalemia  -Replete and recheck     *Tobacco cessation counseling  -Declines nicoderm     *DVT ppx   -SCDs

## 2023-03-25 NOTE — PROGRESS NOTE ADULT - SUBJECTIVE AND OBJECTIVE BOX
38 y/o M w/ PMH of crohn's disease complicated by perianal fistulas S/p diversion, pancreatitis, proctitis, p/w abdominal pain. States abdominal pain started yesterday in the LUQ, and radiated to the back. Pain was sharp and constant. Patient admitted with acute pancreatitis- started on IVF and antibiotics.    3/21 patient was seen and examined- complaining of headache and abdominal pain.   3/22- patient was seen and examined. denies abdominal pain, tolerating diet   3/23- complaining of abdominal pain after eating lunch, stated that felt pressure in his rectum- similar to when she was developing a fistula. +nausea.  3/24- complaining of rectal pain + bloody discharge from rectum. +abdominal pain +nausea  3/25 - still with rectal pain and bloody discharge. no fever or chills. awiaitng mri    ROS:   All 10 systems reviewed and found to be negative with the exception of what has been described above.     Vital Signs Last 24 Hrs  T(C): 37.5 (24 Mar 2023 20:38), Max: 37.5 (24 Mar 2023 20:38)  T(F): 99.5 (24 Mar 2023 20:38), Max: 99.5 (24 Mar 2023 20:38)  HR: 80 (24 Mar 2023 20:38) (80 - 88)  BP: 124/81 (24 Mar 2023 20:38) (112/84 - 124/81)  BP(mean): 95 (24 Mar 2023 20:38) (95 - 95)  RR: 18 (24 Mar 2023 20:38) (18 - 18)  SpO2: 97% (24 Mar 2023 20:38) (97% - 99%)    Parameters below as of 24 Mar 2023 20:38  Patient On (Oxygen Delivery Method): room air        PE:  Constitutional: NAD, laying in bed  HEENT: NC/AT  Back: no tenderness  Respiratory: respirations even and non labored, clear to auscultation   Cardiovascular: S1S2 regular, no murmurs  Abdomen: soft, not tender, not distended, positive BS- ostomy intact.   Genitourinary: voiding  Musculoskeletal: no muscle tenderness, no joint swelling or tenderness  Extremities: no pedal edema   Neurological: no focal deficits                            14.2   11.56 )-----------( 269      ( 25 Mar 2023 07:16 )             41.6     03-25    133<L>  |  103  |  7   ----------------------------<  181<H>  3.9   |  26  |  0.65    Ca    9.0      25 Mar 2023 07:16          CT Angio Chest Aorta w/wo IV Cont (03.20.23 @ 15:31)   IMPRESSION:  No dissection.  Increased peripancreatic fat stranding suggestive of acute pancreatitis.        `

## 2023-03-26 LAB
ADD ON TEST-SPECIMEN IN LAB: SIGNIFICANT CHANGE UP
ANION GAP SERPL CALC-SCNC: 5 MMOL/L — SIGNIFICANT CHANGE UP (ref 5–17)
BUN SERPL-MCNC: 7 MG/DL — SIGNIFICANT CHANGE UP (ref 7–23)
CALCIUM SERPL-MCNC: 9.4 MG/DL — SIGNIFICANT CHANGE UP (ref 8.5–10.1)
CHLORIDE SERPL-SCNC: 102 MMOL/L — SIGNIFICANT CHANGE UP (ref 96–108)
CO2 SERPL-SCNC: 29 MMOL/L — SIGNIFICANT CHANGE UP (ref 22–31)
CREAT SERPL-MCNC: 0.75 MG/DL — SIGNIFICANT CHANGE UP (ref 0.5–1.3)
CULTURE RESULTS: SIGNIFICANT CHANGE UP
CULTURE RESULTS: SIGNIFICANT CHANGE UP
EGFR: 119 ML/MIN/1.73M2 — SIGNIFICANT CHANGE UP
GLUCOSE BLDC GLUCOMTR-MCNC: 264 MG/DL — HIGH (ref 70–99)
GLUCOSE BLDC GLUCOMTR-MCNC: 269 MG/DL — HIGH (ref 70–99)
GLUCOSE BLDC GLUCOMTR-MCNC: 344 MG/DL — HIGH (ref 70–99)
GLUCOSE SERPL-MCNC: 181 MG/DL — HIGH (ref 70–99)
HCT VFR BLD CALC: 43.2 % — SIGNIFICANT CHANGE UP (ref 39–50)
HGB BLD-MCNC: 14.5 G/DL — SIGNIFICANT CHANGE UP (ref 13–17)
MAGNESIUM SERPL-MCNC: 2.2 MG/DL — SIGNIFICANT CHANGE UP (ref 1.6–2.6)
MCHC RBC-ENTMCNC: 33.6 GM/DL — SIGNIFICANT CHANGE UP (ref 32–36)
MCHC RBC-ENTMCNC: 35.1 PG — HIGH (ref 27–34)
MCV RBC AUTO: 104.6 FL — HIGH (ref 80–100)
PHOSPHATE SERPL-MCNC: 3.1 MG/DL — SIGNIFICANT CHANGE UP (ref 2.5–4.5)
PLATELET # BLD AUTO: 320 K/UL — SIGNIFICANT CHANGE UP (ref 150–400)
POTASSIUM SERPL-MCNC: 4.5 MMOL/L — SIGNIFICANT CHANGE UP (ref 3.5–5.3)
POTASSIUM SERPL-SCNC: 4.5 MMOL/L — SIGNIFICANT CHANGE UP (ref 3.5–5.3)
RBC # BLD: 4.13 M/UL — LOW (ref 4.2–5.8)
RBC # FLD: 14.5 % — SIGNIFICANT CHANGE UP (ref 10.3–14.5)
SODIUM SERPL-SCNC: 136 MMOL/L — SIGNIFICANT CHANGE UP (ref 135–145)
SPECIMEN SOURCE: SIGNIFICANT CHANGE UP
SPECIMEN SOURCE: SIGNIFICANT CHANGE UP
WBC # BLD: 12.01 K/UL — HIGH (ref 3.8–10.5)
WBC # FLD AUTO: 12.01 K/UL — HIGH (ref 3.8–10.5)

## 2023-03-26 PROCEDURE — 72197 MRI PELVIS W/O & W/DYE: CPT | Mod: 26

## 2023-03-26 PROCEDURE — 99233 SBSQ HOSP IP/OBS HIGH 50: CPT

## 2023-03-26 RX ORDER — SODIUM CHLORIDE 9 MG/ML
1000 INJECTION, SOLUTION INTRAVENOUS
Refills: 0 | Status: DISCONTINUED | OUTPATIENT
Start: 2023-03-26 | End: 2023-03-28

## 2023-03-26 RX ORDER — GLUCAGON INJECTION, SOLUTION 0.5 MG/.1ML
1 INJECTION, SOLUTION SUBCUTANEOUS ONCE
Refills: 0 | Status: DISCONTINUED | OUTPATIENT
Start: 2023-03-26 | End: 2023-03-28

## 2023-03-26 RX ORDER — OXYCODONE AND ACETAMINOPHEN 5; 325 MG/1; MG/1
1 TABLET ORAL EVERY 6 HOURS
Refills: 0 | Status: DISCONTINUED | OUTPATIENT
Start: 2023-03-26 | End: 2023-03-28

## 2023-03-26 RX ORDER — ACETAMINOPHEN 500 MG
650 TABLET ORAL EVERY 6 HOURS
Refills: 0 | Status: DISCONTINUED | OUTPATIENT
Start: 2023-03-26 | End: 2023-03-28

## 2023-03-26 RX ORDER — DEXTROSE 50 % IN WATER 50 %
25 SYRINGE (ML) INTRAVENOUS ONCE
Refills: 0 | Status: DISCONTINUED | OUTPATIENT
Start: 2023-03-26 | End: 2023-03-28

## 2023-03-26 RX ORDER — DEXTROSE 50 % IN WATER 50 %
15 SYRINGE (ML) INTRAVENOUS ONCE
Refills: 0 | Status: DISCONTINUED | OUTPATIENT
Start: 2023-03-26 | End: 2023-03-28

## 2023-03-26 RX ORDER — DEXTROSE 50 % IN WATER 50 %
12.5 SYRINGE (ML) INTRAVENOUS ONCE
Refills: 0 | Status: DISCONTINUED | OUTPATIENT
Start: 2023-03-26 | End: 2023-03-28

## 2023-03-26 RX ORDER — INSULIN LISPRO 100/ML
VIAL (ML) SUBCUTANEOUS
Refills: 0 | Status: DISCONTINUED | OUTPATIENT
Start: 2023-03-26 | End: 2023-03-28

## 2023-03-26 RX ORDER — INSULIN LISPRO 100/ML
VIAL (ML) SUBCUTANEOUS AT BEDTIME
Refills: 0 | Status: DISCONTINUED | OUTPATIENT
Start: 2023-03-26 | End: 2023-03-28

## 2023-03-26 RX ADMIN — Medication 1 TABLET(S): at 09:43

## 2023-03-26 RX ADMIN — HYDROMORPHONE HYDROCHLORIDE 2 MILLIGRAM(S): 2 INJECTION INTRAMUSCULAR; INTRAVENOUS; SUBCUTANEOUS at 00:06

## 2023-03-26 RX ADMIN — HYDROMORPHONE HYDROCHLORIDE 2 MILLIGRAM(S): 2 INJECTION INTRAMUSCULAR; INTRAVENOUS; SUBCUTANEOUS at 00:36

## 2023-03-26 RX ADMIN — HYDROMORPHONE HYDROCHLORIDE 2 MILLIGRAM(S): 2 INJECTION INTRAMUSCULAR; INTRAVENOUS; SUBCUTANEOUS at 09:17

## 2023-03-26 RX ADMIN — Medication 3: at 12:03

## 2023-03-26 RX ADMIN — MAGNESIUM OXIDE 400 MG ORAL TABLET 400 MILLIGRAM(S): 241.3 TABLET ORAL at 09:43

## 2023-03-26 RX ADMIN — PIPERACILLIN AND TAZOBACTAM 25 GRAM(S): 4; .5 INJECTION, POWDER, LYOPHILIZED, FOR SOLUTION INTRAVENOUS at 14:25

## 2023-03-26 RX ADMIN — HYDROMORPHONE HYDROCHLORIDE 2 MILLIGRAM(S): 2 INJECTION INTRAMUSCULAR; INTRAVENOUS; SUBCUTANEOUS at 05:45

## 2023-03-26 RX ADMIN — OXYCODONE AND ACETAMINOPHEN 1 TABLET(S): 5; 325 TABLET ORAL at 11:30

## 2023-03-26 RX ADMIN — PIPERACILLIN AND TAZOBACTAM 25 GRAM(S): 4; .5 INJECTION, POWDER, LYOPHILIZED, FOR SOLUTION INTRAVENOUS at 05:24

## 2023-03-26 RX ADMIN — MAGNESIUM OXIDE 400 MG ORAL TABLET 400 MILLIGRAM(S): 241.3 TABLET ORAL at 12:04

## 2023-03-26 RX ADMIN — HYDROMORPHONE HYDROCHLORIDE 2 MILLIGRAM(S): 2 INJECTION INTRAMUSCULAR; INTRAVENOUS; SUBCUTANEOUS at 09:45

## 2023-03-26 RX ADMIN — Medication 3: at 17:27

## 2023-03-26 RX ADMIN — PIPERACILLIN AND TAZOBACTAM 25 GRAM(S): 4; .5 INJECTION, POWDER, LYOPHILIZED, FOR SOLUTION INTRAVENOUS at 21:05

## 2023-03-26 RX ADMIN — Medication 1 MILLIGRAM(S): at 09:43

## 2023-03-26 RX ADMIN — MAGNESIUM OXIDE 400 MG ORAL TABLET 400 MILLIGRAM(S): 241.3 TABLET ORAL at 17:28

## 2023-03-26 RX ADMIN — ZOLPIDEM TARTRATE 5 MILLIGRAM(S): 10 TABLET ORAL at 22:04

## 2023-03-26 RX ADMIN — OXYCODONE AND ACETAMINOPHEN 1 TABLET(S): 5; 325 TABLET ORAL at 10:49

## 2023-03-26 RX ADMIN — Medication 2: at 21:57

## 2023-03-26 RX ADMIN — HYDROMORPHONE HYDROCHLORIDE 2 MILLIGRAM(S): 2 INJECTION INTRAMUSCULAR; INTRAVENOUS; SUBCUTANEOUS at 05:15

## 2023-03-26 NOTE — PROGRESS NOTE ADULT - ASSESSMENT
37M PMH significant for Crohn's disease including severe perianal Crohn's (dx 22 years ago) and complexed fistulas s/p fecal diversion (colorectal Surgeon, Dr Mascorro at [place]), previously on humira with treatment failure no longer on meds, who presented for evaluation of epigastric pain and was found to pancreatitis.  wbc 12  h/h stable    Plan:  - pls obtain MRI pelvis to evaluate for perianal abscess  - pain control  - IV abx  - f/u GI reccs (consider infliximab)  - patients to follow-up with colorectal surgery at [facility] on discharge  - continue management per primary team  - colorectal surgery will follow    Discussed with Dr. Merino

## 2023-03-26 NOTE — PROGRESS NOTE ADULT - SUBJECTIVE AND OBJECTIVE BOX
SURGERY DAILY PROGRESS NOTE:     Subjective:  Patient seen and examined at bedside during am rounds doing well. Tolerating regular diet. Pt still reports some drainage from anal area. Pt is pending MRI pelvis. AVSS. Denies any fevers, chills, n/v/d, chest pain or shortness of breath    Objective:    MEDICATIONS  (STANDING):  enoxaparin Injectable 40 milliGRAM(s) SubCutaneous every 24 hours  folic acid 1 milliGRAM(s) Oral daily  influenza   Vaccine 0.5 milliLiter(s) IntraMuscular once  magnesium oxide 400 milliGRAM(s) Oral three times a day with meals  multivitamin 1 Tablet(s) Oral daily  piperacillin/tazobactam IVPB.. 3.375 Gram(s) IV Intermittent every 8 hours    MEDICATIONS  (PRN):  HYDROmorphone   Tablet 2 milliGRAM(s) Oral every 4 hours PRN Moderate Pain (4 - 6)  ondansetron Injectable 8 milliGRAM(s) IV Push every 8 hours PRN Nausea and/or Vomiting  zolpidem 5 milliGRAM(s) Oral at bedtime PRN Insomnia      Vital Signs Last 24 Hrs  T(C): 37.1 (25 Mar 2023 21:13), Max: 37.1 (25 Mar 2023 21:13)  T(F): 98.8 (25 Mar 2023 21:13), Max: 98.8 (25 Mar 2023 21:13)  HR: 80 (25 Mar 2023 21:13) (80 - 90)  BP: 111/71 (25 Mar 2023 21:13) (111/71 - 115/77)  BP(mean): 89 (25 Mar 2023 15:42) (89 - 89)  RR: 20 (25 Mar 2023 21:13) (18 - 20)  SpO2: 95% (25 Mar 2023 21:13) (95% - 97%)    Parameters below as of 25 Mar 2023 21:13  Patient On (Oxygen Delivery Method): room air          PHYSICAL EXAM   Gen: well-appearing, in no acute distress  CV: pulse regularly present   Resp: airway patent, non-labored breathing  Abd: soft, NTND; no rebound or guarding.  Ext. no cyanosis or edema      I&O's Detail      Daily     Daily     LABS:                        14.5   12.01 )-----------( 320      ( 26 Mar 2023 07:50 )             43.2     03-26    136  |  102  |  7   ----------------------------<  181<H>  4.5   |  29  |  0.75    Ca    9.4      26 Mar 2023 07:50

## 2023-03-26 NOTE — PROGRESS NOTE ADULT - ASSESSMENT
36 y/o M w/ PMH of crohn's disease complicated by perianal fistulas S/p diversion, pancreatitis, proctitis, p/w abdominal pain    *Sepsis 2/2 Crohn's disease w/ rectal wall thickening seen with multiple perianal fistulas on CT/rectal bleeding  - hx of crohns with multiple fistulas  - MRI pelvis ordered ---> f/u results   - Given significant leukocytosis -> started zosyn until further recommendations by colorectal   - ID consult appreciated   - Blood cultures negative     *Acute pancreatitis  - CT: Increased peripancreatic fat stranding suggestive of acute pancreatitis  - Lipase trending down   - IVF- dc   - On regular diet   - GI consult appreciated   - Lipid panel appreciated   - ETOH cessation   - dc CIWA- not scoring     *EtOH use disorder   - D/c CIWA   - Continue MVI / thiamine / folate   - Etoh cessation recommended     *3.4 cm basilar right lower lobe bleb  - F/u outpatient pulm     *Hyponatremia  - Improved     *Hypokalemia  -Replete as needed     *Tobacco cessation counseling  -Declines nicoderm     *DVT ppx   -Lovenox subQ           36 y/o M w/ PMH of Crohn's disease complicated by perianal fistulas S/p diversion, pancreatitis, proctitis, p/w abdominal pain    *Sepsis 2/2 Crohn's disease w/ rectal wall thickening seen with multiple perianal fistulas on CT/rectal bleeding  - hx of Crohn's with multiple fistulas  - MRI pelvis ordered ---> f/u results   - Given significant leukocytosis -> started zosyn until further recommendations by colorectal   - ID consult appreciated   - Blood cultures negative     *Severe Crohn's disease   - dx 22 years ago, complexed fistulas s/p fecal diversion (colorectal Surgeon, Dr Mascorro at [place])  - previously on humira --> failed treatment  - Colorectal recommends     *Acute pancreatitis  - CT: Increased peripancreatic fat stranding suggestive of acute pancreatitis  - Lipase trending down   - IVF- dc   - On regular diet   - GI consult appreciated   - Lipid panel reviewed   - ETOH cessation     *New diabetes mellitus   - A1C: 6.9%  - Hypoglycemia Protocol, ISS, Accuchecks AC&HS  - Diet: Consistent Carbs w/ Evening Snack    *EtOH use disorder   - D/c CIWA   - Continue MVI / thiamine / folate   - Etoh cessation recommended     *3.4 cm basilar right lower lobe bleb  - F/u outpatient pulm     *Hyponatremia  - Improved     *Hypokalemia  -Replete as needed     *Tobacco cessation counseling  -Declines nicoderm     *DVT ppx   -Lovenox subQ           36 y/o M w/ PMH of Crohn's disease complicated by perianal fistulas S/p diversion, pancreatitis, proctitis, p/w abdominal pain    *Sepsis 2/2 Crohn's flare w/ rectal wall thickening seen with multiple perianal fistulas on CT/rectal bleeding  - hx of Crohn's with multiple fistulas  - MRI pelvis ordered ---> f/u results   - Given significant leukocytosis -> started zosyn until further recommendations by colorectal   - ID consult appreciated   - Blood cultures negative   - Continue abx for now, WBC trending down   - F/u ID recommendations     *Severe Crohn's disease   - dx 22 years ago, complexed fistulas s/p fecal diversion (colorectal Surgeon, Dr Mascorro at [place])  - previously on humira --> failed treatment  - Colorectal recommendations appreciated     *Acute pancreatitis  - CT: Increased peripancreatic fat stranding suggestive of acute pancreatitis  - Lipase trending down   - IVF- dc   - On regular diet   - GI consult appreciated   - Lipid panel reviewed   - ETOH cessation     *New diabetes mellitus   - A1C: 6.9%  - Hypoglycemia Protocol, ISS, Accuchecks AC&HS  - Diet: Consistent Carbs w/ Evening Snack    *EtOH use disorder   - D/c CIWA   - Continue MVI / thiamine / folate   - Etoh cessation recommended     *3.4 cm basilar right lower lobe bleb  - F/u outpatient pulm     *Hyponatremia  - Improved     *Hypokalemia  -Replete as needed     *Tobacco cessation counseling  -Declines nicoderm     *Severe protein-calorie malnutrition and Underweight (BMI < 19)  - Nutrition consult noted     *DVT ppx   -Lovenox subQ

## 2023-03-26 NOTE — PROGRESS NOTE ADULT - SUBJECTIVE AND OBJECTIVE BOX
HPI: 38 y/o M w/ PMH of crohn's disease complicated by perianal fistulas S/p diversion, pancreatitis, proctitis, p/w abdominal pain. States abdominal pain started in the LUQ, and radiated to the back. Pain was sharp and constant. Patient had associated nausea and chills.    Subjective: Patient seen this AM in painful distress, standing in room after having BRBPR. Reports pain with sitting down. Pain not alleviated with Dilaudid as ordered.     REVIEW OF SYSTEMS:    CONSTITUTIONAL: No weakness, fevers or chills  EYES/ENT: No visual changes;  No vertigo or throat pain   NECK: No pain or stiffness  RESPIRATORY: No cough, wheezing, hemoptysis; No shortness of breath  CARDIOVASCULAR: No chest pain or palpitations  GASTROINTESTINAL: No abdominal or epigastric pain. No nausea, vomiting, or hematemesis; No diarrhea or constipation. +BRBPR, +rectal pain   GENITOURINARY: No dysuria, frequency or hematuria  NEUROLOGICAL: No numbness or weakness  SKIN: No itching, rashes    Vital Signs Last 24 Hrs  T(C): 36.8 (26 Mar 2023 08:44), Max: 37.1 (25 Mar 2023 21:13)  T(F): 98.2 (26 Mar 2023 08:44), Max: 98.8 (25 Mar 2023 21:13)  HR: 87 (26 Mar 2023 08:44) (80 - 90)  BP: 100/66 (26 Mar 2023 08:44) (100/66 - 115/77)  BP(mean): 89 (25 Mar 2023 15:42) (89 - 89)  RR: 19 (26 Mar 2023 08:44) (18 - 20)  SpO2: 99% (26 Mar 2023 08:44) (95% - 99%)    Parameters below as of 26 Mar 2023 08:44  Patient On (Oxygen Delivery Method): room air    PHYSICAL EXAM:  GENERAL: NAD, lying in bed comfortably  HEAD:  Atraumatic, Normocephalic  EYES: conjunctiva and sclera clear  ENT: Moist mucous membranes  NECK: Supple, No JVD  CHEST/LUNG: Clear to auscultation bilaterally; No rales, rhonchi, wheezing. Unlabored respirations  HEART: Regular rate and rhythm; No murmurs  ABDOMEN: Bowel sounds present; Soft, Nontender, Nondistended.   EXTREMITIES:  2+ Peripheral Pulses, brisk capillary refill. No clubbing, cyanosis, or edema  NERVOUS SYSTEM:  Alert & Oriented X3, speech clear. No deficits   MSK: FROM all 4 extremities    MEDICATIONS  (STANDING):  dextrose 5%. 1000 milliLiter(s) (50 mL/Hr) IV Continuous <Continuous>  dextrose 5%. 1000 milliLiter(s) (100 mL/Hr) IV Continuous <Continuous>  dextrose 50% Injectable 25 Gram(s) IV Push once  dextrose 50% Injectable 25 Gram(s) IV Push once  dextrose 50% Injectable 12.5 Gram(s) IV Push once  enoxaparin Injectable 40 milliGRAM(s) SubCutaneous every 24 hours  folic acid 1 milliGRAM(s) Oral daily  glucagon  Injectable 1 milliGRAM(s) IntraMuscular once  influenza   Vaccine 0.5 milliLiter(s) IntraMuscular once  insulin lispro (ADMELOG) corrective regimen sliding scale   SubCutaneous three times a day before meals  insulin lispro (ADMELOG) corrective regimen sliding scale   SubCutaneous at bedtime  magnesium oxide 400 milliGRAM(s) Oral three times a day with meals  multivitamin 1 Tablet(s) Oral daily  piperacillin/tazobactam IVPB.. 3.375 Gram(s) IV Intermittent every 8 hours    MEDICATIONS  (PRN):  acetaminophen     Tablet .. 650 milliGRAM(s) Oral every 6 hours PRN Temp greater or equal to 38C (100.4F), Mild Pain (1 - 3)  dextrose Oral Gel 15 Gram(s) Oral once PRN Blood Glucose LESS THAN 70 milliGRAM(s)/deciliter  ondansetron Injectable 8 milliGRAM(s) IV Push every 8 hours PRN Nausea and/or Vomiting  oxycodone    5 mG/acetaminophen 325 mG 2 Tablet(s) Oral every 6 hours PRN Severe Pain (7 - 10)  oxycodone    5 mG/acetaminophen 325 mG 1 Tablet(s) Oral every 6 hours PRN Moderate Pain (4 - 6)  zolpidem 5 milliGRAM(s) Oral at bedtime PRN Insomnia      LABS:                          14.5   12.01 )-----------( 320      ( 26 Mar 2023 07:50 )             43.2     26 Mar 2023 07:50    136    |  102    |  7      ----------------------------<  181    4.5     |  29     |  0.75     Ca    9.4        26 Mar 2023 07:50  Phos  3.1       26 Mar 2023 07:50  Mg     2.2       26 Mar 2023 07:50          CAPILLARY BLOOD GLUCOSE      POCT Blood Glucose.: 269 mg/dL (26 Mar 2023 12:02)            RADIOLOGY:      < from: CT Angio Abdomen and Pelvis w/ IV Cont (03.20.23 @ 15:30) >  BOWEL: No obstruction. Right lower quadrant ileostomy again noted. Rectal   wall thickening again seen with multiple perianal fistulas.    < from: CT Angio Abdomen and Pelvis w/ IV Cont (03.20.23 @ 15:30) >  PANCREAS: Increased fat stranding surrounding the pancreas representing   acute pancreatitis. No evidence of necrosis. No collection

## 2023-03-27 VITALS
HEART RATE: 68 BPM | TEMPERATURE: 98 F | RESPIRATION RATE: 19 BRPM | DIASTOLIC BLOOD PRESSURE: 67 MMHG | OXYGEN SATURATION: 100 % | SYSTOLIC BLOOD PRESSURE: 91 MMHG

## 2023-03-27 LAB
ANION GAP SERPL CALC-SCNC: 3 MMOL/L — LOW (ref 5–17)
ANISOCYTOSIS BLD QL: SLIGHT — SIGNIFICANT CHANGE UP
BASOPHILS # BLD AUTO: 0.05 K/UL — SIGNIFICANT CHANGE UP (ref 0–0.2)
BASOPHILS NFR BLD AUTO: 0.5 % — SIGNIFICANT CHANGE UP (ref 0–2)
BUN SERPL-MCNC: 8 MG/DL — SIGNIFICANT CHANGE UP (ref 7–23)
CALCIUM SERPL-MCNC: 9.2 MG/DL — SIGNIFICANT CHANGE UP (ref 8.5–10.1)
CHLORIDE SERPL-SCNC: 105 MMOL/L — SIGNIFICANT CHANGE UP (ref 96–108)
CO2 SERPL-SCNC: 28 MMOL/L — SIGNIFICANT CHANGE UP (ref 22–31)
CREAT SERPL-MCNC: 0.62 MG/DL — SIGNIFICANT CHANGE UP (ref 0.5–1.3)
EGFR: 126 ML/MIN/1.73M2 — SIGNIFICANT CHANGE UP
EOSINOPHIL # BLD AUTO: 0.19 K/UL — SIGNIFICANT CHANGE UP (ref 0–0.5)
EOSINOPHIL NFR BLD AUTO: 1.8 % — SIGNIFICANT CHANGE UP (ref 0–6)
GLUCOSE BLDC GLUCOMTR-MCNC: 175 MG/DL — HIGH (ref 70–99)
GLUCOSE BLDC GLUCOMTR-MCNC: 178 MG/DL — HIGH (ref 70–99)
GLUCOSE BLDC GLUCOMTR-MCNC: 244 MG/DL — HIGH (ref 70–99)
GLUCOSE BLDC GLUCOMTR-MCNC: 271 MG/DL — HIGH (ref 70–99)
GLUCOSE SERPL-MCNC: 187 MG/DL — HIGH (ref 70–99)
HCT VFR BLD CALC: 41.8 % — SIGNIFICANT CHANGE UP (ref 39–50)
HGB BLD-MCNC: 13.5 G/DL — SIGNIFICANT CHANGE UP (ref 13–17)
IMM GRANULOCYTES NFR BLD AUTO: 0.8 % — SIGNIFICANT CHANGE UP (ref 0–0.9)
LYMPHOCYTES # BLD AUTO: 19.5 % — SIGNIFICANT CHANGE UP (ref 13–44)
LYMPHOCYTES # BLD AUTO: 2.08 K/UL — SIGNIFICANT CHANGE UP (ref 1–3.3)
MACROCYTES BLD QL: SLIGHT — SIGNIFICANT CHANGE UP
MANUAL SMEAR VERIFICATION: SIGNIFICANT CHANGE UP
MCHC RBC-ENTMCNC: 32.3 GM/DL — SIGNIFICANT CHANGE UP (ref 32–36)
MCHC RBC-ENTMCNC: 34.4 PG — HIGH (ref 27–34)
MCV RBC AUTO: 106.4 FL — HIGH (ref 80–100)
MONOCYTES # BLD AUTO: 1.61 K/UL — HIGH (ref 0–0.9)
MONOCYTES NFR BLD AUTO: 15.1 % — HIGH (ref 2–14)
NEUTROPHILS # BLD AUTO: 6.64 K/UL — SIGNIFICANT CHANGE UP (ref 1.8–7.4)
NEUTROPHILS NFR BLD AUTO: 62.3 % — SIGNIFICANT CHANGE UP (ref 43–77)
PLAT MORPH BLD: NORMAL — SIGNIFICANT CHANGE UP
PLATELET # BLD AUTO: 354 K/UL — SIGNIFICANT CHANGE UP (ref 150–400)
POTASSIUM SERPL-MCNC: 4.5 MMOL/L — SIGNIFICANT CHANGE UP (ref 3.5–5.3)
POTASSIUM SERPL-SCNC: 4.5 MMOL/L — SIGNIFICANT CHANGE UP (ref 3.5–5.3)
RBC # BLD: 3.93 M/UL — LOW (ref 4.2–5.8)
RBC # FLD: 14.4 % — SIGNIFICANT CHANGE UP (ref 10.3–14.5)
RBC BLD AUTO: ABNORMAL
SODIUM SERPL-SCNC: 136 MMOL/L — SIGNIFICANT CHANGE UP (ref 135–145)
WBC # BLD: 10.65 K/UL — HIGH (ref 3.8–10.5)
WBC # FLD AUTO: 10.65 K/UL — HIGH (ref 3.8–10.5)

## 2023-03-27 PROCEDURE — 99232 SBSQ HOSP IP/OBS MODERATE 35: CPT

## 2023-03-27 PROCEDURE — 99232 SBSQ HOSP IP/OBS MODERATE 35: CPT | Mod: GC

## 2023-03-27 RX ADMIN — Medication 1 MILLIGRAM(S): at 09:36

## 2023-03-27 RX ADMIN — Medication 1: at 07:54

## 2023-03-27 RX ADMIN — Medication 1 TABLET(S): at 09:37

## 2023-03-27 RX ADMIN — MAGNESIUM OXIDE 400 MG ORAL TABLET 400 MILLIGRAM(S): 241.3 TABLET ORAL at 07:54

## 2023-03-27 RX ADMIN — PIPERACILLIN AND TAZOBACTAM 25 GRAM(S): 4; .5 INJECTION, POWDER, LYOPHILIZED, FOR SOLUTION INTRAVENOUS at 05:10

## 2023-03-27 NOTE — PROGRESS NOTE ADULT - ASSESSMENT
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  No discrete drainable abscess.  - Given significant leukocytosis -> started zosyn until further recommendations by colorectal   - ID consult appreciated   - Blood cultures negative   - Continue abx for now, WBC trending down   - F/u ID recommendations     *Severe Crohn's disease   - dx 22 years ago, complexed fistulas s/p fecal diversion (colorectal Surgeon, Dr Mascorro at [place])  - previously on humira --> failed treatment  - Colorectal recommendations appreciated     *Acute pancreatitis  - CT: Increased peripancreatic fat stranding suggestive of acute pancreatitis  - Lipase trending down   - IVF- dc   - On regular diet   - GI consult appreciated   - Lipid panel reviewed   - ETOH cessation     *New diabetes mellitus   - A1C: 6.9%  - Hypoglycemia Protocol, ISS, Accuchecks AC&HS  - Diet: Consistent Carbs w/ Evening Snack    *EtOH use disorder   - D/c CIWA   - Continue MVI / thiamine / folate   - Etoh cessation recommended     *3.4 cm basilar right lower lobe bleb  - F/u outpatient pulm     *Hyponatremia  - Improved     *Hypokalemia  -Replete as needed     *Tobacco cessation counseling  -Declines nicoderm     *Severe protein-calorie malnutrition and Underweight (BMI < 19)  - Nutrition consult noted     *DVT ppx   -Lovenox subQ

## 2023-03-27 NOTE — PROGRESS NOTE ADULT - SUBJECTIVE AND OBJECTIVE BOX
SURGERY DAILY PROGRESS NOTE:     Subjective:  Patient seen and examined at bedside during am rounds. Continues to report perianal pain and drainage. AVSS. Denies any fevers, chills, n/v/d, chest pain or shortness of breath    Objective:  Vital Signs Last 24 Hrs  T(C): 36.9 (27 Mar 2023 07:48), Max: 37.2 (26 Mar 2023 15:39)  T(F): 98.4 (27 Mar 2023 07:48), Max: 99 (26 Mar 2023 15:39)  HR: 68 (27 Mar 2023 07:48) (68 - 92)  BP: 91/67 (27 Mar 2023 07:48) (91/67 - 114/64)  BP(mean): --  RR: 19 (27 Mar 2023 07:48) (18 - 19)  SpO2: 100% (27 Mar 2023 07:48) (99% - 100%)    Parameters below as of 27 Mar 2023 07:48  Patient On (Oxygen Delivery Method): room air        I&O's Detail    26 Mar 2023 07:01  -  27 Mar 2023 07:00  --------------------------------------------------------  IN:  Total IN: 0 mL    OUT:    Colostomy (mL): 1 mL  Total OUT: 1 mL    Total NET: -1 mL      PHYSICAL EXAM   Gen: well-appearing, in no acute distress  CV: pulse regularly present   Resp: airway patent, non-labored breathing  Abd: soft, NTND; Multiple perianal fistula openings without purulence or bleeding, tenderness throughout anal verge, no fluctuance        MEDICATIONS  (STANDING):  dextrose 5%. 1000 milliLiter(s) (100 mL/Hr) IV Continuous <Continuous>  dextrose 5%. 1000 milliLiter(s) (50 mL/Hr) IV Continuous <Continuous>  dextrose 50% Injectable 25 Gram(s) IV Push once  dextrose 50% Injectable 12.5 Gram(s) IV Push once  dextrose 50% Injectable 25 Gram(s) IV Push once  enoxaparin Injectable 40 milliGRAM(s) SubCutaneous every 24 hours  folic acid 1 milliGRAM(s) Oral daily  glucagon  Injectable 1 milliGRAM(s) IntraMuscular once  influenza   Vaccine 0.5 milliLiter(s) IntraMuscular once  insulin lispro (ADMELOG) corrective regimen sliding scale   SubCutaneous three times a day before meals  insulin lispro (ADMELOG) corrective regimen sliding scale   SubCutaneous at bedtime  magnesium oxide 400 milliGRAM(s) Oral three times a day with meals  multivitamin 1 Tablet(s) Oral daily  piperacillin/tazobactam IVPB.. 3.375 Gram(s) IV Intermittent every 8 hours    MEDICATIONS  (PRN):  acetaminophen     Tablet .. 650 milliGRAM(s) Oral every 6 hours PRN Temp greater or equal to 38C (100.4F), Mild Pain (1 - 3)  dextrose Oral Gel 15 Gram(s) Oral once PRN Blood Glucose LESS THAN 70 milliGRAM(s)/deciliter  ondansetron Injectable 8 milliGRAM(s) IV Push every 8 hours PRN Nausea and/or Vomiting  oxycodone    5 mG/acetaminophen 325 mG 2 Tablet(s) Oral every 6 hours PRN Severe Pain (7 - 10)  oxycodone    5 mG/acetaminophen 325 mG 1 Tablet(s) Oral every 6 hours PRN Moderate Pain (4 - 6)  zolpidem 5 milliGRAM(s) Oral at bedtime PRN Insomnia        LABS:                        13.5   10.65 )-----------( 354      ( 27 Mar 2023 07:22 )             41.8     03-27    136  |  105  |  8   ----------------------------<  187<H>  4.5   |  28  |  0.62    Ca    9.2      27 Mar 2023 07:22  Phos  3.1     03-26  Mg     2.2     03-26

## 2023-03-27 NOTE — PROGRESS NOTE ADULT - ASSESSMENT
37M w/ Crohn's disease, complex fistulas s/p fecal diversion, previously on humira with treatment failure no longer on meds presents w/ pancreatitis. Colorectal surgery called for drainage from fistulas. Multiple perianal fistula openings without purulence or bleeding, tenderness throughout anal verge, no fluctuance. MRI pelvis done yesterday showed multiple perianal fistulas but no abscess. VSS. WBC 10, chem pending.    Plan:  - better pain control  - follow-up with Dr. Quarles, [facility], to discuss APR  - no surgical intervention at this time    Discussed with Dr. Perez

## 2023-03-27 NOTE — PROGRESS NOTE ADULT - PROVIDER SPECIALTY LIST ADULT
Colorectal Surgery
Hospitalist
Gastroenterology
Hospitalist
Surgery
Gastroenterology
Hospitalist

## 2023-03-27 NOTE — PROGRESS NOTE ADULT - ATTENDING COMMENTS
Patient was seen and examined this morning.  He has complicated perianal Crohn's disease and is currently diverted.  He complains of bloody discharge from the rectal region as well as pain.  MRI shows multiple fistula tracts with no abscess or drainable collections.  On exam, there is mild induration of the perianal region with multiple sinuses and purulent drainage from these areas.  No fluctuance.  I explained that if he continues to have significant symptoms from the rectum despite diversion and in the absence of an actual abscess that requires drainage, he may require proctectomy. Colorectal will sign off. Recommend follow up with his CRS surgeon as an outpatient

## 2023-03-27 NOTE — PROGRESS NOTE ADULT - NUTRITIONAL ASSESSMENT
This patient has been assessed with a concern for Malnutrition and has been determined to have a diagnosis/diagnoses of Severe protein-calorie malnutrition and Underweight (BMI < 19).    This patient is being managed with:   Diet Consistent Carbohydrate w/Evening Snack-  Entered: Mar 26 2023  3:02PM  
This patient has been assessed with a concern for Malnutrition and has been determined to have a diagnosis/diagnoses of Severe protein-calorie malnutrition and Underweight (BMI < 19).    This patient is being managed with:   Diet Regular-  Low Fat (LOWFAT)  Entered: Mar 22 2023  9:50AM  
This patient has been assessed with a concern for Malnutrition and has been determined to have a diagnosis/diagnoses of Severe protein-calorie malnutrition and Underweight (BMI < 19).    This patient is being managed with:   Diet Regular-  Low Fat (LOWFAT)  Entered: Mar 22 2023  9:50AM

## 2023-03-27 NOTE — PROGRESS NOTE ADULT - SUBJECTIVE AND OBJECTIVE BOX
38 y/o M w/ PMH of crohn's disease complicated by perianal fistulas S/p diversion, pancreatitis, proctitis, p/w abdominal pain. States abdominal pain started yesterday in the LUQ, and radiated to the back. Pain was sharp and constant. Patient admitted with acute pancreatitis- started on IVF and antibiotics. Antibiotics dc by ID- Pancreatitis resolved. Hospital course complicated with rectal pain and bloody discharge from rectum- COlorectal consulted.       3/27- patient was seen and examined- still with complaints of 10/10 rectal pain- bloody discharge from rectum.    Vital Signs Last 24 Hrs  T(C): 36.9 (27 Mar 2023 07:48), Max: 37.2 (26 Mar 2023 15:39)  T(F): 98.4 (27 Mar 2023 07:48), Max: 99 (26 Mar 2023 15:39)  HR: 68 (27 Mar 2023 07:48) (68 - 92)  BP: 91/67 (27 Mar 2023 07:48) (91/67 - 114/64)  BP(mean): --  RR: 19 (27 Mar 2023 07:48) (18 - 19)  SpO2: 100% (27 Mar 2023 07:48) (99% - 100%)    Parameters below as of 27 Mar 2023 07:48  Patient On (Oxygen Delivery Method): room air    ROS:   All 10 systems reviewed and found to be negative with the exception of what has been described above.       PE:  Constitutional: NAD, laying in bed  HEENT: NC/AT  Back: no tenderness  Respiratory: respirations even and non labored, clear to auscultation   Cardiovascular: S1S2 regular, no murmurs  Abdomen: soft, not tender, not distended, positive BS- ostomy intact.   Genitourinary: voiding  Musculoskeletal: no muscle tenderness, no joint swelling or tenderness  Extremities: no pedal edema   Neurological: no focal deficits      MEDICATIONS  (STANDING):  dextrose 5%. 1000 milliLiter(s) (100 mL/Hr) IV Continuous <Continuous>  dextrose 5%. 1000 milliLiter(s) (50 mL/Hr) IV Continuous <Continuous>  dextrose 50% Injectable 25 Gram(s) IV Push once  dextrose 50% Injectable 12.5 Gram(s) IV Push once  dextrose 50% Injectable 25 Gram(s) IV Push once  enoxaparin Injectable 40 milliGRAM(s) SubCutaneous every 24 hours  folic acid 1 milliGRAM(s) Oral daily  glucagon  Injectable 1 milliGRAM(s) IntraMuscular once  influenza   Vaccine 0.5 milliLiter(s) IntraMuscular once  insulin lispro (ADMELOG) corrective regimen sliding scale   SubCutaneous three times a day before meals  insulin lispro (ADMELOG) corrective regimen sliding scale   SubCutaneous at bedtime  magnesium oxide 400 milliGRAM(s) Oral three times a day with meals  multivitamin 1 Tablet(s) Oral daily  piperacillin/tazobactam IVPB.. 3.375 Gram(s) IV Intermittent every 8 hours    MEDICATIONS  (PRN):  acetaminophen     Tablet .. 650 milliGRAM(s) Oral every 6 hours PRN Temp greater or equal to 38C (100.4F), Mild Pain (1 - 3)  dextrose Oral Gel 15 Gram(s) Oral once PRN Blood Glucose LESS THAN 70 milliGRAM(s)/deciliter  ondansetron Injectable 8 milliGRAM(s) IV Push every 8 hours PRN Nausea and/or Vomiting  oxycodone    5 mG/acetaminophen 325 mG 2 Tablet(s) Oral every 6 hours PRN Severe Pain (7 - 10)  oxycodone    5 mG/acetaminophen 325 mG 1 Tablet(s) Oral every 6 hours PRN Moderate Pain (4 - 6)  zolpidem 5 milliGRAM(s) Oral at bedtime PRN Insomnia        LABS:    03-27    136  |  105  |  8   ----------------------------<  187<H>  4.5   |  28  |  0.62    Ca    9.2      27 Mar 2023 07:22  Phos  3.1     03-26  Mg     2.2     03-26    Complete Blood Count + Automated Diff in AM (03.27.23 @ 07:22)   WBC Count: 10.65 K/uL  RBC Count: 3.93 M/uL  Hemoglobin: 13.5 g/dL  Hematocrit: 41.8 %  Mean Cell Volume: 106.4 fl  Mean Cell Hemoglobin: 34.4 pg  Mean Cell Hemoglobin Conc: 32.3 gm/dL  Red Cell Distrib Width: 14.4 %  Platelet Count - Automated: 354 K/uL  Auto Neutrophil #: 6.64 K/uL  Auto Lymphocyte #: 2.08 K/uL  Auto Monocyte #: 1.61 K/uL  Auto Eosinophil #: 0.19 K/uL  Auto Basophil #: 0.05 K/uL  Auto Neutrophil %: 62.3: Differential percentages must be correlated with absolute numbers for   clinical significance. %  Auto Lymphocyte %: 19.5 %  Auto Monocyte %: 15.1 %  Auto Eosinophil %: 1.8 %  Auto Basophil %: 0.5 %  Auto Immature Granulocyte %: 0.8: (Includes meta, myelo and promyelocytes). Mild elevations in immature                         14.5   12.01 )-----------( 320      ( 26 Mar 2023 07:50 )             43.2     26 Mar 2023 07:50    136    |  102    |  7      ----------------------------<  181    4.5     |  29     |  0.75                         13.5   10.65 )-----------( 354      ( 27 Mar 2023 07:22 )             41.8     Ca    9.4        26 Mar 2023 07:50  Phos  3.1       26 Mar 2023 07:50  Mg     2.2       26 Mar 2023 07:50                            13.5   10.65 )-----------( 354      ( 27 Mar 2023 07:22 )             41.8       CAPILLARY BLOOD GLUCOSE      POCT Blood Glucose.: 269 mg/dL (26 Mar 2023 12:02)            RADIOLOGY:      < from: CT Angio Abdomen and Pelvis w/ IV Cont (03.20.23 @ 15:30) >  BOWEL: No obstruction. Right lower quadrant ileostomy again noted. Rectal   wall thickening again seen with multiple perianal fistulas.    < from: CT Angio Abdomen and Pelvis w/ IV Cont (03.20.23 @ 15:30) >  PANCREAS: Increased fat stranding surrounding the pancreas representing   acute pancreatitis. No evidence of necrosis. No collection

## 2023-03-28 ENCOUNTER — TRANSCRIPTION ENCOUNTER (OUTPATIENT)
Age: 38
End: 2023-03-28

## 2023-03-28 LAB
ANION GAP SERPL CALC-SCNC: 3 MMOL/L — LOW (ref 5–17)
BUN SERPL-MCNC: 11 MG/DL — SIGNIFICANT CHANGE UP (ref 7–23)
CALCIUM SERPL-MCNC: 9.4 MG/DL — SIGNIFICANT CHANGE UP (ref 8.5–10.1)
CHLORIDE SERPL-SCNC: 99 MMOL/L — SIGNIFICANT CHANGE UP (ref 96–108)
CO2 SERPL-SCNC: 31 MMOL/L — SIGNIFICANT CHANGE UP (ref 22–31)
CREAT SERPL-MCNC: 0.72 MG/DL — SIGNIFICANT CHANGE UP (ref 0.5–1.3)
EGFR: 121 ML/MIN/1.73M2 — SIGNIFICANT CHANGE UP
GLUCOSE BLDC GLUCOMTR-MCNC: 166 MG/DL — HIGH (ref 70–99)
GLUCOSE BLDC GLUCOMTR-MCNC: 202 MG/DL — HIGH (ref 70–99)
GLUCOSE SERPL-MCNC: 197 MG/DL — HIGH (ref 70–99)
HCT VFR BLD CALC: 42.5 % — SIGNIFICANT CHANGE UP (ref 39–50)
HGB BLD-MCNC: 14.3 G/DL — SIGNIFICANT CHANGE UP (ref 13–17)
MCHC RBC-ENTMCNC: 33.6 GM/DL — SIGNIFICANT CHANGE UP (ref 32–36)
MCHC RBC-ENTMCNC: 35.3 PG — HIGH (ref 27–34)
MCV RBC AUTO: 104.9 FL — HIGH (ref 80–100)
PLATELET # BLD AUTO: 428 K/UL — HIGH (ref 150–400)
POTASSIUM SERPL-MCNC: 4 MMOL/L — SIGNIFICANT CHANGE UP (ref 3.5–5.3)
POTASSIUM SERPL-SCNC: 4 MMOL/L — SIGNIFICANT CHANGE UP (ref 3.5–5.3)
RBC # BLD: 4.05 M/UL — LOW (ref 4.2–5.8)
RBC # FLD: 14.5 % — SIGNIFICANT CHANGE UP (ref 10.3–14.5)
SODIUM SERPL-SCNC: 133 MMOL/L — LOW (ref 135–145)
WBC # BLD: 12.05 K/UL — HIGH (ref 3.8–10.5)
WBC # FLD AUTO: 12.05 K/UL — HIGH (ref 3.8–10.5)

## 2023-03-28 PROCEDURE — 99239 HOSP IP/OBS DSCHRG MGMT >30: CPT

## 2023-03-28 RX ORDER — MAGNESIUM OXIDE 400 MG ORAL TABLET 241.3 MG
1 TABLET ORAL
Qty: 0 | Refills: 0 | DISCHARGE
Start: 2023-03-28

## 2023-03-28 RX ORDER — ACETAMINOPHEN 500 MG
2 TABLET ORAL
Qty: 0 | Refills: 0 | DISCHARGE
Start: 2023-03-28

## 2023-03-28 RX ORDER — OXYCODONE AND ACETAMINOPHEN 5; 325 MG/1; MG/1
2 TABLET ORAL
Qty: 16 | Refills: 0
Start: 2023-03-28 | End: 2023-03-29

## 2023-03-28 RX ORDER — METFORMIN HYDROCHLORIDE 850 MG/1
1 TABLET ORAL
Qty: 60 | Refills: 0
Start: 2023-03-28 | End: 2023-04-26

## 2023-03-28 RX ORDER — FOLIC ACID 0.8 MG
1 TABLET ORAL
Qty: 0 | Refills: 0 | DISCHARGE
Start: 2023-03-28

## 2023-03-28 NOTE — DISCHARGE NOTE PROVIDER - PROVIDER TOKENS
FREE:[LAST:[your colorectal Physician in the community],PHONE:[(   )    -],FAX:[(   )    -],ADDRESS:[within this week]],FREE:[LAST:[your [facility] physician],PHONE:[(   )    -],FAX:[(   )    -],ADDRESS:[in 1 week]]

## 2023-03-28 NOTE — DISCHARGE NOTE PROVIDER - DISCHARGE SERVICE FOR PATIENT
on the discharge service for the patient. I have reviewed and made amendments to the documentation where necessary. Topical Retinoid counseling:  Patient advised to apply a pea-sized amount only at bedtime and wait 30 minutes after washing their face before applying.  If too drying, patient may add a non-comedogenic moisturizer. The patient verbalized understanding of the proper use and possible adverse effects of retinoids.  All of the patient's questions and concerns were addressed.

## 2023-03-28 NOTE — DISCHARGE NOTE PROVIDER - CARE PROVIDER_API CALL
your colorectal Physician in the community,   within this week  Phone: (   )    -  Fax: (   )    -  Follow Up Time:     your [facility] physician,   in 1 week  Phone: (   )    -  Fax: (   )    -  Follow Up Time:

## 2023-03-28 NOTE — DISCHARGE NOTE PROVIDER - DETAILS OF MALNUTRITION DIAGNOSIS/DIAGNOSES
This patient has been assessed with a concern for Malnutrition and was treated during this hospitalization for the following Nutrition diagnosis/diagnoses:     -  03/24/2023: Severe protein-calorie malnutrition   -  03/24/2023: Underweight (BMI < 19)

## 2023-03-28 NOTE — DISCHARGE NOTE PROVIDER - NSDCCPCAREPLAN_GEN_ALL_CORE_FT
PRINCIPAL DISCHARGE DIAGNOSIS  Diagnosis: Pancreatitis  Assessment and Plan of Treatment: resolved.  *anal fistula/Crohns disease  - f/u with your colorectal surgeon at [facility]  - c/w antibiotic x3 days   *new diagnosis of Diabetes- your A1C is 6.9 - normal is <6.5- 6.9 confirms that you are diabetic   - you will need to start metfoemin- this is a medication for diabetes  - avoid concentrated sweets.   - f/u with your PCP  **3.4 cm basilar right lower lobe bleb  - F/u outpatient pulm   *alcohol abuse- avoid alcohol         SECONDARY DISCHARGE DIAGNOSES  Diagnosis: Hypokalemia  Assessment and Plan of Treatment:

## 2023-03-28 NOTE — DISCHARGE NOTE PROVIDER - NSDCMRMEDTOKEN_GEN_ALL_CORE_FT
acetaminophen 325 mg oral tablet: 2 tab(s) orally every 6 hours As needed Temp greater or equal to 38C (100.4F), Mild Pain (1 - 3)  amoxicillin-clavulanate 875 mg-125 mg oral tablet: 1 tab(s) orally 2 times a day  folic acid 1 mg oral tablet: 1 tab(s) orally once a day  magnesium oxide 400 mg oral tablet: 1 tab(s) orally 3 times a day (with meals)  metFORMIN 500 mg oral tablet: 1 tab(s) orally 2 times a day  Multiple Vitamins oral tablet: 1 tab(s) orally once a day  Percocet 5 mg-325 mg oral tablet: 2 tab(s) orally every 6 hours as needed for  severe pain MDD: 8 tabs

## 2023-03-28 NOTE — DISCHARGE NOTE NURSING/CASE MANAGEMENT/SOCIAL WORK - PATIENT PORTAL LINK FT
You can access the FollowMyHealth Patient Portal offered by [facility] by registering at the following website: [email or web address]. By joining [facility]’s FollowMyHealth portal, you will also be able to view your health information using other applications (apps) compatible with our system.

## 2023-04-03 DIAGNOSIS — K50.113 CROHN'S DISEASE OF LARGE INTESTINE WITH FISTULA: ICD-10-CM

## 2023-04-03 DIAGNOSIS — K60.3 ANAL FISTULA: ICD-10-CM

## 2023-04-03 DIAGNOSIS — E87.6 HYPOKALEMIA: ICD-10-CM

## 2023-04-03 DIAGNOSIS — E43 UNSPECIFIED SEVERE PROTEIN-CALORIE MALNUTRITION: ICD-10-CM

## 2023-04-03 DIAGNOSIS — E87.1 HYPO-OSMOLALITY AND HYPONATREMIA: ICD-10-CM

## 2023-04-03 DIAGNOSIS — K50.111 CROHN'S DISEASE OF LARGE INTESTINE WITH RECTAL BLEEDING: ICD-10-CM

## 2023-04-03 DIAGNOSIS — F17.210 NICOTINE DEPENDENCE, CIGARETTES, UNCOMPLICATED: ICD-10-CM

## 2023-04-03 DIAGNOSIS — E11.9 TYPE 2 DIABETES MELLITUS WITHOUT COMPLICATIONS: ICD-10-CM

## 2023-04-03 DIAGNOSIS — K85.20 ALCOHOL INDUCED ACUTE PANCREATITIS WITHOUT NECROSIS OR INFECTION: ICD-10-CM

## 2023-04-03 DIAGNOSIS — F10.188 ALCOHOL ABUSE WITH OTHER ALCOHOL-INDUCED DISORDER: ICD-10-CM

## 2023-04-03 DIAGNOSIS — A41.9 SEPSIS, UNSPECIFIED ORGANISM: ICD-10-CM

## 2023-04-03 DIAGNOSIS — G47.00 INSOMNIA, UNSPECIFIED: ICD-10-CM

## 2023-04-03 DIAGNOSIS — J98.4 OTHER DISORDERS OF LUNG: ICD-10-CM

## 2023-07-12 NOTE — ED ADULT NURSE NOTE - CCCP TRG CHIEF CMPLNT
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 Aleida has decided to stop chemotherapy and will continue on immunotherapy for Imfinzi. She continues to need frequent paracentesis which is also causing weight fluctuations. She tried Marinol but said it made her too sleepy and did not increase her appetite. She had tried the mouth rinse of water/baking soda/salt but said it made her gag so she couldn't use it. She is still drinking Boost Plus 2 per day but not adding any of the high calorie additives to it such as ice cream or heavy cream, stating Lane orellana a good thing with Boost\". She did try rotisserie chicken because fried chicken tastes altered to her, she did say she ate some but it had a bitter taste. Suggested sauces to further adjust flavor profile. She saw Palliative Medicine today and was encouraged to try olanzapine and dexamethasone was ordered.      Weight change: 1.41% weight gain x 1 week, 4.98% weight loss x 1 month, 8.33% significant weight loss x 3 months, 12.16% significant weight loss x 6 months  Appetite: Poor appetite  Nutritional Side Effects: dysgeusia, nausea, anorexia, fatigue  Calculated Needs: 32-35 kcal/kg CBW = 3636-4303 kcal, 1.3-1.5 gm/kg/CBW = 75-85 gm pro, 1 ml/kcal = 1470-2887 ml fluids  Malnutrition Status: Severe  Nutrition Diagnosis: Severe malnutrition r/t inadequate energy/protein intake with increased energy/protein needs associated with abdominal pain

## 2023-07-29 NOTE — DISCHARGE NOTE ADULT - PATIENT PORTAL LINK FT
“You can access the FollowHealth Patient Portal, offered by [facility], by registering with the following website: [email or web address] Attending Attestation (For Attendings USE Only)...

## 2023-09-07 NOTE — ED ADULT NURSE NOTE - NS ED NOTE  TALK SOMEONE YN
"Last Written Prescription Date:  9/14/2022  Last Fill Quantity: 90,  # refills: 3   Last office visit provider:  5/24/2023    Last Written Prescription Date:  9/14/2022  Last Fill Quantity: 90,  # refills: 3   Last office visit: 5/24/2023     Last Written Prescription Date:  9/14/2022  Last Fill Quantity: 90,  # refills: 3   Last office visit: 5/24/2023    Last Written Prescription Date:  9/14/2022  Last Fill Quantity: 90,  # refills: 3   Last office visit: 5/24/2023     Requested Prescriptions   Pending Prescriptions Disp Refills    pravastatin (PRAVACHOL) 40 MG tablet [Pharmacy Med Name: Pravastatin Sodium 40 MG Oral Tablet] 90 tablet 0     Sig: TAKE 1 TABLET BY MOUTH AT BEDTIME       Statins Protocol Passed - 9/7/2023  5:31 AM        Passed - LDL on file in past 12 months     Recent Labs   Lab Test 03/14/23  0743   *             Passed - No abnormal creatine kinase in past 12 months     No lab results found.             Passed - Recent (12 mo) or future (30 days) visit within the authorizing provider's specialty     Patient has had an office visit with the authorizing provider or a provider within the authorizing providers department within the previous 12 mos or has a future within next 30 days. See \"Patient Info\" tab in inbasket, or \"Choose Columns\" in Meds & Orders section of the refill encounter.              Passed - Medication is active on med list        Passed - Patient is age 18 or older          lisinopril-hydrochlorothiazide (ZESTORETIC) 20-25 MG tablet [Pharmacy Med Name: Lisinopril-hydroCHLOROthiazide 20-25 MG Oral Tablet] 90 tablet 0     Sig: Take 1 tablet by mouth once daily       Diuretics (Including Combos) Protocol Passed - 9/7/2023  5:31 AM        Passed - Blood pressure under 140/90 in past 12 months     BP Readings from Last 3 Encounters:   05/24/23 138/82   05/18/23 (!) 156/90   05/14/23 (!) 142/78                 Passed - Recent (12 mo) or future (30 days) visit within the " "authorizing provider's specialty     Patient has had an office visit with the authorizing provider or a provider within the authorizing providers department within the previous 12 mos or has a future within next 30 days. See \"Patient Info\" tab in inbasket, or \"Choose Columns\" in Meds & Orders section of the refill encounter.              Passed - Medication is active on med list        Passed - Patient is age 18 or older        Passed - Normal serum creatinine on file in past 12 months     Recent Labs   Lab Test 03/14/23  0743   CR 1.00              Passed - Normal serum potassium on file in past 12 months     Recent Labs   Lab Test 03/14/23  0743   POTASSIUM 4.4                    Passed - Normal serum sodium on file in past 12 months     Recent Labs   Lab Test 03/14/23  0743                ACE Inhibitors (Including Combos) Protocol Passed - 9/7/2023  5:31 AM        Passed - Blood pressure under 140/90 in past 12 months     BP Readings from Last 3 Encounters:   05/24/23 138/82   05/18/23 (!) 156/90   05/14/23 (!) 142/78                 Passed - Recent (12 mo) or future (30 days) visit within the authorizing provider's specialty     Patient has had an office visit with the authorizing provider or a provider within the authorizing providers department within the previous 12 mos or has a future within next 30 days. See \"Patient Info\" tab in inbasket, or \"Choose Columns\" in Meds & Orders section of the refill encounter.              Passed - Medication is active on med list        Passed - Patient is age 18 or older        Passed - Normal serum creatinine on file in past 12 months     Recent Labs   Lab Test 03/14/23  0743   CR 1.00       Ok to refill medication if creatinine is low          Passed - Normal serum potassium on file in past 12 months     Recent Labs   Lab Test 03/14/23  0743   POTASSIUM 4.4               tamsulosin (FLOMAX) 0.4 MG capsule [Pharmacy Med Name: Tamsulosin HCl 0.4 MG Oral Capsule] 90 " "capsule 0     Sig: Take 1 capsule by mouth once daily       Alpha Blockers Passed - 9/7/2023  5:31 AM        Passed - Blood pressure under 140/90 in past 12 months     BP Readings from Last 3 Encounters:   05/24/23 138/82   05/18/23 (!) 156/90   05/14/23 (!) 142/78                 Passed - Recent (12 mo) or future (30 days) visit within the authorizing provider's specialty     Patient has had an office visit with the authorizing provider or a provider within the authorizing providers department within the previous 12 mos or has a future within next 30 days. See \"Patient Info\" tab in inbasket, or \"Choose Columns\" in Meds & Orders section of the refill encounter.              Passed - Patient does not have Tadalafil, Vardenafil, or Sildenafil on their medication list        Passed - Medication is active on med list        Passed - Patient is 18 years of age or older          metoprolol succinate ER (TOPROL XL) 100 MG 24 hr tablet [Pharmacy Med Name: Metoprolol Succinate  MG Oral Tablet Extended Release 24 Hour] 90 tablet 0     Sig: Take 1 tablet by mouth once daily       Beta-Blockers Protocol Passed - 9/7/2023  5:31 AM        Passed - Blood pressure under 140/90 in past 12 months     BP Readings from Last 3 Encounters:   05/24/23 138/82   05/18/23 (!) 156/90   05/14/23 (!) 142/78                 Passed - Patient is age 6 or older        Passed - Recent (12 mo) or future (30 days) visit within the authorizing provider's specialty     Patient has had an office visit with the authorizing provider or a provider within the authorizing providers department within the previous 12 mos or has a future within next 30 days. See \"Patient Info\" tab in inbasket, or \"Choose Columns\" in Meds & Orders section of the refill encounter.              Passed - Medication is active on med list             JULISSA TSAI RN 09/07/23 12:45 PM  " No

## 2023-10-25 ENCOUNTER — INPATIENT (INPATIENT)
Facility: HOSPITAL | Age: 38
LOS: 5 days | Discharge: HOME | DRG: 871 | End: 2023-10-31
Attending: INTERNAL MEDICINE | Admitting: INTERNAL MEDICINE
Payer: COMMERCIAL

## 2023-10-25 VITALS — WEIGHT: 169.98 LBS | HEIGHT: 72 IN

## 2023-10-25 DIAGNOSIS — Z98.89 OTHER SPECIFIED POSTPROCEDURAL STATES: Chronic | ICD-10-CM

## 2023-10-25 DIAGNOSIS — Z96.7 PRESENCE OF OTHER BONE AND TENDON IMPLANTS: Chronic | ICD-10-CM

## 2023-10-25 DIAGNOSIS — K62.89 OTHER SPECIFIED DISEASES OF ANUS AND RECTUM: ICD-10-CM

## 2023-10-25 DIAGNOSIS — K60.3 ANAL FISTULA: Chronic | ICD-10-CM

## 2023-10-25 LAB
ALBUMIN SERPL ELPH-MCNC: 2.5 G/DL — LOW (ref 3.3–5)
ALBUMIN SERPL ELPH-MCNC: 2.5 G/DL — LOW (ref 3.3–5)
ALP SERPL-CCNC: 120 U/L — SIGNIFICANT CHANGE UP (ref 40–120)
ALP SERPL-CCNC: 120 U/L — SIGNIFICANT CHANGE UP (ref 40–120)
ALT FLD-CCNC: 24 U/L — SIGNIFICANT CHANGE UP (ref 12–78)
ALT FLD-CCNC: 24 U/L — SIGNIFICANT CHANGE UP (ref 12–78)
ANION GAP SERPL CALC-SCNC: 5 MMOL/L — SIGNIFICANT CHANGE UP (ref 5–17)
ANION GAP SERPL CALC-SCNC: 5 MMOL/L — SIGNIFICANT CHANGE UP (ref 5–17)
APTT BLD: 31.4 SEC — SIGNIFICANT CHANGE UP (ref 24.5–35.6)
APTT BLD: 31.4 SEC — SIGNIFICANT CHANGE UP (ref 24.5–35.6)
AST SERPL-CCNC: 14 U/L — LOW (ref 15–37)
AST SERPL-CCNC: 14 U/L — LOW (ref 15–37)
BASOPHILS # BLD AUTO: 0.08 K/UL — SIGNIFICANT CHANGE UP (ref 0–0.2)
BASOPHILS # BLD AUTO: 0.08 K/UL — SIGNIFICANT CHANGE UP (ref 0–0.2)
BASOPHILS NFR BLD AUTO: 0.6 % — SIGNIFICANT CHANGE UP (ref 0–2)
BASOPHILS NFR BLD AUTO: 0.6 % — SIGNIFICANT CHANGE UP (ref 0–2)
BILIRUB SERPL-MCNC: 0.2 MG/DL — SIGNIFICANT CHANGE UP (ref 0.2–1.2)
BILIRUB SERPL-MCNC: 0.2 MG/DL — SIGNIFICANT CHANGE UP (ref 0.2–1.2)
BLD GP AB SCN SERPL QL: SIGNIFICANT CHANGE UP
BLD GP AB SCN SERPL QL: SIGNIFICANT CHANGE UP
BUN SERPL-MCNC: 9 MG/DL — SIGNIFICANT CHANGE UP (ref 7–23)
BUN SERPL-MCNC: 9 MG/DL — SIGNIFICANT CHANGE UP (ref 7–23)
CALCIUM SERPL-MCNC: 8.6 MG/DL — SIGNIFICANT CHANGE UP (ref 8.5–10.1)
CALCIUM SERPL-MCNC: 8.6 MG/DL — SIGNIFICANT CHANGE UP (ref 8.5–10.1)
CHLORIDE SERPL-SCNC: 101 MMOL/L — SIGNIFICANT CHANGE UP (ref 96–108)
CHLORIDE SERPL-SCNC: 101 MMOL/L — SIGNIFICANT CHANGE UP (ref 96–108)
CO2 SERPL-SCNC: 28 MMOL/L — SIGNIFICANT CHANGE UP (ref 22–31)
CO2 SERPL-SCNC: 28 MMOL/L — SIGNIFICANT CHANGE UP (ref 22–31)
CREAT SERPL-MCNC: 0.9 MG/DL — SIGNIFICANT CHANGE UP (ref 0.5–1.3)
CREAT SERPL-MCNC: 0.9 MG/DL — SIGNIFICANT CHANGE UP (ref 0.5–1.3)
EGFR: 112 ML/MIN/1.73M2 — SIGNIFICANT CHANGE UP
EGFR: 112 ML/MIN/1.73M2 — SIGNIFICANT CHANGE UP
EOSINOPHIL # BLD AUTO: 0.24 K/UL — SIGNIFICANT CHANGE UP (ref 0–0.5)
EOSINOPHIL # BLD AUTO: 0.24 K/UL — SIGNIFICANT CHANGE UP (ref 0–0.5)
EOSINOPHIL NFR BLD AUTO: 1.7 % — SIGNIFICANT CHANGE UP (ref 0–6)
EOSINOPHIL NFR BLD AUTO: 1.7 % — SIGNIFICANT CHANGE UP (ref 0–6)
GLUCOSE SERPL-MCNC: 229 MG/DL — HIGH (ref 70–99)
GLUCOSE SERPL-MCNC: 229 MG/DL — HIGH (ref 70–99)
HCT VFR BLD CALC: 46.8 % — SIGNIFICANT CHANGE UP (ref 39–50)
HCT VFR BLD CALC: 46.8 % — SIGNIFICANT CHANGE UP (ref 39–50)
HGB BLD-MCNC: 15.3 G/DL — SIGNIFICANT CHANGE UP (ref 13–17)
HGB BLD-MCNC: 15.3 G/DL — SIGNIFICANT CHANGE UP (ref 13–17)
IMM GRANULOCYTES NFR BLD AUTO: 0.6 % — SIGNIFICANT CHANGE UP (ref 0–0.9)
IMM GRANULOCYTES NFR BLD AUTO: 0.6 % — SIGNIFICANT CHANGE UP (ref 0–0.9)
INR BLD: 0.92 RATIO — SIGNIFICANT CHANGE UP (ref 0.85–1.18)
INR BLD: 0.92 RATIO — SIGNIFICANT CHANGE UP (ref 0.85–1.18)
LACTATE SERPL-SCNC: 1 MMOL/L — SIGNIFICANT CHANGE UP (ref 0.7–2)
LACTATE SERPL-SCNC: 1 MMOL/L — SIGNIFICANT CHANGE UP (ref 0.7–2)
LIDOCAIN IGE QN: 19 U/L — SIGNIFICANT CHANGE UP (ref 13–75)
LIDOCAIN IGE QN: 19 U/L — SIGNIFICANT CHANGE UP (ref 13–75)
LYMPHOCYTES # BLD AUTO: 15.8 % — SIGNIFICANT CHANGE UP (ref 13–44)
LYMPHOCYTES # BLD AUTO: 15.8 % — SIGNIFICANT CHANGE UP (ref 13–44)
LYMPHOCYTES # BLD AUTO: 2.29 K/UL — SIGNIFICANT CHANGE UP (ref 1–3.3)
LYMPHOCYTES # BLD AUTO: 2.29 K/UL — SIGNIFICANT CHANGE UP (ref 1–3.3)
MCHC RBC-ENTMCNC: 31.7 PG — SIGNIFICANT CHANGE UP (ref 27–34)
MCHC RBC-ENTMCNC: 31.7 PG — SIGNIFICANT CHANGE UP (ref 27–34)
MCHC RBC-ENTMCNC: 32.7 GM/DL — SIGNIFICANT CHANGE UP (ref 32–36)
MCHC RBC-ENTMCNC: 32.7 GM/DL — SIGNIFICANT CHANGE UP (ref 32–36)
MCV RBC AUTO: 96.9 FL — SIGNIFICANT CHANGE UP (ref 80–100)
MCV RBC AUTO: 96.9 FL — SIGNIFICANT CHANGE UP (ref 80–100)
MONOCYTES # BLD AUTO: 1.22 K/UL — HIGH (ref 0–0.9)
MONOCYTES # BLD AUTO: 1.22 K/UL — HIGH (ref 0–0.9)
MONOCYTES NFR BLD AUTO: 8.4 % — SIGNIFICANT CHANGE UP (ref 2–14)
MONOCYTES NFR BLD AUTO: 8.4 % — SIGNIFICANT CHANGE UP (ref 2–14)
NEUTROPHILS # BLD AUTO: 10.6 K/UL — HIGH (ref 1.8–7.4)
NEUTROPHILS # BLD AUTO: 10.6 K/UL — HIGH (ref 1.8–7.4)
NEUTROPHILS NFR BLD AUTO: 72.9 % — SIGNIFICANT CHANGE UP (ref 43–77)
NEUTROPHILS NFR BLD AUTO: 72.9 % — SIGNIFICANT CHANGE UP (ref 43–77)
PLATELET # BLD AUTO: 468 K/UL — HIGH (ref 150–400)
PLATELET # BLD AUTO: 468 K/UL — HIGH (ref 150–400)
POTASSIUM SERPL-MCNC: 4.8 MMOL/L — SIGNIFICANT CHANGE UP (ref 3.5–5.3)
POTASSIUM SERPL-MCNC: 4.8 MMOL/L — SIGNIFICANT CHANGE UP (ref 3.5–5.3)
POTASSIUM SERPL-SCNC: 4.8 MMOL/L — SIGNIFICANT CHANGE UP (ref 3.5–5.3)
POTASSIUM SERPL-SCNC: 4.8 MMOL/L — SIGNIFICANT CHANGE UP (ref 3.5–5.3)
PROT SERPL-MCNC: 8.3 GM/DL — SIGNIFICANT CHANGE UP (ref 6–8.3)
PROT SERPL-MCNC: 8.3 GM/DL — SIGNIFICANT CHANGE UP (ref 6–8.3)
PROTHROM AB SERPL-ACNC: 10.4 SEC — SIGNIFICANT CHANGE UP (ref 9.5–13)
PROTHROM AB SERPL-ACNC: 10.4 SEC — SIGNIFICANT CHANGE UP (ref 9.5–13)
RBC # BLD: 4.83 M/UL — SIGNIFICANT CHANGE UP (ref 4.2–5.8)
RBC # BLD: 4.83 M/UL — SIGNIFICANT CHANGE UP (ref 4.2–5.8)
RBC # FLD: 15.1 % — HIGH (ref 10.3–14.5)
RBC # FLD: 15.1 % — HIGH (ref 10.3–14.5)
SODIUM SERPL-SCNC: 134 MMOL/L — LOW (ref 135–145)
SODIUM SERPL-SCNC: 134 MMOL/L — LOW (ref 135–145)
WBC # BLD: 14.52 K/UL — HIGH (ref 3.8–10.5)
WBC # BLD: 14.52 K/UL — HIGH (ref 3.8–10.5)
WBC # FLD AUTO: 14.52 K/UL — HIGH (ref 3.8–10.5)
WBC # FLD AUTO: 14.52 K/UL — HIGH (ref 3.8–10.5)

## 2023-10-25 PROCEDURE — 72197 MRI PELVIS W/O & W/DYE: CPT

## 2023-10-25 PROCEDURE — 85025 COMPLETE CBC W/AUTO DIFF WBC: CPT

## 2023-10-25 PROCEDURE — 83735 ASSAY OF MAGNESIUM: CPT

## 2023-10-25 PROCEDURE — 36415 COLL VENOUS BLD VENIPUNCTURE: CPT

## 2023-10-25 PROCEDURE — G1004: CPT

## 2023-10-25 PROCEDURE — 99285 EMERGENCY DEPT VISIT HI MDM: CPT

## 2023-10-25 PROCEDURE — 85027 COMPLETE CBC AUTOMATED: CPT

## 2023-10-25 PROCEDURE — 93010 ELECTROCARDIOGRAM REPORT: CPT

## 2023-10-25 PROCEDURE — 99223 1ST HOSP IP/OBS HIGH 75: CPT

## 2023-10-25 PROCEDURE — A9579: CPT

## 2023-10-25 PROCEDURE — 99221 1ST HOSP IP/OBS SF/LOW 40: CPT

## 2023-10-25 PROCEDURE — 84484 ASSAY OF TROPONIN QUANT: CPT

## 2023-10-25 PROCEDURE — 80053 COMPREHEN METABOLIC PANEL: CPT

## 2023-10-25 PROCEDURE — 83036 HEMOGLOBIN GLYCOSYLATED A1C: CPT

## 2023-10-25 PROCEDURE — 80048 BASIC METABOLIC PNL TOTAL CA: CPT

## 2023-10-25 PROCEDURE — 74177 CT ABD & PELVIS W/CONTRAST: CPT | Mod: 26,MG

## 2023-10-25 PROCEDURE — 93005 ELECTROCARDIOGRAM TRACING: CPT

## 2023-10-25 RX ORDER — ACETAMINOPHEN 500 MG
650 TABLET ORAL EVERY 6 HOURS
Refills: 0 | Status: DISCONTINUED | OUTPATIENT
Start: 2023-10-25 | End: 2023-10-31

## 2023-10-25 RX ORDER — HYDROMORPHONE HYDROCHLORIDE 2 MG/ML
1 INJECTION INTRAMUSCULAR; INTRAVENOUS; SUBCUTANEOUS ONCE
Refills: 0 | Status: DISCONTINUED | OUTPATIENT
Start: 2023-10-25 | End: 2023-10-25

## 2023-10-25 RX ORDER — DIPHENHYDRAMINE HCL 50 MG
25 CAPSULE ORAL ONCE
Refills: 0 | Status: COMPLETED | OUTPATIENT
Start: 2023-10-25 | End: 2023-10-25

## 2023-10-25 RX ORDER — CIPROFLOXACIN LACTATE 400MG/40ML
400 VIAL (ML) INTRAVENOUS EVERY 12 HOURS
Refills: 0 | Status: DISCONTINUED | OUTPATIENT
Start: 2023-10-25 | End: 2023-10-25

## 2023-10-25 RX ORDER — INFLUENZA VIRUS VACCINE 15; 15; 15; 15 UG/.5ML; UG/.5ML; UG/.5ML; UG/.5ML
0.5 SUSPENSION INTRAMUSCULAR ONCE
Refills: 0 | Status: DISCONTINUED | OUTPATIENT
Start: 2023-10-25 | End: 2023-10-31

## 2023-10-25 RX ORDER — HYDROMORPHONE HYDROCHLORIDE 2 MG/ML
1 INJECTION INTRAMUSCULAR; INTRAVENOUS; SUBCUTANEOUS EVERY 4 HOURS
Refills: 0 | Status: DISCONTINUED | OUTPATIENT
Start: 2023-10-25 | End: 2023-10-28

## 2023-10-25 RX ORDER — METRONIDAZOLE 500 MG
500 TABLET ORAL EVERY 8 HOURS
Refills: 0 | Status: DISCONTINUED | OUTPATIENT
Start: 2023-10-25 | End: 2023-10-26

## 2023-10-25 RX ORDER — FOLIC ACID 0.8 MG
1 TABLET ORAL DAILY
Refills: 0 | Status: DISCONTINUED | OUTPATIENT
Start: 2023-10-25 | End: 2023-10-31

## 2023-10-25 RX ORDER — SODIUM CHLORIDE 9 MG/ML
1000 INJECTION, SOLUTION INTRAVENOUS ONCE
Refills: 0 | Status: COMPLETED | OUTPATIENT
Start: 2023-10-25 | End: 2023-10-25

## 2023-10-25 RX ORDER — CIPROFLOXACIN LACTATE 400MG/40ML
400 VIAL (ML) INTRAVENOUS ONCE
Refills: 0 | Status: COMPLETED | OUTPATIENT
Start: 2023-10-25 | End: 2023-10-25

## 2023-10-25 RX ORDER — METRONIDAZOLE 500 MG
500 TABLET ORAL ONCE
Refills: 0 | Status: COMPLETED | OUTPATIENT
Start: 2023-10-25 | End: 2023-10-25

## 2023-10-25 RX ORDER — DIAZEPAM 5 MG
5 TABLET ORAL ONCE
Refills: 0 | Status: DISCONTINUED | OUTPATIENT
Start: 2023-10-25 | End: 2023-10-25

## 2023-10-25 RX ORDER — FAMOTIDINE 10 MG/ML
40 INJECTION INTRAVENOUS ONCE
Refills: 0 | Status: COMPLETED | OUTPATIENT
Start: 2023-10-25 | End: 2023-10-25

## 2023-10-25 RX ORDER — MULTIVIT-MIN/FERROUS GLUCONATE 9 MG/15 ML
1 LIQUID (ML) ORAL DAILY
Refills: 0 | Status: DISCONTINUED | OUTPATIENT
Start: 2023-10-25 | End: 2023-10-31

## 2023-10-25 RX ORDER — ONDANSETRON 8 MG/1
4 TABLET, FILM COATED ORAL ONCE
Refills: 0 | Status: COMPLETED | OUTPATIENT
Start: 2023-10-25 | End: 2023-10-25

## 2023-10-25 RX ORDER — LANOLIN ALCOHOL/MO/W.PET/CERES
3 CREAM (GRAM) TOPICAL AT BEDTIME
Refills: 0 | Status: DISCONTINUED | OUTPATIENT
Start: 2023-10-25 | End: 2023-10-31

## 2023-10-25 RX ORDER — ONDANSETRON 8 MG/1
4 TABLET, FILM COATED ORAL EVERY 8 HOURS
Refills: 0 | Status: DISCONTINUED | OUTPATIENT
Start: 2023-10-25 | End: 2023-10-31

## 2023-10-25 RX ORDER — SODIUM CHLORIDE 9 MG/ML
500 INJECTION INTRAMUSCULAR; INTRAVENOUS; SUBCUTANEOUS ONCE
Refills: 0 | Status: COMPLETED | OUTPATIENT
Start: 2023-10-25 | End: 2023-10-25

## 2023-10-25 RX ORDER — THIAMINE MONONITRATE (VIT B1) 100 MG
100 TABLET ORAL DAILY
Refills: 0 | Status: DISCONTINUED | OUTPATIENT
Start: 2023-10-25 | End: 2023-10-31

## 2023-10-25 RX ADMIN — Medication 1 TABLET(S): at 10:06

## 2023-10-25 RX ADMIN — Medication 200 MILLIGRAM(S): at 04:52

## 2023-10-25 RX ADMIN — HYDROMORPHONE HYDROCHLORIDE 1 MILLIGRAM(S): 2 INJECTION INTRAMUSCULAR; INTRAVENOUS; SUBCUTANEOUS at 21:30

## 2023-10-25 RX ADMIN — HYDROMORPHONE HYDROCHLORIDE 1 MILLIGRAM(S): 2 INJECTION INTRAMUSCULAR; INTRAVENOUS; SUBCUTANEOUS at 09:23

## 2023-10-25 RX ADMIN — SODIUM CHLORIDE 1000 MILLILITER(S): 9 INJECTION INTRAMUSCULAR; INTRAVENOUS; SUBCUTANEOUS at 08:31

## 2023-10-25 RX ADMIN — HYDROMORPHONE HYDROCHLORIDE 1 MILLIGRAM(S): 2 INJECTION INTRAMUSCULAR; INTRAVENOUS; SUBCUTANEOUS at 13:05

## 2023-10-25 RX ADMIN — Medication 100 MILLIGRAM(S): at 09:30

## 2023-10-25 RX ADMIN — Medication 5 MILLIGRAM(S): at 04:51

## 2023-10-25 RX ADMIN — FAMOTIDINE 40 MILLIGRAM(S): 10 INJECTION INTRAVENOUS at 20:07

## 2023-10-25 RX ADMIN — Medication 25 MILLIGRAM(S): at 18:51

## 2023-10-25 RX ADMIN — SODIUM CHLORIDE 2000 MILLILITER(S): 9 INJECTION, SOLUTION INTRAVENOUS at 02:10

## 2023-10-25 RX ADMIN — HYDROMORPHONE HYDROCHLORIDE 1 MILLIGRAM(S): 2 INJECTION INTRAMUSCULAR; INTRAVENOUS; SUBCUTANEOUS at 04:37

## 2023-10-25 RX ADMIN — Medication 1 MILLIGRAM(S): at 09:30

## 2023-10-25 RX ADMIN — Medication 100 MILLIGRAM(S): at 13:07

## 2023-10-25 RX ADMIN — Medication 100 MILLIGRAM(S): at 06:48

## 2023-10-25 RX ADMIN — Medication 40 MILLIGRAM(S): at 18:52

## 2023-10-25 RX ADMIN — Medication 200 MILLIGRAM(S): at 17:10

## 2023-10-25 RX ADMIN — HYDROMORPHONE HYDROCHLORIDE 1 MILLIGRAM(S): 2 INJECTION INTRAMUSCULAR; INTRAVENOUS; SUBCUTANEOUS at 02:09

## 2023-10-25 RX ADMIN — HYDROMORPHONE HYDROCHLORIDE 1 MILLIGRAM(S): 2 INJECTION INTRAMUSCULAR; INTRAVENOUS; SUBCUTANEOUS at 17:07

## 2023-10-25 RX ADMIN — HYDROMORPHONE HYDROCHLORIDE 1 MILLIGRAM(S): 2 INJECTION INTRAMUSCULAR; INTRAVENOUS; SUBCUTANEOUS at 04:21

## 2023-10-25 RX ADMIN — HYDROMORPHONE HYDROCHLORIDE 1 MILLIGRAM(S): 2 INJECTION INTRAMUSCULAR; INTRAVENOUS; SUBCUTANEOUS at 14:59

## 2023-10-25 RX ADMIN — HYDROMORPHONE HYDROCHLORIDE 1 MILLIGRAM(S): 2 INJECTION INTRAMUSCULAR; INTRAVENOUS; SUBCUTANEOUS at 21:10

## 2023-10-25 RX ADMIN — Medication 85 MILLIGRAM(S): at 20:07

## 2023-10-25 RX ADMIN — HYDROMORPHONE HYDROCHLORIDE 1 MILLIGRAM(S): 2 INJECTION INTRAMUSCULAR; INTRAVENOUS; SUBCUTANEOUS at 06:48

## 2023-10-25 RX ADMIN — HYDROMORPHONE HYDROCHLORIDE 1 MILLIGRAM(S): 2 INJECTION INTRAMUSCULAR; INTRAVENOUS; SUBCUTANEOUS at 15:00

## 2023-10-25 RX ADMIN — ONDANSETRON 4 MILLIGRAM(S): 8 TABLET, FILM COATED ORAL at 02:10

## 2023-10-25 RX ADMIN — HYDROMORPHONE HYDROCHLORIDE 1 MILLIGRAM(S): 2 INJECTION INTRAMUSCULAR; INTRAVENOUS; SUBCUTANEOUS at 02:50

## 2023-10-25 RX ADMIN — Medication 100 MILLIGRAM(S): at 20:58

## 2023-10-25 NOTE — ED PROVIDER NOTE - PROGRESS NOTE DETAILS
patient in acute pain concern for perirectal abscess aware of history of similar complaints has grossly purulent discharge from rectal area cannot rule out deep space infection obtain CT scan pain control will require colorectal consultation Spoke with surgical resident resident covering colorectal service coming to evaluate patient updated patient on plan of care

## 2023-10-25 NOTE — ED ADULT TRIAGE NOTE - CHIEF COMPLAINT QUOTE
Pt came into the ED with c/o rectal pain and rectal bleeding x3 days. Pt has hx of fistulas, chron's, has an ostomy bag. Pt states "I haven't been able to sit down my tailbone hurts so bad, there's a lot of blood, pus and green drainage coming out of my rectum especially when I pass gas, I also feel like I have to go to the bathroom but I can't since I have a ostomy bag". Pt denies any CP, SOB in triage. NKDA. Pt came into the ED with c/o rectal pain and rectal bleeding x3 days. Pt has hx of fistulas, chron's, has an ostomy bag. Pt states "I haven't been able to sit down my tailbone hurts so bad, there's a lot of blood, pus and green drainage coming out of my rectum especially when I pass gas, I also feel like I have to go to the bathroom but I can't since I have a ostomy bag". Pt denies any CP, SOB in triage. NKDA. Pt endorsed dizziness at 1:44, EKG in progress.

## 2023-10-25 NOTE — ED PROVIDER NOTE - PHYSICAL EXAMINATION
rectal:  positive visible fistulas normal tone positive nuclear purulent discharge coming from rectal region markedly tender with induration perianal region no gross blood

## 2023-10-25 NOTE — ED ADULT NURSE NOTE - CHIEF COMPLAINT QUOTE
Pt came into the ED with c/o rectal pain and rectal bleeding x3 days. Pt has hx of fistulas, chron's, has an ostomy bag. Pt states "I haven't been able to sit down my tailbone hurts so bad, there's a lot of blood, pus and green drainage coming out of my rectum especially when I pass gas, I also feel like I have to go to the bathroom but I can't since I have a ostomy bag". Pt denies any CP, SOB in triage. NKDA. Pt endorsed dizziness at 1:44, EKG in progress.

## 2023-10-25 NOTE — CONSULT NOTE ADULT - TIME BILLING
Patient seen and examined, chart including vitals, labs, and imaging all reviewed.  This is a 38yoM with Crohn's disease, history of perianal fistulas now s/p diverting ileostomy at [facility], who presents with rectal pain.  He typically follows with Dr. Ortega, his usual colorectal surgeon.  He was previously instructed to follow up to discuss abdominoperineal resection given his continued persistent symptoms despite diversion.  He is currently not on any medication for Crohn's.    On exam his perineum is mildly erythematous and has multiple external fistula openings with drainage.  There is no overt area of fluctuance.  CT without drainable collection.  Abdomen is soft, nondistended, nontender, and stoma is productive.    No drainable collection that would be amenable to surgical intervention at this time  Recommend antibiotics  GI consultation to initiate treatment - issues with compliance in the past  Sitz baths for symptoms relief  Ultimately requires outpatient follow-up with Dr. Ortega to discuss future surgery  Please reconsult Colorectal Surgery as needed

## 2023-10-25 NOTE — ED ADULT NURSE NOTE - NSFALLUNIVINTERV_ED_ALL_ED
Bed/Stretcher in lowest position, wheels locked, appropriate side rails in place/Call bell, personal items and telephone in reach/Instruct patient to call for assistance before getting out of bed/chair/stretcher/Non-slip footwear applied when patient is off stretcher/[place] to call system/Physically safe environment - no spills, clutter or unnecessary equipment/Purposeful proactive rounding/Room/bathroom lighting operational, light cord in reach

## 2023-10-25 NOTE — PATIENT PROFILE ADULT - BILL PAYMENT
SCLEROTHERAPY office visit      PRIOR TO TREATMENT confirmed that patient understood the planned procedure and had all questions answered.        right left   SPIDERS Ankle/calf Ankle/calf/thigh   RETICULAR knee knee   VARICOSE           0.2% STD    syringes =    cc  LOCATION      0.5% PD  8  syringes =  24  cc  LOCATION  As above    Glycerin    syringes =    cc  LOCATION            __x_Patient tolerated procedure well without any allergic reaction or complications    __x_Compression Hose Applied    __x_Post-Procedure Questions Answered      FOLLOW UP:  4-6 weeks
no

## 2023-10-25 NOTE — H&P ADULT - ASSESSMENT
37 y/o male with h/o Crohn's disease (not on tx, follows up in [facility]), perianal fistulas s/p diversion at [facility], proctitis, tobacco use disorder, pancreatitis and EtOH abuse (denies hx of DT/intubations/seizures) presents to the ED with rectal pain.     #Rectal pain  #Acute proctitis   #Sepsis 2/2 proctitis   - Following with Colorectal at [facility] for many years, last seen in March 2023 and surgery was deferred. MRI in 03/2023 also negative for abscess/drainable collection  - CT a/p on admission with no drainable fluid collection  - Rectal exam per Colorectal revealing erythema, many fistulous openings with mucopurulent drainage  - +SIRS criteria with WBC of 14 and   - Treat with IV Flagyl and Cipro (10/25-)  - Pain control with Tylenol and IV Dilaudid   - Colorectal recommendations appreciated, no plan for surgery at this time  - Ensure patient has proper outpatient follow up upon discharge    #Hypotension  BP 83/56 on the floors. Asymptomatic. Likely volume depleted, reports poor hydration. Patient unable to sit up 2/2 pain to get accurate reading.   - Additional 500 cc bolus given, will monitor    #Tobacco use disorder  - SBIRT referral  - Tobacco use: 1 ppd. Counseling/treatment offered. Denies nicotine patch    #Alcohol use disorder  Alcohol use: 2-3 beers few times per week. Denies any hx of withdrawal/DT/intubations/seizures. CIWA 0.    - SBIRT referral  - Low risk CIWA    FULL CODE  DVT ppx: Low risk, SCD  DISPO: Admit to Med/Surg 37 y/o male with h/o Crohn's disease (not on tx, follows up in [facility]), perianal fistulas s/p diversion at [facility], proctitis, tobacco use disorder, pancreatitis and EtOH abuse (denies hx of DT/intubations/seizures) presents to the ED with rectal pain.     #Rectal pain  #Acute proctitis   #Sepsis 2/2 proctitis   - Following with Colorectal at [facility] for many years, last seen in March 2023 and surgery was deferred. MRI in 03/2023 also negative for abscess/drainable collection  - CT a/p on admission with no drainable fluid collection  - Rectal exam per Colorectal revealing erythema, many fistulous openings with mucopurulent drainage  - +SIRS criteria with WBC of 14 and   - Treat with IV Flagyl and Cipro (10/25-)  - Pain control with Tylenol and IV Dilaudid   - Colorectal recommendations appreciated, no plan for surgery at this time  - Ensure patient has proper outpatient follow up upon discharge    #Hypotension  BP 83/56 on the floors. Asymptomatic. Likely volume depleted, reports poor hydration. Patient unable to sit up 2/2 pain to get accurate reading.   - Additional 500 cc bolus given, will monitor    #Tobacco use disorder  - SBIRT referral  - Tobacco use: 1 ppd. Counseling/treatment offered. Denies nicotine patch    #Alcohol use disorder  Alcohol use: 2-3 beers few times per week. Denies any hx of withdrawal/DT/intubations/seizures. CIWA 0.    - SBIRT referral  - Low risk CIWA  - Multivitamin, Thiamine, Folate    FULL CODE  DVT ppx: Low risk, SCD  DISPO: Admit to Med/Surg

## 2023-10-25 NOTE — ED PROVIDER NOTE - OBJECTIVE STATEMENT
38 y/o M w/ PMH of Crohn's disease complicated by perianal fistulas S/p diversion, pancreatitis, proctitis 38 y/o M w/ PMH of Crohn's disease complicated by perianal fistulas S/p diversion, pancreatitis, proctitis Presents to ED with severe 10 out of 10 constant rectal pain hard to sit down on his rectal region positive chills no fever.  No chest pain or shortness of breath.  Not on Crohn's treatment.  No hematemesis no black stool in ileostomy bag

## 2023-10-25 NOTE — ED PROVIDER NOTE - CLINICAL SUMMARY MEDICAL DECISION MAKING FREE TEXT BOX
Spoke with colorectal team secondary to SIRS criteria leukocytosis recommend admission for antibiotics we will follow case pain control patient agrees with plan of care

## 2023-10-25 NOTE — H&P ADULT - HISTORY OF PRESENT ILLNESS
37 y/o male with h/o Crohn's disease (not on tx, follows up in [facility]), perianal fistulas s/p diversion at [facility], proctitis, tobacco use disorder, pancreatitis and EtOH abuse (denies hx of DT/intubations/seizures) presents to the ED with rectal pain. Patient reports ongoing intermittent episodes of rectal pain and bleeding since being discharged in March 2023, however rectal pain has significantly gotten worse over the last 3 days. The pain is worse with any sort of movement/position changes. He has ongoing bloody rectal output - occurs almost everyday per patient. Pain is still moderate to severe at this time despite tx with IV Dilaudid given in the ED. He denies any fevers/chills, abdominal pain, N/V/D. Output from colostomy bag has been normal. He last followed up with his Colorectal Doctor at [facility] back in March; at that time she recommended warm baths and no further treatment/surgery. He is amendable to establishing care with a new provider closer to where he lives in [place] for better follow up.     ED Course: Labs s/f for leukocytosis. CT a/p revealed rectal wall thickening. +anal fistulas. No drainable fluid collection. Seen by Colorectal Team in ED, recommended IV antibiotics and pain control with no plan for surgery at this time. Given 1 L LR and Cipro/Flagyl in ED. Admitted for pain control and tx of acute proctitis. +SIRS with WBC of 14 and

## 2023-10-25 NOTE — CONSULT NOTE ADULT - SUBJECTIVE AND OBJECTIVE BOX
37yo M with h/o Crohn's disease, perianal fistulas s/p diversion at [facility], proctitis, pancreatitis and EtOH abuse presents to the ED with rectal pain. The patient states he has been experiencing rectal pain for a couple of weeks worsening over 3 days, exacerbated with sitting, walking, and change in position. He further endorses increased bloody and purulent discharge, but denies fever, chills, nausea, vomiting, dysuria, or change in ostomy output. Colorectal surgery has been consulted numerous times since 2021 for these fistulas, most recently March 2023 when MRI was negative for abscess/drainable collection and the patient was instructed to follow up with his surgeon for discussion of APR. He states he saw her but they "did not discuss surgery"; she recommended warm baths. Patient is not currently on medication for Crohn's disease.     Physical Exam:  Pt is AAOx3  General: Well developed, in no acute distress.   Chest: Lungs clear, symmetric chest rise   Heart: tachycardic, no murmurs, no rubs, no gallops.   Abdomen: Soft, no tenderness, nondistended, ostomy noted  Rectal: Erythema extending from the marginal zone cranially. Numerous fistulous openings seen and palpated around the anal verge with mucopurulent drainage. Significant tenderness; SHELLIE aborted secondary to pain.   Back: Normal curvature, no tenderness.   Neuro: Physiological, no localizing findings.   Skin: Normal, no rashes, no lesions noted.   Extremities: Warm, well perfused, no edema    Vital Signs Last 24 Hrs  T(C): 37 (25 Oct 2023 01:39), Max: 37 (25 Oct 2023 01:39)  T(F): 98.6 (25 Oct 2023 01:39), Max: 98.6 (25 Oct 2023 01:39)  HR: 110 (25 Oct 2023 01:39) (110 - 110)  BP: 114/85 (25 Oct 2023 01:39) (114/85 - 114/85)  BP(mean): 92 (25 Oct 2023 01:39) (92 - 92)  RR: 18 (25 Oct 2023 01:39) (18 - 18)  SpO2: 100% (25 Oct 2023 01:39) (100% - 100%)    Parameters below as of 25 Oct 2023 01:39  Patient On (Oxygen Delivery Method): room air                            15.3   14.52 )-----------( 468      ( 25 Oct 2023 02:01 )             46.8     10-25    134<L>  |  101  |  9   ----------------------------<  229<H>  4.8   |  28  |  0.90    Ca    8.6      25 Oct 2023 02:01    TPro  8.3  /  Alb  2.5<L>  /  TBili  0.2  /  DBili  x   /  AST  14<L>  /  ALT  24  /  AlkPhos  120  10-25    I&O's Summary    < from: CT Abdomen and Pelvis w/ IV Cont (10.25.23 @ 02:26) >  IMPRESSION:    Again seen is rectal wall thickening. There are multiple thin   granulomatous tracks in both right and left gluteal folds extending to   the anus likely representing anal fistulas. These also extend to the   bilateral levator ani muscle. No definite intraperitoneal involvement. No   drainable fluid collection., This would be better evaluated with   dedicated contrast-enhanced MRI pelvis with anal fistula protocol.    < end of copied text >

## 2023-10-25 NOTE — PROVIDER CONTACT NOTE (OTHER) - ASSESSMENT
Pt received c/o persistent itchiness on arms. Pt has hives on left arm. Pt denies any SOB or chest tightness. Vitals temp 98.8 oral, HR 78, /71, pulse ox 97% on room air.

## 2023-10-25 NOTE — H&P ADULT - NSHPPHYSICALEXAM_GEN_ALL_CORE
VITALS:   T(C): 36.3 (10-25-23 @ 06:06), Max: 37 (10-25-23 @ 01:39)  HR: 69 (10-25-23 @ 06:06) (65 - 110)  BP: 105/81 (10-25-23 @ 06:06) (103/69 - 114/85)  RR: 18 (10-25-23 @ 06:06) (16 - 18)  SpO2: 99% (10-25-23 @ 06:06) (97% - 100%)    PHYSICAL EXAM:  GENERAL: Non-toxic, mild distress  HEAD:  Atraumatic, Normocephalic  EYES: EOMI, PERRLA, conjunctiva and sclera clear  ENMT: No tonsillar erythema, exudates, or enlargement; MM  Rectal: Deferred, see colorectal note for full exam  NECK: Supple, No JVD  HEART: Regular rate and rhythm; No murmurs, rubs, or gallops  RESPIRATORY: Unlabored respirations. CTA B/L, No W/R/R  ABDOMEN: Soft, Nontender, Nondistended; +BS. Colostomy bag with normal brown stool  NEUROLOGY: A&Ox3, nonfocal, moving all extremities  EXTREMITIES:  2+ Peripheral Pulses, No clubbing, cyanosis, or edema  SKIN: warm, dry, normal color, no rash or abnormal lesions

## 2023-10-25 NOTE — H&P ADULT - NSICDXPASTSURGICALHX_GEN_ALL_CORE_FT
PAST SURGICAL HISTORY:  History of colonoscopy (2014)    Perianal fistula repair in 2002    S/P ORIF (open reduction internal fixation) fracture (Right ankle, 2014)    
1.74

## 2023-10-25 NOTE — PATIENT PROFILE ADULT - FALL HARM RISK - HARM RISK INTERVENTIONS

## 2023-10-25 NOTE — H&P ADULT - NSHPLABSRESULTS_GEN_ALL_CORE
LABS:                          15.3   14.52 )-----------( 468      ( 25 Oct 2023 02:01 )             46.8     10-25    134<L>  |  101  |  9   ----------------------------<  229<H>  4.8   |  28  |  0.90    Ca    8.6      25 Oct 2023 02:01    TPro  8.3  /  Alb  2.5<L>  /  TBili  0.2  /  DBili  x   /  AST  14<L>  /  ALT  24  /  AlkPhos  120  10-25    PT/INR - ( 25 Oct 2023 02:01 )   PT: 10.4 sec;   INR: 0.92 ratio         PTT - ( 25 Oct 2023 02:01 )  PTT:31.4 sec      CT Abdomen and Pelvis w/ IV Cont (10.25.23 @ 02:26)     COMPARISON: CT chest abdomen pelvis 3/20/2023.    CONTRAST/COMPLICATIONS:  IV Contrast: Omnipaque 350  90 cc administered   10 cc discarded  Oral Contrast: NONE  Complications: None reported at time of study completion    PROCEDURE:  CT of the Abdomen and Pelvis was performed.  Sagittal and coronal reformats were performed.    FINDINGS:  LOWER CHEST: Right lower lobe bulla is unchanged.    LIVER: Within normal limits.  BILE DUCTS: Normal caliber.  GALLBLADDER: Within normal limits.  SPLEEN: Within normal limits.  PANCREAS: Within normal limits.  ADRENALS: Within normal limits.  KIDNEYS/URETERS: Within normal limits.    BLADDER: Within normal limits.  REPRODUCTIVE ORGANS: Prostate within normal limits.    BOWEL: Again seen is rectal wall thickening. There are multiple thin   granulomatous tracks in both right and left gluteal folds extending to   the anus likely representing anal fistulas. These also extend to the   bilateral levator ani muscle. No definite intraperitoneal involvement. No   drainable fluid collection., This would be better evaluated with   dedicated contrast-enhanced MRI pelvis with anal fistula protocol. No   bowel obstruction. Appendix is normal.  PERITONEUM: No ascites.  VESSELS: Within normal limits.  RETROPERITONEUM/LYMPH NODES: No lymphadenopathy.  ABDOMINAL WALL: Right lower quadrant ileostomy.  BONES: Within normal limits.    IMPRESSION:    Again seen is rectal wall thickening. There are multiple thin   granulomatous tracks in both right and left gluteal folds extending to   the anus likely representing anal fistulas. These also extend to the   bilateral levator ani muscle. No definite intraperitoneal involvement. No   drainable fluid collection., This would be better evaluated with   dedicated contrast-enhanced MRI pelvis with anal fistula protocol.

## 2023-10-25 NOTE — PROVIDER CONTACT NOTE (OTHER) - BACKGROUND
Pt receiving IV cipro and flagyl. Pt received last dose of IV cipro during the day and c/o itching at IV site. IV was removed by day shift. Pt received 50mg PO benadryl and 40mg IV solumedrol.

## 2023-10-25 NOTE — ED ADULT NURSE NOTE - OBJECTIVE STATEMENT
38y male presents to the ED A/OX4, c/o of rectal pain. Pt has PMHx of Crohns, reports perianal fistulas that are bleeding and painful for the past 3 days.

## 2023-10-26 DIAGNOSIS — R94.31 ABNORMAL ELECTROCARDIOGRAM [ECG] [EKG]: ICD-10-CM

## 2023-10-26 DIAGNOSIS — R00.0 TACHYCARDIA, UNSPECIFIED: ICD-10-CM

## 2023-10-26 LAB
A1C WITH ESTIMATED AVERAGE GLUCOSE RESULT: 7.7 % — HIGH (ref 4–5.6)
A1C WITH ESTIMATED AVERAGE GLUCOSE RESULT: 7.7 % — HIGH (ref 4–5.6)
ADD ON TEST-SPECIMEN IN LAB: SIGNIFICANT CHANGE UP
ADD ON TEST-SPECIMEN IN LAB: SIGNIFICANT CHANGE UP
ALBUMIN SERPL ELPH-MCNC: 2.3 G/DL — LOW (ref 3.3–5)
ALBUMIN SERPL ELPH-MCNC: 2.3 G/DL — LOW (ref 3.3–5)
ALP SERPL-CCNC: 102 U/L — SIGNIFICANT CHANGE UP (ref 40–120)
ALP SERPL-CCNC: 102 U/L — SIGNIFICANT CHANGE UP (ref 40–120)
ALT FLD-CCNC: 18 U/L — SIGNIFICANT CHANGE UP (ref 12–78)
ALT FLD-CCNC: 18 U/L — SIGNIFICANT CHANGE UP (ref 12–78)
ANION GAP SERPL CALC-SCNC: 2 MMOL/L — LOW (ref 5–17)
ANION GAP SERPL CALC-SCNC: 2 MMOL/L — LOW (ref 5–17)
ANION GAP SERPL CALC-SCNC: 6 MMOL/L — SIGNIFICANT CHANGE UP (ref 5–17)
ANION GAP SERPL CALC-SCNC: 6 MMOL/L — SIGNIFICANT CHANGE UP (ref 5–17)
AST SERPL-CCNC: 6 U/L — LOW (ref 15–37)
AST SERPL-CCNC: 6 U/L — LOW (ref 15–37)
BASOPHILS # BLD AUTO: 0.02 K/UL — SIGNIFICANT CHANGE UP (ref 0–0.2)
BASOPHILS # BLD AUTO: 0.02 K/UL — SIGNIFICANT CHANGE UP (ref 0–0.2)
BASOPHILS NFR BLD AUTO: 0.2 % — SIGNIFICANT CHANGE UP (ref 0–2)
BASOPHILS NFR BLD AUTO: 0.2 % — SIGNIFICANT CHANGE UP (ref 0–2)
BILIRUB SERPL-MCNC: 0.3 MG/DL — SIGNIFICANT CHANGE UP (ref 0.2–1.2)
BILIRUB SERPL-MCNC: 0.3 MG/DL — SIGNIFICANT CHANGE UP (ref 0.2–1.2)
BUN SERPL-MCNC: 11 MG/DL — SIGNIFICANT CHANGE UP (ref 7–23)
BUN SERPL-MCNC: 11 MG/DL — SIGNIFICANT CHANGE UP (ref 7–23)
BUN SERPL-MCNC: 9 MG/DL — SIGNIFICANT CHANGE UP (ref 7–23)
BUN SERPL-MCNC: 9 MG/DL — SIGNIFICANT CHANGE UP (ref 7–23)
CALCIUM SERPL-MCNC: 8.6 MG/DL — SIGNIFICANT CHANGE UP (ref 8.5–10.1)
CALCIUM SERPL-MCNC: 8.6 MG/DL — SIGNIFICANT CHANGE UP (ref 8.5–10.1)
CALCIUM SERPL-MCNC: 8.9 MG/DL — SIGNIFICANT CHANGE UP (ref 8.5–10.1)
CALCIUM SERPL-MCNC: 8.9 MG/DL — SIGNIFICANT CHANGE UP (ref 8.5–10.1)
CHLORIDE SERPL-SCNC: 98 MMOL/L — SIGNIFICANT CHANGE UP (ref 96–108)
CO2 SERPL-SCNC: 26 MMOL/L — SIGNIFICANT CHANGE UP (ref 22–31)
CO2 SERPL-SCNC: 26 MMOL/L — SIGNIFICANT CHANGE UP (ref 22–31)
CO2 SERPL-SCNC: 31 MMOL/L — SIGNIFICANT CHANGE UP (ref 22–31)
CO2 SERPL-SCNC: 31 MMOL/L — SIGNIFICANT CHANGE UP (ref 22–31)
CREAT SERPL-MCNC: 0.78 MG/DL — SIGNIFICANT CHANGE UP (ref 0.5–1.3)
CREAT SERPL-MCNC: 0.78 MG/DL — SIGNIFICANT CHANGE UP (ref 0.5–1.3)
CREAT SERPL-MCNC: 1.07 MG/DL — SIGNIFICANT CHANGE UP (ref 0.5–1.3)
CREAT SERPL-MCNC: 1.07 MG/DL — SIGNIFICANT CHANGE UP (ref 0.5–1.3)
EGFR: 117 ML/MIN/1.73M2 — SIGNIFICANT CHANGE UP
EGFR: 117 ML/MIN/1.73M2 — SIGNIFICANT CHANGE UP
EGFR: 91 ML/MIN/1.73M2 — SIGNIFICANT CHANGE UP
EGFR: 91 ML/MIN/1.73M2 — SIGNIFICANT CHANGE UP
EOSINOPHIL # BLD AUTO: 0 K/UL — SIGNIFICANT CHANGE UP (ref 0–0.5)
EOSINOPHIL # BLD AUTO: 0 K/UL — SIGNIFICANT CHANGE UP (ref 0–0.5)
EOSINOPHIL NFR BLD AUTO: 0 % — SIGNIFICANT CHANGE UP (ref 0–6)
EOSINOPHIL NFR BLD AUTO: 0 % — SIGNIFICANT CHANGE UP (ref 0–6)
ESTIMATED AVERAGE GLUCOSE: 174 MG/DL — HIGH (ref 68–114)
ESTIMATED AVERAGE GLUCOSE: 174 MG/DL — HIGH (ref 68–114)
GLUCOSE SERPL-MCNC: 233 MG/DL — HIGH (ref 70–99)
GLUCOSE SERPL-MCNC: 233 MG/DL — HIGH (ref 70–99)
GLUCOSE SERPL-MCNC: 395 MG/DL — HIGH (ref 70–99)
GLUCOSE SERPL-MCNC: 395 MG/DL — HIGH (ref 70–99)
HCT VFR BLD CALC: 41.5 % — SIGNIFICANT CHANGE UP (ref 39–50)
HCT VFR BLD CALC: 41.5 % — SIGNIFICANT CHANGE UP (ref 39–50)
HGB BLD-MCNC: 14 G/DL — SIGNIFICANT CHANGE UP (ref 13–17)
HGB BLD-MCNC: 14 G/DL — SIGNIFICANT CHANGE UP (ref 13–17)
IMM GRANULOCYTES NFR BLD AUTO: 0.6 % — SIGNIFICANT CHANGE UP (ref 0–0.9)
IMM GRANULOCYTES NFR BLD AUTO: 0.6 % — SIGNIFICANT CHANGE UP (ref 0–0.9)
LYMPHOCYTES # BLD AUTO: 0.56 K/UL — LOW (ref 1–3.3)
LYMPHOCYTES # BLD AUTO: 0.56 K/UL — LOW (ref 1–3.3)
LYMPHOCYTES # BLD AUTO: 4.4 % — LOW (ref 13–44)
LYMPHOCYTES # BLD AUTO: 4.4 % — LOW (ref 13–44)
MAGNESIUM SERPL-MCNC: 1.9 MG/DL — SIGNIFICANT CHANGE UP (ref 1.6–2.6)
MAGNESIUM SERPL-MCNC: 1.9 MG/DL — SIGNIFICANT CHANGE UP (ref 1.6–2.6)
MAGNESIUM SERPL-MCNC: 2 MG/DL — SIGNIFICANT CHANGE UP (ref 1.6–2.6)
MAGNESIUM SERPL-MCNC: 2 MG/DL — SIGNIFICANT CHANGE UP (ref 1.6–2.6)
MCHC RBC-ENTMCNC: 32 PG — SIGNIFICANT CHANGE UP (ref 27–34)
MCHC RBC-ENTMCNC: 32 PG — SIGNIFICANT CHANGE UP (ref 27–34)
MCHC RBC-ENTMCNC: 33.7 GM/DL — SIGNIFICANT CHANGE UP (ref 32–36)
MCHC RBC-ENTMCNC: 33.7 GM/DL — SIGNIFICANT CHANGE UP (ref 32–36)
MCV RBC AUTO: 95 FL — SIGNIFICANT CHANGE UP (ref 80–100)
MCV RBC AUTO: 95 FL — SIGNIFICANT CHANGE UP (ref 80–100)
MONOCYTES # BLD AUTO: 0.23 K/UL — SIGNIFICANT CHANGE UP (ref 0–0.9)
MONOCYTES # BLD AUTO: 0.23 K/UL — SIGNIFICANT CHANGE UP (ref 0–0.9)
MONOCYTES NFR BLD AUTO: 1.8 % — LOW (ref 2–14)
MONOCYTES NFR BLD AUTO: 1.8 % — LOW (ref 2–14)
NEUTROPHILS # BLD AUTO: 11.7 K/UL — HIGH (ref 1.8–7.4)
NEUTROPHILS # BLD AUTO: 11.7 K/UL — HIGH (ref 1.8–7.4)
NEUTROPHILS NFR BLD AUTO: 93 % — HIGH (ref 43–77)
NEUTROPHILS NFR BLD AUTO: 93 % — HIGH (ref 43–77)
PLATELET # BLD AUTO: 429 K/UL — HIGH (ref 150–400)
PLATELET # BLD AUTO: 429 K/UL — HIGH (ref 150–400)
POTASSIUM SERPL-MCNC: 4 MMOL/L — SIGNIFICANT CHANGE UP (ref 3.5–5.3)
POTASSIUM SERPL-MCNC: 4 MMOL/L — SIGNIFICANT CHANGE UP (ref 3.5–5.3)
POTASSIUM SERPL-MCNC: 4.4 MMOL/L — SIGNIFICANT CHANGE UP (ref 3.5–5.3)
POTASSIUM SERPL-MCNC: 4.4 MMOL/L — SIGNIFICANT CHANGE UP (ref 3.5–5.3)
POTASSIUM SERPL-SCNC: 4 MMOL/L — SIGNIFICANT CHANGE UP (ref 3.5–5.3)
POTASSIUM SERPL-SCNC: 4 MMOL/L — SIGNIFICANT CHANGE UP (ref 3.5–5.3)
POTASSIUM SERPL-SCNC: 4.4 MMOL/L — SIGNIFICANT CHANGE UP (ref 3.5–5.3)
POTASSIUM SERPL-SCNC: 4.4 MMOL/L — SIGNIFICANT CHANGE UP (ref 3.5–5.3)
PROT SERPL-MCNC: 7.7 GM/DL — SIGNIFICANT CHANGE UP (ref 6–8.3)
PROT SERPL-MCNC: 7.7 GM/DL — SIGNIFICANT CHANGE UP (ref 6–8.3)
RBC # BLD: 4.37 M/UL — SIGNIFICANT CHANGE UP (ref 4.2–5.8)
RBC # BLD: 4.37 M/UL — SIGNIFICANT CHANGE UP (ref 4.2–5.8)
RBC # FLD: 14.7 % — HIGH (ref 10.3–14.5)
RBC # FLD: 14.7 % — HIGH (ref 10.3–14.5)
SODIUM SERPL-SCNC: 130 MMOL/L — LOW (ref 135–145)
SODIUM SERPL-SCNC: 130 MMOL/L — LOW (ref 135–145)
SODIUM SERPL-SCNC: 131 MMOL/L — LOW (ref 135–145)
SODIUM SERPL-SCNC: 131 MMOL/L — LOW (ref 135–145)
TROPONIN I, HIGH SENSITIVITY RESULT: <3 NG/L — SIGNIFICANT CHANGE UP
TROPONIN I, HIGH SENSITIVITY RESULT: <3 NG/L — SIGNIFICANT CHANGE UP
WBC # BLD: 12.59 K/UL — HIGH (ref 3.8–10.5)
WBC # BLD: 12.59 K/UL — HIGH (ref 3.8–10.5)
WBC # FLD AUTO: 12.59 K/UL — HIGH (ref 3.8–10.5)
WBC # FLD AUTO: 12.59 K/UL — HIGH (ref 3.8–10.5)

## 2023-10-26 PROCEDURE — 99222 1ST HOSP IP/OBS MODERATE 55: CPT

## 2023-10-26 PROCEDURE — 93010 ELECTROCARDIOGRAM REPORT: CPT

## 2023-10-26 PROCEDURE — 99233 SBSQ HOSP IP/OBS HIGH 50: CPT

## 2023-10-26 PROCEDURE — 72197 MRI PELVIS W/O & W/DYE: CPT | Mod: 26

## 2023-10-26 RX ORDER — HYDROMORPHONE HYDROCHLORIDE 2 MG/ML
1 INJECTION INTRAMUSCULAR; INTRAVENOUS; SUBCUTANEOUS ONCE
Refills: 0 | Status: DISCONTINUED | OUTPATIENT
Start: 2023-10-26 | End: 2023-10-26

## 2023-10-26 RX ORDER — ENOXAPARIN SODIUM 100 MG/ML
40 INJECTION SUBCUTANEOUS EVERY 24 HOURS
Refills: 0 | Status: DISCONTINUED | OUTPATIENT
Start: 2023-10-26 | End: 2023-10-31

## 2023-10-26 RX ORDER — ACETAMINOPHEN 500 MG
1000 TABLET ORAL ONCE
Refills: 0 | Status: COMPLETED | OUTPATIENT
Start: 2023-10-26 | End: 2023-10-26

## 2023-10-26 RX ORDER — SODIUM CHLORIDE 9 MG/ML
250 INJECTION INTRAMUSCULAR; INTRAVENOUS; SUBCUTANEOUS ONCE
Refills: 0 | Status: COMPLETED | OUTPATIENT
Start: 2023-10-26 | End: 2023-10-26

## 2023-10-26 RX ORDER — METRONIDAZOLE 500 MG
500 TABLET ORAL EVERY 12 HOURS
Refills: 0 | Status: DISCONTINUED | OUTPATIENT
Start: 2023-10-26 | End: 2023-10-31

## 2023-10-26 RX ADMIN — HYDROMORPHONE HYDROCHLORIDE 1 MILLIGRAM(S): 2 INJECTION INTRAMUSCULAR; INTRAVENOUS; SUBCUTANEOUS at 01:10

## 2023-10-26 RX ADMIN — HYDROMORPHONE HYDROCHLORIDE 1 MILLIGRAM(S): 2 INJECTION INTRAMUSCULAR; INTRAVENOUS; SUBCUTANEOUS at 09:53

## 2023-10-26 RX ADMIN — HYDROMORPHONE HYDROCHLORIDE 1 MILLIGRAM(S): 2 INJECTION INTRAMUSCULAR; INTRAVENOUS; SUBCUTANEOUS at 19:30

## 2023-10-26 RX ADMIN — Medication 400 MILLIGRAM(S): at 03:53

## 2023-10-26 RX ADMIN — HYDROMORPHONE HYDROCHLORIDE 1 MILLIGRAM(S): 2 INJECTION INTRAMUSCULAR; INTRAVENOUS; SUBCUTANEOUS at 05:29

## 2023-10-26 RX ADMIN — Medication 100 MILLIGRAM(S): at 05:29

## 2023-10-26 RX ADMIN — HYDROMORPHONE HYDROCHLORIDE 1 MILLIGRAM(S): 2 INJECTION INTRAMUSCULAR; INTRAVENOUS; SUBCUTANEOUS at 12:35

## 2023-10-26 RX ADMIN — SODIUM CHLORIDE 500 MILLILITER(S): 9 INJECTION INTRAMUSCULAR; INTRAVENOUS; SUBCUTANEOUS at 22:30

## 2023-10-26 RX ADMIN — Medication 1000 MILLIGRAM(S): at 13:20

## 2023-10-26 RX ADMIN — HYDROMORPHONE HYDROCHLORIDE 1 MILLIGRAM(S): 2 INJECTION INTRAMUSCULAR; INTRAVENOUS; SUBCUTANEOUS at 15:22

## 2023-10-26 RX ADMIN — Medication 1 TABLET(S): at 09:12

## 2023-10-26 RX ADMIN — HYDROMORPHONE HYDROCHLORIDE 1 MILLIGRAM(S): 2 INJECTION INTRAMUSCULAR; INTRAVENOUS; SUBCUTANEOUS at 01:30

## 2023-10-26 RX ADMIN — Medication 400 MILLIGRAM(S): at 12:24

## 2023-10-26 RX ADMIN — HYDROMORPHONE HYDROCHLORIDE 1 MILLIGRAM(S): 2 INJECTION INTRAMUSCULAR; INTRAVENOUS; SUBCUTANEOUS at 14:53

## 2023-10-26 RX ADMIN — Medication 100 MILLIGRAM(S): at 17:11

## 2023-10-26 RX ADMIN — Medication 1 MILLIGRAM(S): at 09:12

## 2023-10-26 RX ADMIN — HYDROMORPHONE HYDROCHLORIDE 1 MILLIGRAM(S): 2 INJECTION INTRAMUSCULAR; INTRAVENOUS; SUBCUTANEOUS at 22:30

## 2023-10-26 RX ADMIN — Medication 100 MILLIGRAM(S): at 09:11

## 2023-10-26 NOTE — PROGRESS NOTE ADULT - SUBJECTIVE AND OBJECTIVE BOX
INCOMPLETE NOTE    HOSPITALIST PROGRESS NOTE    SUBJECTIVE / INTERVAL HPI: Patient seen and examined at bedside.     VITAL SIGNS:  Vital Signs Last 24 Hrs  T(C): 36.4 (26 Oct 2023 08:23), Max: 37.1 (25 Oct 2023 19:22)  T(F): 97.6 (26 Oct 2023 08:23), Max: 98.8 (25 Oct 2023 19:22)  HR: 58 (26 Oct 2023 08:23) (58 - 79)  BP: 104/58 (26 Oct 2023 08:23) (91/51 - 111/71)  BP(mean): --  RR: 18 (26 Oct 2023 08:23) (17 - 18)  SpO2: 99% (26 Oct 2023 08:23) (96% - 99%)    Parameters below as of 26 Oct 2023 08:23  Patient On (Oxygen Delivery Method): room air        PHYSICAL EXAM:    General: Well developed, well nourished, no acute distress  HEENT: NC/AT; PERRL, anicteric sclera; MMM  Neck: Supple  Cardiovascular: +S1/S2, RRR, no murmurs, rubs, gallops  Respiratory: CTA B/L; no W/R/R  Gastrointestinal: soft, NT/ND; +BSx4  Extremities: WWP; no edema, clubbing or cyanosis  Vascular: 2+ radial, DP/PT pulses B/L  Neurological: AAOx3; no focal deficits    MEDICATIONS:  MEDICATIONS  (STANDING):  folic acid 1 milliGRAM(s) Oral daily  influenza   Vaccine 0.5 milliLiter(s) IntraMuscular once  metroNIDAZOLE  IVPB 500 milliGRAM(s) IV Intermittent every 12 hours  multivitamin/minerals 1 Tablet(s) Oral daily  thiamine 100 milliGRAM(s) Oral daily    MEDICATIONS  (PRN):  acetaminophen     Tablet .. 650 milliGRAM(s) Oral every 6 hours PRN Temp greater or equal to 38C (100.4F), Mild Pain (1 - 3)  aluminum hydroxide/magnesium hydroxide/simethicone Suspension 30 milliLiter(s) Oral every 4 hours PRN Dyspepsia  HYDROmorphone  Injectable 1 milliGRAM(s) IV Push every 4 hours PRN Moderate to severe pain  melatonin 3 milliGRAM(s) Oral at bedtime PRN Insomnia  ondansetron Injectable 4 milliGRAM(s) IV Push every 8 hours PRN Nausea and/or Vomiting      ALLERGIES:  Allergies    ciprofloxacin (Other (Mild to Mod))    Intolerances        LABS:                        14.0   12.59 )-----------( 429      ( 26 Oct 2023 05:04 )             41.5     10-26    131<L>  |  98  |  11  ----------------------------<  233<H>  4.0   |  31  |  0.78    Ca    8.9      26 Oct 2023 12:44  Mg     2.0     10-26    TPro  7.7  /  Alb  2.3<L>  /  TBili  0.3  /  DBili  x   /  AST  6<L>  /  ALT  18  /  AlkPhos  102  10-26    PT/INR - ( 25 Oct 2023 02:01 )   PT: 10.4 sec;   INR: 0.92 ratio         PTT - ( 25 Oct 2023 02:01 )  PTT:31.4 sec  Urinalysis Basic - ( 26 Oct 2023 12:44 )    Color: x / Appearance: x / SG: x / pH: x  Gluc: 233 mg/dL / Ketone: x  / Bili: x / Urobili: x   Blood: x / Protein: x / Nitrite: x   Leuk Esterase: x / RBC: x / WBC x   Sq Epi: x / Non Sq Epi: x / Bacteria: x      CAPILLARY BLOOD GLUCOSE            RADIOLOGY & ADDITIONAL TESTS: Reviewed. HOSPITALIST PROGRESS NOTE    SUBJECTIVE / INTERVAL HPI: Last night patient had a reaction to Cipro infusion. RN was running the antibiotic slowly, however pt noticed his arm swelling with hives around the site of IV. Infusion was stopped and patient was given Benadryl and Solumedrol. He didn't experience pressure changes, SOB, wheezing, N/V, tongue or face swelling. At around 4:30 am patient developed newly inverted p-waves and elevated t-waves on remote tele. Cardiology team consulted due to changes. Metabolic panel without any significant electrolyte changes - normal potassium, Mg and corrected Na was 135. Patient had no previous similar reactions to Cipro. He received Cipro just hours prior in the ED without any issues. He has also tolerated Cipro during past admissions.       Patient seen and examined at bedside this morning. His signs/symptoms from the reaction had resolved. Patient states his pain medications take the edge off the pain, however pain is still present and significant. Denies any chest pain, palpitations, SOB, abdominal pain, N/V/D.    VITAL SIGNS:  Vital Signs Last 24 Hrs  T(C): 36.4 (26 Oct 2023 08:23), Max: 37.1 (25 Oct 2023 19:22)  T(F): 97.6 (26 Oct 2023 08:23), Max: 98.8 (25 Oct 2023 19:22)  HR: 58 (26 Oct 2023 08:23) (58 - 79)  BP: 104/58 (26 Oct 2023 08:23) (91/51 - 111/71)  BP(mean): --  RR: 18 (26 Oct 2023 08:23) (17 - 18)  SpO2: 99% (26 Oct 2023 08:23) (96% - 99%)    Parameters below as of 26 Oct 2023 08:23  Patient On (Oxygen Delivery Method): room air    PHYSICAL EXAM:  GENERAL: Non-toxic, mild distress  HEAD:  Atraumatic, Normocephalic  EYES: EOMI, PERRLA, conjunctiva and sclera clear  ENMT: No tonsillar erythema, exudates, or enlargement; MM  Rectal: Deferred, see colorectal note for full exam  NECK: Supple, No JVD  HEART: Regular rate and rhythm; No murmurs, rubs, or gallops  RESPIRATORY: Unlabored respirations. CTA B/L, No W/R/R  ABDOMEN: Soft, Nontender, Nondistended; +BS. Colostomy bag with normal brown stool  NEUROLOGY: A&Ox3, nonfocal, moving all extremities  EXTREMITIES:  2+ Peripheral Pulses, No clubbing, cyanosis, or edema  SKIN: warm, dry, normal color, no rash or abnormal lesions    MEDICATIONS:  MEDICATIONS  (STANDING):  folic acid 1 milliGRAM(s) Oral daily  influenza   Vaccine 0.5 milliLiter(s) IntraMuscular once  metroNIDAZOLE  IVPB 500 milliGRAM(s) IV Intermittent every 12 hours  multivitamin/minerals 1 Tablet(s) Oral daily  thiamine 100 milliGRAM(s) Oral daily    MEDICATIONS  (PRN):  acetaminophen     Tablet .. 650 milliGRAM(s) Oral every 6 hours PRN Temp greater or equal to 38C (100.4F), Mild Pain (1 - 3)  aluminum hydroxide/magnesium hydroxide/simethicone Suspension 30 milliLiter(s) Oral every 4 hours PRN Dyspepsia  HYDROmorphone  Injectable 1 milliGRAM(s) IV Push every 4 hours PRN Moderate to severe pain  melatonin 3 milliGRAM(s) Oral at bedtime PRN Insomnia  ondansetron Injectable 4 milliGRAM(s) IV Push every 8 hours PRN Nausea and/or Vomiting      ALLERGIES:  Allergies    ciprofloxacin (Other (Mild to Mod))    Intolerances        LABS:                        14.0   12.59 )-----------( 429      ( 26 Oct 2023 05:04 )             41.5     10-26    131<L>  |  98  |  11  ----------------------------<  233<H>  4.0   |  31  |  0.78    Ca    8.9      26 Oct 2023 12:44  Mg     2.0     10-26    TPro  7.7  /  Alb  2.3<L>  /  TBili  0.3  /  DBili  x   /  AST  6<L>  /  ALT  18  /  AlkPhos  102  10-26    PT/INR - ( 25 Oct 2023 02:01 )   PT: 10.4 sec;   INR: 0.92 ratio         PTT - ( 25 Oct 2023 02:01 )  PTT:31.4 sec  Urinalysis Basic - ( 26 Oct 2023 12:44 )    Color: x / Appearance: x / SG: x / pH: x  Gluc: 233 mg/dL / Ketone: x  / Bili: x / Urobili: x   Blood: x / Protein: x / Nitrite: x   Leuk Esterase: x / RBC: x / WBC x   Sq Epi: x / Non Sq Epi: x / Bacteria: x      CAPILLARY BLOOD GLUCOSE      RADIOLOGY & ADDITIONAL TESTS: Reviewed. HOSPITALIST PROGRESS NOTE    SUBJECTIVE / INTERVAL HPI: Last night patient had a reaction to Cipro infusion. RN was running the antibiotic slowly, however pt noticed his arm swelling with hives around the site of IV. Infusion was stopped and patient was given Benadryl and Solumedrol. He didn't experience pressure changes, SOB, wheezing, N/V, tongue or face swelling. At around 4:30 am patient developed newly inverted p-waves and elevated t-waves on remote tele. Cardiology team consulted due to changes. Metabolic panel without any significant electrolyte changes - normal potassium, Mg and corrected Na was 135. Patient had no previous similar reactions to Cipro. He received Cipro just hours prior in the ED without any issues. He has also tolerated Cipro during past admissions.     Patient seen and examined at bedside this morning. His signs/symptoms from the reaction had resolved. Patient states his pain medications take the edge off the pain, however pain is still present and significant. Denies any chest pain, palpitations, SOB, abdominal pain, N/V/D.    VITAL SIGNS:  Vital Signs Last 24 Hrs  T(C): 36.4 (26 Oct 2023 08:23), Max: 37.1 (25 Oct 2023 19:22)  T(F): 97.6 (26 Oct 2023 08:23), Max: 98.8 (25 Oct 2023 19:22)  HR: 58 (26 Oct 2023 08:23) (58 - 79)  BP: 104/58 (26 Oct 2023 08:23) (91/51 - 111/71)  BP(mean): --  RR: 18 (26 Oct 2023 08:23) (17 - 18)  SpO2: 99% (26 Oct 2023 08:23) (96% - 99%)    Parameters below as of 26 Oct 2023 08:23  Patient On (Oxygen Delivery Method): room air    PHYSICAL EXAM:  GENERAL: Non-toxic, mild distress  HEAD:  Atraumatic, Normocephalic  EYES: EOMI, PERRLA, conjunctiva and sclera clear  ENMT: No tonsillar erythema, exudates, or enlargement; MM  Rectal: Deferred, see colorectal note for full exam  NECK: Supple, No JVD  HEART: Regular rate and rhythm; No murmurs, rubs, or gallops  RESPIRATORY: Unlabored respirations. CTA B/L, No W/R/R  ABDOMEN: Soft, Nontender, Nondistended; +BS. Colostomy bag with normal brown stool  NEUROLOGY: A&Ox3, nonfocal, moving all extremities  EXTREMITIES:  2+ Peripheral Pulses, No clubbing, cyanosis, or edema  SKIN: warm, dry, normal color, no rash or abnormal lesions    MEDICATIONS:  MEDICATIONS  (STANDING):  folic acid 1 milliGRAM(s) Oral daily  influenza   Vaccine 0.5 milliLiter(s) IntraMuscular once  metroNIDAZOLE  IVPB 500 milliGRAM(s) IV Intermittent every 12 hours  multivitamin/minerals 1 Tablet(s) Oral daily  thiamine 100 milliGRAM(s) Oral daily    MEDICATIONS  (PRN):  acetaminophen     Tablet .. 650 milliGRAM(s) Oral every 6 hours PRN Temp greater or equal to 38C (100.4F), Mild Pain (1 - 3)  aluminum hydroxide/magnesium hydroxide/simethicone Suspension 30 milliLiter(s) Oral every 4 hours PRN Dyspepsia  HYDROmorphone  Injectable 1 milliGRAM(s) IV Push every 4 hours PRN Moderate to severe pain  melatonin 3 milliGRAM(s) Oral at bedtime PRN Insomnia  ondansetron Injectable 4 milliGRAM(s) IV Push every 8 hours PRN Nausea and/or Vomiting      ALLERGIES:  Allergies    ciprofloxacin (Other (Mild to Mod))    Intolerances        LABS:                        14.0   12.59 )-----------( 429      ( 26 Oct 2023 05:04 )             41.5     10-26    131<L>  |  98  |  11  ----------------------------<  233<H>  4.0   |  31  |  0.78    Ca    8.9      26 Oct 2023 12:44  Mg     2.0     10-26    TPro  7.7  /  Alb  2.3<L>  /  TBili  0.3  /  DBili  x   /  AST  6<L>  /  ALT  18  /  AlkPhos  102  10-26    PT/INR - ( 25 Oct 2023 02:01 )   PT: 10.4 sec;   INR: 0.92 ratio         PTT - ( 25 Oct 2023 02:01 )  PTT:31.4 sec  Urinalysis Basic - ( 26 Oct 2023 12:44 )    Color: x / Appearance: x / SG: x / pH: x  Gluc: 233 mg/dL / Ketone: x  / Bili: x / Urobili: x   Blood: x / Protein: x / Nitrite: x   Leuk Esterase: x / RBC: x / WBC x   Sq Epi: x / Non Sq Epi: x / Bacteria: x      CAPILLARY BLOOD GLUCOSE      RADIOLOGY & ADDITIONAL TESTS: Reviewed.

## 2023-10-26 NOTE — PROGRESS NOTE ADULT - ASSESSMENT
37 y/o male with h/o Crohn's disease (not on tx, follows up in [facility]), perianal fistulas s/p diversion at [facility], proctitis, tobacco use disorder, pancreatitis and EtOH abuse (denies hx of DT/intubations/seizures) presents to the ED with rectal pain.     #Rectal pain  #Acute proctitis with chronic fistulas   #Sepsis 2/2 proctitis   - Following with Colorectal at [facility] for many years, last seen in March 2023 and surgery was deferred. MRI in 03/2023 also negative for abscess/drainable collection  - CT a/p on admission with no drainable fluid collection  - Rectal exam per Colorectal revealing erythema, many fistulous openings with mucopurulent drainage  - WBC improving despite getting steroids overnight  - Continue tx with Flagyl for now   - Pain control with Tylenol and IV Dilaudid   - Colorectal recommendations appreciated, no plan for surgery at this time  - GI consult placed, recs appreciated   - MR pelvis w/ IV contrast recommended by GI to evaluate for abscess    #Allergic reaction  Reaction to Cipro on 10/25, required IV Solumedrol. Subsequently developed EKG changes including newly inverted p-waves and elevated t-waves  - EKG changes likely from the reaction  - Electrolytes stable, will monitor EKG daily for improvement  - Continue monitoring on tele     #Tobacco use disorder  - SBIRT referral  - Tobacco use: 1 ppd. Counseling/treatment offered. Denies nicotine patch    #Alcohol use disorder  Alcohol use: 2-3 beers few times per week. Denies any hx of withdrawal/DT/intubations/seizures. CIWA 0.    - SBIRT referral  - CIWA 0 x24 hours, discontinue and monitor   - Multivitamin, Thiamine, Folate    FULL CODE  DVT ppx: Lovenox   DISPO: Admit to Med/Surg, remote tele    37 y/o male with h/o Crohn's disease (not on tx, follows up in [facility]), perianal fistulas s/p diversion at [facility], proctitis, tobacco use disorder, pancreatitis and EtOH abuse (denies hx of DT/intubations/seizures) presents to the ED with rectal pain.     #Rectal pain  #Acute proctitis with chronic fistulas   #Sepsis 2/2 proctitis   - Following with Colorectal at [facility] for many years, last seen in March 2023 and surgery was deferred. MRI in 03/2023 also negative for abscess/drainable collection  - CT a/p on admission with no drainable fluid collection  - Rectal exam per Colorectal revealing erythema, many fistulous openings with mucopurulent drainage  - WBC improving despite getting steroids overnight  - Continue tx with Flagyl for now  - Pain control with Tylenol and IV Dilaudid   - Colorectal recommendations appreciated, no plan for surgery at this time  - GI consult placed, recs appreciated   - MR pelvis w/ IV contrast recommended by GI to evaluate for abscess    #Allergic reaction  Reaction to Cipro on 10/25, required IV Solumedrol. Subsequently developed EKG changes including newly inverted p-waves and elevated t-waves. PM EKG with improvement of t waves  - EKG changes likely from the reaction  - Electrolytes stable, will monitor EKG daily for improvement  - Continue monitoring on tele     #Tobacco use disorder  - SBIRT referral  - Tobacco use: 1 ppd. Counseling/treatment offered. Denies nicotine patch    #Alcohol use disorder  Alcohol use: 2-3 beers few times per week. Denies any hx of withdrawal/DT/intubations/seizures. CIWA 0.    - SBIRT referral  - CIWA 0 x24 hours, discontinue and monitor   - Multivitamin, Thiamine, Folate    FULL CODE  DVT ppx: Lovenox   DISPO: Admit to Med/Surg, remote tele

## 2023-10-26 NOTE — PROVIDER CONTACT NOTE (CHANGE IN STATUS NOTIFICATION) - SITUATION
pt called nurse into room. Pt complaining of burning and itching post completion of cipro. feeling flushed throughout body.
Notified by tele tech Robe that pt has newly inverted p-waves and elevated t-waves on remote tele.

## 2023-10-26 NOTE — CONSULT NOTE ADULT - ASSESSMENT
37yo M with Crohn's disease and known h/o perianal fistulas and proctitis presenting with rectal pain. CT with no drainable fluid collection.    Recommendations:  - medicine admission for IV abx in light of tachycardia and elevated WBC  - pain and nausea control PRN  - outpatient follow up with GI for initiation of Crohn's treatment  - outpatient follow up with surgeon Dr. Ortega for discussion of APR  - the patient is cleared from a surgical standpoint; please reconsult as needed    Discussed with Dr. Merino
38 year old male with history Crohn's not on current treatment, ostomy, and perianal fistulas not compliant with followup GI or Colorectal @ [facility] admitted with rectal pain, bloody drainage, acute proctitis    Imp: Acute proctitis with chronic anal fistulas 2/2 Crohn's    Regarding fistulas, short term goal would be to control pain and decrease drainage and r/o any abscess or drainable collection    Rec:  ::Continue abx  ::MR pelvis w/ IV contrast with anal fistula protocol to evaluate tracts and r/o abscess/collection  ::Pain control  ::Appreciate CRS recs and evaluation  ::Sitz baths for comfort  ::Chemical VTE prophylaxis due to IBD, high risk VTE    Patient currently uninsured and in process of obtaining insurance coverage. Would like to establish care in outpatient practice here locally. Would recommend starting biologic once insurance secured. Would need to reinforce the vital importance of compliance etc.

## 2023-10-26 NOTE — PROVIDER CONTACT NOTE (CHANGE IN STATUS NOTIFICATION) - ACTION/TREATMENT ORDERED:
vitals obtained. WNL. 50 PO benadryl given. Solumedrol 40 IV given.
Stat 12 Lead EKG, provider to evaluate at bedside.

## 2023-10-26 NOTE — CHART NOTE - NSCHARTNOTEFT_GEN_A_CORE
Notified by nurse at around 4:45 am that patient was showing signs of peaked T-waves and inverted P waves on telemetry. Ordered EKG and Troponin and had morning CBC, CMP and mag drawn early. Upon evaluation of patient, patient denies any chest pain, SOB , nausea and other complaints.     T(C): 36.4 (10-26-23 @ 04:00), Max: 37.1 (10-25-23 @ 19:22)  HR: 62 (10-26-23 @ 04:00) (62 - 79)  BP: 99/66 (10-26-23 @ 04:00) (83/56 - 111/71)  RR: 17 (10-26-23 @ 04:00) (17 - 18)  SpO2: 96% (10-26-23 @ 04:00) (96% - 99%)    CONSTITUTIONAL: Well groomed, no apparent distress  RESP: CTA; No accessory muscle use  CV: RRR, +S1S2, no MRG; no JVD; no peripheral edema  GI: Soft, NT, ND, no rebound, no guarding; no palpable masses; no hepatosplenomegaly; no hernia palpated    EKG: Sinus bradycardia. Peaked T-waves noted in leads V2-V6. inverted P-waves in V1, AVR                        14.0   12.59 )-----------( 429      ( 26 Oct 2023 05:04 )             41.5     10-26    130<L>  |  98  |  9   ----------------------------<  395<H>  4.4   |  26  |  1.07    Ca    8.6      26 Oct 2023 05:04  Mg     1.9     10-26    TPro  7.7  /  Alb  2.3<L>  /  TBili  0.3  /  DBili  x   /  AST  6<L>  /  ALT  18  /  AlkPhos  102  10-26    Trop: <3.00      Plan:   Abnormal EKG   - EKG Ordered and reviewed, Repeat EKG ordered at 8AM  - Labs: CBC, BMP, Trop, mag - reviewed   - Cardiology consult- called service     Case discussed, EKG and labs reviewed with attending, Dr. Ramirez

## 2023-10-26 NOTE — CONSULT NOTE ADULT - NS ATTEND AMEND GEN_ALL_CORE FT
38 year old man with very complicated crohn's disease with diverting ostomy and fistulzing ray-anal disease, here for rectal pain and discharge. Not on biologics.     Pelvic MRI to r/o abscess/fistulas that CT's miss.

## 2023-10-26 NOTE — CONSULT NOTE ADULT - PROBLEM SELECTOR RECOMMENDATION 9
ECG has nonspecific T wave abnormality in the absence of cardiac symptoms and without past heart disease; no hyperkalemia.  Peaked T waves is uncommonly seen in hyponatremia -- serum sodium is 130; suggest repeat ECG once hyponatremia has been corrected.

## 2023-10-26 NOTE — CONSULT NOTE ADULT - SUBJECTIVE AND OBJECTIVE BOX
CHIEF COMPLAINT: Patient is a 38y old  Male who presents with a chief complaint of Rectal pain/acute proctitis    HPI:  37 y/o man with a history of Crohn's disease (ray-anal fistula, ileostomy, proctitis), tobacco use disorder, pancreatitis who presented to the ER on 10/25/23 with complaints of rectal pain and rectal bleeding.  Cardiology consulted because of an abnormal ECG.  he reports no history of heart disease and has not been experiencing angina, chest discomfort, dyspnea, palpitations. He complains of severe rectal pain; can't get comfortable and bleeding from rectum.  He has a good baseline functional status; no exertional symptoms suggesting of CV disease.    PAST MEDICAL & SURGICAL HISTORY:  Crohn's disease of large intestine without complication  Pancreatitis  S/P ORIF (open reduction internal fixation) fracture (Right ankle, 2014)  History of colonoscopy  Perianal fistula repair in 2002  Ileostomy    SOCIAL HISTORY:   Alcohol: Denied  Smoking: Current smoker    FAMILY HISTORY: Diabetes mellitus (Father). No premature CAD    MEDICATIONS  (STANDING):  folic acid 1 milliGRAM(s) Oral daily  influenza   Vaccine 0.5 milliLiter(s) IntraMuscular once  metroNIDAZOLE  IVPB 500 milliGRAM(s) IV Intermittent every 8 hours  multivitamin/minerals 1 Tablet(s) Oral daily  thiamine 100 milliGRAM(s) Oral daily    MEDICATIONS  (PRN):  acetaminophen     Tablet .. 650 milliGRAM(s) Oral every 6 hours PRN Temp greater or equal to 38C (100.4F), Mild Pain (1 - 3)  aluminum hydroxide/magnesium hydroxide/simethicone Suspension 30 milliLiter(s) Oral every 4 hours PRN Dyspepsia  HYDROmorphone  Injectable 1 milliGRAM(s) IV Push every 4 hours PRN Moderate to severe pain  melatonin 3 milliGRAM(s) Oral at bedtime PRN Insomnia  ondansetron Injectable 4 milliGRAM(s) IV Push every 8 hours PRN Nausea and/or Vomiting    Allergies: NKDA    REVIEW OF SYSTEMS:  CONSTITUTIONAL: No fever  Eyes: No visual changes  NECK: No pain or stiffness  RESPIRATORY: No cough, wheezing, hemoptysis; No shortness of breath  CARDIOVASCULAR: No chest pain or palpitations  GASTROINTESTINAL: + rectal pain. + hematochezia.  GENITOURINARY: No dysuria, frequency or hematuria  NEUROLOGICAL: No numbness.  SKIN: No itching or rash  All other review of systems is negative unless indicated above    VITAL SIGNS:   Vital Signs Last 24 Hrs  T(C): 36.4 (26 Oct 2023 04:00), Max: 37.1 (25 Oct 2023 19:22)  T(F): 97.5 (26 Oct 2023 04:00), Max: 98.8 (25 Oct 2023 19:22)  HR: 62 (26 Oct 2023 04:00) (62 - 79)  BP: 99/66 (26 Oct 2023 04:00) (83/56 - 111/71)  RR: 17 (26 Oct 2023 04:00) (17 - 18)  SpO2: 96% (26 Oct 2023 04:00) (96% - 99%)  Patient On (Oxygen Delivery Method): room air    PHYSICAL EXAM:  Constitutional: NAD, awake and alert  HEENT:  EOMI,  Pupils round, No oral cyanosis.  Pulmonary: Non-labored, breath sounds are clear bilaterally, No wheezing, rales or rhonchi  Cardiovascular: S1 and S2, regular rate and rhythm, no Murmurs, gallops or rubs  Gastrointestinal: Bowel Sounds present, soft, ileosotmy  Lymph: No peripheral edema. No cervical lymphadenopathy.  Neurological: Alert, no focal deficits  Skin: No rashes.  Psych:  Mood & affect appropriate    LABS:                    14.0   12.59 )-----------( 429      ( 26 Oct 2023 05:04 )             41.5           130    |  98     |  9      ----------------------------<  395    4.4     |  26     |  1.07     Ca    8.6        26 Oct 2023 05:04  Mg     1.9       26 Oct 2023 05:04    TPro  7.7    /  Alb  2.3    /  TBili  0.3    /  DBili  x      /  AST  6      /  ALT  18     /  AlkPhos  102    26 Oct 2023 05:04    PT/INR - ( 25 Oct 2023 02:01 )   PT: 10.4 sec;   INR: 0.92 ratio    PTT - ( 25 Oct 2023 02:01 )  PTT:31.4 sec    TroponinI hsT: 3.00    CT Abdomen and Pelvis w/ IV Cont (10.25.23 @ 02:26):  Again seen is rectal wall thickening. There are multiple thin granulomatous tracks in both right and left gluteal folds extending to the anus likely representing anal fistulas. These also extend to the bilateral levator ani muscle. No definite intraperitoneal involvement. No drainable fluid collection., This would be better evaluated with dedicated contrast-enhanced MRI pelvis with anal fistula protocol.

## 2023-10-26 NOTE — CONSULT NOTE ADULT - SUBJECTIVE AND OBJECTIVE BOX
Patient is a 38y old  Male who presents with a chief complaint of Rectal pain/acute proctitis    HPI:  This is a 38 year old male with significant history of Crohn's disease not on current medication, fistulizing disease, perianal fistualas s/p diversion at [facility] with ostomy, history of etoh abuse and pancreatitis, current every day smoker presenting with rectal pain. Hospitalized 3/2023 for pancreatitis related to etoh abuse and during this admission had worsening rectal pain with bleeding and was advised to follow up with CRS at [place]. Patient stated he did followup and was advised to do sitz baths but not plan for any surgical intervention. Has not followed up with primary GI Dr. Kerwin Muñoz from [facility]. Reports rectal pain and bloody drainage have been present for couple weeks with worsening of frequency of rectal drainage and pain over past 3 days prompting presentation. Pain aggravated by changing body positions from sitting to standing and pain worsened when sitting upright directly on buttocks. Reports moderate relief of pain from IV Dilaudid and IV Tylenol     Regarding Crohns treatment- patient had been on Remicade in the past and "ended up hospitalized and nearly paralyzed," per patient. Also failed Humira. But did have some positive outcomes with Stelara but unable to afford any longer. No biologic in greater than 1 year. Patient is not insured due to recent divorce and "my father is helping me get insurance through his teaching job." Denies any hematochezia or melena via ostomy. Denies nausea, vomiting, abdominal pain, fever, chills, or shortness of breath. CT without evidence of abscess, with anal fistulas, no obvious collection.    PAST MEDICAL & SURGICAL HISTORY:  Crohn's disease of large intestine without complication    Pancreatitis    S/P ORIF (open reduction internal fixation) fracture  (Right ankle, 2014)    History of colonoscopy  (2014)    Perianal fistula  repair in 2002    MEDICATIONS  (STANDING):  folic acid 1 milliGRAM(s) Oral daily  influenza   Vaccine 0.5 milliLiter(s) IntraMuscular once  metroNIDAZOLE  IVPB 500 milliGRAM(s) IV Intermittent every 12 hours  multivitamin/minerals 1 Tablet(s) Oral daily  thiamine 100 milliGRAM(s) Oral daily    MEDICATIONS  (PRN):  acetaminophen     Tablet .. 650 milliGRAM(s) Oral every 6 hours PRN Temp greater or equal to 38C (100.4F), Mild Pain (1 - 3)  aluminum hydroxide/magnesium hydroxide/simethicone Suspension 30 milliLiter(s) Oral every 4 hours PRN Dyspepsia  HYDROmorphone  Injectable 1 milliGRAM(s) IV Push every 4 hours PRN Moderate to severe pain  melatonin 3 milliGRAM(s) Oral at bedtime PRN Insomnia  ondansetron Injectable 4 milliGRAM(s) IV Push every 8 hours PRN Nausea and/or Vomiting    Allergies    ciprofloxacin (Other (Mild to Mod))    Intolerances    SOCIAL HISTORY:    FAMILY HISTORY:  Diabetes mellitus (Father)    REVIEW OF SYSTEMS:    CONSTITUTIONAL: No weakness, fevers or chills  EYES/ENT: No visual changes;  No vertigo or throat pain   NECK: No pain or stiffness  RESPIRATORY: No cough, wheezing, hemoptysis; No shortness of breath  CARDIOVASCULAR: No chest pain or palpitations  GASTROINTESTINAL: See HPI  GENITOURINARY: No dysuria, frequency or hematuria  NEUROLOGICAL: No numbness or weakness  SKIN: No itching, burning, rashes, or lesions   PSYCH: Normal mood and affect  All other review of systems is negative unless indicated above.    Vital Signs Last 24 Hrs  T(C): 36.4 (26 Oct 2023 08:23), Max: 37.1 (25 Oct 2023 19:22)  T(F): 97.6 (26 Oct 2023 08:23), Max: 98.8 (25 Oct 2023 19:22)  HR: 58 (26 Oct 2023 08:23) (58 - 79)  BP: 104/58 (26 Oct 2023 08:23) (96/51 - 111/71)  BP(mean): --  RR: 18 (26 Oct 2023 08:23) (17 - 18)  SpO2: 99% (26 Oct 2023 08:23) (96% - 99%)    Parameters below as of 26 Oct 2023 08:23  Patient On (Oxygen Delivery Method): room air    PHYSICAL EXAM:    Constitutional: No acute distress, non-toxic appearing  HEENT:  good phonation, not icteric  Neck: supple, no lymphadenopathy  Respiratory: clear to ascultation bilaterally, no wheezing  Cardiovascular: S1 and S2, regular rate and rhythm, no murmurs rubs or gallops  Gastrointestinal: soft, non-tender, non-distended, +bowel sounds, no rebound or guarding, ostomy  Extremities: No peripheral edema, no cyanosis or clubbing  Vascular: 2+ peripheral pulses, no venous stasis  Neurological: A/O x 3, no focal deficits, no asterixis  Psychiatric: Normal mood, normal affect  Skin: No rashes, not jaundiced    LABS:                        14.0   12.59 )-----------( 429      ( 26 Oct 2023 05:04 )             41.5     10-26    131<L>  |  98  |  11  ----------------------------<  233<H>  4.0   |  31  |  0.78    Ca    8.9      26 Oct 2023 12:44  Mg     2.0     10-26    TPro  7.7  /  Alb  2.3<L>  /  TBili  0.3  /  DBili  x   /  AST  6<L>  /  ALT  18  /  AlkPhos  102  10-26    PT/INR - ( 25 Oct 2023 02:01 )   PT: 10.4 sec;   INR: 0.92 ratio         PTT - ( 25 Oct 2023 02:01 )  PTT:31.4 sec  LIVER FUNCTIONS - ( 26 Oct 2023 05:04 )  Alb: 2.3 g/dL / Pro: 7.7 gm/dL / ALK PHOS: 102 U/L / ALT: 18 U/L / AST: 6 U/L / GGT: x             RADIOLOGY & ADDITIONAL STUDIES:  IMPRESSION:    Again seen is rectal wall thickening. There are multiple thin   granulomatous tracks in both right and left gluteal folds extending to   the anus likely representing anal fistulas. These also extend to the   bilateral levator ani muscle. No definite intraperitoneal involvement. No   drainable fluid collection., This would be better evaluated with   dedicated contrast-enhanced MRI pelvis with anal fistula protocol. Patient is a 38y old  Male who presents with a chief complaint of Rectal pain/acute proctitis     38 year old male with significant history of Crohn's disease not on current medication, fistulizing disease, perianal fistulas s/p diversion at [facility] with ostomy, history of etoh abuse and pancreatitis, current every day smoker presenting with rectal pain.     EPatient states the over the last couple of weeks he has had worsening discharge from his rectum. Stains his underweard with mucoid discharge, the volume and frequency has been increasing. Now has blood in discharge too. Also associated with rectal discomfort and constant tenesmus. Also with rectal pain. Pain is constant, dull, was gradual in onset, 7/10 in intensity, without alleviating factors. Changing positions is an exacerbating factors (changing body positions sitting to standing and sitting). Of note, he was hospitalized for ETOH induced pancraetitis earlier this year and had rectal pain with bleeding. He was advised sitz bath. He has not seen his primary IBD GI Dr. Kerwin Muñoz in a long time. No bleeding or issues at ostomy site. No diarrhea. No fvers or chills.     Regarding Crohns treatment- patient had been on Remicade in the past and "ended up hospitalized and nearly paralyzed," per patient. Also failed Humira. But did have some positive outcomes with Stelara but unable to afford any longer. No biologic in greater than 1 year. Patient is not insured due to recent divorce and "my father is helping me get insurance through his teaching job." Denies any hematochezia or melena via ostomy. Denies nausea, vomiting, abdominal pain, fever, chills, or shortness of breath. CT without evidence of abscess, with anal fistulas, no obvious collection.    PAST MEDICAL & SURGICAL HISTORY:  Crohn's disease of large intestine without complication    Pancreatitis    S/P ORIF (open reduction internal fixation) fracture  (Right ankle, 2014)    History of colonoscopy  (2014)    Perianal fistula  repair in 2002    MEDICATIONS  (STANDING):  folic acid 1 milliGRAM(s) Oral daily  influenza   Vaccine 0.5 milliLiter(s) IntraMuscular once  metroNIDAZOLE  IVPB 500 milliGRAM(s) IV Intermittent every 12 hours  multivitamin/minerals 1 Tablet(s) Oral daily  thiamine 100 milliGRAM(s) Oral daily    MEDICATIONS  (PRN):  acetaminophen     Tablet .. 650 milliGRAM(s) Oral every 6 hours PRN Temp greater or equal to 38C (100.4F), Mild Pain (1 - 3)  aluminum hydroxide/magnesium hydroxide/simethicone Suspension 30 milliLiter(s) Oral every 4 hours PRN Dyspepsia  HYDROmorphone  Injectable 1 milliGRAM(s) IV Push every 4 hours PRN Moderate to severe pain  melatonin 3 milliGRAM(s) Oral at bedtime PRN Insomnia  ondansetron Injectable 4 milliGRAM(s) IV Push every 8 hours PRN Nausea and/or Vomiting    Allergies    ciprofloxacin (Other (Mild to Mod))    Intolerances    SOCIAL HISTORY:  no smoking,no drugs  +ETOH    FAMILY HISTORY:  Diabetes mellitus (Father)    REVIEW OF SYSTEMS:    CONSTITUTIONAL: No weakness, fevers or chills  EYES/ENT: No visual changes;  No vertigo or throat pain   NECK: No pain or stiffness  RESPIRATORY: No cough, wheezing, hemoptysis; No shortness of breath  CARDIOVASCULAR: No chest pain or palpitations  GASTROINTESTINAL: See HPI  GENITOURINARY: No dysuria, frequency or hematuria  NEUROLOGICAL: No numbness or weakness  SKIN: No itching, burning, rashes, or lesions   PSYCH: Normal mood and affect  All other review of systems is negative unless indicated above.    Vital Signs Last 24 Hrs  T(C): 36.4 (26 Oct 2023 08:23), Max: 37.1 (25 Oct 2023 19:22)  T(F): 97.6 (26 Oct 2023 08:23), Max: 98.8 (25 Oct 2023 19:22)  HR: 58 (26 Oct 2023 08:23) (58 - 79)  BP: 104/58 (26 Oct 2023 08:23) (96/51 - 111/71)  BP(mean): --  RR: 18 (26 Oct 2023 08:23) (17 - 18)  SpO2: 99% (26 Oct 2023 08:23) (96% - 99%)    Parameters below as of 26 Oct 2023 08:23  Patient On (Oxygen Delivery Method): room air    PHYSICAL EXAM:    Constitutional: No acute distress, non-toxic appearing, chronically ill appearing  HEENT:  good phonation, not icteric  Neck: supple, no lymphadenopathy  Respiratory: clear to ascultation bilaterally, no wheezing  Cardiovascular: S1 and S2, regular rate and rhythm, no murmurs rubs or gallops  Gastrointestinal: soft, non-tender, non-distended, +bowel sounds, no rebound or guarding, ostomy c/d/i, no blood in ostomy bag  Extremities: No peripheral edema, no cyanosis or clubbing  Vascular: 2+ peripheral pulses, no venous stasis  Neurological: A/O x 3, no focal deficits, no asterixis  Psychiatric: Normal mood, normal affect  Skin: No rashes, not jaundiced    LABS:                        14.0   12.59 )-----------( 429      ( 26 Oct 2023 05:04 )             41.5     10-26    131<L>  |  98  |  11  ----------------------------<  233<H>  4.0   |  31  |  0.78    Ca    8.9      26 Oct 2023 12:44  Mg     2.0     10-26    TPro  7.7  /  Alb  2.3<L>  /  TBili  0.3  /  DBili  x   /  AST  6<L>  /  ALT  18  /  AlkPhos  102  10-26    PT/INR - ( 25 Oct 2023 02:01 )   PT: 10.4 sec;   INR: 0.92 ratio         PTT - ( 25 Oct 2023 02:01 )  PTT:31.4 sec  LIVER FUNCTIONS - ( 26 Oct 2023 05:04 )  Alb: 2.3 g/dL / Pro: 7.7 gm/dL / ALK PHOS: 102 U/L / ALT: 18 U/L / AST: 6 U/L / GGT: x             RADIOLOGY & ADDITIONAL STUDIES:  IMPRESSION:    Again seen is rectal wall thickening. There are multiple thin   granulomatous tracks in both right and left gluteal folds extending to   the anus likely representing anal fistulas. These also extend to the   bilateral levator ani muscle. No definite intraperitoneal involvement. No   drainable fluid collection., This would be better evaluated with   dedicated contrast-enhanced MRI pelvis with anal fistula protocol.

## 2023-10-27 LAB
ANION GAP SERPL CALC-SCNC: 6 MMOL/L — SIGNIFICANT CHANGE UP (ref 5–17)
ANION GAP SERPL CALC-SCNC: 6 MMOL/L — SIGNIFICANT CHANGE UP (ref 5–17)
BUN SERPL-MCNC: 10 MG/DL — SIGNIFICANT CHANGE UP (ref 7–23)
BUN SERPL-MCNC: 10 MG/DL — SIGNIFICANT CHANGE UP (ref 7–23)
CALCIUM SERPL-MCNC: 8.6 MG/DL — SIGNIFICANT CHANGE UP (ref 8.5–10.1)
CALCIUM SERPL-MCNC: 8.6 MG/DL — SIGNIFICANT CHANGE UP (ref 8.5–10.1)
CHLORIDE SERPL-SCNC: 104 MMOL/L — SIGNIFICANT CHANGE UP (ref 96–108)
CHLORIDE SERPL-SCNC: 104 MMOL/L — SIGNIFICANT CHANGE UP (ref 96–108)
CO2 SERPL-SCNC: 28 MMOL/L — SIGNIFICANT CHANGE UP (ref 22–31)
CO2 SERPL-SCNC: 28 MMOL/L — SIGNIFICANT CHANGE UP (ref 22–31)
CREAT SERPL-MCNC: 0.67 MG/DL — SIGNIFICANT CHANGE UP (ref 0.5–1.3)
CREAT SERPL-MCNC: 0.67 MG/DL — SIGNIFICANT CHANGE UP (ref 0.5–1.3)
EGFR: 123 ML/MIN/1.73M2 — SIGNIFICANT CHANGE UP
EGFR: 123 ML/MIN/1.73M2 — SIGNIFICANT CHANGE UP
GLUCOSE SERPL-MCNC: 138 MG/DL — HIGH (ref 70–99)
GLUCOSE SERPL-MCNC: 138 MG/DL — HIGH (ref 70–99)
HCT VFR BLD CALC: 39.9 % — SIGNIFICANT CHANGE UP (ref 39–50)
HCT VFR BLD CALC: 39.9 % — SIGNIFICANT CHANGE UP (ref 39–50)
HGB BLD-MCNC: 13 G/DL — SIGNIFICANT CHANGE UP (ref 13–17)
HGB BLD-MCNC: 13 G/DL — SIGNIFICANT CHANGE UP (ref 13–17)
MAGNESIUM SERPL-MCNC: 2.1 MG/DL — SIGNIFICANT CHANGE UP (ref 1.6–2.6)
MAGNESIUM SERPL-MCNC: 2.1 MG/DL — SIGNIFICANT CHANGE UP (ref 1.6–2.6)
MCHC RBC-ENTMCNC: 31.7 PG — SIGNIFICANT CHANGE UP (ref 27–34)
MCHC RBC-ENTMCNC: 31.7 PG — SIGNIFICANT CHANGE UP (ref 27–34)
MCHC RBC-ENTMCNC: 32.6 GM/DL — SIGNIFICANT CHANGE UP (ref 32–36)
MCHC RBC-ENTMCNC: 32.6 GM/DL — SIGNIFICANT CHANGE UP (ref 32–36)
MCV RBC AUTO: 97.3 FL — SIGNIFICANT CHANGE UP (ref 80–100)
MCV RBC AUTO: 97.3 FL — SIGNIFICANT CHANGE UP (ref 80–100)
PLATELET # BLD AUTO: 447 K/UL — HIGH (ref 150–400)
PLATELET # BLD AUTO: 447 K/UL — HIGH (ref 150–400)
POTASSIUM SERPL-MCNC: 4.1 MMOL/L — SIGNIFICANT CHANGE UP (ref 3.5–5.3)
POTASSIUM SERPL-MCNC: 4.1 MMOL/L — SIGNIFICANT CHANGE UP (ref 3.5–5.3)
POTASSIUM SERPL-SCNC: 4.1 MMOL/L — SIGNIFICANT CHANGE UP (ref 3.5–5.3)
POTASSIUM SERPL-SCNC: 4.1 MMOL/L — SIGNIFICANT CHANGE UP (ref 3.5–5.3)
RBC # BLD: 4.1 M/UL — LOW (ref 4.2–5.8)
RBC # BLD: 4.1 M/UL — LOW (ref 4.2–5.8)
RBC # FLD: 15.1 % — HIGH (ref 10.3–14.5)
RBC # FLD: 15.1 % — HIGH (ref 10.3–14.5)
SODIUM SERPL-SCNC: 138 MMOL/L — SIGNIFICANT CHANGE UP (ref 135–145)
SODIUM SERPL-SCNC: 138 MMOL/L — SIGNIFICANT CHANGE UP (ref 135–145)
WBC # BLD: 14.64 K/UL — HIGH (ref 3.8–10.5)
WBC # BLD: 14.64 K/UL — HIGH (ref 3.8–10.5)
WBC # FLD AUTO: 14.64 K/UL — HIGH (ref 3.8–10.5)
WBC # FLD AUTO: 14.64 K/UL — HIGH (ref 3.8–10.5)

## 2023-10-27 PROCEDURE — 99222 1ST HOSP IP/OBS MODERATE 55: CPT

## 2023-10-27 PROCEDURE — 99233 SBSQ HOSP IP/OBS HIGH 50: CPT

## 2023-10-27 PROCEDURE — 93010 ELECTROCARDIOGRAM REPORT: CPT

## 2023-10-27 RX ORDER — HYDROMORPHONE HYDROCHLORIDE 2 MG/ML
1 INJECTION INTRAMUSCULAR; INTRAVENOUS; SUBCUTANEOUS ONCE
Refills: 0 | Status: DISCONTINUED | OUTPATIENT
Start: 2023-10-27 | End: 2023-10-27

## 2023-10-27 RX ORDER — SODIUM CHLORIDE 9 MG/ML
1000 INJECTION INTRAMUSCULAR; INTRAVENOUS; SUBCUTANEOUS
Refills: 0 | Status: DISCONTINUED | OUTPATIENT
Start: 2023-10-27 | End: 2023-10-31

## 2023-10-27 RX ORDER — SODIUM CHLORIDE 9 MG/ML
500 INJECTION INTRAMUSCULAR; INTRAVENOUS; SUBCUTANEOUS ONCE
Refills: 0 | Status: COMPLETED | OUTPATIENT
Start: 2023-10-27 | End: 2023-10-27

## 2023-10-27 RX ADMIN — SODIUM CHLORIDE 100 MILLILITER(S): 9 INJECTION INTRAMUSCULAR; INTRAVENOUS; SUBCUTANEOUS at 16:44

## 2023-10-27 RX ADMIN — Medication 100 MILLIGRAM(S): at 09:35

## 2023-10-27 RX ADMIN — HYDROMORPHONE HYDROCHLORIDE 1 MILLIGRAM(S): 2 INJECTION INTRAMUSCULAR; INTRAVENOUS; SUBCUTANEOUS at 22:18

## 2023-10-27 RX ADMIN — Medication 1 TABLET(S): at 09:35

## 2023-10-27 RX ADMIN — HYDROMORPHONE HYDROCHLORIDE 1 MILLIGRAM(S): 2 INJECTION INTRAMUSCULAR; INTRAVENOUS; SUBCUTANEOUS at 13:57

## 2023-10-27 RX ADMIN — HYDROMORPHONE HYDROCHLORIDE 1 MILLIGRAM(S): 2 INJECTION INTRAMUSCULAR; INTRAVENOUS; SUBCUTANEOUS at 09:35

## 2023-10-27 RX ADMIN — HYDROMORPHONE HYDROCHLORIDE 1 MILLIGRAM(S): 2 INJECTION INTRAMUSCULAR; INTRAVENOUS; SUBCUTANEOUS at 02:43

## 2023-10-27 RX ADMIN — HYDROMORPHONE HYDROCHLORIDE 1 MILLIGRAM(S): 2 INJECTION INTRAMUSCULAR; INTRAVENOUS; SUBCUTANEOUS at 18:17

## 2023-10-27 RX ADMIN — HYDROMORPHONE HYDROCHLORIDE 1 MILLIGRAM(S): 2 INJECTION INTRAMUSCULAR; INTRAVENOUS; SUBCUTANEOUS at 06:20

## 2023-10-27 RX ADMIN — HYDROMORPHONE HYDROCHLORIDE 1 MILLIGRAM(S): 2 INJECTION INTRAMUSCULAR; INTRAVENOUS; SUBCUTANEOUS at 09:55

## 2023-10-27 RX ADMIN — Medication 3 MILLIGRAM(S): at 21:36

## 2023-10-27 RX ADMIN — SODIUM CHLORIDE 1000 MILLILITER(S): 9 INJECTION INTRAMUSCULAR; INTRAVENOUS; SUBCUTANEOUS at 06:20

## 2023-10-27 RX ADMIN — Medication 100 MILLIGRAM(S): at 05:23

## 2023-10-27 RX ADMIN — Medication 100 MILLIGRAM(S): at 21:35

## 2023-10-27 RX ADMIN — Medication 1 MILLIGRAM(S): at 09:35

## 2023-10-27 NOTE — DIETITIAN INITIAL EVALUATION ADULT - ADD RECOMMEND
1) C/w regular diet as tolerated  2) Encourage protein-rich foods, maximize food preferences  3) Monitor bowel movements, if no BM for >3 days, consider implementing bowel regimen.  4) Continue to provide 100 mg thiamine, 1 mg folic acid, MVI w/ minerals daily to ensure 100% RDA met 2/2 ETOH abuse and malnutrition  5) Monitor lytes/ min and replete prn.  6) Obtain vitamin D 25OH level to assess nutriture  7) Confirm goals of care regarding nutrition support  RD will continue to monitor PO intake, labs, hydration, and wt prn.

## 2023-10-27 NOTE — PROGRESS NOTE ADULT - ASSESSMENT
37 y/o male with h/o Crohn's disease (not on tx, follows up in [facility]), perianal fistulas s/p diversion at [facility], proctitis, tobacco use disorder, pancreatitis and EtOH abuse (denies hx of DT/intubations/seizures) presents to the ED with rectal pain.     #Rectal pain  #Acute proctitis with chronic fistulas   #Sepsis 2/2 proctitis   - Following with Colorectal at [facility] for many years, last seen in March 2023 and surgery was deferred. MRI in 03/2023 also negative for abscess/drainable collection  - CT a/p on admission with no drainable fluid collection  - Rectal exam per Colorectal revealing erythema, many fistulous openings with mucopurulent drainage  - WBC improving despite getting steroids overnight  - Continue tx with Flagyl for now  - Pain control with Tylenol and IV Dilaudid   - Colorectal recommendations appreciated, no plan for surgery at this time  - GI consult placed, recs appreciated   - MR pelvis w/ IV contrast recommended by GI to evaluate for abscess; results awaited    #Allergic reaction  Reaction to Cipro on 10/25, required IV Solumedrol. Subsequently developed EKG changes including newly inverted p-waves and elevated t-waves. PM EKG with improvement of t waves  - EKG changes likely from the reaction  - Electrolytes stable, will monitor EKG daily for improvement  - Continue monitoring on tele     #Tobacco use disorder  - SBIRT referral  - Tobacco use: 1 ppd. Counseling/treatment offered. Denies nicotine patch    #Alcohol use disorder  Alcohol use: 2-3 beers few times per week. Denies any hx of withdrawal/DT/intubations/seizures. CIWA 0.    - SBIRT referral  - CIWA 0 x24 hours, discontinue and monitor   - Multivitamin, Thiamine, Folate    FULL CODE  DVT ppx: Lovenox     DISPO: await MRI results     no vomiting/no nausea/no diarrhea/no constipation/no abdominal pain

## 2023-10-27 NOTE — PROGRESS NOTE ADULT - SUBJECTIVE AND OBJECTIVE BOX
CHIEF COMPLAINT: Patient is a 38y old  Male who presents with a chief complaint of Rectal pain/acute proctitis    HPI:  37 y/o man with a history of Crohn's disease (ray-anal fistula, ileostomy, proctitis), tobacco use disorder, pancreatitis who presented to the ER on 10/25/23 with complaints of rectal pain and rectal bleeding.  Cardiology consulted because of an abnormal ECG.  he reports no history of heart disease and has not been experiencing angina, chest discomfort, dyspnea, palpitations. He complains of severe rectal pain; can't get comfortable and bleeding from rectum.  He has a good baseline functional status; no exertional symptoms suggesting of CV disease.    10/17/23 - feels well, no cardiac complaints, Tele: NSR    MEDICATIONS  (STANDING):  enoxaparin Injectable 40 milliGRAM(s) SubCutaneous every 24 hours  folic acid 1 milliGRAM(s) Oral daily  influenza   Vaccine 0.5 milliLiter(s) IntraMuscular once  metroNIDAZOLE  IVPB 500 milliGRAM(s) IV Intermittent every 12 hours  multivitamin/minerals 1 Tablet(s) Oral daily  thiamine 100 milliGRAM(s) Oral daily      MEDICATIONS  (PRN):  acetaminophen     Tablet .. 650 milliGRAM(s) Oral every 6 hours PRN Temp greater or equal to 38C (100.4F), Mild Pain (1 - 3)  aluminum hydroxide/magnesium hydroxide/simethicone Suspension 30 milliLiter(s) Oral every 4 hours PRN Dyspepsia  HYDROmorphone  Injectable 1 milliGRAM(s) IV Push every 4 hours PRN Moderate to severe pain  melatonin 3 milliGRAM(s) Oral at bedtime PRN Insomnia  ondansetron Injectable 4 milliGRAM(s) IV Push every 8 hours PRN Nausea and/or Vomiting    Vital Signs Last 24 Hrs  T(C): 36.6 (27 Oct 2023 08:51), Max: 37.1 (26 Oct 2023 23:25)  T(F): 97.8 (27 Oct 2023 08:51), Max: 98.7 (26 Oct 2023 23:25)  HR: 71 (27 Oct 2023 13:55) (62 - 71)  BP: 125/82 (27 Oct 2023 13:55) (90/54 - 125/82)  BP(mean): --  RR: 18 (27 Oct 2023 08:51) (17 - 18)  SpO2: 100% (27 Oct 2023 08:51) (97% - 100%)    Parameters below as of 27 Oct 2023 08:51  Patient On (Oxygen Delivery Method): room air    PHYSICAL EXAM:  Constitutional: NAD, awake and alert  HEENT:  EOMI,  Pupils round, No oral cyanosis.  Pulmonary: Non-labored, breath sounds are clear bilaterally, No wheezing, rales or rhonchi  Cardiovascular: S1 and S2, regular rate and rhythm, no Murmurs, gallops or rubs  Gastrointestinal: Bowel Sounds present, soft, ileosotmy  Lymph: No peripheral edema. No cervical lymphadenopathy.  Neurological: Alert, no focal deficits  Skin: No rashes.  Psych:  Mood & affect appropriate    LABS: reviewed                              13.0   14.64 )-----------( 447      ( 27 Oct 2023 06:21 )             39.9     10-27    138  |  104  |  10  ----------------------------<  138<H>  4.1   |  28  |  0.67    Ca    8.6      27 Oct 2023 06:21  Mg     2.1     10-27    TPro  7.7  /  Alb  2.3<L>  /  TBili  0.3  /  DBili  x   /  AST  6<L>  /  ALT  18  /  AlkPhos  102  10-26    - TroponinI hsT: <-<3.00     Ca    8.6        26 Oct 2023 05:04  Mg     1.9       26 Oct 2023 05:04    TPro  7.7    /  Alb  2.3    /  TBili  0.3    /  DBili  x      /  AST  6      /  ALT  18     /  AlkPhos  102    26 Oct 2023 05:04    PT/INR - ( 25 Oct 2023 02:01 )   PT: 10.4 sec;   INR: 0.92 ratio    PTT - ( 25 Oct 2023 02:01 )  PTT:31.4 sec    TroponinI hsT: 3.00    Radiology: reviewed       CT Abdomen and Pelvis w/ IV Cont (10.25.23 @ 02:26):  Again seen is rectal wall thickening. There are multiple thin granulomatous tracks in both right and left gluteal folds extending to the anus likely representing anal fistulas. These also extend to the bilateral levator ani muscle. No definite intraperitoneal involvement. No drainable fluid collection., This would be better evaluated with dedicated contrast-enhanced MRI pelvis with anal fistula protocol.

## 2023-10-27 NOTE — DIETITIAN INITIAL EVALUATION ADULT - ORAL INTAKE PTA/DIET HISTORY
Pt lives at home w/ father and children, both pt and father cook/grocery shop w/o difficulty. Reports "normal" appetite and consumes 2 meals/day; likely consuming <75% of ENN chronically 2/2 severe rectal pain, etoh abuse, hx of crohn's. Does not follow any diet restrictions or use any ONS.

## 2023-10-27 NOTE — DIETITIAN INITIAL EVALUATION ADULT - PERTINENT LABORATORY DATA
10-27    138  |  104  |  10  ----------------------------<  138<H>  4.1   |  28  |  0.67    Ca    8.6      27 Oct 2023 06:21  Mg     2.1     10-27    TPro  7.7  /  Alb  2.3<L>  /  TBili  0.3  /  DBili  x   /  AST  6<L>  /  ALT  18  /  AlkPhos  102  10-26  A1C with Estimated Average Glucose Result: 7.7 % (10-26-23 @ 05:04)  A1C with Estimated Average Glucose Result: 6.9 % (03-21-23 @ 08:42)

## 2023-10-27 NOTE — PROGRESS NOTE ADULT - PROBLEM/PLAN-1
DISPLAY PLAN FREE TEXT I spoke with patient- she informed me that she see ophthalmology doctor at [facility] in August or Sept.

## 2023-10-27 NOTE — DIETITIAN INITIAL EVALUATION ADULT - PERTINENT MEDS FT
MEDICATIONS  (STANDING):  enoxaparin Injectable 40 milliGRAM(s) SubCutaneous every 24 hours  folic acid 1 milliGRAM(s) Oral daily  influenza   Vaccine 0.5 milliLiter(s) IntraMuscular once  metroNIDAZOLE  IVPB 500 milliGRAM(s) IV Intermittent every 12 hours  multivitamin/minerals 1 Tablet(s) Oral daily  thiamine 100 milliGRAM(s) Oral daily    MEDICATIONS  (PRN):  acetaminophen     Tablet .. 650 milliGRAM(s) Oral every 6 hours PRN Temp greater or equal to 38C (100.4F), Mild Pain (1 - 3)  aluminum hydroxide/magnesium hydroxide/simethicone Suspension 30 milliLiter(s) Oral every 4 hours PRN Dyspepsia  HYDROmorphone  Injectable 1 milliGRAM(s) IV Push every 4 hours PRN Moderate to severe pain  melatonin 3 milliGRAM(s) Oral at bedtime PRN Insomnia  ondansetron Injectable 4 milliGRAM(s) IV Push every 8 hours PRN Nausea and/or Vomiting  oxycodone    5 mG/acetaminophen 325 mG 1 Tablet(s) Oral every 6 hours PRN Moderate Pain (4 - 6)

## 2023-10-27 NOTE — PROGRESS NOTE ADULT - SUBJECTIVE AND OBJECTIVE BOX
HOSPITALIST PROGRESS NOTE    SUBJECTIVE / INTERVAL HPI: Last night patient had a reaction to Cipro infusion. RN was running the antibiotic slowly, however pt noticed his arm swelling with hives around the site of IV. Infusion was stopped and patient was given Benadryl and Solumedrol. He didn't experience pressure changes, SOB, wheezing, N/V, tongue or face swelling. At around 4:30 am patient developed newly inverted p-waves and elevated t-waves on remote tele. Cardiology team consulted due to changes. Metabolic panel without any significant electrolyte changes - normal potassium, Mg and corrected Na was 135. Patient had no previous similar reactions to Cipro. He received Cipro just hours prior in the ED without any issues. He has also tolerated Cipro during past admissions.     Patient seen and examined at bedside this morning. His signs/symptoms from the reaction had resolved. Patient states his pain medications take the edge off the pain, however pain is still present and significant. Denies any chest pain, palpitations, SOB, abdominal pain, N/V/D.    10/27: await MRI results  c/o rectal pain    Vital Signs Last 24 Hrs  T(C): 36.7 (27 Oct 2023 15:25), Max: 37.1 (26 Oct 2023 23:25)  T(F): 98.1 (27 Oct 2023 15:25), Max: 98.7 (26 Oct 2023 23:25)  HR: 63 (27 Oct 2023 15:25) (63 - 71)  BP: 97/67 (27 Oct 2023 15:25) (90/54 - 125/82)  BP(mean): --  RR: 18 (27 Oct 2023 15:25) (17 - 18)  SpO2: 98% (27 Oct 2023 15:25) (97% - 100%)    Parameters below as of 27 Oct 2023 15:25  Patient On (Oxygen Delivery Method): room air      PHYSICAL EXAM:  GENERAL: Non-toxic, mild distress  HEAD:  Atraumatic, Normocephalic  EYES: EOMI, PERRLA, conjunctiva and sclera clear  ENMT: No tonsillar erythema, exudates, or enlargement; MM  Rectal: Deferred, see colorectal note for full exam  NECK: Supple, No JVD  HEART: Regular rate and rhythm; No murmurs, rubs, or gallops  RESPIRATORY: Unlabored respirations. CTA B/L, No W/R/R  ABDOMEN: Soft, Nontender, Nondistended; +BS. Colostomy bag with normal brown stool  NEUROLOGY: A&Ox3, nonfocal, moving all extremities  EXTREMITIES:  2+ Peripheral Pulses, No clubbing, cyanosis, or edema  SKIN: warm, dry, normal color, no rash or abnormal lesions    ALLERGIES:  Allergies    ciprofloxacin (Other (Mild to Mod))    Intolerances    All Labs/EKG/Radiology/Meds reviewed    Lab Results:  CBC  CBC Full  -  ( 27 Oct 2023 06:21 )  WBC Count : 14.64 K/uL  RBC Count : 4.10 M/uL  Hemoglobin : 13.0 g/dL  Hematocrit : 39.9 %  Platelet Count - Automated : 447 K/uL  Mean Cell Volume : 97.3 fl  Mean Cell Hemoglobin : 31.7 pg  Mean Cell Hemoglobin Concentration : 32.6 gm/dL  Auto Neutrophil # : x  Auto Lymphocyte # : x  Auto Monocyte # : x  Auto Eosinophil # : x  Auto Basophil # : x  Auto Neutrophil % : x  Auto Lymphocyte % : x  Auto Monocyte % : x  Auto Eosinophil % : x  Auto Basophil % : x    .		Differential:	[] Automated		[] Manual  Chemistry  10-27    138  |  104  |  10  ----------------------------<  138<H>  4.1   |  28  |  0.67    Ca    8.6      27 Oct 2023 06:21  Mg     2.1     10-27    TPro  7.7  /  Alb  2.3<L>  /  TBili  0.3  /  DBili  x   /  AST  6<L>  /  ALT  18  /  AlkPhos  102  10-26    LIVER FUNCTIONS - ( 26 Oct 2023 05:04 )  Alb: 2.3 g/dL / Pro: 7.7 gm/dL / ALK PHOS: 102 U/L / ALT: 18 U/L / AST: 6 U/L / GGT: x             Urinalysis Basic - ( 27 Oct 2023 06:21 )    Color: x / Appearance: x / SG: x / pH: x  Gluc: 138 mg/dL / Ketone: x  / Bili: x / Urobili: x   Blood: x / Protein: x / Nitrite: x   Leuk Esterase: x / RBC: x / WBC x   Sq Epi: x / Non Sq Epi: x / Bacteria: x        MICROBIOLOGY/CULTURES:  Culture Results:   No growth at 48 Hours (10-25 @ 04:36)  Culture Results:   No growth at 48 Hours (10-25 @ 04:36)            LABS:                        14.0   12.59 )-----------( 429      ( 26 Oct 2023 05:04 )             41.5     10-26    131<L>  |  98  |  11  ----------------------------<  233<H>  4.0   |  31  |  0.78    Ca    8.9      26 Oct 2023 12:44  Mg     2.0     10-26    TPro  7.7  /  Alb  2.3<L>  /  TBili  0.3  /  DBili  x   /  AST  6<L>  /  ALT  18  /  AlkPhos  102  10-26    PT/INR - ( 25 Oct 2023 02:01 )   PT: 10.4 sec;   INR: 0.92 ratio         PTT - ( 25 Oct 2023 02:01 )  PTT:31.4 sec  Urinalysis Basic - ( 26 Oct 2023 12:44 )    Color: x / Appearance: x / SG: x / pH: x  Gluc: 233 mg/dL / Ketone: x  / Bili: x / Urobili: x   Blood: x / Protein: x / Nitrite: x   Leuk Esterase: x / RBC: x / WBC x   Sq Epi: x / Non Sq Epi: x / Bacteria: x      CAPILLARY BLOOD GLUCOSE      RADIOLOGY & ADDITIONAL TESTS: Reviewed.    MEDICATIONS  (STANDING):  enoxaparin Injectable 40 milliGRAM(s) SubCutaneous every 24 hours  folic acid 1 milliGRAM(s) Oral daily  influenza   Vaccine 0.5 milliLiter(s) IntraMuscular once  metroNIDAZOLE  IVPB 500 milliGRAM(s) IV Intermittent every 12 hours  multivitamin/minerals 1 Tablet(s) Oral daily  sodium chloride 0.9%. 1000 milliLiter(s) (100 mL/Hr) IV Continuous <Continuous>  thiamine 100 milliGRAM(s) Oral daily    MEDICATIONS  (PRN):  acetaminophen     Tablet .. 650 milliGRAM(s) Oral every 6 hours PRN Temp greater or equal to 38C (100.4F), Mild Pain (1 - 3)  aluminum hydroxide/magnesium hydroxide/simethicone Suspension 30 milliLiter(s) Oral every 4 hours PRN Dyspepsia  HYDROmorphone  Injectable 1 milliGRAM(s) IV Push every 4 hours PRN Moderate to severe pain  melatonin 3 milliGRAM(s) Oral at bedtime PRN Insomnia  ondansetron Injectable 4 milliGRAM(s) IV Push every 8 hours PRN Nausea and/or Vomiting  oxycodone    5 mG/acetaminophen 325 mG 1 Tablet(s) Oral every 6 hours PRN Moderate Pain (4 - 6)

## 2023-10-27 NOTE — PROGRESS NOTE ADULT - PROBLEM SELECTOR PLAN 1
·  Problem: Abnormal ECG.   ·  Recommendation: ECG has nonspecific T wave abnormality in the absence of cardiac symptoms and without past heart disease; no hyperkalemia.  Peaked T waves is uncommonly seen in hyponatremia -- serum sodium is 130; Hyponatremia corrected - repeat EKG on 10/27/23 - NSR, unchanged from previous, showing the same tall T waves     pt. is stable from cardiac standpoint, will sign off

## 2023-10-27 NOTE — DIETITIAN INITIAL EVALUATION ADULT - OTHER INFO
37 y/o male with h/o Crohn's disease (not on tx, follows up in [facility]), perianal fistulas s/p diversion at [facility], proctitis, tobacco use disorder, pancreatitis and EtOH abuse (denies hx of DT/intubations/seizures) presents to the ED with rectal pain. Patient reports ongoing intermittent episodes of rectal pain and bleeding since being discharged in March 2023, however rectal pain has significantly gotten worse over the last 3 days. He has ongoing bloody rectal output - occurs almost everyday per patient. Pain is still moderate to severe at this time despite tx with IV Dilaudid given in the ED. Output from colostomy bag has been normal. He last followed up with his Colorectal Doctor at [facility] back in March; at that time she recommended warm baths and no further treatment/surgery.   Admit for rectal pain and sepsis 2/2 proctitis, hx of Crohn's     Known to nutr services and dx'd w/ mal on multiple admissions; still meets criteria. Rectal exam per colorectal revealing erythema, many fistulous openings with mucopurulent drainage. CT Abdomen and Pelvis w/ IV Contrast reveals - again seen rectal wall thickening, and "there are multiple thin granulomatous tracks in both right and left gluteal folds extending to the anus likely representing anal fistulas." Reports good appetite, w/ good PO intake since admit (x 2 days); however, w/ severe rectal pain. Reports UBW of 165-170# x ? time frame; bed scale wt of 142# taken by RD on 10/27/23. Wt hx as per EMR: 132# (bed scale wt taken by RD on 3/24/23) 150# (bed scale wt taken by RD on 2/5/22). Unintentional wt loss of 8# / 5.3% wt loss x > 1 year; not clinically significant. NFPE reveals moderate-severe muscle/ fat wasting - pt appears thin and jazzy. C/w regular diet as tolerated; denies ALL ONS. Provide 100 mg thiamine, 1 mg folic acid, MVI w/ minerals daily to ensure 100% RDA met 2/2 ETOH abuse and malnutrition. See below for other recommendations.

## 2023-10-27 NOTE — PROGRESS NOTE ADULT - PROBLEM SELECTOR PLAN 2
·  Problem: Sinus tachycardia.   ·  Recommendation: Tachycardia seen on admission has resolved and was likely related to GI / infectious illness.

## 2023-10-27 NOTE — PROGRESS NOTE ADULT - NS ATTEND AMEND GEN_ALL_CORE FT
Agree with the above. Asymptomatic from cardiac standpoint with a nonspecific T wave abnormality on ECG. Will sign off. please call back with any questions/concerns

## 2023-10-27 NOTE — DIETITIAN INITIAL EVALUATION ADULT - ETIOLOGY-BASIS
Problem: Infant Inpatient Plan of Care  Goal: Plan of Care Review  Outcome: Ongoing (interventions implemented as appropriate)  Infant remains in isolette. Feedings increased, infant tolerating feeds well. No emesis noted. Infant remains on 1 lpm nasal cannula at 25-28%. Infant voiding and stooling. Parents called and update was given. Will continue to monitor infant       Acute illness or injury

## 2023-10-28 LAB
ANION GAP SERPL CALC-SCNC: 3 MMOL/L — LOW (ref 5–17)
ANION GAP SERPL CALC-SCNC: 3 MMOL/L — LOW (ref 5–17)
BUN SERPL-MCNC: 11 MG/DL — SIGNIFICANT CHANGE UP (ref 7–23)
BUN SERPL-MCNC: 11 MG/DL — SIGNIFICANT CHANGE UP (ref 7–23)
CALCIUM SERPL-MCNC: 8.5 MG/DL — SIGNIFICANT CHANGE UP (ref 8.5–10.1)
CALCIUM SERPL-MCNC: 8.5 MG/DL — SIGNIFICANT CHANGE UP (ref 8.5–10.1)
CHLORIDE SERPL-SCNC: 103 MMOL/L — SIGNIFICANT CHANGE UP (ref 96–108)
CHLORIDE SERPL-SCNC: 103 MMOL/L — SIGNIFICANT CHANGE UP (ref 96–108)
CO2 SERPL-SCNC: 28 MMOL/L — SIGNIFICANT CHANGE UP (ref 22–31)
CO2 SERPL-SCNC: 28 MMOL/L — SIGNIFICANT CHANGE UP (ref 22–31)
CREAT SERPL-MCNC: 0.71 MG/DL — SIGNIFICANT CHANGE UP (ref 0.5–1.3)
CREAT SERPL-MCNC: 0.71 MG/DL — SIGNIFICANT CHANGE UP (ref 0.5–1.3)
EGFR: 120 ML/MIN/1.73M2 — SIGNIFICANT CHANGE UP
EGFR: 120 ML/MIN/1.73M2 — SIGNIFICANT CHANGE UP
GLUCOSE SERPL-MCNC: 141 MG/DL — HIGH (ref 70–99)
GLUCOSE SERPL-MCNC: 141 MG/DL — HIGH (ref 70–99)
HCT VFR BLD CALC: 41.8 % — SIGNIFICANT CHANGE UP (ref 39–50)
HCT VFR BLD CALC: 41.8 % — SIGNIFICANT CHANGE UP (ref 39–50)
HGB BLD-MCNC: 13.9 G/DL — SIGNIFICANT CHANGE UP (ref 13–17)
HGB BLD-MCNC: 13.9 G/DL — SIGNIFICANT CHANGE UP (ref 13–17)
MCHC RBC-ENTMCNC: 32.4 PG — SIGNIFICANT CHANGE UP (ref 27–34)
MCHC RBC-ENTMCNC: 32.4 PG — SIGNIFICANT CHANGE UP (ref 27–34)
MCHC RBC-ENTMCNC: 33.3 GM/DL — SIGNIFICANT CHANGE UP (ref 32–36)
MCHC RBC-ENTMCNC: 33.3 GM/DL — SIGNIFICANT CHANGE UP (ref 32–36)
MCV RBC AUTO: 97.4 FL — SIGNIFICANT CHANGE UP (ref 80–100)
MCV RBC AUTO: 97.4 FL — SIGNIFICANT CHANGE UP (ref 80–100)
PLATELET # BLD AUTO: 426 K/UL — HIGH (ref 150–400)
PLATELET # BLD AUTO: 426 K/UL — HIGH (ref 150–400)
POTASSIUM SERPL-MCNC: 4.3 MMOL/L — SIGNIFICANT CHANGE UP (ref 3.5–5.3)
POTASSIUM SERPL-MCNC: 4.3 MMOL/L — SIGNIFICANT CHANGE UP (ref 3.5–5.3)
POTASSIUM SERPL-SCNC: 4.3 MMOL/L — SIGNIFICANT CHANGE UP (ref 3.5–5.3)
POTASSIUM SERPL-SCNC: 4.3 MMOL/L — SIGNIFICANT CHANGE UP (ref 3.5–5.3)
RBC # BLD: 4.29 M/UL — SIGNIFICANT CHANGE UP (ref 4.2–5.8)
RBC # BLD: 4.29 M/UL — SIGNIFICANT CHANGE UP (ref 4.2–5.8)
RBC # FLD: 15.3 % — HIGH (ref 10.3–14.5)
RBC # FLD: 15.3 % — HIGH (ref 10.3–14.5)
SODIUM SERPL-SCNC: 134 MMOL/L — LOW (ref 135–145)
SODIUM SERPL-SCNC: 134 MMOL/L — LOW (ref 135–145)
WBC # BLD: 14.88 K/UL — HIGH (ref 3.8–10.5)
WBC # BLD: 14.88 K/UL — HIGH (ref 3.8–10.5)
WBC # FLD AUTO: 14.88 K/UL — HIGH (ref 3.8–10.5)
WBC # FLD AUTO: 14.88 K/UL — HIGH (ref 3.8–10.5)

## 2023-10-28 PROCEDURE — 99233 SBSQ HOSP IP/OBS HIGH 50: CPT

## 2023-10-28 RX ORDER — HYDROMORPHONE HYDROCHLORIDE 2 MG/ML
1 INJECTION INTRAMUSCULAR; INTRAVENOUS; SUBCUTANEOUS
Refills: 0 | Status: DISCONTINUED | OUTPATIENT
Start: 2023-10-28 | End: 2023-10-30

## 2023-10-28 RX ADMIN — Medication 100 MILLIGRAM(S): at 09:09

## 2023-10-28 RX ADMIN — Medication 1 TABLET(S): at 09:08

## 2023-10-28 RX ADMIN — HYDROMORPHONE HYDROCHLORIDE 1 MILLIGRAM(S): 2 INJECTION INTRAMUSCULAR; INTRAVENOUS; SUBCUTANEOUS at 16:00

## 2023-10-28 RX ADMIN — HYDROMORPHONE HYDROCHLORIDE 1 MILLIGRAM(S): 2 INJECTION INTRAMUSCULAR; INTRAVENOUS; SUBCUTANEOUS at 23:20

## 2023-10-28 RX ADMIN — HYDROMORPHONE HYDROCHLORIDE 1 MILLIGRAM(S): 2 INJECTION INTRAMUSCULAR; INTRAVENOUS; SUBCUTANEOUS at 11:15

## 2023-10-28 RX ADMIN — HYDROMORPHONE HYDROCHLORIDE 1 MILLIGRAM(S): 2 INJECTION INTRAMUSCULAR; INTRAVENOUS; SUBCUTANEOUS at 12:11

## 2023-10-28 RX ADMIN — HYDROMORPHONE HYDROCHLORIDE 1 MILLIGRAM(S): 2 INJECTION INTRAMUSCULAR; INTRAVENOUS; SUBCUTANEOUS at 14:12

## 2023-10-28 RX ADMIN — Medication 3 MILLIGRAM(S): at 23:03

## 2023-10-28 RX ADMIN — HYDROMORPHONE HYDROCHLORIDE 1 MILLIGRAM(S): 2 INJECTION INTRAMUSCULAR; INTRAVENOUS; SUBCUTANEOUS at 02:39

## 2023-10-28 RX ADMIN — HYDROMORPHONE HYDROCHLORIDE 1 MILLIGRAM(S): 2 INJECTION INTRAMUSCULAR; INTRAVENOUS; SUBCUTANEOUS at 23:03

## 2023-10-28 RX ADMIN — HYDROMORPHONE HYDROCHLORIDE 1 MILLIGRAM(S): 2 INJECTION INTRAMUSCULAR; INTRAVENOUS; SUBCUTANEOUS at 18:51

## 2023-10-28 RX ADMIN — SODIUM CHLORIDE 100 MILLILITER(S): 9 INJECTION INTRAMUSCULAR; INTRAVENOUS; SUBCUTANEOUS at 02:37

## 2023-10-28 RX ADMIN — HYDROMORPHONE HYDROCHLORIDE 1 MILLIGRAM(S): 2 INJECTION INTRAMUSCULAR; INTRAVENOUS; SUBCUTANEOUS at 06:35

## 2023-10-28 RX ADMIN — Medication 100 MILLIGRAM(S): at 21:16

## 2023-10-28 RX ADMIN — SODIUM CHLORIDE 100 MILLILITER(S): 9 INJECTION INTRAMUSCULAR; INTRAVENOUS; SUBCUTANEOUS at 23:04

## 2023-10-28 RX ADMIN — HYDROMORPHONE HYDROCHLORIDE 1 MILLIGRAM(S): 2 INJECTION INTRAMUSCULAR; INTRAVENOUS; SUBCUTANEOUS at 20:50

## 2023-10-28 RX ADMIN — HYDROMORPHONE HYDROCHLORIDE 1 MILLIGRAM(S): 2 INJECTION INTRAMUSCULAR; INTRAVENOUS; SUBCUTANEOUS at 18:02

## 2023-10-28 RX ADMIN — HYDROMORPHONE HYDROCHLORIDE 1 MILLIGRAM(S): 2 INJECTION INTRAMUSCULAR; INTRAVENOUS; SUBCUTANEOUS at 15:01

## 2023-10-28 RX ADMIN — Medication 1 MILLIGRAM(S): at 09:09

## 2023-10-28 RX ADMIN — HYDROMORPHONE HYDROCHLORIDE 1 MILLIGRAM(S): 2 INJECTION INTRAMUSCULAR; INTRAVENOUS; SUBCUTANEOUS at 20:28

## 2023-10-28 RX ADMIN — HYDROMORPHONE HYDROCHLORIDE 1 MILLIGRAM(S): 2 INJECTION INTRAMUSCULAR; INTRAVENOUS; SUBCUTANEOUS at 09:56

## 2023-10-28 NOTE — PROGRESS NOTE ADULT - SUBJECTIVE AND OBJECTIVE BOX
Patient is a 38y old  Male who presents with a chief complaint of Rectal pain/acute proctitis (27 Oct 2023 16:09)      Subective: Seen and examined at bedside. Continues to have rectal pain and discharge. MRI pelvis read pending.       PAST MEDICAL & SURGICAL HISTORY:  Crohn's disease of large intestine without complication  (No current flare up)      Pancreatitis      S/P ORIF (open reduction internal fixation) fracture  (Right ankle, 2014)      History of colonoscopy  (2014)      Perianal fistula  repair in 2002          MEDICATIONS  (STANDING):  enoxaparin Injectable 40 milliGRAM(s) SubCutaneous every 24 hours  folic acid 1 milliGRAM(s) Oral daily  influenza   Vaccine 0.5 milliLiter(s) IntraMuscular once  metroNIDAZOLE  IVPB 500 milliGRAM(s) IV Intermittent every 12 hours  multivitamin/minerals 1 Tablet(s) Oral daily  sodium chloride 0.9%. 1000 milliLiter(s) (100 mL/Hr) IV Continuous <Continuous>  thiamine 100 milliGRAM(s) Oral daily    MEDICATIONS  (PRN):  acetaminophen     Tablet .. 650 milliGRAM(s) Oral every 6 hours PRN Temp greater or equal to 38C (100.4F), Mild Pain (1 - 3)  aluminum hydroxide/magnesium hydroxide/simethicone Suspension 30 milliLiter(s) Oral every 4 hours PRN Dyspepsia  HYDROmorphone  Injectable 1 milliGRAM(s) IV Push every 4 hours PRN Moderate to severe pain  melatonin 3 milliGRAM(s) Oral at bedtime PRN Insomnia  ondansetron Injectable 4 milliGRAM(s) IV Push every 8 hours PRN Nausea and/or Vomiting  oxycodone    5 mG/acetaminophen 325 mG 1 Tablet(s) Oral every 6 hours PRN Moderate Pain (4 - 6)      REVIEW OF SYSTEMS:    RESPIRATORY: No shortness of breath  CARDIOVASCULAR: No chest pain  All other review of systems is negative unless indicated above.    Vital Signs Last 24 Hrs  T(C): 36.6 (28 Oct 2023 07:56), Max: 36.9 (27 Oct 2023 21:14)  T(F): 97.9 (28 Oct 2023 07:56), Max: 98.5 (27 Oct 2023 21:14)  HR: 67 (28 Oct 2023 07:56) (63 - 76)  BP: 103/64 (28 Oct 2023 07:56) (97/67 - 125/82)  BP(mean): --  RR: 18 (28 Oct 2023 07:56) (18 - 18)  SpO2: 100% (28 Oct 2023 07:56) (98% - 100%)    Parameters below as of 28 Oct 2023 07:56  Patient On (Oxygen Delivery Method): room air        PHYSICAL EXAM:    Constitutional: NAD  Gastrointestinal: BS+, soft, NT/ND, ostomy intact  Extremities: No peripheral edema  Psychiatric: Normal mood, normal affect    LABS:                        13.9   14.88 )-----------( 426      ( 28 Oct 2023 07:25 )             41.8     10-28    134<L>  |  103  |  11  ----------------------------<  141<H>  4.3   |  28  |  0.71    Ca    8.5      28 Oct 2023 07:25  Mg     2.1     10-27            RADIOLOGY & ADDITIONAL STUDIES:    ACC: [id number] EXAM:  CT ABDOMEN AND PELVIS IC   ORDERED BY: JOHNNIE WASHINGTON     PROCEDURE DATE:  10/25/2023          INTERPRETATION:  CLINICAL INFORMATION: Ileostomy. Crohn's disease. Rectal   abscess.    COMPARISON: CT chest abdomen pelvis 3/20/2023.    CONTRAST/COMPLICATIONS:  IV Contrast: Omnipaque 350  90 cc administered   10 cc discarded  Oral Contrast: NONE  Complications: None reported at time of study completion    PROCEDURE:  CT of the Abdomen and Pelvis was performed.  Sagittal and coronalreformats were performed.    FINDINGS:  LOWER CHEST: Right lower lobe bulla is unchanged.    LIVER: Within normal limits.  BILE DUCTS: Normal caliber.  GALLBLADDER: Within normal limits.  SPLEEN: Within normal limits.  PANCREAS: Within normal limits.  ADRENALS: Within normal limits.  KIDNEYS/URETERS: Within normal limits.    BLADDER: Within normal limits.  REPRODUCTIVE ORGANS: Prostate within normal limits.    BOWEL: Again seen is rectal wall thickening. There are multiple thin   granulomatous tracks in both right and left gluteal folds extending to   the anus likely representing anal fistulas. These also extend to the   bilateral levator ani muscle. No definite intraperitoneal involvement. No   drainable fluid collection., This would be better evaluated with   dedicated contrast-enhanced MRI pelvis with anal fistula protocol. No   bowel obstruction. Appendix is normal.  PERITONEUM: No ascites.  VESSELS: Within normal limits.  RETROPERITONEUM/LYMPH NODES: No lymphadenopathy.  ABDOMINAL WALL: Right lower quadrant ileostomy.  BONES: Within normal limits.    IMPRESSION:    Again seen is rectal wall thickening. There are multiple thin   granulomatous tracks in both right and left gluteal folds extending to   the anus likely representing anal fistulas. These also extend to the   bilateral levator ani muscle. No definite intraperitoneal involvement. No   drainable fluid collection., This would be better evaluated with   dedicated contrast-enhanced MRI pelvis with anal fistula protocol.      --- End of Report ---            DESI PAREDES MD; Attending Radiologist  This document has been electronically signed. Oct 25 2023  3:15AM

## 2023-10-28 NOTE — PROGRESS NOTE ADULT - ASSESSMENT
37 y/o male with h/o Crohn's disease (not on tx, follows up in [facility]), perianal fistulas s/p diversion at [facility], proctitis, tobacco use disorder, pancreatitis and EtOH abuse (denies hx of DT/intubations/seizures) presents to the ED with rectal pain.     #Rectal pain  #Acute proctitis with chronic fistulas   #Sepsis 2/2 proctitis   - Following with Colorectal at [facility] for many years, last seen in March 2023 and surgery was deferred. MRI in 03/2023 also negative for abscess/drainable collection  - CT a/p on admission with no drainable fluid collection  - Rectal exam per Colorectal revealing erythema, many fistulous openings with mucopurulent drainage  - WBC improving despite getting steroids overnight  - Continue tx with Flagyl for now  - Pain control with Tylenol and IV Dilaudid   - Colorectal recommendations appreciated, no plan for surgery at this time  - GI consult placed, recs appreciated   - MR pelvis w/ IV contrast recommended by GI to evaluate for abscess; results awaited    #Allergic reaction  Reaction to Cipro on 10/25, required IV Solumedrol. Subsequently developed EKG changes including newly inverted p-waves and elevated t-waves. PM EKG with improvement of t waves  - EKG changes likely from the reaction  - Electrolytes stable, will monitor EKG daily for improvement  - Continue monitoring on tele     #Tobacco use disorder  - SBIRT referral  - Tobacco use: 1 ppd. Counseling/treatment offered. Denies nicotine patch    #Alcohol use disorder  Alcohol use: 2-3 beers few times per week. Denies any hx of withdrawal/DT/intubations/seizures. CIWA 0.    - SBIRT referral  - CIWA 0 x24 hours, discontinue and monitor   - Multivitamin, Thiamine, Folate    FULL CODE  DVT ppx: Lovenox     DISPO: await MRI results

## 2023-10-28 NOTE — PROGRESS NOTE ADULT - SUBJECTIVE AND OBJECTIVE BOX
HOSPITALIST PROGRESS NOTE    SUBJECTIVE / INTERVAL HPI: Last night patient had a reaction to Cipro infusion. RN was running the antibiotic slowly, however pt noticed his arm swelling with hives around the site of IV. Infusion was stopped and patient was given Benadryl and Solumedrol. He didn't experience pressure changes, SOB, wheezing, N/V, tongue or face swelling. At around 4:30 am patient developed newly inverted p-waves and elevated t-waves on remote tele. Cardiology team consulted due to changes. Metabolic panel without any significant electrolyte changes - normal potassium, Mg and corrected Na was 135. Patient had no previous similar reactions to Cipro. He received Cipro just hours prior in the ED without any issues. He has also tolerated Cipro during past admissions.     Patient seen and examined at bedside this morning. His signs/symptoms from the reaction had resolved. Patient states his pain medications take the edge off the pain, however pain is still present and significant. Denies any chest pain, palpitations, SOB, abdominal pain, N/V/D.    10/27: await MRI results  c/o rectal pain    10/28: c/o rectal pain; dilaudi increased to 1 mg iv q 3 hrs prn  await MRI pelvis results    Vital Signs Last 24 Hrs  T(C): 36.6 (28 Oct 2023 07:56), Max: 36.9 (27 Oct 2023 21:14)  T(F): 97.9 (28 Oct 2023 07:56), Max: 98.5 (27 Oct 2023 21:14)  HR: 67 (28 Oct 2023 07:56) (63 - 76)  BP: 103/64 (28 Oct 2023 07:56) (97/67 - 108/66)  BP(mean): --  RR: 18 (28 Oct 2023 07:56) (18 - 18)  SpO2: 100% (28 Oct 2023 07:56) (98% - 100%)    Parameters below as of 28 Oct 2023 07:56  Patient On (Oxygen Delivery Method): room air        PHYSICAL EXAM:  GENERAL: Non-toxic, mild distress  HEAD:  Atraumatic, Normocephalic  EYES: EOMI, PERRLA, conjunctiva and sclera clear  ENMT: No tonsillar erythema, exudates, or enlargement; MM  Rectal: Deferred, see colorectal note for full exam  NECK: Supple, No JVD  HEART: Regular rate and rhythm; No murmurs, rubs, or gallops  RESPIRATORY: Unlabored respirations. CTA B/L, No W/R/R  ABDOMEN: Soft, Nontender, Nondistended; +BS. Colostomy bag with normal brown stool  NEUROLOGY: A&Ox3, nonfocal, moving all extremities  EXTREMITIES:  2+ Peripheral Pulses, No clubbing, cyanosis, or edema  SKIN: warm, dry, normal color, no rash or abnormal lesions    ALLERGIES:  Allergies    ciprofloxacin (Other (Mild to Mod))    Intolerances    All Labs/EKG/Radiology/Meds reviewed    Lab Results:  CBC  CBC Full  -  ( 27 Oct 2023 06:21 )  WBC Count : 14.64 K/uL  RBC Count : 4.10 M/uL  Hemoglobin : 13.0 g/dL  Hematocrit : 39.9 %  Platelet Count - Automated : 447 K/uL  Mean Cell Volume : 97.3 fl  Mean Cell Hemoglobin : 31.7 pg  Mean Cell Hemoglobin Concentration : 32.6 gm/dL  Auto Neutrophil # : x  Auto Lymphocyte # : x  Auto Monocyte # : x  Auto Eosinophil # : x  Auto Basophil # : x  Auto Neutrophil % : x  Auto Lymphocyte % : x  Auto Monocyte % : x  Auto Eosinophil % : x  Auto Basophil % : x    .		Differential:	[] Automated		[] Manual  Chemistry  10-27    138  |  104  |  10  ----------------------------<  138<H>  4.1   |  28  |  0.67    Ca    8.6      27 Oct 2023 06:21  Mg     2.1     10-27    TPro  7.7  /  Alb  2.3<L>  /  TBili  0.3  /  DBili  x   /  AST  6<L>  /  ALT  18  /  AlkPhos  102  10-26    LIVER FUNCTIONS - ( 26 Oct 2023 05:04 )  Alb: 2.3 g/dL / Pro: 7.7 gm/dL / ALK PHOS: 102 U/L / ALT: 18 U/L / AST: 6 U/L / GGT: x             Urinalysis Basic - ( 27 Oct 2023 06:21 )    Color: x / Appearance: x / SG: x / pH: x  Gluc: 138 mg/dL / Ketone: x  / Bili: x / Urobili: x   Blood: x / Protein: x / Nitrite: x   Leuk Esterase: x / RBC: x / WBC x   Sq Epi: x / Non Sq Epi: x / Bacteria: x        MICROBIOLOGY/CULTURES:  Culture Results:   No growth at 48 Hours (10-25 @ 04:36)  Culture Results:   No growth at 48 Hours (10-25 @ 04:36)            LABS:                        14.0   12.59 )-----------( 429      ( 26 Oct 2023 05:04 )             41.5     10-26    131<L>  |  98  |  11  ----------------------------<  233<H>  4.0   |  31  |  0.78    Ca    8.9      26 Oct 2023 12:44  Mg     2.0     10-26    TPro  7.7  /  Alb  2.3<L>  /  TBili  0.3  /  DBili  x   /  AST  6<L>  /  ALT  18  /  AlkPhos  102  10-26    PT/INR - ( 25 Oct 2023 02:01 )   PT: 10.4 sec;   INR: 0.92 ratio         PTT - ( 25 Oct 2023 02:01 )  PTT:31.4 sec  Urinalysis Basic - ( 26 Oct 2023 12:44 )    Color: x / Appearance: x / SG: x / pH: x  Gluc: 233 mg/dL / Ketone: x  / Bili: x / Urobili: x   Blood: x / Protein: x / Nitrite: x   Leuk Esterase: x / RBC: x / WBC x   Sq Epi: x / Non Sq Epi: x / Bacteria: x      CAPILLARY BLOOD GLUCOSE      RADIOLOGY & ADDITIONAL TESTS: Reviewed.    MEDICATIONS  (STANDING):  enoxaparin Injectable 40 milliGRAM(s) SubCutaneous every 24 hours  folic acid 1 milliGRAM(s) Oral daily  influenza   Vaccine 0.5 milliLiter(s) IntraMuscular once  metroNIDAZOLE  IVPB 500 milliGRAM(s) IV Intermittent every 12 hours  multivitamin/minerals 1 Tablet(s) Oral daily  sodium chloride 0.9%. 1000 milliLiter(s) (100 mL/Hr) IV Continuous <Continuous>  thiamine 100 milliGRAM(s) Oral daily    MEDICATIONS  (PRN):  acetaminophen     Tablet .. 650 milliGRAM(s) Oral every 6 hours PRN Temp greater or equal to 38C (100.4F), Mild Pain (1 - 3)  aluminum hydroxide/magnesium hydroxide/simethicone Suspension 30 milliLiter(s) Oral every 4 hours PRN Dyspepsia  HYDROmorphone  Injectable 1 milliGRAM(s) IV Push every 4 hours PRN Moderate to severe pain  melatonin 3 milliGRAM(s) Oral at bedtime PRN Insomnia  ondansetron Injectable 4 milliGRAM(s) IV Push every 8 hours PRN Nausea and/or Vomiting  oxycodone    5 mG/acetaminophen 325 mG 1 Tablet(s) Oral every 6 hours PRN Moderate Pain (4 - 6)

## 2023-10-28 NOTE — PROGRESS NOTE ADULT - ASSESSMENT
1. Rectal discomfort and pain with CT showing rectal wall thickening and granulomatous tracts in both glutea folds concerning for anal fistulas.     Recommendation  1. Await MRI Pelvis results with anal fistula protocol  2. Stiz Baths and antibiotics  3. Pain control per primary team  4. Appreciate CRS rec's

## 2023-10-29 PROCEDURE — 99232 SBSQ HOSP IP/OBS MODERATE 35: CPT

## 2023-10-29 RX ADMIN — HYDROMORPHONE HYDROCHLORIDE 1 MILLIGRAM(S): 2 INJECTION INTRAMUSCULAR; INTRAVENOUS; SUBCUTANEOUS at 20:05

## 2023-10-29 RX ADMIN — SODIUM CHLORIDE 100 MILLILITER(S): 9 INJECTION INTRAMUSCULAR; INTRAVENOUS; SUBCUTANEOUS at 08:27

## 2023-10-29 RX ADMIN — HYDROMORPHONE HYDROCHLORIDE 1 MILLIGRAM(S): 2 INJECTION INTRAMUSCULAR; INTRAVENOUS; SUBCUTANEOUS at 23:30

## 2023-10-29 RX ADMIN — HYDROMORPHONE HYDROCHLORIDE 1 MILLIGRAM(S): 2 INJECTION INTRAMUSCULAR; INTRAVENOUS; SUBCUTANEOUS at 02:03

## 2023-10-29 RX ADMIN — HYDROMORPHONE HYDROCHLORIDE 1 MILLIGRAM(S): 2 INJECTION INTRAMUSCULAR; INTRAVENOUS; SUBCUTANEOUS at 02:25

## 2023-10-29 RX ADMIN — Medication 3 MILLIGRAM(S): at 23:06

## 2023-10-29 RX ADMIN — Medication 1 TABLET(S): at 13:39

## 2023-10-29 RX ADMIN — Medication 1 MILLIGRAM(S): at 11:04

## 2023-10-29 RX ADMIN — HYDROMORPHONE HYDROCHLORIDE 1 MILLIGRAM(S): 2 INJECTION INTRAMUSCULAR; INTRAVENOUS; SUBCUTANEOUS at 05:04

## 2023-10-29 RX ADMIN — HYDROMORPHONE HYDROCHLORIDE 1 MILLIGRAM(S): 2 INJECTION INTRAMUSCULAR; INTRAVENOUS; SUBCUTANEOUS at 17:17

## 2023-10-29 RX ADMIN — HYDROMORPHONE HYDROCHLORIDE 1 MILLIGRAM(S): 2 INJECTION INTRAMUSCULAR; INTRAVENOUS; SUBCUTANEOUS at 08:25

## 2023-10-29 RX ADMIN — HYDROMORPHONE HYDROCHLORIDE 1 MILLIGRAM(S): 2 INJECTION INTRAMUSCULAR; INTRAVENOUS; SUBCUTANEOUS at 05:20

## 2023-10-29 RX ADMIN — Medication 100 MILLIGRAM(S): at 11:04

## 2023-10-29 RX ADMIN — HYDROMORPHONE HYDROCHLORIDE 1 MILLIGRAM(S): 2 INJECTION INTRAMUSCULAR; INTRAVENOUS; SUBCUTANEOUS at 11:05

## 2023-10-29 RX ADMIN — Medication 100 MILLIGRAM(S): at 11:05

## 2023-10-29 RX ADMIN — HYDROMORPHONE HYDROCHLORIDE 1 MILLIGRAM(S): 2 INJECTION INTRAMUSCULAR; INTRAVENOUS; SUBCUTANEOUS at 14:11

## 2023-10-29 RX ADMIN — HYDROMORPHONE HYDROCHLORIDE 1 MILLIGRAM(S): 2 INJECTION INTRAMUSCULAR; INTRAVENOUS; SUBCUTANEOUS at 23:06

## 2023-10-29 RX ADMIN — Medication 100 MILLIGRAM(S): at 21:21

## 2023-10-29 RX ADMIN — HYDROMORPHONE HYDROCHLORIDE 1 MILLIGRAM(S): 2 INJECTION INTRAMUSCULAR; INTRAVENOUS; SUBCUTANEOUS at 20:25

## 2023-10-29 RX ADMIN — SODIUM CHLORIDE 100 MILLILITER(S): 9 INJECTION INTRAMUSCULAR; INTRAVENOUS; SUBCUTANEOUS at 23:06

## 2023-10-29 NOTE — PROGRESS NOTE ADULT - SUBJECTIVE AND OBJECTIVE BOX
Patient is a 38y old  Male who presents with a chief complaint of Rectal pain/acute proctitis (28 Oct 2023 14:56)      Subective: Seen and examined at bedside. Still with rectal discomfort. Tolerating diet.       PAST MEDICAL & SURGICAL HISTORY:  Crohn's disease of large intestine without complication  (No current flare up)      Pancreatitis      S/P ORIF (open reduction internal fixation) fracture  (Right ankle, 2014)      History of colonoscopy  (2014)      Perianal fistula  repair in 2002          MEDICATIONS  (STANDING):  enoxaparin Injectable 40 milliGRAM(s) SubCutaneous every 24 hours  folic acid 1 milliGRAM(s) Oral daily  HYDROmorphone  Injectable 1 milliGRAM(s) IV Push every 3 hours  influenza   Vaccine 0.5 milliLiter(s) IntraMuscular once  metroNIDAZOLE  IVPB 500 milliGRAM(s) IV Intermittent every 12 hours  multivitamin/minerals 1 Tablet(s) Oral daily  sodium chloride 0.9%. 1000 milliLiter(s) (100 mL/Hr) IV Continuous <Continuous>  thiamine 100 milliGRAM(s) Oral daily    MEDICATIONS  (PRN):  acetaminophen     Tablet .. 650 milliGRAM(s) Oral every 6 hours PRN Temp greater or equal to 38C (100.4F), Mild Pain (1 - 3)  aluminum hydroxide/magnesium hydroxide/simethicone Suspension 30 milliLiter(s) Oral every 4 hours PRN Dyspepsia  melatonin 3 milliGRAM(s) Oral at bedtime PRN Insomnia  ondansetron Injectable 4 milliGRAM(s) IV Push every 8 hours PRN Nausea and/or Vomiting  oxycodone    5 mG/acetaminophen 325 mG 1 Tablet(s) Oral every 6 hours PRN Moderate Pain (4 - 6)      REVIEW OF SYSTEMS:    RESPIRATORY: No shortness of breath  CARDIOVASCULAR: No chest pain  All other review of systems is negative unless indicated above.    Vital Signs Last 24 Hrs  T(C): 36.6 (29 Oct 2023 08:21), Max: 37.1 (28 Oct 2023 15:07)  T(F): 97.8 (29 Oct 2023 08:21), Max: 98.8 (28 Oct 2023 15:07)  HR: 61 (29 Oct 2023 08:21) (61 - 78)  BP: 108/58 (29 Oct 2023 08:21) (103/65 - 111/70)  BP(mean): --  RR: 18 (29 Oct 2023 08:21) (17 - 18)  SpO2: 100% (29 Oct 2023 08:21) (96% - 100%)    Parameters below as of 29 Oct 2023 08:21  Patient On (Oxygen Delivery Method): room air        PHYSICAL EXAM:    Constitutional: NAD, well-developed  Respiratory: CTAB  Cardiovascular: S1 and S2, RRR  Gastrointestinal: BS+, soft, NT/ND/ostomy intact  Extremities: No peripheral edema  Psychiatric: Normal mood, normal affect    LABS:                        13.9   14.88 )-----------( 426      ( 28 Oct 2023 07:25 )             41.8     10-28    134<L>  |  103  |  11  ----------------------------<  141<H>  4.3   |  28  |  0.71    Ca    8.5      28 Oct 2023 07:25            RADIOLOGY & ADDITIONAL STUDIES:

## 2023-10-29 NOTE — PROGRESS NOTE ADULT - SUBJECTIVE AND OBJECTIVE BOX
HOSPITALIST PROGRESS NOTE    SUBJECTIVE / INTERVAL HPI: Last night patient had a reaction to Cipro infusion. RN was running the antibiotic slowly, however pt noticed his arm swelling with hives around the site of IV. Infusion was stopped and patient was given Benadryl and Solumedrol. He didn't experience pressure changes, SOB, wheezing, N/V, tongue or face swelling. At around 4:30 am patient developed newly inverted p-waves and elevated t-waves on remote tele. Cardiology team consulted due to changes. Metabolic panel without any significant electrolyte changes - normal potassium, Mg and corrected Na was 135. Patient had no previous similar reactions to Cipro. He received Cipro just hours prior in the ED without any issues. He has also tolerated Cipro during past admissions.     Patient seen and examined at bedside this morning. His signs/symptoms from the reaction had resolved. Patient states his pain medications take the edge off the pain, however pain is still present and significant. Denies any chest pain, palpitations, SOB, abdominal pain, N/V/D.    10/27: await MRI results  c/o rectal pain    10/28: c/o rectal pain; dilaudid increased to 1 mg iv q 3 hrs prn  await MRI pelvis results    10/29: pain better  MRI results awaited    Vital Signs Last 24 Hrs  T(C): 36.6 (29 Oct 2023 08:21), Max: 37.1 (28 Oct 2023 15:07)  T(F): 97.8 (29 Oct 2023 08:21), Max: 98.8 (28 Oct 2023 15:07)  HR: 61 (29 Oct 2023 08:21) (61 - 78)  BP: 108/58 (29 Oct 2023 08:21) (103/65 - 111/70)  BP(mean): --  RR: 18 (29 Oct 2023 08:21) (17 - 18)  SpO2: 100% (29 Oct 2023 08:21) (96% - 100%)    Parameters below as of 29 Oct 2023 08:21  Patient On (Oxygen Delivery Method): room air          PHYSICAL EXAM:  GENERAL: Non-toxic, mild distress  HEAD:  Atraumatic, Normocephalic  EYES: EOMI, PERRLA, conjunctiva and sclera clear  ENMT: No tonsillar erythema, exudates, or enlargement; MM  Rectal: Deferred, see colorectal note for full exam  NECK: Supple, No JVD  HEART: Regular rate and rhythm; No murmurs, rubs, or gallops  RESPIRATORY: Unlabored respirations. CTA B/L, No W/R/R  ABDOMEN: Soft, Nontender, Nondistended; +BS. Colostomy bag with normal brown stool  NEUROLOGY: A&Ox3, nonfocal, moving all extremities  EXTREMITIES:  2+ Peripheral Pulses, No clubbing, cyanosis, or edema  SKIN: warm, dry, normal color, no rash or abnormal lesions    ALLERGIES:  Allergies    ciprofloxacin (Other (Mild to Mod))    Intolerances    All Labs/EKG/Radiology/Meds reviewed    Lab Results:  CBC  CBC Full  -  ( 27 Oct 2023 06:21 )  WBC Count : 14.64 K/uL  RBC Count : 4.10 M/uL  Hemoglobin : 13.0 g/dL  Hematocrit : 39.9 %  Platelet Count - Automated : 447 K/uL  Mean Cell Volume : 97.3 fl  Mean Cell Hemoglobin : 31.7 pg  Mean Cell Hemoglobin Concentration : 32.6 gm/dL  Auto Neutrophil # : x  Auto Lymphocyte # : x  Auto Monocyte # : x  Auto Eosinophil # : x  Auto Basophil # : x  Auto Neutrophil % : x  Auto Lymphocyte % : x  Auto Monocyte % : x  Auto Eosinophil % : x  Auto Basophil % : x    .		Differential:	[] Automated		[] Manual  Chemistry  10-27    138  |  104  |  10  ----------------------------<  138<H>  4.1   |  28  |  0.67    Ca    8.6      27 Oct 2023 06:21  Mg     2.1     10-27    TPro  7.7  /  Alb  2.3<L>  /  TBili  0.3  /  DBili  x   /  AST  6<L>  /  ALT  18  /  AlkPhos  102  10-26    LIVER FUNCTIONS - ( 26 Oct 2023 05:04 )  Alb: 2.3 g/dL / Pro: 7.7 gm/dL / ALK PHOS: 102 U/L / ALT: 18 U/L / AST: 6 U/L / GGT: x             Urinalysis Basic - ( 27 Oct 2023 06:21 )    Color: x / Appearance: x / SG: x / pH: x  Gluc: 138 mg/dL / Ketone: x  / Bili: x / Urobili: x   Blood: x / Protein: x / Nitrite: x   Leuk Esterase: x / RBC: x / WBC x   Sq Epi: x / Non Sq Epi: x / Bacteria: x        MICROBIOLOGY/CULTURES:  Culture Results:   No growth at 48 Hours (10-25 @ 04:36)  Culture Results:   No growth at 48 Hours (10-25 @ 04:36)            LABS:                        14.0   12.59 )-----------( 429      ( 26 Oct 2023 05:04 )             41.5     10-26    131<L>  |  98  |  11  ----------------------------<  233<H>  4.0   |  31  |  0.78    Ca    8.9      26 Oct 2023 12:44  Mg     2.0     10-26    TPro  7.7  /  Alb  2.3<L>  /  TBili  0.3  /  DBili  x   /  AST  6<L>  /  ALT  18  /  AlkPhos  102  10-26    PT/INR - ( 25 Oct 2023 02:01 )   PT: 10.4 sec;   INR: 0.92 ratio         PTT - ( 25 Oct 2023 02:01 )  PTT:31.4 sec  Urinalysis Basic - ( 26 Oct 2023 12:44 )    Color: x / Appearance: x / SG: x / pH: x  Gluc: 233 mg/dL / Ketone: x  / Bili: x / Urobili: x   Blood: x / Protein: x / Nitrite: x   Leuk Esterase: x / RBC: x / WBC x   Sq Epi: x / Non Sq Epi: x / Bacteria: x      CAPILLARY BLOOD GLUCOSE      RADIOLOGY & ADDITIONAL TESTS: Reviewed.    MEDICATIONS  (STANDING):  enoxaparin Injectable 40 milliGRAM(s) SubCutaneous every 24 hours  folic acid 1 milliGRAM(s) Oral daily  influenza   Vaccine 0.5 milliLiter(s) IntraMuscular once  metroNIDAZOLE  IVPB 500 milliGRAM(s) IV Intermittent every 12 hours  multivitamin/minerals 1 Tablet(s) Oral daily  sodium chloride 0.9%. 1000 milliLiter(s) (100 mL/Hr) IV Continuous <Continuous>  thiamine 100 milliGRAM(s) Oral daily    MEDICATIONS  (PRN):  acetaminophen     Tablet .. 650 milliGRAM(s) Oral every 6 hours PRN Temp greater or equal to 38C (100.4F), Mild Pain (1 - 3)  aluminum hydroxide/magnesium hydroxide/simethicone Suspension 30 milliLiter(s) Oral every 4 hours PRN Dyspepsia  HYDROmorphone  Injectable 1 milliGRAM(s) IV Push every 4 hours PRN Moderate to severe pain  melatonin 3 milliGRAM(s) Oral at bedtime PRN Insomnia  ondansetron Injectable 4 milliGRAM(s) IV Push every 8 hours PRN Nausea and/or Vomiting  oxycodone    5 mG/acetaminophen 325 mG 1 Tablet(s) Oral every 6 hours PRN Moderate Pain (4 - 6)

## 2023-10-29 NOTE — PROGRESS NOTE ADULT - ASSESSMENT
37 y/o male with h/o Crohn's disease (not on tx, follows up in [facility]), perianal fistulas s/p diversion at [facility], proctitis, tobacco use disorder, pancreatitis and EtOH abuse (denies hx of DT/intubations/seizures) presents to the ED with rectal pain.     #Rectal pain  #Acute proctitis with chronic fistulas   #Sepsis 2/2 proctitis   - Following with Colorectal at [facility] for many years, last seen in March 2023 and surgery was deferred. MRI in 03/2023 also negative for abscess/drainable collection  - CT a/p on admission with no drainable fluid collection  - Rectal exam per Colorectal revealing erythema, many fistulous openings with mucopurulent drainage  - WBC improving despite getting steroids overnight  - Continue tx with Flagyl for now  - Pain control with Tylenol and IV Dilaudid   - Colorectal recommendations appreciated, no plan for surgery at this time  - GI consult placed, recs appreciated   - MR pelvis w/ IV contrast recommended by GI to evaluate for abscess; results awaited    #Allergic reaction  Reaction to Cipro on 10/25, required IV Solumedrol. Subsequently developed EKG changes including newly inverted p-waves and elevated t-waves. PM EKG with improvement of t waves  - EKG changes likely from the reaction  - Electrolytes stable, will monitor EKG daily for improvement  - Continue monitoring on tele     #Tobacco use disorder  - SBIRT referral  - Tobacco use: 1 ppd. Counseling/treatment offered. Denies nicotine patch    #Alcohol use disorder  Alcohol use: 2-3 beers few times per week. Denies any hx of withdrawal/DT/intubations/seizures. CIWA 0.    - SBIRT referral  - CIWA 0 x24 hours, discontinue and monitor   - Multivitamin, Thiamine, Folate    FULL CODE  DVT ppx: Lovenox     DISPO: await MRI results

## 2023-10-29 NOTE — PROGRESS NOTE ADULT - ASSESSMENT
1. Rectal discomfort and pain with CT showing rectal wall thickening and granulomatous tracts in both glutea folds concerning for anal fistulas.     Recommendation  1. Await MRI Pelvis results with anal fistula protocol (called radiology hopeful read today)  2. Stiz Baths and antibiotics  3. Pain control per primary team  4. Appreciate CRS rec's

## 2023-10-30 LAB
CULTURE RESULTS: SIGNIFICANT CHANGE UP
SPECIMEN SOURCE: SIGNIFICANT CHANGE UP

## 2023-10-30 PROCEDURE — 99232 SBSQ HOSP IP/OBS MODERATE 35: CPT

## 2023-10-30 PROCEDURE — 99233 SBSQ HOSP IP/OBS HIGH 50: CPT

## 2023-10-30 RX ORDER — OXYCODONE HYDROCHLORIDE 5 MG/1
15 TABLET ORAL EVERY 12 HOURS
Refills: 0 | Status: DISCONTINUED | OUTPATIENT
Start: 2023-10-30 | End: 2023-10-31

## 2023-10-30 RX ORDER — HYDROMORPHONE HYDROCHLORIDE 2 MG/ML
2 INJECTION INTRAMUSCULAR; INTRAVENOUS; SUBCUTANEOUS EVERY 4 HOURS
Refills: 0 | Status: DISCONTINUED | OUTPATIENT
Start: 2023-10-30 | End: 2023-10-31

## 2023-10-30 RX ADMIN — HYDROMORPHONE HYDROCHLORIDE 1 MILLIGRAM(S): 2 INJECTION INTRAMUSCULAR; INTRAVENOUS; SUBCUTANEOUS at 02:04

## 2023-10-30 RX ADMIN — HYDROMORPHONE HYDROCHLORIDE 1 MILLIGRAM(S): 2 INJECTION INTRAMUSCULAR; INTRAVENOUS; SUBCUTANEOUS at 05:06

## 2023-10-30 RX ADMIN — HYDROMORPHONE HYDROCHLORIDE 1 MILLIGRAM(S): 2 INJECTION INTRAMUSCULAR; INTRAVENOUS; SUBCUTANEOUS at 11:33

## 2023-10-30 RX ADMIN — HYDROMORPHONE HYDROCHLORIDE 1 MILLIGRAM(S): 2 INJECTION INTRAMUSCULAR; INTRAVENOUS; SUBCUTANEOUS at 14:55

## 2023-10-30 RX ADMIN — Medication 100 MILLIGRAM(S): at 09:23

## 2023-10-30 RX ADMIN — Medication 1 MILLIGRAM(S): at 09:19

## 2023-10-30 RX ADMIN — Medication 1 TABLET(S): at 09:19

## 2023-10-30 RX ADMIN — HYDROMORPHONE HYDROCHLORIDE 1 MILLIGRAM(S): 2 INJECTION INTRAMUSCULAR; INTRAVENOUS; SUBCUTANEOUS at 09:45

## 2023-10-30 RX ADMIN — HYDROMORPHONE HYDROCHLORIDE 1 MILLIGRAM(S): 2 INJECTION INTRAMUSCULAR; INTRAVENOUS; SUBCUTANEOUS at 13:24

## 2023-10-30 RX ADMIN — HYDROMORPHONE HYDROCHLORIDE 2 MILLIGRAM(S): 2 INJECTION INTRAMUSCULAR; INTRAVENOUS; SUBCUTANEOUS at 21:25

## 2023-10-30 RX ADMIN — OXYCODONE HYDROCHLORIDE 15 MILLIGRAM(S): 5 TABLET ORAL at 22:11

## 2023-10-30 RX ADMIN — HYDROMORPHONE HYDROCHLORIDE 1 MILLIGRAM(S): 2 INJECTION INTRAMUSCULAR; INTRAVENOUS; SUBCUTANEOUS at 05:24

## 2023-10-30 RX ADMIN — HYDROMORPHONE HYDROCHLORIDE 2 MILLIGRAM(S): 2 INJECTION INTRAMUSCULAR; INTRAVENOUS; SUBCUTANEOUS at 18:09

## 2023-10-30 RX ADMIN — OXYCODONE HYDROCHLORIDE 15 MILLIGRAM(S): 5 TABLET ORAL at 16:35

## 2023-10-30 RX ADMIN — Medication 100 MILLIGRAM(S): at 09:19

## 2023-10-30 RX ADMIN — HYDROMORPHONE HYDROCHLORIDE 1 MILLIGRAM(S): 2 INJECTION INTRAMUSCULAR; INTRAVENOUS; SUBCUTANEOUS at 14:31

## 2023-10-30 RX ADMIN — HYDROMORPHONE HYDROCHLORIDE 1 MILLIGRAM(S): 2 INJECTION INTRAMUSCULAR; INTRAVENOUS; SUBCUTANEOUS at 08:23

## 2023-10-30 RX ADMIN — HYDROMORPHONE HYDROCHLORIDE 2 MILLIGRAM(S): 2 INJECTION INTRAMUSCULAR; INTRAVENOUS; SUBCUTANEOUS at 17:18

## 2023-10-30 RX ADMIN — SODIUM CHLORIDE 100 MILLILITER(S): 9 INJECTION INTRAMUSCULAR; INTRAVENOUS; SUBCUTANEOUS at 20:30

## 2023-10-30 RX ADMIN — SODIUM CHLORIDE 100 MILLILITER(S): 9 INJECTION INTRAMUSCULAR; INTRAVENOUS; SUBCUTANEOUS at 09:22

## 2023-10-30 RX ADMIN — OXYCODONE HYDROCHLORIDE 15 MILLIGRAM(S): 5 TABLET ORAL at 17:29

## 2023-10-30 RX ADMIN — Medication 100 MILLIGRAM(S): at 21:25

## 2023-10-30 RX ADMIN — HYDROMORPHONE HYDROCHLORIDE 1 MILLIGRAM(S): 2 INJECTION INTRAMUSCULAR; INTRAVENOUS; SUBCUTANEOUS at 02:20

## 2023-10-30 NOTE — PROGRESS NOTE ADULT - SUBJECTIVE AND OBJECTIVE BOX
HOSPITALIST PROGRESS NOTE    SUBJECTIVE / INTERVAL HPI: Last night patient had a reaction to Cipro infusion. RN was running the antibiotic slowly, however pt noticed his arm swelling with hives around the site of IV. Infusion was stopped and patient was given Benadryl and Solumedrol. He didn't experience pressure changes, SOB, wheezing, N/V, tongue or face swelling. At around 4:30 am patient developed newly inverted p-waves and elevated t-waves on remote tele. Cardiology team consulted due to changes. Metabolic panel without any significant electrolyte changes - normal potassium, Mg and corrected Na was 135. Patient had no previous similar reactions to Cipro. He received Cipro just hours prior in the ED without any issues. He has also tolerated Cipro during past admissions.     Patient seen and examined at bedside this morning. His signs/symptoms from the reaction had resolved. Patient states his pain medications take the edge off the pain, however pain is still present and significant. Denies any chest pain, palpitations, SOB, abdominal pain, N/V/D.    10/27: await MRI results  c/o rectal pain    10/28: c/o rectal pain; dilaudid increased to 1 mg iv q 3 hrs prn  await MRI pelvis results    10/29: pain better  MRI results awaited    10/30: no abscess on MRI pelvis    Vital Signs Last 24 Hrs  T(C): 37.6 (30 Oct 2023 15:12), Max: 37.6 (30 Oct 2023 15:12)  T(F): 99.6 (30 Oct 2023 15:12), Max: 99.6 (30 Oct 2023 15:12)  HR: 75 (30 Oct 2023 15:12) (71 - 84)  BP: 110/67 (30 Oct 2023 15:12) (104/64 - 112/67)  BP(mean): --  RR: 18 (30 Oct 2023 15:12) (17 - 18)  SpO2: 97% (30 Oct 2023 15:12) (96% - 99%)    Parameters below as of 30 Oct 2023 15:12  Patient On (Oxygen Delivery Method): room air      PHYSICAL EXAM:  GENERAL: Non-toxic, mild distress  HEAD:  Atraumatic, Normocephalic  EYES: EOMI, PERRLA, conjunctiva and sclera clear  ENMT: No tonsillar erythema, exudates, or enlargement; MM  Rectal: Deferred, see colorectal note for full exam  NECK: Supple, No JVD  HEART: Regular rate and rhythm; No murmurs, rubs, or gallops  RESPIRATORY: Unlabored respirations. CTA B/L, No W/R/R  ABDOMEN: Soft, Nontender, Nondistended; +BS. Colostomy bag with normal brown stool  NEUROLOGY: A&Ox3, nonfocal, moving all extremities  EXTREMITIES:  2+ Peripheral Pulses, No clubbing, cyanosis, or edema  SKIN: warm, dry, normal color, no rash or abnormal lesions    ALLERGIES:  Allergies    ciprofloxacin (Other (Mild to Mod))    Intolerances    All Labs/EKG/Radiology/Meds reviewed    Lab Results:  CBC    .		Differential:	[] Automated		[] Manual  Chemistry                10-29-23 @ 07:01  -  10-30-23 @ 07:00  --------------------------------------------------------  IN: 0 mL / OUT: 3 mL / NET: -3 mL      MICROBIOLOGY/CULTURES:  Culture Results:   No growth at 5 days (10-25 @ 04:36)  Culture Results:   No growth at 5 days (10-25 @ 04:36)            Lab Results:  CBC  CBC Full  -  ( 27 Oct 2023 06:21 )  WBC Count : 14.64 K/uL  RBC Count : 4.10 M/uL  Hemoglobin : 13.0 g/dL  Hematocrit : 39.9 %  Platelet Count - Automated : 447 K/uL  Mean Cell Volume : 97.3 fl  Mean Cell Hemoglobin : 31.7 pg  Mean Cell Hemoglobin Concentration : 32.6 gm/dL  Auto Neutrophil # : x  Auto Lymphocyte # : x  Auto Monocyte # : x  Auto Eosinophil # : x  Auto Basophil # : x  Auto Neutrophil % : x  Auto Lymphocyte % : x  Auto Monocyte % : x  Auto Eosinophil % : x  Auto Basophil % : x    .		Differential:	[] Automated		[] Manual  Chemistry  10-27    138  |  104  |  10  ----------------------------<  138<H>  4.1   |  28  |  0.67    Ca    8.6      27 Oct 2023 06:21  Mg     2.1     10-27    TPro  7.7  /  Alb  2.3<L>  /  TBili  0.3  /  DBili  x   /  AST  6<L>  /  ALT  18  /  AlkPhos  102  10-26    LIVER FUNCTIONS - ( 26 Oct 2023 05:04 )  Alb: 2.3 g/dL / Pro: 7.7 gm/dL / ALK PHOS: 102 U/L / ALT: 18 U/L / AST: 6 U/L / GGT: x             Urinalysis Basic - ( 27 Oct 2023 06:21 )    Color: x / Appearance: x / SG: x / pH: x  Gluc: 138 mg/dL / Ketone: x  / Bili: x / Urobili: x   Blood: x / Protein: x / Nitrite: x   Leuk Esterase: x / RBC: x / WBC x   Sq Epi: x / Non Sq Epi: x / Bacteria: x        MICROBIOLOGY/CULTURES:  Culture Results:   No growth at 48 Hours (10-25 @ 04:36)  Culture Results:   No growth at 48 Hours (10-25 @ 04:36)            LABS:                        14.0   12.59 )-----------( 429      ( 26 Oct 2023 05:04 )             41.5     10-26    131<L>  |  98  |  11  ----------------------------<  233<H>  4.0   |  31  |  0.78    Ca    8.9      26 Oct 2023 12:44  Mg     2.0     10-26    TPro  7.7  /  Alb  2.3<L>  /  TBili  0.3  /  DBili  x   /  AST  6<L>  /  ALT  18  /  AlkPhos  102  10-26    PT/INR - ( 25 Oct 2023 02:01 )   PT: 10.4 sec;   INR: 0.92 ratio         PTT - ( 25 Oct 2023 02:01 )  PTT:31.4 sec  Urinalysis Basic - ( 26 Oct 2023 12:44 )    Color: x / Appearance: x / SG: x / pH: x  Gluc: 233 mg/dL / Ketone: x  / Bili: x / Urobili: x   Blood: x / Protein: x / Nitrite: x   Leuk Esterase: x / RBC: x / WBC x   Sq Epi: x / Non Sq Epi: x / Bacteria: x      CAPILLARY BLOOD GLUCOSE      RADIOLOGY & ADDITIONAL TESTS: Reviewed.    < from: MR Pelvis w/wo IV Cont (10.26.23 @ 18:55) >  IMPRESSION:  Stable appearance of multiple bilateral active anal fistulas without   drainable abscess formation.  These take on multiple courses including intersphincteric,   transsphincteric, and supralevator.    < end of copied text >      MEDICATIONS  (STANDING):  enoxaparin Injectable 40 milliGRAM(s) SubCutaneous every 24 hours  folic acid 1 milliGRAM(s) Oral daily  influenza   Vaccine 0.5 milliLiter(s) IntraMuscular once  metroNIDAZOLE  IVPB 500 milliGRAM(s) IV Intermittent every 12 hours  multivitamin/minerals 1 Tablet(s) Oral daily  sodium chloride 0.9%. 1000 milliLiter(s) (100 mL/Hr) IV Continuous <Continuous>  thiamine 100 milliGRAM(s) Oral daily    MEDICATIONS  (PRN):  acetaminophen     Tablet .. 650 milliGRAM(s) Oral every 6 hours PRN Temp greater or equal to 38C (100.4F), Mild Pain (1 - 3)  aluminum hydroxide/magnesium hydroxide/simethicone Suspension 30 milliLiter(s) Oral every 4 hours PRN Dyspepsia  HYDROmorphone  Injectable 1 milliGRAM(s) IV Push every 4 hours PRN Moderate to severe pain  melatonin 3 milliGRAM(s) Oral at bedtime PRN Insomnia  ondansetron Injectable 4 milliGRAM(s) IV Push every 8 hours PRN Nausea and/or Vomiting  oxycodone    5 mG/acetaminophen 325 mG 1 Tablet(s) Oral every 6 hours PRN Moderate Pain (4 - 6)

## 2023-10-30 NOTE — PROGRESS NOTE ADULT - SUBJECTIVE AND OBJECTIVE BOX
Patient is a 38y old  Male who presents with a chief complaint of Rectal pain/acute proctitis    Followup: rectal pain 2/2 anal fistulas  At bedside, patient still reporting mucoid/bloody drainage from rectum and pain. MR without evidence of abscess.    MEDICATIONS  (STANDING):  enoxaparin Injectable 40 milliGRAM(s) SubCutaneous every 24 hours  folic acid 1 milliGRAM(s) Oral daily  HYDROmorphone  Injectable 1 milliGRAM(s) IV Push every 3 hours  influenza   Vaccine 0.5 milliLiter(s) IntraMuscular once  metroNIDAZOLE  IVPB 500 milliGRAM(s) IV Intermittent every 12 hours  multivitamin/minerals 1 Tablet(s) Oral daily  sodium chloride 0.9%. 1000 milliLiter(s) (100 mL/Hr) IV Continuous <Continuous>  thiamine 100 milliGRAM(s) Oral daily    MEDICATIONS  (PRN):  acetaminophen     Tablet .. 650 milliGRAM(s) Oral every 6 hours PRN Temp greater or equal to 38C (100.4F), Mild Pain (1 - 3)  aluminum hydroxide/magnesium hydroxide/simethicone Suspension 30 milliLiter(s) Oral every 4 hours PRN Dyspepsia  melatonin 3 milliGRAM(s) Oral at bedtime PRN Insomnia  ondansetron Injectable 4 milliGRAM(s) IV Push every 8 hours PRN Nausea and/or Vomiting  oxycodone    5 mG/acetaminophen 325 mG 1 Tablet(s) Oral every 6 hours PRN Moderate Pain (4 - 6)      Vital Signs Last 24 Hrs  T(C): 36.8 (30 Oct 2023 08:27), Max: 37 (29 Oct 2023 15:53)  T(F): 98.2 (30 Oct 2023 08:27), Max: 98.6 (29 Oct 2023 15:53)  HR: 71 (30 Oct 2023 08:27) (71 - 84)  BP: 104/64 (30 Oct 2023 08:27) (104/64 - 112/67)  BP(mean): --  RR: 18 (30 Oct 2023 08:27) (17 - 18)  SpO2: 99% (30 Oct 2023 08:27) (96% - 99%)    Parameters below as of 30 Oct 2023 08:27  Patient On (Oxygen Delivery Method): room air        PHYSICAL EXAM:    Constitutional: No acute distress, non-toxic appearing  HEENT: masked, good phonation, not icteric  Neck: supple, no lymphadenopathy  Respiratory: clear to ascultation bilaterally, no wheezing  Cardiovascular: S1 and S2, regular rate and rhythm, no murmurs rubs or gallops  Gastrointestinal: soft, non-tender, non-distended, +bowel sounds, no rebound or guarding, no surgical scars, ielostomy  Extremities: No peripheral edema, no cyanosis or clubbing  Vascular: 2+ peripheral pulses, no venous stasis  Neurological: A/O x 3, no focal deficits, no asterixis  Psychiatric: flat affect  Skin: No rashes, not jaundiced    LABS:    RADIOLOGY & ADDITIONAL STUDIES:  FINDINGS:    ANAL FISTULA:  Present: Yes    LOCATION:  MUCOSAL OPENING (based on axial images): Multiple  EXTERNAL OPENING: Right and left perianal skin  CLASSIFICATION: Intersphincteric, Transsphincteric, Suprasphincteric  ACTIVITY: Active tract (Fluid with peripheral enhancement)  COURSE OF FISTULOUS TRACT: Overall stable appearance of multiple   bilateral fistula tracts and secondary tracts, some which exits the right   and left perianal skin, some which terminate in the right and left   ischioanal fat, and some that travel superiorly and terminate   supralevator around the rectum  DISTANCE FROM MUCOSAL DEFECT TO THE PERIANAL SKIN (measured on coronal   imaging): 6.3 cm  ADDITIONAL COMMENTS: None  SECONDARY FISTULA OR ABSCESS (description if present): Yes multiple   secondary tracts, but no discrete abscess.    REPRODUCTIVE ORGANS: Within normal limits.  BLADDER: Within normal limits.  LYMPH NODES: No pelvic lymphadenopathy.    VISUALIZED PORTIONS:  PERITONEUM: No ascites.  VESSELS: Within normal limits.  ABDOMINAL WALL: Within normal limits.  BONES: Within normal limits.    IMPRESSION:  Stable appearance of multiple bilateral active anal fistulas without   drainable abscess formation.  These take on multiple courses including intersphincteric,   transsphincteric, and supralevator.   Patient is a 38y old  Male who presents with a chief complaint of Rectal pain/acute proctitis    Followup: rectal pain 2/2 anal fistulas    At bedside, patient still reporting mucoid/bloody drainage from rectum and pain. pelvic MR without evidence of abscess.    MEDICATIONS  (STANDING):  enoxaparin Injectable 40 milliGRAM(s) SubCutaneous every 24 hours  folic acid 1 milliGRAM(s) Oral daily  HYDROmorphone  Injectable 1 milliGRAM(s) IV Push every 3 hours  influenza   Vaccine 0.5 milliLiter(s) IntraMuscular once  metroNIDAZOLE  IVPB 500 milliGRAM(s) IV Intermittent every 12 hours  multivitamin/minerals 1 Tablet(s) Oral daily  sodium chloride 0.9%. 1000 milliLiter(s) (100 mL/Hr) IV Continuous <Continuous>  thiamine 100 milliGRAM(s) Oral daily    MEDICATIONS  (PRN):  acetaminophen     Tablet .. 650 milliGRAM(s) Oral every 6 hours PRN Temp greater or equal to 38C (100.4F), Mild Pain (1 - 3)  aluminum hydroxide/magnesium hydroxide/simethicone Suspension 30 milliLiter(s) Oral every 4 hours PRN Dyspepsia  melatonin 3 milliGRAM(s) Oral at bedtime PRN Insomnia  ondansetron Injectable 4 milliGRAM(s) IV Push every 8 hours PRN Nausea and/or Vomiting  oxycodone    5 mG/acetaminophen 325 mG 1 Tablet(s) Oral every 6 hours PRN Moderate Pain (4 - 6)      Vital Signs Last 24 Hrs  T(C): 36.8 (30 Oct 2023 08:27), Max: 37 (29 Oct 2023 15:53)  T(F): 98.2 (30 Oct 2023 08:27), Max: 98.6 (29 Oct 2023 15:53)  HR: 71 (30 Oct 2023 08:27) (71 - 84)  BP: 104/64 (30 Oct 2023 08:27) (104/64 - 112/67)  BP(mean): --  RR: 18 (30 Oct 2023 08:27) (17 - 18)  SpO2: 99% (30 Oct 2023 08:27) (96% - 99%)    Parameters below as of 30 Oct 2023 08:27  Patient On (Oxygen Delivery Method): room air        PHYSICAL EXAM:    Constitutional: No acute distress, non-toxic appearing  HEENT: unmasked, good phonation, not icteric  Neck: supple, no lymphadenopathy  Respiratory: clear to ascultation bilaterally, no wheezing  Cardiovascular: S1 and S2, regular rate and rhythm, no murmurs rubs or gallops  Gastrointestinal: soft, non-tender, non-distended, +bowel sounds, no rebound or guarding,+, ielostomy  Extremities: No peripheral edema, no cyanosis or clubbing  Vascular: 2+ peripheral pulses, no venous stasis  Neurological: A/O x 3, no focal deficits, no asterixis  Psychiatric: flat affect  Skin: No rashes, not jaundiced    LABS:    RADIOLOGY & ADDITIONAL STUDIES:  FINDINGS:    ANAL FISTULA:  Present: Yes    LOCATION:  MUCOSAL OPENING (based on axial images): Multiple  EXTERNAL OPENING: Right and left perianal skin  CLASSIFICATION: Intersphincteric, Transsphincteric, Suprasphincteric  ACTIVITY: Active tract (Fluid with peripheral enhancement)  COURSE OF FISTULOUS TRACT: Overall stable appearance of multiple   bilateral fistula tracts and secondary tracts, some which exits the right   and left perianal skin, some which terminate in the right and left   ischioanal fat, and some that travel superiorly and terminate   supralevator around the rectum  DISTANCE FROM MUCOSAL DEFECT TO THE PERIANAL SKIN (measured on coronal   imaging): 6.3 cm  ADDITIONAL COMMENTS: None  SECONDARY FISTULA OR ABSCESS (description if present): Yes multiple   secondary tracts, but no discrete abscess.    REPRODUCTIVE ORGANS: Within normal limits.  BLADDER: Within normal limits.  LYMPH NODES: No pelvic lymphadenopathy.    VISUALIZED PORTIONS:  PERITONEUM: No ascites.  VESSELS: Within normal limits.  ABDOMINAL WALL: Within normal limits.  BONES: Within normal limits.    IMPRESSION:  Stable appearance of multiple bilateral active anal fistulas without   drainable abscess formation.  These take on multiple courses including intersphincteric,   transsphincteric, and supralevator.

## 2023-10-30 NOTE — PROGRESS NOTE ADULT - NS ATTEND AMEND GEN_ALL_CORE FT
38 year old man with crohn's with fistulizing disease, diverting ileostomy, here with pain and discharge.     No abscess.   Appreciate CRS recs.   Needs biologics but this has been a challenge.

## 2023-10-30 NOTE — PROGRESS NOTE ADULT - ASSESSMENT
38 year old male with history Crohn's not on current treatment, ostomy, and perianal fistulas not compliant with followup GI or Colorectal @ [facility] admitted with rectal pain, bloody drainage, acute proctitis    Imp: Acute proctitis with chronic anal fistulas 2/2 Crohn's    Regarding active anal fistulas, short term goal would be to control pain and decrease drainage and r/o any abscess or drainable collection  MR 10/26: without evidence of abscess; pain and drainage without improvement--->appreciate CRS evaluation    Rec:  ::Continue abx  ::Pain control  ::Appreciate CRS re-evaluation and recs  ::Sitz baths for comfort  ::Chemical VTE prophylaxis due to IBD, high risk VTE    Patient currently uninsured and in process of obtaining insurance coverage. Would like to establish care in outpatient practice here locally. Would recommend starting biologic once insurance secured. Would need to reinforce the vital importance of compliance etc.

## 2023-10-30 NOTE — PROGRESS NOTE ADULT - ASSESSMENT
37 y/o male with h/o Crohn's disease (not on tx, follows up in [facility]), perianal fistulas s/p diversion at [facility], proctitis, tobacco use disorder, pancreatitis and EtOH abuse (denies hx of DT/intubations/seizures) presents to the ED with rectal pain.     #Rectal pain  #Acute proctitis with chronic fistulas   #Sepsis 2/2 proctitis   - Following with Colorectal at [facility] for many years, last seen in March 2023 and surgery was deferred. MRI in 03/2023 also negative for abscess/drainable collection  - CT a/p on admission with no drainable fluid collection  - Rectal exam per Colorectal revealing erythema, many fistulous openings with mucopurulent drainage  - WBC improving despite getting steroids overnight  - Continue tx with Flagyl for now  - Pain control with Tylenol and IV Dilaudid   - Colorectal recommendations appreciated, no plan for surgery at this time  - GI consult placed, recs appreciated   - MR pelvis : no abscess    #Allergic reaction  Reaction to Cipro on 10/25, required IV Solumedrol. Subsequently developed EKG changes including newly inverted p-waves and elevated t-waves. PM EKG with improvement of t waves  - EKG changes likely from the reaction  - Electrolytes stable, will monitor EKG daily for improvement  - Continue monitoring on tele     #Tobacco use disorder  - SBIRT referral  - Tobacco use: 1 ppd. Counseling/treatment offered. Denies nicotine patch    #Alcohol use disorder  Alcohol use: 2-3 beers few times per week. Denies any hx of withdrawal/DT/intubations/seizures. CIWA 0.    - SBIRT referral  - CIWA 0 x24 hours, discontinue and monitor   - Multivitamin, Thiamine, Folate    FULL CODE  DVT ppx: Lovenox     DISPO:  mx of anal fistula; colorectal sx reconsulted

## 2023-10-31 ENCOUNTER — TRANSCRIPTION ENCOUNTER (OUTPATIENT)
Age: 38
End: 2023-10-31

## 2023-10-31 VITALS
TEMPERATURE: 99 F | DIASTOLIC BLOOD PRESSURE: 63 MMHG | RESPIRATION RATE: 18 BRPM | OXYGEN SATURATION: 97 % | SYSTOLIC BLOOD PRESSURE: 97 MMHG | HEART RATE: 70 BPM

## 2023-10-31 PROCEDURE — 99232 SBSQ HOSP IP/OBS MODERATE 35: CPT

## 2023-10-31 PROCEDURE — 99239 HOSP IP/OBS DSCHRG MGMT >30: CPT

## 2023-10-31 RX ORDER — HYDROMORPHONE HYDROCHLORIDE 2 MG/ML
1 INJECTION INTRAMUSCULAR; INTRAVENOUS; SUBCUTANEOUS
Qty: 30 | Refills: 0
Start: 2023-10-31 | End: 2023-11-04

## 2023-10-31 RX ORDER — PIPERACILLIN AND TAZOBACTAM 4; .5 G/20ML; G/20ML
3.38 INJECTION, POWDER, LYOPHILIZED, FOR SOLUTION INTRAVENOUS ONCE
Refills: 0 | Status: COMPLETED | OUTPATIENT
Start: 2023-10-31 | End: 2023-10-31

## 2023-10-31 RX ORDER — OXYCODONE AND ACETAMINOPHEN 5; 325 MG/1; MG/1
2 TABLET ORAL
Qty: 60 | Refills: 0
Start: 2023-10-31 | End: 2023-11-04

## 2023-10-31 RX ORDER — ACETAMINOPHEN 500 MG
2 TABLET ORAL
Qty: 0 | Refills: 0 | DISCHARGE
Start: 2023-10-31

## 2023-10-31 RX ORDER — PIPERACILLIN AND TAZOBACTAM 4; .5 G/20ML; G/20ML
3.38 INJECTION, POWDER, LYOPHILIZED, FOR SOLUTION INTRAVENOUS EVERY 8 HOURS
Refills: 0 | Status: DISCONTINUED | OUTPATIENT
Start: 2023-10-31 | End: 2023-10-31

## 2023-10-31 RX ORDER — METRONIDAZOLE 500 MG
1 TABLET ORAL
Qty: 15 | Refills: 0
Start: 2023-10-31 | End: 2023-11-04

## 2023-10-31 RX ORDER — LACTOBACILLUS ACIDOPHILUS 100MM CELL
1 CAPSULE ORAL
Qty: 60 | Refills: 0
Start: 2023-10-31 | End: 2023-11-29

## 2023-10-31 RX ORDER — LACTOBACILLUS ACIDOPHILUS 100MM CELL
1 CAPSULE ORAL
Refills: 0 | Status: DISCONTINUED | OUTPATIENT
Start: 2023-10-31 | End: 2023-10-31

## 2023-10-31 RX ORDER — MULTIVIT-MIN/FERROUS GLUCONATE 9 MG/15 ML
1 LIQUID (ML) ORAL
Qty: 0 | Refills: 0 | DISCHARGE
Start: 2023-10-31

## 2023-10-31 RX ORDER — OXYCODONE HYDROCHLORIDE 5 MG/1
1 TABLET ORAL
Qty: 10 | Refills: 0
Start: 2023-10-31 | End: 2023-11-04

## 2023-10-31 RX ORDER — FOLIC ACID 0.8 MG
1 TABLET ORAL
Qty: 30 | Refills: 0
Start: 2023-10-31 | End: 2023-11-29

## 2023-10-31 RX ORDER — NALOXONE HYDROCHLORIDE 4 MG/.1ML
4 SPRAY NASAL
Qty: 1 | Refills: 0
Start: 2023-10-31 | End: 2023-10-31

## 2023-10-31 RX ORDER — THIAMINE MONONITRATE (VIT B1) 100 MG
1 TABLET ORAL
Qty: 30 | Refills: 0
Start: 2023-10-31 | End: 2023-11-29

## 2023-10-31 RX ADMIN — Medication 100 MILLIGRAM(S): at 09:36

## 2023-10-31 RX ADMIN — HYDROMORPHONE HYDROCHLORIDE 2 MILLIGRAM(S): 2 INJECTION INTRAMUSCULAR; INTRAVENOUS; SUBCUTANEOUS at 05:45

## 2023-10-31 RX ADMIN — SODIUM CHLORIDE 100 MILLILITER(S): 9 INJECTION INTRAMUSCULAR; INTRAVENOUS; SUBCUTANEOUS at 05:45

## 2023-10-31 RX ADMIN — HYDROMORPHONE HYDROCHLORIDE 2 MILLIGRAM(S): 2 INJECTION INTRAMUSCULAR; INTRAVENOUS; SUBCUTANEOUS at 09:38

## 2023-10-31 RX ADMIN — Medication 1 TABLET(S): at 09:36

## 2023-10-31 RX ADMIN — OXYCODONE HYDROCHLORIDE 15 MILLIGRAM(S): 5 TABLET ORAL at 10:34

## 2023-10-31 RX ADMIN — HYDROMORPHONE HYDROCHLORIDE 2 MILLIGRAM(S): 2 INJECTION INTRAMUSCULAR; INTRAVENOUS; SUBCUTANEOUS at 06:00

## 2023-10-31 RX ADMIN — Medication 3 MILLIGRAM(S): at 02:50

## 2023-10-31 RX ADMIN — HYDROMORPHONE HYDROCHLORIDE 2 MILLIGRAM(S): 2 INJECTION INTRAMUSCULAR; INTRAVENOUS; SUBCUTANEOUS at 01:36

## 2023-10-31 RX ADMIN — Medication 100 MILLIGRAM(S): at 10:34

## 2023-10-31 RX ADMIN — HYDROMORPHONE HYDROCHLORIDE 2 MILLIGRAM(S): 2 INJECTION INTRAMUSCULAR; INTRAVENOUS; SUBCUTANEOUS at 01:46

## 2023-10-31 RX ADMIN — Medication 1 MILLIGRAM(S): at 09:36

## 2023-10-31 RX ADMIN — PIPERACILLIN AND TAZOBACTAM 200 GRAM(S): 4; .5 INJECTION, POWDER, LYOPHILIZED, FOR SOLUTION INTRAVENOUS at 12:15

## 2023-10-31 NOTE — PROGRESS NOTE ADULT - NUTRITIONAL ASSESSMENT
This patient has been assessed with a concern for Malnutrition and has been determined to have a diagnosis/diagnoses of Severe protein-calorie malnutrition and Underweight (BMI < 19).    This patient is being managed with:   Diet Regular-  Entered: Oct 25 2023  7:55AM  

## 2023-10-31 NOTE — PROGRESS NOTE ADULT - REASON FOR ADMISSION
Rectal pain/acute proctitis

## 2023-10-31 NOTE — DISCHARGE NOTE PROVIDER - CARE PROVIDER_API CALL
Lito Crawley  [facility]  [address]  [place] [address]  Phone: [phone]  Fax: [phone]  Follow Up Time:     prashant mason  Phone: (   )    -  Fax: (   )    -  Follow Up Time:

## 2023-10-31 NOTE — PROGRESS NOTE ADULT - NS ATTEND AMEND GEN_ALL_CORE FT
38 year old man with fistulizing crohn's disease, with diverting ileostomy, here for increased drainage and rectal pain.     MRI negative for abscess.   Calvin needs biologics, needs insurance.   He is going to follow up with his [facility] doctors upon discharge. 38 year old man with fistulizing crohn's disease, with diverting ileostomy, here for increased drainage and rectal pain.     MRI negative for abscess.   Calvin needs biologics, needs insurance. Needs to be compliant with appointments.   He is going to follow up with his [facility] doctors upon discharge.

## 2023-10-31 NOTE — DISCHARGE NOTE PROVIDER - NSDCMRMEDTOKEN_GEN_ALL_CORE_FT
acetaminophen 325 mg oral tablet: 2 tab(s) orally every 6 hours As needed Temp greater or equal to 38C (100.4F), Mild Pain (1 - 3)  amoxicillin-clavulanate 875 mg-125 mg oral tablet: 875 milligram(s) orally 2 times a day  folic acid 1 mg oral tablet: 1 tab(s) orally once a day  HYDROmorphone 2 mg oral tablet: 1 tab(s) orally every 4 hours as needed for  severe pain MDD: 12 mg  lactobacillus acidophilus oral tablet: 1 tab(s) orally 2 times a day  metroNIDAZOLE 500 mg oral tablet: 1 tab(s) orally 3 times a day  Multiple Vitamins with Minerals oral tablet: 1 tab(s) orally once a day  naloxone 4 mg/0.1 mL nasal spray: 4 milligram(s) intranasally once a day as needed for sedation from opiates  oxyCODONE 15 mg oral tablet, extended release: 1 tab(s) orally every 12 hours MDD: 30 mg  oxycodone-acetaminophen 5 mg-325 mg oral tablet: 2 tab(s) orally every 4 hours as needed for Moderate Pain (4 - 6) MDD: 30 mg  thiamine 100 mg oral tablet: 1 tab(s) orally once a day

## 2023-10-31 NOTE — PROGRESS NOTE ADULT - SUBJECTIVE AND OBJECTIVE BOX
Patient is a 38y old  Male who presents with a chief complaint of Rectal pain/acute proctitis    Followup: rectal pain 2/2 anal fistulas    Patient ambulating in hallway today. Asking for MR report with plan to see [facility] tomorrow upon discharge.    MEDICATIONS  (STANDING):  enoxaparin Injectable 40 milliGRAM(s) SubCutaneous every 24 hours  folic acid 1 milliGRAM(s) Oral daily  HYDROmorphone  Injectable 1 milliGRAM(s) IV Push every 3 hours  influenza   Vaccine 0.5 milliLiter(s) IntraMuscular once  metroNIDAZOLE  IVPB 500 milliGRAM(s) IV Intermittent every 12 hours  multivitamin/minerals 1 Tablet(s) Oral daily  sodium chloride 0.9%. 1000 milliLiter(s) (100 mL/Hr) IV Continuous <Continuous>  thiamine 100 milliGRAM(s) Oral daily    MEDICATIONS  (PRN):  acetaminophen     Tablet .. 650 milliGRAM(s) Oral every 6 hours PRN Temp greater or equal to 38C (100.4F), Mild Pain (1 - 3)  aluminum hydroxide/magnesium hydroxide/simethicone Suspension 30 milliLiter(s) Oral every 4 hours PRN Dyspepsia  melatonin 3 milliGRAM(s) Oral at bedtime PRN Insomnia  ondansetron Injectable 4 milliGRAM(s) IV Push every 8 hours PRN Nausea and/or Vomiting  oxycodone    5 mG/acetaminophen 325 mG 1 Tablet(s) Oral every 6 hours PRN Moderate Pain (4 - 6)    Vital Signs Last 24 Hrs  T(C): 37 (10-31-23 @ 08:44), Max: 37.6 (10-30-23 @ 15:12)  T(F): 98.6 (10-31-23 @ 08:44), Max: 99.6 (10-30-23 @ 15:12)  HR: 70 (10-31-23 @ 08:44) (69 - 88)  BP: 97/63 (10-31-23 @ 08:44) (97/63 - 113/70)  BP(mean): --  RR: 18 (10-31-23 @ 08:44) (18 - 18)  SpO2: 97% (10-31-23 @ 08:44) (97% - 99%)    PHYSICAL EXAM:    Constitutional: No acute distress, non-toxic appearing  HEENT: unmasked, good phonation, not icteric  Neck: supple, no lymphadenopathy  Respiratory: clear to ascultation bilaterally, no wheezing  Cardiovascular: S1 and S2, regular rate and rhythm, no murmurs rubs or gallops  Gastrointestinal: soft, non-tender, non-distended, +bowel sounds, no rebound or guarding,+, ielostomy  Extremities: No peripheral edema, no cyanosis or clubbing  Vascular: 2+ peripheral pulses, no venous stasis  Neurological: A/O x 3, no focal deficits, no asterixis  Psychiatric: flat affect  Skin: No rashes, not jaundiced    LABS:    RADIOLOGY & ADDITIONAL STUDIES:  FINDINGS:    ANAL FISTULA:  Present: Yes    LOCATION:  MUCOSAL OPENING (based on axial images): Multiple  EXTERNAL OPENING: Right and left perianal skin  CLASSIFICATION: Intersphincteric, Transsphincteric, Suprasphincteric  ACTIVITY: Active tract (Fluid with peripheral enhancement)  COURSE OF FISTULOUS TRACT: Overall stable appearance of multiple   bilateral fistula tracts and secondary tracts, some which exits the right   and left perianal skin, some which terminate in the right and left   ischioanal fat, and some that travel superiorly and terminate   supralevator around the rectum  DISTANCE FROM MUCOSAL DEFECT TO THE PERIANAL SKIN (measured on coronal   imaging): 6.3 cm  ADDITIONAL COMMENTS: None  SECONDARY FISTULA OR ABSCESS (description if present): Yes multiple   secondary tracts, but no discrete abscess.    REPRODUCTIVE ORGANS: Within normal limits.  BLADDER: Within normal limits.  LYMPH NODES: No pelvic lymphadenopathy.    VISUALIZED PORTIONS:  PERITONEUM: No ascites.  VESSELS: Within normal limits.  ABDOMINAL WALL: Within normal limits.  BONES: Within normal limits.    IMPRESSION:  Stable appearance of multiple bilateral active anal fistulas without   drainable abscess formation.  These take on multiple courses including intersphincteric,   transsphincteric, and supralevator.   Patient is a 38y old  Male who presents with a chief complaint of Rectal pain/acute proctitis    Followup: rectal pain 2/2 anal fistulas    Patient ambulating in hallway today. Asking for MR report with plan to see [facility] tomorrow upon discharge.    MEDICATIONS  (STANDING):  enoxaparin Injectable 40 milliGRAM(s) SubCutaneous every 24 hours  folic acid 1 milliGRAM(s) Oral daily  HYDROmorphone  Injectable 1 milliGRAM(s) IV Push every 3 hours  influenza   Vaccine 0.5 milliLiter(s) IntraMuscular once  metroNIDAZOLE  IVPB 500 milliGRAM(s) IV Intermittent every 12 hours  multivitamin/minerals 1 Tablet(s) Oral daily  sodium chloride 0.9%. 1000 milliLiter(s) (100 mL/Hr) IV Continuous <Continuous>  thiamine 100 milliGRAM(s) Oral daily    MEDICATIONS  (PRN):  acetaminophen     Tablet .. 650 milliGRAM(s) Oral every 6 hours PRN Temp greater or equal to 38C (100.4F), Mild Pain (1 - 3)  aluminum hydroxide/magnesium hydroxide/simethicone Suspension 30 milliLiter(s) Oral every 4 hours PRN Dyspepsia  melatonin 3 milliGRAM(s) Oral at bedtime PRN Insomnia  ondansetron Injectable 4 milliGRAM(s) IV Push every 8 hours PRN Nausea and/or Vomiting  oxycodone    5 mG/acetaminophen 325 mG 1 Tablet(s) Oral every 6 hours PRN Moderate Pain (4 - 6)    Vital Signs Last 24 Hrs  T(C): 37 (10-31-23 @ 08:44), Max: 37.6 (10-30-23 @ 15:12)  T(F): 98.6 (10-31-23 @ 08:44), Max: 99.6 (10-30-23 @ 15:12)  HR: 70 (10-31-23 @ 08:44) (69 - 88)  BP: 97/63 (10-31-23 @ 08:44) (97/63 - 113/70)  BP(mean): --  RR: 18 (10-31-23 @ 08:44) (18 - 18)  SpO2: 97% (10-31-23 @ 08:44) (97% - 99%)    PHYSICAL EXAM:    Constitutional: No acute distress, non-toxic appearing  HEENT: unmasked, good phonation, not icteric  Neck: supple, no lymphadenopathy  Respiratory: clear to ascultation bilaterally, no wheezing  Cardiovascular: S1 and S2, regular rate and rhythm, no murmurs rubs or gallops  Gastrointestinal: soft, non-tender, non-distended, +bowel sounds, no rebound or guarding,+surigical scars, +, ielostomy  Extremities: No peripheral edema, no cyanosis or clubbing  Vascular: 2+ peripheral pulses, no venous stasis  Neurological: A/O x 3, no focal deficits, no asterixis  Psychiatric: flat affect  Skin: No rashes, not jaundiced    LABS:    RADIOLOGY & ADDITIONAL STUDIES:  FINDINGS:    ANAL FISTULA:  Present: Yes    LOCATION:  MUCOSAL OPENING (based on axial images): Multiple  EXTERNAL OPENING: Right and left perianal skin  CLASSIFICATION: Intersphincteric, Transsphincteric, Suprasphincteric  ACTIVITY: Active tract (Fluid with peripheral enhancement)  COURSE OF FISTULOUS TRACT: Overall stable appearance of multiple   bilateral fistula tracts and secondary tracts, some which exits the right   and left perianal skin, some which terminate in the right and left   ischioanal fat, and some that travel superiorly and terminate   supralevator around the rectum  DISTANCE FROM MUCOSAL DEFECT TO THE PERIANAL SKIN (measured on coronal   imaging): 6.3 cm  ADDITIONAL COMMENTS: None  SECONDARY FISTULA OR ABSCESS (description if present): Yes multiple   secondary tracts, but no discrete abscess.    REPRODUCTIVE ORGANS: Within normal limits.  BLADDER: Within normal limits.  LYMPH NODES: No pelvic lymphadenopathy.    VISUALIZED PORTIONS:  PERITONEUM: No ascites.  VESSELS: Within normal limits.  ABDOMINAL WALL: Within normal limits.  BONES: Within normal limits.    IMPRESSION:  Stable appearance of multiple bilateral active anal fistulas without   drainable abscess formation.  These take on multiple courses including intersphincteric,   transsphincteric, and supralevator.

## 2023-10-31 NOTE — DISCHARGE NOTE NURSING/CASE MANAGEMENT/SOCIAL WORK - PATIENT PORTAL LINK FT
You can access the FollowMyHealth Patient Portal offered by [facility] by registering at the following website: [email or web address]. By joining [employer]’s FollowMyHealth portal, you will also be able to view your health information using other applications (apps) compatible with our system.

## 2023-10-31 NOTE — DISCHARGE NOTE PROVIDER - PROVIDER TOKENS
PROVIDER:[TOKEN:[[id number]]],FREE:[LAST:[key],FIRST:[prashant],PHONE:[(   )    -],FAX:[(   )    -]]

## 2023-10-31 NOTE — PROGRESS NOTE ADULT - PROVIDER SPECIALTY LIST ADULT
Gastroenterology
Hospitalist
Gastroenterology
Gastroenterology
Hospitalist
Cardiology
Hospitalist
Gastroenterology
Hospitalist
Hospitalist

## 2023-10-31 NOTE — PROGRESS NOTE ADULT - ASSESSMENT
38 year old male with history Crohn's not on current treatment, ostomy, and perianal fistulas not compliant with followup GI or Colorectal @ [facility] admitted with rectal pain, bloody drainage, acute proctitis    Imp: Acute proctitis with chronic anal fistulas 2/2 Crohn's    Regarding active anal fistulas, short term goal would be to control pain and decrease drainage and r/o any abscess or drainable collection  MR 10/26: without evidence of abscess; pain and drainage without improvement--->appreciate CRS evaluation    Rec:  ::Continue abx  ::Pain control  ::Chemical VTE prophylaxis due to IBD, high risk VTE  ::Discussed with patient at length: it is of utmost importance for him to obtain insurance or Medicaid as he needs to be on biologic and compliance is key i.e. avoidance of etoh and smoking

## 2023-10-31 NOTE — DISCHARGE NOTE PROVIDER - HOSPITAL COURSE
PHYSICAL EXAM:    Daily     Daily     Vital Signs Last 24 Hrs  T(C): 37 (31 Oct 2023 08:44), Max: 37.6 (30 Oct 2023 15:12)  T(F): 98.6 (31 Oct 2023 08:44), Max: 99.6 (30 Oct 2023 15:12)  HR: 70 (31 Oct 2023 08:44) (69 - 88)  BP: 97/63 (31 Oct 2023 08:44) (97/63 - 113/70)  BP(mean): --  RR: 18 (31 Oct 2023 08:44) (18 - 18)  SpO2: 97% (31 Oct 2023 08:44) (97% - 99%)    Constitutional: Well  appearing  HEENT: Atraumatic, SAIRA, Normal, No congestion  Respiratory: Breath Sounds normal, no rhonchi/wheeze  Cardiovascular: N S1S2;   Gastrointestinal: Abdomen soft, non tender, Bowel Sounds present  Extremities: No edema, peripheral pulses present  Neurological: AAO x 3, no gross focal motor deficits  Skin: Non cellulitic, no rash, ulcers  Lymph Nodes: No lymphadenopathy noted  Back: No CVA tenderness   Musculoskeletal: non tender  Breasts: Deferred  Genitourinary: deferred  Rectal: Deferred    37 y/o male with h/o Crohn's disease (not on tx, follows up in [facility]), perianal fistulas s/p diversion at [facility], proctitis, tobacco use disorder, pancreatitis and EtOH abuse (denies hx of DT/intubations/seizures) presents to the ED with rectal pain.     #Rectal pain  #Acute proctitis with chronic fistulas   #Sepsis 2/2 proctitis   - Following with Colorectal at [facility] for many years, last seen in March 2023 and surgery was deferred. MRI in 03/2023 also negative for abscess/drainable collection  - CT a/p on admission with no drainable fluid collection  - Rectal exam per Colorectal revealing erythema, many fistulous openings with mucopurulent drainage  - WBC improving despite getting steroids overnight  - Continue tx with Flagyl for now; add augmentin on d/c.   - Pain control with Tylenol and Dilaudid   - Colorectal recommendations appreciated, no plan for surgery at this time  - GI consult placed, recs appreciated   - MR pelvis : no abscess  discussed with CRSx team;  pt needs to f/u with his Colorectal Sx in [facility].     #Allergic reaction  Reaction to Cipro on 10/25, required IV Solumedrol. Subsequently developed EKG changes including newly inverted p-waves and elevated t-waves. PM EKG with improvement of t waves  - EKG changes likely from the reaction  - Electrolytes stable, will monitor EKG daily for improvement    #Tobacco use disorder  - SBIRT referral  - Tobacco use: 1 ppd. Counseling/treatment offered. Denies nicotine patch    #Alcohol use disorder  Alcohol use: 2-3 beers few times per week. Denies any hx of withdrawal/DT/intubations/seizures. CIWA 0.    - SBIRT referral  - CIWA 0 x24 hours, discontinue and monitor   - Multivitamin, Thiamine, Folate    d/c home    time spent 45 min

## 2023-11-09 DIAGNOSIS — E87.1 HYPO-OSMOLALITY AND HYPONATREMIA: ICD-10-CM

## 2023-11-09 DIAGNOSIS — F17.200 NICOTINE DEPENDENCE, UNSPECIFIED, UNCOMPLICATED: ICD-10-CM

## 2023-11-09 DIAGNOSIS — Z71.3 DIETARY COUNSELING AND SURVEILLANCE: ICD-10-CM

## 2023-11-09 DIAGNOSIS — K60.3 ANAL FISTULA: ICD-10-CM

## 2023-11-09 DIAGNOSIS — F10.10 ALCOHOL ABUSE, UNCOMPLICATED: ICD-10-CM

## 2023-11-09 DIAGNOSIS — K62.89 OTHER SPECIFIED DISEASES OF ANUS AND RECTUM: ICD-10-CM
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
## 2023-11-09 DIAGNOSIS — I95.9 HYPOTENSION, UNSPECIFIED: ICD-10-CM

## 2023-11-09 DIAGNOSIS — K50.10 CROHN'S DISEASE OF LARGE INTESTINE WITHOUT COMPLICATIONS: ICD-10-CM

## 2023-11-09 DIAGNOSIS — Z83.3 FAMILY HISTORY OF DIABETES MELLITUS: ICD-10-CM

## 2023-11-09 DIAGNOSIS — R94.31 ABNORMAL ELECTROCARDIOGRAM [ECG] [EKG]: ICD-10-CM

## 2023-11-09 DIAGNOSIS — T36.8X5A ADVERSE EFFECT OF OTHER SYSTEMIC ANTIBIOTICS, INITIAL ENCOUNTER: ICD-10-CM

## 2023-11-09 DIAGNOSIS — E43 UNSPECIFIED SEVERE PROTEIN-CALORIE MALNUTRITION: ICD-10-CM

## 2023-11-09 DIAGNOSIS — Z88.1 ALLERGY STATUS TO OTHER ANTIBIOTIC AGENTS STATUS: ICD-10-CM

## 2023-11-09 DIAGNOSIS — A41.9 SEPSIS, UNSPECIFIED ORGANISM: ICD-10-CM

## 2023-11-17 ENCOUNTER — EMERGENCY (EMERGENCY)
Facility: HOSPITAL | Age: 38
LOS: 0 days | Discharge: HOME | End: 2023-11-17
Attending: EMERGENCY MEDICINE
Payer: COMMERCIAL

## 2023-11-17 VITALS
TEMPERATURE: 98 F | OXYGEN SATURATION: 98 % | HEART RATE: 91 BPM | DIASTOLIC BLOOD PRESSURE: 79 MMHG | SYSTOLIC BLOOD PRESSURE: 115 MMHG | RESPIRATION RATE: 20 BRPM

## 2023-11-17 VITALS — WEIGHT: 160.06 LBS | HEIGHT: 72 IN

## 2023-11-17 DIAGNOSIS — K60.4 RECTAL FISTULA: ICD-10-CM

## 2023-11-17 DIAGNOSIS — F10.10 ALCOHOL ABUSE, UNCOMPLICATED: ICD-10-CM

## 2023-11-17 DIAGNOSIS — Z88.1 ALLERGY STATUS TO OTHER ANTIBIOTIC AGENTS: ICD-10-CM

## 2023-11-17 DIAGNOSIS — D72.829 ELEVATED WHITE BLOOD CELL COUNT, UNSPECIFIED: ICD-10-CM

## 2023-11-17 DIAGNOSIS — K50.90 CROHN'S DISEASE, UNSPECIFIED, WITHOUT COMPLICATIONS: ICD-10-CM

## 2023-11-17 DIAGNOSIS — K60.3 ANAL FISTULA: Chronic | ICD-10-CM

## 2023-11-17 DIAGNOSIS — Z87.19 PERSONAL HISTORY OF OTHER DISEASES OF THE DIGESTIVE SYSTEM: ICD-10-CM

## 2023-11-17 DIAGNOSIS — K62.89 OTHER SPECIFIED DISEASES OF ANUS AND RECTUM: ICD-10-CM

## 2023-11-17 DIAGNOSIS — Z98.89 OTHER SPECIFIED POSTPROCEDURAL STATES: Chronic | ICD-10-CM

## 2023-11-17 DIAGNOSIS — Z93.3 COLOSTOMY STATUS: ICD-10-CM

## 2023-11-17 DIAGNOSIS — R73.9 HYPERGLYCEMIA, UNSPECIFIED: ICD-10-CM

## 2023-11-17 DIAGNOSIS — Z96.7 PRESENCE OF OTHER BONE AND TENDON IMPLANTS: Chronic | ICD-10-CM

## 2023-11-17 LAB
ALBUMIN SERPL ELPH-MCNC: 2.7 G/DL — LOW (ref 3.3–5)
ALBUMIN SERPL ELPH-MCNC: 2.7 G/DL — LOW (ref 3.3–5)
ALP SERPL-CCNC: 85 U/L — SIGNIFICANT CHANGE UP (ref 40–120)
ALP SERPL-CCNC: 85 U/L — SIGNIFICANT CHANGE UP (ref 40–120)
ALT FLD-CCNC: 14 U/L — SIGNIFICANT CHANGE UP (ref 12–78)
ALT FLD-CCNC: 14 U/L — SIGNIFICANT CHANGE UP (ref 12–78)
ANION GAP SERPL CALC-SCNC: 6 MMOL/L — SIGNIFICANT CHANGE UP (ref 5–17)
ANION GAP SERPL CALC-SCNC: 6 MMOL/L — SIGNIFICANT CHANGE UP (ref 5–17)
AST SERPL-CCNC: 6 U/L — LOW (ref 15–37)
AST SERPL-CCNC: 6 U/L — LOW (ref 15–37)
BASOPHILS # BLD AUTO: 0.04 K/UL — SIGNIFICANT CHANGE UP (ref 0–0.2)
BASOPHILS # BLD AUTO: 0.04 K/UL — SIGNIFICANT CHANGE UP (ref 0–0.2)
BASOPHILS NFR BLD AUTO: 0.3 % — SIGNIFICANT CHANGE UP (ref 0–2)
BASOPHILS NFR BLD AUTO: 0.3 % — SIGNIFICANT CHANGE UP (ref 0–2)
BILIRUB SERPL-MCNC: 0.1 MG/DL — LOW (ref 0.2–1.2)
BILIRUB SERPL-MCNC: 0.1 MG/DL — LOW (ref 0.2–1.2)
BLD GP AB SCN SERPL QL: SIGNIFICANT CHANGE UP
BLD GP AB SCN SERPL QL: SIGNIFICANT CHANGE UP
BUN SERPL-MCNC: 7 MG/DL — SIGNIFICANT CHANGE UP (ref 7–23)
BUN SERPL-MCNC: 7 MG/DL — SIGNIFICANT CHANGE UP (ref 7–23)
CALCIUM SERPL-MCNC: 8.9 MG/DL — SIGNIFICANT CHANGE UP (ref 8.5–10.1)
CALCIUM SERPL-MCNC: 8.9 MG/DL — SIGNIFICANT CHANGE UP (ref 8.5–10.1)
CHLORIDE SERPL-SCNC: 103 MMOL/L — SIGNIFICANT CHANGE UP (ref 96–108)
CHLORIDE SERPL-SCNC: 103 MMOL/L — SIGNIFICANT CHANGE UP (ref 96–108)
CO2 SERPL-SCNC: 27 MMOL/L — SIGNIFICANT CHANGE UP (ref 22–31)
CO2 SERPL-SCNC: 27 MMOL/L — SIGNIFICANT CHANGE UP (ref 22–31)
CREAT SERPL-MCNC: 0.79 MG/DL — SIGNIFICANT CHANGE UP (ref 0.5–1.3)
CREAT SERPL-MCNC: 0.79 MG/DL — SIGNIFICANT CHANGE UP (ref 0.5–1.3)
EGFR: 117 ML/MIN/1.73M2 — SIGNIFICANT CHANGE UP
EGFR: 117 ML/MIN/1.73M2 — SIGNIFICANT CHANGE UP
EOSINOPHIL # BLD AUTO: 0.28 K/UL — SIGNIFICANT CHANGE UP (ref 0–0.5)
EOSINOPHIL # BLD AUTO: 0.28 K/UL — SIGNIFICANT CHANGE UP (ref 0–0.5)
EOSINOPHIL NFR BLD AUTO: 2.1 % — SIGNIFICANT CHANGE UP (ref 0–6)
EOSINOPHIL NFR BLD AUTO: 2.1 % — SIGNIFICANT CHANGE UP (ref 0–6)
GLUCOSE SERPL-MCNC: 279 MG/DL — HIGH (ref 70–99)
GLUCOSE SERPL-MCNC: 279 MG/DL — HIGH (ref 70–99)
HCT VFR BLD CALC: 41.9 % — SIGNIFICANT CHANGE UP (ref 39–50)
HCT VFR BLD CALC: 41.9 % — SIGNIFICANT CHANGE UP (ref 39–50)
HGB BLD-MCNC: 14 G/DL — SIGNIFICANT CHANGE UP (ref 13–17)
HGB BLD-MCNC: 14 G/DL — SIGNIFICANT CHANGE UP (ref 13–17)
IMM GRANULOCYTES NFR BLD AUTO: 0.6 % — SIGNIFICANT CHANGE UP (ref 0–0.9)
IMM GRANULOCYTES NFR BLD AUTO: 0.6 % — SIGNIFICANT CHANGE UP (ref 0–0.9)
LACTATE SERPL-SCNC: 1.6 MMOL/L — SIGNIFICANT CHANGE UP (ref 0.7–2)
LACTATE SERPL-SCNC: 1.6 MMOL/L — SIGNIFICANT CHANGE UP (ref 0.7–2)
LIDOCAIN IGE QN: 19 U/L — SIGNIFICANT CHANGE UP (ref 13–75)
LIDOCAIN IGE QN: 19 U/L — SIGNIFICANT CHANGE UP (ref 13–75)
LYMPHOCYTES # BLD AUTO: 1.96 K/UL — SIGNIFICANT CHANGE UP (ref 1–3.3)
LYMPHOCYTES # BLD AUTO: 1.96 K/UL — SIGNIFICANT CHANGE UP (ref 1–3.3)
LYMPHOCYTES # BLD AUTO: 14.9 % — SIGNIFICANT CHANGE UP (ref 13–44)
LYMPHOCYTES # BLD AUTO: 14.9 % — SIGNIFICANT CHANGE UP (ref 13–44)
MCHC RBC-ENTMCNC: 31.4 PG — SIGNIFICANT CHANGE UP (ref 27–34)
MCHC RBC-ENTMCNC: 31.4 PG — SIGNIFICANT CHANGE UP (ref 27–34)
MCHC RBC-ENTMCNC: 33.4 GM/DL — SIGNIFICANT CHANGE UP (ref 32–36)
MCHC RBC-ENTMCNC: 33.4 GM/DL — SIGNIFICANT CHANGE UP (ref 32–36)
MCV RBC AUTO: 93.9 FL — SIGNIFICANT CHANGE UP (ref 80–100)
MCV RBC AUTO: 93.9 FL — SIGNIFICANT CHANGE UP (ref 80–100)
MONOCYTES # BLD AUTO: 1.12 K/UL — HIGH (ref 0–0.9)
MONOCYTES # BLD AUTO: 1.12 K/UL — HIGH (ref 0–0.9)
MONOCYTES NFR BLD AUTO: 8.5 % — SIGNIFICANT CHANGE UP (ref 2–14)
MONOCYTES NFR BLD AUTO: 8.5 % — SIGNIFICANT CHANGE UP (ref 2–14)
NEUTROPHILS # BLD AUTO: 9.69 K/UL — HIGH (ref 1.8–7.4)
NEUTROPHILS # BLD AUTO: 9.69 K/UL — HIGH (ref 1.8–7.4)
NEUTROPHILS NFR BLD AUTO: 73.6 % — SIGNIFICANT CHANGE UP (ref 43–77)
NEUTROPHILS NFR BLD AUTO: 73.6 % — SIGNIFICANT CHANGE UP (ref 43–77)
PLATELET # BLD AUTO: 440 K/UL — HIGH (ref 150–400)
PLATELET # BLD AUTO: 440 K/UL — HIGH (ref 150–400)
POTASSIUM SERPL-MCNC: 3.7 MMOL/L — SIGNIFICANT CHANGE UP (ref 3.5–5.3)
POTASSIUM SERPL-MCNC: 3.7 MMOL/L — SIGNIFICANT CHANGE UP (ref 3.5–5.3)
POTASSIUM SERPL-SCNC: 3.7 MMOL/L — SIGNIFICANT CHANGE UP (ref 3.5–5.3)
POTASSIUM SERPL-SCNC: 3.7 MMOL/L — SIGNIFICANT CHANGE UP (ref 3.5–5.3)
PROT SERPL-MCNC: 8 GM/DL — SIGNIFICANT CHANGE UP (ref 6–8.3)
PROT SERPL-MCNC: 8 GM/DL — SIGNIFICANT CHANGE UP (ref 6–8.3)
RBC # BLD: 4.46 M/UL — SIGNIFICANT CHANGE UP (ref 4.2–5.8)
RBC # BLD: 4.46 M/UL — SIGNIFICANT CHANGE UP (ref 4.2–5.8)
RBC # FLD: 14.6 % — HIGH (ref 10.3–14.5)
RBC # FLD: 14.6 % — HIGH (ref 10.3–14.5)
SODIUM SERPL-SCNC: 136 MMOL/L — SIGNIFICANT CHANGE UP (ref 135–145)
SODIUM SERPL-SCNC: 136 MMOL/L — SIGNIFICANT CHANGE UP (ref 135–145)
WBC # BLD: 13.17 K/UL — HIGH (ref 3.8–10.5)
WBC # BLD: 13.17 K/UL — HIGH (ref 3.8–10.5)
WBC # FLD AUTO: 13.17 K/UL — HIGH (ref 3.8–10.5)
WBC # FLD AUTO: 13.17 K/UL — HIGH (ref 3.8–10.5)

## 2023-11-17 PROCEDURE — 83690 ASSAY OF LIPASE: CPT

## 2023-11-17 PROCEDURE — 96375 TX/PRO/DX INJ NEW DRUG ADDON: CPT

## 2023-11-17 PROCEDURE — 99284 EMERGENCY DEPT VISIT MOD MDM: CPT | Mod: 25

## 2023-11-17 PROCEDURE — 96374 THER/PROPH/DIAG INJ IV PUSH: CPT | Mod: XU

## 2023-11-17 PROCEDURE — 80053 COMPREHEN METABOLIC PANEL: CPT

## 2023-11-17 PROCEDURE — 99285 EMERGENCY DEPT VISIT HI MDM: CPT

## 2023-11-17 PROCEDURE — 96376 TX/PRO/DX INJ SAME DRUG ADON: CPT

## 2023-11-17 PROCEDURE — G1004: CPT

## 2023-11-17 PROCEDURE — 36415 COLL VENOUS BLD VENIPUNCTURE: CPT

## 2023-11-17 PROCEDURE — 87040 BLOOD CULTURE FOR BACTERIA: CPT

## 2023-11-17 PROCEDURE — 86901 BLOOD TYPING SEROLOGIC RH(D): CPT

## 2023-11-17 PROCEDURE — 86900 BLOOD TYPING SEROLOGIC ABO: CPT

## 2023-11-17 PROCEDURE — 83605 ASSAY OF LACTIC ACID: CPT

## 2023-11-17 PROCEDURE — 85025 COMPLETE CBC W/AUTO DIFF WBC: CPT

## 2023-11-17 PROCEDURE — 74177 CT ABD & PELVIS W/CONTRAST: CPT | Mod: 26,MG

## 2023-11-17 PROCEDURE — 86850 RBC ANTIBODY SCREEN: CPT

## 2023-11-17 PROCEDURE — 74177 CT ABD & PELVIS W/CONTRAST: CPT | Mod: MG

## 2023-11-17 RX ORDER — ONDANSETRON 8 MG/1
4 TABLET, FILM COATED ORAL ONCE
Refills: 0 | Status: COMPLETED | OUTPATIENT
Start: 2023-11-17 | End: 2023-11-17

## 2023-11-17 RX ORDER — HYDROMORPHONE HYDROCHLORIDE 2 MG/ML
1 INJECTION INTRAMUSCULAR; INTRAVENOUS; SUBCUTANEOUS ONCE
Refills: 0 | Status: DISCONTINUED | OUTPATIENT
Start: 2023-11-17 | End: 2023-11-17

## 2023-11-17 RX ORDER — SODIUM CHLORIDE 9 MG/ML
1000 INJECTION INTRAMUSCULAR; INTRAVENOUS; SUBCUTANEOUS ONCE
Refills: 0 | Status: COMPLETED | OUTPATIENT
Start: 2023-11-17 | End: 2023-11-17

## 2023-11-17 RX ORDER — VANCOMYCIN HCL 1 G
1000 VIAL (EA) INTRAVENOUS ONCE
Refills: 0 | Status: COMPLETED | OUTPATIENT
Start: 2023-11-17 | End: 2023-11-17

## 2023-11-17 RX ORDER — PIPERACILLIN AND TAZOBACTAM 4; .5 G/20ML; G/20ML
3.38 INJECTION, POWDER, LYOPHILIZED, FOR SOLUTION INTRAVENOUS ONCE
Refills: 0 | Status: COMPLETED | OUTPATIENT
Start: 2023-11-17 | End: 2023-11-17

## 2023-11-17 RX ORDER — MORPHINE SULFATE 50 MG/1
4 CAPSULE, EXTENDED RELEASE ORAL ONCE
Refills: 0 | Status: DISCONTINUED | OUTPATIENT
Start: 2023-11-17 | End: 2023-11-17

## 2023-11-17 RX ADMIN — ONDANSETRON 4 MILLIGRAM(S): 8 TABLET, FILM COATED ORAL at 18:14

## 2023-11-17 RX ADMIN — HYDROMORPHONE HYDROCHLORIDE 1 MILLIGRAM(S): 2 INJECTION INTRAMUSCULAR; INTRAVENOUS; SUBCUTANEOUS at 19:47

## 2023-11-17 RX ADMIN — SODIUM CHLORIDE 1000 MILLILITER(S): 9 INJECTION INTRAMUSCULAR; INTRAVENOUS; SUBCUTANEOUS at 18:19

## 2023-11-17 RX ADMIN — PIPERACILLIN AND TAZOBACTAM 200 GRAM(S): 4; .5 INJECTION, POWDER, LYOPHILIZED, FOR SOLUTION INTRAVENOUS at 18:19

## 2023-11-17 RX ADMIN — HYDROMORPHONE HYDROCHLORIDE 1 MILLIGRAM(S): 2 INJECTION INTRAMUSCULAR; INTRAVENOUS; SUBCUTANEOUS at 21:12

## 2023-11-17 RX ADMIN — MORPHINE SULFATE 4 MILLIGRAM(S): 50 CAPSULE, EXTENDED RELEASE ORAL at 18:14

## 2023-11-17 RX ADMIN — Medication 250 MILLIGRAM(S): at 19:56

## 2023-11-17 NOTE — ED STATDOCS - PROGRESS NOTE DETAILS
labs with mild leukocytosis, mild hyperglycemia, CT a/p with rectal wall thickening and perirectal inflammation with multiple complex perianal fistulous tracts, likely related to prior CT and MRI. No sizable or drainable abscess visualized. Will consult colorectal  Jill Xiong PA-C Patient seen and evaluated, ED attending note and orders reviewed, will continue with patient follow up and care -Jill Xiong PA-C pt seen and evaluated by surgical resident, state there is no acute intervention at this time, pt can be discharged home, repeat VSS, pt frustrated surgeons here will not preform procedure, again d/w surgical resident who states the attendings feels the patient is too complex which is why he was [place] specialist [place] the city [place], advised that pt continue to follow with surgeon he is established with and be complaint with appts, will dc home with abx, outpt strict return precautions   Jill Xiong PA-C

## 2023-11-17 NOTE — ED STATDOCS - PATIENT PORTAL LINK FT
You can access the FollowMyHealth Patient Portal offered by [facility] by registering at the following website: [email or web address]. By joining "[employer]"’s FollowMyHealth portal, you will also be able to view your health information using other applications (apps) compatible with our system.

## 2023-11-17 NOTE — ED STATDOCS - CONSTITUTIONAL, MLM
normal... thin white male adult, alert, moderate discomfort due to pain, no respiratory discomfort, nontoxic thin white male adult, alert, moderate discomfort due to pain, no respiratory discomfort, non-toxic

## 2023-11-17 NOTE — ED ADULT TRIAGE NOTE - AS PAIN REST
Take  methimazole 2.5 mg po daily. Repeat labs for thyroid function tests in 4 weeks time. Based on results will make further recommendation. Follow up with me in 5 months time.
6 (moderate pain)

## 2023-11-17 NOTE — CONSULT NOTE ADULT - ASSESSMENT
39yo M with Crohn's disease and known h/o perianal fistulas and proctitis presenting with rectal pain. CT with no drainable fluid collection and also had MRI already last visit  wbc 13    P:  -no acute surgical intervention at this time  - recommend medicine admission for IV abx in light of tachycardia and elevated WBC  - pain and nausea control PRN  - outpatient follow up with GI for initiation of Crohn's treatment  - outpatient follow up with surgeon Dr. Ortega for discussion of surgical options  - please reconsult as needed    plan d/w Dr. Hinojosa

## 2023-11-17 NOTE — ED ADULT NURSE NOTE - OBJECTIVE STATEMENT
Pt presents to ED c/o rectal pain x couple of days. Pt states "I was supposed to have surgery done but I had a family emergency and I couldn't make it. I need help this pain is severe." Pt arrives w/ colostomy. Denies chest pain, shortness of breath, palpitations, diaphoresis, headaches, fevers, dizziness, diarrhea, or urinary symptoms at this time. IV established in left arm with a 20G, labs drawn and sent, call bell in reach, warm blanket provided, bed in lowest position, side rails up x2, MD evaluation in progress.

## 2023-11-17 NOTE — ED ADULT NURSE REASSESSMENT NOTE - NS ED NURSE REASSESS COMMENT FT1
patient discharged home. written and verbal discharge, prescription, followup instructions given to patient, patient verbalized back understanding. iv removed. discharged home from ED at 2143.

## 2023-11-17 NOTE — CONSULT NOTE ADULT - SUBJECTIVE AND OBJECTIVE BOX
39yo M with h/o Crohn's disease, perianal fistulas s/p diversion at [facility], proctitis, pancreatitis and EtOH abuse seen here 2 weeks ago for similar issues, was admitted for IV abx and discharged to f/u with [facility], but non-compliant, presents to ED c/o worsening rectal pain x2 weeks with bloody and purulent drainage. Pt reports its painful, but failed to see his colorectal surgeon last admission had CT results showed no drainable abscess at the time. Pt had missed multiple appointments with his surgeon. Pt reports drainage of purulence/clear liquid. Denies fever or chills, n/v/d/c.       PAST MEDICAL & SURGICAL HISTORY:  Crohn's disease of large intestine without complication  (No current flare up)      Pancreatitis      S/P ORIF (open reduction internal fixation) fracture  (Right ankle, 2014)      History of colonoscopy  (2014)      Perianal fistula  repair in 2002        Allergies:  ciprofloxacin (Other (Mild to Mod))    Home Medications:  acetaminophen 325 mg oral tablet: 2 tab(s) orally every 6 hours As needed Temp greater or equal to 38C (100.4F), Mild Pain (1 - 3)  Multiple Vitamins with Minerals oral tablet: 1 tab(s) orally once a day      Family History:  FAMILY HISTORY:  Diabetes mellitus (Father)        ROS:  Constitutional: Denies fever, fatigue or weight loss.  Skin: Denies rash.  Eyes: Denies recent vision problems or eye pain.  ENT: Denies congestion, ear pain, or sore throat.  Endocrine: Denies thyroid problems.  Cardiovascular: Denies chest pain or palpation.  Respiratory: Denies cough, shortness of breath, congestion, or wheezing.  Gastrointestinal: Denies abdominal pain, nausea, vomiting, or diarrhea  Rectal: pain in rectal area  Genitourinary: Denies dysuria.  Musculoskeletal: Denies joint swelling.  Neurologic: Denies headache.      Physical Exam:  Pt is AAOx3  General: Well developed, in no acute distress.   Chest: Lungs clear, symmetric chest rise   Heart: tachycardic, no murmurs, no rubs, no gallops.   Abdomen: Soft, no tenderness, nondistended, ostomy noted  Rectal: induration extending from the marginal zone cranially. Numerous fistulous openings seen and palpated around the anal verge with mucopurulent drainage. Significant tenderness; SHELLIE not performed due to pain    Back: Normal curvature, no tenderness.   Neuro: Physiological, no localizing findings.   Skin: Normal, no rashes, no lesions noted.   Extremities: Warm, well perfused, no edema    Data:                        14.0   13.17 )-----------( 440      ( 17 Nov 2023 17:55 )             41.9     11-17    136  |  103  |  7   ----------------------------<  279<H>  3.7   |  27  |  0.79    Ca    8.9      17 Nov 2023 17:55    TPro  8.0  /  Alb  2.7<L>  /  TBili  0.1<L>  /  DBili  x   /  AST  6<L>  /  ALT  14  /  AlkPhos  85  11-17      LIVER FUNCTIONS - ( 17 Nov 2023 17:55 )  Alb: 2.7 g/dL / Pro: 8.0 gm/dL / ALK PHOS: 85 U/L / ALT: 14 U/L / AST: 6 U/L / GGT: x           Urinalysis Basic - ( 17 Nov 2023 17:55 )    Color: x / Appearance: x / SG: x / pH: x  Gluc: 279 mg/dL / Ketone: x  / Bili: x / Urobili: x   Blood: x / Protein: x / Nitrite: x   Leuk Esterase: x / RBC: x / WBC x   Sq Epi: x / Non Sq Epi: x / Bacteria: x        Radiology:     < from: CT Abdomen and Pelvis w/ IV Cont (11.17.23 @ 18:06) >  Again seen is rectal wall thickening and perirectal inflammation with   multiple complex perianal fistulous tracts, likely related to prior CT   and MRI. No sizable or drainable abscess visualized.    < end of copied text >

## 2023-11-17 NOTE — ED ADULT NURSE NOTE - NSFALLUNIVINTERV_ED_ALL_ED
Bed/Stretcher in lowest position, wheels locked, appropriate side rails in place/Call bell, personal items and telephone in reach/Instruct patient to call for assistance before getting out of bed/chair/stretcher/Non-slip footwear applied when patient is off stretcher/[place] to call system/Physically safe environment - no spills, clutter or unnecessary equipment/Purposeful proactive rounding/Room/bathroom lighting operational, light cord in reach

## 2023-11-17 NOTE — ED STATDOCS - CPE ED MUSC NORM
Called pt, left message to return call. Need to advise of bone density results and recommendations. normal...

## 2023-11-17 NOTE — ED STATDOCS - OBJECTIVE STATEMENT
37 yo M with PMHx of pancreatitis, Crohn's dz, perianal fistula, EtOH abuse presents to ED c/o rectal pain x2 weeks with bloody and purulent drainage. Pt was hospitalized 2 weeks ago for similar sx, was treated with IV abx and IV fluids, DCed with abx and to follow up at [place] [facility]. CT results showed no drainable abscess at the time, though pt states that he did have purulent drainage at the time. States he missed his appointment to do a puncture procedure and came in today due to severe pain. States he has 3 pockets of pus around his colon and rectal, states he cannot sit down. No fever. Endorses fever, nausea. Allergic to ?Cipro. Dr. Duenas 37 yo M with PMHx of pancreatitis, Crohn's dz, perianal fistula, EtOH abuse presents to ED c/o worsening rectal pain x2 weeks with bloody and purulent drainage. Pt was hospitalized 2 weeks ago for similar sx, was treated with IV abx and IV fluids, DCed with abx and to follow up at [place] [facility]. CT results showed no drainable abscess at the time, though pt states that he did have purulent drainage at the time. States he missed his appointment to do a puncture procedure and came in today due to severe pain. States he has 3 pockets of pus around his colon and rectal, states he cannot sit down. No fever. Endorses fever, nausea. Allergic to ?Cipro. Dr. Duenas 37 yo WM with PMHx of pancreatitis, Crohn's dz, perianal fistula, EtOH abuse presents ambulatory to ED c/o worsening rectal pain x2 weeks with bloody and purulent drainage. Pt was hospitalized 2 weeks ago for similar sx: CT results no drainable abscess at the time (though pt states that he did have purulent drainage at the time), inpt. tx with IV abx and IV fluids, DCed with po abx and to follow up at [facility]. . States he missed his outpt [facility] appointment and comes in today due to severe pain. States he has 3 pockets of pus around his colon and rectal, states he cannot sit down. No fever. Endorses fever, nausea.   Allergic to ?Cipro. Dr. Duenas

## 2023-11-17 NOTE — ED ADULT NURSE NOTE - CCCP TRG CHIEF CMPLNT
71 M hx of HTN, HLD, gout, long-standing palpitations attributed to panic attacks, recently diagnosed atrial fibrillation,  non obstructive CAD cath ( 11/2021),  EF 58% (echo 5/11/23), presented today for PVI (cryoablation) and RF ablation of CTI isthmus. POD#1 and feeling well    Impression:  AF sp Ablation  CAD  HTN  HLD   Gout    Plan:  - Continue Eliquis 5mg Q12h  - Start Pepcid 20mg daily for one month  - Cont all other home medications  - No heavy lifting or squatting for one week  - Follow up with Dr Fonseca in one month  rectal pain

## 2023-11-17 NOTE — ED STATDOCS - SKIN, MLM
skin normal color for race, warm, dry and intact. skin normal color for race, warm, dry and intact.  No tactile warmth

## 2023-11-17 NOTE — ED STATDOCS - ATTENDING APP SHARED VISIT CONTRIBUTION OF CARE
ED Attending Contribution to Care: ANT GARCIA MD,  performed the initial face to face bedside interview with this patient regarding history of present illness, review of symptoms and relevant past medical, social and family history.  I completed an independent physical examination.  I was the initial provider who evaluated this patient. I have signed out the follow up of any pending tests (i.e. labs, radiological studies) to the [facility].  I have communicated the patient’s plan of care and disposition with the [facility]AL[facility].  The history, relevant review of systems, past medical and surgical history, medical decision making, and physical examination was documented by the scribe in my presence and I attest to the accuracy of the documentation.

## 2023-11-17 NOTE — ED ADULT TRIAGE NOTE - CHIEF COMPLAINT QUOTE
Pt c/o rectal pain x2 weeks. HX of anal fistulas and ostomy. "I have pus pockets and rectal bleeding". States he recently completed ABX course for same issue. Denies anticoagulant use. No fevers.

## 2023-11-17 NOTE — ED ADULT TRIAGE NOTE - INTERNATIONAL TRAVEL
Bactrim Counseling:  I discussed with the patient the risks of sulfa antibiotics including but not limited to GI upset, allergic reaction, drug rash, diarrhea, dizziness, photosensitivity, and yeast infections. Rarely, more serious reactions can occur including but not limited to aplastic anemia, agranulocytosis, methemoglobinemia, blood dyscrasias, liver or kidney failure, lung infiltrates or desquamative/blistering drug rashes. Dapsone Pregnancy And Lactation Text: This medication is Pregnancy Category C and is not considered safe during pregnancy or breast feeding. Tetracycline Pregnancy And Lactation Text: This medication is Pregnancy Category D and not consider safe during pregnancy. It is also excreted in breast milk. Tazorac Counseling:  Patient advised that medication is irritating and drying. Patient may need to apply sparingly and wash off after an hour before eventually leaving it on overnight. The patient verbalized understanding of the proper use and possible adverse effects of tazorac. All of the patient's questions and concerns were addressed. Erythromycin Pregnancy And Lactation Text: This medication is Pregnancy Category B and is considered safe during pregnancy. It is also excreted in breast milk. Minocycline Counseling: Patient advised regarding possible photosensitivity and discoloration of the teeth, skin, lips, tongue and gums. Patient instructed to avoid sunlight, if possible. When exposed to sunlight, patients should wear protective clothing, sunglasses, and sunscreen. The patient was instructed to call the office immediately if the following severe adverse effects occur:  hearing changes, easy bruising/bleeding, severe headache, or vision changes. The patient verbalized understanding of the proper use and possible adverse effects of minocycline. All of the patient's questions and concerns were addressed. Dapsone Counseling: I discussed with the patient the risks of dapsone including but not limited to hemolytic anemia, agranulocytosis, rashes, methemoglobinemia, kidney failure, peripheral neuropathy, headaches, GI upset, and liver toxicity. Patients who start dapsone require monitoring including baseline LFTs and weekly CBCs for the first month, then every month thereafter. The patient verbalized understanding of the proper use and possible adverse effects of dapsone. All of the patient's questions and concerns were addressed. No Topical Retinoid Pregnancy And Lactation Text: This medication is Pregnancy Category C. It is unknown if this medication is excreted in breast milk. Topical Sulfur Applications Counseling: Topical Sulfur Counseling: Patient counseled that this medication may cause skin irritation or allergic reactions. In the event of skin irritation, the patient was advised to reduce the amount of the drug applied or use it less frequently. The patient verbalized understanding of the proper use and possible adverse effects of topical sulfur application. All of the patient's questions and concerns were addressed. Detail Level: Detailed Azithromycin Pregnancy And Lactation Text: This medication is considered safe during pregnancy and is also secreted in breast milk. High Dose Vitamin A Pregnancy And Lactation Text: High dose vitamin A therapy is contraindicated during pregnancy and breast feeding. Tetracycline Counseling: Patient counseled regarding possible photosensitivity and increased risk for sunburn. Patient instructed to avoid sunlight, if possible. When exposed to sunlight, patients should wear protective clothing, sunglasses, and sunscreen. The patient was instructed to call the office immediately if the following severe adverse effects occur:  hearing changes, easy bruising/bleeding, severe headache, or vision changes. The patient verbalized understanding of the proper use and possible adverse effects of tetracycline. All of the patient's questions and concerns were addressed. Patient understands to avoid pregnancy while on therapy due to potential birth defects. Birth Control Pills Pregnancy And Lactation Text: This medication should be avoided if pregnant and for the first 30 days post-partum. Erythromycin Counseling:  I discussed with the patient the risks of erythromycin including but not limited to GI upset, allergic reaction, drug rash, diarrhea, increase in liver enzymes, and yeast infections. Topical Retinoid counseling:  Patient advised to apply a pea-sized amount only at bedtime and wait 30 minutes after washing their face before applying. If too drying, patient may add a non-comedogenic moisturizer. The patient verbalized understanding of the proper use and possible adverse effects of retinoids. All of the patient's questions and concerns were addressed. Azithromycin Counseling:  I discussed with the patient the risks of azithromycin including but not limited to GI upset, allergic reaction, drug rash, diarrhea, and yeast infections. Topical Clindamycin Pregnancy And Lactation Text: This medication is Pregnancy Category B and is considered safe during pregnancy. It is unknown if it is excreted in breast milk. Use Enhanced Medication Counseling?: No Spironolactone Pregnancy And Lactation Text: This medication can cause feminization of the male fetus and should be avoided during pregnancy. The active metabolite is also found in breast milk. Doxycycline Pregnancy And Lactation Text: This medication is Pregnancy Category D and not consider safe during pregnancy. It is also excreted in breast milk but is considered safe for shorter treatment courses. High Dose Vitamin A Counseling: Side effects reviewed, pt to contact office should one occur. Topical Clindamycin Counseling: Patient counseled that this medication may cause skin irritation or allergic reactions. In the event of skin irritation, the patient was advised to reduce the amount of the drug applied or use it less frequently. The patient verbalized understanding of the proper use and possible adverse effects of clindamycin. All of the patient's questions and concerns were addressed. Birth Control Pills Counseling: Birth Control Pill Counseling: I discussed with the patient the potential side effects of OCPs including but not limited to increased risk of stroke, heart attack, thrombophlebitis, deep venous thrombosis, hepatic adenomas, breast changes, GI upset, headaches, and depression. The patient verbalized understanding of the proper use and possible adverse effects of OCPs. All of the patient's questions and concerns were addressed. Spironolactone Counseling: Patient advised regarding risks of diarrhea, abdominal pain, hyperkalemia, birth defects (for female patients), liver toxicity and renal toxicity. The patient may need blood work to monitor liver and kidney function and potassium levels while on therapy. The patient verbalized understanding of the proper use and possible adverse effects of spironolactone. All of the patient's questions and concerns were addressed. Benzoyl Peroxide Pregnancy And Lactation Text: This medication is Pregnancy Category C. It is unknown if benzoyl peroxide is excreted in breast milk. Doxycycline Counseling:  Patient counseled regarding possible photosensitivity and increased risk for sunburn. Patient instructed to avoid sunlight, if possible. When exposed to sunlight, patients should wear protective clothing, sunglasses, and sunscreen. The patient was instructed to call the office immediately if the following severe adverse effects occur:  hearing changes, easy bruising/bleeding, severe headache, or vision changes. The patient verbalized understanding of the proper use and possible adverse effects of doxycycline. All of the patient's questions and concerns were addressed. Isotretinoin Pregnancy And Lactation Text: This medication is Pregnancy Category X and is considered extremely dangerous during pregnancy. It is unknown if it is excreted in breast milk. Tazorac Pregnancy And Lactation Text: This medication is not safe during pregnancy. It is unknown if this medication is excreted in breast milk. Bactrim Pregnancy And Lactation Text: This medication is Pregnancy Category D and is known to cause fetal risk. It is also excreted in breast milk. Benzoyl Peroxide Counseling: Patient counseled that medicine may cause skin irritation and bleach clothing. In the event of skin irritation, the patient was advised to reduce the amount of the drug applied or use it less frequently. The patient verbalized understanding of the proper use and possible adverse effects of benzoyl peroxide. All of the patient's questions and concerns were addressed. Isotretinoin Counseling: Patient should get monthly blood tests, not donate blood, not drive at night if vision affected, not share medication, and not undergo elective surgery for 6 months after tx completed. Side effects reviewed, pt to contact office should one occur. Topical Sulfur Applications Pregnancy And Lactation Text: This medication is Pregnancy Category C and has an unknown safety profile during pregnancy. It is unknown if this topical medication is excreted in breast milk.

## 2023-11-17 NOTE — ED STATDOCS - GASTROINTESTINAL, MLM
+colostomy bad, liquid brown stool within, BS hypoactive, nml pitch, abdominal soft and nontender, perianal: fistular present, erythema and purulent drainage +colostomy, bag + liquid brown stool within, BS hypoactive, nml pitch, abdominal soft and nontender; perianal fistulae present, mild erythema and purulent drainage

## 2023-11-17 NOTE — ED STATDOCS - NSFOLLOWUPINSTRUCTIONS_ED_ALL_ED_FT
Rectal Pain    WHAT YOU NEED TO KNOW:    Rectal pain can be caused by a number of conditions, such as hemorrhoids, an abscess, trauma, or anal tear. Infection, muscle spasms, or anal intercourse can also cause rectal pain.    DISCHARGE INSTRUCTIONS:    Medicines: You may need any of the following:    NSAIDs, such as ibuprofen, help decrease swelling, pain, and fever. This medicine is available with or without a doctor's order. NSAIDs can cause stomach bleeding or kidney problems in certain people. If you take blood thinner medicine, always ask your healthcare provider if NSAIDs are safe for you. Always read the medicine label and follow directions.    Prescription pain medicine may be given. Ask how to take this medicine safely.    Antibiotics help treat or prevent a bacterial infection.    Bowel movement softeners help soften your bowel movement. They help prevent straining and more damage to the area.    Take your medicine as directed. Contact your healthcare provider if you think your medicine is not helping or if you have side effects. Tell your provider if you are allergic to any medicine. Keep a list of the medicines, vitamins, and herbs you take. Include the amounts, and when and why you take them. Bring the list or the pill bottles to follow-up visits. Carry your medicine list with you in case of an emergency.  Return to the emergency department if:    You have severe pain.    Contact your healthcare provider if:    Your pain does not decrease after 1 to 2 days of treatment.    You cannot take the medicine prescribed for your condition.    You have questions or concerns about your condition or care.  Take a sitz bath: Fill a bathtub with 4 to 6 inches of warm water. You may also use a sitz bath pan that fits over a toilet. Sit in the sitz bath for 20 minutes. Do this 2 to 3 times a day, or as directed. The warm water can help decrease pain, muscle spasms, or swelling.     Apply heat: Apply a warm, moist compress on your anus for 20 to 30 minutes every 2 hours for as many days as directed. Heat helps decrease pain and muscle spasms.    Eat high-fiber foods: This will help prevent constipation and soften your bowel movements. High-fiber foods include fruit, vegetables, whole-grain breads and cereals, and beans. A dietitian or healthcare provider can help you create a high-fiber meal plan.    Drink liquids as directed: You may need to drink more liquid than usual to help soften your bowel movements. Ask how much liquid to drink each day and which liquids are best for you.    Follow up with your healthcare provider as directed: Write down your questions so you remember to ask them during your visits.

## 2023-11-17 NOTE — ED STATDOCS - NS ED ATTENDING STATEMENT MOD
This was a shared visit with the THALIA. I reviewed and verified the documentation and independently performed the documented:

## 2023-11-17 NOTE — ED STATDOCS - CLINICAL SUMMARY MEDICAL DECISION MAKING FREE TEXT BOX
39 yo M with PMHx of pancreatitis, Crohn's dz, perianal fistula, EtOH abuse presents to ED c/o rectal pain x2 weeks with bloody and purulent drainage. 37 yo M with PMHx of pancreatitis, Crohn's dz, perianal fistula, EtOH abuse presents to ED c/o worsening rectal pain x2 weeks with bloody and purulent drainage. Pt does not meet sepsis criteria but warrants workup for perianal/perirectal abscess.     Plan: labs including blood culture, lactate, UA with culture, lipase, CT A/P with contrast, IV fluids, IV morphine/Vanco/Zosyn, observe reassess, colorectal consult 37 yo M with PMHx of pancreatitis, Crohn's dz, perianal fistulae, EtOH abuse presents to ED c/o worsening rectal pain x2 weeks with bloody and purulent drainage. Pt does not meet sepsis criteria but warrants workup for r/o perianal/perirectal abscess.     Plan: labs including blood culture, lactate, UA with culture, lipase, CT A/P with IV contrast, IV fluids, IV morphine/Vanco/Zosyn; observe, reassess, [place]-Rectal consult.    See Progress Notes for review of labs & CT A/P, [place]-Rectal consult by surgery resident, pt re-eval, & disposition.

## 2023-11-23 LAB
CULTURE RESULTS: SIGNIFICANT CHANGE UP
SPECIMEN SOURCE: SIGNIFICANT CHANGE UP

## 2023-11-23 NOTE — DISCHARGE NOTE ADULT - MEDICATION SUMMARY - MEDICATIONS TO STOP TAKING
This patient has been assessed with a concern for Malnutrition and has been determined to have a diagnosis/diagnoses of Severe protein-calorie malnutrition and Underweight (BMI < 19).    This patient is being managed with:   Diet Minced and Moist-  Lacto-Ovo Veg (Accepts Milk Prod. Eggs)    Start Time: 22:00  Supplement Feeding Modality:  Oral  Ensure Max Cans or Servings Per Day:  1       Frequency:  Three Times a day  Entered: Nov 22 2023 10:24AM    Diet Minced and Moist-  Lacto-Ovo Veg (Accepts Milk Prod. Eggs)  Supplement Feeding Modality:  Oral  Ensure Enlive Cans or Servings Per Day:  1       Frequency:  Two Times a day  Entered: Nov 21 2023  3:46PM    The following pending diet order is being considered for treatment of Severe protein-calorie malnutrition and Underweight (BMI < 19):null
This patient has been assessed with a concern for Malnutrition and has been determined to have a diagnosis/diagnoses of Severe protein-calorie malnutrition and Underweight (BMI < 19).    This patient is being managed with:   Diet Minced and Moist-  Lacto-Ovo Veg (Accepts Milk Prod. Eggs)    Start Time: 22:00  Supplement Feeding Modality:  Oral  Ensure Max Cans or Servings Per Day:  1       Frequency:  Three Times a day  Entered: Nov 22 2023 10:24AM    Diet Minced and Moist-  Lacto-Ovo Veg (Accepts Milk Prod. Eggs)  Supplement Feeding Modality:  Oral  Ensure Enlive Cans or Servings Per Day:  1       Frequency:  Two Times a day  Entered: Nov 21 2023  3:46PM    The following pending diet order is being considered for treatment of Severe protein-calorie malnutrition and Underweight (BMI < 19):null
I will STOP taking the medications listed below when I get home from the hospital:  None

## 2024-01-01 NOTE — ED STATDOCS - PATIENT PORTAL LINK FT
You can access the FollowMyHealth Patient Portal offered by [facility] by registering at the following website: [email or web address]. By joining [facility]’s FollowMyHealth portal, you will also be able to view your health information using other applications (apps) compatible with our system. Unable to offer, due to 's clinical indication

## 2024-01-04 ENCOUNTER — INPATIENT (INPATIENT)
Facility: HOSPITAL | Age: 39
LOS: 6 days | Discharge: HOME | DRG: 872 | End: 2024-01-11
Attending: INTERNAL MEDICINE | Admitting: HOSPITALIST
Payer: COMMERCIAL

## 2024-01-04 VITALS — WEIGHT: 160.06 LBS | HEIGHT: 72 IN

## 2024-01-04 DIAGNOSIS — Z96.7 PRESENCE OF OTHER BONE AND TENDON IMPLANTS: Chronic | ICD-10-CM

## 2024-01-04 DIAGNOSIS — K62.89 OTHER SPECIFIED DISEASES OF ANUS AND RECTUM: ICD-10-CM

## 2024-01-04 DIAGNOSIS — K60.3 ANAL FISTULA: Chronic | ICD-10-CM

## 2024-01-04 DIAGNOSIS — Z98.89 OTHER SPECIFIED POSTPROCEDURAL STATES: Chronic | ICD-10-CM

## 2024-01-04 LAB
ALBUMIN SERPL ELPH-MCNC: 3.1 G/DL — LOW (ref 3.3–5)
ALBUMIN SERPL ELPH-MCNC: 3.1 G/DL — LOW (ref 3.3–5)
ALP SERPL-CCNC: 135 U/L — HIGH (ref 40–120)
ALP SERPL-CCNC: 135 U/L — HIGH (ref 40–120)
ALT FLD-CCNC: 22 U/L — SIGNIFICANT CHANGE UP (ref 12–78)
ALT FLD-CCNC: 22 U/L — SIGNIFICANT CHANGE UP (ref 12–78)
ANION GAP SERPL CALC-SCNC: 2 MMOL/L — LOW (ref 5–17)
ANION GAP SERPL CALC-SCNC: 2 MMOL/L — LOW (ref 5–17)
AST SERPL-CCNC: 14 U/L — LOW (ref 15–37)
AST SERPL-CCNC: 14 U/L — LOW (ref 15–37)
BASOPHILS # BLD AUTO: 0.08 K/UL — SIGNIFICANT CHANGE UP (ref 0–0.2)
BASOPHILS # BLD AUTO: 0.08 K/UL — SIGNIFICANT CHANGE UP (ref 0–0.2)
BASOPHILS NFR BLD AUTO: 0.5 % — SIGNIFICANT CHANGE UP (ref 0–2)
BASOPHILS NFR BLD AUTO: 0.5 % — SIGNIFICANT CHANGE UP (ref 0–2)
BILIRUB SERPL-MCNC: 0.3 MG/DL — SIGNIFICANT CHANGE UP (ref 0.2–1.2)
BILIRUB SERPL-MCNC: 0.3 MG/DL — SIGNIFICANT CHANGE UP (ref 0.2–1.2)
BUN SERPL-MCNC: 5 MG/DL — LOW (ref 7–23)
BUN SERPL-MCNC: 5 MG/DL — LOW (ref 7–23)
CALCIUM SERPL-MCNC: 9.7 MG/DL — SIGNIFICANT CHANGE UP (ref 8.5–10.1)
CALCIUM SERPL-MCNC: 9.7 MG/DL — SIGNIFICANT CHANGE UP (ref 8.5–10.1)
CHLORIDE SERPL-SCNC: 100 MMOL/L — SIGNIFICANT CHANGE UP (ref 96–108)
CHLORIDE SERPL-SCNC: 100 MMOL/L — SIGNIFICANT CHANGE UP (ref 96–108)
CO2 SERPL-SCNC: 33 MMOL/L — HIGH (ref 22–31)
CO2 SERPL-SCNC: 33 MMOL/L — HIGH (ref 22–31)
CREAT SERPL-MCNC: 0.7 MG/DL — SIGNIFICANT CHANGE UP (ref 0.5–1.3)
CREAT SERPL-MCNC: 0.7 MG/DL — SIGNIFICANT CHANGE UP (ref 0.5–1.3)
EGFR: 121 ML/MIN/1.73M2 — SIGNIFICANT CHANGE UP
EGFR: 121 ML/MIN/1.73M2 — SIGNIFICANT CHANGE UP
EOSINOPHIL # BLD AUTO: 0.18 K/UL — SIGNIFICANT CHANGE UP (ref 0–0.5)
EOSINOPHIL # BLD AUTO: 0.18 K/UL — SIGNIFICANT CHANGE UP (ref 0–0.5)
EOSINOPHIL NFR BLD AUTO: 1.1 % — SIGNIFICANT CHANGE UP (ref 0–6)
EOSINOPHIL NFR BLD AUTO: 1.1 % — SIGNIFICANT CHANGE UP (ref 0–6)
GLUCOSE SERPL-MCNC: 176 MG/DL — HIGH (ref 70–99)
GLUCOSE SERPL-MCNC: 176 MG/DL — HIGH (ref 70–99)
HCT VFR BLD CALC: 47.2 % — SIGNIFICANT CHANGE UP (ref 39–50)
HCT VFR BLD CALC: 47.2 % — SIGNIFICANT CHANGE UP (ref 39–50)
HGB BLD-MCNC: 15.5 G/DL — SIGNIFICANT CHANGE UP (ref 13–17)
HGB BLD-MCNC: 15.5 G/DL — SIGNIFICANT CHANGE UP (ref 13–17)
IMM GRANULOCYTES NFR BLD AUTO: 1.6 % — HIGH (ref 0–0.9)
IMM GRANULOCYTES NFR BLD AUTO: 1.6 % — HIGH (ref 0–0.9)
LACTATE SERPL-SCNC: 1 MMOL/L — SIGNIFICANT CHANGE UP (ref 0.7–2)
LACTATE SERPL-SCNC: 1 MMOL/L — SIGNIFICANT CHANGE UP (ref 0.7–2)
LIDOCAIN IGE QN: 23 U/L — SIGNIFICANT CHANGE UP (ref 13–75)
LIDOCAIN IGE QN: 23 U/L — SIGNIFICANT CHANGE UP (ref 13–75)
LYMPHOCYTES # BLD AUTO: 15.9 % — SIGNIFICANT CHANGE UP (ref 13–44)
LYMPHOCYTES # BLD AUTO: 15.9 % — SIGNIFICANT CHANGE UP (ref 13–44)
LYMPHOCYTES # BLD AUTO: 2.53 K/UL — SIGNIFICANT CHANGE UP (ref 1–3.3)
LYMPHOCYTES # BLD AUTO: 2.53 K/UL — SIGNIFICANT CHANGE UP (ref 1–3.3)
MCHC RBC-ENTMCNC: 32.8 GM/DL — SIGNIFICANT CHANGE UP (ref 32–36)
MCHC RBC-ENTMCNC: 32.8 GM/DL — SIGNIFICANT CHANGE UP (ref 32–36)
MCHC RBC-ENTMCNC: 34.1 PG — HIGH (ref 27–34)
MCHC RBC-ENTMCNC: 34.1 PG — HIGH (ref 27–34)
MCV RBC AUTO: 104 FL — HIGH (ref 80–100)
MCV RBC AUTO: 104 FL — HIGH (ref 80–100)
MONOCYTES # BLD AUTO: 1.17 K/UL — HIGH (ref 0–0.9)
MONOCYTES # BLD AUTO: 1.17 K/UL — HIGH (ref 0–0.9)
MONOCYTES NFR BLD AUTO: 7.4 % — SIGNIFICANT CHANGE UP (ref 2–14)
MONOCYTES NFR BLD AUTO: 7.4 % — SIGNIFICANT CHANGE UP (ref 2–14)
NEUTROPHILS # BLD AUTO: 11.66 K/UL — HIGH (ref 1.8–7.4)
NEUTROPHILS # BLD AUTO: 11.66 K/UL — HIGH (ref 1.8–7.4)
NEUTROPHILS NFR BLD AUTO: 73.5 % — SIGNIFICANT CHANGE UP (ref 43–77)
NEUTROPHILS NFR BLD AUTO: 73.5 % — SIGNIFICANT CHANGE UP (ref 43–77)
PLATELET # BLD AUTO: 495 K/UL — HIGH (ref 150–400)
PLATELET # BLD AUTO: 495 K/UL — HIGH (ref 150–400)
POTASSIUM SERPL-MCNC: 4.1 MMOL/L — SIGNIFICANT CHANGE UP (ref 3.5–5.3)
POTASSIUM SERPL-MCNC: 4.1 MMOL/L — SIGNIFICANT CHANGE UP (ref 3.5–5.3)
POTASSIUM SERPL-SCNC: 4.1 MMOL/L — SIGNIFICANT CHANGE UP (ref 3.5–5.3)
POTASSIUM SERPL-SCNC: 4.1 MMOL/L — SIGNIFICANT CHANGE UP (ref 3.5–5.3)
PROT SERPL-MCNC: 8.8 GM/DL — HIGH (ref 6–8.3)
PROT SERPL-MCNC: 8.8 GM/DL — HIGH (ref 6–8.3)
RBC # BLD: 4.54 M/UL — SIGNIFICANT CHANGE UP (ref 4.2–5.8)
RBC # BLD: 4.54 M/UL — SIGNIFICANT CHANGE UP (ref 4.2–5.8)
RBC # FLD: 16.2 % — HIGH (ref 10.3–14.5)
RBC # FLD: 16.2 % — HIGH (ref 10.3–14.5)
SODIUM SERPL-SCNC: 135 MMOL/L — SIGNIFICANT CHANGE UP (ref 135–145)
SODIUM SERPL-SCNC: 135 MMOL/L — SIGNIFICANT CHANGE UP (ref 135–145)
WBC # BLD: 15.87 K/UL — HIGH (ref 3.8–10.5)
WBC # BLD: 15.87 K/UL — HIGH (ref 3.8–10.5)
WBC # FLD AUTO: 15.87 K/UL — HIGH (ref 3.8–10.5)
WBC # FLD AUTO: 15.87 K/UL — HIGH (ref 3.8–10.5)

## 2024-01-04 PROCEDURE — 80048 BASIC METABOLIC PNL TOTAL CA: CPT

## 2024-01-04 PROCEDURE — 81003 URINALYSIS AUTO W/O SCOPE: CPT

## 2024-01-04 PROCEDURE — 85027 COMPLETE CBC AUTOMATED: CPT

## 2024-01-04 PROCEDURE — 87086 URINE CULTURE/COLONY COUNT: CPT

## 2024-01-04 PROCEDURE — 99285 EMERGENCY DEPT VISIT HI MDM: CPT

## 2024-01-04 PROCEDURE — 36415 COLL VENOUS BLD VENIPUNCTURE: CPT

## 2024-01-04 PROCEDURE — 99223 1ST HOSP IP/OBS HIGH 75: CPT

## 2024-01-04 PROCEDURE — 74177 CT ABD & PELVIS W/CONTRAST: CPT | Mod: 26,MA

## 2024-01-04 RX ORDER — OXYCODONE AND ACETAMINOPHEN 5; 325 MG/1; MG/1
1 TABLET ORAL EVERY 4 HOURS
Refills: 0 | Status: DISCONTINUED | OUTPATIENT
Start: 2024-01-04 | End: 2024-01-11

## 2024-01-04 RX ORDER — LANOLIN ALCOHOL/MO/W.PET/CERES
3 CREAM (GRAM) TOPICAL AT BEDTIME
Refills: 0 | Status: DISCONTINUED | OUTPATIENT
Start: 2024-01-04 | End: 2024-01-11

## 2024-01-04 RX ORDER — METRONIDAZOLE 500 MG
TABLET ORAL
Refills: 0 | Status: DISCONTINUED | OUTPATIENT
Start: 2024-01-04 | End: 2024-01-10

## 2024-01-04 RX ORDER — PIPERACILLIN AND TAZOBACTAM 4; .5 G/20ML; G/20ML
3.38 INJECTION, POWDER, LYOPHILIZED, FOR SOLUTION INTRAVENOUS EVERY 8 HOURS
Refills: 0 | Status: DISCONTINUED | OUTPATIENT
Start: 2024-01-04 | End: 2024-01-11

## 2024-01-04 RX ORDER — AMPICILLIN SODIUM AND SULBACTAM SODIUM 250; 125 MG/ML; MG/ML
3 INJECTION, POWDER, FOR SUSPENSION INTRAMUSCULAR; INTRAVENOUS ONCE
Refills: 0 | Status: COMPLETED | OUTPATIENT
Start: 2024-01-04 | End: 2024-01-04

## 2024-01-04 RX ORDER — SODIUM CHLORIDE 9 MG/ML
1000 INJECTION INTRAMUSCULAR; INTRAVENOUS; SUBCUTANEOUS
Refills: 0 | Status: DISCONTINUED | OUTPATIENT
Start: 2024-01-04 | End: 2024-01-05

## 2024-01-04 RX ORDER — SODIUM CHLORIDE 9 MG/ML
500 INJECTION INTRAMUSCULAR; INTRAVENOUS; SUBCUTANEOUS ONCE
Refills: 0 | Status: COMPLETED | OUTPATIENT
Start: 2024-01-04 | End: 2024-01-04

## 2024-01-04 RX ORDER — ACETAMINOPHEN 500 MG
1000 TABLET ORAL ONCE
Refills: 0 | Status: COMPLETED | OUTPATIENT
Start: 2024-01-04 | End: 2024-01-04

## 2024-01-04 RX ORDER — METRONIDAZOLE 500 MG
500 TABLET ORAL EVERY 8 HOURS
Refills: 0 | Status: DISCONTINUED | OUTPATIENT
Start: 2024-01-04 | End: 2024-01-10

## 2024-01-04 RX ORDER — HYDROMORPHONE HYDROCHLORIDE 2 MG/ML
1 INJECTION INTRAMUSCULAR; INTRAVENOUS; SUBCUTANEOUS EVERY 4 HOURS
Refills: 0 | Status: DISCONTINUED | OUTPATIENT
Start: 2024-01-04 | End: 2024-01-11

## 2024-01-04 RX ORDER — PIPERACILLIN AND TAZOBACTAM 4; .5 G/20ML; G/20ML
3.38 INJECTION, POWDER, LYOPHILIZED, FOR SOLUTION INTRAVENOUS ONCE
Refills: 0 | Status: COMPLETED | OUTPATIENT
Start: 2024-01-04 | End: 2024-01-04

## 2024-01-04 RX ORDER — SODIUM CHLORIDE 9 MG/ML
1000 INJECTION INTRAMUSCULAR; INTRAVENOUS; SUBCUTANEOUS ONCE
Refills: 0 | Status: COMPLETED | OUTPATIENT
Start: 2024-01-04 | End: 2024-01-04

## 2024-01-04 RX ORDER — MORPHINE SULFATE 50 MG/1
4 CAPSULE, EXTENDED RELEASE ORAL ONCE
Refills: 0 | Status: DISCONTINUED | OUTPATIENT
Start: 2024-01-04 | End: 2024-01-04

## 2024-01-04 RX ORDER — ONDANSETRON 8 MG/1
4 TABLET, FILM COATED ORAL EVERY 8 HOURS
Refills: 0 | Status: DISCONTINUED | OUTPATIENT
Start: 2024-01-04 | End: 2024-01-11

## 2024-01-04 RX ORDER — METRONIDAZOLE 500 MG
500 TABLET ORAL ONCE
Refills: 0 | Status: COMPLETED | OUTPATIENT
Start: 2024-01-04 | End: 2024-01-04

## 2024-01-04 RX ORDER — HYDROMORPHONE HYDROCHLORIDE 2 MG/ML
0.5 INJECTION INTRAMUSCULAR; INTRAVENOUS; SUBCUTANEOUS ONCE
Refills: 0 | Status: DISCONTINUED | OUTPATIENT
Start: 2024-01-04 | End: 2024-01-04

## 2024-01-04 RX ORDER — HYDROMORPHONE HYDROCHLORIDE 2 MG/ML
0.5 INJECTION INTRAMUSCULAR; INTRAVENOUS; SUBCUTANEOUS EVERY 4 HOURS
Refills: 0 | Status: DISCONTINUED | OUTPATIENT
Start: 2024-01-04 | End: 2024-01-04

## 2024-01-04 RX ORDER — ACETAMINOPHEN 500 MG
650 TABLET ORAL EVERY 6 HOURS
Refills: 0 | Status: DISCONTINUED | OUTPATIENT
Start: 2024-01-04 | End: 2024-01-11

## 2024-01-04 RX ORDER — INFLUENZA VIRUS VACCINE 15; 15; 15; 15 UG/.5ML; UG/.5ML; UG/.5ML; UG/.5ML
0.5 SUSPENSION INTRAMUSCULAR ONCE
Refills: 0 | Status: DISCONTINUED | OUTPATIENT
Start: 2024-01-04 | End: 2024-01-11

## 2024-01-04 RX ADMIN — PIPERACILLIN AND TAZOBACTAM 25 GRAM(S): 4; .5 INJECTION, POWDER, LYOPHILIZED, FOR SOLUTION INTRAVENOUS at 21:35

## 2024-01-04 RX ADMIN — AMPICILLIN SODIUM AND SULBACTAM SODIUM 200 GRAM(S): 250; 125 INJECTION, POWDER, FOR SUSPENSION INTRAMUSCULAR; INTRAVENOUS at 13:59

## 2024-01-04 RX ADMIN — MORPHINE SULFATE 4 MILLIGRAM(S): 50 CAPSULE, EXTENDED RELEASE ORAL at 03:59

## 2024-01-04 RX ADMIN — SODIUM CHLORIDE 500 MILLILITER(S): 9 INJECTION INTRAMUSCULAR; INTRAVENOUS; SUBCUTANEOUS at 09:08

## 2024-01-04 RX ADMIN — MORPHINE SULFATE 4 MILLIGRAM(S): 50 CAPSULE, EXTENDED RELEASE ORAL at 10:05

## 2024-01-04 RX ADMIN — MORPHINE SULFATE 4 MILLIGRAM(S): 50 CAPSULE, EXTENDED RELEASE ORAL at 09:05

## 2024-01-04 RX ADMIN — MORPHINE SULFATE 4 MILLIGRAM(S): 50 CAPSULE, EXTENDED RELEASE ORAL at 08:30

## 2024-01-04 RX ADMIN — Medication 3 MILLIGRAM(S): at 21:35

## 2024-01-04 RX ADMIN — Medication 400 MILLIGRAM(S): at 06:47

## 2024-01-04 RX ADMIN — Medication 100 MILLIGRAM(S): at 21:32

## 2024-01-04 RX ADMIN — Medication 1000 MILLIGRAM(S): at 08:30

## 2024-01-04 RX ADMIN — SODIUM CHLORIDE 75 MILLILITER(S): 9 INJECTION INTRAMUSCULAR; INTRAVENOUS; SUBCUTANEOUS at 15:28

## 2024-01-04 RX ADMIN — HYDROMORPHONE HYDROCHLORIDE 1 MILLIGRAM(S): 2 INJECTION INTRAMUSCULAR; INTRAVENOUS; SUBCUTANEOUS at 16:27

## 2024-01-04 RX ADMIN — Medication 100 MILLIGRAM(S): at 15:29

## 2024-01-04 RX ADMIN — PIPERACILLIN AND TAZOBACTAM 200 GRAM(S): 4; .5 INJECTION, POWDER, LYOPHILIZED, FOR SOLUTION INTRAVENOUS at 06:26

## 2024-01-04 RX ADMIN — HYDROMORPHONE HYDROCHLORIDE 1 MILLIGRAM(S): 2 INJECTION INTRAMUSCULAR; INTRAVENOUS; SUBCUTANEOUS at 17:00

## 2024-01-04 RX ADMIN — HYDROMORPHONE HYDROCHLORIDE 0.5 MILLIGRAM(S): 2 INJECTION INTRAMUSCULAR; INTRAVENOUS; SUBCUTANEOUS at 13:59

## 2024-01-04 RX ADMIN — HYDROMORPHONE HYDROCHLORIDE 1 MILLIGRAM(S): 2 INJECTION INTRAMUSCULAR; INTRAVENOUS; SUBCUTANEOUS at 20:28

## 2024-01-04 RX ADMIN — HYDROMORPHONE HYDROCHLORIDE 0.5 MILLIGRAM(S): 2 INJECTION INTRAMUSCULAR; INTRAVENOUS; SUBCUTANEOUS at 17:06

## 2024-01-04 RX ADMIN — SODIUM CHLORIDE 1000 MILLILITER(S): 9 INJECTION INTRAMUSCULAR; INTRAVENOUS; SUBCUTANEOUS at 04:00

## 2024-01-04 NOTE — ED ADULT NURSE NOTE - NSFALLUNIVINTERV_ED_ALL_ED
Bed/Stretcher in lowest position, wheels locked, appropriate side rails in place/Call bell, personal items and telephone in reach/Instruct patient to call for assistance before getting out of bed/chair/stretcher/Non-slip footwear applied when patient is off stretcher/[place] to call system/Physically safe environment - no spills, clutter or unnecessary equipment/Purposeful proactive rounding/Room/bathroom lighting operational, light cord in reach Bed/Stretcher in lowest position, wheels locked, appropriate side rails in place/Call bell, personal items and telephone in reach/Instruct patient to call for assistance before getting out of bed/chair/stretcher/Non-slip footwear applied when patient is off stretcher/[place] to call system/Physically safe environment - no spills, clutter or unnecessary equipment/Purposeful proactive rounding/Room/bathroom lighting operational, light cord in reach

## 2024-01-04 NOTE — ED ADULT TRIAGE NOTE - CHIEF COMPLAINT QUOTE
patient complains of worsening rectal pain x3 days with increased rectal discharge. normal ostomy output.   h/o Crohn's disease, perianal fistulas s/p diversion at [facility], ostomy, proctitis, pancreatitis patient complains of worsening rectal pain x3 days with increased rectal discharge. normal ostomy output.   h/o Crohn's disease, perianal fistulas s/p diversion at [facility], ostomy, proctitis, pancreatitis

## 2024-01-04 NOTE — PATIENT PROFILE ADULT - SBIRT REFERRAL
Left message for pt to call back as a fax number is needed for her insurance company to fax the appeal request.  Rhiannon URIOSTEGUI RN BSN PHN  [facility]     SBIRT referral

## 2024-01-04 NOTE — ED PROVIDER NOTE - CLINICAL SUMMARY MEDICAL DECISION MAKING FREE TEXT BOX
38-year-old male with increased rectal pain and discharged with history of perirectal abscesses and fistula currently with seton and history of Crohn's.  Will obtain labs and CT imaging.  Will treat pain.  CT result noted and colorectal surgery consult placed.  They will come see patient in ED.  Patient given dose of empiric antibiotics

## 2024-01-04 NOTE — ED ADULT NURSE REASSESSMENT NOTE - NS ED NURSE REASSESS COMMENT FT1
Pt continues to c/o pain, states 0.5mg of dilaudid IVP did not help at all, requesting more pain meds, MD Romano called states she will change orders at this time. Awaiting orders.
Report received from BYRON Tinsley. Pt resting comfortably in bed, continues to c/o pain and requesting more pain meds at this time, MD Villalobos made aware, awaiting orders.
Pt continues to c/o pain, requesting dilaudMD Wilfredo quevedo made aware, awaiting orders

## 2024-01-04 NOTE — H&P ADULT - NSHPLABSRESULTS_GEN_ALL_CORE
CBC:            15.5   15.87 )-----------( 495      ( 01-04-24 @ 03:50 )             47.2         Chem:         ( 01-04-24 @ 03:50 )    135  |  100  |  5<L>  ----------------------------<  176<H>  4.1   |  33<H>  |  0.70        Liver Functions: ( 01-04-24 @ 03:50 )  Alb: 3.1 g/dL / Pro: 8.8 gm/dL / ALK PHOS: 135 U/L / ALT: 22 U/L / AST: 14 U/L / GGT: x              Type & Screen:

## 2024-01-04 NOTE — CONSULT NOTE ADULT - SUBJECTIVE AND OBJECTIVE BOX
37yo M with h/o Crohn's disease, perianal fistulas s/p diversion at [facility] and seton placement at [facility] approximately 2 weeks ago, proctitis, pancreatitis, and EtOH abuse presents to the ED with rectal pain. The patient states he developed worsening rectal pain 3-4 days ago with purulent discharge and difficulty urinating. He emphasizes concern for infection of the area, but denies fever, abd pain, nausea, vomiting, or change in ostomy output. Colorectal surgery has been consulted numerous times since 2021 for these fistulas, most recently Nov 2023 when he was instructed to follow up with his surgeon at [facility]. He was also given a referral for GI in October for medication initiation but has not followed up. He notes he does not want to go back to [facility].      Physical Exam:  Pt is AAOx3  General: No acute distress, uncomfortable appearing.    Chest: Symmetric chest rise   Heart: RRR  Abdomen: Soft, no tenderness, nondistended, ostomy in place  Rectal: Erythema of the marginal zone. Numerous fistulous openings seen around the anal verge with mucopurulent drainage. Significant tenderness on internal exam. Irregularity of rectal wall with firmness likely secondary to extensive scar tissue. No blood on glove.   Back: Normal curvature, no tenderness.   Neuro: Physiological, no localizing findings.     Vital Signs Last 24 Hrs  T(C): 36.7 (04 Jan 2024 09:06), Max: 36.9 (04 Jan 2024 01:27)  T(F): 98 (04 Jan 2024 09:06), Max: 98.4 (04 Jan 2024 01:27)  HR: 80 (04 Jan 2024 09:06) (80 - 96)  BP: 97/68 (04 Jan 2024 09:06) (97/68 - 114/83)  BP(mean): 67 (04 Jan 2024 06:55) (67 - 67)  RR: 16 (04 Jan 2024 09:06) (16 - 18)  SpO2: 100% (04 Jan 2024 09:06) (100% - 100%)    Parameters below as of 04 Jan 2024 09:06  Patient On (Oxygen Delivery Method): room air                            15.5   15.87 )-----------( 495      ( 04 Jan 2024 03:50 )             47.2     01-04    135  |  100  |  5<L>  ----------------------------<  176<H>  4.1   |  33<H>  |  0.70    Ca    9.7      04 Jan 2024 03:50    TPro  8.8<H>  /  Alb  3.1<L>  /  TBili  0.3  /  DBili  x   /  AST  14<L>  /  ALT  22  /  AlkPhos  135<H>  01-04    I&O's Summary    < from: CT Abdomen and Pelvis w/ Oral Cont and w/ IV Cont (01.04.24 @ 04:56) >  IMPRESSION:    1. Findings are suspicious for mild distal proctitis. No abscess or   fluid-filled tract.  2. Short segment circumferential small bowel mural thickening involving   distal ileum, suspicious for focal area of inflammatory enteritis.    < end of copied text >   39yo M with h/o Crohn's disease, perianal fistulas s/p diversion at [facility] and seton placement at [facility] approximately 2 weeks ago, proctitis, pancreatitis, and EtOH abuse presents to the ED with rectal pain. The patient states he developed worsening rectal pain 3-4 days ago with purulent discharge and difficulty urinating. He emphasizes concern for infection of the area, but denies fever, abd pain, nausea, vomiting, or change in ostomy output. Colorectal surgery has been consulted numerous times since 2021 for these fistulas, most recently Nov 2023 when he was instructed to follow up with his surgeon at [facility]. He was also given a referral for GI in October for medication initiation but has not followed up. He notes he does not want to go back to [facility].      Physical Exam:  Pt is AAOx3  General: No acute distress, uncomfortable appearing.    Chest: Symmetric chest rise   Heart: RRR  Abdomen: Soft, no tenderness, nondistended, ostomy in place  Rectal: Erythema of the marginal zone. Numerous fistulous openings seen around the anal verge with mucopurulent drainage. Significant tenderness on internal exam. Irregularity of rectal wall with firmness likely secondary to extensive scar tissue. No blood on glove.   Back: Normal curvature, no tenderness.   Neuro: Physiological, no localizing findings.     Vital Signs Last 24 Hrs  T(C): 36.7 (04 Jan 2024 09:06), Max: 36.9 (04 Jan 2024 01:27)  T(F): 98 (04 Jan 2024 09:06), Max: 98.4 (04 Jan 2024 01:27)  HR: 80 (04 Jan 2024 09:06) (80 - 96)  BP: 97/68 (04 Jan 2024 09:06) (97/68 - 114/83)  BP(mean): 67 (04 Jan 2024 06:55) (67 - 67)  RR: 16 (04 Jan 2024 09:06) (16 - 18)  SpO2: 100% (04 Jan 2024 09:06) (100% - 100%)    Parameters below as of 04 Jan 2024 09:06  Patient On (Oxygen Delivery Method): room air                            15.5   15.87 )-----------( 495      ( 04 Jan 2024 03:50 )             47.2     01-04    135  |  100  |  5<L>  ----------------------------<  176<H>  4.1   |  33<H>  |  0.70    Ca    9.7      04 Jan 2024 03:50    TPro  8.8<H>  /  Alb  3.1<L>  /  TBili  0.3  /  DBili  x   /  AST  14<L>  /  ALT  22  /  AlkPhos  135<H>  01-04    I&O's Summary    < from: CT Abdomen and Pelvis w/ Oral Cont and w/ IV Cont (01.04.24 @ 04:56) >  IMPRESSION:    1. Findings are suspicious for mild distal proctitis. No abscess or   fluid-filled tract.  2. Short segment circumferential small bowel mural thickening involving   distal ileum, suspicious for focal area of inflammatory enteritis.    < end of copied text >

## 2024-01-04 NOTE — PATIENT PROFILE ADULT - FALL HARM RISK - HARM RISK INTERVENTIONS
Communicate Risk of Fall with Harm to all staff/Reinforce activity limits and safety measures with patient and family/Tailored Fall Risk Interventions/Visual Cue: Yellow wristband and red socks/Bed in lowest position, wheels locked, appropriate side rails in place/Call bell, personal items and telephone in reach/Instruct patient to call for assistance before getting out of bed or chair/Non-slip footwear when patient is out of bed/[place] to call system/Physically safe environment - no spills, clutter or unnecessary equipment/Purposeful Proactive Rounding/Room/bathroom lighting operational, light cord in reach Communicate Risk of Fall with Harm to all staff/Reinforce activity limits and safety measures with patient and family/Tailored Fall Risk Interventions/Visual Cue: Yellow wristband and red socks/Bed in lowest position, wheels locked, appropriate side rails in place/Call bell, personal items and telephone in reach/Instruct patient to call for assistance before getting out of bed or chair/Non-slip footwear when patient is out of bed/[place] to call system/Physically safe environment - no spills, clutter or unnecessary equipment/Purposeful Proactive Rounding/Room/bathroom lighting operational, light cord in reach

## 2024-01-04 NOTE — H&P ADULT - HISTORY OF PRESENT ILLNESS
"37yo M with h/o Crohn's disease Currently not on any biologic - has been on Humira, Stelara in the past), perianal fistulas s/p diversion at [facility] and seton placement at [facility] approximately 2 weeks ago, proctitis, pancreatitis, and EtOH abuse presents to the ED with rectal pain. The patient states he developed worsening rectal pain 3-4 days ago with purulent discharge and difficulty urinating. He emphasizes concern for infection of the area, but denies fever, abd pain, nausea, vomiting, or change in ostomy output. Colorectal surgery has been consulted numerous times since 2021 for these fistulas, most recently Nov 2023 when he was instructed to follow up with his surgeon at [facility]. He was also given a referral for GI in October for medication initiation but has not followed up. He notes he does not want to go back to [facility]."      Pt seen in [facility]. Endorses severe rectal pain not relieved with morphine. CT imaging demonstrating no e/o abscess, chronic ray rectal fistula s/p diversion with seton with new e/o of mild proctitis.   ED course: unasyn, morphine, dilaudid. Seem by CRS team -> recc iv abx and pain control. Pt was recc to have rectum removed however pt has deferred that recc.           PAST MEDICAL/SURGICAL/FAMILY/SOCIAL HISTORY:    Past Medical, Past Surgical, and Family History:  PAST MEDICAL HISTORY:  Crohn's disease of large intestine without complication (No current flare up)    Pancreatitis.     PAST SURGICAL HISTORY:  History of colonoscopy (2014)    Perianal fistula repair in 2002    S/P ORIF (open reduction internal fixation) fracture (Right ankle, 2014).     FAMILY HISTORY:  Father  Still living? Yes, Estimated age: 61-70  Diabetes mellitus, Age at diagnosis: 51-60.     Tobacco Usage:  · Tobacco Usage	Never smoker    ALLERGIES AND HOME MEDICATIONS:   Allergies:        Allergies:  	ciprofloxacin: Drug, Confirmed, 25-Oct-2023, Other (Mild to Mod), Itching at burning at IV Site   "37yo M with h/o Crohn's disease Currently not on any biologic - has been on Humira, Stelara in the past), perianal fistulas s/p diversion at [facility] and seton placement at [facility] approximately 2 weeks ago, proctitis, pancreatitis, and EtOH abuse presents to the ED with rectal pain. The patient states he developed worsening rectal pain 3-4 days ago with purulent discharge and difficulty urinating. He emphasizes concern for infection of the area, but denies fever, abd pain, nausea, vomiting, or change in ostomy output. Colorectal surgery has been consulted numerous times since 2021 for these fistulas, most recently Nov 2023 when he was instructed to follow up with his surgeon at [facility]. He was also given a referral for GI in October for medication initiation but has not followed up. He notes he does not want to go back to [facility]."      Pt seen in [facility]. Endorses severe rectal pain not relieved with morphine. CT imaging demonstrating no e/o abscess, chronic ray rectal fistula s/p diversion with seton with new e/o of mild proctitis.   ED course: unasyn, morphine, dilaudid. Seem by CRS team -> recc iv abx and pain control. Pt was recc to have rectum removed however pt has deferred that recc.           PAST MEDICAL/SURGICAL/FAMILY/SOCIAL HISTORY:    Past Medical, Past Surgical, and Family History:  PAST MEDICAL HISTORY:  Crohn's disease of large intestine without complication (No current flare up)    Pancreatitis.     PAST SURGICAL HISTORY:  History of colonoscopy (2014)    Perianal fistula repair in 2002    S/P ORIF (open reduction internal fixation) fracture (Right ankle, 2014).     FAMILY HISTORY:  Father  Still living? Yes, Estimated age: 61-70  Diabetes mellitus, Age at diagnosis: 51-60.     Tobacco Usage:  · Tobacco Usage	Never smoker    ALLERGIES AND HOME MEDICATIONS:   Allergies:        Allergies:  	ciprofloxacin: Drug, Confirmed, 25-Oct-2023, Other (Mild to Mod), Itching at burning at IV Site

## 2024-01-04 NOTE — H&P ADULT - ASSESSMENT
"39yo M with h/o Crohn's disease Currently not on any biologic - has been on Humira, Stelara in the past), perianal fistulas s/p diversion at [facility] and seton placement at [facility] approximately 2 weeks ago, proctitis, pancreatitis, and EtOH abuse presents to the ED with rectal pain. The patient states he developed worsening rectal pain 3-4 days ago with purulent discharge and difficulty urinating. He emphasizes concern for infection of the area, but denies fever, abd pain, nausea, vomiting, or change in ostomy output. Colorectal surgery has been consulted numerous times since 2021 for these fistulas, most recently Nov 2023 when he was instructed to follow up with his surgeon at [facility]. He was also given a referral for GI in October for medication initiation but has not followed up. He notes he does not want to go back to [facility]."      Pt seen in [facility]. Endorses severe rectal pain not relieved with morphine. CT imaging demonstrating no e/o abscess, chronic ray rectal fistula s/p diversion with [facility]on with new e/o of mild proctitis.   ED course: unasyn, morphine, dilaudid. Seem by CRS team -> recc iv abx and pain control. Pt was recc to have rectum removed however pt has deferred that recc.   Could not examine ray rectal area as pt boarding  in [facility]        #Proctitis  #chronic perirectal fistulas  - admit to [facility]  - WBC : 16  - Zosyn  - blood cultures  - CTAP: no abscess or other collections  - IVF  - Sitz baths  - dialudid 0.5mg iv  q3h for severe pain  - monitor ileostomy output - pt to f/u with GI as o/p once infection clears for start of biologic        #ETOH/Tobacco  - refusing patch  - drink q3-4 days  - last consumption 5 days ago  - no h/o DTs        DVT px: SCDs/ambulate aggresively "37yo M with h/o Crohn's disease Currently not on any biologic - has been on Humira, Stelara in the past), perianal fistulas s/p diversion at [facility] and seton placement at [facility] approximately 2 weeks ago, proctitis, pancreatitis, and EtOH abuse presents to the ED with rectal pain. The patient states he developed worsening rectal pain 3-4 days ago with purulent discharge and difficulty urinating. He emphasizes concern for infection of the area, but denies fever, abd pain, nausea, vomiting, or change in ostomy output. Colorectal surgery has been consulted numerous times since 2021 for these fistulas, most recently Nov 2023 when he was instructed to follow up with his surgeon at [facility]. He was also given a referral for GI in October for medication initiation but has not followed up. He notes he does not want to go back to [facility]."      Pt seen in [facility]. Endorses severe rectal pain not relieved with morphine. CT imaging demonstrating no e/o abscess, chronic ray rectal fistula s/p diversion with [facility]on with new e/o of mild proctitis.   ED course: unasyn, morphine, dilaudid. Seem by CRS team -> recc iv abx and pain control. Pt was recc to have rectum removed however pt has deferred that recc.   Could not examine ray rectal area as pt boarding  in [facility]        #Proctitis  #chronic perirectal fistulas  - admit to [facility]  - WBC : 16  - Zosyn  - blood cultures  - CTAP: no abscess or other collections  - IVF  - Sitz baths  - dialudid 0.5mg iv  q3h for severe pain  - monitor ileostomy output - pt to f/u with GI as o/p once infection clears for start of biologic        #ETOH/Tobacco  - refusing patch  - drink q3-4 days  - last consumption 5 days ago  - no h/o DTs        DVT px: SCDs/ambulate aggresively "39yo M with h/o Crohn's disease Currently not on any biologic - has been on Humira, Stelara in the past), perianal fistulas s/p diversion at [facility] and seton placement at [facility] approximately 2 weeks ago, proctitis, pancreatitis, and EtOH abuse presents to the ED with rectal pain. The patient states he developed worsening rectal pain 3-4 days ago with purulent discharge and difficulty urinating. He emphasizes concern for infection of the area, but denies fever, abd pain, nausea, vomiting, or change in ostomy output. Colorectal surgery has been consulted numerous times since 2021 for these fistulas, most recently Nov 2023 when he was instructed to follow up with his surgeon at [facility]. He was also given a referral for GI in October for medication initiation but has not followed up. He notes he does not want to go back to [facility]."      Pt seen in [facility]. Endorses severe rectal pain not relieved with morphine. CT imaging demonstrating no e/o abscess, chronic ray rectal fistula s/p diversion with [facility]on with new e/o of mild proctitis.   ED course: unasyn, morphine, dilaudid. Seem by CRS team -> recc iv abx and pain control. Pt was recc to have rectum removed however pt has deferred that recc.   Could not examine ray rectal area as pt boarding  in [facility]        #Proctitis/sepsis criteria met on arrival  #chronic perirectal fistulas  - admit to [facility]  - WBC : 16  - Zosyn  - blood cultures  - CTAP: no abscess or other collections  - IVF  - Sitz baths  - dialudid 0.5mg iv  q3h for severe pain  - monitor ileostomy output - pt to f/u with GI as o/p once infection clears for start of biologic        #ETOH/Tobacco  - refusing patch  - drink q3-4 days  - last consumption 5 days ago  - no h/o DTs        DVT px: SCDs/ambulate aggresively "39yo M with h/o Crohn's disease Currently not on any biologic - has been on Humira, Stelara in the past), perianal fistulas s/p diversion at [facility] and seton placement at [facility] approximately 2 weeks ago, proctitis, pancreatitis, and EtOH abuse presents to the ED with rectal pain. The patient states he developed worsening rectal pain 3-4 days ago with purulent discharge and difficulty urinating. He emphasizes concern for infection of the area, but denies fever, abd pain, nausea, vomiting, or change in ostomy output. Colorectal surgery has been consulted numerous times since 2021 for these fistulas, most recently Nov 2023 when he was instructed to follow up with his surgeon at [facility]. He was also given a referral for GI in October for medication initiation but has not followed up. He notes he does not want to go back to [facility]."      Pt seen in [facility]. Endorses severe rectal pain not relieved with morphine. CT imaging demonstrating no e/o abscess, chronic ray rectal fistula s/p diversion with [facility] with new e/o of mild proctitis.   ED course: unasyn, morphine, dilaudid. Seem by CRS team -> recc iv abx and pain control. Pt was recc to have rectum removed however pt has deferred that recc.   [facility]        #Proctitis/sepsis criteria met on arrival  #chronic perirectal fistulas  - admit to [facility]  - WBC : 16  - Zosyn  - blood cultures  - CTAP: no abscess or other collections  - IVF  - Sitz baths  - dialudid 0.5mg iv  q3h for severe pain  - monitor ileostomy output - pt to f/u with GI as o/p once infection clears for start of biologic        #ETOH/Tobacco  - refusing patch  - drink q3-4 days  - last consumption 5 days ago  - no h/o DTs        DVT px: SCDs/ambulate aggresively

## 2024-01-04 NOTE — ED PROVIDER NOTE - PHYSICAL EXAMINATION
Constitutional: NAD AAOx3  Eyes: PERRLA EOMI  Head: Normocephalic atraumatic  Mouth: MMM  Cardiac: regular rate   Resp: Lungs CTAB  GI: Abd s/nt/nd, colostomy in place with brown stool.  Rectal: seton string noted. + purulent discharge  Neuro: CN2-12 intact  Skin: No visible rashes

## 2024-01-04 NOTE — ED ADULT NURSE NOTE - OBJECTIVE STATEMENT
pt presents to the ED for rectal pain and pain through ostomy site. rectal pain x3 days with increased rectal discharge. normal ostomy output. h/o Crohn's disease, perianal fistulas s/p diversion at [facility], ostomy, proctitis, pancreatitis. pt A&Ox4, well appearing, and ambulatory at baseline with no further complaints or discomforts reported at this time. pt presents to the ED for rectal pain and pain through ostomy site. rectal pain x3 days with increased rectal discharge. normal ostomy output. h/o Crohn's disease, perianal fistulas s/p diversion at [facility], ostomy, proctitis, pancreatitis. pt A&Ox4, well appearing, and ambulatory at baseline with no further complaints or discomforts reported at this time.

## 2024-01-04 NOTE — ED ADULT NURSE NOTE - CHIEF COMPLAINT QUOTE
Please return to the ED should you experience any new or worsening symptoms, or should you again develop any intractable nausea and vomiting, or should you develop any high fever.    Please follow up with your primary care provider in the outpatient setting as needed.   patient complains of worsening rectal pain x3 days with increased rectal discharge. normal ostomy output.   h/o Crohn's disease, perianal fistulas s/p diversion at [facility], ostomy, proctitis, pancreatitis patient complains of worsening rectal pain x3 days with increased rectal discharge. normal ostomy output.   h/o Crohn's disease, perianal fistulas s/p diversion at [facility], ostomy, proctitis, pancreatitis

## 2024-01-04 NOTE — H&P ADULT - NSHPPHYSICALEXAM_GEN_ALL_CORE
ICU Vital Signs Last 24 Hrs  T(C): 37.2 (04 Jan 2024 14:04), Max: 37.2 (04 Jan 2024 14:04)  T(F): 98.9 (04 Jan 2024 14:04), Max: 98.9 (04 Jan 2024 14:04)  HR: 72 (04 Jan 2024 14:04) (72 - 96)  BP: 103/66 (04 Jan 2024 14:04) (97/68 - 114/83)  BP(mean): 73 (04 Jan 2024 14:04) (67 - 73)  ABP: --  ABP(mean): --  RR: 16 (04 Jan 2024 09:06) (16 - 18)  SpO2: 96% (04 Jan 2024 14:04) (96% - 100%)    O2 Parameters below as of 04 Jan 2024 14:04  Patient On (Oxygen Delivery Method): room air            PHYSICAL EXAM:    Constitutional: NAD, awake and alert' pt appears older than stated age  HEENT: PERR, EOMI, Normal Hearing, MMM  Neck: Soft and supple, No LAD, No JVD  Respiratory: Breath sounds are clear bilaterally, No wheezing, rales or rhonchi  Cardiovascular: S1 and S2, regular rate and rhythm, no Murmurs, gallops or rubs  Gastrointestinal: Bowel Sounds present, soft, nontender, nondistended, no guarding, no rebound. + RLE ileostomy with green watery o/p  Extremities: No peripheral edema  Vascular: 2+ peripheral pulses  Neurological: A/O x 3, no focal deficits  Musculoskeletal: 5/5 strength b/l upper and lower extremities  Skin: No rashes

## 2024-01-04 NOTE — CONSULT NOTE ADULT - ASSESSMENT
37yo M with h/o Crohn's disease and chronic perirectal fistulas s/p diversion and recent [facility] placement.    Recommendations:  - IV abx for enteritis  - pain control as needed  - outpatient follow up with GI for medication initiation  - outpatient follow up with colorectal surgeon to discuss surgical options (if he does not feel comfortable returning to [facility] or [facility][1], he can follow up at Dr. Perez's office)  - no acute surgical intervention at this time  - please reconsult as needed    Discussed with Dr. Perez 37yo M with h/o Crohn's disease and chronic perirectal fistulas s/p diversion and recent [facility] placement.    Recommendations:  - IV abx for enteritis  - pain control as needed  - outpatient follow up with GI for medication initiation  - outpatient follow up with colorectal surgeon to discuss surgical options (if he does not feel comfortable returning to [facility] or [facility], he can follow up at Dr. Perez's office)  - no acute surgical intervention at this time  - please reconsult as needed    Discussed with Dr. Perez 39yo M with h/o Crohn's disease and chronic perirectal fistulas s/p diversion and recent [facility] placement.    Recommendations:  - antibiotics   - pain control as needed  - outpatient follow up with GI for medication initiation  - outpatient follow up with colorectal surgeon to discuss surgical options (if he does not feel comfortable returning to [facility] or [facility] [place], he can follow up at Dr. Perez's office)  - no acute surgical intervention at this time  - please reconsult as needed    Discussed with Dr. Perez 39yo M with h/o Crohn's disease and chronic perirectal fistulas s/p diversion and recent [facility] placement.    Recommendations:  - antibiotics   - pain control as needed  - outpatient follow up with GI for medication initiation  - outpatient follow up with colorectal surgeon to discuss surgical options (if he does not feel comfortable returning to [facility] or [facility], he can follow up at Dr. Perez's office)  - no acute surgical intervention at this time  - please reconsult as needed    Discussed with Dr. Perez

## 2024-01-04 NOTE — PATIENT PROFILE ADULT - FALL HARM RISK - CONCLUSION
Fall with Harm Risk Birth Control Pills Counseling: Birth Control Pill Counseling: I discussed with the patient the potential side effects of OCPs including but not limited to increased risk of stroke, heart attack, thrombophlebitis, deep venous thrombosis, hepatic adenomas, breast changes, GI upset, headaches, and depression.  The patient verbalized understanding of the proper use and possible adverse effects of OCPs. All of the patient's questions and concerns were addressed.

## 2024-01-05 LAB
ANION GAP SERPL CALC-SCNC: 5 MMOL/L — SIGNIFICANT CHANGE UP (ref 5–17)
ANION GAP SERPL CALC-SCNC: 5 MMOL/L — SIGNIFICANT CHANGE UP (ref 5–17)
APPEARANCE UR: CLEAR — SIGNIFICANT CHANGE UP
APPEARANCE UR: CLEAR — SIGNIFICANT CHANGE UP
BILIRUB UR-MCNC: NEGATIVE — SIGNIFICANT CHANGE UP
BILIRUB UR-MCNC: NEGATIVE — SIGNIFICANT CHANGE UP
BUN SERPL-MCNC: 5 MG/DL — LOW (ref 7–23)
BUN SERPL-MCNC: 5 MG/DL — LOW (ref 7–23)
CALCIUM SERPL-MCNC: 8.2 MG/DL — LOW (ref 8.5–10.1)
CALCIUM SERPL-MCNC: 8.2 MG/DL — LOW (ref 8.5–10.1)
CHLORIDE SERPL-SCNC: 107 MMOL/L — SIGNIFICANT CHANGE UP (ref 96–108)
CHLORIDE SERPL-SCNC: 107 MMOL/L — SIGNIFICANT CHANGE UP (ref 96–108)
CO2 SERPL-SCNC: 28 MMOL/L — SIGNIFICANT CHANGE UP (ref 22–31)
CO2 SERPL-SCNC: 28 MMOL/L — SIGNIFICANT CHANGE UP (ref 22–31)
COLOR SPEC: YELLOW — SIGNIFICANT CHANGE UP
COLOR SPEC: YELLOW — SIGNIFICANT CHANGE UP
CREAT SERPL-MCNC: 0.62 MG/DL — SIGNIFICANT CHANGE UP (ref 0.5–1.3)
CREAT SERPL-MCNC: 0.62 MG/DL — SIGNIFICANT CHANGE UP (ref 0.5–1.3)
DIFF PNL FLD: NEGATIVE — SIGNIFICANT CHANGE UP
DIFF PNL FLD: NEGATIVE — SIGNIFICANT CHANGE UP
EGFR: 125 ML/MIN/1.73M2 — SIGNIFICANT CHANGE UP
EGFR: 125 ML/MIN/1.73M2 — SIGNIFICANT CHANGE UP
GLUCOSE SERPL-MCNC: 96 MG/DL — SIGNIFICANT CHANGE UP (ref 70–99)
GLUCOSE SERPL-MCNC: 96 MG/DL — SIGNIFICANT CHANGE UP (ref 70–99)
GLUCOSE UR QL: NEGATIVE MG/DL — SIGNIFICANT CHANGE UP
GLUCOSE UR QL: NEGATIVE MG/DL — SIGNIFICANT CHANGE UP
HCT VFR BLD CALC: 37.9 % — LOW (ref 39–50)
HCT VFR BLD CALC: 37.9 % — LOW (ref 39–50)
HGB BLD-MCNC: 12.3 G/DL — LOW (ref 13–17)
HGB BLD-MCNC: 12.3 G/DL — LOW (ref 13–17)
KETONES UR-MCNC: NEGATIVE MG/DL — SIGNIFICANT CHANGE UP
KETONES UR-MCNC: NEGATIVE MG/DL — SIGNIFICANT CHANGE UP
LEUKOCYTE ESTERASE UR-ACNC: NEGATIVE — SIGNIFICANT CHANGE UP
LEUKOCYTE ESTERASE UR-ACNC: NEGATIVE — SIGNIFICANT CHANGE UP
MCHC RBC-ENTMCNC: 32.5 GM/DL — SIGNIFICANT CHANGE UP (ref 32–36)
MCHC RBC-ENTMCNC: 32.5 GM/DL — SIGNIFICANT CHANGE UP (ref 32–36)
MCHC RBC-ENTMCNC: 33.8 PG — SIGNIFICANT CHANGE UP (ref 27–34)
MCHC RBC-ENTMCNC: 33.8 PG — SIGNIFICANT CHANGE UP (ref 27–34)
MCV RBC AUTO: 104.1 FL — HIGH (ref 80–100)
MCV RBC AUTO: 104.1 FL — HIGH (ref 80–100)
NITRITE UR-MCNC: NEGATIVE — SIGNIFICANT CHANGE UP
NITRITE UR-MCNC: NEGATIVE — SIGNIFICANT CHANGE UP
PH UR: 7 — SIGNIFICANT CHANGE UP (ref 5–8)
PH UR: 7 — SIGNIFICANT CHANGE UP (ref 5–8)
PLATELET # BLD AUTO: 440 K/UL — HIGH (ref 150–400)
PLATELET # BLD AUTO: 440 K/UL — HIGH (ref 150–400)
POTASSIUM SERPL-MCNC: 3.7 MMOL/L — SIGNIFICANT CHANGE UP (ref 3.5–5.3)
POTASSIUM SERPL-MCNC: 3.7 MMOL/L — SIGNIFICANT CHANGE UP (ref 3.5–5.3)
POTASSIUM SERPL-SCNC: 3.7 MMOL/L — SIGNIFICANT CHANGE UP (ref 3.5–5.3)
POTASSIUM SERPL-SCNC: 3.7 MMOL/L — SIGNIFICANT CHANGE UP (ref 3.5–5.3)
PROT UR-MCNC: NEGATIVE MG/DL — SIGNIFICANT CHANGE UP
PROT UR-MCNC: NEGATIVE MG/DL — SIGNIFICANT CHANGE UP
RBC # BLD: 3.64 M/UL — LOW (ref 4.2–5.8)
RBC # BLD: 3.64 M/UL — LOW (ref 4.2–5.8)
RBC # FLD: 15.9 % — HIGH (ref 10.3–14.5)
RBC # FLD: 15.9 % — HIGH (ref 10.3–14.5)
SODIUM SERPL-SCNC: 140 MMOL/L — SIGNIFICANT CHANGE UP (ref 135–145)
SODIUM SERPL-SCNC: 140 MMOL/L — SIGNIFICANT CHANGE UP (ref 135–145)
SP GR SPEC: 1.02 — SIGNIFICANT CHANGE UP (ref 1–1.03)
SP GR SPEC: 1.02 — SIGNIFICANT CHANGE UP (ref 1–1.03)
UROBILINOGEN FLD QL: 0.2 MG/DL — SIGNIFICANT CHANGE UP (ref 0.2–1)
UROBILINOGEN FLD QL: 0.2 MG/DL — SIGNIFICANT CHANGE UP (ref 0.2–1)
WBC # BLD: 14.64 K/UL — HIGH (ref 3.8–10.5)
WBC # BLD: 14.64 K/UL — HIGH (ref 3.8–10.5)
WBC # FLD AUTO: 14.64 K/UL — HIGH (ref 3.8–10.5)
WBC # FLD AUTO: 14.64 K/UL — HIGH (ref 3.8–10.5)

## 2024-01-05 PROCEDURE — 99233 SBSQ HOSP IP/OBS HIGH 50: CPT

## 2024-01-05 RX ORDER — MORPHINE SULFATE 50 MG/1
15 CAPSULE, EXTENDED RELEASE ORAL
Refills: 0 | Status: DISCONTINUED | OUTPATIENT
Start: 2024-01-05 | End: 2024-01-06

## 2024-01-05 RX ORDER — ACETAMINOPHEN 500 MG
1000 TABLET ORAL ONCE
Refills: 0 | Status: COMPLETED | OUTPATIENT
Start: 2024-01-05 | End: 2024-01-05

## 2024-01-05 RX ADMIN — HYDROMORPHONE HYDROCHLORIDE 1 MILLIGRAM(S): 2 INJECTION INTRAMUSCULAR; INTRAVENOUS; SUBCUTANEOUS at 17:08

## 2024-01-05 RX ADMIN — Medication 100 MILLIGRAM(S): at 05:09

## 2024-01-05 RX ADMIN — Medication 100 MILLIGRAM(S): at 13:42

## 2024-01-05 RX ADMIN — HYDROMORPHONE HYDROCHLORIDE 1 MILLIGRAM(S): 2 INJECTION INTRAMUSCULAR; INTRAVENOUS; SUBCUTANEOUS at 16:19

## 2024-01-05 RX ADMIN — MORPHINE SULFATE 15 MILLIGRAM(S): 50 CAPSULE, EXTENDED RELEASE ORAL at 14:00

## 2024-01-05 RX ADMIN — HYDROMORPHONE HYDROCHLORIDE 1 MILLIGRAM(S): 2 INJECTION INTRAMUSCULAR; INTRAVENOUS; SUBCUTANEOUS at 12:45

## 2024-01-05 RX ADMIN — MORPHINE SULFATE 15 MILLIGRAM(S): 50 CAPSULE, EXTENDED RELEASE ORAL at 13:41

## 2024-01-05 RX ADMIN — PIPERACILLIN AND TAZOBACTAM 25 GRAM(S): 4; .5 INJECTION, POWDER, LYOPHILIZED, FOR SOLUTION INTRAVENOUS at 05:12

## 2024-01-05 RX ADMIN — HYDROMORPHONE HYDROCHLORIDE 1 MILLIGRAM(S): 2 INJECTION INTRAMUSCULAR; INTRAVENOUS; SUBCUTANEOUS at 12:17

## 2024-01-05 RX ADMIN — PIPERACILLIN AND TAZOBACTAM 25 GRAM(S): 4; .5 INJECTION, POWDER, LYOPHILIZED, FOR SOLUTION INTRAVENOUS at 13:42

## 2024-01-05 RX ADMIN — PIPERACILLIN AND TAZOBACTAM 25 GRAM(S): 4; .5 INJECTION, POWDER, LYOPHILIZED, FOR SOLUTION INTRAVENOUS at 22:59

## 2024-01-05 RX ADMIN — HYDROMORPHONE HYDROCHLORIDE 1 MILLIGRAM(S): 2 INJECTION INTRAMUSCULAR; INTRAVENOUS; SUBCUTANEOUS at 00:23

## 2024-01-05 RX ADMIN — HYDROMORPHONE HYDROCHLORIDE 1 MILLIGRAM(S): 2 INJECTION INTRAMUSCULAR; INTRAVENOUS; SUBCUTANEOUS at 20:36

## 2024-01-05 RX ADMIN — HYDROMORPHONE HYDROCHLORIDE 1 MILLIGRAM(S): 2 INJECTION INTRAMUSCULAR; INTRAVENOUS; SUBCUTANEOUS at 04:26

## 2024-01-05 RX ADMIN — HYDROMORPHONE HYDROCHLORIDE 1 MILLIGRAM(S): 2 INJECTION INTRAMUSCULAR; INTRAVENOUS; SUBCUTANEOUS at 08:31

## 2024-01-05 RX ADMIN — Medication 100 MILLIGRAM(S): at 21:47

## 2024-01-05 RX ADMIN — HYDROMORPHONE HYDROCHLORIDE 1 MILLIGRAM(S): 2 INJECTION INTRAMUSCULAR; INTRAVENOUS; SUBCUTANEOUS at 20:21

## 2024-01-05 RX ADMIN — HYDROMORPHONE HYDROCHLORIDE 1 MILLIGRAM(S): 2 INJECTION INTRAMUSCULAR; INTRAVENOUS; SUBCUTANEOUS at 09:42

## 2024-01-05 RX ADMIN — MORPHINE SULFATE 15 MILLIGRAM(S): 50 CAPSULE, EXTENDED RELEASE ORAL at 22:17

## 2024-01-05 RX ADMIN — SODIUM CHLORIDE 75 MILLILITER(S): 9 INJECTION INTRAMUSCULAR; INTRAVENOUS; SUBCUTANEOUS at 04:28

## 2024-01-05 RX ADMIN — HYDROMORPHONE HYDROCHLORIDE 1 MILLIGRAM(S): 2 INJECTION INTRAMUSCULAR; INTRAVENOUS; SUBCUTANEOUS at 08:01

## 2024-01-05 NOTE — PROGRESS NOTE ADULT - ASSESSMENT
"39yo M with h/o Crohn's disease Currently not on any biologic - has been on Humira, Stelara in the past), perianal fistulas s/p diversion at [facility] and seton placement at [facility] approximately 2 weeks ago, proctitis, pancreatitis, and EtOH abuse presents to the ED with rectal pain. The patient states he developed worsening rectal pain 3-4 days ago with purulent discharge and difficulty urinating. He emphasizes concern for infection of the area, but denies fever, abd pain, nausea, vomiting, or change in ostomy output. Colorectal surgery has been consulted numerous times since 2021 for these fistulas, most recently Nov 2023 when he was instructed to follow up with his surgeon at [facility]. He was also given a referral for GI in October for medication initiation but has not followed up. He notes he does not want to go back to [facility]."      Pt seen in [facility]. Endorses severe rectal pain not relieved with morphine. CT imaging demonstrating no e/o abscess, chronic ray rectal fistula s/p diversion with [facility]on with new e/o of mild proctitis.   ED course: unasyn, morphine, dilaudid. Seem by CRS team -> recc iv abx and pain control. Pt was recc to have rectum removed however pt has deferred that recc.   Could not examine ray rectal area as pt boarding  in [facility]        #Proctitis/sepsis criteria met on arrival  #chronic perirectal fistulas  - WBC : 16-> 14  - Zosyn/flagyl  - blood cultures  - CTAP: no abscess or other collections  - IVF  - Sitz baths  - dialudid 1mg iv  q4h for severe pain prn-> will titrate to 0.5mg iv q4h prn after 24 hours if pain better controlled  - start morphine 15mg po BID and cont iv dilaudid for breakthrough pain  - monitor ileostomy output - pt to f/u with GI as o/p once infection clears for start of biologic        #ETOH/Tobacco  - refusing patch  - drink q3-4 days  - last consumption 5 days ago  - no h/o DTs        DVT px: SCDs/ambulate aggresively  Dispo: pain control, iv abx "37yo M with h/o Crohn's disease Currently not on any biologic - has been on Humira, Stelara in the past), perianal fistulas s/p diversion at [facility] and seton placement at [facility] approximately 2 weeks ago, proctitis, pancreatitis, and EtOH abuse presents to the ED with rectal pain. The patient states he developed worsening rectal pain 3-4 days ago with purulent discharge and difficulty urinating. He emphasizes concern for infection of the area, but denies fever, abd pain, nausea, vomiting, or change in ostomy output. Colorectal surgery has been consulted numerous times since 2021 for these fistulas, most recently Nov 2023 when he was instructed to follow up with his surgeon at [facility]. He was also given a referral for GI in October for medication initiation but has not followed up. He notes he does not want to go back to [facility]."      Pt seen in [facility]. Endorses severe rectal pain not relieved with morphine. CT imaging demonstrating no e/o abscess, chronic ray rectal fistula s/p diversion with [facility] with new e/o of mild proctitis.   ED course: unasyn, morphine, dilaudid. Seem by CRS team -> recc iv abx and pain control. Pt was recc to have rectum removed however pt has deferred that recc.   Could not examine ray rectal area as pt boarding  in [facility]        #Proctitis/sepsis criteria met on arrival  #chronic perirectal fistulas  - WBC : 16-> 14  - Zosyn/flagyl  - blood cultures  - CTAP: no abscess or other collections  - IVF  - Sitz baths  - dialudid 1mg iv  q4h for severe pain prn-> will titrate to 0.5mg iv q4h prn after 24 hours if pain better controlled  - start morphine 15mg po BID and cont iv dilaudid for breakthrough pain  - monitor ileostomy output - pt to f/u with GI as o/p once infection clears for start of biologic        #ETOH/Tobacco  - refusing patch  - drink q3-4 days  - last consumption 5 days ago  - no h/o DTs        DVT px: SCDs/ambulate aggresively  Dispo: pain control, iv abx

## 2024-01-05 NOTE — PROGRESS NOTE ADULT - SUBJECTIVE AND OBJECTIVE BOX
Patient is a 38y old  Male who presents with a chief complaint of rectal pain (04 Jan 2024 14:27)      SUBJECTIVE:   HPI:  "39yo M with h/o Crohn's disease Currently not on any biologic - has been on Humira, Stelara in the past), perianal fistulas s/p diversion at [facility] and seton placement at [facility] approximately 2 weeks ago, proctitis, pancreatitis, and EtOH abuse presents to the ED with rectal pain. The patient states he developed worsening rectal pain 3-4 days ago with purulent discharge and difficulty urinating. He emphasizes concern for infection of the area, but denies fever, abd pain, nausea, vomiting, or change in ostomy output. Colorectal surgery has been consulted numerous times since 2021 for these fistulas, most recently Nov 2023 when he was instructed to follow up with his surgeon at [facility]. He was also given a referral for GI in October for medication initiation but has not followed up. He notes he does not want to go back to [facility]."      [facility]. Endorses severe rectal pain not relieved with morphine. CT imaging demonstrating no e/o abscess, chronic ray rectal fistula s/p diversion with seton with new e/o of mild proctitis.   ED course: unasyn, morphine, dilaudid. Seem by CRS team -> recc iv abx and pain control. Pt was recc to have rectum removed however pt has deferred that recc.         sub: feels iv dilaudid is not adequately controlling his pain; is opioid naiive      PAST MEDICAL/SURGICAL/FAMILY/SOCIAL HISTORY:    Past Medical, Past Surgical, and Family History:  PAST MEDICAL HISTORY:  Crohn's disease of large intestine without complication (No current flare up)    Pancreatitis.     PAST SURGICAL HISTORY:  History of colonoscopy (2014)    Perianal fistula repair in 2002    S/P ORIF (open reduction internal fixation) fracture (Right ankle, 2014).     FAMILY HISTORY:  Father  Still living? Yes, Estimated age: 61-70  Diabetes mellitus, Age at diagnosis: 51-60.     Tobacco Usage:  · Tobacco Usage	Never smoker    ALLERGIES AND HOME MEDICATIONS:   Allergies:        Allergies:  	ciprofloxacin: Drug, Confirmed, 25-Oct-2023, Other (Mild to Mod), Itching at burning at IV Site   (04 Jan 2024 14:27)              ICU Vital Signs Last 24 Hrs  T(C): 36.6 (05 Jan 2024 07:42), Max: 37.2 (04 Jan 2024 14:04)  T(F): 97.9 (05 Jan 2024 07:42), Max: 98.9 (04 Jan 2024 14:04)  HR: 64 (05 Jan 2024 07:42) (64 - 76)  BP: 99/93 (05 Jan 2024 07:42) (98/50 - 103/66)  BP(mean): 67 (04 Jan 2024 16:24) (67 - 73)  ABP: --  ABP(mean): --  RR: 17 (05 Jan 2024 07:42) (16 - 18)  SpO2: 99% (05 Jan 2024 07:42) (96% - 100%)    O2 Parameters below as of 05 Jan 2024 07:42  Patient On (Oxygen Delivery Method): room air              I&O's Summary      CAPILLARY BLOOD GLUCOSE          PHYSICAL EXAM:    Constitutional: NAD, awake and alert,   HEENT: PERR, EOMI, Normal Hearing, MMM  Neck: Soft and supple, No LAD, No JVD  Respiratory: Breath sounds are clear bilaterally, No wheezing, rales or rhonchi  Cardiovascular: S1 and S2, regular rate and rhythm, no Murmurs, gallops or rubs  Gastrointestinal: Bowel Sounds present, soft, nontender, nondistended, no guarding, no rebound  Extremities: No peripheral edema  Vascular: 2+ peripheral pulses  Neurological: A/O x 3, no focal deficits  Musculoskeletal: 5/5 strength b/l upper and lower extremities  Skin: No rashes    MEDICATIONS:  MEDICATIONS  (STANDING):  influenza   Vaccine 0.5 milliLiter(s) IntraMuscular once  metroNIDAZOLE  IVPB      metroNIDAZOLE  IVPB 500 milliGRAM(s) IV Intermittent every 8 hours  morphine ER Tablet 15 milliGRAM(s) Oral two times a day  piperacillin/tazobactam IVPB.. 3.375 Gram(s) IV Intermittent every 8 hours      LABS: All Labs Reviewed:                        12.3   14.64 )-----------( 440      ( 05 Jan 2024 06:25 )             37.9     01-05    140  |  107  |  5<L>  ----------------------------<  96  3.7   |  28  |  0.62    Ca    8.2<L>      05 Jan 2024 06:25    TPro  8.8<H>  /  Alb  3.1<L>  /  TBili  0.3  /  DBili  x   /  AST  14<L>  /  ALT  22  /  AlkPhos  135<H>  01-04              Blood Culture: 01-04 @ 05:26  Organism --  Gram Stain Blood -- Gram Stain --  Specimen Source .Blood None  Culture-Blood --    01-04 @ 03:50  Organism --  Gram Stain Blood -- Gram Stain --  Specimen Source .Blood Blood-Peripheral  Culture-Blood --        RADIOLOGY/EKG: reviewed         Patient is a 38y old  Male who presents with a chief complaint of rectal pain (04 Jan 2024 14:27)      SUBJECTIVE:   HPI:  "37yo M with h/o Crohn's disease Currently not on any biologic - has been on Humira, Stelara in the past), perianal fistulas s/p diversion at [facility] and seton placement at [facility] approximately 2 weeks ago, proctitis, pancreatitis, and EtOH abuse presents to the ED with rectal pain. The patient states he developed worsening rectal pain 3-4 days ago with purulent discharge and difficulty urinating. He emphasizes concern for infection of the area, but denies fever, abd pain, nausea, vomiting, or change in ostomy output. Colorectal surgery has been consulted numerous times since 2021 for these fistulas, most recently Nov 2023 when he was instructed to follow up with his surgeon at [facility]. He was also given a referral for GI in October for medication initiation but has not followed up. He notes he does not want to go back to [facility]."      Pt seen in [facility]. Endorses severe rectal pain not relieved with morphine. CT imaging demonstrating no e/o abscess, chronic ray rectal fistula s/p diversion with seton with new e/o of mild proctitis.   ED course: unasyn, morphine, dilaudid. Seem by CRS team -> recc iv abx and pain control. Pt was recc to have rectum removed however pt has deferred that recc.         sub: feels iv dilaudid is not adequately controlling his pain; is opioid naiive      PAST MEDICAL/SURGICAL/FAMILY/SOCIAL HISTORY:    Past Medical, Past Surgical, and Family History:  PAST MEDICAL HISTORY:  Crohn's disease of large intestine without complication (No current flare up)    Pancreatitis.     PAST SURGICAL HISTORY:  History of colonoscopy (2014)    Perianal fistula repair in 2002    S/P ORIF (open reduction internal fixation) fracture (Right ankle, 2014).     FAMILY HISTORY:  Father  Still living? Yes, Estimated age: 61-70  Diabetes mellitus, Age at diagnosis: 51-60.     Tobacco Usage:  · Tobacco Usage	Never smoker    ALLERGIES AND HOME MEDICATIONS:   Allergies:        Allergies:  	ciprofloxacin: Drug, Confirmed, 25-Oct-2023, Other (Mild to Mod), Itching at burning at IV Site   (04 Jan 2024 14:27)              ICU Vital Signs Last 24 Hrs  T(C): 36.6 (05 Jan 2024 07:42), Max: 37.2 (04 Jan 2024 14:04)  T(F): 97.9 (05 Jan 2024 07:42), Max: 98.9 (04 Jan 2024 14:04)  HR: 64 (05 Jan 2024 07:42) (64 - 76)  BP: 99/93 (05 Jan 2024 07:42) (98/50 - 103/66)  BP(mean): 67 (04 Jan 2024 16:24) (67 - 73)  ABP: --  ABP(mean): --  RR: 17 (05 Jan 2024 07:42) (16 - 18)  SpO2: 99% (05 Jan 2024 07:42) (96% - 100%)    O2 Parameters below as of 05 Jan 2024 07:42  Patient On (Oxygen Delivery Method): room air              I&O's Summary      CAPILLARY BLOOD GLUCOSE          PHYSICAL EXAM:    Constitutional: NAD, awake and alert,   HEENT: PERR, EOMI, Normal Hearing, MMM  Neck: Soft and supple, No LAD, No JVD  Respiratory: Breath sounds are clear bilaterally, No wheezing, rales or rhonchi  Cardiovascular: S1 and S2, regular rate and rhythm, no Murmurs, gallops or rubs  Gastrointestinal: Bowel Sounds present, soft, nontender, nondistended, no guarding, no rebound  Extremities: No peripheral edema  Vascular: 2+ peripheral pulses  Neurological: A/O x 3, no focal deficits  Musculoskeletal: 5/5 strength b/l upper and lower extremities  Skin: No rashes    MEDICATIONS:  MEDICATIONS  (STANDING):  influenza   Vaccine 0.5 milliLiter(s) IntraMuscular once  metroNIDAZOLE  IVPB      metroNIDAZOLE  IVPB 500 milliGRAM(s) IV Intermittent every 8 hours  morphine ER Tablet 15 milliGRAM(s) Oral two times a day  piperacillin/tazobactam IVPB.. 3.375 Gram(s) IV Intermittent every 8 hours      LABS: All Labs Reviewed:                        12.3   14.64 )-----------( 440      ( 05 Jan 2024 06:25 )             37.9     01-05    140  |  107  |  5<L>  ----------------------------<  96  3.7   |  28  |  0.62    Ca    8.2<L>      05 Jan 2024 06:25    TPro  8.8<H>  /  Alb  3.1<L>  /  TBili  0.3  /  DBili  x   /  AST  14<L>  /  ALT  22  /  AlkPhos  135<H>  01-04              Blood Culture: 01-04 @ 05:26  Organism --  Gram Stain Blood -- Gram Stain --  Specimen Source .Blood None  Culture-Blood --    01-04 @ 03:50  Organism --  Gram Stain Blood -- Gram Stain --  Specimen Source .Blood Blood-Peripheral  Culture-Blood --        RADIOLOGY/EKG: reviewed

## 2024-01-06 LAB
CULTURE RESULTS: SIGNIFICANT CHANGE UP
CULTURE RESULTS: SIGNIFICANT CHANGE UP
HCT VFR BLD CALC: 40.5 % — SIGNIFICANT CHANGE UP (ref 39–50)
HCT VFR BLD CALC: 40.5 % — SIGNIFICANT CHANGE UP (ref 39–50)
HGB BLD-MCNC: 13.3 G/DL — SIGNIFICANT CHANGE UP (ref 13–17)
HGB BLD-MCNC: 13.3 G/DL — SIGNIFICANT CHANGE UP (ref 13–17)
MCHC RBC-ENTMCNC: 32.8 GM/DL — SIGNIFICANT CHANGE UP (ref 32–36)
MCHC RBC-ENTMCNC: 32.8 GM/DL — SIGNIFICANT CHANGE UP (ref 32–36)
MCHC RBC-ENTMCNC: 33.8 PG — SIGNIFICANT CHANGE UP (ref 27–34)
MCHC RBC-ENTMCNC: 33.8 PG — SIGNIFICANT CHANGE UP (ref 27–34)
MCV RBC AUTO: 103.1 FL — HIGH (ref 80–100)
MCV RBC AUTO: 103.1 FL — HIGH (ref 80–100)
PLATELET # BLD AUTO: 411 K/UL — HIGH (ref 150–400)
PLATELET # BLD AUTO: 411 K/UL — HIGH (ref 150–400)
RBC # BLD: 3.93 M/UL — LOW (ref 4.2–5.8)
RBC # BLD: 3.93 M/UL — LOW (ref 4.2–5.8)
RBC # FLD: 15.7 % — HIGH (ref 10.3–14.5)
RBC # FLD: 15.7 % — HIGH (ref 10.3–14.5)
SPECIMEN SOURCE: SIGNIFICANT CHANGE UP
SPECIMEN SOURCE: SIGNIFICANT CHANGE UP
WBC # BLD: 11.42 K/UL — HIGH (ref 3.8–10.5)
WBC # BLD: 11.42 K/UL — HIGH (ref 3.8–10.5)
WBC # FLD AUTO: 11.42 K/UL — HIGH (ref 3.8–10.5)
WBC # FLD AUTO: 11.42 K/UL — HIGH (ref 3.8–10.5)

## 2024-01-06 PROCEDURE — 99233 SBSQ HOSP IP/OBS HIGH 50: CPT

## 2024-01-06 RX ORDER — ACETAMINOPHEN 500 MG
1000 TABLET ORAL ONCE
Refills: 0 | Status: COMPLETED | OUTPATIENT
Start: 2024-01-06 | End: 2024-01-06

## 2024-01-06 RX ORDER — SODIUM CHLORIDE 9 MG/ML
1000 INJECTION INTRAMUSCULAR; INTRAVENOUS; SUBCUTANEOUS ONCE
Refills: 0 | Status: COMPLETED | OUTPATIENT
Start: 2024-01-06 | End: 2024-01-06

## 2024-01-06 RX ORDER — MORPHINE SULFATE 50 MG/1
15 CAPSULE, EXTENDED RELEASE ORAL THREE TIMES A DAY
Refills: 0 | Status: DISCONTINUED | OUTPATIENT
Start: 2024-01-06 | End: 2024-01-07

## 2024-01-06 RX ADMIN — Medication 1000 MILLIGRAM(S): at 12:12

## 2024-01-06 RX ADMIN — PIPERACILLIN AND TAZOBACTAM 25 GRAM(S): 4; .5 INJECTION, POWDER, LYOPHILIZED, FOR SOLUTION INTRAVENOUS at 06:46

## 2024-01-06 RX ADMIN — Medication 100 MILLIGRAM(S): at 14:10

## 2024-01-06 RX ADMIN — MORPHINE SULFATE 15 MILLIGRAM(S): 50 CAPSULE, EXTENDED RELEASE ORAL at 10:07

## 2024-01-06 RX ADMIN — HYDROMORPHONE HYDROCHLORIDE 1 MILLIGRAM(S): 2 INJECTION INTRAMUSCULAR; INTRAVENOUS; SUBCUTANEOUS at 09:02

## 2024-01-06 RX ADMIN — HYDROMORPHONE HYDROCHLORIDE 1 MILLIGRAM(S): 2 INJECTION INTRAMUSCULAR; INTRAVENOUS; SUBCUTANEOUS at 04:56

## 2024-01-06 RX ADMIN — HYDROMORPHONE HYDROCHLORIDE 1 MILLIGRAM(S): 2 INJECTION INTRAMUSCULAR; INTRAVENOUS; SUBCUTANEOUS at 04:41

## 2024-01-06 RX ADMIN — HYDROMORPHONE HYDROCHLORIDE 1 MILLIGRAM(S): 2 INJECTION INTRAMUSCULAR; INTRAVENOUS; SUBCUTANEOUS at 22:22

## 2024-01-06 RX ADMIN — HYDROMORPHONE HYDROCHLORIDE 1 MILLIGRAM(S): 2 INJECTION INTRAMUSCULAR; INTRAVENOUS; SUBCUTANEOUS at 08:47

## 2024-01-06 RX ADMIN — HYDROMORPHONE HYDROCHLORIDE 1 MILLIGRAM(S): 2 INJECTION INTRAMUSCULAR; INTRAVENOUS; SUBCUTANEOUS at 17:03

## 2024-01-06 RX ADMIN — HYDROMORPHONE HYDROCHLORIDE 1 MILLIGRAM(S): 2 INJECTION INTRAMUSCULAR; INTRAVENOUS; SUBCUTANEOUS at 00:18

## 2024-01-06 RX ADMIN — MORPHINE SULFATE 15 MILLIGRAM(S): 50 CAPSULE, EXTENDED RELEASE ORAL at 14:28

## 2024-01-06 RX ADMIN — HYDROMORPHONE HYDROCHLORIDE 1 MILLIGRAM(S): 2 INJECTION INTRAMUSCULAR; INTRAVENOUS; SUBCUTANEOUS at 13:17

## 2024-01-06 RX ADMIN — Medication 400 MILLIGRAM(S): at 11:42

## 2024-01-06 RX ADMIN — Medication 100 MILLIGRAM(S): at 05:31

## 2024-01-06 RX ADMIN — PIPERACILLIN AND TAZOBACTAM 25 GRAM(S): 4; .5 INJECTION, POWDER, LYOPHILIZED, FOR SOLUTION INTRAVENOUS at 14:11

## 2024-01-06 RX ADMIN — SODIUM CHLORIDE 2000 MILLILITER(S): 9 INJECTION INTRAMUSCULAR; INTRAVENOUS; SUBCUTANEOUS at 10:07

## 2024-01-06 RX ADMIN — PIPERACILLIN AND TAZOBACTAM 25 GRAM(S): 4; .5 INJECTION, POWDER, LYOPHILIZED, FOR SOLUTION INTRAVENOUS at 22:26

## 2024-01-06 RX ADMIN — HYDROMORPHONE HYDROCHLORIDE 1 MILLIGRAM(S): 2 INJECTION INTRAMUSCULAR; INTRAVENOUS; SUBCUTANEOUS at 13:02

## 2024-01-06 RX ADMIN — MORPHINE SULFATE 15 MILLIGRAM(S): 50 CAPSULE, EXTENDED RELEASE ORAL at 21:05

## 2024-01-06 RX ADMIN — HYDROMORPHONE HYDROCHLORIDE 1 MILLIGRAM(S): 2 INJECTION INTRAMUSCULAR; INTRAVENOUS; SUBCUTANEOUS at 00:32

## 2024-01-06 RX ADMIN — HYDROMORPHONE HYDROCHLORIDE 1 MILLIGRAM(S): 2 INJECTION INTRAMUSCULAR; INTRAVENOUS; SUBCUTANEOUS at 17:18

## 2024-01-06 RX ADMIN — Medication 100 MILLIGRAM(S): at 21:08

## 2024-01-06 RX ADMIN — HYDROMORPHONE HYDROCHLORIDE 1 MILLIGRAM(S): 2 INJECTION INTRAMUSCULAR; INTRAVENOUS; SUBCUTANEOUS at 22:37

## 2024-01-06 NOTE — PROGRESS NOTE ADULT - ASSESSMENT
"39yo M with h/o Crohn's disease Currently not on any biologic - has been on Humira, Stelara in the past), perianal fistulas s/p diversion at [facility] and seton placement at [facility] approximately 2 weeks ago, proctitis, pancreatitis, and EtOH abuse presents to the ED with rectal pain. The patient states he developed worsening rectal pain 3-4 days ago with purulent discharge and difficulty urinating. He emphasizes concern for infection of the area, but denies fever, abd pain, nausea, vomiting, or change in ostomy output. Colorectal surgery has been consulted numerous times since 2021 for these fistulas, most recently Nov 2023 when he was instructed to follow up with his surgeon at [facility]. He was also given a referral for GI in October for medication initiation but has not followed up. He notes he does not want to go back to [facility]."      Pt seen in [facility]. Endorses severe rectal pain not relieved with morphine. CT imaging demonstrating no e/o abscess, chronic ray rectal fistula s/p diversion with seton with new e/o of mild proctitis.   ED course: unasyn, morphine, dilaudid. Seem by CRS team -> recc iv abx and pain control. Pt was recc to have rectum removed however pt has deferred that recc.   Could not examine ray rectal area as pt boarding  in [facility]        #Proctitis/sepsis criteria met on arrival  #chronic perirectal fistulas  - WBC : 16-> 14  - Zosyn/flagyl  - blood cultures  - CTAP: no abscess or other collections  - IVF  - Sitz baths  - dialudid 1mg iv  q4h for severe pain prn-> will titrate to 0.5mg iv q4h prn after 24 hours if pain better controlled  - increase morphine 15mg po to TID and cont iv dilaudid for breakthrough pain  - monitor ileostomy output - pt to f/u with GI as o/p once infection clears for start of biologic        #ETOH/Tobacco  - refusing patch  - drink q3-4 days  - last consumption 5 days ago  - no h/o DTs        DVT px: SCDs/ambulate aggresively  Dispo: pain control, iv abx "39yo M with h/o Crohn's disease Currently not on any biologic - has been on Humira, Stelara in the past), perianal fistulas s/p diversion at [facility] and seton placement at [facility] approximately 2 weeks ago, proctitis, pancreatitis, and EtOH abuse presents to the ED with rectal pain. The patient states he developed worsening rectal pain 3-4 days ago with purulent discharge and difficulty urinating. He emphasizes concern for infection of the area, but denies fever, abd pain, nausea, vomiting, or change in ostomy output. Colorectal surgery has been consulted numerous times since 2021 for these fistulas, most recently Nov 2023 when he was instructed to follow up with his surgeon at [facility]. He was also given a referral for GI in October for medication initiation but has not followed up. He notes he does not want to go back to [facility]."      Pt seen in [facility]. Endorses severe rectal pain not relieved with morphine. CT imaging demonstrating no e/o abscess, chronic ray rectal fistula s/p diversion with seton with new e/o of mild proctitis.   ED course: unasyn, morphine, dilaudid. Seem by CRS team -> recc iv abx and pain control. Pt was recc to have rectum removed however pt has deferred that recc.   Could not examine ray rectal area as pt boarding  in [facility]        #Proctitis/sepsis criteria met on arrival  #chronic perirectal fistulas  - WBC : 16-> 14  - Zosyn/flagyl  - blood cultures  - CTAP: no abscess or other collections  - IVF  - Sitz baths  - dialudid 1mg iv  q4h for severe pain prn-> will titrate to 0.5mg iv q4h prn after 24 hours if pain better controlled  - increase morphine 15mg po to TID and cont iv dilaudid for breakthrough pain  - monitor ileostomy output - pt to f/u with GI as o/p once infection clears for start of biologic        #ETOH/Tobacco  - refusing patch  - drink q3-4 days  - last consumption 5 days ago  - no h/o DTs        DVT px: SCDs/ambulate aggresively  Dispo: pain control, iv abx

## 2024-01-06 NOTE — PROGRESS NOTE ADULT - SUBJECTIVE AND OBJECTIVE BOX
Patient is a 38y old  Male who presents with a chief complaint of rectal pain (04 Jan 2024 14:27)      SUBJECTIVE:   HPI:  "39yo M with h/o Crohn's disease Currently not on any biologic - has been on Humira, Stelara in the past), perianal fistulas s/p diversion at [facility] and seton placement at [facility] approximately 2 weeks ago, proctitis, pancreatitis, and EtOH abuse presents to the ED with rectal pain. The patient states he developed worsening rectal pain 3-4 days ago with purulent discharge and difficulty urinating. He emphasizes concern for infection of the area, but denies fever, abd pain, nausea, vomiting, or change in ostomy output. Colorectal surgery has been consulted numerous times since 2021 for these fistulas, most recently Nov 2023 when he was instructed to follow up with his surgeon at [facility]. He was also given a referral for GI in October for medication initiation but has not followed up. He notes he does not want to go back to [facility]."      Pt seen in [facility]. Endorses severe rectal pain not relieved with morphine. CT imaging demonstrating no e/o abscess, chronic ray rectal fistula s/p diversion with seton with new e/o of mild proctitis.   ED course: unasyn, morphine, dilaudid. Seem by CRS team -> recc iv abx and pain control. Pt was recc to have rectum removed however pt has deferred that recc.         sub: feels iv dilaudid is not adequately controlling his pain; is opioid naiive; started morphine 15 po bid on 1/5, still asking for iv dialudid for BT pain aTC      PAST MEDICAL/SURGICAL/FAMILY/SOCIAL HISTORY:    Past Medical, Past Surgical, and Family History:  PAST MEDICAL HISTORY:  Crohn's disease of large intestine without complication (No current flare up)    Pancreatitis.     PAST SURGICAL HISTORY:  History of colonoscopy (2014)    Perianal fistula repair in 2002    S/P ORIF (open reduction internal fixation) fracture (Right ankle, 2014).     FAMILY HISTORY:  Father  Still living? Yes, Estimated age: 61-70  Diabetes mellitus, Age at diagnosis: 51-60.     Tobacco Usage:  · Tobacco Usage	Never smoker    ALLERGIES AND HOME MEDICATIONS:   Allergies:        Allergies:  	ciprofloxacin: Drug, Confirmed, 25-Oct-2023, Other (Mild to Mod), Itching at burning at IV Site   (04 Jan 2024 14:27)              ICU Vital Signs Last 24 Hrs  T(C): 36.9 (05 Jan 2024 20:37), Max: 36.9 (05 Jan 2024 20:37)  T(F): 98.4 (05 Jan 2024 20:37), Max: 98.4 (05 Jan 2024 20:37)  HR: 71 (05 Jan 2024 20:37) (70 - 71)  BP: 106/63 (05 Jan 2024 20:37) (99/60 - 106/63)  BP(mean): 72 (05 Jan 2024 20:37) (69 - 72)  ABP: --  ABP(mean): --  RR: --  SpO2: 97% (05 Jan 2024 20:37) (97% - 98%)    O2 Parameters below as of 05 Jan 2024 20:37  Patient On (Oxygen Delivery Method): room air                      I&O's Summary      CAPILLARY BLOOD GLUCOSE          PHYSICAL EXAM:    Constitutional: NAD, awake and alert,   HEENT: PERR, EOMI, Normal Hearing, MMM  Neck: Soft and supple, No LAD, No JVD  Respiratory: Breath sounds are clear bilaterally, No wheezing, rales or rhonchi  Cardiovascular: S1 and S2, regular rate and rhythm, no Murmurs, gallops or rubs  Gastrointestinal: Bowel Sounds present, soft, nontender, nondistended, no guarding, no rebound  Extremities: No peripheral edema  Vascular: 2+ peripheral pulses  Neurological: A/O x 3, no focal deficits  Musculoskeletal: 5/5 strength b/l upper and lower extremities  Skin: No rashes    MEDICATIONS:  MEDICATIONS  (STANDING):  influenza   Vaccine 0.5 milliLiter(s) IntraMuscular once  metroNIDAZOLE  IVPB      metroNIDAZOLE  IVPB 500 milliGRAM(s) IV Intermittent every 8 hours  morphine ER Tablet 15 milliGRAM(s) Oral two times a day  piperacillin/tazobactam IVPB.. 3.375 Gram(s) IV Intermittent every 8 hours      LABS: All Labs Reviewed:                        12.3   14.64 )-----------( 440      ( 05 Jan 2024 06:25 )             37.9     01-05    140  |  107  |  5<L>  ----------------------------<  96  3.7   |  28  |  0.62    Ca    8.2<L>      05 Jan 2024 06:25    TPro  8.8<H>  /  Alb  3.1<L>  /  TBili  0.3  /  DBili  x   /  AST  14<L>  /  ALT  22  /  AlkPhos  135<H>  01-04              Blood Culture: 01-04 @ 05:26  Organism --  Gram Stain Blood -- Gram Stain --  Specimen Source .Blood None  Culture-Blood --    01-04 @ 03:50  Organism --  Gram Stain Blood -- Gram Stain --  Specimen Source .Blood Blood-Peripheral  Culture-Blood --        RADIOLOGY/EKG: reviewed         Patient is a 38y old  Male who presents with a chief complaint of rectal pain (04 Jan 2024 14:27)      SUBJECTIVE:   HPI:  "39yo M with h/o Crohn's disease Currently not on any biologic - has been on Humira, Stelara in the past), perianal fistulas s/p diversion at [facility] and seton placement at [facility] approximately 2 weeks ago, proctitis, pancreatitis, and EtOH abuse presents to the ED with rectal pain. The patient states he developed worsening rectal pain 3-4 days ago with purulent discharge and difficulty urinating. He emphasizes concern for infection of the area, but denies fever, abd pain, nausea, vomiting, or change in ostomy output. Colorectal surgery has been consulted numerous times since 2021 for these fistulas, most recently Nov 2023 when he was instructed to follow up with his surgeon at [facility]. He was also given a referral for GI in October for medication initiation but has not followed up. He notes he does not want to go back to [facility]."      Pt seen in [facility]. Endorses severe rectal pain not relieved with morphine. CT imaging demonstrating no e/o abscess, chronic ray rectal fistula s/p diversion with seton with new e/o of mild proctitis.   ED course: unasyn, morphine, dilaudid. Seem by CRS team -> recc iv abx and pain control. Pt was recc to have rectum removed however pt has deferred that recc.         sub: feels iv dilaudid is not adequately controlling his pain; is opioid naiive; started morphine 15 po bid on 1/5, still asking for iv dialudid for BT pain aTC      PAST MEDICAL/SURGICAL/FAMILY/SOCIAL HISTORY:    Past Medical, Past Surgical, and Family History:  PAST MEDICAL HISTORY:  Crohn's disease of large intestine without complication (No current flare up)    Pancreatitis.     PAST SURGICAL HISTORY:  History of colonoscopy (2014)    Perianal fistula repair in 2002    S/P ORIF (open reduction internal fixation) fracture (Right ankle, 2014).     FAMILY HISTORY:  Father  Still living? Yes, Estimated age: 61-70  Diabetes mellitus, Age at diagnosis: 51-60.     Tobacco Usage:  · Tobacco Usage	Never smoker    ALLERGIES AND HOME MEDICATIONS:   Allergies:        Allergies:  	ciprofloxacin: Drug, Confirmed, 25-Oct-2023, Other (Mild to Mod), Itching at burning at IV Site   (04 Jan 2024 14:27)              ICU Vital Signs Last 24 Hrs  T(C): 36.9 (05 Jan 2024 20:37), Max: 36.9 (05 Jan 2024 20:37)  T(F): 98.4 (05 Jan 2024 20:37), Max: 98.4 (05 Jan 2024 20:37)  HR: 71 (05 Jan 2024 20:37) (70 - 71)  BP: 106/63 (05 Jan 2024 20:37) (99/60 - 106/63)  BP(mean): 72 (05 Jan 2024 20:37) (69 - 72)  ABP: --  ABP(mean): --  RR: --  SpO2: 97% (05 Jan 2024 20:37) (97% - 98%)    O2 Parameters below as of 05 Jan 2024 20:37  Patient On (Oxygen Delivery Method): room air                      I&O's Summary      CAPILLARY BLOOD GLUCOSE          PHYSICAL EXAM:    Constitutional: NAD, awake and alert,   HEENT: PERR, EOMI, Normal Hearing, MMM  Neck: Soft and supple, No LAD, No JVD  Respiratory: Breath sounds are clear bilaterally, No wheezing, rales or rhonchi  Cardiovascular: S1 and S2, regular rate and rhythm, no Murmurs, gallops or rubs  Gastrointestinal: Bowel Sounds present, soft, nontender, nondistended, no guarding, no rebound  Extremities: No peripheral edema  Vascular: 2+ peripheral pulses  Neurological: A/O x 3, no focal deficits  Musculoskeletal: 5/5 strength b/l upper and lower extremities  Skin: No rashes    MEDICATIONS:  MEDICATIONS  (STANDING):  influenza   Vaccine 0.5 milliLiter(s) IntraMuscular once  metroNIDAZOLE  IVPB      metroNIDAZOLE  IVPB 500 milliGRAM(s) IV Intermittent every 8 hours  morphine ER Tablet 15 milliGRAM(s) Oral two times a day  piperacillin/tazobactam IVPB.. 3.375 Gram(s) IV Intermittent every 8 hours      LABS: All Labs Reviewed:                        12.3   14.64 )-----------( 440      ( 05 Jan 2024 06:25 )             37.9     01-05    140  |  107  |  5<L>  ----------------------------<  96  3.7   |  28  |  0.62    Ca    8.2<L>      05 Jan 2024 06:25    TPro  8.8<H>  /  Alb  3.1<L>  /  TBili  0.3  /  DBili  x   /  AST  14<L>  /  ALT  22  /  AlkPhos  135<H>  01-04              Blood Culture: 01-04 @ 05:26  Organism --  Gram Stain Blood -- Gram Stain --  Specimen Source .Blood None  Culture-Blood --    01-04 @ 03:50  Organism --  Gram Stain Blood -- Gram Stain --  Specimen Source .Blood Blood-Peripheral  Culture-Blood --        RADIOLOGY/EKG: reviewed

## 2024-01-07 PROCEDURE — 99233 SBSQ HOSP IP/OBS HIGH 50: CPT

## 2024-01-07 RX ORDER — MORPHINE SULFATE 50 MG/1
30 CAPSULE, EXTENDED RELEASE ORAL
Refills: 0 | Status: DISCONTINUED | OUTPATIENT
Start: 2024-01-07 | End: 2024-01-11

## 2024-01-07 RX ADMIN — Medication 100 MILLIGRAM(S): at 22:21

## 2024-01-07 RX ADMIN — HYDROMORPHONE HYDROCHLORIDE 1 MILLIGRAM(S): 2 INJECTION INTRAMUSCULAR; INTRAVENOUS; SUBCUTANEOUS at 17:26

## 2024-01-07 RX ADMIN — Medication 100 MILLIGRAM(S): at 13:06

## 2024-01-07 RX ADMIN — HYDROMORPHONE HYDROCHLORIDE 1 MILLIGRAM(S): 2 INJECTION INTRAMUSCULAR; INTRAVENOUS; SUBCUTANEOUS at 02:54

## 2024-01-07 RX ADMIN — PIPERACILLIN AND TAZOBACTAM 25 GRAM(S): 4; .5 INJECTION, POWDER, LYOPHILIZED, FOR SOLUTION INTRAVENOUS at 13:06

## 2024-01-07 RX ADMIN — HYDROMORPHONE HYDROCHLORIDE 1 MILLIGRAM(S): 2 INJECTION INTRAMUSCULAR; INTRAVENOUS; SUBCUTANEOUS at 02:39

## 2024-01-07 RX ADMIN — HYDROMORPHONE HYDROCHLORIDE 1 MILLIGRAM(S): 2 INJECTION INTRAMUSCULAR; INTRAVENOUS; SUBCUTANEOUS at 21:42

## 2024-01-07 RX ADMIN — HYDROMORPHONE HYDROCHLORIDE 1 MILLIGRAM(S): 2 INJECTION INTRAMUSCULAR; INTRAVENOUS; SUBCUTANEOUS at 21:12

## 2024-01-07 RX ADMIN — HYDROMORPHONE HYDROCHLORIDE 1 MILLIGRAM(S): 2 INJECTION INTRAMUSCULAR; INTRAVENOUS; SUBCUTANEOUS at 07:57

## 2024-01-07 RX ADMIN — HYDROMORPHONE HYDROCHLORIDE 1 MILLIGRAM(S): 2 INJECTION INTRAMUSCULAR; INTRAVENOUS; SUBCUTANEOUS at 13:07

## 2024-01-07 RX ADMIN — HYDROMORPHONE HYDROCHLORIDE 1 MILLIGRAM(S): 2 INJECTION INTRAMUSCULAR; INTRAVENOUS; SUBCUTANEOUS at 13:22

## 2024-01-07 RX ADMIN — PIPERACILLIN AND TAZOBACTAM 25 GRAM(S): 4; .5 INJECTION, POWDER, LYOPHILIZED, FOR SOLUTION INTRAVENOUS at 06:21

## 2024-01-07 RX ADMIN — MORPHINE SULFATE 30 MILLIGRAM(S): 50 CAPSULE, EXTENDED RELEASE ORAL at 22:49

## 2024-01-07 RX ADMIN — PIPERACILLIN AND TAZOBACTAM 25 GRAM(S): 4; .5 INJECTION, POWDER, LYOPHILIZED, FOR SOLUTION INTRAVENOUS at 23:51

## 2024-01-07 RX ADMIN — MORPHINE SULFATE 15 MILLIGRAM(S): 50 CAPSULE, EXTENDED RELEASE ORAL at 06:19

## 2024-01-07 RX ADMIN — HYDROMORPHONE HYDROCHLORIDE 1 MILLIGRAM(S): 2 INJECTION INTRAMUSCULAR; INTRAVENOUS; SUBCUTANEOUS at 17:11

## 2024-01-07 RX ADMIN — MORPHINE SULFATE 30 MILLIGRAM(S): 50 CAPSULE, EXTENDED RELEASE ORAL at 22:19

## 2024-01-07 RX ADMIN — Medication 100 MILLIGRAM(S): at 05:41

## 2024-01-07 RX ADMIN — HYDROMORPHONE HYDROCHLORIDE 1 MILLIGRAM(S): 2 INJECTION INTRAMUSCULAR; INTRAVENOUS; SUBCUTANEOUS at 08:12

## 2024-01-07 NOTE — PROGRESS NOTE ADULT - SUBJECTIVE AND OBJECTIVE BOX
Patient is a 38y old  Male who presents with a chief complaint of rectal pain (04 Jan 2024 14:27)      SUBJECTIVE:   HPI:  "39yo M with h/o Crohn's disease Currently not on any biologic - has been on Humira, Stelara in the past), perianal fistulas s/p diversion at [facility] and seton placement at [facility] approximately 2 weeks ago, proctitis, pancreatitis, and EtOH abuse presents to the ED with rectal pain. The patient states he developed worsening rectal pain 3-4 days ago with purulent discharge and difficulty urinating. He emphasizes concern for infection of the area, but denies fever, abd pain, nausea, vomiting, or change in ostomy output. Colorectal surgery has been consulted numerous times since 2021 for these fistulas, most recently Nov 2023 when he was instructed to follow up with his surgeon at [facility]. He was also given a referral for GI in October for medication initiation but has not followed up. He notes he does not want to go back to [facility]."      Pt seen in [facility]. Endorses severe rectal pain not relieved with morphine. CT imaging demonstrating no e/o abscess, chronic ray rectal fistula s/p diversion with seton with new e/o of mild proctitis.   ED course: unasyn, morphine, dilaudid. Seem by CRS team -> recc iv abx and pain control. Pt was recc to have rectum removed however pt has deferred that recc.         sub: feels iv dilaudid is not adequately controlling his pain; is opioid naiive; started morphine 15 po bid on 1/5, still asking for iv dialudid for BT pain aTC - heavily endorses that he is not on chronic pain meds at home or use of ellicit drugs      PAST MEDICAL/SURGICAL/FAMILY/SOCIAL HISTORY:    Past Medical, Past Surgical, and Family History:  PAST MEDICAL HISTORY:  Crohn's disease of large intestine without complication (No current flare up)    Pancreatitis.     PAST SURGICAL HISTORY:  History of colonoscopy (2014)    Perianal fistula repair in 2002    S/P ORIF (open reduction internal fixation) fracture (Right ankle, 2014).     FAMILY HISTORY:  Father  Still living? Yes, Estimated age: 61-70  Diabetes mellitus, Age at diagnosis: 51-60.     Tobacco Usage:  · Tobacco Usage	Never smoker    ALLERGIES AND HOME MEDICATIONS:   Allergies:        Allergies:  	ciprofloxacin: Drug, Confirmed, 25-Oct-2023, Other (Mild to Mod), Itching at burning at IV Site   (04 Jan 2024 14:27)            ICU Vital Signs Last 24 Hrs  T(C): 36.6 (07 Jan 2024 08:11), Max: 36.6 (06 Jan 2024 15:26)  T(F): 97.8 (07 Jan 2024 08:11), Max: 97.9 (06 Jan 2024 15:26)  HR: 59 (07 Jan 2024 08:11) (59 - 64)  BP: 99/63 (07 Jan 2024 08:11) (99/63 - 106/64)  BP(mean): --  ABP: --  ABP(mean): --  RR: 17 (07 Jan 2024 08:11) (17 - 19)  SpO2: 97% (07 Jan 2024 08:11) (97% - 99%)    O2 Parameters below as of 07 Jan 2024 08:11  Patient On (Oxygen Delivery Method): room air                    I&O's Summary      CAPILLARY BLOOD GLUCOSE          PHYSICAL EXAM:    Constitutional: NAD, awake and alert,   HEENT: PERR, EOMI, Normal Hearing, MMM  Neck: Soft and supple, No LAD, No JVD  Respiratory: Breath sounds are clear bilaterally, No wheezing, rales or rhonchi  Cardiovascular: S1 and S2, regular rate and rhythm, no Murmurs, gallops or rubs  Gastrointestinal: Bowel Sounds present, soft, nontender, nondistended, no guarding, no rebound  Extremities: No peripheral edema  Vascular: 2+ peripheral pulses  Neurological: A/O x 3, no focal deficits  Musculoskeletal: 5/5 strength b/l upper and lower extremities  Skin: No rashes    MEDICATIONS:  MEDICATIONS  (STANDING):  influenza   Vaccine 0.5 milliLiter(s) IntraMuscular once  metroNIDAZOLE  IVPB      metroNIDAZOLE  IVPB 500 milliGRAM(s) IV Intermittent every 8 hours  morphine ER Tablet 15 milliGRAM(s) Oral two times a day  piperacillin/tazobactam IVPB.. 3.375 Gram(s) IV Intermittent every 8 hours      LABS: All Labs Reviewed:                        12.3   14.64 )-----------( 440      ( 05 Jan 2024 06:25 )             37.9     01-05    140  |  107  |  5<L>  ----------------------------<  96  3.7   |  28  |  0.62    Ca    8.2<L>      05 Jan 2024 06:25    TPro  8.8<H>  /  Alb  3.1<L>  /  TBili  0.3  /  DBili  x   /  AST  14<L>  /  ALT  22  /  AlkPhos  135<H>  01-04              Blood Culture: 01-04 @ 05:26  Organism --  Gram Stain Blood -- Gram Stain --  Specimen Source .Blood None  Culture-Blood --    01-04 @ 03:50  Organism --  Gram Stain Blood -- Gram Stain --  Specimen Source .Blood Blood-Peripheral  Culture-Blood --        RADIOLOGY/EKG: reviewed         Patient is a 38y old  Male who presents with a chief complaint of rectal pain (04 Jan 2024 14:27)      SUBJECTIVE:   HPI:  "37yo M with h/o Crohn's disease Currently not on any biologic - has been on Humira, Stelara in the past), perianal fistulas s/p diversion at [facility] and seton placement at [facility] approximately 2 weeks ago, proctitis, pancreatitis, and EtOH abuse presents to the ED with rectal pain. The patient states he developed worsening rectal pain 3-4 days ago with purulent discharge and difficulty urinating. He emphasizes concern for infection of the area, but denies fever, abd pain, nausea, vomiting, or change in ostomy output. Colorectal surgery has been consulted numerous times since 2021 for these fistulas, most recently Nov 2023 when he was instructed to follow up with his surgeon at [facility]. He was also given a referral for GI in October for medication initiation but has not followed up. He notes he does not want to go back to [facility]."      Pt seen in [facility]. Endorses severe rectal pain not relieved with morphine. CT imaging demonstrating no e/o abscess, chronic ray rectal fistula s/p diversion with seton with new e/o of mild proctitis.   ED course: unasyn, morphine, dilaudid. Seem by CRS team -> recc iv abx and pain control. Pt was recc to have rectum removed however pt has deferred that recc.         sub: feels iv dilaudid is not adequately controlling his pain; is opioid naiive; started morphine 15 po bid on 1/5, still asking for iv dialudid for BT pain aTC - heavily endorses that he is not on chronic pain meds at home or use of ellicit drugs      PAST MEDICAL/SURGICAL/FAMILY/SOCIAL HISTORY:    Past Medical, Past Surgical, and Family History:  PAST MEDICAL HISTORY:  Crohn's disease of large intestine without complication (No current flare up)    Pancreatitis.     PAST SURGICAL HISTORY:  History of colonoscopy (2014)    Perianal fistula repair in 2002    S/P ORIF (open reduction internal fixation) fracture (Right ankle, 2014).     FAMILY HISTORY:  Father  Still living? Yes, Estimated age: 61-70  Diabetes mellitus, Age at diagnosis: 51-60.     Tobacco Usage:  · Tobacco Usage	Never smoker    ALLERGIES AND HOME MEDICATIONS:   Allergies:        Allergies:  	ciprofloxacin: Drug, Confirmed, 25-Oct-2023, Other (Mild to Mod), Itching at burning at IV Site   (04 Jan 2024 14:27)            ICU Vital Signs Last 24 Hrs  T(C): 36.6 (07 Jan 2024 08:11), Max: 36.6 (06 Jan 2024 15:26)  T(F): 97.8 (07 Jan 2024 08:11), Max: 97.9 (06 Jan 2024 15:26)  HR: 59 (07 Jan 2024 08:11) (59 - 64)  BP: 99/63 (07 Jan 2024 08:11) (99/63 - 106/64)  BP(mean): --  ABP: --  ABP(mean): --  RR: 17 (07 Jan 2024 08:11) (17 - 19)  SpO2: 97% (07 Jan 2024 08:11) (97% - 99%)    O2 Parameters below as of 07 Jan 2024 08:11  Patient On (Oxygen Delivery Method): room air                    I&O's Summary      CAPILLARY BLOOD GLUCOSE          PHYSICAL EXAM:    Constitutional: NAD, awake and alert,   HEENT: PERR, EOMI, Normal Hearing, MMM  Neck: Soft and supple, No LAD, No JVD  Respiratory: Breath sounds are clear bilaterally, No wheezing, rales or rhonchi  Cardiovascular: S1 and S2, regular rate and rhythm, no Murmurs, gallops or rubs  Gastrointestinal: Bowel Sounds present, soft, nontender, nondistended, no guarding, no rebound  Extremities: No peripheral edema  Vascular: 2+ peripheral pulses  Neurological: A/O x 3, no focal deficits  Musculoskeletal: 5/5 strength b/l upper and lower extremities  Skin: No rashes    MEDICATIONS:  MEDICATIONS  (STANDING):  influenza   Vaccine 0.5 milliLiter(s) IntraMuscular once  metroNIDAZOLE  IVPB      metroNIDAZOLE  IVPB 500 milliGRAM(s) IV Intermittent every 8 hours  morphine ER Tablet 15 milliGRAM(s) Oral two times a day  piperacillin/tazobactam IVPB.. 3.375 Gram(s) IV Intermittent every 8 hours      LABS: All Labs Reviewed:                        12.3   14.64 )-----------( 440      ( 05 Jan 2024 06:25 )             37.9     01-05    140  |  107  |  5<L>  ----------------------------<  96  3.7   |  28  |  0.62    Ca    8.2<L>      05 Jan 2024 06:25    TPro  8.8<H>  /  Alb  3.1<L>  /  TBili  0.3  /  DBili  x   /  AST  14<L>  /  ALT  22  /  AlkPhos  135<H>  01-04              Blood Culture: 01-04 @ 05:26  Organism --  Gram Stain Blood -- Gram Stain --  Specimen Source .Blood None  Culture-Blood --    01-04 @ 03:50  Organism --  Gram Stain Blood -- Gram Stain --  Specimen Source .Blood Blood-Peripheral  Culture-Blood --        RADIOLOGY/EKG: reviewed

## 2024-01-07 NOTE — PROGRESS NOTE ADULT - ASSESSMENT
"37yo M with h/o Crohn's disease Currently not on any biologic - has been on Humira, Stelara in the past), perianal fistulas s/p diversion at [facility] and seton placement at [facility] approximately 2 weeks ago, proctitis, pancreatitis, and EtOH abuse presents to the ED with rectal pain. The patient states he developed worsening rectal pain 3-4 days ago with purulent discharge and difficulty urinating. He emphasizes concern for infection of the area, but denies fever, abd pain, nausea, vomiting, or change in ostomy output. Colorectal surgery has been consulted numerous times since 2021 for these fistulas, most recently Nov 2023 when he was instructed to follow up with his surgeon at [facility]. He was also given a referral for GI in October for medication initiation but has not followed up. He notes he does not want to go back to [facility]."      Pt seen in [facility]. Endorses severe rectal pain not relieved with morphine. CT imaging demonstrating no e/o abscess, chronic ray rectal fistula s/p diversion with seton with new e/o of mild proctitis.   ED course: unasyn, morphine, dilaudid. Seem by CRS team -> recc iv abx and pain control. Pt was recc to have rectum removed however pt has deferred that recc.   Could not examine ray rectal area as pt boarding  in [facility]        #Proctitis/sepsis criteria met on arrival  #chronic perirectal fistulas  - WBC : 16-> 14  - Zosyn/flagyl  - blood cultures  - CTAP: no abscess or other collections  - IVF  - Sitz baths  - dialudid 1mg iv  q4h for severe pain prn-> will titrate to 0.5mg iv q4h prn after 24 hours if pain better controlled  - increase morphine ER 30 mg po to BID and cont iv dilaudid for breakthrough pain  - toradol 15 ivp x 1 now  - monitor ileostomy output - pt to f/u with GI as o/p once infection clears for start of biologic        #ETOH/Tobacco  - refusing patch  - drink q3-4 days  - last consumption 5 days ago  - no h/o DTs        DVT px: SCDs/ambulate aggresively  Dispo: pain control, iv abx "39yo M with h/o Crohn's disease Currently not on any biologic - has been on Humira, Stelara in the past), perianal fistulas s/p diversion at [facility] and seton placement at [facility] approximately 2 weeks ago, proctitis, pancreatitis, and EtOH abuse presents to the ED with rectal pain. The patient states he developed worsening rectal pain 3-4 days ago with purulent discharge and difficulty urinating. He emphasizes concern for infection of the area, but denies fever, abd pain, nausea, vomiting, or change in ostomy output. Colorectal surgery has been consulted numerous times since 2021 for these fistulas, most recently Nov 2023 when he was instructed to follow up with his surgeon at [facility]. He was also given a referral for GI in October for medication initiation but has not followed up. He notes he does not want to go back to [facility]."      Pt seen in [facility]. Endorses severe rectal pain not relieved with morphine. CT imaging demonstrating no e/o abscess, chronic ray rectal fistula s/p diversion with seton with new e/o of mild proctitis.   ED course: unasyn, morphine, dilaudid. Seem by CRS team -> recc iv abx and pain control. Pt was recc to have rectum removed however pt has deferred that recc.   Could not examine ray rectal area as pt boarding  in [facility]        #Proctitis/sepsis criteria met on arrival  #chronic perirectal fistulas  - WBC : 16-> 14  - Zosyn/flagyl  - blood cultures  - CTAP: no abscess or other collections  - IVF  - Sitz baths  - dialudid 1mg iv  q4h for severe pain prn-> will titrate to 0.5mg iv q4h prn after 24 hours if pain better controlled  - increase morphine ER 30 mg po to BID and cont iv dilaudid for breakthrough pain  - toradol 15 ivp x 1 now  - monitor ileostomy output - pt to f/u with GI as o/p once infection clears for start of biologic        #ETOH/Tobacco  - refusing patch  - drink q3-4 days  - last consumption 5 days ago  - no h/o DTs        DVT px: SCDs/ambulate aggresively  Dispo: pain control, iv abx

## 2024-01-08 PROCEDURE — 99232 SBSQ HOSP IP/OBS MODERATE 35: CPT

## 2024-01-08 RX ORDER — MORPHINE SULFATE 50 MG/1
30 CAPSULE, EXTENDED RELEASE ORAL EVERY 12 HOURS
Refills: 0 | Status: DISCONTINUED | OUTPATIENT
Start: 2024-01-08 | End: 2024-01-08

## 2024-01-08 RX ADMIN — PIPERACILLIN AND TAZOBACTAM 25 GRAM(S): 4; .5 INJECTION, POWDER, LYOPHILIZED, FOR SOLUTION INTRAVENOUS at 22:04

## 2024-01-08 RX ADMIN — HYDROMORPHONE HYDROCHLORIDE 1 MILLIGRAM(S): 2 INJECTION INTRAMUSCULAR; INTRAVENOUS; SUBCUTANEOUS at 09:43

## 2024-01-08 RX ADMIN — HYDROMORPHONE HYDROCHLORIDE 1 MILLIGRAM(S): 2 INJECTION INTRAMUSCULAR; INTRAVENOUS; SUBCUTANEOUS at 22:35

## 2024-01-08 RX ADMIN — Medication 100 MILLIGRAM(S): at 20:55

## 2024-01-08 RX ADMIN — HYDROMORPHONE HYDROCHLORIDE 1 MILLIGRAM(S): 2 INJECTION INTRAMUSCULAR; INTRAVENOUS; SUBCUTANEOUS at 13:50

## 2024-01-08 RX ADMIN — HYDROMORPHONE HYDROCHLORIDE 1 MILLIGRAM(S): 2 INJECTION INTRAMUSCULAR; INTRAVENOUS; SUBCUTANEOUS at 05:39

## 2024-01-08 RX ADMIN — HYDROMORPHONE HYDROCHLORIDE 1 MILLIGRAM(S): 2 INJECTION INTRAMUSCULAR; INTRAVENOUS; SUBCUTANEOUS at 01:30

## 2024-01-08 RX ADMIN — HYDROMORPHONE HYDROCHLORIDE 1 MILLIGRAM(S): 2 INJECTION INTRAMUSCULAR; INTRAVENOUS; SUBCUTANEOUS at 18:17

## 2024-01-08 RX ADMIN — Medication 100 MILLIGRAM(S): at 05:41

## 2024-01-08 RX ADMIN — PIPERACILLIN AND TAZOBACTAM 25 GRAM(S): 4; .5 INJECTION, POWDER, LYOPHILIZED, FOR SOLUTION INTRAVENOUS at 14:59

## 2024-01-08 RX ADMIN — HYDROMORPHONE HYDROCHLORIDE 1 MILLIGRAM(S): 2 INJECTION INTRAMUSCULAR; INTRAVENOUS; SUBCUTANEOUS at 22:02

## 2024-01-08 RX ADMIN — HYDROMORPHONE HYDROCHLORIDE 1 MILLIGRAM(S): 2 INJECTION INTRAMUSCULAR; INTRAVENOUS; SUBCUTANEOUS at 06:09

## 2024-01-08 RX ADMIN — MORPHINE SULFATE 30 MILLIGRAM(S): 50 CAPSULE, EXTENDED RELEASE ORAL at 09:15

## 2024-01-08 RX ADMIN — Medication 100 MILLIGRAM(S): at 13:54

## 2024-01-08 RX ADMIN — PIPERACILLIN AND TAZOBACTAM 25 GRAM(S): 4; .5 INJECTION, POWDER, LYOPHILIZED, FOR SOLUTION INTRAVENOUS at 07:09

## 2024-01-08 RX ADMIN — HYDROMORPHONE HYDROCHLORIDE 1 MILLIGRAM(S): 2 INJECTION INTRAMUSCULAR; INTRAVENOUS; SUBCUTANEOUS at 02:00

## 2024-01-08 RX ADMIN — MORPHINE SULFATE 30 MILLIGRAM(S): 50 CAPSULE, EXTENDED RELEASE ORAL at 21:25

## 2024-01-08 RX ADMIN — MORPHINE SULFATE 30 MILLIGRAM(S): 50 CAPSULE, EXTENDED RELEASE ORAL at 20:55

## 2024-01-08 NOTE — CONSULT NOTE ADULT - ASSESSMENT
38 M with h/o Crohn's disease and chronic perirectal fistulas s/p diversion and recent Seton placement at [facility].    Recommendations:  - WBC downtrending, c/w antibiotics   - pain control as needed  - outpatient follow up with GI for medication initiation  - outpatient follow up with established colorectal surgeons at [facility] and [facility] to discuss further management  - no acute surgical intervention at this time  - please reconsult as needed    Discussed with Dr. Hinojosa 38 M with h/o Crohn's disease and chronic perirectal fistulas s/p diversion and recent Seton placement at [facility].    Recommendations:  - WBC downtrending, c/w antibiotics   - pain control as needed  - outpatient follow up with GI for medication initiation  - outpatient follow up with established colorectal surgeons at [facility] and [facility] to discuss further management  - no acute surgical intervention at this time  - please reconsult as needed    Discussed with Dr. Hinojosa

## 2024-01-08 NOTE — CONSULT NOTE ADULT - SUBJECTIVE AND OBJECTIVE BOX
38 M with h/o Crohn's disease, perianal fistulas s/p diversion at [facility] and seton placement at [facility] approximately 2 weeks ago, proctitis, pancreatitis, and EtOH abuse presents to the ED with rectal pain. The patient states he developed worsening rectal pain 3-4 days ago with purulent discharge and difficulty urinating. He emphasizes concern for infection of the area, but denies fever, abd pain, nausea, vomiting, or change in ostomy output. Colorectal surgery has been consulted numerous times since 2021 for these fistulas, most recently Nov 2023 when he was instructed to follow up with his surgeon at [facility] He was also given a referral for GI in October for medication initiation but has not followed up. He notes he does not want to go back to [facility].      Physical Exam:    Pt is AAOx3  General: No acute distress, uncomfortable appearing.    Chest: Symmetric chest rise   Heart: RRR  Abdomen: Soft, no tenderness, nondistended, ostomy in place  Rectal: Erythema of the marginal zone. 3x draining setons in place. Numerous fistulous openings seen around the anal verge. Significant tenderness on internal exam. Irregularity of rectal wall with firmness likely secondary to extensive scar tissue. No blood on glove.   Back: Normal curvature, no tenderness.   Neuro: Physiological, no localizing findings.     Vital Signs Last 24 Hrs  T(C): 36.7 (04 Jan 2024 09:06), Max: 36.9 (04 Jan 2024 01:27)  T(F): 98 (04 Jan 2024 09:06), Max: 98.4 (04 Jan 2024 01:27)  HR: 80 (04 Jan 2024 09:06) (80 - 96)  BP: 97/68 (04 Jan 2024 09:06) (97/68 - 114/83)  BP(mean): 67 (04 Jan 2024 06:55) (67 - 67)  RR: 16 (04 Jan 2024 09:06) (16 - 18)  SpO2: 100% (04 Jan 2024 09:06) (100% - 100%)    Parameters below as of 04 Jan 2024 09:06  Patient On (Oxygen Delivery Method): room air      MEDICATIONS  (STANDING):  influenza   Vaccine 0.5 milliLiter(s) IntraMuscular once  metroNIDAZOLE  IVPB      metroNIDAZOLE  IVPB 500 milliGRAM(s) IV Intermittent every 8 hours  morphine ER Tablet 30 milliGRAM(s) Oral two times a day  piperacillin/tazobactam IVPB.. 3.375 Gram(s) IV Intermittent every 8 hours    MEDICATIONS  (PRN):  acetaminophen     Tablet .. 650 milliGRAM(s) Oral every 6 hours PRN Temp greater or equal to 38C (100.4F), Mild Pain (1 - 3)  aluminum hydroxide/magnesium hydroxide/simethicone Suspension 30 milliLiter(s) Oral every 4 hours PRN Dyspepsia  HYDROmorphone  Injectable 1 milliGRAM(s) IV Push every 4 hours PRN Severe Pain (7 - 10)  melatonin 3 milliGRAM(s) Oral at bedtime PRN Insomnia  ondansetron Injectable 4 milliGRAM(s) IV Push every 8 hours PRN Nausea and/or Vomiting  oxycodone    5 mG/acetaminophen 325 mG 1 Tablet(s) Oral every 4 hours PRN Moderate Pain (4 - 6)      PAST MEDICAL & SURGICAL HISTORY:  Crohn's disease of large intestine without complication  (No current flare up)      Pancreatitis      S/P ORIF (open reduction internal fixation) fracture  (Right ankle, 2014)      History of colonoscopy  (2014)      Perianal fistula  repair in 2002          Vital Signs Last 24 Hrs  T(C): 36.6 (08 Jan 2024 08:00), Max: 36.9 (07 Jan 2024 21:06)  T(F): 97.8 (08 Jan 2024 08:00), Max: 98.4 (07 Jan 2024 21:06)  HR: 56 (08 Jan 2024 08:00) (56 - 67)  BP: 94/55 (08 Jan 2024 08:00) (94/55 - 107/69)  BP(mean): --  RR: 18 (08 Jan 2024 08:00) (17 - 18)  SpO2: 99% (08 Jan 2024 08:00) (94% - 99%)    Parameters below as of 08 Jan 2024 08:00  Patient On (Oxygen Delivery Method): room air      Culture - Urine (collected 01-05-24 @ 18:30)  Source: Clean Catch Clean Catch (Midstream)  Final Report (01-06-24 @ 23:34):    <10,000 CFU/mL Normal Urogenital Arina    Culture - Blood (collected 01-04-24 @ 05:26)  Source: .Blood None  Preliminary Report (01-08-24 @ 10:01):    No growth at 4 days    Culture - Blood (collected 01-04-24 @ 03:50)  Source: .Blood Blood-Peripheral  Preliminary Report (01-08-24 @ 10:01):    No growth at 4 days                       38 M with h/o Crohn's disease, perianal fistulas s/p diversion at [facility] and seton placement at [facility] approximately 2 weeks ago, proctitis, pancreatitis, and EtOH abuse presents to the ED with rectal pain. The patient states he developed worsening rectal pain 3-4 days ago with purulent discharge and difficulty urinating. He emphasizes concern for infection of the area, but denies fever, abd pain, nausea, vomiting, or change in ostomy output. Colorectal surgery has been consulted numerous times since 2021 for these fistulas, most recently Nov 2023 when he was instructed to follow up with his surgeon at [facility] He was also given a referral for GI in October for medication initiation but has not followed up. He notes he does not want to go back to [facility].      Physical Exam:    Pt is AAOx3  General: No acute distress, uncomfortable appearing.    Chest: Symmetric chest rise   Heart: RRR  Abdomen: Soft, no tenderness, nondistended, ostomy in place  Rectal: Erythema of the marginal zone. 3x draining setons in place. Numerous fistulous openings seen around the anal verge. Significant tenderness on internal exam. Irregularity of rectal wall with firmness likely secondary to extensive scar tissue. No blood on glove.   Back: Normal curvature, no tenderness.   Neuro: Physiological, no localizing findings.     Vital Signs Last 24 Hrs  T(C): 36.7 (04 Jan 2024 09:06), Max: 36.9 (04 Jan 2024 01:27)  T(F): 98 (04 Jan 2024 09:06), Max: 98.4 (04 Jan 2024 01:27)  HR: 80 (04 Jan 2024 09:06) (80 - 96)  BP: 97/68 (04 Jan 2024 09:06) (97/68 - 114/83)  BP(mean): 67 (04 Jan 2024 06:55) (67 - 67)  RR: 16 (04 Jan 2024 09:06) (16 - 18)  SpO2: 100% (04 Jan 2024 09:06) (100% - 100%)    Parameters below as of 04 Jan 2024 09:06  Patient On (Oxygen Delivery Method): room air      MEDICATIONS  (STANDING):  influenza   Vaccine 0.5 milliLiter(s) IntraMuscular once  metroNIDAZOLE  IVPB      metroNIDAZOLE  IVPB 500 milliGRAM(s) IV Intermittent every 8 hours  morphine ER Tablet 30 milliGRAM(s) Oral two times a day  piperacillin/tazobactam IVPB.. 3.375 Gram(s) IV Intermittent every 8 hours    MEDICATIONS  (PRN):  acetaminophen     Tablet .. 650 milliGRAM(s) Oral every 6 hours PRN Temp greater or equal to 38C (100.4F), Mild Pain (1 - 3)  aluminum hydroxide/magnesium hydroxide/simethicone Suspension 30 milliLiter(s) Oral every 4 hours PRN Dyspepsia  HYDROmorphone  Injectable 1 milliGRAM(s) IV Push every 4 hours PRN Severe Pain (7 - 10)  melatonin 3 milliGRAM(s) Oral at bedtime PRN Insomnia  ondansetron Injectable 4 milliGRAM(s) IV Push every 8 hours PRN Nausea and/or Vomiting  oxycodone    5 mG/acetaminophen 325 mG 1 Tablet(s) Oral every 4 hours PRN Moderate Pain (4 - 6)      PAST MEDICAL & SURGICAL HISTORY:  Crohn's disease of large intestine without complication  (No current flare up)      Pancreatitis      S/P ORIF (open reduction internal fixation) fracture  (Right ankle, 2014)      History of colonoscopy  (2014)      Perianal fistula  repair in 2002          Vital Signs Last 24 Hrs  T(C): 36.6 (08 Jan 2024 08:00), Max: 36.9 (07 Jan 2024 21:06)  T(F): 97.8 (08 Jan 2024 08:00), Max: 98.4 (07 Jan 2024 21:06)  HR: 56 (08 Jan 2024 08:00) (56 - 67)  BP: 94/55 (08 Jan 2024 08:00) (94/55 - 107/69)  BP(mean): --  RR: 18 (08 Jan 2024 08:00) (17 - 18)  SpO2: 99% (08 Jan 2024 08:00) (94% - 99%)    Parameters below as of 08 Jan 2024 08:00  Patient On (Oxygen Delivery Method): room air      Culture - Urine (collected 01-05-24 @ 18:30)  Source: Clean Catch Clean Catch (Midstream)  Final Report (01-06-24 @ 23:34):    <10,000 CFU/mL Normal Urogenital Arina    Culture - Blood (collected 01-04-24 @ 05:26)  Source: .Blood None  Preliminary Report (01-08-24 @ 10:01):    No growth at 4 days    Culture - Blood (collected 01-04-24 @ 03:50)  Source: .Blood Blood-Peripheral  Preliminary Report (01-08-24 @ 10:01):    No growth at 4 days

## 2024-01-08 NOTE — PROGRESS NOTE ADULT - ASSESSMENT
"39yo M with h/o Crohn's disease Currently not on any biologic - has been on Humira, Stelara in the past), perianal fistulas s/p diversion at [facility] and seton placement at [facility] approximately 2 weeks ago, proctitis, pancreatitis, and EtOH abuse presents to the ED with rectal pain. The patient states he developed worsening rectal pain 3-4 days ago with purulent discharge and difficulty urinating. He emphasizes concern for infection of the area, but denies fever, abd pain, nausea, vomiting, or change in ostomy output. Colorectal surgery has been consulted numerous times since 2021 for these fistulas, most recently Nov 2023 when he was instructed to follow up with his surgeon at [facility]. He was also given a referral for GI in October for medication initiation but has not followed up. He notes he does not want to go back to [facility]."      Pt seen in [facility]. Endorses severe rectal pain not relieved with morphine. CT imaging demonstrating no e/o abscess, chronic ray rectal fistula s/p diversion with seton with new e/o of mild proctitis.   ED course: unasyn, morphine, dilaudid. Seem by CRS team -> recc iv abx and pain control. Pt was recc to have rectum removed however pt has deferred that recc.   Could not examine ray rectal area as pt boarding  in [facility]        #Proctitis/sepsis criteria met on arrival  #chronic perirectal fistulas  - WBC : 16-> 14  - Zosyn/flagyl D#5, can de escalate in next 24-48 hours to po formulatio  - blood cultures: NGTD  - CTAP: no abscess or other collections  - Sitz baths  - dialudid 1mg iv  q4h for severe pain prn-> will titrate to 0.5mg iv q4h prn after 24 hours if pain better controlled  - increase morphine ER 30 mg po to BID and cont iv dilaudid for breakthrough pain  - Pain consult -> Dr Soto. PT will need f/u with pain management as o/p  - monitor ileostomy output - pt to f/u with GI as o/p once infection clears for start of biologic        #ETOH/Tobacco  - refusing patch  - drink q3-4 days  - last consumption 5 days ago  - no h/o DTs        DVT px: SCDs/ambulate aggresively  Dispo: pain control, iv abx "39yo M with h/o Crohn's disease Currently not on any biologic - has been on Humira, Stelara in the past), perianal fistulas s/p diversion at [facility] and seton placement at [facility] approximately 2 weeks ago, proctitis, pancreatitis, and EtOH abuse presents to the ED with rectal pain. The patient states he developed worsening rectal pain 3-4 days ago with purulent discharge and difficulty urinating. He emphasizes concern for infection of the area, but denies fever, abd pain, nausea, vomiting, or change in ostomy output. Colorectal surgery has been consulted numerous times since 2021 for these fistulas, most recently Nov 2023 when he was instructed to follow up with his surgeon at [facility]. He was also given a referral for GI in October for medication initiation but has not followed up. He notes he does not want to go back to [facility]."      Pt seen in [facility]. Endorses severe rectal pain not relieved with morphine. CT imaging demonstrating no e/o abscess, chronic ray rectal fistula s/p diversion with seton with new e/o of mild proctitis.   ED course: unasyn, morphine, dilaudid. Seem by CRS team -> recc iv abx and pain control. Pt was recc to have rectum removed however pt has deferred that recc.   Could not examine ray rectal area as pt boarding  in [facility]        #Proctitis/sepsis criteria met on arrival  #chronic perirectal fistulas  - WBC : 16-> 14  - Zosyn/flagyl D#5, can de escalate in next 24-48 hours to po formulatio  - blood cultures: NGTD  - CTAP: no abscess or other collections  - Sitz baths  - dialudid 1mg iv  q4h for severe pain prn-> will titrate to 0.5mg iv q4h prn after 24 hours if pain better controlled  - increase morphine ER 30 mg po to BID and cont iv dilaudid for breakthrough pain  - Pain consult -> Dr Soto. PT will need f/u with pain management as o/p  - monitor ileostomy output - pt to f/u with GI as o/p once infection clears for start of biologic        #ETOH/Tobacco  - refusing patch  - drink q3-4 days  - last consumption 5 days ago  - no h/o DTs        DVT px: SCDs/ambulate aggresively  Dispo: pain control, iv abx

## 2024-01-08 NOTE — PROGRESS NOTE ADULT - SUBJECTIVE AND OBJECTIVE BOX
Patient is a 38y old  Male who presents with a chief complaint of rectal pain (04 Jan 2024 14:27)      SUBJECTIVE:   HPI:  "37yo M with h/o Crohn's disease Currently not on any biologic - has been on Humira, Stelara in the past), perianal fistulas s/p diversion at [facility] and seton placement at [facility] approximately 2 weeks ago, proctitis, pancreatitis, and EtOH abuse presents to the ED with rectal pain. The patient states he developed worsening rectal pain 3-4 days ago with purulent discharge and difficulty urinating. He emphasizes concern for infection of the area, but denies fever, abd pain, nausea, vomiting, or change in ostomy output. Colorectal surgery has been consulted numerous times since 2021 for these fistulas, most recently Nov 2023 when he was instructed to follow up with his surgeon at [facility]. He was also given a referral for GI in October for medication initiation but has not followed up. He notes he does not want to go back to [facility]."      Pt seen in [facility]. Endorses severe rectal pain not relieved with morphine. CT imaging demonstrating no e/o abscess, chronic ray rectal fistula s/p diversion with seton with new e/o of mild proctitis.   ED course: unasyn, morphine, dilaudid. Seem by CRS team -> recc iv abx and pain control. Pt was recc to have rectum removed however pt has deferred that recc.       sub: pt appears to be in more comfort today. still requiring iv dilaudid despite being on morphine 30 bid atc.       PAST MEDICAL/SURGICAL/FAMILY/SOCIAL HISTORY:    Past Medical, Past Surgical, and Family History:  PAST MEDICAL HISTORY:  Crohn's disease of large intestine without complication (No current flare up)    Pancreatitis.     PAST SURGICAL HISTORY:  History of colonoscopy (2014)    Perianal fistula repair in 2002    S/P ORIF (open reduction internal fixation) fracture (Right ankle, 2014).     FAMILY HISTORY:  Father  Still living? Yes, Estimated age: 61-70  Diabetes mellitus, Age at diagnosis: 51-60.     Tobacco Usage:  · Tobacco Usage	Never smoker    ALLERGIES AND HOME MEDICATIONS:   Allergies:        Allergies:  	ciprofloxacin: Drug, Confirmed, 25-Oct-2023, Other (Mild to Mod), Itching at burning at IV Site   (04 Jan 2024 14:27)            ICU Vital Signs Last 24 Hrs  T(C): 36.6 (08 Jan 2024 08:00), Max: 36.9 (07 Jan 2024 21:06)  T(F): 97.8 (08 Jan 2024 08:00), Max: 98.4 (07 Jan 2024 21:06)  HR: 56 (08 Jan 2024 08:00) (56 - 67)  BP: 94/55 (08 Jan 2024 08:00) (94/55 - 107/69)  BP(mean): --  ABP: --  ABP(mean): --  RR: 18 (08 Jan 2024 08:00) (17 - 18)  SpO2: 99% (08 Jan 2024 08:00) (94% - 99%)    O2 Parameters below as of 08 Jan 2024 08:00  Patient On (Oxygen Delivery Method): room air                  I&O's Summary      CAPILLARY BLOOD GLUCOSE          PHYSICAL EXAM:    Constitutional: NAD, awake and alert,   HEENT: PERR, EOMI, Normal Hearing, MMM  Neck: Soft and supple, No LAD, No JVD  Respiratory: Breath sounds are clear bilaterally, No wheezing, rales or rhonchi  Cardiovascular: S1 and S2, regular rate and rhythm, no Murmurs, gallops or rubs  Gastrointestinal: Bowel Sounds present, soft, nontender, nondistended, no guarding, no rebound  Extremities: No peripheral edema  Vascular: 2+ peripheral pulses  Neurological: A/O x 3, no focal deficits  Musculoskeletal: 5/5 strength b/l upper and lower extremities  Skin: No rashes    MEDICATIONS:  MEDICATIONS  (STANDING):  influenza   Vaccine 0.5 milliLiter(s) IntraMuscular once  metroNIDAZOLE  IVPB      metroNIDAZOLE  IVPB 500 milliGRAM(s) IV Intermittent every 8 hours  morphine ER Tablet 15 milliGRAM(s) Oral two times a day  piperacillin/tazobactam IVPB.. 3.375 Gram(s) IV Intermittent every 8 hours      LABS: All Labs Reviewed:                        12.3   14.64 )-----------( 440      ( 05 Jan 2024 06:25 )             37.9     01-05    140  |  107  |  5<L>  ----------------------------<  96  3.7   |  28  |  0.62    Ca    8.2<L>      05 Jan 2024 06:25    TPro  8.8<H>  /  Alb  3.1<L>  /  TBili  0.3  /  DBili  x   /  AST  14<L>  /  ALT  22  /  AlkPhos  135<H>  01-04              Blood Culture: 01-04 @ 05:26  Organism --  Gram Stain Blood -- Gram Stain --  Specimen Source .Blood None  Culture-Blood --    01-04 @ 03:50  Organism --  Gram Stain Blood -- Gram Stain --  Specimen Source .Blood Blood-Peripheral  Culture-Blood --        RADIOLOGY/EKG: reviewed         Patient is a 38y old  Male who presents with a chief complaint of rectal pain (04 Jan 2024 14:27)      SUBJECTIVE:   HPI:  "37yo M with h/o Crohn's disease Currently not on any biologic - has been on Humira, Stelara in the past), perianal fistulas s/p diversion at [facility] and seton placement at [facility] approximately 2 weeks ago, proctitis, pancreatitis, and EtOH abuse presents to the ED with rectal pain. The patient states he developed worsening rectal pain 3-4 days ago with purulent discharge and difficulty urinating. He emphasizes concern for infection of the area, but denies fever, abd pain, nausea, vomiting, or change in ostomy output. Colorectal surgery has been consulted numerous times since 2021 for these fistulas, most recently Nov 2023 when he was instructed to follow up with his surgeon at [facility]. He was also given a referral for GI in October for medication initiation but has not followed up. He notes he does not want to go back to [facility]."      Pt seen in [facility]. Endorses severe rectal pain not relieved with morphine. CT imaging demonstrating no e/o abscess, chronic ray rectal fistula s/p diversion with seton with new e/o of mild proctitis.   ED course: unasyn, morphine, dilaudid. Seem by CRS team -> recc iv abx and pain control. Pt was recc to have rectum removed however pt has deferred that recc.       sub: pt appears to be in more comfort today. still requiring iv dilaudid despite being on morphine 30 bid atc.       PAST MEDICAL/SURGICAL/FAMILY/SOCIAL HISTORY:    Past Medical, Past Surgical, and Family History:  PAST MEDICAL HISTORY:  Crohn's disease of large intestine without complication (No current flare up)    Pancreatitis.     PAST SURGICAL HISTORY:  History of colonoscopy (2014)    Perianal fistula repair in 2002    S/P ORIF (open reduction internal fixation) fracture (Right ankle, 2014).     FAMILY HISTORY:  Father  Still living? Yes, Estimated age: 61-70  Diabetes mellitus, Age at diagnosis: 51-60.     Tobacco Usage:  · Tobacco Usage	Never smoker    ALLERGIES AND HOME MEDICATIONS:   Allergies:        Allergies:  	ciprofloxacin: Drug, Confirmed, 25-Oct-2023, Other (Mild to Mod), Itching at burning at IV Site   (04 Jan 2024 14:27)            ICU Vital Signs Last 24 Hrs  T(C): 36.6 (08 Jan 2024 08:00), Max: 36.9 (07 Jan 2024 21:06)  T(F): 97.8 (08 Jan 2024 08:00), Max: 98.4 (07 Jan 2024 21:06)  HR: 56 (08 Jan 2024 08:00) (56 - 67)  BP: 94/55 (08 Jan 2024 08:00) (94/55 - 107/69)  BP(mean): --  ABP: --  ABP(mean): --  RR: 18 (08 Jan 2024 08:00) (17 - 18)  SpO2: 99% (08 Jan 2024 08:00) (94% - 99%)    O2 Parameters below as of 08 Jan 2024 08:00  Patient On (Oxygen Delivery Method): room air                  I&O's Summary      CAPILLARY BLOOD GLUCOSE          PHYSICAL EXAM:    Constitutional: NAD, awake and alert,   HEENT: PERR, EOMI, Normal Hearing, MMM  Neck: Soft and supple, No LAD, No JVD  Respiratory: Breath sounds are clear bilaterally, No wheezing, rales or rhonchi  Cardiovascular: S1 and S2, regular rate and rhythm, no Murmurs, gallops or rubs  Gastrointestinal: Bowel Sounds present, soft, nontender, nondistended, no guarding, no rebound  Extremities: No peripheral edema  Vascular: 2+ peripheral pulses  Neurological: A/O x 3, no focal deficits  Musculoskeletal: 5/5 strength b/l upper and lower extremities  Skin: No rashes    MEDICATIONS:  MEDICATIONS  (STANDING):  influenza   Vaccine 0.5 milliLiter(s) IntraMuscular once  metroNIDAZOLE  IVPB      metroNIDAZOLE  IVPB 500 milliGRAM(s) IV Intermittent every 8 hours  morphine ER Tablet 15 milliGRAM(s) Oral two times a day  piperacillin/tazobactam IVPB.. 3.375 Gram(s) IV Intermittent every 8 hours      LABS: All Labs Reviewed:                        12.3   14.64 )-----------( 440      ( 05 Jan 2024 06:25 )             37.9     01-05    140  |  107  |  5<L>  ----------------------------<  96  3.7   |  28  |  0.62    Ca    8.2<L>      05 Jan 2024 06:25    TPro  8.8<H>  /  Alb  3.1<L>  /  TBili  0.3  /  DBili  x   /  AST  14<L>  /  ALT  22  /  AlkPhos  135<H>  01-04              Blood Culture: 01-04 @ 05:26  Organism --  Gram Stain Blood -- Gram Stain --  Specimen Source .Blood None  Culture-Blood --    01-04 @ 03:50  Organism --  Gram Stain Blood -- Gram Stain --  Specimen Source .Blood Blood-Peripheral  Culture-Blood --        RADIOLOGY/EKG: reviewed

## 2024-01-08 NOTE — SBIRT NOTE ADULT - NSSBIRTSCREENAVAIL_GEN_A_CORE
Pt deferred assessment at this time. Pt reports he has a hx of alcohol use (beers), denies dependency.  Declined referral to treatment.

## 2024-01-09 LAB
CULTURE RESULTS: SIGNIFICANT CHANGE UP
SPECIMEN SOURCE: SIGNIFICANT CHANGE UP

## 2024-01-09 PROCEDURE — 99232 SBSQ HOSP IP/OBS MODERATE 35: CPT

## 2024-01-09 RX ADMIN — HYDROMORPHONE HYDROCHLORIDE 1 MILLIGRAM(S): 2 INJECTION INTRAMUSCULAR; INTRAVENOUS; SUBCUTANEOUS at 18:50

## 2024-01-09 RX ADMIN — PIPERACILLIN AND TAZOBACTAM 25 GRAM(S): 4; .5 INJECTION, POWDER, LYOPHILIZED, FOR SOLUTION INTRAVENOUS at 21:23

## 2024-01-09 RX ADMIN — MORPHINE SULFATE 30 MILLIGRAM(S): 50 CAPSULE, EXTENDED RELEASE ORAL at 10:55

## 2024-01-09 RX ADMIN — HYDROMORPHONE HYDROCHLORIDE 1 MILLIGRAM(S): 2 INJECTION INTRAMUSCULAR; INTRAVENOUS; SUBCUTANEOUS at 13:21

## 2024-01-09 RX ADMIN — Medication 100 MILLIGRAM(S): at 13:10

## 2024-01-09 RX ADMIN — MORPHINE SULFATE 30 MILLIGRAM(S): 50 CAPSULE, EXTENDED RELEASE ORAL at 22:40

## 2024-01-09 RX ADMIN — HYDROMORPHONE HYDROCHLORIDE 1 MILLIGRAM(S): 2 INJECTION INTRAMUSCULAR; INTRAVENOUS; SUBCUTANEOUS at 02:08

## 2024-01-09 RX ADMIN — Medication 100 MILLIGRAM(S): at 21:23

## 2024-01-09 RX ADMIN — HYDROMORPHONE HYDROCHLORIDE 1 MILLIGRAM(S): 2 INJECTION INTRAMUSCULAR; INTRAVENOUS; SUBCUTANEOUS at 02:40

## 2024-01-09 RX ADMIN — PIPERACILLIN AND TAZOBACTAM 25 GRAM(S): 4; .5 INJECTION, POWDER, LYOPHILIZED, FOR SOLUTION INTRAVENOUS at 14:28

## 2024-01-09 RX ADMIN — HYDROMORPHONE HYDROCHLORIDE 1 MILLIGRAM(S): 2 INJECTION INTRAMUSCULAR; INTRAVENOUS; SUBCUTANEOUS at 12:51

## 2024-01-09 RX ADMIN — MORPHINE SULFATE 30 MILLIGRAM(S): 50 CAPSULE, EXTENDED RELEASE ORAL at 21:23

## 2024-01-09 RX ADMIN — PIPERACILLIN AND TAZOBACTAM 25 GRAM(S): 4; .5 INJECTION, POWDER, LYOPHILIZED, FOR SOLUTION INTRAVENOUS at 06:18

## 2024-01-09 RX ADMIN — HYDROMORPHONE HYDROCHLORIDE 1 MILLIGRAM(S): 2 INJECTION INTRAMUSCULAR; INTRAVENOUS; SUBCUTANEOUS at 06:16

## 2024-01-09 RX ADMIN — Medication 100 MILLIGRAM(S): at 05:14

## 2024-01-09 RX ADMIN — Medication 3 MILLIGRAM(S): at 21:22

## 2024-01-09 RX ADMIN — MORPHINE SULFATE 30 MILLIGRAM(S): 50 CAPSULE, EXTENDED RELEASE ORAL at 11:55

## 2024-01-09 NOTE — PROGRESS NOTE ADULT - ATTENDING COMMENTS
Patient seen and examined this morning.  He has a known history of severe Crohn's disease for which he has a diverting ostomy and has recently undergone placement of setons for draining perirectal abscesses.  This was done approximately 2 weeks ago at [facility].  He reports improvement for approximately 2 weeks and his symptoms are back.  On exam, he is in no distress.  Perianal region shows multiple fistulous tract and he has 3 setons in place.  There is no obvious fluctuance or area that requires additional drainage although exam is limited by pain.  On imaging, there is no undrained collection.  I explained that the role of surgery is to drain any residual abscesses and placed setons.  He has several tracts and as long as the area remains draining, additional surgery at this time is not warranted.  I do recommend that he follows up with his existing colorectal surgeon or may see us as outpatient if he wants a new/different surgeon.  There is no role for surgery at this time.  Complete course of antibiotics.  I explained the need for medical management of his Crohn's disease as this ultimately may improve his overall situation.

## 2024-01-09 NOTE — PROGRESS NOTE ADULT - SUBJECTIVE AND OBJECTIVE BOX
HPI:  "37 yo M with h/o Crohn's disease Currently not on any biologic - has been on Humira, Stelara in the past), perianal fistulas s/p diversion at [facility] and seton placement at [facility] approximately 2 weeks ago, proctitis, pancreatitis, and EtOH abuse presents to the ED with rectal pain. The patient states he developed worsening rectal pain 3-4 days ago with purulent discharge and difficulty urinating. He emphasizes concern for infection of the area, but denies fever, abd pain, nausea, vomiting, or change in ostomy output. Colorectal surgery has been consulted numerous times since 2021 for these fistulas, most recently Nov 2023 when he was instructed to follow up with his surgeon at [facility] . He was also given a referral for GI in October for medication initiation but has not followed up. He notes he does not want to go back to [facility]       1.9: c/o severe rectal pain with defecation            REVIEW OF SYSTEMS:    CONSTITUTIONAL: No weakness, No fevers or chills  ENT: No ear ache, No sorethroat  NECK: No pain, No stiffness  RESPIRATORY: No cough, No wheezing, No hemoptysis; No dyspnea  CARDIOVASCULAR: No chest pain, No palpitations  GASTROINTESTINAL: + abd pain, No nausea, No vomiting, No hematemesis, No diarrhea or constipation. No melena, No hematochezia.  GENITOURINARY: No dysuria, No  hematuria  NEUROLOGICAL: No diplopia, No paresthesia, No motor dysfunction  MUSCULOSKELETAL: No arthralgia, No myalgia  SKIN: No rashes, or lesions   PSYCH: no anxiety, no suicidal ideation    All other review of systems is negative unless indicated above    Vital Signs Last 24 Hrs  T(C): 36.7 (09 Jan 2024 15:44), Max: 37.1 (08 Jan 2024 21:56)  T(F): 98.1 (09 Jan 2024 15:44), Max: 98.7 (08 Jan 2024 21:56)  HR: 62 (09 Jan 2024 15:44) (62 - 75)  BP: 102/57 (09 Jan 2024 15:44) (101/65 - 105/63)  RR: 18 (09 Jan 2024 15:44) (16 - 18)  SpO2: 99% (09 Jan 2024 15:44) (98% - 100%)    Parameters below as of 09 Jan 2024 15:44  Patient On (Oxygen Delivery Method): room air        PHYSICAL EXAM:    GENERAL: NAD  HEENT:  NC/AT, EOMI, PERRLA, No scleral icterus, Moist mucous membranes  NECK: Supple, No JVD  CNS:  Alert & Oriented X3, Motor Strength 5/5 B/L upper and lower extremities; DTRs 2+ intact   LUNG: Normal Breath sounds, Clear to auscultation bilaterally, No rales, No rhonchi, No wheezing  HEART: RRR; No murmurs, No rubs  ABDOMEN: +BS, ST/ND/NT, +colostomy present   GENITOURINARY: Voiding, Bladder not distended  EXTREMITIES:  2+ Peripheral Pulses, No clubbing, No cyanosis, No tibial edema  MUSCULOSKELTAL: Joints normal ROM, No TTP, No effusion  SKIN: no rashes  RECTAL: deferred, not indicated  BREAST: deferred            Vancomycin levels:   Cultures:     MEDICATIONS  (STANDING):  influenza   Vaccine 0.5 milliLiter(s) IntraMuscular once  metroNIDAZOLE  IVPB 500 milliGRAM(s) IV Intermittent every 8 hours  morphine ER Tablet 30 milliGRAM(s) Oral two times a day  piperacillin/tazobactam IVPB.. 3.375 Gram(s) IV Intermittent every 8 hours    MEDICATIONS  (PRN):  acetaminophen     Tablet .. 650 milliGRAM(s) Oral every 6 hours PRN Temp greater or equal to 38C (100.4F), Mild Pain (1 - 3)  aluminum hydroxide/magnesium hydroxide/simethicone Suspension 30 milliLiter(s) Oral every 4 hours PRN Dyspepsia  HYDROmorphone  Injectable 1 milliGRAM(s) IV Push every 4 hours PRN Severe Pain (7 - 10)  melatonin 3 milliGRAM(s) Oral at bedtime PRN Insomnia  ondansetron Injectable 4 milliGRAM(s) IV Push every 8 hours PRN Nausea and/or Vomiting  oxycodone    5 mG/acetaminophen 325 mG 1 Tablet(s) Oral every 4 hours PRN Moderate Pain (4 - 6)      all labs reviewed  all imaging reviewed    a/p:      #Proctitis/sepsis criteria met on arrival  c/w IV Abx    #chronic perirectal fistulas  - WBC : 16-> 14-->11  - Zosyn/flagyl D#6, can de escalate in next 24 hours to po  - blood cultures: NGTD  - CTAP: no abscess or other collections  - Sitz baths  - analgesia   - Pain consult -> Dr Soto. PT will need f/u with pain management as o/p  - monitor ileostomy output - pt to f/u with GI as o/p once infection clears for start of biologic        #ETOH/Tobacco  - refusing patch  - drink q3-4 days  - last consumption 5 days ago  - no h/o DTs        DVT px: SCDs/ambulate aggresively  Dispo: pain control, iv abx         HPI:  "37 yo M with h/o Crohn's disease Currently not on any biologic - has been on Humira, Stelara in the past), perianal fistulas s/p diversion at [facility] and seton placement at [facility] approximately 2 weeks ago, proctitis, pancreatitis, and EtOH abuse presents to the ED with rectal pain. The patient states he developed worsening rectal pain 3-4 days ago with purulent discharge and difficulty urinating. He emphasizes concern for infection of the area, but denies fever, abd pain, nausea, vomiting, or change in ostomy output. Colorectal surgery has been consulted numerous times since 2021 for these fistulas, most recently Nov 2023 when he was instructed to follow up with his surgeon at [facility] . He was also given a referral for GI in October for medication initiation but has not followed up. He notes he does not want to go back to [facility]       1.9: c/o severe rectal pain with defecation            REVIEW OF SYSTEMS:    CONSTITUTIONAL: No weakness, No fevers or chills  ENT: No ear ache, No sorethroat  NECK: No pain, No stiffness  RESPIRATORY: No cough, No wheezing, No hemoptysis; No dyspnea  CARDIOVASCULAR: No chest pain, No palpitations  GASTROINTESTINAL: + abd pain, No nausea, No vomiting, No hematemesis, No diarrhea or constipation. No melena, No hematochezia.  GENITOURINARY: No dysuria, No  hematuria  NEUROLOGICAL: No diplopia, No paresthesia, No motor dysfunction  MUSCULOSKELETAL: No arthralgia, No myalgia  SKIN: No rashes, or lesions   PSYCH: no anxiety, no suicidal ideation    All other review of systems is negative unless indicated above    Vital Signs Last 24 Hrs  T(C): 36.7 (09 Jan 2024 15:44), Max: 37.1 (08 Jan 2024 21:56)  T(F): 98.1 (09 Jan 2024 15:44), Max: 98.7 (08 Jan 2024 21:56)  HR: 62 (09 Jan 2024 15:44) (62 - 75)  BP: 102/57 (09 Jan 2024 15:44) (101/65 - 105/63)  RR: 18 (09 Jan 2024 15:44) (16 - 18)  SpO2: 99% (09 Jan 2024 15:44) (98% - 100%)    Parameters below as of 09 Jan 2024 15:44  Patient On (Oxygen Delivery Method): room air        PHYSICAL EXAM:    GENERAL: NAD  HEENT:  NC/AT, EOMI, PERRLA, No scleral icterus, Moist mucous membranes  NECK: Supple, No JVD  CNS:  Alert & Oriented X3, Motor Strength 5/5 B/L upper and lower extremities; DTRs 2+ intact   LUNG: Normal Breath sounds, Clear to auscultation bilaterally, No rales, No rhonchi, No wheezing  HEART: RRR; No murmurs, No rubs  ABDOMEN: +BS, ST/ND/NT, +colostomy present   GENITOURINARY: Voiding, Bladder not distended  EXTREMITIES:  2+ Peripheral Pulses, No clubbing, No cyanosis, No tibial edema  MUSCULOSKELTAL: Joints normal ROM, No TTP, No effusion  SKIN: no rashes  RECTAL: deferred, not indicated  BREAST: deferred            Vancomycin levels:   Cultures:     MEDICATIONS  (STANDING):  influenza   Vaccine 0.5 milliLiter(s) IntraMuscular once  metroNIDAZOLE  IVPB 500 milliGRAM(s) IV Intermittent every 8 hours  morphine ER Tablet 30 milliGRAM(s) Oral two times a day  piperacillin/tazobactam IVPB.. 3.375 Gram(s) IV Intermittent every 8 hours    MEDICATIONS  (PRN):  acetaminophen     Tablet .. 650 milliGRAM(s) Oral every 6 hours PRN Temp greater or equal to 38C (100.4F), Mild Pain (1 - 3)  aluminum hydroxide/magnesium hydroxide/simethicone Suspension 30 milliLiter(s) Oral every 4 hours PRN Dyspepsia  HYDROmorphone  Injectable 1 milliGRAM(s) IV Push every 4 hours PRN Severe Pain (7 - 10)  melatonin 3 milliGRAM(s) Oral at bedtime PRN Insomnia  ondansetron Injectable 4 milliGRAM(s) IV Push every 8 hours PRN Nausea and/or Vomiting  oxycodone    5 mG/acetaminophen 325 mG 1 Tablet(s) Oral every 4 hours PRN Moderate Pain (4 - 6)      all labs reviewed  all imaging reviewed    a/p:      #Proctitis/sepsis criteria met on arrival  c/w IV Abx    #chronic perirectal fistulas  - WBC : 16-> 14-->11  - Zosyn/flagyl D#6, can de escalate in next 24 hours to po  - blood cultures: NGTD  - CTAP: no abscess or other collections  - Sitz baths  - analgesia   - Pain consult -> Dr Soto. PT will need f/u with pain management as o/p  - monitor ileostomy output - pt to f/u with GI as o/p once infection clears for start of biologic        #ETOH/Tobacco  - refusing patch  - drink q3-4 days  - last consumption 5 days ago  - no h/o DTs        DVT px: SCDs/ambulate aggresively  Dispo: pain control, iv abx

## 2024-01-09 NOTE — PROGRESS NOTE ADULT - ASSESSMENT
38 M with h/o Crohn's disease and chronic perirectal fistulas s/p diversion and recent Seton placement at [facility].    Recommendations:  - WBC downtrending, c/w antibiotics   - pain control as needed  - outpatient follow up with GI for medication initiation  - outpatient follow up with established colorectal surgeons at [facility] and [facility] to discuss further management  - no acute surgical intervention at this time  - please reconsult as needed    Discussed with Dr. Hinojosa   38 M with h/o Crohn's disease and chronic perirectal fistulas s/p diversion and recent Seton placement at [facility].    Recommendations:  - WBC downtrending, c/w antibiotics   - pain control as needed  - outpatient follow up with GI for medication initiation  - outpatient follow up with established colorectal surgeons at [facility] and [facility] to discuss further management  - no acute surgical intervention at this time  - please reconsult as needed    Discussed with Dr. Hinojosa

## 2024-01-09 NOTE — PROGRESS NOTE ADULT - SUBJECTIVE AND OBJECTIVE BOX
SURGERY DAILY PROGRESS NOTE:     Subjective:  Patient seen and examined at bedside during am rounds. AVSS. Denies any fevers, chills, n/v/d, chest pain or shortness of breath    Objective:    MEDICATIONS  (STANDING):  influenza   Vaccine 0.5 milliLiter(s) IntraMuscular once  metroNIDAZOLE  IVPB      metroNIDAZOLE  IVPB 500 milliGRAM(s) IV Intermittent every 8 hours  morphine ER Tablet 30 milliGRAM(s) Oral two times a day  piperacillin/tazobactam IVPB.. 3.375 Gram(s) IV Intermittent every 8 hours    MEDICATIONS  (PRN):  acetaminophen     Tablet .. 650 milliGRAM(s) Oral every 6 hours PRN Temp greater or equal to 38C (100.4F), Mild Pain (1 - 3)  aluminum hydroxide/magnesium hydroxide/simethicone Suspension 30 milliLiter(s) Oral every 4 hours PRN Dyspepsia  HYDROmorphone  Injectable 1 milliGRAM(s) IV Push every 4 hours PRN Severe Pain (7 - 10)  melatonin 3 milliGRAM(s) Oral at bedtime PRN Insomnia  ondansetron Injectable 4 milliGRAM(s) IV Push every 8 hours PRN Nausea and/or Vomiting  oxycodone    5 mG/acetaminophen 325 mG 1 Tablet(s) Oral every 4 hours PRN Moderate Pain (4 - 6)      Vital Signs Last 24 Hrs  T(C): 37.1 (08 Jan 2024 21:56), Max: 37.1 (08 Jan 2024 21:56)  T(F): 98.7 (08 Jan 2024 21:56), Max: 98.7 (08 Jan 2024 21:56)  HR: 71 (08 Jan 2024 21:56) (55 - 71)  BP: 105/63 (08 Jan 2024 21:56) (94/55 - 105/67)  BP(mean): --  RR: 18 (08 Jan 2024 21:56) (18 - 18)  SpO2: 100% (08 Jan 2024 21:56) (96% - 100%)    Parameters below as of 08 Jan 2024 21:56  Patient On (Oxygen Delivery Method): room air          PHYSICAL EXAM   Pt is AAOx3  General: No acute distress, uncomfortable appearing.    Chest: Symmetric chest rise   Heart: RRR  Abdomen: Soft, no tenderness, nondistended, ostomy in place  Rectal: Erythema of the marginal zone. 3x draining setons in place. Numerous fistulous openings seen around the anal verge. Significant tenderness on internal exam. Irregularity of rectal wall with firmness likely secondary to extensive scar tissue. No blood on glove.   Back: Normal curvature, no tenderness.   Neuro: Physiological, no localizing findings.     I&O's Detail    LABS:  pending

## 2024-01-10 LAB
ANION GAP SERPL CALC-SCNC: 2 MMOL/L — LOW (ref 5–17)
ANION GAP SERPL CALC-SCNC: 2 MMOL/L — LOW (ref 5–17)
BUN SERPL-MCNC: 7 MG/DL — SIGNIFICANT CHANGE UP (ref 7–23)
BUN SERPL-MCNC: 7 MG/DL — SIGNIFICANT CHANGE UP (ref 7–23)
CALCIUM SERPL-MCNC: 9.3 MG/DL — SIGNIFICANT CHANGE UP (ref 8.5–10.1)
CALCIUM SERPL-MCNC: 9.3 MG/DL — SIGNIFICANT CHANGE UP (ref 8.5–10.1)
CHLORIDE SERPL-SCNC: 105 MMOL/L — SIGNIFICANT CHANGE UP (ref 96–108)
CHLORIDE SERPL-SCNC: 105 MMOL/L — SIGNIFICANT CHANGE UP (ref 96–108)
CO2 SERPL-SCNC: 30 MMOL/L — SIGNIFICANT CHANGE UP (ref 22–31)
CO2 SERPL-SCNC: 30 MMOL/L — SIGNIFICANT CHANGE UP (ref 22–31)
CREAT SERPL-MCNC: 0.87 MG/DL — SIGNIFICANT CHANGE UP (ref 0.5–1.3)
CREAT SERPL-MCNC: 0.87 MG/DL — SIGNIFICANT CHANGE UP (ref 0.5–1.3)
EGFR: 113 ML/MIN/1.73M2 — SIGNIFICANT CHANGE UP
EGFR: 113 ML/MIN/1.73M2 — SIGNIFICANT CHANGE UP
GLUCOSE SERPL-MCNC: 143 MG/DL — HIGH (ref 70–99)
GLUCOSE SERPL-MCNC: 143 MG/DL — HIGH (ref 70–99)
HCT VFR BLD CALC: 44.4 % — SIGNIFICANT CHANGE UP (ref 39–50)
HCT VFR BLD CALC: 44.4 % — SIGNIFICANT CHANGE UP (ref 39–50)
HGB BLD-MCNC: 14.6 G/DL — SIGNIFICANT CHANGE UP (ref 13–17)
HGB BLD-MCNC: 14.6 G/DL — SIGNIFICANT CHANGE UP (ref 13–17)
MCHC RBC-ENTMCNC: 32.9 GM/DL — SIGNIFICANT CHANGE UP (ref 32–36)
MCHC RBC-ENTMCNC: 32.9 GM/DL — SIGNIFICANT CHANGE UP (ref 32–36)
MCHC RBC-ENTMCNC: 33.8 PG — SIGNIFICANT CHANGE UP (ref 27–34)
MCHC RBC-ENTMCNC: 33.8 PG — SIGNIFICANT CHANGE UP (ref 27–34)
MCV RBC AUTO: 102.8 FL — HIGH (ref 80–100)
MCV RBC AUTO: 102.8 FL — HIGH (ref 80–100)
PLATELET # BLD AUTO: 468 K/UL — HIGH (ref 150–400)
PLATELET # BLD AUTO: 468 K/UL — HIGH (ref 150–400)
POTASSIUM SERPL-MCNC: 5 MMOL/L — SIGNIFICANT CHANGE UP (ref 3.5–5.3)
POTASSIUM SERPL-MCNC: 5 MMOL/L — SIGNIFICANT CHANGE UP (ref 3.5–5.3)
POTASSIUM SERPL-SCNC: 5 MMOL/L — SIGNIFICANT CHANGE UP (ref 3.5–5.3)
POTASSIUM SERPL-SCNC: 5 MMOL/L — SIGNIFICANT CHANGE UP (ref 3.5–5.3)
RBC # BLD: 4.32 M/UL — SIGNIFICANT CHANGE UP (ref 4.2–5.8)
RBC # BLD: 4.32 M/UL — SIGNIFICANT CHANGE UP (ref 4.2–5.8)
RBC # FLD: 15.3 % — HIGH (ref 10.3–14.5)
RBC # FLD: 15.3 % — HIGH (ref 10.3–14.5)
SODIUM SERPL-SCNC: 137 MMOL/L — SIGNIFICANT CHANGE UP (ref 135–145)
SODIUM SERPL-SCNC: 137 MMOL/L — SIGNIFICANT CHANGE UP (ref 135–145)
WBC # BLD: 10.92 K/UL — HIGH (ref 3.8–10.5)
WBC # BLD: 10.92 K/UL — HIGH (ref 3.8–10.5)
WBC # FLD AUTO: 10.92 K/UL — HIGH (ref 3.8–10.5)
WBC # FLD AUTO: 10.92 K/UL — HIGH (ref 3.8–10.5)

## 2024-01-10 PROCEDURE — 99232 SBSQ HOSP IP/OBS MODERATE 35: CPT

## 2024-01-10 RX ORDER — METRONIDAZOLE 500 MG
500 TABLET ORAL EVERY 8 HOURS
Refills: 0 | Status: DISCONTINUED | OUTPATIENT
Start: 2024-01-10 | End: 2024-01-11

## 2024-01-10 RX ADMIN — Medication 100 MILLIGRAM(S): at 05:43

## 2024-01-10 RX ADMIN — HYDROMORPHONE HYDROCHLORIDE 1 MILLIGRAM(S): 2 INJECTION INTRAMUSCULAR; INTRAVENOUS; SUBCUTANEOUS at 00:12

## 2024-01-10 RX ADMIN — MORPHINE SULFATE 30 MILLIGRAM(S): 50 CAPSULE, EXTENDED RELEASE ORAL at 21:47

## 2024-01-10 RX ADMIN — MORPHINE SULFATE 30 MILLIGRAM(S): 50 CAPSULE, EXTENDED RELEASE ORAL at 22:20

## 2024-01-10 RX ADMIN — HYDROMORPHONE HYDROCHLORIDE 1 MILLIGRAM(S): 2 INJECTION INTRAMUSCULAR; INTRAVENOUS; SUBCUTANEOUS at 09:59

## 2024-01-10 RX ADMIN — HYDROMORPHONE HYDROCHLORIDE 1 MILLIGRAM(S): 2 INJECTION INTRAMUSCULAR; INTRAVENOUS; SUBCUTANEOUS at 14:02

## 2024-01-10 RX ADMIN — PIPERACILLIN AND TAZOBACTAM 25 GRAM(S): 4; .5 INJECTION, POWDER, LYOPHILIZED, FOR SOLUTION INTRAVENOUS at 05:43

## 2024-01-10 RX ADMIN — Medication 100 MILLIGRAM(S): at 14:04

## 2024-01-10 RX ADMIN — PIPERACILLIN AND TAZOBACTAM 25 GRAM(S): 4; .5 INJECTION, POWDER, LYOPHILIZED, FOR SOLUTION INTRAVENOUS at 15:58

## 2024-01-10 RX ADMIN — HYDROMORPHONE HYDROCHLORIDE 1 MILLIGRAM(S): 2 INJECTION INTRAMUSCULAR; INTRAVENOUS; SUBCUTANEOUS at 05:43

## 2024-01-10 RX ADMIN — Medication 500 MILLIGRAM(S): at 21:46

## 2024-01-10 RX ADMIN — HYDROMORPHONE HYDROCHLORIDE 1 MILLIGRAM(S): 2 INJECTION INTRAMUSCULAR; INTRAVENOUS; SUBCUTANEOUS at 00:50

## 2024-01-10 RX ADMIN — HYDROMORPHONE HYDROCHLORIDE 1 MILLIGRAM(S): 2 INJECTION INTRAMUSCULAR; INTRAVENOUS; SUBCUTANEOUS at 07:03

## 2024-01-10 RX ADMIN — HYDROMORPHONE HYDROCHLORIDE 1 MILLIGRAM(S): 2 INJECTION INTRAMUSCULAR; INTRAVENOUS; SUBCUTANEOUS at 23:07

## 2024-01-10 RX ADMIN — MORPHINE SULFATE 30 MILLIGRAM(S): 50 CAPSULE, EXTENDED RELEASE ORAL at 09:25

## 2024-01-10 RX ADMIN — PIPERACILLIN AND TAZOBACTAM 25 GRAM(S): 4; .5 INJECTION, POWDER, LYOPHILIZED, FOR SOLUTION INTRAVENOUS at 21:44

## 2024-01-10 RX ADMIN — HYDROMORPHONE HYDROCHLORIDE 1 MILLIGRAM(S): 2 INJECTION INTRAMUSCULAR; INTRAVENOUS; SUBCUTANEOUS at 18:03

## 2024-01-10 NOTE — PHARMACOTHERAPY INTERVENTION NOTE - COMMENTS
Case d/w Dr. Prescott. Recommended switching metronidazole from IV to PO. 
Medication history complete, reviewed medications with patient and confirmed with doctor first med hx. Per patient he's not on meds at home.

## 2024-01-10 NOTE — PROGRESS NOTE ADULT - SUBJECTIVE AND OBJECTIVE BOX
HPI:  "39 yo M with h/o Crohn's disease Currently not on any biologic - has been on Humira, Stelara in the past), perianal fistulas s/p diversion at [facility] and seton placement at [facility] approximately 2 weeks ago, proctitis, pancreatitis, and EtOH abuse presents to the ED with rectal pain. The patient states he developed worsening rectal pain 3-4 days ago with purulent discharge and difficulty urinating. He emphasizes concern for infection of the area, but denies fever, abd pain, nausea, vomiting, or change in ostomy output. Colorectal surgery has been consulted numerous times since 2021 for these fistulas, most recently Nov 2023 when he was instructed to follow up with his surgeon at [facility] . He was also given a referral for GI in October for medication initiation but has not followed up. He notes he does not want to go back to [facility]       1.9: c/o severe rectal pain with defecation  1.10: less perirectal pain        REVIEW OF SYSTEMS:    CONSTITUTIONAL: No weakness, No fevers or chills  ENT: No ear ache, No sorethroat  NECK: No pain, No stiffness  RESPIRATORY: No cough, No wheezing, No hemoptysis; No dyspnea  CARDIOVASCULAR: No chest pain, No palpitations  GASTROINTESTINAL: + abd pain, No nausea, No vomiting, No hematemesis, No diarrhea or constipation. No melena, No hematochezia.  GENITOURINARY: No dysuria, No  hematuria  NEUROLOGICAL: No diplopia, No paresthesia, No motor dysfunction  MUSCULOSKELETAL: No arthralgia, No myalgia  SKIN: No rashes, or lesions   PSYCH: no anxiety, no suicidal ideation    All other review of systems is negative unless indicated above    Vital Signs Last 24 Hrs  T(C): 36.8 (10 Jair 2024 15:36), Max: 36.8 (10 Jair 2024 15:36)  T(F): 98.2 (10 Jair 2024 15:36), Max: 98.2 (10 Jair 2024 15:36)  HR: 57 (10 Jair 2024 15:36) (57 - 66)  BP: 98/54 (10 Jair 2024 15:36) (98/54 - 110/74)  BP(mean): --  RR: 18 (10 Jair 2024 15:36) (16 - 18)  SpO2: 97% (10 Jair 2024 15:36) (97% - 99%)    Parameters below as of 10 Jair 2024 15:36  Patient On (Oxygen Delivery Method): room air            PHYSICAL EXAM:    GENERAL: NAD  HEENT:  NC/AT, EOMI, PERRLA, No scleral icterus, Moist mucous membranes  NECK: Supple, No JVD  CNS:  Alert & Oriented X3, Motor Strength 5/5 B/L upper and lower extremities; DTRs 2+ intact   LUNG: Normal Breath sounds, Clear to auscultation bilaterally, No rales, No rhonchi, No wheezing  HEART: RRR; No murmurs, No rubs  ABDOMEN: +BS, ST/ND/NT, +colostomy present   GENITOURINARY: Voiding, Bladder not distended  EXTREMITIES:  2+ Peripheral Pulses, No clubbing, No cyanosis, No tibial edema  MUSCULOSKELTAL: Joints normal ROM, No TTP, No effusion  SKIN: no rashes  RECTAL: perianal  fistulas present   BREAST: deferred      MEDICATIONS  (STANDING):  influenza   Vaccine 0.5 milliLiter(s) IntraMuscular once  metroNIDAZOLE  IVPB 500 milliGRAM(s) IV Intermittent every 8 hours  morphine ER Tablet 30 milliGRAM(s) Oral two times a day  piperacillin/tazobactam IVPB.. 3.375 Gram(s) IV Intermittent every 8 hours    MEDICATIONS  (PRN):  acetaminophen     Tablet .. 650 milliGRAM(s) Oral every 6 hours PRN Temp greater or equal to 38C (100.4F), Mild Pain (1 - 3)  aluminum hydroxide/magnesium hydroxide/simethicone Suspension 30 milliLiter(s) Oral every 4 hours PRN Dyspepsia  HYDROmorphone  Injectable 1 milliGRAM(s) IV Push every 4 hours PRN Severe Pain (7 - 10)  melatonin 3 milliGRAM(s) Oral at bedtime PRN Insomnia  ondansetron Injectable 4 milliGRAM(s) IV Push every 8 hours PRN Nausea and/or Vomiting  oxycodone    5 mG/acetaminophen 325 mG 1 Tablet(s) Oral every 4 hours PRN Moderate Pain (4 - 6)      all labs reviewed  all imaging reviewed    a/p:      #Proctitis   c/w IV Abx day#6/7    #chronic perirectal fistulas  - WBC : 16-> 14-->11-->10K  - Zosyn/flagyl D#7, can de escalate in next 24 hours to po  - blood cultures: NGTD  - CTAP: no abscess or other collections  - Sitz baths  - analgesia   - Pain consult -> Dr Soto. PT will need f/u with pain management as o/p  - GI evaluation for Crohn's disease       #ETOH/Tobacco  - refusing patch  - no h/o DTs    Plan to dc on oral Abx if ok w surgery         HPI:  "39 yo M with h/o Crohn's disease Currently not on any biologic - has been on Humira, Stelara in the past), perianal fistulas s/p diversion at [facility] and seton placement at [facility] approximately 2 weeks ago, proctitis, pancreatitis, and EtOH abuse presents to the ED with rectal pain. The patient states he developed worsening rectal pain 3-4 days ago with purulent discharge and difficulty urinating. He emphasizes concern for infection of the area, but denies fever, abd pain, nausea, vomiting, or change in ostomy output. Colorectal surgery has been consulted numerous times since 2021 for these fistulas, most recently Nov 2023 when he was instructed to follow up with his surgeon at [facility] . He was also given a referral for GI in October for medication initiation but has not followed up. He notes he does not want to go back to [facility]       1.9: c/o severe rectal pain with defecation  1.10: less perirectal pain        REVIEW OF SYSTEMS:    CONSTITUTIONAL: No weakness, No fevers or chills  ENT: No ear ache, No sorethroat  NECK: No pain, No stiffness  RESPIRATORY: No cough, No wheezing, No hemoptysis; No dyspnea  CARDIOVASCULAR: No chest pain, No palpitations  GASTROINTESTINAL: + abd pain, No nausea, No vomiting, No hematemesis, No diarrhea or constipation. No melena, No hematochezia.  GENITOURINARY: No dysuria, No  hematuria  NEUROLOGICAL: No diplopia, No paresthesia, No motor dysfunction  MUSCULOSKELETAL: No arthralgia, No myalgia  SKIN: No rashes, or lesions   PSYCH: no anxiety, no suicidal ideation    All other review of systems is negative unless indicated above    Vital Signs Last 24 Hrs  T(C): 36.8 (10 Jair 2024 15:36), Max: 36.8 (10 Jair 2024 15:36)  T(F): 98.2 (10 Jair 2024 15:36), Max: 98.2 (10 Jair 2024 15:36)  HR: 57 (10 Jair 2024 15:36) (57 - 66)  BP: 98/54 (10 Jair 2024 15:36) (98/54 - 110/74)  BP(mean): --  RR: 18 (10 Jair 2024 15:36) (16 - 18)  SpO2: 97% (10 Jair 2024 15:36) (97% - 99%)    Parameters below as of 10 Jair 2024 15:36  Patient On (Oxygen Delivery Method): room air            PHYSICAL EXAM:    GENERAL: NAD  HEENT:  NC/AT, EOMI, PERRLA, No scleral icterus, Moist mucous membranes  NECK: Supple, No JVD  CNS:  Alert & Oriented X3, Motor Strength 5/5 B/L upper and lower extremities; DTRs 2+ intact   LUNG: Normal Breath sounds, Clear to auscultation bilaterally, No rales, No rhonchi, No wheezing  HEART: RRR; No murmurs, No rubs  ABDOMEN: +BS, ST/ND/NT, +colostomy present   GENITOURINARY: Voiding, Bladder not distended  EXTREMITIES:  2+ Peripheral Pulses, No clubbing, No cyanosis, No tibial edema  MUSCULOSKELTAL: Joints normal ROM, No TTP, No effusion  SKIN: no rashes  RECTAL: perianal  fistulas present   BREAST: deferred      MEDICATIONS  (STANDING):  influenza   Vaccine 0.5 milliLiter(s) IntraMuscular once  metroNIDAZOLE  IVPB 500 milliGRAM(s) IV Intermittent every 8 hours  morphine ER Tablet 30 milliGRAM(s) Oral two times a day  piperacillin/tazobactam IVPB.. 3.375 Gram(s) IV Intermittent every 8 hours    MEDICATIONS  (PRN):  acetaminophen     Tablet .. 650 milliGRAM(s) Oral every 6 hours PRN Temp greater or equal to 38C (100.4F), Mild Pain (1 - 3)  aluminum hydroxide/magnesium hydroxide/simethicone Suspension 30 milliLiter(s) Oral every 4 hours PRN Dyspepsia  HYDROmorphone  Injectable 1 milliGRAM(s) IV Push every 4 hours PRN Severe Pain (7 - 10)  melatonin 3 milliGRAM(s) Oral at bedtime PRN Insomnia  ondansetron Injectable 4 milliGRAM(s) IV Push every 8 hours PRN Nausea and/or Vomiting  oxycodone    5 mG/acetaminophen 325 mG 1 Tablet(s) Oral every 4 hours PRN Moderate Pain (4 - 6)      all labs reviewed  all imaging reviewed    a/p:      #Proctitis   c/w IV Abx day#6/7    #chronic perirectal fistulas  - WBC : 16-> 14-->11-->10K  - Zosyn/flagyl D#7, can de escalate in next 24 hours to po  - blood cultures: NGTD  - CTAP: no abscess or other collections  - Sitz baths  - analgesia   - Pain consult -> Dr Soto. PT will need f/u with pain management as o/p  - GI evaluation for Crohn's disease       #ETOH/Tobacco  - refusing patch  - no h/o DTs    Plan to dc on oral Abx if ok w surgery

## 2024-01-11 ENCOUNTER — TRANSCRIPTION ENCOUNTER (OUTPATIENT)
Age: 39
End: 2024-01-11

## 2024-01-11 VITALS
OXYGEN SATURATION: 97 % | TEMPERATURE: 98 F | HEART RATE: 54 BPM | SYSTOLIC BLOOD PRESSURE: 95 MMHG | RESPIRATION RATE: 19 BRPM | DIASTOLIC BLOOD PRESSURE: 51 MMHG

## 2024-01-11 PROCEDURE — 99239 HOSP IP/OBS DSCHRG MGMT >30: CPT

## 2024-01-11 RX ORDER — MORPHINE SULFATE 50 MG/1
1 CAPSULE, EXTENDED RELEASE ORAL
Qty: 10 | Refills: 0
Start: 2024-01-11 | End: 2024-01-15

## 2024-01-11 RX ORDER — CEFUROXIME AXETIL 250 MG
1 TABLET ORAL
Qty: 14 | Refills: 0 | DISCHARGE
Start: 2024-01-11 | End: 2024-01-17

## 2024-01-11 RX ORDER — CEFUROXIME AXETIL 250 MG
1 TABLET ORAL
Qty: 14 | Refills: 0
Start: 2024-01-11 | End: 2024-01-17

## 2024-01-11 RX ORDER — METRONIDAZOLE 500 MG
1 TABLET ORAL
Qty: 21 | Refills: 0
Start: 2024-01-11 | End: 2024-01-17

## 2024-01-11 RX ORDER — OXYCODONE AND ACETAMINOPHEN 5; 325 MG/1; MG/1
1 TABLET ORAL
Qty: 15 | Refills: 0
Start: 2024-01-11 | End: 2024-01-15

## 2024-01-11 RX ORDER — METRONIDAZOLE 500 MG
1 TABLET ORAL
Qty: 21 | Refills: 0 | DISCHARGE
Start: 2024-01-11 | End: 2024-01-17

## 2024-01-11 RX ADMIN — PIPERACILLIN AND TAZOBACTAM 25 GRAM(S): 4; .5 INJECTION, POWDER, LYOPHILIZED, FOR SOLUTION INTRAVENOUS at 06:52

## 2024-01-11 RX ADMIN — MORPHINE SULFATE 30 MILLIGRAM(S): 50 CAPSULE, EXTENDED RELEASE ORAL at 10:38

## 2024-01-11 RX ADMIN — HYDROMORPHONE HYDROCHLORIDE 1 MILLIGRAM(S): 2 INJECTION INTRAMUSCULAR; INTRAVENOUS; SUBCUTANEOUS at 06:53

## 2024-01-11 RX ADMIN — HYDROMORPHONE HYDROCHLORIDE 1 MILLIGRAM(S): 2 INJECTION INTRAMUSCULAR; INTRAVENOUS; SUBCUTANEOUS at 10:58

## 2024-01-11 RX ADMIN — Medication 500 MILLIGRAM(S): at 06:52

## 2024-01-11 RX ADMIN — MORPHINE SULFATE 30 MILLIGRAM(S): 50 CAPSULE, EXTENDED RELEASE ORAL at 09:38

## 2024-01-11 RX ADMIN — HYDROMORPHONE HYDROCHLORIDE 1 MILLIGRAM(S): 2 INJECTION INTRAMUSCULAR; INTRAVENOUS; SUBCUTANEOUS at 03:11

## 2024-01-11 NOTE — DISCHARGE NOTE PROVIDER - HOSPITAL COURSE
HPI:  "39 yo M with h/o Crohn's disease Currently not on any biologic - has been on Humira, Stelara in the past), perianal fistulas s/p diversion at [facility] and seton placement at [facility] approximately 2 weeks ago, proctitis, pancreatitis, and EtOH abuse presents to the ED with rectal pain. The patient states he developed worsening rectal pain 3-4 days ago with purulent discharge and difficulty urinating. He emphasizes concern for infection of the area, but denies fever, abd pain, nausea, vomiting, or change in ostomy output. Colorectal surgery has been consulted numerous times since 2021 for these fistulas, most recently Nov 2023 when he was instructed to follow up with his surgeon at [facility] . He was also given a referral for GI in October for medication initiation but has not followed up. He notes he does not want to go back to [facility]       1.9: c/o severe rectal pain with defecation  1.10: less perirectal pain        REVIEW OF SYSTEMS:    CONSTITUTIONAL: No weakness, No fevers or chills  ENT: No ear ache, No sorethroat  NECK: No pain, No stiffness  RESPIRATORY: No cough, No wheezing, No hemoptysis; No dyspnea  CARDIOVASCULAR: No chest pain, No palpitations  GASTROINTESTINAL: + abd pain, No nausea, No vomiting, No hematemesis, No diarrhea or constipation. No melena, No hematochezia.  GENITOURINARY: No dysuria, No  hematuria  NEUROLOGICAL: No diplopia, No paresthesia, No motor dysfunction  MUSCULOSKELETAL: No arthralgia, No myalgia  SKIN: No rashes, or lesions   PSYCH: no anxiety, no suicidal ideation    All other review of systems is negative unless indicated above    Vital Signs Last 24 Hrs  T(C): 36.8 (10 Jair 2024 15:36), Max: 36.8 (10 Jair 2024 15:36)  T(F): 98.2 (10 Jair 2024 15:36), Max: 98.2 (10 Jair 2024 15:36)  HR: 57 (10 Jair 2024 15:36) (57 - 66)  BP: 98/54 (10 Jair 2024 15:36) (98/54 - 110/74)  BP(mean): --  RR: 18 (10 Jair 2024 15:36) (16 - 18)  SpO2: 97% (10 Jair 2024 15:36) (97% - 99%)    Parameters below as of 10 Jair 2024 15:36  Patient On (Oxygen Delivery Method): room air            PHYSICAL EXAM:    GENERAL: NAD  HEENT:  NC/AT, EOMI, PERRLA, No scleral icterus, Moist mucous membranes  NECK: Supple, No JVD  CNS:  Alert & Oriented X3, Motor Strength 5/5 B/L upper and lower extremities; DTRs 2+ intact   LUNG: Normal Breath sounds, Clear to auscultation bilaterally, No rales, No rhonchi, No wheezing  HEART: RRR; No murmurs, No rubs  ABDOMEN: +BS, ST/ND/NT, +colostomy present   GENITOURINARY: Voiding, Bladder not distended  EXTREMITIES:  2+ Peripheral Pulses, No clubbing, No cyanosis, No tibial edema  MUSCULOSKELTAL: Joints normal ROM, No TTP, No effusion  SKIN: no rashes  RECTAL: perianal  fistulas present   BREAST: deferred    a/p:      #Proctitis : improving   s/p IV zosyn and flagyl  day#7/7    #chronic perirectal fistulas: improving   - WBC : 16-> 14-->11-->10K  - Zosyn/flagyl D#7, will change to oral meds  - blood cultures: NGTD  - CTAP: no abscess or other drainable collections  - Sitz baths  - analgesia     - GI evaluation for Crohn's disease as outpatient       #ETOH/Tobacco  - refusing patch  - no h/o DTs    Plan to dc on oral Abx HPI:  "37 yo M with h/o Crohn's disease Currently not on any biologic - has been on Humira, Stelara in the past), perianal fistulas s/p diversion at [facility] and seton placement at [facility] approximately 2 weeks ago, proctitis, pancreatitis, and EtOH abuse presents to the ED with rectal pain. The patient states he developed worsening rectal pain 3-4 days ago with purulent discharge and difficulty urinating. He emphasizes concern for infection of the area, but denies fever, abd pain, nausea, vomiting, or change in ostomy output. Colorectal surgery has been consulted numerous times since 2021 for these fistulas, most recently Nov 2023 when he was instructed to follow up with his surgeon at [facility] . He was also given a referral for GI in October for medication initiation but has not followed up. He notes he does not want to go back to [facility]       1.9: c/o severe rectal pain with defecation  1.10: less perirectal pain        REVIEW OF SYSTEMS:    CONSTITUTIONAL: No weakness, No fevers or chills  ENT: No ear ache, No sorethroat  NECK: No pain, No stiffness  RESPIRATORY: No cough, No wheezing, No hemoptysis; No dyspnea  CARDIOVASCULAR: No chest pain, No palpitations  GASTROINTESTINAL: + abd pain, No nausea, No vomiting, No hematemesis, No diarrhea or constipation. No melena, No hematochezia.  GENITOURINARY: No dysuria, No  hematuria  NEUROLOGICAL: No diplopia, No paresthesia, No motor dysfunction  MUSCULOSKELETAL: No arthralgia, No myalgia  SKIN: No rashes, or lesions   PSYCH: no anxiety, no suicidal ideation    All other review of systems is negative unless indicated above    Vital Signs Last 24 Hrs  T(C): 36.8 (10 Jair 2024 15:36), Max: 36.8 (10 Jair 2024 15:36)  T(F): 98.2 (10 Jair 2024 15:36), Max: 98.2 (10 Jair 2024 15:36)  HR: 57 (10 Jair 2024 15:36) (57 - 66)  BP: 98/54 (10 Jair 2024 15:36) (98/54 - 110/74)  BP(mean): --  RR: 18 (10 Jair 2024 15:36) (16 - 18)  SpO2: 97% (10 Jair 2024 15:36) (97% - 99%)    Parameters below as of 10 Jair 2024 15:36  Patient On (Oxygen Delivery Method): room air            PHYSICAL EXAM:    GENERAL: NAD  HEENT:  NC/AT, EOMI, PERRLA, No scleral icterus, Moist mucous membranes  NECK: Supple, No JVD  CNS:  Alert & Oriented X3, Motor Strength 5/5 B/L upper and lower extremities; DTRs 2+ intact   LUNG: Normal Breath sounds, Clear to auscultation bilaterally, No rales, No rhonchi, No wheezing  HEART: RRR; No murmurs, No rubs  ABDOMEN: +BS, ST/ND/NT, +colostomy present   GENITOURINARY: Voiding, Bladder not distended  EXTREMITIES:  2+ Peripheral Pulses, No clubbing, No cyanosis, No tibial edema  MUSCULOSKELTAL: Joints normal ROM, No TTP, No effusion  SKIN: no rashes  RECTAL: perianal  fistulas present   BREAST: deferred    a/p:      #Proctitis : improving   s/p IV zosyn and flagyl  day#7/7    #chronic perirectal fistulas: improving   - WBC : 16-> 14-->11-->10K  - Zosyn/flagyl D#7, will change to oral meds  - blood cultures: NGTD  - CTAP: no abscess or other drainable collections  - Sitz baths  - analgesia     - GI evaluation for Crohn's disease as outpatient       #ETOH/Tobacco  - refusing patch  - no h/o DTs    Plan to dc on oral Abx

## 2024-01-11 NOTE — DISCHARGE NOTE NURSING/CASE MANAGEMENT/SOCIAL WORK - NSTRANSFERBELONGINGSDISPO_GEN_A_NUR
Ordered to continue with medication administration as displayed in worklist by provider.
with patient

## 2024-01-11 NOTE — DISCHARGE NOTE PROVIDER - NSDCCPCAREPLAN_GEN_ALL_CORE_FT
PRINCIPAL DISCHARGE DIAGNOSIS  Diagnosis: Proctitis  Assessment and Plan of Treatment: Perianal fistula, Crohn disease

## 2024-01-11 NOTE — DISCHARGE NOTE PROVIDER - NSDCMRMEDTOKEN_GEN_ALL_CORE_FT
cefuroxime 500 mg oral tablet: 1 tab(s) orally 2 times a day  metroNIDAZOLE 500 mg oral tablet: 1 tab(s) orally every 8 hours  morphine 30 mg/8 to 12 hr oral tablet, extended release: 1 tab(s) orally 2 times a day MDD: 2tb  oxycodone-acetaminophen 5 mg-325 mg oral tablet: 1 tab(s) orally 3 times a day as needed for Moderate Pain (4 - 6) MDD: 3tb

## 2024-01-11 NOTE — DISCHARGE NOTE NURSING/CASE MANAGEMENT/SOCIAL WORK - NSDCPEFALRISK_GEN_ALL_CORE
For information on Fall & Injury Prevention, visit: [email or web address] OR  [email or web address] OR  https://www.cdc.gov/steadi/patient.html For information on Fall & Injury Prevention, visit: [email or web address] OR  [email or web address] OR  https://www.cdc.gov/steadi/patient.html

## 2024-01-11 NOTE — DISCHARGE NOTE NURSING/CASE MANAGEMENT/SOCIAL WORK - PATIENT PORTAL LINK FT
Patient AAOX3 sitting up in chair . Patient has no c/o pain /discomfort at this time. Will cont to monitor.   You can access the FollowMyHealth Patient Portal offered by [facility] by registering at the following website: [email or web address]. By joining [employer]’s FollowMyHealth portal, you will also be able to view your health information using other applications (apps) compatible with our system. You can access the FollowMyHealth Patient Portal offered by [facility] by registering at the following website: [email or web address]. By joining [employer]’s FollowMyHealth portal, you will also be able to view your health information using other applications (apps) compatible with our system.

## 2024-01-12 RX ORDER — MORPHINE SULFATE 50 MG/1
1 CAPSULE, EXTENDED RELEASE ORAL
Qty: 10 | Refills: 0 | DISCHARGE
Start: 2024-01-12 | End: 2024-01-16

## 2024-01-16 ENCOUNTER — EMERGENCY (EMERGENCY)
Facility: HOSPITAL | Age: 39
LOS: 0 days | Discharge: HOME | End: 2024-01-16
Attending: EMERGENCY MEDICINE
Payer: COMMERCIAL

## 2024-01-16 VITALS
OXYGEN SATURATION: 100 % | SYSTOLIC BLOOD PRESSURE: 110 MMHG | RESPIRATION RATE: 17 BRPM | DIASTOLIC BLOOD PRESSURE: 69 MMHG | HEART RATE: 66 BPM

## 2024-01-16 VITALS — WEIGHT: 160.06 LBS | HEIGHT: 72 IN

## 2024-01-16 DIAGNOSIS — Z98.89 OTHER SPECIFIED POSTPROCEDURAL STATES: Chronic | ICD-10-CM

## 2024-01-16 DIAGNOSIS — Z96.7 PRESENCE OF OTHER BONE AND TENDON IMPLANTS: Chronic | ICD-10-CM

## 2024-01-16 DIAGNOSIS — Z88.1 ALLERGY STATUS TO OTHER ANTIBIOTIC AGENTS STATUS: ICD-10-CM

## 2024-01-16 DIAGNOSIS — R11.0 NAUSEA: ICD-10-CM

## 2024-01-16 DIAGNOSIS — N40.0 BENIGN PROSTATIC HYPERPLASIA WITHOUT LOWER URINARY TRACT SYMPTOMS: ICD-10-CM

## 2024-01-16 DIAGNOSIS — R10.9 UNSPECIFIED ABDOMINAL PAIN: ICD-10-CM

## 2024-01-16 DIAGNOSIS — K60.3 ANAL FISTULA: Chronic | ICD-10-CM

## 2024-01-16 DIAGNOSIS — Z93.2 ILEOSTOMY STATUS: ICD-10-CM

## 2024-01-16 DIAGNOSIS — Z87.19 PERSONAL HISTORY OF OTHER DISEASES OF THE DIGESTIVE SYSTEM: ICD-10-CM

## 2024-01-16 DIAGNOSIS — K52.9 NONINFECTIVE GASTROENTERITIS AND COLITIS, UNSPECIFIED: ICD-10-CM

## 2024-01-16 DIAGNOSIS — D72.829 ELEVATED WHITE BLOOD CELL COUNT, UNSPECIFIED: ICD-10-CM

## 2024-01-16 DIAGNOSIS — R73.9 HYPERGLYCEMIA, UNSPECIFIED: ICD-10-CM

## 2024-01-16 LAB
ALBUMIN SERPL ELPH-MCNC: 3.4 G/DL — SIGNIFICANT CHANGE UP (ref 3.3–5)
ALP SERPL-CCNC: 80 U/L — SIGNIFICANT CHANGE UP (ref 40–120)
ALT FLD-CCNC: 27 U/L — SIGNIFICANT CHANGE UP (ref 12–78)
ANION GAP SERPL CALC-SCNC: 2 MMOL/L — LOW (ref 5–17)
APTT BLD: 29 SEC — SIGNIFICANT CHANGE UP (ref 24.5–35.6)
AST SERPL-CCNC: 26 U/L — SIGNIFICANT CHANGE UP (ref 15–37)
BASOPHILS # BLD AUTO: 0.05 K/UL — SIGNIFICANT CHANGE UP (ref 0–0.2)
BASOPHILS NFR BLD AUTO: 0.3 % — SIGNIFICANT CHANGE UP (ref 0–2)
BILIRUB SERPL-MCNC: 0.3 MG/DL — SIGNIFICANT CHANGE UP (ref 0.2–1.2)
BLD GP AB SCN SERPL QL: SIGNIFICANT CHANGE UP
BUN SERPL-MCNC: 12 MG/DL — SIGNIFICANT CHANGE UP (ref 7–23)
CALCIUM SERPL-MCNC: 10.2 MG/DL — HIGH (ref 8.5–10.1)
CHLORIDE SERPL-SCNC: 103 MMOL/L — SIGNIFICANT CHANGE UP (ref 96–108)
CO2 SERPL-SCNC: 26 MMOL/L — SIGNIFICANT CHANGE UP (ref 22–31)
CREAT SERPL-MCNC: 0.9 MG/DL — SIGNIFICANT CHANGE UP (ref 0.5–1.3)
EGFR: 112 ML/MIN/1.73M2 — SIGNIFICANT CHANGE UP
EOSINOPHIL # BLD AUTO: 0.04 K/UL — SIGNIFICANT CHANGE UP (ref 0–0.5)
EOSINOPHIL NFR BLD AUTO: 0.3 % — SIGNIFICANT CHANGE UP (ref 0–6)
GLUCOSE SERPL-MCNC: 352 MG/DL — HIGH (ref 70–99)
HCT VFR BLD CALC: 48.1 % — SIGNIFICANT CHANGE UP (ref 39–50)
HGB BLD-MCNC: 15.9 G/DL — SIGNIFICANT CHANGE UP (ref 13–17)
IMM GRANULOCYTES NFR BLD AUTO: 0.4 % — SIGNIFICANT CHANGE UP (ref 0–0.9)
INR BLD: 0.95 RATIO — SIGNIFICANT CHANGE UP (ref 0.85–1.18)
LACTATE SERPL-SCNC: 1.4 MMOL/L — SIGNIFICANT CHANGE UP (ref 0.7–2)
LACTATE SERPL-SCNC: 2.5 MMOL/L — HIGH (ref 0.7–2)
LIDOCAIN IGE QN: 13 U/L — SIGNIFICANT CHANGE UP (ref 13–75)
LYMPHOCYTES # BLD AUTO: 1.4 K/UL — SIGNIFICANT CHANGE UP (ref 1–3.3)
LYMPHOCYTES # BLD AUTO: 9.3 % — LOW (ref 13–44)
MCHC RBC-ENTMCNC: 32.9 PG — SIGNIFICANT CHANGE UP (ref 27–34)
MCHC RBC-ENTMCNC: 33.1 GM/DL — SIGNIFICANT CHANGE UP (ref 32–36)
MCV RBC AUTO: 99.4 FL — SIGNIFICANT CHANGE UP (ref 80–100)
MONOCYTES # BLD AUTO: 0.85 K/UL — SIGNIFICANT CHANGE UP (ref 0–0.9)
MONOCYTES NFR BLD AUTO: 5.7 % — SIGNIFICANT CHANGE UP (ref 2–14)
NEUTROPHILS # BLD AUTO: 12.61 K/UL — HIGH (ref 1.8–7.4)
NEUTROPHILS NFR BLD AUTO: 84 % — HIGH (ref 43–77)
PLATELET # BLD AUTO: 477 K/UL — HIGH (ref 150–400)
POTASSIUM SERPL-MCNC: 4.7 MMOL/L — SIGNIFICANT CHANGE UP (ref 3.5–5.3)
POTASSIUM SERPL-SCNC: 4.7 MMOL/L — SIGNIFICANT CHANGE UP (ref 3.5–5.3)
PROT SERPL-MCNC: 9 GM/DL — HIGH (ref 6–8.3)
PROTHROM AB SERPL-ACNC: 10.8 SEC — SIGNIFICANT CHANGE UP (ref 9.5–13)
RBC # BLD: 4.84 M/UL — SIGNIFICANT CHANGE UP (ref 4.2–5.8)
RBC # FLD: 14.3 % — SIGNIFICANT CHANGE UP (ref 10.3–14.5)
SODIUM SERPL-SCNC: 131 MMOL/L — LOW (ref 135–145)
WBC # BLD: 15.01 K/UL — HIGH (ref 3.8–10.5)
WBC # FLD AUTO: 15.01 K/UL — HIGH (ref 3.8–10.5)

## 2024-01-16 PROCEDURE — 74177 CT ABD & PELVIS W/CONTRAST: CPT | Mod: MA

## 2024-01-16 PROCEDURE — 85025 COMPLETE CBC W/AUTO DIFF WBC: CPT

## 2024-01-16 PROCEDURE — 99285 EMERGENCY DEPT VISIT HI MDM: CPT

## 2024-01-16 PROCEDURE — 83690 ASSAY OF LIPASE: CPT

## 2024-01-16 PROCEDURE — 86850 RBC ANTIBODY SCREEN: CPT

## 2024-01-16 PROCEDURE — 96375 TX/PRO/DX INJ NEW DRUG ADDON: CPT

## 2024-01-16 PROCEDURE — 36415 COLL VENOUS BLD VENIPUNCTURE: CPT

## 2024-01-16 PROCEDURE — 85730 THROMBOPLASTIN TIME PARTIAL: CPT

## 2024-01-16 PROCEDURE — 99284 EMERGENCY DEPT VISIT MOD MDM: CPT | Mod: 25

## 2024-01-16 PROCEDURE — 83605 ASSAY OF LACTIC ACID: CPT

## 2024-01-16 PROCEDURE — 86900 BLOOD TYPING SEROLOGIC ABO: CPT

## 2024-01-16 PROCEDURE — 86901 BLOOD TYPING SEROLOGIC RH(D): CPT

## 2024-01-16 PROCEDURE — 74177 CT ABD & PELVIS W/CONTRAST: CPT | Mod: 26,MA

## 2024-01-16 PROCEDURE — 96374 THER/PROPH/DIAG INJ IV PUSH: CPT | Mod: XU

## 2024-01-16 PROCEDURE — 96376 TX/PRO/DX INJ SAME DRUG ADON: CPT

## 2024-01-16 PROCEDURE — 80053 COMPREHEN METABOLIC PANEL: CPT

## 2024-01-16 PROCEDURE — 85610 PROTHROMBIN TIME: CPT

## 2024-01-16 RX ORDER — SODIUM CHLORIDE 9 MG/ML
1000 INJECTION INTRAMUSCULAR; INTRAVENOUS; SUBCUTANEOUS ONCE
Refills: 0 | Status: COMPLETED | OUTPATIENT
Start: 2024-01-16 | End: 2024-01-16

## 2024-01-16 RX ORDER — SODIUM CHLORIDE 9 MG/ML
200 INJECTION INTRAMUSCULAR; INTRAVENOUS; SUBCUTANEOUS ONCE
Refills: 0 | Status: COMPLETED | OUTPATIENT
Start: 2024-01-16 | End: 2024-01-16

## 2024-01-16 RX ORDER — ONDANSETRON 8 MG/1
4 TABLET, FILM COATED ORAL ONCE
Refills: 0 | Status: COMPLETED | OUTPATIENT
Start: 2024-01-16 | End: 2024-01-16

## 2024-01-16 RX ORDER — HYDROMORPHONE HYDROCHLORIDE 2 MG/ML
1 INJECTION INTRAMUSCULAR; INTRAVENOUS; SUBCUTANEOUS ONCE
Refills: 0 | Status: DISCONTINUED | OUTPATIENT
Start: 2024-01-16 | End: 2024-01-16

## 2024-01-16 RX ORDER — MORPHINE SULFATE 50 MG/1
4 CAPSULE, EXTENDED RELEASE ORAL ONCE
Refills: 0 | Status: DISCONTINUED | OUTPATIENT
Start: 2024-01-16 | End: 2024-01-16

## 2024-01-16 RX ADMIN — SODIUM CHLORIDE 2000 MILLILITER(S): 9 INJECTION INTRAMUSCULAR; INTRAVENOUS; SUBCUTANEOUS at 16:49

## 2024-01-16 RX ADMIN — SODIUM CHLORIDE 2000 MILLILITER(S): 9 INJECTION INTRAMUSCULAR; INTRAVENOUS; SUBCUTANEOUS at 17:47

## 2024-01-16 RX ADMIN — ONDANSETRON 4 MILLIGRAM(S): 8 TABLET, FILM COATED ORAL at 16:49

## 2024-01-16 RX ADMIN — HYDROMORPHONE HYDROCHLORIDE 1 MILLIGRAM(S): 2 INJECTION INTRAMUSCULAR; INTRAVENOUS; SUBCUTANEOUS at 17:47

## 2024-01-16 RX ADMIN — HYDROMORPHONE HYDROCHLORIDE 1 MILLIGRAM(S): 2 INJECTION INTRAMUSCULAR; INTRAVENOUS; SUBCUTANEOUS at 19:32

## 2024-01-16 RX ADMIN — MORPHINE SULFATE 4 MILLIGRAM(S): 50 CAPSULE, EXTENDED RELEASE ORAL at 16:48

## 2024-01-16 RX ADMIN — SODIUM CHLORIDE 800 MILLILITER(S): 9 INJECTION INTRAMUSCULAR; INTRAVENOUS; SUBCUTANEOUS at 17:47

## 2024-01-16 NOTE — ED STATDOCS - CLINICAL SUMMARY MEDICAL DECISION MAKING FREE TEXT BOX
Pt with hx of Crohns, ileostomy presents for painful stool from rectum. Plan: labs, CT. Pt with hx of Crohns, ileostomy presents for painful stool from rectum. Plan: labs, CT.          Leukocytosis 15, hyperglycemia 352 which has been demonstrated on prior visits.  Lactate 2.5 initially down to 1.4 after  IVF administered.  CT demonstrating Long segment inflammation of the descending colon, likely inflammatory colitis given history of Crohn's disease.  No bowel obstruction or enteric fistulas identified.  Discussed with attending Dr. Mancera and patient may be discharged with colorectal follow up.  Anabel Lombardi PA-C

## 2024-01-16 NOTE — ED ADULT NURSE NOTE - CHIEF COMPLAINT QUOTE
Pt ambulatory to ED from home c/o abdominal pain near ostomy pouch. Per pt he was recently d/charla from  w/ rectal fissures and ostomy bag. Pt endorses no output from pouch since 5am. Endorses nausea/ diarrhea from rectum. Allergies to cirpo.

## 2024-01-16 NOTE — ED ADULT NURSE NOTE - NS ED NURSE LEVEL OF CONSCIOUSNESS ORIENTATION
Orders for admission, patient is aware of plan and ready to go upstairs. Any questions, please call ED RN Jeremy Turner at extension [phone].      Patient Covid vaccination status: Fully vaccinated     COVID Test Ordered in ED: None    COVID Suspicion at Admission: N/A    Running Infusions:  None    Mental Status/LOC at time of transport: x4    Other pertinent information: ambulatory with a walker  CIWA score: N/A   NIH score:  N/A
Oriented - self; Oriented - place; Oriented - time

## 2024-01-16 NOTE — ED STATDOCS - CARE PROVIDERS DIRECT ADDRESSES
[email or web address]

## 2024-01-16 NOTE — ED STATDOCS - CONSTITUTIONAL, MLM
normal... well appearing and in no apparent distress. well appearing and in moderate distress due to pain.

## 2024-01-16 NOTE — ED STATDOCS - CARE PROVIDER_API CALL
Justa Perez  Colon/Rectal Surgery  [address], [place] [address]  Phone: [phone]  Fax: [phone]  Follow Up Time:    Justa Perez  Colon/Rectal Surgery  [address], [place] [address]  Phone: [phone]  Fax: [phone]  Follow Up Time:

## 2024-01-16 NOTE — ED STATDOCS - PATIENT PORTAL LINK FT
You can access the FollowMyHealth Patient Portal offered by [facility] by registering at the following website: [email or web address]. By joining [facility]’s FollowMyHealth portal, you will also be able to view your health information using other applications (apps) compatible with our system. You can access the FollowMyHealth Patient Portal offered by [facility] by registering at the following website: [email or web address]. By joining [employer]’s FollowMyHealth portal, you will also be able to view your health information using other applications (apps) compatible with our system.

## 2024-01-16 NOTE — ED ADULT NURSE NOTE - NSFALLUNIVINTERV_ED_ALL_ED
Bed/Stretcher in lowest position, wheels locked, appropriate side rails in place/Call bell, personal items and telephone in reach/Instruct patient to call for assistance before getting out of bed/chair/stretcher/Non-slip footwear applied when patient is off stretcher/[place] to call system/Physically safe environment - no spills, clutter or unnecessary equipment/Purposeful proactive rounding/Room/bathroom lighting operational, light cord in reach Bed/Stretcher in lowest position, wheels locked, appropriate side rails in place/Call bell, personal items and telephone in reach/Instruct patient to call for assistance before getting out of bed/chair/stretcher/Non-slip footwear applied when patient is off stretcher/[place] to call system/Physically safe environment - no spills, clutter or unnecessary equipment/Purposeful proactive rounding/Room/bathroom lighting operational, light cord in reach

## 2024-01-16 NOTE — ED STATDOCS - PROGRESS NOTE DETAILS
Pt. is a 38 year old male Hx Crohn's disease with ileostomy, pancreatitis, proctitis, perianal fistulas [facility] placement at [facility], presents with abdominal pain and deveased output to ostomy pouch.  Pt. recent hospitalization at  for rectal pain and discharged five days ago.  Pt. is passing sools throug his rectum.  Nausea without vomiting.  Anabel Lombardi PA-C Pt. is a 38 year old male Hx Crohn's disease with ileostomy, pancreatitis, proctitis, perianal fistulas Seton placement at [facility], presents with abdominal pain and deveased output to ostomy pouch.  Pt. recent hospitalization at  for rectal pain and discharged five days ago.  Pt. is passing sools throug his rectum.  Nausea without vomiting.  Anabel Lombardi PA-C Pt. is a 38 year old male Hx Crohn's disease with ileostomy, pancreatitis, proctitis, perianal fistulas Seton placement at [facility], presents with abdominal pain and  output to ostomy pouch.  Pt. recent hospitalization at  for rectal pain and discharged five days ago.  Pt. is passing stools through his rectum.  Nausea without vomiting.  Anabel Lombardi PA-C Pt. is a 38 year old male Hx Crohn's disease with ileostomy, pancreatitis, proctitis, perianal fistulas [facility] placement at [facility], presents with abdominal pain and  output to ostomy pouch.  Pt. recent hospitalization at  for rectal pain and discharged five days ago.  Pt. is passing stools through his rectum.  Nausea without vomiting.  Anabel Lombardi PA-C Pt. returned from CT.  Three rounds of pain medications improved symptoms.  Pt. has stool in ostomy bag.  Awaiting CT reading.  Repeat Lactate drawn.  Anabel Lombardi PA-C Leukocytosis 15, hyperglycemia 352 which has been demonstrated on prior visits.  Lactate 2.5.  IVF administered.  CT demonstrating Long segment inflammation of the descending colon, likely inflammatory colitis given history of Crohn's disease.  No bowel obstruction or enteric fistulas identified. Leukocytosis 15, hyperglycemia 352 which has been demonstrated on prior visits.  Lactate 2.5 initially down to 1.4 after  IVF administered.  CT demonstrating Long segment inflammation of the descending colon, likely inflammatory colitis given history of Crohn's disease.  No bowel obstruction or enteric fistulas identified.  Discussed with attending Dr. Mancera and patient may be discharged with colorectal follow up.  Anabel Lombardi PA-C Pt. returned from CT.  Three rounds of pain medications improved symptoms.  Pt. has stool in ostomy bag.  Awaiting CT reading.  Repeat Lactate drawn.  Pt. aware of elevated glucose.  Anabel Lombardi PA-C

## 2024-01-16 NOTE — ED ADULT NURSE NOTE - OBJECTIVE STATEMENT
Pt comes to the ED complaining of abdominal pain and pain in his rectum 10/10. Pt states that he has an ostomy and this am after he ate breakfast, his ostomy stopped putting out stool and he is having stool come out his rectum. Pt currently has rectal fissures and states that this is extremely painful. +nausea No vomiting at present but feels as if he is going to.

## 2024-01-16 NOTE — ED STATDOCS - GASTROINTESTINAL, MLM
abdomen soft, non-tender, and non-distended. Bowel sounds present. abdomen soft, and non-distended. Bowel sounds present. RLQ with ostomy with small amount of stool. Mild TTP RLQ.

## 2024-01-16 NOTE — ED STATDOCS - ATTENDING APP SHARED VISIT CONTRIBUTION OF CARE
I, Liliana Lopez MD,  performed the initial face to face bedside interview with this patient regarding history of present illness, review of symptoms and relevant past medical, social and family history.  I completed an independent physical examination.  I was the initial provider who evaluated this patient.   I personally saw the patient and performed a substantive portion of the visit including all aspects of the medical decision making.  I have signed out the follow up of any pending tests (i.e. labs, radiological studies) to the [facility].  I have communicated the patient’s plan of care and disposition with the [facility].  The history, relevant review of systems, past medical and surgical history, medical decision making, and physical examination was documented by the scribe in my presence and I attest to the accuracy of the documentation.

## 2024-01-16 NOTE — ED STATDOCS - OBJECTIVE STATEMENT
37 y/o male with a PMHx of Crohns, pancreatitis, proctitis, perianal fistulas s/p diversion at [facility] and seton placement at [facility], presents to the ED c/o abd pain near ileostomy pouch. Pt admitted to  for rectal pain and was d/c 5 days ago. Pt reports decreased output in his ileostomy and is passing stools through his rectum. +nausea. No other complaints at this time. 37 y/o male with a PMHx of Crohns, pancreatitis, proctitis, perianal fistulas s/p diversion at [facility] and seton placement at [facility], presents to the ED c/o abd pain near ileostomy pouch. Pt admitted to  for rectal pain and was d/c 5 days ago. Pt reports decreased output in his ileostomy and is passing stools through his rectum. +nausea. No other complaints at this time.

## 2024-01-16 NOTE — ED STATDOCS - NSICDXFAMILYHX_GEN_ALL_CORE_FT
FAMILY HISTORY:  Father  Still living? Yes, Estimated age: 61-70  Diabetes mellitus, Age at diagnosis: 51-60     Spiral Flap Text: The defect edges were debeveled with a #15 scalpel blade.  Given the location of the defect, shape of the defect and the proximity to free margins a spiral flap was deemed most appropriate.  Using a sterile surgical marker, an appropriate rotation flap was drawn incorporating the defect and placing the expected incisions within the relaxed skin tension lines where possible. The area thus outlined was incised deep to adipose tissue with a #15 scalpel blade.  The skin margins were undermined to an appropriate distance in all directions utilizing iris scissors.

## 2024-01-16 NOTE — ED ADULT NURSE REASSESSMENT NOTE - NS ED NURSE REASSESS COMMENT FT1
Received report previous RN. Pt A&Ox4, c/o abdominal pain. SIMRAN Lombardi aware. Will medicate as per orders in MAR. VS as charted, respirations equal and unlabored. Pending imaging results. Pt updated on plan of care, verbalized understanding.

## 2024-01-17 ENCOUNTER — INPATIENT (INPATIENT)
Facility: HOSPITAL | Age: 39
LOS: 13 days | Discharge: HOME | DRG: 385 | End: 2024-01-31
Attending: INTERNAL MEDICINE | Admitting: HOSPITALIST
Payer: COMMERCIAL

## 2024-01-17 VITALS
TEMPERATURE: 98 F | DIASTOLIC BLOOD PRESSURE: 84 MMHG | HEART RATE: 100 BPM | SYSTOLIC BLOOD PRESSURE: 124 MMHG | WEIGHT: 160.06 LBS | HEIGHT: 72 IN | RESPIRATION RATE: 20 BRPM | OXYGEN SATURATION: 100 %

## 2024-01-17 DIAGNOSIS — Z98.89 OTHER SPECIFIED POSTPROCEDURAL STATES: Chronic | ICD-10-CM

## 2024-01-17 DIAGNOSIS — K60.3 ANAL FISTULA: Chronic | ICD-10-CM

## 2024-01-17 DIAGNOSIS — Z96.7 PRESENCE OF OTHER BONE AND TENDON IMPLANTS: Chronic | ICD-10-CM

## 2024-01-17 DIAGNOSIS — K50.90 CROHN'S DISEASE, UNSPECIFIED, WITHOUT COMPLICATIONS: ICD-10-CM

## 2024-01-17 LAB
ALBUMIN SERPL ELPH-MCNC: 3.3 G/DL — SIGNIFICANT CHANGE UP (ref 3.3–5)
ALP SERPL-CCNC: 80 U/L — SIGNIFICANT CHANGE UP (ref 40–120)
ALT FLD-CCNC: 22 U/L — SIGNIFICANT CHANGE UP (ref 12–78)
ANION GAP SERPL CALC-SCNC: 4 MMOL/L — LOW (ref 5–17)
AST SERPL-CCNC: 17 U/L — SIGNIFICANT CHANGE UP (ref 15–37)
BASOPHILS # BLD AUTO: 0.06 K/UL — SIGNIFICANT CHANGE UP (ref 0–0.2)
BASOPHILS NFR BLD AUTO: 0.5 % — SIGNIFICANT CHANGE UP (ref 0–2)
BILIRUB SERPL-MCNC: 0.2 MG/DL — SIGNIFICANT CHANGE UP (ref 0.2–1.2)
BUN SERPL-MCNC: 7 MG/DL — SIGNIFICANT CHANGE UP (ref 7–23)
CALCIUM SERPL-MCNC: 9 MG/DL — SIGNIFICANT CHANGE UP (ref 8.5–10.1)
CHLORIDE SERPL-SCNC: 108 MMOL/L — SIGNIFICANT CHANGE UP (ref 96–108)
CO2 SERPL-SCNC: 28 MMOL/L — SIGNIFICANT CHANGE UP (ref 22–31)
CREAT SERPL-MCNC: 0.9 MG/DL — SIGNIFICANT CHANGE UP (ref 0.5–1.3)
EGFR: 112 ML/MIN/1.73M2 — SIGNIFICANT CHANGE UP
EOSINOPHIL # BLD AUTO: 0.17 K/UL — SIGNIFICANT CHANGE UP (ref 0–0.5)
EOSINOPHIL NFR BLD AUTO: 1.5 % — SIGNIFICANT CHANGE UP (ref 0–6)
GLUCOSE SERPL-MCNC: 139 MG/DL — HIGH (ref 70–99)
HCT VFR BLD CALC: 44.8 % — SIGNIFICANT CHANGE UP (ref 39–50)
HGB BLD-MCNC: 15.2 G/DL — SIGNIFICANT CHANGE UP (ref 13–17)
IMM GRANULOCYTES NFR BLD AUTO: 0.3 % — SIGNIFICANT CHANGE UP (ref 0–0.9)
LACTATE SERPL-SCNC: 0.9 MMOL/L — SIGNIFICANT CHANGE UP (ref 0.7–2)
LACTATE SERPL-SCNC: 2.4 MMOL/L — HIGH (ref 0.7–2)
LIDOCAIN IGE QN: 19 U/L — SIGNIFICANT CHANGE UP (ref 13–75)
LYMPHOCYTES # BLD AUTO: 2.91 K/UL — SIGNIFICANT CHANGE UP (ref 1–3.3)
LYMPHOCYTES # BLD AUTO: 25 % — SIGNIFICANT CHANGE UP (ref 13–44)
MCHC RBC-ENTMCNC: 33.7 PG — SIGNIFICANT CHANGE UP (ref 27–34)
MCHC RBC-ENTMCNC: 33.9 GM/DL — SIGNIFICANT CHANGE UP (ref 32–36)
MCV RBC AUTO: 99.3 FL — SIGNIFICANT CHANGE UP (ref 80–100)
MONOCYTES # BLD AUTO: 1.19 K/UL — HIGH (ref 0–0.9)
MONOCYTES NFR BLD AUTO: 10.2 % — SIGNIFICANT CHANGE UP (ref 2–14)
NEUTROPHILS # BLD AUTO: 7.27 K/UL — SIGNIFICANT CHANGE UP (ref 1.8–7.4)
NEUTROPHILS NFR BLD AUTO: 62.5 % — SIGNIFICANT CHANGE UP (ref 43–77)
PLATELET # BLD AUTO: 423 K/UL — HIGH (ref 150–400)
POTASSIUM SERPL-MCNC: 3.5 MMOL/L — SIGNIFICANT CHANGE UP (ref 3.5–5.3)
POTASSIUM SERPL-SCNC: 3.5 MMOL/L — SIGNIFICANT CHANGE UP (ref 3.5–5.3)
PROT SERPL-MCNC: 8.3 GM/DL — SIGNIFICANT CHANGE UP (ref 6–8.3)
RBC # BLD: 4.51 M/UL — SIGNIFICANT CHANGE UP (ref 4.2–5.8)
RBC # FLD: 14.5 % — SIGNIFICANT CHANGE UP (ref 10.3–14.5)
SODIUM SERPL-SCNC: 140 MMOL/L — SIGNIFICANT CHANGE UP (ref 135–145)
WBC # BLD: 11.63 K/UL — HIGH (ref 3.8–10.5)
WBC # FLD AUTO: 11.63 K/UL — HIGH (ref 3.8–10.5)

## 2024-01-17 PROCEDURE — 99223 1ST HOSP IP/OBS HIGH 75: CPT

## 2024-01-17 PROCEDURE — 99285 EMERGENCY DEPT VISIT HI MDM: CPT

## 2024-01-17 PROCEDURE — 83605 ASSAY OF LACTIC ACID: CPT

## 2024-01-17 PROCEDURE — 97162 PT EVAL MOD COMPLEX 30 MIN: CPT | Mod: GP

## 2024-01-17 PROCEDURE — 80061 LIPID PANEL: CPT

## 2024-01-17 PROCEDURE — 97116 GAIT TRAINING THERAPY: CPT | Mod: GP

## 2024-01-17 PROCEDURE — 80048 BASIC METABOLIC PNL TOTAL CA: CPT

## 2024-01-17 PROCEDURE — 84100 ASSAY OF PHOSPHORUS: CPT

## 2024-01-17 PROCEDURE — 83036 HEMOGLOBIN GLYCOSYLATED A1C: CPT

## 2024-01-17 PROCEDURE — 80053 COMPREHEN METABOLIC PANEL: CPT

## 2024-01-17 PROCEDURE — 93005 ELECTROCARDIOGRAM TRACING: CPT

## 2024-01-17 PROCEDURE — 85027 COMPLETE CBC AUTOMATED: CPT

## 2024-01-17 PROCEDURE — 86140 C-REACTIVE PROTEIN: CPT

## 2024-01-17 PROCEDURE — 87493 C DIFF AMPLIFIED PROBE: CPT

## 2024-01-17 PROCEDURE — 87040 BLOOD CULTURE FOR BACTERIA: CPT

## 2024-01-17 PROCEDURE — 0241U: CPT

## 2024-01-17 PROCEDURE — 93306 TTE W/DOPPLER COMPLETE: CPT

## 2024-01-17 PROCEDURE — 83735 ASSAY OF MAGNESIUM: CPT

## 2024-01-17 PROCEDURE — 82009 KETONE BODYS QUAL: CPT

## 2024-01-17 PROCEDURE — 81003 URINALYSIS AUTO W/O SCOPE: CPT

## 2024-01-17 PROCEDURE — 36415 COLL VENOUS BLD VENIPUNCTURE: CPT

## 2024-01-17 PROCEDURE — 87507 IADNA-DNA/RNA PROBE TQ 12-25: CPT

## 2024-01-17 PROCEDURE — 85652 RBC SED RATE AUTOMATED: CPT

## 2024-01-17 PROCEDURE — 85025 COMPLETE CBC W/AUTO DIFF WBC: CPT

## 2024-01-17 PROCEDURE — 74177 CT ABD & PELVIS W/CONTRAST: CPT

## 2024-01-17 PROCEDURE — 83690 ASSAY OF LIPASE: CPT

## 2024-01-17 PROCEDURE — 82962 GLUCOSE BLOOD TEST: CPT

## 2024-01-17 RX ORDER — LANOLIN ALCOHOL/MO/W.PET/CERES
3 CREAM (GRAM) TOPICAL AT BEDTIME
Refills: 0 | Status: DISCONTINUED | OUTPATIENT
Start: 2024-01-17 | End: 2024-01-31

## 2024-01-17 RX ORDER — SENNA PLUS 8.6 MG/1
2 TABLET ORAL AT BEDTIME
Refills: 0 | Status: DISCONTINUED | OUTPATIENT
Start: 2024-01-17 | End: 2024-01-31

## 2024-01-17 RX ORDER — HYDROMORPHONE HYDROCHLORIDE 2 MG/ML
1 INJECTION INTRAMUSCULAR; INTRAVENOUS; SUBCUTANEOUS EVERY 4 HOURS
Refills: 0 | Status: DISCONTINUED | OUTPATIENT
Start: 2024-01-17 | End: 2024-01-19

## 2024-01-17 RX ORDER — ONDANSETRON 8 MG/1
4 TABLET, FILM COATED ORAL ONCE
Refills: 0 | Status: COMPLETED | OUTPATIENT
Start: 2024-01-17 | End: 2024-01-17

## 2024-01-17 RX ORDER — PIPERACILLIN AND TAZOBACTAM 4; .5 G/20ML; G/20ML
3.38 INJECTION, POWDER, LYOPHILIZED, FOR SOLUTION INTRAVENOUS ONCE
Refills: 0 | Status: COMPLETED | OUTPATIENT
Start: 2024-01-17 | End: 2024-01-17

## 2024-01-17 RX ORDER — HYDROMORPHONE HYDROCHLORIDE 2 MG/ML
1 INJECTION INTRAMUSCULAR; INTRAVENOUS; SUBCUTANEOUS ONCE
Refills: 0 | Status: DISCONTINUED | OUTPATIENT
Start: 2024-01-17 | End: 2024-01-17

## 2024-01-17 RX ORDER — NALOXONE HYDROCHLORIDE 4 MG/.1ML
0.4 SPRAY NASAL ONCE
Refills: 0 | Status: DISCONTINUED | OUTPATIENT
Start: 2024-01-17 | End: 2024-01-31

## 2024-01-17 RX ORDER — SODIUM CHLORIDE 9 MG/ML
1000 INJECTION INTRAMUSCULAR; INTRAVENOUS; SUBCUTANEOUS ONCE
Refills: 0 | Status: COMPLETED | OUTPATIENT
Start: 2024-01-17 | End: 2024-01-17

## 2024-01-17 RX ORDER — POLYETHYLENE GLYCOL 3350 17 G/17G
17 POWDER, FOR SOLUTION ORAL DAILY
Refills: 0 | Status: DISCONTINUED | OUTPATIENT
Start: 2024-01-17 | End: 2024-01-31

## 2024-01-17 RX ORDER — HYDROMORPHONE HYDROCHLORIDE 2 MG/ML
0.5 INJECTION INTRAMUSCULAR; INTRAVENOUS; SUBCUTANEOUS EVERY 4 HOURS
Refills: 0 | Status: DISCONTINUED | OUTPATIENT
Start: 2024-01-17 | End: 2024-01-19

## 2024-01-17 RX ORDER — ACETAMINOPHEN 500 MG
650 TABLET ORAL EVERY 6 HOURS
Refills: 0 | Status: DISCONTINUED | OUTPATIENT
Start: 2024-01-17 | End: 2024-01-31

## 2024-01-17 RX ORDER — ONDANSETRON 8 MG/1
4 TABLET, FILM COATED ORAL EVERY 8 HOURS
Refills: 0 | Status: DISCONTINUED | OUTPATIENT
Start: 2024-01-17 | End: 2024-01-31

## 2024-01-17 RX ADMIN — SENNA PLUS 2 TABLET(S): 8.6 TABLET ORAL at 23:04

## 2024-01-17 RX ADMIN — Medication 125 MILLIGRAM(S): at 21:23

## 2024-01-17 RX ADMIN — ONDANSETRON 4 MILLIGRAM(S): 8 TABLET, FILM COATED ORAL at 18:53

## 2024-01-17 RX ADMIN — PIPERACILLIN AND TAZOBACTAM 200 GRAM(S): 4; .5 INJECTION, POWDER, LYOPHILIZED, FOR SOLUTION INTRAVENOUS at 18:53

## 2024-01-17 RX ADMIN — HYDROMORPHONE HYDROCHLORIDE 1 MILLIGRAM(S): 2 INJECTION INTRAMUSCULAR; INTRAVENOUS; SUBCUTANEOUS at 22:38

## 2024-01-17 RX ADMIN — HYDROMORPHONE HYDROCHLORIDE 1 MILLIGRAM(S): 2 INJECTION INTRAMUSCULAR; INTRAVENOUS; SUBCUTANEOUS at 18:53

## 2024-01-17 RX ADMIN — SODIUM CHLORIDE 1000 MILLILITER(S): 9 INJECTION INTRAMUSCULAR; INTRAVENOUS; SUBCUTANEOUS at 20:09

## 2024-01-17 RX ADMIN — HYDROMORPHONE HYDROCHLORIDE 1 MILLIGRAM(S): 2 INJECTION INTRAMUSCULAR; INTRAVENOUS; SUBCUTANEOUS at 20:09

## 2024-01-17 RX ADMIN — SODIUM CHLORIDE 1000 MILLILITER(S): 9 INJECTION INTRAMUSCULAR; INTRAVENOUS; SUBCUTANEOUS at 18:54

## 2024-01-17 RX ADMIN — POLYETHYLENE GLYCOL 3350 17 GRAM(S): 17 POWDER, FOR SOLUTION ORAL at 23:04

## 2024-01-17 NOTE — ED ADULT TRIAGE NOTE - NS ED TRIAGE AVPU SCALE
Alert-The patient is alert, awake and responds to voice. The patient is oriented to time, place, and person. The triage nurse is able to obtain subjective information.
03-Sep-2023 14:40

## 2024-01-17 NOTE — ED ADULT TRIAGE NOTE - CHIEF COMPLAINT QUOTE
Patient presents to the ER with complaints of abdominal pain. Patient just seen here yesterday in the ED for the same thing. Patient states he has an ostomy with little to no output.

## 2024-01-17 NOTE — ED STATDOCS - PHYSICAL EXAMINATION
GENERAL: A&Ox4, non-toxic appearing, no acute distress  HEENT: NCAT, EOMI, oral mucosa moist, normal conjunctiva  RESP: CTAB, no respiratory distress, no wheezes/rhonchi/rales, speaking in full sentences  CV: RRR, no murmurs/rubs/gallops  ABDOMEN: soft, diffusely tender, ileostomy with pink stoma, non-distended, no guarding, no rebound tenderness  MSK: no visible deformities  NEURO: no focal sensory or motor deficits, CN 2-12 grossly intact  SKIN: warm, normal color, well perfused, no rash  PSYCH: normal affect

## 2024-01-17 NOTE — ED ADULT NURSE NOTE - OBJECTIVE STATEMENT
pt in ed c/o of abdominal pain. pt discharged yesterday for abdominal pain.  pt reports ostomy bag no output. hx of crohns disease. pt reports n/v. piv inserted. blood work sent.

## 2024-01-17 NOTE — ED STATDOCS - CLINICAL SUMMARY MEDICAL DECISION MAKING FREE TEXT BOX
37 y/o male with PMHx of Crohn's, seen in ED yesterday with CT performed showing significant small bowel inflammation , no recent steroid use, likely GI flare, will check labs, GI consult for possible steroid s

## 2024-01-17 NOTE — ED STATDOCS - PROGRESS NOTE3
[facility]  Dept: [phone]    Brad Pathak MD    Name: Mariah Marquez      : 1977        You are scheduled to have the following procedure: Colonoscopy and EGD    Dr. Brad Pathak MD will be performing this procedure.       Procedure Date: 19 Arrival Time: 12:00 Procedure Time: 1:00       Location: Please report to the [facility] (1) hour before your procedure:  [address], [place] [address].  This is the south entrance along [facility] (closest to [facility]).    Prep medication(s) ordered:  Blanca-Prep    These medication(s) will be available at your preferred pharmacy within 1-2 weeks before your procedure.    DO NOT follow the instruction insert that comes with the medication from the pharmacy.    Day Before Your Procedure:  · You must follow a CLEAR LIQUID diet the day before the procedure  · Attached is a list of clear liquid diet items  · You will start your prep in the evening, the day before your procedure.      At 4pm the evening before the procedure, take one (1) anti-nausea tablet (Zofran/Ondansetron)      At 5 pm the evening before the procedure:    1. Pour ONE (1) 8 oz Suprep bottle into the mixing container provided with the prep.  2. Add 8 oz of cool water to the 16 oz fill line on the container and mix thoroughly.    3. Drink all the liquid in the container.  4. Following the prep, drink TWO (2) more 16 oz glasses of clear liquids over the next hour.     At 8 pm the evening before the procedure, take one (1) anti-nausea tablet (Zofran/Ondansetron)    At 9 pm the evening before the procedure:   (Repeat steps 1-4 with 2nd bottle)    1. Pour ONE (1) 8 oz Suprep bottle into the mixing container provided with the prep.  2. Add 8 oz of cool water to the 16 oz fill line on the container and mix thoroughly.    3. Drink all the liquid in the container.  4. Following the prep, drink TWO (2) more 16 oz glasses of clear liquids over the next hour.          You may become chilled while drinking the prep; do not be alarmed, this is normal. You may continue intake of warm liquids during the time that you are drinking the prep.    NOTHING TO DRINK (OR EAT) AFTER MIDNIGHT DAY OF PROCEDURE.                Please call our office if you need to reschedule your procedure or if you have questions regarding your upcoming procedure: Dept: [phone]    Office Hours:  8:00 am- 4:30 pm Monday-Friday                Clear Liquid Diet       *No RED, BLUE or PURPLE liquids*    FOOD GROUP RECOMMENDED AVOID   Beverages Fruit juices(e.g. apple, white cranberry, or white grape); pulp-free juices (e.g. orange, lemonade or grapefruit)  Coffee (NO cream, flavored creamer or milk) or tea and carbonated beverages as allowed as tolerated. All others including nectars, prune juices, juices with pulp, tomato or vegetable juice, milk, cream, and cocoa.   Soups Clear broth, bouillon or consomme    Sweets and Desserts Fruit-flavored or unflavored gelatin; fruit ice made from clear fruit juice; plain hard candy; sugar; honey; sugar substitutes; frozen popsicles     Miscellaneous Commercially prepared low residue lactose free nutritional supplements; herbs and mild seasonings, salt and flavor extracts.      Sample Menu  Breakfast  White Cranberry Juice  Gelatin dessert  Tea/Coffee  Sugar Lunch  Broth  Apple Juice  Carbonated soda  Frozen popsicle  Tea/Coffee Dinner  Broth  Orange juice, strained  Gelatin dessert  Carbonated soda  Tea/Coffee   Mid-morning Snack  Gelatin dessert Mid-afternoon snack  Frozen popsicle Evening Snack  Frozen popsicle              Stable.

## 2024-01-17 NOTE — ED STATDOCS - OBJECTIVE STATEMENT
37 y/o male with PMHx of crohn's s/p ileostomy presents to the ED c/o intractable abdominal pain, nausea, decreased ostomy output. Pt reports in addition to ostomy output he has had output from his rectum as well. Pt denies fevers. Endorses chills, sweats. Pt not discharged on steroids. Pt GI at [facility] however would like to established care closer to home.

## 2024-01-17 NOTE — ED STATDOCS - PROGRESS NOTE DETAILS
AMADO bellamy. - Diogenes Larry PA-C 37 y/o M with PMH of Crohn's s/p ileostomy, ETOH abuse presents with persistent abdominal pain. Pt was in ED yesterday for same, dc'd after evaluation. States pain has worsened since evaluation. Also reports decreased ostomy output, with increased output from rectum. Pt reports he has previously seen GI based out of [facility] system for care, but is requesting new provider. Was recently admitted to  2 weeks ago for proctitis. Denies fever. +chills. PE: Uncomfortable appearing. Cardiac: s1s2, RRR. Lungs: CTAB. Abdomen: NBS x4, soft, diffuse tenderness. +scant brown stool in ostomy bag. A/P: ?Crohn's flare. Plan for labs, analgesia, IVF, likely admission. Will d/w with GI. - Diogenes Larry PA-C D/w Dr. Lubin. Agreeable with plan for steroids and admission. Will see patient in AM. Dr. Etienne as accepting. - Diogenes Larry PA-C

## 2024-01-17 NOTE — ED ADULT NURSE NOTE - SUICIDE SCREENING QUESTION 2
No care due was identified.  [facility] Embedded Care Due Messages. Reference number: [id number].   5/24/2023 10:41:29 AM CDT  
Refill Decision Note   Purvi Reauthier  is requesting a refill authorization.  Brief Assessment and Rationale for Refill:  Approve     Medication Therapy Plan:       Medication Reconciliation Completed: No   Comments:     No Care Gaps recommended.     Note composed:11:39 AM 05/24/2023            
No

## 2024-01-18 DIAGNOSIS — K50.90 CROHN'S DISEASE, UNSPECIFIED, WITHOUT COMPLICATIONS: ICD-10-CM

## 2024-01-18 LAB
A1C WITH ESTIMATED AVERAGE GLUCOSE RESULT: 7.4 % — HIGH (ref 4–5.6)
ALBUMIN SERPL ELPH-MCNC: 2.6 G/DL — LOW (ref 3.3–5)
ALP SERPL-CCNC: 69 U/L — SIGNIFICANT CHANGE UP (ref 40–120)
ALT FLD-CCNC: 19 U/L — SIGNIFICANT CHANGE UP (ref 12–78)
ANION GAP SERPL CALC-SCNC: 2 MMOL/L — LOW (ref 5–17)
AST SERPL-CCNC: 13 U/L — LOW (ref 15–37)
BILIRUB SERPL-MCNC: 0.4 MG/DL — SIGNIFICANT CHANGE UP (ref 0.2–1.2)
BUN SERPL-MCNC: 9 MG/DL — SIGNIFICANT CHANGE UP (ref 7–23)
CALCIUM SERPL-MCNC: 9.3 MG/DL — SIGNIFICANT CHANGE UP (ref 8.5–10.1)
CHLORIDE SERPL-SCNC: 106 MMOL/L — SIGNIFICANT CHANGE UP (ref 96–108)
CHOLEST SERPL-MCNC: 148 MG/DL — SIGNIFICANT CHANGE UP
CO2 SERPL-SCNC: 26 MMOL/L — SIGNIFICANT CHANGE UP (ref 22–31)
CREAT SERPL-MCNC: 0.73 MG/DL — SIGNIFICANT CHANGE UP (ref 0.5–1.3)
EGFR: 119 ML/MIN/1.73M2 — SIGNIFICANT CHANGE UP
ESTIMATED AVERAGE GLUCOSE: 166 MG/DL — HIGH (ref 68–114)
GLUCOSE BLDC GLUCOMTR-MCNC: 211 MG/DL — HIGH (ref 70–99)
GLUCOSE SERPL-MCNC: 208 MG/DL — HIGH (ref 70–99)
HCT VFR BLD CALC: 41.6 % — SIGNIFICANT CHANGE UP (ref 39–50)
HDLC SERPL-MCNC: 44 MG/DL — SIGNIFICANT CHANGE UP
HGB BLD-MCNC: 14 G/DL — SIGNIFICANT CHANGE UP (ref 13–17)
LIPID PNL WITH DIRECT LDL SERPL: 91 MG/DL — SIGNIFICANT CHANGE UP
MCHC RBC-ENTMCNC: 32.9 PG — SIGNIFICANT CHANGE UP (ref 27–34)
MCHC RBC-ENTMCNC: 33.7 GM/DL — SIGNIFICANT CHANGE UP (ref 32–36)
MCV RBC AUTO: 97.7 FL — SIGNIFICANT CHANGE UP (ref 80–100)
NON HDL CHOLESTEROL: 104 MG/DL — SIGNIFICANT CHANGE UP
PLATELET # BLD AUTO: 372 K/UL — SIGNIFICANT CHANGE UP (ref 150–400)
POTASSIUM SERPL-MCNC: 3.8 MMOL/L — SIGNIFICANT CHANGE UP (ref 3.5–5.3)
POTASSIUM SERPL-SCNC: 3.8 MMOL/L — SIGNIFICANT CHANGE UP (ref 3.5–5.3)
PROT SERPL-MCNC: 7 GM/DL — SIGNIFICANT CHANGE UP (ref 6–8.3)
RBC # BLD: 4.26 M/UL — SIGNIFICANT CHANGE UP (ref 4.2–5.8)
RBC # FLD: 14.2 % — SIGNIFICANT CHANGE UP (ref 10.3–14.5)
SODIUM SERPL-SCNC: 134 MMOL/L — LOW (ref 135–145)
TRIGL SERPL-MCNC: 61 MG/DL — SIGNIFICANT CHANGE UP
WBC # BLD: 8.73 K/UL — SIGNIFICANT CHANGE UP (ref 3.8–10.5)
WBC # FLD AUTO: 8.73 K/UL — SIGNIFICANT CHANGE UP (ref 3.8–10.5)

## 2024-01-18 PROCEDURE — 99233 SBSQ HOSP IP/OBS HIGH 50: CPT

## 2024-01-18 RX ORDER — DEXTROSE 50 % IN WATER 50 %
25 SYRINGE (ML) INTRAVENOUS ONCE
Refills: 0 | Status: DISCONTINUED | OUTPATIENT
Start: 2024-01-18 | End: 2024-01-29

## 2024-01-18 RX ORDER — GLUCAGON INJECTION, SOLUTION 0.5 MG/.1ML
1 INJECTION, SOLUTION SUBCUTANEOUS ONCE
Refills: 0 | Status: DISCONTINUED | OUTPATIENT
Start: 2024-01-18 | End: 2024-01-29

## 2024-01-18 RX ORDER — INSULIN LISPRO 100/ML
VIAL (ML) SUBCUTANEOUS AT BEDTIME
Refills: 0 | Status: DISCONTINUED | OUTPATIENT
Start: 2024-01-18 | End: 2024-01-24

## 2024-01-18 RX ORDER — PIPERACILLIN AND TAZOBACTAM 4; .5 G/20ML; G/20ML
3.38 INJECTION, POWDER, LYOPHILIZED, FOR SOLUTION INTRAVENOUS ONCE
Refills: 0 | Status: COMPLETED | OUTPATIENT
Start: 2024-01-18 | End: 2024-01-18

## 2024-01-18 RX ORDER — DEXTROSE 50 % IN WATER 50 %
15 SYRINGE (ML) INTRAVENOUS ONCE
Refills: 0 | Status: DISCONTINUED | OUTPATIENT
Start: 2024-01-18 | End: 2024-01-29

## 2024-01-18 RX ORDER — INFLUENZA VIRUS VACCINE 15; 15; 15; 15 UG/.5ML; UG/.5ML; UG/.5ML; UG/.5ML
0.5 SUSPENSION INTRAMUSCULAR ONCE
Refills: 0 | Status: DISCONTINUED | OUTPATIENT
Start: 2024-01-18 | End: 2024-01-31

## 2024-01-18 RX ORDER — DEXTROSE 50 % IN WATER 50 %
12.5 SYRINGE (ML) INTRAVENOUS ONCE
Refills: 0 | Status: DISCONTINUED | OUTPATIENT
Start: 2024-01-18 | End: 2024-01-29

## 2024-01-18 RX ORDER — INSULIN LISPRO 100/ML
VIAL (ML) SUBCUTANEOUS
Refills: 0 | Status: DISCONTINUED | OUTPATIENT
Start: 2024-01-18 | End: 2024-01-29

## 2024-01-18 RX ORDER — SODIUM CHLORIDE 9 MG/ML
1000 INJECTION, SOLUTION INTRAVENOUS
Refills: 0 | Status: DISCONTINUED | OUTPATIENT
Start: 2024-01-18 | End: 2024-01-29

## 2024-01-18 RX ORDER — INSULIN GLARGINE 100 [IU]/ML
4 INJECTION, SOLUTION SUBCUTANEOUS AT BEDTIME
Refills: 0 | Status: DISCONTINUED | OUTPATIENT
Start: 2024-01-18 | End: 2024-01-19

## 2024-01-18 RX ORDER — PIPERACILLIN AND TAZOBACTAM 4; .5 G/20ML; G/20ML
3.38 INJECTION, POWDER, LYOPHILIZED, FOR SOLUTION INTRAVENOUS EVERY 8 HOURS
Refills: 0 | Status: COMPLETED | OUTPATIENT
Start: 2024-01-19 | End: 2024-01-25

## 2024-01-18 RX ORDER — ACETAMINOPHEN 500 MG
2 TABLET ORAL
Refills: 0 | DISCHARGE

## 2024-01-18 RX ORDER — SODIUM CHLORIDE 9 MG/ML
1000 INJECTION INTRAMUSCULAR; INTRAVENOUS; SUBCUTANEOUS
Refills: 0 | Status: DISCONTINUED | OUTPATIENT
Start: 2024-01-18 | End: 2024-01-22

## 2024-01-18 RX ADMIN — SODIUM CHLORIDE 75 MILLILITER(S): 9 INJECTION INTRAMUSCULAR; INTRAVENOUS; SUBCUTANEOUS at 21:04

## 2024-01-18 RX ADMIN — HYDROMORPHONE HYDROCHLORIDE 0.5 MILLIGRAM(S): 2 INJECTION INTRAMUSCULAR; INTRAVENOUS; SUBCUTANEOUS at 01:29

## 2024-01-18 RX ADMIN — Medication 3 MILLIGRAM(S): at 21:24

## 2024-01-18 RX ADMIN — HYDROMORPHONE HYDROCHLORIDE 1 MILLIGRAM(S): 2 INJECTION INTRAMUSCULAR; INTRAVENOUS; SUBCUTANEOUS at 15:39

## 2024-01-18 RX ADMIN — ONDANSETRON 4 MILLIGRAM(S): 8 TABLET, FILM COATED ORAL at 13:40

## 2024-01-18 RX ADMIN — SENNA PLUS 2 TABLET(S): 8.6 TABLET ORAL at 21:24

## 2024-01-18 RX ADMIN — Medication 60 MILLIGRAM(S): at 11:06

## 2024-01-18 RX ADMIN — HYDROMORPHONE HYDROCHLORIDE 1 MILLIGRAM(S): 2 INJECTION INTRAMUSCULAR; INTRAVENOUS; SUBCUTANEOUS at 20:38

## 2024-01-18 RX ADMIN — HYDROMORPHONE HYDROCHLORIDE 1 MILLIGRAM(S): 2 INJECTION INTRAMUSCULAR; INTRAVENOUS; SUBCUTANEOUS at 20:08

## 2024-01-18 RX ADMIN — PIPERACILLIN AND TAZOBACTAM 200 GRAM(S): 4; .5 INJECTION, POWDER, LYOPHILIZED, FOR SOLUTION INTRAVENOUS at 20:08

## 2024-01-18 RX ADMIN — HYDROMORPHONE HYDROCHLORIDE 0.5 MILLIGRAM(S): 2 INJECTION INTRAMUSCULAR; INTRAVENOUS; SUBCUTANEOUS at 06:45

## 2024-01-18 RX ADMIN — INSULIN GLARGINE 4 UNIT(S): 100 INJECTION, SOLUTION SUBCUTANEOUS at 21:25

## 2024-01-18 RX ADMIN — PIPERACILLIN AND TAZOBACTAM 25 GRAM(S): 4; .5 INJECTION, POWDER, LYOPHILIZED, FOR SOLUTION INTRAVENOUS at 23:08

## 2024-01-18 RX ADMIN — HYDROMORPHONE HYDROCHLORIDE 0.5 MILLIGRAM(S): 2 INJECTION INTRAMUSCULAR; INTRAVENOUS; SUBCUTANEOUS at 11:05

## 2024-01-18 RX ADMIN — HYDROMORPHONE HYDROCHLORIDE 0.5 MILLIGRAM(S): 2 INJECTION INTRAMUSCULAR; INTRAVENOUS; SUBCUTANEOUS at 02:18

## 2024-01-18 RX ADMIN — HYDROMORPHONE HYDROCHLORIDE 0.5 MILLIGRAM(S): 2 INJECTION INTRAMUSCULAR; INTRAVENOUS; SUBCUTANEOUS at 06:09

## 2024-01-18 NOTE — H&P ADULT - PROBLEM SELECTOR PLAN 1
- Ct Abdomen: Inflammation likely 2/2 to crohn's  - GI consulted by ED  - Dilaudid PRN pain  - Methylprednisolone 125mg IV x1 given in ED  - Methylprednisolone 60mg qD  - Zofran and tylenol PRN

## 2024-01-18 NOTE — PROGRESS NOTE ADULT - SUBJECTIVE AND OBJECTIVE BOX
CC: Crohn's Flare (18 Jan 2024 02:05)    HPI:  38M w/Hx of Crohn's s/p ileostomy presents due to intractable lower abdominal pain, nausea, decreased ostomy output. Pt reports symptoms remind him of Crohn's flares in the past but has not had a flare in years. Came into the ED for eval yesterday was determined safe for discharge but returned as his pain got worse. Pt reports he is no longer on biologic medicine because he lost his job and can longer afford it without insurance. He denies bleeding. Patient denies fever, chills, chest pain, SOB, headache, dizziness, dysuria. (18 Jan 2024 02:05)    INTERVAL HPI/OVERNIGHT EVENTS:    Vital Signs Last 24 Hrs  T(C): 36.6 (18 Jan 2024 15:36), Max: 36.7 (18 Jan 2024 00:35)  T(F): 97.9 (18 Jan 2024 15:36), Max: 98.1 (18 Jan 2024 00:35)  HR: 52 (18 Jan 2024 15:36) (52 - 72)  BP: 98/52 (18 Jan 2024 15:36) (98/52 - 121/84)  BP(mean): 62 (18 Jan 2024 15:36) (62 - 62)  RR: 18 (18 Jan 2024 15:36) (18 - 19)  SpO2: 98% (18 Jan 2024 15:36) (95% - 99%)    Parameters below as of 18 Jan 2024 15:36  Patient On (Oxygen Delivery Method): room air      I&O's Detail    REVIEW OF SYSTEMS:    CONSTITUTIONAL: No weakness, fevers or chills  EYES/ENT: No visual changes;  No vertigo or throat pain   NECK: No pain or stiffness  RESPIRATORY: No cough, wheezing, hemoptysis; No shortness of breath  CARDIOVASCULAR: No chest pain or palpitations  GASTROINTESTINAL: No abdominal or epigastric pain. No nausea, vomiting, or hematemesis; No diarrhea or constipation. No melena or hematochezia.  GENITOURINARY: No dysuria, frequency or hematuria  NEUROLOGICAL: No numbness or weakness  SKIN: No itching, burning, rashes, or lesions   All other review of systems is negative unless indicated above.  PHYSICAL EXAM:    General: in no acute distress  Eyes: PERRLA, EOMI; conjunctiva and sclera clear  Head: Normocephalic; atraumatic  ENMT: No nasal discharge; airway clear  Neck: Supple; non tender; no masses  Respiratory: No wheezes, rales or rhonchi  Cardiovascular: Regular rate and rhythm. S1 and S2 Normal; No murmurs, gallops or rubs  Gastrointestinal: Soft non-tender non-distended; Normal bowel sounds  Genitourinary: No  suprapubic  tenderness  Extremities: Normal range of motion, No clubbing, cyanosis or edema  Vascular: Peripheral pulses palpable 2+ bilaterally  Neurological: Alert and oriented x4  Skin: Warm and dry. No acute rash  Lymph Nodes: No acute cervical adenopathy  Musculoskeletal: Normal muscle tone, without deformities  Psychiatric: Cooperative and appropriate                            14.0   8.73  )-----------( 372      ( 18 Jan 2024 09:21 )             41.6     18 Jan 2024 09:21    134    |  106    |  9      ----------------------------<  208    3.8     |  26     |  0.73     Ca    9.3        18 Jan 2024 09:21    TPro  7.0    /  Alb  2.6    /  TBili  0.4    /  DBili  x      /  AST  13     /  ALT  19     /  AlkPhos  69     18 Jan 2024 09:21      CAPILLARY BLOOD GLUCOSE        LIVER FUNCTIONS - ( 18 Jan 2024 09:21 )  Alb: 2.6 g/dL / Pro: 7.0 gm/dL / ALK PHOS: 69 U/L / ALT: 19 U/L / AST: 13 U/L / GGT: x           Urinalysis Basic - ( 18 Jan 2024 09:21 )    Color: x / Appearance: x / SG: x / pH: x  Gluc: 208 mg/dL / Ketone: x  / Bili: x / Urobili: x   Blood: x / Protein: x / Nitrite: x   Leuk Esterase: x / RBC: x / WBC x   Sq Epi: x / Non Sq Epi: x / Bacteria: x        MEDICATIONS  (STANDING):  influenza   Vaccine 0.5 milliLiter(s) IntraMuscular once  methylPREDNISolone sodium succinate Injectable 60 milliGRAM(s) IV Push daily  naloxone Injectable 0.4 milliGRAM(s) IV Push once  polyethylene glycol 3350 17 Gram(s) Oral daily  senna 2 Tablet(s) Oral at bedtime    MEDICATIONS  (PRN):  acetaminophen     Tablet .. 650 milliGRAM(s) Oral every 6 hours PRN Temp greater or equal to 38C (100.4F), Mild Pain (1 - 3)  aluminum hydroxide/magnesium hydroxide/simethicone Suspension 30 milliLiter(s) Oral every 4 hours PRN Dyspepsia  bisacodyl 5 milliGRAM(s) Oral daily PRN Constipation  HYDROmorphone  Injectable 1 milliGRAM(s) IV Push every 4 hours PRN Severe Pain (7 - 10)  HYDROmorphone  Injectable 0.5 milliGRAM(s) IV Push every 4 hours PRN Moderate Pain (4 - 6)  melatonin 3 milliGRAM(s) Oral at bedtime PRN Insomnia  ondansetron Injectable 4 milliGRAM(s) IV Push every 8 hours PRN Nausea and/or Vomiting      RADIOLOGY & ADDITIONAL TESTS: CC: Crohn's Flare (18 Jan 2024 02:05)    HPI:  38M w/Hx of Crohn's s/p ileostomy presents due to intractable lower abdominal pain, nausea, decreased ostomy output. Pt reports symptoms remind him of Crohn's flares in the past but has not had a flare in years. Came into the ED for eval yesterday was determined safe for discharge but returned as his pain got worse. Pt reports he is no longer on biologic medicine because he lost his job and can longer afford it without insurance. He denies bleeding. Patient denies fever, chills, chest pain, SOB, headache, dizziness, dysuria.    INTERVAL HPI/ OVERNIGHT EVENTS: chart reviewed, Pt was seen and examined, c/o having abd pain started few days ago, was seen in ED and was dc, returned as  pain is not improved and also has  pasty stool coming from rectum,  non bloody, + nausea, no vomiting. + Chills, hot flashes, no fevers. Able to tolerate clears  Pt is frustrated that cant get back to  biologic meds 2/2 infections 9 as per his GI) Pt states that has chelsy with his GI    Vital Signs Last 24 Hrs  T(C): 36.6 (18 Jan 2024 15:36), Max: 36.7 (18 Jan 2024 00:35)  T(F): 97.9 (18 Jan 2024 15:36), Max: 98.1 (18 Jan 2024 00:35)  HR: 52 (18 Jan 2024 15:36) (52 - 72)  BP: 98/52 (18 Jan 2024 15:36) (98/52 - 121/84)  BP(mean): 62 (18 Jan 2024 15:36) (62 - 62)  RR: 18 (18 Jan 2024 15:36) (18 - 19)  SpO2: 98% (18 Jan 2024 15:36) (95% - 99%)    Parameters below as of 18 Jan 2024 15:36  Patient On (Oxygen Delivery Method): room air          REVIEW OF SYSTEMS:  All other review of systems is negative unless indicated above.      PHYSICAL EXAM:  General: Disheveled in no acute distress  Eyes: EOMI; conjunctiva and sclera clear  Head: Normocephalic; atraumatic  ENMT: No nasal discharge; airway clear  Respiratory: No wheezes, rales or rhonchi  Cardiovascular: Regular rate and rhythm. S1 and S2 Normal;   Gastrointestinal: Soft, mildly tender at lower abd, + ostomy in place, + mild brownish liquid stool.  +  bowel sounds  Genitourinary: No  suprapubic  tenderness  Extremities: Normal range of motion, No  edema  Neurological: Alert and oriented x4  Skin: Warm and dry. No acute rash  Musculoskeletal: Normal muscle tone, without deformities  Psychiatric: Cooperative         LABS:                         14.0   8.73  )-----------( 372      ( 18 Jan 2024 09:21 )             41.6     18 Jan 2024 09:21    134    |  106    |  9      ----------------------------<  208    3.8     |  26     |  0.73     Ca    9.3        18 Jan 2024 09:21    TPro  7.0    /  Alb  2.6    /  TBili  0.4    /  DBili  x      /  AST  13     /  ALT  19     /  AlkPhos  69     18 Jan 2024 09:21      CAPILLARY BLOOD GLUCOSE        LIVER FUNCTIONS - ( 18 Jan 2024 09:21 )  Alb: 2.6 g/dL / Pro: 7.0 gm/dL / ALK PHOS: 69 U/L / ALT: 19 U/L / AST: 13 U/L / GGT: x           Urinalysis Basic - ( 18 Jan 2024 09:21 )    Color: x / Appearance: x / SG: x / pH: x  Gluc: 208 mg/dL / Ketone: x  / Bili: x / Urobili: x   Blood: x / Protein: x / Nitrite: x   Leuk Esterase: x / RBC: x / WBC x   Sq Epi: x / Non Sq Epi: x / Bacteria: x        MEDICATIONS  (STANDING):  influenza   Vaccine 0.5 milliLiter(s) IntraMuscular once  methylPREDNISolone sodium succinate Injectable 60 milliGRAM(s) IV Push daily  naloxone Injectable 0.4 milliGRAM(s) IV Push once  polyethylene glycol 3350 17 Gram(s) Oral daily  senna 2 Tablet(s) Oral at bedtime    MEDICATIONS  (PRN):  acetaminophen     Tablet .. 650 milliGRAM(s) Oral every 6 hours PRN Temp greater or equal to 38C (100.4F), Mild Pain (1 - 3)  aluminum hydroxide/magnesium hydroxide/simethicone Suspension 30 milliLiter(s) Oral every 4 hours PRN Dyspepsia  bisacodyl 5 milliGRAM(s) Oral daily PRN Constipation  HYDROmorphone  Injectable 1 milliGRAM(s) IV Push every 4 hours PRN Severe Pain (7 - 10)  HYDROmorphone  Injectable 0.5 milliGRAM(s) IV Push every 4 hours PRN Moderate Pain (4 - 6)  melatonin 3 milliGRAM(s) Oral at bedtime PRN Insomnia  ondansetron Injectable 4 milliGRAM(s) IV Push every 8 hours PRN Nausea and/or Vomiting      RADIOLOGY & ADDITIONAL TESTS:      ACC: [id number] EXAM:  CT ABDOMEN AND PELVIS OC IC   ORDERED BY: FELI SIERRA     PROCEDURE DATE:  01/16/2024          INTERPRETATION:  CLINICAL INFORMATION: Annual stool output from ostomy.   Liquid stool via the rectum. History of Crohn's disease.    COMPARISON: CT abdomen/pelvis 1/4/2024.    CONTRAST/COMPLICATIONS:  IV Contrast: Omnipaque 350  90 cc administered   0 cc discarded  Oral Contrast: Omnipaque 300  Complications: None reported at time of study completion    PROCEDURE:  CT of the Abdomen and Pelvis was performed.  Sagittal and coronal reformats were performed.    FINDINGS:  LOWER CHEST: Within normal limits.    LIVER: Within normal limits.  BILE DUCTS: Normal caliber.  GALLBLADDER: Within normal limits.  SPLEEN: Within normal limits.  PANCREAS: Within normal limits.  ADRENALS: Within normal limits.  KIDNEYS/URETERS: Within normal limits.    BLADDER: Within normal limits.  REPRODUCTIVE ORGANS: Prostate is enlarged.    BOWEL: No bowel obstruction. Right lower quadrant loop ileostomy. Long   segment colonic wall thickening with surrounding pericolonic stranding   involving the descending colon. No enteric fistula is identified. Mild   circumferential wall thickening and enhancement of the distal rectum,   similar to prior. Perianal fistula tracts, perirectal enhancement, seton   drains again seen.  PERITONEUM: No ascites.  VESSELS: Within normal limits.  RETROPERITONEUM/LYMPH NODES: No lymphadenopathy.  ABDOMINAL WALL: Right lower quadrant ileostomy.  BONES: No acute osseous abnormality.    IMPRESSION:  Long segment inflammation of the descending colon, likely inflammatory   colitis given history of Crohn's disease. No bowel obstruction or enteric   fistulas identified.

## 2024-01-18 NOTE — PATIENT PROFILE ADULT - NSPROGENBLOODRESTRICT_GEN_A_NUR
allopurinol 100 mg oral tablet: 1 tab(s) orally 2 times a day  BENZTROPINE MESYLATE  1 MG TABS:   carvedilol 12.5 mg oral tablet: 1 tab(s) orally 2 times a day  Crestor 5 mg oral tablet: 1 tab(s) orally once a day  divalproex sodium 500 mg oral tablet, extended release: orally 2 times a day  enalapril 20 mg oral tablet: 1 tab(s) orally once a day  Flomax 0.4 mg oral capsule: 1 cap(s) orally 2 times a day  Flovent 220 mcg/inh inhalation aerosol with adapter:   FLUoxetine 20 mg oral tablet: 1 tab(s) orally once a day  HALOPERIDOL  10 MG TABS:   metFORMIN 500 mg oral tablet: 2 tab(s) orally 2 times a day  montelukast 10 mg oral tablet: 1 tab(s) orally once a day  NIFEdipine 30 mg oral tablet, extended release: 1 tab(s) orally 2 times a day  Physical Therapy: ICD-10 Code: Z98.890  risperiDONE 4 mg oral tablet: orally once a day (at bedtime)  Trulicity Pen 1.5 mg/0.5 mL subcutaneous solution: 1 dose(s) subcutaneous once a week   allopurinol 100 mg oral tablet: 1 tab(s) orally 2 times a day  BENZTROPINE MESYLATE  1 MG TABS:   carvedilol 12.5 mg oral tablet: 1 tab(s) orally 2 times a day  Crestor 5 mg oral tablet: 1 tab(s) orally once a day  divalproex sodium 500 mg oral tablet, extended release: orally 2 times a day  enalapril 20 mg oral tablet: 1 tab(s) orally once a day  Flomax 0.4 mg oral capsule: 1 cap(s) orally 2 times a day  Flovent 220 mcg/inh inhalation aerosol with adapter:   FLUoxetine 20 mg oral tablet: 1 tab(s) orally once a day  HALOPERIDOL  10 MG TABS:   metFORMIN 500 mg oral tablet: 2 tab(s) orally 2 times a day  MiraLax oral powder for reconstitution: 17 gram(s) orally once a day, As Needed -for constipation   montelukast 10 mg oral tablet: 1 tab(s) orally once a day  NIFEdipine 30 mg oral tablet, extended release: 1 tab(s) orally 2 times a day  Physical Therapy: ICD-10 Code: Z98.890  risperiDONE 4 mg oral tablet: orally once a day (at bedtime)  senna oral tablet: 2 tab(s) orally once a day (at bedtime), As Needed -for constipation   Trulicity Pen 1.5 mg/0.5 mL subcutaneous solution: 1 dose(s) subcutaneous once a week   none

## 2024-01-18 NOTE — H&P ADULT - NSHPPHYSICALEXAM_GEN_ALL_CORE
T(C): 36.3 (01-18-24 @ 01:20), Max: 36.7 (01-18-24 @ 00:35)  HR: 64 (01-18-24 @ 01:20) (64 - 100)  BP: 116/74 (01-18-24 @ 01:20) (116/72 - 124/84)  RR: 18 (01-18-24 @ 01:20) (18 - 20)  SpO2: 95% (01-18-24 @ 01:20) (95% - 100%)    CONSTITUTIONAL: No apparent distress  EYES: PERRLA and symmetric, EOMI, No conjunctival or scleral injection, non-icteric  ENMT: Oral mucosa with moist membranes.  NECK: Supple, symmetric. No JVD.  RESP: No respiratory distress, no use of accessory muscles; CTA b/l, no WRR  CV: RRR, +S1S2, no MRG  GI: Soft, ND, no rebound, no guarding. Ostomy draining brown fecal material. Lower abdominal tenderness.  : No suprapubic tenderness. No CVA tenderness.  LYMPH: No cervical LAD or tenderness  MSK: No spinal tenderness, normal muscle strength/tone  EXTREMITIES: No pedal edema  SKIN: No rashes or ulcers noted  NEURO: A+Ox3, responsive. No tremor, sensation intact in upper and lower extremities b/l  PSYCH: Appropriate insight/judgment; mood and affect appropriate, recent/remote memory intact

## 2024-01-18 NOTE — PROGRESS NOTE ADULT - ASSESSMENT
38M w/Hx of Crohn's s/p ileostomy presents due to intractable lower abdominal pain, nausea, decreased ostomy output.       Problem/Plan - 1:  ·  Problem: Crohn's disease.   ·  Plan: - Ct Abdomen: Inflammation likely 2/2 to crohn's  - GI consulted by ED  - Dilaudid PRN pain  - Methylprednisolone 125mg IV x1 given in ED  - Methylprednisolone 60mg qD  - Zofran and tylenol PRN.   38M w/Hx of Pancreatitis,  Crohn's s/p ileostomy presents due to intractable lower abdominal pain, nausea, decreased ostomy output.     1. Abdominal pain suspect due to Colitis / Crohn's disease flare up  Decreased output for ileostomy   Ct Abdomen/pelvis reviewed: Long segment of descending colon  Inflammation likely 2/2 to Crohn's flare up?   Started on IV Methylprednisolone 60mg IV QD  on admission continue  Start IV Zosyn   IVF   Dilaudid PRN pain  Zofran and tylenol PRN.  Monitor electrolytes   D/w CR Sx team, will evaluate pt   GI eval       2. Hyperglycemia  HB A1c 7.4, new diagnosis? start LAntus 4U QHS  Monitor BS, cover with ISS   Diabetic education

## 2024-01-18 NOTE — H&P ADULT - ASSESSMENT
38M w/Hx of Crohn's s/p ileostomy presents due to intractable lower abdominal pain, nausea, decreased ostomy output.

## 2024-01-18 NOTE — H&P ADULT - HISTORY OF PRESENT ILLNESS
38M w/Hx of Crohn's s/p ileostomy presents due to intractable lower abdominal pain, nausea, decreased ostomy output. Pt reports symptoms remind him of Crohn's flares in the past but has not had a flare in years. Came into the ED for eval yesterday was determined safe for discharge but returned as his pain got worse. Pt reports he is no longer on biologic medicine because he lost his job and can longer afford it without insurance. He denies bleeding. Patient denies fever, chills, chest pain, SOB, headache, dizziness, dysuria.

## 2024-01-18 NOTE — PATIENT PROFILE ADULT - FALL HARM RISK - HARM RISK INTERVENTIONS

## 2024-01-19 LAB
ANION GAP SERPL CALC-SCNC: 2 MMOL/L — LOW (ref 5–17)
BUN SERPL-MCNC: 9 MG/DL — SIGNIFICANT CHANGE UP (ref 7–23)
CALCIUM SERPL-MCNC: 9.2 MG/DL — SIGNIFICANT CHANGE UP (ref 8.5–10.1)
CHLORIDE SERPL-SCNC: 106 MMOL/L — SIGNIFICANT CHANGE UP (ref 96–108)
CO2 SERPL-SCNC: 27 MMOL/L — SIGNIFICANT CHANGE UP (ref 22–31)
CREAT SERPL-MCNC: 0.77 MG/DL — SIGNIFICANT CHANGE UP (ref 0.5–1.3)
CRP SERPL-MCNC: <3 MG/L — SIGNIFICANT CHANGE UP
EGFR: 118 ML/MIN/1.73M2 — SIGNIFICANT CHANGE UP
ERYTHROCYTE [SEDIMENTATION RATE] IN BLOOD: 33 MM/HR — HIGH (ref 0–15)
GI PCR PANEL: SIGNIFICANT CHANGE UP
GLUCOSE BLDC GLUCOMTR-MCNC: 129 MG/DL — HIGH (ref 70–99)
GLUCOSE BLDC GLUCOMTR-MCNC: 134 MG/DL — HIGH (ref 70–99)
GLUCOSE BLDC GLUCOMTR-MCNC: 154 MG/DL — HIGH (ref 70–99)
GLUCOSE BLDC GLUCOMTR-MCNC: 172 MG/DL — HIGH (ref 70–99)
GLUCOSE BLDC GLUCOMTR-MCNC: 299 MG/DL — HIGH (ref 70–99)
GLUCOSE SERPL-MCNC: 134 MG/DL — HIGH (ref 70–99)
HCT VFR BLD CALC: 39.2 % — SIGNIFICANT CHANGE UP (ref 39–50)
HGB BLD-MCNC: 13.1 G/DL — SIGNIFICANT CHANGE UP (ref 13–17)
MAGNESIUM SERPL-MCNC: 2.1 MG/DL — SIGNIFICANT CHANGE UP (ref 1.6–2.6)
MCHC RBC-ENTMCNC: 32.9 PG — SIGNIFICANT CHANGE UP (ref 27–34)
MCHC RBC-ENTMCNC: 33.4 GM/DL — SIGNIFICANT CHANGE UP (ref 32–36)
MCV RBC AUTO: 98.5 FL — SIGNIFICANT CHANGE UP (ref 80–100)
PHOSPHATE SERPL-MCNC: 2.9 MG/DL — SIGNIFICANT CHANGE UP (ref 2.5–4.5)
PLATELET # BLD AUTO: 371 K/UL — SIGNIFICANT CHANGE UP (ref 150–400)
POTASSIUM SERPL-MCNC: 3.4 MMOL/L — LOW (ref 3.5–5.3)
POTASSIUM SERPL-SCNC: 3.4 MMOL/L — LOW (ref 3.5–5.3)
RBC # BLD: 3.98 M/UL — LOW (ref 4.2–5.8)
RBC # FLD: 14.1 % — SIGNIFICANT CHANGE UP (ref 10.3–14.5)
SODIUM SERPL-SCNC: 135 MMOL/L — SIGNIFICANT CHANGE UP (ref 135–145)
WBC # BLD: 12.61 K/UL — HIGH (ref 3.8–10.5)
WBC # FLD AUTO: 12.61 K/UL — HIGH (ref 3.8–10.5)

## 2024-01-19 PROCEDURE — 99222 1ST HOSP IP/OBS MODERATE 55: CPT

## 2024-01-19 PROCEDURE — 99233 SBSQ HOSP IP/OBS HIGH 50: CPT

## 2024-01-19 RX ORDER — ENOXAPARIN SODIUM 100 MG/ML
40 INJECTION SUBCUTANEOUS EVERY 24 HOURS
Refills: 0 | Status: DISCONTINUED | OUTPATIENT
Start: 2024-01-19 | End: 2024-01-31

## 2024-01-19 RX ORDER — POTASSIUM CHLORIDE 20 MEQ
40 PACKET (EA) ORAL EVERY 4 HOURS
Refills: 0 | Status: COMPLETED | OUTPATIENT
Start: 2024-01-19 | End: 2024-01-19

## 2024-01-19 RX ORDER — INSULIN LISPRO 100/ML
2 VIAL (ML) SUBCUTANEOUS
Refills: 0 | Status: DISCONTINUED | OUTPATIENT
Start: 2024-01-19 | End: 2024-01-22

## 2024-01-19 RX ORDER — HYDROMORPHONE HYDROCHLORIDE 2 MG/ML
2 INJECTION INTRAMUSCULAR; INTRAVENOUS; SUBCUTANEOUS EVERY 4 HOURS
Refills: 0 | Status: DISCONTINUED | OUTPATIENT
Start: 2024-01-19 | End: 2024-01-24

## 2024-01-19 RX ORDER — HYDROMORPHONE HYDROCHLORIDE 2 MG/ML
1 INJECTION INTRAMUSCULAR; INTRAVENOUS; SUBCUTANEOUS EVERY 4 HOURS
Refills: 0 | Status: DISCONTINUED | OUTPATIENT
Start: 2024-01-19 | End: 2024-01-24

## 2024-01-19 RX ORDER — INSULIN GLARGINE 100 [IU]/ML
7 INJECTION, SOLUTION SUBCUTANEOUS AT BEDTIME
Refills: 0 | Status: DISCONTINUED | OUTPATIENT
Start: 2024-01-19 | End: 2024-01-22

## 2024-01-19 RX ADMIN — HYDROMORPHONE HYDROCHLORIDE 1 MILLIGRAM(S): 2 INJECTION INTRAMUSCULAR; INTRAVENOUS; SUBCUTANEOUS at 15:17

## 2024-01-19 RX ADMIN — PIPERACILLIN AND TAZOBACTAM 25 GRAM(S): 4; .5 INJECTION, POWDER, LYOPHILIZED, FOR SOLUTION INTRAVENOUS at 23:10

## 2024-01-19 RX ADMIN — HYDROMORPHONE HYDROCHLORIDE 1 MILLIGRAM(S): 2 INJECTION INTRAMUSCULAR; INTRAVENOUS; SUBCUTANEOUS at 00:51

## 2024-01-19 RX ADMIN — SENNA PLUS 2 TABLET(S): 8.6 TABLET ORAL at 21:08

## 2024-01-19 RX ADMIN — HYDROMORPHONE HYDROCHLORIDE 2 MILLIGRAM(S): 2 INJECTION INTRAMUSCULAR; INTRAVENOUS; SUBCUTANEOUS at 21:08

## 2024-01-19 RX ADMIN — HYDROMORPHONE HYDROCHLORIDE 0.5 MILLIGRAM(S): 2 INJECTION INTRAMUSCULAR; INTRAVENOUS; SUBCUTANEOUS at 05:48

## 2024-01-19 RX ADMIN — Medication 2: at 12:57

## 2024-01-19 RX ADMIN — HYDROMORPHONE HYDROCHLORIDE 1 MILLIGRAM(S): 2 INJECTION INTRAMUSCULAR; INTRAVENOUS; SUBCUTANEOUS at 10:51

## 2024-01-19 RX ADMIN — Medication 40 MILLIEQUIVALENT(S): at 10:51

## 2024-01-19 RX ADMIN — Medication 3 MILLIGRAM(S): at 21:07

## 2024-01-19 RX ADMIN — Medication 40 MILLIEQUIVALENT(S): at 14:10

## 2024-01-19 RX ADMIN — Medication 6: at 17:50

## 2024-01-19 RX ADMIN — SODIUM CHLORIDE 75 MILLILITER(S): 9 INJECTION INTRAMUSCULAR; INTRAVENOUS; SUBCUTANEOUS at 14:09

## 2024-01-19 RX ADMIN — HYDROMORPHONE HYDROCHLORIDE 1 MILLIGRAM(S): 2 INJECTION INTRAMUSCULAR; INTRAVENOUS; SUBCUTANEOUS at 11:17

## 2024-01-19 RX ADMIN — HYDROMORPHONE HYDROCHLORIDE 1 MILLIGRAM(S): 2 INJECTION INTRAMUSCULAR; INTRAVENOUS; SUBCUTANEOUS at 15:42

## 2024-01-19 RX ADMIN — Medication 60 MILLIGRAM(S): at 10:45

## 2024-01-19 RX ADMIN — HYDROMORPHONE HYDROCHLORIDE 0.5 MILLIGRAM(S): 2 INJECTION INTRAMUSCULAR; INTRAVENOUS; SUBCUTANEOUS at 05:18

## 2024-01-19 RX ADMIN — PIPERACILLIN AND TAZOBACTAM 25 GRAM(S): 4; .5 INJECTION, POWDER, LYOPHILIZED, FOR SOLUTION INTRAVENOUS at 14:09

## 2024-01-19 RX ADMIN — PIPERACILLIN AND TAZOBACTAM 25 GRAM(S): 4; .5 INJECTION, POWDER, LYOPHILIZED, FOR SOLUTION INTRAVENOUS at 06:04

## 2024-01-19 RX ADMIN — HYDROMORPHONE HYDROCHLORIDE 1 MILLIGRAM(S): 2 INJECTION INTRAMUSCULAR; INTRAVENOUS; SUBCUTANEOUS at 01:21

## 2024-01-19 NOTE — DIETITIAN INITIAL EVALUATION ADULT - PERTINENT MEDS FT
MEDICATIONS  (STANDING):  dextrose 5%. 1000 milliLiter(s) (50 mL/Hr) IV Continuous <Continuous>  dextrose 5%. 1000 milliLiter(s) (100 mL/Hr) IV Continuous <Continuous>  dextrose 50% Injectable 12.5 Gram(s) IV Push once  dextrose 50% Injectable 25 Gram(s) IV Push once  dextrose 50% Injectable 25 Gram(s) IV Push once  glucagon  Injectable 1 milliGRAM(s) IntraMuscular once  influenza   Vaccine 0.5 milliLiter(s) IntraMuscular once  insulin glargine Injectable (LANTUS) 4 Unit(s) SubCutaneous at bedtime  insulin lispro (ADMELOG) corrective regimen sliding scale   SubCutaneous three times a day before meals  insulin lispro (ADMELOG) corrective regimen sliding scale   SubCutaneous at bedtime  methylPREDNISolone sodium succinate Injectable 60 milliGRAM(s) IV Push daily  naloxone Injectable 0.4 milliGRAM(s) IV Push once  piperacillin/tazobactam IVPB.. 3.375 Gram(s) IV Intermittent every 8 hours  polyethylene glycol 3350 17 Gram(s) Oral daily  senna 2 Tablet(s) Oral at bedtime  sodium chloride 0.9%. 1000 milliLiter(s) (75 mL/Hr) IV Continuous <Continuous>    MEDICATIONS  (PRN):  acetaminophen     Tablet .. 650 milliGRAM(s) Oral every 6 hours PRN Temp greater or equal to 38C (100.4F), Mild Pain (1 - 3)  aluminum hydroxide/magnesium hydroxide/simethicone Suspension 30 milliLiter(s) Oral every 4 hours PRN Dyspepsia  bisacodyl 5 milliGRAM(s) Oral daily PRN Constipation  dextrose Oral Gel 15 Gram(s) Oral once PRN Blood Glucose LESS THAN 70 milliGRAM(s)/deciliter  HYDROmorphone  Injectable 1 milliGRAM(s) IV Push every 4 hours PRN Severe Pain (7 - 10)  HYDROmorphone  Injectable 0.5 milliGRAM(s) IV Push every 4 hours PRN Moderate Pain (4 - 6)  melatonin 3 milliGRAM(s) Oral at bedtime PRN Insomnia  ondansetron Injectable 4 milliGRAM(s) IV Push every 8 hours PRN Nausea and/or Vomiting

## 2024-01-19 NOTE — PROGRESS NOTE ADULT - SUBJECTIVE AND OBJECTIVE BOX
CC: Crohn's Flare (18 Jan 2024 02:05)    HPI:  38M w/Hx of Crohn's s/p ileostomy presents due to intractable lower abdominal pain, nausea, decreased ostomy output. Pt reports symptoms remind him of Crohn's flares in the past but has not had a flare in years. Came into the ED for eval yesterday was determined safe for discharge but returned as his pain got worse. Pt reports he is no longer on biologic medicine because he lost his job and can longer afford it without insurance. He denies bleeding. Patient denies fever, chills, chest pain, SOB, headache, dizziness, dysuria.    INTERVAL HPI/ OVERNIGHT EVENTS: Pt was seen and examined, reports pain  got worse this afternoon, more spastic/stabbing, not sure if related to food. No fevers or chills. Noted some increase output from ostomy and less output from rectum non bloody,  mostly mucus     Vital Signs Last 24 Hrs  T(C): 36.4 (19 Jan 2024 15:49), Max: 36.7 (18 Jan 2024 21:14)  T(F): 97.6 (19 Jan 2024 15:49), Max: 98 (18 Jan 2024 21:14)  HR: 52 (19 Jan 2024 15:49) (50 - 63)  BP: 116/69 (19 Jan 2024 15:49) (98/60 - 116/69)  RR: 18 (19 Jan 2024 15:49) (18 - 19)  SpO2: 96% (19 Jan 2024 15:49) (96% - 98%)    Parameters below as of 19 Jan 2024 15:49  Patient On (Oxygen Delivery Method): room air          REVIEW OF SYSTEMS:  All other review of systems is negative unless indicated above.      PHYSICAL EXAM:  General: Disheveled in no acute distress  Eyes: EOMI; conjunctiva and sclera clear  Head: Normocephalic; atraumatic  ENMT: No nasal discharge; airway clear  Respiratory: No wheezes, rales or rhonchi  Cardiovascular: Regular rate and rhythm. S1 and S2 Normal;   Gastrointestinal: Soft,  tender at lower abd, no rebound,  + ostomy in place, + mild  liquid stool.  +  bowel sounds  Genitourinary: No  suprapubic  tenderness  Extremities: Normal range of motion, No  edema  Neurological: Alert and oriented x3, non focal   Skin: Warm and dry. No acute rash  Musculoskeletal: Normal muscle tone, without deformities  Psychiatric: Cooperative         LABS:                         13.1   12.61 )-----------( 371      ( 19 Jan 2024 08:25 )             39.2     01-19    135  |  106  |  9   ----------------------------<  134<H>  3.4<L>   |  27  |  0.77    Ca    9.2      19 Jan 2024 08:25  Phos  2.9     01-19  Mg     2.1     01-19    TPro  7.0  /  Alb  2.6<L>  /  TBili  0.4  /  DBili  x   /  AST  13<L>  /  ALT  19  /  AlkPhos  69  01-18        LIVER FUNCTIONS - ( 18 Jan 2024 09:21 )  Alb: 2.6 g/dL / Pro: 7.0 gm/dL / ALK PHOS: 69 U/L / ALT: 19 U/L / AST: 13 U/L / GGT: x                             14.0   8.73  )-----------( 372      ( 18 Jan 2024 09:21 )             41.6     18 Jan 2024 09:21    134    |  106    |  9      ----------------------------<  208    3.8     |  26     |  0.73     Ca    9.3        18 Jan 2024 09:21    TPro  7.0    /  Alb  2.6    /  TBili  0.4    /  DBili  x      /  AST  13     /  ALT  19     /  AlkPhos  69     18 Jan 2024 09:21    CAPILLARY BLOOD GLUCOSE  POCT Blood Glucose.: 299 mg/dL (19 Jan 2024 17:49)  POCT Blood Glucose.: 172 mg/dL (19 Jan 2024 12:46)  POCT Blood Glucose.: 129 mg/dL (19 Jan 2024 08:41)  POCT Blood Glucose.: 211 mg/dL (18 Jan 2024 21:22)      LIVER FUNCTIONS - ( 18 Jan 2024 09:21 )  Alb: 2.6 g/dL / Pro: 7.0 gm/dL / ALK PHOS: 69 U/L / ALT: 19 U/L / AST: 13 U/L / GGT: x           Urinalysis Basic - ( 18 Jan 2024 09:21 )    Color: x / Appearance: x / SG: x / pH: x  Gluc: 208 mg/dL / Ketone: x  / Bili: x / Urobili: x   Blood: x / Protein: x / Nitrite: x   Leuk Esterase: x / RBC: x / WBC x   Sq Epi: x / Non Sq Epi: x / Bacteria: x        MEDICATIONS  (STANDING):  dextrose 5%. 1000 milliLiter(s) (100 mL/Hr) IV Continuous <Continuous>  dextrose 5%. 1000 milliLiter(s) (50 mL/Hr) IV Continuous <Continuous>  dextrose 50% Injectable 25 Gram(s) IV Push once  dextrose 50% Injectable 25 Gram(s) IV Push once  dextrose 50% Injectable 12.5 Gram(s) IV Push once  enoxaparin Injectable 40 milliGRAM(s) SubCutaneous every 24 hours  glucagon  Injectable 1 milliGRAM(s) IntraMuscular once  influenza   Vaccine 0.5 milliLiter(s) IntraMuscular once  insulin glargine Injectable (LANTUS) 4 Unit(s) SubCutaneous at bedtime  insulin lispro (ADMELOG) corrective regimen sliding scale   SubCutaneous three times a day before meals  insulin lispro (ADMELOG) corrective regimen sliding scale   SubCutaneous at bedtime  naloxone Injectable 0.4 milliGRAM(s) IV Push once  piperacillin/tazobactam IVPB.. 3.375 Gram(s) IV Intermittent every 8 hours  polyethylene glycol 3350 17 Gram(s) Oral daily  senna 2 Tablet(s) Oral at bedtime  sodium chloride 0.9%. 1000 milliLiter(s) (75 mL/Hr) IV Continuous <Continuous>    MEDICATIONS  (PRN):  acetaminophen     Tablet .. 650 milliGRAM(s) Oral every 6 hours PRN Temp greater or equal to 38C (100.4F), Mild Pain (1 - 3)  aluminum hydroxide/magnesium hydroxide/simethicone Suspension 30 milliLiter(s) Oral every 4 hours PRN Dyspepsia  bisacodyl 5 milliGRAM(s) Oral daily PRN Constipation  dextrose Oral Gel 15 Gram(s) Oral once PRN Blood Glucose LESS THAN 70 milliGRAM(s)/deciliter  HYDROmorphone  Injectable 1 milliGRAM(s) IV Push every 4 hours PRN Moderate Pain (4 - 6)  HYDROmorphone  Injectable 2 milliGRAM(s) IV Push every 4 hours PRN Severe Pain (7 - 10)  melatonin 3 milliGRAM(s) Oral at bedtime PRN Insomnia  ondansetron Injectable 4 milliGRAM(s) IV Push every 8 hours PRN Nausea and/or Vomiting      RADIOLOGY & ADDITIONAL TESTS:      ACC: [id number] EXAM:  CT ABDOMEN AND PELVIS OC IC   ORDERED BY: FELI SIERRA     PROCEDURE DATE:  01/16/2024          INTERPRETATION:  CLINICAL INFORMATION: Annual stool output from ostomy.   Liquid stool via the rectum. History of Crohn's disease.    COMPARISON: CT abdomen/pelvis 1/4/2024.    CONTRAST/COMPLICATIONS:  IV Contrast: Omnipaque 350  90 cc administered   0 cc discarded  Oral Contrast: Omnipaque 300  Complications: None reported at time of study completion    PROCEDURE:  CT of the Abdomen and Pelvis was performed.  Sagittal and coronal reformats were performed.    FINDINGS:  LOWER CHEST: Within normal limits.    LIVER: Within normal limits.  BILE DUCTS: Normal caliber.  GALLBLADDER: Within normal limits.  SPLEEN: Within normal limits.  PANCREAS: Within normal limits.  ADRENALS: Within normal limits.  KIDNEYS/URETERS: Within normal limits.    BLADDER: Within normal limits.  REPRODUCTIVE ORGANS: Prostate is enlarged.    BOWEL: No bowel obstruction. Right lower quadrant loop ileostomy. Long   segment colonic wall thickening with surrounding pericolonic stranding   involving the descending colon. No enteric fistula is identified. Mild   circumferential wall thickening and enhancement of the distal rectum,   similar to prior. Perianal fistula tracts, perirectal enhancement, seton   drains again seen.  PERITONEUM: No ascites.  VESSELS: Within normal limits.  RETROPERITONEUM/LYMPH NODES: No lymphadenopathy.  ABDOMINAL WALL: Right lower quadrant ileostomy.  BONES: No acute osseous abnormality.    IMPRESSION:  Long segment inflammation of the descending colon, likely inflammatory   colitis given history of Crohn's disease. No bowel obstruction or enteric   fistulas identified.

## 2024-01-19 NOTE — CONSULT NOTE ADULT - ASSESSMENT
38 year old male with Crohn's and 3 days history abdominal pain, minimal ileostomy output, and long segment descending colon inflammation on CT.    Imp: Colitis 2/2 to Crohn's flare versus infectious etiology    Rec:  ::Stools for GIPCR and C-Diff  ::Once negative, initiate steroids  ::Trend CRP/ESR  ::IVF as needed  ::Clears ok, as tolerated  ::No plan for endoscopic evaluation  ::Chemical VTE prophylaxis despite age/ambulatory as IBD pro-inflammatory and high risk VTE   38 year old male with Crohn's and 3 days history abdominal pain, minimal ileostomy output, and long segment descending colon inflammation on CT.    Imp: Colitis 2/2 to Crohn's flare versus infectious etiology    Rec:  ::Stools for GIPCR and C-Diff  ::Would hold off on steroids until stools result   ::Trend CRP/ESR  ::IVF as needed  ::Clears ok, as tolerated  ::No plan for endoscopic evaluation  ::Chemical VTE prophylaxis despite age/ambulatory as IBD pro-inflammatory and high risk VTE

## 2024-01-19 NOTE — DIETITIAN INITIAL EVALUATION ADULT - OTHER INFO
Bedside ultrasound confirms vertex presentation  GBS - done  Feeling good fetal movements  Denies having contractions   38M w/Hx of Crohn's s/p ileostomy presents due to intractable lower abdominal pain, nausea, decreased ostomy output. Pt reports symptoms remind him of Crohn's flares in the past but has not had a flare in years. Came into the ED for eval yesterday was determined safe for discharge but returned as his pain got worse. Pt reports he is no longer on biologic medicine because he lost his job and can longer afford it without insurance. He denies bleeding. Admit for Crohn's disease w/o complication - CT showed "long segment inflammation of descending colon w/o evidence of obstruction or fistulas."     Known to nutr services w/ dx severe PCM on multiple visits. Upon RD visit this morning, has been on clear liquid diet since admit (x2 days) 2/2 colitis r/t Crohn's flare vs. infectious etiology as per H&P. States able to tolerate clear liquids but not drinking Ensure clears, consuming <50% ENN. Reports UBW unknown. Wt hx as per previous RD notes: 142#, 132#, and 150# on 10/27/23, 3/24/23, and 2/5/22 respectively. RD obtained bed scale wt 150# on 1/17/24, appears accurate. Wt gain 8# / 5.6% x3 mo - not clin sig and considered appropriate 2/2 previous sev PCM. Appears thin and bony w/ NFPE revealing varied muscle/fat wasting. Recommend to ADAT to low-fiber as soon as medically feasible as per MD orders. If diet unable to be advanced, would STRONGLY recommend to initiate nutrition support - please re-consult RD for further nutrition support recs if necessary. Pt denies all ONS at this time, however, if diet advanced would strongly suggest ONS to maximize nutrient intake. Labs reviewed: POCT elevated but ? downtrending (179, 129, 211), received 4U Lantus HS and 2U Admelog x24 hours - continue to monitor and optimize BG levels between 140-180 mg/dL by medical management. Also received 40 mEq KCl repletion BID yesterday, remains hypokalemic - continue to monitor hydration status and lytes, replete lytes prn. See below for other recs.

## 2024-01-19 NOTE — CONSULT NOTE ADULT - ATTENDING COMMENTS
History, exam, labs and imaging reviewed. He has severe crohn's disease and has setons in place and a diverting ileostomy. He is readmitted with left sided colitis. I do not see any fistula between small bowel and colon and the increased output per rectum is likely a factor of the colitis. Recommend medical management of crohn's colitis.

## 2024-01-19 NOTE — DIETITIAN INITIAL EVALUATION ADULT - PERTINENT LABORATORY DATA
01-19    135  |  106  |  9   ----------------------------<  134<H>  3.4<L>   |  27  |  0.77    Ca    9.2      19 Jan 2024 08:25  Phos  2.9     01-19  Mg     2.1     01-19    TPro  7.0  /  Alb  2.6<L>  /  TBili  0.4  /  DBili  x   /  AST  13<L>  /  ALT  19  /  AlkPhos  69  01-18  POCT Blood Glucose.: 172 mg/dL (01-19-24 @ 12:46)  A1C with Estimated Average Glucose Result: 7.4 % (01-18-24 @ 09:21)  A1C with Estimated Average Glucose Result: 7.7 % (10-26-23 @ 05:04)  A1C with Estimated Average Glucose Result: 6.9 % (03-21-23 @ 08:42)

## 2024-01-19 NOTE — CONSULT NOTE ADULT - NS ATTEND AMEND GEN_ALL_CORE FT
38 year old man with complicated Crohn's colitis s/p diversion ileostomy, seton placement, not on biologics due to lack of insurance, recurrent hospitalizations at various institutions (followed for IBD primarily at [facility]), here for abdominal pain and change in output from ileostomy and rectum.    Has colitis on CT. Not on meds. Not on biologics due to no insurance.   F/u C diff, GI PCR. if negative, then start steroids (100mg IV hydrocort).   Colorectal evaluation  Chemical DVT ppx even if mobile  Follow up daily ESR, CRP  Patient needs to work with social work to obtain insurance so he can get access to biologics   Needs to keep follow up with one doctor, one hospital system so his care is not fragmented and so it's complete  Try and avoid opiates in IBD  Dr. Hunt then Dr. Zamora following this weekend

## 2024-01-19 NOTE — DIETITIAN INITIAL EVALUATION ADULT - ORAL INTAKE PTA/DIET HISTORY
Reports no changes in appetite prior to admit, eating 3 meals/day plus snakcs, however, c/o severe abdominal pain, nausea, chills/ sweats, and minimal ostomy output w/ stool coming from rectum. Hx Crohn's disease and s/p ileostomy x3 years ago. As per H&P, also has hx perianal fistulas.

## 2024-01-19 NOTE — CONSULT NOTE ADULT - SUBJECTIVE AND OBJECTIVE BOX
HPI as per chart:  38 year old male with history Crohn's s/p diversion ileostomy presenting to [facility] with abdominal pain and scant ileostomy output. Initially presented in ED on Tuesday with similar symptoms and [place]'ed to home. returned  evening for worsening pain with passing of loose mucoid stool via rectum. Per patient Tuesday, had normal lunch- macaroni and cheese and sandwich and took nap. Awoke with severe abdominal pain described as "stabbing" just beside ileostomy prompting presentation to ED initially. Upon going home that evening continued to have pain through the night and attempted to eat with even more severe pain with chills/sweats and some nausea and noted scant output from ileostomy and stringy/loose brown stool/mucoid via rectum multiple times prompting re-presentation to [facility]. Denies rectal pain or bleeding.  Denies any hematochezia, melena, or hematemesis. Per patient currently pain localized to left side of abdomen radiating downward aggravated by movement and palpation. Denies nausea, vomiting, ill contacts, recent travel, or consumption of undercooked/raw meat. He is followed by [facility] Dr. Kerwin Muñoz. Has history of perianal fistulas and recently had setons placed at [facility] he had followup with Dr. Muñoz booked for next week. Still without insurance, stating "the paper work has been sent, just waiting for approval." Denies any recent flares, steroids, or any IBD medications. CT with long segment inflammation of descending colon without evidence of obstruction or fistulas. ESR elevated.       Patient assessed at bedside. Currently tolerating clears and has liquid stool in ostomy however still has intense abdominal pain, maximal tenderness in suprapubic region. Pain improves with pain medication. Reports ongoing solid stools via rectum. On rectal exam, setons remain in place and has mild tenderness at left inferior glute    Vitals:  T(C): 36.4 ( @ 15:49), Max: 36.7 ( @ 21:14)  HR: 52 ( @ 15:49) (50 - 63)  BP: 116/69 ( @ 15:49) (98/60 - 116/69)  RR: 18 ( @ 15:49) (18 - 19)  SpO2: 96% ( @ 15:49) (96% - 98%)      Physical Exam:  General: AAOx3, Well developed, NAD  Chest: Normal respiratory effort  Heart: RRR  Abdomen: Soft, no masses, RLQ ostomy, Tenderness grreatest in suprapubic region  Neuro/Psych: No localized deficits. Normal speech, normal tone  Skin: Normal, no rashes, no lesions noted.   Extremities: Warm, well perfused, no edema, Pulses intact  Rectal: Multiple setons in place; tenderness at 7 o'clock region, remainder of perianal region nontender with scattered areas of induration     @ 08:25                    13.1  CBC: 12.61>)-------(<371                     39.2                 135 | 106 | 9    CMP:  ----------------------< 134               3.4 | 27 | 0.77                      Ca:9.2  Phos:2.9  M.1               -|      |-        LFTs:  ------|-|-----             -|      |-   @ 09:21                    14.0  CBC: 8.73>)-------(<372                     41.6                 134 | 106 | 9    CMP:  ----------------------< 208               3.8 | 26 | 0.73                      Ca:9.3  Phos:-  Mg:-               0.4|      |13        LFTs:  ------|69|-----             -|      |-      Culture - Blood (collected 24 @ 18:46)  Source: .Blood None  Preliminary Report (24 @ 02:03):    No growth at 24 hours    Culture - Blood (collected 24 @ 18:46)  Source: .Blood None  Preliminary Report (24 @ 02:03):    No growth at 24 hours      Current Inpatient Medications:  acetaminophen     Tablet .. 650 milliGRAM(s) Oral every 6 hours PRN  aluminum hydroxide/magnesium hydroxide/simethicone Suspension 30 milliLiter(s) Oral every 4 hours PRN  bisacodyl 5 milliGRAM(s) Oral daily PRN  dextrose 5%. 1000 milliLiter(s) (100 mL/Hr) IV Continuous <Continuous>  dextrose 5%. 1000 milliLiter(s) (50 mL/Hr) IV Continuous <Continuous>  dextrose 50% Injectable 25 Gram(s) IV Push once  dextrose 50% Injectable 25 Gram(s) IV Push once  dextrose 50% Injectable 12.5 Gram(s) IV Push once  dextrose Oral Gel 15 Gram(s) Oral once PRN  enoxaparin Injectable 40 milliGRAM(s) SubCutaneous every 24 hours  glucagon  Injectable 1 milliGRAM(s) IntraMuscular once  HYDROmorphone  Injectable 1 milliGRAM(s) IV Push every 4 hours PRN  HYDROmorphone  Injectable 0.5 milliGRAM(s) IV Push every 4 hours PRN  influenza   Vaccine 0.5 milliLiter(s) IntraMuscular once  insulin glargine Injectable (LANTUS) 4 Unit(s) SubCutaneous at bedtime  insulin lispro (ADMELOG) corrective regimen sliding scale   SubCutaneous three times a day before meals  insulin lispro (ADMELOG) corrective regimen sliding scale   SubCutaneous at bedtime  melatonin 3 milliGRAM(s) Oral at bedtime PRN  methylPREDNISolone sodium succinate Injectable 60 milliGRAM(s) IV Push daily  naloxone Injectable 0.4 milliGRAM(s) IV Push once  ondansetron Injectable 4 milliGRAM(s) IV Push every 8 hours PRN  piperacillin/tazobactam IVPB.. 3.375 Gram(s) IV Intermittent every 8 hours  polyethylene glycol 3350 17 Gram(s) Oral daily  senna 2 Tablet(s) Oral at bedtime  sodium chloride 0.9%. 1000 milliLiter(s) (75 mL/Hr) IV Continuous <Continuous>       Images:    < from: CT Abdomen and Pelvis w/ Oral Cont and w/ IV Cont (24 @ 19:50) >    ACC: [id number] EXAM:  CT ABDOMEN AND PELVIS OC IC   ORDERED BY: FELI SIERRA     PROCEDURE DATE:  2024          INTERPRETATION:  CLINICAL INFORMATION: Annual stool output from ostomy.   Liquid stool via the rectum. History of Crohn's disease.    COMPARISON: CT abdomen/pelvis 2024.    CONTRAST/COMPLICATIONS:  IV Contrast: Omnipaque 350  90 cc administered   0 cc discarded  Oral Contrast: Omnipaque 300  Complications: None reported at time of study completion    PROCEDURE:  CT of the Abdomen and Pelvis was performed.  Sagittal and coronal reformats were performed.    FINDINGS:  LOWER CHEST: Within normal limits.    LIVER: Within normal limits.  BILE DUCTS: Normal caliber.  GALLBLADDER: Within normal limits.  SPLEEN: Within normal limits.  PANCREAS: Within normal limits.  ADRENALS: Within normal limits.  KIDNEYS/URETERS: Within normal limits.    BLADDER: Within normal limits.  REPRODUCTIVE ORGANS: Prostate is enlarged.    BOWEL: No bowel obstruction. Right lower quadrant loop ileostomy. Long   segment colonic wall thickening with surrounding pericolonic stranding   involving the descending colon. No enteric fistula is identified. Mild   circumferential wall thickening and enhancement of the distal rectum,   similar to prior. Perianal fistula tracts, perirectal enhancement, seton   drains again seen.  PERITONEUM: No ascites.  VESSELS: Within normal limits.  RETROPERITONEUM/LYMPH NODES: No lymphadenopathy.  ABDOMINAL WALL: Right lower quadrant ileostomy.  BONES: No acute osseous abnormality.    IMPRESSION:  Long segment inflammation of the descending colon, likely inflammatory   colitis given history of Crohn's disease. No bowel obstruction or enteric   fistulas identified.        --- End of Report ---            ANITHA TAMAYO; Attending Radiologist  This document has been electronically signed. 2024  8:09PM    < end of copied text >

## 2024-01-19 NOTE — DIETITIAN INITIAL EVALUATION ADULT - ADD RECOMMEND
1. Recommend to ADAT to low-fiber as soon as medically feasible as per MD orders and encourage protein-rich foods, maximize food preferences   2. If unable to advance diet, please re-consult RD for nutrition support recs   3. Monitor and optimize BG levels between 140-180 mg/dL by medical management   4. Continue to monitor hydration status and lytes, replete lytes prn (especially K+)   5. Consider obtaining vitamin D 25OH level to assess nutriture   6. Monitor bowel movements, if no BM for >3 days, consider implementing bowel regimen.   7. Consider adding thiamine 100 mg daily 2/2 poor PO intake/ malnutrition and MVI w/ minerals daily to ensure 100% RDA met   8. Confirm goals of care regarding nutrition support   RD will continue to monitor PO intake, labs, hydration, and wt prn.

## 2024-01-19 NOTE — CONSULT NOTE ADULT - ASSESSMENT
38yoM admitted for crohns flare    Plan:  Imaginge reviewed  Continue conservative management  Follow GI reccs for timing of steroids, biologics, etc  Medical optimization as per primary team  Colorectal surgery team will follow along  No colorectal surgery intervention planned at the moment    Plan discussed with Dr. Perez

## 2024-01-19 NOTE — PROGRESS NOTE ADULT - ASSESSMENT
38M w/Hx of Pancreatitis,  Crohn's s/p ileostomy presents due to intractable lower abdominal pain, nausea, decreased ostomy output.     1. Abdominal pain suspect due to Colitis / Crohn's disease flare up  Decreased output for ileostomy , now better   Ct Abdomen/pelvis reviewed: Long segment of descending colon  Inflammation likely 2/2 to Crohn's flare up? infection?   D/w GI, hold steroids  and R/i Cdiff and check GI PCR (sent)   Started on IV Methylprednisolone 60mg IV QD  on admission will hold   C/w  IV Zosyn   IVF   Dilaudid PRN pain, dose adjusted   Zofran and tylenol PRN.  Monitor electrolytes, replace Potassium   CR SX recs appreciated       2. Hyperglycemia, DM Type II  HB A1c 7.4, new diagnosis?   Increase to 7U LAntus  QHS  add premeals   Monitor BS, cover with ISS   Diabetic education         3. Hypokalemia   replace PO   recheck in am       4. DVT PPX: start lovenox sq

## 2024-01-20 LAB
ANION GAP SERPL CALC-SCNC: 4 MMOL/L — LOW (ref 5–17)
BUN SERPL-MCNC: 8 MG/DL — SIGNIFICANT CHANGE UP (ref 7–23)
C DIFF BY PCR RESULT: SIGNIFICANT CHANGE UP
CALCIUM SERPL-MCNC: 8.9 MG/DL — SIGNIFICANT CHANGE UP (ref 8.5–10.1)
CHLORIDE SERPL-SCNC: 104 MMOL/L — SIGNIFICANT CHANGE UP (ref 96–108)
CO2 SERPL-SCNC: 29 MMOL/L — SIGNIFICANT CHANGE UP (ref 22–31)
CREAT SERPL-MCNC: 0.88 MG/DL — SIGNIFICANT CHANGE UP (ref 0.5–1.3)
EGFR: 113 ML/MIN/1.73M2 — SIGNIFICANT CHANGE UP
ERYTHROCYTE [SEDIMENTATION RATE] IN BLOOD: 36 MM/HR — HIGH (ref 0–15)
GLUCOSE BLDC GLUCOMTR-MCNC: 112 MG/DL — HIGH (ref 70–99)
GLUCOSE BLDC GLUCOMTR-MCNC: 119 MG/DL — HIGH (ref 70–99)
GLUCOSE BLDC GLUCOMTR-MCNC: 129 MG/DL — HIGH (ref 70–99)
GLUCOSE BLDC GLUCOMTR-MCNC: 169 MG/DL — HIGH (ref 70–99)
GLUCOSE SERPL-MCNC: 118 MG/DL — HIGH (ref 70–99)
HCT VFR BLD CALC: 40.6 % — SIGNIFICANT CHANGE UP (ref 39–50)
HGB BLD-MCNC: 13.6 G/DL — SIGNIFICANT CHANGE UP (ref 13–17)
MAGNESIUM SERPL-MCNC: 1.9 MG/DL — SIGNIFICANT CHANGE UP (ref 1.6–2.6)
MCHC RBC-ENTMCNC: 33.3 PG — SIGNIFICANT CHANGE UP (ref 27–34)
MCHC RBC-ENTMCNC: 33.5 GM/DL — SIGNIFICANT CHANGE UP (ref 32–36)
MCV RBC AUTO: 99.3 FL — SIGNIFICANT CHANGE UP (ref 80–100)
PHOSPHATE SERPL-MCNC: 3.4 MG/DL — SIGNIFICANT CHANGE UP (ref 2.5–4.5)
PLATELET # BLD AUTO: 361 K/UL — SIGNIFICANT CHANGE UP (ref 150–400)
POTASSIUM SERPL-MCNC: 3.2 MMOL/L — LOW (ref 3.5–5.3)
POTASSIUM SERPL-SCNC: 3.2 MMOL/L — LOW (ref 3.5–5.3)
RBC # BLD: 4.09 M/UL — LOW (ref 4.2–5.8)
RBC # FLD: 14 % — SIGNIFICANT CHANGE UP (ref 10.3–14.5)
SODIUM SERPL-SCNC: 137 MMOL/L — SIGNIFICANT CHANGE UP (ref 135–145)
WBC # BLD: 11.15 K/UL — HIGH (ref 3.8–10.5)
WBC # FLD AUTO: 11.15 K/UL — HIGH (ref 3.8–10.5)

## 2024-01-20 PROCEDURE — 99222 1ST HOSP IP/OBS MODERATE 55: CPT

## 2024-01-20 PROCEDURE — 99232 SBSQ HOSP IP/OBS MODERATE 35: CPT

## 2024-01-20 RX ORDER — DEXTROSE MONOHYDRATE, SODIUM CHLORIDE, AND POTASSIUM CHLORIDE 50; .745; 4.5 G/1000ML; G/1000ML; G/1000ML
1000 INJECTION, SOLUTION INTRAVENOUS
Refills: 0 | Status: DISCONTINUED | OUTPATIENT
Start: 2024-01-20 | End: 2024-01-21

## 2024-01-20 RX ORDER — POTASSIUM CHLORIDE 20 MEQ
40 PACKET (EA) ORAL EVERY 4 HOURS
Refills: 0 | Status: COMPLETED | OUTPATIENT
Start: 2024-01-20 | End: 2024-01-21

## 2024-01-20 RX ORDER — MAGNESIUM SULFATE 500 MG/ML
1 VIAL (ML) INJECTION ONCE
Refills: 0 | Status: COMPLETED | OUTPATIENT
Start: 2024-01-20 | End: 2024-01-20

## 2024-01-20 RX ORDER — HYDROCORTISONE 20 MG
100 TABLET ORAL THREE TIMES A DAY
Refills: 0 | Status: ACTIVE | OUTPATIENT
Start: 2024-01-20 | End: 2024-12-18

## 2024-01-20 RX ADMIN — HYDROMORPHONE HYDROCHLORIDE 2 MILLIGRAM(S): 2 INJECTION INTRAMUSCULAR; INTRAVENOUS; SUBCUTANEOUS at 07:56

## 2024-01-20 RX ADMIN — HYDROMORPHONE HYDROCHLORIDE 1 MILLIGRAM(S): 2 INJECTION INTRAMUSCULAR; INTRAVENOUS; SUBCUTANEOUS at 21:00

## 2024-01-20 RX ADMIN — DEXTROSE MONOHYDRATE, SODIUM CHLORIDE, AND POTASSIUM CHLORIDE 75 MILLILITER(S): 50; .745; 4.5 INJECTION, SOLUTION INTRAVENOUS at 18:10

## 2024-01-20 RX ADMIN — PIPERACILLIN AND TAZOBACTAM 25 GRAM(S): 4; .5 INJECTION, POWDER, LYOPHILIZED, FOR SOLUTION INTRAVENOUS at 05:48

## 2024-01-20 RX ADMIN — Medication 2 UNIT(S): at 11:53

## 2024-01-20 RX ADMIN — ONDANSETRON 4 MILLIGRAM(S): 8 TABLET, FILM COATED ORAL at 15:21

## 2024-01-20 RX ADMIN — Medication 40 MILLIEQUIVALENT(S): at 20:59

## 2024-01-20 RX ADMIN — INSULIN GLARGINE 7 UNIT(S): 100 INJECTION, SOLUTION SUBCUTANEOUS at 00:20

## 2024-01-20 RX ADMIN — HYDROMORPHONE HYDROCHLORIDE 2 MILLIGRAM(S): 2 INJECTION INTRAMUSCULAR; INTRAVENOUS; SUBCUTANEOUS at 16:38

## 2024-01-20 RX ADMIN — PIPERACILLIN AND TAZOBACTAM 25 GRAM(S): 4; .5 INJECTION, POWDER, LYOPHILIZED, FOR SOLUTION INTRAVENOUS at 15:14

## 2024-01-20 RX ADMIN — INSULIN GLARGINE 7 UNIT(S): 100 INJECTION, SOLUTION SUBCUTANEOUS at 20:59

## 2024-01-20 RX ADMIN — HYDROMORPHONE HYDROCHLORIDE 2 MILLIGRAM(S): 2 INJECTION INTRAMUSCULAR; INTRAVENOUS; SUBCUTANEOUS at 12:37

## 2024-01-20 RX ADMIN — PIPERACILLIN AND TAZOBACTAM 25 GRAM(S): 4; .5 INJECTION, POWDER, LYOPHILIZED, FOR SOLUTION INTRAVENOUS at 21:00

## 2024-01-20 RX ADMIN — Medication 100 MILLIGRAM(S): at 21:00

## 2024-01-20 RX ADMIN — POLYETHYLENE GLYCOL 3350 17 GRAM(S): 17 POWDER, FOR SOLUTION ORAL at 10:16

## 2024-01-20 RX ADMIN — SENNA PLUS 2 TABLET(S): 8.6 TABLET ORAL at 20:59

## 2024-01-20 RX ADMIN — HYDROMORPHONE HYDROCHLORIDE 2 MILLIGRAM(S): 2 INJECTION INTRAMUSCULAR; INTRAVENOUS; SUBCUTANEOUS at 01:32

## 2024-01-20 RX ADMIN — Medication 2 UNIT(S): at 07:56

## 2024-01-20 RX ADMIN — Medication 100 GRAM(S): at 18:11

## 2024-01-20 RX ADMIN — HYDROMORPHONE HYDROCHLORIDE 2 MILLIGRAM(S): 2 INJECTION INTRAMUSCULAR; INTRAVENOUS; SUBCUTANEOUS at 12:07

## 2024-01-20 RX ADMIN — Medication 40 MILLIEQUIVALENT(S): at 17:44

## 2024-01-20 RX ADMIN — Medication 100 MILLIGRAM(S): at 15:22

## 2024-01-20 RX ADMIN — HYDROMORPHONE HYDROCHLORIDE 2 MILLIGRAM(S): 2 INJECTION INTRAMUSCULAR; INTRAVENOUS; SUBCUTANEOUS at 08:30

## 2024-01-20 RX ADMIN — Medication 2 UNIT(S): at 16:38

## 2024-01-20 RX ADMIN — HYDROMORPHONE HYDROCHLORIDE 2 MILLIGRAM(S): 2 INJECTION INTRAMUSCULAR; INTRAVENOUS; SUBCUTANEOUS at 17:15

## 2024-01-20 NOTE — PROGRESS NOTE ADULT - SUBJECTIVE AND OBJECTIVE BOX
CC: Crohn's Flare (18 Jan 2024 02:05)    HPI:  38M w/Hx of Crohn's s/p ileostomy presents due to intractable lower abdominal pain, nausea, decreased ostomy output. Pt reports symptoms remind him of Crohn's flares in the past but has not had a flare in years. Came into the ED for eval yesterday was determined safe for discharge but returned as his pain got worse. Pt reports he is no longer on biologic medicine because he lost his job and can longer afford it without insurance. He denies bleeding. Patient denies fever, chills, chest pain, SOB, headache, dizziness, dysuria.    INTERVAL HPI/ OVERNIGHT EVENTS: Pt was seen and examined, reports still with pain, but less rectal outPut. No fevers. Plan discussed restarted on steroids by GI       Vital Signs Last 24 Hrs  T(C): 36.6 (20 Jan 2024 15:15), Max: 37.1 (20 Jan 2024 09:19)  T(F): 97.8 (20 Jan 2024 15:15), Max: 98.7 (20 Jan 2024 09:19)  HR: 53 (20 Jan 2024 16:20) (50 - 64)  BP: 105/66 (20 Jan 2024 16:20) (92/54 - 126/71)  BP(mean): 75 (20 Jan 2024 16:20) (63 - 85)  RR: 18 (20 Jan 2024 15:15) (16 - 19)  SpO2: 97% (20 Jan 2024 15:15) (97% - 100%)    Parameters below as of 20 Jan 2024 15:15  Patient On (Oxygen Delivery Method): room air          REVIEW OF SYSTEMS:  All other review of systems is negative unless indicated above.      PHYSICAL EXAM:  General: Disheveled in no acute distress  Eyes: EOMI; conjunctiva and sclera clear  Head: Normocephalic; atraumatic  ENMT: No nasal discharge; airway clear  Respiratory: No wheezes, rales or rhonchi  Cardiovascular: Regular rate and rhythm. S1 and S2 Normal;   Gastrointestinal: Soft,  tenderness  at lower abd better,  no rebound,  + ostomy in place, + mild  liquid stool.  +  bowel sounds  Genitourinary: No  suprapubic  tenderness  Extremities: Normal range of motion, No  edema  Neurological: Alert and oriented x3, non focal   Skin: Warm and dry. No acute rash  Musculoskeletal: Normal muscle tone, without deformities  Psychiatric: Cooperative         LABS:                         13.6   11.15 )-----------( 361      ( 20 Jan 2024 09:00 )             40.6     01-20    137  |  104  |  8   ----------------------------<  118<H>  3.2<L>   |  29  |  0.88    Ca    8.9      20 Jan 2024 09:00  Phos  3.4     01-20  Mg     1.9     01-20                              13.1   12.61 )-----------( 371      ( 19 Jan 2024 08:25 )             39.2     01-19    135  |  106  |  9   ----------------------------<  134<H>  3.4<L>   |  27  |  0.77    Ca    9.2      19 Jan 2024 08:25  Phos  2.9     01-19  Mg     2.1     01-19    TPro  7.0  /  Alb  2.6<L>  /  TBili  0.4  /  DBili  x   /  AST  13<L>  /  ALT  19  /  AlkPhos  69  01-18        LIVER FUNCTIONS - ( 18 Jan 2024 09:21 )  Alb: 2.6 g/dL / Pro: 7.0 gm/dL / ALK PHOS: 69 U/L / ALT: 19 U/L / AST: 13 U/L / GGT: x                             14.0   8.73  )-----------( 372      ( 18 Jan 2024 09:21 )             41.6     18 Jan 2024 09:21    134    |  106    |  9      ----------------------------<  208    3.8     |  26     |  0.73     Ca    9.3        18 Jan 2024 09:21    TPro  7.0    /  Alb  2.6    /  TBili  0.4    /  DBili  x      /  AST  13     /  ALT  19     /  AlkPhos  69     18 Jan 2024 09:21    CAPILLARY BLOOD GLUCOSE  POCT Blood Glucose.: 119 mg/dL (20 Jan 2024 16:31)      LIVER FUNCTIONS - ( 18 Jan 2024 09:21 )  Alb: 2.6 g/dL / Pro: 7.0 gm/dL / ALK PHOS: 69 U/L / ALT: 19 U/L / AST: 13 U/L / GGT: x           Urinalysis Basic - ( 18 Jan 2024 09:21 )    Color: x / Appearance: x / SG: x / pH: x  Gluc: 208 mg/dL / Ketone: x  / Bili: x / Urobili: x   Blood: x / Protein: x / Nitrite: x   Leuk Esterase: x / RBC: x / WBC x   Sq Epi: x / Non Sq Epi: x / Bacteria: x        MEDICATIONS  (STANDING):  dextrose 5%. 1000 milliLiter(s) (100 mL/Hr) IV Continuous <Continuous>  dextrose 5%. 1000 milliLiter(s) (50 mL/Hr) IV Continuous <Continuous>  dextrose 50% Injectable 25 Gram(s) IV Push once  dextrose 50% Injectable 25 Gram(s) IV Push once  dextrose 50% Injectable 12.5 Gram(s) IV Push once  enoxaparin Injectable 40 milliGRAM(s) SubCutaneous every 24 hours  glucagon  Injectable 1 milliGRAM(s) IntraMuscular once  influenza   Vaccine 0.5 milliLiter(s) IntraMuscular once  insulin glargine Injectable (LANTUS) 4 Unit(s) SubCutaneous at bedtime  insulin lispro (ADMELOG) corrective regimen sliding scale   SubCutaneous three times a day before meals  insulin lispro (ADMELOG) corrective regimen sliding scale   SubCutaneous at bedtime  naloxone Injectable 0.4 milliGRAM(s) IV Push once  piperacillin/tazobactam IVPB.. 3.375 Gram(s) IV Intermittent every 8 hours  polyethylene glycol 3350 17 Gram(s) Oral daily  senna 2 Tablet(s) Oral at bedtime  sodium chloride 0.9%. 1000 milliLiter(s) (75 mL/Hr) IV Continuous <Continuous>    MEDICATIONS  (PRN):  acetaminophen     Tablet .. 650 milliGRAM(s) Oral every 6 hours PRN Temp greater or equal to 38C (100.4F), Mild Pain (1 - 3)  aluminum hydroxide/magnesium hydroxide/simethicone Suspension 30 milliLiter(s) Oral every 4 hours PRN Dyspepsia  bisacodyl 5 milliGRAM(s) Oral daily PRN Constipation  dextrose Oral Gel 15 Gram(s) Oral once PRN Blood Glucose LESS THAN 70 milliGRAM(s)/deciliter  HYDROmorphone  Injectable 1 milliGRAM(s) IV Push every 4 hours PRN Moderate Pain (4 - 6)  HYDROmorphone  Injectable 2 milliGRAM(s) IV Push every 4 hours PRN Severe Pain (7 - 10)  melatonin 3 milliGRAM(s) Oral at bedtime PRN Insomnia  ondansetron Injectable 4 milliGRAM(s) IV Push every 8 hours PRN Nausea and/or Vomiting      RADIOLOGY & ADDITIONAL TESTS:      ACC: [id number] EXAM:  CT ABDOMEN AND PELVIS OC IC   ORDERED BY: FELI SIERRA     PROCEDURE DATE:  01/16/2024          INTERPRETATION:  CLINICAL INFORMATION: Annual stool output from ostomy.   Liquid stool via the rectum. History of Crohn's disease.    COMPARISON: CT abdomen/pelvis 1/4/2024.    CONTRAST/COMPLICATIONS:  IV Contrast: Omnipaque 350  90 cc administered   0 cc discarded  Oral Contrast: Omnipaque 300  Complications: None reported at time of study completion    PROCEDURE:  CT of the Abdomen and Pelvis was performed.  Sagittal and coronal reformats were performed.    FINDINGS:  LOWER CHEST: Within normal limits.    LIVER: Within normal limits.  BILE DUCTS: Normal caliber.  GALLBLADDER: Within normal limits.  SPLEEN: Within normal limits.  PANCREAS: Within normal limits.  ADRENALS: Within normal limits.  KIDNEYS/URETERS: Within normal limits.    BLADDER: Within normal limits.  REPRODUCTIVE ORGANS: Prostate is enlarged.    BOWEL: No bowel obstruction. Right lower quadrant loop ileostomy. Long   segment colonic wall thickening with surrounding pericolonic stranding   involving the descending colon. No enteric fistula is identified. Mild   circumferential wall thickening and enhancement of the distal rectum,   similar to prior. Perianal fistula tracts, perirectal enhancement, seton   drains again seen.  PERITONEUM: No ascites.  VESSELS: Within normal limits.  RETROPERITONEUM/LYMPH NODES: No lymphadenopathy.  ABDOMINAL WALL: Right lower quadrant ileostomy.  BONES: No acute osseous abnormality.    IMPRESSION:  Long segment inflammation of the descending colon, likely inflammatory   colitis given history of Crohn's disease. No bowel obstruction or enteric   fistulas identified.

## 2024-01-20 NOTE — PROGRESS NOTE ADULT - ATTENDING COMMENTS
Patient seen and examined this morning. Continues to have abdominal pain and feels output from ileostomy is less. There is no evidence of fistula from his small bowel to colon on CT although this could theoretically develop. I think his increased output from rectum is a factor of colitis and not new stool or material going through the area. He is being treated with steroids and ultimately medical management of his crohn's disease is paramount. No role for surgery at this time without exhausting medical options. Monitor ileostomy output, may advance diet as tolerated. Please discontinue bowel regimen unless clear he is constipated- has ileostomy

## 2024-01-20 NOTE — PROGRESS NOTE ADULT - SUBJECTIVE AND OBJECTIVE BOX
Patient is a 38y old  Male who presents with a chief complaint of Crohn's Flare (20 Jan 2024 03:02)      HPI:  38M w/Hx of Crohn's s/p ileostomy presents due to intractable lower abdominal pain, nausea, decreased ostomy output. Pt reports symptoms remind him of Crohn's flares in the past but has not had a flare in years. Came into the ED for eval yesterday was determined safe for discharge but returned as his pain got worse. Pt reports he is no longer on biologic medicine because he lost his job and can longer afford it without insurance. He denies bleeding. Patient denies fever, chills, chest pain, SOB, headache, dizziness, dysuria. (18 Jan 2024 02:05)    Patient notes suprapubic discomfort. GI PCR negative. CT scan with long segment of inflammation in colon.       PAST MEDICAL & SURGICAL HISTORY:  Crohn's disease of large intestine without complication  (No current flare up)      Pancreatitis      S/P ORIF (open reduction internal fixation) fracture  (Right ankle, 2014)      History of colonoscopy  (2014)      Perianal fistula  repair in 2002          MEDICATIONS  (STANDING):  dextrose 5%. 1000 milliLiter(s) (50 mL/Hr) IV Continuous <Continuous>  dextrose 5%. 1000 milliLiter(s) (100 mL/Hr) IV Continuous <Continuous>  dextrose 50% Injectable 12.5 Gram(s) IV Push once  dextrose 50% Injectable 25 Gram(s) IV Push once  dextrose 50% Injectable 25 Gram(s) IV Push once  enoxaparin Injectable 40 milliGRAM(s) SubCutaneous every 24 hours  glucagon  Injectable 1 milliGRAM(s) IntraMuscular once  influenza   Vaccine 0.5 milliLiter(s) IntraMuscular once  insulin glargine Injectable (LANTUS) 7 Unit(s) SubCutaneous at bedtime  insulin lispro (ADMELOG) corrective regimen sliding scale   SubCutaneous three times a day before meals  insulin lispro (ADMELOG) corrective regimen sliding scale   SubCutaneous at bedtime  insulin lispro Injectable (ADMELOG) 2 Unit(s) SubCutaneous three times a day before meals  naloxone Injectable 0.4 milliGRAM(s) IV Push once  piperacillin/tazobactam IVPB.. 3.375 Gram(s) IV Intermittent every 8 hours  polyethylene glycol 3350 17 Gram(s) Oral daily  senna 2 Tablet(s) Oral at bedtime  sodium chloride 0.9%. 1000 milliLiter(s) (75 mL/Hr) IV Continuous <Continuous>    MEDICATIONS  (PRN):  acetaminophen     Tablet .. 650 milliGRAM(s) Oral every 6 hours PRN Temp greater or equal to 38C (100.4F), Mild Pain (1 - 3)  aluminum hydroxide/magnesium hydroxide/simethicone Suspension 30 milliLiter(s) Oral every 4 hours PRN Dyspepsia  bisacodyl 5 milliGRAM(s) Oral daily PRN Constipation  dextrose Oral Gel 15 Gram(s) Oral once PRN Blood Glucose LESS THAN 70 milliGRAM(s)/deciliter  HYDROmorphone  Injectable 2 milliGRAM(s) IV Push every 4 hours PRN Severe Pain (7 - 10)  HYDROmorphone  Injectable 1 milliGRAM(s) IV Push every 4 hours PRN Moderate Pain (4 - 6)  melatonin 3 milliGRAM(s) Oral at bedtime PRN Insomnia  ondansetron Injectable 4 milliGRAM(s) IV Push every 8 hours PRN Nausea and/or Vomiting      Allergies    ciprofloxacin (Other (Mild to Mod))    Intolerances          Vital Signs Last 24 Hrs  T(C): 37.1 (20 Jan 2024 09:19), Max: 37.1 (20 Jan 2024 09:19)  T(F): 98.7 (20 Jan 2024 09:19), Max: 98.7 (20 Jan 2024 09:19)  HR: 64 (20 Jan 2024 09:19) (50 - 64)  BP: 126/71 (20 Jan 2024 09:19) (98/52 - 126/71)  BP(mean): 85 (20 Jan 2024 09:19) (64 - 85)  RR: 18 (20 Jan 2024 09:19) (16 - 19)  SpO2: 97% (20 Jan 2024 09:19) (96% - 100%)    Parameters below as of 20 Jan 2024 09:19  Patient On (Oxygen Delivery Method): room air        PHYSICAL EXAM:    Respiratory: CTAB  Cardiovascular: S1 and S2, RRR, no M/G/R  Gastrointestinal:  tender in lower abdomen    LABS:                        13.1   12.61 )-----------( 371      ( 19 Jan 2024 08:25 )             39.2     01-19    135  |  106  |  9   ----------------------------<  134<H>  3.4<L>   |  27  |  0.77    Ca    9.2      19 Jan 2024 08:25  Phos  2.9     01-19  Mg     2.1     01-19            RADIOLOGY & ADDITIONAL STUDIES:

## 2024-01-20 NOTE — PROGRESS NOTE ADULT - ASSESSMENT
39 yo male with history of Crohn's disease. CT scan with long segment of inflammation. Will initiate steroids for therapy. Patient will need close outpatient follow up.

## 2024-01-20 NOTE — PROGRESS NOTE ADULT - SUBJECTIVE AND OBJECTIVE BOX
39yo M with Crohn's disease seen at bedside. Patient is tolerating diet without nausea or vomiting and has liquid output in ostomy. He states he continues to have abd and rectal pain, but denies fevers, chills, CP, or SOB.      Physical Exam:  General: AAOx3, NAD  Chest: Normal respiratory effort  Heart: RRR  Abdomen: Soft, no masses, RLQ ostomy, Tenderness to palpation of suprapubic region with voluntary guarding  Neuro/Psych: No localized deficits. Normal speech, normal tone  Skin: Normal, no rashes, no lesions noted.   Extremities: Warm, well perfused, no edema, Pulses intact    Vital Signs Last 24 Hrs  T(C): 36.5 (19 Jan 2024 21:00), Max: 36.5 (19 Jan 2024 21:00)  T(F): 97.7 (19 Jan 2024 21:00), Max: 97.7 (19 Jan 2024 21:00)  HR: 55 (19 Jan 2024 21:00) (50 - 55)  BP: 103/65 (19 Jan 2024 21:00) (100/48 - 116/69)  BP(mean): --  RR: 16 (19 Jan 2024 21:00) (16 - 19)  SpO2: 99% (19 Jan 2024 21:00) (96% - 99%)    Parameters below as of 19 Jan 2024 15:49  Patient On (Oxygen Delivery Method): room air                            13.1   12.61 )-----------( 371      ( 19 Jan 2024 08:25 )             39.2     01-19    135  |  106  |  9   ----------------------------<  134<H>  3.4<L>   |  27  |  0.77    Ca    9.2      19 Jan 2024 08:25  Phos  2.9     01-19  Mg     2.1     01-19    TPro  7.0  /  Alb  2.6<L>  /  TBili  0.4  /  DBili  x   /  AST  13<L>  /  ALT  19  /  AlkPhos  69  01-18    I&O's Summary      Culture - Blood (collected 17 Jan 2024 18:46)  Source: .Blood None  Preliminary Report (20 Jan 2024 02:02):    No growth at 48 Hours    Culture - Blood (collected 17 Jan 2024 18:46)  Source: .Blood None  Preliminary Report (20 Jan 2024 02:02):    No growth at 48 Hours

## 2024-01-20 NOTE — PROGRESS NOTE ADULT - ASSESSMENT
38M w/Hx of Pancreatitis,  Crohn's s/p ileostomy presents due to intractable lower abdominal pain, nausea, decreased ostomy output.     1. Abdominal pain suspect due to Colitis / Crohn's disease flare up  Decreased output for ileostomy , now better   Ct Abdomen/pelvis reviewed: Long segment of descending colon  Inflammation likely 2/2 to Crohn's flare up? infection?   Cdiff and check GI PCR neg   C/w  IV Zosyn   IVF   restarted on Solu Cortef as per GI   Dilaudid PRN pain,  Zofran and tylenol PRN.  Monitor electrolytes, replace Potassium and magnesium IV       2. Hyperglycemia, DM Type II  HB A1c 7.4, new diagnosis?   Increase to 7U LAntus  QHS  C/w  premeals   Monitor BS, cover with ISS   Diabetic education       3. Hypokalemia/Hypomagnesemia  replace PO/IV   recheck in am       4. DVT PPX: start lovenox sq  ( refusing)

## 2024-01-20 NOTE — PROGRESS NOTE ADULT - ASSESSMENT
38yoM admitted for Crohn's flare    Plan:  - Continue conservative management  - Pain and nausea control PRN  - Follow GI reccs for timing of steroids, biologics, etc  - Medical optimization as per primary team  - No colorectal surgery intervention planned at the moment    Plan discussed with Dr. Perez

## 2024-01-21 LAB
ANION GAP SERPL CALC-SCNC: 2 MMOL/L — LOW (ref 5–17)
BUN SERPL-MCNC: 7 MG/DL — SIGNIFICANT CHANGE UP (ref 7–23)
CALCIUM SERPL-MCNC: 9.1 MG/DL — SIGNIFICANT CHANGE UP (ref 8.5–10.1)
CHLORIDE SERPL-SCNC: 105 MMOL/L — SIGNIFICANT CHANGE UP (ref 96–108)
CO2 SERPL-SCNC: 26 MMOL/L — SIGNIFICANT CHANGE UP (ref 22–31)
CREAT SERPL-MCNC: 0.73 MG/DL — SIGNIFICANT CHANGE UP (ref 0.5–1.3)
CRP SERPL-MCNC: <3 MG/L — SIGNIFICANT CHANGE UP
CRP SERPL-MCNC: <3 MG/L — SIGNIFICANT CHANGE UP
EGFR: 119 ML/MIN/1.73M2 — SIGNIFICANT CHANGE UP
ERYTHROCYTE [SEDIMENTATION RATE] IN BLOOD: 25 MM/HR — HIGH (ref 0–15)
GLUCOSE BLDC GLUCOMTR-MCNC: 116 MG/DL — HIGH (ref 70–99)
GLUCOSE BLDC GLUCOMTR-MCNC: 172 MG/DL — HIGH (ref 70–99)
GLUCOSE BLDC GLUCOMTR-MCNC: 211 MG/DL — HIGH (ref 70–99)
GLUCOSE BLDC GLUCOMTR-MCNC: 320 MG/DL — HIGH (ref 70–99)
GLUCOSE SERPL-MCNC: 202 MG/DL — HIGH (ref 70–99)
HCT VFR BLD CALC: 40.8 % — SIGNIFICANT CHANGE UP (ref 39–50)
HGB BLD-MCNC: 14 G/DL — SIGNIFICANT CHANGE UP (ref 13–17)
MAGNESIUM SERPL-MCNC: 2.2 MG/DL — SIGNIFICANT CHANGE UP (ref 1.6–2.6)
MCHC RBC-ENTMCNC: 33.7 PG — SIGNIFICANT CHANGE UP (ref 27–34)
MCHC RBC-ENTMCNC: 34.3 GM/DL — SIGNIFICANT CHANGE UP (ref 32–36)
MCV RBC AUTO: 98.1 FL — SIGNIFICANT CHANGE UP (ref 80–100)
PLATELET # BLD AUTO: 335 K/UL — SIGNIFICANT CHANGE UP (ref 150–400)
POTASSIUM SERPL-MCNC: 3.8 MMOL/L — SIGNIFICANT CHANGE UP (ref 3.5–5.3)
POTASSIUM SERPL-SCNC: 3.8 MMOL/L — SIGNIFICANT CHANGE UP (ref 3.5–5.3)
RBC # BLD: 4.16 M/UL — LOW (ref 4.2–5.8)
RBC # FLD: 13.9 % — SIGNIFICANT CHANGE UP (ref 10.3–14.5)
SODIUM SERPL-SCNC: 133 MMOL/L — LOW (ref 135–145)
WBC # BLD: 10.3 K/UL — SIGNIFICANT CHANGE UP (ref 3.8–10.5)
WBC # FLD AUTO: 10.3 K/UL — SIGNIFICANT CHANGE UP (ref 3.8–10.5)

## 2024-01-21 PROCEDURE — 99232 SBSQ HOSP IP/OBS MODERATE 35: CPT

## 2024-01-21 RX ORDER — DEXTROSE MONOHYDRATE, SODIUM CHLORIDE, AND POTASSIUM CHLORIDE 50; .745; 4.5 G/1000ML; G/1000ML; G/1000ML
1000 INJECTION, SOLUTION INTRAVENOUS
Refills: 0 | Status: DISCONTINUED | OUTPATIENT
Start: 2024-01-21 | End: 2024-01-22

## 2024-01-21 RX ADMIN — Medication 100 MILLIGRAM(S): at 06:06

## 2024-01-21 RX ADMIN — HYDROMORPHONE HYDROCHLORIDE 2 MILLIGRAM(S): 2 INJECTION INTRAMUSCULAR; INTRAVENOUS; SUBCUTANEOUS at 12:25

## 2024-01-21 RX ADMIN — Medication 2 UNIT(S): at 08:12

## 2024-01-21 RX ADMIN — PIPERACILLIN AND TAZOBACTAM 25 GRAM(S): 4; .5 INJECTION, POWDER, LYOPHILIZED, FOR SOLUTION INTRAVENOUS at 20:49

## 2024-01-21 RX ADMIN — HYDROMORPHONE HYDROCHLORIDE 1 MILLIGRAM(S): 2 INJECTION INTRAMUSCULAR; INTRAVENOUS; SUBCUTANEOUS at 00:58

## 2024-01-21 RX ADMIN — HYDROMORPHONE HYDROCHLORIDE 2 MILLIGRAM(S): 2 INJECTION INTRAMUSCULAR; INTRAVENOUS; SUBCUTANEOUS at 08:42

## 2024-01-21 RX ADMIN — Medication 40 MILLIEQUIVALENT(S): at 00:43

## 2024-01-21 RX ADMIN — Medication 4: at 11:35

## 2024-01-21 RX ADMIN — DEXTROSE MONOHYDRATE, SODIUM CHLORIDE, AND POTASSIUM CHLORIDE 75 MILLILITER(S): 50; .745; 4.5 INJECTION, SOLUTION INTRAVENOUS at 11:36

## 2024-01-21 RX ADMIN — Medication 2 UNIT(S): at 16:53

## 2024-01-21 RX ADMIN — Medication 2: at 16:53

## 2024-01-21 RX ADMIN — HYDROMORPHONE HYDROCHLORIDE 2 MILLIGRAM(S): 2 INJECTION INTRAMUSCULAR; INTRAVENOUS; SUBCUTANEOUS at 08:12

## 2024-01-21 RX ADMIN — PIPERACILLIN AND TAZOBACTAM 25 GRAM(S): 4; .5 INJECTION, POWDER, LYOPHILIZED, FOR SOLUTION INTRAVENOUS at 15:26

## 2024-01-21 RX ADMIN — INSULIN GLARGINE 7 UNIT(S): 100 INJECTION, SOLUTION SUBCUTANEOUS at 20:48

## 2024-01-21 RX ADMIN — HYDROMORPHONE HYDROCHLORIDE 2 MILLIGRAM(S): 2 INJECTION INTRAMUSCULAR; INTRAVENOUS; SUBCUTANEOUS at 16:54

## 2024-01-21 RX ADMIN — HYDROMORPHONE HYDROCHLORIDE 2 MILLIGRAM(S): 2 INJECTION INTRAMUSCULAR; INTRAVENOUS; SUBCUTANEOUS at 12:55

## 2024-01-21 RX ADMIN — PIPERACILLIN AND TAZOBACTAM 25 GRAM(S): 4; .5 INJECTION, POWDER, LYOPHILIZED, FOR SOLUTION INTRAVENOUS at 06:06

## 2024-01-21 RX ADMIN — HYDROMORPHONE HYDROCHLORIDE 2 MILLIGRAM(S): 2 INJECTION INTRAMUSCULAR; INTRAVENOUS; SUBCUTANEOUS at 20:49

## 2024-01-21 RX ADMIN — Medication 100 MILLIGRAM(S): at 20:49

## 2024-01-21 RX ADMIN — Medication 4: at 20:48

## 2024-01-21 RX ADMIN — HYDROMORPHONE HYDROCHLORIDE 2 MILLIGRAM(S): 2 INJECTION INTRAMUSCULAR; INTRAVENOUS; SUBCUTANEOUS at 17:34

## 2024-01-21 RX ADMIN — HYDROMORPHONE HYDROCHLORIDE 2 MILLIGRAM(S): 2 INJECTION INTRAMUSCULAR; INTRAVENOUS; SUBCUTANEOUS at 21:20

## 2024-01-21 RX ADMIN — Medication 100 MILLIGRAM(S): at 15:26

## 2024-01-21 RX ADMIN — Medication 2 UNIT(S): at 11:35

## 2024-01-21 NOTE — PROGRESS NOTE ADULT - SUBJECTIVE AND OBJECTIVE BOX
37yo M with Crohn's disease seen at bedside. Patient is tolerating diet without nausea or vomiting and has liquid output in ostomy. He states he continues to have stool from rectum.       Physical Exam:  General: AAOx3, NAD  Chest: Normal respiratory effort  Heart: RRR  Abdomen: Soft, no masses, RLQ ostomy, Tenderness to palpation of suprapubic region with voluntary guarding  Neuro/Psych: No localized deficits. Normal speech, normal tone  Skin: Normal, no rashes, no lesions noted.   Extremities: Warm, well perfused, no edema, Pulses intact      MEDICATIONS  (STANDING):  dextrose 5%. 1000 milliLiter(s) (50 mL/Hr) IV Continuous <Continuous>  dextrose 5%. 1000 milliLiter(s) (100 mL/Hr) IV Continuous <Continuous>  dextrose 50% Injectable 12.5 Gram(s) IV Push once  dextrose 50% Injectable 25 Gram(s) IV Push once  dextrose 50% Injectable 25 Gram(s) IV Push once  enoxaparin Injectable 40 milliGRAM(s) SubCutaneous every 24 hours  glucagon  Injectable 1 milliGRAM(s) IntraMuscular once  hydrocortisone sodium succinate Injectable 100 milliGRAM(s) IV Push three times a day  influenza   Vaccine 0.5 milliLiter(s) IntraMuscular once  insulin glargine Injectable (LANTUS) 7 Unit(s) SubCutaneous at bedtime  insulin lispro (ADMELOG) corrective regimen sliding scale   SubCutaneous three times a day before meals  insulin lispro (ADMELOG) corrective regimen sliding scale   SubCutaneous at bedtime  insulin lispro Injectable (ADMELOG) 2 Unit(s) SubCutaneous three times a day before meals  naloxone Injectable 0.4 milliGRAM(s) IV Push once  piperacillin/tazobactam IVPB.. 3.375 Gram(s) IV Intermittent every 8 hours  polyethylene glycol 3350 17 Gram(s) Oral daily  senna 2 Tablet(s) Oral at bedtime  sodium chloride 0.9% with potassium chloride 20 mEq/L 1000 milliLiter(s) (75 mL/Hr) IV Continuous <Continuous>  sodium chloride 0.9%. 1000 milliLiter(s) (75 mL/Hr) IV Continuous <Continuous>    MEDICATIONS  (PRN):  acetaminophen     Tablet .. 650 milliGRAM(s) Oral every 6 hours PRN Temp greater or equal to 38C (100.4F), Mild Pain (1 - 3)  aluminum hydroxide/magnesium hydroxide/simethicone Suspension 30 milliLiter(s) Oral every 4 hours PRN Dyspepsia  bisacodyl 5 milliGRAM(s) Oral daily PRN Constipation  dextrose Oral Gel 15 Gram(s) Oral once PRN Blood Glucose LESS THAN 70 milliGRAM(s)/deciliter  HYDROmorphone  Injectable 2 milliGRAM(s) IV Push every 4 hours PRN Severe Pain (7 - 10)  HYDROmorphone  Injectable 1 milliGRAM(s) IV Push every 4 hours PRN Moderate Pain (4 - 6)  melatonin 3 milliGRAM(s) Oral at bedtime PRN Insomnia  ondansetron Injectable 4 milliGRAM(s) IV Push every 8 hours PRN Nausea and/or Vomiting      PAST MEDICAL & SURGICAL HISTORY:  Crohn's disease of large intestine without complication  (No current flare up)      Pancreatitis      S/P ORIF (open reduction internal fixation) fracture  (Right ankle, 2014)      History of colonoscopy  (2014)      Perianal fistula  repair in 2002          Vital Signs Last 24 Hrs  T(C): 36.8 (20 Jan 2024 20:47), Max: 37.1 (20 Jan 2024 09:19)  T(F): 98.3 (20 Jan 2024 20:47), Max: 98.7 (20 Jan 2024 09:19)  HR: 100 (20 Jan 2024 20:47) (50 - 100)  BP: 105/67 (20 Jan 2024 20:47) (92/54 - 126/71)  BP(mean): 75 (20 Jan 2024 16:20) (63 - 85)  RR: 18 (20 Jan 2024 20:47) (18 - 19)  SpO2: 97% (20 Jan 2024 20:47) (97% - 100%)    Parameters below as of 20 Jan 2024 20:47  Patient On (Oxygen Delivery Method): room air        I&O's Summary                            13.6   11.15 )-----------( 361      ( 20 Jan 2024 09:00 )             40.6     01-20    137  |  104  |  8   ----------------------------<  118<H>  3.2<L>   |  29  |  0.88    Ca    8.9      20 Jan 2024 09:00  Phos  3.4     01-20  Mg     1.9     01-20            Culture - Blood (collected 01-17-24 @ 18:46)  Source: .Blood None  Preliminary Report (01-20-24 @ 02:02):    No growth at 48 Hours    Culture - Blood (collected 01-17-24 @ 18:46)  Source: .Blood None  Preliminary Report (01-20-24 @ 02:02):    No growth at 48 Hours

## 2024-01-21 NOTE — PROGRESS NOTE ADULT - SUBJECTIVE AND OBJECTIVE BOX
Patient is a 38y old  Male who presents with a chief complaint of Crohn's Flare (21 Jan 2024 09:55)      Subective:  No complaints  Liquid stool from rectum slowing down, cramps improving  No bleeding    PAST MEDICAL & SURGICAL HISTORY:  Crohn's disease of large intestine without complication  (No current flare up)      Pancreatitis      S/P ORIF (open reduction internal fixation) fracture  (Right ankle, 2014)      History of colonoscopy  (2014)      Perianal fistula  repair in 2002          MEDICATIONS  (STANDING):  dextrose 5%. 1000 milliLiter(s) (50 mL/Hr) IV Continuous <Continuous>  dextrose 5%. 1000 milliLiter(s) (100 mL/Hr) IV Continuous <Continuous>  dextrose 50% Injectable 12.5 Gram(s) IV Push once  dextrose 50% Injectable 25 Gram(s) IV Push once  dextrose 50% Injectable 25 Gram(s) IV Push once  enoxaparin Injectable 40 milliGRAM(s) SubCutaneous every 24 hours  glucagon  Injectable 1 milliGRAM(s) IntraMuscular once  hydrocortisone sodium succinate Injectable 100 milliGRAM(s) IV Push three times a day  influenza   Vaccine 0.5 milliLiter(s) IntraMuscular once  insulin glargine Injectable (LANTUS) 7 Unit(s) SubCutaneous at bedtime  insulin lispro (ADMELOG) corrective regimen sliding scale   SubCutaneous at bedtime  insulin lispro (ADMELOG) corrective regimen sliding scale   SubCutaneous three times a day before meals  insulin lispro Injectable (ADMELOG) 2 Unit(s) SubCutaneous three times a day before meals  naloxone Injectable 0.4 milliGRAM(s) IV Push once  piperacillin/tazobactam IVPB.. 3.375 Gram(s) IV Intermittent every 8 hours  polyethylene glycol 3350 17 Gram(s) Oral daily  senna 2 Tablet(s) Oral at bedtime  sodium chloride 0.9% with potassium chloride 20 mEq/L 1000 milliLiter(s) (75 mL/Hr) IV Continuous <Continuous>  sodium chloride 0.9%. 1000 milliLiter(s) (75 mL/Hr) IV Continuous <Continuous>    MEDICATIONS  (PRN):  acetaminophen     Tablet .. 650 milliGRAM(s) Oral every 6 hours PRN Temp greater or equal to 38C (100.4F), Mild Pain (1 - 3)  aluminum hydroxide/magnesium hydroxide/simethicone Suspension 30 milliLiter(s) Oral every 4 hours PRN Dyspepsia  bisacodyl 5 milliGRAM(s) Oral daily PRN Constipation  dextrose Oral Gel 15 Gram(s) Oral once PRN Blood Glucose LESS THAN 70 milliGRAM(s)/deciliter  HYDROmorphone  Injectable 2 milliGRAM(s) IV Push every 4 hours PRN Severe Pain (7 - 10)  HYDROmorphone  Injectable 1 milliGRAM(s) IV Push every 4 hours PRN Moderate Pain (4 - 6)  melatonin 3 milliGRAM(s) Oral at bedtime PRN Insomnia  ondansetron Injectable 4 milliGRAM(s) IV Push every 8 hours PRN Nausea and/or Vomiting      REVIEW OF SYSTEMS:    RESPIRATORY: No shortness of breath  CARDIOVASCULAR: No chest pain  All other review of systems is negative unless indicated above.    Vital Signs Last 24 Hrs  T(C): 36.6 (21 Jan 2024 07:52), Max: 36.8 (20 Jan 2024 20:47)  T(F): 97.8 (21 Jan 2024 07:52), Max: 98.3 (20 Jan 2024 20:47)  HR: 51 (21 Jan 2024 07:52) (51 - 100)  BP: 108/74 (21 Jan 2024 07:52) (92/54 - 108/74)  BP(mean): 75 (20 Jan 2024 16:20) (63 - 75)  RR: 16 (21 Jan 2024 07:52) (16 - 18)  SpO2: 100% (21 Jan 2024 07:52) (97% - 100%)    Parameters below as of 21 Jan 2024 07:52  Patient On (Oxygen Delivery Method): room air        PHYSICAL EXAM:    Constitutional: NAD, well-developed  Respiratory: CTAB  Cardiovascular: S1 and S2, RRR  Gastrointestinal: BS+, soft, NT/ND, ileostomy with brown stool  Extremities: No peripheral edema  Psychiatric: Normal mood, normal affect    LABS:                        14.0   10.30 )-----------( 335      ( 21 Jan 2024 08:46 )             40.8     01-21    133<L>  |  105  |  7   ----------------------------<  202<H>  3.8   |  26  |  0.73    Ca    9.1      21 Jan 2024 08:46  Phos  3.4     01-20  Mg     2.2     01-21            RADIOLOGY & ADDITIONAL STUDIES:

## 2024-01-21 NOTE — PROGRESS NOTE ADULT - SUBJECTIVE AND OBJECTIVE BOX
CC: Crohn's Flare    HPI: 38M w/Hx of Crohn's s/p ileostomy presents due to intractable lower abdominal pain, nausea, decreased ostomy output. Pt reports symptoms remind him of Crohn's flares in the past but has not had a flare in years. Came into the ED for eval yesterday was determined safe for discharge but returned as his pain got worse. Pt reports he is no longer on biologic medicine because he lost his job and can longer afford it without insurance. He denies bleeding. Patient denies fever, chills, chest pain, SOB, headache, dizziness, dysuria.    INTERVAL HPI/ OVERNIGHT EVENTS: Pt was seen and examined,  looks comfortable, on the phone, reports periods of sudden severe pain which can last for about an hour. States that feels  little shaky walking to the bathroom. Tolerates clears.     Vital Signs Last 24 Hrs  T(C): 36.6 (20 Jan 2024 15:15), Max: 37.1 (20 Jan 2024 09:19)  T(F): 97.8 (20 Jan 2024 15:15), Max: 98.7 (20 Jan 2024 09:19)  HR: 53 (20 Jan 2024 16:20) (50 - 64)  BP: 105/66 (20 Jan 2024 16:20) (92/54 - 126/71)  BP(mean): 75 (20 Jan 2024 16:20) (63 - 85)  RR: 18 (20 Jan 2024 15:15) (16 - 19)  SpO2: 97% (20 Jan 2024 15:15) (97% - 100%)    Parameters below as of 20 Jan 2024 15:15  Patient On (Oxygen Delivery Method): room air      REVIEW OF SYSTEMS:  All other review of systems is negative unless indicated above.      PHYSICAL EXAM:  General: Disheveled in no acute distress  Eyes: EOMI; conjunctiva and sclera clear  Head: Normocephalic; atraumatic  ENMT: No nasal discharge; airway clear  Respiratory: No wheezes, rales or rhonchi  Cardiovascular: Regular rate and rhythm. S1 and S2 Normal;   Gastrointestinal: Soft,  tenderness  at lower abd better,  no rebound,  + ostomy in place, + mild  liquid stool.  +  bowel sounds  Genitourinary: No  suprapubic  tenderness  Extremities: Normal range of motion, No  edema  Neurological: Alert and oriented x3, non focal   Skin: Warm and dry. No acute rash  Musculoskeletal: Normal muscle tone, without deformities  Psychiatric: Cooperative         LABS:                         14.0   10.30 )-----------( 335      ( 21 Jan 2024 08:46 )             40.8     01-21    133<L>  |  105  |  7   ----------------------------<  202<H>  3.8   |  26  |  0.73    Ca    9.1      21 Jan 2024 08:46  Phos  3.4     01-20  Mg     2.2     01-21      Urinalysis Basic - ( 21 Jan 2024 08:46 )  Color: x / Appearance: x / SG: x / pH: x  Gluc: 202 mg/dL / Ketone: x  / Bili: x / Urobili: x   Blood: x / Protein: x / Nitrite: x   Leuk Esterase: x / RBC: x / WBC x   Sq Epi: x / Non Sq Epi: x / Bacteria: x                            13.6   11.15 )-----------( 361      ( 20 Jan 2024 09:00 )             40.6     01-20    137  |  104  |  8   ----------------------------<  118<H>  3.2<L>   |  29  |  0.88    Ca    8.9      20 Jan 2024 09:00  Phos  3.4     01-20  Mg     1.9     01-20                              13.1   12.61 )-----------( 371      ( 19 Jan 2024 08:25 )             39.2     01-19    135  |  106  |  9   ----------------------------<  134<H>  3.4<L>   |  27  |  0.77    Ca    9.2      19 Jan 2024 08:25  Phos  2.9     01-19  Mg     2.1     01-19    TPro  7.0  /  Alb  2.6<L>  /  TBili  0.4  /  DBili  x   /  AST  13<L>  /  ALT  19  /  AlkPhos  69  01-18        LIVER FUNCTIONS - ( 18 Jan 2024 09:21 )  Alb: 2.6 g/dL / Pro: 7.0 gm/dL / ALK PHOS: 69 U/L / ALT: 19 U/L / AST: 13 U/L / GGT: x                             14.0   8.73  )-----------( 372      ( 18 Jan 2024 09:21 )             41.6     18 Jan 2024 09:21    134    |  106    |  9      ----------------------------<  208    3.8     |  26     |  0.73     Ca    9.3        18 Jan 2024 09:21    TPro  7.0    /  Alb  2.6    /  TBili  0.4    /  DBili  x      /  AST  13     /  ALT  19     /  AlkPhos  69     18 Jan 2024 09:21    CAPILLARY BLOOD GLUCOSE  POCT Blood Glucose.: 119 mg/dL (20 Jan 2024 16:31)      LIVER FUNCTIONS - ( 18 Jan 2024 09:21 )  Alb: 2.6 g/dL / Pro: 7.0 gm/dL / ALK PHOS: 69 U/L / ALT: 19 U/L / AST: 13 U/L / GGT: x           Urinalysis Basic - ( 18 Jan 2024 09:21 )    Color: x / Appearance: x / SG: x / pH: x  Gluc: 208 mg/dL / Ketone: x  / Bili: x / Urobili: x   Blood: x / Protein: x / Nitrite: x   Leuk Esterase: x / RBC: x / WBC x   Sq Epi: x / Non Sq Epi: x / Bacteria: x        MEDICATIONS  (STANDING):  dextrose 5%. 1000 milliLiter(s) (100 mL/Hr) IV Continuous <Continuous>  dextrose 5%. 1000 milliLiter(s) (50 mL/Hr) IV Continuous <Continuous>  dextrose 50% Injectable 25 Gram(s) IV Push once  dextrose 50% Injectable 25 Gram(s) IV Push once  dextrose 50% Injectable 12.5 Gram(s) IV Push once  enoxaparin Injectable 40 milliGRAM(s) SubCutaneous every 24 hours  glucagon  Injectable 1 milliGRAM(s) IntraMuscular once  hydrocortisone sodium succinate Injectable 100 milliGRAM(s) IV Push three times a day  influenza   Vaccine 0.5 milliLiter(s) IntraMuscular once  insulin glargine Injectable (LANTUS) 7 Unit(s) SubCutaneous at bedtime  insulin lispro (ADMELOG) corrective regimen sliding scale   SubCutaneous three times a day before meals  insulin lispro (ADMELOG) corrective regimen sliding scale   SubCutaneous at bedtime  insulin lispro Injectable (ADMELOG) 2 Unit(s) SubCutaneous three times a day before meals  naloxone Injectable 0.4 milliGRAM(s) IV Push once  piperacillin/tazobactam IVPB.. 3.375 Gram(s) IV Intermittent every 8 hours  polyethylene glycol 3350 17 Gram(s) Oral daily  senna 2 Tablet(s) Oral at bedtime  sodium chloride 0.9% with potassium chloride 20 mEq/L 1000 milliLiter(s) (75 mL/Hr) IV Continuous <Continuous>  sodium chloride 0.9%. 1000 milliLiter(s) (75 mL/Hr) IV Continuous <Continuous>    MEDICATIONS  (PRN):  acetaminophen     Tablet .. 650 milliGRAM(s) Oral every 6 hours PRN Temp greater or equal to 38C (100.4F), Mild Pain (1 - 3)  aluminum hydroxide/magnesium hydroxide/simethicone Suspension 30 milliLiter(s) Oral every 4 hours PRN Dyspepsia  bisacodyl 5 milliGRAM(s) Oral daily PRN Constipation  dextrose Oral Gel 15 Gram(s) Oral once PRN Blood Glucose LESS THAN 70 milliGRAM(s)/deciliter  HYDROmorphone  Injectable 2 milliGRAM(s) IV Push every 4 hours PRN Severe Pain (7 - 10)  HYDROmorphone  Injectable 1 milliGRAM(s) IV Push every 4 hours PRN Moderate Pain (4 - 6)  melatonin 3 milliGRAM(s) Oral at bedtime PRN Insomnia  ondansetron Injectable 4 milliGRAM(s) IV Push every 8 hours PRN Nausea and/or Vomiting        RADIOLOGY & ADDITIONAL TESTS:      ACC: [id number] EXAM:  CT ABDOMEN AND PELVIS OC IC   ORDERED BY: FELI SIERRA     PROCEDURE DATE:  01/16/2024      INTERPRETATION:  CLINICAL INFORMATION: Annual stool output from ostomy.   Liquid stool via the rectum. History of Crohn's disease.    COMPARISON: CT abdomen/pelvis 1/4/2024.    CONTRAST/COMPLICATIONS:  IV Contrast: Omnipaque 350  90 cc administered   0 cc discarded  Oral Contrast: Omnipaque 300  Complications: None reported at time of study completion    PROCEDURE:  CT of the Abdomen and Pelvis was performed.  Sagittal and coronal reformats were performed.    FINDINGS:  LOWER CHEST: Within normal limits.    LIVER: Within normal limits.  BILE DUCTS: Normal caliber.  GALLBLADDER: Within normal limits.  SPLEEN: Within normal limits.  PANCREAS: Within normal limits.  ADRENALS: Within normal limits.  KIDNEYS/URETERS: Within normal limits.    BLADDER: Within normal limits.  REPRODUCTIVE ORGANS: Prostate is enlarged.    BOWEL: No bowel obstruction. Right lower quadrant loop ileostomy. Long   segment colonic wall thickening with surrounding pericolonic stranding   involving the descending colon. No enteric fistula is identified. Mild   circumferential wall thickening and enhancement of the distal rectum,   similar to prior. Perianal fistula tracts, perirectal enhancement, seton   drains again seen.  PERITONEUM: No ascites.  VESSELS: Within normal limits.  RETROPERITONEUM/LYMPH NODES: No lymphadenopathy.  ABDOMINAL WALL: Right lower quadrant ileostomy.  BONES: No acute osseous abnormality.    IMPRESSION:  Long segment inflammation of the descending colon, likely inflammatory   colitis given history of Crohn's disease. No bowel obstruction or enteric   fistulas identified.

## 2024-01-21 NOTE — PROGRESS NOTE ADULT - ASSESSMENT
38M w/Hx of Pancreatitis,  Crohn's s/p ileostomy presents due to intractable lower abdominal pain, nausea, decreased ostomy output.     1. Abdominal pain suspect due to Colitis / Crohn's disease flare up  Decreased output for ileostomy , now better   Ct Abdomen/pelvis reviewed: Long segment of descending colon  Inflammation likely 2/2 to Crohn's flare up  Cdiff and check GI PCR neg   C/w  IV Zosyn   IVF   C/w Solu Cortef as per GI  Dilaudid PRN pain,  Zofran and tylenol PRN.  Monitor electrolytes, replace Potassium and magnesium IV    follow up     2. Hyperglycemia, DM Type II  HB A1c 7.4, new diagnosis?   C/w  7U Lantus  QHS  C/w  premeals   Monitor BS, cover with ISS   Diabetic education       3. Hypokalemia/Hypomagnesemia  replace PO/IV   recheck in am       4. DVT PPX: start lovenox sq  ( refusing)     Dispo; C/w current care, will need SW as pt has no insurance, GI follow up for further recs    38M w/Hx of Pancreatitis,  Crohn's s/p ileostomy presents due to intractable lower abdominal pain, nausea, decreased ostomy output.     1. Abdominal pain suspect due to Colitis / Crohn's disease flare up  Decreased output for ileostomy , now better   Ct Abdomen/pelvis reviewed: Long segment of descending colon  Inflammation likely 2/2 to Crohn's flare up  Cdiff and check GI PCR neg   C/w  IV Zosyn   IVF   C/w Solu Cortef as per GI  Dilaudid PRN pain,  Zofran and tylenol PRN.  Monitor electrolytes, replace Potassium and magnesium IV    follow up     2. Hyperglycemia, DM Type II  HB A1c 7.4, new diagnosis?   C/w  7U Lantus  QHS  C/w  premeals   Monitor BS, cover with ISS   Diabetic education       3. Hypokalemia/Hypomagnesemia  replace PO/IV   recheck in am       4. Hyponatremia  likely 2/2 poor oral intake.   C/w IVF  LAbs in am     5. DVT PPX: start lovenox sq  ( refusing)     Dispo; C/w current care, will need SW as pt has no insurance, GI follow up for further recs

## 2024-01-21 NOTE — PROGRESS NOTE ADULT - ATTENDING COMMENTS
Patient seen and examined this morning.  He has more output from his ileostomy.  His pain is also improved.  On exam, there is tenderness in the left mid to lower abdomen.  Continue medical management of his Crohn's colitis.  I explained that if he does not improve in the future and continues to have inflammation of his colon despite diversion and a biologic (which he is yet to start), he should consider removing his colon. Patient seen and examined this morning.  He has more output from his ileostomy.  His pain is also improved.  On exam, there is tenderness in the left mid to lower abdomen.  Continue medical management of his Crohn's colitis.  I explained that if he does not improve in the future and continues to have inflammation of his colon despite diversion and a biologic (which he is yet to start), he should consider removing his colon. May advance to regular diet

## 2024-01-22 LAB
ANION GAP SERPL CALC-SCNC: 3 MMOL/L — LOW (ref 5–17)
BUN SERPL-MCNC: 12 MG/DL — SIGNIFICANT CHANGE UP (ref 7–23)
CALCIUM SERPL-MCNC: 9 MG/DL — SIGNIFICANT CHANGE UP (ref 8.5–10.1)
CHLORIDE SERPL-SCNC: 106 MMOL/L — SIGNIFICANT CHANGE UP (ref 96–108)
CO2 SERPL-SCNC: 27 MMOL/L — SIGNIFICANT CHANGE UP (ref 22–31)
CREAT SERPL-MCNC: 0.88 MG/DL — SIGNIFICANT CHANGE UP (ref 0.5–1.3)
CRP SERPL-MCNC: <3 MG/L — SIGNIFICANT CHANGE UP
EGFR: 113 ML/MIN/1.73M2 — SIGNIFICANT CHANGE UP
ERYTHROCYTE [SEDIMENTATION RATE] IN BLOOD: 18 MM/HR — HIGH (ref 0–15)
GLUCOSE BLDC GLUCOMTR-MCNC: 193 MG/DL — HIGH (ref 70–99)
GLUCOSE BLDC GLUCOMTR-MCNC: 224 MG/DL — HIGH (ref 70–99)
GLUCOSE BLDC GLUCOMTR-MCNC: 234 MG/DL — HIGH (ref 70–99)
GLUCOSE BLDC GLUCOMTR-MCNC: 309 MG/DL — HIGH (ref 70–99)
GLUCOSE SERPL-MCNC: 200 MG/DL — HIGH (ref 70–99)
POTASSIUM SERPL-MCNC: 3.8 MMOL/L — SIGNIFICANT CHANGE UP (ref 3.5–5.3)
POTASSIUM SERPL-SCNC: 3.8 MMOL/L — SIGNIFICANT CHANGE UP (ref 3.5–5.3)
SODIUM SERPL-SCNC: 136 MMOL/L — SIGNIFICANT CHANGE UP (ref 135–145)

## 2024-01-22 PROCEDURE — 99232 SBSQ HOSP IP/OBS MODERATE 35: CPT

## 2024-01-22 RX ORDER — INSULIN GLARGINE 100 [IU]/ML
10 INJECTION, SOLUTION SUBCUTANEOUS AT BEDTIME
Refills: 0 | Status: DISCONTINUED | OUTPATIENT
Start: 2024-01-22 | End: 2024-01-23

## 2024-01-22 RX ORDER — SODIUM CHLORIDE 9 MG/ML
1000 INJECTION INTRAMUSCULAR; INTRAVENOUS; SUBCUTANEOUS
Refills: 0 | Status: DISCONTINUED | OUTPATIENT
Start: 2024-01-22 | End: 2024-01-22

## 2024-01-22 RX ORDER — DEXTROSE MONOHYDRATE, SODIUM CHLORIDE, AND POTASSIUM CHLORIDE 50; .745; 4.5 G/1000ML; G/1000ML; G/1000ML
1000 INJECTION, SOLUTION INTRAVENOUS
Refills: 0 | Status: DISCONTINUED | OUTPATIENT
Start: 2024-01-22 | End: 2024-01-23

## 2024-01-22 RX ORDER — INSULIN LISPRO 100/ML
4 VIAL (ML) SUBCUTANEOUS
Refills: 0 | Status: DISCONTINUED | OUTPATIENT
Start: 2024-01-22 | End: 2024-01-23

## 2024-01-22 RX ORDER — DEXTROSE MONOHYDRATE, SODIUM CHLORIDE, AND POTASSIUM CHLORIDE 50; .745; 4.5 G/1000ML; G/1000ML; G/1000ML
1000 INJECTION, SOLUTION INTRAVENOUS
Refills: 0 | Status: DISCONTINUED | OUTPATIENT
Start: 2024-01-22 | End: 2024-01-22

## 2024-01-22 RX ADMIN — PIPERACILLIN AND TAZOBACTAM 25 GRAM(S): 4; .5 INJECTION, POWDER, LYOPHILIZED, FOR SOLUTION INTRAVENOUS at 05:36

## 2024-01-22 RX ADMIN — ONDANSETRON 4 MILLIGRAM(S): 8 TABLET, FILM COATED ORAL at 17:49

## 2024-01-22 RX ADMIN — Medication 100 MILLIGRAM(S): at 21:03

## 2024-01-22 RX ADMIN — HYDROMORPHONE HYDROCHLORIDE 2 MILLIGRAM(S): 2 INJECTION INTRAMUSCULAR; INTRAVENOUS; SUBCUTANEOUS at 01:23

## 2024-01-22 RX ADMIN — Medication 8: at 17:55

## 2024-01-22 RX ADMIN — Medication 2: at 08:24

## 2024-01-22 RX ADMIN — Medication 100 MILLIGRAM(S): at 05:36

## 2024-01-22 RX ADMIN — Medication 2 UNIT(S): at 08:24

## 2024-01-22 RX ADMIN — DEXTROSE MONOHYDRATE, SODIUM CHLORIDE, AND POTASSIUM CHLORIDE 75 MILLILITER(S): 50; .745; 4.5 INJECTION, SOLUTION INTRAVENOUS at 15:23

## 2024-01-22 RX ADMIN — Medication 4: at 12:11

## 2024-01-22 RX ADMIN — HYDROMORPHONE HYDROCHLORIDE 2 MILLIGRAM(S): 2 INJECTION INTRAMUSCULAR; INTRAVENOUS; SUBCUTANEOUS at 22:25

## 2024-01-22 RX ADMIN — HYDROMORPHONE HYDROCHLORIDE 2 MILLIGRAM(S): 2 INJECTION INTRAMUSCULAR; INTRAVENOUS; SUBCUTANEOUS at 02:00

## 2024-01-22 RX ADMIN — HYDROMORPHONE HYDROCHLORIDE 2 MILLIGRAM(S): 2 INJECTION INTRAMUSCULAR; INTRAVENOUS; SUBCUTANEOUS at 17:49

## 2024-01-22 RX ADMIN — PIPERACILLIN AND TAZOBACTAM 25 GRAM(S): 4; .5 INJECTION, POWDER, LYOPHILIZED, FOR SOLUTION INTRAVENOUS at 13:38

## 2024-01-22 RX ADMIN — DEXTROSE MONOHYDRATE, SODIUM CHLORIDE, AND POTASSIUM CHLORIDE 75 MILLILITER(S): 50; .745; 4.5 INJECTION, SOLUTION INTRAVENOUS at 12:12

## 2024-01-22 RX ADMIN — HYDROMORPHONE HYDROCHLORIDE 2 MILLIGRAM(S): 2 INJECTION INTRAMUSCULAR; INTRAVENOUS; SUBCUTANEOUS at 22:01

## 2024-01-22 RX ADMIN — INSULIN GLARGINE 10 UNIT(S): 100 INJECTION, SOLUTION SUBCUTANEOUS at 21:03

## 2024-01-22 RX ADMIN — HYDROMORPHONE HYDROCHLORIDE 2 MILLIGRAM(S): 2 INJECTION INTRAMUSCULAR; INTRAVENOUS; SUBCUTANEOUS at 05:47

## 2024-01-22 RX ADMIN — HYDROMORPHONE HYDROCHLORIDE 2 MILLIGRAM(S): 2 INJECTION INTRAMUSCULAR; INTRAVENOUS; SUBCUTANEOUS at 13:38

## 2024-01-22 RX ADMIN — Medication 2 UNIT(S): at 12:11

## 2024-01-22 RX ADMIN — Medication 100 MILLIGRAM(S): at 13:38

## 2024-01-22 RX ADMIN — Medication 4 UNIT(S): at 17:54

## 2024-01-22 RX ADMIN — PIPERACILLIN AND TAZOBACTAM 25 GRAM(S): 4; .5 INJECTION, POWDER, LYOPHILIZED, FOR SOLUTION INTRAVENOUS at 21:06

## 2024-01-22 RX ADMIN — HYDROMORPHONE HYDROCHLORIDE 2 MILLIGRAM(S): 2 INJECTION INTRAMUSCULAR; INTRAVENOUS; SUBCUTANEOUS at 09:47

## 2024-01-22 NOTE — PROGRESS NOTE ADULT - ASSESSMENT
38M w/Hx of Pancreatitis,  Crohn's s/p ileostomy presents due to intractable lower abdominal pain, nausea, decreased ostomy output.     1. Abdominal pain suspect due to Colitis / Crohn's disease flare up  Decreased output for ileostomy , now better   Ct Abdomen/pelvis reviewed: Long segment of descending colon  Inflammation likely 2/2 to Crohn's flare up  Cdiff and check GI PCR neg   C/w  IV Zosyn   IVF   C/w Solu Cortef same dose as d/w GI    Dilaudid IV PRN pain   Zofran and tylenol PRN.  Monitor electrolytes  D/w Shruthi Smith NP  and Dr Perez     2. Hyperglycemia, DM Type II  HB A1c 7.4, new diagnosis?   C/w  7U Lantus  QHS  C/w  premeals   Monitor BS, cover with ISS   Diabetic education   Nutrition education       3. Hypokalemia/Hypomagnesemia  replaced  monitor       4. Hyponatremia, resolved   likely 2/2 poor oral intake.   C/w IVF  LAbs in am     5. DVT PPX: start lovenox sq  ( refusing)     Dispo; C/w current care, will need SW as pt has no insurance

## 2024-01-22 NOTE — SBIRT NOTE ADULT - NSSBIRTOFTENALCOHOL_GEN_A_CORE
Pt reports he used to drink beers, now with ongoing health issues he doesn't drink. Pt declined referral/resources./Never

## 2024-01-22 NOTE — PROGRESS NOTE ADULT - ASSESSMENT
38 M admitted for Crohn's flare    Plan:  - Continue conservative management  - Pain and nausea control PRN  - Follow GI reccs for timing of steroids, biologics, etc  - Medical optimization as per primary team  - No colorectal surgery intervention planned at the moment  - Please reconsult as necessary     Plan discussed with Colorectal surgery team

## 2024-01-22 NOTE — PROGRESS NOTE ADULT - ASSESSMENT
38 year old male with Crohn's and 3 days history of abdominal pain, minimal ileostomy output, and long segment descending colon inflammation on CT. Stool studies negative for infectious etiology.    Imp: Crohn's flare colitis      PLAN:  ::Continue steroids   ::Low residue diet as tolerated  ::Trend ESR/CRP  ::Chemical VTE ppx recommended despite ambulatory, due to pro-inflammatory state

## 2024-01-22 NOTE — PROGRESS NOTE ADULT - SUBJECTIVE AND OBJECTIVE BOX
CC: Crohn's Flare    HPI: 38M w/Hx of Crohn's s/p ileostomy presents due to intractable lower abdominal pain, nausea, decreased ostomy output. Pt reports symptoms remind him of Crohn's flares in the past but has not had a flare in years. Came into the ED for eval yesterday was determined safe for discharge but returned as his pain got worse. Pt reports he is no longer on biologic medicine because he lost his job and can longer afford it without insurance. He denies bleeding. Patient denies fever, chills, chest pain, SOB, headache, dizziness, dysuria.    INTERVAL HPI/ OVERNIGHT EVENTS: Pt was seen and examined,  looks comfortable reports improvement of ostomy output now more formed, still with lightheadedness with ambulation. Still with abd pain but moved L and up. No N/V. Tolerates diet     Vital Signs Last 24 Hrs  T(C): 36.6 (20 Jan 2024 15:15), Max: 37.1 (20 Jan 2024 09:19)  T(F): 97.8 (20 Jan 2024 15:15), Max: 98.7 (20 Jan 2024 09:19)  HR: 53 (20 Jan 2024 16:20) (50 - 64)  BP: 105/66 (20 Jan 2024 16:20) (92/54 - 126/71)  BP(mean): 75 (20 Jan 2024 16:20) (63 - 85)  RR: 18 (20 Jan 2024 15:15) (16 - 19)  SpO2: 97% (20 Jan 2024 15:15) (97% - 100%)    Parameters below as of 20 Jan 2024 15:15  Patient On (Oxygen Delivery Method): room air      REVIEW OF SYSTEMS:  All other review of systems is negative unless indicated above.      PHYSICAL EXAM:  General: Disheveled in no acute distress  Eyes: EOMI; conjunctiva and sclera clear  Head: Normocephalic; atraumatic  ENMT: No nasal discharge; airway clear  Respiratory: No wheezes, rales or rhonchi  Cardiovascular: Regular rate and rhythm. S1 and S2 Normal;   Gastrointestinal: Soft,  tenderness  at lower abd better,  no rebound,  + ostomy in place, + mild  liquid stool.  +  bowel sounds  Genitourinary: No  suprapubic  tenderness  Extremities: Normal range of motion, No  edema  Neurological: Alert and oriented x3, non focal   Skin: Warm and dry. No acute rash  Musculoskeletal: Normal muscle tone, without deformities  Psychiatric: Cooperative         LABS:                       14.0   10.30 )-----------( 335      ( 21 Jan 2024 08:46 )             40.8     01-22    136  |  106  |  12  ----------------------------<  200<H>  3.8   |  27  |  0.88    Ca    9.0      22 Jan 2024 08:20  Mg     2.2     01-21      Urinalysis Basic - ( 22 Jan 2024 08:20 )    Color: x / Appearance: x / SG: x / pH: x  Gluc: 200 mg/dL / Ketone: x  / Bili: x / Urobili: x   Blood: x / Protein: x / Nitrite: x   Leuk Esterase: x / RBC: x / WBC x   Sq Epi: x / Non Sq Epi: x / Bacteria: x                            14.0   10.30 )-----------( 335      ( 21 Jan 2024 08:46 )             40.8     01-21    133<L>  |  105  |  7   ----------------------------<  202<H>  3.8   |  26  |  0.73    Ca    9.1      21 Jan 2024 08:46  Phos  3.4     01-20  Mg     2.2     01-21      Urinalysis Basic - ( 21 Jan 2024 08:46 )  Color: x / Appearance: x / SG: x / pH: x  Gluc: 202 mg/dL / Ketone: x  / Bili: x / Urobili: x   Blood: x / Protein: x / Nitrite: x   Leuk Esterase: x / RBC: x / WBC x   Sq Epi: x / Non Sq Epi: x / Bacteria: x                            13.6   11.15 )-----------( 361      ( 20 Jan 2024 09:00 )             40.6     01-20    137  |  104  |  8   ----------------------------<  118<H>  3.2<L>   |  29  |  0.88    Ca    8.9      20 Jan 2024 09:00  Phos  3.4     01-20  Mg     1.9     01-20                              13.1   12.61 )-----------( 371      ( 19 Jan 2024 08:25 )             39.2     01-19    135  |  106  |  9   ----------------------------<  134<H>  3.4<L>   |  27  |  0.77    Ca    9.2      19 Jan 2024 08:25  Phos  2.9     01-19  Mg     2.1     01-19    TPro  7.0  /  Alb  2.6<L>  /  TBili  0.4  /  DBili  x   /  AST  13<L>  /  ALT  19  /  AlkPhos  69  01-18        LIVER FUNCTIONS - ( 18 Jan 2024 09:21 )  Alb: 2.6 g/dL / Pro: 7.0 gm/dL / ALK PHOS: 69 U/L / ALT: 19 U/L / AST: 13 U/L / GGT: x                             14.0   8.73  )-----------( 372      ( 18 Jan 2024 09:21 )             41.6     18 Jan 2024 09:21    134    |  106    |  9      ----------------------------<  208    3.8     |  26     |  0.73     Ca    9.3        18 Jan 2024 09:21    TPro  7.0    /  Alb  2.6    /  TBili  0.4    /  DBili  x      /  AST  13     /  ALT  19     /  AlkPhos  69     18 Jan 2024 09:21    CAPILLARY BLOOD GLUCOSE  POCT Blood Glucose.: 119 mg/dL (20 Jan 2024 16:31)      LIVER FUNCTIONS - ( 18 Jan 2024 09:21 )  Alb: 2.6 g/dL / Pro: 7.0 gm/dL / ALK PHOS: 69 U/L / ALT: 19 U/L / AST: 13 U/L / GGT: x           Urinalysis Basic - ( 18 Jan 2024 09:21 )    Color: x / Appearance: x / SG: x / pH: x  Gluc: 208 mg/dL / Ketone: x  / Bili: x / Urobili: x   Blood: x / Protein: x / Nitrite: x   Leuk Esterase: x / RBC: x / WBC x   Sq Epi: x / Non Sq Epi: x / Bacteria: x      MEDICATIONS  (STANDING):  dextrose 5%. 1000 milliLiter(s) (100 mL/Hr) IV Continuous <Continuous>  dextrose 5%. 1000 milliLiter(s) (50 mL/Hr) IV Continuous <Continuous>  dextrose 50% Injectable 25 Gram(s) IV Push once  dextrose 50% Injectable 25 Gram(s) IV Push once  dextrose 50% Injectable 12.5 Gram(s) IV Push once  enoxaparin Injectable 40 milliGRAM(s) SubCutaneous every 24 hours  glucagon  Injectable 1 milliGRAM(s) IntraMuscular once  hydrocortisone sodium succinate Injectable 100 milliGRAM(s) IV Push three times a day  influenza   Vaccine 0.5 milliLiter(s) IntraMuscular once  insulin glargine Injectable (LANTUS) 7 Unit(s) SubCutaneous at bedtime  insulin lispro (ADMELOG) corrective regimen sliding scale   SubCutaneous three times a day before meals  insulin lispro (ADMELOG) corrective regimen sliding scale   SubCutaneous at bedtime  insulin lispro Injectable (ADMELOG) 2 Unit(s) SubCutaneous three times a day before meals  naloxone Injectable 0.4 milliGRAM(s) IV Push once  piperacillin/tazobactam IVPB.. 3.375 Gram(s) IV Intermittent every 8 hours  polyethylene glycol 3350 17 Gram(s) Oral daily  senna 2 Tablet(s) Oral at bedtime  sodium chloride 0.9% with potassium chloride 20 mEq/L 1000 milliLiter(s) (75 mL/Hr) IV Continuous <Continuous>  sodium chloride 0.9%. 1000 milliLiter(s) (75 mL/Hr) IV Continuous <Continuous>  sodium chloride 0.9%. 1000 milliLiter(s) (75 mL/Hr) IV Continuous <Continuous>    MEDICATIONS  (PRN):  acetaminophen     Tablet .. 650 milliGRAM(s) Oral every 6 hours PRN Temp greater or equal to 38C (100.4F), Mild Pain (1 - 3)  aluminum hydroxide/magnesium hydroxide/simethicone Suspension 30 milliLiter(s) Oral every 4 hours PRN Dyspepsia  bisacodyl 5 milliGRAM(s) Oral daily PRN Constipation  dextrose Oral Gel 15 Gram(s) Oral once PRN Blood Glucose LESS THAN 70 milliGRAM(s)/deciliter  HYDROmorphone  Injectable 2 milliGRAM(s) IV Push every 4 hours PRN Severe Pain (7 - 10)  HYDROmorphone  Injectable 1 milliGRAM(s) IV Push every 4 hours PRN Moderate Pain (4 - 6)  melatonin 3 milliGRAM(s) Oral at bedtime PRN Insomnia  ondansetron Injectable 4 milliGRAM(s) IV Push every 8 hours PRN Nausea and/or Vomiting        RADIOLOGY & ADDITIONAL TESTS:      ACC: [id number] EXAM:  CT ABDOMEN AND PELVIS OC IC   ORDERED BY: FELI SIERRA     PROCEDURE DATE:  01/16/2024      INTERPRETATION:  CLINICAL INFORMATION: Annual stool output from ostomy.   Liquid stool via the rectum. History of Crohn's disease.    COMPARISON: CT abdomen/pelvis 1/4/2024.    CONTRAST/COMPLICATIONS:  IV Contrast: Omnipaque 350  90 cc administered   0 cc discarded  Oral Contrast: Omnipaque 300  Complications: None reported at time of study completion    PROCEDURE:  CT of the Abdomen and Pelvis was performed.  Sagittal and coronal reformats were performed.    FINDINGS:  LOWER CHEST: Within normal limits.    LIVER: Within normal limits.  BILE DUCTS: Normal caliber.  GALLBLADDER: Within normal limits.  SPLEEN: Within normal limits.  PANCREAS: Within normal limits.  ADRENALS: Within normal limits.  KIDNEYS/URETERS: Within normal limits.    BLADDER: Within normal limits.  REPRODUCTIVE ORGANS: Prostate is enlarged.    BOWEL: No bowel obstruction. Right lower quadrant loop ileostomy. Long   segment colonic wall thickening with surrounding pericolonic stranding   involving the descending colon. No enteric fistula is identified. Mild   circumferential wall thickening and enhancement of the distal rectum,   similar to prior. Perianal fistula tracts, perirectal enhancement, seton   drains again seen.  PERITONEUM: No ascites.  VESSELS: Within normal limits.  RETROPERITONEUM/LYMPH NODES: No lymphadenopathy.  ABDOMINAL WALL: Right lower quadrant ileostomy.  BONES: No acute osseous abnormality.    IMPRESSION:  Long segment inflammation of the descending colon, likely inflammatory   colitis given history of Crohn's disease. No bowel obstruction or enteric   fistulas identified.

## 2024-01-22 NOTE — SBIRT NOTE ADULT - NSSBIRTALCPOSREINDET_GEN_A_CORE
Pt reports he used to drink beers, now with ongoing health issues he doesn't drink. Pt declined referral/resources.  Pt reports he is aware of the health concerns associated with alcohol denied current use. Denied substances.

## 2024-01-22 NOTE — PROGRESS NOTE ADULT - SUBJECTIVE AND OBJECTIVE BOX
Patient is a 38y old male who presents with a chief complaint of Crohn's Flare.    Follow up: Crohn's flare    Patient seen this morning. He reports improvement of ostomy function and decrease in BM's since initiating steroids IVP on 1/20. Endorses intermittent, severe, sharp/stabbing mid-abdominal pain that radiates to LUQ/LLQ - aggravated by palpation, alleviated with Dilaudid IVP. Diet advanced to low fiber yesterday and tolerating well. Had one episode of nausea last night, now resolved. He also reports hot flashes with night sweats, likely related to steroids.          MEDICATIONS  (STANDING):  dextrose 5%. 1000 milliLiter(s) (100 mL/Hr) IV Continuous <Continuous>  dextrose 5%. 1000 milliLiter(s) (50 mL/Hr) IV Continuous <Continuous>  dextrose 50% Injectable 25 Gram(s) IV Push once  dextrose 50% Injectable 25 Gram(s) IV Push once  dextrose 50% Injectable 12.5 Gram(s) IV Push once  enoxaparin Injectable 40 milliGRAM(s) SubCutaneous every 24 hours  glucagon  Injectable 1 milliGRAM(s) IntraMuscular once  hydrocortisone sodium succinate Injectable 100 milliGRAM(s) IV Push three times a day  influenza   Vaccine 0.5 milliLiter(s) IntraMuscular once  insulin glargine Injectable (LANTUS) 7 Unit(s) SubCutaneous at bedtime  insulin lispro (ADMELOG) corrective regimen sliding scale   SubCutaneous three times a day before meals  insulin lispro (ADMELOG) corrective regimen sliding scale   SubCutaneous at bedtime  insulin lispro Injectable (ADMELOG) 2 Unit(s) SubCutaneous three times a day before meals  naloxone Injectable 0.4 milliGRAM(s) IV Push once  piperacillin/tazobactam IVPB.. 3.375 Gram(s) IV Intermittent every 8 hours  polyethylene glycol 3350 17 Gram(s) Oral daily  senna 2 Tablet(s) Oral at bedtime  sodium chloride 0.9% with potassium chloride 20 mEq/L 1000 milliLiter(s) (75 mL/Hr) IV Continuous <Continuous>  sodium chloride 0.9%. 1000 milliLiter(s) (75 mL/Hr) IV Continuous <Continuous>  sodium chloride 0.9%. 1000 milliLiter(s) (75 mL/Hr) IV Continuous <Continuous>    MEDICATIONS  (PRN):  acetaminophen     Tablet .. 650 milliGRAM(s) Oral every 6 hours PRN Temp greater or equal to 38C (100.4F), Mild Pain (1 - 3)  aluminum hydroxide/magnesium hydroxide/simethicone Suspension 30 milliLiter(s) Oral every 4 hours PRN Dyspepsia  bisacodyl 5 milliGRAM(s) Oral daily PRN Constipation  dextrose Oral Gel 15 Gram(s) Oral once PRN Blood Glucose LESS THAN 70 milliGRAM(s)/deciliter  HYDROmorphone  Injectable 2 milliGRAM(s) IV Push every 4 hours PRN Severe Pain (7 - 10)  HYDROmorphone  Injectable 1 milliGRAM(s) IV Push every 4 hours PRN Moderate Pain (4 - 6)  melatonin 3 milliGRAM(s) Oral at bedtime PRN Insomnia  ondansetron Injectable 4 milliGRAM(s) IV Push every 8 hours PRN Nausea and/or Vomiting      Vital Signs Last 24 Hrs  T(C): 36.8 (22 Jan 2024 09:16), Max: 36.8 (22 Jan 2024 09:16)  T(F): 98.2 (22 Jan 2024 09:16), Max: 98.2 (22 Jan 2024 09:16)  HR: 65 (22 Jan 2024 09:16) (58 - 81)  BP: 129/75 (22 Jan 2024 09:16) (99/56 - 129/75)  BP(mean): 78 (21 Jan 2024 16:50) (78 - 78)  RR: 18 (21 Jan 2024 23:52) (17 - 18)  SpO2: 99% (22 Jan 2024 09:16) (99% - 100%)    Parameters below as of 22 Jan 2024 09:16  Patient On (Oxygen Delivery Method): room air        PHYSICAL EXAM:    Constitutional: No acute distress, well-developed, non-toxic appearing  HEENT: masked, good phonation, not icteric  Neck: supple, no lymphadenopathy  Respiratory: no audible wheezing  Cardiovascular: S1 and S2, regular rate and rhythm, no murmurs rubs or gallops  Gastrointestinal: soft, +TTLP with guarding, lower abdomen slightly distended, +bowel sounds, RLQ ostomy intact with moderate amount of liquid brown stools  Extremities: No peripheral edema, no cyanosis or clubbing  Vascular: 2+ peripheral pulses, no venous stasis  Neurological: A/O x 3, no focal deficits, no asterixis  Psychiatric: Normal mood, normal affect  Skin: No rashes, not jaundiced    LABS:                        14.0   10.30 )-----------( 335      ( 21 Jan 2024 08:46 )             40.8     01-22    136  |  106  |  12  ----------------------------<  200<H>  3.8   |  27  |  0.88    Ca    9.0      22 Jan 2024 08:20  Mg     2.2     01-21            RADIOLOGY & ADDITIONAL STUDIES: Patient is a 38y old male who presents with a chief complaint of Crohn's Flare.    Follow up for: Crohn's flare    Patient seen this morning. He reports improvement of ostomy function and decrease in BM's since initiating steroids IVP on 1/20. Diet advanced to low fiber yesterday and tolerating well. Had one episode of nausea last night, now resolved.           MEDICATIONS  (STANDING):  dextrose 5%. 1000 milliLiter(s) (100 mL/Hr) IV Continuous <Continuous>  dextrose 5%. 1000 milliLiter(s) (50 mL/Hr) IV Continuous <Continuous>  dextrose 50% Injectable 25 Gram(s) IV Push once  dextrose 50% Injectable 25 Gram(s) IV Push once  dextrose 50% Injectable 12.5 Gram(s) IV Push once  enoxaparin Injectable 40 milliGRAM(s) SubCutaneous every 24 hours  glucagon  Injectable 1 milliGRAM(s) IntraMuscular once  hydrocortisone sodium succinate Injectable 100 milliGRAM(s) IV Push three times a day  influenza   Vaccine 0.5 milliLiter(s) IntraMuscular once  insulin glargine Injectable (LANTUS) 7 Unit(s) SubCutaneous at bedtime  insulin lispro (ADMELOG) corrective regimen sliding scale   SubCutaneous three times a day before meals  insulin lispro (ADMELOG) corrective regimen sliding scale   SubCutaneous at bedtime  insulin lispro Injectable (ADMELOG) 2 Unit(s) SubCutaneous three times a day before meals  naloxone Injectable 0.4 milliGRAM(s) IV Push once  piperacillin/tazobactam IVPB.. 3.375 Gram(s) IV Intermittent every 8 hours  polyethylene glycol 3350 17 Gram(s) Oral daily  senna 2 Tablet(s) Oral at bedtime  sodium chloride 0.9% with potassium chloride 20 mEq/L 1000 milliLiter(s) (75 mL/Hr) IV Continuous <Continuous>  sodium chloride 0.9%. 1000 milliLiter(s) (75 mL/Hr) IV Continuous <Continuous>  sodium chloride 0.9%. 1000 milliLiter(s) (75 mL/Hr) IV Continuous <Continuous>    MEDICATIONS  (PRN):  acetaminophen     Tablet .. 650 milliGRAM(s) Oral every 6 hours PRN Temp greater or equal to 38C (100.4F), Mild Pain (1 - 3)  aluminum hydroxide/magnesium hydroxide/simethicone Suspension 30 milliLiter(s) Oral every 4 hours PRN Dyspepsia  bisacodyl 5 milliGRAM(s) Oral daily PRN Constipation  dextrose Oral Gel 15 Gram(s) Oral once PRN Blood Glucose LESS THAN 70 milliGRAM(s)/deciliter  HYDROmorphone  Injectable 2 milliGRAM(s) IV Push every 4 hours PRN Severe Pain (7 - 10)  HYDROmorphone  Injectable 1 milliGRAM(s) IV Push every 4 hours PRN Moderate Pain (4 - 6)  melatonin 3 milliGRAM(s) Oral at bedtime PRN Insomnia  ondansetron Injectable 4 milliGRAM(s) IV Push every 8 hours PRN Nausea and/or Vomiting      Vital Signs Last 24 Hrs  T(C): 36.8 (22 Jan 2024 09:16), Max: 36.8 (22 Jan 2024 09:16)  T(F): 98.2 (22 Jan 2024 09:16), Max: 98.2 (22 Jan 2024 09:16)  HR: 65 (22 Jan 2024 09:16) (58 - 81)  BP: 129/75 (22 Jan 2024 09:16) (99/56 - 129/75)  BP(mean): 78 (21 Jan 2024 16:50) (78 - 78)  RR: 18 (21 Jan 2024 23:52) (17 - 18)  SpO2: 99% (22 Jan 2024 09:16) (99% - 100%)    Parameters below as of 22 Jan 2024 09:16  Patient On (Oxygen Delivery Method): room air        PHYSICAL EXAM:    Constitutional: No acute distress, well-developed, non-toxic appearing  HEENT: unmasked, good phonation, not icteric  Neck: supple, no lymphadenopathy  Respiratory: no audible wheezing  Gastrointestinal: soft, +tender to deep palpation, non distended, +bowel sounds, RLQ ostomy intact with moderate amount of liquid brown stools  Extremities: No peripheral edema, no cyanosis or clubbing  Vascular: 2+ peripheral pulses, no venous stasis  Neurological: A/O x 3, no focal deficits, no asterixis  Psychiatric: Normal mood, normal affect  Skin: No rashes, not jaundiced    LABS:                        14.0   10.30 )-----------( 335      ( 21 Jan 2024 08:46 )             40.8     01-22    136  |  106  |  12  ----------------------------<  200<H>  3.8   |  27  |  0.88    Ca    9.0      22 Jan 2024 08:20  Mg     2.2     01-21

## 2024-01-22 NOTE — PROGRESS NOTE ADULT - ATTENDING COMMENTS
Patient seen and examined this morning.  He feels his abdominal pain is migrating but mostly remains on the left side.  On exam, he is in no distress.  His abdomen is soft, tender in the left lower quadrant without rebound or guarding.  Objective findings report normal white blood cell count and improving ESR value to 18.  He is tolerating a regular diet.  Continue medical management of his Crohn's flare with steroids and abx and discharge planning

## 2024-01-22 NOTE — PROGRESS NOTE ADULT - SUBJECTIVE AND OBJECTIVE BOX
39yo M with Crohn's disease seen at bedside.   Patient is tolerating diet without nausea or vomiting and has liquid output in ostomy.   He states he continues to have stool from rectum.     Physical Exam:  General: AAOx3, NAD  Chest: Normal respiratory effort  Heart: RRR  Abdomen: Soft, no masses, RLQ ostomy, Tenderness to palpation of suprapubic region with voluntary guarding  Neuro/Psych: No localized deficits. Normal speech, normal tone  Skin: Normal, no rashes, no lesions noted.   Extremities: Warm, well perfused, no edema, Pulses intact      MEDICATIONS  (STANDING):  dextrose 5%. 1000 milliLiter(s) (100 mL/Hr) IV Continuous <Continuous>  dextrose 5%. 1000 milliLiter(s) (50 mL/Hr) IV Continuous <Continuous>  dextrose 50% Injectable 25 Gram(s) IV Push once  dextrose 50% Injectable 25 Gram(s) IV Push once  dextrose 50% Injectable 12.5 Gram(s) IV Push once  enoxaparin Injectable 40 milliGRAM(s) SubCutaneous every 24 hours  glucagon  Injectable 1 milliGRAM(s) IntraMuscular once  hydrocortisone sodium succinate Injectable 100 milliGRAM(s) IV Push three times a day  influenza   Vaccine 0.5 milliLiter(s) IntraMuscular once  insulin glargine Injectable (LANTUS) 7 Unit(s) SubCutaneous at bedtime  insulin lispro (ADMELOG) corrective regimen sliding scale   SubCutaneous three times a day before meals  insulin lispro (ADMELOG) corrective regimen sliding scale   SubCutaneous at bedtime  insulin lispro Injectable (ADMELOG) 2 Unit(s) SubCutaneous three times a day before meals  naloxone Injectable 0.4 milliGRAM(s) IV Push once  piperacillin/tazobactam IVPB.. 3.375 Gram(s) IV Intermittent every 8 hours  polyethylene glycol 3350 17 Gram(s) Oral daily  senna 2 Tablet(s) Oral at bedtime  sodium chloride 0.9% with potassium chloride 20 mEq/L 1000 milliLiter(s) (75 mL/Hr) IV Continuous <Continuous>  sodium chloride 0.9%. 1000 milliLiter(s) (75 mL/Hr) IV Continuous <Continuous>    MEDICATIONS  (PRN):  acetaminophen     Tablet .. 650 milliGRAM(s) Oral every 6 hours PRN Temp greater or equal to 38C (100.4F), Mild Pain (1 - 3)  aluminum hydroxide/magnesium hydroxide/simethicone Suspension 30 milliLiter(s) Oral every 4 hours PRN Dyspepsia  bisacodyl 5 milliGRAM(s) Oral daily PRN Constipation  dextrose Oral Gel 15 Gram(s) Oral once PRN Blood Glucose LESS THAN 70 milliGRAM(s)/deciliter  HYDROmorphone  Injectable 2 milliGRAM(s) IV Push every 4 hours PRN Severe Pain (7 - 10)  HYDROmorphone  Injectable 1 milliGRAM(s) IV Push every 4 hours PRN Moderate Pain (4 - 6)  melatonin 3 milliGRAM(s) Oral at bedtime PRN Insomnia  ondansetron Injectable 4 milliGRAM(s) IV Push every 8 hours PRN Nausea and/or Vomiting      PAST MEDICAL & SURGICAL HISTORY:  Crohn's disease of large intestine without complication  (No current flare up)      Pancreatitis      S/P ORIF (open reduction internal fixation) fracture  (Right ankle, 2014)      History of colonoscopy  (2014)      Perianal fistula  repair in 2002          Vital Signs Last 24 Hrs  T(C): 36.3 (21 Jan 2024 23:52), Max: 36.6 (21 Jan 2024 07:52)  T(F): 97.4 (21 Jan 2024 23:52), Max: 97.9 (21 Jan 2024 15:26)  HR: 58 (21 Jan 2024 23:52) (51 - 81)  BP: 113/71 (21 Jan 2024 23:52) (99/56 - 113/71)  BP(mean): 78 (21 Jan 2024 16:50) (78 - 78)  RR: 18 (21 Jan 2024 23:52) (16 - 18)  SpO2: 99% (21 Jan 2024 23:52) (99% - 100%)    Parameters below as of 21 Jan 2024 23:52  Patient On (Oxygen Delivery Method): room air        I&O's Summary                            14.0   10.30 )-----------( 335      ( 21 Jan 2024 08:46 )             40.8     01-21    133<L>  |  105  |  7   ----------------------------<  202<H>  3.8   |  26  |  0.73    Ca    9.1      21 Jan 2024 08:46  Phos  3.4     01-20  Mg     2.2     01-21            Culture - Blood (collected 01-17-24 @ 18:46)  Source: .Blood None  Preliminary Report (01-21-24 @ 02:01):    No growth at 72 Hours    Culture - Blood (collected 01-17-24 @ 18:46)  Source: .Blood None  Preliminary Report (01-21-24 @ 02:01):    No growth at 72 Hours

## 2024-01-23 LAB
ANION GAP SERPL CALC-SCNC: 4 MMOL/L — LOW (ref 5–17)
BUN SERPL-MCNC: 11 MG/DL — SIGNIFICANT CHANGE UP (ref 7–23)
CALCIUM SERPL-MCNC: 9.1 MG/DL — SIGNIFICANT CHANGE UP (ref 8.5–10.1)
CHLORIDE SERPL-SCNC: 105 MMOL/L — SIGNIFICANT CHANGE UP (ref 96–108)
CO2 SERPL-SCNC: 26 MMOL/L — SIGNIFICANT CHANGE UP (ref 22–31)
CREAT SERPL-MCNC: 0.76 MG/DL — SIGNIFICANT CHANGE UP (ref 0.5–1.3)
CRP SERPL-MCNC: <3 MG/L — SIGNIFICANT CHANGE UP
CULTURE RESULTS: SIGNIFICANT CHANGE UP
CULTURE RESULTS: SIGNIFICANT CHANGE UP
EGFR: 118 ML/MIN/1.73M2 — SIGNIFICANT CHANGE UP
ERYTHROCYTE [SEDIMENTATION RATE] IN BLOOD: 15 MM/HR — SIGNIFICANT CHANGE UP (ref 0–15)
GLUCOSE BLDC GLUCOMTR-MCNC: 204 MG/DL — HIGH (ref 70–99)
GLUCOSE BLDC GLUCOMTR-MCNC: 283 MG/DL — HIGH (ref 70–99)
GLUCOSE BLDC GLUCOMTR-MCNC: 284 MG/DL — HIGH (ref 70–99)
GLUCOSE BLDC GLUCOMTR-MCNC: 305 MG/DL — HIGH (ref 70–99)
GLUCOSE SERPL-MCNC: 196 MG/DL — HIGH (ref 70–99)
MAGNESIUM SERPL-MCNC: 1.9 MG/DL — SIGNIFICANT CHANGE UP (ref 1.6–2.6)
POTASSIUM SERPL-MCNC: 3.3 MMOL/L — LOW (ref 3.5–5.3)
POTASSIUM SERPL-SCNC: 3.3 MMOL/L — LOW (ref 3.5–5.3)
SODIUM SERPL-SCNC: 135 MMOL/L — SIGNIFICANT CHANGE UP (ref 135–145)
SPECIMEN SOURCE: SIGNIFICANT CHANGE UP
SPECIMEN SOURCE: SIGNIFICANT CHANGE UP

## 2024-01-23 PROCEDURE — 99232 SBSQ HOSP IP/OBS MODERATE 35: CPT

## 2024-01-23 RX ORDER — ZOLPIDEM TARTRATE 10 MG/1
5 TABLET ORAL AT BEDTIME
Refills: 0 | Status: DISCONTINUED | OUTPATIENT
Start: 2024-01-23 | End: 2024-01-30

## 2024-01-23 RX ORDER — INSULIN LISPRO 100/ML
7 VIAL (ML) SUBCUTANEOUS
Refills: 0 | Status: DISCONTINUED | OUTPATIENT
Start: 2024-01-23 | End: 2024-01-24

## 2024-01-23 RX ORDER — PANTOPRAZOLE SODIUM 20 MG/1
40 TABLET, DELAYED RELEASE ORAL DAILY
Refills: 0 | Status: DISCONTINUED | OUTPATIENT
Start: 2024-01-23 | End: 2024-01-31

## 2024-01-23 RX ORDER — POTASSIUM CHLORIDE 20 MEQ
40 PACKET (EA) ORAL ONCE
Refills: 0 | Status: COMPLETED | OUTPATIENT
Start: 2024-01-23 | End: 2024-01-23

## 2024-01-23 RX ORDER — INSULIN GLARGINE 100 [IU]/ML
15 INJECTION, SOLUTION SUBCUTANEOUS AT BEDTIME
Refills: 0 | Status: DISCONTINUED | OUTPATIENT
Start: 2024-01-23 | End: 2024-01-24

## 2024-01-23 RX ADMIN — Medication 100 MILLIGRAM(S): at 05:18

## 2024-01-23 RX ADMIN — Medication 8: at 12:01

## 2024-01-23 RX ADMIN — HYDROMORPHONE HYDROCHLORIDE 2 MILLIGRAM(S): 2 INJECTION INTRAMUSCULAR; INTRAVENOUS; SUBCUTANEOUS at 16:08

## 2024-01-23 RX ADMIN — INSULIN GLARGINE 15 UNIT(S): 100 INJECTION, SOLUTION SUBCUTANEOUS at 21:32

## 2024-01-23 RX ADMIN — Medication 100 MILLIGRAM(S): at 20:02

## 2024-01-23 RX ADMIN — ONDANSETRON 4 MILLIGRAM(S): 8 TABLET, FILM COATED ORAL at 20:02

## 2024-01-23 RX ADMIN — Medication 4 UNIT(S): at 07:41

## 2024-01-23 RX ADMIN — Medication 100 MILLIGRAM(S): at 15:23

## 2024-01-23 RX ADMIN — PIPERACILLIN AND TAZOBACTAM 25 GRAM(S): 4; .5 INJECTION, POWDER, LYOPHILIZED, FOR SOLUTION INTRAVENOUS at 05:17

## 2024-01-23 RX ADMIN — HYDROMORPHONE HYDROCHLORIDE 2 MILLIGRAM(S): 2 INJECTION INTRAMUSCULAR; INTRAVENOUS; SUBCUTANEOUS at 20:09

## 2024-01-23 RX ADMIN — PIPERACILLIN AND TAZOBACTAM 25 GRAM(S): 4; .5 INJECTION, POWDER, LYOPHILIZED, FOR SOLUTION INTRAVENOUS at 15:22

## 2024-01-23 RX ADMIN — Medication 6: at 17:50

## 2024-01-23 RX ADMIN — HYDROMORPHONE HYDROCHLORIDE 2 MILLIGRAM(S): 2 INJECTION INTRAMUSCULAR; INTRAVENOUS; SUBCUTANEOUS at 07:38

## 2024-01-23 RX ADMIN — ONDANSETRON 4 MILLIGRAM(S): 8 TABLET, FILM COATED ORAL at 03:47

## 2024-01-23 RX ADMIN — HYDROMORPHONE HYDROCHLORIDE 2 MILLIGRAM(S): 2 INJECTION INTRAMUSCULAR; INTRAVENOUS; SUBCUTANEOUS at 21:00

## 2024-01-23 RX ADMIN — HYDROMORPHONE HYDROCHLORIDE 2 MILLIGRAM(S): 2 INJECTION INTRAMUSCULAR; INTRAVENOUS; SUBCUTANEOUS at 11:59

## 2024-01-23 RX ADMIN — Medication 2: at 21:31

## 2024-01-23 RX ADMIN — HYDROMORPHONE HYDROCHLORIDE 2 MILLIGRAM(S): 2 INJECTION INTRAMUSCULAR; INTRAVENOUS; SUBCUTANEOUS at 02:02

## 2024-01-23 RX ADMIN — Medication 7 UNIT(S): at 17:51

## 2024-01-23 RX ADMIN — Medication 40 MILLIEQUIVALENT(S): at 11:59

## 2024-01-23 RX ADMIN — PIPERACILLIN AND TAZOBACTAM 25 GRAM(S): 4; .5 INJECTION, POWDER, LYOPHILIZED, FOR SOLUTION INTRAVENOUS at 21:31

## 2024-01-23 RX ADMIN — Medication 4 UNIT(S): at 12:01

## 2024-01-23 RX ADMIN — DEXTROSE MONOHYDRATE, SODIUM CHLORIDE, AND POTASSIUM CHLORIDE 75 MILLILITER(S): 50; .745; 4.5 INJECTION, SOLUTION INTRAVENOUS at 01:48

## 2024-01-23 RX ADMIN — Medication 4: at 07:41

## 2024-01-23 NOTE — PROGRESS NOTE ADULT - SUBJECTIVE AND OBJECTIVE BOX
Patient is a 38y old Male who presents with a chief complaint of Crohn's Flare.    Follow up: Crohn's flare    Patient seen this morning. Endorses severe, sharp LUQ/LLQ abd pain that is alleviated with Dilaudid IVP and has been taking them around the clock as it "wears off". Had one episode of nausea without emesis yesterday due to soup, now resolved. RLQ ostomy full with brown liquid stools and small mucoid BM x1 overnight. Tolerating low fiber diet well.       MEDICATIONS  (STANDING):  dextrose 5%. 1000 milliLiter(s) (50 mL/Hr) IV Continuous <Continuous>  dextrose 5%. 1000 milliLiter(s) (100 mL/Hr) IV Continuous <Continuous>  dextrose 50% Injectable 12.5 Gram(s) IV Push once  dextrose 50% Injectable 25 Gram(s) IV Push once  dextrose 50% Injectable 25 Gram(s) IV Push once  enoxaparin Injectable 40 milliGRAM(s) SubCutaneous every 24 hours  glucagon  Injectable 1 milliGRAM(s) IntraMuscular once  hydrocortisone sodium succinate Injectable 100 milliGRAM(s) IV Push three times a day  influenza   Vaccine 0.5 milliLiter(s) IntraMuscular once  insulin glargine Injectable (LANTUS) 10 Unit(s) SubCutaneous at bedtime  insulin lispro (ADMELOG) corrective regimen sliding scale   SubCutaneous three times a day before meals  insulin lispro (ADMELOG) corrective regimen sliding scale   SubCutaneous at bedtime  insulin lispro Injectable (ADMELOG) 4 Unit(s) SubCutaneous three times a day before meals  naloxone Injectable 0.4 milliGRAM(s) IV Push once  piperacillin/tazobactam IVPB.. 3.375 Gram(s) IV Intermittent every 8 hours  polyethylene glycol 3350 17 Gram(s) Oral daily  senna 2 Tablet(s) Oral at bedtime  sodium chloride 0.9% with potassium chloride 20 mEq/L 1000 milliLiter(s) (75 mL/Hr) IV Continuous <Continuous>    MEDICATIONS  (PRN):  acetaminophen     Tablet .. 650 milliGRAM(s) Oral every 6 hours PRN Temp greater or equal to 38C (100.4F), Mild Pain (1 - 3)  aluminum hydroxide/magnesium hydroxide/simethicone Suspension 30 milliLiter(s) Oral every 4 hours PRN Dyspepsia  bisacodyl 5 milliGRAM(s) Oral daily PRN Constipation  dextrose Oral Gel 15 Gram(s) Oral once PRN Blood Glucose LESS THAN 70 milliGRAM(s)/deciliter  HYDROmorphone  Injectable 2 milliGRAM(s) IV Push every 4 hours PRN Severe Pain (7 - 10)  HYDROmorphone  Injectable 1 milliGRAM(s) IV Push every 4 hours PRN Moderate Pain (4 - 6)  melatonin 3 milliGRAM(s) Oral at bedtime PRN Insomnia  ondansetron Injectable 4 milliGRAM(s) IV Push every 8 hours PRN Nausea and/or Vomiting      Vital Signs Last 24 Hrs  T(C): 36.6 (23 Jan 2024 07:52), Max: 36.6 (23 Jan 2024 07:52)  T(F): 97.9 (23 Jan 2024 07:52), Max: 97.9 (23 Jan 2024 07:52)  HR: 52 (23 Jan 2024 07:52) (52 - 58)  BP: 113/95 (23 Jan 2024 07:52) (113/95 - 133/80)  BP(mean): 86 (22 Jan 2024 15:06) (86 - 86)  RR: 18 (22 Jan 2024 21:55) (18 - 18)  SpO2: 99% (23 Jan 2024 07:52) (97% - 99%)    Parameters below as of 23 Jan 2024 07:52  Patient On (Oxygen Delivery Method): room air        PHYSICAL EXAM:    Constitutional: No acute distress, well-developed, non-toxic appearing  HEENT: masked, good phonation, not icteric  Neck: supple, no lymphadenopathy  Respiratory: no audible wheezing  Cardiovascular: S1 and S2, regular rate and rhythm, no murmurs rubs or gallops  Gastrointestinal: soft, +TTLP LUQ/LLQ with guarding, lower abd slightly distended, +bowel sounds, RLQ ostomy full with brown stool  Extremities: No peripheral edema, no cyanosis or clubbing  Vascular: 2+ peripheral pulses, no venous stasis  Neurological: A/O x 3, no focal deficits, no asterixis  Psychiatric: Normal mood, normal affect  Skin: No rashes, not jaundiced    LABS:    01-23    135  |  105  |  11  ----------------------------<  196<H>  3.3<L>   |  26  |  0.76    Ca    9.1      23 Jan 2024 08:44  Mg     1.9     01-23            RADIOLOGY & ADDITIONAL STUDIES: Patient is a 38y old Male who presents with a chief complaint of Crohn's Flare.    Follow up: Crohn's flare    Patient seen this morning. Endorses severe, sharp LUQ/LLQ abd pain that is aggravated by palpation and alleviated with Dilaudid IVP. He has been taking PRN Dilaudid around the clock as it "wears off". Had one episode of nausea without emesis yesterday due to soup, now resolved. RLQ ostomy full with brown liquid stools and small mucoid BM x1 overnight. Tolerating low fiber diet well.       MEDICATIONS  (STANDING):  dextrose 5%. 1000 milliLiter(s) (50 mL/Hr) IV Continuous <Continuous>  dextrose 5%. 1000 milliLiter(s) (100 mL/Hr) IV Continuous <Continuous>  dextrose 50% Injectable 12.5 Gram(s) IV Push once  dextrose 50% Injectable 25 Gram(s) IV Push once  dextrose 50% Injectable 25 Gram(s) IV Push once  enoxaparin Injectable 40 milliGRAM(s) SubCutaneous every 24 hours  glucagon  Injectable 1 milliGRAM(s) IntraMuscular once  hydrocortisone sodium succinate Injectable 100 milliGRAM(s) IV Push three times a day  influenza   Vaccine 0.5 milliLiter(s) IntraMuscular once  insulin glargine Injectable (LANTUS) 10 Unit(s) SubCutaneous at bedtime  insulin lispro (ADMELOG) corrective regimen sliding scale   SubCutaneous three times a day before meals  insulin lispro (ADMELOG) corrective regimen sliding scale   SubCutaneous at bedtime  insulin lispro Injectable (ADMELOG) 4 Unit(s) SubCutaneous three times a day before meals  naloxone Injectable 0.4 milliGRAM(s) IV Push once  piperacillin/tazobactam IVPB.. 3.375 Gram(s) IV Intermittent every 8 hours  polyethylene glycol 3350 17 Gram(s) Oral daily  senna 2 Tablet(s) Oral at bedtime  sodium chloride 0.9% with potassium chloride 20 mEq/L 1000 milliLiter(s) (75 mL/Hr) IV Continuous <Continuous>    MEDICATIONS  (PRN):  acetaminophen     Tablet .. 650 milliGRAM(s) Oral every 6 hours PRN Temp greater or equal to 38C (100.4F), Mild Pain (1 - 3)  aluminum hydroxide/magnesium hydroxide/simethicone Suspension 30 milliLiter(s) Oral every 4 hours PRN Dyspepsia  bisacodyl 5 milliGRAM(s) Oral daily PRN Constipation  dextrose Oral Gel 15 Gram(s) Oral once PRN Blood Glucose LESS THAN 70 milliGRAM(s)/deciliter  HYDROmorphone  Injectable 2 milliGRAM(s) IV Push every 4 hours PRN Severe Pain (7 - 10)  HYDROmorphone  Injectable 1 milliGRAM(s) IV Push every 4 hours PRN Moderate Pain (4 - 6)  melatonin 3 milliGRAM(s) Oral at bedtime PRN Insomnia  ondansetron Injectable 4 milliGRAM(s) IV Push every 8 hours PRN Nausea and/or Vomiting      Vital Signs Last 24 Hrs  T(C): 36.6 (23 Jan 2024 07:52), Max: 36.6 (23 Jan 2024 07:52)  T(F): 97.9 (23 Jan 2024 07:52), Max: 97.9 (23 Jan 2024 07:52)  HR: 52 (23 Jan 2024 07:52) (52 - 58)  BP: 113/95 (23 Jan 2024 07:52) (113/95 - 133/80)  BP(mean): 86 (22 Jan 2024 15:06) (86 - 86)  RR: 18 (22 Jan 2024 21:55) (18 - 18)  SpO2: 99% (23 Jan 2024 07:52) (97% - 99%)    Parameters below as of 23 Jan 2024 07:52  Patient On (Oxygen Delivery Method): room air        PHYSICAL EXAM:    Constitutional: No acute distress, well-developed, non-toxic appearing  HEENT: masked, good phonation, not icteric  Neck: supple, no lymphadenopathy  Respiratory: no audible wheezing  Cardiovascular: S1 and S2, regular rate and rhythm, no murmurs rubs or gallops  Gastrointestinal: soft, +TTLP LUQ/LLQ with guarding, lower abd slightly distended, +bowel sounds, RLQ ostomy full with brown stool  Extremities: No peripheral edema, no cyanosis or clubbing  Vascular: 2+ peripheral pulses, no venous stasis  Neurological: A/O x 3, no focal deficits, no asterixis  Psychiatric: Normal mood, normal affect  Skin: No rashes, not jaundiced    LABS:    01-23    135  |  105  |  11  ----------------------------<  196<H>  3.3<L>   |  26  |  0.76    Ca    9.1      23 Jan 2024 08:44  Mg     1.9     01-23            RADIOLOGY & ADDITIONAL STUDIES: Patient is a 38y old Male who presents with a chief complaint of Crohn's Flare.    Follow up: Crohn's flare    Patient seen this morning at bedside. Endorses severe, sharp LUQ/LLQ abd pain that is aggravated by palpation and alleviated with Dilaudid IVP. He states he has been taking PRN Dilaudid around the clock as it "wears off." Had one episode of nausea without emesis yesterday due to soup, now resolved, otherwise tolerating low fiber diet well. RLQ ostomy full with brown liquid stools and small mucoid BM x1 overnight.     MEDICATIONS  (STANDING):  dextrose 5%. 1000 milliLiter(s) (50 mL/Hr) IV Continuous <Continuous>  dextrose 5%. 1000 milliLiter(s) (100 mL/Hr) IV Continuous <Continuous>  dextrose 50% Injectable 12.5 Gram(s) IV Push once  dextrose 50% Injectable 25 Gram(s) IV Push once  dextrose 50% Injectable 25 Gram(s) IV Push once  enoxaparin Injectable 40 milliGRAM(s) SubCutaneous every 24 hours  glucagon  Injectable 1 milliGRAM(s) IntraMuscular once  hydrocortisone sodium succinate Injectable 100 milliGRAM(s) IV Push three times a day  influenza   Vaccine 0.5 milliLiter(s) IntraMuscular once  insulin glargine Injectable (LANTUS) 10 Unit(s) SubCutaneous at bedtime  insulin lispro (ADMELOG) corrective regimen sliding scale   SubCutaneous three times a day before meals  insulin lispro (ADMELOG) corrective regimen sliding scale   SubCutaneous at bedtime  insulin lispro Injectable (ADMELOG) 4 Unit(s) SubCutaneous three times a day before meals  naloxone Injectable 0.4 milliGRAM(s) IV Push once  piperacillin/tazobactam IVPB.. 3.375 Gram(s) IV Intermittent every 8 hours  polyethylene glycol 3350 17 Gram(s) Oral daily  senna 2 Tablet(s) Oral at bedtime  sodium chloride 0.9% with potassium chloride 20 mEq/L 1000 milliLiter(s) (75 mL/Hr) IV Continuous <Continuous>    MEDICATIONS  (PRN):  acetaminophen     Tablet .. 650 milliGRAM(s) Oral every 6 hours PRN Temp greater or equal to 38C (100.4F), Mild Pain (1 - 3)  aluminum hydroxide/magnesium hydroxide/simethicone Suspension 30 milliLiter(s) Oral every 4 hours PRN Dyspepsia  bisacodyl 5 milliGRAM(s) Oral daily PRN Constipation  dextrose Oral Gel 15 Gram(s) Oral once PRN Blood Glucose LESS THAN 70 milliGRAM(s)/deciliter  HYDROmorphone  Injectable 2 milliGRAM(s) IV Push every 4 hours PRN Severe Pain (7 - 10)  HYDROmorphone  Injectable 1 milliGRAM(s) IV Push every 4 hours PRN Moderate Pain (4 - 6)  melatonin 3 milliGRAM(s) Oral at bedtime PRN Insomnia  ondansetron Injectable 4 milliGRAM(s) IV Push every 8 hours PRN Nausea and/or Vomiting      Vital Signs Last 24 Hrs  T(C): 36.6 (23 Jan 2024 07:52), Max: 36.6 (23 Jan 2024 07:52)  T(F): 97.9 (23 Jan 2024 07:52), Max: 97.9 (23 Jan 2024 07:52)  HR: 52 (23 Jan 2024 07:52) (52 - 58)  BP: 113/95 (23 Jan 2024 07:52) (113/95 - 133/80)  BP(mean): 86 (22 Jan 2024 15:06) (86 - 86)  RR: 18 (22 Jan 2024 21:55) (18 - 18)  SpO2: 99% (23 Jan 2024 07:52) (97% - 99%)    Parameters below as of 23 Jan 2024 07:52  Patient On (Oxygen Delivery Method): room air        PHYSICAL EXAM:    Constitutional: No acute distress, well-developed, non-toxic appearing  HEENT: masked, good phonation, not icteric  Neck: supple, no lymphadenopathy  Respiratory: no audible wheezing  Cardiovascular: S1 and S2, regular rate and rhythm, no murmurs rubs or gallops  Gastrointestinal: soft, +TTLP LUQ/LLQ with guarding, lower abd slightly distended, +bowel sounds, RLQ ostomy full with brown stool  Extremities: No peripheral edema, no cyanosis or clubbing  Vascular: 2+ peripheral pulses, no venous stasis  Neurological: A/O x 3, no focal deficits, no asterixis  Psychiatric: Normal mood, normal affect  Skin: No rashes, not jaundiced    LABS:    01-23    135  |  105  |  11  ----------------------------<  196<H>  3.3<L>   |  26  |  0.76    Ca    9.1      23 Jan 2024 08:44  Mg     1.9     01-23            RADIOLOGY & ADDITIONAL STUDIES: Patient is a 38y old Male who presents with a chief complaint of Crohn's Flare.    Follow up for: Crohn's flare    Patient seen this morning at bedside. Endorses severe, sharp LUQ/LLQ abd pain that is aggravated by palpation and alleviated with Dilaudid IVP. He states he has been taking PRN Dilaudid around the clock as it "wears off." Had one episode of nausea without emesis yesterday due to soup, now resolved, otherwise tolerating low fiber diet well. RLQ ostomy full with brown liquid stools and small mucoid BM x1 overnight.     MEDICATIONS  (STANDING):  dextrose 5%. 1000 milliLiter(s) (50 mL/Hr) IV Continuous <Continuous>  dextrose 5%. 1000 milliLiter(s) (100 mL/Hr) IV Continuous <Continuous>  dextrose 50% Injectable 12.5 Gram(s) IV Push once  dextrose 50% Injectable 25 Gram(s) IV Push once  dextrose 50% Injectable 25 Gram(s) IV Push once  enoxaparin Injectable 40 milliGRAM(s) SubCutaneous every 24 hours  glucagon  Injectable 1 milliGRAM(s) IntraMuscular once  hydrocortisone sodium succinate Injectable 100 milliGRAM(s) IV Push three times a day  influenza   Vaccine 0.5 milliLiter(s) IntraMuscular once  insulin glargine Injectable (LANTUS) 10 Unit(s) SubCutaneous at bedtime  insulin lispro (ADMELOG) corrective regimen sliding scale   SubCutaneous three times a day before meals  insulin lispro (ADMELOG) corrective regimen sliding scale   SubCutaneous at bedtime  insulin lispro Injectable (ADMELOG) 4 Unit(s) SubCutaneous three times a day before meals  naloxone Injectable 0.4 milliGRAM(s) IV Push once  piperacillin/tazobactam IVPB.. 3.375 Gram(s) IV Intermittent every 8 hours  polyethylene glycol 3350 17 Gram(s) Oral daily  senna 2 Tablet(s) Oral at bedtime  sodium chloride 0.9% with potassium chloride 20 mEq/L 1000 milliLiter(s) (75 mL/Hr) IV Continuous <Continuous>    MEDICATIONS  (PRN):  acetaminophen     Tablet .. 650 milliGRAM(s) Oral every 6 hours PRN Temp greater or equal to 38C (100.4F), Mild Pain (1 - 3)  aluminum hydroxide/magnesium hydroxide/simethicone Suspension 30 milliLiter(s) Oral every 4 hours PRN Dyspepsia  bisacodyl 5 milliGRAM(s) Oral daily PRN Constipation  dextrose Oral Gel 15 Gram(s) Oral once PRN Blood Glucose LESS THAN 70 milliGRAM(s)/deciliter  HYDROmorphone  Injectable 2 milliGRAM(s) IV Push every 4 hours PRN Severe Pain (7 - 10)  HYDROmorphone  Injectable 1 milliGRAM(s) IV Push every 4 hours PRN Moderate Pain (4 - 6)  melatonin 3 milliGRAM(s) Oral at bedtime PRN Insomnia  ondansetron Injectable 4 milliGRAM(s) IV Push every 8 hours PRN Nausea and/or Vomiting      Vital Signs Last 24 Hrs  T(C): 36.6 (23 Jan 2024 07:52), Max: 36.6 (23 Jan 2024 07:52)  T(F): 97.9 (23 Jan 2024 07:52), Max: 97.9 (23 Jan 2024 07:52)  HR: 52 (23 Jan 2024 07:52) (52 - 58)  BP: 113/95 (23 Jan 2024 07:52) (113/95 - 133/80)  BP(mean): 86 (22 Jan 2024 15:06) (86 - 86)  RR: 18 (22 Jan 2024 21:55) (18 - 18)  SpO2: 99% (23 Jan 2024 07:52) (97% - 99%)    Parameters below as of 23 Jan 2024 07:52  Patient On (Oxygen Delivery Method): room air        PHYSICAL EXAM:    Constitutional: No acute distress, well-developed, non-toxic appearing  HEENT: unmasked, good phonation, not icteric  Neck: supple, no lymphadenopathy  Respiratory: no audible wheezing  Cardiovascular: S1 and S2, regular rate and rhythm, no murmurs rubs or gallops  Gastrointestinal: soft, +TTLP LUQ/LLQ with guarding, lower abd slightly distended, +bowel sounds, RLQ ostomy full with brown stool  Extremities: No peripheral edema, no cyanosis or clubbing  Vascular: 2+ peripheral pulses, no venous stasis  Neurological: A/O x 3, no focal deficits, no asterixis  Psychiatric: Normal mood, normal affect  Skin: No rashes, not jaundiced    LABS:    01-23    135  |  105  |  11  ----------------------------<  196<H>  3.3<L>   |  26  |  0.76    Ca    9.1      23 Jan 2024 08:44  Mg     1.9     01-23            RADIOLOGY & ADDITIONAL STUDIES:

## 2024-01-23 NOTE — PROGRESS NOTE ADULT - SUBJECTIVE AND OBJECTIVE BOX
HOSPITALIST ATTENDING PROGRESS NOTE    Chart and meds reviewed.  Patient seen and examined.    CC: Crohn's flare    Subjective: Some acid reflux, pain controlled.    All other systems reviewed and found to be negative with the exception of what has been described above.    MEDICATIONS  (STANDING):  dextrose 5%. 1000 milliLiter(s) (50 mL/Hr) IV Continuous <Continuous>  dextrose 5%. 1000 milliLiter(s) (100 mL/Hr) IV Continuous <Continuous>  dextrose 50% Injectable 12.5 Gram(s) IV Push once  dextrose 50% Injectable 25 Gram(s) IV Push once  dextrose 50% Injectable 25 Gram(s) IV Push once  enoxaparin Injectable 40 milliGRAM(s) SubCutaneous every 24 hours  glucagon  Injectable 1 milliGRAM(s) IntraMuscular once  hydrocortisone sodium succinate Injectable 100 milliGRAM(s) IV Push three times a day  influenza   Vaccine 0.5 milliLiter(s) IntraMuscular once  insulin glargine Injectable (LANTUS) 15 Unit(s) SubCutaneous at bedtime  insulin lispro (ADMELOG) corrective regimen sliding scale   SubCutaneous three times a day before meals  insulin lispro (ADMELOG) corrective regimen sliding scale   SubCutaneous at bedtime  insulin lispro Injectable (ADMELOG) 7 Unit(s) SubCutaneous three times a day before meals  naloxone Injectable 0.4 milliGRAM(s) IV Push once  piperacillin/tazobactam IVPB.. 3.375 Gram(s) IV Intermittent every 8 hours  polyethylene glycol 3350 17 Gram(s) Oral daily  senna 2 Tablet(s) Oral at bedtime    MEDICATIONS  (PRN):  acetaminophen     Tablet .. 650 milliGRAM(s) Oral every 6 hours PRN Temp greater or equal to 38C (100.4F), Mild Pain (1 - 3)  aluminum hydroxide/magnesium hydroxide/simethicone Suspension 30 milliLiter(s) Oral every 4 hours PRN Dyspepsia  bisacodyl 5 milliGRAM(s) Oral daily PRN Constipation  dextrose Oral Gel 15 Gram(s) Oral once PRN Blood Glucose LESS THAN 70 milliGRAM(s)/deciliter  HYDROmorphone  Injectable 2 milliGRAM(s) IV Push every 4 hours PRN Severe Pain (7 - 10)  HYDROmorphone  Injectable 1 milliGRAM(s) IV Push every 4 hours PRN Moderate Pain (4 - 6)  melatonin 3 milliGRAM(s) Oral at bedtime PRN Insomnia  ondansetron Injectable 4 milliGRAM(s) IV Push every 8 hours PRN Nausea and/or Vomiting      VITALS:  T(F): 97.6 (01-23-24 @ 17:39), Max: 97.9 (01-23-24 @ 07:52)  HR: 66 (01-23-24 @ 17:39) (52 - 66)  BP: 121/70 (01-23-24 @ 17:39) (113/95 - 133/80)  RR: 18 (01-23-24 @ 17:39) (18 - 18)  SpO2: 97% (01-23-24 @ 17:39) (97% - 99%)  Wt(kg): --    I&O's Summary      CAPILLARY BLOOD GLUCOSE      POCT Blood Glucose.: 283 mg/dL (23 Jan 2024 17:48)  POCT Blood Glucose.: 305 mg/dL (23 Jan 2024 12:01)  POCT Blood Glucose.: 204 mg/dL (23 Jan 2024 07:39)      PHYSICAL EXAM:  Gen: NAD  HEENT:  pupils equal and reactive, EOMI, no oropharyngeal lesions, erythema, exudates, oral thrush  NECK:   supple, no carotid bruits, no palpable lymph nodes, no thyromegaly  CV:  +S1, +S2, regular, no murmurs or rubs  RESP:   lungs clear to auscultation bilaterally, no wheezing, rales, rhonchi, good air entry bilaterally  BREAST:  not examined  GI:  abdomen soft, non-tender, non-distended, normal BS, no bruits, no abdominal masses, no palpable masses  RECTAL:  not examined  :  not examined  MSK:   normal muscle tone, no atrophy, no rigidity, no contractions  EXT:  no clubbing, no cyanosis, no edema, no calf pain, swelling or erythema  VASCULAR:  pulses equal and symmetric in the upper and lower extremities  NEURO:  AAOX3, no focal neurological deficits, follows all commands, able to move extremities spontaneously  SKIN:  no ulcers, lesions or rashes    LABS:        01-23    135  |  105  |  11  ----------------------------<  196<H>  3.3<L>   |  26  |  0.76    Ca    9.1      23 Jan 2024 08:44  Mg     1.9     01-23              Urinalysis Basic - ( 23 Jan 2024 08:44 )    Color: x / Appearance: x / SG: x / pH: x  Gluc: 196 mg/dL / Ketone: x  / Bili: x / Urobili: x   Blood: x / Protein: x / Nitrite: x   Leuk Esterase: x / RBC: x / WBC x   Sq Epi: x / Non Sq Epi: x / Bacteria: x    CULTURES:  BCx NG  GI PCR neg  C diff neg    Additional results/Imaging, I have personally reviewed:  CT a/p w iv and oral contrast 1/16/24: Long segment inflammation of the descending colon, likely inflammatory  colitis given history of Crohn's disease. No bowel obstruction or enteric  fistula  identified.

## 2024-01-23 NOTE — PROGRESS NOTE ADULT - ASSESSMENT
38M w/Hx of Pancreatitis,  Crohn's s/p ileostomy presents due to intractable lower abdominal pain, nausea, decreased ostomy output.     1. Abdominal pain suspect due to Colitis / Crohn's disease flare up  Decreased output for ileostomy , now better   Ct Abdomen/pelvis reviewed: Long segment of descending colon  Inflammation likely 2/2 to Crohn's flare up  Cdiff and check GI PCR neg   C/w  IV Zosyn   IVF   C/w hydrocortisone, possibly decrease to BID tmrw  Dilaudid IV PRN pain   Zofran and tylenol PRN.  Monitor electrolytes  -appreciate  GI and surgery recs    2. Hyperglycemia, DM Type II  HB A1c 7.4, new diagnosis?   -insulin titration w guidance of pharmacy appreciated  Nutrition education     3. Hypokalemia/Hypomagnesemia  replaced  monitor     4. Hyponatremia, resolved   likely 2/2 poor oral intake.        5. DVT PPX: start lovenox sq  ( refusing)     Dispo; C/w current care, will need SW as pt has no insurance

## 2024-01-23 NOTE — PROGRESS NOTE ADULT - ASSESSMENT
38 year old male with Crohn's and 3 days history of abdominal pain, minimal ileostomy output, and long segment descending colon inflammation on CT. Stool studies negative for infectious etiology.    Imp: Crohn's flare colitis  CRP/ESR has been downtrending and WNL as of today morning.      PLAN:  ::Continue steroids   ::Low residue diet as tolerated  ::Trend ESR/CRP  ::Chemical VTE ppx recommended despite ambulatory, due to pro-inflammatory state 38 year old male with Crohn's and 3 days history of abdominal pain, minimal ileostomy output, and long segment descending colon inflammation on CT. Stool studies negative for infectious etiology.    Imp: Crohn's flare colitis  CRP/ESR has been downtrending and WNL as of today morning.      PLAN:  ::Continue IV steroids today, may transition to BID regimen depending on symptoms  ::Low residue diet as tolerated  ::Remains on Dilaudid IVP ATC for ?pain  ::Trend ESR/CRP  ::Chemical VTE ppx recommended despite ambulatory, due to pro-inflammatory state

## 2024-01-24 LAB
ANION GAP SERPL CALC-SCNC: 5 MMOL/L — SIGNIFICANT CHANGE UP (ref 5–17)
BUN SERPL-MCNC: 11 MG/DL — SIGNIFICANT CHANGE UP (ref 7–23)
CALCIUM SERPL-MCNC: 8.7 MG/DL — SIGNIFICANT CHANGE UP (ref 8.5–10.1)
CHLORIDE SERPL-SCNC: 104 MMOL/L — SIGNIFICANT CHANGE UP (ref 96–108)
CO2 SERPL-SCNC: 28 MMOL/L — SIGNIFICANT CHANGE UP (ref 22–31)
CREAT SERPL-MCNC: 1.01 MG/DL — SIGNIFICANT CHANGE UP (ref 0.5–1.3)
CRP SERPL-MCNC: <3 MG/L — SIGNIFICANT CHANGE UP
EGFR: 98 ML/MIN/1.73M2 — SIGNIFICANT CHANGE UP
ERYTHROCYTE [SEDIMENTATION RATE] IN BLOOD: 14 MM/HR — SIGNIFICANT CHANGE UP (ref 0–15)
GLUCOSE SERPL-MCNC: 349 MG/DL — HIGH (ref 70–99)
MAGNESIUM SERPL-MCNC: 1.9 MG/DL — SIGNIFICANT CHANGE UP (ref 1.6–2.6)
POTASSIUM SERPL-MCNC: 3.7 MMOL/L — SIGNIFICANT CHANGE UP (ref 3.5–5.3)
POTASSIUM SERPL-SCNC: 3.7 MMOL/L — SIGNIFICANT CHANGE UP (ref 3.5–5.3)
SODIUM SERPL-SCNC: 137 MMOL/L — SIGNIFICANT CHANGE UP (ref 135–145)

## 2024-01-24 PROCEDURE — 99232 SBSQ HOSP IP/OBS MODERATE 35: CPT | Mod: GC

## 2024-01-24 PROCEDURE — 74177 CT ABD & PELVIS W/CONTRAST: CPT | Mod: 26

## 2024-01-24 PROCEDURE — 99233 SBSQ HOSP IP/OBS HIGH 50: CPT

## 2024-01-24 RX ORDER — HYDROMORPHONE HYDROCHLORIDE 2 MG/ML
0.5 INJECTION INTRAMUSCULAR; INTRAVENOUS; SUBCUTANEOUS EVERY 4 HOURS
Refills: 0 | Status: DISCONTINUED | OUTPATIENT
Start: 2024-01-24 | End: 2024-01-25

## 2024-01-24 RX ORDER — INSULIN LISPRO 100/ML
8 VIAL (ML) SUBCUTANEOUS
Refills: 0 | Status: DISCONTINUED | OUTPATIENT
Start: 2024-01-24 | End: 2024-01-26

## 2024-01-24 RX ORDER — HYDROMORPHONE HYDROCHLORIDE 2 MG/ML
4 INJECTION INTRAMUSCULAR; INTRAVENOUS; SUBCUTANEOUS EVERY 6 HOURS
Refills: 0 | Status: DISCONTINUED | OUTPATIENT
Start: 2024-01-24 | End: 2024-01-29

## 2024-01-24 RX ORDER — INSULIN GLARGINE 100 [IU]/ML
17 INJECTION, SOLUTION SUBCUTANEOUS AT BEDTIME
Refills: 0 | Status: DISCONTINUED | OUTPATIENT
Start: 2024-01-24 | End: 2024-01-26

## 2024-01-24 RX ORDER — INSULIN LISPRO 100/ML
10 VIAL (ML) SUBCUTANEOUS
Refills: 0 | Status: DISCONTINUED | OUTPATIENT
Start: 2024-01-24 | End: 2024-01-24

## 2024-01-24 RX ORDER — INSULIN LISPRO 100/ML
VIAL (ML) SUBCUTANEOUS AT BEDTIME
Refills: 0 | Status: DISCONTINUED | OUTPATIENT
Start: 2024-01-24 | End: 2024-01-29

## 2024-01-24 RX ORDER — INSULIN GLARGINE 100 [IU]/ML
20 INJECTION, SOLUTION SUBCUTANEOUS AT BEDTIME
Refills: 0 | Status: DISCONTINUED | OUTPATIENT
Start: 2024-01-24 | End: 2024-01-24

## 2024-01-24 RX ORDER — HYDROMORPHONE HYDROCHLORIDE 2 MG/ML
2 INJECTION INTRAMUSCULAR; INTRAVENOUS; SUBCUTANEOUS EVERY 6 HOURS
Refills: 0 | Status: DISCONTINUED | OUTPATIENT
Start: 2024-01-24 | End: 2024-01-31

## 2024-01-24 RX ADMIN — HYDROMORPHONE HYDROCHLORIDE 2 MILLIGRAM(S): 2 INJECTION INTRAMUSCULAR; INTRAVENOUS; SUBCUTANEOUS at 04:31

## 2024-01-24 RX ADMIN — Medication 30 MILLILITER(S): at 01:39

## 2024-01-24 RX ADMIN — Medication 7 UNIT(S): at 08:15

## 2024-01-24 RX ADMIN — HYDROMORPHONE HYDROCHLORIDE 2 MILLIGRAM(S): 2 INJECTION INTRAMUSCULAR; INTRAVENOUS; SUBCUTANEOUS at 08:15

## 2024-01-24 RX ADMIN — Medication 10 UNIT(S): at 12:43

## 2024-01-24 RX ADMIN — HYDROMORPHONE HYDROCHLORIDE 2 MILLIGRAM(S): 2 INJECTION INTRAMUSCULAR; INTRAVENOUS; SUBCUTANEOUS at 01:26

## 2024-01-24 RX ADMIN — ZOLPIDEM TARTRATE 5 MILLIGRAM(S): 10 TABLET ORAL at 22:01

## 2024-01-24 RX ADMIN — HYDROMORPHONE HYDROCHLORIDE 0.5 MILLIGRAM(S): 2 INJECTION INTRAMUSCULAR; INTRAVENOUS; SUBCUTANEOUS at 21:12

## 2024-01-24 RX ADMIN — Medication 10 UNIT(S): at 17:14

## 2024-01-24 RX ADMIN — PIPERACILLIN AND TAZOBACTAM 25 GRAM(S): 4; .5 INJECTION, POWDER, LYOPHILIZED, FOR SOLUTION INTRAVENOUS at 12:42

## 2024-01-24 RX ADMIN — PIPERACILLIN AND TAZOBACTAM 25 GRAM(S): 4; .5 INJECTION, POWDER, LYOPHILIZED, FOR SOLUTION INTRAVENOUS at 22:02

## 2024-01-24 RX ADMIN — HYDROMORPHONE HYDROCHLORIDE 2 MILLIGRAM(S): 2 INJECTION INTRAMUSCULAR; INTRAVENOUS; SUBCUTANEOUS at 05:43

## 2024-01-24 RX ADMIN — Medication 100 MILLIGRAM(S): at 21:15

## 2024-01-24 RX ADMIN — HYDROMORPHONE HYDROCHLORIDE 2 MILLIGRAM(S): 2 INJECTION INTRAMUSCULAR; INTRAVENOUS; SUBCUTANEOUS at 12:41

## 2024-01-24 RX ADMIN — Medication 100 MILLIGRAM(S): at 12:41

## 2024-01-24 RX ADMIN — HYDROMORPHONE HYDROCHLORIDE 2 MILLIGRAM(S): 2 INJECTION INTRAMUSCULAR; INTRAVENOUS; SUBCUTANEOUS at 00:25

## 2024-01-24 RX ADMIN — PANTOPRAZOLE SODIUM 40 MILLIGRAM(S): 20 TABLET, DELAYED RELEASE ORAL at 12:44

## 2024-01-24 RX ADMIN — Medication 100 MILLIGRAM(S): at 04:32

## 2024-01-24 RX ADMIN — HYDROMORPHONE HYDROCHLORIDE 0.5 MILLIGRAM(S): 2 INJECTION INTRAMUSCULAR; INTRAVENOUS; SUBCUTANEOUS at 22:02

## 2024-01-24 RX ADMIN — Medication 4: at 08:14

## 2024-01-24 RX ADMIN — PIPERACILLIN AND TAZOBACTAM 25 GRAM(S): 4; .5 INJECTION, POWDER, LYOPHILIZED, FOR SOLUTION INTRAVENOUS at 04:33

## 2024-01-24 RX ADMIN — Medication 2: at 12:43

## 2024-01-24 RX ADMIN — Medication 6: at 17:14

## 2024-01-24 RX ADMIN — POLYETHYLENE GLYCOL 3350 17 GRAM(S): 17 POWDER, FOR SOLUTION ORAL at 12:41

## 2024-01-24 RX ADMIN — HYDROMORPHONE HYDROCHLORIDE 2 MILLIGRAM(S): 2 INJECTION INTRAMUSCULAR; INTRAVENOUS; SUBCUTANEOUS at 17:13

## 2024-01-24 RX ADMIN — INSULIN GLARGINE 17 UNIT(S): 100 INJECTION, SOLUTION SUBCUTANEOUS at 22:01

## 2024-01-24 NOTE — [facility]I QUERY NOTE - NSCDIOTHERTXTBX_GEN_ALL_CORE_HH
Could you please further clarify the Sepsis status.     -Sepsis POA ruled in  -Sepsis ruled out  -Unable to rule out sepsis  -Other please specify      Chart documentation and clinical evidence      POA HR 96 , RR 18, WBC 15.87 from the flow sheets and ED nurse  documentation 2 SIRS criteria met    H&P and Medicine progress notes 1/10 ,up to DC summary documents: #Proctitis/sepsis criteria met on arrival-#chronic perirectal fistulas  - admit to [facility]- WBC : 16- Zosyn- blood cultures- CTAP: no abscess or other collections- IVF- Sitz baths- dialudid 0.5mg iv  q3h for severe pain  - monitor ileostomy output - pt to f/u with GI as o/p once infection clears for start of biologic    DC summary-#Proctitis : improving s/p IV zosyn and flagyl  day#7/7#chronic perirectal fistulas: improving - WBC : 16-> 14-->11-->10K  - Zosyn/flagyl D#7, will change to oral meds-Care Plan/Procedures:Discharge Diagnoses, Assessment and Plan of Treatment  PRINCIPAL DISCHARGE DIAGNOSISDiagnosis: ProctitisAssessment and Plan of Treatment: Perianal fistula, Crohn disease

## 2024-01-24 NOTE — PROGRESS NOTE ADULT - SUBJECTIVE AND OBJECTIVE BOX
HPI: 38M w/Hx of Crohn's s/p ileostomy presents due to intractable lower abdominal pain, nausea, decreased ostomy output. Pt reports symptoms remind him of Crohn's flares in the past but has not had a flare in years. Came into the ED for eval yesterday was determined safe for discharge but returned as his pain got worse. Pt reports he is no longer on biologic medicine because he lost his job and can longer afford it without insurance. He denies bleeding. Patient denies fever, chills, chest pain, SOB, headache, dizziness, dysuria. (18 Jan 2024 02:05)    Subjective: Patient seen today, c/o continued abdominal pain, midline and towards L side, intermittent nausea. Inflammatory markers trending down, repeat CT with improved findings.     REVIEW OF SYSTEMS:    CONSTITUTIONAL: No weakness, fevers or chills  EYES/ENT: No visual changes;  No vertigo or throat pain   NECK: No pain or stiffness  RESPIRATORY: No cough, wheezing, hemoptysis; No shortness of breath  CARDIOVASCULAR: No chest pain or palpitations  GASTROINTESTINAL: No abdominal or epigastric pain. No nausea, vomiting, or hematemesis; No diarrhea or constipation. No melena or hematochezia.  GENITOURINARY: No dysuria, frequency or hematuria  NEUROLOGICAL: No numbness or weakness  SKIN: No itching, rashes    MEDICATIONS  (STANDING):  dextrose 5%. 1000 milliLiter(s) (100 mL/Hr) IV Continuous <Continuous>  dextrose 5%. 1000 milliLiter(s) (50 mL/Hr) IV Continuous <Continuous>  dextrose 50% Injectable 25 Gram(s) IV Push once  dextrose 50% Injectable 25 Gram(s) IV Push once  dextrose 50% Injectable 12.5 Gram(s) IV Push once  enoxaparin Injectable 40 milliGRAM(s) SubCutaneous every 24 hours  glucagon  Injectable 1 milliGRAM(s) IntraMuscular once  hydrocortisone sodium succinate Injectable 100 milliGRAM(s) IV Push three times a day  influenza   Vaccine 0.5 milliLiter(s) IntraMuscular once  insulin glargine Injectable (LANTUS) 20 Unit(s) SubCutaneous at bedtime  insulin lispro (ADMELOG) corrective regimen sliding scale   SubCutaneous three times a day before meals  insulin lispro (ADMELOG) corrective regimen sliding scale   SubCutaneous at bedtime  insulin lispro Injectable (ADMELOG) 10 Unit(s) SubCutaneous three times a day before meals  naloxone Injectable 0.4 milliGRAM(s) IV Push once  pantoprazole    Tablet 40 milliGRAM(s) Oral daily  piperacillin/tazobactam IVPB.. 3.375 Gram(s) IV Intermittent every 8 hours  polyethylene glycol 3350 17 Gram(s) Oral daily  senna 2 Tablet(s) Oral at bedtime    MEDICATIONS  (PRN):  acetaminophen     Tablet .. 650 milliGRAM(s) Oral every 6 hours PRN Temp greater or equal to 38C (100.4F), Mild Pain (1 - 3)  aluminum hydroxide/magnesium hydroxide/simethicone Suspension 30 milliLiter(s) Oral every 4 hours PRN Dyspepsia  bisacodyl 5 milliGRAM(s) Oral daily PRN Constipation  dextrose Oral Gel 15 Gram(s) Oral once PRN Blood Glucose LESS THAN 70 milliGRAM(s)/deciliter  HYDROmorphone  Injectable 2 milliGRAM(s) IV Push every 4 hours PRN Severe Pain (7 - 10)  HYDROmorphone  Injectable 1 milliGRAM(s) IV Push every 4 hours PRN Moderate Pain (4 - 6)  melatonin 3 milliGRAM(s) Oral at bedtime PRN Insomnia  ondansetron Injectable 4 milliGRAM(s) IV Push every 8 hours PRN Nausea and/or Vomiting  zolpidem 5 milliGRAM(s) Oral at bedtime PRN Insomnia      Vital Signs Last 24 Hrs  T(C): 36.3 (24 Jan 2024 08:40), Max: 36.7 (24 Jan 2024 00:00)  T(F): 97.3 (24 Jan 2024 08:40), Max: 98.1 (24 Jan 2024 00:00)  HR: 56 (24 Jan 2024 08:40) (54 - 56)  BP: 119/72 (24 Jan 2024 08:40) (119/72 - 120/93)  SpO2: 97% (24 Jan 2024 08:40) (97% - 100%)    Parameters below as of 24 Jan 2024 08:40  Patient On (Oxygen Delivery Method): room air    PHYSICAL EXAM:  GENERAL: NAD, lying in bed comfortably  HEAD:  Atraumatic, Normocephalic  EYES: conjunctiva and sclera clear  ENT: Moist mucous membranes  NECK: Supple, No JVD  CHEST/LUNG: Clear to auscultation bilaterally; No rales, rhonchi, wheezing. Unlabored respirations  HEART: Regular rate and rhythm; No murmurs  ABDOMEN: Bowel sounds present; Soft, Nontender, Nondistended.   EXTREMITIES:  2+ Peripheral Pulses, brisk capillary refill. No clubbing, cyanosis, or edema  NERVOUS SYSTEM:  Alert & Oriented X3, speech clear. No deficits   MSK: FROM all 4 extremities, full and equal strength        LABS:                          13.1   14.84 )-----------( 323      ( 24 Jan 2024 06:44 )             38.3     24 Jan 2024 09:23    137    |  104    |  11     ----------------------------<  349    3.7     |  28     |  1.01     Ca    8.7        24 Jan 2024 09:23  Mg     1.9       24 Jan 2024 09:23          CAPILLARY BLOOD GLUCOSE      POCT Blood Glucose.: 297 mg/dL (24 Jan 2024 17:12)  POCT Blood Glucose.: 180 mg/dL (24 Jan 2024 11:57)  POCT Blood Glucose.: 243 mg/dL (24 Jan 2024 07:48)  POCT Blood Glucose.: 284 mg/dL (23 Jan 2024 21:29)        Urinalysis Basic - ( 24 Jan 2024 09:23 )    Color: x / Appearance: x / SG: x / pH: x  Gluc: 349 mg/dL / Ketone: x  / Bili: x / Urobili: x   Blood: x / Protein: x / Nitrite: x   Leuk Esterase: x / RBC: x / WBC x   Sq Epi: x / Non Sq Epi: x / Bacteria: x        RADIOLOGY:      < from: CT Abdomen and Pelvis w/ IV Cont (01.24.24 @ 15:26) >  LIVER: Within normal limits.  BILE DUCTS: Normal caliber.  GALLBLADDER: Contracted  SPLEEN: Within normal limits.  PANCREAS: Within normal limits.  ADRENALS: Within normal limits.  KIDNEYS/URETERS: Within normal limits.    BLADDER: Decompressed.  REPRODUCTIVE ORGANS: Prostate within normal limits.    BOWEL: No bowel obstruction. Appendix is normal. Diverting loop ileostomy   in the right lower quadrant. Previously seen thickening of the descending   and sigmoid colon is improved, largely resolved. There is ongoing   inflammation of the rectum as well as multiple Seton catheters within   perianal fistulas.  PERITONEUM: No ascites.  VESSELS: Within normal limits.  RETROPERITONEUM/LYMPH NODES: No lymphadenopathy.  ABDOMINAL WALL: Within normal limits.  BONES: Within normal limits.    IMPRESSION:  Previously seen: Inflammation has improved, largely resolved. 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

## 2024-01-24 NOTE — PROGRESS NOTE ADULT - ASSESSMENT
38 year old male with Crohn's and 3 day history of abdominal pain, minimal ileostomy output, and long segment descending colon inflammation on CT. Stool studies negative for infectious etiology.    Imp: Crohn's flare    Improved ileostomy output but pain remains same requiring Q4 ATC dilaudid.     PLAN:  ::Continue steroids, will assess for possible taper tomorrow  ::Will consider repeat imaging if no improvement in pain  ::Low residue diet as tolerated  ::Trend ESR/CRP  ::Chemical VTE ppx recommended despite ambulatory, due to pro-inflammatory state 38 year old male with Crohn's and 3 day history of abdominal pain, minimal ileostomy output, and long segment descending colon inflammation on CT. Stool studies negative for infectious etiology with improvement in stool output and esr/crp with steroids.     Imp: Crohn's flare    Improved ileostomy output but pain remains same requiring Q4 ATC dilaudid.     PLAN:  ::Continue steroids, will assess for possible taper tomorrow  ::Will recommend repeat imaging if no improvement in pain  ::Low residue diet as tolerated  ::Trend ESR/CRP  ::Chemical VTE ppx recommended despite ambulatory, due to pro-inflammatory state

## 2024-01-24 NOTE — PROGRESS NOTE ADULT - SUBJECTIVE AND OBJECTIVE BOX
Patient is a 38y old  Male who presents with a chief complaint of Crohn's Flare    Followup: Crohn's flare  At bedside patient still endorsing "stabbing" pain LMQ radiating to LQ with some relief with IV dilaudid, but requirement is still ATC every 4 hours. Also states that it "helps with sleep." Advised that IV dilaudid be solely used for pain and if sleep assistance needed can take ambien etc. Ileostomy output much improved.     MEDICATIONS  (STANDING):  dextrose 5%. 1000 milliLiter(s) (50 mL/Hr) IV Continuous <Continuous>  dextrose 5%. 1000 milliLiter(s) (100 mL/Hr) IV Continuous <Continuous>  dextrose 50% Injectable 12.5 Gram(s) IV Push once  dextrose 50% Injectable 25 Gram(s) IV Push once  dextrose 50% Injectable 25 Gram(s) IV Push once  enoxaparin Injectable 40 milliGRAM(s) SubCutaneous every 24 hours  glucagon  Injectable 1 milliGRAM(s) IntraMuscular once  hydrocortisone sodium succinate Injectable 100 milliGRAM(s) IV Push three times a day  influenza   Vaccine 0.5 milliLiter(s) IntraMuscular once  insulin glargine Injectable (LANTUS) 20 Unit(s) SubCutaneous at bedtime  insulin lispro (ADMELOG) corrective regimen sliding scale   SubCutaneous three times a day before meals  insulin lispro (ADMELOG) corrective regimen sliding scale   SubCutaneous at bedtime  insulin lispro Injectable (ADMELOG) 10 Unit(s) SubCutaneous three times a day before meals  naloxone Injectable 0.4 milliGRAM(s) IV Push once  pantoprazole    Tablet 40 milliGRAM(s) Oral daily  piperacillin/tazobactam IVPB.. 3.375 Gram(s) IV Intermittent every 8 hours  polyethylene glycol 3350 17 Gram(s) Oral daily  senna 2 Tablet(s) Oral at bedtime    MEDICATIONS  (PRN):  acetaminophen     Tablet .. 650 milliGRAM(s) Oral every 6 hours PRN Temp greater or equal to 38C (100.4F), Mild Pain (1 - 3)  aluminum hydroxide/magnesium hydroxide/simethicone Suspension 30 milliLiter(s) Oral every 4 hours PRN Dyspepsia  bisacodyl 5 milliGRAM(s) Oral daily PRN Constipation  dextrose Oral Gel 15 Gram(s) Oral once PRN Blood Glucose LESS THAN 70 milliGRAM(s)/deciliter  HYDROmorphone  Injectable 2 milliGRAM(s) IV Push every 4 hours PRN Severe Pain (7 - 10)  HYDROmorphone  Injectable 1 milliGRAM(s) IV Push every 4 hours PRN Moderate Pain (4 - 6)  melatonin 3 milliGRAM(s) Oral at bedtime PRN Insomnia  ondansetron Injectable 4 milliGRAM(s) IV Push every 8 hours PRN Nausea and/or Vomiting  zolpidem 5 milliGRAM(s) Oral at bedtime PRN Insomnia    Vital Signs Last 24 Hrs  T(C): 36.3 (24 Jan 2024 08:40), Max: 36.7 (24 Jan 2024 00:00)  T(F): 97.3 (24 Jan 2024 08:40), Max: 98.1 (24 Jan 2024 00:00)  HR: 56 (24 Jan 2024 08:40) (54 - 66)  BP: 119/72 (24 Jan 2024 08:40) (119/72 - 121/70)  BP(mean): --  RR: 18 (23 Jan 2024 17:39) (18 - 18)  SpO2: 97% (24 Jan 2024 08:40) (97% - 100%)    Parameters below as of 24 Jan 2024 08:40  Patient On (Oxygen Delivery Method): room air    PHYSICAL EXAM:    Constitutional: No acute distress, non-toxic appearing  HEENT: masked, good phonation, not icteric  Neck: supple, no lymphadenopathy  Respiratory: clear to ascultation bilaterally, no wheezing  Cardiovascular: S1 and S2, regular rate and rhythm, no murmurs rubs or gallops  Gastrointestinal: soft, TTP LMQ/LLQ, non-distended, +bowel sounds, no rebound or guarding, ileostomy: liquid dark brown stool, stoma moist/pink  Extremities: No peripheral edema, no cyanosis or clubbing  Vascular: 2+ peripheral pulses, no venous stasis  Neurological: A/O x 3, no focal deficits, no asterixis  Psychiatric: Normal mood, normal affect  Skin: No rashes, not jaundiced    LABS:                        13.1   14.84 )-----------( 323      ( 24 Jan 2024 06:44 )             38.3     01-24    137  |  104  |  11  ----------------------------<  349<H>  3.7   |  28  |  1.01    Ca    8.7      24 Jan 2024 09:23  Mg     1.9     01-24    RADIOLOGY & ADDITIONAL STUDIES: Patient is a 38y old  Male who presents with a chief complaint of Crohn's Flare    Followup for: Crohn's flare    At bedside patient still endorsing "stabbing" pain LMQ radiating to LQ with some relief with IV dilaudid, but requirement is still ATC every 4 hours. Also states that it "helps with sleep." Advised that IV dilaudid be solely used for pain and if sleep assistance needed can take ambien etc. Ileostomy output much improved.     MEDICATIONS  (STANDING):  dextrose 5%. 1000 milliLiter(s) (50 mL/Hr) IV Continuous <Continuous>  dextrose 5%. 1000 milliLiter(s) (100 mL/Hr) IV Continuous <Continuous>  dextrose 50% Injectable 12.5 Gram(s) IV Push once  dextrose 50% Injectable 25 Gram(s) IV Push once  dextrose 50% Injectable 25 Gram(s) IV Push once  enoxaparin Injectable 40 milliGRAM(s) SubCutaneous every 24 hours  glucagon  Injectable 1 milliGRAM(s) IntraMuscular once  hydrocortisone sodium succinate Injectable 100 milliGRAM(s) IV Push three times a day  influenza   Vaccine 0.5 milliLiter(s) IntraMuscular once  insulin glargine Injectable (LANTUS) 20 Unit(s) SubCutaneous at bedtime  insulin lispro (ADMELOG) corrective regimen sliding scale   SubCutaneous three times a day before meals  insulin lispro (ADMELOG) corrective regimen sliding scale   SubCutaneous at bedtime  insulin lispro Injectable (ADMELOG) 10 Unit(s) SubCutaneous three times a day before meals  naloxone Injectable 0.4 milliGRAM(s) IV Push once  pantoprazole    Tablet 40 milliGRAM(s) Oral daily  piperacillin/tazobactam IVPB.. 3.375 Gram(s) IV Intermittent every 8 hours  polyethylene glycol 3350 17 Gram(s) Oral daily  senna 2 Tablet(s) Oral at bedtime    MEDICATIONS  (PRN):  acetaminophen     Tablet .. 650 milliGRAM(s) Oral every 6 hours PRN Temp greater or equal to 38C (100.4F), Mild Pain (1 - 3)  aluminum hydroxide/magnesium hydroxide/simethicone Suspension 30 milliLiter(s) Oral every 4 hours PRN Dyspepsia  bisacodyl 5 milliGRAM(s) Oral daily PRN Constipation  dextrose Oral Gel 15 Gram(s) Oral once PRN Blood Glucose LESS THAN 70 milliGRAM(s)/deciliter  HYDROmorphone  Injectable 2 milliGRAM(s) IV Push every 4 hours PRN Severe Pain (7 - 10)  HYDROmorphone  Injectable 1 milliGRAM(s) IV Push every 4 hours PRN Moderate Pain (4 - 6)  melatonin 3 milliGRAM(s) Oral at bedtime PRN Insomnia  ondansetron Injectable 4 milliGRAM(s) IV Push every 8 hours PRN Nausea and/or Vomiting  zolpidem 5 milliGRAM(s) Oral at bedtime PRN Insomnia    Vital Signs Last 24 Hrs  T(C): 36.3 (24 Jan 2024 08:40), Max: 36.7 (24 Jan 2024 00:00)  T(F): 97.3 (24 Jan 2024 08:40), Max: 98.1 (24 Jan 2024 00:00)  HR: 56 (24 Jan 2024 08:40) (54 - 66)  BP: 119/72 (24 Jan 2024 08:40) (119/72 - 121/70)  BP(mean): --  RR: 18 (23 Jan 2024 17:39) (18 - 18)  SpO2: 97% (24 Jan 2024 08:40) (97% - 100%)    Parameters below as of 24 Jan 2024 08:40  Patient On (Oxygen Delivery Method): room air    PHYSICAL EXAM:    Constitutional: No acute distress, non-toxic appearing  HEENT: unmasked, good phonation, not icteric  Neck: supple, no lymphadenopathy  Respiratory: clear to ascultation bilaterally, no wheezing  Cardiovascular: S1 and S2, regular rate and rhythm, no murmurs rubs or gallops  Gastrointestinal: soft, mildly TTP with deep palpation in LMQ/LLQ, non-distended, +bowel sounds, no rebound or guarding, ileostomy: liquid dark brown stool, stoma moist/pink  Extremities: No peripheral edema, no cyanosis or clubbing  Vascular: 2+ peripheral pulses, no venous stasis  Neurological: A/O x 3, no focal deficits, no asterixis  Psychiatric: Normal mood, normal affect  Skin: No rashes, not jaundiced    LABS:                        13.1   14.84 )-----------( 323      ( 24 Jan 2024 06:44 )             38.3     01-24    137  |  104  |  11  ----------------------------<  349<H>  3.7   |  28  |  1.01    Ca    8.7      24 Jan 2024 09:23  Mg     1.9     01-24    RADIOLOGY & ADDITIONAL STUDIES:

## 2024-01-24 NOTE — PROGRESS NOTE ADULT - TIME BILLING
I spent a total of 55 minutes on the date of this encounter coordinating the patient's care. This includes reviewing prior documentation, results and imaging in addition to completing a full history and physical examination on the patient. Further tests, medications, and procedures have been ordered as indicated. Laboratory results and the plan of care were communicated to the patient and/or their family member. Supporting documentation was completed and added to the patient's chart.

## 2024-01-24 NOTE — PROGRESS NOTE ADULT - ASSESSMENT
38M w/Hx of Pancreatitis,  Crohn's s/p ileostomy presents due to intractable lower abdominal pain, nausea, decreased ostomy output.     Abdominal pain suspect due to Colitis / Crohn's disease flare up  Decreased output for ileostomy, now better   Ct Abdomen/pelvis reviewed: Long segment of descending colon  Inflammation likely 2/2 to Crohn's flare up  Cdiff and GI PCR neg   C/w  IV Zosyn   C/w hydrocortisone, possibly decrease to BID tmrw  Dilaudid IV PRN pain   Zofran and tylenol PRN  Monitor electrolytes  -appreciate  GI and surgery recs  ESR, CRP normalized now  Repeated CT abd/ pelvis today with Inflammation has improved, largely resolved. Ongoing proctitis with multiple Seton catheters withinperianal fistulas      Hyperglycemia, DM Type II - New  Pt stated he has no Hx DM  HB A1c 7.4%   insulin titration w guidance of pharmacy appreciated  Nutrition education     Hypokalemia/Hypomagnesemia  replaced  monitor     Hyponatremia, resolved   likely 2/2 poor oral intake     DVT PPX: start lovenox sq  ( refusing)     Dispo; C/w current care, will need SW as pt has no insurance

## 2024-01-25 PROCEDURE — 99232 SBSQ HOSP IP/OBS MODERATE 35: CPT

## 2024-01-25 RX ORDER — ACETAMINOPHEN 500 MG
1000 TABLET ORAL ONCE
Refills: 0 | Status: COMPLETED | OUTPATIENT
Start: 2024-01-25 | End: 2024-01-25

## 2024-01-25 RX ORDER — HYDROMORPHONE HYDROCHLORIDE 2 MG/ML
2 INJECTION INTRAMUSCULAR; INTRAVENOUS; SUBCUTANEOUS ONCE
Refills: 0 | Status: DISCONTINUED | OUTPATIENT
Start: 2024-01-25 | End: 2024-01-25

## 2024-01-25 RX ADMIN — HYDROMORPHONE HYDROCHLORIDE 2 MILLIGRAM(S): 2 INJECTION INTRAMUSCULAR; INTRAVENOUS; SUBCUTANEOUS at 10:51

## 2024-01-25 RX ADMIN — Medication 2: at 08:59

## 2024-01-25 RX ADMIN — PIPERACILLIN AND TAZOBACTAM 25 GRAM(S): 4; .5 INJECTION, POWDER, LYOPHILIZED, FOR SOLUTION INTRAVENOUS at 05:33

## 2024-01-25 RX ADMIN — Medication 100 MILLIGRAM(S): at 05:33

## 2024-01-25 RX ADMIN — Medication 8 UNIT(S): at 12:12

## 2024-01-25 RX ADMIN — INSULIN GLARGINE 17 UNIT(S): 100 INJECTION, SOLUTION SUBCUTANEOUS at 22:05

## 2024-01-25 RX ADMIN — Medication 50 MILLIGRAM(S): at 13:26

## 2024-01-25 RX ADMIN — HYDROMORPHONE HYDROCHLORIDE 4 MILLIGRAM(S): 2 INJECTION INTRAMUSCULAR; INTRAVENOUS; SUBCUTANEOUS at 01:51

## 2024-01-25 RX ADMIN — HYDROMORPHONE HYDROCHLORIDE 4 MILLIGRAM(S): 2 INJECTION INTRAMUSCULAR; INTRAVENOUS; SUBCUTANEOUS at 09:00

## 2024-01-25 RX ADMIN — HYDROMORPHONE HYDROCHLORIDE 0.5 MILLIGRAM(S): 2 INJECTION INTRAMUSCULAR; INTRAVENOUS; SUBCUTANEOUS at 04:04

## 2024-01-25 RX ADMIN — HYDROMORPHONE HYDROCHLORIDE 2 MILLIGRAM(S): 2 INJECTION INTRAMUSCULAR; INTRAVENOUS; SUBCUTANEOUS at 20:08

## 2024-01-25 RX ADMIN — PIPERACILLIN AND TAZOBACTAM 25 GRAM(S): 4; .5 INJECTION, POWDER, LYOPHILIZED, FOR SOLUTION INTRAVENOUS at 22:05

## 2024-01-25 RX ADMIN — HYDROMORPHONE HYDROCHLORIDE 4 MILLIGRAM(S): 2 INJECTION INTRAMUSCULAR; INTRAVENOUS; SUBCUTANEOUS at 22:44

## 2024-01-25 RX ADMIN — HYDROMORPHONE HYDROCHLORIDE 4 MILLIGRAM(S): 2 INJECTION INTRAMUSCULAR; INTRAVENOUS; SUBCUTANEOUS at 02:30

## 2024-01-25 RX ADMIN — HYDROMORPHONE HYDROCHLORIDE 2 MILLIGRAM(S): 2 INJECTION INTRAMUSCULAR; INTRAVENOUS; SUBCUTANEOUS at 20:48

## 2024-01-25 RX ADMIN — Medication 4: at 16:29

## 2024-01-25 RX ADMIN — HYDROMORPHONE HYDROCHLORIDE 4 MILLIGRAM(S): 2 INJECTION INTRAMUSCULAR; INTRAVENOUS; SUBCUTANEOUS at 16:02

## 2024-01-25 RX ADMIN — HYDROMORPHONE HYDROCHLORIDE 4 MILLIGRAM(S): 2 INJECTION INTRAMUSCULAR; INTRAVENOUS; SUBCUTANEOUS at 22:04

## 2024-01-25 RX ADMIN — Medication 8 UNIT(S): at 09:00

## 2024-01-25 RX ADMIN — ZOLPIDEM TARTRATE 5 MILLIGRAM(S): 10 TABLET ORAL at 22:59

## 2024-01-25 RX ADMIN — PANTOPRAZOLE SODIUM 40 MILLIGRAM(S): 20 TABLET, DELAYED RELEASE ORAL at 10:51

## 2024-01-25 RX ADMIN — Medication 1: at 22:04

## 2024-01-25 RX ADMIN — Medication 400 MILLIGRAM(S): at 13:25

## 2024-01-25 RX ADMIN — Medication 8 UNIT(S): at 16:30

## 2024-01-25 NOTE — PROGRESS NOTE ADULT - SUBJECTIVE AND OBJECTIVE BOX
Patient is a 38y old  Male who presents with a chief complaint of Crohn's Flare  Followup: Crohn's flare  At bedside, patient switched to PO dilaudid endorsing slept all night with ambien then this morning unable to walk more than short distance due to abdominal pain and gas. Denies nausea or vomiting. CT with significant improvement.    MEDICATIONS  (STANDING):  acetaminophen   IVPB .. 1000 milliGRAM(s) IV Intermittent once  dextrose 5%. 1000 milliLiter(s) (50 mL/Hr) IV Continuous <Continuous>  dextrose 5%. 1000 milliLiter(s) (100 mL/Hr) IV Continuous <Continuous>  dextrose 50% Injectable 12.5 Gram(s) IV Push once  dextrose 50% Injectable 25 Gram(s) IV Push once  dextrose 50% Injectable 25 Gram(s) IV Push once  enoxaparin Injectable 40 milliGRAM(s) SubCutaneous every 24 hours  glucagon  Injectable 1 milliGRAM(s) IntraMuscular once  influenza   Vaccine 0.5 milliLiter(s) IntraMuscular once  insulin glargine Injectable (LANTUS) 17 Unit(s) SubCutaneous at bedtime  insulin lispro (ADMELOG) corrective regimen sliding scale   SubCutaneous at bedtime  insulin lispro (ADMELOG) corrective regimen sliding scale   SubCutaneous three times a day before meals  insulin lispro Injectable (ADMELOG) 8 Unit(s) SubCutaneous three times a day before meals  naloxone Injectable 0.4 milliGRAM(s) IV Push once  pantoprazole    Tablet 40 milliGRAM(s) Oral daily  piperacillin/tazobactam IVPB.. 3.375 Gram(s) IV Intermittent every 8 hours  polyethylene glycol 3350 17 Gram(s) Oral daily  predniSONE   Tablet 50 milliGRAM(s) Oral daily  senna 2 Tablet(s) Oral at bedtime    MEDICATIONS  (PRN):  acetaminophen     Tablet .. 650 milliGRAM(s) Oral every 6 hours PRN Temp greater or equal to 38C (100.4F), Mild Pain (1 - 3)  aluminum hydroxide/magnesium hydroxide/simethicone Suspension 30 milliLiter(s) Oral every 4 hours PRN Dyspepsia  bisacodyl 5 milliGRAM(s) Oral daily PRN Constipation  dextrose Oral Gel 15 Gram(s) Oral once PRN Blood Glucose LESS THAN 70 milliGRAM(s)/deciliter  HYDROmorphone   Tablet 4 milliGRAM(s) Oral every 6 hours PRN Severe Pain (7 - 10)  HYDROmorphone   Tablet 2 milliGRAM(s) Oral every 6 hours PRN Moderate Pain (4 - 6)  melatonin 3 milliGRAM(s) Oral at bedtime PRN Insomnia  ondansetron Injectable 4 milliGRAM(s) IV Push every 8 hours PRN Nausea and/or Vomiting  zolpidem 5 milliGRAM(s) Oral at bedtime PRN Insomnia      Vital Signs Last 24 Hrs  T(C): 36.5 (25 Jan 2024 07:17), Max: 36.5 (25 Jan 2024 07:17)  T(F): 97.7 (25 Jan 2024 07:17), Max: 97.7 (25 Jan 2024 07:17)  HR: 50 (25 Jan 2024 07:17) (50 - 91)  BP: 121/66 (25 Jan 2024 07:17) (113/72 - 121/66)  BP(mean): --  RR: 19 (25 Jan 2024 07:17) (18 - 19)  SpO2: 100% (25 Jan 2024 07:17) (98% - 100%)    Parameters below as of 25 Jan 2024 07:17  Patient On (Oxygen Delivery Method): room air        PHYSICAL EXAM:    Constitutional: No acute distress,  non-toxic appearing  HEENT: masked, good phonation, not icteric  Neck: supple, no lymphadenopathy  Respiratory: clear to ascultation bilaterally, no wheezing  Cardiovascular: S1 and S2, regular rate and rhythm, no murmurs rubs or gallops  Gastrointestinal: soft, LMQ TTP, non-distended, +bowel sounds, no rebound or guarding, no surgical scars, ileostomy with mod amt brown liquid stool  Extremities: No peripheral edema, no cyanosis or clubbing  Vascular: 2+ peripheral pulses, no venous stasis  Neurological: A/O x 3, no focal deficits, no asterixis  Psychiatric: Normal mood, normal affect  Skin: No rashes, not jaundiced    LABS:                        13.1   14.84 )-----------( 323      ( 24 Jan 2024 06:44 )             38.3     01-24    137  |  104  |  11  ----------------------------<  349<H>  3.7   |  28  |  1.01    Ca    8.7      24 Jan 2024 09:23  Mg     1.9     01-24      RADIOLOGY & ADDITIONAL STUDIES:  BOWEL: No bowel obstruction. Appendix is normal. Diverting loop ileostomy   in the right lower quadrant. Previously seen thickening of the descending   and sigmoid colon is improved, largely resolved. There is ongoing   inflammation of the rectum as well as multiple Seton catheters within   perianal fistulas.  PERITONEUM: No ascites.  VESSELS: Within normal limits.  RETROPERITONEUM/LYMPH NODES: No lymphadenopathy.  ABDOMINAL WALL: Within normal limits.  BONES: Within normal limits.    IMPRESSION:  Previously seen: Inflammation has improved, largely resolved. 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 Patient is a 38y old  Male who presents with a chief complaint of Crohn's Flare  Followup for: Crohn's flare  At bedside, patient switched to PO dilaudid endorsing slept all night with ambien then this morning unable to walk more than short distance due to abdominal pain and gas. Denies nausea or vomiting. CT with significant improvement.    MEDICATIONS  (STANDING):  acetaminophen   IVPB .. 1000 milliGRAM(s) IV Intermittent once  dextrose 5%. 1000 milliLiter(s) (50 mL/Hr) IV Continuous <Continuous>  dextrose 5%. 1000 milliLiter(s) (100 mL/Hr) IV Continuous <Continuous>  dextrose 50% Injectable 12.5 Gram(s) IV Push once  dextrose 50% Injectable 25 Gram(s) IV Push once  dextrose 50% Injectable 25 Gram(s) IV Push once  enoxaparin Injectable 40 milliGRAM(s) SubCutaneous every 24 hours  glucagon  Injectable 1 milliGRAM(s) IntraMuscular once  influenza   Vaccine 0.5 milliLiter(s) IntraMuscular once  insulin glargine Injectable (LANTUS) 17 Unit(s) SubCutaneous at bedtime  insulin lispro (ADMELOG) corrective regimen sliding scale   SubCutaneous at bedtime  insulin lispro (ADMELOG) corrective regimen sliding scale   SubCutaneous three times a day before meals  insulin lispro Injectable (ADMELOG) 8 Unit(s) SubCutaneous three times a day before meals  naloxone Injectable 0.4 milliGRAM(s) IV Push once  pantoprazole    Tablet 40 milliGRAM(s) Oral daily  piperacillin/tazobactam IVPB.. 3.375 Gram(s) IV Intermittent every 8 hours  polyethylene glycol 3350 17 Gram(s) Oral daily  predniSONE   Tablet 50 milliGRAM(s) Oral daily  senna 2 Tablet(s) Oral at bedtime    MEDICATIONS  (PRN):  acetaminophen     Tablet .. 650 milliGRAM(s) Oral every 6 hours PRN Temp greater or equal to 38C (100.4F), Mild Pain (1 - 3)  aluminum hydroxide/magnesium hydroxide/simethicone Suspension 30 milliLiter(s) Oral every 4 hours PRN Dyspepsia  bisacodyl 5 milliGRAM(s) Oral daily PRN Constipation  dextrose Oral Gel 15 Gram(s) Oral once PRN Blood Glucose LESS THAN 70 milliGRAM(s)/deciliter  HYDROmorphone   Tablet 4 milliGRAM(s) Oral every 6 hours PRN Severe Pain (7 - 10)  HYDROmorphone   Tablet 2 milliGRAM(s) Oral every 6 hours PRN Moderate Pain (4 - 6)  melatonin 3 milliGRAM(s) Oral at bedtime PRN Insomnia  ondansetron Injectable 4 milliGRAM(s) IV Push every 8 hours PRN Nausea and/or Vomiting  zolpidem 5 milliGRAM(s) Oral at bedtime PRN Insomnia      Vital Signs Last 24 Hrs  T(C): 36.5 (25 Jan 2024 07:17), Max: 36.5 (25 Jan 2024 07:17)  T(F): 97.7 (25 Jan 2024 07:17), Max: 97.7 (25 Jan 2024 07:17)  HR: 50 (25 Jan 2024 07:17) (50 - 91)  BP: 121/66 (25 Jan 2024 07:17) (113/72 - 121/66)  BP(mean): --  RR: 19 (25 Jan 2024 07:17) (18 - 19)  SpO2: 100% (25 Jan 2024 07:17) (98% - 100%)    Parameters below as of 25 Jan 2024 07:17  Patient On (Oxygen Delivery Method): room air        PHYSICAL EXAM:    Constitutional: No acute distress,  non-toxic appearing  HEENT: unmasked, good phonation, not icteric  Neck: supple, no lymphadenopathy  Respiratory: clear to ascultation bilaterally, no wheezing  Cardiovascular: S1 and S2, regular rate and rhythm, no murmurs rubs or gallops  Gastrointestinal: soft, lower abdomen tender to deep palpation, non-distended, +bowel sounds, no rebound or guarding, ileostomy with mod amt brown liquid stool  Extremities: No peripheral edema, no cyanosis or clubbing  Vascular: 2+ peripheral pulses, no venous stasis  Neurological: A/O x 3, no focal deficits, no asterixis  Psychiatric: Normal mood, normal affect  Skin: No rashes, not jaundiced    LABS:                        13.1   14.84 )-----------( 323      ( 24 Jan 2024 06:44 )             38.3     01-24    137  |  104  |  11  ----------------------------<  349<H>  3.7   |  28  |  1.01    Ca    8.7      24 Jan 2024 09:23  Mg     1.9     01-24      RADIOLOGY & ADDITIONAL STUDIES:  BOWEL: No bowel obstruction. Appendix is normal. Diverting loop ileostomy   in the right lower quadrant. Previously seen thickening of the descending   and sigmoid colon is improved, largely resolved. There is ongoing   inflammation of the rectum as well as multiple Seton catheters within   perianal fistulas.  PERITONEUM: No ascites.  VESSELS: Within normal limits.  RETROPERITONEUM/LYMPH NODES: No lymphadenopathy.  ABDOMINAL WALL: Within normal limits.  BONES: Within normal limits.    IMPRESSION:  Previously seen: Inflammation has improved, largely resolved. 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

## 2024-01-25 NOTE — PROGRESS NOTE ADULT - ASSESSMENT
38 year old male with Crohn's and 3 day history of abdominal pain, minimal ileostomy output, and long segment descending colon inflammation on CT. Stool studies negative for infectious etiology with improvement in stool output and esr/crp with steroids.     Imp: Crohn's flare    CT obtained with significant improvement in inflammation. Patient still with significant LMQ pain.   Encourage non-opioid pain medications--->suggest IV tylenol    PLAN:  ::May switch IV steroid to PO prednisone with taper  ::Low residue diet as tolerated  ::Trend ESR/CRP  ::Chemical VTE ppx recommended despite ambulatory, due to pro-inflammatory state 38 year old male with Crohn's and 3 day history of abdominal pain, minimal ileostomy output, and long segment descending colon inflammation on CT. Stool studies negative for infectious etiology with improvement in stool output and esr/crp with steroids.     Imp: Crohn's flare    CT obtained with significant improvement in inflammation. Patient still states he has pain.   Encourage non-opioid pain medications--->suggest IV tylenol    PLAN:  ::May switch IV steroid to PO prednisone with taper  ::Low residue diet as tolerated  ::Trend ESR/CRP  ::Chemical VTE ppx recommended despite ambulatory, due to pro-inflammatory state

## 2024-01-25 NOTE — PROGRESS NOTE ADULT - ASSESSMENT
38M w/Hx of Pancreatitis,  Crohn's s/p ileostomy presents due to intractable lower abdominal pain, nausea, decreased ostomy output.     Abdominal pain suspect due to Colitis / Crohn's disease flare up  Decreased output for ileostomy, now better   Ct Abdomen/pelvis reviewed: Long segment of descending colon  Inflammation likely 2/2 to Crohn's flare up  Cdiff and GI PCR neg   C/w  IV Zosyn   s/p  hydrocortisone, switched to po prednisone   Dilaudid IV PRN pain   Zofran and tylenol PRN  Monitor electrolytes  -appreciate  GI and surgery recs  ESR, CRP normalized now  Repeated CT abd/ pelvis today with Inflammation has improved, largely resolved. Ongoing proctitis with multiple Seton catheters within perianal fistulas    Hyperglycemia, DM Type II - New  Pt stated he has no Hx DM  HB A1c 7.4%   insulin titration w guidance of pharmacy appreciated  Nutrition education     Hypokalemia/Hypomagnesemia  replaced  monitor     Hyponatremia, resolved   likely 2/2 poor oral intake     Severe protein-calorie malnutrition  -Nutrition consult appreciated     DVT PPX: start lovenox sq  ( refusing)     Dispo; C/w current care

## 2024-01-25 NOTE — PROGRESS NOTE ADULT - SUBJECTIVE AND OBJECTIVE BOX
HPI: 38M w/Hx of Crohn's s/p ileostomy presents due to intractable lower abdominal pain, nausea, decreased ostomy output. Pt reports symptoms remind him of Crohn's flares in the past but has not had a flare in years. Came into the ED for eval yesterday was determined safe for discharge but returned as his pain got worse. Pt reports he is no longer on biologic medicine because he lost his job and can longer afford it without insurance. He denies bleeding. Patient denies fever, chills, chest pain, SOB, headache, dizziness, dysuria. (18 Jan 2024 02:05)    1/24: Patient seen today, c/o continued abdominal pain, midline and towards L side, intermittent nausea. Inflammatory markers trending down, repeat CT with improved findings.   1/24: Patient seen today, reported abd pain, no other complaints, iv steroids switched to po today.     REVIEW OF SYSTEMS:    CONSTITUTIONAL: No weakness, fevers or chills  EYES/ENT: No visual changes;  No vertigo or throat pain   NECK: No pain or stiffness  RESPIRATORY: No cough, wheezing, hemoptysis; No shortness of breath  CARDIOVASCULAR: No chest pain or palpitations  GASTROINTESTINAL: No abdominal or epigastric pain. No nausea, vomiting, or hematemesis; No diarrhea or constipation. No melena or hematochezia.  GENITOURINARY: No dysuria, frequency or hematuria  NEUROLOGICAL: No numbness or weakness  SKIN: No itching, rashes    Vital Signs Last 24 Hrs  T(C): 36.7 (25 Jan 2024 15:50), Max: 36.7 (25 Jan 2024 15:50)  T(F): 98.1 (25 Jan 2024 15:50), Max: 98.1 (25 Jan 2024 15:50)  HR: 55 (25 Jan 2024 15:50) (50 - 91)  BP: 112/65 (25 Jan 2024 15:50) (112/65 - 121/66)  RR: 18 (25 Jan 2024 15:50) (18 - 19)  SpO2: 99% (25 Jan 2024 15:50) (98% - 100%)    Parameters below as of 25 Jan 2024 15:50  Patient On (Oxygen Delivery Method): room air      PHYSICAL EXAM:  GENERAL: NAD, lying in bed comfortably  HEAD:  Atraumatic, Normocephalic  EYES: conjunctiva and sclera clear  ENT: Moist mucous membranes  NECK: Supple, No JVD  CHEST/LUNG: Clear to auscultation bilaterally; No rales, rhonchi, wheezing. Unlabored respirations  HEART: Regular rate and rhythm; No murmurs  ABDOMEN: Bowel sounds present; Soft, Nontender, Nondistended.   EXTREMITIES:  2+ Peripheral Pulses, brisk capillary refill. No clubbing, cyanosis, or edema  NERVOUS SYSTEM:  Alert & Oriented X3, speech clear. No deficits   MSK: FROM all 4 extremities, full and equal strength    MEDICATIONS  (STANDING):  dextrose 5%. 1000 milliLiter(s) (100 mL/Hr) IV Continuous <Continuous>  dextrose 5%. 1000 milliLiter(s) (50 mL/Hr) IV Continuous <Continuous>  dextrose 50% Injectable 25 Gram(s) IV Push once  dextrose 50% Injectable 25 Gram(s) IV Push once  dextrose 50% Injectable 12.5 Gram(s) IV Push once  enoxaparin Injectable 40 milliGRAM(s) SubCutaneous every 24 hours  glucagon  Injectable 1 milliGRAM(s) IntraMuscular once  influenza   Vaccine 0.5 milliLiter(s) IntraMuscular once  insulin glargine Injectable (LANTUS) 17 Unit(s) SubCutaneous at bedtime  insulin lispro (ADMELOG) corrective regimen sliding scale   SubCutaneous three times a day before meals  insulin lispro (ADMELOG) corrective regimen sliding scale   SubCutaneous at bedtime  insulin lispro Injectable (ADMELOG) 8 Unit(s) SubCutaneous three times a day before meals  naloxone Injectable 0.4 milliGRAM(s) IV Push once  pantoprazole    Tablet 40 milliGRAM(s) Oral daily  piperacillin/tazobactam IVPB.. 3.375 Gram(s) IV Intermittent every 8 hours  polyethylene glycol 3350 17 Gram(s) Oral daily  predniSONE   Tablet 50 milliGRAM(s) Oral daily  senna 2 Tablet(s) Oral at bedtime    MEDICATIONS  (PRN):  acetaminophen     Tablet .. 650 milliGRAM(s) Oral every 6 hours PRN Temp greater or equal to 38C (100.4F), Mild Pain (1 - 3)  aluminum hydroxide/magnesium hydroxide/simethicone Suspension 30 milliLiter(s) Oral every 4 hours PRN Dyspepsia  bisacodyl 5 milliGRAM(s) Oral daily PRN Constipation  dextrose Oral Gel 15 Gram(s) Oral once PRN Blood Glucose LESS THAN 70 milliGRAM(s)/deciliter  HYDROmorphone   Tablet 2 milliGRAM(s) Oral every 6 hours PRN Moderate Pain (4 - 6)  HYDROmorphone   Tablet 4 milliGRAM(s) Oral every 6 hours PRN Severe Pain (7 - 10)  melatonin 3 milliGRAM(s) Oral at bedtime PRN Insomnia  ondansetron Injectable 4 milliGRAM(s) IV Push every 8 hours PRN Nausea and/or Vomiting  zolpidem 5 milliGRAM(s) Oral at bedtime PRN Insomnia      LABS:                                     13.1   14.84 )-----------( 323      ( 24 Jan 2024 06:44 )             38.3   01-24    137  |  104  |  11  ----------------------------<  349<H>  3.7   |  28  |  1.01    Ca    8.7      24 Jan 2024 09:23  Mg     1.9     01-24    CAPILLARY BLOOD GLUCOSE      POCT Blood Glucose.: 214 mg/dL (25 Jan 2024 16:21)  POCT Blood Glucose.: 216 mg/dL (25 Jan 2024 11:54)  POCT Blood Glucose.: 168 mg/dL (25 Jan 2024 08:52)  POCT Blood Glucose.: 167 mg/dL (24 Jan 2024 21:54)          Urinalysis Basic - ( 24 Jan 2024 09:23 )    Color: x / Appearance: x / SG: x / pH: x  Gluc: 349 mg/dL / Ketone: x  / Bili: x / Urobili: x   Blood: x / Protein: x / Nitrite: x   Leuk Esterase: x / RBC: x / WBC x   Sq Epi: x / Non Sq Epi: x / Bacteria: x        RADIOLOGY:      < from: CT Abdomen and Pelvis w/ IV Cont (01.24.24 @ 15:26) >  LIVER: Within normal limits.  BILE DUCTS: Normal caliber.  GALLBLADDER: Contracted  SPLEEN: Within normal limits.  PANCREAS: Within normal limits.  ADRENALS: Within normal limits.  KIDNEYS/URETERS: Within normal limits.    BLADDER: Decompressed.  REPRODUCTIVE ORGANS: Prostate within normal limits.    BOWEL: No bowel obstruction. Appendix is normal. Diverting loop ileostomy   in the right lower quadrant. Previously seen thickening of the descending   and sigmoid colon is improved, largely resolved. There is ongoing   inflammation of the rectum as well as multiple Seton catheters within   perianal fistulas.  PERITONEUM: No ascites.  VESSELS: Within normal limits.  RETROPERITONEUM/LYMPH NODES: No lymphadenopathy.  ABDOMINAL WALL: Within normal limits.  BONES: Within normal limits.    IMPRESSION:  Previously seen: Inflammation has improved, largely resolved. 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

## 2024-01-26 DIAGNOSIS — F10.90 ALCOHOL USE, UNSPECIFIED, UNCOMPLICATED: ICD-10-CM

## 2024-01-26 DIAGNOSIS — Z93.2 ILEOSTOMY STATUS: ICD-10-CM

## 2024-01-26 DIAGNOSIS — Z88.1 ALLERGY STATUS TO OTHER ANTIBIOTIC AGENTS STATUS: ICD-10-CM

## 2024-01-26 DIAGNOSIS — K62.89 OTHER SPECIFIED DISEASES OF ANUS AND RECTUM: ICD-10-CM

## 2024-01-26 DIAGNOSIS — K50.10 CROHN'S DISEASE OF LARGE INTESTINE WITHOUT COMPLICATIONS: ICD-10-CM

## 2024-01-26 DIAGNOSIS — A41.9 SEPSIS, UNSPECIFIED ORGANISM: ICD-10-CM

## 2024-01-26 DIAGNOSIS — K61.1 RECTAL ABSCESS: ICD-10-CM

## 2024-01-26 DIAGNOSIS — Z72.0 TOBACCO USE: ICD-10-CM

## 2024-01-26 DIAGNOSIS — R33.8 OTHER RETENTION OF URINE: ICD-10-CM

## 2024-01-26 LAB
ANION GAP SERPL CALC-SCNC: 2 MMOL/L — LOW (ref 5–17)
BUN SERPL-MCNC: 12 MG/DL — SIGNIFICANT CHANGE UP (ref 7–23)
CALCIUM SERPL-MCNC: 8.7 MG/DL — SIGNIFICANT CHANGE UP (ref 8.5–10.1)
CHLORIDE SERPL-SCNC: 107 MMOL/L — SIGNIFICANT CHANGE UP (ref 96–108)
CO2 SERPL-SCNC: 30 MMOL/L — SIGNIFICANT CHANGE UP (ref 22–31)
CREAT SERPL-MCNC: 0.6 MG/DL — SIGNIFICANT CHANGE UP (ref 0.5–1.3)
EGFR: 127 ML/MIN/1.73M2 — SIGNIFICANT CHANGE UP
FLUAV AG NPH QL: SIGNIFICANT CHANGE UP
FLUBV AG NPH QL: SIGNIFICANT CHANGE UP
GLUCOSE SERPL-MCNC: 153 MG/DL — HIGH (ref 70–99)
HCT VFR BLD CALC: 37.8 % — LOW (ref 39–50)
HGB BLD-MCNC: 13.1 G/DL — SIGNIFICANT CHANGE UP (ref 13–17)
MAGNESIUM SERPL-MCNC: 1.9 MG/DL — SIGNIFICANT CHANGE UP (ref 1.6–2.6)
MCHC RBC-ENTMCNC: 33.6 PG — SIGNIFICANT CHANGE UP (ref 27–34)
MCHC RBC-ENTMCNC: 34.7 GM/DL — SIGNIFICANT CHANGE UP (ref 32–36)
MCV RBC AUTO: 96.9 FL — SIGNIFICANT CHANGE UP (ref 80–100)
PHOSPHATE SERPL-MCNC: 3.5 MG/DL — SIGNIFICANT CHANGE UP (ref 2.5–4.5)
PLATELET # BLD AUTO: 302 K/UL — SIGNIFICANT CHANGE UP (ref 150–400)
POTASSIUM SERPL-MCNC: 3.2 MMOL/L — LOW (ref 3.5–5.3)
POTASSIUM SERPL-SCNC: 3.2 MMOL/L — LOW (ref 3.5–5.3)
RBC # BLD: 3.9 M/UL — LOW (ref 4.2–5.8)
RBC # FLD: 14.2 % — SIGNIFICANT CHANGE UP (ref 10.3–14.5)
RSV RNA NPH QL NAA+NON-PROBE: SIGNIFICANT CHANGE UP
SARS-COV-2 RNA SPEC QL NAA+PROBE: SIGNIFICANT CHANGE UP
SODIUM SERPL-SCNC: 139 MMOL/L — SIGNIFICANT CHANGE UP (ref 135–145)
WBC # BLD: 19.38 K/UL — HIGH (ref 3.8–10.5)
WBC # FLD AUTO: 19.38 K/UL — HIGH (ref 3.8–10.5)

## 2024-01-26 PROCEDURE — 93010 ELECTROCARDIOGRAM REPORT: CPT

## 2024-01-26 PROCEDURE — 93306 TTE W/DOPPLER COMPLETE: CPT | Mod: 26

## 2024-01-26 PROCEDURE — 99233 SBSQ HOSP IP/OBS HIGH 50: CPT

## 2024-01-26 PROCEDURE — 99232 SBSQ HOSP IP/OBS MODERATE 35: CPT

## 2024-01-26 RX ORDER — HYDROMORPHONE HYDROCHLORIDE 2 MG/ML
0.5 INJECTION INTRAMUSCULAR; INTRAVENOUS; SUBCUTANEOUS ONCE
Refills: 0 | Status: DISCONTINUED | OUTPATIENT
Start: 2024-01-26 | End: 2024-01-26

## 2024-01-26 RX ORDER — KETOROLAC TROMETHAMINE 30 MG/ML
15 SYRINGE (ML) INJECTION ONCE
Refills: 0 | Status: DISCONTINUED | OUTPATIENT
Start: 2024-01-26 | End: 2024-01-26

## 2024-01-26 RX ORDER — MECLIZINE HCL 12.5 MG
25 TABLET ORAL ONCE
Refills: 0 | Status: COMPLETED | OUTPATIENT
Start: 2024-01-26 | End: 2024-01-26

## 2024-01-26 RX ORDER — INSULIN LISPRO 100/ML
4 VIAL (ML) SUBCUTANEOUS
Refills: 0 | Status: DISCONTINUED | OUTPATIENT
Start: 2024-01-26 | End: 2024-01-29

## 2024-01-26 RX ORDER — ACETAMINOPHEN 500 MG
1000 TABLET ORAL ONCE
Refills: 0 | Status: COMPLETED | OUTPATIENT
Start: 2024-01-26 | End: 2024-01-26

## 2024-01-26 RX ORDER — POTASSIUM CHLORIDE 20 MEQ
40 PACKET (EA) ORAL ONCE
Refills: 0 | Status: COMPLETED | OUTPATIENT
Start: 2024-01-26 | End: 2024-01-26

## 2024-01-26 RX ORDER — MAGNESIUM SULFATE 500 MG/ML
1 VIAL (ML) INJECTION ONCE
Refills: 0 | Status: COMPLETED | OUTPATIENT
Start: 2024-01-26 | End: 2024-01-26

## 2024-01-26 RX ORDER — DIPHENHYDRAMINE HCL 50 MG
25 CAPSULE ORAL ONCE
Refills: 0 | Status: COMPLETED | OUTPATIENT
Start: 2024-01-26 | End: 2024-01-26

## 2024-01-26 RX ORDER — INSULIN GLARGINE 100 [IU]/ML
14 INJECTION, SOLUTION SUBCUTANEOUS AT BEDTIME
Refills: 0 | Status: DISCONTINUED | OUTPATIENT
Start: 2024-01-26 | End: 2024-01-28

## 2024-01-26 RX ORDER — INSULIN LISPRO 100/ML
6 VIAL (ML) SUBCUTANEOUS ONCE
Refills: 0 | Status: COMPLETED | OUTPATIENT
Start: 2024-01-26 | End: 2024-01-26

## 2024-01-26 RX ADMIN — Medication 4 UNIT(S): at 17:00

## 2024-01-26 RX ADMIN — Medication 6 UNIT(S): at 08:29

## 2024-01-26 RX ADMIN — Medication 8: at 17:00

## 2024-01-26 RX ADMIN — Medication 1: at 21:02

## 2024-01-26 RX ADMIN — HYDROMORPHONE HYDROCHLORIDE 4 MILLIGRAM(S): 2 INJECTION INTRAMUSCULAR; INTRAVENOUS; SUBCUTANEOUS at 21:02

## 2024-01-26 RX ADMIN — HYDROMORPHONE HYDROCHLORIDE 4 MILLIGRAM(S): 2 INJECTION INTRAMUSCULAR; INTRAVENOUS; SUBCUTANEOUS at 21:47

## 2024-01-26 RX ADMIN — Medication 40 MILLIEQUIVALENT(S): at 09:28

## 2024-01-26 RX ADMIN — PANTOPRAZOLE SODIUM 40 MILLIGRAM(S): 20 TABLET, DELAYED RELEASE ORAL at 09:28

## 2024-01-26 RX ADMIN — Medication 15 MILLIGRAM(S): at 07:02

## 2024-01-26 RX ADMIN — Medication 100 GRAM(S): at 09:40

## 2024-01-26 RX ADMIN — HYDROMORPHONE HYDROCHLORIDE 4 MILLIGRAM(S): 2 INJECTION INTRAMUSCULAR; INTRAVENOUS; SUBCUTANEOUS at 12:40

## 2024-01-26 RX ADMIN — HYDROMORPHONE HYDROCHLORIDE 0.5 MILLIGRAM(S): 2 INJECTION INTRAMUSCULAR; INTRAVENOUS; SUBCUTANEOUS at 09:28

## 2024-01-26 RX ADMIN — INSULIN GLARGINE 14 UNIT(S): 100 INJECTION, SOLUTION SUBCUTANEOUS at 21:01

## 2024-01-26 RX ADMIN — Medication 50 MILLIGRAM(S): at 09:28

## 2024-01-26 RX ADMIN — Medication 400 MILLIGRAM(S): at 04:20

## 2024-01-26 NOTE — PROGRESS NOTE ADULT - SUBJECTIVE AND OBJECTIVE BOX
Patient is a 38y old  Male who presents with a chief complaint of Crohn's Flare  Followup for: Crohn's flare  Patient reports overnight chills, sweating, body aches, and dizziness "room spinning." Swabbed- all negative. Sinus tahira. "I can't even get up."    MEDICATIONS  (STANDING):  acetaminophen   IVPB .. 1000 milliGRAM(s) IV Intermittent once  dextrose 5%. 1000 milliLiter(s) (50 mL/Hr) IV Continuous <Continuous>  dextrose 5%. 1000 milliLiter(s) (100 mL/Hr) IV Continuous <Continuous>  dextrose 50% Injectable 12.5 Gram(s) IV Push once  dextrose 50% Injectable 25 Gram(s) IV Push once  dextrose 50% Injectable 25 Gram(s) IV Push once  enoxaparin Injectable 40 milliGRAM(s) SubCutaneous every 24 hours  glucagon  Injectable 1 milliGRAM(s) IntraMuscular once  influenza   Vaccine 0.5 milliLiter(s) IntraMuscular once  insulin glargine Injectable (LANTUS) 17 Unit(s) SubCutaneous at bedtime  insulin lispro (ADMELOG) corrective regimen sliding scale   SubCutaneous at bedtime  insulin lispro (ADMELOG) corrective regimen sliding scale   SubCutaneous three times a day before meals  insulin lispro Injectable (ADMELOG) 8 Unit(s) SubCutaneous three times a day before meals  naloxone Injectable 0.4 milliGRAM(s) IV Push once  pantoprazole    Tablet 40 milliGRAM(s) Oral daily  piperacillin/tazobactam IVPB.. 3.375 Gram(s) IV Intermittent every 8 hours  polyethylene glycol 3350 17 Gram(s) Oral daily  predniSONE   Tablet 50 milliGRAM(s) Oral daily  senna 2 Tablet(s) Oral at bedtime    MEDICATIONS  (PRN):  acetaminophen     Tablet .. 650 milliGRAM(s) Oral every 6 hours PRN Temp greater or equal to 38C (100.4F), Mild Pain (1 - 3)  aluminum hydroxide/magnesium hydroxide/simethicone Suspension 30 milliLiter(s) Oral every 4 hours PRN Dyspepsia  bisacodyl 5 milliGRAM(s) Oral daily PRN Constipation  dextrose Oral Gel 15 Gram(s) Oral once PRN Blood Glucose LESS THAN 70 milliGRAM(s)/deciliter  HYDROmorphone   Tablet 4 milliGRAM(s) Oral every 6 hours PRN Severe Pain (7 - 10)  HYDROmorphone   Tablet 2 milliGRAM(s) Oral every 6 hours PRN Moderate Pain (4 - 6)  melatonin 3 milliGRAM(s) Oral at bedtime PRN Insomnia  ondansetron Injectable 4 milliGRAM(s) IV Push every 8 hours PRN Nausea and/or Vomiting  zolpidem 5 milliGRAM(s) Oral at bedtime PRN Insomnia      Vital Signs Last 24 Hrs  T(C): 36.5 (01-26-24 @ 07:27), Max: 36.7 (01-25-24 @ 15:50)  T(F): 97.7 (01-26-24 @ 07:27), Max: 98.1 (01-25-24 @ 15:50)  HR: 45 (01-26-24 @ 07:27) (40 - 59)  BP: 129/73 (01-26-24 @ 07:27) (107/56 - 129/78)  BP(mean): 68 (01-25-24 @ 21:55) (68 - 68)  RR: 18 (01-26-24 @ 07:27) (18 - 19)  SpO2: 99% (01-26-24 @ 07:27) (97% - 100%)      PHYSICAL EXAM:    Constitutional: No acute distress, non-toxic appearing  HEENT: unmasked, good phonation, not icteric  Neck: supple, no lymphadenopathy  Respiratory: clear to ascultation bilaterally, no wheezing  Cardiovascular: S1 and S2, regular rate and rhythm, no murmurs rubs or gallops  Gastrointestinal: soft, lower abdomen tender to deep palpation, non-distended, +bowel sounds, no rebound or guarding, ileostomy with mod amt brown liquid stool  Extremities: No peripheral edema, no cyanosis or clubbing  Vascular: 2+ peripheral pulses, no venous stasis  Neurological: A/O x 3, no focal deficits, no asterixis  Psychiatric: Normal mood, normal affect  Skin: No rashes, not jaundiced    LABS:                                          13.1   19.38 )-----------( 302      ( 26 Jan 2024 06:35 )             37.8       01-26    139  |  107  |  12  ----------------------------<  153<H>  3.2<L>   |  30  |  0.60    Ca    8.7      26 Jan 2024 06:35  Phos  3.5     01-26  Mg     1.9     01-26      RADIOLOGY & ADDITIONAL STUDIES:  BOWEL: No bowel obstruction. Appendix is normal. Diverting loop ileostomy   in the right lower quadrant. Previously seen thickening of the descending   and sigmoid colon is improved, largely resolved. There is ongoing   inflammation of the rectum as well as multiple Seton catheters within   perianal fistulas.  PERITONEUM: No ascites.  VESSELS: Within normal limits.  RETROPERITONEUM/LYMPH NODES: No lymphadenopathy.  ABDOMINAL WALL: Within normal limits.  BONES: Within normal limits.    IMPRESSION:  Previously seen: Inflammation has improved, largely resolved. 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 Patient is a 38y old  Male who presents with a chief complaint of Crohn's Flare    Followup for: Crohn's flare    Patient reports overnight chills, sweating, body aches, and dizziness "room spinning." Swabbed- all negative. Sinus tahira. "I can't even get up."    MEDICATIONS  (STANDING):  acetaminophen   IVPB .. 1000 milliGRAM(s) IV Intermittent once  dextrose 5%. 1000 milliLiter(s) (50 mL/Hr) IV Continuous <Continuous>  dextrose 5%. 1000 milliLiter(s) (100 mL/Hr) IV Continuous <Continuous>  dextrose 50% Injectable 12.5 Gram(s) IV Push once  dextrose 50% Injectable 25 Gram(s) IV Push once  dextrose 50% Injectable 25 Gram(s) IV Push once  enoxaparin Injectable 40 milliGRAM(s) SubCutaneous every 24 hours  glucagon  Injectable 1 milliGRAM(s) IntraMuscular once  influenza   Vaccine 0.5 milliLiter(s) IntraMuscular once  insulin glargine Injectable (LANTUS) 17 Unit(s) SubCutaneous at bedtime  insulin lispro (ADMELOG) corrective regimen sliding scale   SubCutaneous at bedtime  insulin lispro (ADMELOG) corrective regimen sliding scale   SubCutaneous three times a day before meals  insulin lispro Injectable (ADMELOG) 8 Unit(s) SubCutaneous three times a day before meals  naloxone Injectable 0.4 milliGRAM(s) IV Push once  pantoprazole    Tablet 40 milliGRAM(s) Oral daily  piperacillin/tazobactam IVPB.. 3.375 Gram(s) IV Intermittent every 8 hours  polyethylene glycol 3350 17 Gram(s) Oral daily  predniSONE   Tablet 50 milliGRAM(s) Oral daily  senna 2 Tablet(s) Oral at bedtime    MEDICATIONS  (PRN):  acetaminophen     Tablet .. 650 milliGRAM(s) Oral every 6 hours PRN Temp greater or equal to 38C (100.4F), Mild Pain (1 - 3)  aluminum hydroxide/magnesium hydroxide/simethicone Suspension 30 milliLiter(s) Oral every 4 hours PRN Dyspepsia  bisacodyl 5 milliGRAM(s) Oral daily PRN Constipation  dextrose Oral Gel 15 Gram(s) Oral once PRN Blood Glucose LESS THAN 70 milliGRAM(s)/deciliter  HYDROmorphone   Tablet 4 milliGRAM(s) Oral every 6 hours PRN Severe Pain (7 - 10)  HYDROmorphone   Tablet 2 milliGRAM(s) Oral every 6 hours PRN Moderate Pain (4 - 6)  melatonin 3 milliGRAM(s) Oral at bedtime PRN Insomnia  ondansetron Injectable 4 milliGRAM(s) IV Push every 8 hours PRN Nausea and/or Vomiting  zolpidem 5 milliGRAM(s) Oral at bedtime PRN Insomnia      Vital Signs Last 24 Hrs  T(C): 36.5 (01-26-24 @ 07:27), Max: 36.7 (01-25-24 @ 15:50)  T(F): 97.7 (01-26-24 @ 07:27), Max: 98.1 (01-25-24 @ 15:50)  HR: 45 (01-26-24 @ 07:27) (40 - 59)  BP: 129/73 (01-26-24 @ 07:27) (107/56 - 129/78)  BP(mean): 68 (01-25-24 @ 21:55) (68 - 68)  RR: 18 (01-26-24 @ 07:27) (18 - 19)  SpO2: 99% (01-26-24 @ 07:27) (97% - 100%)      PHYSICAL EXAM:    Constitutional: No acute distress, non-toxic appearing  HEENT: unmasked, good phonation, not icteric  Neck: supple, no lymphadenopathy  Respiratory: clear to ascultation bilaterally, no wheezing  Cardiovascular: S1 and S2, regular rate and rhythm, no murmurs rubs or gallops  Gastrointestinal: soft, lower abdomen tender to deep palpation, non-distended, +bowel sounds, no rebound or guarding, ileostomy with mod amt brown liquid stool  Extremities: No peripheral edema, no cyanosis or clubbing  Vascular: 2+ peripheral pulses, no venous stasis  Neurological: A/O x 3, no focal deficits, no asterixis  Psychiatric: Normal mood, normal affect  Skin: No rashes, not jaundiced    LABS:                                          13.1   19.38 )-----------( 302      ( 26 Jan 2024 06:35 )             37.8       01-26    139  |  107  |  12  ----------------------------<  153<H>  3.2<L>   |  30  |  0.60    Ca    8.7      26 Jan 2024 06:35  Phos  3.5     01-26  Mg     1.9     01-26      RADIOLOGY & ADDITIONAL STUDIES:  BOWEL: No bowel obstruction. Appendix is normal. Diverting loop ileostomy   in the right lower quadrant. Previously seen thickening of the descending   and sigmoid colon is improved, largely resolved. There is ongoing   inflammation of the rectum as well as multiple Seton catheters within   perianal fistulas.  PERITONEUM: No ascites.  VESSELS: Within normal limits.  RETROPERITONEUM/LYMPH NODES: No lymphadenopathy.  ABDOMINAL WALL: Within normal limits.  BONES: Within normal limits.    IMPRESSION:  Previously seen: Inflammation has improved, largely resolved. 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

## 2024-01-26 NOTE — PROVIDER CONTACT NOTE (OTHER) - ACTION/TREATMENT ORDERED:
NP Duperval ordered RVP swab, negative. Pt complaining of pain, pt given IV Tylenol per PA order. Bed alarm on, instructed to use call bell.
EKG ordered. Pt now on remote tele. Labs ordered. Per NP dayshift nurse should hold AM prednisone. Meclizine ordered for dizziness and toradol ordered for pain.

## 2024-01-26 NOTE — PROGRESS NOTE ADULT - SUBJECTIVE AND OBJECTIVE BOX
HPI: 38M w/Hx of Crohn's s/p ileostomy presents due to intractable lower abdominal pain, nausea, decreased ostomy output. Pt reports symptoms remind him of Crohn's flares in the past but has not had a flare in years. Came into the ED for eval yesterday was determined safe for discharge but returned as his pain got worse. Pt reports he is no longer on biologic medicine because he lost his job and can longer afford it without insurance. He denies bleeding. Patient denies fever, chills, chest pain, SOB, headache, dizziness, dysuria. (18 Jan 2024 02:05)    1/24: Patient seen today, c/o continued abdominal pain, midline and towards L side, intermittent nausea. Inflammatory markers trending down, repeat CT with improved findings.   1/25: Patient seen today, reported abd pain, no other complaints, iv steroids switched to po today.   1/26: Patient seen and examined this AM, c/o dizziness, and pain to entire body including joints, noted bradycardic this morning, placed on tele, HR was in 30-40s.       REVIEW OF SYSTEMS:    CONSTITUTIONAL: No weakness, fevers or chills  EYES/ENT: No visual changes;  No vertigo or throat pain   NECK: No pain or stiffness  RESPIRATORY: No cough, wheezing, hemoptysis; No shortness of breath  CARDIOVASCULAR: No chest pain or palpitations  GASTROINTESTINAL: No abdominal or epigastric pain. No nausea, vomiting, or hematemesis; No diarrhea or constipation. No melena or hematochezia.  GENITOURINARY: No dysuria, frequency or hematuria  NEUROLOGICAL: No numbness or weakness, + dizizness  SKIN: No itching, rashes    Vital Signs Last 24 Hrs  T(C): 36.7 (26 Jan 2024 15:20), Max: 36.7 (25 Jan 2024 21:55)  T(F): 98 (26 Jan 2024 15:20), Max: 98 (25 Jan 2024 21:55)  HR: 61 (26 Jan 2024 15:20) (40 - 61)  BP: 124/73 (26 Jan 2024 15:20) (107/56 - 129/78)  BP(mean): 68 (25 Jan 2024 21:55) (68 - 68)  RR: 18 (26 Jan 2024 15:20) (18 - 19)  SpO2: 98% (26 Jan 2024 15:20) (97% - 100%)    Parameters below as of 26 Jan 2024 15:20  Patient On (Oxygen Delivery Method): room air        PHYSICAL EXAM:  GENERAL: NAD, lying in bed comfortably  HEAD:  Atraumatic, Normocephalic  EYES: conjunctiva and sclera clear  ENT: Moist mucous membranes  NECK: Supple, No JVD  CHEST/LUNG: Clear to auscultation bilaterally; No rales, rhonchi, wheezing. Unlabored respirations  HEART: Regular rate and rhythm; No murmurs  ABDOMEN: Bowel sounds present; Soft, Nontender, Nondistended.   EXTREMITIES:  2+ Peripheral Pulses, brisk capillary refill. No clubbing, cyanosis, or edema  NERVOUS SYSTEM:  Alert & Oriented X3, speech clear. No deficits   MSK: FROM all 4 extremities, full and equal strength    MEDICATIONS  (STANDING):  dextrose 5%. 1000 milliLiter(s) (100 mL/Hr) IV Continuous <Continuous>  dextrose 5%. 1000 milliLiter(s) (50 mL/Hr) IV Continuous <Continuous>  dextrose 50% Injectable 25 Gram(s) IV Push once  dextrose 50% Injectable 25 Gram(s) IV Push once  dextrose 50% Injectable 12.5 Gram(s) IV Push once  enoxaparin Injectable 40 milliGRAM(s) SubCutaneous every 24 hours  glucagon  Injectable 1 milliGRAM(s) IntraMuscular once  influenza   Vaccine 0.5 milliLiter(s) IntraMuscular once  insulin glargine Injectable (LANTUS) 14 Unit(s) SubCutaneous at bedtime  insulin lispro (ADMELOG) corrective regimen sliding scale   SubCutaneous at bedtime  insulin lispro (ADMELOG) corrective regimen sliding scale   SubCutaneous three times a day before meals  insulin lispro Injectable (ADMELOG) 4 Unit(s) SubCutaneous three times a day before meals  naloxone Injectable 0.4 milliGRAM(s) IV Push once  pantoprazole    Tablet 40 milliGRAM(s) Oral daily  polyethylene glycol 3350 17 Gram(s) Oral daily  predniSONE   Tablet 50 milliGRAM(s) Oral daily  senna 2 Tablet(s) Oral at bedtime    MEDICATIONS  (PRN):  acetaminophen     Tablet .. 650 milliGRAM(s) Oral every 6 hours PRN Temp greater or equal to 38C (100.4F), Mild Pain (1 - 3)  aluminum hydroxide/magnesium hydroxide/simethicone Suspension 30 milliLiter(s) Oral every 4 hours PRN Dyspepsia  bisacodyl 5 milliGRAM(s) Oral daily PRN Constipation  dextrose Oral Gel 15 Gram(s) Oral once PRN Blood Glucose LESS THAN 70 milliGRAM(s)/deciliter  HYDROmorphone   Tablet 2 milliGRAM(s) Oral every 6 hours PRN Moderate Pain (4 - 6)  HYDROmorphone   Tablet 4 milliGRAM(s) Oral every 6 hours PRN Severe Pain (7 - 10)  melatonin 3 milliGRAM(s) Oral at bedtime PRN Insomnia  ondansetron Injectable 4 milliGRAM(s) IV Push every 8 hours PRN Nausea and/or Vomiting  zolpidem 5 milliGRAM(s) Oral at bedtime PRN Insomnia        LABS:                                     13.1   19.38 )-----------( 302      ( 26 Jan 2024 06:35 )             37.8   01-26    139  |  107  |  12  ----------------------------<  153<H>  3.2<L>   |  30  |  0.60    Ca    8.7      26 Jan 2024 06:35  Phos  3.5     01-26  Mg     1.9     01-26              Urinalysis Basic - ( 24 Jan 2024 09:23 )    Color: x / Appearance: x / SG: x / pH: x  Gluc: 349 mg/dL / Ketone: x  / Bili: x / Urobili: x   Blood: x / Protein: x / Nitrite: x   Leuk Esterase: x / RBC: x / WBC x   Sq Epi: x / Non Sq Epi: x / Bacteria: x        RADIOLOGY:      < from: CT Abdomen and Pelvis w/ IV Cont (01.24.24 @ 15:26) >  LIVER: Within normal limits.  BILE DUCTS: Normal caliber.  GALLBLADDER: Contracted  SPLEEN: Within normal limits.  PANCREAS: Within normal limits.  ADRENALS: Within normal limits.  KIDNEYS/URETERS: Within normal limits.    BLADDER: Decompressed.  REPRODUCTIVE ORGANS: Prostate within normal limits.    BOWEL: No bowel obstruction. Appendix is normal. Diverting loop ileostomy   in the right lower quadrant. Previously seen thickening of the descending   and sigmoid colon is improved, largely resolved. There is ongoing   inflammation of the rectum as well as multiple Seton catheters within   perianal fistulas.  PERITONEUM: No ascites.  VESSELS: Within normal limits.  RETROPERITONEUM/LYMPH NODES: No lymphadenopathy.  ABDOMINAL WALL: Within normal limits.  BONES: Within normal limits.    IMPRESSION:  Previously seen: Inflammation has improved, largely resolved. 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      < from: TTE Echo Complete w/o Contrast w/ Doppler (01.26.24 @ 11:32) >   The left ventricle is normal in size, wall thickness, wall motion and   contractility.   Estimated left ventricular ejection fraction is 60 %.   Normal appearing left atrium.   Normal appearing right atrium.   Normal appearing right ventricle structure and function.   Normal aortic valve structure and function.   Trace aortic regurgitation is present.   Normal appearing mitral valve structure and function.   Trace mitralregurgitation is present.   Normal appearing tricuspid valve structure and function.   Mild (1+) tricuspid valve regurgitation is present.   Normal appearing pulmonic valve structure and function.   Trace pulmonic valvular regurgitation is present.  No evidence of pericardial effusion.

## 2024-01-26 NOTE — PROGRESS NOTE ADULT - ASSESSMENT
38M w/Hx of Pancreatitis,  Crohn's s/p ileostomy presents due to intractable lower abdominal pain, nausea, decreased ostomy output.     Abdominal pain due to Colitis / Crohn's disease flare up  Decreased output for ileostomy, now better   Ct Abdomen/pelvis reviewed: Long segment of descending colon  Inflammation likely 2/2 to Crohn's flare up  Cdiff and GI PCR neg   s/p IV Zosyn   s/p  hydrocortisone, switched to po prednisone   Dilaudid po PRN pain   Zofran and tylenol PRN  Monitor electrolytes  -appreciate  GI and surgery recs  ESR, CRP normalized now  Repeated CT abd/ pelvis today with Inflammation has improved, largely resolved. Ongoing proctitis with multiple Seton catheters within perianal fistulas    Leukocytosis  Maybe steroid induced   Continue to monitor, pt is afebrile, no abdominal pain on my exam today 1/26    Dizziness/Bradycardia  Continue tele monitoring overnight   TTE as above   EKG: sinus bradycardia, HR 40s  replete electrolytes   EP consult     Hyperglycemia, DM Type II - New  Pt stated he has no Hx DM  HB A1c 7.4%   insulin titration w guidance of pharmacy appreciated  Nutrition education     Hypokalemia/Hypomagnesemia  replaced  monitor     Hyponatremia, resolved   likely 2/2 poor oral intake     Severe protein-calorie malnutrition  -Nutrition consult appreciated     DVT PPX: start lovenox sq  ( refusing)     Dispo; C/w current care

## 2024-01-26 NOTE — PROVIDER CONTACT NOTE (OTHER) - SITUATION
Pt complaining of continued dizziness, HR 40 and palpated HR 42. Other VSS. NP Antonio made aware.
Pt complained of dizziness & "feeling off". JENNIFER Alvarez and SIMRAN Iverson on floor at time and made aware. HR 45 and /78, . Pt reports random jerking movements, SIMRAN Iverson and JENNIFER Alvarez aware.
notified Dr Crawley's office of admission please fax over discharge paperwork once the patient is discharged to [phone]

## 2024-01-26 NOTE — PROGRESS NOTE ADULT - ASSESSMENT
38 year old male with Crohn's and 3 day history of abdominal pain, minimal ileostomy output, and long segment descending colon inflammation on CT. Stool studies negative for infectious etiology with improvement in stool output and esr/crp with steroids.     Imp: Crohn's flare    CT with significant improvement in inflammation.   Overnight chills/myalgia/arthralgia/sweats/bradycardia--->negative swabs--->?withdrawal from [facility] IV dilaudid  Encourage non-opioid pain medications--->continue IV tylenol    PLAN:  ::PO prednisone with taper  ::Low residue diet as tolerated  ::Trend ESR/CRP  ::Chemical VTE ppx recommended despite ambulatory, due to pro-inflammatory state 38 year old male with Crohn's admitted with flare.  Stool studies negative for infectious etiology with improvement in stool output and esr/crp with steroids.     Imp: Crohn's flare    CT with significant improvement in inflammation.   Overnight chills/myalgia/arthralgia/sweats/bradycardia--->negative swabs--->?withdrawal from [facility] IV dilaudid  Encourage non-opioid pain medications--->continue IV tylenol    PLAN:  ::PO prednisone with taper  ::Low residue diet as tolerated  ::Trend ESR/CRP  ::Chemical VTE ppx recommended despite ambulatory, due to pro-inflammatory state

## 2024-01-27 LAB
ANION GAP SERPL CALC-SCNC: 4 MMOL/L — LOW (ref 5–17)
BASOPHILS # BLD AUTO: 0 K/UL — SIGNIFICANT CHANGE UP (ref 0–0.2)
BASOPHILS NFR BLD AUTO: 0 % — SIGNIFICANT CHANGE UP (ref 0–2)
BUN SERPL-MCNC: 15 MG/DL — SIGNIFICANT CHANGE UP (ref 7–23)
CALCIUM SERPL-MCNC: 8.7 MG/DL — SIGNIFICANT CHANGE UP (ref 8.5–10.1)
CHLORIDE SERPL-SCNC: 107 MMOL/L — SIGNIFICANT CHANGE UP (ref 96–108)
CO2 SERPL-SCNC: 29 MMOL/L — SIGNIFICANT CHANGE UP (ref 22–31)
CREAT SERPL-MCNC: 0.72 MG/DL — SIGNIFICANT CHANGE UP (ref 0.5–1.3)
EGFR: 120 ML/MIN/1.73M2 — SIGNIFICANT CHANGE UP
EOSINOPHIL # BLD AUTO: 0.15 K/UL — SIGNIFICANT CHANGE UP (ref 0–0.5)
EOSINOPHIL NFR BLD AUTO: 1 % — SIGNIFICANT CHANGE UP (ref 0–6)
GLUCOSE SERPL-MCNC: 192 MG/DL — HIGH (ref 70–99)
HCT VFR BLD CALC: 40.6 % — SIGNIFICANT CHANGE UP (ref 39–50)
HGB BLD-MCNC: 13.6 G/DL — SIGNIFICANT CHANGE UP (ref 13–17)
LYMPHOCYTES # BLD AUTO: 25 % — SIGNIFICANT CHANGE UP (ref 13–44)
LYMPHOCYTES # BLD AUTO: 3.76 K/UL — HIGH (ref 1–3.3)
MAGNESIUM SERPL-MCNC: 2 MG/DL — SIGNIFICANT CHANGE UP (ref 1.6–2.6)
MCHC RBC-ENTMCNC: 32.9 PG — SIGNIFICANT CHANGE UP (ref 27–34)
MCHC RBC-ENTMCNC: 33.5 GM/DL — SIGNIFICANT CHANGE UP (ref 32–36)
MCV RBC AUTO: 98.3 FL — SIGNIFICANT CHANGE UP (ref 80–100)
MONOCYTES # BLD AUTO: 1.5 K/UL — HIGH (ref 0–0.9)
MONOCYTES NFR BLD AUTO: 10 % — SIGNIFICANT CHANGE UP (ref 2–14)
NEUTROPHILS # BLD AUTO: 9.32 K/UL — HIGH (ref 1.8–7.4)
NEUTROPHILS NFR BLD AUTO: 62 % — SIGNIFICANT CHANGE UP (ref 43–77)
NRBC # BLD: SIGNIFICANT CHANGE UP /100 WBCS (ref 0–0)
PHOSPHATE SERPL-MCNC: 3.5 MG/DL — SIGNIFICANT CHANGE UP (ref 2.5–4.5)
PLATELET # BLD AUTO: 303 K/UL — SIGNIFICANT CHANGE UP (ref 150–400)
POTASSIUM SERPL-MCNC: 3.8 MMOL/L — SIGNIFICANT CHANGE UP (ref 3.5–5.3)
POTASSIUM SERPL-SCNC: 3.8 MMOL/L — SIGNIFICANT CHANGE UP (ref 3.5–5.3)
RBC # BLD: 4.13 M/UL — LOW (ref 4.2–5.8)
RBC # FLD: 14.3 % — SIGNIFICANT CHANGE UP (ref 10.3–14.5)
SODIUM SERPL-SCNC: 140 MMOL/L — SIGNIFICANT CHANGE UP (ref 135–145)
WBC # BLD: 15.03 K/UL — HIGH (ref 3.8–10.5)
WBC # FLD AUTO: 15.03 K/UL — HIGH (ref 3.8–10.5)

## 2024-01-27 PROCEDURE — 99232 SBSQ HOSP IP/OBS MODERATE 35: CPT

## 2024-01-27 RX ORDER — KETOROLAC TROMETHAMINE 30 MG/ML
15 SYRINGE (ML) INJECTION ONCE
Refills: 0 | Status: DISCONTINUED | OUTPATIENT
Start: 2024-01-27 | End: 2024-01-27

## 2024-01-27 RX ADMIN — HYDROMORPHONE HYDROCHLORIDE 2 MILLIGRAM(S): 2 INJECTION INTRAMUSCULAR; INTRAVENOUS; SUBCUTANEOUS at 03:41

## 2024-01-27 RX ADMIN — PANTOPRAZOLE SODIUM 40 MILLIGRAM(S): 20 TABLET, DELAYED RELEASE ORAL at 09:46

## 2024-01-27 RX ADMIN — Medication 4 UNIT(S): at 17:25

## 2024-01-27 RX ADMIN — ZOLPIDEM TARTRATE 5 MILLIGRAM(S): 10 TABLET ORAL at 21:27

## 2024-01-27 RX ADMIN — Medication 50 MILLIGRAM(S): at 09:44

## 2024-01-27 RX ADMIN — Medication 15 MILLIGRAM(S): at 22:07

## 2024-01-27 RX ADMIN — Medication 4: at 08:21

## 2024-01-27 RX ADMIN — INSULIN GLARGINE 14 UNIT(S): 100 INJECTION, SOLUTION SUBCUTANEOUS at 21:31

## 2024-01-27 RX ADMIN — HYDROMORPHONE HYDROCHLORIDE 4 MILLIGRAM(S): 2 INJECTION INTRAMUSCULAR; INTRAVENOUS; SUBCUTANEOUS at 22:55

## 2024-01-27 RX ADMIN — Medication 25 MILLIGRAM(S): at 02:35

## 2024-01-27 RX ADMIN — Medication 4 UNIT(S): at 08:21

## 2024-01-27 RX ADMIN — HYDROMORPHONE HYDROCHLORIDE 2 MILLIGRAM(S): 2 INJECTION INTRAMUSCULAR; INTRAVENOUS; SUBCUTANEOUS at 03:11

## 2024-01-27 RX ADMIN — HYDROMORPHONE HYDROCHLORIDE 4 MILLIGRAM(S): 2 INJECTION INTRAMUSCULAR; INTRAVENOUS; SUBCUTANEOUS at 15:57

## 2024-01-27 RX ADMIN — HYDROMORPHONE HYDROCHLORIDE 4 MILLIGRAM(S): 2 INJECTION INTRAMUSCULAR; INTRAVENOUS; SUBCUTANEOUS at 09:46

## 2024-01-27 RX ADMIN — Medication 6: at 17:25

## 2024-01-27 RX ADMIN — Medication 4 UNIT(S): at 12:20

## 2024-01-27 NOTE — CONSULT NOTE ADULT - ASSESSMENT
A 38 y.o. M w/Hx of Crohn's s/p ileostomy presents due to intractable lower abdominal pain, nausea, decreased ostomy output. Pt reports symptoms remind him of Crohn's flares in the past, now with bradycardia in the 40-50s most likely caused by  persistent abdominal pain ( parasympathetic/ vagal response) Normal  LVEF 60-65%.   1) Optimize hydration -encourage fluids  2) Optimize electrolytes Keep K>4.0 Mg >2.0  3) Educated pt to change positions slowly when getting OOB  4) Tele monitor with sinus tahira -no AVB/ pauses or ectopy detected  5) Avoid any AVN blocking agents/ encourage non-opiod pain control measures  6) Tobacco cessation discussed with pt/ refusing nicotine patch at this time  7) Continue GI/ hospitalist management and care

## 2024-01-27 NOTE — PROGRESS NOTE ADULT - ASSESSMENT
38M w/Hx of Pancreatitis,  Crohn's s/p ileostomy presents due to intractable lower abdominal pain, nausea, decreased ostomy output.     Abdominal pain due to Colitis / Crohn's disease flare up  Decreased output for ileostomy, now better   Ct Abdomen/pelvis reviewed: Long segment of descending colon  Inflammation likely 2/2 to Crohn's flare up  Cdiff and GI PCR neg   s/p IV Zosyn   s/p  hydrocortisone, switched to po prednisone   Dilaudid po PRN pain   Zofran and tylenol PRN  Monitor electrolytes  -appreciate  GI and surgery recs  ESR, CRP normalized now  Repeated CT abd/ pelvis today with Inflammation has improved, largely resolved. Ongoing proctitis with multiple Seton catheters within perianal fistulas    Leukocytosis  Maybe steroid induced   Continue to monitor, pt is afebrile    Dizziness/Bradycardia  TTE as above   EKG: sinus bradycardia, HR 40s, d/c tele   replete electrolytes   EP consult - no interventions     Hyperglycemia, DM Type II - New  Pt stated he has no Hx DM  HB A1c 7.4%   insulin titration w guidance of pharmacy appreciated  Nutrition education     Hypokalemia/Hypomagnesemia  replaced  monitor     Hyponatremia, resolved   likely 2/2 poor oral intake     Severe protein-calorie malnutrition  -Nutrition consult appreciated     DVT PPX: start lovenox sq  ( refusing)     Dispo; d/c planning for tomorrow     pt states that he cannot walk, will have PT eval            38M w/Hx of Pancreatitis,  Crohn's s/p ileostomy presents due to intractable lower abdominal pain, nausea, decreased ostomy output.     Abdominal pain due to Colitis / Crohn's disease flare up  Decreased output for ileostomy, now better   Ct Abdomen/pelvis reviewed: Long segment of descending colon  Inflammation likely 2/2 to Crohn's flare up  Cdiff and GI PCR neg   s/p IV Zosyn   s/p  hydrocortisone, switched to po prednisone   Dilaudid po PRN pain   Zofran and tylenol PRN  Monitor electrolytes  -appreciate  GI and surgery recs  ESR, CRP normalized now  Repeated CT abd/ pelvis today with Inflammation has improved, largely resolved. Ongoing proctitis with multiple Seton catheters within perianal fistulas    Leukocytosis  Maybe steroid induced   Continue to monitor, pt is afebrile    Dizziness/Bradycardia  TTE as above   EKG: sinus bradycardia, HR 40s, d/c tele   replete electrolytes   EP consult - no interventions   Orthostatic vitals normal   Electrolytes normal today     Hyperglycemia, DM Type II - New  Pt stated he has no Hx DM  HB A1c 7.4%   insulin titration w guidance of pharmacy appreciated  Nutrition education     Hypokalemia/Hypomagnesemia  replaced  monitor     Hyponatremia, resolved   likely 2/2 poor oral intake     Severe protein-calorie malnutrition  -Nutrition consult appreciated     DVT PPX: start lovenox sq  ( refusing)     Dispo; d/c planning for tomorrow     pt states that he cannot walk, will have PT eval

## 2024-01-27 NOTE — PROGRESS NOTE ADULT - SUBJECTIVE AND OBJECTIVE BOX
HPI: 38M w/Hx of Crohn's s/p ileostomy presents due to intractable lower abdominal pain, nausea, decreased ostomy output. Pt reports symptoms remind him of Crohn's flares in the past but has not had a flare in years. Came into the ED for eval yesterday was determined safe for discharge but returned as his pain got worse. Pt reports he is no longer on biologic medicine because he lost his job and can longer afford it without insurance. He denies bleeding. Patient denies fever, chills, chest pain, SOB, headache, dizziness, dysuria. (18 Jan 2024 02:05)    1/24: Patient seen today, c/o continued abdominal pain, midline and towards L side, intermittent nausea. Inflammatory markers trending down, repeat CT with improved findings.   1/25: Patient seen today, reported abd pain, no other complaints, iv steroids switched to po today.   1/26: Patient seen and examined this AM, c/o dizziness, and pain to entire body including joints, noted bradycardic this morning, placed on tele, HR was in 30-40s.   1/27: Patient seen this AM, no further dizziness, continued pain to entire body, some abdominal pain, no fevers, tolerating orally. NSR on tele. Orthostatic vitals normal.       REVIEW OF SYSTEMS:    CONSTITUTIONAL: No weakness, fevers or chills  EYES/ENT: No visual changes;  No vertigo or throat pain   NECK: No pain or stiffness  RESPIRATORY: No cough, wheezing, hemoptysis; No shortness of breath  CARDIOVASCULAR: No chest pain or palpitations  GASTROINTESTINAL: No abdominal or epigastric pain. No nausea, vomiting, or hematemesis; No diarrhea or constipation. No melena or hematochezia.  GENITOURINARY: No dysuria, frequency or hematuria  NEUROLOGICAL: No numbness or weakness, + dizizness  SKIN: No itching, rashes    Vital Signs Last 24 Hrs  T(C): 36.6 (27 Jan 2024 07:01), Max: 36.9 (26 Jan 2024 20:45)  T(F): 97.8 (27 Jan 2024 07:01), Max: 98.5 (26 Jan 2024 20:45)  HR: 48 (27 Jan 2024 07:01) (48 - 64)  BP: 111/63 (27 Jan 2024 07:01) (111/63 - 120/67)  RR: 18 (27 Jan 2024 07:01) (18 - 18)  SpO2: 100% (27 Jan 2024 07:01) (98% - 100%)    Parameters below as of 27 Jan 2024 07:01  Patient On (Oxygen Delivery Method): room air    PHYSICAL EXAM:  GENERAL: NAD, lying in bed comfortably  HEAD:  Atraumatic, Normocephalic  EYES: conjunctiva and sclera clear  ENT: Moist mucous membranes  NECK: Supple, No JVD  CHEST/LUNG: Clear to auscultation bilaterally; No rales, rhonchi, wheezing. Unlabored respirations  HEART: Regular rate and rhythm; No murmurs  ABDOMEN: Bowel sounds present; Soft, Nontender, Nondistended. + ostomy   EXTREMITIES:  2+ Peripheral Pulses, brisk capillary refill. No clubbing, cyanosis, or edema  NERVOUS SYSTEM:  Alert & Oriented X3, speech clear. No deficits   MSK: FROM all 4 extremities, full and equal strength    MEDICATIONS  (STANDING):  dextrose 5%. 1000 milliLiter(s) (50 mL/Hr) IV Continuous <Continuous>  dextrose 5%. 1000 milliLiter(s) (100 mL/Hr) IV Continuous <Continuous>  dextrose 50% Injectable 12.5 Gram(s) IV Push once  dextrose 50% Injectable 25 Gram(s) IV Push once  dextrose 50% Injectable 25 Gram(s) IV Push once  enoxaparin Injectable 40 milliGRAM(s) SubCutaneous every 24 hours  glucagon  Injectable 1 milliGRAM(s) IntraMuscular once  influenza   Vaccine 0.5 milliLiter(s) IntraMuscular once  insulin glargine Injectable (LANTUS) 14 Unit(s) SubCutaneous at bedtime  insulin lispro (ADMELOG) corrective regimen sliding scale   SubCutaneous three times a day before meals  insulin lispro (ADMELOG) corrective regimen sliding scale   SubCutaneous at bedtime  insulin lispro Injectable (ADMELOG) 4 Unit(s) SubCutaneous three times a day before meals  naloxone Injectable 0.4 milliGRAM(s) IV Push once  pantoprazole    Tablet 40 milliGRAM(s) Oral daily  polyethylene glycol 3350 17 Gram(s) Oral daily  predniSONE   Tablet 50 milliGRAM(s) Oral daily  senna 2 Tablet(s) Oral at bedtime    MEDICATIONS  (PRN):  acetaminophen     Tablet .. 650 milliGRAM(s) Oral every 6 hours PRN Temp greater or equal to 38C (100.4F), Mild Pain (1 - 3)  aluminum hydroxide/magnesium hydroxide/simethicone Suspension 30 milliLiter(s) Oral every 4 hours PRN Dyspepsia  bisacodyl 5 milliGRAM(s) Oral daily PRN Constipation  dextrose Oral Gel 15 Gram(s) Oral once PRN Blood Glucose LESS THAN 70 milliGRAM(s)/deciliter  HYDROmorphone   Tablet 4 milliGRAM(s) Oral every 6 hours PRN Severe Pain (7 - 10)  HYDROmorphone   Tablet 2 milliGRAM(s) Oral every 6 hours PRN Moderate Pain (4 - 6)  melatonin 3 milliGRAM(s) Oral at bedtime PRN Insomnia  ondansetron Injectable 4 milliGRAM(s) IV Push every 8 hours PRN Nausea and/or Vomiting  zolpidem 5 milliGRAM(s) Oral at bedtime PRN Insomnia        LABS:                                     13.6   15.03 )-----------( 303      ( 27 Jan 2024 07:57 )             40.6   01-27    140  |  107  |  15  ----------------------------<  192<H>  3.8   |  29  |  0.72    Ca    8.7      27 Jan 2024 07:57  Phos  3.5     01-27  Mg     2.0     01-27      CAPILLARY BLOOD GLUCOSE      POCT Blood Glucose.: 147 mg/dL (27 Jan 2024 12:14)  POCT Blood Glucose.: 205 mg/dL (27 Jan 2024 07:50)  POCT Blood Glucose.: 259 mg/dL (26 Jan 2024 20:40)  POCT Blood Glucose.: 305 mg/dL (26 Jan 2024 16:41)              Urinalysis Basic - ( 24 Jan 2024 09:23 )    Color: x / Appearance: x / SG: x / pH: x  Gluc: 349 mg/dL / Ketone: x  / Bili: x / Urobili: x   Blood: x / Protein: x / Nitrite: x   Leuk Esterase: x / RBC: x / WBC x   Sq Epi: x / Non Sq Epi: x / Bacteria: x        RADIOLOGY:      < from: CT Abdomen and Pelvis w/ IV Cont (01.24.24 @ 15:26) >  LIVER: Within normal limits.  BILE DUCTS: Normal caliber.  GALLBLADDER: Contracted  SPLEEN: Within normal limits.  PANCREAS: Within normal limits.  ADRENALS: Within normal limits.  KIDNEYS/URETERS: Within normal limits.    BLADDER: Decompressed.  REPRODUCTIVE ORGANS: Prostate within normal limits.    BOWEL: No bowel obstruction. Appendix is normal. Diverting loop ileostomy   in the right lower quadrant. Previously seen thickening of the descending   and sigmoid colon is improved, largely resolved. There is ongoing   inflammation of the rectum as well as multiple Seton catheters within   perianal fistulas.  PERITONEUM: No ascites.  VESSELS: Within normal limits.  RETROPERITONEUM/LYMPH NODES: No lymphadenopathy.  ABDOMINAL WALL: Within normal limits.  BONES: Within normal limits.    IMPRESSION:  Previously seen: Inflammation has improved, largely resolved. 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      < from: TTE Echo Complete w/o Contrast w/ Doppler (01.26.24 @ 11:32) >   The left ventricle is normal in size, wall thickness, wall motion and   contractility.   Estimated left ventricular ejection fraction is 60 %.   Normal appearing left atrium.   Normal appearing right atrium.   Normal appearing right ventricle structure and function.   Normal aortic valve structure and function.   Trace aortic regurgitation is present.   Normal appearing mitral valve structure and function.   Trace mitralregurgitation is present.   Normal appearing tricuspid valve structure and function.   Mild (1+) tricuspid valve regurgitation is present.   Normal appearing pulmonic valve structure and function.   Trace pulmonic valvular regurgitation is present.  No evidence of pericardial effusion.

## 2024-01-27 NOTE — CONSULT NOTE ADULT - SUBJECTIVE AND OBJECTIVE BOX
Electrophysiology  CONSULTATION NOTE                                                                                             Consult requested by:  Dr Palmer Hospitalist   Reason for Consultation: Bradycardia  History obtained by: Patient and medical record   obtained: No    Chief complaint:    Patient is a 38y old  Male who presents with a chief complaint of Crohn's Flare (26 Jan 2024 17:10)    HPI:  38M w/Hx of Crohn's s/p ileostomy presents due to intractable lower abdominal pain, nausea, decreased ostomy output. Pt reports symptoms remind him of Crohn's flares in the past but has not had a flare in years. Came into the ED for eval yesterday was determined safe for discharge but returned as his pain got worse. Pt reports he is no longer on biologic medicine because he lost his job and can longer afford it without insurance. He denies bleeding. Patient denies fever, chills, chest pain, SOB, headache, dizziness, dysuria. (18 Jan 2024 02:05)    1/27/24-EP asked to evaluate pt this am in the setting  of a Crohn's flare up with intense abdominal pain, demonstrating sinus tahira ( lowest was 48 since yesterday) on tele. Pt denies any c/o CP or palpitations ever, had + dizziness Thursday evening-(none since then). does experience occ palpitations. Discussed his recent hospitalization at length with pt, encouraged him to optimize hydration, change position slowly when getting OOB, and optimize pain control measures via GI recommendations ( non-opiod preferred).      REVIEW OF SYMPTOMS:   General: +  fatigue, no fevers/chills  Respiratory: No dyspnea, no cough, no wheeze  CV: No chest pain, no palpitations  Abd: No nausea + LLQ abdominal pain and tenderness  Neuro: No headache, no dizziness      PREVIOUS DIAGNOSTIC TESTING  ECHO FINDINGS:     Patient name        CHIQUI BALLESTEROS    Age     38 year(s)     Med Rec #           [id number]         Gender        Male     Account #           [id number]      Date of Birth 1985     Interpreting        Robe Mike MD  Room Number   0532   Physician     Referring Physician Cheryl Palmer     Sonographer   Jamaal Connolly                                                       [facility]     Date of study       01/26/2024 11:23                       AM     Height              72.05 in          Weight        158.73 pounds    Type of Study:     TTE procedure: ECHO TTE WO CON COMP W DOP     BP: 129/73 mmHg     Study Location: 5ET[employer]chnical Quality: Fair    Indications   1) R94.31 - Abnormal electrocardiogram ECG EKG    M-Mode Measurements (cm)     LVEDd: 5.03 cm            LVESd: 3.3 cm   IVSEd: 0.85 cm   LVPWd: 0.86 cm            AO Root Dimension: 2.9 cm                             ACS: 1.9 cm                             LA: 3.3 cm    Doppler Measurements:     AV Velocity:127 cm/s               MV Peak E-Wave: 98 cm/s   AV Peak Gradient: 6.45 mmHg        MV Peak A-Wave: 47.7 cm/s                                      MV E/A Ratio: 2.05 %   TR Velocity:186 cm/s               MV Peak Gradient: 3.84 mmHg   TR Gradient:13.8384 mmHg   Estimated RAP:3 mmHg   RVSP:24 mmHg     Findings     Mitral Valve   Normal appearing mitral valve structure and function.   Trace mitral regurgitation is present.     Aortic Valve   Normal aortic valve structure and function.   Trace aortic regurgitation is present.     Tricuspid Valve   Normal appearing tricuspid valve structure and function.   Mild (1+) tricuspid valve regurgitation is present.     Pulmonic Valve   Normal appearing pulmonic valve structure and function.   Trace pulmonic valvular regurgitation is present.     Left Atrium   Normal appearing left atrium.     Left Ventricle   The left ventricle is normal in size, wall thickness, wall motion and   contractility.   Estimated left ventricular ejection fraction is 60 %.     Right Atrium   Normal appearing right atrium.     RightVentricle   Normal appearing right ventricle structure and function.     Pericardial Effusion   No evidence of pericardial effusion.     Pleural Effusion   No evidence of pleural effusion.     Miscellaneous   All visualized extra cardiac structures appears to be normal.   The IVC appears normal.     Impression     Summary     The left ventricle is normal in size, wall thickness, wall motion and   contractility.   Estimated left ventricular ejection fraction is 60 %.   Normal appearing left atrium.   Normal appearing right atrium.   Normal appearing right ventricle structure and function.   Normal aortic valve structure and function.   Trace aortic regurgitation is present.   Normal appearing mitral valve structure and function.   Trace mitralregurgitation is present.   Normal appearing tricuspid valve structure and function.   Mild (1+) tricuspid valve regurgitation is present.   Normal appearing pulmonic valve structure and function.   Trace pulmonic valvular regurgitation is present.  No evidence of pericardial effusion.            ALLERGIES: Allergies    ciprofloxacin (Other (Mild to Mod))    Intolerances          PAST MEDICAL HISTORY  Crohn's disease of large intestine without complication    Pancreatitis        PAST SURGICAL HISTORY  S/P ORIF (open reduction internal fixation) fracture    History of colonoscopy    Perianal fistula        FAMILY HISTORY:  Diabetes mellitus (Father)        SOCIAL HISTORY:  + active smoking 1/2 PPD x 20 years-refusing nicotine patch offerred / denies alcohol/drugs + heavy caffeine use daily  Lives with his father and occ kids      CURRENT MEDICATIONS:     pantoprazole    Tablet  polyethylene glycol 3350  senna  dextrose 5%.  dextrose 5%.  dextrose 50% Injectable  dextrose 50% Injectable  dextrose 50% Injectable  enoxaparin Injectable  glucagon  Injectable  influenza   Vaccine  insulin glargine Injectable (LANTUS)  insulin lispro (ADMELOG) corrective regimen sliding scale  insulin lispro (ADMELOG) corrective regimen sliding scale  insulin lispro Injectable (ADMELOG)  naloxone Injectable  predniSONE   Tablet        Vital Signs Last 24 Hrs  T(C): 36.6 (27 Jan 2024 07:01), Max: 36.9 (26 Jan 2024 20:45)  T(F): 97.8 (27 Jan 2024 07:01), Max: 98.5 (26 Jan 2024 20:45)  HR: 48 (27 Jan 2024 07:01) (48 - 64)  BP: 111/63 (27 Jan 2024 07:01) (111/63 - 124/73)  BP(mean): --  RR: 18 (27 Jan 2024 07:01) (18 - 18)  SpO2: 100% (27 Jan 2024 07:01) (98% - 100%)    Parameters below as of 27 Jan 2024 07:01  Patient On (Oxygen Delivery Method): room air      PHYSICAL EXAM:  Constitutional: Comfortable . Mild distress r/t abdominal pain  HEENT: Atraumatic and normocephalic , neck is supple . no JVD. No carotid bruit. PEERL   CNS: A&Ox3. No focal deficits.  Lymph Nodes: Cervical : Not palpable.  Respiratory: CTAB  Cardiovascular: S1S2 RRR. No murmur/rubs or gallop.  Gastrointestinal: Soft non-tender and non distended . +Bowel sounds. Ostomy functioning in situ, + LLQ abdominal pain-tender to palpation  Extremities: No edema.   Psychiatric: Calm . no agitation.  Skin: No skin rash/ulcers visualized to face, hands or feet.    Intake and output:     LABS:                        13.6   15.03 )-----------( 303      ( 27 Jan 2024 07:57 )             40.6     01-27    140  |  107  |  15  ----------------------------<  192<H>  3.8   |  29  |  0.72    Ca    8.7      27 Jan 2024 07:57  Phos  3.5     01-27  Mg     2.0     01-27        ;p-BNP=    Urinalysis Basic - ( 27 Jan 2024 07:57 )    Color: x / Appearance: x / SG: x / pH: x  Gluc: 192 mg/dL / Ketone: x  / Bili: x / Urobili: x   Blood: x / Protein: x / Nitrite: x   Leuk Esterase: x / RBC: x / WBC x   Sq Epi: x / Non Sq Epi: x / Bacteria: x        INTERPRETATION OF TELEMETRY: Reviewed by me. NSR-sinus tahira ( lowest HR was 48) Avg HR 70-80s    ECG: Reviewed by me.   SInus bradycardia HR 48  no acute changes

## 2024-01-28 LAB
ADD ON TEST-SPECIMEN IN LAB: SIGNIFICANT CHANGE UP
ANION GAP SERPL CALC-SCNC: 2 MMOL/L — LOW (ref 5–17)
APPEARANCE UR: CLEAR — SIGNIFICANT CHANGE UP
BASOPHILS # BLD AUTO: 0.04 K/UL — SIGNIFICANT CHANGE UP (ref 0–0.2)
BASOPHILS NFR BLD AUTO: 0.2 % — SIGNIFICANT CHANGE UP (ref 0–2)
BILIRUB UR-MCNC: NEGATIVE — SIGNIFICANT CHANGE UP
BUN SERPL-MCNC: 22 MG/DL — SIGNIFICANT CHANGE UP (ref 7–23)
CALCIUM SERPL-MCNC: 9.2 MG/DL — SIGNIFICANT CHANGE UP (ref 8.5–10.1)
CHLORIDE SERPL-SCNC: 103 MMOL/L — SIGNIFICANT CHANGE UP (ref 96–108)
CO2 SERPL-SCNC: 29 MMOL/L — SIGNIFICANT CHANGE UP (ref 22–31)
COLOR SPEC: YELLOW — SIGNIFICANT CHANGE UP
CREAT SERPL-MCNC: 1.06 MG/DL — SIGNIFICANT CHANGE UP (ref 0.5–1.3)
DIFF PNL FLD: NEGATIVE — SIGNIFICANT CHANGE UP
EGFR: 92 ML/MIN/1.73M2 — SIGNIFICANT CHANGE UP
EOSINOPHIL # BLD AUTO: 0.09 K/UL — SIGNIFICANT CHANGE UP (ref 0–0.5)
EOSINOPHIL NFR BLD AUTO: 0.5 % — SIGNIFICANT CHANGE UP (ref 0–6)
GLUCOSE SERPL-MCNC: 205 MG/DL — HIGH (ref 70–99)
GLUCOSE UR QL: >=1000 MG/DL
HCT VFR BLD CALC: 41.9 % — SIGNIFICANT CHANGE UP (ref 39–50)
HGB BLD-MCNC: 14.2 G/DL — SIGNIFICANT CHANGE UP (ref 13–17)
IMM GRANULOCYTES NFR BLD AUTO: 2 % — HIGH (ref 0–0.9)
KETONES UR-MCNC: ABNORMAL MG/DL
LACTATE SERPL-SCNC: 2.1 MMOL/L — HIGH (ref 0.7–2)
LACTATE SERPL-SCNC: 2.6 MMOL/L — HIGH (ref 0.7–2)
LACTATE SERPL-SCNC: 5.4 MMOL/L — CRITICAL HIGH (ref 0.7–2)
LEUKOCYTE ESTERASE UR-ACNC: NEGATIVE — SIGNIFICANT CHANGE UP
LIDOCAIN IGE QN: 33 U/L — SIGNIFICANT CHANGE UP (ref 13–75)
LYMPHOCYTES # BLD AUTO: 17 % — SIGNIFICANT CHANGE UP (ref 13–44)
LYMPHOCYTES # BLD AUTO: 3.15 K/UL — SIGNIFICANT CHANGE UP (ref 1–3.3)
MCHC RBC-ENTMCNC: 32.9 PG — SIGNIFICANT CHANGE UP (ref 27–34)
MCHC RBC-ENTMCNC: 33.9 GM/DL — SIGNIFICANT CHANGE UP (ref 32–36)
MCV RBC AUTO: 97.2 FL — SIGNIFICANT CHANGE UP (ref 80–100)
MONOCYTES # BLD AUTO: 1.66 K/UL — HIGH (ref 0–0.9)
MONOCYTES NFR BLD AUTO: 9 % — SIGNIFICANT CHANGE UP (ref 2–14)
NEUTROPHILS # BLD AUTO: 13.22 K/UL — HIGH (ref 1.8–7.4)
NEUTROPHILS NFR BLD AUTO: 71.3 % — SIGNIFICANT CHANGE UP (ref 43–77)
NITRITE UR-MCNC: NEGATIVE — SIGNIFICANT CHANGE UP
PH UR: 5 — SIGNIFICANT CHANGE UP (ref 5–8)
PLATELET # BLD AUTO: 317 K/UL — SIGNIFICANT CHANGE UP (ref 150–400)
POTASSIUM SERPL-MCNC: 4.2 MMOL/L — SIGNIFICANT CHANGE UP (ref 3.5–5.3)
POTASSIUM SERPL-SCNC: 4.2 MMOL/L — SIGNIFICANT CHANGE UP (ref 3.5–5.3)
PROT UR-MCNC: NEGATIVE MG/DL — SIGNIFICANT CHANGE UP
RBC # BLD: 4.31 M/UL — SIGNIFICANT CHANGE UP (ref 4.2–5.8)
RBC # FLD: 14 % — SIGNIFICANT CHANGE UP (ref 10.3–14.5)
SODIUM SERPL-SCNC: 134 MMOL/L — LOW (ref 135–145)
SP GR SPEC: 1.02 — SIGNIFICANT CHANGE UP (ref 1–1.03)
UROBILINOGEN FLD QL: 0.2 MG/DL — SIGNIFICANT CHANGE UP (ref 0.2–1)
WBC # BLD: 18.54 K/UL — HIGH (ref 3.8–10.5)
WBC # FLD AUTO: 18.54 K/UL — HIGH (ref 3.8–10.5)

## 2024-01-28 PROCEDURE — 99233 SBSQ HOSP IP/OBS HIGH 50: CPT

## 2024-01-28 PROCEDURE — 74177 CT ABD & PELVIS W/CONTRAST: CPT | Mod: 26

## 2024-01-28 RX ORDER — ACETAMINOPHEN 500 MG
1000 TABLET ORAL ONCE
Refills: 0 | Status: COMPLETED | OUTPATIENT
Start: 2024-01-28 | End: 2024-01-28

## 2024-01-28 RX ORDER — SODIUM CHLORIDE 9 MG/ML
500 INJECTION INTRAMUSCULAR; INTRAVENOUS; SUBCUTANEOUS ONCE
Refills: 0 | Status: COMPLETED | OUTPATIENT
Start: 2024-01-28 | End: 2024-01-28

## 2024-01-28 RX ORDER — INSULIN GLARGINE 100 [IU]/ML
15 INJECTION, SOLUTION SUBCUTANEOUS AT BEDTIME
Refills: 0 | Status: DISCONTINUED | OUTPATIENT
Start: 2024-01-28 | End: 2024-01-28

## 2024-01-28 RX ORDER — PIPERACILLIN AND TAZOBACTAM 4; .5 G/20ML; G/20ML
3.38 INJECTION, POWDER, LYOPHILIZED, FOR SOLUTION INTRAVENOUS EVERY 8 HOURS
Refills: 0 | Status: DISCONTINUED | OUTPATIENT
Start: 2024-01-29 | End: 2024-01-31

## 2024-01-28 RX ORDER — SODIUM CHLORIDE 9 MG/ML
2000 INJECTION INTRAMUSCULAR; INTRAVENOUS; SUBCUTANEOUS ONCE
Refills: 0 | Status: COMPLETED | OUTPATIENT
Start: 2024-01-28 | End: 2024-01-28

## 2024-01-28 RX ORDER — PIPERACILLIN AND TAZOBACTAM 4; .5 G/20ML; G/20ML
3.38 INJECTION, POWDER, LYOPHILIZED, FOR SOLUTION INTRAVENOUS ONCE
Refills: 0 | Status: COMPLETED | OUTPATIENT
Start: 2024-01-28 | End: 2024-01-28

## 2024-01-28 RX ORDER — SODIUM CHLORIDE 9 MG/ML
1000 INJECTION INTRAMUSCULAR; INTRAVENOUS; SUBCUTANEOUS
Refills: 0 | Status: DISCONTINUED | OUTPATIENT
Start: 2024-01-28 | End: 2024-01-29

## 2024-01-28 RX ORDER — HYDROMORPHONE HYDROCHLORIDE 2 MG/ML
0.5 INJECTION INTRAMUSCULAR; INTRAVENOUS; SUBCUTANEOUS ONCE
Refills: 0 | Status: DISCONTINUED | OUTPATIENT
Start: 2024-01-28 | End: 2024-01-28

## 2024-01-28 RX ORDER — PIPERACILLIN AND TAZOBACTAM 4; .5 G/20ML; G/20ML
3.38 INJECTION, POWDER, LYOPHILIZED, FOR SOLUTION INTRAVENOUS ONCE
Refills: 0 | Status: COMPLETED | OUTPATIENT
Start: 2024-01-29 | End: 2024-01-28

## 2024-01-28 RX ORDER — INSULIN GLARGINE 100 [IU]/ML
12 INJECTION, SOLUTION SUBCUTANEOUS AT BEDTIME
Refills: 0 | Status: DISCONTINUED | OUTPATIENT
Start: 2024-01-28 | End: 2024-01-29

## 2024-01-28 RX ORDER — SIMETHICONE 80 MG/1
80 TABLET, CHEWABLE ORAL
Refills: 0 | Status: COMPLETED | OUTPATIENT
Start: 2024-01-28 | End: 2024-01-30

## 2024-01-28 RX ADMIN — SODIUM CHLORIDE 500 MILLILITER(S): 9 INJECTION INTRAMUSCULAR; INTRAVENOUS; SUBCUTANEOUS at 18:30

## 2024-01-28 RX ADMIN — PIPERACILLIN AND TAZOBACTAM 25 GRAM(S): 4; .5 INJECTION, POWDER, LYOPHILIZED, FOR SOLUTION INTRAVENOUS at 17:16

## 2024-01-28 RX ADMIN — ZOLPIDEM TARTRATE 5 MILLIGRAM(S): 10 TABLET ORAL at 21:16

## 2024-01-28 RX ADMIN — SODIUM CHLORIDE 75 MILLILITER(S): 9 INJECTION INTRAMUSCULAR; INTRAVENOUS; SUBCUTANEOUS at 23:28

## 2024-01-28 RX ADMIN — HYDROMORPHONE HYDROCHLORIDE 4 MILLIGRAM(S): 2 INJECTION INTRAMUSCULAR; INTRAVENOUS; SUBCUTANEOUS at 18:31

## 2024-01-28 RX ADMIN — Medication 400 MILLIGRAM(S): at 23:55

## 2024-01-28 RX ADMIN — Medication 6: at 12:51

## 2024-01-28 RX ADMIN — INSULIN GLARGINE 12 UNIT(S): 100 INJECTION, SOLUTION SUBCUTANEOUS at 21:00

## 2024-01-28 RX ADMIN — HYDROMORPHONE HYDROCHLORIDE 2 MILLIGRAM(S): 2 INJECTION INTRAMUSCULAR; INTRAVENOUS; SUBCUTANEOUS at 21:42

## 2024-01-28 RX ADMIN — HYDROMORPHONE HYDROCHLORIDE 2 MILLIGRAM(S): 2 INJECTION INTRAMUSCULAR; INTRAVENOUS; SUBCUTANEOUS at 11:33

## 2024-01-28 RX ADMIN — HYDROMORPHONE HYDROCHLORIDE 4 MILLIGRAM(S): 2 INJECTION INTRAMUSCULAR; INTRAVENOUS; SUBCUTANEOUS at 13:39

## 2024-01-28 RX ADMIN — HYDROMORPHONE HYDROCHLORIDE 4 MILLIGRAM(S): 2 INJECTION INTRAMUSCULAR; INTRAVENOUS; SUBCUTANEOUS at 08:39

## 2024-01-28 RX ADMIN — PIPERACILLIN AND TAZOBACTAM 200 GRAM(S): 4; .5 INJECTION, POWDER, LYOPHILIZED, FOR SOLUTION INTRAVENOUS at 14:59

## 2024-01-28 RX ADMIN — SIMETHICONE 80 MILLIGRAM(S): 80 TABLET, CHEWABLE ORAL at 17:17

## 2024-01-28 RX ADMIN — Medication 4 UNIT(S): at 12:52

## 2024-01-28 RX ADMIN — Medication 6: at 17:16

## 2024-01-28 RX ADMIN — HYDROMORPHONE HYDROCHLORIDE 4 MILLIGRAM(S): 2 INJECTION INTRAMUSCULAR; INTRAVENOUS; SUBCUTANEOUS at 14:09

## 2024-01-28 RX ADMIN — SODIUM CHLORIDE 75 MILLILITER(S): 9 INJECTION INTRAMUSCULAR; INTRAVENOUS; SUBCUTANEOUS at 18:30

## 2024-01-28 RX ADMIN — SODIUM CHLORIDE 75 MILLILITER(S): 9 INJECTION INTRAMUSCULAR; INTRAVENOUS; SUBCUTANEOUS at 17:15

## 2024-01-28 RX ADMIN — HYDROMORPHONE HYDROCHLORIDE 4 MILLIGRAM(S): 2 INJECTION INTRAMUSCULAR; INTRAVENOUS; SUBCUTANEOUS at 09:09

## 2024-01-28 RX ADMIN — Medication 50 MILLIGRAM(S): at 08:38

## 2024-01-28 RX ADMIN — PIPERACILLIN AND TAZOBACTAM 25 GRAM(S): 4; .5 INJECTION, POWDER, LYOPHILIZED, FOR SOLUTION INTRAVENOUS at 23:55

## 2024-01-28 RX ADMIN — HYDROMORPHONE HYDROCHLORIDE 2 MILLIGRAM(S): 2 INJECTION INTRAMUSCULAR; INTRAVENOUS; SUBCUTANEOUS at 11:03

## 2024-01-28 RX ADMIN — Medication 4 UNIT(S): at 17:17

## 2024-01-28 RX ADMIN — Medication 4 UNIT(S): at 08:39

## 2024-01-28 RX ADMIN — SIMETHICONE 80 MILLIGRAM(S): 80 TABLET, CHEWABLE ORAL at 23:54

## 2024-01-28 RX ADMIN — Medication 2: at 08:39

## 2024-01-28 RX ADMIN — PANTOPRAZOLE SODIUM 40 MILLIGRAM(S): 20 TABLET, DELAYED RELEASE ORAL at 08:39

## 2024-01-28 RX ADMIN — HYDROMORPHONE HYDROCHLORIDE 2 MILLIGRAM(S): 2 INJECTION INTRAMUSCULAR; INTRAVENOUS; SUBCUTANEOUS at 21:00

## 2024-01-28 RX ADMIN — SODIUM CHLORIDE 1000 MILLILITER(S): 9 INJECTION INTRAMUSCULAR; INTRAVENOUS; SUBCUTANEOUS at 14:38

## 2024-01-28 RX ADMIN — HYDROMORPHONE HYDROCHLORIDE 4 MILLIGRAM(S): 2 INJECTION INTRAMUSCULAR; INTRAVENOUS; SUBCUTANEOUS at 00:37

## 2024-01-28 RX ADMIN — HYDROMORPHONE HYDROCHLORIDE 0.5 MILLIGRAM(S): 2 INJECTION INTRAMUSCULAR; INTRAVENOUS; SUBCUTANEOUS at 14:59

## 2024-01-28 NOTE — PROGRESS NOTE ADULT - ASSESSMENT
38M w/Hx of Pancreatitis,  Crohn's s/p ileostomy presents due to intractable lower abdominal pain, nausea, decreased ostomy output.     Worsening abdominal pain         Abdominal pain due to Colitis / Crohn's disease flare up  Decreased output for ileostomy, now better   Ct Abdomen/pelvis reviewed: Long segment of descending colon  Inflammation likely 2/2 to Crohn's flare up  Cdiff and GI PCR neg   s/p IV Zosyn   s/p  hydrocortisone, switched to po prednisone   Dilaudid po PRN pain   Zofran and tylenol PRN  Monitor electrolytes  -appreciate  GI and surgery recs  ESR, CRP normalized now  Repeated CT abd/ pelvis today with Inflammation has improved, largely resolved. Ongoing proctitis with multiple Seton catheters within perianal fistulas    Leukocytosis  Maybe steroid induced   Continue to monitor, pt is afebrile    Dizziness/Bradycardia  TTE as above   EKG: sinus bradycardia, HR 40s, d/c tele   replete electrolytes   EP consult - no interventions   Orthostatic vitals normal   Electrolytes normal today     Hyperglycemia, DM Type II - New  Pt stated he has no Hx DM  HB A1c 7.4%   insulin titration w guidance of pharmacy appreciated  Nutrition education     Hypokalemia/Hypomagnesemia  replaced  monitor     Hyponatremia, resolved   likely 2/2 poor oral intake     Severe protein-calorie malnutrition  -Nutrition consult appreciated     DVT PPX: start lovenox sq  ( refusing)     Dispo; d/c planning for tomorrow     pt states that he cannot walk, will have PT eval            38M w/Hx of Pancreatitis,  Crohn's s/p ileostomy presents due to intractable lower abdominal pain, nausea, decreased ostomy output.     Worsening abdominal pain   SIRS criteria, leukocytosis/tachycardia this AM  Elevated lactate  -Lactate 5.4  -IVF bolus   -Repeat blood cx, UA  -Restart zosyn  -CT abd/pelvis ordered  -Pain management   -Diet to CLD for now     Abdominal pain due to Colitis / Crohn's disease flare up  Decreased output for ileostomy, now better   -Ct Abdomen/pelvis reviewed: Long segment of descending colon  Inflammation likely 2/2 to Crohn's flare up  -Cdiff and GI PCR neg   -s/p IV Zosyn   -s/p  hydrocortisone, switched to po prednisone   -Dilaudid po PRN pain   -Zofran and tylenol PRN  -Monitor electrolytes  -appreciate  GI and surgery recs  -ESR, CRP normalized now  -Repeated CT abd/ pelvis today with Inflammation has improved, largely resolved. Ongoing proctitis with multiple Seton catheters within perianal fistulas    Leukocytosis  -Maybe steroid induced   -Continue to monitor, pt is afebrile    Dizziness/Bradycardia  -TTE as above   -EKG: sinus bradycardia, HR 40s, d/c tele   -replete electrolytes   -EP consult - no interventions   -Orthostatic vitals normal   -Electrolytes normal today     Hyperglycemia, DM Type II - New  -Pt stated he has no Hx DM  -HB A1c 7.4%   -insulin titration w guidance of pharmacy appreciated  -Nutrition education     Hypokalemia/Hypomagnesemia  replaced  monitor     Hyponatremia, resolved   -likely 2/2 poor oral intake     Severe protein-calorie malnutrition  -Nutrition consult appreciated     DVT PPX: start lovenox sq  ( refusing)     Dispo; Continue medical management

## 2024-01-28 NOTE — PROGRESS NOTE ADULT - SUBJECTIVE AND OBJECTIVE BOX
HPI: 38M w/Hx of Crohn's s/p ileostomy presents due to intractable lower abdominal pain, nausea, decreased ostomy output. Pt reports symptoms remind him of Crohn's flares in the past but has not had a flare in years. Came into the ED for eval yesterday was determined safe for discharge but returned as his pain got worse. Pt reports he is no longer on biologic medicine because he lost his job and can longer afford it without insurance. He denies bleeding. Patient denies fever, chills, chest pain, SOB, headache, dizziness, dysuria. (18 Jan 2024 02:05)    1/24: Patient seen today, c/o continued abdominal pain, midline and towards L side, intermittent nausea. Inflammatory markers trending down, repeat CT with improved findings.   1/25: Patient seen today, reported abd pain, no other complaints, iv steroids switched to po today.   1/26: Patient seen and examined this AM, c/o dizziness, and pain to entire body including joints, noted bradycardic this morning, placed on tele, HR was in 30-40s.   1/27: Patient seen this AM, no further dizziness, continued pain to entire body, some abdominal pain, no fevers, tolerating orally. NSR on tele. Orthostatic vitals normal.   1/28: Patient seen earlier this AM, feels well, no further dizziness, ambulated with PT, continued abdominal pain intermittently, tolerating orally, later, with worsening abdominal pain, worsening leukocytosis this morning, lactate elevated, blood cultures drawn, restarted zosyn, will change diet to CLD for now. CT abd/pelvis ordered.       REVIEW OF SYSTEMS:    CONSTITUTIONAL: No weakness, fevers or chills  EYES/ENT: No visual changes;  No vertigo or throat pain   NECK: No pain or stiffness  RESPIRATORY: No cough, wheezing, hemoptysis; No shortness of breath  CARDIOVASCULAR: No chest pain or palpitations  GASTROINTESTINAL: No abdominal or epigastric pain. No nausea, vomiting, or hematemesis; No diarrhea or constipation. No melena or hematochezia.  GENITOURINARY: No dysuria, frequency or hematuria  NEUROLOGICAL: No numbness or weakness  SKIN: No itching, rashes    Vital Signs Last 24 Hrs  T(C): 36.7 (28 Jan 2024 13:19), Max: 36.8 (27 Jan 2024 15:44)  T(F): 98 (28 Jan 2024 13:19), Max: 98.3 (27 Jan 2024 15:44)  HR: 77 (28 Jan 2024 13:19) (65 - 95)  BP: 128/69 (28 Jan 2024 13:19) (107/59 - 128/69)  RR: 16 (28 Jan 2024 13:19) (16 - 19)  SpO2: 98% (28 Jan 2024 13:19) (95% - 99%)    Parameters below as of 28 Jan 2024 13:19  Patient On (Oxygen Delivery Method): room air    PHYSICAL EXAM:  GENERAL: NAD, lying in bed comfortably  HEAD:  Atraumatic, Normocephalic  EYES: conjunctiva and sclera clear  ENT: Moist mucous membranes  NECK: Supple, No JVD  CHEST/LUNG: Clear to auscultation bilaterally; No rales, rhonchi, wheezing. Unlabored respirations  HEART: Regular rate and rhythm; No murmurs  ABDOMEN: Bowel sounds present; Soft, Nondistended. + ostomy, tender to epigastric region  EXTREMITIES:  2+ Peripheral Pulses, brisk capillary refill. No clubbing, cyanosis, or edema  NERVOUS SYSTEM:  Alert & Oriented X3, speech clear. No deficits   MSK: FROM all 4 extremities, full and equal strength    MEDICATIONS  (STANDING):  dextrose 5%. 1000 milliLiter(s) (50 mL/Hr) IV Continuous <Continuous>  dextrose 5%. 1000 milliLiter(s) (100 mL/Hr) IV Continuous <Continuous>  dextrose 50% Injectable 12.5 Gram(s) IV Push once  dextrose 50% Injectable 25 Gram(s) IV Push once  dextrose 50% Injectable 25 Gram(s) IV Push once  enoxaparin Injectable 40 milliGRAM(s) SubCutaneous every 24 hours  glucagon  Injectable 1 milliGRAM(s) IntraMuscular once  influenza   Vaccine 0.5 milliLiter(s) IntraMuscular once  insulin glargine Injectable (LANTUS) 14 Unit(s) SubCutaneous at bedtime  insulin lispro (ADMELOG) corrective regimen sliding scale   SubCutaneous three times a day before meals  insulin lispro (ADMELOG) corrective regimen sliding scale   SubCutaneous at bedtime  insulin lispro Injectable (ADMELOG) 4 Unit(s) SubCutaneous three times a day before meals  naloxone Injectable 0.4 milliGRAM(s) IV Push once  pantoprazole    Tablet 40 milliGRAM(s) Oral daily  polyethylene glycol 3350 17 Gram(s) Oral daily  predniSONE   Tablet 50 milliGRAM(s) Oral daily  senna 2 Tablet(s) Oral at bedtime    MEDICATIONS  (PRN):  acetaminophen     Tablet .. 650 milliGRAM(s) Oral every 6 hours PRN Temp greater or equal to 38C (100.4F), Mild Pain (1 - 3)  aluminum hydroxide/magnesium hydroxide/simethicone Suspension 30 milliLiter(s) Oral every 4 hours PRN Dyspepsia  bisacodyl 5 milliGRAM(s) Oral daily PRN Constipation  dextrose Oral Gel 15 Gram(s) Oral once PRN Blood Glucose LESS THAN 70 milliGRAM(s)/deciliter  HYDROmorphone   Tablet 4 milliGRAM(s) Oral every 6 hours PRN Severe Pain (7 - 10)  HYDROmorphone   Tablet 2 milliGRAM(s) Oral every 6 hours PRN Moderate Pain (4 - 6)  melatonin 3 milliGRAM(s) Oral at bedtime PRN Insomnia  ondansetron Injectable 4 milliGRAM(s) IV Push every 8 hours PRN Nausea and/or Vomiting  zolpidem 5 milliGRAM(s) Oral at bedtime PRN Insomnia        LABS:                                   14.2   18.54 )-----------( 317      ( 28 Jan 2024 08:05 )             41.9   01-28    134<L>  |  103  |  22  ----------------------------<  205<H>  4.2   |  29  |  1.06    Ca    9.2      28 Jan 2024 08:05  Phos  3.5     01-27  Mg     2.0     01-27      CAPILLARY BLOOD GLUCOSE      POCT Blood Glucose.: 292 mg/dL (28 Jan 2024 12:09)  POCT Blood Glucose.: 182 mg/dL (28 Jan 2024 07:40)  POCT Blood Glucose.: 239 mg/dL (27 Jan 2024 21:25)  POCT Blood Glucose.: 278 mg/dL (27 Jan 2024 17:23)  POCT Blood Glucose.: 269 mg/dL (27 Jan 2024 16:40)                Urinalysis Basic - ( 24 Jan 2024 09:23 )    Color: x / Appearance: x / SG: x / pH: x  Gluc: 349 mg/dL / Ketone: x  / Bili: x / Urobili: x   Blood: x / Protein: x / Nitrite: x   Leuk Esterase: x / RBC: x / WBC x   Sq Epi: x / Non Sq Epi: x / Bacteria: x        RADIOLOGY:      < from: CT Abdomen and Pelvis w/ IV Cont (01.24.24 @ 15:26) >  LIVER: Within normal limits.  BILE DUCTS: Normal caliber.  GALLBLADDER: Contracted  SPLEEN: Within normal limits.  PANCREAS: Within normal limits.  ADRENALS: Within normal limits.  KIDNEYS/URETERS: Within normal limits.    BLADDER: Decompressed.  REPRODUCTIVE ORGANS: Prostate within normal limits.    BOWEL: No bowel obstruction. Appendix is normal. Diverting loop ileostomy   in the right lower quadrant. Previously seen thickening of the descending   and sigmoid colon is improved, largely resolved. There is ongoing   inflammation of the rectum as well as multiple Seton catheters within   perianal fistulas.  PERITONEUM: No ascites.  VESSELS: Within normal limits.  RETROPERITONEUM/LYMPH NODES: No lymphadenopathy.  ABDOMINAL WALL: Within normal limits.  BONES: Within normal limits.    IMPRESSION:  Previously seen: Inflammation has improved, largely resolved. 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      < from: TTE Echo Complete w/o Contrast w/ Doppler (01.26.24 @ 11:32) >   The left ventricle is normal in size, wall thickness, wall motion and   contractility.   Estimated left ventricular ejection fraction is 60 %.   Normal appearing left atrium.   Normal appearing right atrium.   Normal appearing right ventricle structure and function.   Normal aortic valve structure and function.   Trace aortic regurgitation is present.   Normal appearing mitral valve structure and function.   Trace mitralregurgitation is present.   Normal appearing tricuspid valve structure and function.   Mild (1+) tricuspid valve regurgitation is present.   Normal appearing pulmonic valve structure and function.   Trace pulmonic valvular regurgitation is present.  No evidence of pericardial effusion.

## 2024-01-28 NOTE — PHYSICAL THERAPY INITIAL EVALUATION ADULT - PERTINENT HX OF CURRENT PROBLEM, REHAB EVAL
Pt admitted to  secondary to crohn's flare, abdominal pain, N&V. H/o Crohns. Pt agreeable to PT. States he feels much better today.

## 2024-01-29 LAB
ALBUMIN SERPL ELPH-MCNC: 3 G/DL — LOW (ref 3.3–5)
ALP SERPL-CCNC: 54 U/L — SIGNIFICANT CHANGE UP (ref 40–120)
ALT FLD-CCNC: 27 U/L — SIGNIFICANT CHANGE UP (ref 12–78)
ANION GAP SERPL CALC-SCNC: 4 MMOL/L — LOW (ref 5–17)
AST SERPL-CCNC: 6 U/L — LOW (ref 15–37)
BASOPHILS # BLD AUTO: 0.03 K/UL — SIGNIFICANT CHANGE UP (ref 0–0.2)
BASOPHILS NFR BLD AUTO: 0.2 % — SIGNIFICANT CHANGE UP (ref 0–2)
BILIRUB SERPL-MCNC: 0.3 MG/DL — SIGNIFICANT CHANGE UP (ref 0.2–1.2)
BUN SERPL-MCNC: 14 MG/DL — SIGNIFICANT CHANGE UP (ref 7–23)
CALCIUM SERPL-MCNC: 9.1 MG/DL — SIGNIFICANT CHANGE UP (ref 8.5–10.1)
CHLORIDE SERPL-SCNC: 106 MMOL/L — SIGNIFICANT CHANGE UP (ref 96–108)
CO2 SERPL-SCNC: 28 MMOL/L — SIGNIFICANT CHANGE UP (ref 22–31)
CREAT SERPL-MCNC: 0.7 MG/DL — SIGNIFICANT CHANGE UP (ref 0.5–1.3)
EGFR: 121 ML/MIN/1.73M2 — SIGNIFICANT CHANGE UP
EOSINOPHIL # BLD AUTO: 0.06 K/UL — SIGNIFICANT CHANGE UP (ref 0–0.5)
EOSINOPHIL NFR BLD AUTO: 0.4 % — SIGNIFICANT CHANGE UP (ref 0–6)
GLUCOSE BLDC GLUCOMTR-MCNC: 123 MG/DL — HIGH (ref 70–99)
GLUCOSE BLDC GLUCOMTR-MCNC: 130 MG/DL — HIGH (ref 70–99)
GLUCOSE BLDC GLUCOMTR-MCNC: 447 MG/DL — HIGH (ref 70–99)
GLUCOSE BLDC GLUCOMTR-MCNC: 96 MG/DL — SIGNIFICANT CHANGE UP (ref 70–99)
GLUCOSE SERPL-MCNC: 131 MG/DL — HIGH (ref 70–99)
HCT VFR BLD CALC: 41.3 % — SIGNIFICANT CHANGE UP (ref 39–50)
HGB BLD-MCNC: 13.9 G/DL — SIGNIFICANT CHANGE UP (ref 13–17)
IMM GRANULOCYTES NFR BLD AUTO: 1.5 % — HIGH (ref 0–0.9)
LYMPHOCYTES # BLD AUTO: 22.5 % — SIGNIFICANT CHANGE UP (ref 13–44)
LYMPHOCYTES # BLD AUTO: 3.79 K/UL — HIGH (ref 1–3.3)
MAGNESIUM SERPL-MCNC: 2.2 MG/DL — SIGNIFICANT CHANGE UP (ref 1.6–2.6)
MCHC RBC-ENTMCNC: 32.6 PG — SIGNIFICANT CHANGE UP (ref 27–34)
MCHC RBC-ENTMCNC: 33.7 GM/DL — SIGNIFICANT CHANGE UP (ref 32–36)
MCV RBC AUTO: 96.7 FL — SIGNIFICANT CHANGE UP (ref 80–100)
MONOCYTES # BLD AUTO: 1.43 K/UL — HIGH (ref 0–0.9)
MONOCYTES NFR BLD AUTO: 8.5 % — SIGNIFICANT CHANGE UP (ref 2–14)
NEUTROPHILS # BLD AUTO: 11.3 K/UL — HIGH (ref 1.8–7.4)
NEUTROPHILS NFR BLD AUTO: 66.9 % — SIGNIFICANT CHANGE UP (ref 43–77)
PHOSPHATE SERPL-MCNC: 4 MG/DL — SIGNIFICANT CHANGE UP (ref 2.5–4.5)
PLATELET # BLD AUTO: 300 K/UL — SIGNIFICANT CHANGE UP (ref 150–400)
POTASSIUM SERPL-MCNC: 3.8 MMOL/L — SIGNIFICANT CHANGE UP (ref 3.5–5.3)
POTASSIUM SERPL-SCNC: 3.8 MMOL/L — SIGNIFICANT CHANGE UP (ref 3.5–5.3)
PROT SERPL-MCNC: 6.7 GM/DL — SIGNIFICANT CHANGE UP (ref 6–8.3)
RBC # BLD: 4.27 M/UL — SIGNIFICANT CHANGE UP (ref 4.2–5.8)
RBC # FLD: 14.5 % — SIGNIFICANT CHANGE UP (ref 10.3–14.5)
SODIUM SERPL-SCNC: 138 MMOL/L — SIGNIFICANT CHANGE UP (ref 135–145)
WBC # BLD: 16.87 K/UL — HIGH (ref 3.8–10.5)
WBC # FLD AUTO: 16.87 K/UL — HIGH (ref 3.8–10.5)

## 2024-01-29 PROCEDURE — 99233 SBSQ HOSP IP/OBS HIGH 50: CPT

## 2024-01-29 PROCEDURE — 99232 SBSQ HOSP IP/OBS MODERATE 35: CPT | Mod: GC

## 2024-01-29 RX ORDER — INSULIN LISPRO 100/ML
2 VIAL (ML) SUBCUTANEOUS
Refills: 0 | Status: DISCONTINUED | OUTPATIENT
Start: 2024-01-29 | End: 2024-01-31

## 2024-01-29 RX ORDER — DEXTROSE 50 % IN WATER 50 %
25 SYRINGE (ML) INTRAVENOUS ONCE
Refills: 0 | Status: DISCONTINUED | OUTPATIENT
Start: 2024-01-29 | End: 2024-01-31

## 2024-01-29 RX ORDER — SODIUM CHLORIDE 9 MG/ML
1000 INJECTION, SOLUTION INTRAVENOUS
Refills: 0 | Status: DISCONTINUED | OUTPATIENT
Start: 2024-01-29 | End: 2024-01-31

## 2024-01-29 RX ORDER — DEXTROSE 50 % IN WATER 50 %
15 SYRINGE (ML) INTRAVENOUS ONCE
Refills: 0 | Status: DISCONTINUED | OUTPATIENT
Start: 2024-01-29 | End: 2024-01-31

## 2024-01-29 RX ORDER — HYDROMORPHONE HYDROCHLORIDE 2 MG/ML
1 INJECTION INTRAMUSCULAR; INTRAVENOUS; SUBCUTANEOUS EVERY 6 HOURS
Refills: 0 | Status: DISCONTINUED | OUTPATIENT
Start: 2024-01-29 | End: 2024-01-30

## 2024-01-29 RX ORDER — INSULIN LISPRO 100/ML
2 VIAL (ML) SUBCUTANEOUS
Refills: 0 | Status: DISCONTINUED | OUTPATIENT
Start: 2024-01-29 | End: 2024-01-29

## 2024-01-29 RX ORDER — DEXTROSE 50 % IN WATER 50 %
12.5 SYRINGE (ML) INTRAVENOUS ONCE
Refills: 0 | Status: DISCONTINUED | OUTPATIENT
Start: 2024-01-29 | End: 2024-01-31

## 2024-01-29 RX ORDER — INSULIN LISPRO 100/ML
VIAL (ML) SUBCUTANEOUS AT BEDTIME
Refills: 0 | Status: DISCONTINUED | OUTPATIENT
Start: 2024-01-29 | End: 2024-01-31

## 2024-01-29 RX ORDER — GLUCAGON INJECTION, SOLUTION 0.5 MG/.1ML
1 INJECTION, SOLUTION SUBCUTANEOUS ONCE
Refills: 0 | Status: DISCONTINUED | OUTPATIENT
Start: 2024-01-29 | End: 2024-01-31

## 2024-01-29 RX ORDER — INSULIN GLARGINE 100 [IU]/ML
14 INJECTION, SOLUTION SUBCUTANEOUS AT BEDTIME
Refills: 0 | Status: DISCONTINUED | OUTPATIENT
Start: 2024-01-29 | End: 2024-01-31

## 2024-01-29 RX ORDER — INSULIN LISPRO 100/ML
VIAL (ML) SUBCUTANEOUS
Refills: 0 | Status: DISCONTINUED | OUTPATIENT
Start: 2024-01-29 | End: 2024-01-31

## 2024-01-29 RX ADMIN — Medication 1000 MILLIGRAM(S): at 00:14

## 2024-01-29 RX ADMIN — Medication 4 UNIT(S): at 08:27

## 2024-01-29 RX ADMIN — Medication 50 MILLIGRAM(S): at 10:14

## 2024-01-29 RX ADMIN — HYDROMORPHONE HYDROCHLORIDE 1 MILLIGRAM(S): 2 INJECTION INTRAMUSCULAR; INTRAVENOUS; SUBCUTANEOUS at 23:02

## 2024-01-29 RX ADMIN — Medication 12: at 17:13

## 2024-01-29 RX ADMIN — PANTOPRAZOLE SODIUM 40 MILLIGRAM(S): 20 TABLET, DELAYED RELEASE ORAL at 10:14

## 2024-01-29 RX ADMIN — SIMETHICONE 80 MILLIGRAM(S): 80 TABLET, CHEWABLE ORAL at 05:50

## 2024-01-29 RX ADMIN — HYDROMORPHONE HYDROCHLORIDE 1 MILLIGRAM(S): 2 INJECTION INTRAMUSCULAR; INTRAVENOUS; SUBCUTANEOUS at 23:51

## 2024-01-29 RX ADMIN — HYDROMORPHONE HYDROCHLORIDE 1 MILLIGRAM(S): 2 INJECTION INTRAMUSCULAR; INTRAVENOUS; SUBCUTANEOUS at 14:51

## 2024-01-29 RX ADMIN — SODIUM CHLORIDE 75 MILLILITER(S): 9 INJECTION INTRAMUSCULAR; INTRAVENOUS; SUBCUTANEOUS at 12:05

## 2024-01-29 RX ADMIN — PIPERACILLIN AND TAZOBACTAM 25 GRAM(S): 4; .5 INJECTION, POWDER, LYOPHILIZED, FOR SOLUTION INTRAVENOUS at 14:04

## 2024-01-29 RX ADMIN — HYDROMORPHONE HYDROCHLORIDE 4 MILLIGRAM(S): 2 INJECTION INTRAMUSCULAR; INTRAVENOUS; SUBCUTANEOUS at 10:27

## 2024-01-29 RX ADMIN — HYDROMORPHONE HYDROCHLORIDE 4 MILLIGRAM(S): 2 INJECTION INTRAMUSCULAR; INTRAVENOUS; SUBCUTANEOUS at 04:06

## 2024-01-29 RX ADMIN — PIPERACILLIN AND TAZOBACTAM 25 GRAM(S): 4; .5 INJECTION, POWDER, LYOPHILIZED, FOR SOLUTION INTRAVENOUS at 20:57

## 2024-01-29 RX ADMIN — PIPERACILLIN AND TAZOBACTAM 25 GRAM(S): 4; .5 INJECTION, POWDER, LYOPHILIZED, FOR SOLUTION INTRAVENOUS at 05:50

## 2024-01-29 RX ADMIN — INSULIN GLARGINE 14 UNIT(S): 100 INJECTION, SOLUTION SUBCUTANEOUS at 20:54

## 2024-01-29 RX ADMIN — HYDROMORPHONE HYDROCHLORIDE 4 MILLIGRAM(S): 2 INJECTION INTRAMUSCULAR; INTRAVENOUS; SUBCUTANEOUS at 05:09

## 2024-01-29 RX ADMIN — ZOLPIDEM TARTRATE 5 MILLIGRAM(S): 10 TABLET ORAL at 20:55

## 2024-01-29 RX ADMIN — Medication 2 UNIT(S): at 17:15

## 2024-01-29 NOTE — PROGRESS NOTE ADULT - SUBJECTIVE AND OBJECTIVE BOX
Patient is a 38y old  Male who presents with a chief complaint of Crohn's flare    Followup: Crohn's flare, abdominal pain  At bedside, patient endorsing still abdominal pain LMQ radiating downward. "I'm so hungry"- consumed 2 x clears trays this morning.  Lactate elevated over weekend. Afebrile, normal hemodynamics.     MEDICATIONS  (STANDING):  dextrose 5%. 1000 milliLiter(s) (100 mL/Hr) IV Continuous <Continuous>  dextrose 5%. 1000 milliLiter(s) (50 mL/Hr) IV Continuous <Continuous>  dextrose 50% Injectable 25 Gram(s) IV Push once  dextrose 50% Injectable 25 Gram(s) IV Push once  dextrose 50% Injectable 12.5 Gram(s) IV Push once  enoxaparin Injectable 40 milliGRAM(s) SubCutaneous every 24 hours  glucagon  Injectable 1 milliGRAM(s) IntraMuscular once  influenza   Vaccine 0.5 milliLiter(s) IntraMuscular once  insulin glargine Injectable (LANTUS) 12 Unit(s) SubCutaneous at bedtime  insulin lispro (ADMELOG) corrective regimen sliding scale   SubCutaneous at bedtime  insulin lispro (ADMELOG) corrective regimen sliding scale   SubCutaneous three times a day before meals  insulin lispro Injectable (ADMELOG) 2 Unit(s) SubCutaneous three times a day before meals  naloxone Injectable 0.4 milliGRAM(s) IV Push once  pantoprazole    Tablet 40 milliGRAM(s) Oral daily  piperacillin/tazobactam IVPB.. 3.375 Gram(s) IV Intermittent every 8 hours  polyethylene glycol 3350 17 Gram(s) Oral daily  predniSONE   Tablet 50 milliGRAM(s) Oral daily  senna 2 Tablet(s) Oral at bedtime  simethicone 80 milliGRAM(s) Chew four times a day  sodium chloride 0.9%. 1000 milliLiter(s) (75 mL/Hr) IV Continuous <Continuous>    MEDICATIONS  (PRN):  acetaminophen     Tablet .. 650 milliGRAM(s) Oral every 6 hours PRN Temp greater or equal to 38C (100.4F), Mild Pain (1 - 3)  aluminum hydroxide/magnesium hydroxide/simethicone Suspension 30 milliLiter(s) Oral every 4 hours PRN Dyspepsia  bisacodyl 5 milliGRAM(s) Oral daily PRN Constipation  dextrose Oral Gel 15 Gram(s) Oral once PRN Blood Glucose LESS THAN 70 milliGRAM(s)/deciliter  HYDROmorphone   Tablet 2 milliGRAM(s) Oral every 6 hours PRN Moderate Pain (4 - 6)  HYDROmorphone  Injectable 1 milliGRAM(s) IV Push every 6 hours PRN Severe Pain (7 - 10)  melatonin 3 milliGRAM(s) Oral at bedtime PRN Insomnia  ondansetron Injectable 4 milliGRAM(s) IV Push every 8 hours PRN Nausea and/or Vomiting  zolpidem 5 milliGRAM(s) Oral at bedtime PRN Insomnia    Vital Signs Last 24 Hrs  T(C): 36.5 (28 Jan 2024 22:42), Max: 36.8 (28 Jan 2024 16:11)  T(F): 97.7 (28 Jan 2024 22:42), Max: 98.2 (28 Jan 2024 16:11)  HR: 64 (28 Jan 2024 22:42) (64 - 73)  BP: 123/70 (28 Jan 2024 22:42) (112/72 - 123/70)  BP(mean): --  RR: 18 (28 Jan 2024 22:42) (16 - 18)  SpO2: 97% (28 Jan 2024 22:42) (97% - 98%)    Parameters below as of 28 Jan 2024 22:42  Patient On (Oxygen Delivery Method): room air    PHYSICAL EXAM:    Constitutional: No acute distress, non-toxic appearing  HEENT: good phonation, not icteric  Neck: supple, no lymphadenopathy  Respiratory: clear to ascultation bilaterally, no wheezing  Cardiovascular: S1 and S2, regular rate and rhythm, no murmurs rubs or gallops  Gastrointestinal: soft, TTP LMQ, non-distended, +bowel sounds, no rebound or guarding, + surgical scars, ileostomy  Extremities: No peripheral edema, no cyanosis or clubbing  Vascular: 2+ peripheral pulses, no venous stasis  Neurological: A/O x 3, no focal deficits, no asterixis  Psychiatric: Normal mood, normal affect  Skin: No rashes, not jaundiced    LABS:                        13.9   16.87 )-----------( 300      ( 29 Jan 2024 07:24 )             41.3     01-29    138  |  106  |  14  ----------------------------<  131<H>  3.8   |  28  |  0.70    Ca    9.1      29 Jan 2024 07:24  Phos  4.0     01-29  Mg     2.2     01-29    TPro  6.7  /  Alb  3.0<L>  /  TBili  0.3  /  DBili  x   /  AST  6<L>  /  ALT  27  /  AlkPhos  54  01-29      LIVER FUNCTIONS - ( 29 Jan 2024 07:24 )  Alb: 3.0 g/dL / Pro: 6.7 gm/dL / ALK PHOS: 54 U/L / ALT: 27 U/L / AST: 6 U/L / GGT: x           RADIOLOGY & ADDITIONAL STUDIES:  FINDINGS:  LOWER CHEST: Right lung base subpleural lung cyst measuring 4.3 cm,   stable. Otherwise normal appearance of the lung bases.    LIVER: Within normal limits.  BILE DUCTS: Normal caliber.  GALLBLADDER: Contracted  SPLEEN: Within normal limits.  PANCREAS: Within normal limits.  ADRENALS: Within normal limits.  KIDNEYS/URETERS: No renal stones or hydronephrosis. Symmetric,   homogeneous enhancement.    BLADDER: Within normal limits.  REPRODUCTIVE ORGANS: Prostate is mildly enlarged.    BOWEL: The stomach is significantly distended and contains ingested   material, similar to prior study. The duodenum has normal caliber.   Enhancement of the gastric wall is preserved. No discrete mass is seen.   No pneumatosis. Small bowel has normal caliber.  Diverting ileostomy in   the right midabdomen. No bowel obstruction. No obvious segments with   bowel stricture or abnormal bowel wall thickening based on this CT   without bowel distention. No obvious enteric fistula.  There is increased enhancement, wall thickening and edema involving the   distal rectum, anorectal junction and anal canal with multiple setons in   place suggesting perianal fistula. One inflammatory tract is noted to   extent into the right ischioanal fossa concerning for an extrasphincteric   component of a perianal fistula, image 2:106. Similar to prior study.   Appendix is normal.  PERITONEUM: No ascites. No pneumoperitoneum, no abscess.  VESSELS: Hepatic veins, portal vein, SMV, renal veins and IVC are patent.   Abdominal aorta with normal caliber.  RETROPERITONEUM/LYMPH NODES: No lymphadenopathy. Very small mesenteric   lymph nodes.  ABDOMINAL WALL: Perianal fistula as described above.  BONES: Within normal limits.    IMPRESSION:    Diverting loop ileostomy in the right lower quadrant, no evidence of   bowel obstruction.    Gastric distention similar to prior study 1/24/2024 with retained   ingested food. No obvious mass in the duodenum. Findings may relate to   gastroparesis.    No obvious segments with abnormal bowel wall thickening, strictures or   enteroenteric fistula in the setting of Crohn's disease. Inflammatory   changes of the rectum, anorectal junction and anus with multiple setons   in place, most compatible with perianal fistula. Please note the bowel is   better evaluated with bowel distension as provided with CT enterography   or MR  enterography if clinically indicated.    No abscess, no pneumoperitoneum, no pneumatosis.     Patient is a 38y old  Male who presents with a chief complaint of Crohn's flare    Followup for: Crohn's flare, abdominal pain    At bedside, patient still endorsing sabdominal pain LMQ radiating downward. "I'm so hungry"- consumed 2 x clears trays this morning.  Lactate elevated over weekend. Afebrile, normal hemodynamics. Denies rectal pain/discomfort on sitting.     MEDICATIONS  (STANDING):  dextrose 5%. 1000 milliLiter(s) (100 mL/Hr) IV Continuous <Continuous>  dextrose 5%. 1000 milliLiter(s) (50 mL/Hr) IV Continuous <Continuous>  dextrose 50% Injectable 25 Gram(s) IV Push once  dextrose 50% Injectable 25 Gram(s) IV Push once  dextrose 50% Injectable 12.5 Gram(s) IV Push once  enoxaparin Injectable 40 milliGRAM(s) SubCutaneous every 24 hours  glucagon  Injectable 1 milliGRAM(s) IntraMuscular once  influenza   Vaccine 0.5 milliLiter(s) IntraMuscular once  insulin glargine Injectable (LANTUS) 12 Unit(s) SubCutaneous at bedtime  insulin lispro (ADMELOG) corrective regimen sliding scale   SubCutaneous at bedtime  insulin lispro (ADMELOG) corrective regimen sliding scale   SubCutaneous three times a day before meals  insulin lispro Injectable (ADMELOG) 2 Unit(s) SubCutaneous three times a day before meals  naloxone Injectable 0.4 milliGRAM(s) IV Push once  pantoprazole    Tablet 40 milliGRAM(s) Oral daily  piperacillin/tazobactam IVPB.. 3.375 Gram(s) IV Intermittent every 8 hours  polyethylene glycol 3350 17 Gram(s) Oral daily  predniSONE   Tablet 50 milliGRAM(s) Oral daily  senna 2 Tablet(s) Oral at bedtime  simethicone 80 milliGRAM(s) Chew four times a day  sodium chloride 0.9%. 1000 milliLiter(s) (75 mL/Hr) IV Continuous <Continuous>    MEDICATIONS  (PRN):  acetaminophen     Tablet .. 650 milliGRAM(s) Oral every 6 hours PRN Temp greater or equal to 38C (100.4F), Mild Pain (1 - 3)  aluminum hydroxide/magnesium hydroxide/simethicone Suspension 30 milliLiter(s) Oral every 4 hours PRN Dyspepsia  bisacodyl 5 milliGRAM(s) Oral daily PRN Constipation  dextrose Oral Gel 15 Gram(s) Oral once PRN Blood Glucose LESS THAN 70 milliGRAM(s)/deciliter  HYDROmorphone   Tablet 2 milliGRAM(s) Oral every 6 hours PRN Moderate Pain (4 - 6)  HYDROmorphone  Injectable 1 milliGRAM(s) IV Push every 6 hours PRN Severe Pain (7 - 10)  melatonin 3 milliGRAM(s) Oral at bedtime PRN Insomnia  ondansetron Injectable 4 milliGRAM(s) IV Push every 8 hours PRN Nausea and/or Vomiting  zolpidem 5 milliGRAM(s) Oral at bedtime PRN Insomnia    Vital Signs Last 24 Hrs  T(C): 36.5 (28 Jan 2024 22:42), Max: 36.8 (28 Jan 2024 16:11)  T(F): 97.7 (28 Jan 2024 22:42), Max: 98.2 (28 Jan 2024 16:11)  HR: 64 (28 Jan 2024 22:42) (64 - 73)  BP: 123/70 (28 Jan 2024 22:42) (112/72 - 123/70)  BP(mean): --  RR: 18 (28 Jan 2024 22:42) (16 - 18)  SpO2: 97% (28 Jan 2024 22:42) (97% - 98%)    Parameters below as of 28 Jan 2024 22:42  Patient On (Oxygen Delivery Method): room air    PHYSICAL EXAM:    Constitutional: No acute distress, non-toxic appearing  HEENT: good phonation, not icteric  Neck: supple, no lymphadenopathy  Respiratory: clear to ascultation bilaterally, no wheezing  Cardiovascular: S1 and S2, regular rate and rhythm, no murmurs rubs or gallops  Gastrointestinal: soft, tender to palpation in left middle quadrant, non-distended, +bowel sounds, no rebound or guarding, + surgical scars, ileostomy  Extremities: No peripheral edema, no cyanosis or clubbing  Vascular: 2+ peripheral pulses, no venous stasis  Neurological: A/O x 3, no focal deficits, no asterixis  Psychiatric: Normal mood, normal affect  Skin: No rashes, not jaundiced    LABS:                        13.9   16.87 )-----------( 300      ( 29 Jan 2024 07:24 )             41.3     01-29    138  |  106  |  14  ----------------------------<  131<H>  3.8   |  28  |  0.70    Ca    9.1      29 Jan 2024 07:24  Phos  4.0     01-29  Mg     2.2     01-29    TPro  6.7  /  Alb  3.0<L>  /  TBili  0.3  /  DBili  x   /  AST  6<L>  /  ALT  27  /  AlkPhos  54  01-29      LIVER FUNCTIONS - ( 29 Jan 2024 07:24 )  Alb: 3.0 g/dL / Pro: 6.7 gm/dL / ALK PHOS: 54 U/L / ALT: 27 U/L / AST: 6 U/L / GGT: x           RADIOLOGY & ADDITIONAL STUDIES:  FINDINGS:  LOWER CHEST: Right lung base subpleural lung cyst measuring 4.3 cm,   stable. Otherwise normal appearance of the lung bases.    LIVER: Within normal limits.  BILE DUCTS: Normal caliber.  GALLBLADDER: Contracted  SPLEEN: Within normal limits.  PANCREAS: Within normal limits.  ADRENALS: Within normal limits.  KIDNEYS/URETERS: No renal stones or hydronephrosis. Symmetric,   homogeneous enhancement.    BLADDER: Within normal limits.  REPRODUCTIVE ORGANS: Prostate is mildly enlarged.    BOWEL: The stomach is significantly distended and contains ingested   material, similar to prior study. The duodenum has normal caliber.   Enhancement of the gastric wall is preserved. No discrete mass is seen.   No pneumatosis. Small bowel has normal caliber.  Diverting ileostomy in   the right midabdomen. No bowel obstruction. No obvious segments with   bowel stricture or abnormal bowel wall thickening based on this CT   without bowel distention. No obvious enteric fistula.  There is increased enhancement, wall thickening and edema involving the   distal rectum, anorectal junction and anal canal with multiple setons in   place suggesting perianal fistula. One inflammatory tract is noted to   extent into the right ischioanal fossa concerning for an extrasphincteric   component of a perianal fistula, image 2:106. Similar to prior study.   Appendix is normal.  PERITONEUM: No ascites. No pneumoperitoneum, no abscess.  VESSELS: Hepatic veins, portal vein, SMV, renal veins and IVC are patent.   Abdominal aorta with normal caliber.  RETROPERITONEUM/LYMPH NODES: No lymphadenopathy. Very small mesenteric   lymph nodes.  ABDOMINAL WALL: Perianal fistula as described above.  BONES: Within normal limits.    IMPRESSION:    Diverting loop ileostomy in the right lower quadrant, no evidence of   bowel obstruction.    Gastric distention similar to prior study 1/24/2024 with retained   ingested food. No obvious mass in the duodenum. Findings may relate to   gastroparesis.    No obvious segments with abnormal bowel wall thickening, strictures or   enteroenteric fistula in the setting of Crohn's disease. Inflammatory   changes of the rectum, anorectal junction and anus with multiple setons   in place, most compatible with perianal fistula. Please note the bowel is   better evaluated with bowel distension as provided with CT enterography   or MR  enterography if clinically indicated.    No abscess, no pneumoperitoneum, no pneumatosis.

## 2024-01-29 NOTE — PROGRESS NOTE ADULT - ASSESSMENT
38 year old male with Crohn's admitted with flare.  Stool studies negative for infectious etiology with improvement in stool output and esr/crp with steroids.     Imp: Crohn's flare    Elevated lactate over weekend--->blood cultures pending  Repeat CT again with improvement.  Continue encourage non-opioid pain medications--->continue IV tylenol    PLAN:  ::Continue oral prednisone with taper on discharge  ::Low residue diet as tolerated  ::CRS to evaluate  ::Chemical VTE ppx recommended despite ambulatory, due to pro-inflammatory state

## 2024-01-29 NOTE — PROGRESS NOTE ADULT - SUBJECTIVE AND OBJECTIVE BOX
Reason for Admission: Crohn's Flare      · Subjective and Objective:   HPI: 38M w/Hx of Crohn's s/p ileostomy presents due to intractable lower abdominal pain, nausea, decreased ostomy output. Pt reports symptoms remind him of Crohn's flares in the past but has not had a flare in years. Came into the ED for eval yesterday was determined safe for discharge but returned as his pain got worse. Pt reports he is no longer on biologic medicine because he lost his job and can longer afford it without insurance. He denies bleeding. Patient denies fever, chills, chest pain, SOB, headache, dizziness, dysuria. (18 Jan 2024 02:05)    S:  1/24: Patient seen today, c/o continued abdominal pain, midline and towards L side, intermittent nausea. Inflammatory markers trending down, repeat CT with improved findings.   1/25: Patient seen today, reported abd pain, no other complaints, iv steroids switched to po today.   1/26: Patient seen and examined this AM, c/o dizziness, and pain to entire body including joints, noted bradycardic this morning, placed on tele, HR was in 30-40s.   1/27: Patient seen this AM, no further dizziness, continued pain to entire body, some abdominal pain, no fevers, tolerating orally. NSR on tele. Orthostatic vitals normal.   1/28: Patient seen earlier this AM, feels well, no further dizziness, ambulated with PT, continued abdominal pain intermittently, tolerating orally, later, with worsening abdominal pain, worsening leukocytosis this morning, lactate elevated, blood cultures drawn, restarted zosyn, will change diet to CLD for now. CT abd/pelvis ordered.     1/29: Seen at bedside, prior to initiating discussion pt appeared comfortable however during interview pt states he has been having excruciating abd pain, asking for IV Dilaudid to paola the pain.  Discussed CT findings and blood work.  Pt would like a regular diet which i agreed to trial.      REVIEW OF SYSTEMS: All other review of systems is negative unless indicated above.    Vital Signs Last 24 Hrs  T(C): 37 (29 Jan 2024 15:22), Max: 37 (29 Jan 2024 15:22)  T(F): 98.6 (29 Jan 2024 15:22), Max: 98.6 (29 Jan 2024 15:22)  HR: 85 (29 Jan 2024 15:22) (64 - 85)  BP: 106/63 (29 Jan 2024 15:22) (106/63 - 123/70)  BP(mean): 73 (29 Jan 2024 15:22) (73 - 73)  RR: 18 (29 Jan 2024 15:22) (18 - 18)  SpO2: 98% (29 Jan 2024 15:22) (97% - 98%)    Parameters below as of 29 Jan 2024 15:22  Patient On (Oxygen Delivery Method): room air      PHYSICAL EXAM:  GENERAL: NAD, lying in bed comfortably  HEAD:  Atraumatic, Normocephalic  EYES: conjunctiva and sclera clear  ENT: Moist mucous membranes  NECK: Supple, No JVD  CHEST/LUNG: Clear to auscultation bilaterally; No rales, rhonchi, wheezing. Unlabored respirations  HEART: Regular rate and rhythm; No murmurs  ABDOMEN: Bowel sounds present; Soft, Nondistended. + ostomy, tender to epigastric region  EXTREMITIES:  2+ Peripheral Pulses, brisk capillary refill. No clubbing, cyanosis, or edema  NERVOUS SYSTEM:  Alert & Oriented X3, speech clear. No deficits   MSK: FROM all 4 extremities, full and equal strength    MEDICATIONS  (STANDING):  dextrose 5%. 1000 milliLiter(s) (50 mL/Hr) IV Continuous <Continuous>  dextrose 5%. 1000 milliLiter(s) (100 mL/Hr) IV Continuous <Continuous>  dextrose 50% Injectable 12.5 Gram(s) IV Push once  dextrose 50% Injectable 25 Gram(s) IV Push once  dextrose 50% Injectable 25 Gram(s) IV Push once  enoxaparin Injectable 40 milliGRAM(s) SubCutaneous every 24 hours  glucagon  Injectable 1 milliGRAM(s) IntraMuscular once  influenza   Vaccine 0.5 milliLiter(s) IntraMuscular once  insulin glargine Injectable (LANTUS) 12 Unit(s) SubCutaneous at bedtime  insulin lispro (ADMELOG) corrective regimen sliding scale   SubCutaneous three times a day before meals  insulin lispro (ADMELOG) corrective regimen sliding scale   SubCutaneous at bedtime  insulin lispro Injectable (ADMELOG) 2 Unit(s) SubCutaneous three times a day before meals  naloxone Injectable 0.4 milliGRAM(s) IV Push once  pantoprazole    Tablet 40 milliGRAM(s) Oral daily  piperacillin/tazobactam IVPB.. 3.375 Gram(s) IV Intermittent every 8 hours  polyethylene glycol 3350 17 Gram(s) Oral daily  predniSONE   Tablet 50 milliGRAM(s) Oral daily  senna 2 Tablet(s) Oral at bedtime  simethicone 80 milliGRAM(s) Chew four times a day  sodium chloride 0.9%. 1000 milliLiter(s) (75 mL/Hr) IV Continuous <Continuous>    MEDICATIONS  (PRN):  acetaminophen     Tablet .. 650 milliGRAM(s) Oral every 6 hours PRN Temp greater or equal to 38C (100.4F), Mild Pain (1 - 3)  aluminum hydroxide/magnesium hydroxide/simethicone Suspension 30 milliLiter(s) Oral every 4 hours PRN Dyspepsia  bisacodyl 5 milliGRAM(s) Oral daily PRN Constipation  dextrose Oral Gel 15 Gram(s) Oral once PRN Blood Glucose LESS THAN 70 milliGRAM(s)/deciliter  HYDROmorphone   Tablet 2 milliGRAM(s) Oral every 6 hours PRN Moderate Pain (4 - 6)  HYDROmorphone  Injectable 1 milliGRAM(s) IV Push every 6 hours PRN Severe Pain (7 - 10)  melatonin 3 milliGRAM(s) Oral at bedtime PRN Insomnia  ondansetron Injectable 4 milliGRAM(s) IV Push every 8 hours PRN Nausea and/or Vomiting  zolpidem 5 milliGRAM(s) Oral at bedtime PRN Insomnia                              13.9   16.87 )-----------( 300      ( 29 Jan 2024 07:24 )             41.3     01-29    138  |  106  |  14  ----------------------------<  131<H>  3.8   |  28  |  0.70    Ca    9.1      29 Jan 2024 07:24  Phos  4.0     01-29  Mg     2.2     01-29    TPro  6.7  /  Alb  3.0<L>  /  TBili  0.3  /  DBili  x   /  AST  6<L>  /  ALT  27  /  AlkPhos  54  01-29    CAPILLARY BLOOD GLUCOSE      POCT Blood Glucose.: 96 mg/dL (29 Jan 2024 11:52)  POCT Blood Glucose.: 130 mg/dL (29 Jan 2024 07:54)  POCT Blood Glucose.: 188 mg/dL (28 Jan 2024 20:59)  POCT Blood Glucose.: 286 mg/dL (28 Jan 2024 16:52)    LIVER FUNCTIONS - ( 29 Jan 2024 07:24 )  Alb: 3.0 g/dL / Pro: 6.7 gm/dL / ALK PHOS: 54 U/L / ALT: 27 U/L / AST: 6 U/L / GGT: x             Urinalysis Basic - ( 29 Jan 2024 07:24 )    Color: x / Appearance: x / SG: x / pH: x  Gluc: 131 mg/dL / Ketone: x  / Bili: x / Urobili: x   Blood: x / Protein: x / Nitrite: x   Leuk Esterase: x / RBC: x / WBC x   Sq Epi: x / Non Sq Epi: x / Bacteria: x          Assessment and Plan:   38M w/Hx of Pancreatitis,  Crohn's s/p ileostomy presents due to intractable lower abdominal pain, nausea, decreased ostomy output.     Persistent abdominal pain: Unclear etiology  -Hx of Crohn's flare up.  -No longer meets SIRS, not tachy  -leukocytosis likely in part due to steroids  -Elevated lactate, s/p IVFs, trend  -Repeat blood cx pending  -c/w zosyn for now but may stop if cultures negative  -CT abd/pelvis -->  Diverting loop ileostomy in the right lower quadrant, no evidence of bowel obstruction. Gastric distention similar to prior study 1/24/2024 with retained   ingested food. No obvious mass in the duodenum. Findings may relate to gastroparesis. No obvious segments with abnormal bowel wall thickening, strictures or enteroenteric fistula in the setting of Crohn's disease. Inflammatory changes of the rectum, anorectal junction and anus with multiple setons in place, most compatible with perianal fistula.  No abscess, no pneumoperitoneum, no pneumatosis.  -Pain management   -no issues with ileostomy  -Cdiff and GI PCR neg   -s/p  hydrocortisone, switched to po prednisone, continue   -Supportive care  -GI following  -colorectal eval      DM Type II: New onset with steroids contributing   -HB A1c 7.4%   -insulin adjustment 2u TID and lantus 12u QHS with RAISS  -regular diet trial    Severe protein-calorie malnutrition  -Nutrition consult appreciated     DVT PPX: start lovenox sq  ( refusing)     Dispo:  -d/c home once medically ready   -No needs per PT

## 2024-01-30 LAB
ALBUMIN SERPL ELPH-MCNC: 3 G/DL — LOW (ref 3.3–5)
ALP SERPL-CCNC: 54 U/L — SIGNIFICANT CHANGE UP (ref 40–120)
ALT FLD-CCNC: 28 U/L — SIGNIFICANT CHANGE UP (ref 12–78)
ANION GAP SERPL CALC-SCNC: 2 MMOL/L — LOW (ref 5–17)
AST SERPL-CCNC: 8 U/L — LOW (ref 15–37)
BILIRUB SERPL-MCNC: 0.3 MG/DL — SIGNIFICANT CHANGE UP (ref 0.2–1.2)
BUN SERPL-MCNC: 22 MG/DL — SIGNIFICANT CHANGE UP (ref 7–23)
CALCIUM SERPL-MCNC: 8.8 MG/DL — SIGNIFICANT CHANGE UP (ref 8.5–10.1)
CHLORIDE SERPL-SCNC: 103 MMOL/L — SIGNIFICANT CHANGE UP (ref 96–108)
CO2 SERPL-SCNC: 30 MMOL/L — SIGNIFICANT CHANGE UP (ref 22–31)
CREAT SERPL-MCNC: 1.05 MG/DL — SIGNIFICANT CHANGE UP (ref 0.5–1.3)
EGFR: 93 ML/MIN/1.73M2 — SIGNIFICANT CHANGE UP
GLUCOSE BLDC GLUCOMTR-MCNC: 159 MG/DL — HIGH (ref 70–99)
GLUCOSE BLDC GLUCOMTR-MCNC: 185 MG/DL — HIGH (ref 70–99)
GLUCOSE BLDC GLUCOMTR-MCNC: 214 MG/DL — HIGH (ref 70–99)
GLUCOSE BLDC GLUCOMTR-MCNC: 221 MG/DL — HIGH (ref 70–99)
GLUCOSE SERPL-MCNC: 127 MG/DL — HIGH (ref 70–99)
HCT VFR BLD CALC: 41 % — SIGNIFICANT CHANGE UP (ref 39–50)
HGB BLD-MCNC: 13.9 G/DL — SIGNIFICANT CHANGE UP (ref 13–17)
LACTATE SERPL-SCNC: 1 MMOL/L — SIGNIFICANT CHANGE UP (ref 0.7–2)
MCHC RBC-ENTMCNC: 33.3 PG — SIGNIFICANT CHANGE UP (ref 27–34)
MCHC RBC-ENTMCNC: 33.9 GM/DL — SIGNIFICANT CHANGE UP (ref 32–36)
MCV RBC AUTO: 98.3 FL — SIGNIFICANT CHANGE UP (ref 80–100)
PLATELET # BLD AUTO: 266 K/UL — SIGNIFICANT CHANGE UP (ref 150–400)
POTASSIUM SERPL-MCNC: 4.2 MMOL/L — SIGNIFICANT CHANGE UP (ref 3.5–5.3)
POTASSIUM SERPL-SCNC: 4.2 MMOL/L — SIGNIFICANT CHANGE UP (ref 3.5–5.3)
PROT SERPL-MCNC: 6.6 GM/DL — SIGNIFICANT CHANGE UP (ref 6–8.3)
RBC # BLD: 4.17 M/UL — LOW (ref 4.2–5.8)
RBC # FLD: 14.6 % — HIGH (ref 10.3–14.5)
SODIUM SERPL-SCNC: 135 MMOL/L — SIGNIFICANT CHANGE UP (ref 135–145)
WBC # BLD: 18.28 K/UL — HIGH (ref 3.8–10.5)
WBC # FLD AUTO: 18.28 K/UL — HIGH (ref 3.8–10.5)

## 2024-01-30 PROCEDURE — 99232 SBSQ HOSP IP/OBS MODERATE 35: CPT

## 2024-01-30 PROCEDURE — 99232 SBSQ HOSP IP/OBS MODERATE 35: CPT | Mod: GC

## 2024-01-30 RX ORDER — ZOLPIDEM TARTRATE 10 MG/1
5 TABLET ORAL AT BEDTIME
Refills: 0 | Status: DISCONTINUED | OUTPATIENT
Start: 2024-01-31 | End: 2024-01-31

## 2024-01-30 RX ORDER — HYDROMORPHONE HYDROCHLORIDE 2 MG/ML
1 INJECTION INTRAMUSCULAR; INTRAVENOUS; SUBCUTANEOUS ONCE
Refills: 0 | Status: DISCONTINUED | OUTPATIENT
Start: 2024-01-30 | End: 2024-01-30

## 2024-01-30 RX ADMIN — INSULIN GLARGINE 14 UNIT(S): 100 INJECTION, SOLUTION SUBCUTANEOUS at 21:59

## 2024-01-30 RX ADMIN — Medication 2: at 09:24

## 2024-01-30 RX ADMIN — ZOLPIDEM TARTRATE 5 MILLIGRAM(S): 10 TABLET ORAL at 22:31

## 2024-01-30 RX ADMIN — HYDROMORPHONE HYDROCHLORIDE 1 MILLIGRAM(S): 2 INJECTION INTRAMUSCULAR; INTRAVENOUS; SUBCUTANEOUS at 18:40

## 2024-01-30 RX ADMIN — Medication 2 UNIT(S): at 09:24

## 2024-01-30 RX ADMIN — Medication 50 MILLIGRAM(S): at 09:23

## 2024-01-30 RX ADMIN — HYDROMORPHONE HYDROCHLORIDE 1 MILLIGRAM(S): 2 INJECTION INTRAMUSCULAR; INTRAVENOUS; SUBCUTANEOUS at 06:01

## 2024-01-30 RX ADMIN — PIPERACILLIN AND TAZOBACTAM 25 GRAM(S): 4; .5 INJECTION, POWDER, LYOPHILIZED, FOR SOLUTION INTRAVENOUS at 13:47

## 2024-01-30 RX ADMIN — HYDROMORPHONE HYDROCHLORIDE 2 MILLIGRAM(S): 2 INJECTION INTRAMUSCULAR; INTRAVENOUS; SUBCUTANEOUS at 21:59

## 2024-01-30 RX ADMIN — PIPERACILLIN AND TAZOBACTAM 25 GRAM(S): 4; .5 INJECTION, POWDER, LYOPHILIZED, FOR SOLUTION INTRAVENOUS at 21:59

## 2024-01-30 RX ADMIN — HYDROMORPHONE HYDROCHLORIDE 2 MILLIGRAM(S): 2 INJECTION INTRAMUSCULAR; INTRAVENOUS; SUBCUTANEOUS at 22:44

## 2024-01-30 RX ADMIN — HYDROMORPHONE HYDROCHLORIDE 1 MILLIGRAM(S): 2 INJECTION INTRAMUSCULAR; INTRAVENOUS; SUBCUTANEOUS at 05:16

## 2024-01-30 RX ADMIN — Medication 2 UNIT(S): at 11:55

## 2024-01-30 RX ADMIN — HYDROMORPHONE HYDROCHLORIDE 1 MILLIGRAM(S): 2 INJECTION INTRAMUSCULAR; INTRAVENOUS; SUBCUTANEOUS at 11:44

## 2024-01-30 RX ADMIN — PIPERACILLIN AND TAZOBACTAM 25 GRAM(S): 4; .5 INJECTION, POWDER, LYOPHILIZED, FOR SOLUTION INTRAVENOUS at 05:22

## 2024-01-30 RX ADMIN — PANTOPRAZOLE SODIUM 40 MILLIGRAM(S): 20 TABLET, DELAYED RELEASE ORAL at 09:23

## 2024-01-30 RX ADMIN — Medication 4: at 17:18

## 2024-01-30 RX ADMIN — Medication 2: at 11:54

## 2024-01-30 RX ADMIN — Medication 2 UNIT(S): at 17:19

## 2024-01-30 NOTE — PROGRESS NOTE ADULT - SUBJECTIVE AND OBJECTIVE BOX
Patient is a 38y old Male who presents with a chief complaint of Crohn's Flare     Follow up: Crohn's flare, abdominal pain    Patient examined at bedside this morning. Endorses severe sharp pain of mid abdomen that radiates to LUQ/LLQ that is aggravated by palpation and alleviated with pain medication. Denies fever, chills, nausea, vomiting, or rectal pain.   Lactate elevated over the weekend, now resolved as of this morning. Remains afebrile and normal hemodynamics.        MEDICATIONS  (STANDING):  dextrose 5%. 1000 milliLiter(s) (100 mL/Hr) IV Continuous <Continuous>  dextrose 5%. 1000 milliLiter(s) (50 mL/Hr) IV Continuous <Continuous>  dextrose 50% Injectable 25 Gram(s) IV Push once  dextrose 50% Injectable 12.5 Gram(s) IV Push once  dextrose 50% Injectable 25 Gram(s) IV Push once  enoxaparin Injectable 40 milliGRAM(s) SubCutaneous every 24 hours  glucagon  Injectable 1 milliGRAM(s) IntraMuscular once  influenza   Vaccine 0.5 milliLiter(s) IntraMuscular once  insulin glargine Injectable (LANTUS) 14 Unit(s) SubCutaneous at bedtime  insulin lispro (ADMELOG) corrective regimen sliding scale   SubCutaneous three times a day before meals  insulin lispro (ADMELOG) corrective regimen sliding scale   SubCutaneous at bedtime  insulin lispro Injectable (ADMELOG) 2 Unit(s) SubCutaneous three times a day before meals  naloxone Injectable 0.4 milliGRAM(s) IV Push once  pantoprazole    Tablet 40 milliGRAM(s) Oral daily  piperacillin/tazobactam IVPB.. 3.375 Gram(s) IV Intermittent every 8 hours  polyethylene glycol 3350 17 Gram(s) Oral daily  predniSONE   Tablet 50 milliGRAM(s) Oral daily  senna 2 Tablet(s) Oral at bedtime    MEDICATIONS  (PRN):  acetaminophen     Tablet .. 650 milliGRAM(s) Oral every 6 hours PRN Temp greater or equal to 38C (100.4F), Mild Pain (1 - 3)  aluminum hydroxide/magnesium hydroxide/simethicone Suspension 30 milliLiter(s) Oral every 4 hours PRN Dyspepsia  bisacodyl 5 milliGRAM(s) Oral daily PRN Constipation  dextrose Oral Gel 15 Gram(s) Oral once PRN Blood Glucose LESS THAN 70 milliGRAM(s)/deciliter  HYDROmorphone   Tablet 2 milliGRAM(s) Oral every 6 hours PRN Moderate Pain (4 - 6)  HYDROmorphone  Injectable 1 milliGRAM(s) IV Push every 6 hours PRN Severe Pain (7 - 10)  melatonin 3 milliGRAM(s) Oral at bedtime PRN Insomnia  ondansetron Injectable 4 milliGRAM(s) IV Push every 8 hours PRN Nausea and/or Vomiting  zolpidem 5 milliGRAM(s) Oral at bedtime PRN Insomnia      Vital Signs Last 24 Hrs  T(C): 36.7 (30 Jan 2024 08:02), Max: 37 (29 Jan 2024 15:22)  T(F): 98 (30 Jan 2024 08:02), Max: 98.6 (29 Jan 2024 15:22)  HR: 58 (30 Jan 2024 08:02) (58 - 85)  BP: 114/68 (30 Jan 2024 08:02) (106/63 - 118/68)  BP(mean): 73 (29 Jan 2024 15:22) (73 - 73)  RR: 18 (30 Jan 2024 08:02) (18 - 18)  SpO2: 100% (30 Jan 2024 08:02) (96% - 100%)    Parameters below as of 30 Jan 2024 08:02  Patient On (Oxygen Delivery Method): room air        PHYSICAL EXAM:    Constitutional: No acute distress, well-developed, non-toxic appearing  HEENT: good phonation, not icteric  Neck: supple, no lymphadenopathy  Respiratory: no audible wheezing  Cardiovascular: S1 and S2, regular rate and rhythm, no murmurs rubs or gallops  Gastrointestinal: soft, +TTLP mid abdomen, non-distended, +bowel sounds, no rebound or guarding, right ileostomy with moderate liquid brown stool  Extremities: No peripheral edema, no cyanosis or clubbing  Vascular: 2+ peripheral pulses, no venous stasis  Neurological: A/O x 3, no focal deficits, no asterixis  Psychiatric: Normal mood, normal affect  Skin: No rashes, not jaundiced    LABS:                        13.9   18.28 )-----------( 266      ( 30 Jan 2024 08:06 )             41.0     01-30    135  |  103  |  22  ----------------------------<  127<H>  4.2   |  30  |  1.05    Ca    8.8      30 Jan 2024 08:06  Phos  4.0     01-29  Mg     2.2     01-29    TPro  6.6  /  Alb  3.0<L>  /  TBili  0.3  /  DBili  x   /  AST  8<L>  /  ALT  28  /  AlkPhos  54  01-30      LIVER FUNCTIONS - ( 30 Jan 2024 08:06 )  Alb: 3.0 g/dL / Pro: 6.6 gm/dL / ALK PHOS: 54 U/L / ALT: 28 U/L / AST: 8 U/L / GGT: x             RADIOLOGY & ADDITIONAL STUDIES: Patient is a 38y old Male who presents with a chief complaint of Crohn's Flare     Follow up for: Crohn's flare, abdominal pain    Patient examined at bedside this morning. Endorses severe sharp pain of mid abdomen that radiates to LUQ/LLQ that is aggravated by palpation and alleviated with pain medication. Denies fever, chills, nausea, vomiting, or rectal pain.   Lactate elevated over the weekend, now resolved as of this morning. Remains afebrile and normal hemodynamics.        MEDICATIONS  (STANDING):  dextrose 5%. 1000 milliLiter(s) (100 mL/Hr) IV Continuous <Continuous>  dextrose 5%. 1000 milliLiter(s) (50 mL/Hr) IV Continuous <Continuous>  dextrose 50% Injectable 25 Gram(s) IV Push once  dextrose 50% Injectable 12.5 Gram(s) IV Push once  dextrose 50% Injectable 25 Gram(s) IV Push once  enoxaparin Injectable 40 milliGRAM(s) SubCutaneous every 24 hours  glucagon  Injectable 1 milliGRAM(s) IntraMuscular once  influenza   Vaccine 0.5 milliLiter(s) IntraMuscular once  insulin glargine Injectable (LANTUS) 14 Unit(s) SubCutaneous at bedtime  insulin lispro (ADMELOG) corrective regimen sliding scale   SubCutaneous three times a day before meals  insulin lispro (ADMELOG) corrective regimen sliding scale   SubCutaneous at bedtime  insulin lispro Injectable (ADMELOG) 2 Unit(s) SubCutaneous three times a day before meals  naloxone Injectable 0.4 milliGRAM(s) IV Push once  pantoprazole    Tablet 40 milliGRAM(s) Oral daily  piperacillin/tazobactam IVPB.. 3.375 Gram(s) IV Intermittent every 8 hours  polyethylene glycol 3350 17 Gram(s) Oral daily  predniSONE   Tablet 50 milliGRAM(s) Oral daily  senna 2 Tablet(s) Oral at bedtime    MEDICATIONS  (PRN):  acetaminophen     Tablet .. 650 milliGRAM(s) Oral every 6 hours PRN Temp greater or equal to 38C (100.4F), Mild Pain (1 - 3)  aluminum hydroxide/magnesium hydroxide/simethicone Suspension 30 milliLiter(s) Oral every 4 hours PRN Dyspepsia  bisacodyl 5 milliGRAM(s) Oral daily PRN Constipation  dextrose Oral Gel 15 Gram(s) Oral once PRN Blood Glucose LESS THAN 70 milliGRAM(s)/deciliter  HYDROmorphone   Tablet 2 milliGRAM(s) Oral every 6 hours PRN Moderate Pain (4 - 6)  HYDROmorphone  Injectable 1 milliGRAM(s) IV Push every 6 hours PRN Severe Pain (7 - 10)  melatonin 3 milliGRAM(s) Oral at bedtime PRN Insomnia  ondansetron Injectable 4 milliGRAM(s) IV Push every 8 hours PRN Nausea and/or Vomiting  zolpidem 5 milliGRAM(s) Oral at bedtime PRN Insomnia      Vital Signs Last 24 Hrs  T(C): 36.7 (30 Jan 2024 08:02), Max: 37 (29 Jan 2024 15:22)  T(F): 98 (30 Jan 2024 08:02), Max: 98.6 (29 Jan 2024 15:22)  HR: 58 (30 Jan 2024 08:02) (58 - 85)  BP: 114/68 (30 Jan 2024 08:02) (106/63 - 118/68)  BP(mean): 73 (29 Jan 2024 15:22) (73 - 73)  RR: 18 (30 Jan 2024 08:02) (18 - 18)  SpO2: 100% (30 Jan 2024 08:02) (96% - 100%)    Parameters below as of 30 Jan 2024 08:02  Patient On (Oxygen Delivery Method): room air        PHYSICAL EXAM:    Constitutional: No acute distress, well-developed, non-toxic appearing  HEENT: good phonation, not icteric  Neck: supple, no lymphadenopathy  Respiratory: no audible wheezing  Cardiovascular: S1 and S2, regular rate and rhythm, no murmurs rubs or gallops  Gastrointestinal: soft, +tender to deep palpation in mid abdomen, non-distended, +bowel sounds, no rebound or guarding, right ileostomy with moderate liquid brown stool  Extremities: No peripheral edema, no cyanosis or clubbing  Vascular: 2+ peripheral pulses, no venous stasis  Neurological: A/O x 3, no focal deficits, no asterixis  Psychiatric: Normal mood, normal affect  Skin: No rashes, not jaundiced    LABS:                        13.9   18.28 )-----------( 266      ( 30 Jan 2024 08:06 )             41.0     01-30    135  |  103  |  22  ----------------------------<  127<H>  4.2   |  30  |  1.05    Ca    8.8      30 Jan 2024 08:06  Phos  4.0     01-29  Mg     2.2     01-29    TPro  6.6  /  Alb  3.0<L>  /  TBili  0.3  /  DBili  x   /  AST  8<L>  /  ALT  28  /  AlkPhos  54  01-30      LIVER FUNCTIONS - ( 30 Jan 2024 08:06 )  Alb: 3.0 g/dL / Pro: 6.6 gm/dL / ALK PHOS: 54 U/L / ALT: 28 U/L / AST: 8 U/L / GGT: x             RADIOLOGY & ADDITIONAL STUDIES:

## 2024-01-30 NOTE — PROGRESS NOTE ADULT - SUBJECTIVE AND OBJECTIVE BOX
CC: Crohn's Flare      · Subjective and Objective:   HPI: 38M w/Hx of Crohn's s/p ileostomy presents due to intractable lower abdominal pain, nausea, decreased ostomy output. Pt reports symptoms remind him of Crohn's flares in the past but has not had a flare in years. Came into the ED for eval yesterday was determined safe for discharge but returned as his pain got worse. Pt reports he is no longer on biologic medicine because he lost his job and can longer afford it without insurance. He denies bleeding. Patient denies fever, chills, chest pain, SOB, headache, dizziness, dysuria. (18 Jan 2024 02:05)    S:  1/24: Patient seen today, c/o continued abdominal pain, midline and towards L side, intermittent nausea. Inflammatory markers trending down, repeat CT with improved findings.   1/25: Patient seen today, reported abd pain, no other complaints, iv steroids switched to po today.   1/26: Patient seen and examined this AM, c/o dizziness, and pain to entire body including joints, noted bradycardic this morning, placed on tele, HR was in 30-40s.   1/27: Patient seen this AM, no further dizziness, continued pain to entire body, some abdominal pain, no fevers, tolerating orally. NSR on tele. Orthostatic vitals normal.   1/28: Patient seen earlier this AM, feels well, no further dizziness, ambulated with PT, continued abdominal pain intermittently, tolerating orally, later, with worsening abdominal pain, worsening leukocytosis this morning, lactate elevated, blood cultures drawn, restarted zosyn, will change diet to CLD for now. CT abd/pelvis ordered.     1/29: Seen at bedside, prior to initiating discussion pt appeared comfortable however during interview pt states he has been having excruciating abd pain, asking for IV Dilaudid to paola the pain.  Discussed CT findings and blood work.  Pt would like a regular diet which i agreed to trial.      1/30: Lying in bed.  Seen ambulating in hallways.  Pt appears well though complains about abd pain.  No fever, chills, n, v.     REVIEW OF SYSTEMS: All other review of systems is negative unless indicated above.    Vital Signs Last 24 Hrs  T(C): 36.7 (30 Jan 2024 08:02), Max: 37 (29 Jan 2024 15:22)  T(F): 98 (30 Jan 2024 08:02), Max: 98.6 (29 Jan 2024 15:22)  HR: 58 (30 Jan 2024 08:02) (58 - 85)  BP: 114/68 (30 Jan 2024 08:02) (106/63 - 118/68)  BP(mean): 73 (29 Jan 2024 15:22) (73 - 73)  RR: 18 (30 Jan 2024 08:02) (18 - 18)  SpO2: 100% (30 Jan 2024 08:02) (96% - 100%)    Parameters below as of 30 Jan 2024 08:02  Patient On (Oxygen Delivery Method): room air      PHYSICAL EXAM:  GENERAL: NAD, lying in bed comfortably  HEAD:  Atraumatic, Normocephalic  EYES: conjunctiva and sclera clear  ENT: Moist mucous membranes  NECK: Supple, No JVD  CHEST/LUNG: Clear to auscultation bilaterally; No rales, rhonchi, wheezing. Unlabored respirations  HEART: Regular rate and rhythm; No murmurs  ABDOMEN: Bowel sounds present; Soft, Nondistended. + ostomy, tender to epigastric region  EXTREMITIES:  2+ Peripheral Pulses, brisk capillary refill. No clubbing, cyanosis, or edema  NERVOUS SYSTEM:  Alert & Oriented X3, speech clear. No deficits   MSK: FROM all 4 extremities, full and equal strength      MEDICATIONS  (STANDING):  dextrose 5%. 1000 milliLiter(s) (100 mL/Hr) IV Continuous <Continuous>  dextrose 5%. 1000 milliLiter(s) (50 mL/Hr) IV Continuous <Continuous>  dextrose 50% Injectable 25 Gram(s) IV Push once  dextrose 50% Injectable 12.5 Gram(s) IV Push once  dextrose 50% Injectable 25 Gram(s) IV Push once  enoxaparin Injectable 40 milliGRAM(s) SubCutaneous every 24 hours  glucagon  Injectable 1 milliGRAM(s) IntraMuscular once  influenza   Vaccine 0.5 milliLiter(s) IntraMuscular once  insulin glargine Injectable (LANTUS) 14 Unit(s) SubCutaneous at bedtime  insulin lispro (ADMELOG) corrective regimen sliding scale   SubCutaneous three times a day before meals  insulin lispro (ADMELOG) corrective regimen sliding scale   SubCutaneous at bedtime  insulin lispro Injectable (ADMELOG) 2 Unit(s) SubCutaneous three times a day before meals  naloxone Injectable 0.4 milliGRAM(s) IV Push once  pantoprazole    Tablet 40 milliGRAM(s) Oral daily  piperacillin/tazobactam IVPB.. 3.375 Gram(s) IV Intermittent every 8 hours  polyethylene glycol 3350 17 Gram(s) Oral daily  predniSONE   Tablet 50 milliGRAM(s) Oral daily  senna 2 Tablet(s) Oral at bedtime    MEDICATIONS  (PRN):  acetaminophen     Tablet .. 650 milliGRAM(s) Oral every 6 hours PRN Temp greater or equal to 38C (100.4F), Mild Pain (1 - 3)  aluminum hydroxide/magnesium hydroxide/simethicone Suspension 30 milliLiter(s) Oral every 4 hours PRN Dyspepsia  bisacodyl 5 milliGRAM(s) Oral daily PRN Constipation  dextrose Oral Gel 15 Gram(s) Oral once PRN Blood Glucose LESS THAN 70 milliGRAM(s)/deciliter  HYDROmorphone   Tablet 2 milliGRAM(s) Oral every 6 hours PRN Moderate Pain (4 - 6)  melatonin 3 milliGRAM(s) Oral at bedtime PRN Insomnia  ondansetron Injectable 4 milliGRAM(s) IV Push every 8 hours PRN Nausea and/or Vomiting  zolpidem 5 milliGRAM(s) Oral at bedtime PRN Insomnia                                13.9   18.28 )-----------( 266      ( 30 Jan 2024 08:06 )             41.0     01-30    135  |  103  |  22  ----------------------------<  127<H>  4.2   |  30  |  1.05    Ca    8.8      30 Jan 2024 08:06  Phos  4.0     01-29  Mg     2.2     01-29    TPro  6.6  /  Alb  3.0<L>  /  TBili  0.3  /  DBili  x   /  AST  8<L>  /  ALT  28  /  AlkPhos  54  01-30    CAPILLARY BLOOD GLUCOSE      POCT Blood Glucose.: 159 mg/dL (30 Jan 2024 11:48)  POCT Blood Glucose.: 185 mg/dL (30 Jan 2024 09:12)  POCT Blood Glucose.: 123 mg/dL (29 Jan 2024 20:40)  POCT Blood Glucose.: 447 mg/dL (29 Jan 2024 17:03)    LIVER FUNCTIONS - ( 30 Jan 2024 08:06 )  Alb: 3.0 g/dL / Pro: 6.6 gm/dL / ALK PHOS: 54 U/L / ALT: 28 U/L / AST: 8 U/L / GGT: x             Urinalysis Basic - ( 30 Jan 2024 08:06 )    Color: x / Appearance: x / SG: x / pH: x  Gluc: 127 mg/dL / Ketone: x  / Bili: x / Urobili: x   Blood: x / Protein: x / Nitrite: x   Leuk Esterase: x / RBC: x / WBC x   Sq Epi: x / Non Sq Epi: x / Bacteria: x        Assessment and Plan:   38M w/Hx of Pancreatitis,  Crohn's s/p ileostomy presents due to intractable lower abdominal pain, nausea, decreased ostomy output.     Persistent abdominal pain: Unclear etiology  -Hx of Crohn's flare up.  -No longer meets SIRS, not tachy  -leukocytosis likely in part due to steroids  -Elevated lactate, s/p IVFs, now RESOLVED  -Repeat blood cx pending  -c/w zosyn for now but will stop if cultures negative  -CT abd/pelvis -->  Diverting loop ileostomy in the right lower quadrant, no evidence of bowel obstruction. Gastric distention similar to prior study 1/24/2024 with retained   ingested food. No obvious mass in the duodenum. Findings may relate to gastroparesis. No obvious segments with abnormal bowel wall thickening, strictures or enteroenteric fistula in the setting of Crohn's disease. Inflammatory changes of the rectum, anorectal junction and anus with multiple setons in place, most compatible with perianal fistula.  No abscess, no pneumoperitoneum, no pneumatosis.  -Pain management, avoid IV narcotics.   -no issues with ileostomy  -Cdiff and GI PCR neg   -s/p  hydrocortisone, switched to po prednisone, continue   -Supportive care  -GI following  -colorectal eval      DM Type II: New onset with steroids contributing   -HB A1c 7.4%   -insulin adjustment 2u TID and lantus 12u QHS with RAISS  -regular diet tolerating    Severe protein-calorie malnutrition  -Nutrition consult appreciated     DVT PPX: start lovenox sq  ( refusing)     Dispo:  -d/c home once medically ready   -No needs per PT

## 2024-01-30 NOTE — PROGRESS NOTE ADULT - NS ATTEND AMEND GEN_ALL_CORE FT
38 year old man with crohn's colitis and fistulizing disease, diverting ileosotmy, with flare now improved but ongoing pain.     I truly think patient has narcotic bowel/medical addition to opiates. He is tolerating food but requiring IV dilauded, CT without concerning findings.   If we can obtain pain evaluation in hospital that would be ideal.   Psych for assistance with narcotic bowel  In terms of his flare imaging, phsyical and chemical evidence of improvement, now just dealing with pain needs.   Lactate cleared without intervention  Surgery following
38 year old man with crohn's colitis with fistulizing disease, diveriting iloestomy, perianal disease, with crohn's flare.     Objectively much improved. Suspect need for dilauded around the clock narcotic bowel/addition. Obtain imaging today to re-eval abdomen.
38 year old with severe crohn's colitis, with diverting iloestomy and perianal disease s/p setons. Here for flare. Has biochemical and imaging improvement, but ongoing issues of dilaudid use/misuse.     Non opiates for pain.   Outpatient follow up  Needs biologics, needs insurance  Needs on IBD GI doctor
38 year old man with crohn's colitis and diverting ileostomy, setons, fistulizing disease, admitted with flare.     Imaging and labs demonstrate significant improvement.   I don't think patient should require IV dilauded around the clock. Likely narcotic bowel vs addition. Pain/psych consults per primary team.   Can change to PO steroids with taper.   Patient will need to establish outaptient, consistent care and not hospital shop.   Needs insurance for biologics.
38 year old man with complicated fistulizing crohn's colitis, not on meds, with flare.     If doing well tomorrow change to PO steroids.   Needs long term care/follow up/meds.
38 year old with crohn's colitis with ray-anal disease and diverting ileostomy, here for flare, now with imaging and chemical evidence of improvement, but with ongoing pain.     Not clear what lactate is from but patient tolerating food and hungry.   I think has narcotic bowel vs addiction; psych and pain evals. Try best without narcotics for pain management.   Surgical re-eval for pain, lactate.   CT without concerning findings.
38 year old with crohn's colitis with severe fistulizing disease, with diverting ileostomy, not on meds due to insurance/poor follow up, admitted with flare.     Suspect there is an issue of narcotic bowel or addiction in terms of needs for dilauded. Really need to try and taper off.   Rec pain service/management evaluation, psych evalution.   Conintue IV steroids with plan for tapering soon as his numbers improving.   If still requiring IV opiates will need re-imaging to prove that this may be not related to flare.

## 2024-01-30 NOTE — PROGRESS NOTE ADULT - REASON FOR ADMISSION
Crohn's Flare

## 2024-01-30 NOTE — PROGRESS NOTE ADULT - NUTRITIONAL ASSESSMENT
This patient has been assessed with a concern for Malnutrition and has been determined to have a diagnosis/diagnoses of Severe protein-calorie malnutrition.    This patient is being managed with:   Diet Consistent Carbohydrate/No Snacks-  Entered: Jan 29 2024  5:08PM  
This patient has been assessed with a concern for Malnutrition and has been determined to have a diagnosis/diagnoses of Severe protein-calorie malnutrition.    This patient is being managed with:   Diet Low Fiber-  Consistent Carbohydrate {Evening Snack} (CSTCHOSN)  Entered: Jan 24 2024  6:47PM  
This patient has been assessed with a concern for Malnutrition and has been determined to have a diagnosis/diagnoses of Severe protein-calorie malnutrition.    This patient is being managed with:   Diet Low Fiber-  Consistent Carbohydrate {Evening Snack} (CSTCHOSN)  Entered: Jan 24 2024  6:47PM  
This patient has been assessed with a concern for Malnutrition and has been determined to have a diagnosis/diagnoses of Severe protein-calorie malnutrition.    This patient is being managed with:   Diet Low Fiber-  Entered: Jan 21 2024 10:49AM  
This patient has been assessed with a concern for Malnutrition and has been determined to have a diagnosis/diagnoses of Severe protein-calorie malnutrition.    This patient is being managed with:   Diet Clear Liquid-  Entered: Jan 17 2024 10:37PM  
This patient has been assessed with a concern for Malnutrition and has been determined to have a diagnosis/diagnoses of Severe protein-calorie malnutrition.    This patient is being managed with:   Diet Low Fiber-  Consistent Carbohydrate {Evening Snack} (CSTCHOSN)  Entered: Jan 24 2024  6:47PM  
This patient has been assessed with a concern for Malnutrition and has been determined to have a diagnosis/diagnoses of Severe protein-calorie malnutrition.    This patient is being managed with:   Diet Low Fiber-  Entered: Jan 21 2024 10:49AM  
This patient has been assessed with a concern for Malnutrition and has been determined to have a diagnosis/diagnoses of Severe protein-calorie malnutrition.    This patient is being managed with:   Diet Regular-  Fiber/Residue Restricted (LOWFIBER)  Entered: Jan 29 2024  1:33PM  
This patient has been assessed with a concern for Malnutrition and has been determined to have a diagnosis/diagnoses of Severe protein-calorie malnutrition.    This patient is being managed with:   Diet Clear Liquid-  Entered: Jan 17 2024 10:37PM  
This patient has been assessed with a concern for Malnutrition and has been determined to have a diagnosis/diagnoses of Severe protein-calorie malnutrition.    This patient is being managed with:   Diet Low Fiber-  Consistent Carbohydrate {Evening Snack} (CSTCHOSN)  Entered: Jan 24 2024  6:47PM  
This patient has been assessed with a concern for Malnutrition and has been determined to have a diagnosis/diagnoses of Severe protein-calorie malnutrition.    This patient is being managed with:   Diet Low Fiber-  Entered: Jan 21 2024 10:49AM  
This patient has been assessed with a concern for Malnutrition and has been determined to have a diagnosis/diagnoses of Severe protein-calorie malnutrition.    This patient is being managed with:   Diet Clear Liquid-  Entered: Jan 17 2024 10:37PM  

## 2024-01-30 NOTE — PROGRESS NOTE ADULT - ASSESSMENT
38 year old male with Crohn's admitted with flare.  Stool studies negative for infectious etiology with improvement in stool output and esr/crp with steroids.     Imp: Crohn's flare    Elevated lactate over weekend--->blood cultures pending, prelim results shows no growth.  Repeat CT again with improvement.      PLAN:  ::Continue to encourage non-opioid pain medications (IV tylenol)  ::Continue oral prednisone with taper on discharge  ::Low residue diet as tolerated  ::CRS to evaluate  ::Chemical VTE ppx recommended despite ambulatory, due to pro-inflammatory state

## 2024-01-31 ENCOUNTER — TRANSCRIPTION ENCOUNTER (OUTPATIENT)
Age: 39
End: 2024-01-31

## 2024-01-31 VITALS
OXYGEN SATURATION: 99 % | RESPIRATION RATE: 18 BRPM | TEMPERATURE: 98 F | HEART RATE: 80 BPM | DIASTOLIC BLOOD PRESSURE: 66 MMHG | SYSTOLIC BLOOD PRESSURE: 103 MMHG

## 2024-01-31 LAB
GLUCOSE BLDC GLUCOMTR-MCNC: 150 MG/DL — HIGH (ref 70–99)
GLUCOSE BLDC GLUCOMTR-MCNC: 359 MG/DL — HIGH (ref 70–99)

## 2024-01-31 PROCEDURE — 99239 HOSP IP/OBS DSCHRG MGMT >30: CPT

## 2024-01-31 RX ORDER — PANTOPRAZOLE SODIUM 20 MG/1
1 TABLET, DELAYED RELEASE ORAL
Qty: 8 | Refills: 0
Start: 2024-01-31 | End: 2024-02-07

## 2024-01-31 RX ORDER — HYDROMORPHONE HYDROCHLORIDE 2 MG/ML
0.5 INJECTION INTRAMUSCULAR; INTRAVENOUS; SUBCUTANEOUS ONCE
Refills: 0 | Status: DISCONTINUED | OUTPATIENT
Start: 2024-01-31 | End: 2024-01-31

## 2024-01-31 RX ORDER — ACETAMINOPHEN 500 MG
1000 TABLET ORAL ONCE
Refills: 0 | Status: COMPLETED | OUTPATIENT
Start: 2024-01-31 | End: 2024-01-31

## 2024-01-31 RX ORDER — METFORMIN HYDROCHLORIDE 850 MG/1
1 TABLET ORAL
Qty: 30 | Refills: 0
Start: 2024-01-31 | End: 2024-02-29

## 2024-01-31 RX ORDER — HYDROMORPHONE HYDROCHLORIDE 2 MG/ML
1 INJECTION INTRAMUSCULAR; INTRAVENOUS; SUBCUTANEOUS
Qty: 32 | Refills: 0
Start: 2024-01-31 | End: 2024-02-07

## 2024-01-31 RX ORDER — ACETAMINOPHEN 500 MG
2 TABLET ORAL
Qty: 0 | Refills: 0 | DISCHARGE
Start: 2024-01-31

## 2024-01-31 RX ADMIN — PANTOPRAZOLE SODIUM 40 MILLIGRAM(S): 20 TABLET, DELAYED RELEASE ORAL at 09:07

## 2024-01-31 RX ADMIN — HYDROMORPHONE HYDROCHLORIDE 0.5 MILLIGRAM(S): 2 INJECTION INTRAMUSCULAR; INTRAVENOUS; SUBCUTANEOUS at 05:10

## 2024-01-31 RX ADMIN — Medication 2 UNIT(S): at 08:45

## 2024-01-31 RX ADMIN — HYDROMORPHONE HYDROCHLORIDE 2 MILLIGRAM(S): 2 INJECTION INTRAMUSCULAR; INTRAVENOUS; SUBCUTANEOUS at 12:22

## 2024-01-31 RX ADMIN — Medication 2 UNIT(S): at 12:20

## 2024-01-31 RX ADMIN — Medication 400 MILLIGRAM(S): at 01:31

## 2024-01-31 RX ADMIN — HYDROMORPHONE HYDROCHLORIDE 0.5 MILLIGRAM(S): 2 INJECTION INTRAMUSCULAR; INTRAVENOUS; SUBCUTANEOUS at 05:40

## 2024-01-31 RX ADMIN — PIPERACILLIN AND TAZOBACTAM 25 GRAM(S): 4; .5 INJECTION, POWDER, LYOPHILIZED, FOR SOLUTION INTRAVENOUS at 05:09

## 2024-01-31 RX ADMIN — Medication 50 MILLIGRAM(S): at 09:07

## 2024-01-31 RX ADMIN — Medication 1000 MILLIGRAM(S): at 02:01

## 2024-01-31 RX ADMIN — Medication 10: at 12:19

## 2024-01-31 NOTE — DISCHARGE NOTE PROVIDER - HOSPITAL COURSE
CC: Crohn's Flare      · Subjective and Objective:   HPI: 38M w/Hx of Crohn's s/p ileostomy presents due to intractable lower abdominal pain, nausea, decreased ostomy output. Pt reports symptoms remind him of Crohn's flares in the past but has not had a flare in years. Came into the ED for eval yesterday was determined safe for discharge but returned as his pain got worse. Pt reports he is no longer on biologic medicine because he lost his job and can longer afford it without insurance. He denies bleeding. Patient denies fever, chills, chest pain, SOB, headache, dizziness, dysuria. (18 Jan 2024 02:05)    Hospital course:  Pt presens with Crohns flare up.  Pt placed on tapering steroids.  Hospital course complicated by persistent abdominal pain requiring high dose narcotics.  Cultures drawn, no growth found.  Antibiotics stopped.  CT abd/pelvis shows no acute pathology.  Leukocytosis due to steroids.  Pt HD stable, afebrile, ESR normal.  Pt appears well.  He was cleared by GI and colorectal, no further inpatient management indicated.  Pt can go home with tapering steroid, pain meds and close out patient follow up.    REVIEW OF SYSTEMS: All other review of systems is negative unless indicated above.    Vital Signs Last 24 Hrs  T(C): 36.7 (31 Jan 2024 07:33), Max: 36.8 (30 Jan 2024 15:40)  T(F): 98.1 (31 Jan 2024 07:33), Max: 98.2 (30 Jan 2024 15:40)  HR: 80 (31 Jan 2024 07:33) (71 - 80)  BP: 103/66 (31 Jan 2024 07:33) (103/66 - 108/63)  BP(mean): --  RR: 18 (31 Jan 2024 07:33) (18 - 18)  SpO2: 99% (31 Jan 2024 07:33) (96% - 99%)    Parameters below as of 31 Jan 2024 07:33  Patient On (Oxygen Delivery Method): room air    PHYSICAL EXAM:  GENERAL: NAD, lying in bed comfortably  HEAD:  Atraumatic, Normocephalic  EYES: conjunctiva and sclera clear  ENT: Moist mucous membranes  NECK: Supple, No JVD  CHEST/LUNG: Clear to auscultation bilaterally; No rales, rhonchi, wheezing. Unlabored respirations  HEART: Regular rate and rhythm; No murmurs  ABDOMEN: Bowel sounds present; Soft, Nondistended. + ostomy, tender to epigastric region  EXTREMITIES:  2+ Peripheral Pulses, brisk capillary refill. No clubbing, cyanosis, or edema  NERVOUS SYSTEM:  Alert & Oriented X3, speech clear. No deficits   MSK: FROM all 4 extremities, full and equal strength      med/labs: Reviewed and interpreted       Assessment and Plan:   38M w/Hx of Pancreatitis,  Crohn's s/p ileostomy presents due to intractable lower abdominal pain, nausea, decreased ostomy output.     Persistent abdominal pain: Crohns flare.  Unclear why pt with persistent abd pain, pt looks well, HD stable, eating well, question possible narcotic dependence.   -Hx of Crohn's flare up.  -leukocytosis likely in part due to steroids  -Elevated lactate, s/p IVFs, now RESOLVED  -Repeat blood cx neg, stop zosyn, monitor off antibiotics.  -CT abd/pelvis -->  Diverting loop ileostomy in the right lower quadrant, no evidence of bowel obstruction. Gastric distention similar to prior study 1/24/2024 with retained   ingested food. No obvious mass in the duodenum. Findings may relate to gastroparesis. No obvious segments with abnormal bowel wall thickening, strictures or enteroenteric fistula in the setting of Crohn's disease. Inflammatory changes of the rectum, anorectal junction and anus with multiple setons in place, most compatible with perianal fistula.  No abscess, no pneumoperitoneum, no pneumatosis.  -Pain management, avoid IV narcotics.   -no issues with ileostomy  -Cdiff and GI PCR neg   -s/p  hydrocortisone, switched to po prednisone, continue   -Supportive care  -GI following: nothing further to do, out patient f/u.  -colorectal eval: NTD      DM Type II: New onset with steroids contributing   -HB A1c 7.4%   -insulin adjustment 2u TID and lantus 12u QHS with RAISS  -regular diet tolerating    Severe protein-calorie malnutrition  -Nutrition consult appreciated     DVT PPX: start lovenox sq  ( refusing)     Dispo:  -d/c home with steroid taper, short supply of opioid for pain management and out patient f/u    Attending Statement: 40 minutes spent on total encounter and discharge planning.

## 2024-01-31 NOTE — DISCHARGE NOTE PROVIDER - NSDCMRMEDTOKEN_GEN_ALL_CORE_FT
acetaminophen 325 mg oral tablet: 2 tab(s) orally every 6 hours As needed Temp greater or equal to 38C (100.4F), Mild Pain (1 - 3)  HYDROmorphone 2 mg oral tablet: 1 tab(s) orally every 6 hours as needed for  severe pain MDD: 8mg  metFORMIN 500 mg oral tablet: 1 tab(s) orally once a day  pantoprazole 40 mg oral delayed release tablet: 1 tab(s) orally once a day  predniSONE 10 mg oral tablet: 4 tab(s) orally once a day take 40mg x 2 days then  30mg x 2 days then  20mg x 2 days then  10mg x 2 days then stop

## 2024-01-31 NOTE — DISCHARGE NOTE NURSING/CASE MANAGEMENT/SOCIAL WORK - PATIENT PORTAL LINK FT
You can access the FollowMyHealth Patient Portal offered by [facility] by registering at the following website: [email or web address]. By joining [facility]’s FollowMyHealth portal, you will also be able to view your health information using other applications (apps) compatible with our system.

## 2024-01-31 NOTE — DISCHARGE NOTE PROVIDER - PROVIDER TOKENS
FREE:[LAST:[Follow up with your pcp and gastroenterologist 5-7 days],PHONE:[(   )    -],FAX:[(   )    -]]

## 2024-01-31 NOTE — DISCHARGE NOTE PROVIDER - NSDCCPCAREPLAN_GEN_ALL_CORE_FT
PRINCIPAL DISCHARGE DIAGNOSIS  Diagnosis: Exacerbation of Crohn's disease  Assessment and Plan of Treatment: Take steroid taper as prescribed  take protonix for GI prophylaxis while on steroids (Sent to pharmacy)  take tylenol as needed mild to moderate pain  take dilaudid oral as needed for severe pain ( sent to pharmacy)  follow up with your pcp and gastroenterologist for ongoing management of your Crohns disease.  Script sent for metformin for new onset diabetes.  follow up with pcp for blood sugar management.

## 2024-01-31 NOTE — DISCHARGE NOTE PROVIDER - CARE PROVIDER_API CALL
Follow up with your pcp and gastroenterologist 5-7 days,   Phone: (   )    -  Fax: (   )    -  Follow Up Time:

## 2024-02-09 ENCOUNTER — INPATIENT (INPATIENT)
Facility: HOSPITAL | Age: 39
LOS: 1 days | Discharge: HOME | DRG: 387 | End: 2024-02-11
Attending: FAMILY MEDICINE | Admitting: STUDENT IN AN ORGANIZED HEALTH CARE EDUCATION/TRAINING PROGRAM
Payer: COMMERCIAL

## 2024-02-09 VITALS
HEART RATE: 129 BPM | TEMPERATURE: 98 F | RESPIRATION RATE: 18 BRPM | SYSTOLIC BLOOD PRESSURE: 133 MMHG | OXYGEN SATURATION: 100 % | DIASTOLIC BLOOD PRESSURE: 100 MMHG

## 2024-02-09 DIAGNOSIS — D72.829 ELEVATED WHITE BLOOD CELL COUNT, UNSPECIFIED: ICD-10-CM

## 2024-02-09 DIAGNOSIS — E43 UNSPECIFIED SEVERE PROTEIN-CALORIE MALNUTRITION: ICD-10-CM

## 2024-02-09 DIAGNOSIS — E11.65 TYPE 2 DIABETES MELLITUS WITH HYPERGLYCEMIA: ICD-10-CM

## 2024-02-09 DIAGNOSIS — T38.0X5A ADVERSE EFFECT OF GLUCOCORTICOIDS AND SYNTHETIC ANALOGUES, INITIAL ENCOUNTER: ICD-10-CM

## 2024-02-09 DIAGNOSIS — Z98.89 OTHER SPECIFIED POSTPROCEDURAL STATES: Chronic | ICD-10-CM

## 2024-02-09 DIAGNOSIS — K50.90 CROHN'S DISEASE, UNSPECIFIED, WITHOUT COMPLICATIONS: ICD-10-CM

## 2024-02-09 DIAGNOSIS — K60.3 ANAL FISTULA: Chronic | ICD-10-CM

## 2024-02-09 DIAGNOSIS — E87.6 HYPOKALEMIA: ICD-10-CM

## 2024-02-09 DIAGNOSIS — E87.1 HYPO-OSMOLALITY AND HYPONATREMIA: ICD-10-CM

## 2024-02-09 DIAGNOSIS — Z93.2 ILEOSTOMY STATUS: ICD-10-CM

## 2024-02-09 DIAGNOSIS — R00.1 BRADYCARDIA, UNSPECIFIED: ICD-10-CM

## 2024-02-09 DIAGNOSIS — Z79.84 LONG TERM (CURRENT) USE OF ORAL HYPOGLYCEMIC DRUGS: ICD-10-CM

## 2024-02-09 DIAGNOSIS — E83.42 HYPOMAGNESEMIA: ICD-10-CM

## 2024-02-09 DIAGNOSIS — Z96.7 PRESENCE OF OTHER BONE AND TENDON IMPLANTS: Chronic | ICD-10-CM

## 2024-02-09 LAB
ALBUMIN SERPL ELPH-MCNC: 3.3 G/DL — SIGNIFICANT CHANGE UP (ref 3.3–5)
ALP SERPL-CCNC: 91 U/L — SIGNIFICANT CHANGE UP (ref 40–120)
ALT FLD-CCNC: 24 U/L — SIGNIFICANT CHANGE UP (ref 12–78)
ANION GAP SERPL CALC-SCNC: 1 MMOL/L — SIGNIFICANT CHANGE UP (ref 5–17)
APPEARANCE UR: CLEAR — SIGNIFICANT CHANGE UP
APTT BLD: 24.7 SEC — SIGNIFICANT CHANGE UP (ref 24.5–35.6)
AST SERPL-CCNC: 11 U/L — LOW (ref 15–37)
BASOPHILS # BLD AUTO: 0.03 K/UL — SIGNIFICANT CHANGE UP (ref 0–0.2)
BASOPHILS NFR BLD AUTO: 0.2 % — SIGNIFICANT CHANGE UP (ref 0–2)
BILIRUB SERPL-MCNC: 0.2 MG/DL — SIGNIFICANT CHANGE UP (ref 0.2–1.2)
BILIRUB UR-MCNC: NEGATIVE — SIGNIFICANT CHANGE UP
BUN SERPL-MCNC: 15 MG/DL — SIGNIFICANT CHANGE UP (ref 7–23)
CALCIUM SERPL-MCNC: 9.2 MG/DL — SIGNIFICANT CHANGE UP (ref 8.5–10.1)
CHLORIDE SERPL-SCNC: 102 MMOL/L — SIGNIFICANT CHANGE UP (ref 96–108)
CO2 SERPL-SCNC: 32 MMOL/L — HIGH (ref 22–31)
COLOR SPEC: YELLOW — SIGNIFICANT CHANGE UP
CREAT SERPL-MCNC: 0.87 MG/DL — SIGNIFICANT CHANGE UP (ref 0.5–1.3)
DIFF PNL FLD: NEGATIVE — SIGNIFICANT CHANGE UP
EGFR: 113 ML/MIN/1.73M2 — SIGNIFICANT CHANGE UP
EOSINOPHIL # BLD AUTO: 0.2 K/UL — SIGNIFICANT CHANGE UP (ref 0–0.5)
EOSINOPHIL NFR BLD AUTO: 1.7 % — SIGNIFICANT CHANGE UP (ref 0–6)
FLUAV AG NPH QL: SIGNIFICANT CHANGE UP
FLUBV AG NPH QL: SIGNIFICANT CHANGE UP
GLUCOSE SERPL-MCNC: 207 MG/DL — HIGH (ref 70–99)
GLUCOSE UR QL: >=1000 MG/DL
HCT VFR BLD CALC: 42.6 % — SIGNIFICANT CHANGE UP (ref 39–50)
HGB BLD-MCNC: 14.7 G/DL — SIGNIFICANT CHANGE UP (ref 13–17)
IMM GRANULOCYTES NFR BLD AUTO: 0.6 % — SIGNIFICANT CHANGE UP (ref 0–0.9)
INR BLD: 0.72 RATIO — LOW (ref 0.85–1.18)
KETONES UR-MCNC: ABNORMAL MG/DL
LACTATE SERPL-SCNC: 1.5 MMOL/L — SIGNIFICANT CHANGE UP (ref 0.7–2)
LEUKOCYTE ESTERASE UR-ACNC: NEGATIVE — SIGNIFICANT CHANGE UP
LIDOCAIN IGE QN: 21 U/L — SIGNIFICANT CHANGE UP (ref 13–75)
LYMPHOCYTES # BLD AUTO: 26.8 % — SIGNIFICANT CHANGE UP (ref 13–44)
LYMPHOCYTES # BLD AUTO: 3.24 K/UL — SIGNIFICANT CHANGE UP (ref 1–3.3)
MCHC RBC-ENTMCNC: 33.9 PG — SIGNIFICANT CHANGE UP (ref 27–34)
MCHC RBC-ENTMCNC: 34.5 GM/DL — SIGNIFICANT CHANGE UP (ref 32–36)
MCV RBC AUTO: 98.2 FL — SIGNIFICANT CHANGE UP (ref 80–100)
MONOCYTES # BLD AUTO: 1.05 K/UL — HIGH (ref 0–0.9)
MONOCYTES NFR BLD AUTO: 8.7 % — SIGNIFICANT CHANGE UP (ref 2–14)
NEUTROPHILS # BLD AUTO: 7.49 K/UL — HIGH (ref 1.8–7.4)
NEUTROPHILS NFR BLD AUTO: 62 % — SIGNIFICANT CHANGE UP (ref 43–77)
NITRITE UR-MCNC: NEGATIVE — SIGNIFICANT CHANGE UP
PH UR: 6 — SIGNIFICANT CHANGE UP (ref 5–8)
PLATELET # BLD AUTO: 236 K/UL — SIGNIFICANT CHANGE UP (ref 150–400)
POTASSIUM SERPL-MCNC: 3.6 MMOL/L — SIGNIFICANT CHANGE UP (ref 3.5–5.3)
POTASSIUM SERPL-SCNC: 3.6 MMOL/L — SIGNIFICANT CHANGE UP (ref 3.5–5.3)
PROT SERPL-MCNC: 7.4 GM/DL — SIGNIFICANT CHANGE UP (ref 6–8.3)
PROT UR-MCNC: NEGATIVE MG/DL — SIGNIFICANT CHANGE UP
PROTHROM AB SERPL-ACNC: 8.2 SEC — LOW (ref 9.5–13)
RBC # BLD: 4.34 M/UL — SIGNIFICANT CHANGE UP (ref 4.2–5.8)
RBC # FLD: 14.6 % — HIGH (ref 10.3–14.5)
RSV RNA NPH QL NAA+NON-PROBE: SIGNIFICANT CHANGE UP
SARS-COV-2 RNA SPEC QL NAA+PROBE: SIGNIFICANT CHANGE UP
SODIUM SERPL-SCNC: 134 MMOL/L — LOW (ref 135–145)
SP GR SPEC: >1.03 — HIGH (ref 1–1.03)
UROBILINOGEN FLD QL: 0.2 MG/DL — SIGNIFICANT CHANGE UP (ref 0.2–1)
WBC # BLD: 12.08 K/UL — HIGH (ref 3.8–10.5)
WBC # FLD AUTO: 12.08 K/UL — HIGH (ref 3.8–10.5)

## 2024-02-09 PROCEDURE — 99285 EMERGENCY DEPT VISIT HI MDM: CPT

## 2024-02-09 PROCEDURE — 93010 ELECTROCARDIOGRAM REPORT: CPT

## 2024-02-09 PROCEDURE — 74177 CT ABD & PELVIS W/CONTRAST: CPT | Mod: 26,MA

## 2024-02-09 RX ORDER — SODIUM CHLORIDE 9 MG/ML
1000 INJECTION INTRAMUSCULAR; INTRAVENOUS; SUBCUTANEOUS ONCE
Refills: 0 | Status: COMPLETED | OUTPATIENT
Start: 2024-02-09 | End: 2024-02-09

## 2024-02-09 RX ORDER — MORPHINE SULFATE 50 MG/1
4 CAPSULE, EXTENDED RELEASE ORAL ONCE
Refills: 0 | Status: DISCONTINUED | OUTPATIENT
Start: 2024-02-09 | End: 2024-02-09

## 2024-02-09 RX ORDER — HYDROMORPHONE HYDROCHLORIDE 2 MG/ML
1 INJECTION INTRAMUSCULAR; INTRAVENOUS; SUBCUTANEOUS ONCE
Refills: 0 | Status: DISCONTINUED | OUTPATIENT
Start: 2024-02-09 | End: 2024-02-09

## 2024-02-09 RX ORDER — VANCOMYCIN HCL 1 G
1000 VIAL (EA) INTRAVENOUS ONCE
Refills: 0 | Status: COMPLETED | OUTPATIENT
Start: 2024-02-09 | End: 2024-02-09

## 2024-02-09 RX ORDER — ACETAMINOPHEN 500 MG
650 TABLET ORAL ONCE
Refills: 0 | Status: DISCONTINUED | OUTPATIENT
Start: 2024-02-09 | End: 2024-02-09

## 2024-02-09 RX ORDER — ONDANSETRON 8 MG/1
4 TABLET, FILM COATED ORAL ONCE
Refills: 0 | Status: COMPLETED | OUTPATIENT
Start: 2024-02-09 | End: 2024-02-09

## 2024-02-09 RX ORDER — PIPERACILLIN AND TAZOBACTAM 4; .5 G/20ML; G/20ML
3.38 INJECTION, POWDER, LYOPHILIZED, FOR SOLUTION INTRAVENOUS ONCE
Refills: 0 | Status: COMPLETED | OUTPATIENT
Start: 2024-02-09 | End: 2024-02-09

## 2024-02-09 RX ADMIN — HYDROMORPHONE HYDROCHLORIDE 1 MILLIGRAM(S): 2 INJECTION INTRAMUSCULAR; INTRAVENOUS; SUBCUTANEOUS at 22:35

## 2024-02-09 RX ADMIN — ONDANSETRON 4 MILLIGRAM(S): 8 TABLET, FILM COATED ORAL at 21:13

## 2024-02-09 RX ADMIN — Medication 250 MILLIGRAM(S): at 22:26

## 2024-02-09 RX ADMIN — MORPHINE SULFATE 4 MILLIGRAM(S): 50 CAPSULE, EXTENDED RELEASE ORAL at 21:13

## 2024-02-09 RX ADMIN — SODIUM CHLORIDE 1000 MILLILITER(S): 9 INJECTION INTRAMUSCULAR; INTRAVENOUS; SUBCUTANEOUS at 21:21

## 2024-02-09 RX ADMIN — PIPERACILLIN AND TAZOBACTAM 200 GRAM(S): 4; .5 INJECTION, POWDER, LYOPHILIZED, FOR SOLUTION INTRAVENOUS at 21:19

## 2024-02-09 NOTE — ED ADULT NURSE NOTE - OBJECTIVE STATEMENT
Pt is 39yo male, A&Ox4, breathing even and unlabored, presenting to ED with c/o severe 10/10 epigastric / abdominal pain that radiates from the front around his left side. Pt states "I have a hx of Chron's and I was hospitalized twice a month ago, I have a hx of fistulas and the fistula was causing my ostomy to not work and I was passing stool through my rectum". Pt endorses that he currently has green chunky discharge mixed with feces from his rectum beginning today. Pt ostomy site is noted to be right red in color and the stool in the ostomy bag appears to be thin liquid and dark brown in color. Pt endorses his ostomy stool was normal this morning and when he go to  he noticed it changed. Pt denies Nausea / vomiting. Pt endorses fevers at home, fevers and chills.

## 2024-02-09 NOTE — ED ADULT TRIAGE NOTE - CHIEF COMPLAINT QUOTE
Patient ambulatory to the ER c/o "racing heart," epigastric pain, SOB starting last night and worsening today. Patient reports that the SOB worsens when he lies down and he starts to "get the spins and the legs start jumping." Denies chest pain, nausea, vomiting, diarrhea; however, patient has an ostomy and has started having discharge from his anus. Patient ambulatory to the ER c/o "racing heart," epigastric pain, SOB starting last night and worsening today. Patient reports that the SOB worsens when he lies down and he starts to "get the spins and the legs start jumping." Denies chest pain, nausea, vomiting, diarrhea; however, patient has an ostomy and has started having discharge from his anus.  in triage

## 2024-02-09 NOTE — ED ADULT NURSE NOTE - NSFALLUNIVINTERV_ED_ALL_ED
Bed/Stretcher in lowest position, wheels locked, appropriate side rails in place/Call bell, personal items and telephone in reach/Instruct patient to call for assistance before getting out of bed/chair/stretcher/Non-slip footwear applied when patient is off stretcher/[place] to call system/Physically safe environment - no spills, clutter or unnecessary equipment/Purposeful proactive rounding/Room/bathroom lighting operational, light cord in reach

## 2024-02-09 NOTE — ED ADULT NURSE NOTE - CHIEF COMPLAINT QUOTE
Patient ambulatory to the ER c/o "racing heart," epigastric pain, SOB starting last night and worsening today. Patient reports that the SOB worsens when he lies down and he starts to "get the spins and the legs start jumping." Denies chest pain, nausea, vomiting, diarrhea; however, patient has an ostomy and has started having discharge from his anus.  in triage

## 2024-02-09 NOTE — ED PROVIDER NOTE - OBJECTIVE STATEMENT
37 y/o male with PMHx of Crohn's s/p ileostomy presents to the c/o chills, abdominal pain, watery output from both ostomy and rectum.   Surgery done at [facility] 39 y/o male with PMHx of Crohn's s/p ileostomy presents to the c/o chills, abdominal pain, watery output from both ostomy and rectum.   Surgery done at [facility]. Pt states pain is 10/10 even after morphine and dilaudid. No vomiting. Per old records, pt admitted in Jan end for abdominal pain.

## 2024-02-09 NOTE — ED PROVIDER NOTE - CLINICAL SUMMARY MEDICAL DECISION MAKING FREE TEXT BOX
pt with hx of Crohn's, p/w increasing abdominal pain and liquid stool in his ostomy, pt states that he felt like he had a fever, unknown if documented fever. labs, IV fluids, pain medication. pt with hx of Crohn's, p/w increasing abdominal pain and liquid stool in his ostomy, pt states that he felt like he had a fever, unknown if documented fever. labs, IV fluids, pain medication,ct.

## 2024-02-09 NOTE — ED STATDOCS - PROGRESS NOTE DETAILS
Iwona Trejo MD, Attending  39 yo M hx of on crohn's sp ileostomy presents with on epigastric and LUQ pain since last night. Pt also endorsing watery diarrhea in his ostomy and rectum. Pt endorse chills, nausea, no cp, sob. Pt appears uncomfortable, needs a bed and closer monitoring. will send to [facility] for further evaluation.     I, Iwona Trejo MD, performed the initial face to face bedside interview with this patient regarding history of present illness and determined that the patient should be seen in the main ED.  The history, was documented by the scribe in my presence and I attest to the accuracy of the documentation..

## 2024-02-09 NOTE — ED PROVIDER NOTE - CARE PLAN
Principal Discharge DX:	Exacerbation of Crohn's disease  Secondary Diagnosis:	Intractable abdominal pain   1

## 2024-02-10 DIAGNOSIS — K50.90 CROHN'S DISEASE, UNSPECIFIED, WITHOUT COMPLICATIONS: ICD-10-CM

## 2024-02-10 LAB
GLUCOSE BLDC GLUCOMTR-MCNC: 111 MG/DL — HIGH (ref 70–99)
GLUCOSE BLDC GLUCOMTR-MCNC: 143 MG/DL — HIGH (ref 70–99)
GLUCOSE BLDC GLUCOMTR-MCNC: 165 MG/DL — HIGH (ref 70–99)

## 2024-02-10 PROCEDURE — 85027 COMPLETE CBC AUTOMATED: CPT

## 2024-02-10 PROCEDURE — 36415 COLL VENOUS BLD VENIPUNCTURE: CPT

## 2024-02-10 PROCEDURE — 87507 IADNA-DNA/RNA PROBE TQ 12-25: CPT

## 2024-02-10 PROCEDURE — 99222 1ST HOSP IP/OBS MODERATE 55: CPT

## 2024-02-10 PROCEDURE — 80048 BASIC METABOLIC PNL TOTAL CA: CPT

## 2024-02-10 PROCEDURE — G0378: CPT

## 2024-02-10 PROCEDURE — 82962 GLUCOSE BLOOD TEST: CPT

## 2024-02-10 PROCEDURE — 87493 C DIFF AMPLIFIED PROBE: CPT

## 2024-02-10 RX ORDER — INSULIN LISPRO 100/ML
VIAL (ML) SUBCUTANEOUS
Refills: 0 | Status: DISCONTINUED | OUTPATIENT
Start: 2024-02-10 | End: 2024-02-11

## 2024-02-10 RX ORDER — ONDANSETRON 8 MG/1
4 TABLET, FILM COATED ORAL EVERY 8 HOURS
Refills: 0 | Status: DISCONTINUED | OUTPATIENT
Start: 2024-02-10 | End: 2024-02-11

## 2024-02-10 RX ORDER — HYDROMORPHONE HYDROCHLORIDE 2 MG/ML
2 INJECTION INTRAMUSCULAR; INTRAVENOUS; SUBCUTANEOUS EVERY 4 HOURS
Refills: 0 | Status: DISCONTINUED | OUTPATIENT
Start: 2024-02-10 | End: 2024-02-11

## 2024-02-10 RX ORDER — HYDROMORPHONE HYDROCHLORIDE 2 MG/ML
2 INJECTION INTRAMUSCULAR; INTRAVENOUS; SUBCUTANEOUS EVERY 6 HOURS
Refills: 0 | Status: DISCONTINUED | OUTPATIENT
Start: 2024-02-10 | End: 2024-02-10

## 2024-02-10 RX ORDER — DEXTROSE 50 % IN WATER 50 %
12.5 SYRINGE (ML) INTRAVENOUS ONCE
Refills: 0 | Status: DISCONTINUED | OUTPATIENT
Start: 2024-02-10 | End: 2024-02-11

## 2024-02-10 RX ORDER — SODIUM CHLORIDE 9 MG/ML
1000 INJECTION, SOLUTION INTRAVENOUS
Refills: 0 | Status: DISCONTINUED | OUTPATIENT
Start: 2024-02-10 | End: 2024-02-11

## 2024-02-10 RX ORDER — GLUCAGON INJECTION, SOLUTION 0.5 MG/.1ML
1 INJECTION, SOLUTION SUBCUTANEOUS ONCE
Refills: 0 | Status: DISCONTINUED | OUTPATIENT
Start: 2024-02-10 | End: 2024-02-11

## 2024-02-10 RX ORDER — DEXTROSE 50 % IN WATER 50 %
25 SYRINGE (ML) INTRAVENOUS ONCE
Refills: 0 | Status: DISCONTINUED | OUTPATIENT
Start: 2024-02-10 | End: 2024-02-11

## 2024-02-10 RX ORDER — ACETAMINOPHEN 500 MG
650 TABLET ORAL EVERY 6 HOURS
Refills: 0 | Status: DISCONTINUED | OUTPATIENT
Start: 2024-02-10 | End: 2024-02-11

## 2024-02-10 RX ORDER — MUPIROCIN 20 MG/G
1 OINTMENT TOPICAL
Refills: 0 | Status: DISCONTINUED | OUTPATIENT
Start: 2024-02-10 | End: 2024-02-11

## 2024-02-10 RX ORDER — HYDROMORPHONE HYDROCHLORIDE 2 MG/ML
2 INJECTION INTRAMUSCULAR; INTRAVENOUS; SUBCUTANEOUS ONCE
Refills: 0 | Status: DISCONTINUED | OUTPATIENT
Start: 2024-02-10 | End: 2024-02-10

## 2024-02-10 RX ORDER — PANTOPRAZOLE SODIUM 20 MG/1
40 TABLET, DELAYED RELEASE ORAL
Refills: 0 | Status: DISCONTINUED | OUTPATIENT
Start: 2024-02-10 | End: 2024-02-11

## 2024-02-10 RX ORDER — INFLUENZA VIRUS VACCINE 15; 15; 15; 15 UG/.5ML; UG/.5ML; UG/.5ML; UG/.5ML
0.5 SUSPENSION INTRAMUSCULAR ONCE
Refills: 0 | Status: COMPLETED | OUTPATIENT
Start: 2024-02-10 | End: 2024-02-10

## 2024-02-10 RX ORDER — DEXTROSE 50 % IN WATER 50 %
15 SYRINGE (ML) INTRAVENOUS ONCE
Refills: 0 | Status: DISCONTINUED | OUTPATIENT
Start: 2024-02-10 | End: 2024-02-11

## 2024-02-10 RX ORDER — LANOLIN ALCOHOL/MO/W.PET/CERES
3 CREAM (GRAM) TOPICAL AT BEDTIME
Refills: 0 | Status: DISCONTINUED | OUTPATIENT
Start: 2024-02-10 | End: 2024-02-11

## 2024-02-10 RX ADMIN — HYDROMORPHONE HYDROCHLORIDE 2 MILLIGRAM(S): 2 INJECTION INTRAMUSCULAR; INTRAVENOUS; SUBCUTANEOUS at 13:06

## 2024-02-10 RX ADMIN — MUPIROCIN 1 APPLICATION(S): 20 OINTMENT TOPICAL at 21:33

## 2024-02-10 RX ADMIN — Medication 1: at 12:14

## 2024-02-10 RX ADMIN — HYDROMORPHONE HYDROCHLORIDE 2 MILLIGRAM(S): 2 INJECTION INTRAMUSCULAR; INTRAVENOUS; SUBCUTANEOUS at 21:33

## 2024-02-10 RX ADMIN — HYDROMORPHONE HYDROCHLORIDE 2 MILLIGRAM(S): 2 INJECTION INTRAMUSCULAR; INTRAVENOUS; SUBCUTANEOUS at 10:51

## 2024-02-10 RX ADMIN — HYDROMORPHONE HYDROCHLORIDE 2 MILLIGRAM(S): 2 INJECTION INTRAMUSCULAR; INTRAVENOUS; SUBCUTANEOUS at 18:30

## 2024-02-10 RX ADMIN — HYDROMORPHONE HYDROCHLORIDE 2 MILLIGRAM(S): 2 INJECTION INTRAMUSCULAR; INTRAVENOUS; SUBCUTANEOUS at 03:54

## 2024-02-10 RX ADMIN — HYDROMORPHONE HYDROCHLORIDE 2 MILLIGRAM(S): 2 INJECTION INTRAMUSCULAR; INTRAVENOUS; SUBCUTANEOUS at 17:30

## 2024-02-10 RX ADMIN — HYDROMORPHONE HYDROCHLORIDE 2 MILLIGRAM(S): 2 INJECTION INTRAMUSCULAR; INTRAVENOUS; SUBCUTANEOUS at 09:51

## 2024-02-10 RX ADMIN — Medication 100 MILLIGRAM(S): at 21:33

## 2024-02-10 RX ADMIN — HYDROMORPHONE HYDROCHLORIDE 1 MILLIGRAM(S): 2 INJECTION INTRAMUSCULAR; INTRAVENOUS; SUBCUTANEOUS at 00:17

## 2024-02-10 RX ADMIN — HYDROMORPHONE HYDROCHLORIDE 2 MILLIGRAM(S): 2 INJECTION INTRAMUSCULAR; INTRAVENOUS; SUBCUTANEOUS at 14:06

## 2024-02-10 NOTE — H&P ADULT - ASSESSMENT
39 yo male with Hx. of Crohn's s/p ileostomy done at [facility], recently  hospitalized 1/17-1/31/24 for Crohn's exacerbation and treated with steroid taper, discharged on Metformin for diabetes ,  presented in the ED with abdominal pain 10/10,  watery output from rectum and Ileostomy. The patient states his symptoms started 2 days ago with pain in the epigastric area and got gradually worse, he took Tylenol at home with no improvement, also developed rapid hear beats. The patient is also c/o painful swelling in R axilla.   assessment Dx:                       1. Acute abdominal pain                       2. Crohn's exacerbation                        3. Hidradenitis R axilla                        4. Diarrhea                       5. DM2    Plan:    admit to medicine               medications: started on IV Dilaudid for pain, d/c meformin, start insulin coverage,                VTEP: ambulation                Labs:  GI PCR, cbc,bmp               Radiology: Had CT abdomen               Cardiac diagnostics: EKG , sinus tach.               Consults:  GI , ID for antibiotic management                 Advance Directive: full code,

## 2024-02-10 NOTE — H&P ADULT - NSHPPHYSICALEXAM_GEN_ALL_CORE
Physical Exam: Vital Signs Last 24 Hrs  T(C): 36.6 (10 Feb 2024 08:46), Max: 36.6 (09 Feb 2024 19:47)  T(F): 97.8 (10 Feb 2024 08:46), Max: 97.9 (09 Feb 2024 19:47)  HR: 88 (10 Feb 2024 08:46) (85 - 129)  BP: 111/74 (10 Feb 2024 08:46) (108/75 - 138/80)  BP(mean): 98 (10 Feb 2024 02:06) (98 - 98)  RR: 18 (10 Feb 2024 08:46) (17 - 18)  SpO2: 99% (10 Feb 2024 08:46) (95% - 100%)    Parameters below as of 10 Feb 2024 08:46  Patient On (Oxygen Delivery Method): room air                  HEENT: PRRL EOMI    MOUTH/TEETH/GUMS: Clear    NECK: no JVD    LUNGS: Clear    HEART: S1,S2 RR    ABDOMEN: soft , tender s/p Ileostomy     EXTREMITIES:  no pedal edema    MUSCULOSKELETAL: no joint swelling     NEURO: no tremor, no focal signs.    SKIN: R axilla swelling, rash , tenderness, adenitis     : CVA negative,

## 2024-02-10 NOTE — H&P ADULT - HISTORY OF PRESENT ILLNESS
HPI: 39 yo male with Hx. of Crohn's s/p ileostomy done at [facility], recently  hospitalized -24 for Crohn's exacerbation and treated with steroid taper, discharged on Metformin for diabetes ,  presented in the ED with abdominal pain 10/10,  watery output from rectum and Ileostomy. The patient states his symptoms started 2 days ago with pain in the epigastric area and got gradually worse, he took Tylenol at home with no improvement, also developed rapid hear beats. The patient is also c/o painful swelling in R axilla.     PMHx: Crohn's since age 12,  s/p ileostomy done at [facility],  recently diagnosed DM2,     PSHx:  Ileostomy , perianal fistula marquita     Family Hx: mother  in an accident, Father had diabetes     Social Hx.: not smoking, no alcohol use             HPI: 39 yo male with Hx. of Crohn's,  perianal fistula  s/p diversion ileostomy done at [facility], , recently  hospitalized -24 for Crohn's exacerbation and treated with steroid taper, discharged on Metformin for diabetes ,  presented in the ED with abdominal pain 10/10,  watery output from rectum and Ileostomy. The patient states his symptoms started 2 days ago with pain in the epigastric area and got gradually worse, he took Tylenol at home with no improvement, also developed rapid hear beats. The patient is also c/o painful swelling in R axilla.     PMHx: Crohn's since age 12,  s/p ileostomy done at [facility], seton placement  at [facility]  , ETOH abuse , recently diagnosed DM2,     PSHx: diversion  Ileostomy done at [facility], seton placement at [facility] , perianal fistula repair      Family Hx: mother  in an accident, Father had diabetes     Social Hx.: not smoking, Hx of alcohol abuse.

## 2024-02-10 NOTE — H&P ADULT - REASON FOR ADMISSION
Patient requesting refill: Allopurinol  Last OV: 11/23/2020  Next OV: None  Last refill: 10/02/2020 # 90 1 refill  Last labs: 10/02/2020    Please advise.    
abdominal pain, palpitations

## 2024-02-10 NOTE — H&P ADULT - NSHPLABSRESULTS_GEN_ALL_CORE
14.7   12.08 )-----------( 236      ( 09 Feb 2024 21:22 )             42.6       02-09    134<L>  |  102  |  15  ----------------------------<  207<H>  3.6   |  32<H>  |  0.87    Ca    9.2      09 Feb 2024 21:22    TPro  7.4  /  Alb  3.3  /  TBili  0.2  /  DBili  x   /  AST  11<L>  /  ALT  24  /  AlkPhos  91  02-09    CT A/P : Inflammatory changes of the rectum and anus with two setons seen   posteriorly. Additional linear density seen anterior to the rectum may be   sequelae of a previous fistulous tract. Additional thickening of the   right levator ani complex with associated increased linear density and   involvement of at least the external anal sphincter on the right is   compatible with an additional fistulous tract.  Thick-walled urinary bladder which may be related to its underdistended     EKG sinus tachycardia   nature versus cystitis. Correlate with urinalysis.  Gastric wall thickening suspicious for gastritis though may also be   related to underdistention.Indeterminate subcentimeter hypodense lesion of the left kidney. This is   too small to accurately characterize. A previous MRI of 2/26/2022, there   is a tiny smaller cyst seen in this region, and it is unclear if this   represents the same lesion. Further correlation with MRI can be obtained   for more definitive evaluation.

## 2024-02-10 NOTE — H&P ADULT - NSHPREVIEWOFSYSTEMS_GEN_ALL_CORE
ROS: as above   Eyes: no changes in vision  ENT/Mouth: no changes    Cardiovascular: no chest pain    Respiratory: no SOB    Gastrointestinal: abdominal pain,  diarrhea, no nausea, no vomiting    Genitourinary: no dysuria    Breast: no pain    Musculoskeletal: no pain    Integumentary: no itching    Neurological: No Headache, no tremor,    Endocrine: no excessive thirst,     Allergic/Immunologic: no itching

## 2024-02-10 NOTE — ED ADULT NURSE REASSESSMENT NOTE - NS ED NURSE REASSESS COMMENT FT1
Pt noted to be resting comfortably in stretcher, no complaints at this time, Dilaudid administered for pain management. VS as charted, no change in baseline status. Safety & comfort maintained.

## 2024-02-11 ENCOUNTER — TRANSCRIPTION ENCOUNTER (OUTPATIENT)
Age: 39
End: 2024-02-11

## 2024-02-11 VITALS
DIASTOLIC BLOOD PRESSURE: 85 MMHG | SYSTOLIC BLOOD PRESSURE: 112 MMHG | RESPIRATION RATE: 18 BRPM | HEART RATE: 98 BPM | TEMPERATURE: 98 F | OXYGEN SATURATION: 100 %

## 2024-02-11 LAB
ANION GAP SERPL CALC-SCNC: 2 MMOL/L — LOW (ref 5–17)
BUN SERPL-MCNC: 6 MG/DL — LOW (ref 7–23)
C DIFF BY PCR RESULT: SIGNIFICANT CHANGE UP
CALCIUM SERPL-MCNC: 9 MG/DL — SIGNIFICANT CHANGE UP (ref 8.5–10.1)
CHLORIDE SERPL-SCNC: 103 MMOL/L — SIGNIFICANT CHANGE UP (ref 96–108)
CO2 SERPL-SCNC: 31 MMOL/L — SIGNIFICANT CHANGE UP (ref 22–31)
CREAT SERPL-MCNC: 0.5 MG/DL — SIGNIFICANT CHANGE UP (ref 0.5–1.3)
CULTURE RESULTS: NO GROWTH — SIGNIFICANT CHANGE UP
EGFR: 134 ML/MIN/1.73M2 — SIGNIFICANT CHANGE UP
GI PCR PANEL: SIGNIFICANT CHANGE UP
GI PCR PANEL: SIGNIFICANT CHANGE UP
GLUCOSE BLDC GLUCOMTR-MCNC: 125 MG/DL — HIGH (ref 70–99)
GLUCOSE BLDC GLUCOMTR-MCNC: 167 MG/DL — HIGH (ref 70–99)
GLUCOSE SERPL-MCNC: 128 MG/DL — HIGH (ref 70–99)
HCT VFR BLD CALC: 38.1 % — LOW (ref 39–50)
HGB BLD-MCNC: 12.8 G/DL — LOW (ref 13–17)
MCHC RBC-ENTMCNC: 33.3 PG — SIGNIFICANT CHANGE UP (ref 27–34)
MCHC RBC-ENTMCNC: 33.6 GM/DL — SIGNIFICANT CHANGE UP (ref 32–36)
MCV RBC AUTO: 99.2 FL — SIGNIFICANT CHANGE UP (ref 80–100)
PLATELET # BLD AUTO: 225 K/UL — SIGNIFICANT CHANGE UP (ref 150–400)
POTASSIUM SERPL-MCNC: 3.9 MMOL/L — SIGNIFICANT CHANGE UP (ref 3.5–5.3)
POTASSIUM SERPL-SCNC: 3.9 MMOL/L — SIGNIFICANT CHANGE UP (ref 3.5–5.3)
RBC # BLD: 3.84 M/UL — LOW (ref 4.2–5.8)
RBC # FLD: 14.1 % — SIGNIFICANT CHANGE UP (ref 10.3–14.5)
SODIUM SERPL-SCNC: 136 MMOL/L — SIGNIFICANT CHANGE UP (ref 135–145)
SPECIMEN SOURCE: SIGNIFICANT CHANGE UP
WBC # BLD: 9.07 K/UL — SIGNIFICANT CHANGE UP (ref 3.8–10.5)
WBC # FLD AUTO: 9.07 K/UL — SIGNIFICANT CHANGE UP (ref 3.8–10.5)

## 2024-02-11 PROCEDURE — 99232 SBSQ HOSP IP/OBS MODERATE 35: CPT

## 2024-02-11 RX ORDER — HYDROMORPHONE HYDROCHLORIDE 2 MG/ML
1 INJECTION INTRAMUSCULAR; INTRAVENOUS; SUBCUTANEOUS
Refills: 0 | Status: DISCONTINUED | OUTPATIENT
Start: 2024-02-11 | End: 2024-02-11

## 2024-02-11 RX ORDER — HYDROMORPHONE HYDROCHLORIDE 2 MG/ML
6 INJECTION INTRAMUSCULAR; INTRAVENOUS; SUBCUTANEOUS EVERY 4 HOURS
Refills: 0 | Status: DISCONTINUED | OUTPATIENT
Start: 2024-02-11 | End: 2024-02-11

## 2024-02-11 RX ORDER — HYDROMORPHONE HYDROCHLORIDE 2 MG/ML
1.5 INJECTION INTRAMUSCULAR; INTRAVENOUS; SUBCUTANEOUS EVERY 4 HOURS
Refills: 0 | Status: DISCONTINUED | OUTPATIENT
Start: 2024-02-11 | End: 2024-02-11

## 2024-02-11 RX ORDER — HYDROMORPHONE HYDROCHLORIDE 2 MG/ML
2 INJECTION INTRAMUSCULAR; INTRAVENOUS; SUBCUTANEOUS EVERY 6 HOURS
Refills: 0 | Status: DISCONTINUED | OUTPATIENT
Start: 2024-02-11 | End: 2024-02-11

## 2024-02-11 RX ORDER — HYDROMORPHONE HYDROCHLORIDE 2 MG/ML
3 INJECTION INTRAMUSCULAR; INTRAVENOUS; SUBCUTANEOUS
Qty: 90 | Refills: 0
Start: 2024-02-11 | End: 2024-02-15

## 2024-02-11 RX ORDER — HYDROMORPHONE HYDROCHLORIDE 2 MG/ML
4 INJECTION INTRAMUSCULAR; INTRAVENOUS; SUBCUTANEOUS EVERY 4 HOURS
Refills: 0 | Status: DISCONTINUED | OUTPATIENT
Start: 2024-02-11 | End: 2024-02-11

## 2024-02-11 RX ORDER — NALOXONE HYDROCHLORIDE 4 MG/.1ML
4 SPRAY NASAL
Qty: 1 | Refills: 0
Start: 2024-02-11 | End: 2024-02-11

## 2024-02-11 RX ORDER — MUPIROCIN 20 MG/G
1 OINTMENT TOPICAL
Qty: 20 | Refills: 0
Start: 2024-02-11 | End: 2024-02-20

## 2024-02-11 RX ADMIN — HYDROMORPHONE HYDROCHLORIDE 2 MILLIGRAM(S): 2 INJECTION INTRAMUSCULAR; INTRAVENOUS; SUBCUTANEOUS at 01:33

## 2024-02-11 RX ADMIN — HYDROMORPHONE HYDROCHLORIDE 1.5 MILLIGRAM(S): 2 INJECTION INTRAMUSCULAR; INTRAVENOUS; SUBCUTANEOUS at 11:14

## 2024-02-11 RX ADMIN — HYDROMORPHONE HYDROCHLORIDE 1.5 MILLIGRAM(S): 2 INJECTION INTRAMUSCULAR; INTRAVENOUS; SUBCUTANEOUS at 11:39

## 2024-02-11 RX ADMIN — HYDROMORPHONE HYDROCHLORIDE 4 MILLIGRAM(S): 2 INJECTION INTRAMUSCULAR; INTRAVENOUS; SUBCUTANEOUS at 10:25

## 2024-02-11 RX ADMIN — MUPIROCIN 1 APPLICATION(S): 20 OINTMENT TOPICAL at 08:46

## 2024-02-11 RX ADMIN — HYDROMORPHONE HYDROCHLORIDE 4 MILLIGRAM(S): 2 INJECTION INTRAMUSCULAR; INTRAVENOUS; SUBCUTANEOUS at 11:39

## 2024-02-11 RX ADMIN — Medication 100 MILLIGRAM(S): at 08:46

## 2024-02-11 RX ADMIN — Medication 1: at 12:52

## 2024-02-11 RX ADMIN — HYDROMORPHONE HYDROCHLORIDE 6 MILLIGRAM(S): 2 INJECTION INTRAMUSCULAR; INTRAVENOUS; SUBCUTANEOUS at 13:49

## 2024-02-11 RX ADMIN — HYDROMORPHONE HYDROCHLORIDE 2 MILLIGRAM(S): 2 INJECTION INTRAMUSCULAR; INTRAVENOUS; SUBCUTANEOUS at 05:38

## 2024-02-11 NOTE — CONSULT NOTE ADULT - ASSESSMENT
37 yo male with Hx. of Crohn's,  perianal fistula  s/p diversion ileostomy done at [facility], , recently  hospitalized 1/17-1/31/24 for Crohn's exacerbation and treated with steroid taper, discharged on Metformin for diabetes ,  presented in the ED with abdominal pain 10/10,  watery output from rectum and Ileostomy. The patient states his symptoms started 2 days ago with pain in the epigastric area and got gradually worse, he took Tylenol at home with no improvement, also developed rapid hear beats. The patient is also c/o painful swelling in R axilla with drainage, redness.     1. R axilla hidradenitis suppurtiva. SSTI. Crohns disease  - imaging reviewed  - start po doxycycline 100mg BID x 5 days  - topical bactroband twice daily x 1 week  - monitor temps  - gi eval   - f/u cultures  - fu cbc  - supportive care    2. other issues - care per medicine 
1. Crohn's disease s/p diverting ileostomy with perianal fistulas s/p seton placement. CT scan showing persistent inflammation in the rectum and anus as seen on previous CT with additional fistulous tract.    2. Hydradenitis suppurative    Recommendation  1. Advance diet to low residue  2. Appreciate ID rec's and complete course of antibiotics  3. Discussed with patient and needs [facility]  4. No contraindications from GI perspective to discharge with follow up with his IBD specialist.

## 2024-02-11 NOTE — DISCHARGE NOTE PROVIDER - DISCHARGE DATE
Medication:  Insulin Reglar-Humilin 70/30  Ordering provider:  Axel Smith MD  Last office visit:  10/22/20  with provider Dr. Smith  Last refill:  10/23/20 #20 mL; 12 refills  Last A1C 10/22/20 was 9.1      Another triage RN already working on medication refill per patient.        11-Feb-2024

## 2024-02-11 NOTE — DISCHARGE NOTE NURSING/CASE MANAGEMENT/SOCIAL WORK - PATIENT PORTAL LINK FT
You can access the FollowMyHealth Patient Portal offered by [facility] by registering at the following website: [email or web address]. By joining [facility]’s FollowMyHealth portal, you will also be able to view your health information using other applications (apps) compatible with our system.

## 2024-02-11 NOTE — DISCHARGE NOTE PROVIDER - NSDCCPTREATMENT_GEN_ALL_CORE_FT
PRINCIPAL PROCEDURE  Procedure: Abdomen CT  Findings and Treatment: IMPRESSION:  Inflammatory changes ofthe rectum and anus with two setons seen   posteriorly. Additional linear density seen anterior to the rectum may be   sequelae of a previous fistulous tract. Additional thickening of the   right levator ani complex with associated increased linear density and   involvement of at least the external anal sphincter on the right is   compatible with an additional fistulous tract.  Thick-walled urinary bladder which may be related to its underdistended   nature versus cystitis. Correlate with urinalysis.  Gastric wall thickening suspicious for gastritis though may also be   related to underdistention.  Indeterminate subcentimeter hypodense lesion of the left kidney. This is   too small to accurately characterize. A previous MRI of 2/26/2022, there   is a tiny smaller cyst seen in this region, and it is unclear if this   represents the same lesion. Further correlation with MRI can be obtained   for more definitive evaluation.

## 2024-02-11 NOTE — DISCHARGE NOTE PROVIDER - NSDCCPCAREPLAN_GEN_ALL_CORE_FT
PRINCIPAL DISCHARGE DIAGNOSIS  Diagnosis: Exacerbation of Crohn's disease  Assessment and Plan of Treatment: follow up with GI specialist at [facility]  take pain medications      SECONDARY DISCHARGE DIAGNOSES  Diagnosis: Intractable abdominal pain  Assessment and Plan of Treatment: pain medications, follow up with GI specialist for long term pain management plan    Diagnosis: Hydradenitis  Assessment and Plan of Treatment: complete 5 days of antibioitics with topical sream as prescribed  follow up with your PCP for further care within 1 week    Diagnosis: Diabetes mellitus  Assessment and Plan of Treatment: restart home medications, follow up with PCP within 1 week for further preventive care

## 2024-02-11 NOTE — DISCHARGE NOTE PROVIDER - CARE PROVIDER_API CALL
PCP,   Phone: (   )    -  Fax: (   )    -  Follow Up Time: 1 week    GI specialist,   Phone: (   )    -  Fax: (   )    -  Follow Up Time: 1-3 days

## 2024-02-11 NOTE — DISCHARGE NOTE PROVIDER - NSDCMRMEDTOKEN_GEN_ALL_CORE_FT
acetaminophen 325 mg oral tablet: 2 tab(s) orally every 6 hours As needed Temp greater or equal to 38C (100.4F), Mild Pain (1 - 3)  doxycycline monohydrate 50 mg oral capsule: 2 cap(s) orally every 12 hours  HYDROmorphone 2 mg oral tablet: 3 tab(s) orally every 4 hours as needed for Moderate Pain (4 - 6) to severe pain MDD: 36  metFORMIN 500 mg oral tablet: 1 tab(s) orally once a day  mupirocin 2% topical ointment: Apply topically to affected area 2 times a day 1 Apply topically to affected area 2 times a day  Narcan 4 mg/0.1 mL nasal spray: 4 milligram(s) intranasally once as needed for  opioid overdose 4 milligram(s) intranasally once, As Needed for opioid overdose if no response you may repeat dose in 2-3 minutes in alternate nostrils with each dose  pantoprazole 40 mg oral delayed release tablet: 1 tab(s) orally once a day

## 2024-02-11 NOTE — DISCHARGE NOTE NURSING/CASE MANAGEMENT/SOCIAL WORK - NSDCPEFALRISK_GEN_ALL_CORE
For information on Fall & Injury Prevention, visit: [email or web address] OR  [email or web address] OR  https://www.cdc.gov/steadi/patient.html
denies pain/discomfort

## 2024-02-11 NOTE — DISCHARGE NOTE PROVIDER - PROVIDER TOKENS
FREE:[LAST:[PCP],PHONE:[(   )    -],FAX:[(   )    -],FOLLOWUP:[1 week]],FREE:[LAST:[GI specialist],PHONE:[(   )    -],FAX:[(   )    -],FOLLOWUP:[1-3 days]]

## 2024-02-11 NOTE — DISCHARGE NOTE PROVIDER - HOSPITAL COURSE
Subjective:  Chief complain :  abdominal pain,  right axillar pain   HPI: 39 yo male with Hx. of Crohn's,  perianal fistula  s/p diversion ileostomy done at [facility], , recently  hospitalized 1/17-1/31/24 for Crohn's exacerbation and treated with steroid taper, discharged on Metformin for diabetes ,  presented in the ED with abdominal pain 10/10,  watery output from rectum and Ileostomy. The patient states his symptoms started 2 days ago with pain in the epigastric area and got gradually worse, he took Tylenol at home with no improvement, also developed rapid hear beats. The patient is also c/o painful swelling in R axilla.      2/11 - patient seen and examined at bedside earlier today, + right axillar pain, rectal pain, discharge from drains, + ileostomy watery output but stool studies negative for infections causes     Review of system- Rest of the review of system are negative except mentioned in HPI     Vital sings reviewed for last 24 h  T(C): 36.8 (02-11-24 @ 08:38), Max: 36.8 (02-11-24 @ 08:38)  HR: 89 (02-11-24 @ 08:38) (87 - 91)  BP: 120/76 (02-11-24 @ 08:38) (107/75 - 120/82)  RR: 18 (02-11-24 @ 08:38) (18 - 18)  SpO2: 100% (02-11-24 @ 08:38) (96% - 100%)    Physical exam:   General : NAD, appear to be of stated age , well groomed   NERVOUS SYSTEM:  Alert & Oriented X3, non- focal exam, Motor Strength 5/5 B/L upper and lower extremities; DTRs 2+ intact and symmetric  HEAD:  Atraumatic, Normocephalic  EYES: EOMI, PERRLA, conjunctiva and sclera clear  HEENT: Moist mucous membranes, Supple neck , No JVD  CHEST: Clear to auscultation bilaterally; No rales, no rhonchi, no wheezing  HEART: Regular rate and rhythm; No murmurs, no rubs or gallops  ABDOMEN: Soft, Non-tender, Non-distended; Bowel sounds present, no guarding , no peritoneal irritation , perirectal drains , + ileostomy  GENITOURINARY- Voiding, no suprapubic tenderness  EXTREMITIES:  2+ Peripheral Pulses, No clubbing, cyanosis,   edema  MUSCULOSKELETAL:- No muscle tenderness, Muscle tone normal, No joint tenderness, no Joint swelling,  Joint ROM –normal   SKIN-no rash, + right axillar hydradenitis with some redness and white discharge, tender to touch    Labs radiologic and other test : all reviewed and interpreted   A/P   1. Crohn's disease s/p diverting ileostomy with perianal fistulas s/p seton placement.   - CT scan showing persistent inflammation in the rectum and anus as seen on previous CT with additional fistulous tract.  - GI consult - advance to low residue diet, [facility].No contraindications from GI perspective to discharge with follow up with his IBD specialist.   - pain management - po dilaudid prn     2. Hydradenitis suppurative  - doxycycline 100mg BID x 5 days  - topical bactroband twice daily x 1 week  - ID consult noted     3 . DM2   - diabetic diet  - ISS, restart home medications  - A1C 7.4 in 1/2024    4. Diarrhea ruled out infectious causes  - stool studies negative    Disposition - medically optimized to be discharged home with close follow up with PCP  complete antibiotics  return to ED if fever, abdominal pain, nausea, vomiting, chest pain, dyspnea  Discharge plan discussed with patient, RN  Patient advised to follow up with PCP within 3-7 days  time spend 40 min  Discharge note faxed to PCP with my contact information to call me back

## 2024-02-15 ENCOUNTER — INPATIENT (INPATIENT)
Facility: HOSPITAL | Age: 39
LOS: 3 days | Discharge: HOME | DRG: 607 | End: 2024-02-19
Attending: SURGERY | Admitting: SURGERY
Payer: COMMERCIAL

## 2024-02-15 VITALS — WEIGHT: 160.06 LBS | HEIGHT: 69 IN

## 2024-02-15 DIAGNOSIS — Z96.7 PRESENCE OF OTHER BONE AND TENDON IMPLANTS: Chronic | ICD-10-CM

## 2024-02-15 DIAGNOSIS — Z98.89 OTHER SPECIFIED POSTPROCEDURAL STATES: Chronic | ICD-10-CM

## 2024-02-15 DIAGNOSIS — L02.91 CUTANEOUS ABSCESS, UNSPECIFIED: ICD-10-CM

## 2024-02-15 DIAGNOSIS — K60.3 ANAL FISTULA: Chronic | ICD-10-CM

## 2024-02-15 LAB
ALBUMIN SERPL ELPH-MCNC: 2.7 G/DL — LOW (ref 3.3–5)
ALBUMIN SERPL ELPH-MCNC: 3 G/DL — LOW (ref 3.3–5)
ALP SERPL-CCNC: 100 U/L — SIGNIFICANT CHANGE UP (ref 40–120)
ALP SERPL-CCNC: 82 U/L — SIGNIFICANT CHANGE UP (ref 40–120)
ALT FLD-CCNC: 26 U/L — SIGNIFICANT CHANGE UP (ref 12–78)
ALT FLD-CCNC: 26 U/L — SIGNIFICANT CHANGE UP (ref 12–78)
ANION GAP SERPL CALC-SCNC: 1 MMOL/L — LOW (ref 5–17)
ANION GAP SERPL CALC-SCNC: 7 MMOL/L — SIGNIFICANT CHANGE UP (ref 5–17)
APTT BLD: 26.7 SEC — SIGNIFICANT CHANGE UP (ref 24.5–35.6)
AST SERPL-CCNC: 8 U/L — LOW (ref 15–37)
AST SERPL-CCNC: 8 U/L — LOW (ref 15–37)
BASOPHILS # BLD AUTO: 0.03 K/UL — SIGNIFICANT CHANGE UP (ref 0–0.2)
BASOPHILS NFR BLD AUTO: 0.3 % — SIGNIFICANT CHANGE UP (ref 0–2)
BILIRUB DIRECT SERPL-MCNC: <0.1 MG/DL — SIGNIFICANT CHANGE UP (ref 0–0.3)
BILIRUB INDIRECT FLD-MCNC: >0 MG/DL — LOW (ref 0.2–1)
BILIRUB SERPL-MCNC: 0.1 MG/DL — LOW (ref 0.2–1.2)
BILIRUB SERPL-MCNC: 0.1 MG/DL — LOW (ref 0.2–1.2)
BUN SERPL-MCNC: 5 MG/DL — LOW (ref 7–23)
BUN SERPL-MCNC: 7 MG/DL — SIGNIFICANT CHANGE UP (ref 7–23)
CALCIUM SERPL-MCNC: 8.4 MG/DL — LOW (ref 8.5–10.1)
CALCIUM SERPL-MCNC: 9.2 MG/DL — SIGNIFICANT CHANGE UP (ref 8.5–10.1)
CHLORIDE SERPL-SCNC: 105 MMOL/L — SIGNIFICANT CHANGE UP (ref 96–108)
CHLORIDE SERPL-SCNC: 107 MMOL/L — SIGNIFICANT CHANGE UP (ref 96–108)
CO2 SERPL-SCNC: 25 MMOL/L — SIGNIFICANT CHANGE UP (ref 22–31)
CO2 SERPL-SCNC: 30 MMOL/L — SIGNIFICANT CHANGE UP (ref 22–31)
CREAT SERPL-MCNC: 0.78 MG/DL — SIGNIFICANT CHANGE UP (ref 0.5–1.3)
CREAT SERPL-MCNC: 0.84 MG/DL — SIGNIFICANT CHANGE UP (ref 0.5–1.3)
CULTURE RESULTS: SIGNIFICANT CHANGE UP
CULTURE RESULTS: SIGNIFICANT CHANGE UP
EGFR: 114 ML/MIN/1.73M2 — SIGNIFICANT CHANGE UP
EGFR: 117 ML/MIN/1.73M2 — SIGNIFICANT CHANGE UP
EOSINOPHIL # BLD AUTO: 0.15 K/UL — SIGNIFICANT CHANGE UP (ref 0–0.5)
EOSINOPHIL NFR BLD AUTO: 1.5 % — SIGNIFICANT CHANGE UP (ref 0–6)
GLUCOSE SERPL-MCNC: 254 MG/DL — HIGH (ref 70–99)
GLUCOSE SERPL-MCNC: 351 MG/DL — HIGH (ref 70–99)
HCT VFR BLD CALC: 38.3 % — LOW (ref 39–50)
HGB BLD-MCNC: 13.7 G/DL — SIGNIFICANT CHANGE UP (ref 13–17)
IMM GRANULOCYTES NFR BLD AUTO: 1.1 % — HIGH (ref 0–0.9)
INR BLD: 0.82 RATIO — LOW (ref 0.85–1.18)
LACTATE SERPL-SCNC: 1.5 MMOL/L — SIGNIFICANT CHANGE UP (ref 0.7–2)
LACTATE SERPL-SCNC: 2.7 MMOL/L — HIGH (ref 0.7–2)
LYMPHOCYTES # BLD AUTO: 2.22 K/UL — SIGNIFICANT CHANGE UP (ref 1–3.3)
LYMPHOCYTES # BLD AUTO: 22.6 % — SIGNIFICANT CHANGE UP (ref 13–44)
MAGNESIUM SERPL-MCNC: 1.7 MG/DL — SIGNIFICANT CHANGE UP (ref 1.6–2.6)
MCHC RBC-ENTMCNC: 34.7 PG — HIGH (ref 27–34)
MCHC RBC-ENTMCNC: 35.8 GM/DL — SIGNIFICANT CHANGE UP (ref 32–36)
MCV RBC AUTO: 97 FL — SIGNIFICANT CHANGE UP (ref 80–100)
MONOCYTES # BLD AUTO: 1.13 K/UL — HIGH (ref 0–0.9)
MONOCYTES NFR BLD AUTO: 11.5 % — SIGNIFICANT CHANGE UP (ref 2–14)
NEUTROPHILS # BLD AUTO: 6.19 K/UL — SIGNIFICANT CHANGE UP (ref 1.8–7.4)
NEUTROPHILS NFR BLD AUTO: 63 % — SIGNIFICANT CHANGE UP (ref 43–77)
PHOSPHATE SERPL-MCNC: 3.1 MG/DL — SIGNIFICANT CHANGE UP (ref 2.5–4.5)
PLATELET # BLD AUTO: 354 K/UL — SIGNIFICANT CHANGE UP (ref 150–400)
POTASSIUM SERPL-MCNC: 3.5 MMOL/L — SIGNIFICANT CHANGE UP (ref 3.5–5.3)
POTASSIUM SERPL-MCNC: 3.8 MMOL/L — SIGNIFICANT CHANGE UP (ref 3.5–5.3)
POTASSIUM SERPL-SCNC: 3.5 MMOL/L — SIGNIFICANT CHANGE UP (ref 3.5–5.3)
POTASSIUM SERPL-SCNC: 3.8 MMOL/L — SIGNIFICANT CHANGE UP (ref 3.5–5.3)
PROT SERPL-MCNC: 6.4 GM/DL — SIGNIFICANT CHANGE UP (ref 6–8.3)
PROT SERPL-MCNC: 7.5 GM/DL — SIGNIFICANT CHANGE UP (ref 6–8.3)
PROTHROM AB SERPL-ACNC: 9.3 SEC — LOW (ref 9.5–13)
RBC # BLD: 3.95 M/UL — LOW (ref 4.2–5.8)
RBC # FLD: 13.7 % — SIGNIFICANT CHANGE UP (ref 10.3–14.5)
SODIUM SERPL-SCNC: 137 MMOL/L — SIGNIFICANT CHANGE UP (ref 135–145)
SODIUM SERPL-SCNC: 138 MMOL/L — SIGNIFICANT CHANGE UP (ref 135–145)
SPECIMEN SOURCE: SIGNIFICANT CHANGE UP
SPECIMEN SOURCE: SIGNIFICANT CHANGE UP
WBC # BLD: 9.83 K/UL — SIGNIFICANT CHANGE UP (ref 3.8–10.5)
WBC # FLD AUTO: 9.83 K/UL — SIGNIFICANT CHANGE UP (ref 3.8–10.5)

## 2024-02-15 PROCEDURE — 74177 CT ABD & PELVIS W/CONTRAST: CPT | Mod: 26

## 2024-02-15 PROCEDURE — 86901 BLOOD TYPING SEROLOGIC RH(D): CPT

## 2024-02-15 PROCEDURE — 86900 BLOOD TYPING SEROLOGIC ABO: CPT

## 2024-02-15 PROCEDURE — 76882 US LMTD JT/FCL EVL NVASC XTR: CPT | Mod: 26,RT

## 2024-02-15 PROCEDURE — 87075 CULTR BACTERIA EXCEPT BLOOD: CPT

## 2024-02-15 PROCEDURE — 99285 EMERGENCY DEPT VISIT HI MDM: CPT

## 2024-02-15 PROCEDURE — 85610 PROTHROMBIN TIME: CPT

## 2024-02-15 PROCEDURE — 36415 COLL VENOUS BLD VENIPUNCTURE: CPT

## 2024-02-15 PROCEDURE — 87070 CULTURE OTHR SPECIMN AEROBIC: CPT

## 2024-02-15 PROCEDURE — 87116 MYCOBACTERIA CULTURE: CPT

## 2024-02-15 PROCEDURE — 99222 1ST HOSP IP/OBS MODERATE 55: CPT | Mod: GC,57

## 2024-02-15 PROCEDURE — 80048 BASIC METABOLIC PNL TOTAL CA: CPT

## 2024-02-15 PROCEDURE — 93005 ELECTROCARDIOGRAM TRACING: CPT

## 2024-02-15 PROCEDURE — 88304 TISSUE EXAM BY PATHOLOGIST: CPT

## 2024-02-15 PROCEDURE — 93010 ELECTROCARDIOGRAM REPORT: CPT

## 2024-02-15 PROCEDURE — 87102 FUNGUS ISOLATION CULTURE: CPT

## 2024-02-15 PROCEDURE — 83735 ASSAY OF MAGNESIUM: CPT

## 2024-02-15 PROCEDURE — 85730 THROMBOPLASTIN TIME PARTIAL: CPT

## 2024-02-15 PROCEDURE — 80076 HEPATIC FUNCTION PANEL: CPT

## 2024-02-15 PROCEDURE — 85027 COMPLETE CBC AUTOMATED: CPT

## 2024-02-15 PROCEDURE — G1004: CPT

## 2024-02-15 PROCEDURE — 82962 GLUCOSE BLOOD TEST: CPT

## 2024-02-15 PROCEDURE — 84100 ASSAY OF PHOSPHORUS: CPT

## 2024-02-15 PROCEDURE — 74177 CT ABD & PELVIS W/CONTRAST: CPT | Mod: ME

## 2024-02-15 PROCEDURE — 87077 CULTURE AEROBIC IDENTIFY: CPT

## 2024-02-15 PROCEDURE — 86850 RBC ANTIBODY SCREEN: CPT

## 2024-02-15 RX ORDER — PIPERACILLIN AND TAZOBACTAM 4; .5 G/20ML; G/20ML
3.38 INJECTION, POWDER, LYOPHILIZED, FOR SOLUTION INTRAVENOUS EVERY 8 HOURS
Refills: 0 | Status: DISCONTINUED | OUTPATIENT
Start: 2024-02-15 | End: 2024-02-19

## 2024-02-15 RX ORDER — MORPHINE SULFATE 50 MG/1
4 CAPSULE, EXTENDED RELEASE ORAL ONCE
Refills: 0 | Status: DISCONTINUED | OUTPATIENT
Start: 2024-02-15 | End: 2024-02-15

## 2024-02-15 RX ORDER — SODIUM CHLORIDE 9 MG/ML
1000 INJECTION INTRAMUSCULAR; INTRAVENOUS; SUBCUTANEOUS ONCE
Refills: 0 | Status: COMPLETED | OUTPATIENT
Start: 2024-02-15 | End: 2024-02-15

## 2024-02-15 RX ORDER — KETOROLAC TROMETHAMINE 30 MG/ML
30 SYRINGE (ML) INJECTION ONCE
Refills: 0 | Status: DISCONTINUED | OUTPATIENT
Start: 2024-02-15 | End: 2024-02-15

## 2024-02-15 RX ORDER — ONDANSETRON 8 MG/1
4 TABLET, FILM COATED ORAL EVERY 6 HOURS
Refills: 0 | Status: DISCONTINUED | OUTPATIENT
Start: 2024-02-15 | End: 2024-02-19

## 2024-02-15 RX ORDER — KETOROLAC TROMETHAMINE 30 MG/ML
15 SYRINGE (ML) INJECTION EVERY 6 HOURS
Refills: 0 | Status: DISCONTINUED | OUTPATIENT
Start: 2024-02-15 | End: 2024-02-16

## 2024-02-15 RX ORDER — ACETAMINOPHEN 500 MG
1000 TABLET ORAL ONCE
Refills: 0 | Status: DISCONTINUED | OUTPATIENT
Start: 2024-02-15 | End: 2024-02-16

## 2024-02-15 RX ORDER — MORPHINE SULFATE 50 MG/1
2 CAPSULE, EXTENDED RELEASE ORAL EVERY 4 HOURS
Refills: 0 | Status: DISCONTINUED | OUTPATIENT
Start: 2024-02-15 | End: 2024-02-19

## 2024-02-15 RX ADMIN — Medication 15 MILLIGRAM(S): at 19:21

## 2024-02-15 RX ADMIN — MORPHINE SULFATE 2 MILLIGRAM(S): 50 CAPSULE, EXTENDED RELEASE ORAL at 21:31

## 2024-02-15 RX ADMIN — SODIUM CHLORIDE 1000 MILLILITER(S): 9 INJECTION INTRAMUSCULAR; INTRAVENOUS; SUBCUTANEOUS at 16:16

## 2024-02-15 RX ADMIN — PIPERACILLIN AND TAZOBACTAM 25 GRAM(S): 4; .5 INJECTION, POWDER, LYOPHILIZED, FOR SOLUTION INTRAVENOUS at 21:30

## 2024-02-15 RX ADMIN — MORPHINE SULFATE 4 MILLIGRAM(S): 50 CAPSULE, EXTENDED RELEASE ORAL at 17:33

## 2024-02-15 RX ADMIN — MORPHINE SULFATE 4 MILLIGRAM(S): 50 CAPSULE, EXTENDED RELEASE ORAL at 15:47

## 2024-02-15 RX ADMIN — Medication 30 MILLIGRAM(S): at 19:30

## 2024-02-15 NOTE — H&P ADULT - HISTORY OF PRESENT ILLNESS
38 M with h/o Crohn's disease, perianal fistulas s/p fecal diversion at [facility] (3yrs ago) and seton placement at [facility] now all removed, proctitis, pancreatitis, and EtOH abuse presents to the ED with left axillary pain from hidradenitis supprativa. Patient presented to the ED for the same problem a week ago, discharged with oral abx, states has not gotten any better. Patient states stop taking Stelara (Ustekinumab) as well as Humira for Crohn's and Hidradenitis ~6 months ago due to infectious side effects, based on Colorectal surgeon in [facility], states he does not want to go back since he is unhappy with their care. Denies systemic symptoms such as fever or chills. Ostomy well functioning, abdomen benign. No other complaints.      PMHx: Crohn's since age 12,  s/p ileostomy done at [facility], seton placement  at [facility]  , ETOH abuse , recently diagnosed DM2,   PSHx: diversion  Ileostomy done at [facility] 3 yrs ago, seton placement at [facility] , perianal fistula repair  2002  ALL: Ciprofloxacin

## 2024-02-15 NOTE — CONSULT NOTE ADULT - ASSESSMENT
38M w/ HS in right axilla, known to have Crohn's w/ perianal fistula    Recommendations:  - CT A/P w/ contrast tonight vs AM  - No urgent intervention warranted since actively draining  - Rt axillary HS care as per GS team  - Will follow along  - Rest of the care as per primary team    Plan will be discussed with Colorectal surgery attending, Dr. Hinojosa

## 2024-02-15 NOTE — ED STATDOCS - CLINICAL SUMMARY MEDICAL DECISION MAKING FREE TEXT BOX
38-year-old male presenting with 1 week of right axillary pain.  Patient has 2 lesions that are tender, may be small abscesses versus lymph nodes.  Patient also has area of fluctuance deep to that location concerning for deeper abscess.  Will start with blood work and ultrasound to evaluate the site, may need to escalate to CT scan to further evaluate.  I&D as appropriate.  Reassess for dispo.

## 2024-02-15 NOTE — ED ADULT NURSE NOTE - OBJECTIVE STATEMENT
Pt ambulatory to the ED with c/o abcess to the right armpit. Pt is A&OX4, GCS 15. Pt endorses pain of 10/10. Pt denies any fevers, SOB or CP. Pt has IV access.

## 2024-02-15 NOTE — ED STATDOCS - PHYSICAL EXAMINATION
GENERAL: A&Ox4, non-toxic appearing, no acute distress  HEENT: NCAT, EOMI, oral mucosa moist, normal conjunctiva  RESP: no respiratory distress, speaking in full sentences  CV: RRR  MSK: no visible deformities, R axilla with 2 raised tender firm nodules as well as fluctuance in the axilla, no significant tenderness of the RUE  NEURO: no focal sensory or motor deficits, CN 2-12 grossly intact  SKIN: warm, normal color, well perfused, no rash  PSYCH: normal affect

## 2024-02-15 NOTE — H&P ADULT - ASSESSMENT
38M w/ Hidradenitis supprativa, currently has 4.1cm fluid collection in the right axilla    Plan:  - Admit under Dr. Hilario's service  - Will undergo I&D of the right axillary abscess  - CRS consult for Crohn's perianal abscess  - Pain control PRN  - Monitor vitals  - Reg  then NPO after midnight  - Nausea control PRN  - IV abx: zosyn  - IVF:   - replete electrolytes  - daily labs  - OOB/PT    Plan will be discussed with Trauma & Surgical critical care surgery attending, Dr. Hilario

## 2024-02-15 NOTE — CONSULT NOTE ADULT - SUBJECTIVE AND OBJECTIVE BOX
38 M with h/o Crohn's disease, perianal fistulas s/p fecal diversion at [facility] (3yrs ago) and seton placement at [facility] now all removed, proctitis, pancreatitis, and EtOH abuse presents to the ED with left axillary pain from hidradenitis supprativa. Colorectal surgery team consulted for perianal fistula    Patient is well known to the service. Patient goes to [facility] and [facility] for management. Patient states stop taking Stelara (Ustekinumab) as well as Humira for Crohn's and Hidradenitis ~6 months ago due to infectious side effects, based on Colorectal surgeon in [facility], states he does not want to go back since he is unhappy with their care. Denies systemic symptoms such as fever or chills. Ostomy well functioning, abdomen benign. No other complaints.      Physical Exam:    Pt is AAOx3  General: No acute distress  Chest: Symmetric chest rise   Heart: RRR  Abdomen: Soft, no tenderness, nondistended, ostomy in place, brown semisolid output  MSK: Rt axilla: Raised scar with some fluctuances with palpation, 2-3 pits that is draining scant purulent fluid. Tender to touch at the medial aspect of the scar. no skin erythema nor induration noticed.  Rectal: Erythema of the inner bilateral buttocks. Numerous fistulous openings seen around the anal verge, actively draining purulent fluids. SHELLIE deferred due to extreme tenderness  Back: Normal curvature, no tenderness.   Neuro: Physiological, no localizing findings.       Chief complaint:      PMHx:  Crohn's disease of large intestine without complication    Pancreatitis        PSHx:  S/P ORIF (open reduction internal fixation) fracture    History of colonoscopy    Perianal fistula        FHx:  Diabetes mellitus (Father)        Vitals:  T(C): 36.6 (02-15 @ 17:31), Max: 36.7 (02-15 @ 14:10)  HR: 118 (02-15 @ 17:31) (118 - 137)  BP: 125/69 (02-15 @ 17:31) (120/86 - 125/69)  RR: 17 (02-15 @ 17:31) (17 - 19)  SpO2: 93% (02-15 @ 17:31) (93% - 95%)      I&Os    .    Labs:  02-15 @ 15:41                    13.7  CBC: 9.83>)-------(<354                     38.3                 137 | 105 | 5    CMP:  ----------------------< 351               3.5 | 25 | 0.78                      Ca:9.2  Phos:-  Mg:-               0.1|      |8        LFTs:  ------|100|-----             -|      |-        Cultures:        Current Inpatient Medications:  acetaminophen   IVPB .. 1000 milliGRAM(s) IV Intermittent Once  ketorolac   Injectable 30 milliGRAM(s) IV Push Once  ketorolac   Injectable 15 milliGRAM(s) IV Push every 6 hours PRN  LORazepam   Injectable 2 milliGRAM(s) IV Push every 2 hours PRN  morphine  - Injectable 2 milliGRAM(s) IV Push every 4 hours PRN  ondansetron Injectable 4 milliGRAM(s) IV Push every 6 hours PRN  piperacillin/tazobactam IVPB.. 3.375 Gram(s) IV Intermittent every 8 hours

## 2024-02-15 NOTE — H&P ADULT - NSICDXPASTMEDICALHX_GEN_ALL_CORE_FT
12/15/2020 Patient: Greta Cantrell YOB: 2014 Date of Visit: 12/15/2020 No Recipients Dear No Recipients, Thank you for referring Mr. Greta Cantrell to [address] [place] [address] for evaluation. My notes for this consultation are attached. If you have questions, please do not hesitate to call me. I look forward to following your patient along with you.  
 
 
Sincerely, 
 
Jose Luis Navarrete MD 
 
 

PAST MEDICAL HISTORY:  Crohn's disease of large intestine without complication (No current flare up)    Pancreatitis

## 2024-02-15 NOTE — H&P ADULT - NSHPLABSRESULTS_GEN_ALL_CORE
Chief complaint:      PMHx:  Crohn's disease of large intestine without complication    Pancreatitis        PSHx:  S/P ORIF (open reduction internal fixation) fracture    History of colonoscopy    Perianal fistula        FHx:  Diabetes mellitus (Father)        Vitals:  T(C): 36.6 (02-15 @ 17:31), Max: 36.7 (02-15 @ 14:10)  HR: 118 (02-15 @ 17:31) (118 - 137)  BP: 125/69 (02-15 @ 17:31) (120/86 - 125/69)  RR: 17 (02-15 @ 17:31) (17 - 19)  SpO2: 93% (02-15 @ 17:31) (93% - 95%)      I&Os    .    Labs:  02-15 @ 15:41                    13.7  CBC: 9.83>)-------(<354                     38.3                 137 | 105 | 5    CMP:  ----------------------< 351               3.5 | 25 | 0.78                      Ca:9.2  Phos:-  Mg:-               0.1|      |8        LFTs:  ------|100|-----             -|      |-        Cultures:        Current Inpatient Medications:  acetaminophen   IVPB .. 1000 milliGRAM(s) IV Intermittent Once  ketorolac   Injectable 15 milliGRAM(s) IV Push every 6 hours PRN  ketorolac   Injectable 30 milliGRAM(s) IV Push Once  LORazepam   Injectable 2 milliGRAM(s) IV Push every 2 hours PRN  morphine  - Injectable 2 milliGRAM(s) IV Push every 4 hours PRN  ondansetron Injectable 4 milliGRAM(s) IV Push every 6 hours PRN  piperacillin/tazobactam IVPB.. 3.375 Gram(s) IV Intermittent every 8 hours      < from: US Extremity Nonvasc Limited, Right (02.15.24 @ 15:28) >    Findings/  impression: There is a heterogeneous complex fluid collection with   peripheral color flow seen measuring 4.3 x 1.1 x 1.2 cm corresponding to   the area of clinical concern. This is consistent with an abscess in the   appropriate clinical setting.    < end of copied text >

## 2024-02-15 NOTE — ED ADULT TRIAGE NOTE - CHIEF COMPLAINT QUOTE
pt presents to Ed with complaints of abscess to right armpit. pt endorses being seen Friday for same issue in ED. returns today with spread of infection down ro right elbow and up to right neck. endorses fever (tmax 102.0) no respiratory distress. pt endorses he has been taking doxycycline and using mupirocin ointment as prescribed. called PMD Misbah who directed pt to return to ED.

## 2024-02-15 NOTE — H&P ADULT - NSHPPHYSICALEXAM_GEN_ALL_CORE
Physical Exam:    Pt is AAOx3  General: No acute distress  Chest: Symmetric chest rise   Heart: RRR  Abdomen: Soft, no tenderness, nondistended, ostomy in place, brown semisolid output  MSK: Rt axilla: Raised scar with some fluctuances with palpation, 2-3 pits that is draining scant purulent fluid. Tender to touch at the medial aspect of the scar. no skin erythema nor induration noticed.  Rectal: Erythema of the inner bilateral buttocks. Numerous fistulous openings seen around the anal verge, actively draining purulent fluids. SHELLIE deferred due to extreme tenderness  Back: Normal curvature, no tenderness.   Neuro: Physiological, no localizing findings.

## 2024-02-15 NOTE — ED STATDOCS - PROGRESS NOTE DETAILS
37 y/o M with PMH of Crohn's dx with ileostomy, recent admission presents with right armpit infection. Pt was diagnosed with hidradenitis suppurativa on last admission and started on doxycycline x 5 days on 2/10/24. States he has pain extending from his neck to his right elbow. +Fever, Tmax 102F at home. PE: uncomfortable appearing. Cardiac: s1s2, tachycardic. Lungs; CTAB. Skin; fluctuant, raised nodules x2 in right axilla. A/p hidradenitis suppurativa. Plan for labs, imaging, analgesia, IVF, reassess. - Diogenes Larry PA-C Labs and imaging d/w patient. Surgery called for consult to evaluate patient in ED. - Diogenes Larry PA-C per surgical resident pt tba to Dr. Sulaiman Xiong PA-C

## 2024-02-15 NOTE — ED STATDOCS - OBJECTIVE STATEMENT
37 yo female w/PMHx Crohn's and pancreatitis presents to the ED with R arm abscess. Pt was here 1 week ago and admitted for other abdominal reasons, and when he was admitted his abscess opened on its own, was then placed on antibiotics at that time. Pt reporting he is now having pain from his R elbow up to his neck. Pt had a fever last night, tmax 102.

## 2024-02-15 NOTE — ED ADULT NURSE NOTE - NSFALLUNIVINTERV_ED_ALL_ED
Bed/Stretcher in lowest position, wheels locked, appropriate side rails in place/Call bell, personal items and telephone in reach/Instruct patient to call for assistance before getting out of bed/chair/stretcher/Non-slip footwear applied when patient is off stretcher/[place] to call system/Physically safe environment - no spills, clutter or unnecessary equipment/Purposeful proactive rounding/Room/bathroom lighting operational, light cord in reach

## 2024-02-16 ENCOUNTER — RESULT REVIEW (OUTPATIENT)
Age: 39
End: 2024-02-16

## 2024-02-16 ENCOUNTER — TRANSCRIPTION ENCOUNTER (OUTPATIENT)
Age: 39
End: 2024-02-16

## 2024-02-16 LAB
ANION GAP SERPL CALC-SCNC: 0 MMOL/L — LOW (ref 5–17)
APTT BLD: 27.3 SEC — SIGNIFICANT CHANGE UP (ref 24.5–35.6)
BUN SERPL-MCNC: 8 MG/DL — SIGNIFICANT CHANGE UP (ref 7–23)
CALCIUM SERPL-MCNC: 9.1 MG/DL — SIGNIFICANT CHANGE UP (ref 8.5–10.1)
CHLORIDE SERPL-SCNC: 111 MMOL/L — HIGH (ref 96–108)
CO2 SERPL-SCNC: 29 MMOL/L — SIGNIFICANT CHANGE UP (ref 22–31)
CREAT SERPL-MCNC: 0.74 MG/DL — SIGNIFICANT CHANGE UP (ref 0.5–1.3)
EGFR: 119 ML/MIN/1.73M2 — SIGNIFICANT CHANGE UP
GLUCOSE BLDC GLUCOMTR-MCNC: 356 MG/DL — HIGH (ref 70–99)
GLUCOSE BLDC GLUCOMTR-MCNC: 358 MG/DL — HIGH (ref 70–99)
GLUCOSE SERPL-MCNC: 184 MG/DL — HIGH (ref 70–99)
HCT VFR BLD CALC: 37.4 % — LOW (ref 39–50)
HGB BLD-MCNC: 12.5 G/DL — LOW (ref 13–17)
INR BLD: 0.93 RATIO — SIGNIFICANT CHANGE UP (ref 0.85–1.18)
MAGNESIUM SERPL-MCNC: 1.8 MG/DL — SIGNIFICANT CHANGE UP (ref 1.6–2.6)
MCHC RBC-ENTMCNC: 32.8 PG — SIGNIFICANT CHANGE UP (ref 27–34)
MCHC RBC-ENTMCNC: 33.4 GM/DL — SIGNIFICANT CHANGE UP (ref 32–36)
MCV RBC AUTO: 98.2 FL — SIGNIFICANT CHANGE UP (ref 80–100)
PHOSPHATE SERPL-MCNC: 3 MG/DL — SIGNIFICANT CHANGE UP (ref 2.5–4.5)
PLATELET # BLD AUTO: 329 K/UL — SIGNIFICANT CHANGE UP (ref 150–400)
POTASSIUM SERPL-MCNC: 4.4 MMOL/L — SIGNIFICANT CHANGE UP (ref 3.5–5.3)
POTASSIUM SERPL-SCNC: 4.4 MMOL/L — SIGNIFICANT CHANGE UP (ref 3.5–5.3)
PROTHROM AB SERPL-ACNC: 10.5 SEC — SIGNIFICANT CHANGE UP (ref 9.5–13)
RBC # BLD: 3.81 M/UL — LOW (ref 4.2–5.8)
RBC # FLD: 13.9 % — SIGNIFICANT CHANGE UP (ref 10.3–14.5)
SODIUM SERPL-SCNC: 140 MMOL/L — SIGNIFICANT CHANGE UP (ref 135–145)
WBC # BLD: 9.99 K/UL — SIGNIFICANT CHANGE UP (ref 3.8–10.5)
WBC # FLD AUTO: 9.99 K/UL — SIGNIFICANT CHANGE UP (ref 3.8–10.5)

## 2024-02-16 PROCEDURE — 99232 SBSQ HOSP IP/OBS MODERATE 35: CPT

## 2024-02-16 PROCEDURE — 11451 EXC SKN HDRDNT AX COMPLEX: CPT | Mod: GC

## 2024-02-16 PROCEDURE — 88304 TISSUE EXAM BY PATHOLOGIST: CPT | Mod: 26

## 2024-02-16 RX ORDER — SODIUM CHLORIDE 9 MG/ML
1000 INJECTION, SOLUTION INTRAVENOUS
Refills: 0 | Status: DISCONTINUED | OUTPATIENT
Start: 2024-02-16 | End: 2024-02-16

## 2024-02-16 RX ORDER — HYDROMORPHONE HYDROCHLORIDE 2 MG/ML
0.5 INJECTION INTRAMUSCULAR; INTRAVENOUS; SUBCUTANEOUS
Refills: 0 | Status: DISCONTINUED | OUTPATIENT
Start: 2024-02-16 | End: 2024-02-16

## 2024-02-16 RX ORDER — KETOROLAC TROMETHAMINE 30 MG/ML
15 SYRINGE (ML) INJECTION EVERY 6 HOURS
Refills: 0 | Status: COMPLETED | OUTPATIENT
Start: 2024-02-16 | End: 2024-02-18

## 2024-02-16 RX ORDER — DIPHENHYDRAMINE HCL 50 MG
25 CAPSULE ORAL ONCE
Refills: 0 | Status: COMPLETED | OUTPATIENT
Start: 2024-02-16 | End: 2024-02-16

## 2024-02-16 RX ORDER — OXYCODONE HYDROCHLORIDE 5 MG/1
10 TABLET ORAL ONCE
Refills: 0 | Status: DISCONTINUED | OUTPATIENT
Start: 2024-02-16 | End: 2024-02-16

## 2024-02-16 RX ORDER — HYDROMORPHONE HYDROCHLORIDE 2 MG/ML
0.5 INJECTION INTRAMUSCULAR; INTRAVENOUS; SUBCUTANEOUS EVERY 4 HOURS
Refills: 0 | Status: DISCONTINUED | OUTPATIENT
Start: 2024-02-16 | End: 2024-02-19

## 2024-02-16 RX ORDER — ONDANSETRON 8 MG/1
4 TABLET, FILM COATED ORAL ONCE
Refills: 0 | Status: DISCONTINUED | OUTPATIENT
Start: 2024-02-16 | End: 2024-02-16

## 2024-02-16 RX ORDER — ACETAMINOPHEN 500 MG
1000 TABLET ORAL EVERY 8 HOURS
Refills: 0 | Status: COMPLETED | OUTPATIENT
Start: 2024-02-16 | End: 2024-02-17

## 2024-02-16 RX ORDER — MORPHINE SULFATE 50 MG/1
4 CAPSULE, EXTENDED RELEASE ORAL EVERY 4 HOURS
Refills: 0 | Status: DISCONTINUED | OUTPATIENT
Start: 2024-02-16 | End: 2024-02-19

## 2024-02-16 RX ORDER — INSULIN LISPRO 100/ML
VIAL (ML) SUBCUTANEOUS EVERY 6 HOURS
Refills: 0 | Status: DISCONTINUED | OUTPATIENT
Start: 2024-02-16 | End: 2024-02-19

## 2024-02-16 RX ORDER — OXYCODONE HYDROCHLORIDE 5 MG/1
5 TABLET ORAL ONCE
Refills: 0 | Status: DISCONTINUED | OUTPATIENT
Start: 2024-02-16 | End: 2024-02-16

## 2024-02-16 RX ORDER — FENTANYL CITRATE 50 UG/ML
50 INJECTION INTRAVENOUS
Refills: 0 | Status: DISCONTINUED | OUTPATIENT
Start: 2024-02-16 | End: 2024-02-16

## 2024-02-16 RX ORDER — INFLUENZA VIRUS VACCINE 15; 15; 15; 15 UG/.5ML; UG/.5ML; UG/.5ML; UG/.5ML
0.5 SUSPENSION INTRAMUSCULAR ONCE
Refills: 0 | Status: DISCONTINUED | OUTPATIENT
Start: 2024-02-16 | End: 2024-02-19

## 2024-02-16 RX ADMIN — FENTANYL CITRATE 50 MICROGRAM(S): 50 INJECTION INTRAVENOUS at 15:45

## 2024-02-16 RX ADMIN — Medication 400 MILLIGRAM(S): at 21:03

## 2024-02-16 RX ADMIN — Medication 5: at 18:01

## 2024-02-16 RX ADMIN — Medication 25 MILLIGRAM(S): at 23:34

## 2024-02-16 RX ADMIN — Medication 400 MILLIGRAM(S): at 14:45

## 2024-02-16 RX ADMIN — Medication 15 MILLIGRAM(S): at 01:48

## 2024-02-16 RX ADMIN — MORPHINE SULFATE 2 MILLIGRAM(S): 50 CAPSULE, EXTENDED RELEASE ORAL at 09:00

## 2024-02-16 RX ADMIN — FENTANYL CITRATE 50 MICROGRAM(S): 50 INJECTION INTRAVENOUS at 16:04

## 2024-02-16 RX ADMIN — PIPERACILLIN AND TAZOBACTAM 25 GRAM(S): 4; .5 INJECTION, POWDER, LYOPHILIZED, FOR SOLUTION INTRAVENOUS at 12:35

## 2024-02-16 RX ADMIN — MORPHINE SULFATE 4 MILLIGRAM(S): 50 CAPSULE, EXTENDED RELEASE ORAL at 12:51

## 2024-02-16 RX ADMIN — HYDROMORPHONE HYDROCHLORIDE 0.5 MILLIGRAM(S): 2 INJECTION INTRAMUSCULAR; INTRAVENOUS; SUBCUTANEOUS at 23:38

## 2024-02-16 RX ADMIN — MORPHINE SULFATE 4 MILLIGRAM(S): 50 CAPSULE, EXTENDED RELEASE ORAL at 18:01

## 2024-02-16 RX ADMIN — MORPHINE SULFATE 2 MILLIGRAM(S): 50 CAPSULE, EXTENDED RELEASE ORAL at 08:30

## 2024-02-16 RX ADMIN — FENTANYL CITRATE 50 MICROGRAM(S): 50 INJECTION INTRAVENOUS at 15:37

## 2024-02-16 RX ADMIN — MORPHINE SULFATE 2 MILLIGRAM(S): 50 CAPSULE, EXTENDED RELEASE ORAL at 03:23

## 2024-02-16 RX ADMIN — Medication 1000 MILLIGRAM(S): at 15:37

## 2024-02-16 RX ADMIN — PIPERACILLIN AND TAZOBACTAM 25 GRAM(S): 4; .5 INJECTION, POWDER, LYOPHILIZED, FOR SOLUTION INTRAVENOUS at 21:53

## 2024-02-16 RX ADMIN — MORPHINE SULFATE 4 MILLIGRAM(S): 50 CAPSULE, EXTENDED RELEASE ORAL at 12:34

## 2024-02-16 RX ADMIN — PIPERACILLIN AND TAZOBACTAM 25 GRAM(S): 4; .5 INJECTION, POWDER, LYOPHILIZED, FOR SOLUTION INTRAVENOUS at 05:51

## 2024-02-16 NOTE — PROGRESS NOTE ADULT - ASSESSMENT
38M w/ Hidradenitis supprativa, currently has 4.1cm fluid collection in the right axilla    Plan:    - Will undergo I&D of the right axillary abscess  - CRS consult for Crohn's perianal abscess  - Pain control PRN  - Monitor vitals  - NPO after midnight  - Nausea control PRN  - IV abx: zosyn  - IVF:   - replete electrolytes  - daily labs  - OOB/PT    Plan will be discussed with Trauma & Surgical critical care surgery attending, Dr. Hilario

## 2024-02-16 NOTE — PROGRESS NOTE ADULT - ASSESSMENT
38M w/ HS in right axilla, known to have Crohn's w/ perianal fistula    CT abd/pelvis: < from: CT Abdomen and Pelvis w/ IV Cont (02.15.24 @ 20:32) >  Findings suggestive of a right-sided fistula crossing the supralevator   space into the ischioanal fossa.  Additional left transsphincteric fistula or small abscess extending into   the ischioanal fossa.  MRI is recommended to better characterize perianal fistulas and sinus  tract.    < end of copied text >    Recommendations:      - No urgent intervention warranted since actively draining  - Rt axillary HS care as per GS team  - Will follow along  - Rest of the care as per primary team    Plan will be discussed with Colorectal surgery attending

## 2024-02-16 NOTE — PROGRESS NOTE ADULT - SUBJECTIVE AND OBJECTIVE BOX
Pt seen at bedside, resting comfortable  Pt denies pain, nausea, vomiting, fever, chills    Vitals:  T(C): 36.6 (02-15 @ 23:27), Max: 36.7 (02-15 @ 14:10)  HR: 83 (02-15 @ 23:27) (83 - 137)  BP: 111/69 (02-15 @ 23:27) (111/69 - 125/69)  RR: 18 (02-15 @ 23:27) (16 - 19)  SpO2: 100% (02-15 @ 23:27) (93% - 100%)      Physical Exam:  General: AAOx3, Well developed, NAD  Chest: Normal respiratory effort  Heart: RRR  Abdomen: Soft, NTND, no masses  Neuro/Psych: No localized deficits. Normal spech, normal tone  Skin: Normal, no rashes, no lesions noted.   Extremities:  Rt axilla: Raised scar with some fluctuances with palpation, 2-3 pits that is draining scant purulent fluid. Tender to touch at the medial aspect of the scar. no skin erythema nor induration noticed.    02-15 @ 21:51                    -  CBC: ->)-------(<-                     -                 138 | 107 | 7    CMP:  ----------------------< 254               3.8 | 30 | 0.84                      Ca:8.4  Phos:3.1  M.7               0.1|      |8        LFTs:  ------|82|-----             <0.1|      |-  02-15 @ 15:41                    13.7  CBC: 9.83>)-------(<354                     38.3                 137 | 105 | 5    CMP:  ----------------------< 351               3.5 | 25 | 0.78                      Ca:9.2  Phos:-  Mg:-               0.1|      |8        LFTs:  ------|100|-----             -|      |-      Current Inpatient Medications:  acetaminophen   IVPB .. 1000 milliGRAM(s) IV Intermittent Once  ketorolac   Injectable 15 milliGRAM(s) IV Push every 6 hours PRN  LORazepam   Injectable 2 milliGRAM(s) IV Push every 2 hours PRN  morphine  - Injectable 2 milliGRAM(s) IV Push every 4 hours PRN  ondansetron Injectable 4 milliGRAM(s) IV Push every 6 hours PRN  piperacillin/tazobactam IVPB.. 3.375 Gram(s) IV Intermittent every 8 hours

## 2024-02-16 NOTE — PROGRESS NOTE ADULT - ATTENDING COMMENTS
History of Crohns disease  Anorectal Crohns with perianal fistulas  Fistulas are draining well, no surrounding erythema, mild tenderness on examination  Patient will follow up as an outpatient with his colorectal surgeon for continued evaluation/treatment

## 2024-02-16 NOTE — BRIEF OPERATIVE NOTE - NSICDXBRIEFPREOP_GEN_ALL_CORE_FT
PRE-OP DIAGNOSIS:  Hidradenitis axillaris 16-Feb-2024 15:51:57  Ignacio Tay  Hidradenitis axillaris 16-Feb-2024 15:52:05  Ignacio Tay

## 2024-02-16 NOTE — PATIENT PROFILE ADULT - NSPRESCRALCAMT_GEN_A_NUR
----- Message from Kingsley Posey MD sent at 12/3/2020 11:44 AM CST -----  Let patient know the lab results.  Hemoglobin is up significantly compared to before she got the IV iron.  I want to check a CBC, iron, iron-binding capacity, ferritin in 2 months.   1 or 2

## 2024-02-16 NOTE — PROGRESS NOTE ADULT - SUBJECTIVE AND OBJECTIVE BOX
Pt seen at bedside, resting comfortable  Pt denies pain, nausea, vomiting, fever, chills    Physical Exam:    Pt is AAOx3  General: No acute distress  Chest: Symmetric chest rise   Heart: RRR  Abdomen: Soft, no tenderness, nondistended, ostomy in place, brown semisolid output  MSK: Rt axilla: Raised scar with some fluctuance with palpation, 2-3 pits that is draining scant purulent fluid. Tender to touch at the medial aspect of the scar. no skin erythema nor induration noticed.  Rectal: Erythema of the inner bilateral buttocks. Numerous fistulous openings seen around the anal verge, actively draining purulent fluids. SHELLIE deferred due to extreme tenderness  Back: Normal curvature, no tenderness.   Neuro: Physiological, no localizing findings.     Vitals:  T(C): 36.6 (02-15 @ 23:27), Max: 36.7 (02-15 @ 14:10)  HR: 83 (02-15 @ 23:27) (83 - 137)  BP: 111/69 (02-15 @ 23:27) (111/69 - 125/69)  RR: 18 (02-15 @ 23:27) (16 - 19)  SpO2: 100% (02-15 @ 23:27) (93% - 100%)      02-15 @ 21:51                    -  CBC: ->)-------(<-                     -                 138 | 107 | 7    CMP:  ----------------------< 254               3.8 | 30 | 0.84                      Ca:8.4  Phos:3.1  M.7               0.1|      |8        LFTs:  ------|82|-----             <0.1|      |-  02-15 @ 15:41                    13.7  CBC: 9.83>)-------(<354                     38.3                 137 | 105 | 5    CMP:  ----------------------< 351               3.5 | 25 | 0.78                      Ca:9.2  Phos:-  Mg:-               0.1|      |8        LFTs:  ------|100|-----             -|      |-      Current Inpatient Medications:  acetaminophen   IVPB .. 1000 milliGRAM(s) IV Intermittent Once  ketorolac   Injectable 15 milliGRAM(s) IV Push every 6 hours PRN  LORazepam   Injectable 2 milliGRAM(s) IV Push every 2 hours PRN  morphine  - Injectable 2 milliGRAM(s) IV Push every 4 hours PRN  ondansetron Injectable 4 milliGRAM(s) IV Push every 6 hours PRN  piperacillin/tazobactam IVPB.. 3.375 Gram(s) IV Intermittent every 8 hours

## 2024-02-16 NOTE — ED ADULT NURSE REASSESSMENT NOTE - NS ED NURSE REASSESS COMMENT FT1
received pt. from previous RN - Melissa, pt. is lying on bed, comfortably, alert and oriented, awaiting for bed in the [facility], safety maintained applied.

## 2024-02-17 DIAGNOSIS — E11.9 TYPE 2 DIABETES MELLITUS WITHOUT COMPLICATIONS: ICD-10-CM

## 2024-02-17 DIAGNOSIS — K50.90 CROHN'S DISEASE, UNSPECIFIED, WITHOUT COMPLICATIONS: ICD-10-CM

## 2024-02-17 DIAGNOSIS — K60.3 ANAL FISTULA: ICD-10-CM

## 2024-02-17 DIAGNOSIS — F10.10 ALCOHOL ABUSE, UNCOMPLICATED: ICD-10-CM

## 2024-02-17 DIAGNOSIS — K50.113 CROHN'S DISEASE OF LARGE INTESTINE WITH FISTULA: ICD-10-CM

## 2024-02-17 DIAGNOSIS — Z93.2 ILEOSTOMY STATUS: ICD-10-CM

## 2024-02-17 DIAGNOSIS — L73.2 HIDRADENITIS SUPPURATIVA: ICD-10-CM

## 2024-02-17 LAB
ANION GAP SERPL CALC-SCNC: 3 MMOL/L — LOW (ref 5–17)
BUN SERPL-MCNC: 6 MG/DL — LOW (ref 7–23)
CALCIUM SERPL-MCNC: 9.4 MG/DL — SIGNIFICANT CHANGE UP (ref 8.5–10.1)
CHLORIDE SERPL-SCNC: 102 MMOL/L — SIGNIFICANT CHANGE UP (ref 96–108)
CO2 SERPL-SCNC: 29 MMOL/L — SIGNIFICANT CHANGE UP (ref 22–31)
CREAT SERPL-MCNC: 0.84 MG/DL — SIGNIFICANT CHANGE UP (ref 0.5–1.3)
EGFR: 114 ML/MIN/1.73M2 — SIGNIFICANT CHANGE UP
GLUCOSE BLDC GLUCOMTR-MCNC: 202 MG/DL — HIGH (ref 70–99)
GLUCOSE BLDC GLUCOMTR-MCNC: 291 MG/DL — HIGH (ref 70–99)
GLUCOSE BLDC GLUCOMTR-MCNC: 299 MG/DL — HIGH (ref 70–99)
GLUCOSE BLDC GLUCOMTR-MCNC: 300 MG/DL — HIGH (ref 70–99)
GLUCOSE BLDC GLUCOMTR-MCNC: 342 MG/DL — HIGH (ref 70–99)
GLUCOSE SERPL-MCNC: 231 MG/DL — HIGH (ref 70–99)
GRAM STN FLD: SIGNIFICANT CHANGE UP
HCT VFR BLD CALC: 37.6 % — LOW (ref 39–50)
HGB BLD-MCNC: 12.4 G/DL — LOW (ref 13–17)
MAGNESIUM SERPL-MCNC: 1.9 MG/DL — SIGNIFICANT CHANGE UP (ref 1.6–2.6)
MCHC RBC-ENTMCNC: 32.5 PG — SIGNIFICANT CHANGE UP (ref 27–34)
MCHC RBC-ENTMCNC: 33 GM/DL — SIGNIFICANT CHANGE UP (ref 32–36)
MCV RBC AUTO: 98.7 FL — SIGNIFICANT CHANGE UP (ref 80–100)
NIGHT BLUE STAIN TISS: SIGNIFICANT CHANGE UP
PHOSPHATE SERPL-MCNC: 2.7 MG/DL — SIGNIFICANT CHANGE UP (ref 2.5–4.5)
PLATELET # BLD AUTO: 402 K/UL — HIGH (ref 150–400)
POTASSIUM SERPL-MCNC: 3.8 MMOL/L — SIGNIFICANT CHANGE UP (ref 3.5–5.3)
POTASSIUM SERPL-SCNC: 3.8 MMOL/L — SIGNIFICANT CHANGE UP (ref 3.5–5.3)
RBC # BLD: 3.81 M/UL — LOW (ref 4.2–5.8)
RBC # FLD: 13.4 % — SIGNIFICANT CHANGE UP (ref 10.3–14.5)
SODIUM SERPL-SCNC: 134 MMOL/L — LOW (ref 135–145)
SPECIMEN SOURCE: SIGNIFICANT CHANGE UP
SPECIMEN SOURCE: SIGNIFICANT CHANGE UP
WBC # BLD: 10.8 K/UL — HIGH (ref 3.8–10.5)
WBC # FLD AUTO: 10.8 K/UL — HIGH (ref 3.8–10.5)

## 2024-02-17 PROCEDURE — 99232 SBSQ HOSP IP/OBS MODERATE 35: CPT

## 2024-02-17 RX ORDER — SODIUM HYPOCHLORITE 0.125 %
1 SOLUTION, NON-ORAL MISCELLANEOUS DAILY
Refills: 0 | Status: DISCONTINUED | OUTPATIENT
Start: 2024-02-17 | End: 2024-02-19

## 2024-02-17 RX ADMIN — HYDROMORPHONE HYDROCHLORIDE 0.5 MILLIGRAM(S): 2 INJECTION INTRAMUSCULAR; INTRAVENOUS; SUBCUTANEOUS at 13:06

## 2024-02-17 RX ADMIN — HYDROMORPHONE HYDROCHLORIDE 0.5 MILLIGRAM(S): 2 INJECTION INTRAMUSCULAR; INTRAVENOUS; SUBCUTANEOUS at 03:45

## 2024-02-17 RX ADMIN — Medication 3: at 17:20

## 2024-02-17 RX ADMIN — HYDROMORPHONE HYDROCHLORIDE 0.5 MILLIGRAM(S): 2 INJECTION INTRAMUSCULAR; INTRAVENOUS; SUBCUTANEOUS at 22:54

## 2024-02-17 RX ADMIN — HYDROMORPHONE HYDROCHLORIDE 0.5 MILLIGRAM(S): 2 INJECTION INTRAMUSCULAR; INTRAVENOUS; SUBCUTANEOUS at 09:19

## 2024-02-17 RX ADMIN — HYDROMORPHONE HYDROCHLORIDE 0.5 MILLIGRAM(S): 2 INJECTION INTRAMUSCULAR; INTRAVENOUS; SUBCUTANEOUS at 04:17

## 2024-02-17 RX ADMIN — HYDROMORPHONE HYDROCHLORIDE 0.5 MILLIGRAM(S): 2 INJECTION INTRAMUSCULAR; INTRAVENOUS; SUBCUTANEOUS at 08:49

## 2024-02-17 RX ADMIN — HYDROMORPHONE HYDROCHLORIDE 0.5 MILLIGRAM(S): 2 INJECTION INTRAMUSCULAR; INTRAVENOUS; SUBCUTANEOUS at 17:25

## 2024-02-17 RX ADMIN — Medication 4: at 12:57

## 2024-02-17 RX ADMIN — Medication 3: at 00:13

## 2024-02-17 RX ADMIN — HYDROMORPHONE HYDROCHLORIDE 0.5 MILLIGRAM(S): 2 INJECTION INTRAMUSCULAR; INTRAVENOUS; SUBCUTANEOUS at 17:55

## 2024-02-17 RX ADMIN — HYDROMORPHONE HYDROCHLORIDE 0.5 MILLIGRAM(S): 2 INJECTION INTRAMUSCULAR; INTRAVENOUS; SUBCUTANEOUS at 00:08

## 2024-02-17 RX ADMIN — HYDROMORPHONE HYDROCHLORIDE 0.5 MILLIGRAM(S): 2 INJECTION INTRAMUSCULAR; INTRAVENOUS; SUBCUTANEOUS at 21:54

## 2024-02-17 RX ADMIN — PIPERACILLIN AND TAZOBACTAM 25 GRAM(S): 4; .5 INJECTION, POWDER, LYOPHILIZED, FOR SOLUTION INTRAVENOUS at 13:27

## 2024-02-17 RX ADMIN — PIPERACILLIN AND TAZOBACTAM 25 GRAM(S): 4; .5 INJECTION, POWDER, LYOPHILIZED, FOR SOLUTION INTRAVENOUS at 21:57

## 2024-02-17 RX ADMIN — PIPERACILLIN AND TAZOBACTAM 25 GRAM(S): 4; .5 INJECTION, POWDER, LYOPHILIZED, FOR SOLUTION INTRAVENOUS at 06:34

## 2024-02-17 RX ADMIN — Medication 2: at 06:38

## 2024-02-17 RX ADMIN — Medication 3: at 22:34

## 2024-02-17 RX ADMIN — HYDROMORPHONE HYDROCHLORIDE 0.5 MILLIGRAM(S): 2 INJECTION INTRAMUSCULAR; INTRAVENOUS; SUBCUTANEOUS at 13:36

## 2024-02-17 NOTE — PROGRESS NOTE ADULT - SUBJECTIVE AND OBJECTIVE BOX
Pt seen at bedside, resting comfortable  Pt denies pain, nausea, vomiting, fever, chills    Physical Exam:    Pt is AAOx3  General: No acute distress  Chest: Symmetric chest rise   Heart: RRR  Abdomen: Soft, no tenderness, nondistended, ostomy in place, brown semisolid output  Rectal: Erythema of the inner bilateral buttocks. Numerous fistulous openings seen around the anal verge, actively draining purulent fluids. SHELLIE deferred due to extreme tenderness  Back: Normal curvature, no tenderness.   Neuro: Physiological, no localizing findings.     Vitals:  T(C): 36.7 ( @ 00:00), Max: 36.8 ( @ 10:30)  HR: 81 ( @ 00:00) (79 - 104)  BP: 124/75 ( @ 00:00) (104/63 - 139/92)  RR: 17 ( @ 00:00) (11 - 18)  SpO2: 97% ( @ 00:00) (95% - 100%)     @ 07:01  -  02 @ 03:08  --------------------------------------------------------  IN:    Other (mL): 500 mL  Total IN: 500 mL    OUT:  Total OUT: 0 mL    Total NET: 500 mL     @ 07:59                    12.5  CBC: 9.99>)-------(<329                     37.4                 140 | 111 | 8    CMP:  ----------------------< 184               4.4 | 29 | 0.74                      Ca:9.1  Phos:3.0  M.8               -|      |-        LFTs:  ------|-|-----             -|      |-  02-15 @ 21:51                    -  CBC: ->)-------(<-                     -                 138 | 107 | 7    CMP:  ----------------------< 254               3.8 | 30 | 0.84                      Ca:8.4  Phos:3.1  M.7               0.1|      |8        LFTs:  ------|82|-----             <0.1|      |-  02-15 @ 15:41                    13.7  CBC: 9.83>)-------(<354                     38.3                 137 | 105 | 5    CMP:  ----------------------< 351               3.5 | 25 | 0.78                      Ca:9.2  Phos:-  Mg:-               0.1|      |8        LFTs:  ------|100|-----             -|      |-      Culture - Blood (collected 02-15-24 @ 15:41)  Source: .Blood None  Preliminary Report (24 @ 20:02):    No growth at 24 hours    Culture - Blood (collected 02-15-24 @ 15:32)  Source: .Blood Blood-Peripheral  Preliminary Report (24 @ 20:02):    No growth at 24 hours      Current Inpatient Medications:  acetaminophen   IVPB .. 1000 milliGRAM(s) IV Intermittent every 8 hours  HYDROmorphone  Injectable 0.5 milliGRAM(s) IV Push every 4 hours PRN  influenza   Vaccine 0.5 milliLiter(s) IntraMuscular once  insulin lispro (ADMELOG) corrective regimen sliding scale   SubCutaneous every 6 hours  ketorolac   Injectable 15 milliGRAM(s) IV Push every 6 hours  LORazepam   Injectable 2 milliGRAM(s) IV Push every 2 hours PRN  morphine  - Injectable 2 milliGRAM(s) IV Push every 4 hours PRN  morphine  - Injectable 4 milliGRAM(s) IV Push every 4 hours PRN  ondansetron Injectable 4 milliGRAM(s) IV Push every 6 hours PRN  piperacillin/tazobactam IVPB.. 3.375 Gram(s) IV Intermittent every 8 hours

## 2024-02-17 NOTE — PROGRESS NOTE ADULT - ASSESSMENT
38M w/ HS in right axilla, known to have Crohn's w/ perianal fistula    CT abd/pelvis: < from: CT Abdomen and Pelvis w/ IV Cont (02.15.24 @ 20:32) >  Findings suggestive of a right-sided fistula crossing the supralevator   space into the ischioanal fossa.  Additional left transsphincteric fistula or small abscess extending into   the ischioanal fossa.  MRI is recommended to better characterize perianal fistulas and sinus  tract.    Recommendations:      - No urgent intervention warranted since actively draining  - Rt axillary HS care as per GS team  - Patient will follow up as an outpatient with his colorectal surgeon for continued evaluation/treatment.  - Rest of the care as per primary team    Plan will be discussed with Colorectal surgery attending

## 2024-02-17 NOTE — PROGRESS NOTE ADULT - ASSESSMENT
38M w/ Hidradenitis supprativa,  4.1cm fluid collection in the right axilla  Now POD 1 after wide local excision and drainage of abscess    Plan:  - regular diet  - daily dressing changes with dakins  - CRS consult for Crohn's perianal abscess  - Pain control PRN  - Monitor vitals  - Nausea control PRN  - IV abx: zosyn  - IVF:   - replete electrolytes  - daily labs  - OOB/PT    Plan will be discussed with Trauma & Surgical critical care surgery attending, Dr. Hilario 38M w/ Hidradenitis supprativa,  4.1cm fluid collection in the right axilla  Now POD 1 after wide local excision and drainage of abscess    Plan:  - regular diet  - daily dressing changes with dakins  - CRS consult for Crohn's perianal abscess  - Pain control PRN  - Monitor vitals  - Nausea control PRN  - IV abx: zosyn  - IVF:   - replete electrolytes  - daily labs  - OOB/PT    Plan will be discussed with Trauma & Surgical critical care surgery attending, Dr. Cazares    Attending A/P:    Chart reviewed, patient examined, agree with above resident evaluation in addition to the following    pain well controlled, will change dressing daily, persistent perianal discharge, will discuss with colorectal surgery regarding further management    PLAN:  antibiotics  daily dressin gchange  colorectal consult  GI/DVT prophylaxis  home meds as appropriate  Pain control  PT, IS      Pt will be monitored for signs of evolution/resolution of pathology   Pt aware of and agrees with all of the above    35 minuted of time spent on pt examination, review of relevant labs and radiologic studies, assured stabilization of pt, discussion with relevant services/providers for coordination of pt care and services

## 2024-02-17 NOTE — PROGRESS NOTE ADULT - SUBJECTIVE AND OBJECTIVE BOX
Pt seen at bedside, resting comfortable  Pt refers right axillary pain with movement  Pt denies nausea, vomiting, fever, chills    Physical Exam:  General: AAOx3, Well developed, NAD  Chest: Normal respiratory effort  Heart: RRR  Abdomen: Soft, NTND, no masses  Neuro/Psych: No localized deficits. Normal speech, normal tone  Skin: Normal, no rashes, no lesions noted.   Extremities:  Rt axilla:  dressing in place, no bleeding     Vitals:  T(C): 36.7 ( @ 00:00), Max: 36.8 ( @ 10:30)  HR: 81 ( @ 00:00) (79 - 104)  BP: 124/75 ( @ 00:00) (104/63 - 139/92)  RR: 17 ( @ 00:00) (11 - 18)  SpO2: 97% ( @ 00:00) (95% - 100%)     @ 07:01  -   @ 03:05  --------------------------------------------------------  IN:    Other (mL): 500 mL  Total IN: 500 mL    OUT:  Total OUT: 0 mL    Total NET: 500 mL       @ 07:59                    12.5  CBC: 9.99>)-------(<329                     37.4                 140 | 111 | 8    CMP:  ----------------------< 184               4.4 | 29 | 0.74                      Ca:9.1  Phos:3.0  M.8               -|      |-        LFTs:  ------|-|-----             -|      |-  0215 @ 21:51                    -  CBC: ->)-------(<-                     -                 138 | 107 | 7    CMP:  ----------------------< 254               3.8 | 30 | 0.84                      Ca:8.4  Phos:3.1  M.7               0.1|      |8        LFTs:  ------|82|-----             <0.1|      |-  02-15 @ 15:41                    13.7  CBC: 9.83>)-------(<354                     38.3                 137 | 105 | 5    CMP:  ----------------------< 351               3.5 | 25 | 0.78                      Ca:9.2  Phos:-  Mg:-               0.1|      |8        LFTs:  ------|100|-----             -|      |-      Culture - Blood (collected 02-15-24 @ 15:41)  Source: .Blood None  Preliminary Report (24 @ 20:02):    No growth at 24 hours    Culture - Blood (collected 02-15-24 @ 15:32)  Source: .Blood Blood-Peripheral  Preliminary Report (24 @ 20:02):    No growth at 24 hours      Current Inpatient Medications:  acetaminophen   IVPB .. 1000 milliGRAM(s) IV Intermittent every 8 hours  HYDROmorphone  Injectable 0.5 milliGRAM(s) IV Push every 4 hours PRN  influenza   Vaccine 0.5 milliLiter(s) IntraMuscular once  insulin lispro (ADMELOG) corrective regimen sliding scale   SubCutaneous every 6 hours  ketorolac   Injectable 15 milliGRAM(s) IV Push every 6 hours  LORazepam   Injectable 2 milliGRAM(s) IV Push every 2 hours PRN  morphine  - Injectable 2 milliGRAM(s) IV Push every 4 hours PRN  morphine  - Injectable 4 milliGRAM(s) IV Push every 4 hours PRN  ondansetron Injectable 4 milliGRAM(s) IV Push every 6 hours PRN  piperacillin/tazobactam IVPB.. 3.375 Gram(s) IV Intermittent every 8 hours       Pt seen at bedside, resting comfortable  Pt refers right axillary pain with movement  Pt denies nausea, vomiting, fever, chills    Physical Exam:  General: AAOx3, Well developed, NAD  Chest: Normal respiratory effort  Heart: RRR  Abdomen: Soft, NTND, no masses  Neuro/Psych: No localized deficits. Normal speech, normal tone  Skin: Normal, no rashes, no lesions noted.   Extremities:  Rt axilla:  dressing in place, no bleeding     Vitals:  T(C): 36.7 ( @ 00:00), Max: 36.8 ( @ 10:30)  HR: 81 ( @ 00:00) (79 - 104)  BP: 124/75 ( @ 00:00) (104/63 - 139/92)  RR: 17 ( @ 00:00) (11 - 18)  SpO2: 97% ( @ 00:00) (95% - 100%)     @ 07:01  -   @ 03:05  --------------------------------------------------------  IN:    Other (mL): 500 mL  Total IN: 500 mL    OUT:  Total OUT: 0 mL    Total NET: 500 mL       @ 07:59                    12.5  CBC: 9.99>)-------(<329                     37.4                 140 | 111 | 8    CMP:  ----------------------< 184               4.4 | 29 | 0.74                      Ca:9.1  Phos:3.0  M.8               -|      |-        LFTs:  ------|-|-----             -|      |-  0215 @ 21:51                    -  CBC: ->)-------(<-                     -                 138 | 107 | 7    CMP:  ----------------------< 254               3.8 | 30 | 0.84                      Ca:8.4  Phos:3.1  M.7               0.1|      |8        LFTs:  ------|82|-----             <0.1|      |-  02-15 @ 15:41                    13.7  CBC: 9.83>)-------(<354                     38.3                 137 | 105 | 5    CMP:  ----------------------< 351               3.5 | 25 | 0.78                      Ca:9.2  Phos:-  Mg:-               0.1|      |8        LFTs:  ------|100|-----             -|      |-      Culture - Blood (collected 02-15-24 @ 15:41)  Source: .Blood None  Preliminary Report (24 @ 20:02):    No growth at 24 hours    Culture - Blood (collected 02-15-24 @ 15:32)  Source: .Blood Blood-Peripheral  Preliminary Report (24 @ 20:02):    No growth at 24 hours      Current Inpatient Medications:  acetaminophen   IVPB .. 1000 milliGRAM(s) IV Intermittent every 8 hours  HYDROmorphone  Injectable 0.5 milliGRAM(s) IV Push every 4 hours PRN  influenza   Vaccine 0.5 milliLiter(s) IntraMuscular once  insulin lispro (ADMELOG) corrective regimen sliding scale   SubCutaneous every 6 hours  ketorolac   Injectable 15 milliGRAM(s) IV Push every 6 hours  LORazepam   Injectable 2 milliGRAM(s) IV Push every 2 hours PRN  morphine  - Injectable 2 milliGRAM(s) IV Push every 4 hours PRN  morphine  - Injectable 4 milliGRAM(s) IV Push every 4 hours PRN  ondansetron Injectable 4 milliGRAM(s) IV Push every 6 hours PRN  piperacillin/tazobactam IVPB.. 3.375 Gram(s) IV Intermittent every 8 hours    Rads no new images

## 2024-02-18 LAB
ANION GAP SERPL CALC-SCNC: 1 MMOL/L — LOW (ref 5–17)
BUN SERPL-MCNC: 16 MG/DL — SIGNIFICANT CHANGE UP (ref 7–23)
CALCIUM SERPL-MCNC: 9.4 MG/DL — SIGNIFICANT CHANGE UP (ref 8.5–10.1)
CHLORIDE SERPL-SCNC: 106 MMOL/L — SIGNIFICANT CHANGE UP (ref 96–108)
CO2 SERPL-SCNC: 29 MMOL/L — SIGNIFICANT CHANGE UP (ref 22–31)
CREAT SERPL-MCNC: 1.01 MG/DL — SIGNIFICANT CHANGE UP (ref 0.5–1.3)
EGFR: 98 ML/MIN/1.73M2 — SIGNIFICANT CHANGE UP
GLUCOSE BLDC GLUCOMTR-MCNC: 218 MG/DL — HIGH (ref 70–99)
GLUCOSE BLDC GLUCOMTR-MCNC: 286 MG/DL — HIGH (ref 70–99)
GLUCOSE BLDC GLUCOMTR-MCNC: 320 MG/DL — HIGH (ref 70–99)
GLUCOSE BLDC GLUCOMTR-MCNC: 375 MG/DL — HIGH (ref 70–99)
GLUCOSE SERPL-MCNC: 200 MG/DL — HIGH (ref 70–99)
GRAM STN FLD: ABNORMAL
HCT VFR BLD CALC: 41.3 % — SIGNIFICANT CHANGE UP (ref 39–50)
HGB BLD-MCNC: 13.6 G/DL — SIGNIFICANT CHANGE UP (ref 13–17)
MAGNESIUM SERPL-MCNC: 1.9 MG/DL — SIGNIFICANT CHANGE UP (ref 1.6–2.6)
MCHC RBC-ENTMCNC: 32.9 GM/DL — SIGNIFICANT CHANGE UP (ref 32–36)
MCHC RBC-ENTMCNC: 32.9 PG — SIGNIFICANT CHANGE UP (ref 27–34)
MCV RBC AUTO: 99.8 FL — SIGNIFICANT CHANGE UP (ref 80–100)
PHOSPHATE SERPL-MCNC: 2.8 MG/DL — SIGNIFICANT CHANGE UP (ref 2.5–4.5)
PLATELET # BLD AUTO: 418 K/UL — HIGH (ref 150–400)
POTASSIUM SERPL-MCNC: 4.5 MMOL/L — SIGNIFICANT CHANGE UP (ref 3.5–5.3)
POTASSIUM SERPL-SCNC: 4.5 MMOL/L — SIGNIFICANT CHANGE UP (ref 3.5–5.3)
RBC # BLD: 4.14 M/UL — LOW (ref 4.2–5.8)
RBC # FLD: 13.5 % — SIGNIFICANT CHANGE UP (ref 10.3–14.5)
SODIUM SERPL-SCNC: 136 MMOL/L — SIGNIFICANT CHANGE UP (ref 135–145)
WBC # BLD: 10.07 K/UL — SIGNIFICANT CHANGE UP (ref 3.8–10.5)
WBC # FLD AUTO: 10.07 K/UL — SIGNIFICANT CHANGE UP (ref 3.8–10.5)

## 2024-02-18 RX ADMIN — HYDROMORPHONE HYDROCHLORIDE 0.5 MILLIGRAM(S): 2 INJECTION INTRAMUSCULAR; INTRAVENOUS; SUBCUTANEOUS at 12:13

## 2024-02-18 RX ADMIN — HYDROMORPHONE HYDROCHLORIDE 0.5 MILLIGRAM(S): 2 INJECTION INTRAMUSCULAR; INTRAVENOUS; SUBCUTANEOUS at 21:34

## 2024-02-18 RX ADMIN — PIPERACILLIN AND TAZOBACTAM 25 GRAM(S): 4; .5 INJECTION, POWDER, LYOPHILIZED, FOR SOLUTION INTRAVENOUS at 05:22

## 2024-02-18 RX ADMIN — PIPERACILLIN AND TAZOBACTAM 25 GRAM(S): 4; .5 INJECTION, POWDER, LYOPHILIZED, FOR SOLUTION INTRAVENOUS at 21:19

## 2024-02-18 RX ADMIN — HYDROMORPHONE HYDROCHLORIDE 0.5 MILLIGRAM(S): 2 INJECTION INTRAMUSCULAR; INTRAVENOUS; SUBCUTANEOUS at 07:37

## 2024-02-18 RX ADMIN — Medication 1 APPLICATION(S): at 12:17

## 2024-02-18 RX ADMIN — HYDROMORPHONE HYDROCHLORIDE 0.5 MILLIGRAM(S): 2 INJECTION INTRAMUSCULAR; INTRAVENOUS; SUBCUTANEOUS at 12:43

## 2024-02-18 RX ADMIN — HYDROMORPHONE HYDROCHLORIDE 0.5 MILLIGRAM(S): 2 INJECTION INTRAMUSCULAR; INTRAVENOUS; SUBCUTANEOUS at 17:22

## 2024-02-18 RX ADMIN — Medication 5: at 12:08

## 2024-02-18 RX ADMIN — Medication 2: at 23:18

## 2024-02-18 RX ADMIN — Medication 3: at 05:20

## 2024-02-18 RX ADMIN — HYDROMORPHONE HYDROCHLORIDE 0.5 MILLIGRAM(S): 2 INJECTION INTRAMUSCULAR; INTRAVENOUS; SUBCUTANEOUS at 21:19

## 2024-02-18 RX ADMIN — HYDROMORPHONE HYDROCHLORIDE 0.5 MILLIGRAM(S): 2 INJECTION INTRAMUSCULAR; INTRAVENOUS; SUBCUTANEOUS at 08:07

## 2024-02-18 RX ADMIN — HYDROMORPHONE HYDROCHLORIDE 0.5 MILLIGRAM(S): 2 INJECTION INTRAMUSCULAR; INTRAVENOUS; SUBCUTANEOUS at 01:57

## 2024-02-18 RX ADMIN — Medication 4: at 17:27

## 2024-02-18 RX ADMIN — PIPERACILLIN AND TAZOBACTAM 25 GRAM(S): 4; .5 INJECTION, POWDER, LYOPHILIZED, FOR SOLUTION INTRAVENOUS at 14:17

## 2024-02-18 RX ADMIN — HYDROMORPHONE HYDROCHLORIDE 0.5 MILLIGRAM(S): 2 INJECTION INTRAMUSCULAR; INTRAVENOUS; SUBCUTANEOUS at 02:57

## 2024-02-18 NOTE — PROGRESS NOTE ADULT - ASSESSMENT
38M w/ Hidradenitis supprativa, found to have 4.1cm fluid collection in the right axilla  Now POD2 after wide local excision and drainage of abscess    Plan:  - regular diet  - daily dressing changes with dakins solution  - CRS consult for Crohn's perianal abscess      - No urgent intervention warranted, draining actively from existing fistula.  - Pain control PRN  - Monitor vitals  - Nausea control PRN  - IV abx: zosyn  - Cultures: Coag negative staph  - IVF  - replete electrolytes  - daily labs  - OOB/PT  - Dispo planning    Plan will be discussed with Trauma & Surgical critical care surgery attending. Dr. Ventura

## 2024-02-18 NOTE — PROGRESS NOTE ADULT - SUBJECTIVE AND OBJECTIVE BOX
Patient seen and examined by surgical team this morning.   Dressing changed on bedside, wound looks c/d/i, no skin erythema/ purulent discharge noticed  Otherwise No acute overnight events  Pain well controlled  Denies fever or chills  Tolerating diet well without nausea or vomiting.         Physical Exam:  General: AAOx3, Well developed, NAD  Chest: Normal respiratory effort  Heart: RRR  Abdomen: Soft, NTND, no masses  Neuro/Psych: No localized deficits. Normal speech, normal tone  Extremities:  Rt axilla:  dressing c/d/i. Wound intact & clean, no signs of active infection; no skin induration/ abnormal discharges        Chief complaint:      PMHx:  Crohn's disease of large intestine without complication    Pancreatitis        PSHx:  S/P ORIF (open reduction internal fixation) fracture    History of colonoscopy    Perianal fistula        FHx:  Diabetes mellitus (Father)        Vitals:  T(C): 36.9 (-18 @ 00:47), Max: 36.9 (-18 @ 00:47)  HR: 79 ( @ 00:47) (68 - 79)  BP: 103/67 ( @ 00:47) (103/67 - 120/72)  RR: 18 (-18 @ 00:47) (16 - 18)  SpO2: 97% (18 @ 00:47) (95% - 99%)      I&Os     @ 07:01  -   @ 07:00  --------------------------------------------------------  IN:    Other (mL): 500 mL  Total IN: 500 mL    OUT:  Total OUT: 0 mL    Total NET: 500 mL        .    Labs:   @ 08:07                    12.4  CBC: 10.80>)-------(<402                     37.6                 134 | 102 | 6    CMP:  ----------------------< 231               3.8 | 29 | 0.84                      Ca:9.4  Phos:2.7  M.9               -|      |-        LFTs:  ------|-|-----             -|      |-        Cultures:    Culture - Acid Fast - Other w/Smear (collected 24 @ 14:20)  Source: .Other RIGHT AXILLARY ABSCESS    Culture - Fungal, Other (collected 24 @ 14:20)  Source: .Other RIGHT AXILLARY ABSCESS  Preliminary Report (24 @ 08:24):    Testing in progress    Culture - Abscess with Gram Stain (collected 24 @ 14:20)  Source: .Abscess RIGHT AXILLARY ABSCESS  Gram Stain (24 @ 00:22):    No polymorphonuclear cells seen per low power field    No organisms seen per oil power field  Preliminary Report (24 @ 00:22):    Rare Coag Negative Staphylococcus "Susceptibilities not performed"    Culture - Blood (collected 02-15-24 @ 15:41)  Source: .Blood None  Preliminary Report (24 @ 20:01):    No growth at 48 Hours    Culture - Blood (collected 02-15-24 @ 15:32)  Source: .Blood Blood-Peripheral  Preliminary Report (24 @ 20:01):    No growth at 48 Hours          Current Inpatient Medications:  Dakins Solution - 1/4 Strength 1 Application(s) Topical daily  HYDROmorphone  Injectable 0.5 milliGRAM(s) IV Push every 4 hours PRN  influenza   Vaccine 0.5 milliLiter(s) IntraMuscular once  insulin lispro (ADMELOG) corrective regimen sliding scale   SubCutaneous every 6 hours  ketorolac   Injectable 15 milliGRAM(s) IV Push every 6 hours  LORazepam   Injectable 2 milliGRAM(s) IV Push every 2 hours PRN  morphine  - Injectable 2 milliGRAM(s) IV Push every 4 hours PRN  morphine  - Injectable 4 milliGRAM(s) IV Push every 4 hours PRN  ondansetron Injectable 4 milliGRAM(s) IV Push every 6 hours PRN  piperacillin/tazobactam IVPB.. 3.375 Gram(s) IV Intermittent every 8 hours

## 2024-02-19 ENCOUNTER — TRANSCRIPTION ENCOUNTER (OUTPATIENT)
Age: 39
End: 2024-02-19

## 2024-02-19 VITALS
RESPIRATION RATE: 18 BRPM | TEMPERATURE: 98 F | HEART RATE: 87 BPM | SYSTOLIC BLOOD PRESSURE: 104 MMHG | OXYGEN SATURATION: 97 % | DIASTOLIC BLOOD PRESSURE: 62 MMHG

## 2024-02-19 LAB
GLUCOSE BLDC GLUCOMTR-MCNC: 223 MG/DL — HIGH (ref 70–99)
GLUCOSE BLDC GLUCOMTR-MCNC: 344 MG/DL — HIGH (ref 70–99)

## 2024-02-19 PROCEDURE — 99232 SBSQ HOSP IP/OBS MODERATE 35: CPT

## 2024-02-19 RX ORDER — IBUPROFEN 200 MG
1 TABLET ORAL
Qty: 21 | Refills: 0
Start: 2024-02-19 | End: 2024-02-25

## 2024-02-19 RX ORDER — ACETAMINOPHEN 500 MG
2 TABLET ORAL
Qty: 60 | Refills: 0
Start: 2024-02-19 | End: 2024-02-28

## 2024-02-19 RX ADMIN — HYDROMORPHONE HYDROCHLORIDE 0.5 MILLIGRAM(S): 2 INJECTION INTRAMUSCULAR; INTRAVENOUS; SUBCUTANEOUS at 14:39

## 2024-02-19 RX ADMIN — HYDROMORPHONE HYDROCHLORIDE 0.5 MILLIGRAM(S): 2 INJECTION INTRAMUSCULAR; INTRAVENOUS; SUBCUTANEOUS at 10:16

## 2024-02-19 RX ADMIN — HYDROMORPHONE HYDROCHLORIDE 0.5 MILLIGRAM(S): 2 INJECTION INTRAMUSCULAR; INTRAVENOUS; SUBCUTANEOUS at 01:46

## 2024-02-19 RX ADMIN — Medication 2: at 05:09

## 2024-02-19 RX ADMIN — HYDROMORPHONE HYDROCHLORIDE 0.5 MILLIGRAM(S): 2 INJECTION INTRAMUSCULAR; INTRAVENOUS; SUBCUTANEOUS at 01:31

## 2024-02-19 RX ADMIN — PIPERACILLIN AND TAZOBACTAM 25 GRAM(S): 4; .5 INJECTION, POWDER, LYOPHILIZED, FOR SOLUTION INTRAVENOUS at 05:02

## 2024-02-19 RX ADMIN — Medication 1 APPLICATION(S): at 12:38

## 2024-02-19 RX ADMIN — Medication 4: at 12:41

## 2024-02-19 RX ADMIN — HYDROMORPHONE HYDROCHLORIDE 0.5 MILLIGRAM(S): 2 INJECTION INTRAMUSCULAR; INTRAVENOUS; SUBCUTANEOUS at 05:49

## 2024-02-19 RX ADMIN — HYDROMORPHONE HYDROCHLORIDE 0.5 MILLIGRAM(S): 2 INJECTION INTRAMUSCULAR; INTRAVENOUS; SUBCUTANEOUS at 05:34

## 2024-02-19 NOTE — DISCHARGE NOTE PROVIDER - CARE PROVIDER_API CALL
Sanket Hilario (DO)  Surgery  [address] [place] [id number]  Phone: [phone]  Fax: [phone]  Follow Up Time: 2 weeks

## 2024-02-19 NOTE — DISCHARGE NOTE PROVIDER - NSDCMRMEDTOKEN_GEN_ALL_CORE_FT
acetaminophen 500 mg oral tablet: 2 tab(s) orally every 8 hours  amoxicillin-clavulanate 500 mg-125 mg oral tablet: 500 milligram(s) orally every 8 hours  ibuprofen 600 mg oral tablet: 1 tab(s) orally every 8 hours  metFORMIN 500 mg oral tablet: 1 tab(s) orally once a day

## 2024-02-19 NOTE — DISCHARGE NOTE PROVIDER - NSDCHC_MEDRECSTATUS_GEN_ALL_CORE
"Pain in ball of R foot, under toes and in toes. Symptoms x 3 days but was chronic symptom. Has had sx for about a year coming and going. Current pain now\" 6-7\", No reddness, swelling or warmth to area.     Protocol reviewed, disposition: be seen within 3 days in office. Patient says he will call back. To call back if worsening symptoms, further questions/concerns.     HEMANTH CANTRELL RN  [facility] nurse advisors  8/28/2023  3:44 PM  Reason for Disposition   MODERATE pain (e.g., interferes with normal activities, limping) and present > 3 days    Additional Information   Negative: Followed an ankle or foot injury   Negative: Toe pain is main symptom   Negative: Ankle pain is main symptom   Negative: Entire foot is cool or blue in comparison to other foot   Negative: Purple or black skin on foot or toe   Negative: Red area or streak and fever   Negative: Swollen foot and fever   Negative: Patient sounds very sick or weak to the triager   Negative: SEVERE pain (e.g., excruciating, unable to do any normal activities)   Negative: Looks like a boil, infected sore, deep ulcer, or other infected rash (spreading redness, pus)   Negative: Swollen foot  (Exceptions: Localized bump from bunions, calluses, insect bite, sting.)   Negative: Weakness (i.e., loss of strength) of new-onset in foot or toes (Exceptions: Not truly weak, foot feels weak because of pain; weakness present > 2 weeks.)   Negative: Numbness (i.e., loss of sensation) in foot or toes (Exception: Just tingling; numbness present > 2 weeks.)    Protocols used: Foot Pain-A-[place]    " Admission Reconciliation is Completed  Discharge Reconciliation is Completed

## 2024-02-19 NOTE — DISCHARGE NOTE PROVIDER - HOSPITAL COURSE
38M w/ Hidradenitis suppurative, found to have 4.1cm fluid collection in the right axilla, s/p wide local excision healing well 38M w/ Hidradenitis suppurative, found to have 4.1cm fluid collection in the right axilla, s/p wide local excision healing well, evaluated by GI and colorectal and to follow outpatient for perianal fistulae, needs VNA for daily dressing changes

## 2024-02-19 NOTE — PROGRESS NOTE ADULT - SUBJECTIVE AND OBJECTIVE BOX
[facility]   SURGERY DAILY PROGRESS NOTE    Subjective:  Patient seen and examined on morning rounds.   No acute events overnight.   Surgical dressing intact.      MEDICATIONS  (STANDING):  Dakins Solution - 1/4 Strength 1 Application(s) Topical daily  influenza   Vaccine 0.5 milliLiter(s) IntraMuscular once  insulin lispro (ADMELOG) corrective regimen sliding scale   SubCutaneous every 6 hours  piperacillin/tazobactam IVPB.. 3.375 Gram(s) IV Intermittent every 8 hours    MEDICATIONS  (PRN):  HYDROmorphone  Injectable 0.5 milliGRAM(s) IV Push every 4 hours PRN Severe Pain (7 - 10)  LORazepam   Injectable 2 milliGRAM(s) IV Push every 2 hours PRN Symptom-triggered: 2 point increase in CIWA -Ar score and a total score of 7 or LESS  morphine  - Injectable 4 milliGRAM(s) IV Push every 4 hours PRN Moderate Pain (4 - 6)  morphine  - Injectable 2 milliGRAM(s) IV Push every 4 hours PRN Severe Pain (7 - 10)  ondansetron Injectable 4 milliGRAM(s) IV Push every 6 hours PRN Nausea    Vital Signs Last 24 Hrs  T(C): 36.7 (19 Feb 2024 00:05), Max: 36.7 (18 Feb 2024 15:55)  T(F): 98 (19 Feb 2024 00:05), Max: 98 (18 Feb 2024 15:55)  HR: 79 (19 Feb 2024 00:05) (75 - 84)  BP: 105/71 (19 Feb 2024 00:05) (101/67 - 114/78)  BP(mean): --  RR: 17 (19 Feb 2024 00:05) (17 - 18)  SpO2: 96% (19 Feb 2024 00:05) (96% - 98%)    Parameters below as of 19 Feb 2024 00:05  Patient On (Oxygen Delivery Method): room air      I&O's Detail    Daily     Daily     LABS:                        13.6   10.07 )-----------( 418      ( 18 Feb 2024 07:54 )             41.3     02-18    136  |  106  |  16  ----------------------------<  200<H>  4.5   |  29  |  1.01    Ca    9.4      18 Feb 2024 07:54  Phos  2.8     02-18  Mg     1.9     02-18        Urinalysis Basic - ( 18 Feb 2024 07:54 )    Color: x / Appearance: x / SG: x / pH: x  Gluc: 200 mg/dL / Ketone: x  / Bili: x / Urobili: x   Blood: x / Protein: x / Nitrite: x   Leuk Esterase: x / RBC: x / WBC x   Sq Epi: x / Non Sq Epi: x / Bacteria: x        Physical Exam:   General: AAOx3, Well developed, NAD  Chest: Normal respiratory effort  Heart: RRR  Abdomen: Soft, NTND, no masses  Neuro/Psych: No localized deficits. Normal speech, normal tone  Extremities:  Rt axilla:  dressing c/d/i. Wound intact & clean, no signs of active infection; no skin induration/ abnormal discharges     [facility]   SURGERY DAILY PROGRESS NOTE  Patient is a 38y old  Male who presents with a chief complaint of Right axillary hidradenitis suppurativa (19 Feb 2024 09:25)      Subjective:  Patient seen and examined on morning rounds.   No acute events overnight.   Surgical dressing intact.    ROS negative except as above    MEDICATIONS  (STANDING):  Dakins Solution - 1/4 Strength 1 Application(s) Topical daily  influenza   Vaccine 0.5 milliLiter(s) IntraMuscular once  insulin lispro (ADMELOG) corrective regimen sliding scale   SubCutaneous every 6 hours  piperacillin/tazobactam IVPB.. 3.375 Gram(s) IV Intermittent every 8 hours    MEDICATIONS  (PRN):  HYDROmorphone  Injectable 0.5 milliGRAM(s) IV Push every 4 hours PRN Severe Pain (7 - 10)  LORazepam   Injectable 2 milliGRAM(s) IV Push every 2 hours PRN Symptom-triggered: 2 point increase in CIWA -Ar score and a total score of 7 or LESS  morphine  - Injectable 4 milliGRAM(s) IV Push every 4 hours PRN Moderate Pain (4 - 6)  morphine  - Injectable 2 milliGRAM(s) IV Push every 4 hours PRN Severe Pain (7 - 10)  ondansetron Injectable 4 milliGRAM(s) IV Push every 6 hours PRN Nausea    Vital Signs Last 24 Hrs  T(C): 36.7 (19 Feb 2024 00:05), Max: 36.7 (18 Feb 2024 15:55)  T(F): 98 (19 Feb 2024 00:05), Max: 98 (18 Feb 2024 15:55)  HR: 79 (19 Feb 2024 00:05) (75 - 84)  BP: 105/71 (19 Feb 2024 00:05) (101/67 - 114/78)  BP(mean): --  RR: 17 (19 Feb 2024 00:05) (17 - 18)  SpO2: 96% (19 Feb 2024 00:05) (96% - 98%)    Parameters below as of 19 Feb 2024 00:05  Patient On (Oxygen Delivery Method): room air      I&O's Detail    Daily     Daily     LABS:                        13.6   10.07 )-----------( 418      ( 18 Feb 2024 07:54 )             41.3     02-18    136  |  106  |  16  ----------------------------<  200<H>  4.5   |  29  |  1.01    Ca    9.4      18 Feb 2024 07:54  Phos  2.8     02-18  Mg     1.9     02-18        Urinalysis Basic - ( 18 Feb 2024 07:54 )    Color: x / Appearance: x / SG: x / pH: x  Gluc: 200 mg/dL / Ketone: x  / Bili: x / Urobili: x   Blood: x / Protein: x / Nitrite: x   Leuk Esterase: x / RBC: x / WBC x   Sq Epi: x / Non Sq Epi: x / Bacteria: x        Physical Exam:   General: AAOx3, Well developed, NAD  Chest: Normal respiratory effort  Heart: RRR  Abdomen: Soft, NTND, no masses  Neuro/Psych: No localized deficits. Normal speech, normal tone  Extremities:  Rt axilla:  dressing c/d/i. Wound intact & clean, no signs of active infection; no skin induration/ abnormal discharges    RAds: no new images

## 2024-02-19 NOTE — PROGRESS NOTE ADULT - ASSESSMENT
38M w/ Hidradenitis supprativa, found to have 4.1cm fluid collection in the right axilla  s/p wide local excision     Plan:  - regular diet  - daily dressing changes with dakins solution  - CRS recs appreciated: No urgent intervention warranted, draining actively from existing fistula.  - Pain control PRN  - Monitor vitals  - Nausea control PRN  - IV abx: zosyn  - Cultures: Coag negative staph  - replete electrolytes  - daily labs  - OOB/PT  - Dispo planning    Plan discussed with Dr. Hilario 38M w/ Hidradenitis supprativa, found to have 4.1cm fluid collection in the right axilla  s/p wide local excision     Plan:  - regular diet  - daily dressing changes with dakins solution  - CRS recs appreciated: No urgent intervention warranted, draining actively from existing fistula.  - Pain control PRN  - Monitor vitals  - Nausea control PRN  - IV abx: zosyn  - Cultures: Coag negative staph  - replete electrolytes  - daily labs  - OOB/PT  - Dispo planning    Attending A/P:    Chart reviewed, patient examined, agree with above resident evaluation in addition to the following    s/p WLE of hidradenitis suppurativa, wound clean, daily dressing changes, requesting eval by GI for perianal fistulae, colorectal and GI consulted and will need outpatient management    PLAN:  VNA for daily dressing changes  DC later today   FU Dr. hilario in 1 week  GI/DVT prophylaxis  home meds as appropriate  Pain control  PT, IS      Pt will be monitored for signs of evolution/resolution of pathology  Pt aware of and agrees with all of the above    35 minuted of time spent on pt examination, review of relevant labs and radiologic studies, assured stabilization of pt, discussion with relevant services/providers for coordination of pt care and services  Plan discussed with Dr. Hilario

## 2024-02-19 NOTE — PROGRESS NOTE ADULT - REASON FOR ADMISSION
Right axillary hidradenitis supprativa
Right axillary hidradenitis suppurativa

## 2024-02-19 NOTE — DISCHARGE NOTE PROVIDER - ATTENDING DISCHARGE PHYSICAL EXAMINATION:
Physical Exam:   General: AAOx3, Well developed, NAD  Chest: Normal respiratory effort  Heart: RRR  Abdomen: Soft, NTND, no masses  Neuro/Psych: No localized deficits. Normal speech, normal tone  Extremities:  Rt axilla:  dressing c/d/i. Wound intact & clean, no signs of active infection; no skin induration/ abnormal discharges

## 2024-02-19 NOTE — DISCHARGE NOTE NURSING/CASE MANAGEMENT/SOCIAL WORK - PATIENT PORTAL LINK FT
You can access the FollowMyHealth Patient Portal offered by [facility] by registering at the following website: [email or web address]. By joining [employer]’s FollowMyHealth portal, you will also be able to view your health information using other applications (apps) compatible with our system.

## 2024-02-19 NOTE — CHART NOTE - NSCHARTNOTEFT_GEN_A_CORE
Covering Dr. Ross:    Patient evaluated at bedside per surgical team request. Per patient still with rectal pain/bleeding which is chronic in nature and waxes and wanes in severity and amount of bleeding. Patient does have IBD specialist in [place] but poor compliance due to proximity. He was supposed to see 2/12 and did not go due to "an issue with my son." Offered local follow up with [facility]- Dr. Ross. Confirmed with Dr. Ross, office to reach out to schedule appointment.

## 2024-02-19 NOTE — DISCHARGE NOTE PROVIDER - NSDCFUADDAPPT_GEN_ALL_CORE_FT
Please follow up with original colorectal surgeon who has been managing chrons and perirectal fistulas

## 2024-02-20 ENCOUNTER — EMERGENCY (EMERGENCY)
Facility: HOSPITAL | Age: 39
LOS: 0 days | Discharge: HOME | End: 2024-02-21
Attending: EMERGENCY MEDICINE
Payer: COMMERCIAL

## 2024-02-20 VITALS — WEIGHT: 160.06 LBS | HEIGHT: 69 IN

## 2024-02-20 DIAGNOSIS — K50.90 CROHN'S DISEASE, UNSPECIFIED, WITHOUT COMPLICATIONS: ICD-10-CM

## 2024-02-20 DIAGNOSIS — T39.4X6A: ICD-10-CM

## 2024-02-20 DIAGNOSIS — Z88.1 ALLERGY STATUS TO OTHER ANTIBIOTIC AGENTS STATUS: ICD-10-CM

## 2024-02-20 DIAGNOSIS — R10.9 UNSPECIFIED ABDOMINAL PAIN: ICD-10-CM

## 2024-02-20 DIAGNOSIS — Z98.89 OTHER SPECIFIED POSTPROCEDURAL STATES: Chronic | ICD-10-CM

## 2024-02-20 DIAGNOSIS — Z91.148 PATIENT'S OTHER NONCOMPLIANCE WITH MEDICATION REGIMEN FOR OTHER REASON: ICD-10-CM

## 2024-02-20 DIAGNOSIS — Z87.19 PERSONAL HISTORY OF OTHER DISEASES OF THE DIGESTIVE SYSTEM: ICD-10-CM

## 2024-02-20 DIAGNOSIS — L73.2 HIDRADENITIS SUPPURATIVA: ICD-10-CM

## 2024-02-20 DIAGNOSIS — E11.9 TYPE 2 DIABETES MELLITUS WITHOUT COMPLICATIONS: ICD-10-CM

## 2024-02-20 DIAGNOSIS — Z93.3 COLOSTOMY STATUS: ICD-10-CM

## 2024-02-20 DIAGNOSIS — Z96.7 PRESENCE OF OTHER BONE AND TENDON IMPLANTS: Chronic | ICD-10-CM

## 2024-02-20 DIAGNOSIS — T50.996A UNDERDOSING OF OTHER DRUGS, MEDICAMENTS AND BIOLOGICAL SUBSTANCES, INITIAL ENCOUNTER: ICD-10-CM

## 2024-02-20 DIAGNOSIS — K60.3 ANAL FISTULA: Chronic | ICD-10-CM

## 2024-02-20 LAB
ALBUMIN SERPL ELPH-MCNC: 3.7 G/DL — SIGNIFICANT CHANGE UP (ref 3.3–5)
ALP SERPL-CCNC: 101 U/L — SIGNIFICANT CHANGE UP (ref 40–120)
ALT FLD-CCNC: 28 U/L — SIGNIFICANT CHANGE UP (ref 12–78)
ANION GAP SERPL CALC-SCNC: 2 MMOL/L — LOW (ref 5–17)
AST SERPL-CCNC: 16 U/L — SIGNIFICANT CHANGE UP (ref 15–37)
BASOPHILS # BLD AUTO: 0 K/UL — SIGNIFICANT CHANGE UP (ref 0–0.2)
BASOPHILS NFR BLD AUTO: 0 % — SIGNIFICANT CHANGE UP (ref 0–2)
BILIRUB SERPL-MCNC: 0.2 MG/DL — SIGNIFICANT CHANGE UP (ref 0.2–1.2)
BUN SERPL-MCNC: 16 MG/DL — SIGNIFICANT CHANGE UP (ref 7–23)
CALCIUM SERPL-MCNC: 10.1 MG/DL — SIGNIFICANT CHANGE UP (ref 8.5–10.1)
CHLORIDE SERPL-SCNC: 103 MMOL/L — SIGNIFICANT CHANGE UP (ref 96–108)
CO2 SERPL-SCNC: 30 MMOL/L — SIGNIFICANT CHANGE UP (ref 22–31)
CREAT SERPL-MCNC: 1.13 MG/DL — SIGNIFICANT CHANGE UP (ref 0.5–1.3)
CULTURE RESULTS: SIGNIFICANT CHANGE UP
CULTURE RESULTS: SIGNIFICANT CHANGE UP
EGFR: 85 ML/MIN/1.73M2 — SIGNIFICANT CHANGE UP
EOSINOPHIL # BLD AUTO: 0.15 K/UL — SIGNIFICANT CHANGE UP (ref 0–0.5)
EOSINOPHIL NFR BLD AUTO: 1 % — SIGNIFICANT CHANGE UP (ref 0–6)
GLUCOSE SERPL-MCNC: 224 MG/DL — HIGH (ref 70–99)
HCT VFR BLD CALC: 45.3 % — SIGNIFICANT CHANGE UP (ref 39–50)
HGB BLD-MCNC: 15.4 G/DL — SIGNIFICANT CHANGE UP (ref 13–17)
LIDOCAIN IGE QN: 24 U/L — SIGNIFICANT CHANGE UP (ref 13–75)
LYMPHOCYTES # BLD AUTO: 21 % — SIGNIFICANT CHANGE UP (ref 13–44)
LYMPHOCYTES # BLD AUTO: 3.15 K/UL — SIGNIFICANT CHANGE UP (ref 1–3.3)
MACROCYTES BLD QL: SLIGHT — SIGNIFICANT CHANGE UP
MANUAL SMEAR VERIFICATION: SIGNIFICANT CHANGE UP
MCHC RBC-ENTMCNC: 32.8 PG — SIGNIFICANT CHANGE UP (ref 27–34)
MCHC RBC-ENTMCNC: 34 GM/DL — SIGNIFICANT CHANGE UP (ref 32–36)
MCV RBC AUTO: 96.6 FL — SIGNIFICANT CHANGE UP (ref 80–100)
METAMYELOCYTES # FLD: 1 % — HIGH (ref 0–0)
MONOCYTES # BLD AUTO: 1.35 K/UL — HIGH (ref 0–0.9)
MONOCYTES NFR BLD AUTO: 9 % — SIGNIFICANT CHANGE UP (ref 2–14)
MYELOCYTES NFR BLD: 1 % — HIGH (ref 0–0)
NEUTROPHILS # BLD AUTO: 10.06 K/UL — HIGH (ref 1.8–7.4)
NEUTROPHILS NFR BLD AUTO: 67 % — SIGNIFICANT CHANGE UP (ref 43–77)
NRBC # BLD: 0 /100 WBCS — SIGNIFICANT CHANGE UP (ref 0–0)
NRBC # BLD: SIGNIFICANT CHANGE UP /100 WBCS (ref 0–0)
PLAT MORPH BLD: NORMAL — SIGNIFICANT CHANGE UP
PLATELET # BLD AUTO: 524 K/UL — HIGH (ref 150–400)
POTASSIUM SERPL-MCNC: 4.5 MMOL/L — SIGNIFICANT CHANGE UP (ref 3.5–5.3)
POTASSIUM SERPL-SCNC: 4.5 MMOL/L — SIGNIFICANT CHANGE UP (ref 3.5–5.3)
PROT SERPL-MCNC: 8.8 GM/DL — HIGH (ref 6–8.3)
RBC # BLD: 4.69 M/UL — SIGNIFICANT CHANGE UP (ref 4.2–5.8)
RBC # FLD: 13.2 % — SIGNIFICANT CHANGE UP (ref 10.3–14.5)
RBC BLD AUTO: ABNORMAL
SODIUM SERPL-SCNC: 135 MMOL/L — SIGNIFICANT CHANGE UP (ref 135–145)
SPECIMEN SOURCE: SIGNIFICANT CHANGE UP
SPECIMEN SOURCE: SIGNIFICANT CHANGE UP
TOXIC GRANULES BLD QL SMEAR: PRESENT — SIGNIFICANT CHANGE UP
WBC # BLD: 15.01 K/UL — HIGH (ref 3.8–10.5)
WBC # FLD AUTO: 15.01 K/UL — HIGH (ref 3.8–10.5)

## 2024-02-20 PROCEDURE — 99285 EMERGENCY DEPT VISIT HI MDM: CPT

## 2024-02-20 PROCEDURE — 96375 TX/PRO/DX INJ NEW DRUG ADDON: CPT

## 2024-02-20 PROCEDURE — 36415 COLL VENOUS BLD VENIPUNCTURE: CPT

## 2024-02-20 PROCEDURE — 74177 CT ABD & PELVIS W/CONTRAST: CPT | Mod: MA

## 2024-02-20 PROCEDURE — 80053 COMPREHEN METABOLIC PANEL: CPT

## 2024-02-20 PROCEDURE — 96374 THER/PROPH/DIAG INJ IV PUSH: CPT

## 2024-02-20 PROCEDURE — 99284 EMERGENCY DEPT VISIT MOD MDM: CPT | Mod: 25

## 2024-02-20 PROCEDURE — 83690 ASSAY OF LIPASE: CPT

## 2024-02-20 PROCEDURE — 85025 COMPLETE CBC W/AUTO DIFF WBC: CPT

## 2024-02-20 PROCEDURE — 96376 TX/PRO/DX INJ SAME DRUG ADON: CPT

## 2024-02-20 RX ORDER — KETOROLAC TROMETHAMINE 30 MG/ML
30 SYRINGE (ML) INJECTION ONCE
Refills: 0 | Status: DISCONTINUED | OUTPATIENT
Start: 2024-02-20 | End: 2024-02-20

## 2024-02-20 RX ORDER — SODIUM CHLORIDE 9 MG/ML
1000 INJECTION INTRAMUSCULAR; INTRAVENOUS; SUBCUTANEOUS ONCE
Refills: 0 | Status: COMPLETED | OUTPATIENT
Start: 2024-02-20 | End: 2024-02-20

## 2024-02-20 RX ORDER — HYDROMORPHONE HYDROCHLORIDE 2 MG/ML
1 INJECTION INTRAMUSCULAR; INTRAVENOUS; SUBCUTANEOUS ONCE
Refills: 0 | Status: DISCONTINUED | OUTPATIENT
Start: 2024-02-20 | End: 2024-02-20

## 2024-02-20 RX ORDER — ACETAMINOPHEN 500 MG
1000 TABLET ORAL ONCE
Refills: 0 | Status: COMPLETED | OUTPATIENT
Start: 2024-02-20 | End: 2024-02-20

## 2024-02-20 RX ADMIN — Medication 400 MILLIGRAM(S): at 23:55

## 2024-02-20 RX ADMIN — SODIUM CHLORIDE 1000 MILLILITER(S): 9 INJECTION INTRAMUSCULAR; INTRAVENOUS; SUBCUTANEOUS at 19:57

## 2024-02-20 RX ADMIN — SODIUM CHLORIDE 1000 MILLILITER(S): 9 INJECTION INTRAMUSCULAR; INTRAVENOUS; SUBCUTANEOUS at 20:15

## 2024-02-20 RX ADMIN — HYDROMORPHONE HYDROCHLORIDE 1 MILLIGRAM(S): 2 INJECTION INTRAMUSCULAR; INTRAVENOUS; SUBCUTANEOUS at 19:57

## 2024-02-20 RX ADMIN — HYDROMORPHONE HYDROCHLORIDE 1 MILLIGRAM(S): 2 INJECTION INTRAMUSCULAR; INTRAVENOUS; SUBCUTANEOUS at 21:20

## 2024-02-20 RX ADMIN — Medication 30 MILLIGRAM(S): at 19:57

## 2024-02-20 NOTE — ED STATDOCS - PATIENT PORTAL LINK FT
You can access the FollowMyHealth Patient Portal offered by [facility] by registering at the following website: [email or web address]. By joining [employer]’s FollowMyHealth portal, you will also be able to view your health information using other applications (apps) compatible with our system.

## 2024-02-20 NOTE — ED ADULT TRIAGE NOTE - CHIEF COMPLAINT QUOTE
pt c/o right sided abdominal pain x 2 hours. pt states " I feel like my intestines are in a vice ". denies cp/sob/fever/chills. pmh: chrons/ fistulas/ dmII

## 2024-02-20 NOTE — ED STATDOCS - NSFOLLOWUPINSTRUCTIONS_ED_ALL_ED_FT
** You do not have a bowel obstruction.   ** Follow up with your surgeon.    ** Follow up with your primary care doctor in the next 72 hours.     ** Go to the nearest Emergency Department if you experience any new or concerning symptoms, such as:   - worsening pain, decreased ostomy output.   - passing out  - unable to eat or drink  - fever, chills

## 2024-02-20 NOTE — ED STATDOCS - CLINICAL SUMMARY MEDICAL DECISION MAKING FREE TEXT BOX
Patient presented for abdominal pain and decreased ostomy output.  Received IVF, and pain meds. Labs demonstrated leukocytosis.  Pending CT at time of signout to Dr. Trejo.

## 2024-02-20 NOTE — ED STATDOCS - OBJECTIVE STATEMENT
37 y/o M with PMHx of pancreatitis, Crohn's disease, fistulas, DM2 presents to the ED c/o R sided abdominal pain x2 hours. Pt states that he feels like his in intestines are in a "vice ." Pt endorses that he was showering and cleaning his colostomy site, and a small pocket of liquid came out, and then his pain started. Pt called his colorectal surgeon and GI who advised he come to the ED. Pt took Tylenol for pain. Pt was seen in ED yesterday for similar sx. Allergic to ciprofloxacin. Pt endorses that he has never had pain like this before.

## 2024-02-20 NOTE — ED STATDOCS - GASTROINTESTINAL, MLM
abdomen soft,  and non-distended. Bowel sounds present. no rebound no guarding. colostomy in place, mild tenderness around colostomy site

## 2024-02-20 NOTE — ED STATDOCS - PROGRESS NOTE DETAILS
claude: pt requesting more dilaudid Zana: pt concerned about decreased ostomy output. will order CT Signout to Dr. Trejo.  Pending CT read.  Please see her documentation for further management of this patient. Phill Winter D.O. Iwona Trejo MD, Attending  Pt received at signout pending CTr and reassessment.   CT results discussed with patient, agree to follow up with surgeon in [place].   Offered patient more pain medication, states " if you are just going to give me pain meds, I can go home." Zana: pt concerned about decreased ostomy output. discussed that he was seen this week and had CT abd/pelvis done. states he was admitted for an abscess in the axilla but discussed he also had c/o abd pain which is why a ct was ordered. states he knows something is wrong and wants another CT done. will order CT and IV tylenol claude: pt requesting more dilaudid. reviewed pts previous notes and consults. pt seen yesterday by colorectal and surgery. states that pt goes to [facility] and [facility] for management but stopped taking Stelara (Ustekinumab) as well as Humira for Crohn's and Hidradenitis ~6 months ago due to infectious side effects. Zana: pt concerned about decreased ostomy output. discussed that he was seen this week and had CT abd/pelvis done. states he was admitted for an abscess in the axilla but discussed he also had c/o abd pain which is why a ct was ordered. states he knows something is wrong and wants another CT done. will order CT and IV tylenol. pt received IVF. was tachy to 117 initially which normalized after dilaudid and if

## 2024-02-21 VITALS
OXYGEN SATURATION: 98 % | SYSTOLIC BLOOD PRESSURE: 103 MMHG | TEMPERATURE: 98 F | DIASTOLIC BLOOD PRESSURE: 80 MMHG | RESPIRATION RATE: 18 BRPM | HEART RATE: 76 BPM

## 2024-02-21 LAB
CULTURE RESULTS: ABNORMAL
SPECIMEN SOURCE: SIGNIFICANT CHANGE UP

## 2024-02-21 PROCEDURE — 74177 CT ABD & PELVIS W/CONTRAST: CPT | Mod: 26,MA

## 2024-02-21 NOTE — ED ADULT NURSE NOTE - CAS EDN DISCHARGE INTERVENTIONS
Missed Physical Therapy      Patient Name:  Malou Suarez   MRN:  [id number]    Patient not seen today secondary to Testing/imaging (xray/CT/MRI) (- patient currently ROWAN for a CT Scan.).     Will follow-up at later day/time.    Hipolito Cornelius, PT  1/14/24     IV discontinued, cath removed intact

## 2024-02-24 ENCOUNTER — EMERGENCY (EMERGENCY)
Facility: HOSPITAL | Age: 39
LOS: 0 days | Discharge: HOME | End: 2024-02-24
Attending: STUDENT IN AN ORGANIZED HEALTH CARE EDUCATION/TRAINING PROGRAM
Payer: COMMERCIAL

## 2024-02-24 VITALS
DIASTOLIC BLOOD PRESSURE: 86 MMHG | RESPIRATION RATE: 18 BRPM | HEART RATE: 97 BPM | TEMPERATURE: 98 F | OXYGEN SATURATION: 100 % | SYSTOLIC BLOOD PRESSURE: 127 MMHG

## 2024-02-24 VITALS — WEIGHT: 160.06 LBS | HEIGHT: 69 IN

## 2024-02-24 DIAGNOSIS — K60.3 ANAL FISTULA: Chronic | ICD-10-CM

## 2024-02-24 DIAGNOSIS — L02.411 CUTANEOUS ABSCESS OF RIGHT AXILLA: ICD-10-CM

## 2024-02-24 DIAGNOSIS — Z96.7 PRESENCE OF OTHER BONE AND TENDON IMPLANTS: Chronic | ICD-10-CM

## 2024-02-24 DIAGNOSIS — Z88.1 ALLERGY STATUS TO OTHER ANTIBIOTIC AGENTS STATUS: ICD-10-CM

## 2024-02-24 DIAGNOSIS — Z98.89 OTHER SPECIFIED POSTPROCEDURAL STATES: Chronic | ICD-10-CM

## 2024-02-24 LAB
ALBUMIN SERPL ELPH-MCNC: 2.9 G/DL — LOW (ref 3.3–5)
ALP SERPL-CCNC: 72 U/L — SIGNIFICANT CHANGE UP (ref 40–120)
ALT FLD-CCNC: 17 U/L — SIGNIFICANT CHANGE UP (ref 12–78)
ANION GAP SERPL CALC-SCNC: 4 MMOL/L — LOW (ref 5–17)
AST SERPL-CCNC: 8 U/L — LOW (ref 15–37)
BASOPHILS # BLD AUTO: 0.05 K/UL — SIGNIFICANT CHANGE UP (ref 0–0.2)
BASOPHILS NFR BLD AUTO: 0.5 % — SIGNIFICANT CHANGE UP (ref 0–2)
BILIRUB SERPL-MCNC: 0.1 MG/DL — LOW (ref 0.2–1.2)
BUN SERPL-MCNC: 4 MG/DL — LOW (ref 7–23)
CALCIUM SERPL-MCNC: 8.9 MG/DL — SIGNIFICANT CHANGE UP (ref 8.5–10.1)
CHLORIDE SERPL-SCNC: 109 MMOL/L — HIGH (ref 96–108)
CO2 SERPL-SCNC: 27 MMOL/L — SIGNIFICANT CHANGE UP (ref 22–31)
CREAT SERPL-MCNC: 0.74 MG/DL — SIGNIFICANT CHANGE UP (ref 0.5–1.3)
EGFR: 119 ML/MIN/1.73M2 — SIGNIFICANT CHANGE UP
EOSINOPHIL # BLD AUTO: 0.09 K/UL — SIGNIFICANT CHANGE UP (ref 0–0.5)
EOSINOPHIL NFR BLD AUTO: 0.8 % — SIGNIFICANT CHANGE UP (ref 0–6)
GLUCOSE SERPL-MCNC: 201 MG/DL — HIGH (ref 70–99)
HCT VFR BLD CALC: 43.5 % — SIGNIFICANT CHANGE UP (ref 39–50)
HGB BLD-MCNC: 15 G/DL — SIGNIFICANT CHANGE UP (ref 13–17)
IMM GRANULOCYTES NFR BLD AUTO: 1 % — HIGH (ref 0–0.9)
LYMPHOCYTES # BLD AUTO: 3.76 K/UL — HIGH (ref 1–3.3)
LYMPHOCYTES # BLD AUTO: 34.2 % — SIGNIFICANT CHANGE UP (ref 13–44)
MCHC RBC-ENTMCNC: 33.2 PG — SIGNIFICANT CHANGE UP (ref 27–34)
MCHC RBC-ENTMCNC: 34.5 GM/DL — SIGNIFICANT CHANGE UP (ref 32–36)
MCV RBC AUTO: 96.2 FL — SIGNIFICANT CHANGE UP (ref 80–100)
MONOCYTES # BLD AUTO: 1.15 K/UL — HIGH (ref 0–0.9)
MONOCYTES NFR BLD AUTO: 10.5 % — SIGNIFICANT CHANGE UP (ref 2–14)
NEUTROPHILS # BLD AUTO: 5.82 K/UL — SIGNIFICANT CHANGE UP (ref 1.8–7.4)
NEUTROPHILS NFR BLD AUTO: 53 % — SIGNIFICANT CHANGE UP (ref 43–77)
PLATELET # BLD AUTO: 509 K/UL — HIGH (ref 150–400)
POTASSIUM SERPL-MCNC: 3.8 MMOL/L — SIGNIFICANT CHANGE UP (ref 3.5–5.3)
POTASSIUM SERPL-SCNC: 3.8 MMOL/L — SIGNIFICANT CHANGE UP (ref 3.5–5.3)
PROT SERPL-MCNC: 7.2 GM/DL — SIGNIFICANT CHANGE UP (ref 6–8.3)
RBC # BLD: 4.52 M/UL — SIGNIFICANT CHANGE UP (ref 4.2–5.8)
RBC # FLD: 13.3 % — SIGNIFICANT CHANGE UP (ref 10.3–14.5)
SODIUM SERPL-SCNC: 140 MMOL/L — SIGNIFICANT CHANGE UP (ref 135–145)
WBC # BLD: 10.98 K/UL — HIGH (ref 3.8–10.5)
WBC # FLD AUTO: 10.98 K/UL — HIGH (ref 3.8–10.5)

## 2024-02-24 PROCEDURE — 80053 COMPREHEN METABOLIC PANEL: CPT

## 2024-02-24 PROCEDURE — 36415 COLL VENOUS BLD VENIPUNCTURE: CPT

## 2024-02-24 PROCEDURE — 99285 EMERGENCY DEPT VISIT HI MDM: CPT

## 2024-02-24 PROCEDURE — 96376 TX/PRO/DX INJ SAME DRUG ADON: CPT

## 2024-02-24 PROCEDURE — 85025 COMPLETE CBC W/AUTO DIFF WBC: CPT

## 2024-02-24 PROCEDURE — 99284 EMERGENCY DEPT VISIT MOD MDM: CPT | Mod: 25

## 2024-02-24 PROCEDURE — 96375 TX/PRO/DX INJ NEW DRUG ADDON: CPT

## 2024-02-24 PROCEDURE — 96374 THER/PROPH/DIAG INJ IV PUSH: CPT

## 2024-02-24 RX ORDER — SODIUM CHLORIDE 9 MG/ML
1000 INJECTION INTRAMUSCULAR; INTRAVENOUS; SUBCUTANEOUS ONCE
Refills: 0 | Status: COMPLETED | OUTPATIENT
Start: 2024-02-24 | End: 2024-02-24

## 2024-02-24 RX ORDER — ACETAMINOPHEN 500 MG
1000 TABLET ORAL ONCE
Refills: 0 | Status: COMPLETED | OUTPATIENT
Start: 2024-02-24 | End: 2024-02-24

## 2024-02-24 RX ORDER — OXYCODONE AND ACETAMINOPHEN 5; 325 MG/1; MG/1
1 TABLET ORAL
Qty: 6 | Refills: 0
Start: 2024-02-24 | End: 2024-02-25

## 2024-02-24 RX ORDER — MORPHINE SULFATE 50 MG/1
4 CAPSULE, EXTENDED RELEASE ORAL ONCE
Refills: 0 | Status: DISCONTINUED | OUTPATIENT
Start: 2024-02-24 | End: 2024-02-24

## 2024-02-24 RX ORDER — MORPHINE SULFATE 50 MG/1
2 CAPSULE, EXTENDED RELEASE ORAL ONCE
Refills: 0 | Status: DISCONTINUED | OUTPATIENT
Start: 2024-02-24 | End: 2024-02-24

## 2024-02-24 RX ADMIN — SODIUM CHLORIDE 1000 MILLILITER(S): 9 INJECTION INTRAMUSCULAR; INTRAVENOUS; SUBCUTANEOUS at 05:13

## 2024-02-24 RX ADMIN — Medication 400 MILLIGRAM(S): at 09:16

## 2024-02-24 RX ADMIN — MORPHINE SULFATE 4 MILLIGRAM(S): 50 CAPSULE, EXTENDED RELEASE ORAL at 06:30

## 2024-02-24 RX ADMIN — MORPHINE SULFATE 2 MILLIGRAM(S): 50 CAPSULE, EXTENDED RELEASE ORAL at 05:13

## 2024-02-24 NOTE — ED ADULT NURSE NOTE - NSFALLUNIVINTERV_ED_ALL_ED
Bed/Stretcher in lowest position, wheels locked, appropriate side rails in place/Call bell, personal items and telephone in reach/Instruct patient to call for assistance before getting out of bed/chair/stretcher/Non-slip footwear applied when patient is off stretcher/[place] to call system/Physically safe environment - no spills, clutter or unnecessary equipment/Purposeful proactive rounding/Room/bathroom lighting operational, light cord in reach

## 2024-02-24 NOTE — ED PROVIDER NOTE - PATIENT PORTAL LINK FT
You can access the FollowMyHealth Patient Portal offered by [facility] by registering at the following website: [email or web address]. By joining [employer]’s FollowMyHealth portal, you will also be able to view your health information using other applications (apps) compatible with our system.

## 2024-02-24 NOTE — ED ADULT TRIAGE NOTE - CHIEF COMPLAINT QUOTE
Pt ambulatory to ED for pain near wound on r armpit. Pt had abscess removed from right armpit on friday. Pt now w/ increasing pain, states wound is red and hot to touch and pain is shooting down his arm. Endorses chills. Took tylenol for pain PTA w/out relief. Allergy to cipro

## 2024-02-24 NOTE — ED ADULT NURSE NOTE - OBJECTIVE STATEMENT
pt presents to er c/o wound check. pt had an abscess removed from R. armpit last friday, had dressing removed yesterday and has been having worsening pain since. pt currently on augmentin. -fever, +chills, -n/v/d. pt presents to er c/o wound check. pt had an abscess removed from R. armpit last friday, had dressing removed yesterday and has been having worsening pain since, as well as redness and warmth. no new drainage noted. pt currently on augmentin. -fever, +chills, -n/v/d.

## 2024-02-24 NOTE — ED ADULT NURSE REASSESSMENT NOTE - NS ED NURSE REASSESS COMMENT FT1
POC assumed from overnight RN. pt. resting comfortably in stretcher, alert and oriented x3 and endorsing no complaints at this time. pt. remains afebrile and endorses no pain.

## 2024-02-24 NOTE — ED PROVIDER NOTE - PHYSICAL EXAMINATION
Iwona Trejo MD, Attending  Gen: Well appearing in NAD   Head: NC/AT  Neck: trachea midline  Resp:  No distress  Ext: no deformities  Neuro:  A&O appears non focal  Skin:  Warm and dry as visualized + right auxiliary incision healing, with mild tenderness and swelling around incision site, no erythema. minimal drainage noted  Psych:  Normal affect and mood

## 2024-02-24 NOTE — CONSULT NOTE ADULT - ASSESSMENT
HPI:  38M w/ Hidradenitis supprativa, found to have 4.1cm fluid collection in the right axilla s/p wide local excision on 2/16/24, discharged home last week, coming back for wound check and pain.    In ED HDS, TTP arounf right axilla sensory and motor intact, previous I&D site with minimal discharge and healing.  Labs showed WBC of 10.9 (downtrended from 15 from last week)     Recs:  - no surgical intervention indicated  - Pt can be discharged home w pain medications and Augmentin for 7 days and follow up in clinic with Dr Hilario on Wednesday

## 2024-02-24 NOTE — ED PROVIDER NOTE - OBJECTIVE STATEMENT
38M w/ Hidradenitis supprativa, sp wide local excision for fluid collection in the right axilla 2/16/24, presents home for wound check. Pt states he finished course of Augmentin, but started to have pain, swelling and bad smelling drainage from wound incision. Denies fever chills, motor or sensory deficits of R upper extremity.           Physical Exam:  General: AAOx3, NAD  Chest: Normal respiratory effort  Heart: RRR  Abdomen: Soft, NTND, no masses  Neuro/Psych: No localized deficits. Normal speech, normal tone  Skin: Normal, no rashes, no lesions noted.   Extremities: Warm, well perfused  right axilla previous incision site whealing, minimal discharge, no sign of infection, TTP, motor and

## 2024-02-24 NOTE — ED ADULT NURSE REASSESSMENT NOTE - NS ED NURSE REASSESS COMMENT FT1
Patient complaining of pain under right axillae where he had incision and drainage performed. Ofirmev ordered for pain, will administer- pending pharmacist verification.

## 2024-02-24 NOTE — ED PROVIDER NOTE - CARE PROVIDER_API CALL
Sanket Hilario (DO)  Surgery  [address] [place] [id number]  Phone: [phone]  Fax: [phone]  Follow Up Time:

## 2024-02-24 NOTE — ED PROVIDER NOTE - CLINICAL SUMMARY MEDICAL DECISION MAKING FREE TEXT BOX
Po DONNELLY: received s/o from Dr. Trejo pending surgery consult; seen by surgery resident; continue augmentin x 5 more days; rx for percocet; f/u with Dr. Hilario in surgery clinic on Wednesday and patient is aware and agreeable to plan at this time. Iwona Trejo MD, Attending  ddx includes, but is not limited to the following: abscess, low suspicion for wound dehiscence.   plan: screening labs,  analgesia, surgery consult.   update: see progress notes.   ----      Po DO: received s/o from Dr. Trejo pending surgery consult; seen by surgery resident; continue augmentin x 5 more days; rx for percocet; f/u with Dr. Hilario in surgery clinic on Wednesday and patient is aware and agreeable to plan at this time.

## 2024-02-26 DIAGNOSIS — Z93.2 ILEOSTOMY STATUS: ICD-10-CM

## 2024-02-26 DIAGNOSIS — L02.413 CUTANEOUS ABSCESS OF RIGHT UPPER LIMB: ICD-10-CM

## 2024-02-26 DIAGNOSIS — L73.2 HIDRADENITIS SUPPURATIVA: ICD-10-CM

## 2024-02-26 DIAGNOSIS — K60.3 ANAL FISTULA: ICD-10-CM

## 2024-02-26 DIAGNOSIS — Z79.899 OTHER LONG TERM (CURRENT) DRUG THERAPY: ICD-10-CM

## 2024-02-26 DIAGNOSIS — K50.10 CROHN'S DISEASE OF LARGE INTESTINE WITHOUT COMPLICATIONS: ICD-10-CM

## 2024-02-26 DIAGNOSIS — Z79.84 LONG TERM (CURRENT) USE OF ORAL HYPOGLYCEMIC DRUGS: ICD-10-CM

## 2024-02-26 DIAGNOSIS — E11.9 TYPE 2 DIABETES MELLITUS WITHOUT COMPLICATIONS: ICD-10-CM

## 2024-02-27 LAB — SURGICAL PATHOLOGY STUDY: SIGNIFICANT CHANGE UP

## 2024-02-29 ENCOUNTER — INPATIENT (INPATIENT)
Facility: HOSPITAL | Age: 39
LOS: 4 days | Discharge: HOME | DRG: 603 | End: 2024-03-05
Attending: SURGERY | Admitting: SURGERY
Payer: SELF-PAY

## 2024-02-29 VITALS — HEIGHT: 69 IN | WEIGHT: 160.06 LBS

## 2024-02-29 DIAGNOSIS — Z96.7 PRESENCE OF OTHER BONE AND TENDON IMPLANTS: Chronic | ICD-10-CM

## 2024-02-29 DIAGNOSIS — Z98.89 OTHER SPECIFIED POSTPROCEDURAL STATES: Chronic | ICD-10-CM

## 2024-02-29 DIAGNOSIS — L03.111 CELLULITIS OF RIGHT AXILLA: ICD-10-CM

## 2024-02-29 DIAGNOSIS — K60.3 ANAL FISTULA: Chronic | ICD-10-CM

## 2024-02-29 LAB
ALBUMIN SERPL ELPH-MCNC: 3.4 G/DL — SIGNIFICANT CHANGE UP (ref 3.3–5)
ALP SERPL-CCNC: 86 U/L — SIGNIFICANT CHANGE UP (ref 40–120)
ALT FLD-CCNC: 12 U/L — SIGNIFICANT CHANGE UP (ref 12–78)
ANION GAP SERPL CALC-SCNC: 9 MMOL/L — SIGNIFICANT CHANGE UP (ref 5–17)
APTT BLD: 28.4 SEC — SIGNIFICANT CHANGE UP (ref 24.5–35.6)
AST SERPL-CCNC: 6 U/L — LOW (ref 15–37)
BASOPHILS # BLD AUTO: 0.06 K/UL — SIGNIFICANT CHANGE UP (ref 0–0.2)
BASOPHILS NFR BLD AUTO: 0.4 % — SIGNIFICANT CHANGE UP (ref 0–2)
BILIRUB SERPL-MCNC: 0.1 MG/DL — LOW (ref 0.2–1.2)
BLD GP AB SCN SERPL QL: SIGNIFICANT CHANGE UP
BUN SERPL-MCNC: 7 MG/DL — SIGNIFICANT CHANGE UP (ref 7–23)
CALCIUM SERPL-MCNC: 9.3 MG/DL — SIGNIFICANT CHANGE UP (ref 8.5–10.1)
CHLORIDE SERPL-SCNC: 99 MMOL/L — SIGNIFICANT CHANGE UP (ref 96–108)
CO2 SERPL-SCNC: 26 MMOL/L — SIGNIFICANT CHANGE UP (ref 22–31)
CREAT SERPL-MCNC: 0.81 MG/DL — SIGNIFICANT CHANGE UP (ref 0.5–1.3)
EGFR: 116 ML/MIN/1.73M2 — SIGNIFICANT CHANGE UP
EOSINOPHIL # BLD AUTO: 0.03 K/UL — SIGNIFICANT CHANGE UP (ref 0–0.5)
EOSINOPHIL NFR BLD AUTO: 0.2 % — SIGNIFICANT CHANGE UP (ref 0–6)
GLUCOSE SERPL-MCNC: 324 MG/DL — HIGH (ref 70–99)
HCT VFR BLD CALC: 41.5 % — SIGNIFICANT CHANGE UP (ref 39–50)
HGB BLD-MCNC: 14.2 G/DL — SIGNIFICANT CHANGE UP (ref 13–17)
IMM GRANULOCYTES NFR BLD AUTO: 0.9 % — SIGNIFICANT CHANGE UP (ref 0–0.9)
INR BLD: 0.95 RATIO — SIGNIFICANT CHANGE UP (ref 0.85–1.18)
LACTATE SERPL-SCNC: 2.6 MMOL/L — HIGH (ref 0.7–2)
LYMPHOCYTES # BLD AUTO: 17.1 % — SIGNIFICANT CHANGE UP (ref 13–44)
LYMPHOCYTES # BLD AUTO: 2.86 K/UL — SIGNIFICANT CHANGE UP (ref 1–3.3)
MCHC RBC-ENTMCNC: 32.6 PG — SIGNIFICANT CHANGE UP (ref 27–34)
MCHC RBC-ENTMCNC: 34.2 GM/DL — SIGNIFICANT CHANGE UP (ref 32–36)
MCV RBC AUTO: 95.2 FL — SIGNIFICANT CHANGE UP (ref 80–100)
MONOCYTES # BLD AUTO: 1.4 K/UL — HIGH (ref 0–0.9)
MONOCYTES NFR BLD AUTO: 8.4 % — SIGNIFICANT CHANGE UP (ref 2–14)
NEUTROPHILS # BLD AUTO: 12.21 K/UL — HIGH (ref 1.8–7.4)
NEUTROPHILS NFR BLD AUTO: 73 % — SIGNIFICANT CHANGE UP (ref 43–77)
PLATELET # BLD AUTO: 479 K/UL — HIGH (ref 150–400)
POTASSIUM SERPL-MCNC: 3.4 MMOL/L — LOW (ref 3.5–5.3)
POTASSIUM SERPL-SCNC: 3.4 MMOL/L — LOW (ref 3.5–5.3)
PROT SERPL-MCNC: 8.4 GM/DL — HIGH (ref 6–8.3)
PROTHROM AB SERPL-ACNC: 10.7 SEC — SIGNIFICANT CHANGE UP (ref 9.5–13)
RBC # BLD: 4.36 M/UL — SIGNIFICANT CHANGE UP (ref 4.2–5.8)
RBC # FLD: 13.1 % — SIGNIFICANT CHANGE UP (ref 10.3–14.5)
SODIUM SERPL-SCNC: 134 MMOL/L — LOW (ref 135–145)
WBC # BLD: 16.71 K/UL — HIGH (ref 3.8–10.5)
WBC # FLD AUTO: 16.71 K/UL — HIGH (ref 3.8–10.5)

## 2024-02-29 PROCEDURE — 99221 1ST HOSP IP/OBS SF/LOW 40: CPT

## 2024-02-29 PROCEDURE — 82962 GLUCOSE BLOOD TEST: CPT

## 2024-02-29 PROCEDURE — 36415 COLL VENOUS BLD VENIPUNCTURE: CPT

## 2024-02-29 PROCEDURE — 85027 COMPLETE CBC AUTOMATED: CPT

## 2024-02-29 PROCEDURE — 80048 BASIC METABOLIC PNL TOTAL CA: CPT

## 2024-02-29 PROCEDURE — 93010 ELECTROCARDIOGRAM REPORT: CPT

## 2024-02-29 PROCEDURE — 99285 EMERGENCY DEPT VISIT HI MDM: CPT

## 2024-02-29 PROCEDURE — 71260 CT THORAX DX C+: CPT | Mod: 26,MC

## 2024-02-29 PROCEDURE — 83735 ASSAY OF MAGNESIUM: CPT

## 2024-02-29 PROCEDURE — 84100 ASSAY OF PHOSPHORUS: CPT

## 2024-02-29 PROCEDURE — 85025 COMPLETE CBC W/AUTO DIFF WBC: CPT

## 2024-02-29 PROCEDURE — 83605 ASSAY OF LACTIC ACID: CPT

## 2024-02-29 RX ORDER — MORPHINE SULFATE 50 MG/1
6 CAPSULE, EXTENDED RELEASE ORAL ONCE
Refills: 0 | Status: DISCONTINUED | OUTPATIENT
Start: 2024-02-29 | End: 2024-02-29

## 2024-02-29 RX ORDER — CEFTRIAXONE 500 MG/1
1000 INJECTION, POWDER, FOR SOLUTION INTRAMUSCULAR; INTRAVENOUS ONCE
Refills: 0 | Status: COMPLETED | OUTPATIENT
Start: 2024-02-29 | End: 2024-02-29

## 2024-02-29 RX ORDER — SODIUM CHLORIDE 9 MG/ML
2500 INJECTION INTRAMUSCULAR; INTRAVENOUS; SUBCUTANEOUS ONCE
Refills: 0 | Status: COMPLETED | OUTPATIENT
Start: 2024-02-29 | End: 2024-02-29

## 2024-02-29 RX ORDER — ACETAMINOPHEN 500 MG
1000 TABLET ORAL ONCE
Refills: 0 | Status: COMPLETED | OUTPATIENT
Start: 2024-02-29 | End: 2024-02-29

## 2024-02-29 RX ORDER — ONDANSETRON 8 MG/1
4 TABLET, FILM COATED ORAL ONCE
Refills: 0 | Status: COMPLETED | OUTPATIENT
Start: 2024-02-29 | End: 2024-02-29

## 2024-02-29 RX ORDER — MORPHINE SULFATE 50 MG/1
4 CAPSULE, EXTENDED RELEASE ORAL ONCE
Refills: 0 | Status: DISCONTINUED | OUTPATIENT
Start: 2024-02-29 | End: 2024-02-29

## 2024-02-29 RX ADMIN — MORPHINE SULFATE 4 MILLIGRAM(S): 50 CAPSULE, EXTENDED RELEASE ORAL at 21:47

## 2024-02-29 RX ADMIN — Medication 400 MILLIGRAM(S): at 21:57

## 2024-02-29 RX ADMIN — SODIUM CHLORIDE 2500 MILLILITER(S): 9 INJECTION INTRAMUSCULAR; INTRAVENOUS; SUBCUTANEOUS at 21:47

## 2024-02-29 RX ADMIN — MORPHINE SULFATE 4 MILLIGRAM(S): 50 CAPSULE, EXTENDED RELEASE ORAL at 23:06

## 2024-02-29 RX ADMIN — ONDANSETRON 4 MILLIGRAM(S): 8 TABLET, FILM COATED ORAL at 21:47

## 2024-02-29 RX ADMIN — CEFTRIAXONE 1000 MILLIGRAM(S): 500 INJECTION, POWDER, FOR SOLUTION INTRAMUSCULAR; INTRAVENOUS at 21:47

## 2024-02-29 NOTE — ED STATDOCS - CLINICAL SUMMARY MEDICAL DECISION MAKING FREE TEXT BOX
Right axiliary pain s/p right axilla surgery 2 weeks ago. Labs, IV abx, imaging right axilla. I, Dr. Armijo, spoke to Dr. Hilario who agrees with ED management and will send resident to see pt, labs and imaging pending.

## 2024-02-29 NOTE — ED STATDOCS - OBJECTIVE STATEMENT
39 y/o male with PMHx of Crohn's with ostomy, hidradenitis suppurativa presents to the ED c/o I&D and excision 2 weeks ago for cyst on right axilla by Dr. Hilario. Patient instructed to use warm compress, care of dressing, prescribed Augmentin. Patient non compliant with Augmentin due to abdominal pain, had no would check OP in office. Patient c/o 10/10 pain right axilla radiating down arm to fingertips with tingling. Endorses chills and sweats. No falls or injuries to area. Recently noticing incision opening.

## 2024-02-29 NOTE — ED ADULT NURSE NOTE - OBJECTIVE STATEMENT
Pt presents to ED w c/o pain from inside R axilla that radiates to R elbow. Pmh of cysts removal from R axilla 2 weeks ago. Pt states that he was in [facility] last week for similar c/o. Endorses pain of 10/10, nausea, chills, HA. Pt presents to ED w c/o pain from inside R axilla that radiates to R elbow. Pmh of cysts removal from R axilla 2 weeks ago. Pt states that he was in [facility] last week for similar c/o. Endorses pain of 10/10, nausea, chills, HA. Denies, fever, vomiting. pt has no other concerns at this time.  Pt A&O4 w clear speech and follows commands, ambulatory, pt does not appear to be in any distress, respirations are even and unlabored.

## 2024-02-29 NOTE — ED STATDOCS - MUSCULOSKELETAL, MLM
full ROM right shoulder nvi 6cm vertical incision right axilla, slight epidermal dehiscence lower portion incision, diffuse local tenderness, no erythema

## 2024-02-29 NOTE — ED ADULT TRIAGE NOTE - CHIEF COMPLAINT QUOTE
Pt presents complaining of R arm pain s/p cyst removal x2 weeks ago. Pt states he had a cyst removed by MD Orellana x2 weeks ago, began experiencing R arm Pain x1 week with tingling and sharp sensation. States wound is open to air. Pt appears visibly uncomfortable. Hx Crohns, DMII

## 2024-02-29 NOTE — ED ADULT TRIAGE NOTE - ARRIVAL FROM
[employer]; PhD)  Neurology; Vascular Neurology  [address] [place] [address]  Phone: [phone]  Fax: [phone]  Follow Up Time:

## 2024-03-01 LAB
ANION GAP SERPL CALC-SCNC: 7 MMOL/L — SIGNIFICANT CHANGE UP (ref 5–17)
BUN SERPL-MCNC: 4 MG/DL — LOW (ref 7–23)
CALCIUM SERPL-MCNC: 8.3 MG/DL — LOW (ref 8.5–10.1)
CHLORIDE SERPL-SCNC: 105 MMOL/L — SIGNIFICANT CHANGE UP (ref 96–108)
CO2 SERPL-SCNC: 27 MMOL/L — SIGNIFICANT CHANGE UP (ref 22–31)
CREAT SERPL-MCNC: 0.64 MG/DL — SIGNIFICANT CHANGE UP (ref 0.5–1.3)
EGFR: 124 ML/MIN/1.73M2 — SIGNIFICANT CHANGE UP
GLUCOSE BLDC GLUCOMTR-MCNC: 123 MG/DL — HIGH (ref 70–99)
GLUCOSE BLDC GLUCOMTR-MCNC: 168 MG/DL — HIGH (ref 70–99)
GLUCOSE BLDC GLUCOMTR-MCNC: 216 MG/DL — HIGH (ref 70–99)
GLUCOSE BLDC GLUCOMTR-MCNC: 316 MG/DL — HIGH (ref 70–99)
GLUCOSE SERPL-MCNC: 139 MG/DL — HIGH (ref 70–99)
HCT VFR BLD CALC: 33.6 % — LOW (ref 39–50)
HGB BLD-MCNC: 11.2 G/DL — LOW (ref 13–17)
LACTATE SERPL-SCNC: 1.3 MMOL/L — SIGNIFICANT CHANGE UP (ref 0.7–2)
MAGNESIUM SERPL-MCNC: 1.8 MG/DL — SIGNIFICANT CHANGE UP (ref 1.6–2.6)
MCHC RBC-ENTMCNC: 32.4 PG — SIGNIFICANT CHANGE UP (ref 27–34)
MCHC RBC-ENTMCNC: 33.3 GM/DL — SIGNIFICANT CHANGE UP (ref 32–36)
MCV RBC AUTO: 97.1 FL — SIGNIFICANT CHANGE UP (ref 80–100)
PHOSPHATE SERPL-MCNC: 3 MG/DL — SIGNIFICANT CHANGE UP (ref 2.5–4.5)
PLATELET # BLD AUTO: 385 K/UL — SIGNIFICANT CHANGE UP (ref 150–400)
POTASSIUM SERPL-MCNC: 3.8 MMOL/L — SIGNIFICANT CHANGE UP (ref 3.5–5.3)
POTASSIUM SERPL-SCNC: 3.8 MMOL/L — SIGNIFICANT CHANGE UP (ref 3.5–5.3)
RBC # BLD: 3.46 M/UL — LOW (ref 4.2–5.8)
RBC # FLD: 13.2 % — SIGNIFICANT CHANGE UP (ref 10.3–14.5)
SODIUM SERPL-SCNC: 139 MMOL/L — SIGNIFICANT CHANGE UP (ref 135–145)
WBC # BLD: 10.36 K/UL — SIGNIFICANT CHANGE UP (ref 3.8–10.5)
WBC # FLD AUTO: 10.36 K/UL — SIGNIFICANT CHANGE UP (ref 3.8–10.5)

## 2024-03-01 PROCEDURE — 99232 SBSQ HOSP IP/OBS MODERATE 35: CPT

## 2024-03-01 PROCEDURE — 99222 1ST HOSP IP/OBS MODERATE 55: CPT

## 2024-03-01 RX ORDER — INFLUENZA VIRUS VACCINE 15; 15; 15; 15 UG/.5ML; UG/.5ML; UG/.5ML; UG/.5ML
0.5 SUSPENSION INTRAMUSCULAR ONCE
Refills: 0 | Status: DISCONTINUED | OUTPATIENT
Start: 2024-03-01 | End: 2024-03-05

## 2024-03-01 RX ORDER — DEXTROSE 50 % IN WATER 50 %
12.5 SYRINGE (ML) INTRAVENOUS ONCE
Refills: 0 | Status: DISCONTINUED | OUTPATIENT
Start: 2024-03-01 | End: 2024-03-05

## 2024-03-01 RX ORDER — HYDROMORPHONE HYDROCHLORIDE 2 MG/ML
0.5 INJECTION INTRAMUSCULAR; INTRAVENOUS; SUBCUTANEOUS EVERY 6 HOURS
Refills: 0 | Status: DISCONTINUED | OUTPATIENT
Start: 2024-03-01 | End: 2024-03-04

## 2024-03-01 RX ORDER — AMPICILLIN SODIUM AND SULBACTAM SODIUM 250; 125 MG/ML; MG/ML
3 INJECTION, POWDER, FOR SUSPENSION INTRAMUSCULAR; INTRAVENOUS ONCE
Refills: 0 | Status: COMPLETED | OUTPATIENT
Start: 2024-03-01 | End: 2024-03-01

## 2024-03-01 RX ORDER — DEXTROSE 50 % IN WATER 50 %
15 SYRINGE (ML) INTRAVENOUS ONCE
Refills: 0 | Status: DISCONTINUED | OUTPATIENT
Start: 2024-03-01 | End: 2024-03-05

## 2024-03-01 RX ORDER — INSULIN LISPRO 100/ML
VIAL (ML) SUBCUTANEOUS AT BEDTIME
Refills: 0 | Status: DISCONTINUED | OUTPATIENT
Start: 2024-03-01 | End: 2024-03-05

## 2024-03-01 RX ORDER — AMPICILLIN SODIUM AND SULBACTAM SODIUM 250; 125 MG/ML; MG/ML
3 INJECTION, POWDER, FOR SUSPENSION INTRAMUSCULAR; INTRAVENOUS EVERY 6 HOURS
Refills: 0 | Status: DISCONTINUED | OUTPATIENT
Start: 2024-03-01 | End: 2024-03-05

## 2024-03-01 RX ORDER — AMPICILLIN SODIUM AND SULBACTAM SODIUM 250; 125 MG/ML; MG/ML
1.5 INJECTION, POWDER, FOR SUSPENSION INTRAMUSCULAR; INTRAVENOUS EVERY 6 HOURS
Refills: 0 | Status: DISCONTINUED | OUTPATIENT
Start: 2024-03-01 | End: 2024-03-01

## 2024-03-01 RX ORDER — GLUCAGON INJECTION, SOLUTION 0.5 MG/.1ML
1 INJECTION, SOLUTION SUBCUTANEOUS ONCE
Refills: 0 | Status: DISCONTINUED | OUTPATIENT
Start: 2024-03-01 | End: 2024-03-05

## 2024-03-01 RX ORDER — OXYCODONE HYDROCHLORIDE 5 MG/1
5 TABLET ORAL EVERY 6 HOURS
Refills: 0 | Status: DISCONTINUED | OUTPATIENT
Start: 2024-03-01 | End: 2024-03-01

## 2024-03-01 RX ORDER — OXYCODONE HYDROCHLORIDE 5 MG/1
2.5 TABLET ORAL EVERY 4 HOURS
Refills: 0 | Status: DISCONTINUED | OUTPATIENT
Start: 2024-03-01 | End: 2024-03-01

## 2024-03-01 RX ORDER — ONDANSETRON 8 MG/1
4 TABLET, FILM COATED ORAL EVERY 6 HOURS
Refills: 0 | Status: DISCONTINUED | OUTPATIENT
Start: 2024-03-01 | End: 2024-03-05

## 2024-03-01 RX ORDER — HYDROMORPHONE HYDROCHLORIDE 2 MG/ML
0.5 INJECTION INTRAMUSCULAR; INTRAVENOUS; SUBCUTANEOUS ONCE
Refills: 0 | Status: DISCONTINUED | OUTPATIENT
Start: 2024-03-01 | End: 2024-03-01

## 2024-03-01 RX ORDER — SODIUM CHLORIDE 9 MG/ML
1000 INJECTION, SOLUTION INTRAVENOUS
Refills: 0 | Status: DISCONTINUED | OUTPATIENT
Start: 2024-03-01 | End: 2024-03-05

## 2024-03-01 RX ORDER — ACETAMINOPHEN 500 MG
650 TABLET ORAL EVERY 6 HOURS
Refills: 0 | Status: DISCONTINUED | OUTPATIENT
Start: 2024-03-01 | End: 2024-03-04

## 2024-03-01 RX ORDER — OXYCODONE HYDROCHLORIDE 5 MG/1
10 TABLET ORAL EVERY 4 HOURS
Refills: 0 | Status: DISCONTINUED | OUTPATIENT
Start: 2024-03-01 | End: 2024-03-01

## 2024-03-01 RX ORDER — KETOROLAC TROMETHAMINE 30 MG/ML
30 SYRINGE (ML) INJECTION EVERY 6 HOURS
Refills: 0 | Status: DISCONTINUED | OUTPATIENT
Start: 2024-03-01 | End: 2024-03-04

## 2024-03-01 RX ORDER — IBUPROFEN 200 MG
600 TABLET ORAL EVERY 6 HOURS
Refills: 0 | Status: DISCONTINUED | OUTPATIENT
Start: 2024-03-01 | End: 2024-03-04

## 2024-03-01 RX ORDER — OXYCODONE HYDROCHLORIDE 5 MG/1
5 TABLET ORAL EVERY 6 HOURS
Refills: 0 | Status: DISCONTINUED | OUTPATIENT
Start: 2024-03-01 | End: 2024-03-05

## 2024-03-01 RX ORDER — DEXTROSE 50 % IN WATER 50 %
25 SYRINGE (ML) INTRAVENOUS ONCE
Refills: 0 | Status: DISCONTINUED | OUTPATIENT
Start: 2024-03-01 | End: 2024-03-05

## 2024-03-01 RX ORDER — ACETAMINOPHEN 500 MG
975 TABLET ORAL EVERY 6 HOURS
Refills: 0 | Status: DISCONTINUED | OUTPATIENT
Start: 2024-03-01 | End: 2024-03-05

## 2024-03-01 RX ORDER — MORPHINE SULFATE 50 MG/1
2 CAPSULE, EXTENDED RELEASE ORAL ONCE
Refills: 0 | Status: DISCONTINUED | OUTPATIENT
Start: 2024-03-01 | End: 2024-03-01

## 2024-03-01 RX ORDER — INSULIN GLARGINE 100 [IU]/ML
10 INJECTION, SOLUTION SUBCUTANEOUS EVERY MORNING
Refills: 0 | Status: DISCONTINUED | OUTPATIENT
Start: 2024-03-01 | End: 2024-03-05

## 2024-03-01 RX ORDER — LIDOCAINE 4 G/100G
1 CREAM TOPICAL DAILY
Refills: 0 | Status: DISCONTINUED | OUTPATIENT
Start: 2024-03-01 | End: 2024-03-05

## 2024-03-01 RX ORDER — CYCLOBENZAPRINE HYDROCHLORIDE 10 MG/1
5 TABLET, FILM COATED ORAL
Refills: 0 | Status: DISCONTINUED | OUTPATIENT
Start: 2024-03-01 | End: 2024-03-05

## 2024-03-01 RX ORDER — GABAPENTIN 400 MG/1
300 CAPSULE ORAL EVERY 8 HOURS
Refills: 0 | Status: DISCONTINUED | OUTPATIENT
Start: 2024-03-01 | End: 2024-03-05

## 2024-03-01 RX ORDER — AMPICILLIN SODIUM AND SULBACTAM SODIUM 250; 125 MG/ML; MG/ML
INJECTION, POWDER, FOR SUSPENSION INTRAMUSCULAR; INTRAVENOUS
Refills: 0 | Status: DISCONTINUED | OUTPATIENT
Start: 2024-03-01 | End: 2024-03-05

## 2024-03-01 RX ORDER — INSULIN LISPRO 100/ML
VIAL (ML) SUBCUTANEOUS
Refills: 0 | Status: DISCONTINUED | OUTPATIENT
Start: 2024-03-01 | End: 2024-03-05

## 2024-03-01 RX ADMIN — Medication 600 MILLIGRAM(S): at 14:28

## 2024-03-01 RX ADMIN — Medication 30 MILLIGRAM(S): at 14:28

## 2024-03-01 RX ADMIN — OXYCODONE HYDROCHLORIDE 5 MILLIGRAM(S): 5 TABLET ORAL at 14:19

## 2024-03-01 RX ADMIN — HYDROMORPHONE HYDROCHLORIDE 0.5 MILLIGRAM(S): 2 INJECTION INTRAMUSCULAR; INTRAVENOUS; SUBCUTANEOUS at 04:00

## 2024-03-01 RX ADMIN — MORPHINE SULFATE 2 MILLIGRAM(S): 50 CAPSULE, EXTENDED RELEASE ORAL at 08:54

## 2024-03-01 RX ADMIN — LIDOCAINE 1 PATCH: 4 CREAM TOPICAL at 08:55

## 2024-03-01 RX ADMIN — AMPICILLIN SODIUM AND SULBACTAM SODIUM 200 GRAM(S): 250; 125 INJECTION, POWDER, FOR SUSPENSION INTRAMUSCULAR; INTRAVENOUS at 17:12

## 2024-03-01 RX ADMIN — HYDROMORPHONE HYDROCHLORIDE 0.5 MILLIGRAM(S): 2 INJECTION INTRAMUSCULAR; INTRAVENOUS; SUBCUTANEOUS at 22:33

## 2024-03-01 RX ADMIN — HYDROMORPHONE HYDROCHLORIDE 0.5 MILLIGRAM(S): 2 INJECTION INTRAMUSCULAR; INTRAVENOUS; SUBCUTANEOUS at 15:16

## 2024-03-01 RX ADMIN — MORPHINE SULFATE 2 MILLIGRAM(S): 50 CAPSULE, EXTENDED RELEASE ORAL at 09:54

## 2024-03-01 RX ADMIN — Medication 600 MILLIGRAM(S): at 17:13

## 2024-03-01 RX ADMIN — LIDOCAINE 1 PATCH: 4 CREAM TOPICAL at 19:56

## 2024-03-01 RX ADMIN — HYDROMORPHONE HYDROCHLORIDE 0.5 MILLIGRAM(S): 2 INJECTION INTRAMUSCULAR; INTRAVENOUS; SUBCUTANEOUS at 11:50

## 2024-03-01 RX ADMIN — Medication 650 MILLIGRAM(S): at 14:28

## 2024-03-01 RX ADMIN — HYDROMORPHONE HYDROCHLORIDE 0.5 MILLIGRAM(S): 2 INJECTION INTRAMUSCULAR; INTRAVENOUS; SUBCUTANEOUS at 03:46

## 2024-03-01 RX ADMIN — GABAPENTIN 300 MILLIGRAM(S): 400 CAPSULE ORAL at 22:37

## 2024-03-01 RX ADMIN — OXYCODONE HYDROCHLORIDE 5 MILLIGRAM(S): 5 TABLET ORAL at 02:30

## 2024-03-01 RX ADMIN — Medication 30 MILLIGRAM(S): at 21:15

## 2024-03-01 RX ADMIN — Medication 650 MILLIGRAM(S): at 17:12

## 2024-03-01 RX ADMIN — Medication 600 MILLIGRAM(S): at 13:29

## 2024-03-01 RX ADMIN — Medication 2: at 11:58

## 2024-03-01 RX ADMIN — LIDOCAINE 1 PATCH: 4 CREAM TOPICAL at 21:17

## 2024-03-01 RX ADMIN — OXYCODONE HYDROCHLORIDE 5 MILLIGRAM(S): 5 TABLET ORAL at 01:43

## 2024-03-01 RX ADMIN — AMPICILLIN SODIUM AND SULBACTAM SODIUM 200 GRAM(S): 250; 125 INJECTION, POWDER, FOR SUSPENSION INTRAMUSCULAR; INTRAVENOUS at 10:58

## 2024-03-01 RX ADMIN — CYCLOBENZAPRINE HYDROCHLORIDE 5 MILLIGRAM(S): 10 TABLET, FILM COATED ORAL at 22:37

## 2024-03-01 RX ADMIN — Medication 650 MILLIGRAM(S): at 13:29

## 2024-03-01 RX ADMIN — GABAPENTIN 300 MILLIGRAM(S): 400 CAPSULE ORAL at 09:48

## 2024-03-01 RX ADMIN — CYCLOBENZAPRINE HYDROCHLORIDE 5 MILLIGRAM(S): 10 TABLET, FILM COATED ORAL at 09:48

## 2024-03-01 RX ADMIN — AMPICILLIN SODIUM AND SULBACTAM SODIUM 100 GRAM(S): 250; 125 INJECTION, POWDER, FOR SUSPENSION INTRAMUSCULAR; INTRAVENOUS at 06:23

## 2024-03-01 RX ADMIN — INSULIN GLARGINE 10 UNIT(S): 100 INJECTION, SOLUTION SUBCUTANEOUS at 11:58

## 2024-03-01 RX ADMIN — HYDROMORPHONE HYDROCHLORIDE 0.5 MILLIGRAM(S): 2 INJECTION INTRAMUSCULAR; INTRAVENOUS; SUBCUTANEOUS at 10:50

## 2024-03-01 RX ADMIN — HYDROMORPHONE HYDROCHLORIDE 0.5 MILLIGRAM(S): 2 INJECTION INTRAMUSCULAR; INTRAVENOUS; SUBCUTANEOUS at 21:28

## 2024-03-01 RX ADMIN — Medication 30 MILLIGRAM(S): at 13:28

## 2024-03-01 RX ADMIN — Medication 30 MILLIGRAM(S): at 20:56

## 2024-03-01 NOTE — CONSULT NOTE ADULT - ASSESSMENT
38M with h/o NIDDM,  Crohn's disease c/w perianal fistulae s/p fecal diversion 3 yrs ago, Right axillary hidradenitis suppurativa s/p excisional debridement on 2/16/24, presented with 'excruciating pain' of the right axillary surgical site.      Medicine consulted for  Diabetic managment, and antibioitic managment.       #Suppprativa Hidranitis  -s/p excision from right axilla 2/16 Dr. Hilario; now with pain in right groin  -unclear in needs another excision? will defer to surgery team   -adjust Unasyn to appropriate dosing >> 3gm Q6h  -ID consult to see if Vancomyin needed. Input appreciated    #Pain control  -patient not happy with his pain control. Asking for Dilaudid  -says oral percocet not helping. paitent may also have some dependence of opiods due to hx of chrons disease.  -will add Dilaudid for pain relief  low dose 0.5mg q6h severe pain and c/w 5mg percocet for moderate pain.   -will add ketorolac for breakthrough     #NIDDM  -at home takes metformin  -f/u HBA1c; last known was 7.4  - will add Lantus 10 units QAM for better inpaitent glycemic control  -c/w ss      DVT prophy- young patient, to ambulate

## 2024-03-01 NOTE — H&P ADULT - ATTENDING COMMENTS
Attending A/P:    Chart reviewed, patient examined, agree with above resident evaluation in addition to the following    s/p hidradenitis resection, persistent pain, Crohn's disease    asking for IV pain meds for pain control  on exam wound healing well, the small amount of air in the wound seen on CT expected as the wound is still partially opening and healing well with granulation tissue and no evidence of collection or purulent discharge.    Patient has been non compliant with his Crohn's care in the past.    given leukocytosis will admit for IV antibiotics and better pain control and hyperglycemia      PLAN:  admit  IV abx  hospitalist consult for hyperglycemia  pain consult for adequate PO regimen    GI/DVT prophylaxis  home meds as appropriate  Pain control  PT, IS      Pt will be monitored for signs of evolution/resolution of pathology and surgical intervention as required and warranted  Pt aware of and agrees with all of the above    86 minuted of time spent on pt examination, review of relevant labs and radiologic studies, assured stabilization of pt, discussion with relevant services/providers for coordination of pt care and services

## 2024-03-01 NOTE — CONSULT NOTE ADULT - SUBJECTIVE AND OBJECTIVE BOX
HPI:  38M with h/o Crohn's disease c/w perianal fistulae s/p fecal diversion 3 yrs ago and seton placements, Right axillary hidradenitis suppurativa s/p excisional debridement on 2/16/24, presented with 'excruciating pain' of the right axillary surgical site. Patient endorses the pain goes down to his elbow.     Patient has been hospitalized multiple times recently had has had difficult follow up with CRS and GI team at [facility] in the city. He was previously on Stelara but discontinued due to infections and continued treatment of anorectal fistulous disease. He has outpatient follow up next week with CRS Dr. Dimitri Ortega. Ostomy is currently working with no blood and has continued drainage from seton placement.     PAST MEDICAL & SURGICAL HISTORY:  Crohn's disease of large intestine without complication  (No current flare up)      Pancreatitis      S/P ORIF (open reduction internal fixation) fracture  (Right ankle, 2014)      History of colonoscopy  (2014)      Perianal fistula  repair in 2002          Home Medications:  amoxicillin-clavulanate 500 mg-125 mg oral tablet: 1 tab(s) orally every 8 hours for 5 days ***COMPLETE*** (01 Mar 2024 10:38)  amoxicillin-clavulanate 875 mg-125 mg oral tablet: 1 tab(s) orally 2 times a day for 5 days ***COMPLETE*** (01 Mar 2024 10:38)  doxycycline hyclate 50 mg oral capsule: 2 cap(s) orally 2 times a day for 5 days ***COMPLETE*** (01 Mar 2024 10:37)      MEDICATIONS  (STANDING):  acetaminophen     Tablet .. 650 milliGRAM(s) Oral every 6 hours  ampicillin/sulbactam  IVPB      ampicillin/sulbactam  IVPB 3 Gram(s) IV Intermittent every 6 hours  cyclobenzaprine 5 milliGRAM(s) Oral two times a day  dextrose 5%. 1000 milliLiter(s) (50 mL/Hr) IV Continuous <Continuous>  dextrose 5%. 1000 milliLiter(s) (100 mL/Hr) IV Continuous <Continuous>  dextrose 50% Injectable 12.5 Gram(s) IV Push once  dextrose 50% Injectable 25 Gram(s) IV Push once  dextrose 50% Injectable 25 Gram(s) IV Push once  dextrose Oral Gel 15 Gram(s) Oral once  gabapentin 300 milliGRAM(s) Oral every 8 hours  glucagon  Injectable 1 milliGRAM(s) IntraMuscular once  ibuprofen  Tablet. 600 milliGRAM(s) Oral every 6 hours  influenza   Vaccine 0.5 milliLiter(s) IntraMuscular once  insulin glargine Injectable (LANTUS) 10 Unit(s) SubCutaneous every morning  insulin lispro (ADMELOG) corrective regimen sliding scale   SubCutaneous three times a day before meals  insulin lispro (ADMELOG) corrective regimen sliding scale   SubCutaneous at bedtime  lidocaine   4% Patch 1 Patch Transdermal daily    MEDICATIONS  (PRN):  acetaminophen     Tablet .. 975 milliGRAM(s) Oral every 6 hours PRN Temp greater or equal to 38C (100.4F), Mild Pain (1 - 3)  HYDROmorphone  Injectable 0.5 milliGRAM(s) IV Push every 6 hours PRN Severe Pain (7 - 10)  ketorolac   Injectable 30 milliGRAM(s) IV Push every 6 hours PRN breakthrough pain  ondansetron Injectable 4 milliGRAM(s) IV Push every 6 hours PRN Nausea  oxyCODONE    IR 5 milliGRAM(s) Oral every 6 hours PRN Moderate Pain (4 - 6)      Allergies    ciprofloxacin (Other (Mild to Mod))    Intolerances        SOCIAL HISTORY:    FAMILY HISTORY:  Diabetes mellitus (Father)        ROS  As above  Otherwise unremarkable    Vital Signs Last 24 Hrs  T(C): 36.6 (01 Mar 2024 07:58), Max: 37.1 (29 Feb 2024 20:11)  T(F): 97.9 (01 Mar 2024 07:58), Max: 98.8 (29 Feb 2024 20:11)  HR: 92 (01 Mar 2024 07:58) (92 - 123)  BP: 97/69 (01 Mar 2024 07:58) (94/61 - 116/80)  BP(mean): 70 (01 Mar 2024 00:06) (70 - 82)  RR: 17 (01 Mar 2024 07:58) (17 - 18)  SpO2: 99% (01 Mar 2024 07:58) (96% - 99%)    Parameters below as of 01 Mar 2024 07:58  Patient On (Oxygen Delivery Method): room air        Constitutional: NAD, well-developed  Respiratory: CTAB  Cardiovascular: S1 and S2, RRR  Gastrointestinal: BS+, soft, NT/ND  Extremities: No peripheral edema  Psychiatric: Normal mood, normal affect  Skin: No rashes    LABS:                        11.2   10.36 )-----------( 385      ( 01 Mar 2024 06:47 )             33.6     03-01    139  |  105  |  4<L>  ----------------------------<  139<H>  3.8   |  27  |  0.64    Ca    8.3<L>      01 Mar 2024 06:47  Phos  3.0     03-01  Mg     1.8     03-01    TPro  8.4<H>  /  Alb  3.4  /  TBili  0.1<L>  /  DBili  x   /  AST  6<L>  /  ALT  12  /  AlkPhos  86  02-29    PT/INR - ( 29 Feb 2024 21:07 )   PT: 10.7 sec;   INR: 0.95 ratio         PTT - ( 29 Feb 2024 21:07 )  PTT:28.4 sec  LIVER FUNCTIONS - ( 29 Feb 2024 21:07 )  Alb: 3.4 g/dL / Pro: 8.4 gm/dL / ALK PHOS: 86 U/L / ALT: 12 U/L / AST: 6 U/L / GGT: x             RADIOLOGY & ADDITIONAL STUDIES:

## 2024-03-01 NOTE — CONSULT NOTE ADULT - ASSESSMENT
1. Crohn's disease s/p ileostomy with perianal fistulous disease s/p setons. Currently without overt complains. Discussed at length that he needs close follow up and not to miss appointment with CRS Dr. Ortega next week. He needs to reestablish care with Dr. Pearl given his complex disease as both Dr. Ortega and Dr. Pearl are focused on IBD.    Recommendation  1. Antibiotics for hidradenitis per ID  2. Diet as tolerated  3. Patient should not miss appointment with Dr. Ortega next week.

## 2024-03-01 NOTE — CONSULT NOTE ADULT - SUBJECTIVE AND OBJECTIVE BOX
"38M with h/o Crohn's disease c/w perianal fistulae s/p fecal diversion 3 yrs ago, Right axillary hidradenitis suppurativa s/p excisional debridement on 2/16/24, presented with 'excruciating pain' of the right axillary surgical site. Patient endorses the pain goes down to his elbow. Called Dr. Hilario's office, advised to come to the ED if pain not subsides with oral pain medications, thus presented to ED. Denies systemic symptoms such as fever or chills.   Of note: Patient goes to [facility] and [facility] for management. Patient states stop taking Stelara (Ustekinumab) as well as Humira for Crohn's and Hidradenitis ~6 months ago due to infectious side effects, based on Colorectal surgeon in [facility], states he does not want to go back since he is unhappy with their care. Denies systemic symptoms such as fever or chills. Ostomy well functioning, abdomen benign. No other complaints. "       Review of Systems:  Other Review of Systems: All other review of systems negative, except as noted in HPI      Allergies and Intolerances:        Allergies:  	ciprofloxacin: Drug, Other (Mild to Mod), Itching at burning at IV Site    Home Medications:   * Patient Currently Takes Medications as of 19-Feb-2024 09:29 documented in Structured Notes  · 	Percocet 5 mg-325 mg oral tablet: 1 tab(s) orally 3 times a day MDD:4  · 	amoxicillin-clavulanate 875 mg-125 mg oral tablet: 875 milligram(s) orally 2 times a day  · 	acetaminophen 500 mg oral tablet: 2 tab(s) orally every 8 hours  · 	ibuprofen 600 mg oral tablet: 1 tab(s) orally every 8 hours  · 	amoxicillin-clavulanate 500 mg-125 mg oral tablet: 500 milligram(s) orally every 8 hours  · 	metFORMIN 500 mg oral tablet: 1 tab(s) orally once a day    .    Patient History:    Past Medical, Past Surgical, and Family History:  PAST MEDICAL HISTORY:  Crohn's disease of large intestine without complication (No current flare up)  Crohn's disease c/w perianal fistula s/p fecal diversion at [facility] (3yrs ago) and seton placement at [facility] now all removed, proctitis, pancreatitis, and EtOH abuse.    Pancreatitis.     PAST SURGICAL HISTORY:  History of colonoscopy (2014)    Perianal fistula repair in 2002    S/P ORIF (open reduction internal fixation) fracture (Right ankle, 2014).     FAMILY HISTORY:  Father  Still living? Yes, Estimated age: 61-70  Diabetes mellitus, Age at diagnosis: 51-60.     Social History:  · Substance use	No            REVIEW OF SYSTEMS:    CONSTITUTIONAL: No weakness, fevers or chills  EYES/ENT: No visual changes;  No vertigo or throat pain   NECK: No pain or stiffness  RESPIRATORY: No cough, wheezing, hemoptysis; No shortness of breath  CARDIOVASCULAR: No chest pain or palpitations  GASTROINTESTINAL: No abdominal or epigastric pain. No nausea, vomiting, or hematemesis; No diarrhea or constipation. No melena or hematochezia.  GENITOURINARY: No dysuria, frequency or hematuria  NEUROLOGICAL: No numbness or weakness  SKIN: No itching, burning, rashes, or lesions   All other review of systems is negative unless indicated above      Vital Signs Last 24 Hrs  T(C): 36.6 (01 Mar 2024 07:58), Max: 37.1 (29 Feb 2024 20:11)  T(F): 97.9 (01 Mar 2024 07:58), Max: 98.8 (29 Feb 2024 20:11)  HR: 92 (01 Mar 2024 07:58) (92 - 123)  BP: 97/69 (01 Mar 2024 07:58) (94/61 - 116/80)  BP(mean): 70 (01 Mar 2024 00:06) (70 - 82)  RR: 17 (01 Mar 2024 07:58) (17 - 18)  SpO2: 99% (01 Mar 2024 07:58) (96% - 99%)    Parameters below as of 01 Mar 2024 07:58  Patient On (Oxygen Delivery Method): room air        HEENT:   pupils equal and reactive, EOMI, no oropharyngeal lesions, erythema, exudates, oral thrush    NECK:   supple, no carotid bruits, no palpable lymph nodes, no thyromegaly    CV:  +S1, +S2, regular, no murmurs or rubs    RESP:   lungs clear to auscultation bilaterally, no wheezing, rales, rhonchi, good air entry bilaterally    BREAST:  not examined    GI:  abdomen soft, non-tender, non-distended, normal BS, no bruits, no abdominal masses, no palpable masses    RECTAL:  not examined    :  not examined    MSK:   normal muscle tone, no atrophy, no rigidity, no contractions    EXT:   no clubbing, no cyanosis, no edema, no calf pain, swelling or erythema    VASCULAR:  pulses equal and symmetric in the upper and lower extremities    NEURO:  AAOX3, no focal neurological deficits, follows all commands, able to move extremities spontaneously    SKIN:  no ulcers, lesions or rashes    MEDICATIONS  (STANDING):  acetaminophen     Tablet .. 650 milliGRAM(s) Oral every 6 hours  ampicillin/sulbactam  IVPB      ampicillin/sulbactam  IVPB 3 Gram(s) IV Intermittent every 6 hours  cyclobenzaprine 5 milliGRAM(s) Oral two times a day  dextrose 50% Injectable 12.5 Gram(s) IV Push once  dextrose 50% Injectable 25 Gram(s) IV Push once  dextrose 50% Injectable 25 Gram(s) IV Push once  dextrose Oral Gel 15 Gram(s) Oral once  gabapentin 300 milliGRAM(s) Oral every 8 hours  glucagon  Injectable 1 milliGRAM(s) IntraMuscular once  ibuprofen  Tablet. 600 milliGRAM(s) Oral every 6 hours  influenza   Vaccine 0.5 milliLiter(s) IntraMuscular once  insulin glargine Injectable (LANTUS) 10 Unit(s) SubCutaneous every morning  lidocaine   4% Patch 1 Patch Transdermal daily    MEDICATIONS  (PRN):  acetaminophen     Tablet .. 975 milliGRAM(s) Oral every 6 hours PRN Temp greater or equal to 38C (100.4F), Mild Pain (1 - 3)  HYDROmorphone  Injectable 0.5 milliGRAM(s) IV Push every 6 hours PRN Severe Pain (7 - 10)  ketorolac   Injectable 30 milliGRAM(s) IV Push every 6 hours PRN breakthrough pain  ondansetron Injectable 4 milliGRAM(s) IV Push every 6 hours PRN Nausea  oxyCODONE    IR 5 milliGRAM(s) Oral every 6 hours PRN Moderate Pain (4 - 6)      Urinalysis Basic - ( 01 Mar 2024 06:47 )    Color: x / Appearance: x / SG: x / pH: x  Gluc: 139 mg/dL / Ketone: x  / Bili: x / Urobili: x   Blood: x / Protein: x / Nitrite: x   Leuk Esterase: x / RBC: x / WBC x   Sq Epi: x / Non Sq Epi: x / Bacteria: x    01 Mar 2024 06:47    139    |  105    |  4      ----------------------------<  139    3.8     |  27     |  0.64     Ca    8.3        01 Mar 2024 06:47  Phos  3.0       01 Mar 2024 06:47  Mg     1.8       01 Mar 2024 06:47    TPro  8.4    /  Alb  3.4    /  TBili  0.1    /  DBili  x      /  AST  6      /  ALT  12     /  AlkPhos  86     29 Feb 2024 21:07  LIVER FUNCTIONS - ( 29 Feb 2024 21:07 )  Alb: 3.4 g/dL / Pro: 8.4 gm/dL / ALK PHOS: 86 U/L / ALT: 12 U/L / AST: 6 U/L / GGT: x         PT/INR - ( 29 Feb 2024 21:07 )   PT: 10.7 sec;   INR: 0.95 ratio         PTT - ( 29 Feb 2024 21:07 )  PTT:28.4 secCBC Full  -  ( 01 Mar 2024 06:47 )  WBC Count : 10.36 K/uL  Hemoglobin : 11.2 g/dL  Hematocrit : 33.6 %  Platelet Count - Automated : 385 K/uL  Mean Cell Volume : 97.1 fl  Mean Cell Hemoglobin : 32.4 pg  Mean Cell Hemoglobin Concentration : 33.3 gm/dL  Auto Neutrophil # : x  Auto Lymphocyte # : x  Auto Monocyte # : x  Auto Eosinophil # : x  Auto Basophil # : x  Auto Neutrophil % : x  Auto Lymphocyte % : x  Auto Monocyte % : x  Auto Eosinophil % : x  Auto Basophil % : x            PT/INR - ( 29 Feb 2024 21:07 )   PT: 10.7 sec;   INR: 0.95 ratio         PTT - ( 29 Feb 2024 21:07 )  PTT:28.4 sec

## 2024-03-01 NOTE — H&P ADULT - HISTORY OF PRESENT ILLNESS
38M with h/o Crohn's disease c/w perianal fistulae s/p fecal diversion 3 yrs ago, Right axillary hidradenitis suppurativa s/p excisional debridement on 2/16/24, presented with 'excruciating pain' of the right axillary surgical site. Patient endorses the pain goes down to his elbow. Called Dr. Hilario's office, advised to come to the ED if pain not subsides with oral pain medications, thus presented to ED. Denies systemic symptoms such as fever or chills.     Of note: Patient goes to [facility] and [facility] for management. Patient states stop taking Stelara (Ustekinumab) as well as Humira for Crohn's and Hidradenitis ~6 months ago due to infectious side effects, based on Colorectal surgeon in [facility], states he does not want to go back since he is unhappy with their care. Denies systemic symptoms such as fever or chills. Ostomy well functioning, abdomen benign. No other complaints.    PMHx: Crohn's disease c/w perianal fistula s/p fecal diversion at [facility] (3yrs ago) and seton placement at [facility] now all removed, proctitis, pancreatitis, and EtOH abuse.  PSHx: excision of right axillary hidradenitis suppruativa  ALL: Ciprofloxacin

## 2024-03-01 NOTE — CONSULT NOTE ADULT - ASSESSMENT
37 y/o Male with h/o Crohn's disease s/p Stelara/ Humira c/w perianal fistulae s/p fecal diversion 3 yrs ago, right axillary hidradenitis suppurativa s/p excisional debridement on 2/16/24 was admitted on 3/1 for 'excruciating pain' of the right axillary surgical site. Patient endorses the pain goes down to his elbow. Called Dr. Hilario's office, advised to come to the ED if pain not subsides with oral pain medications, thus presented to ED. Denies fever or chills. IN ER he received unasyn.     1. Right axillary cellulitis. Right axillary hidradenitis suppurativa s/p excisional debridement on 2/16/24. Allergy to cipro.  -no clinical signs of sepsis  -start unasyn 3 gm IV q6h  -reason for abx use and side effects reviewed with patient; monitor BMP  -old chart reviewed to assess prior cultures  -monitor temps  -f/u CBC  -supportive care  2. Other issues:   -care per medicine   37 y/o Male with h/o Crohn's disease s/p Stelara/ Humira c/w perianal fistulae s/p fecal diversion 3 yrs ago, right axillary hidradenitis suppurativa s/p excisional debridement on 2/16/24 was admitted on 3/1 for 'excruciating pain' of the right axillary surgical site. Patient endorses the pain goes down to his elbow. Called Dr. Hilario's office, advised to come to the ED if pain not subsides with oral pain medications, thus presented to ED. Denies fever or chills. IN ER he received unasyn.     1. Right axillary cellulitis. Right axillary hidradenitis suppurativa s/p excisional debridement on 2/16/24. Crohn's disease. Immunocompromised host. Allergy to cipro.  -no clinical signs of sepsis  -start unasyn 3 gm IV q6h  -reason for abx use and side effects reviewed with patient; monitor BMP  -old chart reviewed to assess prior cultures  -monitor temps  -f/u CBC  -supportive care  2. Other issues:   -care per medicine    Clinical team may change from intravenous to oral antibiotics when the following criteria are met:   1. Patient is clinically improving/stable       a)	Improved signs and symptoms of infection from initial presentation       b)	Afebrile for 24 hours       c)	Leukocytosis trending towards normal range   2. Patient is tolerating oral intake   3. Initial/repeat blood cultures are negative    When above criteria met OR on date, may change iv antibiotics to augmentin 875 mg PO q12h x 10 days

## 2024-03-01 NOTE — H&P ADULT - NSHPLABSRESULTS_GEN_ALL_CORE
Chief complaint:      PMHx:  Crohn's disease of large intestine without complication    Pancreatitis        PSHx:  S/P ORIF (open reduction internal fixation) fracture    History of colonoscopy    Perianal fistula        FHx:  Diabetes mellitus (Father)        Vitals:  T(C): 36.5 (03-01 @ 01:30), Max: 37.1 (02-29 @ 20:11)  HR: 98 (03-01 @ 01:30) (96 - 123)  BP: 116/80 (03-01 @ 01:30) (94/61 - 116/80)  RR: 18 (03-01 @ 01:30) (18 - 18)  SpO2: 98% (03-01 @ 01:30) (96% - 98%)      I&Os    .    Labs:  02-29 @ 21:07                    14.2  CBC: 16.71>)-------(<479                     41.5                 134 | 99 | 7    CMP:  ----------------------< 324               3.4 | 26 | 0.81                      Ca:9.3  Phos:-  Mg:-               0.1|      |6        LFTs:  ------|86|-----             -|      |-        Cultures:        Current Inpatient Medications:  acetaminophen     Tablet .. 975 milliGRAM(s) Oral every 6 hours PRN  ampicillin/sulbactam  IVPB 1.5 Gram(s) IV Intermittent every 6 hours  influenza   Vaccine 0.5 milliLiter(s) IntraMuscular once  lidocaine   4% Patch 1 Patch Transdermal daily  ondansetron Injectable 4 milliGRAM(s) IV Push every 6 hours PRN  oxyCODONE    IR 2.5 milliGRAM(s) Oral every 4 hours PRN  oxyCODONE    IR 5 milliGRAM(s) Oral every 6 hours PRN

## 2024-03-01 NOTE — CONSULT NOTE ADULT - SUBJECTIVE AND OBJECTIVE BOX
Patient is a 38y old  Male who presents with a chief complaint of right axilla redness and pain.    HPI:  37 y/o Male with h/o Crohn's disease s/p Stelara/ Humira c/w perianal fistulae s/p fecal diversion 3 yrs ago, right axillary hidradenitis suppurativa s/p excisional debridement on 2/16/24 was admitted on 3/1 for 'excruciating pain' of the right axillary surgical site. Patient endorses the pain goes down to his elbow. Called Dr. Hilario's office, advised to come to the ED if pain not subsides with oral pain medications, thus presented to ED. Denies fever or chills. IN ER he received unasyn.     Of note: Patient goes to [facility] and [facility] for management. Patient states stop taking Stelara (Ustekinumab) as well as Humira for Crohn's and Hidradenitis ~6 months ago due to infectious side effects, based on Colorectal surgeon in [facility], states he does not want to go back since he is unhappy with their care. Ostomy well functioning, abdomen benign.     PMHx: Crohn's disease c/w perianal fistula s/p fecal diversion at [facility] (3yrs ago) and seton placement at [facility] now all removed, proctitis, pancreatitis, and EtOH abuse.  PSHx: excision of right axillary hidradenitis suppruativa    Meds: per reconciliation sheet, noted below  MEDICATIONS  (STANDING):  acetaminophen     Tablet .. 650 milliGRAM(s) Oral every 6 hours  ampicillin/sulbactam  IVPB      ampicillin/sulbactam  IVPB 3 Gram(s) IV Intermittent once  ampicillin/sulbactam  IVPB 3 Gram(s) IV Intermittent every 6 hours  cyclobenzaprine 5 milliGRAM(s) Oral two times a day  gabapentin 300 milliGRAM(s) Oral every 8 hours  ibuprofen  Tablet. 600 milliGRAM(s) Oral every 6 hours  influenza   Vaccine 0.5 milliLiter(s) IntraMuscular once  lidocaine   4% Patch 1 Patch Transdermal daily  triamcinolone 0.1% Cream 1 Application(s) Topical two times a day    MEDICATIONS  (PRN):  acetaminophen     Tablet .. 975 milliGRAM(s) Oral every 6 hours PRN Temp greater or equal to 38C (100.4F), Mild Pain (1 - 3)  ondansetron Injectable 4 milliGRAM(s) IV Push every 6 hours PRN Nausea  oxyCODONE    IR 10 milliGRAM(s) Oral every 4 hours PRN Severe Pain (7 - 10)  oxyCODONE    IR 5 milliGRAM(s) Oral every 6 hours PRN Moderate Pain (4 - 6)    Allergies    ciprofloxacin (Other (Mild to Mod))    Intolerances      Social: no smoking, no alcohol, no illegal drugs; no recent travel, no exposure to TB  FAMILY HISTORY:  Diabetes mellitus (Father)      no history of premature cardiovascular disease in first degree relatives    ROS: the patient denies fever, no chills, no HA, no seizures, no dizziness, no sore throat, no nasal congestion, no blurry vision, no CP, no palpitations, no SOB, no cough, no abdominal pain, has diarrhea, no N/V, no dysuria, no leg pain, no claudication, right axilla rash, no joint aches, no rectal pain or bleeding, no night sweats  All other systems reviewed and are negative    Vital Signs Last 24 Hrs  T(C): 36.6 (01 Mar 2024 07:58), Max: 37.1 (29 Feb 2024 20:11)  T(F): 97.9 (01 Mar 2024 07:58), Max: 98.8 (29 Feb 2024 20:11)  HR: 92 (01 Mar 2024 07:58) (92 - 123)  BP: 97/69 (01 Mar 2024 07:58) (94/61 - 116/80)  BP(mean): 70 (01 Mar 2024 00:06) (70 - 82)  RR: 17 (01 Mar 2024 07:58) (17 - 18)  SpO2: 99% (01 Mar 2024 07:58) (96% - 99%)    Parameters below as of 01 Mar 2024 07:58  Patient On (Oxygen Delivery Method): room air      Daily Height in cm: 175.26 (29 Feb 2024 20:00)    Daily     PE:    Constitutional:  No acute distress  HEENT: NC/AT, EOMI, PERRLA, conjunctivae clear; ears and nose atraumatic; pharynx benign  Neck: supple; thyroid not palpable  Back: no tenderness  Respiratory: respiratory effort normal; clear to auscultation  Cardiovascular: S1S2 regular, no murmurs  Abdomen: soft, not tender, not distended, positive BS; no liver or spleen organomegaly  colostomy  Genitourinary: no suprapubic tenderness  Lymphatic: no LN palpable  Musculoskeletal: no muscle tenderness, no joint swelling or tenderness  Extremities: no pedal edema  Neurological/ Psychiatric: AxOx3, judgement and insight normal; moving all extremities  Skin: no rashes; no palpable lesions    Labs: all available labs reviewed                        11.2   10.36 )-----------( 385      ( 01 Mar 2024 06:47 )             33.6     03-01    139  |  105  |  4<L>  ----------------------------<  139<H>  3.8   |  27  |  0.64    Ca    8.3<L>      01 Mar 2024 06:47  Phos  3.0     03-01  Mg     1.8     03-01    TPro  8.4<H>  /  Alb  3.4  /  TBili  0.1<L>  /  DBili  x   /  AST  6<L>  /  ALT  12  /  AlkPhos  86  02-29     LIVER FUNCTIONS - ( 29 Feb 2024 21:07 )  Alb: 3.4 g/dL / Pro: 8.4 gm/dL / ALK PHOS: 86 U/L / ALT: 12 U/L / AST: 6 U/L / GGT: x                           Radiology: all available radiological tests reviewed    Advanced directives addressed: full resuscitation Patient is a 38y old  Male who presents with a chief complaint of right axilla redness and pain.    HPI:  37 y/o Male with h/o Crohn's disease s/p Stelara/ Humira c/w perianal fistulae s/p fecal diversion 3 yrs ago, right axillary hidradenitis suppurativa s/p excisional debridement on 2/16/24 was admitted on 3/1 for 'excruciating pain' of the right axillary surgical site. Patient endorses the pain goes down to his elbow. Called Dr. Hilario's office, advised to come to the ED if pain not subsides with oral pain medications, thus presented to ED. Denies fever or chills. IN ER he received unasyn.     Of note: Patient goes to [facility] and [facility] for management. Patient states stop taking Stelara (Ustekinumab) as well as Humira for Crohn's and Hidradenitis ~6 months ago due to infectious side effects, based on Colorectal surgeon in [facility], states he does not want to go back since he is unhappy with their care. Ostomy well functioning, abdomen benign.     PMHx: Crohn's disease c/w perianal fistula s/p fecal diversion at [facility] (3yrs ago) and seton placement at [facility] now all removed, proctitis, pancreatitis, and EtOH abuse.  PSHx: excision of right axillary hidradenitis suppruativa    Meds: per reconciliation sheet, noted below  MEDICATIONS  (STANDING):  acetaminophen     Tablet .. 650 milliGRAM(s) Oral every 6 hours  ampicillin/sulbactam  IVPB      ampicillin/sulbactam  IVPB 3 Gram(s) IV Intermittent once  ampicillin/sulbactam  IVPB 3 Gram(s) IV Intermittent every 6 hours  cyclobenzaprine 5 milliGRAM(s) Oral two times a day  gabapentin 300 milliGRAM(s) Oral every 8 hours  ibuprofen  Tablet. 600 milliGRAM(s) Oral every 6 hours  influenza   Vaccine 0.5 milliLiter(s) IntraMuscular once  lidocaine   4% Patch 1 Patch Transdermal daily  triamcinolone 0.1% Cream 1 Application(s) Topical two times a day    MEDICATIONS  (PRN):  acetaminophen     Tablet .. 975 milliGRAM(s) Oral every 6 hours PRN Temp greater or equal to 38C (100.4F), Mild Pain (1 - 3)  ondansetron Injectable 4 milliGRAM(s) IV Push every 6 hours PRN Nausea  oxyCODONE    IR 10 milliGRAM(s) Oral every 4 hours PRN Severe Pain (7 - 10)  oxyCODONE    IR 5 milliGRAM(s) Oral every 6 hours PRN Moderate Pain (4 - 6)    Allergies    ciprofloxacin (Other (Mild to Mod))    Intolerances      Social: no smoking, no alcohol, no illegal drugs; no recent travel, no exposure to TB  FAMILY HISTORY:  Diabetes mellitus (Father)      no history of premature cardiovascular disease in first degree relatives    ROS: the patient denies fever, no chills, no HA, no seizures, no dizziness, no sore throat, no nasal congestion, no blurry vision, no CP, no palpitations, no SOB, no cough, no abdominal pain, has diarrhea, no N/V, no dysuria, no leg pain, no claudication, right axilla rash, no joint aches, no rectal pain or bleeding, no night sweats  All other systems reviewed and are negative    Vital Signs Last 24 Hrs  T(C): 36.6 (01 Mar 2024 07:58), Max: 37.1 (29 Feb 2024 20:11)  T(F): 97.9 (01 Mar 2024 07:58), Max: 98.8 (29 Feb 2024 20:11)  HR: 92 (01 Mar 2024 07:58) (92 - 123)  BP: 97/69 (01 Mar 2024 07:58) (94/61 - 116/80)  BP(mean): 70 (01 Mar 2024 00:06) (70 - 82)  RR: 17 (01 Mar 2024 07:58) (17 - 18)  SpO2: 99% (01 Mar 2024 07:58) (96% - 99%)    Parameters below as of 01 Mar 2024 07:58  Patient On (Oxygen Delivery Method): room air      Daily Height in cm: 175.26 (29 Feb 2024 20:00)    Daily     PE:    Constitutional:  No acute distress  HEENT: NC/AT, EOMI, PERRLA, conjunctivae clear; ears and nose atraumatic; pharynx benign  Neck: supple; thyroid not palpable  Back: no tenderness  Respiratory: respiratory effort normal; clear to auscultation  Cardiovascular: S1S2 regular, no murmurs  Abdomen: soft, not tender, not distended, positive BS; no liver or spleen organomegaly  colostomy  Genitourinary: no suprapubic tenderness  Lymphatic: no LN palpable  Musculoskeletal: no muscle tenderness, no joint swelling or tenderness  Extremities: no pedal edema  Right axilla partially open wound s/p recent I and D with scant serous discharge; mild erythema and edema, tender  Neurological/ Psychiatric: AxOx3, judgement and insight normal; moving all extremities  Skin: no rashes; no palpable lesions    Labs: all available labs reviewed                        11.2   10.36 )-----------( 385      ( 01 Mar 2024 06:47 )             33.6     03-01    139  |  105  |  4<L>  ----------------------------<  139<H>  3.8   |  27  |  0.64    Ca    8.3<L>      01 Mar 2024 06:47  Phos  3.0     03-01  Mg     1.8     03-01    TPro  8.4<H>  /  Alb  3.4  /  TBili  0.1<L>  /  DBili  x   /  AST  6<L>  /  ALT  12  /  AlkPhos  86  02-29     LIVER FUNCTIONS - ( 29 Feb 2024 21:07 )  Alb: 3.4 g/dL / Pro: 8.4 gm/dL / ALK PHOS: 86 U/L / ALT: 12 U/L / AST: 6 U/L / GGT: x                           Radiology: all available radiological tests reviewed    Advanced directives addressed: full resuscitation

## 2024-03-01 NOTE — H&P ADULT - ASSESSMENT
38M recently underwent excision of Rt axillary HS, presented for pain    Plan:  - Admit under Dr. Cazares's service, transfer to Dr. Hilario's service later  in the day  - GI team for crohn's management  - Pain control PRN  - Monitor vitals  - Reg diet  - Nausea control PRN  - IV abx: Unasyn  - replete electrolytes  - daily labs  - OOB/PT      Plan will be discussed with Surgical attending, Dr. Cazares

## 2024-03-01 NOTE — H&P ADULT - NSHPPHYSICALEXAM_GEN_ALL_CORE
Pt is AAOx3  General: No acute distress  Chest: Symmetric chest rise   Heart: RRR  Abdomen: Soft, no tenderness, nondistended, ostomy in place, brown semisolid output  MSK: Rt axilla: Partially opened well-healing scar of the right axilla noticed, fibrinous discharges; no surround skin induration. No fluctuance noticed. Tender to touch.   Rectal: Erythema of the inner bilateral buttocks. Numerous fistulous openings seen around the anal verge, actively draining purulent fluids. SHELLIE deferred due to extreme tenderness  Back: Normal curvature, no tenderness.   Neuro: Physiological, no localizing findings. Pt is AAOx3  General: No acute distress  Chest: Symmetric chest rise, CTAB  Heart: RRR  Abdomen: Soft, no tenderness, nondistended, ostomy in place, brown semisolid output  MSK: Rt axilla: Partially opened well-healing scar of the right axilla noticed, fibrinous  exudate with some healthy granulation tissue in the open area, surrounding skin normal, no purulent discharge ; no surround skin induration. No fluctuance noticed. Tender to touch.   Rectal: Erythema of the inner bilateral buttocks. Numerous fistulous openings seen around the anal verge, actively draining purulent fluids. SHELLIE deferred due to extreme tenderness  Back: Normal curvature, no tenderness.   Neuro: Physiological, no localizing findings.  Psych: anxious due to apin

## 2024-03-02 LAB
ANION GAP SERPL CALC-SCNC: 4 MMOL/L — LOW (ref 5–17)
BUN SERPL-MCNC: 11 MG/DL — SIGNIFICANT CHANGE UP (ref 7–23)
CALCIUM SERPL-MCNC: 8.9 MG/DL — SIGNIFICANT CHANGE UP (ref 8.5–10.1)
CHLORIDE SERPL-SCNC: 108 MMOL/L — SIGNIFICANT CHANGE UP (ref 96–108)
CO2 SERPL-SCNC: 27 MMOL/L — SIGNIFICANT CHANGE UP (ref 22–31)
CREAT SERPL-MCNC: 0.66 MG/DL — SIGNIFICANT CHANGE UP (ref 0.5–1.3)
EGFR: 123 ML/MIN/1.73M2 — SIGNIFICANT CHANGE UP
GLUCOSE BLDC GLUCOMTR-MCNC: 135 MG/DL — HIGH (ref 70–99)
GLUCOSE BLDC GLUCOMTR-MCNC: 194 MG/DL — HIGH (ref 70–99)
GLUCOSE BLDC GLUCOMTR-MCNC: 197 MG/DL — HIGH (ref 70–99)
GLUCOSE BLDC GLUCOMTR-MCNC: 235 MG/DL — HIGH (ref 70–99)
GLUCOSE SERPL-MCNC: 151 MG/DL — HIGH (ref 70–99)
HCT VFR BLD CALC: 33.4 % — LOW (ref 39–50)
HGB BLD-MCNC: 10.9 G/DL — LOW (ref 13–17)
MAGNESIUM SERPL-MCNC: 2 MG/DL — SIGNIFICANT CHANGE UP (ref 1.6–2.6)
MCHC RBC-ENTMCNC: 32.5 PG — SIGNIFICANT CHANGE UP (ref 27–34)
MCHC RBC-ENTMCNC: 32.6 GM/DL — SIGNIFICANT CHANGE UP (ref 32–36)
MCV RBC AUTO: 99.7 FL — SIGNIFICANT CHANGE UP (ref 80–100)
PHOSPHATE SERPL-MCNC: 3.4 MG/DL — SIGNIFICANT CHANGE UP (ref 2.5–4.5)
PLATELET # BLD AUTO: 361 K/UL — SIGNIFICANT CHANGE UP (ref 150–400)
POTASSIUM SERPL-MCNC: 4.1 MMOL/L — SIGNIFICANT CHANGE UP (ref 3.5–5.3)
POTASSIUM SERPL-SCNC: 4.1 MMOL/L — SIGNIFICANT CHANGE UP (ref 3.5–5.3)
RBC # BLD: 3.35 M/UL — LOW (ref 4.2–5.8)
RBC # FLD: 13.2 % — SIGNIFICANT CHANGE UP (ref 10.3–14.5)
SODIUM SERPL-SCNC: 139 MMOL/L — SIGNIFICANT CHANGE UP (ref 135–145)
WBC # BLD: 11.74 K/UL — HIGH (ref 3.8–10.5)
WBC # FLD AUTO: 11.74 K/UL — HIGH (ref 3.8–10.5)

## 2024-03-02 PROCEDURE — 99233 SBSQ HOSP IP/OBS HIGH 50: CPT

## 2024-03-02 RX ORDER — HYDROMORPHONE HYDROCHLORIDE 2 MG/ML
0.2 INJECTION INTRAMUSCULAR; INTRAVENOUS; SUBCUTANEOUS ONCE
Refills: 0 | Status: DISCONTINUED | OUTPATIENT
Start: 2024-03-02 | End: 2024-03-02

## 2024-03-02 RX ADMIN — Medication 600 MILLIGRAM(S): at 17:03

## 2024-03-02 RX ADMIN — CYCLOBENZAPRINE HYDROCHLORIDE 5 MILLIGRAM(S): 10 TABLET, FILM COATED ORAL at 09:58

## 2024-03-02 RX ADMIN — Medication 2: at 17:04

## 2024-03-02 RX ADMIN — HYDROMORPHONE HYDROCHLORIDE 0.2 MILLIGRAM(S): 2 INJECTION INTRAMUSCULAR; INTRAVENOUS; SUBCUTANEOUS at 16:38

## 2024-03-02 RX ADMIN — HYDROMORPHONE HYDROCHLORIDE 0.5 MILLIGRAM(S): 2 INJECTION INTRAMUSCULAR; INTRAVENOUS; SUBCUTANEOUS at 13:00

## 2024-03-02 RX ADMIN — Medication 650 MILLIGRAM(S): at 17:03

## 2024-03-02 RX ADMIN — AMPICILLIN SODIUM AND SULBACTAM SODIUM 200 GRAM(S): 250; 125 INJECTION, POWDER, FOR SUSPENSION INTRAMUSCULAR; INTRAVENOUS at 00:16

## 2024-03-02 RX ADMIN — AMPICILLIN SODIUM AND SULBACTAM SODIUM 200 GRAM(S): 250; 125 INJECTION, POWDER, FOR SUSPENSION INTRAMUSCULAR; INTRAVENOUS at 17:12

## 2024-03-02 RX ADMIN — GABAPENTIN 300 MILLIGRAM(S): 400 CAPSULE ORAL at 21:57

## 2024-03-02 RX ADMIN — HYDROMORPHONE HYDROCHLORIDE 0.5 MILLIGRAM(S): 2 INJECTION INTRAMUSCULAR; INTRAVENOUS; SUBCUTANEOUS at 20:10

## 2024-03-02 RX ADMIN — Medication 650 MILLIGRAM(S): at 06:38

## 2024-03-02 RX ADMIN — LIDOCAINE 1 PATCH: 4 CREAM TOPICAL at 19:56

## 2024-03-02 RX ADMIN — GABAPENTIN 300 MILLIGRAM(S): 400 CAPSULE ORAL at 14:27

## 2024-03-02 RX ADMIN — LIDOCAINE 1 PATCH: 4 CREAM TOPICAL at 21:00

## 2024-03-02 RX ADMIN — GABAPENTIN 300 MILLIGRAM(S): 400 CAPSULE ORAL at 06:39

## 2024-03-02 RX ADMIN — Medication 600 MILLIGRAM(S): at 00:15

## 2024-03-02 RX ADMIN — Medication 30 MILLIGRAM(S): at 14:27

## 2024-03-02 RX ADMIN — Medication 650 MILLIGRAM(S): at 07:17

## 2024-03-02 RX ADMIN — HYDROMORPHONE HYDROCHLORIDE 0.5 MILLIGRAM(S): 2 INJECTION INTRAMUSCULAR; INTRAVENOUS; SUBCUTANEOUS at 06:37

## 2024-03-02 RX ADMIN — Medication 2: at 12:05

## 2024-03-02 RX ADMIN — Medication 650 MILLIGRAM(S): at 00:15

## 2024-03-02 RX ADMIN — Medication 650 MILLIGRAM(S): at 01:23

## 2024-03-02 RX ADMIN — Medication 600 MILLIGRAM(S): at 01:23

## 2024-03-02 RX ADMIN — Medication 600 MILLIGRAM(S): at 07:17

## 2024-03-02 RX ADMIN — Medication 600 MILLIGRAM(S): at 12:05

## 2024-03-02 RX ADMIN — INSULIN GLARGINE 10 UNIT(S): 100 INJECTION, SOLUTION SUBCUTANEOUS at 09:56

## 2024-03-02 RX ADMIN — CYCLOBENZAPRINE HYDROCHLORIDE 5 MILLIGRAM(S): 10 TABLET, FILM COATED ORAL at 21:56

## 2024-03-02 RX ADMIN — AMPICILLIN SODIUM AND SULBACTAM SODIUM 200 GRAM(S): 250; 125 INJECTION, POWDER, FOR SUSPENSION INTRAMUSCULAR; INTRAVENOUS at 06:07

## 2024-03-02 RX ADMIN — Medication 600 MILLIGRAM(S): at 06:39

## 2024-03-02 RX ADMIN — AMPICILLIN SODIUM AND SULBACTAM SODIUM 200 GRAM(S): 250; 125 INJECTION, POWDER, FOR SUSPENSION INTRAMUSCULAR; INTRAVENOUS at 12:11

## 2024-03-02 RX ADMIN — HYDROMORPHONE HYDROCHLORIDE 0.5 MILLIGRAM(S): 2 INJECTION INTRAMUSCULAR; INTRAVENOUS; SUBCUTANEOUS at 06:07

## 2024-03-02 RX ADMIN — HYDROMORPHONE HYDROCHLORIDE 0.5 MILLIGRAM(S): 2 INJECTION INTRAMUSCULAR; INTRAVENOUS; SUBCUTANEOUS at 12:03

## 2024-03-02 RX ADMIN — LIDOCAINE 1 PATCH: 4 CREAM TOPICAL at 09:57

## 2024-03-02 NOTE — PROGRESS NOTE ADULT - SUBJECTIVE AND OBJECTIVE BOX
Patient is a 38y old  Male who presents with a chief complaint of Right axillary hidradenitis suppurativa (19 Feb 2024 09:25)    Subjective:  Patient seen and examined on morning rounds.   No acute events overnight. Was sleeping comfortably on evening rounds  Surgical dressing intact.      Physical Exam:   General: AAOx3, Well developed, NAD  Chest: Normal respiratory effort  Heart: RRR  Abdomen: Soft, NTND, no masses  Neuro/Psych: No localized deficits. Normal speech, normal tone  Extremities:  Rt axilla:  dressing c/d/i. Wound intact & clean, no signs of active infection; no skin induration/ abnormal discharges        Vital Signs Last 24 Hrs  T(C): 36.6 (01 Mar 2024 22:35), Max: 36.6 (01 Mar 2024 07:58)  T(F): 97.9 (01 Mar 2024 22:35), Max: 97.9 (01 Mar 2024 07:58)  HR: 81 (01 Mar 2024 22:35) (81 - 92)  BP: 100/68 (01 Mar 2024 22:35) (97/69 - 100/69)  BP(mean): --  RR: 18 (01 Mar 2024 22:35) (17 - 18)  SpO2: 99% (01 Mar 2024 22:35) (97% - 99%)    Parameters below as of 01 Mar 2024 22:35  Patient On (Oxygen Delivery Method): room air        Labs:                                11.2   10.36 )-----------( 385      ( 01 Mar 2024 06:47 )             33.6     CBC Full  -  ( 01 Mar 2024 06:47 )  WBC Count : 10.36 K/uL  RBC Count : 3.46 M/uL  Hemoglobin : 11.2 g/dL  Hematocrit : 33.6 %  Platelet Count - Automated : 385 K/uL  Mean Cell Volume : 97.1 fl  Mean Cell Hemoglobin : 32.4 pg  Mean Cell Hemoglobin Concentration : 33.3 gm/dL  Auto Neutrophil # : x  Auto Lymphocyte # : x  Auto Monocyte # : x  Auto Eosinophil # : x  Auto Basophil # : x  Auto Neutrophil % : x  Auto Lymphocyte % : x  Auto Monocyte % : x  Auto Eosinophil % : x  Auto Basophil % : x    03-01    139  |  105  |  4<L>  ----------------------------<  139<H>  3.8   |  27  |  0.64    Ca    8.3<L>      01 Mar 2024 06:47  Phos  3.0     03-01  Mg     1.8     03-01    TPro  8.4<H>  /  Alb  3.4  /  TBili  0.1<L>  /  DBili  x   /  AST  6<L>  /  ALT  12  /  AlkPhos  86  02-29    LIVER FUNCTIONS - ( 29 Feb 2024 21:07 )  Alb: 3.4 g/dL / Pro: 8.4 gm/dL / ALK PHOS: 86 U/L / ALT: 12 U/L / AST: 6 U/L / GGT: x           PT/INR - ( 29 Feb 2024 21:07 )   PT: 10.7 sec;   INR: 0.95 ratio         PTT - ( 29 Feb 2024 21:07 )  PTT:28.4 sec      Meds:  acetaminophen     Tablet .. 975 milliGRAM(s) Oral every 6 hours PRN  acetaminophen     Tablet .. 650 milliGRAM(s) Oral every 6 hours  ampicillin/sulbactam  IVPB      ampicillin/sulbactam  IVPB 3 Gram(s) IV Intermittent every 6 hours  cyclobenzaprine 5 milliGRAM(s) Oral two times a day  dextrose 5%. 1000 milliLiter(s) IV Continuous <Continuous>  dextrose 5%. 1000 milliLiter(s) IV Continuous <Continuous>  dextrose 50% Injectable 25 Gram(s) IV Push once  dextrose 50% Injectable 25 Gram(s) IV Push once  dextrose 50% Injectable 12.5 Gram(s) IV Push once  dextrose Oral Gel 15 Gram(s) Oral once  gabapentin 300 milliGRAM(s) Oral every 8 hours  glucagon  Injectable 1 milliGRAM(s) IntraMuscular once  HYDROmorphone  Injectable 0.5 milliGRAM(s) IV Push every 6 hours PRN  ibuprofen  Tablet. 600 milliGRAM(s) Oral every 6 hours  influenza   Vaccine 0.5 milliLiter(s) IntraMuscular once  insulin glargine Injectable (LANTUS) 10 Unit(s) SubCutaneous every morning  insulin lispro (ADMELOG) corrective regimen sliding scale   SubCutaneous three times a day before meals  insulin lispro (ADMELOG) corrective regimen sliding scale   SubCutaneous at bedtime  ketorolac   Injectable 30 milliGRAM(s) IV Push every 6 hours PRN  lidocaine   4% Patch 1 Patch Transdermal daily  ondansetron Injectable 4 milliGRAM(s) IV Push every 6 hours PRN  oxyCODONE    IR 5 milliGRAM(s) Oral every 6 hours PRN

## 2024-03-02 NOTE — SBIRT NOTE ADULT - NSSBIRTALCPOSREINDET_GEN_A_CORE
Per patient he is familiar with these questions, self reported that he has been to [facility] 3x.  offered resources and patient declined at this time.

## 2024-03-02 NOTE — PROGRESS NOTE ADULT - SUBJECTIVE AND OBJECTIVE BOX
HPI:  38M with h/o Crohn's disease c/w perianal fistulae s/p fecal diversion 3 yrs ago, Right axillary hidradenitis suppurativa s/p excisional debridement on 2/16/24, presented with 'excruciating pain' of the right axillary surgical site. Patient endorses the pain goes down to his elbow. Called Dr. Hilario's office, advised to come to the ED if pain not subsides with oral pain medications, thus presented to ED. Denies systemic symptoms such as fever or chills.         Review of Systems:  CONSTITUTIONAL: No weakness, fevers or chills  EYES/ENT: No visual changes;  No vertigo or throat pain   NECK: No pain or stiffness  RESPIRATORY: No cough, wheezing, hemoptysis; No shortness of breath,   CARDIOVASCULAR: No chest pain or palpitations  GASTROINTESTINAL: No abdominal or epigastric pain. No nausea, vomiting, or hematemesis; No diarrhea or constipation.   GENITOURINARY: No dysuria, frequency or hematuria  NEUROLOGICAL: No numbness or weakness  SKIN: No itching, burning, rashes, or lesions   All other review of systems is negative unless indicated above    PHYSICAL EXAM:    Vital Signs Last 24 Hrs  T(C): 36.4 (02 Mar 2024 07:44), Max: 36.6 (01 Mar 2024 16:34)  T(F): 97.5 (02 Mar 2024 07:44), Max: 97.9 (01 Mar 2024 16:34)  HR: 61 (02 Mar 2024 07:44) (61 - 86)  BP: 92/68 (02 Mar 2024 07:44) (92/68 - 100/69)  BP(mean): --  RR: 17 (02 Mar 2024 07:44) (17 - 18)  SpO2: 100% (02 Mar 2024 07:44) (97% - 100%)    Parameters below as of 02 Mar 2024 07:44  Patient On (Oxygen Delivery Method): room air        GENERAL: comfortable   HEAD:  Atraumatic, Normocephalic  EYES: EOMI, PERRLA, conjunctiva and sclera clear  HEENT: Moist mucous membranes  NECK: Supple, No JVD  NERVOUS SYSTEM:  Alert & Oriented X3, Motor Strength 5/5 B/L upper and lower extremities; DTRs 2+ intact and symmetric  CHEST/LUNG: Clear to auscultation bilaterally; No rales, rhonchi, wheezing, or rubs  HEART:S1S2 normal, no murmer  ABDOMEN: Soft, Nontender, Nondistended; Bowel sounds present  GENITOURINARY- Voiding, no palpable bladder  EXTREMITIES:  2+ Peripheral Pulses, No clubbing, cyanosis, or edema  MUSCULOSKELTAL- No muscle tenderness, Muscle tone normal, No joint tenderness, no Joint swelling, Joint range of motion-normal  SKIN-no rash, no lesion  PSYCH- Mood stable  LYMPH Node- No palpable lymph node    LABS:                        10.9   11.74 )-----------( 361      ( 02 Mar 2024 07:33 )             33.4     03-02    139  |  108  |  11  ----------------------------<  151<H>  4.1   |  27  |  0.66    Ca    8.9      02 Mar 2024 07:33  Phos  3.4     03-02  Mg     2.0     03-02    TPro  8.4<H>  /  Alb  3.4  /  TBili  0.1<L>  /  DBili  x   /  AST  6<L>  /  ALT  12  /  AlkPhos  86  02-29    PT/INR - ( 29 Feb 2024 21:07 )   PT: 10.7 sec;   INR: 0.95 ratio         PTT - ( 29 Feb 2024 21:07 )  PTT:28.4 sec  Urinalysis Basic - ( 02 Mar 2024 07:33 )    Color: x / Appearance: x / SG: x / pH: x  Gluc: 151 mg/dL / Ketone: x  / Bili: x / Urobili: x   Blood: x / Protein: x / Nitrite: x   Leuk Esterase: x / RBC: x / WBC x   Sq Epi: x / Non Sq Epi: x / Bacteria: x        CAPILLARY BLOOD GLUCOSE      POCT Blood Glucose.: 197 mg/dL (02 Mar 2024 11:54)  POCT Blood Glucose.: 135 mg/dL (02 Mar 2024 09:54)  POCT Blood Glucose.: 216 mg/dL (01 Mar 2024 21:21)  POCT Blood Glucose.: 123 mg/dL (01 Mar 2024 17:09)            Standing medicine  acetaminophen     Tablet .. 650 milliGRAM(s) Oral every 6 hours  acetaminophen     Tablet .. 975 milliGRAM(s) Oral every 6 hours PRN  ampicillin/sulbactam  IVPB      ampicillin/sulbactam  IVPB 3 Gram(s) IV Intermittent every 6 hours  cyclobenzaprine 5 milliGRAM(s) Oral two times a day  dextrose 5%. 1000 milliLiter(s) IV Continuous <Continuous>  dextrose 5%. 1000 milliLiter(s) IV Continuous <Continuous>  dextrose 50% Injectable 25 Gram(s) IV Push once  dextrose 50% Injectable 25 Gram(s) IV Push once  dextrose 50% Injectable 12.5 Gram(s) IV Push once  dextrose Oral Gel 15 Gram(s) Oral once  gabapentin 300 milliGRAM(s) Oral every 8 hours  glucagon  Injectable 1 milliGRAM(s) IntraMuscular once  HYDROmorphone  Injectable 0.5 milliGRAM(s) IV Push every 6 hours PRN  ibuprofen  Tablet. 600 milliGRAM(s) Oral every 6 hours  influenza   Vaccine 0.5 milliLiter(s) IntraMuscular once  insulin glargine Injectable (LANTUS) 10 Unit(s) SubCutaneous every morning  insulin lispro (ADMELOG) corrective regimen sliding scale   SubCutaneous at bedtime  insulin lispro (ADMELOG) corrective regimen sliding scale   SubCutaneous three times a day before meals  ketorolac   Injectable 30 milliGRAM(s) IV Push every 6 hours PRN  lidocaine   4% Patch 1 Patch Transdermal daily  ondansetron Injectable 4 milliGRAM(s) IV Push every 6 hours PRN  oxyCODONE    IR 5 milliGRAM(s) Oral every 6 hours PRN

## 2024-03-02 NOTE — PROGRESS NOTE ADULT - ASSESSMENT
37 y/o Male with h/o Crohn's disease s/p Stelara/ Humira c/w perianal fistulae s/p fecal diversion 3 yrs ago, right axillary hidradenitis suppurativa s/p excisional debridement on 2/16/24 was admitted on 3/1 for 'excruciating pain' of the right axillary surgical site. Patient endorses the pain goes down to his elbow. Called Dr. Hilario's office, advised to come to the ED if pain not subsides with oral pain medications, thus presented to ED. Denies fever or chills. IN ER he received unasyn.     1. Right axillary cellulitis resolving. Right axillary hidradenitis suppurativa s/p excisional debridement on 2/16/24. Crohn's disease. Immunocompromised host. Allergy to cipro.  -no clinical signs of sepsis  -on unasyn 3 gm IV q6h # 2  -tolerating abx well so far; no side effects noted  -continue abx coverage  -may change to augmentin PO when ready for discharge   -monitor temps  -f/u CBC  -supportive care  2. Other issues:   -care per medicine    Clinical team may change from intravenous to oral antibiotics when the following criteria are met:   1. Patient is clinically improving/stable       a)	Improved signs and symptoms of infection from initial presentation       b)	Afebrile for 24 hours       c)	Leukocytosis trending towards normal range   2. Patient is tolerating oral intake   3. Initial/repeat blood cultures are negative    When above criteria met may change iv antibiotics to augmentin 875 mg PO q12h x 10 days

## 2024-03-02 NOTE — PROGRESS NOTE ADULT - SUBJECTIVE AND OBJECTIVE BOX
Date of service: 03-02-24 @ 09:06    Lying in bed in NAD  Right axilla erythema resolved  Mild tenderness  No discharge     ROS: no fever or chills; denies dizziness, no HA, no SOB or cough, no abdominal pain, no diarrhea or constipation; no dysuria, no legs pain, no rashes    MEDICATIONS  (STANDING):  acetaminophen     Tablet .. 650 milliGRAM(s) Oral every 6 hours  ampicillin/sulbactam  IVPB      ampicillin/sulbactam  IVPB 3 Gram(s) IV Intermittent every 6 hours  cyclobenzaprine 5 milliGRAM(s) Oral two times a day  dextrose 5%. 1000 milliLiter(s) (50 mL/Hr) IV Continuous <Continuous>  dextrose 5%. 1000 milliLiter(s) (100 mL/Hr) IV Continuous <Continuous>  dextrose 50% Injectable 25 Gram(s) IV Push once  dextrose 50% Injectable 25 Gram(s) IV Push once  dextrose 50% Injectable 12.5 Gram(s) IV Push once  dextrose Oral Gel 15 Gram(s) Oral once  gabapentin 300 milliGRAM(s) Oral every 8 hours  glucagon  Injectable 1 milliGRAM(s) IntraMuscular once  ibuprofen  Tablet. 600 milliGRAM(s) Oral every 6 hours  influenza   Vaccine 0.5 milliLiter(s) IntraMuscular once  insulin glargine Injectable (LANTUS) 10 Unit(s) SubCutaneous every morning  insulin lispro (ADMELOG) corrective regimen sliding scale   SubCutaneous three times a day before meals  insulin lispro (ADMELOG) corrective regimen sliding scale   SubCutaneous at bedtime  lidocaine   4% Patch 1 Patch Transdermal daily    Vital Signs Last 24 Hrs  T(C): 36.4 (02 Mar 2024 07:44), Max: 36.6 (01 Mar 2024 16:34)  T(F): 97.5 (02 Mar 2024 07:44), Max: 97.9 (01 Mar 2024 16:34)  HR: 61 (02 Mar 2024 07:44) (61 - 86)  BP: 92/68 (02 Mar 2024 07:44) (92/68 - 100/69)  BP(mean): --  RR: 17 (02 Mar 2024 07:44) (17 - 18)  SpO2: 100% (02 Mar 2024 07:44) (97% - 100%)    Parameters below as of 02 Mar 2024 07:44  Patient On (Oxygen Delivery Method): room air     Physical exam:    Constitutional:  No acute distress  HEENT: NC/AT, EOMI, PERRLA, conjunctivae clear; ears and nose atraumatic  Neck: supple; thyroid not palpable  Back: no tenderness  Respiratory: respiratory effort normal; clear to auscultation  Cardiovascular: S1S2 regular, no murmurs  Abdomen: soft, not tender, not distended, positive BS  colostomy  Genitourinary: no suprapubic tenderness  Lymphatic: no LN palpable  Musculoskeletal: no muscle tenderness, no joint swelling or tenderness  Extremities: no pedal edema  Right axilla partially open wound s/p recent I and D with scant serous discharge;   mild erythema and edema, tender - improving  Neurological/ Psychiatric: AxOx3, judgement and insight normal; moving all extremities  Skin: no rashes; no palpable lesions    Labs: all available labs reviewed                        11.2   10.36 )-----------( 385      ( 01 Mar 2024 06:47 )             33.6     03-01    139  |  105  |  4<L>  ----------------------------<  139<H>  3.8   |  27  |  0.64    Ca    8.3<L>      01 Mar 2024 06:47  Phos  3.0     03-01  Mg     1.8     03-01    TPro  8.4<H>  /  Alb  3.4  /  TBili  0.1<L>  /  DBili  x   /  AST  6<L>  /  ALT  12  /  AlkPhos  86  02-29     LIVER FUNCTIONS - ( 29 Feb 2024 21:07 )  Alb: 3.4 g/dL / Pro: 8.4 gm/dL / ALK PHOS: 86 U/L / ALT: 12 U/L / AST: 6 U/L / GGT: x           Culture - Blood (collected 29 Feb 2024 21:07)  Source: .Blood None  Preliminary Report (02 Mar 2024 02:02):    No growth at 24 hours    Culture - Blood (collected 29 Feb 2024 21:07)  Source: .Blood None  Preliminary Report (02 Mar 2024 02:02):    No growth at 24 hours    Radiology: all available radiological tests reviewed    Advanced directives addressed: full resuscitation

## 2024-03-02 NOTE — PROGRESS NOTE ADULT - ASSESSMENT
A/P    #axillary hidradenitis suppurativa s/p excisional debridement on 2/16/24, presented with 'excruciating pain' of the right axillary surgical site      # Suppurativa Hidarnitis   -s/p excision from right axilla 2/16 Dr. Hilario; now with pain in right groin  -unclear in needs another excision? will defer to surgery team   -adjust Unasyn to appropriate dosing >> 3gm Q6h  -ID consult to see if Vancomyin needed. Input appreciated    #Pain control      #NIDDM  -ct insuline

## 2024-03-02 NOTE — PROGRESS NOTE ADULT - ASSESSMENT
38M w/ Hidradenitis supprativa, found to have 4.1cm fluid collection in the right axilla  s/p wide local excision     Plan:  - regular diet  - daily dressing changes with dakins solution  - CRS recs appreciated: No urgent intervention warranted, draining actively from existing fistula.  - Pain control PRN  - Monitor vitals  - Nausea control PRN  - IV abx: zosyn  - replete electrolytes PRN  - daily labs  - OOB/PT  - Dispo planning with pain management

## 2024-03-03 LAB
GLUCOSE BLDC GLUCOMTR-MCNC: 146 MG/DL — HIGH (ref 70–99)
GLUCOSE BLDC GLUCOMTR-MCNC: 150 MG/DL — HIGH (ref 70–99)
GLUCOSE BLDC GLUCOMTR-MCNC: 160 MG/DL — HIGH (ref 70–99)
GLUCOSE BLDC GLUCOMTR-MCNC: 175 MG/DL — HIGH (ref 70–99)

## 2024-03-03 PROCEDURE — 99233 SBSQ HOSP IP/OBS HIGH 50: CPT

## 2024-03-03 RX ORDER — SODIUM HYPOCHLORITE 0.125 %
1 SOLUTION, NON-ORAL MISCELLANEOUS
Refills: 0 | Status: DISCONTINUED | OUTPATIENT
Start: 2024-03-03 | End: 2024-03-05

## 2024-03-03 RX ADMIN — AMPICILLIN SODIUM AND SULBACTAM SODIUM 200 GRAM(S): 250; 125 INJECTION, POWDER, FOR SUSPENSION INTRAMUSCULAR; INTRAVENOUS at 18:03

## 2024-03-03 RX ADMIN — Medication 1 APPLICATION(S): at 18:03

## 2024-03-03 RX ADMIN — AMPICILLIN SODIUM AND SULBACTAM SODIUM 200 GRAM(S): 250; 125 INJECTION, POWDER, FOR SUSPENSION INTRAMUSCULAR; INTRAVENOUS at 12:13

## 2024-03-03 RX ADMIN — Medication 650 MILLIGRAM(S): at 00:29

## 2024-03-03 RX ADMIN — Medication 30 MILLIGRAM(S): at 00:47

## 2024-03-03 RX ADMIN — Medication 650 MILLIGRAM(S): at 23:41

## 2024-03-03 RX ADMIN — OXYCODONE HYDROCHLORIDE 5 MILLIGRAM(S): 5 TABLET ORAL at 23:07

## 2024-03-03 RX ADMIN — CYCLOBENZAPRINE HYDROCHLORIDE 5 MILLIGRAM(S): 10 TABLET, FILM COATED ORAL at 21:56

## 2024-03-03 RX ADMIN — CYCLOBENZAPRINE HYDROCHLORIDE 5 MILLIGRAM(S): 10 TABLET, FILM COATED ORAL at 09:17

## 2024-03-03 RX ADMIN — GABAPENTIN 300 MILLIGRAM(S): 400 CAPSULE ORAL at 05:35

## 2024-03-03 RX ADMIN — HYDROMORPHONE HYDROCHLORIDE 0.5 MILLIGRAM(S): 2 INJECTION INTRAMUSCULAR; INTRAVENOUS; SUBCUTANEOUS at 09:17

## 2024-03-03 RX ADMIN — HYDROMORPHONE HYDROCHLORIDE 0.5 MILLIGRAM(S): 2 INJECTION INTRAMUSCULAR; INTRAVENOUS; SUBCUTANEOUS at 03:02

## 2024-03-03 RX ADMIN — HYDROMORPHONE HYDROCHLORIDE 0.5 MILLIGRAM(S): 2 INJECTION INTRAMUSCULAR; INTRAVENOUS; SUBCUTANEOUS at 15:09

## 2024-03-03 RX ADMIN — Medication 600 MILLIGRAM(S): at 12:13

## 2024-03-03 RX ADMIN — HYDROMORPHONE HYDROCHLORIDE 0.5 MILLIGRAM(S): 2 INJECTION INTRAMUSCULAR; INTRAVENOUS; SUBCUTANEOUS at 21:51

## 2024-03-03 RX ADMIN — Medication 650 MILLIGRAM(S): at 12:13

## 2024-03-03 RX ADMIN — GABAPENTIN 300 MILLIGRAM(S): 400 CAPSULE ORAL at 14:12

## 2024-03-03 RX ADMIN — HYDROMORPHONE HYDROCHLORIDE 0.5 MILLIGRAM(S): 2 INJECTION INTRAMUSCULAR; INTRAVENOUS; SUBCUTANEOUS at 22:21

## 2024-03-03 RX ADMIN — Medication 2: at 08:47

## 2024-03-03 RX ADMIN — Medication 1 APPLICATION(S): at 05:34

## 2024-03-03 RX ADMIN — AMPICILLIN SODIUM AND SULBACTAM SODIUM 200 GRAM(S): 250; 125 INJECTION, POWDER, FOR SUSPENSION INTRAMUSCULAR; INTRAVENOUS at 05:36

## 2024-03-03 RX ADMIN — INSULIN GLARGINE 10 UNIT(S): 100 INJECTION, SOLUTION SUBCUTANEOUS at 09:14

## 2024-03-03 RX ADMIN — Medication 650 MILLIGRAM(S): at 18:03

## 2024-03-03 RX ADMIN — Medication 600 MILLIGRAM(S): at 18:03

## 2024-03-03 RX ADMIN — OXYCODONE HYDROCHLORIDE 5 MILLIGRAM(S): 5 TABLET ORAL at 23:37

## 2024-03-03 RX ADMIN — Medication 600 MILLIGRAM(S): at 00:20

## 2024-03-03 RX ADMIN — Medication 600 MILLIGRAM(S): at 05:34

## 2024-03-03 RX ADMIN — GABAPENTIN 300 MILLIGRAM(S): 400 CAPSULE ORAL at 21:57

## 2024-03-03 RX ADMIN — AMPICILLIN SODIUM AND SULBACTAM SODIUM 200 GRAM(S): 250; 125 INJECTION, POWDER, FOR SUSPENSION INTRAMUSCULAR; INTRAVENOUS at 23:06

## 2024-03-03 RX ADMIN — Medication 30 MILLIGRAM(S): at 08:46

## 2024-03-03 RX ADMIN — Medication 650 MILLIGRAM(S): at 05:34

## 2024-03-03 RX ADMIN — AMPICILLIN SODIUM AND SULBACTAM SODIUM 200 GRAM(S): 250; 125 INJECTION, POWDER, FOR SUSPENSION INTRAMUSCULAR; INTRAVENOUS at 00:20

## 2024-03-03 NOTE — PROGRESS NOTE ADULT - SUBJECTIVE AND OBJECTIVE BOX
Patient is a 38y old  Male who presents with a chief complaint of Right axillary hidradenitis suppurativa (19 Feb 2024 09:25)    Subjective:  Patient seen and examined on morning rounds.   No acute events overnight. Was sleeping comfortably on evening rounds  Surgical dressing intact.      Physical Exam:   General: AAOx3, Well developed, NAD  Chest: Normal respiratory effort  Heart: RRR  Abdomen: Soft, NTND, no masses  Neuro/Psych: No localized deficits. Normal speech, normal tone  Extremities:  Rt axilla:  dressing c/d/i. Wound intact & clean, no signs of active infection; no skin induration/ abnormal discharges    Vital Signs Last 24 Hrs  T(C): 36.6 (03 Mar 2024 16:39), Max: 36.6 (03 Mar 2024 16:39)  T(F): 97.9 (03 Mar 2024 16:39), Max: 97.9 (03 Mar 2024 16:39)  HR: 72 (03 Mar 2024 16:39) (72 - 72)  BP: 113/73 (03 Mar 2024 16:39) (113/73 - 113/73)  BP(mean): --  RR: 18 (03 Mar 2024 16:39) (18 - 18)  SpO2: 98% (03 Mar 2024 16:39) (98% - 98%)    Parameters below as of 03 Mar 2024 16:39  Patient On (Oxygen Delivery Method): room air                            10.9   11.74 )-----------( 361      ( 02 Mar 2024 07:33 )             33.4     03-02    139  |  108  |  11  ----------------------------<  151<H>  4.1   |  27  |  0.66    Ca    8.9      02 Mar 2024 07:33  Phos  3.4     03-02  Mg     2.0     03-02      I&O's Summary

## 2024-03-03 NOTE — PROGRESS NOTE ADULT - SUBJECTIVE AND OBJECTIVE BOX
HPI:  38M with h/o Crohn's disease c/w perianal fistulae s/p fecal diversion 3 yrs ago, Right axillary hidradenitis suppurativa s/p excisional debridement on 2/16/24, presented with 'excruciating pain' of the right axillary surgical site. Patient endorses the pain goes down to his elbow. Called Dr. Hilario's office, advised to come to the ED if pain not subsides with oral pain medications, thus presented to ED. Denies systemic symptoms such as fever or chills.         Review of Systems:  CONSTITUTIONAL: No weakness, fevers or chills  EYES/ENT: No visual changes;  No vertigo or throat pain   NECK: No pain or stiffness  RESPIRATORY: No cough, wheezing, hemoptysis; No shortness of breath,   CARDIOVASCULAR: No chest pain or palpitations  GASTROINTESTINAL: No abdominal or epigastric pain. No nausea, vomiting, or hematemesis; No diarrhea or constipation.   GENITOURINARY: No dysuria, frequency or hematuria  NEUROLOGICAL: No numbness or weakness  SKIN: No itching, burning, rashes, or lesions   All other review of systems is negative unless indicated above    PHYSICAL EXAM:    Vital Signs Last 24 Hrs  T(C): 36.7 (02 Mar 2024 20:00), Max: 36.7 (02 Mar 2024 20:00)  T(F): 98.1 (02 Mar 2024 20:00), Max: 98.1 (02 Mar 2024 20:00)  HR: 80 (02 Mar 2024 20:00) (75 - 80)  BP: 111/77 (02 Mar 2024 20:00) (102/80 - 111/77)  BP(mean): --  RR: 18 (02 Mar 2024 20:00) (17 - 18)  SpO2: 99% (02 Mar 2024 20:00) (99% - 100%)    Parameters below as of 02 Mar 2024 20:00  Patient On (Oxygen Delivery Method): room air          GENERAL: comfortable   HEAD:  Atraumatic, Normocephalic  EYES: EOMI, PERRLA, conjunctiva and sclera clear  HEENT: Moist mucous membranes  NECK: Supple, No JVD  NERVOUS SYSTEM:  Alert & Oriented X3, Motor Strength 5/5 B/L upper and lower extremities; DTRs 2+ intact and symmetric  CHEST/LUNG: Clear to auscultation bilaterally; No rales, rhonchi, wheezing, or rubs  HEART:S1S2 normal, no murmer  ABDOMEN: Soft, Nontender, Nondistended; Bowel sounds present  GENITOURINARY- Voiding, no palpable bladder  EXTREMITIES:  2+ Peripheral Pulses, No clubbing, cyanosis, or edema  MUSCULOSKELTAL- No muscle tenderness, Muscle tone normal, No joint tenderness, no Joint swelling, Joint range of motion-normal  SKIN-no rash, no lesion  PSYCH- Mood stable  LYMPH Node- No palpable lymph node                            10.9   11.74 )-----------( 361      ( 02 Mar 2024 07:33 )             33.4     03-02    139  |  108  |  11  ----------------------------<  151<H>  4.1   |  27  |  0.66    Ca    8.9      02 Mar 2024 07:33  Phos  3.4     03-02  Mg     2.0     03-02          Culture - Blood (collected 29 Feb 2024 21:07)  Source: .Blood None  Preliminary Report (03 Mar 2024 02:03):    No growth at 48 Hours    Culture - Blood (collected 29 Feb 2024 21:07)  Source: .Blood None  Preliminary Report (03 Mar 2024 02:02):    No growth at 48 Hours        Urinalysis Basic - ( 02 Mar 2024 07:33 )    Color: x / Appearance: x / SG: x / pH: x  Gluc: 151 mg/dL / Ketone: x  / Bili: x / Urobili: x   Blood: x / Protein: x / Nitrite: x   Leuk Esterase: x / RBC: x / WBC x   Sq Epi: x / Non Sq Epi: x / Bacteria: x          CAPILLARY BLOOD GLUCOSE      POCT Blood Glucose.: 160 mg/dL (03 Mar 2024 08:20)      Culture - Blood (collected 29 Feb 2024 21:07)  Source: .Blood None  Preliminary Report (03 Mar 2024 02:03):    No growth at 48 Hours    Culture - Blood (collected 29 Feb 2024 21:07)  Source: .Blood None  Preliminary Report (03 Mar 2024 02:02):    No growth at 48 Hours      MEDICATIONS  (STANDING):  acetaminophen     Tablet .. 650 milliGRAM(s) Oral every 6 hours  ampicillin/sulbactam  IVPB      ampicillin/sulbactam  IVPB 3 Gram(s) IV Intermittent every 6 hours  cyclobenzaprine 5 milliGRAM(s) Oral two times a day  Dakins Solution - Full Strength 1 Application(s) Topical two times a day  dextrose 5%. 1000 milliLiter(s) (50 mL/Hr) IV Continuous <Continuous>  dextrose 5%. 1000 milliLiter(s) (100 mL/Hr) IV Continuous <Continuous>  dextrose 50% Injectable 25 Gram(s) IV Push once  dextrose 50% Injectable 12.5 Gram(s) IV Push once  dextrose 50% Injectable 25 Gram(s) IV Push once  dextrose Oral Gel 15 Gram(s) Oral once  gabapentin 300 milliGRAM(s) Oral every 8 hours  glucagon  Injectable 1 milliGRAM(s) IntraMuscular once  ibuprofen  Tablet. 600 milliGRAM(s) Oral every 6 hours  influenza   Vaccine 0.5 milliLiter(s) IntraMuscular once  insulin glargine Injectable (LANTUS) 10 Unit(s) SubCutaneous every morning  insulin lispro (ADMELOG) corrective regimen sliding scale   SubCutaneous three times a day before meals  insulin lispro (ADMELOG) corrective regimen sliding scale   SubCutaneous at bedtime  lidocaine   4% Patch 1 Patch Transdermal daily    MEDICATIONS  (PRN):  acetaminophen     Tablet .. 975 milliGRAM(s) Oral every 6 hours PRN Temp greater or equal to 38C (100.4F), Mild Pain (1 - 3)  HYDROmorphone  Injectable 0.5 milliGRAM(s) IV Push every 6 hours PRN Severe Pain (7 - 10)  ketorolac   Injectable 30 milliGRAM(s) IV Push every 6 hours PRN breakthrough pain  ondansetron Injectable 4 milliGRAM(s) IV Push every 6 hours PRN Nausea  oxyCODONE    IR 5 milliGRAM(s) Oral every 6 hours PRN Moderate Pain (4 - 6)

## 2024-03-03 NOTE — PROGRESS NOTE ADULT - ASSESSMENT
A/P    #axillary hidradenitis suppurativa s/p excisional debridement on 2/16/24, presented with 'excruciating pain' of the right axillary surgical site      # Suppurativa Hidarnitis   -s/p excision from right axilla 2/16 Dr. Hilario; now with pain in right groin  -unclear in needs another excision? will defer to surgery team   -adjust Unasyn to appropriate dosing >> 3gm Q6h  -ID consult to see if Vancomyin needed. Input appreciated    #Pain control      #NIDDM  -ct insuline     #dvt pr

## 2024-03-04 ENCOUNTER — TRANSCRIPTION ENCOUNTER (OUTPATIENT)
Age: 39
End: 2024-03-04

## 2024-03-04 LAB
ANION GAP SERPL CALC-SCNC: 5 MMOL/L — SIGNIFICANT CHANGE UP (ref 5–17)
BUN SERPL-MCNC: 15 MG/DL — SIGNIFICANT CHANGE UP (ref 7–23)
CALCIUM SERPL-MCNC: 9.1 MG/DL — SIGNIFICANT CHANGE UP (ref 8.5–10.1)
CHLORIDE SERPL-SCNC: 106 MMOL/L — SIGNIFICANT CHANGE UP (ref 96–108)
CO2 SERPL-SCNC: 26 MMOL/L — SIGNIFICANT CHANGE UP (ref 22–31)
CREAT SERPL-MCNC: 0.79 MG/DL — SIGNIFICANT CHANGE UP (ref 0.5–1.3)
EGFR: 117 ML/MIN/1.73M2 — SIGNIFICANT CHANGE UP
GLUCOSE BLDC GLUCOMTR-MCNC: 129 MG/DL — HIGH (ref 70–99)
GLUCOSE BLDC GLUCOMTR-MCNC: 144 MG/DL — HIGH (ref 70–99)
GLUCOSE BLDC GLUCOMTR-MCNC: 155 MG/DL — HIGH (ref 70–99)
GLUCOSE BLDC GLUCOMTR-MCNC: 224 MG/DL — HIGH (ref 70–99)
GLUCOSE SERPL-MCNC: 149 MG/DL — HIGH (ref 70–99)
HCT VFR BLD CALC: 34.8 % — LOW (ref 39–50)
HGB BLD-MCNC: 11.6 G/DL — LOW (ref 13–17)
MAGNESIUM SERPL-MCNC: 1.9 MG/DL — SIGNIFICANT CHANGE UP (ref 1.6–2.6)
MCHC RBC-ENTMCNC: 32.9 PG — SIGNIFICANT CHANGE UP (ref 27–34)
MCHC RBC-ENTMCNC: 33.3 GM/DL — SIGNIFICANT CHANGE UP (ref 32–36)
MCV RBC AUTO: 98.6 FL — SIGNIFICANT CHANGE UP (ref 80–100)
PHOSPHATE SERPL-MCNC: 3.6 MG/DL — SIGNIFICANT CHANGE UP (ref 2.5–4.5)
PLATELET # BLD AUTO: 351 K/UL — SIGNIFICANT CHANGE UP (ref 150–400)
POTASSIUM SERPL-MCNC: 4.1 MMOL/L — SIGNIFICANT CHANGE UP (ref 3.5–5.3)
POTASSIUM SERPL-SCNC: 4.1 MMOL/L — SIGNIFICANT CHANGE UP (ref 3.5–5.3)
RBC # BLD: 3.53 M/UL — LOW (ref 4.2–5.8)
RBC # FLD: 13.2 % — SIGNIFICANT CHANGE UP (ref 10.3–14.5)
SODIUM SERPL-SCNC: 137 MMOL/L — SIGNIFICANT CHANGE UP (ref 135–145)
WBC # BLD: 11.79 K/UL — HIGH (ref 3.8–10.5)
WBC # FLD AUTO: 11.79 K/UL — HIGH (ref 3.8–10.5)

## 2024-03-04 PROCEDURE — 99232 SBSQ HOSP IP/OBS MODERATE 35: CPT

## 2024-03-04 RX ORDER — HYDROMORPHONE HYDROCHLORIDE 2 MG/ML
2 INJECTION INTRAMUSCULAR; INTRAVENOUS; SUBCUTANEOUS EVERY 6 HOURS
Refills: 0 | Status: DISCONTINUED | OUTPATIENT
Start: 2024-03-04 | End: 2024-03-05

## 2024-03-04 RX ORDER — IBUPROFEN 200 MG
1 TABLET ORAL
Qty: 21 | Refills: 0
Start: 2024-03-04 | End: 2024-03-10

## 2024-03-04 RX ORDER — HYDROMORPHONE HYDROCHLORIDE 2 MG/ML
1.5 INJECTION INTRAMUSCULAR; INTRAVENOUS; SUBCUTANEOUS EVERY 4 HOURS
Refills: 0 | Status: DISCONTINUED | OUTPATIENT
Start: 2024-03-04 | End: 2024-03-04

## 2024-03-04 RX ORDER — ACETAMINOPHEN 500 MG
2 TABLET ORAL
Qty: 60 | Refills: 0
Start: 2024-03-04 | End: 2024-03-13

## 2024-03-04 RX ORDER — IBUPROFEN 200 MG
600 TABLET ORAL EVERY 6 HOURS
Refills: 0 | Status: DISCONTINUED | OUTPATIENT
Start: 2024-03-04 | End: 2024-03-05

## 2024-03-04 RX ORDER — HYDROMORPHONE HYDROCHLORIDE 2 MG/ML
2 INJECTION INTRAMUSCULAR; INTRAVENOUS; SUBCUTANEOUS ONCE
Refills: 0 | Status: DISCONTINUED | OUTPATIENT
Start: 2024-03-04 | End: 2024-03-04

## 2024-03-04 RX ORDER — HYDROMORPHONE HYDROCHLORIDE 2 MG/ML
1 INJECTION INTRAMUSCULAR; INTRAVENOUS; SUBCUTANEOUS ONCE
Refills: 0 | Status: DISCONTINUED | OUTPATIENT
Start: 2024-03-04 | End: 2024-03-04

## 2024-03-04 RX ORDER — ACETAMINOPHEN 500 MG
975 TABLET ORAL EVERY 6 HOURS
Refills: 0 | Status: DISCONTINUED | OUTPATIENT
Start: 2024-03-04 | End: 2024-03-05

## 2024-03-04 RX ADMIN — Medication 600 MILLIGRAM(S): at 23:04

## 2024-03-04 RX ADMIN — AMPICILLIN SODIUM AND SULBACTAM SODIUM 200 GRAM(S): 250; 125 INJECTION, POWDER, FOR SUSPENSION INTRAMUSCULAR; INTRAVENOUS at 12:36

## 2024-03-04 RX ADMIN — Medication 30 MILLIGRAM(S): at 03:16

## 2024-03-04 RX ADMIN — Medication 30 MILLIGRAM(S): at 09:14

## 2024-03-04 RX ADMIN — OXYCODONE HYDROCHLORIDE 5 MILLIGRAM(S): 5 TABLET ORAL at 12:39

## 2024-03-04 RX ADMIN — Medication 30 MILLIGRAM(S): at 03:46

## 2024-03-04 RX ADMIN — Medication 600 MILLIGRAM(S): at 06:54

## 2024-03-04 RX ADMIN — Medication 4: at 18:13

## 2024-03-04 RX ADMIN — OXYCODONE HYDROCHLORIDE 5 MILLIGRAM(S): 5 TABLET ORAL at 12:35

## 2024-03-04 RX ADMIN — HYDROMORPHONE HYDROCHLORIDE 2 MILLIGRAM(S): 2 INJECTION INTRAMUSCULAR; INTRAVENOUS; SUBCUTANEOUS at 20:04

## 2024-03-04 RX ADMIN — Medication 600 MILLIGRAM(S): at 06:25

## 2024-03-04 RX ADMIN — Medication 30 MILLIGRAM(S): at 08:47

## 2024-03-04 RX ADMIN — Medication 650 MILLIGRAM(S): at 00:11

## 2024-03-04 RX ADMIN — Medication 650 MILLIGRAM(S): at 06:22

## 2024-03-04 RX ADMIN — Medication 1 APPLICATION(S): at 06:54

## 2024-03-04 RX ADMIN — Medication 975 MILLIGRAM(S): at 18:13

## 2024-03-04 RX ADMIN — Medication 975 MILLIGRAM(S): at 23:35

## 2024-03-04 RX ADMIN — CYCLOBENZAPRINE HYDROCHLORIDE 5 MILLIGRAM(S): 10 TABLET, FILM COATED ORAL at 21:41

## 2024-03-04 RX ADMIN — HYDROMORPHONE HYDROCHLORIDE 0.5 MILLIGRAM(S): 2 INJECTION INTRAMUSCULAR; INTRAVENOUS; SUBCUTANEOUS at 05:03

## 2024-03-04 RX ADMIN — Medication 975 MILLIGRAM(S): at 20:10

## 2024-03-04 RX ADMIN — Medication 1 APPLICATION(S): at 18:12

## 2024-03-04 RX ADMIN — HYDROMORPHONE HYDROCHLORIDE 0.5 MILLIGRAM(S): 2 INJECTION INTRAMUSCULAR; INTRAVENOUS; SUBCUTANEOUS at 11:14

## 2024-03-04 RX ADMIN — Medication 650 MILLIGRAM(S): at 12:35

## 2024-03-04 RX ADMIN — GABAPENTIN 300 MILLIGRAM(S): 400 CAPSULE ORAL at 06:25

## 2024-03-04 RX ADMIN — Medication 650 MILLIGRAM(S): at 06:53

## 2024-03-04 RX ADMIN — CYCLOBENZAPRINE HYDROCHLORIDE 5 MILLIGRAM(S): 10 TABLET, FILM COATED ORAL at 10:42

## 2024-03-04 RX ADMIN — AMPICILLIN SODIUM AND SULBACTAM SODIUM 200 GRAM(S): 250; 125 INJECTION, POWDER, FOR SUSPENSION INTRAMUSCULAR; INTRAVENOUS at 23:03

## 2024-03-04 RX ADMIN — Medication 600 MILLIGRAM(S): at 12:36

## 2024-03-04 RX ADMIN — Medication 600 MILLIGRAM(S): at 23:35

## 2024-03-04 RX ADMIN — HYDROMORPHONE HYDROCHLORIDE 0.5 MILLIGRAM(S): 2 INJECTION INTRAMUSCULAR; INTRAVENOUS; SUBCUTANEOUS at 04:42

## 2024-03-04 RX ADMIN — INSULIN GLARGINE 10 UNIT(S): 100 INJECTION, SOLUTION SUBCUTANEOUS at 08:42

## 2024-03-04 RX ADMIN — AMPICILLIN SODIUM AND SULBACTAM SODIUM 200 GRAM(S): 250; 125 INJECTION, POWDER, FOR SUSPENSION INTRAMUSCULAR; INTRAVENOUS at 18:13

## 2024-03-04 RX ADMIN — HYDROMORPHONE HYDROCHLORIDE 1 MILLIGRAM(S): 2 INJECTION INTRAMUSCULAR; INTRAVENOUS; SUBCUTANEOUS at 16:01

## 2024-03-04 RX ADMIN — HYDROMORPHONE HYDROCHLORIDE 2 MILLIGRAM(S): 2 INJECTION INTRAMUSCULAR; INTRAVENOUS; SUBCUTANEOUS at 20:47

## 2024-03-04 RX ADMIN — GABAPENTIN 300 MILLIGRAM(S): 400 CAPSULE ORAL at 21:42

## 2024-03-04 RX ADMIN — Medication 600 MILLIGRAM(S): at 20:10

## 2024-03-04 RX ADMIN — AMPICILLIN SODIUM AND SULBACTAM SODIUM 200 GRAM(S): 250; 125 INJECTION, POWDER, FOR SUSPENSION INTRAMUSCULAR; INTRAVENOUS at 06:26

## 2024-03-04 RX ADMIN — Medication 975 MILLIGRAM(S): at 23:04

## 2024-03-04 RX ADMIN — Medication 600 MILLIGRAM(S): at 18:12

## 2024-03-04 RX ADMIN — HYDROMORPHONE HYDROCHLORIDE 1 MILLIGRAM(S): 2 INJECTION INTRAMUSCULAR; INTRAVENOUS; SUBCUTANEOUS at 16:22

## 2024-03-04 RX ADMIN — HYDROMORPHONE HYDROCHLORIDE 0.5 MILLIGRAM(S): 2 INJECTION INTRAMUSCULAR; INTRAVENOUS; SUBCUTANEOUS at 10:24

## 2024-03-04 RX ADMIN — GABAPENTIN 300 MILLIGRAM(S): 400 CAPSULE ORAL at 14:42

## 2024-03-04 NOTE — PROGRESS NOTE ADULT - ASSESSMENT
38M w/ Hidradenitis supprativa, found to have 4.1cm fluid collection in the right axilla  s/p wide local excision     Plan:  - regular diet  - daily dressing changes with dakins solution  - CRS recs appreciated: No urgent intervention warranted, draining actively from existing fistula.  - Pain control PRN  - Monitor vitals  - Nausea control PRN  - IV abx: zosyn  - replete electrolytes PRN  - daily labs  - OOB/PT  - Dispo planning with pain management       38M w/ Hidradenitis supprativa, found to have 4.1cm fluid collection in the right axilla  s/p wide local excision     Plan:  - regular diet  - daily dressing changes with dakins solution  - CRS recs appreciated: No urgent intervention warranted, draining actively from existing fistula.  - Pain control PRN  - Monitor vitals  - Nausea control PRN  - IV abx: zosyn  - replete electrolytes PRN  - daily labs  - OOB/PT  - Dispo planning with pain management    Discussed with Dr. Cazares    Attending A/P:    Chart reviewed, patient examined, agree with above resident evaluation in addition to the following    difficult pain control and still requiring IV narcotics, wound c/d/i and healing well  pending pain service eval    PLAN:  pain service eval  transition to PO pain control  DC planning  home meds as appropriate  IV abx while inhouse per IVD  DVT/GI prophylaxis  SCD, spirometer, OOB    Pt aware of and agrees with all of the above    35 minuted of time spent on pt examination, review of relevant labs and radiologic studies, assured stabilization of pt, discussion with relevant services/providers for coordination of pt care and services

## 2024-03-04 NOTE — CONSULT NOTE ADULT - SUBJECTIVE AND OBJECTIVE BOX
38 M with h/o Crohn's disease, perianal fistulas s/p fecal diversion at [facility] (3yrs ago) and seton placement at [facility] now all removed, proctitis, pancreatitis, and EtOH abuse presents to the ED with left axillary pain from hidradenitis supprativa. Colorectal surgery team consulted for possible perianal fistula has patient was complaining of pain in perianal region.     Patient is well known to the service. Patient goes to [facility] and [facility] for management. Patient states stop taking Stelara (Ustekinumab) as well as Humira for Crohn's and Hidradenitis ~6 months ago due to infectious side effects, based on Colorectal surgeon in [facility], states he does not want to go back since he is unhappy with their care. Denies systemic symptoms such as fever or chills. Ostomy well functioning, abdomen benign. No other complaints.      Physical Exam:   General: AAOx3, Well developed, NAD  Chest: Normal respiratory effort  Heart: RRR  Abdomen: Soft, NTND, no masses  Neuro/Psych: No localized deficits. Normal speech, normal tone  Extremities:  Rt axilla:  dressing c/d/i. Wound intact & clean, no signs of active infection; no skin induration/ abnormal discharges  Perianal area with active drainage, tender, no fluctuance appreciated    Vital Signs Last 24 Hrs  T(C): 36.6 (03 Mar 2024 16:39), Max: 36.6 (03 Mar 2024 16:39)  T(F): 97.9 (03 Mar 2024 16:39), Max: 97.9 (03 Mar 2024 16:39)  HR: 72 (03 Mar 2024 16:39) (72 - 72)  BP: 113/73 (03 Mar 2024 16:39) (113/73 - 113/73)  BP(mean): --  RR: 18 (03 Mar 2024 16:39) (18 - 18)  SpO2: 98% (03 Mar 2024 16:39) (98% - 98%)    Parameters below as of 03 Mar 2024 16:39  Patient On (Oxygen Delivery Method): room air                            10.9   11.74 )-----------( 361      ( 02 Mar 2024 07:33 )             33.4     03-02    139  |  108  |  11  ----------------------------<  151<H>  4.1   |  27  |  0.66    Ca    8.9      02 Mar 2024 07:33  Phos  3.4     03-02  Mg     2.0     03-02

## 2024-03-04 NOTE — PROGRESS NOTE ADULT - SUBJECTIVE AND OBJECTIVE BOX
Patient is a 38y old  Male who presents with a chief complaint of Right axillary hidradenitis suppurativa (19 Feb 2024 09:25)    Subjective:  Patient seen and examined on morning rounds.   No acute events overnight. Was sleeping comfortably on evening rounds  Surgical dressing intact.      Physical Exam:   General: AAOx3, Well developed, NAD  Chest: Normal respiratory effort  Heart: RRR  Abdomen: Soft, NTND, no masses  Neuro/Psych: No localized deficits. Normal speech, normal tone  Extremities:  Rt axilla:  dressing c/d/i. Wound intact & clean, no signs of active infection; no skin induration/ abnormal discharges    Vital Signs Last 24 Hrs  T(C): 36.6 (03 Mar 2024 16:39), Max: 36.6 (03 Mar 2024 16:39)  T(F): 97.9 (03 Mar 2024 16:39), Max: 97.9 (03 Mar 2024 16:39)  HR: 72 (03 Mar 2024 16:39) (72 - 72)  BP: 113/73 (03 Mar 2024 16:39) (113/73 - 113/73)  BP(mean): --  RR: 18 (03 Mar 2024 16:39) (18 - 18)  SpO2: 98% (03 Mar 2024 16:39) (98% - 98%)    Parameters below as of 03 Mar 2024 16:39  Patient On (Oxygen Delivery Method): room air                            10.9   11.74 )-----------( 361      ( 02 Mar 2024 07:33 )             33.4     03-02    139  |  108  |  11  ----------------------------<  151<H>  4.1   |  27  |  0.66    Ca    8.9      02 Mar 2024 07:33  Phos  3.4     03-02  Mg     2.0     03-02      I&O's Summary     Patient is a 38y old  Male who presents with a chief complaint of Right axillary hidradenitis suppurativa (19 Feb 2024 09:25)    Subjective:  Patient seen and examined on morning rounds.   No acute events overnight. Was sleeping comfortably on evening rounds  Surgical dressing intact. Glucose better controlled, still requiring IV narcotics for pain, pending pain service evaluation    ROS negative except as above    Physical Exam:   General: AAOx3, Well developed, NAD  Chest: Normal respiratory effort, CTAB  Heart: RRR  Abdomen: Soft, NTND, no masses  Neuro/Psych: No localized deficits. Normal speech, normal tone  Extremities:  Rt axilla:  dressing c/d/i. Wound intact & clean, no signs of active infection; no skin induration/ abnormal discharges    Vital Signs Last 24 Hrs  T(C): 36.6 (03 Mar 2024 16:39), Max: 36.6 (03 Mar 2024 16:39)  T(F): 97.9 (03 Mar 2024 16:39), Max: 97.9 (03 Mar 2024 16:39)  HR: 72 (03 Mar 2024 16:39) (72 - 72)  BP: 113/73 (03 Mar 2024 16:39) (113/73 - 113/73)  BP(mean): --  RR: 18 (03 Mar 2024 16:39) (18 - 18)  SpO2: 98% (03 Mar 2024 16:39) (98% - 98%)    Parameters below as of 03 Mar 2024 16:39  Patient On (Oxygen Delivery Method): room air                            10.9   11.74 )-----------( 361      ( 02 Mar 2024 07:33 )             33.4     03-02    139  |  108  |  11  ----------------------------<  151<H>  4.1   |  27  |  0.66    Ca    8.9      02 Mar 2024 07:33  Phos  3.4     03-02  Mg     2.0     03-02      I&O's Summary    MEDICATIONS  (STANDING):  acetaminophen     Tablet .. 650 milliGRAM(s) Oral every 6 hours  ampicillin/sulbactam  IVPB      ampicillin/sulbactam  IVPB 3 Gram(s) IV Intermittent every 6 hours  cyclobenzaprine 5 milliGRAM(s) Oral two times a day  Dakins Solution - Full Strength 1 Application(s) Topical two times a day  dextrose 5%. 1000 milliLiter(s) (50 mL/Hr) IV Continuous <Continuous>  dextrose 5%. 1000 milliLiter(s) (100 mL/Hr) IV Continuous <Continuous>  dextrose 50% Injectable 25 Gram(s) IV Push once  dextrose 50% Injectable 25 Gram(s) IV Push once  dextrose 50% Injectable 12.5 Gram(s) IV Push once  dextrose Oral Gel 15 Gram(s) Oral once  gabapentin 300 milliGRAM(s) Oral every 8 hours  glucagon  Injectable 1 milliGRAM(s) IntraMuscular once  ibuprofen  Tablet. 600 milliGRAM(s) Oral every 6 hours  influenza   Vaccine 0.5 milliLiter(s) IntraMuscular once  insulin glargine Injectable (LANTUS) 10 Unit(s) SubCutaneous every morning  insulin lispro (ADMELOG) corrective regimen sliding scale   SubCutaneous three times a day before meals  insulin lispro (ADMELOG) corrective regimen sliding scale   SubCutaneous at bedtime  lidocaine   4% Patch 1 Patch Transdermal daily    MEDICATIONS  (PRN):  acetaminophen     Tablet .. 975 milliGRAM(s) Oral every 6 hours PRN Temp greater or equal to 38C (100.4F), Mild Pain (1 - 3)  HYDROmorphone  Injectable 0.5 milliGRAM(s) IV Push every 6 hours PRN Severe Pain (7 - 10)  ketorolac   Injectable 30 milliGRAM(s) IV Push every 6 hours PRN breakthrough pain  ondansetron Injectable 4 milliGRAM(s) IV Push every 6 hours PRN Nausea  oxyCODONE    IR 5 milliGRAM(s) Oral every 6 hours PRN Moderate Pain (4 - 6)        RADIOLOGY: no new images

## 2024-03-04 NOTE — DISCHARGE NOTE PROVIDER - NSDCFUSCHEDAPPT_GEN_ALL_CORE_FT
[facility]  [facility] [address]  Scheduled Appointment: 03/06/2024

## 2024-03-04 NOTE — CHART NOTE - NSCHARTNOTEFT_GEN_A_CORE
Pt with difficult to control pain, anesthesia contacted and informed they do not provide chronic pain management services, pain management office contacted and informed us they are not available.
Mr. Dacosta admitted due to poor pain control after HS excision, pain management was contacted  on 01/03/24 and informed us they were not available at the moment, anesthesia team does not provide chronic pain management.  today before 10:30 am, Center for pain treatment, Dr. Ivan Raphael , contacted again today,  received consult and was informed about the urgency of the consult since it has prolonged hospital stay. The office was later contacted approximately 13:30 and we were informed Dr. Love will not be able to see the patient, when inquiring if this can be done tomorrow morning we were informed Dr. Love is not working in the area tomorrow.    All efforts have been made to provide adequate acute and chronic pain control by the trauma team

## 2024-03-04 NOTE — DISCHARGE NOTE PROVIDER - NSDCCPCAREPLAN_GEN_ALL_CORE_FT
PRINCIPAL DISCHARGE DIAGNOSIS  Diagnosis: Cellulitis of axilla, right  Assessment and Plan of Treatment:       SECONDARY DISCHARGE DIAGNOSES  Diagnosis: Chronic arm pain  Assessment and Plan of Treatment:

## 2024-03-04 NOTE — DISCHARGE NOTE PROVIDER - NSDCFUADDINST_GEN_ALL_CORE_FT
Please follow up with pain management for outpatient pain control.   Some pain medicine is sent to your pharmacy in the meantime. Please follow up with your colorectal surgeon at [facility] or [facility].

## 2024-03-04 NOTE — DISCHARGE NOTE PROVIDER - NSDCMRMEDTOKEN_GEN_ALL_CORE_FT
acetaminophen 500 mg oral tablet: 2 tab(s) orally every 8 hours  amoxicillin-clavulanate 500 mg-125 mg oral tablet: 1 tab(s) orally every 8 hours for 5 days ***COMPLETE***  amoxicillin-clavulanate 875 mg-125 mg oral tablet: 1 tab(s) orally 2 times a day for 5 days ***COMPLETE***  amoxicillin-clavulanate 875 mg-125 mg oral tablet: 875 milligram(s) orally 2 times a day  doxycycline hyclate 50 mg oral capsule: 2 cap(s) orally 2 times a day for 5 days ***COMPLETE***  ibuprofen 600 mg oral tablet: 1 tab(s) orally every 8 hours  metFORMIN 500 mg oral tablet: 1 tab(s) orally once a day   acetaminophen 500 mg oral tablet: 2 tab(s) orally every 8 hours  ibuprofen 600 mg oral tablet: 1 tab(s) orally every 8 hours  metFORMIN 500 mg oral tablet: 1 tab(s) orally once a day

## 2024-03-04 NOTE — DISCHARGE NOTE PROVIDER - HOSPITAL COURSE
38M w/ Hidradenitis supprativa, found to have 4.1cm fluid collection in the right axilla  s/p wide local excision, pt readmitted due to pain control 38M w/ Hidradenitis supprativa, found to have 4.1cm fluid collection in the right axilla  s/p wide local excision, pt readmitted due to pain control.

## 2024-03-04 NOTE — CONSULT NOTE ADULT - CONSULT REASON
Perianal fistula from Crohn's disease
antibiotics management/ diabetic managment
Crohn's disease
axilla erythema

## 2024-03-04 NOTE — DISCHARGE NOTE PROVIDER - CARE PROVIDERS DIRECT ADDRESSES
,[email or web address]

## 2024-03-04 NOTE — CONSULT NOTE ADULT - ASSESSMENT
38M w/ HS in right axilla, known to have Crohn's w/ perianal fistula. Complaining of increase pain this AM. Still draining. No abscess or fluctuance appreciated    Recommendations:  - No urgent intervention warranted since actively draining  - Rt axillary HS care as per GS team  - Pt will need to follow up with his own surgeons at [facility], no acute intervention at this time.  - Rest of the care as per primary team    Plan will be discussed with Colorectal surgery attending, Dr. Hinojosa

## 2024-03-04 NOTE — CONSULT NOTE ADULT - CONSULT REQUESTED DATE/TIME
01-Mar-2024 13:23
01-Mar-2024 10:19
15-Feb-2024 19:43
01-Mar-2024
negative - no change in level of consciousness

## 2024-03-04 NOTE — DISCHARGE NOTE PROVIDER - CARE PROVIDER_API CALL
Sanket Hilario (DO)  Surgery  [address] [place] [address]  Phone: [phone]  Fax: [phone]  Follow Up Time: 2 weeks   Sanket Hilario (DO)  Surgery  [address] [place] [address]  Phone: [phone]  Fax: [phone]  Follow Up Time: 2 weeks    Donavon Love  Pain Medicine  [address] [place] [address], [facility] [address]  [place] [address]  Phone: [phone]  Fax: [phone]  Follow Up Time: 1-3 days

## 2024-03-04 NOTE — DISCHARGE NOTE PROVIDER - PROVIDER TOKENS
PROVIDER:[TOKEN:[[id number]],FOLLOWUP:[2 weeks]] PROVIDER:[TOKEN:[[id number]],FOLLOWUP:[2 weeks]],PROVIDER:[TOKEN:[[id number]],FOLLOWUP:[1-3 days]]

## 2024-03-05 ENCOUNTER — TRANSCRIPTION ENCOUNTER (OUTPATIENT)
Age: 39
End: 2024-03-05

## 2024-03-05 VITALS
RESPIRATION RATE: 19 BRPM | DIASTOLIC BLOOD PRESSURE: 73 MMHG | SYSTOLIC BLOOD PRESSURE: 109 MMHG | OXYGEN SATURATION: 100 % | HEART RATE: 70 BPM | TEMPERATURE: 98 F

## 2024-03-05 LAB
ANION GAP SERPL CALC-SCNC: 4 MMOL/L — LOW (ref 5–17)
BASOPHILS # BLD AUTO: 0 K/UL — SIGNIFICANT CHANGE UP (ref 0–0.2)
BASOPHILS NFR BLD AUTO: 0 % — SIGNIFICANT CHANGE UP (ref 0–2)
BUN SERPL-MCNC: 17 MG/DL — SIGNIFICANT CHANGE UP (ref 7–23)
CALCIUM SERPL-MCNC: 8.9 MG/DL — SIGNIFICANT CHANGE UP (ref 8.5–10.1)
CHLORIDE SERPL-SCNC: 109 MMOL/L — HIGH (ref 96–108)
CO2 SERPL-SCNC: 24 MMOL/L — SIGNIFICANT CHANGE UP (ref 22–31)
CREAT SERPL-MCNC: 0.86 MG/DL — SIGNIFICANT CHANGE UP (ref 0.5–1.3)
EGFR: 114 ML/MIN/1.73M2 — SIGNIFICANT CHANGE UP
EOSINOPHIL # BLD AUTO: 0.12 K/UL — SIGNIFICANT CHANGE UP (ref 0–0.5)
EOSINOPHIL NFR BLD AUTO: 1 % — SIGNIFICANT CHANGE UP (ref 0–6)
GLUCOSE BLDC GLUCOMTR-MCNC: 151 MG/DL — HIGH (ref 70–99)
GLUCOSE SERPL-MCNC: 151 MG/DL — HIGH (ref 70–99)
HCT VFR BLD CALC: 37.2 % — LOW (ref 39–50)
HGB BLD-MCNC: 12.1 G/DL — LOW (ref 13–17)
LYMPHOCYTES # BLD AUTO: 1.79 K/UL — SIGNIFICANT CHANGE UP (ref 1–3.3)
LYMPHOCYTES # BLD AUTO: 15 % — SIGNIFICANT CHANGE UP (ref 13–44)
MAGNESIUM SERPL-MCNC: 1.9 MG/DL — SIGNIFICANT CHANGE UP (ref 1.6–2.6)
MCHC RBC-ENTMCNC: 32.3 PG — SIGNIFICANT CHANGE UP (ref 27–34)
MCHC RBC-ENTMCNC: 32.5 GM/DL — SIGNIFICANT CHANGE UP (ref 32–36)
MCV RBC AUTO: 99.2 FL — SIGNIFICANT CHANGE UP (ref 80–100)
MONOCYTES # BLD AUTO: 1.07 K/UL — HIGH (ref 0–0.9)
MONOCYTES NFR BLD AUTO: 9 % — SIGNIFICANT CHANGE UP (ref 2–14)
NEUTROPHILS # BLD AUTO: 8.69 K/UL — HIGH (ref 1.8–7.4)
NEUTROPHILS NFR BLD AUTO: 73 % — SIGNIFICANT CHANGE UP (ref 43–77)
NRBC # BLD: SIGNIFICANT CHANGE UP /100 WBCS (ref 0–0)
PHOSPHATE SERPL-MCNC: 3.9 MG/DL — SIGNIFICANT CHANGE UP (ref 2.5–4.5)
PLATELET # BLD AUTO: 404 K/UL — HIGH (ref 150–400)
POTASSIUM SERPL-MCNC: 4.1 MMOL/L — SIGNIFICANT CHANGE UP (ref 3.5–5.3)
POTASSIUM SERPL-SCNC: 4.1 MMOL/L — SIGNIFICANT CHANGE UP (ref 3.5–5.3)
RBC # BLD: 3.75 M/UL — LOW (ref 4.2–5.8)
RBC # FLD: 13.2 % — SIGNIFICANT CHANGE UP (ref 10.3–14.5)
SODIUM SERPL-SCNC: 137 MMOL/L — SIGNIFICANT CHANGE UP (ref 135–145)
WBC # BLD: 11.9 K/UL — HIGH (ref 3.8–10.5)
WBC # FLD AUTO: 11.9 K/UL — HIGH (ref 3.8–10.5)

## 2024-03-05 PROCEDURE — 99231 SBSQ HOSP IP/OBS SF/LOW 25: CPT

## 2024-03-05 RX ORDER — OXYCODONE HYDROCHLORIDE 5 MG/1
1 TABLET ORAL
Qty: 15 | Refills: 0
Start: 2024-03-05 | End: 2024-03-09

## 2024-03-05 RX ADMIN — HYDROMORPHONE HYDROCHLORIDE 2 MILLIGRAM(S): 2 INJECTION INTRAMUSCULAR; INTRAVENOUS; SUBCUTANEOUS at 03:26

## 2024-03-05 RX ADMIN — INSULIN GLARGINE 10 UNIT(S): 100 INJECTION, SOLUTION SUBCUTANEOUS at 08:55

## 2024-03-05 RX ADMIN — Medication 600 MILLIGRAM(S): at 06:54

## 2024-03-05 RX ADMIN — Medication 975 MILLIGRAM(S): at 06:54

## 2024-03-05 RX ADMIN — Medication 2: at 08:55

## 2024-03-05 RX ADMIN — Medication 1 APPLICATION(S): at 06:24

## 2024-03-05 RX ADMIN — HYDROMORPHONE HYDROCHLORIDE 2 MILLIGRAM(S): 2 INJECTION INTRAMUSCULAR; INTRAVENOUS; SUBCUTANEOUS at 04:36

## 2024-03-05 RX ADMIN — Medication 600 MILLIGRAM(S): at 06:19

## 2024-03-05 RX ADMIN — AMPICILLIN SODIUM AND SULBACTAM SODIUM 200 GRAM(S): 250; 125 INJECTION, POWDER, FOR SUSPENSION INTRAMUSCULAR; INTRAVENOUS at 06:23

## 2024-03-05 RX ADMIN — Medication 975 MILLIGRAM(S): at 06:19

## 2024-03-05 RX ADMIN — GABAPENTIN 300 MILLIGRAM(S): 400 CAPSULE ORAL at 06:20

## 2024-03-05 NOTE — PROGRESS NOTE ADULT - ASSESSMENT
38M w/ Hidradenitis supprativa, found to have 4.1cm fluid collection in the right axilla  s/p wide local excision     Plan:  - regular diet  - daily dressing changes with dakins solution  - CRS recs appreciated: No urgent intervention warranted, draining actively from existing fistula.  - Pain control PRN  - Monitor vitals  - Nausea control PRN  - IV abx: zosyn  - replete electrolytes PRN  - daily labs  - OOB/PT  - Dispo planning with pain management    Discussed with Dr. Cazares   38M w/ Hidradenitis supprativa, found to have 4.1cm fluid collection in the right axilla  s/p wide local excision     Plan:  - regular diet  - daily dressing changes with dakins solution  - CRS recs appreciated: No urgent intervention warranted, draining actively from existing fistula.  - Pain control PRN  - Monitor vitals  - Nausea control PRN  - IV abx: zosyn  - replete electrolytes PRN  - daily labs  - OOB/PT  - Dispo planning with pain management    Discussed with Dr. Hilario    Attending A/P:    Chart reviewed, patient examined, agree with above resident evaluation in addition to the following    patient was discussed with Dr. hilario this am and was discharged prior to my evaluation. Per resident team patient was asking for IV pain meds and if not given wanted to be discharged with a plan to follow Dr. Love for pain management, colorectal/GI FU for perianal fistula and Dr. Hilario for R axillary wound.

## 2024-03-05 NOTE — PROGRESS NOTE ADULT - SUBJECTIVE AND OBJECTIVE BOX
Patient is a 38y old  Male who presents with a chief complaint of Right axillary hidradenitis suppurativa (19 Feb 2024 09:25)    Subjective:  Patient seen and examined on morning rounds.   No acute events overnight. Was sleeping comfortably on evening rounds  Surgical dressing intact. Glucose better controlled, still requiring IV narcotics for pain, pending pain service evaluation    ROS negative except as above    Physical Exam:   General: AAOx3, Well developed, NAD  Chest: Normal respiratory effort, CTAB  Heart: RRR  Abdomen: Soft, NTND, no masses  Neuro/Psych: No localized deficits. Normal speech, normal tone  Extremities:  Rt axilla:  dressing c/d/i. Wound intact & clean, no signs of active infection; no skin induration/ abnormal discharges    Vital Signs Last 24 Hrs  T(C): 36.5 (04 Mar 2024 21:43), Max: 36.6 (04 Mar 2024 16:31)  T(F): 97.7 (04 Mar 2024 21:43), Max: 97.9 (04 Mar 2024 16:31)  HR: 91 (04 Mar 2024 21:43) (68 - 91)  BP: 111/82 (04 Mar 2024 21:43) (95/66 - 113/75)  BP(mean): --  RR: 18 (04 Mar 2024 21:43) (18 - 19)  SpO2: 98% (04 Mar 2024 21:43) (95% - 98%)    Parameters below as of 04 Mar 2024 21:43  Patient On (Oxygen Delivery Method): room air                            11.6   11.79 )-----------( 351      ( 04 Mar 2024 06:57 )             34.8     03-04    137  |  106  |  15  ----------------------------<  149<H>  4.1   |  26  |  0.79    Ca    9.1      04 Mar 2024 06:57  Phos  3.6     03-04  Mg     1.9     03-04      I&O's Summary

## 2024-03-05 NOTE — PROGRESS NOTE ADULT - REASON FOR ADMISSION
pain s/p hidradenitis resection

## 2024-03-05 NOTE — DISCHARGE NOTE NURSING/CASE MANAGEMENT/SOCIAL WORK - NSDCPEFALRISK_GEN_ALL_CORE
For information on Fall & Injury Prevention, visit: [email or web address] OR  [email or web address] OR  https://www.cdc.gov/steadi/patient.html

## 2024-03-05 NOTE — DISCHARGE NOTE NURSING/CASE MANAGEMENT/SOCIAL WORK - PATIENT PORTAL LINK FT
You can access the FollowMyHealth Patient Portal offered by [facility] by registering at the following website: [email or web address]. By joining [employer]’s FollowMyHealth portal, you will also be able to view your health information using other applications (apps) compatible with our system.

## 2024-03-06 ENCOUNTER — APPOINTMENT (OUTPATIENT)
Dept: SURGERY | Facility: CLINIC | Age: 39
End: 2024-03-06
Payer: COMMERCIAL

## 2024-03-06 ENCOUNTER — NON-APPOINTMENT (OUTPATIENT)
Age: 39
End: 2024-03-06

## 2024-03-06 VITALS
BODY MASS INDEX: 22.4 KG/M2 | WEIGHT: 160 LBS | DIASTOLIC BLOOD PRESSURE: 82 MMHG | HEIGHT: 71 IN | HEART RATE: 102 BPM | SYSTOLIC BLOOD PRESSURE: 120 MMHG | OXYGEN SATURATION: 99 %

## 2024-03-06 DIAGNOSIS — Z87.898 PERSONAL HISTORY OF OTHER SPECIFIED CONDITIONS: ICD-10-CM

## 2024-03-06 PROCEDURE — 99024 POSTOP FOLLOW-UP VISIT: CPT

## 2024-03-06 NOTE — PHYSICAL EXAM
[Normal Breath Sounds] : Normal breath sounds [Abdomen Tenderness] : ~T ~M No abdominal tenderness [de-identified] : NAD [de-identified] : wound of the right axilla healing well.

## 2024-03-13 DIAGNOSIS — Z91.148 PATIENT'S OTHER NONCOMPLIANCE WITH MEDICATION REGIMEN FOR OTHER REASON: ICD-10-CM

## 2024-03-13 DIAGNOSIS — E11.65 TYPE 2 DIABETES MELLITUS WITH HYPERGLYCEMIA: ICD-10-CM

## 2024-03-13 DIAGNOSIS — G89.29 OTHER CHRONIC PAIN: ICD-10-CM

## 2024-03-13 DIAGNOSIS — M79.621 PAIN IN RIGHT UPPER ARM: ICD-10-CM

## 2024-03-13 DIAGNOSIS — Z93.3 COLOSTOMY STATUS: ICD-10-CM

## 2024-03-13 DIAGNOSIS — Z79.84 LONG TERM (CURRENT) USE OF ORAL HYPOGLYCEMIC DRUGS: ICD-10-CM

## 2024-03-13 DIAGNOSIS — K50.90 CROHN'S DISEASE, UNSPECIFIED, WITHOUT COMPLICATIONS: ICD-10-CM

## 2024-03-13 DIAGNOSIS — D84.9 IMMUNODEFICIENCY, UNSPECIFIED: ICD-10-CM

## 2024-03-13 DIAGNOSIS — L03.111 CELLULITIS OF RIGHT AXILLA: ICD-10-CM

## 2024-03-13 DIAGNOSIS — Z88.1 ALLERGY STATUS TO OTHER ANTIBIOTIC AGENTS STATUS: ICD-10-CM

## 2024-03-16 LAB
CULTURE RESULTS: SIGNIFICANT CHANGE UP
SPECIMEN SOURCE: SIGNIFICANT CHANGE UP

## 2024-03-19 ENCOUNTER — EMERGENCY (EMERGENCY)
Facility: HOSPITAL | Age: 39
LOS: 0 days | Discharge: HOME | End: 2024-03-19
Attending: EMERGENCY MEDICINE
Payer: MEDICAID

## 2024-03-19 VITALS — WEIGHT: 160.06 LBS | HEIGHT: 69 IN

## 2024-03-19 VITALS
TEMPERATURE: 98 F | HEART RATE: 84 BPM | SYSTOLIC BLOOD PRESSURE: 108 MMHG | DIASTOLIC BLOOD PRESSURE: 75 MMHG | RESPIRATION RATE: 18 BRPM | OXYGEN SATURATION: 98 %

## 2024-03-19 DIAGNOSIS — E78.5 HYPERLIPIDEMIA, UNSPECIFIED: ICD-10-CM

## 2024-03-19 DIAGNOSIS — I80.8 PHLEBITIS AND THROMBOPHLEBITIS OF OTHER SITES: ICD-10-CM

## 2024-03-19 DIAGNOSIS — K60.3 ANAL FISTULA: Chronic | ICD-10-CM

## 2024-03-19 DIAGNOSIS — M79.601 PAIN IN RIGHT ARM: ICD-10-CM

## 2024-03-19 DIAGNOSIS — I10 ESSENTIAL (PRIMARY) HYPERTENSION: ICD-10-CM

## 2024-03-19 DIAGNOSIS — Z96.7 PRESENCE OF OTHER BONE AND TENDON IMPLANTS: Chronic | ICD-10-CM

## 2024-03-19 DIAGNOSIS — R11.2 NAUSEA WITH VOMITING, UNSPECIFIED: ICD-10-CM

## 2024-03-19 DIAGNOSIS — Z88.1 ALLERGY STATUS TO OTHER ANTIBIOTIC AGENTS STATUS: ICD-10-CM

## 2024-03-19 DIAGNOSIS — K50.90 CROHN'S DISEASE, UNSPECIFIED, WITHOUT COMPLICATIONS: ICD-10-CM

## 2024-03-19 DIAGNOSIS — Z98.89 OTHER SPECIFIED POSTPROCEDURAL STATES: Chronic | ICD-10-CM

## 2024-03-19 LAB
ALBUMIN SERPL ELPH-MCNC: 3.6 G/DL — SIGNIFICANT CHANGE UP (ref 3.3–5)
ALP SERPL-CCNC: 91 U/L — SIGNIFICANT CHANGE UP (ref 40–120)
ALT FLD-CCNC: 16 U/L — SIGNIFICANT CHANGE UP (ref 12–78)
ANION GAP SERPL CALC-SCNC: 5 MMOL/L — SIGNIFICANT CHANGE UP (ref 5–17)
APTT BLD: 28.5 SEC — SIGNIFICANT CHANGE UP (ref 24.5–35.6)
AST SERPL-CCNC: 9 U/L — LOW (ref 15–37)
BASOPHILS # BLD AUTO: 0.07 K/UL — SIGNIFICANT CHANGE UP (ref 0–0.2)
BASOPHILS NFR BLD AUTO: 0.4 % — SIGNIFICANT CHANGE UP (ref 0–2)
BILIRUB SERPL-MCNC: 0.3 MG/DL — SIGNIFICANT CHANGE UP (ref 0.2–1.2)
BUN SERPL-MCNC: 7 MG/DL — SIGNIFICANT CHANGE UP (ref 7–23)
CALCIUM SERPL-MCNC: 10.1 MG/DL — SIGNIFICANT CHANGE UP (ref 8.5–10.1)
CHLORIDE SERPL-SCNC: 98 MMOL/L — SIGNIFICANT CHANGE UP (ref 96–108)
CO2 SERPL-SCNC: 29 MMOL/L — SIGNIFICANT CHANGE UP (ref 22–31)
CREAT SERPL-MCNC: 0.81 MG/DL — SIGNIFICANT CHANGE UP (ref 0.5–1.3)
EGFR: 116 ML/MIN/1.73M2 — SIGNIFICANT CHANGE UP
EOSINOPHIL # BLD AUTO: 0.14 K/UL — SIGNIFICANT CHANGE UP (ref 0–0.5)
EOSINOPHIL NFR BLD AUTO: 0.9 % — SIGNIFICANT CHANGE UP (ref 0–6)
GLUCOSE SERPL-MCNC: 202 MG/DL — HIGH (ref 70–99)
HCT VFR BLD CALC: 40.2 % — SIGNIFICANT CHANGE UP (ref 39–50)
HGB BLD-MCNC: 13.9 G/DL — SIGNIFICANT CHANGE UP (ref 13–17)
IMM GRANULOCYTES NFR BLD AUTO: 1 % — HIGH (ref 0–0.9)
INR BLD: 0.95 RATIO — SIGNIFICANT CHANGE UP (ref 0.85–1.18)
LIDOCAIN IGE QN: 10 U/L — LOW (ref 13–75)
LYMPHOCYTES # BLD AUTO: 16.3 % — SIGNIFICANT CHANGE UP (ref 13–44)
LYMPHOCYTES # BLD AUTO: 2.6 K/UL — SIGNIFICANT CHANGE UP (ref 1–3.3)
MCHC RBC-ENTMCNC: 32.9 PG — SIGNIFICANT CHANGE UP (ref 27–34)
MCHC RBC-ENTMCNC: 34.6 GM/DL — SIGNIFICANT CHANGE UP (ref 32–36)
MCV RBC AUTO: 95 FL — SIGNIFICANT CHANGE UP (ref 80–100)
MONOCYTES # BLD AUTO: 1.26 K/UL — HIGH (ref 0–0.9)
MONOCYTES NFR BLD AUTO: 7.9 % — SIGNIFICANT CHANGE UP (ref 2–14)
NEUTROPHILS # BLD AUTO: 11.75 K/UL — HIGH (ref 1.8–7.4)
NEUTROPHILS NFR BLD AUTO: 73.5 % — SIGNIFICANT CHANGE UP (ref 43–77)
PLATELET # BLD AUTO: 426 K/UL — HIGH (ref 150–400)
POTASSIUM SERPL-MCNC: 4.7 MMOL/L — SIGNIFICANT CHANGE UP (ref 3.5–5.3)
POTASSIUM SERPL-SCNC: 4.7 MMOL/L — SIGNIFICANT CHANGE UP (ref 3.5–5.3)
PROT SERPL-MCNC: 8.5 GM/DL — HIGH (ref 6–8.3)
PROTHROM AB SERPL-ACNC: 10.7 SEC — SIGNIFICANT CHANGE UP (ref 9.5–13)
RBC # BLD: 4.23 M/UL — SIGNIFICANT CHANGE UP (ref 4.2–5.8)
RBC # FLD: 13.6 % — SIGNIFICANT CHANGE UP (ref 10.3–14.5)
SODIUM SERPL-SCNC: 132 MMOL/L — LOW (ref 135–145)
WBC # BLD: 15.98 K/UL — HIGH (ref 3.8–10.5)
WBC # FLD AUTO: 15.98 K/UL — HIGH (ref 3.8–10.5)

## 2024-03-19 PROCEDURE — 80053 COMPREHEN METABOLIC PANEL: CPT

## 2024-03-19 PROCEDURE — 85610 PROTHROMBIN TIME: CPT

## 2024-03-19 PROCEDURE — 83690 ASSAY OF LIPASE: CPT

## 2024-03-19 PROCEDURE — 36415 COLL VENOUS BLD VENIPUNCTURE: CPT

## 2024-03-19 PROCEDURE — 99285 EMERGENCY DEPT VISIT HI MDM: CPT

## 2024-03-19 PROCEDURE — 93971 EXTREMITY STUDY: CPT | Mod: RT

## 2024-03-19 PROCEDURE — 93971 EXTREMITY STUDY: CPT | Mod: 26,RT

## 2024-03-19 PROCEDURE — 96374 THER/PROPH/DIAG INJ IV PUSH: CPT

## 2024-03-19 PROCEDURE — 85025 COMPLETE CBC W/AUTO DIFF WBC: CPT

## 2024-03-19 PROCEDURE — 85730 THROMBOPLASTIN TIME PARTIAL: CPT

## 2024-03-19 PROCEDURE — 99284 EMERGENCY DEPT VISIT MOD MDM: CPT | Mod: 25

## 2024-03-19 RX ORDER — MORPHINE SULFATE 50 MG/1
2 CAPSULE, EXTENDED RELEASE ORAL ONCE
Refills: 0 | Status: DISCONTINUED | OUTPATIENT
Start: 2024-03-19 | End: 2024-03-19

## 2024-03-19 RX ORDER — GABAPENTIN 400 MG/1
300 CAPSULE ORAL ONCE
Refills: 0 | Status: COMPLETED | OUTPATIENT
Start: 2024-03-19 | End: 2024-03-19

## 2024-03-19 RX ORDER — KETOROLAC TROMETHAMINE 30 MG/ML
15 SYRINGE (ML) INJECTION ONCE
Refills: 0 | Status: DISCONTINUED | OUTPATIENT
Start: 2024-03-19 | End: 2024-03-19

## 2024-03-19 RX ORDER — GABAPENTIN 400 MG/1
1 CAPSULE ORAL
Qty: 42 | Refills: 0
Start: 2024-03-19 | End: 2024-04-01

## 2024-03-19 RX ADMIN — Medication 15 MILLIGRAM(S): at 22:02

## 2024-03-19 RX ADMIN — GABAPENTIN 300 MILLIGRAM(S): 400 CAPSULE ORAL at 21:01

## 2024-03-19 NOTE — ED PROVIDER NOTE - CARE PROVIDERS DIRECT ADDRESSES
[email or web address]

## 2024-03-19 NOTE — ED PROVIDER NOTE - OBJECTIVE STATEMENT
37 y/o M with PMHx of pancreatitis, Crohn's disease, HTN, HLD presents to the ED c/o nausea, vomiting, and right arm pain x3 days becoming unbearable this morning. Pt had recent surgery to remove abscess under armpit without complications. Pt states he has been unable to lift objects due to pain. Denies chest pain, sob, dizziness, change in gait, or blurred vision.Surgery performed by Dr. Kaur.  Symptoms since procedure however worsening.  Taking Motrin and Tylenol without relief.  Reports tingling in the extremity.

## 2024-03-19 NOTE — ED PROVIDER NOTE - PATIENT PORTAL LINK FT
You can access the FollowMyHealth Patient Portal offered by [facility] by registering at the following website: [email or web address]. By joining [employer]’s FollowMyHealth portal, you will also be able to view your health information using other applications (apps) compatible with our system.

## 2024-03-19 NOTE — ED STATDOCS - PROGRESS NOTE DETAILS
Katerina Rahman for Dr. Kraus  39 y/o M with PMHx of pancreatitis, Crohn's disease, HTN, HLD presents to the ED c/o nausea, vomiting, and right arm pain x3 days becoming unbearable this morning. Pt had recent surgery to remove abscess under armpit without complications. Pt states he has been unable to lift objects due to pain. Denies chest pain, sob, dizziness, change in gait, or blurred vision. Will send to main given possibility and risk for CVA vs cervical pathology, will put in orders. Katerina Kraus  39 y/o M with PMHx of pancreatitis, Crohn's disease, HTN, HLD presents to the ED c/o nausea, vomiting, and right arm pain x3 days becoming unbearable this morning. Pt had recent surgery to remove abscess under armpit without complications. Pt states he has been unable to lift objects due to pain. Denies chest pain, sob, dizziness, change in gait, or blurred vision. Will send to main given possibility and risk for CVA vs cervical pathology, will put in orders. further care deferred to the main ED attending.

## 2024-03-19 NOTE — ED STATDOCS - NS_ ATTENDINGSCRIBEDETAILS _ED_A_ED_FT
I Cj Kraus MD saw and examined the patient. Scribe documented for me and under my supervision. I have modified the scribe's documentation where necessary to reflect my history, physical exam and other relevant documentations pertinent to the care of the patient.

## 2024-03-19 NOTE — ED ADULT NURSE NOTE - OBJECTIVE STATEMENT
Pt is a 38yr old male, A&OX4 and ambulatory, c/o N/V and R arm pain x3 days. States he had an abscess removed on R underarm with no complaints last month. BEFAST NEG. Denies chest pain, SOB, HA, dizziness, abd. pain, fever/chills, cough, and urinary symptoms. Labs drawn and sent as per order. In NAD. Brought to ultrasound.

## 2024-03-19 NOTE — ED PROVIDER NOTE - CARE PROVIDER_API CALL
Sanket Hilario (DO)  Surgery  [address] [place] [address]  Phone: [phone]  Fax: [phone]  Follow Up Time:     Laura Hobson  Neurology  [address] [place] [address], [address] [place] [address]  Phone: [phone]  Fax: [phone]  Follow Up Time:

## 2024-03-19 NOTE — ED ADULT TRIAGE NOTE - CHIEF COMPLAINT QUOTE
Pt presents ambulatory to ED c/o nausea, vomiting, and right arm pain x 3 days becoming unbearble this morning. Pt states he has been unable to lift objects due to pain. Denies chest pain, sob, dizziness, change in gait, or blurred vision.

## 2024-03-19 NOTE — ED ADULT NURSE NOTE - NSFALLUNIVINTERV_ED_ALL_ED
Bed/Stretcher in lowest position, wheels locked, appropriate side rails in place/Call bell, personal items and telephone in reach/Instruct patient to call for assistance before getting out of bed/chair/stretcher/Non-slip footwear applied when patient is off stretcher/[place] to call system/Physically safe environment - no spills, clutter or unnecessary equipment/Purposeful proactive rounding/Room/bathroom lighting operational, light cord in reach

## 2024-03-19 NOTE — ED PROVIDER NOTE - PHYSICAL EXAMINATION
Dr Lopez Gen: Well appearing in NAD  Head: NC/AT  Neck: trachea midline  Resp:  No distress  Ext: rue healed incision in rue   Neuro:  A&O appears non focal  Skin:  Warm and dry as visualized  Psych:  Normal affect and mood

## 2024-03-19 NOTE — ED PROVIDER NOTE - CLINICAL SUMMARY MEDICAL DECISION MAKING FREE TEXT BOX
pt w/ right arm paresthesias after hidradenitis surgery.  Following with Dr. Kaur.  Likely neuropathic pain will start gabapentin.  I discussed the case with the surgical resident Dr. Pratt who states patient is known to be pain seeking, in the absence of acute findings can follow-up with Dr. Kaur in the office. pt w/ right arm paresthesias after hidradenitis surgery.  Following with Dr. Kaur.  Likely neuropathic pain will start gabapentin.  I discussed the case with the surgical resident Dr. Pratt who states patient is known to be pain seeking, in the absence of acute findings can follow-up with Dr. Kaur in the office.    Progress note 11:24 PM sono  with superficial thrombophlebitis, will discharge with NSAIDs, will also prescribe gabapentin for pain.

## 2024-03-19 NOTE — ED PROVIDER NOTE - NSFOLLOWUPINSTRUCTIONS_ED_ALL_ED_FT
Please follow-up with Dr. Hilario please start gabapentin as prescribed you may also follow-up with a neurologist. Please take ibuprofen 600mg every six hours for pain. You may also take gabapentin as prescribed.      Please follow-up with Dr. Hilario please start gabapentin as prescribed you may also follow-up with a neurologist.        SUPERFICIAL THROMBOPHLEBITIS - General Information    Superficial Thrombophlebitis    WHAT YOU NEED TO KNOW:    What is superficial thrombophlebitis (STP)? STP is inflammation of a vein just under your skin (superficial vein). The inflammation causes a blood clot to form in your vein. STP most often happens in your leg but may also happen in your arm or neck.  Thrombus and Embolus    What increases my risk for STP?    A condition that affects your blood vessels, such as varicose veins    A long-term IV catheter    Recent surgery    Injections of prescription or non-prescription drugs into veins    Obesity, pregnancy, or cancer    Limited activity caused by bed rest, a leg cast, or sitting for long periods    A blood disorder that makes your blood clot faster than normal, such as factor V Leiden mutation    In women, hormone replacement therapy or birth control pills  What are the signs and symptoms of STP?    A red line on the skin over the vein    Pain near the vein, and sometimes swelling    A fever if infection has spread from your vein to others places in your body    Warm, red skin that is tender to the touch    A hard area that feels like a knot  How is STP diagnosed? Your healthcare provider will examine you. You may need any of the following:    Blood tests may be done to check for infection and test how fast your blood clots.    Doppler ultrasound uses sound waves to check for blood clots or damage to your vein.  How is STP treated? Treatment may not be needed. STP usually goes away on its own. You may need any of the following for STP that continues:    Medicines may be given to treat an infection and decrease swelling and pain. Medicine may also be given to prevent more blood clots.    Removal of an IV catheter may be needed if your IV is infected.    Surgery may be needed to remove the blood clot or part of your vein. Surgery may also be needed to remove a collection of infected fluid from your vein.  What can I do to manage STP?    Apply a warm compress to your arm or leg. This will help decrease swelling and pain. Wet a washcloth in warm water. Do not use hot water. Apply the warm compress for 10 minutes. Repeat this 4 times each day.    Wear pressure stockings as directed. Pressure stockings improve blood flow and help prevent clots in your legs. Wear the stockings during the day. Do not wear them when you sleep.  Pressure Stockings       Elevate your leg or arm above the level of your heart as often as you can. This will help decrease swelling and pain. Prop your leg or arm on pillows or blankets to keep it elevated comfortably.  Elevate Leg    What can I do to prevent STP?    Maintain a healthy weight. This will help decrease your risk for another blood clot. Ask your healthcare provider what a healthy weight is for you. Ask him or her to help you create a weight loss plan if needed.    Do not smoke. Nicotine and other chemicals in cigarettes and cigars can damage blood vessels and increase your risk for blood clots. Ask your healthcare provider for information if you currently smoke and need help to quit. E-cigarettes or smokeless tobacco still contain nicotine. Talk to your healthcare provider before you use these products.    Change your body position or move around often. Move and stretch in your seat several times each hour if you travel by car or work at a desk. In an airplane, get up and walk every hour. Move your legs by tightening and releasing your leg muscles while sitting. You can move your legs while sitting by raising and lowering your heels. Keep your toes on the floor while you do this. You can also raise and lower your toes while keeping your heels on the floor.  DVT Prevention Heel Raise  DVT Prevention Toe Raise      Exercise regularly to help increase your blood flow. Walking is a good low-impact exercise. Talk to your healthcare provider about the best exercise plan for you.   Family Walking for Exercise      Do not inject non-prescription drugs. Talk to your healthcare provider if you need help to quit.  Call your local emergency number (911 in the [place]) if:    You feel lightheaded, short of breath, and have chest pain.    You cough up blood.  When should I seek immediate care?    Your arm or leg feels warm, tender, and painful. It may look swollen and red.    When should I call my doctor?    You have questions or concerns about your condition or care.    CARE AGREEMENT:    You have the right to help plan your care. Learn about your health condition and how it may be treated. Discuss treatment options with your healthcare providers to decide what care you want to receive. You always have the right to refuse treatment.    © [employer] 1973, 2024    	  back to top            © [facility] [employer]PDamion 1973, 2024

## 2024-03-21 ENCOUNTER — EMERGENCY (EMERGENCY)
Facility: HOSPITAL | Age: 39
LOS: 0 days | Discharge: HOME | End: 2024-03-21
Attending: EMERGENCY MEDICINE
Payer: MEDICAID

## 2024-03-21 VITALS
OXYGEN SATURATION: 100 % | DIASTOLIC BLOOD PRESSURE: 78 MMHG | TEMPERATURE: 98 F | HEART RATE: 90 BPM | RESPIRATION RATE: 18 BRPM | SYSTOLIC BLOOD PRESSURE: 117 MMHG

## 2024-03-21 VITALS — HEIGHT: 69 IN | WEIGHT: 160.06 LBS

## 2024-03-21 DIAGNOSIS — E11.40 TYPE 2 DIABETES MELLITUS WITH DIABETIC NEUROPATHY, UNSPECIFIED: ICD-10-CM

## 2024-03-21 DIAGNOSIS — K50.90 CROHN'S DISEASE, UNSPECIFIED, WITHOUT COMPLICATIONS: ICD-10-CM

## 2024-03-21 DIAGNOSIS — K60.3 ANAL FISTULA: Chronic | ICD-10-CM

## 2024-03-21 DIAGNOSIS — K62.89 OTHER SPECIFIED DISEASES OF ANUS AND RECTUM: ICD-10-CM

## 2024-03-21 DIAGNOSIS — Z98.89 OTHER SPECIFIED POSTPROCEDURAL STATES: Chronic | ICD-10-CM

## 2024-03-21 DIAGNOSIS — Z88.1 ALLERGY STATUS TO OTHER ANTIBIOTIC AGENTS STATUS: ICD-10-CM

## 2024-03-21 DIAGNOSIS — Z96.7 PRESENCE OF OTHER BONE AND TENDON IMPLANTS: Chronic | ICD-10-CM

## 2024-03-21 DIAGNOSIS — K60.4 RECTAL FISTULA: ICD-10-CM

## 2024-03-21 DIAGNOSIS — K62.5 HEMORRHAGE OF ANUS AND RECTUM: ICD-10-CM

## 2024-03-21 LAB
ALBUMIN SERPL ELPH-MCNC: 3.1 G/DL — LOW (ref 3.3–5)
ALP SERPL-CCNC: 84 U/L — SIGNIFICANT CHANGE UP (ref 40–120)
ALT FLD-CCNC: 13 U/L — SIGNIFICANT CHANGE UP (ref 12–78)
ANION GAP SERPL CALC-SCNC: 5 MMOL/L — SIGNIFICANT CHANGE UP (ref 5–17)
AST SERPL-CCNC: 8 U/L — LOW (ref 15–37)
BASOPHILS # BLD AUTO: 0 K/UL — SIGNIFICANT CHANGE UP (ref 0–0.2)
BASOPHILS NFR BLD AUTO: 0 % — SIGNIFICANT CHANGE UP (ref 0–2)
BILIRUB SERPL-MCNC: 0.2 MG/DL — SIGNIFICANT CHANGE UP (ref 0.2–1.2)
BUN SERPL-MCNC: 8 MG/DL — SIGNIFICANT CHANGE UP (ref 7–23)
CALCIUM SERPL-MCNC: 9.3 MG/DL — SIGNIFICANT CHANGE UP (ref 8.5–10.1)
CHLORIDE SERPL-SCNC: 101 MMOL/L — SIGNIFICANT CHANGE UP (ref 96–108)
CO2 SERPL-SCNC: 28 MMOL/L — SIGNIFICANT CHANGE UP (ref 22–31)
CREAT SERPL-MCNC: 0.8 MG/DL — SIGNIFICANT CHANGE UP (ref 0.5–1.3)
EGFR: 116 ML/MIN/1.73M2 — SIGNIFICANT CHANGE UP
EOSINOPHIL # BLD AUTO: 0.14 K/UL — SIGNIFICANT CHANGE UP (ref 0–0.5)
EOSINOPHIL NFR BLD AUTO: 1 % — SIGNIFICANT CHANGE UP (ref 0–6)
GLUCOSE SERPL-MCNC: 213 MG/DL — HIGH (ref 70–99)
HCT VFR BLD CALC: 38 % — LOW (ref 39–50)
HGB BLD-MCNC: 13.1 G/DL — SIGNIFICANT CHANGE UP (ref 13–17)
LYMPHOCYTES # BLD AUTO: 24 % — SIGNIFICANT CHANGE UP (ref 13–44)
LYMPHOCYTES # BLD AUTO: 3.34 K/UL — HIGH (ref 1–3.3)
MANUAL SMEAR VERIFICATION: SIGNIFICANT CHANGE UP
MCHC RBC-ENTMCNC: 33 PG — SIGNIFICANT CHANGE UP (ref 27–34)
MCHC RBC-ENTMCNC: 34.5 GM/DL — SIGNIFICANT CHANGE UP (ref 32–36)
MCV RBC AUTO: 95.7 FL — SIGNIFICANT CHANGE UP (ref 80–100)
MONOCYTES # BLD AUTO: 1.39 K/UL — HIGH (ref 0–0.9)
MONOCYTES NFR BLD AUTO: 10 % — SIGNIFICANT CHANGE UP (ref 2–14)
NEUTROPHILS # BLD AUTO: 8.9 K/UL — HIGH (ref 1.8–7.4)
NEUTROPHILS NFR BLD AUTO: 64 % — SIGNIFICANT CHANGE UP (ref 43–77)
NRBC # BLD: 0 /100 WBCS — SIGNIFICANT CHANGE UP (ref 0–0)
NRBC # BLD: SIGNIFICANT CHANGE UP /100 WBCS (ref 0–0)
PLAT MORPH BLD: NORMAL — SIGNIFICANT CHANGE UP
PLATELET # BLD AUTO: 384 K/UL — SIGNIFICANT CHANGE UP (ref 150–400)
POTASSIUM SERPL-MCNC: 4.5 MMOL/L — SIGNIFICANT CHANGE UP (ref 3.5–5.3)
POTASSIUM SERPL-SCNC: 4.5 MMOL/L — SIGNIFICANT CHANGE UP (ref 3.5–5.3)
PROT SERPL-MCNC: 7.5 GM/DL — SIGNIFICANT CHANGE UP (ref 6–8.3)
RBC # BLD: 3.97 M/UL — LOW (ref 4.2–5.8)
RBC # FLD: 13.6 % — SIGNIFICANT CHANGE UP (ref 10.3–14.5)
RBC BLD AUTO: NORMAL — SIGNIFICANT CHANGE UP
SODIUM SERPL-SCNC: 134 MMOL/L — LOW (ref 135–145)
VARIANT LYMPHS # BLD: 1 % — SIGNIFICANT CHANGE UP (ref 0–6)
WBC # BLD: 13.9 K/UL — HIGH (ref 3.8–10.5)
WBC # FLD AUTO: 13.9 K/UL — HIGH (ref 3.8–10.5)

## 2024-03-21 PROCEDURE — 99284 EMERGENCY DEPT VISIT MOD MDM: CPT

## 2024-03-21 PROCEDURE — 36415 COLL VENOUS BLD VENIPUNCTURE: CPT

## 2024-03-21 PROCEDURE — 85025 COMPLETE CBC W/AUTO DIFF WBC: CPT

## 2024-03-21 PROCEDURE — 80053 COMPREHEN METABOLIC PANEL: CPT

## 2024-03-21 PROCEDURE — 99284 EMERGENCY DEPT VISIT MOD MDM: CPT | Mod: 25

## 2024-03-21 PROCEDURE — 96374 THER/PROPH/DIAG INJ IV PUSH: CPT

## 2024-03-21 RX ORDER — LIDOCAINE 4 G/100G
1 CREAM TOPICAL ONCE
Refills: 0 | Status: COMPLETED | OUTPATIENT
Start: 2024-03-21 | End: 2024-03-21

## 2024-03-21 RX ORDER — SODIUM CHLORIDE 9 MG/ML
1000 INJECTION INTRAMUSCULAR; INTRAVENOUS; SUBCUTANEOUS ONCE
Refills: 0 | Status: COMPLETED | OUTPATIENT
Start: 2024-03-21 | End: 2024-03-21

## 2024-03-21 RX ORDER — OXYCODONE HYDROCHLORIDE 5 MG/1
10 TABLET ORAL ONCE
Refills: 0 | Status: DISCONTINUED | OUTPATIENT
Start: 2024-03-21 | End: 2024-03-21

## 2024-03-21 RX ORDER — MORPHINE SULFATE 50 MG/1
6 CAPSULE, EXTENDED RELEASE ORAL ONCE
Refills: 0 | Status: DISCONTINUED | OUTPATIENT
Start: 2024-03-21 | End: 2024-03-21

## 2024-03-21 RX ADMIN — SODIUM CHLORIDE 1000 MILLILITER(S): 9 INJECTION INTRAMUSCULAR; INTRAVENOUS; SUBCUTANEOUS at 06:28

## 2024-03-21 RX ADMIN — SODIUM CHLORIDE 1000 MILLILITER(S): 9 INJECTION INTRAMUSCULAR; INTRAVENOUS; SUBCUTANEOUS at 04:45

## 2024-03-21 RX ADMIN — MORPHINE SULFATE 6 MILLIGRAM(S): 50 CAPSULE, EXTENDED RELEASE ORAL at 04:45

## 2024-03-21 RX ADMIN — LIDOCAINE 1 APPLICATION(S): 4 CREAM TOPICAL at 05:43

## 2024-03-21 RX ADMIN — OXYCODONE HYDROCHLORIDE 10 MILLIGRAM(S): 5 TABLET ORAL at 06:28

## 2024-03-21 NOTE — ED PROVIDER NOTE - NSTIMEPROVIDERCAREINITIATE_GEN_ER
Drink a glass of water upon waking    Try to eat your first meal within an hour of waking. Ideally your meals would be no more than 4-5 hours apart during the day.    If you have to go longer than 4-5 hours between meals, that is when a small nutrient snack is recommended     Drink water with meals    Focus on having a palm size portion of lean protein at each meal    Work on increasing non starchy vegetables at meals. The ultimate goal is half of your plate    Limit your starchy items to no more than 1 cup total or 2 pieces ( less is better) 10 chips equals 1 piece. Try to choose minimally processed grains and starches most often. We discussed keeping your carbs at 30 grams until you increase your activity then 30-45     Stop eating 2-3 hours before bed    Try to add some activity at least every other day . Something is always better than nothing. Try to work up to 30-45 minutes a day about 4 or more days a week     Avoid distracted eating    Follow up with me as needed     
21-Mar-2024 03:17

## 2024-03-21 NOTE — ED PROVIDER NOTE - CARE PROVIDER_API CALL
Bear Hairston  Colon/Rectal Surgery  [address] [facility] [facility], [place] [address]  Phone: [phone]  Fax: [phone]  Follow Up Time:     Yasmeen Merino  Colon/Rectal Surgery  [address], [place] [address]  Phone: [phone]  Fax: [phone]  Follow Up Time:     Lizzy Bates  Colon/Rectal Surgery  [address] [facility], [address]  [place] [address]  Phone: [phone]  Fax: [phone]  Follow Up Time:     Richard Abreu  Colon/Rectal Surgery  [address], [address]  [place] [address]  Phone: [phone]  Fax: [phone]  Follow Up Time:

## 2024-03-21 NOTE — ED PROVIDER NOTE - PROVIDER TOKENS
PROVIDER:[TOKEN:[[id number]]],PROVIDER:[TOKEN:[[id number]]],PROVIDER:[TOKEN:[[id number]]],PROVIDER:[TOKEN:[[id number]]]

## 2024-03-21 NOTE — ED PROVIDER NOTE - OBJECTIVE STATEMENT
Pt. is a 37 yo M with PMhx of Crohns disease, hx of DM, neuropathy, superficial thrombophlebitis, hydradenitis suppurativa s/p I&D of axillary abscess 1 month ago, now s/p Ceton drains placed in rectum for rectal fistulas in ED at [facility] 2 wks ago, presents with rectal pain, drainage and bleeding s/p wiping last night. Patient states he thinks he wiped too hard and pulled out a Ceton drain.  He saw the drain in the toilet.  Patient denies fever, abdominal pain, vomiting, dizziness or trouble urinating.  Has had Ceton drains placed in the past by colorectal surgeons at [facility] in November 2023. States he does not want to go to the city anymore for his colorectal care, wants local colorectal surgeon that takes his insurance.

## 2024-03-21 NOTE — ED PROVIDER NOTE - PATIENT PORTAL LINK FT
You can access the FollowMyHealth Patient Portal offered by [facility] by registering at the following website: [email or web address]. By joining [employer]’s FollowMyHealth portal, you will also be able to view your health information using other applications (apps) compatible with our system.

## 2024-03-21 NOTE — ED ADULT TRIAGE NOTE - CHIEF COMPLAINT QUOTE
Pt ambulates to ED co pain and bleeding in rectum after accidentally removing a surgically placed drain. Pt states he had a surgery in rectum x2 weeks ago and accidentally removed one of the drains while wiping. Pt endorsing severe rectal pain and bleeding, difficulty sitting, denies CP, SOB, dizziness. Allergy to Cipro, PMHx Chron's disease. AOx4.

## 2024-03-21 NOTE — ED PROVIDER NOTE - NSFOLLOWUPINSTRUCTIONS_ED_ALL_ED_FT
Log Out.  [employer]® CareNotes®  :  [facility]        RECTAL FISTULOTOMY - General Information    Rectal Fistulotomy    WHAT YOU NEED TO KNOW:    What do I need to know about a rectal fistulotomy? A rectal fistulotomy is surgery to heal a fistula near your anus. A rectal fistula is a tunnel-like wound that forms next to your anus. It spreads from inside your rectum to the skin surface near your anus. A rectal fistulotomy is used to open and drain the fistula. The wound heals by filling in with scar tissue. Your surgeon may also drain an abscess during your surgery.    How do I prepare for surgery?    Your surgeon will tell you how to prepare. He or she may tell you not to eat or drink anything after midnight on the day of surgery. Arrange to have someone drive you home after you are discharged from the hospital.    You may need to take an antibiotic before surgery to prevent a bacterial infection.    You may need lab tests or an ultrasound before your surgery. An ultrasound is a machine that uses sound waves to take pictures of your rectum and anus. This will help your surgeon see inside your rectum. Your surgeon may need to look inside your rectum with a scope to find your fistula. This scope is a long, bendable tube with a camera on the end. If this is painful, you may be given this exam during surgery when you are asleep. Ask your surgeon for more information about these and other tests you may need.    Tell your surgeon if you have any other medical conditions, such as bleeding problems or cancer. Tell him or her if you had any anal or rectal surgeries.    Your bowel may need to be emptied and cleaned out before the surgery. Healthcare providers will give you a liquid medicine called an enema. This will be put into your rectum to help empty your bowel.    An anesthesiologist may talk to you before your surgery. This healthcare provider may give you medicine through your IV to make you sleepy before your surgery and to keep you asleep during your surgery. You may instead get medicine through an injection in your spine. This will make you numb from the waist down. Tell healthcare providers if you or anyone in your family has had problems with anesthesia.  What will happen during surgery?    You will be placed on your stomach or on your back with your feet in stirrups. Your surgeon will use a finger to guide a probe into your fistula. He or she may also need to inject contrast liquid into the fistula to help find the opening in your rectum. Your surgeon will insert a tool to hold the fistula open. This will help the probe move through it more easily.    Your surgeon will use the probe as a guide to cut along the length of the fistula. He or she will use a tool to scrape out any tissue buildup from your fistula. He or she may also need to cut open and drain any abscesses (pockets of pus). The skin around the fistula will be held open with stitches, surgical glue, or heat (cauterization). This allows the fistula to heal from the inside out. Your stitches will dissolve and will not need to be removed.    Your surgeon may inject numbing medicine into your incision area. This will help ease pain and stop the bleeding when you wake up. He or she may also place a thread-like material called a seton inside your fistula. Setons allow fluid to drain out of the fistula as the area heals. Your healthcare provider may pack the area inside your rectum with gauze to help absorb any fluid or blood. He or she may also cover the wound on your anus with a bandage.  What should I expect after surgery? You will be taken to a recovery area where healthcare providers will watch you until you are alert. You may be able to go home if you have enough fluids in your body. You may need to stay in the hospital overnight if you have severe pain or need fluids.    Medicines may be given to prevent or treat pain or a bacterial infection. You may also need medicine to prevent constipation. This medicine will make it easier to have bowel movements.    A sitz bath may be used before you go home. A sitz bath is a pan that holds warm water and fits in the toilet bowl. Sitz baths help keep your wound clean and may ease your pain.    You will be able to drink liquids and eat certain foods when your stomach function returns after surgery. You may be given ice chips at first. Then you will get liquids such as water, broth, juice, and clear soft drinks. When you can eat soft foods easily, you may slowly begin to eat solid foods.  What are the risks of a rectal fistulotomy? You may have severe pain after your surgery. You may lose bowel movement or gas control if the sphincter in your anus is cut. You may develop a skin infection or bleeding after surgery. Rarely, you may not be able to urinate as easily as before. You may also get a urinary tract infection. Your rectal fistula may come back, even after surgery. You may need more than one surgery to help heal the fistula. You may have chronic (long-term) drainage. Your healing may be delayed if you have Crohn disease.    CARE AGREEMENT:    You have the right to help plan your care. Learn about your health condition and how it may be treated. Discuss treatment options with your healthcare providers to decide what care you want to receive. You always have the right to refuse treatment.    © [employer] 1973, 2024    	  back to top            © [employer] 1973, 2024

## 2024-03-21 NOTE — ED PROVIDER NOTE - CLINICAL SUMMARY MEDICAL DECISION MAKING FREE TEXT BOX
37 yo M with Crohns and rectal pain s/p Seton drain placement.  Believes Seton dislodged when wiping and saw blood and discharge.  No active bleeding and Seton drains in place without any evidence of abnormality.  Lidocaine cream and pain meds ordered.  Labs ordered and if stable, patient can f/u with a colorectal surgeon outpatient.

## 2024-03-21 NOTE — ED PROVIDER NOTE - NSPTACCESSSVCSAPPTDETAILS_ED_ALL_ED_FT
Hx of Crohns with recent Fistulotomy; seton drains in place by [facility]; that surgeon doesn't take his insurance; needs [facility] colorectal follow up.

## 2024-03-21 NOTE — ED PROVIDER NOTE - GASTROINTESTINAL, MLM
Abdomen soft, non-tender, no guarding. RECTAL EXAM: +Ceton tubing intact and no evidence of gross blood; Scant mucous drainage.; slight epidermal and anal erythema.

## 2024-03-21 NOTE — ED PROVIDER NOTE - CARE PROVIDERS DIRECT ADDRESSES
[email or web address]

## 2024-03-21 NOTE — ED ADULT TRIAGE NOTE - WEIGHT METHOD
Detail Level: Zone Plan: Advised patient to at follow up with ophthalmologist and request discuss possible culture to r/o chlamydia /gonorrhea.  Patient will notify office once he sees ophthalmologist.\\nInformed patient this may still be a flare secondary to Dupixent, hesitant to restart duxient as it may make conjunctivitis worse. \\nCotinue Z melinda as prescribed and systane eye drops. stated

## 2024-03-21 NOTE — ED PROVIDER NOTE - CPE EDP GASTRO NORM
-- DO NOT REPLY / DO NOT REPLY ALL --  -- Message is from Engagement Center Operations (ECO) --    General Patient Message: Danielle is calling because Aiden has hives all over his body; started about 2 weeks ago.  They thought it was allergy to fabric softener so everything was washed again without fabric softener and he is still having hives and itchiness.  No difficulty breathing. Asking for a phone call to discuss.      Caller Information         Type Contact Phone/Fax    01/15/2024 12:38 PM CST Phone (Incoming) DANIELLE RICE (Emergency Contact) [phone]          Alternative phone number: none    Can a detailed message be left? Yes    Message Turnaround:                normal...

## 2024-03-21 NOTE — ED ADULT NURSE NOTE - OBJECTIVE STATEMENT
pt is A/Ox4 from home and is ambulatory. pt to the ED with c/o rectal pain and " rectal drains falling out" pt with hx of Chroens dx and per pt, pt had . pt with PMH. pt respirations even and unlabored. pt in NAD. Per MD orders pt IV placed, labs sent, Cardiac monitor placed, pt medicated and EKG completed. pt is A/Ox4 from home and is ambulatory. pt to the ED with c/o rectal pain and " rectal drains falling out with bleeding and pus." pt with hx of Chron' s dx and states drains recently placed at [facility]. pt respirations even and unlabored. pt in NAD.

## 2024-03-21 NOTE — ED PROVIDER NOTE - NS_EDPROVIDERDISPOUSERTYPE_ED_A_ED
Quality 131: Pain Assessment And Follow-Up: Pain assessment documented as positive using a standardized tool AND a follow-up plan is documented Detail Level: Detailed Attending Attestation (For Attendings USE Only)...

## 2024-03-28 ENCOUNTER — INPATIENT (INPATIENT)
Facility: HOSPITAL | Age: 39
LOS: 2 days | Discharge: HOME | DRG: 245 | End: 2024-03-31
Attending: STUDENT IN AN ORGANIZED HEALTH CARE EDUCATION/TRAINING PROGRAM | Admitting: INTERNAL MEDICINE
Payer: MEDICAID

## 2024-03-28 VITALS — WEIGHT: 160.06 LBS | HEIGHT: 69 IN

## 2024-03-28 DIAGNOSIS — Z98.89 OTHER SPECIFIED POSTPROCEDURAL STATES: Chronic | ICD-10-CM

## 2024-03-28 DIAGNOSIS — Z96.7 PRESENCE OF OTHER BONE AND TENDON IMPLANTS: Chronic | ICD-10-CM

## 2024-03-28 DIAGNOSIS — K60.3 ANAL FISTULA: Chronic | ICD-10-CM

## 2024-03-28 LAB
ALBUMIN SERPL ELPH-MCNC: 3 G/DL — LOW (ref 3.3–5)
ALP SERPL-CCNC: 87 U/L — SIGNIFICANT CHANGE UP (ref 40–120)
ALT FLD-CCNC: 17 U/L — SIGNIFICANT CHANGE UP (ref 12–78)
ANION GAP SERPL CALC-SCNC: 9 MMOL/L — SIGNIFICANT CHANGE UP (ref 5–17)
APPEARANCE UR: CLEAR — SIGNIFICANT CHANGE UP
AST SERPL-CCNC: 12 U/L — LOW (ref 15–37)
BASOPHILS # BLD AUTO: 0.05 K/UL — SIGNIFICANT CHANGE UP (ref 0–0.2)
BASOPHILS NFR BLD AUTO: 0.3 % — SIGNIFICANT CHANGE UP (ref 0–2)
BILIRUB SERPL-MCNC: <0.1 MG/DL — LOW (ref 0.2–1.2)
BILIRUB UR-MCNC: NEGATIVE — SIGNIFICANT CHANGE UP
BUN SERPL-MCNC: 13 MG/DL — SIGNIFICANT CHANGE UP (ref 7–23)
CALCIUM SERPL-MCNC: 9.5 MG/DL — SIGNIFICANT CHANGE UP (ref 8.5–10.1)
CHLORIDE SERPL-SCNC: 101 MMOL/L — SIGNIFICANT CHANGE UP (ref 96–108)
CO2 SERPL-SCNC: 25 MMOL/L — SIGNIFICANT CHANGE UP (ref 22–31)
COLOR SPEC: YELLOW — SIGNIFICANT CHANGE UP
CREAT SERPL-MCNC: 1.02 MG/DL — SIGNIFICANT CHANGE UP (ref 0.5–1.3)
DIFF PNL FLD: NEGATIVE — SIGNIFICANT CHANGE UP
EGFR: 96 ML/MIN/1.73M2 — SIGNIFICANT CHANGE UP
EOSINOPHIL # BLD AUTO: 0.07 K/UL — SIGNIFICANT CHANGE UP (ref 0–0.5)
EOSINOPHIL NFR BLD AUTO: 0.4 % — SIGNIFICANT CHANGE UP (ref 0–6)
GLUCOSE SERPL-MCNC: 181 MG/DL — HIGH (ref 70–99)
GLUCOSE UR QL: NEGATIVE MG/DL — SIGNIFICANT CHANGE UP
HCT VFR BLD CALC: 37.4 % — LOW (ref 39–50)
HGB BLD-MCNC: 12.4 G/DL — LOW (ref 13–17)
IMM GRANULOCYTES NFR BLD AUTO: 0.6 % — SIGNIFICANT CHANGE UP (ref 0–0.9)
KETONES UR-MCNC: NEGATIVE MG/DL — SIGNIFICANT CHANGE UP
LEUKOCYTE ESTERASE UR-ACNC: NEGATIVE — SIGNIFICANT CHANGE UP
LIDOCAIN IGE QN: 9 U/L — LOW (ref 13–75)
LYMPHOCYTES # BLD AUTO: 1.65 K/UL — SIGNIFICANT CHANGE UP (ref 1–3.3)
LYMPHOCYTES # BLD AUTO: 10.3 % — LOW (ref 13–44)
MCHC RBC-ENTMCNC: 31.8 PG — SIGNIFICANT CHANGE UP (ref 27–34)
MCHC RBC-ENTMCNC: 33.2 GM/DL — SIGNIFICANT CHANGE UP (ref 32–36)
MCV RBC AUTO: 95.9 FL — SIGNIFICANT CHANGE UP (ref 80–100)
MONOCYTES # BLD AUTO: 1.11 K/UL — HIGH (ref 0–0.9)
MONOCYTES NFR BLD AUTO: 6.9 % — SIGNIFICANT CHANGE UP (ref 2–14)
NEUTROPHILS # BLD AUTO: 13.06 K/UL — HIGH (ref 1.8–7.4)
NEUTROPHILS NFR BLD AUTO: 81.5 % — HIGH (ref 43–77)
NITRITE UR-MCNC: NEGATIVE — SIGNIFICANT CHANGE UP
PH UR: 5.5 — SIGNIFICANT CHANGE UP (ref 5–8)
PLATELET # BLD AUTO: 500 K/UL — HIGH (ref 150–400)
POTASSIUM SERPL-MCNC: 4 MMOL/L — SIGNIFICANT CHANGE UP (ref 3.5–5.3)
POTASSIUM SERPL-SCNC: 4 MMOL/L — SIGNIFICANT CHANGE UP (ref 3.5–5.3)
PROT SERPL-MCNC: 8 GM/DL — SIGNIFICANT CHANGE UP (ref 6–8.3)
PROT UR-MCNC: NEGATIVE MG/DL — SIGNIFICANT CHANGE UP
RBC # BLD: 3.9 M/UL — LOW (ref 4.2–5.8)
RBC # FLD: 13.3 % — SIGNIFICANT CHANGE UP (ref 10.3–14.5)
SODIUM SERPL-SCNC: 135 MMOL/L — SIGNIFICANT CHANGE UP (ref 135–145)
SP GR SPEC: >1.03 — HIGH (ref 1–1.03)
UROBILINOGEN FLD QL: 0.2 MG/DL — SIGNIFICANT CHANGE UP (ref 0.2–1)
WBC # BLD: 16.04 K/UL — HIGH (ref 3.8–10.5)
WBC # FLD AUTO: 16.04 K/UL — HIGH (ref 3.8–10.5)

## 2024-03-28 PROCEDURE — 74177 CT ABD & PELVIS W/CONTRAST: CPT | Mod: 26,MC

## 2024-03-28 PROCEDURE — 99285 EMERGENCY DEPT VISIT HI MDM: CPT

## 2024-03-28 RX ORDER — SODIUM CHLORIDE 9 MG/ML
1000 INJECTION INTRAMUSCULAR; INTRAVENOUS; SUBCUTANEOUS ONCE
Refills: 0 | Status: COMPLETED | OUTPATIENT
Start: 2024-03-28 | End: 2024-03-28

## 2024-03-28 RX ORDER — MORPHINE SULFATE 50 MG/1
4 CAPSULE, EXTENDED RELEASE ORAL ONCE
Refills: 0 | Status: DISCONTINUED | OUTPATIENT
Start: 2024-03-28 | End: 2024-03-28

## 2024-03-28 RX ADMIN — MORPHINE SULFATE 4 MILLIGRAM(S): 50 CAPSULE, EXTENDED RELEASE ORAL at 23:20

## 2024-03-28 RX ADMIN — SODIUM CHLORIDE 1000 MILLILITER(S): 9 INJECTION INTRAMUSCULAR; INTRAVENOUS; SUBCUTANEOUS at 23:20

## 2024-03-28 NOTE — CONSULT NOTE ADULT - ASSESSMENT
38 M with h/o Crohn's disease, perianal fistulas s/p Seton placement for rectal fistulas in ED at [facility] 2 wks ago. Colorectal surgery team consulted for perianal fistula.   Patient is well known to the service. Colorectal surgeon in [facility].     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 No fluid collection.    On exam, Seton in place, active pus drainage, no appreciable fluids collection or fluctuance.    P:  - No acute colorectal surgery intervention needed at this time, actively draining  - Would recommend to follow up to original CRS who did Seton and ileostomy     Plan discussed with Dr Merino

## 2024-03-28 NOTE — ED ADULT NURSE NOTE - NSFALLUNIVINTERV_ED_ALL_ED
Bed/Stretcher in lowest position, wheels locked, appropriate side rails in place/Call bell, personal items and telephone in reach/Instruct patient to call for assistance before getting out of bed/chair/stretcher/Non-slip footwear applied when patient is off stretcher/[place] to call system/Physically safe environment - no spills, clutter or unnecessary equipment/Purposeful proactive rounding/Room/bathroom lighting operational, light cord in reach

## 2024-03-28 NOTE — ED ADULT TRIAGE NOTE - CHIEF COMPLAINT QUOTE
Pt ambulatory to ED with complaints of rectal pain x yesterday. denies trauma to area. Pt endorses pain to sit.  PMH Crohn's Dx. Pt took tylenol 2hrs PTA. no s/s of distress

## 2024-03-28 NOTE — ED STATDOCS - NSICDXPASTSURGICALHX_GEN_ALL_CORE_FT
[Time Spent: ___ minutes] : I have spent [unfilled] minutes of time on the encounter. [>50% of the face to face encounter time was spent on counseling and/or coordination of care for ___] : Greater than 50% of the face to face encounter time was spent on counseling and/or coordination of care for [unfilled] PAST SURGICAL HISTORY:  History of colonoscopy (2014)    Perianal fistula repair in 2002    S/P ORIF (open reduction internal fixation) fracture (Right ankle, 2014)

## 2024-03-28 NOTE — ED ADULT NURSE NOTE - GASTROINTESTINAL ASSESSMENT
Pino Sargent is a 71 year old male here for  No chief complaint on file.    Denies latex allergy or sensitivity.    Medication verified, no changes.  PCP and Pharmacy verified.    Social History     Tobacco Use   Smoking Status Former   • Packs/day: 2.00   • Years: 35.00   • Pack years: 70.00   • Types: Cigarettes   • Quit date: 2002   • Years since quittin.8   Smokeless Tobacco Never     Advance Directives Filed: Yes    ECOG:   ECOG   ECOG Performance Status        Vitals:    There were no vitals taken for this visit.    These vital signs are:  Not within defined parameters:  The following abnormal VS were verbally communicated to bp and pulse.    Height: No.  Ht Readings from Last 1 Encounters:   02/10/23 6' 1.23\" (1.86 m)     Weight:Yes, shoes on.  Wt Readings from Last 3 Encounters:   23 85.5 kg (188 lb 7.9 oz)   23 92.2 kg (203 lb 4.9 oz)   23 91.7 kg (202 lb 4.4 oz)       BMI: There is no height or weight on file to calculate BMI.    REVIEW OF SYSTEMS  GENERAL:  Patient denies headache, fevers, chills, night sweats, change in appetite, weight loss, dizziness, but complains of: excessive fatigue  ALLERGIC/IMMUNOLOGIC: Verified allergies: Yes  EYES:  Patient denies significant visual difficulties, double vision, blurred vision  ENT/MOUTH: Patient denies problems with hearing, sore throat, sinus drainage, mouth sores  ENDOCRINE:  Patient denies diabetes, hormone replacement, hot flashes, but complains of: thyroid disease  HEMATOLOGIC/LYMPHATIC: Patient denies easy bruising, bleeding, tender lymph nodes, swollen lymph nodes  BREASTS: Patient denies abnormal masses of breast, nipple discharge, pain  RESPIRATORY:  Patient denies lung pain with breathing, cough, coughing up blood, shortness of breath  CARDIOVASCULAR:  Patient denies anginal chest pain, palpitations, shortness of breath when lying flat, peripheral edema  GASTROINTESTINAL: Patient denies abdominal pain , nausea, vomiting,  diarrhea, GI bleeding, heartburn, sensation of feeling full, difficulty swallowing, but complains of: constipation and change in bowel habits  : Patient denies blood in the urine, burning with urination, frequency, urgency, hesitancy, incontinence  MUSCULOSKELETAL:  Patient denies joint pain, bone pain, joint swelling, redness, decreased range of motion  SKIN:  Patient denies chronic rashes, inflammation, ulcerations, skin changes, itching  NEUROLOGIC:  Patient denies loss of balance, areas of focal weakness, abnormal gait, sensory problems, numbness, tingling  PSYCHIATRIC: Patient denies insomnia, depression, but complains of: insomnia and anxiety    This patient reported abnormal symptoms that needed immediate verbal communication: Yes, these symptoms included patient states that he has lower R stomach pain that goes arround to the right side of back and was told that he meed to use stool softner to help his bowels bnut states it has done nothing. and were reported to Yes, these symptoms included patient states that he has lower R stomach pain that goes arround to the right side of back and was told that he meed to use stool softner to help his bowels bnut states it has done nothing. and were reported to .   - - -

## 2024-03-28 NOTE — ED STATDOCS - PROGRESS NOTE DETAILS
Katerina Suresh for attending Dr. Orellana: 39 y/o male with PMHx of Crohns, perianal fistulas on Augmentin presents to the ED c/o rectal bleeding since yesterday. States he saw an MD at [facility] for feeling of rectal pocket for 3-4 days, MD stated there were no findings, dc 2 days ago, did not improve. Last imaging of area was last week, showed tissue damage and inflammation at rectum, no fistula or abscess. States he feels like the bleeding is coming from a pocket. Has ostomy bag, has had it for 4 years, no blood in bag. No AC. GI Dr. Pearl. Colorectal surgeon Dr. Ortega. Will send to [facility] for further evaluation.

## 2024-03-28 NOTE — ED ADULT NURSE NOTE - NSFALLRISKFACTORS_ED_ALL_ED
No indicators present Opioid Pregnancy And Lactation Text: These medications can lead to premature delivery and should be avoided during pregnancy. These medications are also present in breast milk in small amounts.

## 2024-03-28 NOTE — ED STATDOCS - CARE PLAN
1 Principal Discharge DX:	Crohn's disease  Secondary Diagnosis:	Rectal fistula  Secondary Diagnosis:	Diabetes mellitus

## 2024-03-28 NOTE — ED ADULT NURSE NOTE - OBJECTIVE STATEMENT
pt. came in from home with c/o rectal pain for 2-3 days as claimed, denies trauma, fever, chest pain and SOB, known with Crohn's dse., took tylenol 2hrs ago, alert and oriented, safety maintained applied.

## 2024-03-28 NOTE — CONSULT NOTE ADULT - SUBJECTIVE AND OBJECTIVE BOX
38 M with h/o Crohn's disease, perianal fistulas s/p diversion at [facility] (3yrs ago) and seton placement at [facility], proctitis, pancreatitis, and EtOH abuse presents to the ED with rectal pain and feeling of fullness.  Pt is s/p Seton placement in rectum for rectal fistulas in ED at [facility] 2 wks ago. Colorectal surgery team consulted for perianal fistula.     Patient is well known to the service. Patient goes to [facility] and [facility] for management. Patient states stop taking Stelara (Ustekinumab) as well as Humira for Crohn's and Hidradenitis ~6 months ago due to infectious side effects, based on Colorectal surgeon in [facility]. Denies systemic symptoms such as fever or chills. Ostomy well functioning, abdomen benign. No other complaints.      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 No fluid collection.        Vital Signs Last 24 Hrs  T(C): 36.4 (28 Mar 2024 23:20), Max: 36.7 (28 Mar 2024 21:03)  T(F): 97.6 (28 Mar 2024 23:20), Max: 98 (28 Mar 2024 21:03)  HR: 86 (28 Mar 2024 23:20) (86 - 110)  BP: 113/72 (28 Mar 2024 23:20) (113/72 - 124/90)  BP(mean): 84 (28 Mar 2024 23:20) (82 - 96)  RR: 18 (28 Mar 2024 23:20) (18 - 19)  SpO2: 100% (28 Mar 2024 23:20) (98% - 100%)    Parameters below as of 28 Mar 2024 23:20  Patient On (Oxygen Delivery Method): room air        Labs:             12.4   16.04 )-----------( 500      ( 28 Mar 2024 21:11 )             37.4     CBC Full  -  ( 28 Mar 2024 21:11 )  WBC Count : 16.04 K/uL  RBC Count : 3.90 M/uL  Hemoglobin : 12.4 g/dL  Hematocrit : 37.4 %  Platelet Count - Automated : 500 K/uL  Mean Cell Volume : 95.9 fl  Mean Cell Hemoglobin : 31.8 pg  Mean Cell Hemoglobin Concentration : 33.2 gm/dL  Auto Neutrophil # : 13.06 K/uL  Auto Lymphocyte # : 1.65 K/uL  Auto Monocyte # : 1.11 K/uL  Auto Eosinophil # : 0.07 K/uL  Auto Basophil # : 0.05 K/uL  Auto Neutrophil % : 81.5 %  Auto Lymphocyte % : 10.3 %  Auto Monocyte % : 6.9 %  Auto Eosinophil % : 0.4 %  Auto Basophil % : 0.3 %    03-28    135  |  101  |  13  ----------------------------<  181<H>  4.0   |  25  |  1.02    Ca    9.5      28 Mar 2024 21:11    TPro  8.0  /  Alb  3.0<L>  /  TBili  <0.1<L>  /  DBili  x   /  AST  12<L>  /  ALT  17  /  AlkPhos  87  03-28    LIVER FUNCTIONS - ( 28 Mar 2024 21:11 )  Alb: 3.0 g/dL / Pro: 8.0 gm/dL / ALK PHOS: 87 U/L / ALT: 17 U/L / AST: 12 U/L / GGT: x                 Meds:      Physical exam:  GCS of 15, AOx3  Airway is patent  Breathing is symmetric and unlabored  Psych: normal affect  HEENT: Normocephalic, atraumatic, EOMI  Chest: no gross rib pathology or tenderness to exam.  Respiratory: Respiratory Effort normal; no wheezes  ABD:  soft, nontender, non distended, Ostomy in place  Rectum: Setons in place, purulent drainage from multiple fistulas, no area of fluctuance appreciated, refused SHELLIE due to pain  Musculoskeletal: All digits are warm and well perfused. Pt demonstrates grossly intact sensoromotor function.

## 2024-03-29 DIAGNOSIS — Z93.3 COLOSTOMY STATUS: Chronic | ICD-10-CM

## 2024-03-29 DIAGNOSIS — K50.90 CROHN'S DISEASE, UNSPECIFIED, WITHOUT COMPLICATIONS: ICD-10-CM

## 2024-03-29 DIAGNOSIS — Z87.81 PERSONAL HISTORY OF (HEALED) TRAUMATIC FRACTURE: Chronic | ICD-10-CM

## 2024-03-29 LAB
GLUCOSE BLDC GLUCOMTR-MCNC: 168 MG/DL — HIGH (ref 70–99)
GLUCOSE BLDC GLUCOMTR-MCNC: 177 MG/DL — HIGH (ref 70–99)
GLUCOSE BLDC GLUCOMTR-MCNC: 208 MG/DL — HIGH (ref 70–99)
GLUCOSE BLDC GLUCOMTR-MCNC: 210 MG/DL — HIGH (ref 70–99)

## 2024-03-29 PROCEDURE — 80048 BASIC METABOLIC PNL TOTAL CA: CPT

## 2024-03-29 PROCEDURE — 87040 BLOOD CULTURE FOR BACTERIA: CPT

## 2024-03-29 PROCEDURE — 82962 GLUCOSE BLOOD TEST: CPT

## 2024-03-29 PROCEDURE — 36415 COLL VENOUS BLD VENIPUNCTURE: CPT

## 2024-03-29 PROCEDURE — 85027 COMPLETE CBC AUTOMATED: CPT

## 2024-03-29 PROCEDURE — 99223 1ST HOSP IP/OBS HIGH 75: CPT

## 2024-03-29 PROCEDURE — 83036 HEMOGLOBIN GLYCOSYLATED A1C: CPT

## 2024-03-29 RX ORDER — DEXTROSE 50 % IN WATER 50 %
25 SYRINGE (ML) INTRAVENOUS ONCE
Refills: 0 | Status: DISCONTINUED | OUTPATIENT
Start: 2024-03-29 | End: 2024-03-31

## 2024-03-29 RX ORDER — DEXTROSE 50 % IN WATER 50 %
15 SYRINGE (ML) INTRAVENOUS ONCE
Refills: 0 | Status: DISCONTINUED | OUTPATIENT
Start: 2024-03-29 | End: 2024-03-31

## 2024-03-29 RX ORDER — PIPERACILLIN AND TAZOBACTAM 4; .5 G/20ML; G/20ML
3.38 INJECTION, POWDER, LYOPHILIZED, FOR SOLUTION INTRAVENOUS ONCE
Refills: 0 | Status: COMPLETED | OUTPATIENT
Start: 2024-03-29 | End: 2024-03-29

## 2024-03-29 RX ORDER — SODIUM CHLORIDE 9 MG/ML
1000 INJECTION INTRAMUSCULAR; INTRAVENOUS; SUBCUTANEOUS
Refills: 0 | Status: DISCONTINUED | OUTPATIENT
Start: 2024-03-29 | End: 2024-03-30

## 2024-03-29 RX ORDER — ACETAMINOPHEN 500 MG
650 TABLET ORAL EVERY 6 HOURS
Refills: 0 | Status: DISCONTINUED | OUTPATIENT
Start: 2024-03-29 | End: 2024-03-31

## 2024-03-29 RX ORDER — SODIUM CHLORIDE 9 MG/ML
1000 INJECTION, SOLUTION INTRAVENOUS
Refills: 0 | Status: DISCONTINUED | OUTPATIENT
Start: 2024-03-29 | End: 2024-03-31

## 2024-03-29 RX ORDER — LANOLIN ALCOHOL/MO/W.PET/CERES
3 CREAM (GRAM) TOPICAL AT BEDTIME
Refills: 0 | Status: DISCONTINUED | OUTPATIENT
Start: 2024-03-29 | End: 2024-03-31

## 2024-03-29 RX ORDER — METRONIDAZOLE 500 MG
500 TABLET ORAL ONCE
Refills: 0 | Status: COMPLETED | OUTPATIENT
Start: 2024-03-29 | End: 2024-03-29

## 2024-03-29 RX ORDER — INSULIN LISPRO 100/ML
VIAL (ML) SUBCUTANEOUS AT BEDTIME
Refills: 0 | Status: DISCONTINUED | OUTPATIENT
Start: 2024-03-29 | End: 2024-03-31

## 2024-03-29 RX ORDER — INSULIN LISPRO 100/ML
VIAL (ML) SUBCUTANEOUS
Refills: 0 | Status: DISCONTINUED | OUTPATIENT
Start: 2024-03-29 | End: 2024-03-31

## 2024-03-29 RX ORDER — MORPHINE SULFATE 50 MG/1
2 CAPSULE, EXTENDED RELEASE ORAL EVERY 4 HOURS
Refills: 0 | Status: DISCONTINUED | OUTPATIENT
Start: 2024-03-29 | End: 2024-03-29

## 2024-03-29 RX ORDER — GLUCAGON INJECTION, SOLUTION 0.5 MG/.1ML
1 INJECTION, SOLUTION SUBCUTANEOUS ONCE
Refills: 0 | Status: DISCONTINUED | OUTPATIENT
Start: 2024-03-29 | End: 2024-03-31

## 2024-03-29 RX ORDER — PIPERACILLIN AND TAZOBACTAM 4; .5 G/20ML; G/20ML
3.38 INJECTION, POWDER, LYOPHILIZED, FOR SOLUTION INTRAVENOUS EVERY 8 HOURS
Refills: 0 | Status: DISCONTINUED | OUTPATIENT
Start: 2024-03-29 | End: 2024-03-30

## 2024-03-29 RX ORDER — INFLUENZA VIRUS VACCINE 15; 15; 15; 15 UG/.5ML; UG/.5ML; UG/.5ML; UG/.5ML
0.5 SUSPENSION INTRAMUSCULAR ONCE
Refills: 0 | Status: DISCONTINUED | OUTPATIENT
Start: 2024-03-29 | End: 2024-03-31

## 2024-03-29 RX ORDER — DEXTROSE 50 % IN WATER 50 %
12.5 SYRINGE (ML) INTRAVENOUS ONCE
Refills: 0 | Status: DISCONTINUED | OUTPATIENT
Start: 2024-03-29 | End: 2024-03-31

## 2024-03-29 RX ORDER — OXYCODONE HYDROCHLORIDE 5 MG/1
10 TABLET ORAL
Refills: 0 | Status: DISCONTINUED | OUTPATIENT
Start: 2024-03-29 | End: 2024-03-30

## 2024-03-29 RX ORDER — ONDANSETRON 8 MG/1
4 TABLET, FILM COATED ORAL EVERY 8 HOURS
Refills: 0 | Status: DISCONTINUED | OUTPATIENT
Start: 2024-03-29 | End: 2024-03-31

## 2024-03-29 RX ORDER — MORPHINE SULFATE 50 MG/1
4 CAPSULE, EXTENDED RELEASE ORAL
Refills: 0 | Status: DISCONTINUED | OUTPATIENT
Start: 2024-03-29 | End: 2024-03-30

## 2024-03-29 RX ADMIN — MORPHINE SULFATE 4 MILLIGRAM(S): 50 CAPSULE, EXTENDED RELEASE ORAL at 20:44

## 2024-03-29 RX ADMIN — Medication 2: at 14:44

## 2024-03-29 RX ADMIN — MORPHINE SULFATE 2 MILLIGRAM(S): 50 CAPSULE, EXTENDED RELEASE ORAL at 06:01

## 2024-03-29 RX ADMIN — PIPERACILLIN AND TAZOBACTAM 25 GRAM(S): 4; .5 INJECTION, POWDER, LYOPHILIZED, FOR SOLUTION INTRAVENOUS at 22:13

## 2024-03-29 RX ADMIN — PIPERACILLIN AND TAZOBACTAM 200 GRAM(S): 4; .5 INJECTION, POWDER, LYOPHILIZED, FOR SOLUTION INTRAVENOUS at 01:24

## 2024-03-29 RX ADMIN — MORPHINE SULFATE 2 MILLIGRAM(S): 50 CAPSULE, EXTENDED RELEASE ORAL at 01:24

## 2024-03-29 RX ADMIN — Medication 100 MILLIGRAM(S): at 01:38

## 2024-03-29 RX ADMIN — Medication 2: at 17:15

## 2024-03-29 RX ADMIN — PIPERACILLIN AND TAZOBACTAM 25 GRAM(S): 4; .5 INJECTION, POWDER, LYOPHILIZED, FOR SOLUTION INTRAVENOUS at 14:19

## 2024-03-29 RX ADMIN — MORPHINE SULFATE 4 MILLIGRAM(S): 50 CAPSULE, EXTENDED RELEASE ORAL at 15:13

## 2024-03-29 RX ADMIN — MORPHINE SULFATE 4 MILLIGRAM(S): 50 CAPSULE, EXTENDED RELEASE ORAL at 21:44

## 2024-03-29 RX ADMIN — MORPHINE SULFATE 4 MILLIGRAM(S): 50 CAPSULE, EXTENDED RELEASE ORAL at 10:51

## 2024-03-29 RX ADMIN — MORPHINE SULFATE 2 MILLIGRAM(S): 50 CAPSULE, EXTENDED RELEASE ORAL at 06:20

## 2024-03-29 RX ADMIN — SODIUM CHLORIDE 100 MILLILITER(S): 9 INJECTION INTRAMUSCULAR; INTRAVENOUS; SUBCUTANEOUS at 17:50

## 2024-03-29 RX ADMIN — SODIUM CHLORIDE 100 MILLILITER(S): 9 INJECTION INTRAMUSCULAR; INTRAVENOUS; SUBCUTANEOUS at 09:19

## 2024-03-29 RX ADMIN — MORPHINE SULFATE 4 MILLIGRAM(S): 50 CAPSULE, EXTENDED RELEASE ORAL at 10:21

## 2024-03-29 RX ADMIN — MORPHINE SULFATE 4 MILLIGRAM(S): 50 CAPSULE, EXTENDED RELEASE ORAL at 14:43

## 2024-03-29 NOTE — ED PROVIDER NOTE - OBJECTIVE STATEMENT
38 M with h/o Crohn's disease, perianal fistulas s/p diversion at [facility] (3yrs ago) and seton placement at [facility], proctitis, pancreatitis, and EtOH abuse presents to the ED with rectal pain and feeling of fullness.  Pt is s/p Seton placement in rectum for rectal fistulas in ED at [facility] 2 wks ago. Colorectal surgery team consulted for perianal fistula.     Patient is well known to the service. Patient goes to [facility] and [facility] for management. Patient states stop taking Stelara (Ustekinumab) as well as Humira for Crohn's and Hidradenitis ~6 months ago due to infectious side effects, based on Colorectal surgeon in [facility]. Denies systemic symptoms such as fever or chills. Ostomy well functioning, abdomen benign. No other complaints. 38 M with h/o Crohn's disease, perianal fistulas s/p diversion at [facility] (3yrs ago) and seton placement at [facility], proctitis, pancreatitis, and EtOH abuse presents to the ED with rectal pain and feeling of fullness. Pt concerned he has an abscess. Pt endorse severe pain, denies fever chills, CP, SOB.

## 2024-03-29 NOTE — CONSULT NOTE ADULT - SUBJECTIVE AND OBJECTIVE BOX
HPI:  38 M with h/o Crohn's disease, not on immunosuppressant x 6mo,  perianal fistulas s/p   diversion ostomy at [facility] (3yrs ago) , s/p  seton placement at [facility], proctitis, DM,  pancreatitis, h/o EtOH abuse , recent L hydradenitis s/p drainage 3 weeks ago, presents to the ED with rectal pain and feeling of fullness. Patient follows with coloretal surgery at [facility] in the city. States he was seen last week and is scheduled again for follow up this upcoming Tuesday. Ileostomy output normal without blood. He has occasional rectal bleeding but hemoglobin remains stable.     Currently hemodynamically stable and afebrile. Labs notable for     Pt concerned   he has an abscess. Pt endorse severe pain, denies fever chills, CP, SOB.       He also c/o one episode of hematochezia with associated chills. He states he has severe rectal pain , mostly when sitting up                       · 38 M with h/o Crohn's disease, not on immunosuppressant x 6mo,   perianal fistulas s/p   diversion ostomy at [facility] (3yrs ago) , s/p  seton placement at [facility], proctitis, DM,  pancreatitis, h/o EtOH abuse , recent L hydradenitis s/p drainage 3 weeks ago, presents to the ED with rectal pain and feeling of fullness. Pt concerned   he has an abscess. Pt endorse severe pain, denies fever chills, CP, SOB.       He also c/o one episode of hematochezia with associated chills. He states he has severe rectal pain , mostly when sitting up                 -seen by colorectal team in ED        PAST MEDICAL HISTORY:    as above        PAST SURGICAL HISTORY:    History of colonoscopy (2014)    Perianal fistula repair in 2002    S/P ORIF (open reduction internal fixation) fracture (Right ankle, 2014).     L axillary hidradenitis drainage          FAMILY HISTORY:    Father: DM        Social History:    no smoking    occasional Etoh                        REVIEW OF SYSTEMS:        CONSTITUTIONAL: No weakness, No fevers or chills    ENT: No ear ache, No sorethroat    NECK: No pain, No stiffness    RESPIRATORY: No cough, No wheezing, No hemoptysis; No dyspnea    CARDIOVASCULAR: No chest pain, No palpitations    GASTROINTESTINAL: No abd pain, No nausea, No vomiting, No hematemesis, No diarrhea or constipation. No melena, No hematochezia.    GENITOURINARY: No dysuria, No  hematuria    NEUROLOGICAL: No diplopia, No paresthesia, No motor dysfunction    MUSCULOSKELETAL: No arthralgia, No myalgia    SKIN: No rashes, or lesions     PSYCH: no anxiety, no suicidal ideation        All other review of systems is negative unless indicated above        Vital Signs Last 24 Hrs    T(C): 36.3 (29 Mar 2024 02:42), Max: 36.7 (28 Mar 2024 21:03)    T(F): 97.3 (29 Mar 2024 02:42), Max: 98 (28 Mar 2024 21:03)    HR: 73 (29 Mar 2024 02:42) (68 - 110)    BP: 108/67 (29 Mar 2024 02:42) (93/62 - 124/90)    BP(mean): 81 (29 Mar 2024 02:42) (73 - 96)    RR: 19 (29 Mar 2024 02:15) (18 - 19)    SpO2: 96% (29 Mar 2024 02:42) (96% - 100%)        Parameters below as of 29 Mar 2024 02:42    Patient On (Oxygen Delivery Method): room air                PHYSICAL EXAM:        GENERAL: NAD    HEENT:  NC/AT, EOMI, PERRLA, No scleral icterus, Moist mucous membranes    NECK: Supple, No JVD    CNS:  Alert & Oriented X3, Motor Strength 5/5 B/L upper and lower extremities; DTRs 2+ intact     LUNG: Normal Breath sounds, Clear to auscultation bilaterally, No rales, No rhonchi, No wheezing    HEART: RRR; No murmurs, No rubs    ABDOMEN: +BS, ST/ND, +ostomy present w feces, only mild TTP diffusely     GENITOURINARY: Voiding, Bladder not distended    EXTREMITIES:  2+ Peripheral Pulses, No clubbing, No cyanosis, No tibial edema    MUSCULOSKELTAL: Joints normal ROM, No TTP, No effusion    SKIN: no rashes    RECTAL: deferred, done by colorectal team    BREAST: deferred                                12.4     16.04 )-----------( 500      ( 28 Mar 2024 21:11 )               37.4         03-28        135  |  101  |  13    ----------------------------<  181<H>    4.0   |  25  |  1.02        Ca    9.5      28 Mar 2024 21:11        TPro  8.0  /  Alb  3.0<L>  /  TBili  <0.1<L>  /  DBili  x   /  AST  12<L>  /  ALT  17  /  AlkPhos  87  03-28        Vancomycin levels:     Cultures:         MEDICATIONS  (STANDING):    glucagon  Injectable 1 milliGRAM(s) IntraMuscular once    influenza   Vaccine 0.5 milliLiter(s) IntraMuscular once    insulin lispro (ADMELOG) corrective regimen sliding scale   SubCutaneous three times a day before meals    piperacillin/tazobactam IVPB.. 3.375 Gram(s) IV Intermittent every 8 hours    sodium chloride 0.9%. 1000 milliLiter(s) (100 mL/Hr) IV Continuous <Continuous>        MEDICATIONS  (PRN):    acetaminophen     Tablet .. 650 milliGRAM(s) Oral every 6 hours PRN Temp greater or equal to 38C (100.4F), Mild Pain (1 - 3)    aluminum hydroxide/magnesium hydroxide/simethicone Suspension 30 milliLiter(s) Oral every 4 hours PRN Dyspepsia    dextrose Oral Gel 15 Gram(s) Oral once PRN Blood Glucose LESS THAN 70 milliGRAM(s)/deciliter    melatonin 3 milliGRAM(s) Oral at bedtime PRN Insomnia    morphine  - Injectable 4 milliGRAM(s) IV Push four times a day PRN Severe Pain (7 - 10)    ondansetron Injectable 4 milliGRAM(s) IV Push every 8 hours PRN Nausea and/or Vomiting    oxyCODONE    IR 10 milliGRAM(s) Oral four times a day PRN Moderate Pain (4 - 6)            all labs reviewed    all imaging reviewed        a/p:        1. Crohn's disease severe form with rectal and perianal fistulas:    s/p ostomy diversion 3yrs ago    s/p seton placement     +hematochezia     -colorectal team garima noted: setons in place draining pus, no surgical intervention recommended     Start IV fluids, IV Zosyn    ADA    GI evaluation     Analgesia prn        2. DM :     ADA    ISS               (29 Mar 2024 08:38)      PAST MEDICAL & SURGICAL HISTORY:  Crohn's disease of large intestine without complication  (No current flare up)      Pancreatitis      Type 2 diabetes mellitus      S/P ORIF (open reduction internal fixation) fracture  (Right ankle, 2014)      History of colonoscopy  (2014)      Perianal fistula  repair in 2002      H/O fracture of clavicle  with surgical repair      S/P colostomy          Home Medications:      MEDICATIONS  (STANDING):  dextrose 5%. 1000 milliLiter(s) (100 mL/Hr) IV Continuous <Continuous>  dextrose 5%. 1000 milliLiter(s) (50 mL/Hr) IV Continuous <Continuous>  dextrose 50% Injectable 25 Gram(s) IV Push once  dextrose 50% Injectable 25 Gram(s) IV Push once  dextrose 50% Injectable 12.5 Gram(s) IV Push once  glucagon  Injectable 1 milliGRAM(s) IntraMuscular once  influenza   Vaccine 0.5 milliLiter(s) IntraMuscular once  insulin lispro (ADMELOG) corrective regimen sliding scale   SubCutaneous three times a day before meals  piperacillin/tazobactam IVPB.. 3.375 Gram(s) IV Intermittent every 8 hours  sodium chloride 0.9%. 1000 milliLiter(s) (100 mL/Hr) IV Continuous <Continuous>    MEDICATIONS  (PRN):  acetaminophen     Tablet .. 650 milliGRAM(s) Oral every 6 hours PRN Temp greater or equal to 38C (100.4F), Mild Pain (1 - 3)  aluminum hydroxide/magnesium hydroxide/simethicone Suspension 30 milliLiter(s) Oral every 4 hours PRN Dyspepsia  dextrose Oral Gel 15 Gram(s) Oral once PRN Blood Glucose LESS THAN 70 milliGRAM(s)/deciliter  melatonin 3 milliGRAM(s) Oral at bedtime PRN Insomnia  morphine  - Injectable 4 milliGRAM(s) IV Push four times a day PRN Severe Pain (7 - 10)  ondansetron Injectable 4 milliGRAM(s) IV Push every 8 hours PRN Nausea and/or Vomiting  oxyCODONE    IR 10 milliGRAM(s) Oral four times a day PRN Moderate Pain (4 - 6)      Allergies    ciprofloxacin (Other (Mild to Mod))    Intolerances        SOCIAL HISTORY:    FAMILY HISTORY:  Diabetes mellitus (Father)        ROS  As above  Otherwise unremarkable    Vital Signs Last 24 Hrs  T(C): 36.3 (29 Mar 2024 07:30), Max: 36.7 (28 Mar 2024 21:03)  T(F): 97.3 (29 Mar 2024 07:30), Max: 98 (28 Mar 2024 21:03)  HR: 70 (29 Mar 2024 10:14) (54 - 110)  BP: 99/64 (29 Mar 2024 10:14) (88/64 - 124/90)  BP(mean): 81 (29 Mar 2024 02:42) (73 - 96)  RR: 16 (29 Mar 2024 10:14) (16 - 19)  SpO2: 100% (29 Mar 2024 10:14) (96% - 100%)    Parameters below as of 29 Mar 2024 10:14  Patient On (Oxygen Delivery Method): room air        Constitutional: NAD, well-developed  Respiratory: CTAB  Cardiovascular: S1 and S2, RRR  Gastrointestinal: BS+, soft, NT/ND  Extremities: No peripheral edema  Psychiatric: Normal mood, normal affect  Skin: No rashes    LABS:                        12.4   16.04 )-----------( 500      ( 28 Mar 2024 21:11 )             37.4     03-28    135  |  101  |  13  ----------------------------<  181<H>  4.0   |  25  |  1.02    Ca    9.5      28 Mar 2024 21:11    TPro  8.0  /  Alb  3.0<L>  /  TBili  <0.1<L>  /  DBili  x   /  AST  12<L>  /  ALT  17  /  AlkPhos  87  03-28      LIVER FUNCTIONS - ( 28 Mar 2024 21:11 )  Alb: 3.0 g/dL / Pro: 8.0 gm/dL / ALK PHOS: 87 U/L / ALT: 17 U/L / AST: 12 U/L / GGT: x             RADIOLOGY & ADDITIONAL STUDIES: HPI:  38 M with h/o Crohn's disease, not on immunosuppressant x 6mo,  perianal fistulas s/p   diversion ostomy at [facility] (3yrs ago) , s/p  seton placement at [facility], proctitis, DM,  pancreatitis, h/o EtOH abuse , recent L hydradenitis s/p drainage 3 weeks ago, presents to the ED with rectal pain and feeling of fullness. Patient follows with coloretal surgery at [facility] in the city. States he was seen last week and is scheduled again for follow up this upcoming Tuesday. Ileostomy output normal without blood. He has occasional rectal bleeding but hemoglobin remains stable.     Currently hemodynamically stable and afebrile. Labs near baseline. CT A/P without abscess but with evidence of known anorectal fistulas.     PAST MEDICAL & SURGICAL HISTORY:  Crohn's disease of large intestine without complication  (No current flare up)      Pancreatitis      Type 2 diabetes mellitus      S/P ORIF (open reduction internal fixation) fracture  (Right ankle, 2014)      History of colonoscopy  (2014)      Perianal fistula  repair in 2002      H/O fracture of clavicle  with surgical repair      S/P colostomy          Home Medications:      MEDICATIONS  (STANDING):  dextrose 5%. 1000 milliLiter(s) (100 mL/Hr) IV Continuous <Continuous>  dextrose 5%. 1000 milliLiter(s) (50 mL/Hr) IV Continuous <Continuous>  dextrose 50% Injectable 25 Gram(s) IV Push once  dextrose 50% Injectable 25 Gram(s) IV Push once  dextrose 50% Injectable 12.5 Gram(s) IV Push once  glucagon  Injectable 1 milliGRAM(s) IntraMuscular once  influenza   Vaccine 0.5 milliLiter(s) IntraMuscular once  insulin lispro (ADMELOG) corrective regimen sliding scale   SubCutaneous three times a day before meals  piperacillin/tazobactam IVPB.. 3.375 Gram(s) IV Intermittent every 8 hours  sodium chloride 0.9%. 1000 milliLiter(s) (100 mL/Hr) IV Continuous <Continuous>    MEDICATIONS  (PRN):  acetaminophen     Tablet .. 650 milliGRAM(s) Oral every 6 hours PRN Temp greater or equal to 38C (100.4F), Mild Pain (1 - 3)  aluminum hydroxide/magnesium hydroxide/simethicone Suspension 30 milliLiter(s) Oral every 4 hours PRN Dyspepsia  dextrose Oral Gel 15 Gram(s) Oral once PRN Blood Glucose LESS THAN 70 milliGRAM(s)/deciliter  melatonin 3 milliGRAM(s) Oral at bedtime PRN Insomnia  morphine  - Injectable 4 milliGRAM(s) IV Push four times a day PRN Severe Pain (7 - 10)  ondansetron Injectable 4 milliGRAM(s) IV Push every 8 hours PRN Nausea and/or Vomiting  oxyCODONE    IR 10 milliGRAM(s) Oral four times a day PRN Moderate Pain (4 - 6)      Allergies    ciprofloxacin (Other (Mild to Mod))    Intolerances        SOCIAL HISTORY:    FAMILY HISTORY:  Diabetes mellitus (Father)        ROS  As above  Otherwise unremarkable    Vital Signs Last 24 Hrs  T(C): 36.3 (29 Mar 2024 07:30), Max: 36.7 (28 Mar 2024 21:03)  T(F): 97.3 (29 Mar 2024 07:30), Max: 98 (28 Mar 2024 21:03)  HR: 70 (29 Mar 2024 10:14) (54 - 110)  BP: 99/64 (29 Mar 2024 10:14) (88/64 - 124/90)  BP(mean): 81 (29 Mar 2024 02:42) (73 - 96)  RR: 16 (29 Mar 2024 10:14) (16 - 19)  SpO2: 100% (29 Mar 2024 10:14) (96% - 100%)    Parameters below as of 29 Mar 2024 10:14  Patient On (Oxygen Delivery Method): room air        Constitutional: NAD  Respiratory: CTAB  Cardiovascular: S1 and S2, RRR  Gastrointestinal: BS+, soft, NT,ostomy intact  rectum: multiple gluteal fistulas with seton draining  Extremities: No peripheral edema  Psychiatric: Normal mood, normal affect  Skin: No rashes    LABS:                        12.4   16.04 )-----------( 500      ( 28 Mar 2024 21:11 )             37.4     03-28    135  |  101  |  13  ----------------------------<  181<H>  4.0   |  25  |  1.02    Ca    9.5      28 Mar 2024 21:11    TPro  8.0  /  Alb  3.0<L>  /  TBili  <0.1<L>  /  DBili  x   /  AST  12<L>  /  ALT  17  /  AlkPhos  87  03-28      LIVER FUNCTIONS - ( 28 Mar 2024 21:11 )  Alb: 3.0 g/dL / Pro: 8.0 gm/dL / ALK PHOS: 87 U/L / ALT: 17 U/L / AST: 12 U/L / GGT: x             RADIOLOGY & ADDITIONAL STUDIES:    ACC: [id number] EXAM:  CT ABDOMEN AND PELVIS IC   ORDERED BY: ANN SUTHERLAND     PROCEDURE DATE:  03/28/2024          INTERPRETATION:  CLINICAL INFORMATION: Rectal pain. Recent Seton   placement. History of Crohn's disease.    COMPARISON: CT abdomen pelvis 2/21/2024.    CONTRAST/COMPLICATIONS:  IV Contrast: Omnipaque 350  90 cc administered   0 cc discarded  Oral Contrast: NONE  Complications: None reported at time of study completion    PROCEDURE:  CT of the Abdomen and Pelvis was performed.  Sagittal and coronal reformats were performed.    FINDINGS:  LOWER CHEST: Within normal limits.    LIVER: Within normal limits.  BILE DUCTS: Normal caliber.  GALLBLADDER: Within normal limits.  SPLEEN: Within normal limits.  PANCREAS: Within normal limits.  ADRENALS: Within normal limits.  KIDNEYS/URETERS: Within normal limits.    BLADDER: Within normal limits.  REPRODUCTIVE ORGANS: Prostate within normal limits.    BOWEL: Right lower quadrant diverting ileostomy. No bowel obstruction.   Similar appearance of circumferential rectal wall thickening and   perirectal infiltration. Bandlike linear soft tissue extending from the   anterior rectal wall inferiorly towards the prostate gland may represent   fistula. Similar enhancing bandlike soft tissue extension the posterior   rectum to the presacral space. Bandlike soft tissue opacities extending   from rectum bilaterally into the ischioanal fossae and towards the medial   gluteal folds. Several, setons are in place. No drainable fluid   collection. Stable thickening of bilateral levator ani muscles.  PERITONEUM: No ascites.  VESSELS: Within normal limits.  RETROPERITONEUM/LYMPH NODES: Prominent bilateral inguinal lymph nodes,   likely reactive..  ABDOMINAL WALL: Within normal limits.  BONES: Within normal limits.    IMPRESSION:  Proctitis with redemonstration of multiple fistulous tracts extending   from the rectum to the prostate gland, presacral space, as well as   perianal fistulas. No fluid collection.        --- End of Report ---            PURNIMA WEINBERG MD; Attending Radiologist  This document has been electronically signed. Mar 28 2024 10:46PM

## 2024-03-29 NOTE — ED PROVIDER NOTE - PRINCIPAL DIAGNOSIS
Large joint arthrocentesis: R subacromial bursa  Universal Protocol:  Consent: Verbal consent obtained  Written consent obtained  Risks and benefits: risks, benefits and alternatives were discussed  Consent given by: patient  Time out: Immediately prior to procedure a "time out" was called to verify the correct patient, procedure, equipment, support staff and site/side marked as required  Timeout called at: 5/19/2021 8:00 AM   Patient understanding: patient states understanding of the procedure being performed  Patient consent: the patient's understanding of the procedure matches consent given  Procedure consent: procedure consent matches procedure scheduled  Relevant documents: relevant documents present and verified  Test results: test results available and properly labeled  Site marked: the operative site was marked  Radiology Images displayed and confirmed  If images not available, report reviewed: imaging studies available  Required items: required blood products, implants, devices, and special equipment available  Patient identity confirmed: verbally with patient    Supporting Documentation  Indications: pain   Procedure Details  Location: shoulder - R subacromial bursa  Preparation: Patient was prepped and draped in the usual sterile fashion  Needle size: 22 G  Ultrasound guidance: yes  Approach: posterolateral  Medications administered: 10 mL bupivacaine (PF) 0 25 %; 2 mL lidocaine 1 %; 1 mL methylPREDNISolone acetate 40 mg/mL    Patient tolerance: patient tolerated the procedure well with no immediate complications  Dressing:  Sterile dressing applied    Hand/upper extremity injection: L elbow  Universal Protocol:  Consent: Verbal consent obtained  Written consent obtained    Risks and benefits: risks, benefits and alternatives were discussed  Consent given by: patient  Timeout called at: 5/19/2021 8:03 AM   Patient understanding: patient states understanding of the procedure being performed  Patient consent: the patient's understanding of the procedure matches consent given  Procedure consent: procedure consent matches procedure scheduled  Relevant documents: relevant documents present and verified  Test results: test results available and properly labeled  Site marked: the operative site was marked  Radiology Images displayed and confirmed   If images not available, report reviewed: imaging studies available  Patient identity confirmed: verbally with patient    Supporting Documentation  Indications: pain   Procedure Details  Condition:medial epicondylitis Site: L elbow   Preparation: Patient was prepped and draped in the usual sterile fashion  Needle size: 22 G  Ultrasound guidance: yes  Approach: medial  Medications administered: 1 mL bupivacaine 0 25 %; 1 mL lidocaine 1 %; 1 mL methylPREDNISolone acetate 40 mg/mL    Patient tolerance: patient tolerated the procedure well with no immediate complications  Dressing:  Sterile dressing applied Crohn's disease

## 2024-03-29 NOTE — PHARMACOTHERAPY INTERVENTION NOTE - COMMENTS
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 He is concerned he has an abscess. (+) Severe rectal pain. (-) Fever, chills, CP, SOB. He also complains of hematochezia and associated chills. (29 Mar 2024 08:38)    Pertinent PMH/PSH  Crohn's disease of large intestine without complication  Pancreatitis  Type 2 diabetes mellitus  S/P ORIF (open reduction internal fixation) fracture  History of colonoscopy  Perianal fistula  H/O fracture of clavicle  S/P colostomy    Home Medications:  acetaminophen 500 mg oral tablet: 2 tab(s) orally every 8 hours  metFORMIN 500 mg oral tablet: 1 tab(s) orally once a day    A1C Result(s) Within Prior 3 Months  A1C with Estimated Average Glucose Result: 7.4 % (01-18-24 @ 09:21)    Renal Function Within Prior 72 Hours  Creatinine: 1.02 mg/dL (03-28-24 @ 21:11)    Current Orders  insulin lispro (ADMELOG) corrective regimen sliding scale   SubCutaneous three times a day before meals    POCT Within Prior 24 Hours  POCT Blood Glucose.: 210 mg/dL (03-29-24 @ 11:22)  POCT Blood Glucose.: 177 mg/dL (03-29-24 @ 14:44)    Insulin Administration Within Prior 24 Hours  insulin lispro (ADMELOG) corrective regimen sliding scale   2 Unit(s) SubCutaneous (03-29-24 @ 14:44)    Other Administration(s) (i.e. Steroids, PO diabetes medications) Within Prior 24 Hours  piperacillin/tazobactam IVPB   200 mL/Hr IV Intermittent (03-29-24 @ 01:24)   25 mL/Hr IV Intermittent (03-29-24 @ 14:19)    Height (cm): 175.3 (03-28-24 @ 20:18), 175.3 (03-21-24 @ 03:04), 175.3 (03-19-24 @ 18:07), 175.3 (02-29-24 @ 20:00)  Weight (kg): 75.9 (03-29-24 @ 02:42), 72.6 (03-21-24 @ 03:04), 72.6 (03-19-24 @ 18:07), 72.6 (02-29-24 @ 20:00)  BMI (kg/m2): 24.7 (03-29-24 @ 02:42), 23.6 (03-28-24 @ 20:18), 23.6 (03-21-24 @ 03:04), 23.6 (03-19-24 @ 18:07), 23.6 (02-29-24 @ 20:00)    Current Diet  Diet, Consistent Carbohydrate/No Snacks (03-29-24 @ 08:30) [Active]    Assessment/Plan:  - Patient with a history of Type 2 Diabetes Mellitus: Glycemic control to be managed by Inpatient Diabetes Management Team  - A1C was 7.4% in Jan 2024: Patients treatment goal is A1C < 7.0% per the ADA standards and thus patient has poor glycemic control outpatient, on metformin 500 mg PO QD at home [eGFR 96]  - Follow up pending A1C  - Patient is insulin naive, currently ordered a moderate intensity insulin correction scale TID AC  - Fasting POCT N/A [No scale ordered] and pre-lunch POCT 210/177: Patients glycemic control is currently above protocol range of POCT 100-180  - Will add HS insulin correction scale to titrate therapy to glycemic POCT target 100-180  - Patient is not a candidate for Tradjenta in-patient use: History of pancreatitis  - If POCT > 180 x 2 within 24 hours: Consider starting weight based basal-bolus insulin regimen

## 2024-03-29 NOTE — CONSULT NOTE ADULT - ASSESSMENT
1. Crohn's disease with anorectal fistulas. Needs close follow up with his [place] CRS team this coming tuesday. There is no evidence of abscess on CT scan    Recommendation  1. Diet as tolerated  2. Needs to follow up with CRS at [place] on 4/2/24  3. No contraindication to discharge

## 2024-03-29 NOTE — ED PROVIDER NOTE - CLINICAL SUMMARY MEDICAL DECISION MAKING FREE TEXT BOX
Iwona Trejo MD, Attending  ddx includes, but is not limited to the following: crohn's flare, abscess, fistula tracts, lower suspicion for obstruction given ostomy output.   plan: labs, CT, pain control, surgery consult, admit to surgery vs medicine.   update: see progress notes.   ----

## 2024-03-29 NOTE — H&P ADULT - HISTORY OF PRESENT ILLNESS
{\rtf1\aqsnmq67592\ansi\skyooix7068\ftnbj\uc1\deff0  {\fonttbl{\f0 \fnil Microsoft Sans Serif;}{\f1 \fnil \fcharset0 Microsoft Sans Serif;}{\f2 \fnil Segoe UI;}{\f3 \fnil Times New Mateo;}}  {\colortbl ;\wbi110\cdkic434\lzlj997 ;\red0\green0\blue0 ;\red0\green0\qepq674 ;\red0\green0\blue0 ;}  {\stylesheet{\f0\fs20 Normal;}{\cs1 Default Paragraph Font;}{\cs2\f2\fs16 Line Number;}{\cs3\f3\fs24\ul\cf3 Hyperlink;}}  {\*\revtbl{Unknown;}}  \vyxvof77638\xwertc32936\xtomm4952\byovx7857\yoflf3058\qoppq9335\ekkemna102\oohvboo777\nogrowautofit\wfcgou045\formshade\nofeaturethrottle1\dntblnsbdb\fet4\aendnotes\aftnnrlc\pgbrdrhead\pgbrdrfoot  \sectd\qvouxk85710\wiyueg62120\guttersxn0\fztawmel0175\ihqylilt1060\hvtayhai7561\likojyka1823\gyofpwy839\ereegtr717\sbkpage\pgncont\pgndec  \plain\plain\f0\fs24  \trowd\hqbrsv82\vnydvya54\trpaddfl3\cogjwyu90\trpaddfr3\trpaddt0\trpaddft3\trpaddb0\trpaddfb3\trleft0  \clvertalt\rqjbne33\clpadft3\drumbd02\clpadfr3\clpadl0\clpadfl3\clpadb0\clpadfb3\vswwq3311  \pard\intbl\ssparaaux0\s0\fi-120\li120\ql\plain\f0\fs24{\*\bkmkstart [id number]}{\*\bkmkend [id number]}\plain\f0\fs20\fmoy4091\hich\f0\dbch\f0\loch\f0\fs20 \'b7 38 M with h/o Crohn's disease, not on immunosuppressant x 6mo,   perianal fistulas s/p   diversion ostomy at [facility] (3yrs ago) , s/p  seton placement at [facility], proctitis, DM,  pancreatitis, h/o EtOH abuse , recent L hydradenitis s/p drainage 3 weeks ago, presents to the ED with rectal pain and feeling of fullness. Pt concerned   he has an abscess. Pt endorse severe pain, denies fever chills, CP, SOB.\par     He also c/o one episode of hematochezia with associated chills. He states he has severe rectal pain , mostly when sitting up \cell  \intbl\row  \trowd\hycgys69\lastrow\vgyayvq17\trpaddfl3\mrbuefj69\trpaddfr3\trpaddt0\trpaddft3\trpaddb0\trpaddfb3\trleft0  \clvertalt\sqbpkv05\clpadft3\msecfe88\clpadfr3\clpadl0\clpadfl3\clpadb0\clpadfb3\hwrgw9494  \pard\intbl\ssparaaux0\s0\ql\plain\f0\fs24\plain\f1\fs20\sxzn8809\hich\f1\dbch\f1\loch\f1\cf2\fs20\strike\plain\f0\fs20\magv6923\hich\f0\dbch\f0\loch\f0\fs20\cell  \intbl\row  \pard\ssparaaux0\s0\ql\plain\f0\fs24\plain\f0\fs20\qsht2312\hich\f0\dbch\f0\loch\f0\fs20\par  -seen by colorectal team in ED\par  \par  {\*\bkmkstart [id number]}{\*\bkmkend [id number]}\plain\f1\fs20\rcah4930\hich\f1\dbch\f1\loch\f1\cf2\fs20\b PAST MEDICAL HISTORY:\plain\f0\fs20\dbhl5185\hich\f0\dbch\f0\loch\f0\fs20\par  as above\par  \par  {\*\bkmkstart [id number]}{\*\bkmkend [id number]}\plain\f1\fs20\gmrj1633\hich\f1\dbch\f1\loch\f1\cf2\fs20\b PAST SURGICAL HISTORY:\plain\f0\fs20\adxd2627\hich\f0\dbch\f0\loch\f0\fs20\par  History of colonoscopy (2014)\par  Perianal fistula repair in 2002\par  S/P ORIF (open reduction internal fixation) fracture (Right ankle, 2014). \par  L axillary hidradenitis drainage  \par  \par  {\*\bkmkstart [id number]}{\*\bkmkend [id number]}\plain\f1\fs20\kkjk9191\hich\f1\dbch\f1\loch\f1\cf2\fs20\b FAMILY HISTORY:\plain\f0\fs20\dbhr1727\hich\f0\dbch\f0\loch\f0\fs20\par  Father: DM\par  \plain\f1\fs20\hnxi2722\hich\f1\dbch\f1\loch\f1\cf2\fs20\b\par  Social History:\par  \plain\f0\fs20\hgnd3458\hich\f0\dbch\f0\loch\f0\fs20 no smoking\par  occasional Etoh\par  \par  \pard\plain\f0\fs24\plain\f0\fs20\uwic0293\hich\f0\dbch\f0\loch\f0\fs20\par  \par  \par  \par  REVIEW OF SYSTEMS:\par  \par  CONSTITUTIONAL: No weakness, No fevers or chills\par  ENT: No ear ache, No sorethroat\par  NECK: No pain, No stiffness\par  RESPIRATORY: No cough, No wheezing, No hemoptysis; No dyspnea\par  CARDIOVASCULAR: No chest pain, No palpitations\par  GASTROINTESTINAL: No abd pain, No nausea, No vomiting, No hematemesis, No diarrhea or constipation. No melena, No hematochezia.\par  GENITOURINARY: No dysuria, No  hematuria\par  NEUROLOGICAL: No diplopia, No paresthesia, No motor dysfunction\par  MUSCULOSKELETAL: No arthralgia, No myalgia\par  SKIN: No rashes, or lesions \par  PSYCH: no anxiety, no suicidal ideation\par  \par  All other review of systems is negative unless indicated above\par  \par  Vital Signs Last 24 Hrs\par  T(C): 36.3 (29 Mar 2024 02:42), Max: 36.7 (28 Mar 2024 21:03)\par  T(F): 97.3 (29 Mar 2024 02:42), Max: 98 (28 Mar 2024 21:03)\par  HR: 73 (29 Mar 2024 02:42) (68 - 110)\par  BP: 108/67 (29 Mar 2024 02:42) (93/62 - 124/90)\par  BP(mean): 81 (29 Mar 2024 02:42) (73 - 96)\par  RR: 19 (29 Mar 2024 02:15) (18 - 19)\par  SpO2: 96% (29 Mar 2024 02:42) (96% - 100%)\par  \par  Parameters below as of 29 Mar 2024 02:42\par  Patient On (Oxygen Delivery Method): room air\par  \par  \par  \par  PHYSICAL EXAM:\par  \par  GENERAL: NAD\par  HEENT:  NC/AT, EOMI, PERRLA, No scleral icterus, Moist mucous membranes\par  NECK: Supple, No JVD\par  CNS:  Alert & Oriented X3, Motor Strength 5/5 B/L upper and lower extremities; DTRs 2+ intact \par  LUNG: Normal Breath sounds, Clear to auscultation bilaterally, No rales, No rhonchi, No wheezing\par  HEART: RRR; No murmurs, No rubs\par  ABDOMEN: +BS, ST/ND, +ostomy present w feces, only mild TTP diffusely \par  GENITOURINARY: Voiding, Bladder not distended\par  EXTREMITIES:  2+ Peripheral Pulses, No clubbing, No cyanosis, No tibial edema\par  MUSCULOSKELTAL: Joints normal ROM, No TTP, No effusion\par  SKIN: no rashes\par  RECTAL: deferred, done by colorectal team\par  BREAST: deferred\par  \par           \par             12.4 \par  \plain\f1\fs20\wmvk4629\hich\f1\dbch\f1\loch\f1\cf2\fs20\b 16.04\plain\f0\fs20\qoeu2151\hich\f0\dbch\f0\loch\f0\fs20  )-----------( 500      ( 28 Mar 2024 21:11 )\par             37.4 \par  \par  03-28\par  \par  135  |  101  |  13\par  ----------------------------<  181<H>\par  4.0   |  25  |  1.02\par  \par  Ca    9.5      28 Mar 2024 21:11\par  \par  TPro  8.0  /  Alb  3.0<L>  /  TBili  <0.1<L>  /  DBili  x   /  AST  12<L>  /  ALT  17  /  AlkPhos  87  03-28\par  \par  Vancomycin levels: \par  Cultures: \par  \par  MEDICATIONS  (STANDING):\par  glucagon  Injectable 1 milliGRAM(s) IntraMuscular once\par  influenza   Vaccine 0.5 milliLiter(s) IntraMuscular once\par  insulin lispro (ADMELOG) corrective regimen sliding scale   SubCutaneous three times a day before meals\par  piperacillin/tazobactam IVPB.. 3.375 Gram(s) IV Intermittent every 8 hours\par  sodium chloride 0.9%. 1000 milliLiter(s) (100 mL/Hr) IV Continuous <Continuous>\par  \par  MEDICATIONS  (PRN):\par  acetaminophen     Tablet .. 650 milliGRAM(s) Oral every 6 hours PRN Temp greater or equal to 38C (100.4F), Mild Pain (1 - 3)\par  aluminum hydroxide/magnesium hydroxide/simethicone Suspension 30 milliLiter(s) Oral every 4 hours PRN Dyspepsia\par  dextrose Oral Gel 15 Gram(s) Oral once PRN Blood Glucose LESS THAN 70 milliGRAM(s)/deciliter\par  melatonin 3 milliGRAM(s) Oral at bedtime PRN Insomnia\par  morphine  - Injectable 4 milliGRAM(s) IV Push four times a day PRN Severe Pain (7 - 10)\par  ondansetron Injectable 4 milliGRAM(s) IV Push every 8 hours PRN Nausea and/or Vomiting\par  oxyCODONE    IR 10 milliGRAM(s) Oral four times a day PRN Moderate Pain (4 - 6)\par  \par  \par  all labs reviewed\par  all imaging reviewed\par  \par  \ql\plain\f0\fs24\plain\f0\fs20\nkmg4965\hich\f0\dbch\f0\loch\f0\fs20 a/p:\par  \par  1. Crohn's disease severe form with rectal and perianal fistulas:\par  s/p ostomy diversion 3yrs ago\par  s/p seton placement \par  +hematochezia \par  -colorectal team eval noted: setons in place draining pus, no surgical intervention recommended \par  Start IV fluids, IV Zosyn\par  ADA\par  GI evaluation \par  Analgesia prn\par  \par  2. DM : \par  ADA\par  ISS\par  \par  \par  }   · 38 M with h/o Crohn's disease, not on immunosuppressant x 6mo,   perianal fistulas s/p diversion ostomy at [facility] (3yrs ago) , s/p  seton placement at [facility], proctitis, DM,  pancreatitis, h/o EtOH abuse , recent L hydradenitis s/p drainage 3 weeks ago, presents to the ED with rectal pain and feeling of fullness. Pt concerned he has an abscess. Pt endorse severe pain, denies fever chills, CP, SOB.     He also c/o one episode of hematochezia with associated chills. He states he has severe rectal pain , mostly when sitting up       -seen by colorectal team in ED    PAST MEDICAL HISTORY:  as above    PAST SURGICAL HISTORY:  History of colonoscopy (2014)  Perianal fistula repair in 2002  S/P ORIF (open reduction internal fixation) fracture (Right ankle, 2014).   L axillary hidradenitis drainage      FAMILY HISTORY:  Father: DM    Social History:  no smoking  occasional Etoh            REVIEW OF SYSTEMS:    CONSTITUTIONAL: No weakness, No fevers or chills  ENT: No ear ache, No sorethroat  NECK: No pain, No stiffness  RESPIRATORY: No cough, No wheezing, No hemoptysis; No dyspnea  CARDIOVASCULAR: No chest pain, No palpitations  GASTROINTESTINAL: No abd pain, No nausea, No vomiting, No hematemesis, No diarrhea or constipation. No melena, No hematochezia.  GENITOURINARY: No dysuria, No  hematuria  NEUROLOGICAL: No diplopia, No paresthesia, No motor dysfunction  MUSCULOSKELETAL: No arthralgia, No myalgia  SKIN: No rashes, or lesions   PSYCH: no anxiety, no suicidal ideation    All other review of systems is negative unless indicated above    Vital Signs Last 24 Hrs  T(C): 36.3 (29 Mar 2024 02:42), Max: 36.7 (28 Mar 2024 21:03)  T(F): 97.3 (29 Mar 2024 02:42), Max: 98 (28 Mar 2024 21:03)  HR: 73 (29 Mar 2024 02:42) (68 - 110)  BP: 108/67 (29 Mar 2024 02:42) (93/62 - 124/90)  BP(mean): 81 (29 Mar 2024 02:42) (73 - 96)  RR: 19 (29 Mar 2024 02:15) (18 - 19)  SpO2: 96% (29 Mar 2024 02:42) (96% - 100%)    Parameters below as of 29 Mar 2024 02:42  Patient On (Oxygen Delivery Method): room air        PHYSICAL EXAM:    GENERAL: NAD  HEENT:  NC/AT, EOMI, PERRLA, No scleral icterus, Moist mucous membranes  NECK: Supple, No JVD  CNS:  Alert & Oriented X3, Motor Strength 5/5 B/L upper and lower extremities; DTRs 2+ intact   LUNG: Normal Breath sounds, Clear to auscultation bilaterally, No rales, No rhonchi, No wheezing  HEART: RRR; No murmurs, No rubs  ABDOMEN: +BS, ST/ND, +ostomy present w feces, only mild TTP diffusely   GENITOURINARY: Voiding, Bladder not distended  EXTREMITIES:  2+ Peripheral Pulses, No clubbing, No cyanosis, No tibial edema  MUSCULOSKELTAL: Joints normal ROM, No TTP, No effusion  SKIN: no rashes  RECTAL: deferred, done by colorectal team  BREAST: deferred                          12.4   16.04 )-----------( 500      ( 28 Mar 2024 21:11 )             37.4     03-28    135  |  101  |  13  ----------------------------<  181<H>  4.0   |  25  |  1.02    Ca    9.5      28 Mar 2024 21:11    TPro  8.0  /  Alb  3.0<L>  /  TBili  <0.1<L>  /  DBili  x   /  AST  12<L>  /  ALT  17  /  AlkPhos  87  03-28    Vancomycin levels:   Cultures:     MEDICATIONS  (STANDING):  glucagon  Injectable 1 milliGRAM(s) IntraMuscular once  influenza   Vaccine 0.5 milliLiter(s) IntraMuscular once  insulin lispro (ADMELOG) corrective regimen sliding scale   SubCutaneous three times a day before meals  piperacillin/tazobactam IVPB.. 3.375 Gram(s) IV Intermittent every 8 hours  sodium chloride 0.9%. 1000 milliLiter(s) (100 mL/Hr) IV Continuous <Continuous>    MEDICATIONS  (PRN):  acetaminophen     Tablet .. 650 milliGRAM(s) Oral every 6 hours PRN Temp greater or equal to 38C (100.4F), Mild Pain (1 - 3)  aluminum hydroxide/magnesium hydroxide/simethicone Suspension 30 milliLiter(s) Oral every 4 hours PRN Dyspepsia  dextrose Oral Gel 15 Gram(s) Oral once PRN Blood Glucose LESS THAN 70 milliGRAM(s)/deciliter  melatonin 3 milliGRAM(s) Oral at bedtime PRN Insomnia  morphine  - Injectable 4 milliGRAM(s) IV Push four times a day PRN Severe Pain (7 - 10)  ondansetron Injectable 4 milliGRAM(s) IV Push every 8 hours PRN Nausea and/or Vomiting  oxyCODONE    IR 10 milliGRAM(s) Oral four times a day PRN Moderate Pain (4 - 6)      all labs reviewed  all imaging reviewed    a/p:    1. Crohn's disease severe form with rectal and perianal fistulas:  s/p ostomy diversion 3yrs ago  s/p seton placement   +hematochezia , stable Hb, will recheck in Am    -colorectal team eval noted: setons in place draining pus, no surgical intervention recommended   Start IV fluids, IV Zosyn  ADA  GI evaluation   Analgesia prn    2. DM :   ADA  ISS

## 2024-03-29 NOTE — ED PROVIDER NOTE - PHYSICAL EXAMINATION
Iwona Trejo MD, Attending  Gen: Well awake, alert.   Head: NC/AT  Neck: trachea midline  Resp:  No distress  Ext: no deformities  Neuro:  A&O appears non focal  abd: ostomy with output, tender abdomen  Skin:  Warm and dry as visualized  Psych: +flat affect and mood.

## 2024-03-30 LAB
A1C WITH ESTIMATED AVERAGE GLUCOSE RESULT: 8.5 % — HIGH (ref 4–5.6)
ANION GAP SERPL CALC-SCNC: 6 MMOL/L — SIGNIFICANT CHANGE UP (ref 5–17)
BUN SERPL-MCNC: 7 MG/DL — SIGNIFICANT CHANGE UP (ref 7–23)
CALCIUM SERPL-MCNC: 9.5 MG/DL — SIGNIFICANT CHANGE UP (ref 8.5–10.1)
CHLORIDE SERPL-SCNC: 105 MMOL/L — SIGNIFICANT CHANGE UP (ref 96–108)
CO2 SERPL-SCNC: 26 MMOL/L — SIGNIFICANT CHANGE UP (ref 22–31)
CREAT SERPL-MCNC: 0.81 MG/DL — SIGNIFICANT CHANGE UP (ref 0.5–1.3)
CULTURE RESULTS: NO GROWTH — SIGNIFICANT CHANGE UP
EGFR: 116 ML/MIN/1.73M2 — SIGNIFICANT CHANGE UP
ESTIMATED AVERAGE GLUCOSE: 197 MG/DL — HIGH (ref 68–114)
GLUCOSE SERPL-MCNC: 131 MG/DL — HIGH (ref 70–99)
HCT VFR BLD CALC: 34.6 % — LOW (ref 39–50)
HGB BLD-MCNC: 11.2 G/DL — LOW (ref 13–17)
MCHC RBC-ENTMCNC: 31.7 PG — SIGNIFICANT CHANGE UP (ref 27–34)
MCHC RBC-ENTMCNC: 32.4 GM/DL — SIGNIFICANT CHANGE UP (ref 32–36)
MCV RBC AUTO: 98 FL — SIGNIFICANT CHANGE UP (ref 80–100)
PLATELET # BLD AUTO: 467 K/UL — HIGH (ref 150–400)
POTASSIUM SERPL-MCNC: 3.8 MMOL/L — SIGNIFICANT CHANGE UP (ref 3.5–5.3)
POTASSIUM SERPL-SCNC: 3.8 MMOL/L — SIGNIFICANT CHANGE UP (ref 3.5–5.3)
RBC # BLD: 3.53 M/UL — LOW (ref 4.2–5.8)
RBC # FLD: 13.4 % — SIGNIFICANT CHANGE UP (ref 10.3–14.5)
SODIUM SERPL-SCNC: 137 MMOL/L — SIGNIFICANT CHANGE UP (ref 135–145)
SPECIMEN SOURCE: SIGNIFICANT CHANGE UP
WBC # BLD: 10.04 K/UL — SIGNIFICANT CHANGE UP (ref 3.8–10.5)
WBC # FLD AUTO: 10.04 K/UL — SIGNIFICANT CHANGE UP (ref 3.8–10.5)

## 2024-03-30 PROCEDURE — 99233 SBSQ HOSP IP/OBS HIGH 50: CPT

## 2024-03-30 RX ORDER — MORPHINE SULFATE 50 MG/1
2 CAPSULE, EXTENDED RELEASE ORAL EVERY 4 HOURS
Refills: 0 | Status: DISCONTINUED | OUTPATIENT
Start: 2024-03-30 | End: 2024-03-31

## 2024-03-30 RX ORDER — OXYCODONE AND ACETAMINOPHEN 5; 325 MG/1; MG/1
1 TABLET ORAL EVERY 6 HOURS
Refills: 0 | Status: DISCONTINUED | OUTPATIENT
Start: 2024-03-30 | End: 2024-03-31

## 2024-03-30 RX ADMIN — MORPHINE SULFATE 2 MILLIGRAM(S): 50 CAPSULE, EXTENDED RELEASE ORAL at 23:01

## 2024-03-30 RX ADMIN — MORPHINE SULFATE 4 MILLIGRAM(S): 50 CAPSULE, EXTENDED RELEASE ORAL at 06:02

## 2024-03-30 RX ADMIN — MORPHINE SULFATE 4 MILLIGRAM(S): 50 CAPSULE, EXTENDED RELEASE ORAL at 01:01

## 2024-03-30 RX ADMIN — MORPHINE SULFATE 2 MILLIGRAM(S): 50 CAPSULE, EXTENDED RELEASE ORAL at 12:14

## 2024-03-30 RX ADMIN — MORPHINE SULFATE 2 MILLIGRAM(S): 50 CAPSULE, EXTENDED RELEASE ORAL at 18:09

## 2024-03-30 RX ADMIN — MORPHINE SULFATE 2 MILLIGRAM(S): 50 CAPSULE, EXTENDED RELEASE ORAL at 18:39

## 2024-03-30 RX ADMIN — Medication 4: at 17:16

## 2024-03-30 RX ADMIN — MORPHINE SULFATE 2 MILLIGRAM(S): 50 CAPSULE, EXTENDED RELEASE ORAL at 12:45

## 2024-03-30 RX ADMIN — PIPERACILLIN AND TAZOBACTAM 25 GRAM(S): 4; .5 INJECTION, POWDER, LYOPHILIZED, FOR SOLUTION INTRAVENOUS at 06:08

## 2024-03-30 RX ADMIN — MORPHINE SULFATE 4 MILLIGRAM(S): 50 CAPSULE, EXTENDED RELEASE ORAL at 02:01

## 2024-03-30 RX ADMIN — Medication 2: at 12:01

## 2024-03-30 RX ADMIN — MORPHINE SULFATE 2 MILLIGRAM(S): 50 CAPSULE, EXTENDED RELEASE ORAL at 22:31

## 2024-03-30 NOTE — PROGRESS NOTE ADULT - SUBJECTIVE AND OBJECTIVE BOX
HPI: 38 M with h/o Crohn's disease, not on immunosuppressant x 6mo,   perianal fistulas s/p diversion ostomy at [facility] (3yrs ago) , s/p  seton placement at [facility], proctitis, DM,  pancreatitis, h/o EtOH abuse , recent L hydradenitis s/p drainage 3 weeks ago, presents to the ED with rectal pain and feeling of fullness. Pt concerned he has an abscess. Pt endorse severe pain, denies fever chills, CP, SOB. He also c/o one episode of hematochezia with associated chills. He states he has severe rectal pain , mostly when sitting up       Subjective: patient seen this AM, continued abdominal pain, reported not being able to sit up yet due to pain, received 4mg morphine this AM with drop in BP. Tolerating orally.     REVIEW OF SYSTEMS:    CONSTITUTIONAL: No weakness, No fevers or chills  ENT: No ear ache, No sorethroat  NECK: No pain, No stiffness  RESPIRATORY: No cough, No wheezing, No hemoptysis; No dyspnea  CARDIOVASCULAR: No chest pain, No palpitations  GASTROINTESTINAL: + abd pain, No nausea, No vomiting, No hematemesis, No diarrhea or constipation. No melena, No hematochezia.  GENITOURINARY: No dysuria, No  hematuria  NEUROLOGICAL: No diplopia, No paresthesia, No motor dysfunction  MUSCULOSKELETAL: No arthralgia, No myalgia  SKIN: No rashes, or lesions   PSYCH: no anxiety, no suicidal ideation    Vital Signs Last 24 Hrs  T(C): 36.6 (30 Mar 2024 07:45), Max: 36.8 (29 Mar 2024 20:32)  T(F): 97.9 (30 Mar 2024 07:45), Max: 98.2 (29 Mar 2024 20:32)  HR: 65 (30 Mar 2024 12:25) (63 - 82)  BP: 103/72 (30 Mar 2024 12:25) (83/59 - 106/74)  RR: 18 (30 Mar 2024 09:30) (16 - 18)  SpO2: 98% (30 Mar 2024 09:30) (97% - 98%)    Parameters below as of 30 Mar 2024 09:30  Patient On (Oxygen Delivery Method): room air    PHYSICAL EXAM:  GENERAL: NAD, lying in bed comfortably  HEAD:  Atraumatic, Normocephalic  EYES: conjunctiva and sclera clear  ENT: Moist mucous membranes  NECK: Supple, No JVD  CHEST/LUNG: Clear to auscultation bilaterally; No rales, rhonchi, wheezing. Unlabored respirations  HEART: Regular rate and rhythm; No murmurs  ABDOMEN: Bowel sounds present; Soft, Nontender, Nondistended.   EXTREMITIES:  2+ Peripheral Pulses, brisk capillary refill. No clubbing, cyanosis, or edema  NERVOUS SYSTEM:  Alert & Oriented X3, speech clear. No deficits   MSK: FROM all 4 extremities, full and equal strength      MEDICATIONS  (STANDING):  dextrose 5%. 1000 milliLiter(s) (100 mL/Hr) IV Continuous <Continuous>  dextrose 5%. 1000 milliLiter(s) (50 mL/Hr) IV Continuous <Continuous>  dextrose 50% Injectable 25 Gram(s) IV Push once  dextrose 50% Injectable 25 Gram(s) IV Push once  dextrose 50% Injectable 12.5 Gram(s) IV Push once  glucagon  Injectable 1 milliGRAM(s) IntraMuscular once  influenza   Vaccine 0.5 milliLiter(s) IntraMuscular once  insulin lispro (ADMELOG) corrective regimen sliding scale   SubCutaneous three times a day before meals  insulin lispro (ADMELOG) corrective regimen sliding scale.   SubCutaneous at bedtime    MEDICATIONS  (PRN):  acetaminophen     Tablet .. 650 milliGRAM(s) Oral every 6 hours PRN Temp greater or equal to 38C (100.4F), Mild Pain (1 - 3)  aluminum hydroxide/magnesium hydroxide/simethicone Suspension 30 milliLiter(s) Oral every 4 hours PRN Dyspepsia  dextrose Oral Gel 15 Gram(s) Oral once PRN Blood Glucose LESS THAN 70 milliGRAM(s)/deciliter  melatonin 3 milliGRAM(s) Oral at bedtime PRN Insomnia  morphine  - Injectable 2 milliGRAM(s) IV Push every 4 hours PRN for BREAKTHROUGH Severe Pain (7 - 10)  ondansetron Injectable 4 milliGRAM(s) IV Push every 8 hours PRN Nausea and/or Vomiting  oxycodone    5 mG/acetaminophen 325 mG 2 Tablet(s) Oral every 4 hours PRN Severe Pain (7 - 10)  oxycodone    5 mG/acetaminophen 325 mG 1 Tablet(s) Oral every 6 hours PRN Moderate Pain (4 - 6)              LABS:                          11.2   10.04 )-----------( 467      ( 30 Mar 2024 07:02 )             34.6     30 Mar 2024 07:02    137    |  105    |  7      ----------------------------<  131    3.8     |  26     |  0.81     Ca    9.5        30 Mar 2024 07:02    TPro  8.0    /  Alb  3.0    /  TBili  <0.1   /  DBili  x      /  AST  12     /  ALT  17     /  AlkPhos  87     28 Mar 2024 21:11    LIVER FUNCTIONS - ( 28 Mar 2024 21:11 )  Alb: 3.0 g/dL / Pro: 8.0 gm/dL / ALK PHOS: 87 U/L / ALT: 17 U/L / AST: 12 U/L / GGT: x             CAPILLARY BLOOD GLUCOSE      POCT Blood Glucose.: 193 mg/dL (30 Mar 2024 11:32)  POCT Blood Glucose.: 134 mg/dL (30 Mar 2024 08:07)  POCT Blood Glucose.: 208 mg/dL (29 Mar 2024 21:58)  POCT Blood Glucose.: 168 mg/dL (29 Mar 2024 16:30)  POCT Blood Glucose.: 177 mg/dL (29 Mar 2024 14:44)        Urinalysis Basic - ( 30 Mar 2024 07:02 )    Color: x / Appearance: x / SG: x / pH: x  Gluc: 131 mg/dL / Ketone: x  / Bili: x / Urobili: x   Blood: x / Protein: x / Nitrite: x   Leuk Esterase: x / RBC: x / WBC x   Sq Epi: x / Non Sq Epi: x / Bacteria: x        RADIOLOGY:    < from: CT Abdomen and Pelvis w/ IV Cont (03.28.24 @ 22:06) >  PROCEDURE:  CT of the Abdomen and Pelvis was performed.  Sagittal and coronal reformats were performed.    FINDINGS:  LOWER CHEST: Within normal limits.    LIVER: Within normal limits.  BILE DUCTS: Normal caliber.  GALLBLADDER: Within normal limits.  SPLEEN: Within normal limits.  PANCREAS: Within normal limits.  ADRENALS: Within normal limits.  KIDNEYS/URETERS: Within normal limits.    BLADDER: Within normal limits.  REPRODUCTIVE ORGANS: Prostate within normal limits.    BOWEL: Right lower quadrant diverting ileostomy. No bowel obstruction.   Similar appearance of circumferential rectal wall thickening and   perirectal infiltration. Bandlike linear soft tissue extending from the   anterior rectal wall inferiorly towards the prostate gland may represent   fistula. Similar enhancing bandlike soft tissue extension the posterior   rectum to the presacral space. Bandlike soft tissue opacities extending   from rectum bilaterally into the ischioanal fossae and towards the medial   gluteal folds. Several, setons are in place. No drainable fluid   collection. Stable thickening of bilateral levator ani muscles.  PERITONEUM: No ascites.  VESSELS: Within normal limits.  RETROPERITONEUM/LYMPH NODES: Prominent bilateral inguinal lymph nodes,   likely reactive..  ABDOMINAL WALL: Within normal limits.  BONES: Within normal limits.    IMPRESSION:  Proctitis with redemonstration of multiple fistulous tracts extending   from the rectum to the prostate gland, presacral space, as well as   perianal fistulas. No fluid collection.

## 2024-03-30 NOTE — PROGRESS NOTE ADULT - ASSESSMENT
a/p:  Abdominal pain likely due to Crohn's disease   s/p ostomy diversion 3yrs ago  s/p seton placement   -colorectal team eval noted: setons in place draining pus, no surgical intervention recommended   -d/c Start IV fluids,  IV Zosyn  -GI evaluation appreciated: no abscess on CT, no need for abx, pt to f/u with CRS at [facility] on Tuesday   -Pain management PRN, switch to oral, IV for breakthrough pain     DM   -ADA  -ISS      DSIPO: d/c planning for tomorrow  a/p:  Abdominal pain likely due to Crohn's disease   s/p ostomy diversion 3yrs ago  s/p seton placement   -colorectal team garima noted: setons in place draining pus, no surgical intervention recommended   -d/c Start IV fluids,  IV Zosyn  -GI evaluation appreciated: no abscess on CT, no need for abx, pt to f/u with CRS at [facility] on Tuesday   -Pain management PRN, switch to oral, IV for breakthrough pain     DM   -ADA  -ISS      DISPO: d/c planning for tomorrow

## 2024-03-30 NOTE — PHARMACOTHERAPY INTERVENTION NOTE - COMMENTS
38y Male admitted with Crohn's disease without complication    HPI:  · 38 M with h/o Crohn's disease, not on immunosuppressant x 6mo,   perianal fistulas s/p diversion ostomy at [facility] (3yrs ago) , s/p  seton placement at [facility], proctitis, DM,  pancreatitis, h/o EtOH abuse , recent L hydradenitis s/p drainage 3 weeks ago, presents to the ED with rectal pain and feeling of fullness. Pt concerned he has an abscess. Pt endorse severe pain, denies fever chills, CP, SOB.     He also c/o one episode of hematochezia with associated chills. He states he has severe rectal pain , mostly when sitting up       -seen by colorectal team in ED    PAST MEDICAL HISTORY:  as above    PAST SURGICAL HISTORY:  History of colonoscopy (2014)  Perianal fistula repair in 2002  S/P ORIF (open reduction internal fixation) fracture (Right ankle, 2014).   L axillary hidradenitis drainage      FAMILY HISTORY:  Father: DM    Social History:  no smoking  occasional Etoh            REVIEW OF SYSTEMS:    CONSTITUTIONAL: No weakness, No fevers or chills  ENT: No ear ache, No sorethroat  NECK: No pain, No stiffness  RESPIRATORY: No cough, No wheezing, No hemoptysis; No dyspnea  CARDIOVASCULAR: No chest pain, No palpitations  GASTROINTESTINAL: No abd pain, No nausea, No vomiting, No hematemesis, No diarrhea or constipation. No melena, No hematochezia.  GENITOURINARY: No dysuria, No  hematuria  NEUROLOGICAL: No diplopia, No paresthesia, No motor dysfunction  MUSCULOSKELETAL: No arthralgia, No myalgia  SKIN: No rashes, or lesions   PSYCH: no anxiety, no suicidal ideation    All other review of systems is negative unless indicated above    Vital Signs Last 24 Hrs  T(C): 36.3 (29 Mar 2024 02:42), Max: 36.7 (28 Mar 2024 21:03)  T(F): 97.3 (29 Mar 2024 02:42), Max: 98 (28 Mar 2024 21:03)  HR: 73 (29 Mar 2024 02:42) (68 - 110)  BP: 108/67 (29 Mar 2024 02:42) (93/62 - 124/90)  BP(mean): 81 (29 Mar 2024 02:42) (73 - 96)  RR: 19 (29 Mar 2024 02:15) (18 - 19)  SpO2: 96% (29 Mar 2024 02:42) (96% - 100%)    Parameters below as of 29 Mar 2024 02:42  Patient On (Oxygen Delivery Method): room air        PHYSICAL EXAM:    GENERAL: NAD  HEENT:  NC/AT, EOMI, PERRLA, No scleral icterus, Moist mucous membranes  NECK: Supple, No JVD  CNS:  Alert & Oriented X3, Motor Strength 5/5 B/L upper and lower extremities; DTRs 2+ intact   LUNG: Normal Breath sounds, Clear to auscultation bilaterally, No rales, No rhonchi, No wheezing  HEART: RRR; No murmurs, No rubs  ABDOMEN: +BS, ST/ND, +ostomy present w feces, only mild TTP diffusely   GENITOURINARY: Voiding, Bladder not distended  EXTREMITIES:  2+ Peripheral Pulses, No clubbing, No cyanosis, No tibial edema  MUSCULOSKELTAL: Joints normal ROM, No TTP, No effusion  SKIN: no rashes  RECTAL: deferred, done by colorectal team  BREAST: deferred                          12.4   16.04 )-----------( 500      ( 28 Mar 2024 21:11 )             37.4     03-28    135  |  101  |  13  ----------------------------<  181<H>  4.0   |  25  |  1.02    Ca    9.5      28 Mar 2024 21:11    TPro  8.0  /  Alb  3.0<L>  /  TBili  <0.1<L>  /  DBili  x   /  AST  12<L>  /  ALT  17  /  AlkPhos  87  03-28    Vancomycin levels:   Cultures:     MEDICATIONS  (STANDING):  glucagon  Injectable 1 milliGRAM(s) IntraMuscular once  influenza   Vaccine 0.5 milliLiter(s) IntraMuscular once  insulin lispro (ADMELOG) corrective regimen sliding scale   SubCutaneous three times a day before meals  piperacillin/tazobactam IVPB.. 3.375 Gram(s) IV Intermittent every 8 hours  sodium chloride 0.9%. 1000 milliLiter(s) (100 mL/Hr) IV Continuous <Continuous>    MEDICATIONS  (PRN):  acetaminophen     Tablet .. 650 milliGRAM(s) Oral every 6 hours PRN Temp greater or equal to 38C (100.4F), Mild Pain (1 - 3)  aluminum hydroxide/magnesium hydroxide/simethicone Suspension 30 milliLiter(s) Oral every 4 hours PRN Dyspepsia  dextrose Oral Gel 15 Gram(s) Oral once PRN Blood Glucose LESS THAN 70 milliGRAM(s)/deciliter  melatonin 3 milliGRAM(s) Oral at bedtime PRN Insomnia  morphine  - Injectable 4 milliGRAM(s) IV Push four times a day PRN Severe Pain (7 - 10)  ondansetron Injectable 4 milliGRAM(s) IV Push every 8 hours PRN Nausea and/or Vomiting  oxyCODONE    IR 10 milliGRAM(s) Oral four times a day PRN Moderate Pain (4 - 6)      Pertinent PMH/PSH  Crohn's disease of large intestine without complication    Pancreatitis    Type 2 diabetes mellitus    S/P ORIF (open reduction internal fixation) fracture    History of colonoscopy    Perianal fistula    H/O fracture of clavicle    S/P colostomy  A1C Result(s) Within Prior 3 Months  A1C with Estimated Average Glucose Result: 7.4 % (01-18-24 @ 09:21)    Renal Function Within Prior 72 Hours  Creatinine: 1.02 mg/dL (03-28-24 @ 21:11)  Creatinine: 0.81 mg/dL (03-30-24 @ 07:02)    Home Medications:    Current Orders  insulin lispro (ADMELOG) corrective regimen sliding scale   SubCutaneous three times a day before meals  insulin lispro (ADMELOG) corrective regimen sliding scale.   SubCutaneous at bedtime      POCT Within Prior 24 Hours  POCT Blood Glucose.: 177 mg/dL (03-29-24 @ 14:44)  POCT Blood Glucose.: 168 mg/dL (03-29-24 @ 16:30)  POCT Blood Glucose.: 208 mg/dL (03-29-24 @ 21:58)  POCT Blood Glucose.: 134 mg/dL (03-30-24 @ 08:07)  POCT Blood Glucose.: 193 mg/dL (03-30-24 @ 11:32)      Insulin Administration Within Prior 24 Hours  insulin lispro (ADMELOG) corrective regimen sliding scale: 2 Unit(s) SubCutaneous (03-29-24 @ 14:44)  insulin lispro (ADMELOG) corrective regimen sliding scale: 2 Unit(s) SubCutaneous (03-29-24 @ 17:15)      Other Administration(s) (i.e. Steroids, PO diabetes medications) Within Prior 24 Hours      Height (cm): 175.3 (03-28-24 @ 20:18), 175.3 (03-21-24 @ 03:04), 175.3 (03-19-24 @ 18:07), 175.3 (02-29-24 @ 20:00)  Weight (kg): 75.9 (03-29-24 @ 02:42), 72.6 (03-21-24 @ 03:04), 72.6 (03-19-24 @ 18:07), 72.6 (02-29-24 @ 20:00)  BMI (kg/m2): 24.7 (03-29-24 @ 02:42), 23.6 (03-28-24 @ 20:18), 23.6 (03-21-24 @ 03:04), 23.6 (03-19-24 @ 18:07), 23.6 (02-29-24 @ 20:00)    Current Diet  Diet, Consistent Carbohydrate/No Snacks (03-29-24 @ 08:30) [Active]    Assessment/Plan:  - Patient with a history of Type 2 Diabetes Mellitus: Glycemic control to be managed by Inpatient Diabetes Management Team  - A1C was 7.4% in Jan 2024: Patients treatment goal is A1C < 7.0% per the ADA standards and thus patient has poor glycemic control outpatient, on metformin 500 mg PO QD at home [eGFR 96]  - Follow up pending A1C  - Patient is insulin naive, currently ordered a moderate intensity insulin correction scale TID AC  - Fasting POCT 134 (within range) and pre-lunch POCT 193 (slightly above protocol range of POCT 100-180)  - Patient is not a candidate for Tradjenta in-patient use: History of pancreatitis  - If POCT > 180 x 2 within 24 hours: Consider starting weight based basal-bolus insulin regimen   38y Male admitted with Crohn's disease without complication    HPI:  · 38 M with h/o Crohn's disease, not on immunosuppressant x 6mo,   perianal fistulas s/p diversion ostomy at [facility] (3yrs ago) , s/p  seton placement at [facility], proctitis, DM,  pancreatitis, h/o EtOH abuse , recent L hydradenitis s/p drainage 3 weeks ago, presents to the ED with rectal pain and feeling of fullness. Pt concerned he has an abscess. Pt endorse severe pain, denies fever chills, CP, SOB.     He also c/o one episode of hematochezia with associated chills. He states he has severe rectal pain , mostly when sitting up       -seen by colorectal team in ED    PAST MEDICAL HISTORY:  as above    PAST SURGICAL HISTORY:  History of colonoscopy (2014)  Perianal fistula repair in 2002  S/P ORIF (open reduction internal fixation) fracture (Right ankle, 2014).   L axillary hidradenitis drainage      FAMILY HISTORY:  Father: DM    Social History:  no smoking  occasional Etoh            REVIEW OF SYSTEMS:    CONSTITUTIONAL: No weakness, No fevers or chills  ENT: No ear ache, No sorethroat  NECK: No pain, No stiffness  RESPIRATORY: No cough, No wheezing, No hemoptysis; No dyspnea  CARDIOVASCULAR: No chest pain, No palpitations  GASTROINTESTINAL: No abd pain, No nausea, No vomiting, No hematemesis, No diarrhea or constipation. No melena, No hematochezia.  GENITOURINARY: No dysuria, No  hematuria  NEUROLOGICAL: No diplopia, No paresthesia, No motor dysfunction  MUSCULOSKELETAL: No arthralgia, No myalgia  SKIN: No rashes, or lesions   PSYCH: no anxiety, no suicidal ideation    All other review of systems is negative unless indicated above    Vital Signs Last 24 Hrs  T(C): 36.3 (29 Mar 2024 02:42), Max: 36.7 (28 Mar 2024 21:03)  T(F): 97.3 (29 Mar 2024 02:42), Max: 98 (28 Mar 2024 21:03)  HR: 73 (29 Mar 2024 02:42) (68 - 110)  BP: 108/67 (29 Mar 2024 02:42) (93/62 - 124/90)  BP(mean): 81 (29 Mar 2024 02:42) (73 - 96)  RR: 19 (29 Mar 2024 02:15) (18 - 19)  SpO2: 96% (29 Mar 2024 02:42) (96% - 100%)    Parameters below as of 29 Mar 2024 02:42  Patient On (Oxygen Delivery Method): room air        PHYSICAL EXAM:    GENERAL: NAD  HEENT:  NC/AT, EOMI, PERRLA, No scleral icterus, Moist mucous membranes  NECK: Supple, No JVD  CNS:  Alert & Oriented X3, Motor Strength 5/5 B/L upper and lower extremities; DTRs 2+ intact   LUNG: Normal Breath sounds, Clear to auscultation bilaterally, No rales, No rhonchi, No wheezing  HEART: RRR; No murmurs, No rubs  ABDOMEN: +BS, ST/ND, +ostomy present w feces, only mild TTP diffusely   GENITOURINARY: Voiding, Bladder not distended  EXTREMITIES:  2+ Peripheral Pulses, No clubbing, No cyanosis, No tibial edema  MUSCULOSKELTAL: Joints normal ROM, No TTP, No effusion  SKIN: no rashes  RECTAL: deferred, done by colorectal team  BREAST: deferred                          12.4   16.04 )-----------( 500      ( 28 Mar 2024 21:11 )             37.4     03-28    135  |  101  |  13  ----------------------------<  181<H>  4.0   |  25  |  1.02    Ca    9.5      28 Mar 2024 21:11    TPro  8.0  /  Alb  3.0<L>  /  TBili  <0.1<L>  /  DBili  x   /  AST  12<L>  /  ALT  17  /  AlkPhos  87  03-28    Vancomycin levels:   Cultures:     MEDICATIONS  (STANDING):  glucagon  Injectable 1 milliGRAM(s) IntraMuscular once  influenza   Vaccine 0.5 milliLiter(s) IntraMuscular once  insulin lispro (ADMELOG) corrective regimen sliding scale   SubCutaneous three times a day before meals  piperacillin/tazobactam IVPB.. 3.375 Gram(s) IV Intermittent every 8 hours  sodium chloride 0.9%. 1000 milliLiter(s) (100 mL/Hr) IV Continuous <Continuous>    MEDICATIONS  (PRN):  acetaminophen     Tablet .. 650 milliGRAM(s) Oral every 6 hours PRN Temp greater or equal to 38C (100.4F), Mild Pain (1 - 3)  aluminum hydroxide/magnesium hydroxide/simethicone Suspension 30 milliLiter(s) Oral every 4 hours PRN Dyspepsia  dextrose Oral Gel 15 Gram(s) Oral once PRN Blood Glucose LESS THAN 70 milliGRAM(s)/deciliter  melatonin 3 milliGRAM(s) Oral at bedtime PRN Insomnia  morphine  - Injectable 4 milliGRAM(s) IV Push four times a day PRN Severe Pain (7 - 10)  ondansetron Injectable 4 milliGRAM(s) IV Push every 8 hours PRN Nausea and/or Vomiting  oxyCODONE    IR 10 milliGRAM(s) Oral four times a day PRN Moderate Pain (4 - 6)      Pertinent PMH/PSH  Crohn's disease of large intestine without complication    Pancreatitis    Type 2 diabetes mellitus    S/P ORIF (open reduction internal fixation) fracture    History of colonoscopy    Perianal fistula    H/O fracture of clavicle    S/P colostomy  A1C Result(s) Within Prior 3 Months  A1C with Estimated Average Glucose Result: 7.4 % (01-18-24 @ 09:21)    Renal Function Within Prior 72 Hours  Creatinine: 1.02 mg/dL (03-28-24 @ 21:11)  Creatinine: 0.81 mg/dL (03-30-24 @ 07:02)    Home Medications:    Current Orders  insulin lispro (ADMELOG) corrective regimen sliding scale   SubCutaneous three times a day before meals  insulin lispro (ADMELOG) corrective regimen sliding scale.   SubCutaneous at bedtime      POCT Within Prior 24 Hours  POCT Blood Glucose.: 177 mg/dL (03-29-24 @ 14:44)  POCT Blood Glucose.: 168 mg/dL (03-29-24 @ 16:30)  POCT Blood Glucose.: 208 mg/dL (03-29-24 @ 21:58)  POCT Blood Glucose.: 134 mg/dL (03-30-24 @ 08:07)  POCT Blood Glucose.: 193 mg/dL (03-30-24 @ 11:32)      Insulin Administration Within Prior 24 Hours  insulin lispro (ADMELOG) corrective regimen sliding scale: 2 Unit(s) SubCutaneous (03-29-24 @ 14:44)  insulin lispro (ADMELOG) corrective regimen sliding scale: 2 Unit(s) SubCutaneous (03-29-24 @ 17:15)      Other Administration(s) (i.e. Steroids, PO diabetes medications) Within Prior 24 Hours      Height (cm): 175.3 (03-28-24 @ 20:18), 175.3 (03-21-24 @ 03:04), 175.3 (03-19-24 @ 18:07), 175.3 (02-29-24 @ 20:00)  Weight (kg): 75.9 (03-29-24 @ 02:42), 72.6 (03-21-24 @ 03:04), 72.6 (03-19-24 @ 18:07), 72.6 (02-29-24 @ 20:00)  BMI (kg/m2): 24.7 (03-29-24 @ 02:42), 23.6 (03-28-24 @ 20:18), 23.6 (03-21-24 @ 03:04), 23.6 (03-19-24 @ 18:07), 23.6 (02-29-24 @ 20:00)    Current Diet  Diet, Consistent Carbohydrate/No Snacks (03-29-24 @ 08:30) [Active]    Assessment/Plan:  - Patient with a history of Type 2 Diabetes Mellitus: Glycemic control to be managed by Inpatient Diabetes Management Team  - A1C was 7.4% in Jan 2024: Patients treatment goal is A1C < 7.0% per the ADA standards and thus patient has poor glycemic control outpatient, on metformin 500 mg PO QD at home [eGFR 96]  - Follow up pending A1C  - Patient is insulin naive, currently ordered a moderate intensity insulin correction scale TID AC  - Fasting POCT 134 (within range) and pre-lunch POCT 193 (slightly above protocol range of POCT 100-180)  - Prior day POCT trended 210, 177, 168, 208: Patient has not required Admelog correction 4 units in the past 24 hours   - Patient is not a candidate for Tradjenta in-patient use: History of pancreatitis  - If POCT > 180 x 2 within 24 hours: Consider starting weight based basal-bolus insulin regimen

## 2024-03-30 NOTE — PROGRESS NOTE ADULT - SUBJECTIVE AND OBJECTIVE BOX
Patient is a 38y old  Male who presents with a chief complaint of     Subective: Seen and examined at bedside. No overnight events. Feeling better.      PAST MEDICAL & SURGICAL HISTORY:  Crohn's disease of large intestine without complication  (No current flare up)      Pancreatitis      Type 2 diabetes mellitus      S/P ORIF (open reduction internal fixation) fracture  (Right ankle, 2014)      History of colonoscopy  (2014)      Perianal fistula  repair in 2002      H/O fracture of clavicle  with surgical repair      S/P colostomy          MEDICATIONS  (STANDING):  dextrose 5%. 1000 milliLiter(s) (100 mL/Hr) IV Continuous <Continuous>  dextrose 5%. 1000 milliLiter(s) (50 mL/Hr) IV Continuous <Continuous>  dextrose 50% Injectable 25 Gram(s) IV Push once  dextrose 50% Injectable 25 Gram(s) IV Push once  dextrose 50% Injectable 12.5 Gram(s) IV Push once  glucagon  Injectable 1 milliGRAM(s) IntraMuscular once  influenza   Vaccine 0.5 milliLiter(s) IntraMuscular once  insulin lispro (ADMELOG) corrective regimen sliding scale   SubCutaneous three times a day before meals  insulin lispro (ADMELOG) corrective regimen sliding scale.   SubCutaneous at bedtime  piperacillin/tazobactam IVPB.. 3.375 Gram(s) IV Intermittent every 8 hours  sodium chloride 0.9%. 1000 milliLiter(s) (100 mL/Hr) IV Continuous <Continuous>    MEDICATIONS  (PRN):  acetaminophen     Tablet .. 650 milliGRAM(s) Oral every 6 hours PRN Temp greater or equal to 38C (100.4F), Mild Pain (1 - 3)  aluminum hydroxide/magnesium hydroxide/simethicone Suspension 30 milliLiter(s) Oral every 4 hours PRN Dyspepsia  dextrose Oral Gel 15 Gram(s) Oral once PRN Blood Glucose LESS THAN 70 milliGRAM(s)/deciliter  melatonin 3 milliGRAM(s) Oral at bedtime PRN Insomnia  morphine  - Injectable 2 milliGRAM(s) IV Push every 4 hours PRN for BREAKTHROUGH Severe Pain (7 - 10)  ondansetron Injectable 4 milliGRAM(s) IV Push every 8 hours PRN Nausea and/or Vomiting  oxycodone    5 mG/acetaminophen 325 mG 2 Tablet(s) Oral every 4 hours PRN Severe Pain (7 - 10)  oxycodone    5 mG/acetaminophen 325 mG 1 Tablet(s) Oral every 6 hours PRN Moderate Pain (4 - 6)      REVIEW OF SYSTEMS:    RESPIRATORY: No shortness of breath  CARDIOVASCULAR: No chest pain  All other review of systems is negative unless indicated above.    Vital Signs Last 24 Hrs  T(C): 36.6 (30 Mar 2024 07:45), Max: 36.8 (29 Mar 2024 20:32)  T(F): 97.9 (30 Mar 2024 07:45), Max: 98.2 (29 Mar 2024 20:32)  HR: 75 (30 Mar 2024 09:30) (63 - 82)  BP: 97/71 (30 Mar 2024 09:30) (83/59 - 106/74)  BP(mean): --  RR: 18 (30 Mar 2024 09:30) (16 - 18)  SpO2: 98% (30 Mar 2024 09:30) (97% - 98%)    Parameters below as of 30 Mar 2024 09:30  Patient On (Oxygen Delivery Method): room air        PHYSICAL EXAM:    Constitutional: NAD, well-developed  Respiratory: CTAB  Cardiovascular: S1 and S2, RRR  Gastrointestinal: BS+, soft, NT, ostomy intact  Extremities: No peripheral edema  Psychiatric: Normal mood, normal affect    LABS:                        11.2   10.04 )-----------( 467      ( 30 Mar 2024 07:02 )             34.6     03-30    137  |  105  |  7   ----------------------------<  131<H>  3.8   |  26  |  0.81    Ca    9.5      30 Mar 2024 07:02    TPro  8.0  /  Alb  3.0<L>  /  TBili  <0.1<L>  /  DBili  x   /  AST  12<L>  /  ALT  17  /  AlkPhos  87  03-28      LIVER FUNCTIONS - ( 28 Mar 2024 21:11 )  Alb: 3.0 g/dL / Pro: 8.0 gm/dL / ALK PHOS: 87 U/L / ALT: 17 U/L / AST: 12 U/L / GGT: x             RADIOLOGY & ADDITIONAL STUDIES:

## 2024-03-30 NOTE — PROGRESS NOTE ADULT - ASSESSMENT
1. Crohn's disease with anorectal fistulas. Needs close follow up with his [place] CRS team this coming tuesday. There is no evidence of abscess on CT scan    Recommendation  1. Diet as tolerated  2. Needs to follow up with CRS at [place] on 4/2/24  3. No overt infection. Antibiotics not required for fistuous disease without abscess  3. No contraindication to discharge

## 2024-03-31 ENCOUNTER — TRANSCRIPTION ENCOUNTER (OUTPATIENT)
Age: 39
End: 2024-03-31

## 2024-03-31 VITALS
DIASTOLIC BLOOD PRESSURE: 56 MMHG | OXYGEN SATURATION: 100 % | SYSTOLIC BLOOD PRESSURE: 97 MMHG | RESPIRATION RATE: 18 BRPM | HEART RATE: 73 BPM | TEMPERATURE: 98 F

## 2024-03-31 PROCEDURE — 99239 HOSP IP/OBS DSCHRG MGMT >30: CPT

## 2024-03-31 RX ORDER — INSULIN LISPRO 100/ML
3 VIAL (ML) SUBCUTANEOUS
Refills: 0 | Status: DISCONTINUED | OUTPATIENT
Start: 2024-03-31 | End: 2024-03-31

## 2024-03-31 RX ORDER — OXYCODONE AND ACETAMINOPHEN 5; 325 MG/1; MG/1
1 TABLET ORAL
Qty: 12 | Refills: 0
Start: 2024-03-31 | End: 2024-04-02

## 2024-03-31 RX ORDER — INSULIN LISPRO 100/ML
VIAL (ML) SUBCUTANEOUS AT BEDTIME
Refills: 0 | Status: DISCONTINUED | OUTPATIENT
Start: 2024-03-31 | End: 2024-03-31

## 2024-03-31 RX ORDER — INSULIN LISPRO 100/ML
VIAL (ML) SUBCUTANEOUS
Refills: 0 | Status: DISCONTINUED | OUTPATIENT
Start: 2024-03-31 | End: 2024-03-31

## 2024-03-31 RX ORDER — INSULIN GLARGINE 100 [IU]/ML
9 INJECTION, SOLUTION SUBCUTANEOUS AT BEDTIME
Refills: 0 | Status: DISCONTINUED | OUTPATIENT
Start: 2024-03-31 | End: 2024-03-31

## 2024-03-31 RX ORDER — METFORMIN HYDROCHLORIDE 850 MG/1
2 TABLET ORAL
Qty: 120 | Refills: 0
Start: 2024-03-31 | End: 2024-04-29

## 2024-03-31 RX ADMIN — Medication 1: at 11:52

## 2024-03-31 RX ADMIN — Medication 3 UNIT(S): at 11:52

## 2024-03-31 RX ADMIN — MORPHINE SULFATE 2 MILLIGRAM(S): 50 CAPSULE, EXTENDED RELEASE ORAL at 03:03

## 2024-03-31 RX ADMIN — MORPHINE SULFATE 2 MILLIGRAM(S): 50 CAPSULE, EXTENDED RELEASE ORAL at 02:33

## 2024-03-31 NOTE — DISCHARGE NOTE PROVIDER - NSDCCPCAREPLAN_GEN_ALL_CORE_FT
PRINCIPAL DISCHARGE DIAGNOSIS  Diagnosis: Crohn's disease  Assessment and Plan of Treatment: Admitted for abdominal pain likely due to crohn's flare. Follow up with CRS at [place] on 4/2/24. Continue percocet as needed for severe pain. Return to ER for worsening symptoms.      SECONDARY DISCHARGE DIAGNOSES  Diagnosis: Rectal fistula  Assessment and Plan of Treatment:     Diagnosis: Diabetes mellitus  Assessment and Plan of Treatment: Your A1C was 8.5%, recommend increasing metformin to 1000mg twice daily with food. Continue to follow up with your primary care physician for continued management.

## 2024-03-31 NOTE — DISCHARGE NOTE PROVIDER - HOSPITAL COURSE
38 M with h/o Crohn's disease, not on immunosuppressant x 6mo,   perianal fistulas s/p diversion ostomy at [facility] (3yrs ago) , s/p  seton placement at [facility], proctitis, DM,  pancreatitis, h/o EtOH abuse , recent L hydradenitis s/p drainage 3 weeks ago, presents to the ED with rectal pain and feeling of fullness. Pt concerned he has an abscess. Pt endorse severe pain, denies fever chills, CP, SOB. He also c/o one episode of hematochezia with associated chills. He states he has severe rectal pain , mostly when sitting up.     Abdominal pain likely due to Crohn's disease   s/p ostomy diversion 3yrs ago  s/p seton placement   -colorectal team garima noted: setons in place draining pus, no surgical intervention recommended   -d/c Start IV fluids,  IV Zosyn  -GI evaluation appreciated: no abscess on CT, no need for abx, pt to f/u with CRS at [facility] on Tuesday   -Pain management PRN, switch to oral, IV for breakthrough pain     DM   -ADA  -ISS

## 2024-03-31 NOTE — DISCHARGE NOTE PROVIDER - NSDCMRMEDTOKEN_GEN_ALL_CORE_FT
acetaminophen 500 mg oral tablet: 2 tab(s) orally every 8 hours  metFORMIN 500 mg oral tablet: 2 tab(s) orally 2 times a day  oxycodone-acetaminophen 5 mg-325 mg oral tablet: 1 tab(s) orally every 6 hours as needed for  severe pain MDD: 4 tabs

## 2024-03-31 NOTE — DISCHARGE NOTE PROVIDER - PROVIDER TOKENS
FREE:[LAST:[Colorectal surgeon at [facility]],PHONE:[(   )    -],FAX:[(   )    -],SCHEDULEDAPPT:[04/02/2024]],PROVIDER:[TOKEN:[[id number]]]

## 2024-03-31 NOTE — DISCHARGE NOTE PROVIDER - CARE PROVIDER_API CALL
Colorectal surgeon at [facility],   Phone: (   )    -  Fax: (   )    -  Scheduled Appointment: 04/02/2024    Lito Crawley  [facility]  [address]  [place] [address]  Phone: [phone]  Fax: [phone]  Follow Up Time:

## 2024-03-31 NOTE — PHARMACOTHERAPY INTERVENTION NOTE - COMMENTS
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 He is concerned he has an abscess. (+) Severe rectal pain. (-) Fever, chills, CP, SOB. He also complains of hematochezia and associated chills. (29 Mar 2024 08:38)    Pertinent PMH/PSH  Crohn's disease of large intestine without complication  Pancreatitis  Type 2 diabetes mellitus  S/P ORIF (open reduction internal fixation) fracture  History of colonoscopy  Perianal fistula  H/O fracture of clavicle  S/P colostomy    Home Medications:  acetaminophen 500 mg oral tablet: 2 tab(s) orally every 8 hours  metFORMIN 500 mg oral tablet: 1 tab(s) orally once a day    A1C Result(s) Within Prior 3 Months  A1C with Estimated Average Glucose Result: 7.4 % (01-18-24 @ 09:21)  A1C with Estimated Average Glucose Result: 8.5 % (03-30-24 @ 07:02)    Renal Function Within Prior 72 Hours  Creatinine: 1.02 mg/dL (03-28-24 @ 21:11)  Creatinine: 0.81 mg/dL (03-30-24 @ 07:02)    Current Orders  insulin glargine Injectable (LANTUS) 9 Unit(s) SubCutaneous at bedtime  insulin lispro (ADMELOG) corrective regimen sliding scale   SubCutaneous at bedtime  insulin lispro (ADMELOG) corrective regimen sliding scale   SubCutaneous three times a day before meals  insulin lispro Injectable (ADMELOG) 3 Unit(s) SubCutaneous three times a day before meals    POCT Within Prior 24 Hours  POCT Blood Glucose.: 208 mg/dL (03-29-24 @ 21:58)  POCT Blood Glucose.: 134 mg/dL (03-30-24 @ 08:07)  POCT Blood Glucose.: 193 mg/dL (03-30-24 @ 11:32)  POCT Blood Glucose.: 216 mg/dL (03-30-24 @ 17:07)  POCT Blood Glucose.: 181 mg/dL (03-30-24 @ 22:30)  POCT Blood Glucose.: 143 mg/dL (03-31-24 @ 08:02)    Insulin Administration Within Prior 24 Hours  insulin lispro (ADMELOG) corrective regimen sliding scale   2 Unit(s) SubCutaneous (03-30-24 @ 12:01)   4 Unit(s) SubCutaneous (03-30-24 @ 17:16)    Other Administration(s) (i.e. Steroids, PO diabetes medications) Within Prior 24 Hours: N/A    Height (cm): 175.3 (03-28-24 @ 20:18), 175.3 (03-21-24 @ 03:04), 175.3 (03-19-24 @ 18:07)  Weight (kg): 75.9 (03-29-24 @ 02:42), 72.6 (03-21-24 @ 03:04), 72.6 (03-19-24 @ 18:07)  BMI (kg/m2): 24.7 (03-29-24 @ 02:42), 23.6 (03-28-24 @ 20:18), 23.6 (03-21-24 @ 03:04), 23.6 (03-19-24 @ 18:07)    Current Diet  Diet, Consistent Carbohydrate/No Snacks (03-29-24 @ 08:30) [Active]    Assessment/Plan:  - Patient with a history of Type 2 Diabetes Mellitus: Glycemic control to be managed by Inpatient Diabetes Management Team  - A1C was 7.4% in Jan 2024 and increased to 8.5% Mar 2024: Patients treatment goal is A1C < 7.0% per the ADA standards and thus patient has poor glycemic control outpatient, on metformin 500 mg PO QD at home [eGFR 116]  - Patient is insulin naive, currently ordered a moderate intensity insulin correction scale TID AC and HS  - Fasting POCT 143: Patients glycemic control is currently within protocol range of POCT 100-180  - Prior day POCT trended 134, 193, 216, 181: Patient has required Admelog correction 0/2/4/0  - Patient is not a candidate for Tradjenta in-patient use: History of pancreatitis  - POCT > 180 x 2 within 24 hours: Start weight based basal-bolus insulin regimen  - Start Lantus 9 units HS and Admelog 3 units TID AC [First dose pre-lunch]  - Change insulin correction scale to low intensity TID AC and HS  - Titrate therapy to glycemic target of POCT 100-180  - On discharge: Titrate metformin up to 500 mg PO BID with a goal of 1000 mg PO BID

## 2024-03-31 NOTE — DISCHARGE NOTE NURSING/CASE MANAGEMENT/SOCIAL WORK - PATIENT PORTAL LINK FT
You can access the FollowMyHealth Patient Portal offered by [facility] by registering at the following website: [email or web address]. By joining [employer]’s FollowMyHealth portal, you will also be able to view your health information using other applications (apps) compatible with our system.

## 2024-03-31 NOTE — DISCHARGE NOTE PROVIDER - ATTENDING DISCHARGE PHYSICAL EXAMINATION:
Patient seen today, feels same, continued abdominal pain, which he states is always there, tolerating orally, ambulating to restroom. No N/V. Afebrile.     Vital Signs Last 24 Hrs  T(C): 36.9 (31 Mar 2024 08:00), Max: 36.9 (31 Mar 2024 08:00)  T(F): 98.4 (31 Mar 2024 08:00), Max: 98.4 (31 Mar 2024 08:00)  HR: 73 (31 Mar 2024 08:00) (64 - 75)  BP: 97/56 (31 Mar 2024 08:00) (97/56 - 106/69)  BP(mean): 68 (31 Mar 2024 08:00) (68 - 68)  RR: 18 (31 Mar 2024 08:00) (18 - 18)  SpO2: 100% (31 Mar 2024 08:00) (98% - 100%)    Parameters below as of 31 Mar 2024 08:00  Patient On (Oxygen Delivery Method): room air    PHYSICAL EXAM:  GENERAL: NAD  CHEST/LUNG: Clear to auscultation bilaterally; No rales, rhonchi, wheezing. Unlabored respirations  HEART: Regular rate and rhythm; No murmurs  ABDOMEN: Bowel sounds present; Soft, Nontender, Nondistended.   EXTREMITIES:  2+ Peripheral Pulses, brisk capillary refill. No clubbing, cyanosis, or edema  NERVOUS SYSTEM:  Alert & Oriented X3, speech clear. No deficits   MSK: FROM all 4 extremities, full and equal strength

## 2024-04-03 LAB
CULTURE RESULTS: SIGNIFICANT CHANGE UP
SPECIMEN SOURCE: SIGNIFICANT CHANGE UP

## 2024-04-06 DIAGNOSIS — K50.113 CROHN'S DISEASE OF LARGE INTESTINE WITH FISTULA: ICD-10-CM

## 2024-04-06 DIAGNOSIS — K60.4 RECTAL FISTULA: ICD-10-CM

## 2024-04-06 DIAGNOSIS — K62.89 OTHER SPECIFIED DISEASES OF ANUS AND RECTUM: ICD-10-CM

## 2024-04-06 DIAGNOSIS — E11.9 TYPE 2 DIABETES MELLITUS WITHOUT COMPLICATIONS: ICD-10-CM

## 2024-04-06 DIAGNOSIS — F10.10 ALCOHOL ABUSE, UNCOMPLICATED: ICD-10-CM

## 2024-04-15 NOTE — H&P ADULT - GUM GEN PE MLT EXAM PC
Render In Strict Bullet Format?: No
Discontinue Regimen: mupirocin 2 % topical ointment: Apply to cheeks twice daily x 2-3 weeks until clear.
Detail Level: Zone
Initiate Treatment: hydrocortisone 2.5 % topical ointment: Apply to affected areas of face twice daily x 1-2 weeks until clear.
Plan: Impetigo has resolved, but pt still has underlying eczematous rash. Ordering ENRIQUETA since pt states it worsens with sun exposure.
Normal genitalia; no lesions; no discharge

## 2024-05-27 ENCOUNTER — EMERGENCY (EMERGENCY)
Facility: HOSPITAL | Age: 39
LOS: 0 days | Discharge: HOME | End: 2024-05-27
Attending: STUDENT IN AN ORGANIZED HEALTH CARE EDUCATION/TRAINING PROGRAM
Payer: MEDICAID

## 2024-05-27 VITALS
RESPIRATION RATE: 18 BRPM | OXYGEN SATURATION: 100 % | TEMPERATURE: 98 F | HEART RATE: 85 BPM | SYSTOLIC BLOOD PRESSURE: 120 MMHG | DIASTOLIC BLOOD PRESSURE: 85 MMHG

## 2024-05-27 VITALS — HEIGHT: 69 IN

## 2024-05-27 DIAGNOSIS — Z98.89 OTHER SPECIFIED POSTPROCEDURAL STATES: Chronic | ICD-10-CM

## 2024-05-27 DIAGNOSIS — K50.90 CROHN'S DISEASE, UNSPECIFIED, WITHOUT COMPLICATIONS: ICD-10-CM

## 2024-05-27 DIAGNOSIS — L98.8 OTHER SPECIFIED DISORDERS OF THE SKIN AND SUBCUTANEOUS TISSUE: ICD-10-CM

## 2024-05-27 DIAGNOSIS — E11.9 TYPE 2 DIABETES MELLITUS WITHOUT COMPLICATIONS: ICD-10-CM

## 2024-05-27 DIAGNOSIS — K62.89 OTHER SPECIFIED DISEASES OF ANUS AND RECTUM: ICD-10-CM

## 2024-05-27 DIAGNOSIS — K60.3 ANAL FISTULA: Chronic | ICD-10-CM

## 2024-05-27 DIAGNOSIS — F17.200 NICOTINE DEPENDENCE, UNSPECIFIED, UNCOMPLICATED: ICD-10-CM

## 2024-05-27 DIAGNOSIS — Z93.3 COLOSTOMY STATUS: ICD-10-CM

## 2024-05-27 DIAGNOSIS — Z87.81 PERSONAL HISTORY OF (HEALED) TRAUMATIC FRACTURE: Chronic | ICD-10-CM

## 2024-05-27 DIAGNOSIS — Z96.7 PRESENCE OF OTHER BONE AND TENDON IMPLANTS: Chronic | ICD-10-CM

## 2024-05-27 DIAGNOSIS — D72.829 ELEVATED WHITE BLOOD CELL COUNT, UNSPECIFIED: ICD-10-CM

## 2024-05-27 DIAGNOSIS — Z88.1 ALLERGY STATUS TO OTHER ANTIBIOTIC AGENTS STATUS: ICD-10-CM

## 2024-05-27 DIAGNOSIS — Z93.3 COLOSTOMY STATUS: Chronic | ICD-10-CM

## 2024-05-27 LAB
ALBUMIN SERPL ELPH-MCNC: 2.9 G/DL — LOW (ref 3.3–5)
ALP SERPL-CCNC: 73 U/L — SIGNIFICANT CHANGE UP (ref 40–120)
ALT FLD-CCNC: 17 U/L — SIGNIFICANT CHANGE UP (ref 12–78)
ANION GAP SERPL CALC-SCNC: 5 MMOL/L — SIGNIFICANT CHANGE UP (ref 5–17)
APPEARANCE UR: CLEAR — SIGNIFICANT CHANGE UP
AST SERPL-CCNC: 13 U/L — LOW (ref 15–37)
BASOPHILS # BLD AUTO: 0.04 K/UL — SIGNIFICANT CHANGE UP (ref 0–0.2)
BASOPHILS NFR BLD AUTO: 0.4 % — SIGNIFICANT CHANGE UP (ref 0–2)
BILIRUB SERPL-MCNC: 0.3 MG/DL — SIGNIFICANT CHANGE UP (ref 0.2–1.2)
BILIRUB UR-MCNC: NEGATIVE — SIGNIFICANT CHANGE UP
BUN SERPL-MCNC: 7 MG/DL — SIGNIFICANT CHANGE UP (ref 7–23)
CALCIUM SERPL-MCNC: 9.4 MG/DL — SIGNIFICANT CHANGE UP (ref 8.5–10.1)
CHLORIDE SERPL-SCNC: 104 MMOL/L — SIGNIFICANT CHANGE UP (ref 96–108)
CO2 SERPL-SCNC: 26 MMOL/L — SIGNIFICANT CHANGE UP (ref 22–31)
COLOR SPEC: YELLOW — SIGNIFICANT CHANGE UP
CREAT SERPL-MCNC: 0.7 MG/DL — SIGNIFICANT CHANGE UP (ref 0.5–1.3)
DIFF PNL FLD: NEGATIVE — SIGNIFICANT CHANGE UP
EGFR: 121 ML/MIN/1.73M2 — SIGNIFICANT CHANGE UP
EOSINOPHIL # BLD AUTO: 0.07 K/UL — SIGNIFICANT CHANGE UP (ref 0–0.5)
EOSINOPHIL NFR BLD AUTO: 0.6 % — SIGNIFICANT CHANGE UP (ref 0–6)
GLUCOSE SERPL-MCNC: 152 MG/DL — HIGH (ref 70–99)
GLUCOSE UR QL: NEGATIVE MG/DL — SIGNIFICANT CHANGE UP
HCT VFR BLD CALC: 38.4 % — LOW (ref 39–50)
HGB BLD-MCNC: 12.4 G/DL — LOW (ref 13–17)
IMM GRANULOCYTES NFR BLD AUTO: 0.5 % — SIGNIFICANT CHANGE UP (ref 0–0.9)
KETONES UR-MCNC: NEGATIVE MG/DL — SIGNIFICANT CHANGE UP
LEUKOCYTE ESTERASE UR-ACNC: NEGATIVE — SIGNIFICANT CHANGE UP
LIDOCAIN IGE QN: 18 U/L — SIGNIFICANT CHANGE UP (ref 13–75)
LYMPHOCYTES # BLD AUTO: 17.7 % — SIGNIFICANT CHANGE UP (ref 13–44)
LYMPHOCYTES # BLD AUTO: 2.01 K/UL — SIGNIFICANT CHANGE UP (ref 1–3.3)
MCHC RBC-ENTMCNC: 30.5 PG — SIGNIFICANT CHANGE UP (ref 27–34)
MCHC RBC-ENTMCNC: 32.3 GM/DL — SIGNIFICANT CHANGE UP (ref 32–36)
MCV RBC AUTO: 94.6 FL — SIGNIFICANT CHANGE UP (ref 80–100)
MONOCYTES # BLD AUTO: 0.92 K/UL — HIGH (ref 0–0.9)
MONOCYTES NFR BLD AUTO: 8.1 % — SIGNIFICANT CHANGE UP (ref 2–14)
NEUTROPHILS # BLD AUTO: 8.23 K/UL — HIGH (ref 1.8–7.4)
NEUTROPHILS NFR BLD AUTO: 72.7 % — SIGNIFICANT CHANGE UP (ref 43–77)
NITRITE UR-MCNC: NEGATIVE — SIGNIFICANT CHANGE UP
PH UR: 6.5 — SIGNIFICANT CHANGE UP (ref 5–8)
PLATELET # BLD AUTO: 387 K/UL — SIGNIFICANT CHANGE UP (ref 150–400)
POTASSIUM SERPL-MCNC: 3.3 MMOL/L — LOW (ref 3.5–5.3)
POTASSIUM SERPL-SCNC: 3.3 MMOL/L — LOW (ref 3.5–5.3)
PROT SERPL-MCNC: 7.5 GM/DL — SIGNIFICANT CHANGE UP (ref 6–8.3)
PROT UR-MCNC: NEGATIVE MG/DL — SIGNIFICANT CHANGE UP
RBC # BLD: 4.06 M/UL — LOW (ref 4.2–5.8)
RBC # FLD: 14.3 % — SIGNIFICANT CHANGE UP (ref 10.3–14.5)
SODIUM SERPL-SCNC: 135 MMOL/L — SIGNIFICANT CHANGE UP (ref 135–145)
SP GR SPEC: 1.03 — SIGNIFICANT CHANGE UP (ref 1–1.03)
UROBILINOGEN FLD QL: 0.2 MG/DL — SIGNIFICANT CHANGE UP (ref 0.2–1)
WBC # BLD: 11.33 K/UL — HIGH (ref 3.8–10.5)
WBC # FLD AUTO: 11.33 K/UL — HIGH (ref 3.8–10.5)

## 2024-05-27 PROCEDURE — 80053 COMPREHEN METABOLIC PANEL: CPT

## 2024-05-27 PROCEDURE — 99285 EMERGENCY DEPT VISIT HI MDM: CPT

## 2024-05-27 PROCEDURE — 87086 URINE CULTURE/COLONY COUNT: CPT

## 2024-05-27 PROCEDURE — 36415 COLL VENOUS BLD VENIPUNCTURE: CPT

## 2024-05-27 PROCEDURE — 96374 THER/PROPH/DIAG INJ IV PUSH: CPT | Mod: XU

## 2024-05-27 PROCEDURE — 74177 CT ABD & PELVIS W/CONTRAST: CPT | Mod: 26,MC

## 2024-05-27 PROCEDURE — 96376 TX/PRO/DX INJ SAME DRUG ADON: CPT

## 2024-05-27 PROCEDURE — 74177 CT ABD & PELVIS W/CONTRAST: CPT | Mod: MC

## 2024-05-27 PROCEDURE — 83690 ASSAY OF LIPASE: CPT

## 2024-05-27 PROCEDURE — 81003 URINALYSIS AUTO W/O SCOPE: CPT

## 2024-05-27 PROCEDURE — 85025 COMPLETE CBC W/AUTO DIFF WBC: CPT

## 2024-05-27 PROCEDURE — 99284 EMERGENCY DEPT VISIT MOD MDM: CPT | Mod: 25

## 2024-05-27 RX ORDER — MORPHINE SULFATE 50 MG/1
4 CAPSULE, EXTENDED RELEASE ORAL ONCE
Refills: 0 | Status: DISCONTINUED | OUTPATIENT
Start: 2024-05-27 | End: 2024-05-27

## 2024-05-27 RX ORDER — SODIUM CHLORIDE 9 MG/ML
1000 INJECTION INTRAMUSCULAR; INTRAVENOUS; SUBCUTANEOUS ONCE
Refills: 0 | Status: COMPLETED | OUTPATIENT
Start: 2024-05-27 | End: 2024-05-27

## 2024-05-27 RX ORDER — OXYCODONE HYDROCHLORIDE 5 MG/1
1 TABLET ORAL
Qty: 20 | Refills: 0
Start: 2024-05-27 | End: 2024-05-31

## 2024-05-27 RX ADMIN — SODIUM CHLORIDE 1000 MILLILITER(S): 9 INJECTION INTRAMUSCULAR; INTRAVENOUS; SUBCUTANEOUS at 11:46

## 2024-05-27 RX ADMIN — MORPHINE SULFATE 4 MILLIGRAM(S): 50 CAPSULE, EXTENDED RELEASE ORAL at 11:47

## 2024-05-27 RX ADMIN — MORPHINE SULFATE 4 MILLIGRAM(S): 50 CAPSULE, EXTENDED RELEASE ORAL at 13:10

## 2024-05-27 NOTE — ED STATDOCS - NSICDXPASTMEDICALHX_GEN_ALL_CORE_FT
PAST MEDICAL HISTORY:  Crohn's disease of large intestine without complication (No current flare up)    Pancreatitis     Type 2 diabetes mellitus

## 2024-05-27 NOTE — ED STATDOCS - PROGRESS NOTE DETAILS
Katerina Gutierrez for attending Dr. Orellana: 37 y/o male with a PMHx of Crohns, DM2, pancreatitis presents to the ED c/o rectal pain and chills. Pt s/p Seton procedure at [place] 2 weeks ago. Pt completed course of abx. Pt reports his ostomy bag is filling up and he is having stool coming out of his rectum. Smoker. Occasional EtOH use.  Will send pt to main ED for further evaluation. 39 y/o male with a PMHx of Crohns, DM2, pancreatitis presents to the ED c/o rectal pain and chills. Pt s/p Seton procedure at [place] 2 weeks ago. Pt completed course of Augmentin.   Pt reports his ostomy bag is filling up and he is having stool coming out of his rectum. Smoker. Occasional EtOH use. No other complaints at this time.  Known history of rectal fistulas.  Anabel Lombardi PA-C 39 y/o male with a PMHx of Crohns, perianal fistulas s/p diversion at [facility] 3 years ago with seton placement a [facility], proctitis,  DM2, pancreatitis presents to the ED c/o rectal pain and chills. Pt s/p Seton procedure at [place] 2 weeks ago. Pt completed course of Augmentin.   Pt reports his ostomy bag is filling up and he is having stool coming out of his rectum. Smoker. Occasional EtOH use. No other complaints at this time.  Known history of rectal fistulas.  Anabel Lombardi PA-C mild leukocytosis with CT comparable to previous.  Improvement on left side.  DC with pain management and he will follow with his surgeon.  Return for any worsening symptoms or concerns.  Anabel Lombardi PA-C

## 2024-05-27 NOTE — ED ADULT TRIAGE NOTE - STATUS:
Medical Necessity Information: It is in your best interest to select a reason for this procedure from the list below. All of these items fulfill various CMS LCD requirements except the new and changing color options.
Post-Care Instructions: I reviewed with the patient in detail post-care instructions. Patient is to wear sunprotection, and avoid picking at any of the treated lesions. Pt may apply Vaseline to crusted or scabbing areas.
Render Post-Care Instructions In Note?: no
Consent: The patient's consent was obtained including but not limited to risks of crusting, scabbing, blistering, scarring, darker or lighter pigmentary change, recurrence, incomplete removal and infection.
Applied
Detail Level: Detailed
Medical Necessity Clause: This procedure was medically necessary because the lesions that were treated were: at r and risk for and/or subject to recurrent physical trauma as a result of lesion type and location with history of the same, bleeding and irritated.

## 2024-05-27 NOTE — ED STATDOCS - CLINICAL SUMMARY MEDICAL DECISION MAKING FREE TEXT BOX
Will send pt to main ED for further evaluation. Adult male with chronic fistulas and perirectal abscess presents with worsening pain. Vitals normal. No fevers. Abd exam benign. Pending rectal exam. Plan: labs, CT, consult pt's surgeon, reassess. Adult male with chronic fistulas and perirectal abscess presents with worsening pain. Vitals normal. No fevers. Abd exam benign. Pending rectal exam. Plan: labs, CT, consult pt's surgeon, reassess.          mild leukocytosis with CT comparable to previous.  Improvement on left side.  DC with pain management and he will follow with his surgeon.  Return for any worsening symptoms or concerns.  Anabel Lombardi PA-C

## 2024-05-27 NOTE — ED STATDOCS - PATIENT PORTAL LINK FT
You can access the FollowMyHealth Patient Portal offered by [facility] by registering at the following website: [email or web address]. By joining [employer]’s FollowMyHealth portal, you will also be able to view your health information using other applications (apps) compatible with our system.

## 2024-05-27 NOTE — ED ADULT NURSE NOTE - CAS TRG GEN SKIN CONDITION
A: airway intact, speaking full sentences  B: bilateral breath sounds, not in respiratory distress  C: central and distal pulses  D: GCS 15, no confusion, 3 mm, pupils round and reactive  E: pt exposed for eval of injuries    General: well appearing, NAD  Head:  NC, AT  Eyes: EOMI, PERRLA, no scleral icterus  Ears: no erythema/drainage  Nose: midline, no bleeding/drainage  Throat: MMM  Cardiac: RRR, no m/r/g, no lower extremity edema  Respiratory: CTABL, no wheezes/rales/rhonchi, equal chest wall expansions  Abdomen: soft, ND, NT, no rebound tenderness, no guarding, nonperitonitic  MSK/Vascular: full ROM, distal pulses intact, soft compartments, warm extremities  Neuro: AAOx4, strength and sensation intact  Psych: calm, cooperative, normal affect Warm

## 2024-05-27 NOTE — ED STATDOCS - NSFOLLOWUPINSTRUCTIONS_ED_ALL_ED_FT
Anal Fistula  Body outline showing some organs of the digestive system with a close-up of an anal fistula.  An anal fistula is a hole that forms between the bowel and the skin near the opening of the butt (anus).    The anus lets poop (stool) leave the body. It is made up of many small glands. In some cases, these glands can get plugged. This can cause a pocket filled with fluid (abscess) to form. An anal fistula may happen when an abscess gets infected.    What are the causes?  In most cases, an anal fistula is caused by an abscess. Other causes include:  A problem from surgery.  An injury to the rectum or the area around it.  The use of high-energy beams (radiation) to treat the area around the rectum.  What increases the risk?  You are more likely to get an anal fistula if you have certain conditions. These include:  Chronic inflammatory bowel disease, such as Crohn's disease or ulcerative colitis.  Colon cancer or rectal cancer.  Diverticular disease, such as diverticulitis.  A sexually transmitted infection (STI), such as gonorrhea, chlamydia, or syphilis.  An infection caused by human immunodeficiency virus (HIV).  What are the signs or symptoms?  Symptoms of an anal fistula include:  Throbbing or constant pain that may get worse when you sit.  Swelling or irritation around the anus.  Pus or blood coming from a hole near the anus.  Pain when you poop.  Fever or chills.  How is this diagnosed?  This condition is diagnosed based on a physical exam. Your health care provider may:  Look for the opening of the fistula on the outside of your body.  Look for the opening of the fistula on the inside of your body. This may be done with a probe or scope.  Do an exam of the rectum with a gloved hand. This is called a digital rectal exam.  You may also have tests done. These may include:  Imaging tests that use dye to find the fistula, such as:  X-rays.  Ultrasound.  CT scan.  MRI.  Other tests to find the cause of the fistula. This may include blood tests.  How is this treated?  An anal fistula is often treated with surgery. This may include:  A fistulotomy. This is when the fistula is opened and drained. It helps with healing.  Seton placement. This is when a silk string (seton) is put in during a fistulotomy. It helps to drain any infection.  Advancement flap procedure. This is when tissue is taken from your rectum or the skin around your anus. The tissue is attached to the fistula.  Bioprosthetic plug. This is when a plug shaped like a cone is made from your tissue. It is used to block the fistula.  Some anal fistulas do not require surgery. In some cases, they can be sealed with a fibrin glue. You may also need to take antibiotics.    Follow these instructions at home:  Medicines    Take over-the-counter and prescription medicines only as told by your provider.  If you were prescribed antibiotics, take them as told by your provider. Do not stop using the antibiotic even if you start to feel better.  Managing constipation    You may need to take these actions to prevent or treat constipation:  Drink enough fluid to keep your pee (urine) pale yellow.  Take over-the-counter or prescription medicines. Use a stool softener or a medicine that helps you poop (laxative) as told by your provider.  Eat foods that are high in fiber, such as beans, whole grains, and fresh fruits and vegetables.  Limit foods that are high in fat and processed sugars, such as fried or sweet foods.  General instructions    Pear, berries, artichoke, and beans.  Take a warm sitz bath for 15–20 minutes, 3–4 times a day, or as told by your provider. Sitz baths can ease your pain and discomfort. They can also help with healing.  Keep the area around the anus as clean and dry as you can. Use wet toilet paper or a moist towelette after you poop.  Contact a health care provider if:  You have pain that does not get better with medicine.  You have new redness or swelling near the anus.  You have new fluid, blood, or pus coming from the anus.  The area around the anus feels tender or warm.  You have a fever or chills.  You have diarrhea.  Get help right away if:  You are in severe pain.  You have severe problems peeing (urinating) or pooping.  This information is not intended to replace advice given to you by your health care provider. Make sure you discuss any questions you have with your health care provider.    Document Revised: 10/02/2023 Document Reviewed: 10/02/2023

## 2024-05-27 NOTE — ED STATDOCS - OBJECTIVE STATEMENT
See progress note. 37 y/o male with a PMHx of Crohns, DM2, pancreatitis presents to the ED c/o rectal pain and chills. Pt s/p Seton procedure at [place] 2 weeks ago. Pt completed course of abx. Pt reports his ostomy bag is filling up and he is having stool coming out of his rectum. Smoker. Occasional EtOH use. No other complaints at this time.

## 2024-05-27 NOTE — ED ADULT TRIAGE NOTE - CHIEF COMPLAINT QUOTE
Pt to ED c/o rectal pain x2 weeks progressively getting worse. Pt with PMH Nissatricia had a procedure done 2 weeks ago at [place] and since then has had pain, bloody and pussy drainage. Pt with ostomy bag. Reports chills, sweat and weakness. Pt unable to sit due to pain.

## 2024-05-27 NOTE — ED STATDOCS - NSICDXPASTSURGICALHX_GEN_ALL_CORE_FT
PAST SURGICAL HISTORY:  H/O fracture of clavicle with surgical repair    History of colonoscopy (2014)    Perianal fistula repair in 2002    S/P colostomy     S/P ORIF (open reduction internal fixation) fracture (Right ankle, 2014)

## 2024-05-27 NOTE — ED STATDOCS - ATTENDING APP SHARED VISIT CONTRIBUTION OF CARE
I, Bear Orellana, DO personally saw the patient with THALIA.  I have personally performed a face to face diagnostic evaluation on this patient.  I have reviewed the THALIA note and agree with the history, exam, and plan of care, except as noted.  I personally saw the patient and performed a substantive portion of the visit including all aspects of the medical decision making.

## 2024-05-27 NOTE — ED ADULT NURSE NOTE - CHIEF COMPLAINT QUOTE
Pt to ED c/o rectal pain x2 weeks progressively getting worse. Pt with PMH Nissatricia had a procedure done 2 weeks ago at [place] and since then has had pain, bloody and pussy drainage. Pt with ostomy bag. Reports chills, sweat and weakness. Pt unable to sit due to pain.

## 2024-05-28 LAB
CULTURE RESULTS: SIGNIFICANT CHANGE UP
SPECIMEN SOURCE: SIGNIFICANT CHANGE UP

## 2024-05-29 ENCOUNTER — EMERGENCY (EMERGENCY)
Facility: HOSPITAL | Age: 39
LOS: 0 days | Discharge: HOME | End: 2024-05-29
Attending: STUDENT IN AN ORGANIZED HEALTH CARE EDUCATION/TRAINING PROGRAM
Payer: MEDICAID

## 2024-05-29 VITALS
RESPIRATION RATE: 18 BRPM | OXYGEN SATURATION: 99 % | HEART RATE: 98 BPM | SYSTOLIC BLOOD PRESSURE: 118 MMHG | DIASTOLIC BLOOD PRESSURE: 89 MMHG | TEMPERATURE: 98 F

## 2024-05-29 VITALS — HEIGHT: 69 IN | WEIGHT: 159.39 LBS

## 2024-05-29 DIAGNOSIS — K62.89 OTHER SPECIFIED DISEASES OF ANUS AND RECTUM: ICD-10-CM

## 2024-05-29 DIAGNOSIS — E11.9 TYPE 2 DIABETES MELLITUS WITHOUT COMPLICATIONS: ICD-10-CM

## 2024-05-29 DIAGNOSIS — K60.3 ANAL FISTULA: Chronic | ICD-10-CM

## 2024-05-29 DIAGNOSIS — Z88.1 ALLERGY STATUS TO OTHER ANTIBIOTIC AGENTS STATUS: ICD-10-CM

## 2024-05-29 DIAGNOSIS — Z87.19 PERSONAL HISTORY OF OTHER DISEASES OF THE DIGESTIVE SYSTEM: ICD-10-CM

## 2024-05-29 DIAGNOSIS — Z93.2 ILEOSTOMY STATUS: ICD-10-CM

## 2024-05-29 DIAGNOSIS — Z98.89 OTHER SPECIFIED POSTPROCEDURAL STATES: Chronic | ICD-10-CM

## 2024-05-29 DIAGNOSIS — K62.5 HEMORRHAGE OF ANUS AND RECTUM: ICD-10-CM

## 2024-05-29 DIAGNOSIS — Z93.3 COLOSTOMY STATUS: Chronic | ICD-10-CM

## 2024-05-29 DIAGNOSIS — Z87.81 PERSONAL HISTORY OF (HEALED) TRAUMATIC FRACTURE: Chronic | ICD-10-CM

## 2024-05-29 DIAGNOSIS — Z96.7 PRESENCE OF OTHER BONE AND TENDON IMPLANTS: Chronic | ICD-10-CM

## 2024-05-29 LAB
ALBUMIN SERPL ELPH-MCNC: 3 G/DL — LOW (ref 3.3–5)
ALP SERPL-CCNC: 75 U/L — SIGNIFICANT CHANGE UP (ref 40–120)
ALT FLD-CCNC: 19 U/L — SIGNIFICANT CHANGE UP (ref 12–78)
ANION GAP SERPL CALC-SCNC: 5 MMOL/L — SIGNIFICANT CHANGE UP (ref 5–17)
APTT BLD: 29.4 SEC — SIGNIFICANT CHANGE UP (ref 24.5–35.6)
AST SERPL-CCNC: 16 U/L — SIGNIFICANT CHANGE UP (ref 15–37)
BASOPHILS # BLD AUTO: 0.03 K/UL — SIGNIFICANT CHANGE UP (ref 0–0.2)
BASOPHILS NFR BLD AUTO: 0.3 % — SIGNIFICANT CHANGE UP (ref 0–2)
BILIRUB SERPL-MCNC: 0.3 MG/DL — SIGNIFICANT CHANGE UP (ref 0.2–1.2)
BLD GP AB SCN SERPL QL: SIGNIFICANT CHANGE UP
BUN SERPL-MCNC: 6 MG/DL — LOW (ref 7–23)
CALCIUM SERPL-MCNC: 9.7 MG/DL — SIGNIFICANT CHANGE UP (ref 8.5–10.1)
CHLORIDE SERPL-SCNC: 104 MMOL/L — SIGNIFICANT CHANGE UP (ref 96–108)
CO2 SERPL-SCNC: 25 MMOL/L — SIGNIFICANT CHANGE UP (ref 22–31)
CREAT SERPL-MCNC: 0.68 MG/DL — SIGNIFICANT CHANGE UP (ref 0.5–1.3)
EGFR: 122 ML/MIN/1.73M2 — SIGNIFICANT CHANGE UP
EOSINOPHIL # BLD AUTO: 0.09 K/UL — SIGNIFICANT CHANGE UP (ref 0–0.5)
EOSINOPHIL NFR BLD AUTO: 0.8 % — SIGNIFICANT CHANGE UP (ref 0–6)
GLUCOSE SERPL-MCNC: 125 MG/DL — HIGH (ref 70–99)
HCT VFR BLD CALC: 39 % — SIGNIFICANT CHANGE UP (ref 39–50)
HGB BLD-MCNC: 13 G/DL — SIGNIFICANT CHANGE UP (ref 13–17)
IMM GRANULOCYTES NFR BLD AUTO: 0.4 % — SIGNIFICANT CHANGE UP (ref 0–0.9)
INR BLD: 1.01 RATIO — SIGNIFICANT CHANGE UP (ref 0.85–1.18)
LACTATE SERPL-SCNC: 0.6 MMOL/L — LOW (ref 0.7–2)
LYMPHOCYTES # BLD AUTO: 1.77 K/UL — SIGNIFICANT CHANGE UP (ref 1–3.3)
LYMPHOCYTES # BLD AUTO: 14.9 % — SIGNIFICANT CHANGE UP (ref 13–44)
MCHC RBC-ENTMCNC: 31.6 PG — SIGNIFICANT CHANGE UP (ref 27–34)
MCHC RBC-ENTMCNC: 33.3 GM/DL — SIGNIFICANT CHANGE UP (ref 32–36)
MCV RBC AUTO: 94.7 FL — SIGNIFICANT CHANGE UP (ref 80–100)
MONOCYTES # BLD AUTO: 1.06 K/UL — HIGH (ref 0–0.9)
MONOCYTES NFR BLD AUTO: 8.9 % — SIGNIFICANT CHANGE UP (ref 2–14)
NEUTROPHILS # BLD AUTO: 8.85 K/UL — HIGH (ref 1.8–7.4)
NEUTROPHILS NFR BLD AUTO: 74.7 % — SIGNIFICANT CHANGE UP (ref 43–77)
PLATELET # BLD AUTO: 410 K/UL — HIGH (ref 150–400)
POTASSIUM SERPL-MCNC: 3.8 MMOL/L — SIGNIFICANT CHANGE UP (ref 3.5–5.3)
POTASSIUM SERPL-SCNC: 3.8 MMOL/L — SIGNIFICANT CHANGE UP (ref 3.5–5.3)
PROT SERPL-MCNC: 7.9 GM/DL — SIGNIFICANT CHANGE UP (ref 6–8.3)
PROTHROM AB SERPL-ACNC: 11.4 SEC — SIGNIFICANT CHANGE UP (ref 9.5–13)
RBC # BLD: 4.12 M/UL — LOW (ref 4.2–5.8)
RBC # FLD: 14.1 % — SIGNIFICANT CHANGE UP (ref 10.3–14.5)
SODIUM SERPL-SCNC: 134 MMOL/L — LOW (ref 135–145)
WBC # BLD: 11.85 K/UL — HIGH (ref 3.8–10.5)
WBC # FLD AUTO: 11.85 K/UL — HIGH (ref 3.8–10.5)

## 2024-05-29 PROCEDURE — 93005 ELECTROCARDIOGRAM TRACING: CPT

## 2024-05-29 PROCEDURE — 86850 RBC ANTIBODY SCREEN: CPT

## 2024-05-29 PROCEDURE — 85610 PROTHROMBIN TIME: CPT

## 2024-05-29 PROCEDURE — 86900 BLOOD TYPING SEROLOGIC ABO: CPT

## 2024-05-29 PROCEDURE — 99284 EMERGENCY DEPT VISIT MOD MDM: CPT | Mod: 25

## 2024-05-29 PROCEDURE — 93010 ELECTROCARDIOGRAM REPORT: CPT

## 2024-05-29 PROCEDURE — 99285 EMERGENCY DEPT VISIT HI MDM: CPT

## 2024-05-29 PROCEDURE — 80053 COMPREHEN METABOLIC PANEL: CPT

## 2024-05-29 PROCEDURE — 96374 THER/PROPH/DIAG INJ IV PUSH: CPT

## 2024-05-29 PROCEDURE — 83605 ASSAY OF LACTIC ACID: CPT

## 2024-05-29 PROCEDURE — 85730 THROMBOPLASTIN TIME PARTIAL: CPT

## 2024-05-29 PROCEDURE — 36415 COLL VENOUS BLD VENIPUNCTURE: CPT

## 2024-05-29 PROCEDURE — 87040 BLOOD CULTURE FOR BACTERIA: CPT

## 2024-05-29 PROCEDURE — 85025 COMPLETE CBC W/AUTO DIFF WBC: CPT

## 2024-05-29 PROCEDURE — 86901 BLOOD TYPING SEROLOGIC RH(D): CPT

## 2024-05-29 RX ORDER — SODIUM CHLORIDE 9 MG/ML
2200 INJECTION INTRAMUSCULAR; INTRAVENOUS; SUBCUTANEOUS ONCE
Refills: 0 | Status: COMPLETED | OUTPATIENT
Start: 2024-05-29 | End: 2024-05-29

## 2024-05-29 RX ORDER — MORPHINE SULFATE 50 MG/1
4 CAPSULE, EXTENDED RELEASE ORAL ONCE
Refills: 0 | Status: DISCONTINUED | OUTPATIENT
Start: 2024-05-29 | End: 2024-05-29

## 2024-05-29 RX ADMIN — SODIUM CHLORIDE 2200 MILLILITER(S): 9 INJECTION INTRAMUSCULAR; INTRAVENOUS; SUBCUTANEOUS at 12:55

## 2024-05-29 RX ADMIN — MORPHINE SULFATE 4 MILLIGRAM(S): 50 CAPSULE, EXTENDED RELEASE ORAL at 12:55

## 2024-05-29 NOTE — ED ADULT NURSE NOTE - OBJECTIVE STATEMENT
Ambulatory to ER with c/o rectal pain. Patient a & ox4, respirations even and unlabored on room air. Await MD painting.

## 2024-05-29 NOTE — ED ADULT TRIAGE NOTE - CHIEF COMPLAINT QUOTE
pt presents to Ed with complaints of rectal pain. pt endorses procedure 2 weeks ago for fistula with MD Kelly in [place].   pt endorses bright red rectal bleeding.

## 2024-05-29 NOTE — ED PROVIDER NOTE - CPE EDP SKIN NORM
normal... [Fully active, able to carry on all pre-disease performance without restriction] : Status 0 - Fully active, able to carry on all pre-disease performance without restriction [Normal] : affect appropriate [de-identified] : b/l mastectomies with skin outpouching laterally, no palpable masses or adenopathy, unchanged [de-identified] : many moles over skin UE/LEs

## 2024-05-29 NOTE — ED ADULT NURSE NOTE - NS ED PATIENT SAFETY CONCERN
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 embolic MCA infarct      PULMONARY   (+) COPD (chronic obstructive pulmonary disease) (HCC)        Physical Exam    Airway    Mallampati score: II  TM Distance: >3 FB  Neck ROM: full     Dental   upper dentures,     Cardiovascular  Cardiovascular exam normal    Pulmonary  Pulmonary exam normal     Other Findings        Anesthesia Plan  ASA Score- 3     Anesthesia Type- IV sedation with anesthesia with ASA Monitors  Additional Monitors:   Airway Plan:     Comment: NPO appropriate  Discussed MAC with GA as back-up  Patient understands risks/benefits and wishes to proceed          Plan Factors-Exercise tolerance (METS): <4 METS  Chart reviewed  EKG reviewed  Existing labs reviewed  Patient is a current smoker  Patient instructed to abstain from smoking on day of procedure  Patient did not smoke on day of surgery  Induction-     Postoperative Plan-     Informed Consent- Anesthetic plan and risks discussed with patient  I personally reviewed this patient with the CRNA  Discussed and agreed on the Anesthesia Plan with the CRNA  Leatha Boston
No

## 2024-05-29 NOTE — ED STATDOCS - PROGRESS NOTE DETAILS
38-year-old male presents the emergency department for rectal bleeding greenish discharge from fistula was in the rectal region will send to main for further workup and evaluation.

## 2024-05-29 NOTE — ED ADULT NURSE NOTE - CHIEF COMPLAINT QUOTE
pt presents to Ed with complaints of rectal pain. pt endorses procedure 2 weeks ago for fistula with MD Kelly in [place].   pt endorses bright red rectal bleeding.

## 2024-05-29 NOTE — ED PROVIDER NOTE - PATIENT PORTAL LINK FT
You can access the FollowMyHealth Patient Portal offered by [facility] by registering at the following website: [email or web address]. By joining [employer]’s FollowMyHealth portal, you will also be able to view your health information using other applications (apps) compatible with our system.

## 2024-05-29 NOTE — CONSULT NOTE ADULT - SUBJECTIVE AND OBJECTIVE BOX
38M with pmhx Crohns disease with extensive anorectal fistulization s/p multiple interventions including diverting ileostomy done at [facility], EUA, I&D and seton placement multiple times, most recently B/L seton placement at [place] 2 weeks ago.  Patient states he has been in constant pain since the procedure and noticed increased purulent drainage from the seton sites.  Patient was seen in   ED 2 days ago for the same issue. At that time, CT was performed which demonstrated the fistula tracts, however there was no appreciable fluid collection. Patient was discharged home and advised to follow up with his primary colorectal surgeon. Endorses subjective fevers and chills. Denies chest pain, sob, abdominal pain, n/v.  Ileostomy functional.     Vital Signs (24 Hrs):  T(C): 36.3 (05-29-24 @ 10:35), Max: 36.3 (05-29-24 @ 10:35)  HR: 105 (05-29-24 @ 10:35) (105 - 105)  BP: 125/97 (05-29-24 @ 10:35) (125/97 - 125/97)  RR: 19 (05-29-24 @ 10:35) (19 - 19)  SpO2: 100% (05-29-24 @ 10:35) (100% - 100%)  Wt(kg): --  Daily Height in cm: 175.26 (29 May 2024 10:31)    Daily     I&O's Summary      T(C): 36.3 (05-29-24 @ 10:35), Max: 36.3 (05-29-24 @ 10:35)  HR: 105 (05-29-24 @ 10:35) (105 - 105)  BP: 125/97 (05-29-24 @ 10:35) (125/97 - 125/97)  RR: 19 (05-29-24 @ 10:35) (19 - 19)  SpO2: 100% (05-29-24 @ 10:35) (100% - 100%)    CONSTITUTIONAL: Well groomed, no apparent distress  EYES: PERRLA and symmetric, EOMI, No conjunctival or scleral injection, non-icteric  ENMT: Oral mucosa with moist membranes. Normal dentition; no pharyngeal injection or exudates             NECK: Supple, symmetric and without tracheal deviation   RESP: No respiratory distress, no use of accessory muscles; CTA b/l, no WRR  CV: RRR, +S1S2, no MRG; no JVD; no peripheral edema  GI: Soft, NT, ND, no rebound, no guarding; no palpable masses; no hepatosplenomegaly; no hernia palpated, ileostomy pink and patent with gas and stool within the ostomy appliance.   Multiple B/L setons in place, actively draining pus.  Right gluteal area tender to palpation with mild induration    .  LABS:                         13.0   11.85 )-----------( 410      ( 29 May 2024 12:40 )             39.0     05-29    134<L>  |  104  |  6<L>  ----------------------------<  125<H>  3.8   |  25  |  0.68    Ca    9.7      29 May 2024 12:40    TPro  7.9  /  Alb  3.0<L>  /  TBili  0.3  /  DBili  x   /  AST  16  /  ALT  19  /  AlkPhos  75  05-29    PT/INR - ( 29 May 2024 12:40 )   PT: 11.4 sec;   INR: 1.01 ratio         PTT - ( 29 May 2024 12:40 )  PTT:29.4 sec  Urinalysis Basic - ( 29 May 2024 12:40 )    Color: x / Appearance: x / SG: x / pH: x  Gluc: 125 mg/dL / Ketone: x  / Bili: x / Urobili: x   Blood: x / Protein: x / Nitrite: x   Leuk Esterase: x / RBC: x / WBC x   Sq Epi: x / Non Sq Epi: x / Bacteria: x        Lactate, Blood: 0.6 mmol/L (05-29 @ 12:40)      RADIOLOGY, EKG & ADDITIONAL TESTS: Reviewed.

## 2024-05-29 NOTE — ED PROVIDER NOTE - OBJECTIVE STATEMENT
39 yo male w/PMHx DM2, pancreatitis, Crohn's with an ostomy presents to the ED c/o rectal pain and discharge. Pt states 2 weeks ago he had a seton procedure done and since then has been having increased pain to his rectum. Pt had a ct scan done on May 27th in [facility], returns today with similar symptoms. Pt is having difficulty sitting due to pain. Pt feels like there is drainage coming from the rectum. Does have an ostomy site, has had it for 4-5 years, no blood in the bag reported.

## 2024-05-29 NOTE — CONSULT NOTE ADULT - ASSESSMENT
38M with history of Crohns disease with extensive anorectal involvement s/p recent seton placement at outside hospital.  Fistulas are actively draining.     Recommendations:   - No acute colorectal surgery intervention indicated at this time   - D/C on Oral Abx   - Patient advised to follow up with primary colorectal surgeon   - reconsult colorectal surgery as needed     d/w Dr. Merino

## 2024-05-29 NOTE — ED PROVIDER NOTE - CLINICAL SUMMARY MEDICAL DECISION MAKING FREE TEXT BOX
Pt presents with rectal pain s/p seton procedure done at outside hospital. Pt had recent ct imaging done. Will repeat labs consult colorectal and give pain control and further plan based on colorectal recommendation Pt presents with rectal pain s/p seton procedure done at outside hospital. Pt had recent ct imaging done. Will repeat labs consult colorectal and give pain control and further plan based on colorectal recommendation    Po DONNELLY: Seen and evaluated by colorectal team- rec patient to go to [facility] at this time where his private surgeon is- patient drove here today and father to pick him up and bring him there- he is comfortable with this plan; colorectal recs po antibiotics- ordered as requested; patient had script send for oxycodone on 5/27- 20 pills- will not refill at this time; strict return precautions given.

## 2024-05-29 NOTE — ED PROVIDER NOTE - CARE PROVIDER_API CALL
Yasmeen Merino  Colon/Rectal Surgery  [address], [address] [place] [address]  Phone: [phone]  Fax: [phone]  Follow Up Time:

## 2024-06-04 NOTE — ED ADULT NURSE NOTE - CHPI ED NUR SEVERITY2
You can access the FollowMyHealth Patient Portal offered by [facility] by registering at the following website: [email or web address]. By joining [employer]’s FollowMyHealth portal, you will also be able to view your health information using other applications (apps) compatible with our system. PAIN SCALE 10 OF 10. How Severe Is This Condition?: mild Additional History: Patient states area has improved

## 2024-06-08 NOTE — ED ADULT NURSE NOTE - NSHOSCREENINGQ1_ED_ALL_ED
Discharge Phone Call    Patient Name: Charity Matthews     OB Care Provider: Richard Ibarra MD Discharge Date: 2024    Disposition of baby:    Phone Number: [phone] (home)     Attempts to Contact:  Date:    Caller  Date:    Caller  Date:    Caller    Information for the patient's :  Antony Matthews [[id number]]   Delivery Method: Vaginal, Spontaneous     1.  Now that you are at home is your pain being well controlled?   Y/N   If no, instruct to call       provider.      2. Are you breastfeeding?    Y/N    Do you need any extra support from our lactation staff?      Y/N    If yes, provide number for lactation.  3. Have you made or already had your first appointment with the baby's doctor? Y/N   If no, do      you know when to schedule it?  Y/N    4. Have you scheduled your follow-up appointment?  Y/N  If no, do you know when to schedule       it?    Y/N   If no, they can find it on printed discharge instructions.  5. Did staff discuss safe sleep during your stay? Y/N   6. Did we explain things in a way you could understand?  Y/N  7. Were we respectful of your preferences for labor and birth and include you in the plan of       care?  Y/N  If no, please explain _______________________________________________  8. Is there anyone in particular you would like to mention who provided care for you? _______      _________________________________________________________________________     9. Were you given a Post-Birth Warning Signs handout?  Y/N  Do you have it somewhere      easily accessible?  Y/N  If no, please send them a copy and ask them to put it somewhere      easily found.  10. Have you been crying excessively, having anger or mood swings that feel out of control, or       feel like you can't cope with caring for yourself or baby? Y/N   If yes, they may be showing       signs of postpartum depression and should call provider. There is also a        depression test on page C5  No

## 2024-07-13 ENCOUNTER — INPATIENT (INPATIENT)
Facility: HOSPITAL | Age: 39
LOS: 18 days | Discharge: HOME | DRG: 251 | End: 2024-08-01
Attending: INTERNAL MEDICINE | Admitting: INTERNAL MEDICINE
Payer: MEDICAID

## 2024-07-13 ENCOUNTER — EMERGENCY (EMERGENCY)
Facility: HOSPITAL | Age: 39
LOS: 0 days | Discharge: HOME | End: 2024-07-13
Attending: EMERGENCY MEDICINE
Payer: MEDICAID

## 2024-07-13 VITALS
DIASTOLIC BLOOD PRESSURE: 76 MMHG | TEMPERATURE: 98 F | SYSTOLIC BLOOD PRESSURE: 137 MMHG | HEART RATE: 76 BPM | OXYGEN SATURATION: 98 % | RESPIRATION RATE: 18 BRPM

## 2024-07-13 VITALS — HEIGHT: 69 IN | WEIGHT: 161.16 LBS

## 2024-07-13 VITALS — HEIGHT: 69 IN

## 2024-07-13 DIAGNOSIS — K60.3 ANAL FISTULA: Chronic | ICD-10-CM

## 2024-07-13 DIAGNOSIS — R10.9 UNSPECIFIED ABDOMINAL PAIN: ICD-10-CM

## 2024-07-13 DIAGNOSIS — Z88.1 ALLERGY STATUS TO OTHER ANTIBIOTIC AGENTS: ICD-10-CM

## 2024-07-13 DIAGNOSIS — E11.65 TYPE 2 DIABETES MELLITUS WITH HYPERGLYCEMIA: ICD-10-CM

## 2024-07-13 DIAGNOSIS — Z93.3 COLOSTOMY STATUS: Chronic | ICD-10-CM

## 2024-07-13 DIAGNOSIS — Z96.7 PRESENCE OF OTHER BONE AND TENDON IMPLANTS: Chronic | ICD-10-CM

## 2024-07-13 DIAGNOSIS — Z87.81 PERSONAL HISTORY OF (HEALED) TRAUMATIC FRACTURE: Chronic | ICD-10-CM

## 2024-07-13 DIAGNOSIS — R11.0 NAUSEA: ICD-10-CM

## 2024-07-13 DIAGNOSIS — R33.9 RETENTION OF URINE, UNSPECIFIED: ICD-10-CM

## 2024-07-13 DIAGNOSIS — R68.83 CHILLS (WITHOUT FEVER): ICD-10-CM

## 2024-07-13 DIAGNOSIS — K50.90 CROHN'S DISEASE, UNSPECIFIED, WITHOUT COMPLICATIONS: ICD-10-CM

## 2024-07-13 DIAGNOSIS — R10.814 LEFT LOWER QUADRANT ABDOMINAL TENDERNESS: ICD-10-CM

## 2024-07-13 DIAGNOSIS — Z98.89 OTHER SPECIFIED POSTPROCEDURAL STATES: Chronic | ICD-10-CM

## 2024-07-13 DIAGNOSIS — Z87.19 PERSONAL HISTORY OF OTHER DISEASES OF THE DIGESTIVE SYSTEM: ICD-10-CM

## 2024-07-13 DIAGNOSIS — Z98.890 OTHER SPECIFIED POSTPROCEDURAL STATES: ICD-10-CM

## 2024-07-13 DIAGNOSIS — Z93.3 COLOSTOMY STATUS: ICD-10-CM

## 2024-07-13 DIAGNOSIS — K60.4 RECTAL FISTULA: ICD-10-CM

## 2024-07-13 LAB
ALBUMIN SERPL ELPH-MCNC: 2.5 G/DL — LOW (ref 3.3–5)
ALBUMIN SERPL ELPH-MCNC: 2.8 G/DL — LOW (ref 3.3–5)
ALP SERPL-CCNC: 76 U/L — SIGNIFICANT CHANGE UP (ref 40–120)
ALP SERPL-CCNC: 87 U/L — SIGNIFICANT CHANGE UP (ref 40–120)
ALT FLD-CCNC: 14 U/L — SIGNIFICANT CHANGE UP (ref 12–78)
ALT FLD-CCNC: 20 U/L — SIGNIFICANT CHANGE UP (ref 12–78)
ANION GAP SERPL CALC-SCNC: 5 MMOL/L — SIGNIFICANT CHANGE UP (ref 5–17)
ANION GAP SERPL CALC-SCNC: 8 MMOL/L — SIGNIFICANT CHANGE UP (ref 5–17)
APPEARANCE UR: CLEAR — SIGNIFICANT CHANGE UP
APTT BLD: 28.6 SEC — SIGNIFICANT CHANGE UP (ref 24.5–35.6)
AST SERPL-CCNC: 15 U/L — SIGNIFICANT CHANGE UP (ref 15–37)
AST SERPL-CCNC: 26 U/L — SIGNIFICANT CHANGE UP (ref 15–37)
BASOPHILS # BLD AUTO: 0.04 K/UL — SIGNIFICANT CHANGE UP (ref 0–0.2)
BASOPHILS # BLD AUTO: 0.05 K/UL — SIGNIFICANT CHANGE UP (ref 0–0.2)
BASOPHILS NFR BLD AUTO: 0.3 % — SIGNIFICANT CHANGE UP (ref 0–2)
BASOPHILS NFR BLD AUTO: 0.4 % — SIGNIFICANT CHANGE UP (ref 0–2)
BILIRUB SERPL-MCNC: 0.4 MG/DL — SIGNIFICANT CHANGE UP (ref 0.2–1.2)
BILIRUB SERPL-MCNC: 0.5 MG/DL — SIGNIFICANT CHANGE UP (ref 0.2–1.2)
BILIRUB UR-MCNC: NEGATIVE — SIGNIFICANT CHANGE UP
BUN SERPL-MCNC: 4 MG/DL — LOW (ref 7–23)
BUN SERPL-MCNC: 5 MG/DL — LOW (ref 7–23)
CALCIUM SERPL-MCNC: 8.9 MG/DL — SIGNIFICANT CHANGE UP (ref 8.5–10.1)
CALCIUM SERPL-MCNC: 9.4 MG/DL — SIGNIFICANT CHANGE UP (ref 8.5–10.1)
CHLORIDE SERPL-SCNC: 102 MMOL/L — SIGNIFICANT CHANGE UP (ref 96–108)
CHLORIDE SERPL-SCNC: 99 MMOL/L — SIGNIFICANT CHANGE UP (ref 96–108)
CO2 SERPL-SCNC: 28 MMOL/L — SIGNIFICANT CHANGE UP (ref 22–31)
CO2 SERPL-SCNC: 28 MMOL/L — SIGNIFICANT CHANGE UP (ref 22–31)
COLOR SPEC: YELLOW — SIGNIFICANT CHANGE UP
CREAT SERPL-MCNC: 0.67 MG/DL — SIGNIFICANT CHANGE UP (ref 0.5–1.3)
CREAT SERPL-MCNC: 0.86 MG/DL — SIGNIFICANT CHANGE UP (ref 0.5–1.3)
DIFF PNL FLD: NEGATIVE — SIGNIFICANT CHANGE UP
EGFR: 114 ML/MIN/1.73M2 — SIGNIFICANT CHANGE UP
EGFR: 114 ML/MIN/1.73M2 — SIGNIFICANT CHANGE UP
EGFR: 123 ML/MIN/1.73M2 — SIGNIFICANT CHANGE UP
EOSINOPHIL # BLD AUTO: 0.05 K/UL — SIGNIFICANT CHANGE UP (ref 0–0.5)
EOSINOPHIL # BLD AUTO: 0.07 K/UL — SIGNIFICANT CHANGE UP (ref 0–0.5)
EOSINOPHIL NFR BLD AUTO: 0.4 % — SIGNIFICANT CHANGE UP (ref 0–6)
EOSINOPHIL NFR BLD AUTO: 0.6 % — SIGNIFICANT CHANGE UP (ref 0–6)
GLUCOSE BLDC GLUCOMTR-MCNC: 120 MG/DL — HIGH (ref 70–99)
GLUCOSE SERPL-MCNC: 251 MG/DL — HIGH (ref 70–99)
GLUCOSE SERPL-MCNC: 338 MG/DL — HIGH (ref 70–99)
GLUCOSE UR QL: 100 MG/DL
HCT VFR BLD CALC: 38.5 % — LOW (ref 39–50)
HCT VFR BLD CALC: 42.6 % — SIGNIFICANT CHANGE UP (ref 39–50)
HGB BLD-MCNC: 12.9 G/DL — LOW (ref 13–17)
HGB BLD-MCNC: 14.1 G/DL — SIGNIFICANT CHANGE UP (ref 13–17)
IMM GRANULOCYTES NFR BLD AUTO: 0.6 % — SIGNIFICANT CHANGE UP (ref 0–0.9)
IMM GRANULOCYTES NFR BLD AUTO: 0.6 % — SIGNIFICANT CHANGE UP (ref 0–0.9)
INR BLD: 0.95 RATIO — SIGNIFICANT CHANGE UP (ref 0.85–1.18)
KETONES UR-MCNC: NEGATIVE MG/DL — SIGNIFICANT CHANGE UP
LACTATE SERPL-SCNC: 1.2 MMOL/L — SIGNIFICANT CHANGE UP (ref 0.7–2)
LACTATE SERPL-SCNC: 1.7 MMOL/L — SIGNIFICANT CHANGE UP (ref 0.7–2)
LEUKOCYTE ESTERASE UR-ACNC: NEGATIVE — SIGNIFICANT CHANGE UP
LIDOCAIN IGE QN: 180 U/L — HIGH (ref 13–75)
LIDOCAIN IGE QN: 227 U/L — HIGH (ref 13–75)
LYMPHOCYTES # BLD AUTO: 1.63 K/UL — SIGNIFICANT CHANGE UP (ref 1–3.3)
LYMPHOCYTES # BLD AUTO: 1.79 K/UL — SIGNIFICANT CHANGE UP (ref 1–3.3)
LYMPHOCYTES # BLD AUTO: 13.8 % — SIGNIFICANT CHANGE UP (ref 13–44)
LYMPHOCYTES # BLD AUTO: 14.4 % — SIGNIFICANT CHANGE UP (ref 13–44)
MCHC RBC-ENTMCNC: 31.1 PG — SIGNIFICANT CHANGE UP (ref 27–34)
MCHC RBC-ENTMCNC: 31.4 PG — SIGNIFICANT CHANGE UP (ref 27–34)
MCHC RBC-ENTMCNC: 33.1 GM/DL — SIGNIFICANT CHANGE UP (ref 32–36)
MCHC RBC-ENTMCNC: 33.5 GM/DL — SIGNIFICANT CHANGE UP (ref 32–36)
MCV RBC AUTO: 93.7 FL — SIGNIFICANT CHANGE UP (ref 80–100)
MCV RBC AUTO: 94 FL — SIGNIFICANT CHANGE UP (ref 80–100)
MONOCYTES # BLD AUTO: 0.84 K/UL — SIGNIFICANT CHANGE UP (ref 0–0.9)
MONOCYTES # BLD AUTO: 1.03 K/UL — HIGH (ref 0–0.9)
MONOCYTES NFR BLD AUTO: 7.1 % — SIGNIFICANT CHANGE UP (ref 2–14)
MONOCYTES NFR BLD AUTO: 8.3 % — SIGNIFICANT CHANGE UP (ref 2–14)
NEUTROPHILS # BLD AUTO: 9.17 K/UL — HIGH (ref 1.8–7.4)
NEUTROPHILS # BLD AUTO: 9.42 K/UL — HIGH (ref 1.8–7.4)
NEUTROPHILS NFR BLD AUTO: 75.7 % — SIGNIFICANT CHANGE UP (ref 43–77)
NEUTROPHILS NFR BLD AUTO: 77.8 % — HIGH (ref 43–77)
NITRITE UR-MCNC: NEGATIVE — SIGNIFICANT CHANGE UP
PH UR: 7 — SIGNIFICANT CHANGE UP (ref 5–8)
PLATELET # BLD AUTO: 365 K/UL — SIGNIFICANT CHANGE UP (ref 150–400)
PLATELET # BLD AUTO: 391 K/UL — SIGNIFICANT CHANGE UP (ref 150–400)
POTASSIUM SERPL-MCNC: 3 MMOL/L — LOW (ref 3.5–5.3)
POTASSIUM SERPL-MCNC: 3.6 MMOL/L — SIGNIFICANT CHANGE UP (ref 3.5–5.3)
POTASSIUM SERPL-SCNC: 3 MMOL/L — LOW (ref 3.5–5.3)
POTASSIUM SERPL-SCNC: 3.6 MMOL/L — SIGNIFICANT CHANGE UP (ref 3.5–5.3)
PROT SERPL-MCNC: 7 GM/DL — SIGNIFICANT CHANGE UP (ref 6–8.3)
PROT SERPL-MCNC: 7.9 GM/DL — SIGNIFICANT CHANGE UP (ref 6–8.3)
PROT UR-MCNC: NEGATIVE MG/DL — SIGNIFICANT CHANGE UP
PROTHROM AB SERPL-ACNC: 10.8 SEC — SIGNIFICANT CHANGE UP (ref 9.5–13)
RBC # BLD: 4.11 M/UL — LOW (ref 4.2–5.8)
RBC # BLD: 4.53 M/UL — SIGNIFICANT CHANGE UP (ref 4.2–5.8)
RBC # FLD: 15.5 % — HIGH (ref 10.3–14.5)
RBC # FLD: 15.6 % — HIGH (ref 10.3–14.5)
SODIUM SERPL-SCNC: 132 MMOL/L — LOW (ref 135–145)
SODIUM SERPL-SCNC: 138 MMOL/L — SIGNIFICANT CHANGE UP (ref 135–145)
SP GR SPEC: >1.03 — HIGH (ref 1–1.03)
UROBILINOGEN FLD QL: 0.2 MG/DL — SIGNIFICANT CHANGE UP (ref 0.2–1)
WBC # BLD: 11.8 K/UL — HIGH (ref 3.8–10.5)
WBC # BLD: 12.44 K/UL — HIGH (ref 3.8–10.5)
WBC # FLD AUTO: 11.8 K/UL — HIGH (ref 3.8–10.5)
WBC # FLD AUTO: 12.44 K/UL — HIGH (ref 3.8–10.5)

## 2024-07-13 PROCEDURE — 82962 GLUCOSE BLOOD TEST: CPT

## 2024-07-13 PROCEDURE — 36415 COLL VENOUS BLD VENIPUNCTURE: CPT

## 2024-07-13 PROCEDURE — 85610 PROTHROMBIN TIME: CPT

## 2024-07-13 PROCEDURE — 93005 ELECTROCARDIOGRAM TRACING: CPT

## 2024-07-13 PROCEDURE — 99285 EMERGENCY DEPT VISIT HI MDM: CPT

## 2024-07-13 PROCEDURE — 74177 CT ABD & PELVIS W/CONTRAST: CPT | Mod: 26,MC

## 2024-07-13 PROCEDURE — 81003 URINALYSIS AUTO W/O SCOPE: CPT

## 2024-07-13 PROCEDURE — 71045 X-RAY EXAM CHEST 1 VIEW: CPT

## 2024-07-13 PROCEDURE — 80053 COMPREHEN METABOLIC PANEL: CPT

## 2024-07-13 PROCEDURE — 96375 TX/PRO/DX INJ NEW DRUG ADDON: CPT

## 2024-07-13 PROCEDURE — 96374 THER/PROPH/DIAG INJ IV PUSH: CPT | Mod: XU

## 2024-07-13 PROCEDURE — 74177 CT ABD & PELVIS W/CONTRAST: CPT | Mod: MC

## 2024-07-13 PROCEDURE — 93010 ELECTROCARDIOGRAM REPORT: CPT

## 2024-07-13 PROCEDURE — 87040 BLOOD CULTURE FOR BACTERIA: CPT

## 2024-07-13 PROCEDURE — 85025 COMPLETE CBC W/AUTO DIFF WBC: CPT

## 2024-07-13 PROCEDURE — 76770 US EXAM ABDO BACK WALL COMP: CPT | Mod: 26

## 2024-07-13 PROCEDURE — 99285 EMERGENCY DEPT VISIT HI MDM: CPT | Mod: 25

## 2024-07-13 PROCEDURE — 83605 ASSAY OF LACTIC ACID: CPT

## 2024-07-13 PROCEDURE — 96376 TX/PRO/DX INJ SAME DRUG ADON: CPT

## 2024-07-13 PROCEDURE — 85730 THROMBOPLASTIN TIME PARTIAL: CPT

## 2024-07-13 PROCEDURE — 71045 X-RAY EXAM CHEST 1 VIEW: CPT | Mod: 26

## 2024-07-13 RX ORDER — AMOXICILLIN AND CLAVULANATE POTASSIUM 500; 125 MG/1; MG/1
1 TABLET, FILM COATED ORAL ONCE
Refills: 0 | Status: COMPLETED | OUTPATIENT
Start: 2024-07-13 | End: 2024-07-13

## 2024-07-13 RX ORDER — POTASSIUM CHLORIDE 1500 MG/1
40 TABLET, EXTENDED RELEASE ORAL ONCE
Refills: 0 | Status: COMPLETED | OUTPATIENT
Start: 2024-07-13 | End: 2024-07-13

## 2024-07-13 RX ORDER — BACTERIOSTATIC SODIUM CHLORIDE 0.9 %
1000 VIAL (ML) INJECTION ONCE
Refills: 0 | Status: COMPLETED | OUTPATIENT
Start: 2024-07-13 | End: 2024-07-13

## 2024-07-13 RX ORDER — ONDANSETRON HCL/PF 4 MG/2 ML
4 VIAL (ML) INJECTION ONCE
Refills: 0 | Status: COMPLETED | OUTPATIENT
Start: 2024-07-13 | End: 2024-07-13

## 2024-07-13 RX ORDER — AMOXICILLIN AND CLAVULANATE POTASSIUM 500; 125 MG/1; MG/1
875 TABLET, FILM COATED ORAL
Qty: 20 | Refills: 0
Start: 2024-07-13 | End: 2024-07-22

## 2024-07-13 RX ORDER — PIPERACILLIN SODIUM, TAZOBACTAM SODIUM 3; .375 G/15ML; G/15ML
3.38 INJECTION, POWDER, LYOPHILIZED, FOR SOLUTION INTRAVENOUS ONCE
Refills: 0 | Status: COMPLETED | OUTPATIENT
Start: 2024-07-13 | End: 2024-07-13

## 2024-07-13 RX ORDER — HYDROMORPHONE HCL IN 0.9% NACL 0.2 MG/ML
1 PLASTIC BAG, INJECTION (ML) INTRAVENOUS ONCE
Refills: 0 | Status: DISCONTINUED | OUTPATIENT
Start: 2024-07-13 | End: 2024-07-13

## 2024-07-13 RX ORDER — BACTERIOSTATIC SODIUM CHLORIDE 0.9 %
2000 VIAL (ML) INJECTION ONCE
Refills: 0 | Status: COMPLETED | OUTPATIENT
Start: 2024-07-13 | End: 2024-07-13

## 2024-07-13 RX ORDER — AMOXICILLIN AND CLAVULANATE POTASSIUM 500; 125 MG/1; MG/1
1 TABLET, FILM COATED ORAL
Qty: 20 | Refills: 0
Start: 2024-07-13 | End: 2024-07-22

## 2024-07-13 RX ADMIN — Medication 1 MILLIGRAM(S): at 22:40

## 2024-07-13 RX ADMIN — Medication 1000 MILLILITER(S): at 09:34

## 2024-07-13 RX ADMIN — AMOXICILLIN AND CLAVULANATE POTASSIUM 1 TABLET(S): 500; 125 TABLET, FILM COATED ORAL at 15:30

## 2024-07-13 RX ADMIN — Medication 4 MILLIGRAM(S): at 22:30

## 2024-07-13 RX ADMIN — Medication 4 MILLIGRAM(S): at 21:09

## 2024-07-13 RX ADMIN — Medication 4 MILLIGRAM(S): at 14:14

## 2024-07-13 RX ADMIN — Medication 1000 MILLILITER(S): at 21:07

## 2024-07-13 RX ADMIN — Medication 4 MILLIGRAM(S): at 21:07

## 2024-07-13 RX ADMIN — Medication 4 MILLIGRAM(S): at 11:08

## 2024-07-13 RX ADMIN — PIPERACILLIN SODIUM, TAZOBACTAM SODIUM 200 GRAM(S): 3; .375 INJECTION, POWDER, LYOPHILIZED, FOR SOLUTION INTRAVENOUS at 21:08

## 2024-07-13 RX ADMIN — Medication 4 MILLIGRAM(S): at 09:34

## 2024-07-13 RX ADMIN — PIPERACILLIN SODIUM, TAZOBACTAM SODIUM 3.38 GRAM(S): 3; .375 INJECTION, POWDER, LYOPHILIZED, FOR SOLUTION INTRAVENOUS at 21:40

## 2024-07-13 RX ADMIN — Medication 4 MILLIGRAM(S): at 21:30

## 2024-07-13 RX ADMIN — Medication 4 MILLIGRAM(S): at 21:58

## 2024-07-13 RX ADMIN — Medication 1000 MILLILITER(S): at 22:15

## 2024-07-13 RX ADMIN — Medication 500 MILLILITER(S): at 15:30

## 2024-07-13 RX ADMIN — POTASSIUM CHLORIDE 40 MILLIEQUIVALENT(S): 1500 TABLET, EXTENDED RELEASE ORAL at 21:57

## 2024-07-13 RX ADMIN — Medication 2000 MILLILITER(S): at 22:28

## 2024-07-13 NOTE — ED STATDOCS - CROS ED ROS STATEMENT
Called patient verified name and date of birth patient is needing medication prescription and physician name approving this.  This was currently under a different physician had consult with Dr. Galvez and [facility]  [facility] ([facility] talked with patient and will fax information to [facility] for patient medication.  Krissy Jacobson RN on 11/13/2020 at 9:20 AM      Patient call left message on voice mail she states called insurance company and would like to proceed with medication.  Patient states had some other questions.  Krissy Jacobson RN on 11/13/2020 at 9:13 AM     
Please call regarding medication ordered at last visit.  
all other ROS negative except as per HPI

## 2024-07-13 NOTE — ED STATDOCS - PROGRESS NOTE DETAILS
SIMRAN Mccarty: I participated in the care of this patient. I agree with the history, physical and plan. JG :Patient still with intractable abdominal pain despite IV narcotics, zofran, and 3L of IV fluids. Repeat lipase performed after all IV fluids and improved.  lipase elevated but CT without evidence of pancreatitis . Will admit for intractable pain due to Crohns disease.

## 2024-07-13 NOTE — ED STATDOCS - OBJECTIVE STATEMENT
Pt. is a 37 yo M with Crohns disease, hx of bilateral Seton placement for anal fistula drainage 6 wks ago at [facility] followed by dysuria and issues with urinary retention presents with abdominal pain. Patient was discharged several hours ago from [facility] ED for the same pain.  He had labs, urine and CT without any acute abnormalities.  Colorectal surgery consulted and outpatient antibiotics, pain control, and GI, urology and colorectal surgery follow up recommended in 2 wks.  Patient states ostomy and fox catheter draining well.  He went home and went to lay down and pain returned. Pain is in left lower abdomen and flank and low back.  Pain is constant. Denies fevers, chills, vomiting.  States he is getting some green drainage from rectum.

## 2024-07-13 NOTE — ED ADULT NURSE NOTE - PRIMARY CARE PROVIDER
----- Message from Linnette Vick RN sent at 10/21/2022  1:09 PM CDT -----  Regarding: PHP referral from KATHIE      
Attempted to leave mother voicemail regarding referral. Voicemail has not been set up yet so unable to leave a message. Will try father.   
see MD note

## 2024-07-13 NOTE — ED ADULT TRIAGE NOTE - GLASGOW COMA SCALE: EYE OPENING, MLM
Chief Complaint   Patient presents with   • Abdominal Pain     Bloating   • fatigue   • Diarrhea     2 weeks      Visit Vitals  /70   Pulse 63   Wt 93.4 kg (206 lb)   SpO2 98%   BMI 29.56 kg/m²       HISTORY OF PRESENT ILLNESS  Luis is a 63 year old White male here today for 2 weeks of abdominal pain bloating loose stool, no blood no mucus in stool, very light brown in color.  Located lower abdomen bilateral.  Pain a 2-4/10 today, \"dull discomfort\".     + fatigue and nausea No fever, sweats, chills, nausea, vomiting.      Decreased appetite.      2020 colonoscopy showed diminutive polyp.  History of appendectomy with partial colectomy and post op infection of hernia mesh, 2018 he thinks with Dr Lady cuellar at [facility], no records available.      Patient Active Problem List   Diagnosis   • Testosterone deficiency   • History of colon polyps   • Hyperlipidemia   • Sebaceous cyst, mid back   • Lipoma of torso   • Overweight (BMI 25.0-29.9)   • Migraine without status migrainosus, not intractable   • IZABELLA (generalized anxiety disorder)   • Elevated transaminase level       I have reviewed the patient's medications, allergies, past medical, surgical, social and family history, and updated these as appropriate.  See below or history section of EMR (electronic medical record) for a display of this information.  Medications noted below.    PROBLEMS    Patient Active Problem List    Diagnosis Date Noted   • Migraine without status migrainosus, not intractable 03/04/2022     Priority: Low   • IZABELLA (generalized anxiety disorder) 03/04/2022     Priority: Low   • Elevated transaminase level 03/04/2022     Priority: Low   • Overweight (BMI 25.0-29.9) 11/19/2019     Priority: Low   • Lipoma of torso 07/07/2017     Priority: Low   • Sebaceous cyst, mid back 06/26/2017     Priority: Low   • Hyperlipidemia 10/28/2016     Priority: Low   • History of colon polyps 02/04/2015     Priority: Low   • Testosterone deficiency  03/26/2013     Priority: Low        Body mass index is 29.56 kg/m².     Most Recent Labs:    TSH (mcUnits/mL)   Date Value   04/06/2021 1.411   06/24/2014 2.221       GFR Estimate, Non  (no units)   Date Value   11/02/2019 >90   06/25/2019 83       CHOLESTEROL (mg/dL)   Date Value   06/25/2019 171     Cholesterol (mg/dL)   Date Value   03/06/2021 153     HDL (mg/dL)   Date Value   03/06/2021 68   06/25/2019 54     CHOL/HDL (no units)   Date Value   06/25/2019 3.2     Cholesterol/ HDL Ratio (no units)   Date Value   03/06/2021 2.3     TRIGLYCERIDE (mg/dL)   Date Value   06/25/2019 103     Triglycerides (mg/dL)   Date Value   03/06/2021 55     CALCULATED LDL (mg/dL)   Date Value   06/25/2019 96     LDL (mg/dL)   Date Value   03/06/2021 74     HDL (mg/dL)   Date Value   03/06/2021 68   06/25/2019 54     CHOL/HDL (no units)   Date Value   06/25/2019 3.2     Cholesterol/ HDL Ratio (no units)   Date Value   03/06/2021 2.3     TRIGLYCERIDE (mg/dL)   Date Value   06/25/2019 103     Triglycerides (mg/dL)   Date Value   03/06/2021 55     CALCULATED LDL (mg/dL)   Date Value   06/25/2019 96     LDL (mg/dL)   Date Value   03/06/2021 74       EOSIN (%)   Date Value   11/14/2018 4     Eosinophils, Percent (%)   Date Value   11/15/2022 3     Absolute Neutrophils (K/mcL)   Date Value   11/15/2022 5.3   02/04/2022 3.4     Absolute Mono (K/mcL)   Date Value   11/14/2018 0.6     Absolute Monocytes (K/mcL)   Date Value   11/15/2022 0.8     Absolute Baso (K/mcL)   Date Value   11/14/2018 0.1     Absolute Basophils (K/mcL)   Date Value   11/15/2022 0.1     Absolute Immature Granulocytes (K/mcl)   Date Value   11/14/2018 0.0     MONO (%)   Date Value   11/14/2018 8     Mono, Percent (%)   Date Value   11/15/2022 9     BASO (%)   Date Value   11/14/2018 1     Basophils, Percent (%)   Date Value   11/15/2022 1     Absolute Lymphocytes (K/mcL)   Date Value   11/15/2022 2.4   02/04/2022 2.3     Absolute Eos (K/mcL)   Date Value    11/14/2018 0.3     Absolute Eosinophils  (K/mcL)   Date Value   11/15/2022 0.3     Percent Immature Granuloctyes (%)   Date Value   11/14/2018 0       WBC (K/mcL)   Date Value   11/15/2022 8.9   02/04/2022 6.4     RBC (mil/mcL)   Date Value   11/15/2022 4.42 (L)   02/04/2022 4.74     HGB (g/dL)   Date Value   11/15/2022 13.3   02/04/2022 13.7     HCT (%)   Date Value   11/15/2022 39.6   02/04/2022 42.3     MCV (fl)   Date Value   11/15/2022 89.6   02/04/2022 89.2     MCH (pg)   Date Value   11/15/2022 30.1   02/04/2022 28.9     MCHC (g/dL)   Date Value   11/15/2022 33.6   02/04/2022 32.4     PLT (K/mcL)   Date Value   11/15/2022 211   02/04/2022 211       No components found for: LIVPNL    Sodium (mmol/L)   Date Value   11/15/2022 143   02/04/2022 138     Potassium (mmol/L)   Date Value   11/15/2022 3.5   02/04/2022 4.4     Chloride (mmol/L)   Date Value   11/15/2022 106   02/04/2022 106     Carbon Dioxide (mmol/L)   Date Value   11/15/2022 26   02/04/2022 29     Anion Gap (mmol/L)   Date Value   11/15/2022 15   02/04/2022 7 (L)     Glucose (mg/dL)   Date Value   11/15/2022 88   02/04/2022 92     BUN (mg/dL)   Date Value   11/15/2022 20   02/04/2022 18     Creatinine (mg/dL)   Date Value   11/15/2022 1.02   02/04/2022 0.95     GFR Estimate,  (no units)   Date Value   11/02/2019 >90   06/25/2019 >90     GFR Estimate, Non  (no units)   Date Value   11/02/2019 >90   06/25/2019 83     BUN/ Creatinine Ratio (no units)   Date Value   11/15/2022 20   02/04/2022 19     Bilirubin, Total (mg/dL)   Date Value   11/15/2022 0.5   02/04/2022 0.6     GOT/AST (Units/L)   Date Value   11/15/2022 72 (H)   02/04/2022 62 (H)     Alkaline Phosphatase (Units/L)   Date Value   11/15/2022 65   02/04/2022 70     Albumin (g/dL)   Date Value   11/15/2022 4.1   02/04/2022 4.3     Globulin (g/dL)   Date Value   11/15/2022 2.8   02/04/2022 2.6     A/G Ratio (no units)   Date Value   11/15/2022 1.5   02/04/2022  1.7     GPT/ALT (Units/L)   Date Value   11/15/2022 53   02/04/2022 32     Calcium (mg/dL)   Date Value   11/15/2022 8.9   02/04/2022 8.9     Fasting Status (Hours)   Date Value   03/27/2021 12   03/06/2021 14   03/06/2021 14       VITAMIND, 25 HYDROXY (ng/mL)   Date Value   10/15/2015 22.3 (L)       No results found for: HGBA1C    MEDICATIONS  Medications were reviewed and updated today  Current Outpatient Medications   Medication Sig Dispense Refill   • sumatriptan (IMITREX) 100 MG tablet Take 1 tablet by mouth at onset of migraine. May repeat after 2 hours if needed. Max daily dose of 200MG or 2 tablets daily. 90 tablet 3   • ciprofloxacin (CIPRO) 500 MG tablet      • sertraline (ZOLOFT) 50 MG tablet Take 2 tablets by mouth daily. 186 tablet 3   • simvastatin (ZOCOR) 40 MG tablet Take 1 tablet by mouth nightly. 93 tablet 3   • vitamin E 100 units capsule Take 100 Units by mouth daily.     • MAGNESIUM OXIDE PO        No current facility-administered medications for this visit.       ALLERGIES  Allergies as of 11/15/2022   • (No Known Allergies)       HISTORIES  Past Medical History:   Diagnosis Date   • Hyperlipidemia    • Hypotestosteronism    • Plantar fasciitis 06/24/2008    right greater than left   • Sebaceous cyst, mid back 6/26/2017   • Testicular pain 09/21/2020     Past Surgical History:   Procedure Laterality Date   • Appendectomy      with colon resection    • Colon surgery      resection with Appy    • Colonoscopy  2/10/15    repeat in 5 years   • Colonoscopy  01/22/2010   • Colonoscopy  01/24/2020    colonoscopy in 5 years due to your history of adenomatous polyp.   • Hernia repair N/A     Umbilical   • Inguinal hernia repair Bilateral    • Prostate specific antigen screening  11/0/32009    1.6   • Removal of sperm duct(s)      Vasectomy   • Rotator cuff repair Left 12/13/2018   • Service to gastroenterology     • Shoulder arthroscopy Left 12/13/2018   • Shoulder surg proc unlisted      left shoulder,  AC repair     Past Surgical History:   Procedure Laterality Date   • Appendectomy      with colon resection    • Colon surgery      resection with Appy    • Colonoscopy  2/10/15    repeat in 5 years   • Colonoscopy  01/22/2010   • Colonoscopy  01/24/2020    colonoscopy in 5 years due to your history of adenomatous polyp.   • Hernia repair N/A     Umbilical   • Inguinal hernia repair Bilateral    • Prostate specific antigen screening  11/0/05601    1.6   • Removal of sperm duct(s)      Vasectomy   • Rotator cuff repair Left 12/13/2018   • Service to gastroenterology     • Shoulder arthroscopy Left 12/13/2018   • Shoulder surg proc unlisted      left shoulder, AC repair       REVIEW OF SYSTEMS:  Constitutional:  Denies fever or chills.   HENT:  Denies nasal congestion or sore throat.   Respiratory:  Denies cough or shortness of breath.   Cardiovascular:  Denies chest pain or edema.   Gastrointestinal:  + nausea, bloating, diarrhea, abdominal pain.  Musculoskeletal:  Denies back pain or joint pain.   Integument:  Denies rash.   Neurologic:  Denies headache  Psychiatric:  Denies depression or anxiety.  Additional ROS as noted in HPI    PHYSICAL EXAMINATION:  Visit Vitals  /70   Pulse 63   Wt 93.4 kg (206 lb)   SpO2 98%   BMI 29.56 kg/m²     Wt Readings from Last 3 Encounters:   11/15/22 93.4 kg (206 lb)   05/19/22 98.9 kg (218 lb)   03/04/22 98.9 kg (218 lb)     General:  Patient is awake, alert, pleasant.  In no acute distress.   HEENT:  Normocephalic, atraumatic.  Anicteric sclerae.  No pallor.    Neck:  Neck supple.  No lymphadenopathy.   Cardiac:  Regular rate and rhythm.  No murmurs, rubs or gallops.    Lungs:  Clear to auscultation.  No wheezing.  Good air entry.   Abdomen:  Soft.  Tender rlq and llq no masses, no HSM noted, no rigidity no rebound  Extremities:  No edema.  Skin:  Warm and dry with no rashes.    LABORATORY  I have reviewed the pertinent laboratory tests.  These are the pertinent  findings:  Lab Results   Component Value Date    CHOLESTEROL 153 03/06/2021    HDL 68 03/06/2021    CALCLDL 74 03/06/2021    TRIGLYCERIDE 55 03/06/2021    POTASSIUM 3.5 11/15/2022    CREATININE 1.02 11/15/2022    AST 72 (H) 11/15/2022    BILIRUBIN 0.5 11/15/2022    TSH 1.411 04/06/2021    WBC 8.9 11/15/2022    HGB 13.3 11/15/2022     11/15/2022    PSA 5.68 (H) 02/04/2022    VITD25 22.3 (L) 10/15/2015         ASSESSMENT:  1. Bloating    2. Abdominal pain, unspecified abdominal location    3. Diarrhea, unspecified type        PLAN:  Orders Placed This Encounter   • CT ABDOMEN PELVIS W CONTRAST   • Comprehensive Metabolic Panel   • Lipase   • WBC Stool   • Gastrointestinal Parasites Panel by PCR   • CBC with Automated Differential   • STOOL, BACTERIAL CULTURE   • C Difficile Toxin by PCR     Stool studies as above, CMP lipase, CBC, CT abd pelvis with contrast is ordered.    Will follow up with results.    Return for labs today .    Plan alternatives, risks and benefits were discussed with the patient in detail and all resulting questions were answered to the patient's satisfaction.     He will call or return to the office for any persistent, worsening, or concerning symptoms.  For any emergencies, he will present to the emergency room or call 911.     Diagnosis is provisional, based on the symptoms described and the diagnostic information available today.  If any new symptoms occur or worsen, or if symptoms are persistent, patient is aware to seek reevaluation.    Patient left the office in stable condition with verbal and written instructions.         (E4) spontaneous

## 2024-07-13 NOTE — ED STATDOCS - CLINICAL SUMMARY MEDICAL DECISION MAKING FREE TEXT BOX
37 yo M with Crohns disease , ileostomy, bilateral Seton placement for anal fistulas 6 wks ago presents with left sided abdominal pain.  He was in ED earlier and had normal CT, found to have urinary retention and fox placed.  Labs, pain control, IV fluids, IV antibiotics and US kidney and bladder planned.

## 2024-07-13 NOTE — ED STATDOCS - CARE PLAN
1 Principal Discharge DX:	Intractable abdominal pain  Secondary Diagnosis:	Crohn's disease  Secondary Diagnosis:	Elevated lipase  Secondary Diagnosis:	Acute on chronic urinary retention

## 2024-07-13 NOTE — ED STATDOCS - GASTROINTESTINAL, MLM
abdomen soft, Left upper quadrant and lower quadrant tenderness without rebound or guarding.  mild left CVAT

## 2024-07-13 NOTE — ED STATDOCS - ATTENDING APP SHARED VISIT CONTRIBUTION OF CARE
Attending Contribution to Care: I, Geena Armijo, performed the initial face to face bedside interview with this patient regarding history of present illness, review of symptoms and relevant past medical, social and family history.  I completed an independent physical examination.  I was the initial provider who evaluated this patient. I have signed out the follow up of any pending tests (i.e. labs, radiological studies) to the ACP.  I have communicated the patient’s plan of care and disposition with the ACP.

## 2024-07-13 NOTE — ED ADULT NURSE NOTE - OBJECTIVE STATEMENT
Pt A&Ox4. Pt c/o left-sided abd pain radiating to left flank worsening today. Pt stated that his pain was severe after d/c from  ED at aprox. 4pm. Pt with fox to leg beg placed today patent, draining je urine. Ostomy to RUQ patent draining brown liquid. Pt endorsing nausea without vomiting. 20g IV placed in L ac.

## 2024-07-13 NOTE — ED ADULT TRIAGE NOTE - CHIEF COMPLAINT QUOTE
Pt c/o severe L sided abdominal pain and nausea. Seen at  ED this morning and DC'd with fox catheter. HX of Crohn's and an ostomy. Denies vomiting and fevers.

## 2024-07-14 DIAGNOSIS — R10.9 UNSPECIFIED ABDOMINAL PAIN: ICD-10-CM

## 2024-07-14 DIAGNOSIS — E11.9 TYPE 2 DIABETES MELLITUS WITHOUT COMPLICATIONS: ICD-10-CM

## 2024-07-14 DIAGNOSIS — K50.90 CROHN'S DISEASE, UNSPECIFIED, WITHOUT COMPLICATIONS: ICD-10-CM

## 2024-07-14 LAB
A1C WITH ESTIMATED AVERAGE GLUCOSE RESULT: 8.1 % — HIGH (ref 4–5.6)
ALBUMIN SERPL ELPH-MCNC: 2.3 G/DL — LOW (ref 3.3–5)
ALP SERPL-CCNC: 74 U/L — SIGNIFICANT CHANGE UP (ref 40–120)
ALT FLD-CCNC: 13 U/L — SIGNIFICANT CHANGE UP (ref 12–78)
ANION GAP SERPL CALC-SCNC: 7 MMOL/L — SIGNIFICANT CHANGE UP (ref 5–17)
AST SERPL-CCNC: 13 U/L — LOW (ref 15–37)
BASOPHILS # BLD AUTO: 0.03 K/UL — SIGNIFICANT CHANGE UP (ref 0–0.2)
BASOPHILS NFR BLD AUTO: 0.3 % — SIGNIFICANT CHANGE UP (ref 0–2)
BILIRUB SERPL-MCNC: 0.4 MG/DL — SIGNIFICANT CHANGE UP (ref 0.2–1.2)
BUN SERPL-MCNC: 2 MG/DL — LOW (ref 7–23)
CALCIUM SERPL-MCNC: 8.4 MG/DL — LOW (ref 8.5–10.1)
CHLORIDE SERPL-SCNC: 106 MMOL/L — SIGNIFICANT CHANGE UP (ref 96–108)
CO2 SERPL-SCNC: 28 MMOL/L — SIGNIFICANT CHANGE UP (ref 22–31)
CREAT SERPL-MCNC: 0.58 MG/DL — SIGNIFICANT CHANGE UP (ref 0.5–1.3)
EGFR: 128 ML/MIN/1.73M2 — SIGNIFICANT CHANGE UP
EOSINOPHIL # BLD AUTO: 0.09 K/UL — SIGNIFICANT CHANGE UP (ref 0–0.5)
EOSINOPHIL NFR BLD AUTO: 0.9 % — SIGNIFICANT CHANGE UP (ref 0–6)
ESTIMATED AVERAGE GLUCOSE: 186 MG/DL — HIGH (ref 68–114)
GLUCOSE BLDC GLUCOMTR-MCNC: 109 MG/DL — HIGH (ref 70–99)
GLUCOSE BLDC GLUCOMTR-MCNC: 164 MG/DL — HIGH (ref 70–99)
GLUCOSE BLDC GLUCOMTR-MCNC: 222 MG/DL — HIGH (ref 70–99)
GLUCOSE BLDC GLUCOMTR-MCNC: 98 MG/DL — SIGNIFICANT CHANGE UP (ref 70–99)
GLUCOSE SERPL-MCNC: 104 MG/DL — HIGH (ref 70–99)
HCT VFR BLD CALC: 37.6 % — LOW (ref 39–50)
HGB BLD-MCNC: 12.1 G/DL — LOW (ref 13–17)
IMM GRANULOCYTES NFR BLD AUTO: 0.6 % — SIGNIFICANT CHANGE UP (ref 0–0.9)
LIDOCAIN IGE QN: 170 U/L — HIGH (ref 13–75)
LYMPHOCYTES # BLD AUTO: 1.62 K/UL — SIGNIFICANT CHANGE UP (ref 1–3.3)
LYMPHOCYTES # BLD AUTO: 15.8 % — SIGNIFICANT CHANGE UP (ref 13–44)
MCHC RBC-ENTMCNC: 30.9 PG — SIGNIFICANT CHANGE UP (ref 27–34)
MCHC RBC-ENTMCNC: 32.2 GM/DL — SIGNIFICANT CHANGE UP (ref 32–36)
MCV RBC AUTO: 96.2 FL — SIGNIFICANT CHANGE UP (ref 80–100)
MONOCYTES # BLD AUTO: 0.94 K/UL — HIGH (ref 0–0.9)
MONOCYTES NFR BLD AUTO: 9.2 % — SIGNIFICANT CHANGE UP (ref 2–14)
NEUTROPHILS # BLD AUTO: 7.52 K/UL — HIGH (ref 1.8–7.4)
NEUTROPHILS NFR BLD AUTO: 73.2 % — SIGNIFICANT CHANGE UP (ref 43–77)
PLATELET # BLD AUTO: 353 K/UL — SIGNIFICANT CHANGE UP (ref 150–400)
POTASSIUM SERPL-MCNC: 3 MMOL/L — LOW (ref 3.5–5.3)
POTASSIUM SERPL-SCNC: 3 MMOL/L — LOW (ref 3.5–5.3)
PROT SERPL-MCNC: 6.5 GM/DL — SIGNIFICANT CHANGE UP (ref 6–8.3)
RBC # BLD: 3.91 M/UL — LOW (ref 4.2–5.8)
RBC # FLD: 15.6 % — HIGH (ref 10.3–14.5)
SODIUM SERPL-SCNC: 141 MMOL/L — SIGNIFICANT CHANGE UP (ref 135–145)
WBC # BLD: 10.26 K/UL — SIGNIFICANT CHANGE UP (ref 3.8–10.5)
WBC # FLD AUTO: 10.26 K/UL — SIGNIFICANT CHANGE UP (ref 3.8–10.5)

## 2024-07-14 PROCEDURE — 80053 COMPREHEN METABOLIC PANEL: CPT

## 2024-07-14 PROCEDURE — 86480 TB TEST CELL IMMUN MEASURE: CPT

## 2024-07-14 PROCEDURE — 85652 RBC SED RATE AUTOMATED: CPT

## 2024-07-14 PROCEDURE — 72193 CT PELVIS W/DYE: CPT | Mod: MC

## 2024-07-14 PROCEDURE — 86140 C-REACTIVE PROTEIN: CPT

## 2024-07-14 PROCEDURE — 85027 COMPLETE CBC AUTOMATED: CPT

## 2024-07-14 PROCEDURE — 36415 COLL VENOUS BLD VENIPUNCTURE: CPT

## 2024-07-14 PROCEDURE — 99223 1ST HOSP IP/OBS HIGH 75: CPT

## 2024-07-14 PROCEDURE — 74177 CT ABD & PELVIS W/CONTRAST: CPT | Mod: MC

## 2024-07-14 PROCEDURE — 83036 HEMOGLOBIN GLYCOSYLATED A1C: CPT

## 2024-07-14 PROCEDURE — 82728 ASSAY OF FERRITIN: CPT

## 2024-07-14 PROCEDURE — 85025 COMPLETE CBC W/AUTO DIFF WBC: CPT

## 2024-07-14 PROCEDURE — 80048 BASIC METABOLIC PNL TOTAL CA: CPT

## 2024-07-14 PROCEDURE — 82962 GLUCOSE BLOOD TEST: CPT

## 2024-07-14 RX ORDER — DEXTROSE 4 G
25 TABLET,CHEWABLE ORAL ONCE
Refills: 0 | Status: DISCONTINUED | OUTPATIENT
Start: 2024-07-14 | End: 2024-08-01

## 2024-07-14 RX ORDER — HEPARIN SODIUM 1000 [USP'U]/ML
5000 INJECTION, SOLUTION INTRAVENOUS; SUBCUTANEOUS EVERY 12 HOURS
Refills: 0 | Status: DISCONTINUED | OUTPATIENT
Start: 2024-07-14 | End: 2024-07-15

## 2024-07-14 RX ORDER — DICYCLOMINE HCL 20 MG
10 TABLET ORAL
Refills: 0 | Status: DISCONTINUED | OUTPATIENT
Start: 2024-07-14 | End: 2024-08-01

## 2024-07-14 RX ORDER — DEXTROSE MONOHYDRATE, SODIUM CHLORIDE, SODIUM LACTATE, CALCIUM CHLORIDE, MAGNESIUM CHLORIDE 1.5; 538; 448; 18.4; 5.08 G/100ML; MG/100ML; MG/100ML; MG/100ML; MG/100ML
1000 SOLUTION INTRAPERITONEAL
Refills: 0 | Status: DISCONTINUED | OUTPATIENT
Start: 2024-07-14 | End: 2024-08-01

## 2024-07-14 RX ORDER — BACTERIOSTATIC SODIUM CHLORIDE 0.9 %
1000 VIAL (ML) INJECTION
Refills: 0 | Status: DISCONTINUED | OUTPATIENT
Start: 2024-07-14 | End: 2024-07-16

## 2024-07-14 RX ORDER — NALOXONE HYDROCHLORIDE 0.4 MG/ML
0.4 INJECTION, SOLUTION INTRAMUSCULAR; INTRAVENOUS; SUBCUTANEOUS ONCE
Refills: 0 | Status: DISCONTINUED | OUTPATIENT
Start: 2024-07-14 | End: 2024-08-01

## 2024-07-14 RX ORDER — INSULIN LISPRO 100/ML
VIAL (ML) SUBCUTANEOUS
Refills: 0 | Status: DISCONTINUED | OUTPATIENT
Start: 2024-07-14 | End: 2024-08-01

## 2024-07-14 RX ORDER — LORATADINE 10 MG
17 TABLET,DISINTEGRATING ORAL DAILY
Refills: 0 | Status: DISCONTINUED | OUTPATIENT
Start: 2024-07-14 | End: 2024-08-01

## 2024-07-14 RX ORDER — POTASSIUM CHLORIDE 1500 MG/1
40 TABLET, EXTENDED RELEASE ORAL ONCE
Refills: 0 | Status: COMPLETED | OUTPATIENT
Start: 2024-07-14 | End: 2024-07-14

## 2024-07-14 RX ORDER — ONDANSETRON HCL/PF 4 MG/2 ML
4 VIAL (ML) INJECTION EVERY 6 HOURS
Refills: 0 | Status: DISCONTINUED | OUTPATIENT
Start: 2024-07-14 | End: 2024-08-01

## 2024-07-14 RX ORDER — MELATONIN 3 MG
3 TABLET ORAL AT BEDTIME
Refills: 0 | Status: DISCONTINUED | OUTPATIENT
Start: 2024-07-14 | End: 2024-08-01

## 2024-07-14 RX ORDER — TAMSULOSIN HCL 0.4 MG
0.4 CAPSULE ORAL AT BEDTIME
Refills: 0 | Status: DISCONTINUED | OUTPATIENT
Start: 2024-07-14 | End: 2024-08-01

## 2024-07-14 RX ORDER — HYDROMORPHONE HCL IN 0.9% NACL 0.2 MG/ML
1 PLASTIC BAG, INJECTION (ML) INTRAVENOUS EVERY 4 HOURS
Refills: 0 | Status: DISCONTINUED | OUTPATIENT
Start: 2024-07-14 | End: 2024-07-14

## 2024-07-14 RX ORDER — OXYCODONE HYDROCHLORIDE 30 MG/1
2.5 TABLET ORAL EVERY 4 HOURS
Refills: 0 | Status: DISCONTINUED | OUTPATIENT
Start: 2024-07-14 | End: 2024-07-14

## 2024-07-14 RX ORDER — OXYCODONE AND ACETAMINOPHEN 10; 325 MG/1; MG/1
1 TABLET ORAL EVERY 6 HOURS
Refills: 0 | Status: DISCONTINUED | OUTPATIENT
Start: 2024-07-14 | End: 2024-07-14

## 2024-07-14 RX ORDER — SENNOSIDES 8.6 MG/1
2 TABLET ORAL AT BEDTIME
Refills: 0 | Status: DISCONTINUED | OUTPATIENT
Start: 2024-07-14 | End: 2024-08-01

## 2024-07-14 RX ORDER — GLUCAGON INJECTION, SOLUTION 0.5 MG/.1ML
1 INJECTION, SOLUTION SUBCUTANEOUS ONCE
Refills: 0 | Status: DISCONTINUED | OUTPATIENT
Start: 2024-07-14 | End: 2024-08-01

## 2024-07-14 RX ORDER — PIPERACILLIN SODIUM, TAZOBACTAM SODIUM 3; .375 G/15ML; G/15ML
3.38 INJECTION, POWDER, LYOPHILIZED, FOR SOLUTION INTRAVENOUS EVERY 8 HOURS
Refills: 0 | Status: COMPLETED | OUTPATIENT
Start: 2024-07-14 | End: 2024-07-20

## 2024-07-14 RX ORDER — DEXTROSE 4 G
15 TABLET,CHEWABLE ORAL ONCE
Refills: 0 | Status: DISCONTINUED | OUTPATIENT
Start: 2024-07-14 | End: 2024-08-01

## 2024-07-14 RX ORDER — INSULIN LISPRO 100/ML
VIAL (ML) SUBCUTANEOUS AT BEDTIME
Refills: 0 | Status: DISCONTINUED | OUTPATIENT
Start: 2024-07-14 | End: 2024-08-01

## 2024-07-14 RX ORDER — DEXTROSE 4 G
12.5 TABLET,CHEWABLE ORAL ONCE
Refills: 0 | Status: DISCONTINUED | OUTPATIENT
Start: 2024-07-14 | End: 2024-08-01

## 2024-07-14 RX ORDER — ACETAMINOPHEN 500 MG
650 TABLET ORAL EVERY 6 HOURS
Refills: 0 | Status: DISCONTINUED | OUTPATIENT
Start: 2024-07-14 | End: 2024-08-01

## 2024-07-14 RX ORDER — HYDROMORPHONE HCL IN 0.9% NACL 0.2 MG/ML
2 PLASTIC BAG, INJECTION (ML) INTRAVENOUS EVERY 4 HOURS
Refills: 0 | Status: DISCONTINUED | OUTPATIENT
Start: 2024-07-14 | End: 2024-07-17

## 2024-07-14 RX ORDER — ASPIRIN 325 MG
5 TABLET ORAL DAILY
Refills: 0 | Status: DISCONTINUED | OUTPATIENT
Start: 2024-07-14 | End: 2024-08-01

## 2024-07-14 RX ORDER — DEXTROSE MONOHYDRATE, SODIUM CHLORIDE, SODIUM LACTATE, CALCIUM CHLORIDE, MAGNESIUM CHLORIDE 1.5; 538; 448; 18.4; 5.08 G/100ML; MG/100ML; MG/100ML; MG/100ML; MG/100ML
1000 SOLUTION INTRAPERITONEAL
Refills: 0 | Status: DISCONTINUED | OUTPATIENT
Start: 2024-07-14 | End: 2024-07-17

## 2024-07-14 RX ORDER — MAGNESIUM, ALUMINUM HYDROXIDE 200-225/5
30 SUSPENSION, ORAL (FINAL DOSE FORM) ORAL EVERY 4 HOURS
Refills: 0 | Status: DISCONTINUED | OUTPATIENT
Start: 2024-07-14 | End: 2024-08-01

## 2024-07-14 RX ADMIN — Medication 4 MILLIGRAM(S): at 08:40

## 2024-07-14 RX ADMIN — POTASSIUM CHLORIDE 40 MILLIEQUIVALENT(S): 1500 TABLET, EXTENDED RELEASE ORAL at 08:40

## 2024-07-14 RX ADMIN — Medication 2000 MILLILITER(S): at 00:11

## 2024-07-14 RX ADMIN — Medication 1 MILLIGRAM(S): at 13:00

## 2024-07-14 RX ADMIN — Medication 10 MILLIGRAM(S): at 17:16

## 2024-07-14 RX ADMIN — PIPERACILLIN SODIUM, TAZOBACTAM SODIUM 25 GRAM(S): 3; .375 INJECTION, POWDER, LYOPHILIZED, FOR SOLUTION INTRAVENOUS at 14:41

## 2024-07-14 RX ADMIN — Medication 4 MILLIGRAM(S): at 04:03

## 2024-07-14 RX ADMIN — Medication 10 MILLIGRAM(S): at 06:39

## 2024-07-14 RX ADMIN — Medication 10 MILLIGRAM(S): at 13:00

## 2024-07-14 RX ADMIN — Medication 4 MILLIGRAM(S): at 20:01

## 2024-07-14 RX ADMIN — Medication 2 MILLIGRAM(S): at 21:48

## 2024-07-14 RX ADMIN — Medication 1 MILLIGRAM(S): at 00:09

## 2024-07-14 RX ADMIN — Medication 3 MILLIGRAM(S): at 21:59

## 2024-07-14 RX ADMIN — Medication 2 MILLIGRAM(S): at 17:15

## 2024-07-14 RX ADMIN — Medication 4: at 17:16

## 2024-07-14 RX ADMIN — Medication 10 MILLIGRAM(S): at 21:57

## 2024-07-14 RX ADMIN — PIPERACILLIN SODIUM, TAZOBACTAM SODIUM 25 GRAM(S): 3; .375 INJECTION, POWDER, LYOPHILIZED, FOR SOLUTION INTRAVENOUS at 06:39

## 2024-07-14 RX ADMIN — OXYCODONE HYDROCHLORIDE 2.5 MILLIGRAM(S): 30 TABLET ORAL at 06:39

## 2024-07-14 RX ADMIN — DEXTROSE MONOHYDRATE, SODIUM CHLORIDE, SODIUM LACTATE, CALCIUM CHLORIDE, MAGNESIUM CHLORIDE 100 MILLILITER(S): 1.5; 538; 448; 18.4; 5.08 SOLUTION INTRAPERITONEAL at 17:17

## 2024-07-14 RX ADMIN — PIPERACILLIN SODIUM, TAZOBACTAM SODIUM 25 GRAM(S): 3; .375 INJECTION, POWDER, LYOPHILIZED, FOR SOLUTION INTRAVENOUS at 21:57

## 2024-07-14 NOTE — PATIENT PROFILE ADULT - FUNCTIONAL ASSESSMENT - BASIC MOBILITY 6.
4-calculated by average/Not able to assess (calculate score using [facility] averaging method)

## 2024-07-14 NOTE — H&P ADULT - PROBLEM SELECTOR PLAN 1
With perianal fistula with possible abdominal abscess vs infected tract.   C/w zosyn, IVFs, f/u septic workup.  F/u CT abdomen with oral and IV contrast.   GI consult placed. CT abdomen with oral and IV con neg for any etiology for patients pain.   C/w zosyn, IVFs, f/u septic workup.  Pain control with bowel regimen.   GI consult placed.

## 2024-07-14 NOTE — PATIENT PROFILE ADULT - FALL HARM RISK - UNIVERSAL INTERVENTIONS
Bed in lowest position, wheels locked, appropriate side rails in place/Call bell, personal items and telephone in reach/Instruct patient to call for assistance before getting out of bed or chair/Non-slip footwear when patient is out of bed/[place] to call system/Physically safe environment - no spills, clutter or unnecessary equipment/Purposeful Proactive Rounding/Room/bathroom lighting operational, light cord in reach

## 2024-07-14 NOTE — CONSULT NOTE ADULT - ASSESSMENT
H/O fistulizing Crohns disease /Perianal fistulas s/p Diverting Colostomy  The Pt was on several Biologic Rxs in the past ? Noncompliant  REC  Management of the acute problems/Urinary obstruction  REC  F/U in the office after discharge  H/O fistulizing Crohns disease /Perianal fistulas s/p Diverting Colostomy  The Pt was on several Biologic Rxs in the past ? Noncompliant  REC  Management of the acute problems/Urinary obstruction  Debridement and management of Perianal fidtuls-Colorectal surgery evaluation  Start Biological Rx after insurance issues clarified

## 2024-07-14 NOTE — PATIENT PROFILE ADULT - NSPROGENOTHERPROVIDER_GEN_A_NUR
58M presenting to the hospital day 2 after laparoscopic hernia repair with abdominal pain.     - Patient has not been taking prescribed pain medications, educated patient on importance of taking medications as prescribed, as well as around the clock tylenol.   - Fever is not indicative of infection at this early postoperative stage.   - CT findings likely reactive fluid collection to difficult surgery and manipulation.   - Informed patient that if fevers continue or pain worsens on prescribed regimen of pain medications, he should present to his Surgeon's office or ED.   - Discussed with Surgical Fellow.     [facility] Pager #[phone] none

## 2024-07-14 NOTE — H&P ADULT - NSHPPHYSICALEXAM_GEN_ALL_CORE
T(C): 36.7 (07-14-24 @ 01:22), Max: 37 (07-13-24 @ 11:16)  HR: 77 (07-14-24 @ 01:22) (76 - 125)  BP: 109/68 (07-14-24 @ 01:22) (109/68 - 156/78)  RR: 16 (07-14-24 @ 01:22) (16 - 18)  SpO2: 100% (07-14-24 @ 01:22) (97% - 100%)    General: non-toxic  HEENT: non-traumatic, perrla, eomi  Cardio: s1s2 regular rate and rhythm  Lungs: comfortable breathing, clear to auscultation  GI: Soft, pain out of proportion to physical exam, purulent discharge noted in perianal area.   Neuro: AOx4  Ext: Pulses +2

## 2024-07-14 NOTE — H&P ADULT - NSHPLABSRESULTS_GEN_ALL_CORE
LABS:  cret                        12.9   11.80 )-----------( 365      ( 13 Jul 2024 20:41 )             38.5     07-13    138  |  102  |  4<L>  ----------------------------<  251<H>  3.0<L>   |  28  |  0.67    Ca    8.9      13 Jul 2024 20:41    TPro  7.0  /  Alb  2.5<L>  /  TBili  0.4  /  DBili  x   /  AST  15  /  ALT  14  /  AlkPhos  76  07-13    PT/INR - ( 13 Jul 2024 09:29 )   PT: 10.8 sec;   INR: 0.95 ratio       < from: US Kidney and Bladder (07.13.24 @ 21:35) >    IMPRESSION:  Note renal mass, hydronephrosis or calculus is visualized sonographically.  Hepatic steatosis.        --- End of Report ---    < end of copied text >      PTT - ( 13 Jul 2024 09:29 )  PTT:28.6 sec      Urinalysis with Rflx Culture (collected 07-13-24 @ 09:29)

## 2024-07-14 NOTE — H&P ADULT - HISTORY OF PRESENT ILLNESS
38M w/ h/o DM, Crohns disease, hx of bilateral Seton placement for anal fistula drainage 6 wks ago at [place] presents with abdominal pain and anuria. Patient reports he used to be on biologics for chrons, but they were stopped due to recurrent fistulas and infections. One month ago,   followed by dysuria and issues with urinary retention presents with abdominal pain. Patient was discharged several hours ago from [facility] ED for the same pain.  He had labs, urine and CT without any acute abnormalities.  Colorectal surgery consulted and outpatient antibiotics, pain control, and GI, urology and colorectal surgery follow up recommended in 2 wks.  Patient states ostomy and fox catheter draining well.  He went home and went to lay down and pain returned. Pain is in left lower abdomen and flank and low back.  Pain is constant. Denies fevers, chills, vomiting.  States he is getting some green drainage from rectum. Reports chills, denies fevers.   In ED vitals stable.  38M w/ h/o DM, Crohns disease, s/p colostomy, hx of bilateral Seton placement for anal fistula drainage 6 wks ago at [place] presents with abdominal pain and anuria. Patient reports he used to be on biologics for chrons, but they were stopped due to recurrent fistulas and infections. Had seton placement for anal fistula 6 months ago, and reports abdominal pain worsening, especially with [facility] Has had green discharge from the rectum. For past 3 days, pain worsened and was unable to have any urine output and came to [facility]. Patient reports he does not want to go back to [place] due to having a bad experience last time and would like to switch care to [facility]. Reports chills, denies fevers. No other complaints and ROS otherwise negative.   In ED vitals stable. CBC noted for WBC 11, H/H 12/38, Plt 365. CMP noted for K of 3, otherwise unremarkable. UA done yesterday appears sterile. S/p 1 dose zosyn, admitted to  for further care. 38M w/ h/o DM, Crohns disease, s/p colostomy, hx of bilateral Seton placement for anal fistula drainage 6 wks ago at [place] presents with abdominal pain and anuria. Patient reports he used to be on biologics for chrons, but they were stopped due to recurrent fistulas and infections. Had seton placement for anal fistula 6 months ago, and reports abdominal pain worsening, especially with [facility] Has had green discharge from the rectum. For past 3 days, pain worsened and was unable to have any urine output and came to [facility]. Patient reports he does not want to go back to [place] due to having a bad experience last time and would like to switch care to [facility]. Reports chills, denies fevers. No other complaints and ROS otherwise negative.   Of note patient was in ED, fox was placed with good urine output, UA was negative, He was discharged with out-pt follow up with GI, colorectal, and . He went home and had severe pain and came back to ED.    In ED vitals stable. CBC noted for WBC 11, H/H 12/38, Plt 365. CMP noted for K of 3, otherwise unremarkable. UA done yesterday appears sterile. CT abdomen with oral and IV contrast noted for "No definite cause for abdominal pain identified.  No significant change in multiple complex perianal fistulas without associated abscess". S/p 1 dose zosyn, admitted to  for further care.

## 2024-07-15 LAB
GLUCOSE BLDC GLUCOMTR-MCNC: 153 MG/DL — HIGH (ref 70–99)
GLUCOSE BLDC GLUCOMTR-MCNC: 170 MG/DL — HIGH (ref 70–99)
GLUCOSE BLDC GLUCOMTR-MCNC: 173 MG/DL — HIGH (ref 70–99)
GLUCOSE BLDC GLUCOMTR-MCNC: 189 MG/DL — HIGH (ref 70–99)

## 2024-07-15 PROCEDURE — 99232 SBSQ HOSP IP/OBS MODERATE 35: CPT

## 2024-07-15 RX ORDER — ENOXAPARIN SODIUM 120 MG/.8ML
40 INJECTION SUBCUTANEOUS EVERY 24 HOURS
Refills: 0 | Status: DISCONTINUED | OUTPATIENT
Start: 2024-07-15 | End: 2024-08-01

## 2024-07-15 RX ADMIN — Medication 2 MILLIGRAM(S): at 11:45

## 2024-07-15 RX ADMIN — Medication 2 MILLIGRAM(S): at 16:00

## 2024-07-15 RX ADMIN — Medication 4 MILLIGRAM(S): at 14:17

## 2024-07-15 RX ADMIN — Medication 2: at 12:28

## 2024-07-15 RX ADMIN — Medication 10 MILLIGRAM(S): at 10:06

## 2024-07-15 RX ADMIN — Medication 2 MILLIGRAM(S): at 12:30

## 2024-07-15 RX ADMIN — Medication 2 MILLIGRAM(S): at 20:20

## 2024-07-15 RX ADMIN — Medication 4 MILLIGRAM(S): at 18:11

## 2024-07-15 RX ADMIN — PIPERACILLIN SODIUM, TAZOBACTAM SODIUM 25 GRAM(S): 3; .375 INJECTION, POWDER, LYOPHILIZED, FOR SOLUTION INTRAVENOUS at 14:15

## 2024-07-15 RX ADMIN — Medication 2 MILLIGRAM(S): at 06:39

## 2024-07-15 RX ADMIN — Medication 4 MILLIGRAM(S): at 23:54

## 2024-07-15 RX ADMIN — Medication 4 MILLIGRAM(S): at 22:39

## 2024-07-15 RX ADMIN — Medication 4 MILLIGRAM(S): at 01:03

## 2024-07-15 RX ADMIN — Medication 2: at 08:59

## 2024-07-15 RX ADMIN — PIPERACILLIN SODIUM, TAZOBACTAM SODIUM 25 GRAM(S): 3; .375 INJECTION, POWDER, LYOPHILIZED, FOR SOLUTION INTRAVENOUS at 05:18

## 2024-07-15 RX ADMIN — Medication 2 MILLIGRAM(S): at 02:17

## 2024-07-15 RX ADMIN — Medication 4 MILLIGRAM(S): at 15:00

## 2024-07-15 RX ADMIN — Medication 2: at 16:45

## 2024-07-15 RX ADMIN — Medication 2 MILLIGRAM(S): at 15:42

## 2024-07-15 RX ADMIN — Medication 2 MILLIGRAM(S): at 20:35

## 2024-07-15 RX ADMIN — Medication 10 MILLIGRAM(S): at 16:45

## 2024-07-15 RX ADMIN — Medication 10 MILLIGRAM(S): at 05:18

## 2024-07-15 RX ADMIN — Medication 4 MILLIGRAM(S): at 05:25

## 2024-07-15 RX ADMIN — Medication 4 MILLIGRAM(S): at 09:44

## 2024-07-15 RX ADMIN — Medication 4 MILLIGRAM(S): at 15:10

## 2024-07-15 RX ADMIN — PIPERACILLIN SODIUM, TAZOBACTAM SODIUM 25 GRAM(S): 3; .375 INJECTION, POWDER, LYOPHILIZED, FOR SOLUTION INTRAVENOUS at 20:20

## 2024-07-15 NOTE — PROGRESS NOTE ADULT - SUBJECTIVE AND OBJECTIVE BOX
CHIEF COMPLAINT/INTERVAL HISTORY:    Patient is a 38y old  Male who presents with a chief complaint of Urinary obstruction (14 Jul 2024 15:36)      HPI:  38M w/ h/o DM, Crohns disease, s/p colostomy, hx of bilateral Seton placement for anal fistula drainage 6 wks ago at [place] presents with abdominal pain and anuria. Patient reports he used to be on biologics for chrons, but they were stopped due to recurrent fistulas and infections. Had seton placement for anal fistula 6 months ago, and reports abdominal pain worsening, especially with [facility] Has had green discharge from the rectum. For past 3 days, pain worsened and was unable to have any urine output and came to [facility]. Patient reports he does not want to go back to [place] due to having a bad experience last time and would like to switch care to [facility]. Reports chills, denies fevers. No other complaints and ROS otherwise negative.   Of note patient was in ED, fox was placed with good urine output, UA was negative, He was discharged with out-pt follow up with GI, colorectal, and . He went home and had severe pain and came back to ED.    In ED vitals stable. CBC noted for WBC 11, H/H 12/38, Plt 365. CMP noted for K of 3, otherwise unremarkable. UA done yesterday appears sterile. CT abdomen with oral and IV contrast noted for "No definite cause for abdominal pain identified.  No significant change in multiple complex perianal fistulas without associated abscess". S/p 1 dose zosyn, admitted to  for further care. (14 Jul 2024 02:57)      SUBJECTIVE & OBJECTIVE: Pt seen and examined at bedside.     ICU Vital Signs Last 24 Hrs  T(C): 36.8 (14 Jul 2024 21:40), Max: 36.8 (14 Jul 2024 21:40)  T(F): 98.3 (14 Jul 2024 21:40), Max: 98.3 (14 Jul 2024 21:40)  HR: 79 (14 Jul 2024 21:40) (77 - 79)  BP: 106/74 (14 Jul 2024 21:40) (106/74 - 124/77)  BP(mean): --  ABP: --  ABP(mean): --  RR: 20 (14 Jul 2024 21:40) (18 - 20)  SpO2: 100% (14 Jul 2024 21:40) (100% - 100%)    O2 Parameters below as of 14 Jul 2024 21:40  Patient On (Oxygen Delivery Method): room air        PHYSICAL EXAM:      Constitutional: NAD  Eyes: perrl, no conjunctival changes  ENMT: no exudates, moist oral muc, uvula midline  Neck: no JVD, no LAD  Back: no cva tenderness  Respiratory: CTA, no exp wheezes  Cardiovascular: S1S2 reg, no murmur gallop or rub  Gastrointestinal: abd soft, NT/ND + BS  Genitourinary: voiding  Extremities: FROM, no joint effusions, no edema, no clubbing , no cyanosis  Vascular: pedal pulses + bilateral, warm extremities  Neurological: non focal, mot str 5/5/ all extr  Skin: no rashes  Lymph Nodes: no LAD            LABS:                        12.1   10.26 )-----------( 353      ( 14 Jul 2024 07:39 )             37.6     07-14    141  |  106  |  2<L>  ----------------------------<  104<H>  3.0<L>   |  28  |  0.58    Ca    8.4<L>      14 Jul 2024 07:39    TPro  6.5  /  Alb  2.3<L>  /  TBili  0.4  /  DBili  x   /  AST  13<L>  /  ALT  13  /  AlkPhos  74  07-14    PT/INR - ( 13 Jul 2024 09:29 )   PT: 10.8 sec;   INR: 0.95 ratio               CAPILLARY BLOOD GLUCOSE      POCT Blood Glucose.: 164 mg/dL (14 Jul 2024 21:34)  POCT Blood Glucose.: 222 mg/dL (14 Jul 2024 16:57)  POCT Blood Glucose.: 98 mg/dL (14 Jul 2024 11:49)      RECENT CULTURES:      RADIOLOGY & ADDITIONAL TESTS: personally reviewed    time spent:      CHIEF COMPLAINT/INTERVAL HISTORY:    Patient is a 38y old  Male who presents with a chief complaint of Urinary obstruction (14 Jul 2024 15:36)      HPI:  38M w/ h/o DM, Crohns disease, s/p colostomy, hx of bilateral Seton placement for anal fistula drainage 6 wks ago at [place] presents with abdominal pain and anuria. Patient reports he used to be on biologics for chrons, but they were stopped due to recurrent fistulas and infections. Had seton placement for anal fistula 6 months ago, and reports abdominal pain worsening, especially with [facility] Has had green discharge from the rectum. For past 3 days, pain worsened and was unable to have any urine output and came to [facility]. Patient reports he does not want to go back to [place] due to having a bad experience last time and would like to switch care to [facility]. Reports chills, denies fevers. No other complaints and ROS otherwise negative.   Of note patient was in ED, fox was placed with good urine output, UA was negative, He was discharged with out-pt follow up with GI, colorectal, and . He went home and had severe pain and came back to ED.    In ED vitals stable. CBC noted for WBC 11, H/H 12/38, Plt 365. CMP noted for K of 3, otherwise unremarkable. UA done yesterday appears sterile. CT abdomen with oral and IV contrast noted for "No definite cause for abdominal pain identified.  No significant change in multiple complex perianal fistulas without associated abscess". S/p 1 dose zosyn, admitted to  for further care. (14 Jul 2024 02:57)  c/w intermittent abd pain, has Fox now for urinary retention    Vital Signs Last 24 Hrs  T(C): 36.8 (14 Jul 2024 21:40), Max: 36.8 (14 Jul 2024 21:40)  T(F): 98.3 (14 Jul 2024 21:40), Max: 98.3 (14 Jul 2024 21:40)  HR: 79 (14 Jul 2024 21:40) (77 - 79)  BP: 106/74 (14 Jul 2024 21:40) (106/74 - 124/77)  BP(mean): --  RR: 20 (14 Jul 2024 21:40) (18 - 20)  SpO2: 100% (14 Jul 2024 21:40) (100% - 100%)    Parameters below as of 14 Jul 2024 21:40  Patient On (Oxygen Delivery Method): room air        PHYSICAL EXAM:      Constitutional: NAD  Eyes: perrl, no conjunctival changes  ENMT: no exudates, moist oral muc, uvula midline  Neck: no JVD, no LAD  Back: no cva tenderness  Respiratory: CTA, no exp wheezes  Cardiovascular: S1S2 reg, no murmur gallop or rub  Gastrointestinal: abd soft, NT/ND + BS  Genitourinary: voiding  Extremities: FROM, no joint effusions, no edema, no clubbing , no cyanosis  Vascular: pedal pulses + bilateral, warm extremities  Neurological: non focal, mot str 5/5/ all extr  Skin: no rashes  Lymph Nodes: no LAD            LABS:                        12.1   10.26 )-----------( 353      ( 14 Jul 2024 07:39 )             37.6     07-14    141  |  106  |  2<L>  ----------------------------<  104<H>  3.0<L>   |  28  |  0.58    Ca    8.4<L>      14 Jul 2024 07:39    TPro  6.5  /  Alb  2.3<L>  /  TBili  0.4  /  DBili  x   /  AST  13<L>  /  ALT  13  /  AlkPhos  74  07-14    PT/INR - ( 13 Jul 2024 09:29 )   PT: 10.8 sec;   INR: 0.95 ratio           CAPILLARY BLOOD GLUCOSE      POCT Blood Glucose.: 153 mg/dL (15 Jul 2024 08:51)  POCT Blood Glucose.: 164 mg/dL (14 Jul 2024 21:34)  POCT Blood Glucose.: 222 mg/dL (14 Jul 2024 16:57)  POCT Blood Glucose.: 98 mg/dL (14 Jul 2024 11:49)        RADIOLOGY & ADDITIONAL TESTS: personally reviewed  CT:  IMPRESSION:  No definite cause for abdominal pain identified.  No significant change in multiple complex perianal fistulas without   associated abscess. These could be reevaluated with nonemergent MRI as   warranted.      38M admitted for possible infected perianal fistula.      * Crohn's disease.   CT abdomen with oral and IV con neg for any etiology for patients pain.   C/w zosyn,  Pain control with bowel regimen.   GI consult noted colorectal consult recommended     * Type 2 diabetes mellitus.   Adjust insulin as needed.    * DVT px- LMWH    time spent: 45 min  Case d/w Dr Lubin

## 2024-07-15 NOTE — CONSULT NOTE ADULT - SUBJECTIVE AND OBJECTIVE BOX
HPI:  "38M w/ h/o DM, Crohns disease, s/p colostomy, hx of bilateral Seton placement for anal fistula drainage 6 wks ago at [facility] presents with abdominal pain and anuria. Patient reports he used to be on biologics for chrons, but they were stopped due to recurrent fistulas and infections. Had seton placement for anal fistula 6 months ago, and reports abdominal pain worsening, especially with Flatulance Has had green discharge from the rectum. For past 3 days, pain worsened and was unable to have any urine output and came to [facility]. Patient reports he does not want to go back to [place] due to having a bad experience last time and would like to switch care to [facility]. Reports chills, denies fevers. No other complaints and ROS otherwise negative.   Of note patient was in ED, fox was placed with good urine output, UA was negative, He was discharged with out-pt follow up with GI, colorectal, and . He went home and had severe pain and came back to ED.    In ED vitals stable. CBC noted for WBC 11, H/H 12/38, Plt 365. CMP noted for K of 3, otherwise unremarkable. UA done yesterday appears sterile. CT abdomen with oral and IV contrast noted for "No definite cause for abdominal pain identified.  No significant change in multiple complex perianal fistulas without associated abscess". S/p 1 dose zosyn, admitted to  for further care. (14 Jul 2024 02:57)"    39 yo Male admitted for abd pain, pt with extensive hx of Crohn's disease of large intestine without complication Crohn's disease, s/p colostomy, hx of bilateral Seton placement for anal fistula drainage 6 wks ago at [facility]. Urology consulted for pt with urinary retention. Pt seen at bedside reports he was given nerve block injections and epidural after which he developed urinary retention requiring an indwelling fox. He denies any trouble voiding prior to this. Denies dysuria, hx of retention, hx of hesitancy, frequency, urgency or other urinary problems.       PAST MEDICAL & SURGICAL HISTORY:    (No current flare up)      Pancreatitis      Type 2 diabetes mellitus      S/P ORIF (open reduction internal fixation) fracture  (Right ankle, 2014)      History of colonoscopy  (2014)      Perianal fistula  repair in 2002      H/O fracture of clavicle  with surgical repair      S/P colostomy          REVIEW OF SYSTEMS     All other review of systems neg, except as noted in HPI    MEDICATIONS  (STANDING):  dextrose 5%. 1000 milliLiter(s) (50 mL/Hr) IV Continuous <Continuous>  dextrose 5%. 1000 milliLiter(s) (100 mL/Hr) IV Continuous <Continuous>  dextrose 50% Injectable 25 Gram(s) IV Push once  dextrose 50% Injectable 25 Gram(s) IV Push once  dextrose 50% Injectable 12.5 Gram(s) IV Push once  dicyclomine 10 milliGRAM(s) Oral four times a day before meals  enoxaparin Injectable 40 milliGRAM(s) SubCutaneous every 24 hours  glucagon  Injectable 1 milliGRAM(s) IntraMuscular once  insulin lispro (ADMELOG) corrective regimen sliding scale   SubCutaneous at bedtime  insulin lispro (ADMELOG) corrective regimen sliding scale   SubCutaneous three times a day before meals  lactated ringers. 1000 milliLiter(s) (100 mL/Hr) IV Continuous <Continuous>  naloxone Injectable 0.4 milliGRAM(s) IV Push once  piperacillin/tazobactam IVPB.. 3.375 Gram(s) IV Intermittent every 8 hours  polyethylene glycol 3350 17 Gram(s) Oral daily  senna 2 Tablet(s) Oral at bedtime  sodium chloride 0.9%. 1000 milliLiter(s) (125 mL/Hr) IV Continuous <Continuous>  tamsulosin 0.4 milliGRAM(s) Oral at bedtime    MEDICATIONS  (PRN):  acetaminophen     Tablet .. 650 milliGRAM(s) Oral every 6 hours PRN Mild Pain (1 - 3)  aluminum hydroxide/magnesium hydroxide/simethicone Suspension 30 milliLiter(s) Oral every 4 hours PRN Dyspepsia  bisacodyl 5 milliGRAM(s) Oral daily PRN Constipation  dextrose Oral Gel 15 Gram(s) Oral once PRN Blood Glucose LESS THAN 70 milliGRAM(s)/deciliter  HYDROmorphone  Injectable 2 milliGRAM(s) IV Push every 4 hours PRN Severe Pain (7 - 10)  melatonin 3 milliGRAM(s) Oral at bedtime PRN Insomnia  morphine  - Injectable 4 milliGRAM(s) IV Push every 4 hours PRN Moderate Pain (4 - 6)  ondansetron Injectable 4 milliGRAM(s) IV Push every 6 hours PRN Nausea and/or Vomiting      Allergies    ciprofloxacin (Other (Mild to Mod))    Intolerances        SOCIAL HISTORY:    FAMILY HISTORY:  Diabetes mellitus (Father)        Vital Signs Last 24 Hrs  T(C): 36.8 (14 Jul 2024 21:40), Max: 36.8 (14 Jul 2024 21:40)  T(F): 98.3 (14 Jul 2024 21:40), Max: 98.3 (14 Jul 2024 21:40)  HR: 79 (14 Jul 2024 21:40) (77 - 79)  BP: 106/74 (14 Jul 2024 21:40) (106/74 - 124/77)  BP(mean): --  RR: 20 (14 Jul 2024 21:40) (18 - 20)  SpO2: 100% (14 Jul 2024 21:40) (100% - 100%)    Parameters below as of 14 Jul 2024 21:40  Patient On (Oxygen Delivery Method): room air        PHYSICAL EXAM:   General: No distress, No anxiety  VITALS  T(C): 36.8 (07-14-24 @ 21:40), Max: 36.8 (07-14-24 @ 21:40)  HR: 79 (07-14-24 @ 21:40) (77 - 79)  BP: 106/74 (07-14-24 @ 21:40) (106/74 - 124/77)  RR: 20 (07-14-24 @ 21:40) (18 - 20)  SpO2: 100% (07-14-24 @ 21:40) (100% - 100%)            Skin     : No jaundice   HEENT: Normocephalic, no icterus , EOM full , No epistaxis  Lung    : No resp distress  Abdo:   : Soft, Non tender, No guarding, No distension + colostomy in place  Back    : No CVAT b/l  Genitalia Male: Fox with yellow urine in leg bag  Neuro   : A&Ox3      LABS:                        12.1   10.26 )-----------( 353      ( 14 Jul 2024 07:39 )             37.6     07-14    141  |  106  |  2<L>  ----------------------------<  104<H>  3.0<L>   |  28  |  0.58    Ca    8.4<L>      14 Jul 2024 07:39    TPro  6.5  /  Alb  2.3<L>  /  TBili  0.4  /  DBili  x   /  AST  13<L>  /  ALT  13  /  AlkPhos  74  07-14      Urinalysis Basic - ( 14 Jul 2024 07:39 )    Color: x / Appearance: x / SG: x / pH: x  Gluc: 104 mg/dL / Ketone: x  / Bili: x / Urobili: x   Blood: x / Protein: x / Nitrite: x   Leuk Esterase: x / RBC: x / WBC x   Sq Epi: x / Non Sq Epi: x / Bacteria: x        RADIOLOGY & ADDITIONAL STUDIES:< from: US Kidney and Bladder (07.13.24 @ 21:35) >    ACC: [id number] EXAM:  US KIDNEYS AND BLADDER   ORDERED BY: SOO BARBOSA     PROCEDURE DATE:  07/13/2024          INTERPRETATION:  CLINICAL INFORMATION: Left flank pain radiating to back.    Dysuria.  Urinary retention.  Status post Fox catheter placement.    COMPARISON: CT abdomen and pelvis from 07/13/2024 at 10:41 AM.  Right   upper quadrant ultrasound from 01/06/2022.    TECHNIQUE: Sonography of the kidneys and bladder.    FINDINGS:  Right kidney: 11.2 cm. No renal mass, hydronephrosis or calculi.    Left kidney: 9.9 cm. No renal mass, hydronephrosis or calculi.    Urinary bladder: Collapsed around a Fox catheter.    Incidental findings: Echogenic liver parenchyma, compatible hepatic   steatosis.    IMPRESSION:  Note renal mass, hydronephrosis or calculus is visualized sonographically.  Hepatic steatosis.        --- End of Report ---            VICK WEBB MD; Attending Radiologist  This document has been electronically signed. Jul 13 2024  9:48PM    < end of copied text >  < from: CT Abdomen and Pelvis w/ Oral Cont and w/ IV Cont (07.13.24 @ 10:50) >    ACC: [id number] EXAM:  CT ABDOMEN AND PELVIS OC IC   ORDERED BY: AGATA WEINBERG     PROCEDURE DATE:  07/13/2024          INTERPRETATION:  CLINICAL INFORMATION: Abdominal pain, history of Crohn's    COMPARISON: May 27, 2024    CONTRAST/COMPLICATIONS:  IV Contrast: Omnipaque 350  90 cc administered   0 cc discarded  Oral Contrast: Omnipaque 300  Complications: None reported at time of study completion    PROCEDURE:  CT of the Abdomen and Pelvis was performed.  Sagittal and coronal reformats were performed.    FINDINGS:  LOWER CHEST: Within normal limits.    LIVER: Steatosis.  BILE DUCTS: Normal caliber.  GALLBLADDER: Within normal limits.  SPLEEN: Within normal limits.  PANCREAS: Stable mild atrophy of the pancreatic tail.  ADRENALS: Withinnormal limits.  KIDNEYS/URETERS: Within normal limits.    BLADDER: Within normal limits.  REPRODUCTIVE ORGANS: Prostate within normal limits.    BOWEL: No bowel obstruction. Appendix is not visualized. Right lower   quadrant loop ileostomy. Multiple complex perianal fistulous tracts again   noted with seton in place. No perianal abscess.  PERITONEUM/RETROPERITONEUM: Within normal limits.  VESSELS: Within normal limits.  LYMPH NODES: No lymphadenopathy.  ABDOMINAL WALL: Within normal limits.  BONES: Old left rib fractures.    IMPRESSION:  No definite cause for abdominal pain identified.  No significant change in multiple complex perianal fistulas without   associated abscess. These could be reevaluated with nonemergent MRI as   warranted.          --- End of Report ---            MERLY MONTES DE OCA MD; Attending Radiologist  This document has been electronically signed. Jul 13 2024 11:42AM    < end of copied text >  Culture Results:   <10,000 CFU/mL Normal Urogenital Arina (05.27.24 @ 13:12) 
HPI:  38M w/ h/o DM, Crohns disease, s/p colostomy, hx of bilateral Seton placement for anal fistula drainage 6 wks ago at [facility] presents with abdominal pain and anuria. Patient reports he used to be on biologics for Fistulizing  disease, but they were stopped due to recurrent fistulas and infections. Had seton placement for anal fistula 6 months ago, and reports abdominal pain worsening, especially with Flatulance Has had green discharge from the rectum. For past 3 days, pain worsened and was unable to have any urine output and came to [facility]. Patient reports he does not want to go back to [place] due to having a bad experience last time and would like to switch care to [facility]. Reports chills, denies fevers. No other complaints and ROS otherwise negative.   Of note patient was in ED, fox was placed with good urine output, UA was negative, He was discharged with out-pt follow up with GI, colorectal, and . He went home and had severe pain and came back to ED.    In ED vitals stable. CBC noted for WBC 11, H/H 12/38, Plt 365. CMP noted for K of 3, otherwise unremarkable. UA done yesterday appears sterile. CT abdomen with oral and IV contrast noted for "No definite cause for abdominal pain identified.  No significant change in multiple complex perianal fistulas without associated abscess". S/p 1 dose zosyn, admitted to  for further care. (14 Jul 2024 02:57)      PAST MEDICAL & SURGICAL HISTORY:  Crohn's disease of large intestine without complication  (No current flare up)      Pancreatitis      Type 2 diabetes mellitus      S/P ORIF (open reduction internal fixation) fracture  (Right ankle, 2014)      History of colonoscopy  (2014)      Perianal fistula  repair in 2002      H/O fracture of clavicle  with surgical repair      S/P colostomy          MEDICATIONS  (STANDING):  dextrose 5%. 1000 milliLiter(s) (50 mL/Hr) IV Continuous <Continuous>  dextrose 5%. 1000 milliLiter(s) (100 mL/Hr) IV Continuous <Continuous>  dextrose 50% Injectable 25 Gram(s) IV Push once  dextrose 50% Injectable 12.5 Gram(s) IV Push once  dextrose 50% Injectable 25 Gram(s) IV Push once  dicyclomine 10 milliGRAM(s) Oral four times a day before meals  glucagon  Injectable 1 milliGRAM(s) IntraMuscular once  heparin   Injectable 5000 Unit(s) SubCutaneous every 12 hours  insulin lispro (ADMELOG) corrective regimen sliding scale   SubCutaneous at bedtime  insulin lispro (ADMELOG) corrective regimen sliding scale   SubCutaneous three times a day before meals  lactated ringers. 1000 milliLiter(s) (100 mL/Hr) IV Continuous <Continuous>  naloxone Injectable 0.4 milliGRAM(s) IV Push once  piperacillin/tazobactam IVPB.. 3.375 Gram(s) IV Intermittent every 8 hours  polyethylene glycol 3350 17 Gram(s) Oral daily  senna 2 Tablet(s) Oral at bedtime  sodium chloride 0.9%. 1000 milliLiter(s) (125 mL/Hr) IV Continuous <Continuous>  tamsulosin 0.4 milliGRAM(s) Oral at bedtime    MEDICATIONS  (PRN):  acetaminophen     Tablet .. 650 milliGRAM(s) Oral every 6 hours PRN Mild Pain (1 - 3)  aluminum hydroxide/magnesium hydroxide/simethicone Suspension 30 milliLiter(s) Oral every 4 hours PRN Dyspepsia  bisacodyl 5 milliGRAM(s) Oral daily PRN Constipation  dextrose Oral Gel 15 Gram(s) Oral once PRN Blood Glucose LESS THAN 70 milliGRAM(s)/deciliter  HYDROmorphone  Injectable 1 milliGRAM(s) IV Push every 4 hours PRN Severe Pain (7 - 10)  melatonin 3 milliGRAM(s) Oral at bedtime PRN Insomnia  ondansetron Injectable 4 milliGRAM(s) IV Push every 6 hours PRN Nausea and/or Vomiting  oxycodone    5 mG/acetaminophen 325 mG 1 Tablet(s) Oral every 6 hours PRN Moderate Pain (4 - 6)      Allergies    ciprofloxacin (Other (Mild to Mod))    Intolerances        SOCIAL HISTORY:    FAMILY HISTORY:  Diabetes mellitus (Father)     Non-contributory    REVIEW OF SYSTEMS      General:	    Respiratory and Thorax:  	  Cardiovascular:	    Gastrointestinal:	    Musculoskeletal:	   Vital Signs Last 24 Hrs  T(C): 36.7 (14 Jul 2024 08:34), Max: 36.8 (13 Jul 2024 16:03)  T(F): 98.1 (14 Jul 2024 08:34), Max: 98.3 (13 Jul 2024 16:03)  HR: 72 (14 Jul 2024 08:34) (72 - 107)  BP: 103/69 (14 Jul 2024 08:34) (103/69 - 137/76)  BP(mean): 107 (13 Jul 2024 20:00) (107 - 107)  RR: 18 (14 Jul 2024 08:34) (16 - 18)  SpO2: 100% (14 Jul 2024 08:34) (98% - 100%)    Parameters below as of 14 Jul 2024 08:34  Patient On (Oxygen Delivery Method): room air        HEENT :No Pallor.No icterus. EOMI,PERLAA  Chest : Clear to Auscultation  CVS : S1S2 Normal.No murmurs.  Abdomen: Soft.Non tender .Normal bowel sounds.No Organomegaly.  CNS: Alert.Oriented to Time,Place and Person.No focal deficit.  EXT: Normal Range of motion.No pitting edema.    LABS:                        12.1   10.26 )-----------( 353      ( 14 Jul 2024 07:39 )             37.6     07-14    141  |  106  |  2<L>  ----------------------------<  104<H>  3.0<L>   |  28  |  0.58    Ca    8.4<L>      14 Jul 2024 07:39    TPro  6.5  /  Alb  2.3<L>  /  TBili  0.4  /  DBili  x   /  AST  13<L>  /  ALT  13  /  AlkPhos  74  07-14    PT/INR - ( 13 Jul 2024 09:29 )   PT: 10.8 sec;   INR: 0.95 ratio         PTT - ( 13 Jul 2024 09:29 )  PTT:28.6 sec  LIVER FUNCTIONS - ( 14 Jul 2024 07:39 )  Alb: 2.3 g/dL / Pro: 6.5 gm/dL / ALK PHOS: 74 U/L / ALT: 13 U/L / AST: 13 U/L / GGT: x             RADIOLOGY & ADDITIONAL STUDIES:

## 2024-07-15 NOTE — CONSULT NOTE ADULT - ASSESSMENT
39 yo Male admitted for abd pain, pt with extensive hx of Crohn's disease of large intestine without complication Crohn's disease, s/p colostomy, hx of bilateral Seton placement for anal fistula drainage 6 wks ago at [place]. Urology consulted for pt with urinary retention. Pt seen at bedside reports he was given nerve block injections and epidural after which he developed urinary retention requiring an indwelling fox.  Recommend  - Continue Flomax  - Trial of void in 1-2 weeks outpatient, if fails may benefit from urodynamics test outpatient  - Follow up with Dr. Jackman outpatient    Case discussed with Dr. Jackman

## 2024-07-16 LAB
GLUCOSE BLDC GLUCOMTR-MCNC: 160 MG/DL — HIGH (ref 70–99)
GLUCOSE BLDC GLUCOMTR-MCNC: 202 MG/DL — HIGH (ref 70–99)
GLUCOSE BLDC GLUCOMTR-MCNC: 221 MG/DL — HIGH (ref 70–99)
GLUCOSE BLDC GLUCOMTR-MCNC: 237 MG/DL — HIGH (ref 70–99)

## 2024-07-16 PROCEDURE — 99233 SBSQ HOSP IP/OBS HIGH 50: CPT

## 2024-07-16 RX ORDER — KETOROLAC TROMETHAMINE 10 MG
15 TABLET ORAL EVERY 8 HOURS
Refills: 0 | Status: DISCONTINUED | OUTPATIENT
Start: 2024-07-16 | End: 2024-07-19

## 2024-07-16 RX ADMIN — PIPERACILLIN SODIUM, TAZOBACTAM SODIUM 25 GRAM(S): 3; .375 INJECTION, POWDER, LYOPHILIZED, FOR SOLUTION INTRAVENOUS at 20:33

## 2024-07-16 RX ADMIN — Medication 2 MILLIGRAM(S): at 05:55

## 2024-07-16 RX ADMIN — Medication 4 MILLIGRAM(S): at 02:51

## 2024-07-16 RX ADMIN — Medication 4: at 16:53

## 2024-07-16 RX ADMIN — Medication 15 MILLIGRAM(S): at 16:40

## 2024-07-16 RX ADMIN — PIPERACILLIN SODIUM, TAZOBACTAM SODIUM 25 GRAM(S): 3; .375 INJECTION, POWDER, LYOPHILIZED, FOR SOLUTION INTRAVENOUS at 05:27

## 2024-07-16 RX ADMIN — Medication 2 MILLIGRAM(S): at 09:51

## 2024-07-16 RX ADMIN — Medication 4 MILLIGRAM(S): at 02:21

## 2024-07-16 RX ADMIN — Medication 2 MILLIGRAM(S): at 05:27

## 2024-07-16 RX ADMIN — Medication 2 MILLIGRAM(S): at 22:14

## 2024-07-16 RX ADMIN — Medication 4: at 12:04

## 2024-07-16 RX ADMIN — Medication 10 MILLIGRAM(S): at 16:40

## 2024-07-16 RX ADMIN — Medication 2 MILLIGRAM(S): at 01:29

## 2024-07-16 RX ADMIN — Medication 2 MILLIGRAM(S): at 13:43

## 2024-07-16 RX ADMIN — Medication 2 MILLIGRAM(S): at 17:45

## 2024-07-16 RX ADMIN — Medication 2 MILLIGRAM(S): at 01:14

## 2024-07-16 RX ADMIN — Medication 10 MILLIGRAM(S): at 12:04

## 2024-07-16 RX ADMIN — Medication 2: at 08:01

## 2024-07-16 RX ADMIN — PIPERACILLIN SODIUM, TAZOBACTAM SODIUM 25 GRAM(S): 3; .375 INJECTION, POWDER, LYOPHILIZED, FOR SOLUTION INTRAVENOUS at 13:43

## 2024-07-16 RX ADMIN — Medication 2 MILLIGRAM(S): at 22:30

## 2024-07-16 NOTE — PROGRESS NOTE ADULT - SUBJECTIVE AND OBJECTIVE BOX
Chief complaint of Urinary obstruction (14 Jul 2024 15:36)      HPI:  38M w/ h/o DM, Crohns disease, s/p colostomy, hx of bilateral Seton placement for anal fistula drainage 6 wks ago at [place] presents with abdominal pain and anuria. Patient reports he used to be on biologics for chrons, but they were stopped due to recurrent fistulas and infections. Had seton placement for anal fistula 6 months ago, and reports abdominal pain worsening, especially with Flatulance Has had green discharge from the rectum. For past 3 days, pain worsened and was unable to have any urine output and came to [facility]. Patient reports he does not want to go back to [place] due to having a bad experience last time and would like to switch care to [facility]. Reports chills, denies fevers. No other complaints and ROS otherwise negative.   Of note patient was in ED, fox was placed with good urine output, UA was negative, He was discharged with out-pt follow up with GI, colorectal, and . He went home and had severe pain and came back to ED.    In ED vitals stable. CBC noted for WBC 11, H/H 12/38, Plt 365. CMP noted for K of 3, otherwise unremarkable. UA done yesterday appears sterile. CT abdomen with oral and IV contrast noted for "No definite cause for abdominal pain identified. No significant change in multiple complex perianal fistulas without associated abscess". S/p 1 dose zosyn, admitted to  for further care. (14 Jul 2024 02:57) c/w intermittent abd pain, has Fox now for urinary retention.    S:  7/16: Lying in bed, awake, alert, states he has intractable pain by his anus, with associated discharge.  + abd discomfort.    REVIEW OF SYSTEMS: All other review of systems is negative unless indicated above.    Vital Signs Last 24 Hrs  T(C): 36.5 (16 Jul 2024 07:43), Max: 36.8 (15 Jul 2024 20:19)  T(F): 97.7 (16 Jul 2024 07:43), Max: 98.2 (15 Jul 2024 20:19)  HR: 79 (16 Jul 2024 13:30) (70 - 81)  BP: 115/74 (16 Jul 2024 13:30) (105/66 - 126/78)  BP(mean): --  RR: 18 (16 Jul 2024 07:43) (18 - 18)  SpO2: 99% (16 Jul 2024 07:43) (96% - 99%)    Parameters below as of 16 Jul 2024 07:43  Patient On (Oxygen Delivery Method): room air      PHYSICAL EXAM:    Constitutional: NAD, awake and alert  HEENT: PERR, EOMI, Normal Hearing, MMM  Neck: Soft and supple  Respiratory: Breath sounds are clear bilaterally, No wheezing, rales or rhonchi  Cardiovascular: S1 and S2, regular rate and rhythm, no Murmurs, gallops or rubs  Gastrointestinal: Bowel Sounds present, soft, nontender, nondistended, no guarding, no rebound, ostomy in place  : Fox in place  Extremities: No peripheral edema  Neurological: A/O x 3, no focal deficits in my limited exam            MEDICATIONS  (STANDING):  dextrose 5%. 1000 milliLiter(s) (100 mL/Hr) IV Continuous <Continuous>  dextrose 5%. 1000 milliLiter(s) (50 mL/Hr) IV Continuous <Continuous>  dextrose 50% Injectable 25 Gram(s) IV Push once  dextrose 50% Injectable 25 Gram(s) IV Push once  dextrose 50% Injectable 12.5 Gram(s) IV Push once  dicyclomine 10 milliGRAM(s) Oral four times a day before meals  enoxaparin Injectable 40 milliGRAM(s) SubCutaneous every 24 hours  glucagon  Injectable 1 milliGRAM(s) IntraMuscular once  insulin lispro (ADMELOG) corrective regimen sliding scale   SubCutaneous three times a day before meals  insulin lispro (ADMELOG) corrective regimen sliding scale   SubCutaneous at bedtime  lactated ringers. 1000 milliLiter(s) (100 mL/Hr) IV Continuous <Continuous>  naloxone Injectable 0.4 milliGRAM(s) IV Push once  piperacillin/tazobactam IVPB.. 3.375 Gram(s) IV Intermittent every 8 hours  polyethylene glycol 3350 17 Gram(s) Oral daily  senna 2 Tablet(s) Oral at bedtime  tamsulosin 0.4 milliGRAM(s) Oral at bedtime    MEDICATIONS  (PRN):  acetaminophen     Tablet .. 650 milliGRAM(s) Oral every 6 hours PRN Mild Pain (1 - 3)  aluminum hydroxide/magnesium hydroxide/simethicone Suspension 30 milliLiter(s) Oral every 4 hours PRN Dyspepsia  bisacodyl 5 milliGRAM(s) Oral daily PRN Constipation  dextrose Oral Gel 15 Gram(s) Oral once PRN Blood Glucose LESS THAN 70 milliGRAM(s)/deciliter  HYDROmorphone  Injectable 2 milliGRAM(s) IV Push every 4 hours PRN Severe Pain (7 - 10)  ketorolac   Injectable 15 milliGRAM(s) IV Push every 8 hours PRN breakthrough pain  melatonin 3 milliGRAM(s) Oral at bedtime PRN Insomnia  morphine  - Injectable 4 milliGRAM(s) IV Push every 4 hours PRN Moderate Pain (4 - 6)  ondansetron Injectable 4 milliGRAM(s) IV Push every 6 hours PRN Nausea and/or Vomiting                  CAPILLARY BLOOD GLUCOSE      POCT Blood Glucose.: 202 mg/dL (16 Jul 2024 11:51)  POCT Blood Glucose.: 160 mg/dL (16 Jul 2024 07:27)  POCT Blood Glucose.: 189 mg/dL (15 Jul 2024 20:28)  POCT Blood Glucose.: 170 mg/dL (15 Jul 2024 16:39)        A/P:  38M admitted for possible infected perianal fistula.      *Crohn's disease / acute on chronic abd pain and perianal pain / perianal fistula:   -vitals stable, no leukocytosis, does not appear to be in a flare  -AM inflammatory markers   C-T abdomen with oral and IV con neg for any etiology for patients pain.   -C/w empiric zosyn for now  -ostomy in place, no issues  -c/w pain control, on high dose narcotics dilaudid 2mg IVP every 4 hours, question opioid dependence  -GI eval appreciated, apparently has poor compliance outpatient, would benefit from biologic therapy   -f/u surgery for perianal fistula      *Urinary retention:  -fox in place, POA  -c/w flomax  -uro eval appreciated, f/u Dr. Jackman outpatient for void trial 1-2 weeks      * Type 2 diabetes mellitus:  -A1c 8.1  -Adjust insulin as needed.    * DVT px- LMWH

## 2024-07-17 LAB
CRP SERPL-MCNC: 24 MG/L — HIGH
ERYTHROCYTE [SEDIMENTATION RATE] IN BLOOD: 87 MM/HR — HIGH (ref 0–15)
FERRITIN SERPL-MCNC: 80 NG/ML — SIGNIFICANT CHANGE UP (ref 30–400)
GLUCOSE BLDC GLUCOMTR-MCNC: 104 MG/DL — HIGH (ref 70–99)
GLUCOSE BLDC GLUCOMTR-MCNC: 109 MG/DL — HIGH (ref 70–99)
GLUCOSE BLDC GLUCOMTR-MCNC: 182 MG/DL — HIGH (ref 70–99)
GLUCOSE BLDC GLUCOMTR-MCNC: 212 MG/DL — HIGH (ref 70–99)

## 2024-07-17 PROCEDURE — 99233 SBSQ HOSP IP/OBS HIGH 50: CPT

## 2024-07-17 RX ORDER — OXYCODONE HYDROCHLORIDE 30 MG/1
10 TABLET ORAL EVERY 12 HOURS
Refills: 0 | Status: DISCONTINUED | OUTPATIENT
Start: 2024-07-17 | End: 2024-07-20

## 2024-07-17 RX ORDER — INSULIN GLARGINE-YFGN 100 [IU]/ML
5 INJECTION, SOLUTION SUBCUTANEOUS AT BEDTIME
Refills: 0 | Status: DISCONTINUED | OUTPATIENT
Start: 2024-07-17 | End: 2024-08-01

## 2024-07-17 RX ORDER — HYDROMORPHONE HCL IN 0.9% NACL 0.2 MG/ML
2 PLASTIC BAG, INJECTION (ML) INTRAVENOUS
Refills: 0 | Status: DISCONTINUED | OUTPATIENT
Start: 2024-07-17 | End: 2024-07-24

## 2024-07-17 RX ORDER — INSULIN LISPRO 100/ML
3 VIAL (ML) SUBCUTANEOUS
Refills: 0 | Status: DISCONTINUED | OUTPATIENT
Start: 2024-07-17 | End: 2024-08-01

## 2024-07-17 RX ADMIN — Medication 4: at 15:52

## 2024-07-17 RX ADMIN — Medication 2 MILLIGRAM(S): at 15:51

## 2024-07-17 RX ADMIN — Medication 3 UNIT(S): at 19:24

## 2024-07-17 RX ADMIN — INSULIN GLARGINE-YFGN 5 UNIT(S): 100 INJECTION, SOLUTION SUBCUTANEOUS at 20:55

## 2024-07-17 RX ADMIN — Medication 2 MILLIGRAM(S): at 22:39

## 2024-07-17 RX ADMIN — Medication 2 MILLIGRAM(S): at 02:29

## 2024-07-17 RX ADMIN — PIPERACILLIN SODIUM, TAZOBACTAM SODIUM 25 GRAM(S): 3; .375 INJECTION, POWDER, LYOPHILIZED, FOR SOLUTION INTRAVENOUS at 05:20

## 2024-07-17 RX ADMIN — Medication 2 MILLIGRAM(S): at 12:24

## 2024-07-17 RX ADMIN — Medication 2 MILLIGRAM(S): at 18:59

## 2024-07-17 RX ADMIN — Medication 2 MILLIGRAM(S): at 08:53

## 2024-07-17 RX ADMIN — Medication 15 MILLIGRAM(S): at 11:17

## 2024-07-17 RX ADMIN — Medication 2: at 08:33

## 2024-07-17 RX ADMIN — OXYCODONE HYDROCHLORIDE 10 MILLIGRAM(S): 30 TABLET ORAL at 20:26

## 2024-07-17 RX ADMIN — Medication 2 MILLIGRAM(S): at 22:00

## 2024-07-17 RX ADMIN — Medication 10 MILLIGRAM(S): at 20:26

## 2024-07-17 RX ADMIN — PIPERACILLIN SODIUM, TAZOBACTAM SODIUM 25 GRAM(S): 3; .375 INJECTION, POWDER, LYOPHILIZED, FOR SOLUTION INTRAVENOUS at 17:33

## 2024-07-17 RX ADMIN — Medication 10 MILLIGRAM(S): at 11:17

## 2024-07-17 RX ADMIN — Medication 10 MILLIGRAM(S): at 15:52

## 2024-07-17 NOTE — PROGRESS NOTE ADULT - SUBJECTIVE AND OBJECTIVE BOX
SURGERY DAILY PROGRESS NOTE:     Subjective:  Patient seen and examined at bedside during am rounds. Reports continued drainage from the rectum and rectal pain.   AVSS. Denies any fevers, chills, n/v/d, chest pain or shortness of breath    Objective:    MEDICATIONS  (STANDING):  dextrose 5%. 1000 milliLiter(s) (50 mL/Hr) IV Continuous <Continuous>  dextrose 5%. 1000 milliLiter(s) (100 mL/Hr) IV Continuous <Continuous>  dextrose 50% Injectable 25 Gram(s) IV Push once  dextrose 50% Injectable 25 Gram(s) IV Push once  dextrose 50% Injectable 12.5 Gram(s) IV Push once  dicyclomine 10 milliGRAM(s) Oral four times a day before meals  enoxaparin Injectable 40 milliGRAM(s) SubCutaneous every 24 hours  glucagon  Injectable 1 milliGRAM(s) IntraMuscular once  insulin lispro (ADMELOG) corrective regimen sliding scale   SubCutaneous three times a day before meals  insulin lispro (ADMELOG) corrective regimen sliding scale   SubCutaneous at bedtime  naloxone Injectable 0.4 milliGRAM(s) IV Push once  piperacillin/tazobactam IVPB.. 3.375 Gram(s) IV Intermittent every 8 hours  polyethylene glycol 3350 17 Gram(s) Oral daily  senna 2 Tablet(s) Oral at bedtime  tamsulosin 0.4 milliGRAM(s) Oral at bedtime    MEDICATIONS  (PRN):  acetaminophen     Tablet .. 650 milliGRAM(s) Oral every 6 hours PRN Mild Pain (1 - 3)  aluminum hydroxide/magnesium hydroxide/simethicone Suspension 30 milliLiter(s) Oral every 4 hours PRN Dyspepsia  bisacodyl 5 milliGRAM(s) Oral daily PRN Constipation  dextrose Oral Gel 15 Gram(s) Oral once PRN Blood Glucose LESS THAN 70 milliGRAM(s)/deciliter  HYDROmorphone  Injectable 2 milliGRAM(s) IV Push every 4 hours PRN Severe Pain (7 - 10)  ketorolac   Injectable 15 milliGRAM(s) IV Push every 8 hours PRN breakthrough pain  melatonin 3 milliGRAM(s) Oral at bedtime PRN Insomnia  morphine  - Injectable 4 milliGRAM(s) IV Push every 4 hours PRN Moderate Pain (4 - 6)  ondansetron Injectable 4 milliGRAM(s) IV Push every 6 hours PRN Nausea and/or Vomiting      Vital Signs Last 24 Hrs  T(C): 36.8 (17 Jul 2024 07:59), Max: 36.8 (17 Jul 2024 07:59)  T(F): 98.3 (17 Jul 2024 07:59), Max: 98.3 (17 Jul 2024 07:59)  HR: 79 (17 Jul 2024 07:59) (64 - 79)  BP: 113/73 (17 Jul 2024 07:59) (108/61 - 115/74)  BP(mean): --  RR: 19 (17 Jul 2024 07:59) (18 - 19)  SpO2: 94% (17 Jul 2024 07:59) (94% - 99%)    Parameters below as of 17 Jul 2024 07:59  Patient On (Oxygen Delivery Method): room air          PHYSICAL EXAM   General: NAD, resting comfortably  HEENT: NC/AT, EOMI, normal hearing, no oral lesions, no LAD, neck supple  Pulmonary: normal resp effort, CTA-B  Cardiovascular: NSR, no murmurs  Abdominal: soft, mild tenderness suprapubic region, ileostomy in place functioning  Extremities: WWP, normal strength, no clubbing/cyanosis/edema  Neuro: A/O x 3, CNs II-XII grossly intact, normal sensation, no focal deficits  Perianal: multiple external fistula visualized with minimal drainage, induration, seton not visualized likely due to swelling, SHELLIE deferred due to pain

## 2024-07-17 NOTE — PROGRESS NOTE ADULT - ASSESSMENT
38M with history of Crohns disease with extensive anorectal involvement s/p recent seton placement at outside hospital, came for perianal pain with active discharges    CT A/p: No definite cause for abdominal pain identified. No significant change in multiple complex perianal fistulas without  associated abscess.    CT evidence of seton in place even though patient reports they fell out three weeks postop. Not visualized on exam , likely due to induration adn swelling in the area.   Drainage from the perirectal area is expected since he has setons in place and they are functioning    WBC normal and no CT evidence of any drainable collection /abscess    Recommendations:   - No acute colorectal surgery intervention indicated at this time   - Patient advised to follow up with primary colorectal surgeon and GI for management of Crohns Disease  - Patient wishes to follow up here, can call Colorectal surgery office after 2 weeks  - F/u Urology recs  - Pain medications PRN  - Reconsult colorectal surgery if needed    Plan discussed /w Dr Perez

## 2024-07-18 LAB
CULTURE RESULTS: SIGNIFICANT CHANGE UP
CULTURE RESULTS: SIGNIFICANT CHANGE UP
GLUCOSE BLDC GLUCOMTR-MCNC: 116 MG/DL — HIGH (ref 70–99)
GLUCOSE BLDC GLUCOMTR-MCNC: 148 MG/DL — HIGH (ref 70–99)
GLUCOSE BLDC GLUCOMTR-MCNC: 175 MG/DL — HIGH (ref 70–99)
GLUCOSE BLDC GLUCOMTR-MCNC: 274 MG/DL — HIGH (ref 70–99)
SPECIMEN SOURCE: SIGNIFICANT CHANGE UP
SPECIMEN SOURCE: SIGNIFICANT CHANGE UP

## 2024-07-18 PROCEDURE — 99232 SBSQ HOSP IP/OBS MODERATE 35: CPT

## 2024-07-18 RX ORDER — GUAIFEN/P-PROPANOLAMIN/CODEINE
5 EXPECTORANT ORAL AT BEDTIME
Refills: 0 | Status: DISCONTINUED | OUTPATIENT
Start: 2024-07-18 | End: 2024-07-22

## 2024-07-18 RX ADMIN — OXYCODONE HYDROCHLORIDE 10 MILLIGRAM(S): 30 TABLET ORAL at 15:05

## 2024-07-18 RX ADMIN — Medication 3 UNIT(S): at 09:57

## 2024-07-18 RX ADMIN — Medication 2 MILLIGRAM(S): at 16:18

## 2024-07-18 RX ADMIN — Medication 6: at 09:57

## 2024-07-18 RX ADMIN — Medication 2 MILLIGRAM(S): at 04:01

## 2024-07-18 RX ADMIN — INSULIN GLARGINE-YFGN 5 UNIT(S): 100 INJECTION, SOLUTION SUBCUTANEOUS at 21:47

## 2024-07-18 RX ADMIN — Medication 2 MILLIGRAM(S): at 10:17

## 2024-07-18 RX ADMIN — Medication 2 MILLIGRAM(S): at 00:53

## 2024-07-18 RX ADMIN — Medication 3 UNIT(S): at 13:43

## 2024-07-18 RX ADMIN — Medication 10 MILLIGRAM(S): at 13:42

## 2024-07-18 RX ADMIN — Medication 2 MILLIGRAM(S): at 16:32

## 2024-07-18 RX ADMIN — Medication 2 MILLIGRAM(S): at 13:06

## 2024-07-18 RX ADMIN — PIPERACILLIN SODIUM, TAZOBACTAM SODIUM 25 GRAM(S): 3; .375 INJECTION, POWDER, LYOPHILIZED, FOR SOLUTION INTRAVENOUS at 05:03

## 2024-07-18 RX ADMIN — Medication 3 UNIT(S): at 19:32

## 2024-07-18 RX ADMIN — Medication 10 MILLIGRAM(S): at 19:32

## 2024-07-18 RX ADMIN — Medication 2 MILLIGRAM(S): at 02:09

## 2024-07-18 RX ADMIN — PIPERACILLIN SODIUM, TAZOBACTAM SODIUM 25 GRAM(S): 3; .375 INJECTION, POWDER, LYOPHILIZED, FOR SOLUTION INTRAVENOUS at 15:29

## 2024-07-18 RX ADMIN — Medication 2 MILLIGRAM(S): at 04:25

## 2024-07-18 RX ADMIN — Medication 2 MILLIGRAM(S): at 13:20

## 2024-07-18 RX ADMIN — Medication 10 MILLIGRAM(S): at 05:03

## 2024-07-18 RX ADMIN — Medication 2 MILLIGRAM(S): at 09:57

## 2024-07-18 RX ADMIN — OXYCODONE HYDROCHLORIDE 10 MILLIGRAM(S): 30 TABLET ORAL at 21:47

## 2024-07-18 RX ADMIN — Medication 2 MILLIGRAM(S): at 19:31

## 2024-07-18 RX ADMIN — Medication 2 MILLIGRAM(S): at 06:50

## 2024-07-18 RX ADMIN — Medication 10 MILLIGRAM(S): at 21:47

## 2024-07-18 RX ADMIN — Medication 2 MILLIGRAM(S): at 23:14

## 2024-07-18 RX ADMIN — PIPERACILLIN SODIUM, TAZOBACTAM SODIUM 25 GRAM(S): 3; .375 INJECTION, POWDER, LYOPHILIZED, FOR SOLUTION INTRAVENOUS at 21:48

## 2024-07-18 NOTE — DIETITIAN INITIAL EVALUATION ADULT - OTHER INFO
39 y/o M w/ PMH DM, Crohn's disease, s/p colostomy, hx of bilateral Seton placement for anal fistula drainage 6 wks ago at [place] presents with abdominal pain and anuria. Patient reports he used to be on biologics for Crohn's, but they were stopped due to recurrent fistulas and infections. Had seton placement for anal fistula 6 months ago, and reports abdominal pain worsening, especially with Flatulance. Has had green discharge from the rectum. For past 3 days, pain worsened and was unable to have any urine output and came to ED.     Known to nutr services, met criteria for PCM on previous admissions. Endorses no changes in appetite since admission. Remains w/ persistent pain from rectum, is receiving pain meds every 3 hrs. Currently on consistent CHO diet. Labs reviewed: POCT x 24 hrs w/ 3 elevated levels, HgbA1c 8.1%; c/w consistent CHO diet for now, if PO intake becomes poor consider to Liberalize diet to regular to maximize caloric and nutrient intake. Colorectal surgery following for anal fistulas, per note: "CT evidence of seton in place even though patient reports they fell out three weeks postop. Not visualized on exam, likely due to induration adn swelling in the area. Drainage from the perirectal area is expected since he has setons in place and they are functioning"; no surgical interventions warranted at this time, per pt ?possible drainage during admission. Wt hx reviewed per RD notes: 150# on 1/17/24; 142# on 10/27/23; 132# on 3/24/23; 150# on 2/5/22. UBW stated at 160# which he states is stable. Bed scale wt of 156# taken by RD on 7/18/24; w/ consistent wt gain. Only mild facial wasting noted on NFPE at this time, does not meet criteria for PCM however will closely monitor as pt at HIGH risk of becoming malnourished. C/w current PO diet. Will not add ONS at this time per pt request, can add Premier protein shake BID to optimize nutritional needs if warranted (provides 160 kcal, 30 g protein/ shake). See other recs below. 39 y/o M w/ PMH DM, Crohn's disease, s/p colostomy, hx of bilateral Seton placement for anal fistula drainage 6 wks ago at [place] presents with abdominal pain and anuria. Patient reports he used to be on biologics for Crohn's, but they were stopped due to recurrent fistulas and infections. Had seton placement for anal fistula 6 months ago, and reports abdominal pain worsening, especially with Flat[facility]. Has had green discharge from the rectum. For past 3 days, pain worsened and was unable to have any urine output and came to ED.     Known to nutr services, met criteria for PCM on previous admissions. Endorses no changes in appetite since admission. Remains w/ persistent pain from rectum, is receiving pain meds every 3 hrs. Also c/o bleeding from rectum, surgery to re-eval. Currently on consistent CHO diet. Labs reviewed: POCT x 24 hrs w/ 3 elevated levels, HgbA1c 8.1%; c/w consistent CHO diet for now, if PO intake becomes poor consider to Liberalize diet to regular to maximize caloric and nutrient intake. Colorectal surgery following for anal fistulas, per note: "CT evidence of seton in place even though patient reports they fell out three weeks postop. Not visualized on exam, likely due to induration adn swelling in the area. Drainage from the perirectal area is expected since he has setons in place and they are functioning"; no surgical interventions warranted at this time, per pt ?possible drainage during admission. Wt hx reviewed per RD notes: 150# on 1/17/24; 142# on 10/27/23; 132# on 3/24/23; 150# on 2/5/22. UBW stated at 160# which he states is stable. Bed scale wt of 156# taken by RD on 7/18/24; w/ consistent wt gain. Only mild facial wasting noted on NFPE at this time, does not meet criteria for PCM however will closely monitor as pt at HIGH risk of becoming malnourished. C/w current PO diet. Will not add ONS at this time per pt request, can add Premier protein shake BID to optimize nutritional needs if warranted (provides 160 kcal, 30 g protein/ shake). See other recs below.

## 2024-07-18 NOTE — DIETITIAN INITIAL EVALUATION ADULT - PROBLEM SELECTOR PLAN 1
CT abdomen with oral and IV con neg for any etiology for patients pain.   C/w zosyn, IVFs, f/u septic workup.  Pain control with bowel regimen.   GI consult placed.

## 2024-07-18 NOTE — DIETITIAN INITIAL EVALUATION ADULT - ADD RECOMMEND
1) C/w current PO diet. If PO intake becomes poor during admission consider to liberalize diet to regular to maximize caloric and nutrient intake.   2) Encourage protein-rich foods, maximize food preferences. Declined ONS at this time however if requested can add Premier protein shake BID to optimize nutritional needs (provides 160 kcal, 30 g protein/ shake)   3) Monitor bowel movements/ ostomy function, if no BM for >3 days, consider implementing stronger bowel regimen.   4) Obtain weekly wt to track/trend changes  5) Consider to obtain vitamin D 25OH level to assess nutriture   6) Monitor and optimize BG levels between 140-180 mg/dL by medical management. Obtain POCTs q6 hrs.  RD will continue to monitor PO intake, labs, hydration, and wt prn.

## 2024-07-18 NOTE — PROGRESS NOTE ADULT - SUBJECTIVE AND OBJECTIVE BOX
Chief complaint of Urinary obstruction (14 Jul 2024 15:36)      HPI:  38M w/ h/o DM, Crohns disease, s/p colostomy, hx of bilateral Seton placement for anal fistula drainage 6 wks ago at [place] presents with abdominal pain and anuria. Patient reports he used to be on biologics for chrons, but they were stopped due to recurrent fistulas and infections. Had seton placement for anal fistula 6 months ago, and reports abdominal pain worsening, especially with [facility] Has had green discharge from the rectum. For past 3 days, pain worsened and was unable to have any urine output and came to [facility]. Patient reports he does not want to go back to [place] due to having a bad experience last time and would like to switch care to [facility]. Reports chills, denies fevers. No other complaints and ROS otherwise negative.   Of note patient was in ED, fox was placed with good urine output, UA was negative, He was discharged with out-pt follow up with GI, colorectal, and . He went home and had severe pain and came back to ED.    In ED vitals stable. CBC noted for WBC 11, H/H 12/38, Plt 365. CMP noted for K of 3, otherwise unremarkable. UA done yesterday appears sterile. CT abdomen with oral and IV contrast noted for "No definite cause for abdominal pain identified. No significant change in multiple complex perianal fistulas without associated abscess". S/p 1 dose zosyn, admitted to  for further care. (14 Jul 2024 02:57) c/w intermittent abd pain, has Fox now for urinary retention.    S:  7/16: Lying in bed, awake, alert, states he has intractable pain by his anus, with associated discharge.  + abd discomfort.  7/17: Pt still complaining of intractable rectal pain, states dilaudid only holds him over for 3 hours, asking for it more frequently.  States this pain is not due to crohns flare, but due to his perianal fistulas.    7/18: Pt states he slept better today but asking for sleep aid.  Discussed pain management and initiation of oxycontin.  Pt now states he is having rectal bleeding, asking to be re-evaluated by surgical team.      REVIEW OF SYSTEMS: All other review of systems is negative unless indicated above.      Vital Signs Last 24 Hrs  T(C): 36.8 (18 Jul 2024 07:40), Max: 36.8 (17 Jul 2024 15:25)  T(F): 98.2 (18 Jul 2024 07:40), Max: 98.3 (17 Jul 2024 15:25)  HR: 58 (18 Jul 2024 07:40) (58 - 87)  BP: 96/56 (18 Jul 2024 07:40) (96/56 - 104/75)  BP(mean): 74 (18 Jul 2024 00:03) (74 - 74)  RR: 18 (18 Jul 2024 07:40) (16 - 18)  SpO2: 94% (18 Jul 2024 07:40) (94% - 98%)    Parameters below as of 18 Jul 2024 07:40  Patient On (Oxygen Delivery Method): room air        PHYSICAL EXAM:    Constitutional: NAD, awake and alert  HEENT: PERR, EOMI, Normal Hearing, MMM  Neck: Soft and supple  Respiratory: Breath sounds are clear bilaterally, No wheezing, rales or rhonchi  Cardiovascular: S1 and S2, regular rate and rhythm, no Murmurs, gallops or rubs  Gastrointestinal: Bowel Sounds present, soft, nontender, nondistended, no guarding, no rebound, ostomy in place  : Fox in place  Extremities: No peripheral edema  Neurological: A/O x 3, no focal deficits in my limited exam      MEDICATIONS  (STANDING):  dextrose 5%. 1000 milliLiter(s) (50 mL/Hr) IV Continuous <Continuous>  dextrose 5%. 1000 milliLiter(s) (100 mL/Hr) IV Continuous <Continuous>  dextrose 50% Injectable 12.5 Gram(s) IV Push once  dextrose 50% Injectable 25 Gram(s) IV Push once  dextrose 50% Injectable 25 Gram(s) IV Push once  dicyclomine 10 milliGRAM(s) Oral four times a day before meals  enoxaparin Injectable 40 milliGRAM(s) SubCutaneous every 24 hours  glucagon  Injectable 1 milliGRAM(s) IntraMuscular once  insulin glargine Injectable (LANTUS) 5 Unit(s) SubCutaneous at bedtime  insulin lispro (ADMELOG) corrective regimen sliding scale   SubCutaneous three times a day before meals  insulin lispro (ADMELOG) corrective regimen sliding scale   SubCutaneous at bedtime  insulin lispro Injectable (ADMELOG) 3 Unit(s) SubCutaneous three times a day before meals  naloxone Injectable 0.4 milliGRAM(s) IV Push once  oxyCODONE  ER Tablet 10 milliGRAM(s) Oral every 12 hours  piperacillin/tazobactam IVPB.. 3.375 Gram(s) IV Intermittent every 8 hours  polyethylene glycol 3350 17 Gram(s) Oral daily  senna 2 Tablet(s) Oral at bedtime  tamsulosin 0.4 milliGRAM(s) Oral at bedtime    MEDICATIONS  (PRN):  acetaminophen     Tablet .. 650 milliGRAM(s) Oral every 6 hours PRN Mild Pain (1 - 3)  aluminum hydroxide/magnesium hydroxide/simethicone Suspension 30 milliLiter(s) Oral every 4 hours PRN Dyspepsia  bisacodyl 5 milliGRAM(s) Oral daily PRN Constipation  dextrose Oral Gel 15 Gram(s) Oral once PRN Blood Glucose LESS THAN 70 milliGRAM(s)/deciliter  HYDROmorphone  Injectable 2 milliGRAM(s) IV Push every 3 hours PRN Severe Pain (7 - 10)  ketorolac   Injectable 15 milliGRAM(s) IV Push every 8 hours PRN breakthrough pain  melatonin 3 milliGRAM(s) Oral at bedtime PRN Insomnia  morphine  - Injectable 4 milliGRAM(s) IV Push every 4 hours PRN Moderate Pain (4 - 6)  ondansetron Injectable 4 milliGRAM(s) IV Push every 6 hours PRN Nausea and/or Vomiting  zolpidem 5 milliGRAM(s) Oral at bedtime PRN Insomnia            CAPILLARY BLOOD GLUCOSE      POCT Blood Glucose.: 116 mg/dL (18 Jul 2024 13:36)  POCT Blood Glucose.: 274 mg/dL (18 Jul 2024 09:26)  POCT Blood Glucose.: 104 mg/dL (17 Jul 2024 20:21)  POCT Blood Glucose.: 109 mg/dL (17 Jul 2024 19:21)  POCT Blood Glucose.: 212 mg/dL (17 Jul 2024 15:14)          A/P:  38M admitted for possible infected perianal fistula.      *Crohn's disease / acute on chronic  perianal pain / perianal fistula:   -vitals stable, no leukocytosis, does not appear to be in a flare  -inflammatory markers noted  0CT abdomen with oral and IV con neg for any etiology for patients pain.   -C/w empiric zosyn for now and will d/c on oral antibiotics  -surgery eval appreciated - no indication for surgical intervention at this time.  Can follow up outpatient in 1-2 weeks.  -surgery re-eval for new onset rectal bleeding   -ostomy in place, no issues  -dilaudid 1mg IV q4h --> q3h  -stop toradol, pt states "Does not do anything."  -started long acting oxycontin 10mg BID on 7/17 for long term pain management, titrate slowly.   -GI eval appreciated, apparently has poor compliance outpatient, would benefit from biologic therapy outpatient.       *Urinary retention:  -fox in place, POA  -c/w flomax  -uro eval appreciated, f/u Dr. Jackman outpatient for void trial 1-2 weeks      * Type 2 diabetes mellitus:  -A1c 8.1  -Adjust insulin as needed.    * DVT px  -LMWH

## 2024-07-18 NOTE — PROGRESS NOTE ADULT - ASSESSMENT
38M with history of Crohns disease with extensive anorectal involvement s/p recent seton placement at outside hospital, came for perianal pain with active discharges    CT A/p: No definite cause for abdominal pain identified. No significant change in multiple complex perianal fistulas without  associated abscess.    CT evidence of seton in place even though patient reports they fell out three weeks postop. Not visualized on exam , likely due to induration adn swelling in the area.   Drainage from the perirectal area is expected since he has setons in place and they are functioning    WBC normal and no CT evidence of any drainable collection /abscess    Recommendations:   - No acute colorectal surgery intervention indicated at this time   - Patient advised to follow up with primary colorectal surgeon and GI for management of Crohns Disease  - Patient wishes to follow up here, can call Colorectal surgery office after 2 weeks  - F/u Urology recs  - Pain medications PRN  - Reconsult colorectal surgery if needed    Plan discussed /w Dr Hinojosa

## 2024-07-18 NOTE — PROGRESS NOTE ADULT - SUBJECTIVE AND OBJECTIVE BOX
SURGERY DAILY PROGRESS NOTE:     Subjective:  Patient seen and examined at bedside during am rounds. Endorses continuous blood mixed  drainage from the rectum and rectal pain.   AVSS. Denies any fevers, chills, n/v/d, chest pain or shortness of breath    Objective:    MEDICATIONS  (STANDING):  dextrose 5%. 1000 milliLiter(s) (50 mL/Hr) IV Continuous <Continuous>  dextrose 5%. 1000 milliLiter(s) (100 mL/Hr) IV Continuous <Continuous>  dextrose 50% Injectable 25 Gram(s) IV Push once  dextrose 50% Injectable 25 Gram(s) IV Push once  dextrose 50% Injectable 12.5 Gram(s) IV Push once  dicyclomine 10 milliGRAM(s) Oral four times a day before meals  enoxaparin Injectable 40 milliGRAM(s) SubCutaneous every 24 hours  glucagon  Injectable 1 milliGRAM(s) IntraMuscular once  insulin lispro (ADMELOG) corrective regimen sliding scale   SubCutaneous three times a day before meals  insulin lispro (ADMELOG) corrective regimen sliding scale   SubCutaneous at bedtime  naloxone Injectable 0.4 milliGRAM(s) IV Push once  piperacillin/tazobactam IVPB.. 3.375 Gram(s) IV Intermittent every 8 hours  polyethylene glycol 3350 17 Gram(s) Oral daily  senna 2 Tablet(s) Oral at bedtime  tamsulosin 0.4 milliGRAM(s) Oral at bedtime    MEDICATIONS  (PRN):  acetaminophen     Tablet .. 650 milliGRAM(s) Oral every 6 hours PRN Mild Pain (1 - 3)  aluminum hydroxide/magnesium hydroxide/simethicone Suspension 30 milliLiter(s) Oral every 4 hours PRN Dyspepsia  bisacodyl 5 milliGRAM(s) Oral daily PRN Constipation  dextrose Oral Gel 15 Gram(s) Oral once PRN Blood Glucose LESS THAN 70 milliGRAM(s)/deciliter  HYDROmorphone  Injectable 2 milliGRAM(s) IV Push every 4 hours PRN Severe Pain (7 - 10)  ketorolac   Injectable 15 milliGRAM(s) IV Push every 8 hours PRN breakthrough pain  melatonin 3 milliGRAM(s) Oral at bedtime PRN Insomnia  morphine  - Injectable 4 milliGRAM(s) IV Push every 4 hours PRN Moderate Pain (4 - 6)    PHYSICAL EXAM   General: NAD, resting comfortably  HEENT: NC/AT, EOMI, normal hearing, no oral lesions, no LAD, neck supple  Pulmonary: normal resp effort, CTA-B  Cardiovascular: NSR, no murmurs  Abdominal: soft, mild tenderness suprapubic region, ileostomy in place functioning  Extremities: WWP, normal strength, no clubbing/cyanosis/edema  Neuro: A/O x 3, CNs II-XII grossly intact, normal sensation, no focal deficits  Perianal: multiple external fistula visualized with minimal drainage, induration, seton not visualized likely due to swelling, SHELLIE deferred due to pain

## 2024-07-18 NOTE — DIETITIAN INITIAL EVALUATION ADULT - PERTINENT MEDS FT
MEDICATIONS  (STANDING):  dextrose 5%. 1000 milliLiter(s) (50 mL/Hr) IV Continuous <Continuous>  dextrose 5%. 1000 milliLiter(s) (100 mL/Hr) IV Continuous <Continuous>  dextrose 50% Injectable 25 Gram(s) IV Push once  dextrose 50% Injectable 12.5 Gram(s) IV Push once  dextrose 50% Injectable 25 Gram(s) IV Push once  dicyclomine 10 milliGRAM(s) Oral four times a day before meals  enoxaparin Injectable 40 milliGRAM(s) SubCutaneous every 24 hours  glucagon  Injectable 1 milliGRAM(s) IntraMuscular once  insulin glargine Injectable (LANTUS) 5 Unit(s) SubCutaneous at bedtime  insulin lispro (ADMELOG) corrective regimen sliding scale   SubCutaneous three times a day before meals  insulin lispro (ADMELOG) corrective regimen sliding scale   SubCutaneous at bedtime  insulin lispro Injectable (ADMELOG) 3 Unit(s) SubCutaneous three times a day before meals  naloxone Injectable 0.4 milliGRAM(s) IV Push once  oxyCODONE  ER Tablet 10 milliGRAM(s) Oral every 12 hours  piperacillin/tazobactam IVPB.. 3.375 Gram(s) IV Intermittent every 8 hours  polyethylene glycol 3350 17 Gram(s) Oral daily  senna 2 Tablet(s) Oral at bedtime  tamsulosin 0.4 milliGRAM(s) Oral at bedtime    MEDICATIONS  (PRN):  acetaminophen     Tablet .. 650 milliGRAM(s) Oral every 6 hours PRN Mild Pain (1 - 3)  aluminum hydroxide/magnesium hydroxide/simethicone Suspension 30 milliLiter(s) Oral every 4 hours PRN Dyspepsia  bisacodyl 5 milliGRAM(s) Oral daily PRN Constipation  dextrose Oral Gel 15 Gram(s) Oral once PRN Blood Glucose LESS THAN 70 milliGRAM(s)/deciliter  HYDROmorphone  Injectable 2 milliGRAM(s) IV Push every 3 hours PRN Severe Pain (7 - 10)  ketorolac   Injectable 15 milliGRAM(s) IV Push every 8 hours PRN breakthrough pain  melatonin 3 milliGRAM(s) Oral at bedtime PRN Insomnia  morphine  - Injectable 4 milliGRAM(s) IV Push every 4 hours PRN Moderate Pain (4 - 6)  ondansetron Injectable 4 milliGRAM(s) IV Push every 6 hours PRN Nausea and/or Vomiting

## 2024-07-18 NOTE — DIETITIAN INITIAL EVALUATION ADULT - NUTRITIONGOAL OUTCOME1
Pt will receive >/= 80% of ENN via tolerated route & optimize BG levels between 140-180 mg/dL by medical management

## 2024-07-18 NOTE — DIETITIAN INITIAL EVALUATION ADULT - PERTINENT LABORATORY DATA
CAPILLARY BLOOD GLUCOSE  POCT Blood Glucose.: 116 mg/dL (18 Jul 2024 13:36)  POCT Blood Glucose.: 274 mg/dL (18 Jul 2024 09:26)  POCT Blood Glucose.: 104 mg/dL (17 Jul 2024 20:21)  POCT Blood Glucose.: 109 mg/dL (17 Jul 2024 19:21)  POCT Blood Glucose.: 212 mg/dL (17 Jul 2024 15:14)    A1C with Estimated Average Glucose Result: 8.1 % (07-14-24 @ 07:39)  A1C with Estimated Average Glucose Result: 8.5 % (03-30-24 @ 07:02)  A1C with Estimated Average Glucose Result: 7.4 % (01-18-24 @ 09:21)

## 2024-07-18 NOTE — DIETITIAN INITIAL EVALUATION ADULT - ORAL INTAKE PTA/DIET HISTORY
From home. Denies difficulties w/ cooking and grocery shopping. Reports good appetite at home. Does not follow specific diet at home. Has permanent ileostomy which is properly functioning. NKFA.

## 2024-07-19 LAB
CULTURE RESULTS: SIGNIFICANT CHANGE UP
CULTURE RESULTS: SIGNIFICANT CHANGE UP
GLUCOSE BLDC GLUCOMTR-MCNC: 153 MG/DL — HIGH (ref 70–99)
GLUCOSE BLDC GLUCOMTR-MCNC: 160 MG/DL — HIGH (ref 70–99)
GLUCOSE BLDC GLUCOMTR-MCNC: 174 MG/DL — HIGH (ref 70–99)
GLUCOSE BLDC GLUCOMTR-MCNC: 197 MG/DL — HIGH (ref 70–99)
SPECIMEN SOURCE: SIGNIFICANT CHANGE UP
SPECIMEN SOURCE: SIGNIFICANT CHANGE UP

## 2024-07-19 PROCEDURE — 99232 SBSQ HOSP IP/OBS MODERATE 35: CPT

## 2024-07-19 RX ADMIN — Medication 2 MILLIGRAM(S): at 12:32

## 2024-07-19 RX ADMIN — Medication 2: at 17:11

## 2024-07-19 RX ADMIN — Medication 3 UNIT(S): at 17:11

## 2024-07-19 RX ADMIN — Medication 2 MILLIGRAM(S): at 05:26

## 2024-07-19 RX ADMIN — Medication 10 MILLIGRAM(S): at 21:00

## 2024-07-19 RX ADMIN — PIPERACILLIN SODIUM, TAZOBACTAM SODIUM 25 GRAM(S): 3; .375 INJECTION, POWDER, LYOPHILIZED, FOR SOLUTION INTRAVENOUS at 05:26

## 2024-07-19 RX ADMIN — Medication 3 UNIT(S): at 12:17

## 2024-07-19 RX ADMIN — Medication 10 MILLIGRAM(S): at 17:11

## 2024-07-19 RX ADMIN — Medication 4 MILLIGRAM(S): at 23:29

## 2024-07-19 RX ADMIN — Medication 2 MILLIGRAM(S): at 15:41

## 2024-07-19 RX ADMIN — Medication 2 MILLIGRAM(S): at 08:19

## 2024-07-19 RX ADMIN — Medication 10 MILLIGRAM(S): at 05:26

## 2024-07-19 RX ADMIN — Medication 2: at 12:16

## 2024-07-19 RX ADMIN — Medication 15 MILLIGRAM(S): at 19:50

## 2024-07-19 RX ADMIN — Medication 3 UNIT(S): at 08:22

## 2024-07-19 RX ADMIN — PIPERACILLIN SODIUM, TAZOBACTAM SODIUM 25 GRAM(S): 3; .375 INJECTION, POWDER, LYOPHILIZED, FOR SOLUTION INTRAVENOUS at 15:30

## 2024-07-19 RX ADMIN — Medication 2 MILLIGRAM(S): at 18:23

## 2024-07-19 RX ADMIN — Medication 2: at 08:20

## 2024-07-19 RX ADMIN — INSULIN GLARGINE-YFGN 5 UNIT(S): 100 INJECTION, SOLUTION SUBCUTANEOUS at 21:00

## 2024-07-19 RX ADMIN — Medication 4 MILLIGRAM(S): at 03:54

## 2024-07-19 RX ADMIN — Medication 2 MILLIGRAM(S): at 02:07

## 2024-07-19 RX ADMIN — Medication 2 MILLIGRAM(S): at 22:11

## 2024-07-19 RX ADMIN — Medication 5 MILLIGRAM(S): at 00:18

## 2024-07-19 RX ADMIN — OXYCODONE HYDROCHLORIDE 10 MILLIGRAM(S): 30 TABLET ORAL at 21:00

## 2024-07-19 RX ADMIN — Medication 4 MILLIGRAM(S): at 10:48

## 2024-07-19 RX ADMIN — Medication 4 MILLIGRAM(S): at 17:12

## 2024-07-19 RX ADMIN — OXYCODONE HYDROCHLORIDE 10 MILLIGRAM(S): 30 TABLET ORAL at 09:35

## 2024-07-19 RX ADMIN — PIPERACILLIN SODIUM, TAZOBACTAM SODIUM 25 GRAM(S): 3; .375 INJECTION, POWDER, LYOPHILIZED, FOR SOLUTION INTRAVENOUS at 23:29

## 2024-07-19 RX ADMIN — Medication 10 MILLIGRAM(S): at 12:19

## 2024-07-19 RX ADMIN — Medication 2 MILLIGRAM(S): at 15:56

## 2024-07-19 NOTE — PROGRESS NOTE ADULT - SUBJECTIVE AND OBJECTIVE BOX
Chief complaint of Urinary obstruction (14 Jul 2024 15:36)      HPI:  38M w/ h/o DM, Crohns disease, s/p colostomy, hx of bilateral Seton placement for anal fistula drainage 6 wks ago at [place] presents with abdominal pain and anuria. Patient reports he used to be on biologics for chrons, but they were stopped due to recurrent fistulas and infections. Had seton placement for anal fistula 6 months ago, and reports abdominal pain worsening, especially with [facility] Has had green discharge from the rectum. For past 3 days, pain worsened and was unable to have any urine output and came to [facility]. Patient reports he does not want to go back to [place] due to having a bad experience last time and would like to switch care to [facility]. Reports chills, denies fevers. No other complaints and ROS otherwise negative.   Of note patient was in ED, fox was placed with good urine output, UA was negative, He was discharged with out-pt follow up with GI, colorectal, and . He went home and had severe pain and came back to ED.    In ED vitals stable. CBC noted for WBC 11, H/H 12/38, Plt 365. CMP noted for K of 3, otherwise unremarkable. UA done yesterday appears sterile. CT abdomen with oral and IV contrast noted for "No definite cause for abdominal pain identified. No significant change in multiple complex perianal fistulas without associated abscess". S/p 1 dose zosyn, admitted to  for further care. (14 Jul 2024 02:57) c/w intermittent abd pain, has Fox now for urinary retention.    S:  7/16: Lying in bed, awake, alert, states he has intractable pain by his anus, with associated discharge.  + abd discomfort.  7/17: Pt still complaining of intractable rectal pain, states dilaudid only holds him over for 3 hours, asking for it more frequently.  States this pain is not due to crohns flare, but due to his perianal fistulas.    7/18: Pt states he slept better today but asking for sleep aid.  Discussed pain management and initiation of oxycontin.  Pt now states he is having rectal bleeding, asking to be re-evaluated by surgical team.    7/19: In bed, appears comfortable. States he spoke with a specialist late last night ~10:30PM however did not have a good understanding of that interaction, states he wants to further discuss with surgical team what he is feeling, feels something is being missed.    REVIEW OF SYSTEMS: All other review of systems is negative unless indicated above.      Vital Signs Last 24 Hrs  T(C): 36.6 (19 Jul 2024 07:40), Max: 37 (18 Jul 2024 23:08)  T(F): 97.8 (19 Jul 2024 07:40), Max: 98.6 (18 Jul 2024 23:08)  HR: 73 (19 Jul 2024 07:40) (57 - 73)  BP: 100/57 (19 Jul 2024 07:40) (100/57 - 117/60)  BP(mean): 77 (18 Jul 2024 15:30) (77 - 77)  RR: 18 (19 Jul 2024 07:40) (18 - 18)  SpO2: 95% (19 Jul 2024 07:40) (95% - 96%)    Parameters below as of 19 Jul 2024 07:40  Patient On (Oxygen Delivery Method): room air        PHYSICAL EXAM:    Constitutional: NAD, awake and alert  HEENT: PERR, EOMI, Normal Hearing, MMM  Neck: Soft and supple  Respiratory: Breath sounds are clear bilaterally, No wheezing, rales or rhonchi  Cardiovascular: S1 and S2, regular rate and rhythm, no Murmurs, gallops or rubs  Gastrointestinal: Bowel Sounds present, soft, nontender, nondistended, no guarding, no rebound, ostomy in place  : Fox in place  Extremities: No peripheral edema  Neurological: A/O x 3, no focal deficits in my limited exam      MEDICATIONS  (STANDING):  dextrose 5%. 1000 milliLiter(s) (50 mL/Hr) IV Continuous <Continuous>  dextrose 5%. 1000 milliLiter(s) (100 mL/Hr) IV Continuous <Continuous>  dextrose 50% Injectable 25 Gram(s) IV Push once  dextrose 50% Injectable 12.5 Gram(s) IV Push once  dextrose 50% Injectable 25 Gram(s) IV Push once  dicyclomine 10 milliGRAM(s) Oral four times a day before meals  enoxaparin Injectable 40 milliGRAM(s) SubCutaneous every 24 hours  glucagon  Injectable 1 milliGRAM(s) IntraMuscular once  insulin glargine Injectable (LANTUS) 5 Unit(s) SubCutaneous at bedtime  insulin lispro (ADMELOG) corrective regimen sliding scale   SubCutaneous three times a day before meals  insulin lispro (ADMELOG) corrective regimen sliding scale   SubCutaneous at bedtime  insulin lispro Injectable (ADMELOG) 3 Unit(s) SubCutaneous three times a day before meals  naloxone Injectable 0.4 milliGRAM(s) IV Push once  oxyCODONE  ER Tablet 10 milliGRAM(s) Oral every 12 hours  piperacillin/tazobactam IVPB.. 3.375 Gram(s) IV Intermittent every 8 hours  polyethylene glycol 3350 17 Gram(s) Oral daily  senna 2 Tablet(s) Oral at bedtime  tamsulosin 0.4 milliGRAM(s) Oral at bedtime    MEDICATIONS  (PRN):  acetaminophen     Tablet .. 650 milliGRAM(s) Oral every 6 hours PRN Mild Pain (1 - 3)  aluminum hydroxide/magnesium hydroxide/simethicone Suspension 30 milliLiter(s) Oral every 4 hours PRN Dyspepsia  bisacodyl 5 milliGRAM(s) Oral daily PRN Constipation  dextrose Oral Gel 15 Gram(s) Oral once PRN Blood Glucose LESS THAN 70 milliGRAM(s)/deciliter  HYDROmorphone  Injectable 2 milliGRAM(s) IV Push every 3 hours PRN Severe Pain (7 - 10)  ketorolac   Injectable 15 milliGRAM(s) IV Push every 8 hours PRN breakthrough pain  melatonin 3 milliGRAM(s) Oral at bedtime PRN Insomnia  morphine  - Injectable 4 milliGRAM(s) IV Push every 4 hours PRN Moderate Pain (4 - 6)  ondansetron Injectable 4 milliGRAM(s) IV Push every 6 hours PRN Nausea and/or Vomiting  zolpidem 5 milliGRAM(s) Oral at bedtime PRN Insomnia          CAPILLARY BLOOD GLUCOSE      POCT Blood Glucose.: 174 mg/dL (19 Jul 2024 11:57)  POCT Blood Glucose.: 153 mg/dL (19 Jul 2024 07:47)  POCT Blood Glucose.: 175 mg/dL (18 Jul 2024 21:02)  POCT Blood Glucose.: 148 mg/dL (18 Jul 2024 18:38)  POCT Blood Glucose.: 116 mg/dL (18 Jul 2024 13:36)            A/P:  38M admitted for possible infected perianal fistula.      *Crohn's disease / acute on chronic  perianal pain / perianal fistula:   -vitals stable, no leukocytosis, does not appear to be in a flare  -inflammatory markers noted  0CT abdomen with oral and IV con neg for any etiology for patients pain.   -C/w empiric zosyn day # 5   -surgery eval appreciated - no indication for surgical intervention at this time.  Can follow up outpatient in 1-2 weeks.  -surgery re-eval for new onset rectal bleeding   -ostomy in place, no issues  -dilaudid 1mg IV q4h --> q3h PRN severe pain  -stopped toradol, pt states "Does not do anything."  -started long acting oxycontin 10mg BID on 7/17 for long term pain management, titrate slowly.   -GI eval appreciated, apparently has poor compliance outpatient, would benefit from biologic therapy outpatient.       *Urinary retention:  -fox in place, POA  -c/w flomax  -uro eval appreciated, f/u Dr. Jackman outpatient for void trial 1-2 weeks      * Type 2 diabetes mellitus:  -A1c 8.1  -Adjust insulin as needed.    * DVT px  -LMWH

## 2024-07-20 LAB
GLUCOSE BLDC GLUCOMTR-MCNC: 134 MG/DL — HIGH (ref 70–99)
GLUCOSE BLDC GLUCOMTR-MCNC: 157 MG/DL — HIGH (ref 70–99)
GLUCOSE BLDC GLUCOMTR-MCNC: 191 MG/DL — HIGH (ref 70–99)
GLUCOSE BLDC GLUCOMTR-MCNC: 194 MG/DL — HIGH (ref 70–99)

## 2024-07-20 PROCEDURE — 72193 CT PELVIS W/DYE: CPT | Mod: 26

## 2024-07-20 PROCEDURE — 99233 SBSQ HOSP IP/OBS HIGH 50: CPT

## 2024-07-20 PROCEDURE — 99232 SBSQ HOSP IP/OBS MODERATE 35: CPT

## 2024-07-20 RX ORDER — OXYCODONE HYDROCHLORIDE 30 MG/1
15 TABLET ORAL EVERY 12 HOURS
Refills: 0 | Status: DISCONTINUED | OUTPATIENT
Start: 2024-07-20 | End: 2024-07-23

## 2024-07-20 RX ADMIN — Medication 2 MILLIGRAM(S): at 18:58

## 2024-07-20 RX ADMIN — INSULIN GLARGINE-YFGN 5 UNIT(S): 100 INJECTION, SOLUTION SUBCUTANEOUS at 21:39

## 2024-07-20 RX ADMIN — Medication 2 MILLIGRAM(S): at 22:53

## 2024-07-20 RX ADMIN — Medication 2 MILLIGRAM(S): at 23:08

## 2024-07-20 RX ADMIN — Medication 10 MILLIGRAM(S): at 21:40

## 2024-07-20 RX ADMIN — Medication 4 MILLIGRAM(S): at 03:48

## 2024-07-20 RX ADMIN — Medication 2 MILLIGRAM(S): at 19:28

## 2024-07-20 RX ADMIN — Medication 2 MILLIGRAM(S): at 02:27

## 2024-07-20 RX ADMIN — Medication 2 MILLIGRAM(S): at 14:22

## 2024-07-20 RX ADMIN — Medication 2 MILLIGRAM(S): at 10:39

## 2024-07-20 RX ADMIN — Medication 10 MILLIGRAM(S): at 16:24

## 2024-07-20 RX ADMIN — Medication 3 UNIT(S): at 12:19

## 2024-07-20 RX ADMIN — Medication 10 MILLIGRAM(S): at 06:06

## 2024-07-20 RX ADMIN — Medication 2 MILLIGRAM(S): at 06:04

## 2024-07-20 RX ADMIN — Medication 2 MILLIGRAM(S): at 14:52

## 2024-07-20 RX ADMIN — Medication 3 UNIT(S): at 16:57

## 2024-07-20 RX ADMIN — OXYCODONE HYDROCHLORIDE 15 MILLIGRAM(S): 30 TABLET ORAL at 22:35

## 2024-07-20 RX ADMIN — Medication 3 UNIT(S): at 08:00

## 2024-07-20 RX ADMIN — OXYCODONE HYDROCHLORIDE 15 MILLIGRAM(S): 30 TABLET ORAL at 21:40

## 2024-07-20 RX ADMIN — Medication 4 MILLIGRAM(S): at 12:25

## 2024-07-20 RX ADMIN — Medication 2: at 12:18

## 2024-07-20 RX ADMIN — PIPERACILLIN SODIUM, TAZOBACTAM SODIUM 25 GRAM(S): 3; .375 INJECTION, POWDER, LYOPHILIZED, FOR SOLUTION INTRAVENOUS at 06:04

## 2024-07-20 RX ADMIN — Medication 2: at 16:56

## 2024-07-20 RX ADMIN — Medication 10 MILLIGRAM(S): at 11:26

## 2024-07-20 RX ADMIN — Medication 2 MILLIGRAM(S): at 10:09

## 2024-07-20 RX ADMIN — PIPERACILLIN SODIUM, TAZOBACTAM SODIUM 25 GRAM(S): 3; .375 INJECTION, POWDER, LYOPHILIZED, FOR SOLUTION INTRAVENOUS at 21:39

## 2024-07-20 RX ADMIN — PIPERACILLIN SODIUM, TAZOBACTAM SODIUM 25 GRAM(S): 3; .375 INJECTION, POWDER, LYOPHILIZED, FOR SOLUTION INTRAVENOUS at 14:58

## 2024-07-20 RX ADMIN — OXYCODONE HYDROCHLORIDE 10 MILLIGRAM(S): 30 TABLET ORAL at 09:16

## 2024-07-20 RX ADMIN — Medication 4 MILLIGRAM(S): at 11:55

## 2024-07-20 RX ADMIN — Medication 3 MILLIGRAM(S): at 21:40

## 2024-07-20 NOTE — PROGRESS NOTE ADULT - SUBJECTIVE AND OBJECTIVE BOX
Chief complaint of Urinary obstruction (14 Jul 2024 15:36)      HPI:  38M w/ h/o DM, Crohns disease, s/p colostomy, hx of bilateral Seton placement for anal fistula drainage 6 wks ago at [place] presents with abdominal pain and anuria. Patient reports he used to be on biologics for chrons, but they were stopped due to recurrent fistulas and infections. Had seton placement for anal fistula 6 months ago, and reports abdominal pain worsening, especially with [facility] Has had green discharge from the rectum. For past 3 days, pain worsened and was unable to have any urine output and came to [facility]. Patient reports he does not want to go back to [place] due to having a bad experience last time and would like to switch care to [facility]. Reports chills, denies fevers. No other complaints and ROS otherwise negative.   Of note patient was in ED, fox was placed with good urine output, UA was negative, He was discharged with out-pt follow up with GI, colorectal, and . He went home and had severe pain and came back to ED.    In ED vitals stable. CBC noted for WBC 11, H/H 12/38, Plt 365. CMP noted for K of 3, otherwise unremarkable. UA done yesterday appears sterile. CT abdomen with oral and IV contrast noted for "No definite cause for abdominal pain identified. No significant change in multiple complex perianal fistulas without associated abscess". S/p 1 dose zosyn, admitted to  for further care. (14 Jul 2024 02:57) c/w intermittent abd pain, has Fox now for urinary retention.    S:  7/16: Lying in bed, awake, alert, states he has intractable pain by his anus, with associated discharge.  + abd discomfort.  7/17: Pt still complaining of intractable rectal pain, states dilaudid only holds him over for 3 hours, asking for it more frequently.  States this pain is not due to crohns flare, but due to his perianal fistulas.    7/18: Pt states he slept better today but asking for sleep aid.  Discussed pain management and initiation of oxycontin.  Pt now states he is having rectal bleeding, asking to be re-evaluated by surgical team.    7/19: In bed, appears comfortable. States he spoke with a specialist late last night ~10:30PM however did not have a good understanding of that interaction, states he wants to further discuss with surgical team what he is feeling, feels something is being missed.    7/20: States oxycontin not helping, still with same level of pain. Pt still feels frustrated, believes he has a pocket of infection that needs surgical attention. Discussed plan of care to best of my ability and addressed all questions.  Pt also requesting uro follow to see if he can have inpatient void trial.      REVIEW OF SYSTEMS: All other review of systems is negative unless indicated above.    Vital Signs Last 24 Hrs  T(C): 36.9 (20 Jul 2024 08:17), Max: 36.9 (20 Jul 2024 08:17)  T(F): 98.5 (20 Jul 2024 08:17), Max: 98.5 (20 Jul 2024 08:17)  HR: 66 (20 Jul 2024 08:17) (63 - 77)  BP: 118/91 (20 Jul 2024 08:17) (104/65 - 120/72)  BP(mean): --  RR: 16 (20 Jul 2024 08:17) (16 - 18)  SpO2: 99% (20 Jul 2024 08:17) (98% - 100%)    Parameters below as of 20 Jul 2024 08:17  Patient On (Oxygen Delivery Method): room air      PHYSICAL EXAM:    Constitutional: NAD, awake and alert  HEENT: PERR, EOMI, Normal Hearing, MMM  Neck: Soft and supple  Respiratory: Breath sounds are clear bilaterally, No wheezing, rales or rhonchi  Cardiovascular: S1 and S2, regular rate and rhythm, no Murmurs, gallops or rubs  Gastrointestinal: Bowel Sounds present, soft, nontender, nondistended, no guarding, no rebound, ostomy in place  : Fox in place  Extremities: No peripheral edema  Neurological: A/O x 3, no focal deficits in my limited exam      MEDICATIONS  (STANDING):  dextrose 5%. 1000 milliLiter(s) (50 mL/Hr) IV Continuous <Continuous>  dextrose 5%. 1000 milliLiter(s) (100 mL/Hr) IV Continuous <Continuous>  dextrose 50% Injectable 25 Gram(s) IV Push once  dextrose 50% Injectable 25 Gram(s) IV Push once  dextrose 50% Injectable 12.5 Gram(s) IV Push once  dicyclomine 10 milliGRAM(s) Oral four times a day before meals  enoxaparin Injectable 40 milliGRAM(s) SubCutaneous every 24 hours  glucagon  Injectable 1 milliGRAM(s) IntraMuscular once  insulin glargine Injectable (LANTUS) 5 Unit(s) SubCutaneous at bedtime  insulin lispro (ADMELOG) corrective regimen sliding scale   SubCutaneous three times a day before meals  insulin lispro (ADMELOG) corrective regimen sliding scale   SubCutaneous at bedtime  insulin lispro Injectable (ADMELOG) 3 Unit(s) SubCutaneous three times a day before meals  naloxone Injectable 0.4 milliGRAM(s) IV Push once  oxyCODONE  ER Tablet 15 milliGRAM(s) Oral every 12 hours  piperacillin/tazobactam IVPB.. 3.375 Gram(s) IV Intermittent every 8 hours  polyethylene glycol 3350 17 Gram(s) Oral daily  senna 2 Tablet(s) Oral at bedtime  tamsulosin 0.4 milliGRAM(s) Oral at bedtime    MEDICATIONS  (PRN):  acetaminophen     Tablet .. 650 milliGRAM(s) Oral every 6 hours PRN Mild Pain (1 - 3)  aluminum hydroxide/magnesium hydroxide/simethicone Suspension 30 milliLiter(s) Oral every 4 hours PRN Dyspepsia  bisacodyl 5 milliGRAM(s) Oral daily PRN Constipation  dextrose Oral Gel 15 Gram(s) Oral once PRN Blood Glucose LESS THAN 70 milliGRAM(s)/deciliter  HYDROmorphone  Injectable 2 milliGRAM(s) IV Push every 3 hours PRN Severe Pain (7 - 10)  ketorolac   Injectable 15 milliGRAM(s) IV Push every 8 hours PRN breakthrough pain  melatonin 3 milliGRAM(s) Oral at bedtime PRN Insomnia  morphine  - Injectable 4 milliGRAM(s) IV Push every 4 hours PRN Moderate Pain (4 - 6)  ondansetron Injectable 4 milliGRAM(s) IV Push every 6 hours PRN Nausea and/or Vomiting  zolpidem 5 milliGRAM(s) Oral at bedtime PRN Insomnia          CAPILLARY BLOOD GLUCOSE      POCT Blood Glucose.: 134 mg/dL (20 Jul 2024 07:44)  POCT Blood Glucose.: 160 mg/dL (19 Jul 2024 20:34)  POCT Blood Glucose.: 197 mg/dL (19 Jul 2024 16:47)  POCT Blood Glucose.: 174 mg/dL (19 Jul 2024 11:57)        A/P:  38M admitted for possible infected perianal fistula.      *Crohn's disease / acute on chronic  perianal pain / perianal fistula:   -vitals stable, no leukocytosis, does not appear to be in a flare  -inflammatory markers noted  0CT abdomen with oral and IV con neg for any etiology for patients pain.   -C/w empiric zosyn day # 6  -surgery eval appreciated - no indication for surgical intervention at this time.  Can follow up outpatient in 1-2 weeks.  -surgery re-eval for new onset rectal bleeding   -ostomy in place, no issues  -dilaudid 1mg IV q4h --> q3h PRN severe pain  -stopped toradol, pt states "Does not do anything."  -started long acting oxycontin 10mg BID on 7/17 --> increase to 15mg BID  -GI eval appreciated, apparently has poor compliance outpatient, would benefit from biologic therapy outpatient.       *Urinary retention:  -fox in place, POA  -c/w flomax  -uro eval appreciated, f/u Dr. Jackman outpatient for void trial 1-2 weeks  -per pt request for inpatient void trial will reconsult uro      * Type 2 diabetes mellitus:  -A1c 8.1  -Adjust insulin as needed.    * DVT px  -LMWH

## 2024-07-20 NOTE — PROGRESS NOTE ADULT - ASSESSMENT
Last CT scan done on 7/13 shows no abscessess    Recommendations:   - Patient wishes to have a repeat CT scan done, please repeat one with IV contrast   - Will follow up imaging  - F/u Urology recs  - Pain medications PRN    Plan discussed /w Dr Merino

## 2024-07-20 NOTE — CHART NOTE - NSCHARTNOTEFT_GEN_A_CORE
Pt seen and examined at bedside.   PHYSICAL EXAM:  GEN: NAD, well-developed, awake and alert.  SKIN: Good color, non diaphoretic.  HEENT: NC/AT.  RESP: No dyspnea, non-labored breathing. No use of accessory muscles.  CARDIO: +S1/S2  ABDO: Soft, NT/ND, no palpable bladder, no suprapubic tenderness/ + Suprapubic Tube draining clear yellow urine.  BACK: No CVAT B/L  : + Indwelling fox in place, draining clear yellow urine.       OK to DC Fox and TOV prior Discharge.    Above D/W Dr. Ma

## 2024-07-20 NOTE — PROGRESS NOTE ADULT - SUBJECTIVE AND OBJECTIVE BOX
SURGERY DAILY PROGRESS NOTE:     Subjective:  Patient seen and examined at bedside. Reports abdominal pain, perirectal pain adn pain in his urethra.   AVSS. Denies any fevers, chills, n/v/d, chest pain or shortness of breath    Objective:    MEDICATIONS  (STANDING):  dextrose 5%. 1000 milliLiter(s) (50 mL/Hr) IV Continuous <Continuous>  dextrose 5%. 1000 milliLiter(s) (100 mL/Hr) IV Continuous <Continuous>  dextrose 50% Injectable 25 Gram(s) IV Push once  dextrose 50% Injectable 25 Gram(s) IV Push once  dextrose 50% Injectable 12.5 Gram(s) IV Push once  dicyclomine 10 milliGRAM(s) Oral four times a day before meals  enoxaparin Injectable 40 milliGRAM(s) SubCutaneous every 24 hours  glucagon  Injectable 1 milliGRAM(s) IntraMuscular once  insulin glargine Injectable (LANTUS) 5 Unit(s) SubCutaneous at bedtime  insulin lispro (ADMELOG) corrective regimen sliding scale   SubCutaneous three times a day before meals  insulin lispro (ADMELOG) corrective regimen sliding scale   SubCutaneous at bedtime  insulin lispro Injectable (ADMELOG) 3 Unit(s) SubCutaneous three times a day before meals  naloxone Injectable 0.4 milliGRAM(s) IV Push once  oxyCODONE  ER Tablet 15 milliGRAM(s) Oral every 12 hours  piperacillin/tazobactam IVPB.. 3.375 Gram(s) IV Intermittent every 8 hours  polyethylene glycol 3350 17 Gram(s) Oral daily  senna 2 Tablet(s) Oral at bedtime  tamsulosin 0.4 milliGRAM(s) Oral at bedtime    MEDICATIONS  (PRN):  acetaminophen     Tablet .. 650 milliGRAM(s) Oral every 6 hours PRN Mild Pain (1 - 3)  aluminum hydroxide/magnesium hydroxide/simethicone Suspension 30 milliLiter(s) Oral every 4 hours PRN Dyspepsia  bisacodyl 5 milliGRAM(s) Oral daily PRN Constipation  dextrose Oral Gel 15 Gram(s) Oral once PRN Blood Glucose LESS THAN 70 milliGRAM(s)/deciliter  HYDROmorphone  Injectable 2 milliGRAM(s) IV Push every 3 hours PRN Severe Pain (7 - 10)  ketorolac   Injectable 15 milliGRAM(s) IV Push every 8 hours PRN breakthrough pain  melatonin 3 milliGRAM(s) Oral at bedtime PRN Insomnia  morphine  - Injectable 4 milliGRAM(s) IV Push every 4 hours PRN Moderate Pain (4 - 6)  ondansetron Injectable 4 milliGRAM(s) IV Push every 6 hours PRN Nausea and/or Vomiting  zolpidem 5 milliGRAM(s) Oral at bedtime PRN Insomnia      Vital Signs Last 24 Hrs  T(C): 36.9 (20 Jul 2024 08:17), Max: 36.9 (20 Jul 2024 08:17)  T(F): 98.5 (20 Jul 2024 08:17), Max: 98.5 (20 Jul 2024 08:17)  HR: 66 (20 Jul 2024 08:17) (63 - 77)  BP: 118/91 (20 Jul 2024 08:17) (104/65 - 120/72)  BP(mean): --  RR: 16 (20 Jul 2024 08:17) (16 - 18)  SpO2: 99% (20 Jul 2024 08:17) (98% - 100%)    Parameters below as of 20 Jul 2024 08:17  Patient On (Oxygen Delivery Method): room air          PHYSICAL EXAM   Gen: well-appearing, in no acute distress  CV: pulse regularly present   Resp: airway patent, non-labored breathing  Abd: soft, tender in the lower abdomen, ostomy with liquid stool  Rectum: multiple fistulae, seton in place, no active driange noted      LABS:  no new labs

## 2024-07-21 LAB
GLUCOSE BLDC GLUCOMTR-MCNC: 123 MG/DL — HIGH (ref 70–99)
GLUCOSE BLDC GLUCOMTR-MCNC: 133 MG/DL — HIGH (ref 70–99)
GLUCOSE BLDC GLUCOMTR-MCNC: 146 MG/DL — HIGH (ref 70–99)
GLUCOSE BLDC GLUCOMTR-MCNC: 221 MG/DL — HIGH (ref 70–99)

## 2024-07-21 PROCEDURE — 99232 SBSQ HOSP IP/OBS MODERATE 35: CPT

## 2024-07-21 RX ADMIN — OXYCODONE HYDROCHLORIDE 15 MILLIGRAM(S): 30 TABLET ORAL at 09:24

## 2024-07-21 RX ADMIN — Medication 2 MILLIGRAM(S): at 23:40

## 2024-07-21 RX ADMIN — Medication 3 UNIT(S): at 08:27

## 2024-07-21 RX ADMIN — Medication 2 MILLIGRAM(S): at 15:16

## 2024-07-21 RX ADMIN — Medication 4: at 17:54

## 2024-07-21 RX ADMIN — OXYCODONE HYDROCHLORIDE 15 MILLIGRAM(S): 30 TABLET ORAL at 02:25

## 2024-07-21 RX ADMIN — INSULIN GLARGINE-YFGN 5 UNIT(S): 100 INJECTION, SOLUTION SUBCUTANEOUS at 21:55

## 2024-07-21 RX ADMIN — Medication 2 MILLIGRAM(S): at 02:16

## 2024-07-21 RX ADMIN — Medication 10 MILLIGRAM(S): at 16:21

## 2024-07-21 RX ADMIN — OXYCODONE HYDROCHLORIDE 15 MILLIGRAM(S): 30 TABLET ORAL at 21:55

## 2024-07-21 RX ADMIN — Medication 4 MILLIGRAM(S): at 22:18

## 2024-07-21 RX ADMIN — Medication 3 UNIT(S): at 17:55

## 2024-07-21 RX ADMIN — Medication 2 MILLIGRAM(S): at 06:45

## 2024-07-21 RX ADMIN — Medication 2 MILLIGRAM(S): at 02:35

## 2024-07-21 RX ADMIN — Medication 3 UNIT(S): at 13:49

## 2024-07-21 RX ADMIN — Medication 10 MILLIGRAM(S): at 06:45

## 2024-07-21 RX ADMIN — Medication 2 MILLIGRAM(S): at 15:46

## 2024-07-21 RX ADMIN — ENOXAPARIN SODIUM 40 MILLIGRAM(S): 120 INJECTION SUBCUTANEOUS at 21:57

## 2024-07-21 RX ADMIN — Medication 10 MILLIGRAM(S): at 21:55

## 2024-07-21 RX ADMIN — Medication 3 MILLIGRAM(S): at 21:55

## 2024-07-21 RX ADMIN — Medication 4 MILLIGRAM(S): at 21:48

## 2024-07-21 RX ADMIN — Medication 2 MILLIGRAM(S): at 19:04

## 2024-07-21 RX ADMIN — Medication 10 MILLIGRAM(S): at 11:17

## 2024-07-21 RX ADMIN — Medication 2 MILLIGRAM(S): at 11:09

## 2024-07-21 RX ADMIN — Medication 2 MILLIGRAM(S): at 11:39

## 2024-07-21 NOTE — PROGRESS NOTE ADULT - ASSESSMENT
Repeat CT pelvis done, pending read  Surgical Plan pending final report of CT scan     Plan discussed /w Dr Merino   38M with history of Crohns disease with extensive anorectal involvement s/p recent seton placement at outside hospital, came for perianal pain with active discharges      Repeat CT pelvis done, pending read  Surgical Plan pending final report of CT scan     Plan discussed /w Dr Merino

## 2024-07-21 NOTE — PROGRESS NOTE ADULT - SUBJECTIVE AND OBJECTIVE BOX
Chief complaint of Urinary obstruction (14 Jul 2024 15:36)      HPI:  38M w/ h/o DM, Crohns disease, s/p colostomy, hx of bilateral Seton placement for anal fistula drainage 6 wks ago at [place] presents with abdominal pain and anuria. Patient reports he used to be on biologics for chrons, but they were stopped due to recurrent fistulas and infections. Had seton placement for anal fistula 6 months ago, and reports abdominal pain worsening, especially with [facility] Has had green discharge from the rectum. For past 3 days, pain worsened and was unable to have any urine output and came to [facility]. Patient reports he does not want to go back to [place] due to having a bad experience last time and would like to switch care to [facility]. Reports chills, denies fevers. No other complaints and ROS otherwise negative.   Of note patient was in ED, fox was placed with good urine output, UA was negative, He was discharged with out-pt follow up with GI, colorectal, and . He went home and had severe pain and came back to ED.    In ED vitals stable. CBC noted for WBC 11, H/H 12/38, Plt 365. CMP noted for K of 3, otherwise unremarkable. UA done yesterday appears sterile. CT abdomen with oral and IV contrast noted for "No definite cause for abdominal pain identified. No significant change in multiple complex perianal fistulas without associated abscess". S/p 1 dose zosyn, admitted to  for further care. (14 Jul 2024 02:57) c/w intermittent abd pain, has Fox now for urinary retention.    S:  7/16: Lying in bed, awake, alert, states he has intractable pain by his anus, with associated discharge.  + abd discomfort.  7/17: Pt still complaining of intractable rectal pain, states dilaudid only holds him over for 3 hours, asking for it more frequently.  States this pain is not due to crohns flare, but due to his perianal fistulas.    7/18: Pt states he slept better today but asking for sleep aid.  Discussed pain management and initiation of oxycontin.  Pt now states he is having rectal bleeding, asking to be re-evaluated by surgical team.    7/19: In bed, appears comfortable. States he spoke with a specialist late last night ~10:30PM however did not have a good understanding of that interaction, states he wants to further discuss with surgical team what he is feeling, feels something is being missed.    7/20: States oxycontin not helping, still with same level of pain. Pt still feels frustrated, believes he has a pocket of infection that needs surgical attention. Discussed plan of care to best of my ability and addressed all questions.  Pt also requesting uro follow to see if he can have inpatient void trial.    7/21: In bed, pt appears frustrated, states feels the same when asked.  Discussed plan of care for pain control, follow up of repeat CT testing.  In agreement to void trial tomororw.      REVIEW OF SYSTEMS: All other review of systems is negative unless indicated above.    Vital Signs Last 24 Hrs  T(C): 36.8 (21 Jul 2024 08:53), Max: 36.8 (21 Jul 2024 08:53)  T(F): 98.3 (21 Jul 2024 08:53), Max: 98.3 (21 Jul 2024 08:53)  HR: 86 (21 Jul 2024 10:45) (55 - 86)  BP: 108/69 (21 Jul 2024 10:45) (92/58 - 112/67)  BP(mean): --  RR: 17 (21 Jul 2024 08:53) (16 - 18)  SpO2: 96% (21 Jul 2024 08:53) (96% - 100%)    Parameters below as of 21 Jul 2024 08:53  Patient On (Oxygen Delivery Method): room air      PHYSICAL EXAM:    Constitutional: NAD, awake and alert  HEENT: PERR, EOMI, Normal Hearing, MMM  Neck: Soft and supple  Respiratory: Breath sounds are clear bilaterally, No wheezing, rales or rhonchi  Cardiovascular: S1 and S2, regular rate and rhythm, no Murmurs, gallops or rubs  Gastrointestinal: Bowel Sounds present, soft, nontender, nondistended, no guarding, no rebound, ostomy in place  : Fox in place  Extremities: No peripheral edema  Neurological: A/O x 3, no focal deficits in my limited exam      MEDICATIONS  (STANDING):  dextrose 5%. 1000 milliLiter(s) (50 mL/Hr) IV Continuous <Continuous>  dextrose 5%. 1000 milliLiter(s) (100 mL/Hr) IV Continuous <Continuous>  dextrose 50% Injectable 25 Gram(s) IV Push once  dextrose 50% Injectable 12.5 Gram(s) IV Push once  dextrose 50% Injectable 25 Gram(s) IV Push once  dicyclomine 10 milliGRAM(s) Oral four times a day before meals  enoxaparin Injectable 40 milliGRAM(s) SubCutaneous every 24 hours  glucagon  Injectable 1 milliGRAM(s) IntraMuscular once  insulin glargine Injectable (LANTUS) 5 Unit(s) SubCutaneous at bedtime  insulin lispro (ADMELOG) corrective regimen sliding scale   SubCutaneous three times a day before meals  insulin lispro (ADMELOG) corrective regimen sliding scale   SubCutaneous at bedtime  insulin lispro Injectable (ADMELOG) 3 Unit(s) SubCutaneous three times a day before meals  naloxone Injectable 0.4 milliGRAM(s) IV Push once  oxyCODONE  ER Tablet 15 milliGRAM(s) Oral every 12 hours  polyethylene glycol 3350 17 Gram(s) Oral daily  senna 2 Tablet(s) Oral at bedtime  tamsulosin 0.4 milliGRAM(s) Oral at bedtime    MEDICATIONS  (PRN):  acetaminophen     Tablet .. 650 milliGRAM(s) Oral every 6 hours PRN Mild Pain (1 - 3)  aluminum hydroxide/magnesium hydroxide/simethicone Suspension 30 milliLiter(s) Oral every 4 hours PRN Dyspepsia  bisacodyl 5 milliGRAM(s) Oral daily PRN Constipation  dextrose Oral Gel 15 Gram(s) Oral once PRN Blood Glucose LESS THAN 70 milliGRAM(s)/deciliter  HYDROmorphone  Injectable 2 milliGRAM(s) IV Push every 3 hours PRN Severe Pain (7 - 10)  ketorolac   Injectable 15 milliGRAM(s) IV Push every 8 hours PRN breakthrough pain  melatonin 3 milliGRAM(s) Oral at bedtime PRN Insomnia  morphine  - Injectable 4 milliGRAM(s) IV Push every 4 hours PRN Moderate Pain (4 - 6)  ondansetron Injectable 4 milliGRAM(s) IV Push every 6 hours PRN Nausea and/or Vomiting  zolpidem 5 milliGRAM(s) Oral at bedtime PRN Insomnia                  CAPILLARY BLOOD GLUCOSE      POCT Blood Glucose.: 133 mg/dL (21 Jul 2024 07:53)  POCT Blood Glucose.: 194 mg/dL (20 Jul 2024 21:21)  POCT Blood Glucose.: 191 mg/dL (20 Jul 2024 16:47)  POCT Blood Glucose.: 157 mg/dL (20 Jul 2024 12:09)          A/P:  38M admitted for possible infected perianal fistula.      *Crohn's disease / acute on chronic  perianal pain / perianal fistula:   -vitals stable, no leukocytosis, does not appear to be in a flare  -inflammatory markers noted  0CT abdomen with oral and IV con neg for any etiology for patients pain.   -C/w empiric zosyn day #7  -surgery eval appreciated - no indication for surgical intervention at this time.  Can follow up outpatient in 1-2 weeks.  -surgery re-eval for new onset rectal bleeding   -repeat CT scan pending read  -ostomy in place, no issues  -dilaudid 1mg IV q4h --> q3h PRN severe pain  -stopped toradol, pt states "Does not do anything."  -started long acting oxycontin 10mg BID on 7/17 --> increased to 15mg BID on 7/20, I do suspect a certain degree of opioid dependence   -GI eval appreciated, apparently has poor compliance outpatient, would benefit from biologic therapy outpatient.       *Urinary retention:  -fox in place, POA  -c/w flomax  -per pt request for inpatient void trial, will attempt for tomorrow      * Type 2 diabetes mellitus:  -A1c 8.1  -Adjust insulin as needed.    * DVT px  -LMWH    Dispo:  -pain management  -void trial tomorrow  -CT results and colorectal f/u

## 2024-07-21 NOTE — PROGRESS NOTE ADULT - SUBJECTIVE AND OBJECTIVE BOX
SURGERY DAILY PROGRESS NOTE:     Subjective:  Patient seen and examined at bedside during am rounds. Continues to report lower abdominal and perirectal pain  Ostomy functioning, AVSS. Denies any fevers, chills, n/v/d, chest pain or shortness of breath    Objective:    MEDICATIONS  (STANDING):  dextrose 5%. 1000 milliLiter(s) (50 mL/Hr) IV Continuous <Continuous>  dextrose 5%. 1000 milliLiter(s) (100 mL/Hr) IV Continuous <Continuous>  dextrose 50% Injectable 25 Gram(s) IV Push once  dextrose 50% Injectable 12.5 Gram(s) IV Push once  dextrose 50% Injectable 25 Gram(s) IV Push once  dicyclomine 10 milliGRAM(s) Oral four times a day before meals  enoxaparin Injectable 40 milliGRAM(s) SubCutaneous every 24 hours  glucagon  Injectable 1 milliGRAM(s) IntraMuscular once  insulin glargine Injectable (LANTUS) 5 Unit(s) SubCutaneous at bedtime  insulin lispro (ADMELOG) corrective regimen sliding scale   SubCutaneous three times a day before meals  insulin lispro (ADMELOG) corrective regimen sliding scale   SubCutaneous at bedtime  insulin lispro Injectable (ADMELOG) 3 Unit(s) SubCutaneous three times a day before meals  naloxone Injectable 0.4 milliGRAM(s) IV Push once  oxyCODONE  ER Tablet 15 milliGRAM(s) Oral every 12 hours  polyethylene glycol 3350 17 Gram(s) Oral daily  senna 2 Tablet(s) Oral at bedtime  tamsulosin 0.4 milliGRAM(s) Oral at bedtime    MEDICATIONS  (PRN):  acetaminophen     Tablet .. 650 milliGRAM(s) Oral every 6 hours PRN Mild Pain (1 - 3)  aluminum hydroxide/magnesium hydroxide/simethicone Suspension 30 milliLiter(s) Oral every 4 hours PRN Dyspepsia  bisacodyl 5 milliGRAM(s) Oral daily PRN Constipation  dextrose Oral Gel 15 Gram(s) Oral once PRN Blood Glucose LESS THAN 70 milliGRAM(s)/deciliter  HYDROmorphone  Injectable 2 milliGRAM(s) IV Push every 3 hours PRN Severe Pain (7 - 10)  ketorolac   Injectable 15 milliGRAM(s) IV Push every 8 hours PRN breakthrough pain  melatonin 3 milliGRAM(s) Oral at bedtime PRN Insomnia  morphine  - Injectable 4 milliGRAM(s) IV Push every 4 hours PRN Moderate Pain (4 - 6)  ondansetron Injectable 4 milliGRAM(s) IV Push every 6 hours PRN Nausea and/or Vomiting  zolpidem 5 milliGRAM(s) Oral at bedtime PRN Insomnia      Vital Signs Last 24 Hrs  T(C): 36.4 (21 Jul 2024 00:15), Max: 36.9 (20 Jul 2024 08:17)  T(F): 97.6 (21 Jul 2024 00:15), Max: 98.5 (20 Jul 2024 08:17)  HR: 82 (21 Jul 2024 00:15) (66 - 82)  BP: 107/82 (21 Jul 2024 00:15) (107/82 - 118/91)  BP(mean): --  RR: 18 (21 Jul 2024 00:15) (16 - 18)  SpO2: 97% (21 Jul 2024 00:15) (97% - 100%)    Parameters below as of 21 Jul 2024 00:15  Patient On (Oxygen Delivery Method): room air          PHYSICAL EXAM   Gen: well-appearing, in no acute distress  CV: pulse regularly present   Resp: airway patent, non-labored breathing  Abd: soft, tender in the lower abdomen, ostomy with liquid stool  Rectum: multiple fistulae, seton in place, no active drainge noted

## 2024-07-22 LAB
GLUCOSE BLDC GLUCOMTR-MCNC: 101 MG/DL — HIGH (ref 70–99)
GLUCOSE BLDC GLUCOMTR-MCNC: 130 MG/DL — HIGH (ref 70–99)
GLUCOSE BLDC GLUCOMTR-MCNC: 178 MG/DL — HIGH (ref 70–99)
GLUCOSE BLDC GLUCOMTR-MCNC: 178 MG/DL — HIGH (ref 70–99)

## 2024-07-22 PROCEDURE — 99232 SBSQ HOSP IP/OBS MODERATE 35: CPT | Mod: GC

## 2024-07-22 PROCEDURE — 99232 SBSQ HOSP IP/OBS MODERATE 35: CPT

## 2024-07-22 RX ORDER — HYDROMORPHONE HCL IN 0.9% NACL 0.2 MG/ML
1 PLASTIC BAG, INJECTION (ML) INTRAVENOUS ONCE
Refills: 0 | Status: DISCONTINUED | OUTPATIENT
Start: 2024-07-22 | End: 2024-07-22

## 2024-07-22 RX ORDER — CIPROFLOXACIN 500 MG/1
500 TABLET, FILM COATED ORAL EVERY 12 HOURS
Refills: 0 | Status: DISCONTINUED | OUTPATIENT
Start: 2024-07-22 | End: 2024-07-23

## 2024-07-22 RX ORDER — METRONIDAZOLE 500 MG/1
500 TABLET ORAL EVERY 12 HOURS
Refills: 0 | Status: DISCONTINUED | OUTPATIENT
Start: 2024-07-22 | End: 2024-07-23

## 2024-07-22 RX ADMIN — Medication 1 MILLIGRAM(S): at 21:26

## 2024-07-22 RX ADMIN — Medication 2 MILLIGRAM(S): at 08:39

## 2024-07-22 RX ADMIN — Medication 2 MILLIGRAM(S): at 02:19

## 2024-07-22 RX ADMIN — OXYCODONE HYDROCHLORIDE 15 MILLIGRAM(S): 30 TABLET ORAL at 20:55

## 2024-07-22 RX ADMIN — Medication 3 UNIT(S): at 17:21

## 2024-07-22 RX ADMIN — Medication 2 MILLIGRAM(S): at 05:28

## 2024-07-22 RX ADMIN — Medication 10 MILLIGRAM(S): at 15:48

## 2024-07-22 RX ADMIN — METRONIDAZOLE 500 MILLIGRAM(S): 500 TABLET ORAL at 20:53

## 2024-07-22 RX ADMIN — Medication 2: at 17:20

## 2024-07-22 RX ADMIN — Medication 2 MILLIGRAM(S): at 00:20

## 2024-07-22 RX ADMIN — Medication 10 MILLIGRAM(S): at 05:15

## 2024-07-22 RX ADMIN — Medication 3 UNIT(S): at 08:35

## 2024-07-22 RX ADMIN — Medication 2 MILLIGRAM(S): at 19:10

## 2024-07-22 RX ADMIN — Medication 2 MILLIGRAM(S): at 22:13

## 2024-07-22 RX ADMIN — Medication 0.4 MILLIGRAM(S): at 20:53

## 2024-07-22 RX ADMIN — OXYCODONE HYDROCHLORIDE 15 MILLIGRAM(S): 30 TABLET ORAL at 09:44

## 2024-07-22 RX ADMIN — Medication 2 MILLIGRAM(S): at 23:17

## 2024-07-22 RX ADMIN — Medication 10 MILLIGRAM(S): at 20:53

## 2024-07-22 RX ADMIN — INSULIN GLARGINE-YFGN 5 UNIT(S): 100 INJECTION, SOLUTION SUBCUTANEOUS at 20:53

## 2024-07-22 RX ADMIN — Medication 2 MILLIGRAM(S): at 05:14

## 2024-07-22 RX ADMIN — Medication 1 MILLIGRAM(S): at 22:09

## 2024-07-22 RX ADMIN — Medication 2 MILLIGRAM(S): at 09:10

## 2024-07-22 RX ADMIN — Medication 2 MILLIGRAM(S): at 02:04

## 2024-07-22 RX ADMIN — Medication 2 MILLIGRAM(S): at 15:46

## 2024-07-22 RX ADMIN — Medication 2 MILLIGRAM(S): at 11:55

## 2024-07-22 RX ADMIN — Medication 10 MILLIGRAM(S): at 12:12

## 2024-07-22 RX ADMIN — Medication 5 MILLIGRAM(S): at 01:18

## 2024-07-22 NOTE — PROGRESS NOTE ADULT - SUBJECTIVE AND OBJECTIVE BOX
SURGERY DAILY PROGRESS NOTE:     Subjective:  Patient seen and examined at bedside during am rounds. Continues to report lower abdominal and perirectal pain  Ostomy functioning, AVSS. Denies any fevers, chills, n/v/d, chest pain or shortness of breath    Objective:    MEDICATIONS  (STANDING):  dextrose 5%. 1000 milliLiter(s) (50 mL/Hr) IV Continuous <Continuous>  dextrose 5%. 1000 milliLiter(s) (100 mL/Hr) IV Continuous <Continuous>  dextrose 50% Injectable 12.5 Gram(s) IV Push once  dextrose 50% Injectable 25 Gram(s) IV Push once  dextrose 50% Injectable 25 Gram(s) IV Push once  dicyclomine 10 milliGRAM(s) Oral four times a day before meals  enoxaparin Injectable 40 milliGRAM(s) SubCutaneous every 24 hours  glucagon  Injectable 1 milliGRAM(s) IntraMuscular once  insulin glargine Injectable (LANTUS) 5 Unit(s) SubCutaneous at bedtime  insulin lispro (ADMELOG) corrective regimen sliding scale   SubCutaneous three times a day before meals  insulin lispro (ADMELOG) corrective regimen sliding scale   SubCutaneous at bedtime  insulin lispro Injectable (ADMELOG) 3 Unit(s) SubCutaneous three times a day before meals  naloxone Injectable 0.4 milliGRAM(s) IV Push once  oxyCODONE  ER Tablet 15 milliGRAM(s) Oral every 12 hours  polyethylene glycol 3350 17 Gram(s) Oral daily  senna 2 Tablet(s) Oral at bedtime  tamsulosin 0.4 milliGRAM(s) Oral at bedtime    MEDICATIONS  (PRN):  acetaminophen     Tablet .. 650 milliGRAM(s) Oral every 6 hours PRN Mild Pain (1 - 3)  aluminum hydroxide/magnesium hydroxide/simethicone Suspension 30 milliLiter(s) Oral every 4 hours PRN Dyspepsia  bisacodyl 5 milliGRAM(s) Oral daily PRN Constipation  dextrose Oral Gel 15 Gram(s) Oral once PRN Blood Glucose LESS THAN 70 milliGRAM(s)/deciliter  HYDROmorphone  Injectable 2 milliGRAM(s) IV Push every 3 hours PRN Severe Pain (7 - 10)  melatonin 3 milliGRAM(s) Oral at bedtime PRN Insomnia  ondansetron Injectable 4 milliGRAM(s) IV Push every 6 hours PRN Nausea and/or Vomiting  zolpidem 5 milliGRAM(s) Oral at bedtime PRN Insomnia      Vital Signs Last 24 Hrs  T(C): 36.8 (21 Jul 2024 21:06), Max: 36.8 (21 Jul 2024 08:53)  T(F): 98.3 (21 Jul 2024 21:06), Max: 98.3 (21 Jul 2024 08:53)  HR: 73 (21 Jul 2024 21:06) (55 - 86)  BP: 112/68 (21 Jul 2024 21:06) (92/58 - 112/68)  BP(mean): 78 (21 Jul 2024 21:06) (78 - 78)  RR: 18 (21 Jul 2024 21:06) (17 - 18)  SpO2: 97% (21 Jul 2024 21:06) (96% - 97%)    Parameters below as of 21 Jul 2024 21:06  Patient On (Oxygen Delivery Method): room air    PHYSICAL EXAM   Gen: well-appearing, in no acute distress  CV: pulse regularly present   Resp: airway patent, non-labored breathing  Abd: soft, tender in the lower abdomen, ostomy with liquid stool  Rectum: multiple fistulae, seton in place, no active drainge noted

## 2024-07-22 NOTE — PROGRESS NOTE ADULT - SUBJECTIVE AND OBJECTIVE BOX
Chief complaint of Urinary obstruction (14 Jul 2024 15:36)      HPI:  38M w/ h/o DM, Crohns disease, s/p colostomy, hx of bilateral Seton placement for anal fistula drainage 6 wks ago at [place] presents with abdominal pain and anuria. Patient reports he used to be on biologics for chrons, but they were stopped due to recurrent fistulas and infections. Had seton placement for anal fistula 6 months ago, and reports abdominal pain worsening, especially with [facility] Has had green discharge from the rectum. For past 3 days, pain worsened and was unable to have any urine output and came to [facility]. Patient reports he does not want to go back to [place] due to having a bad experience last time and would like to switch care to [facility]. Reports chills, denies fevers. No other complaints and ROS otherwise negative.   Of note patient was in ED, fox was placed with good urine output, UA was negative, He was discharged with out-pt follow up with GI, colorectal, and . He went home and had severe pain and came back to ED.    In ED vitals stable. CBC noted for WBC 11, H/H 12/38, Plt 365. CMP noted for K of 3, otherwise unremarkable. UA done yesterday appears sterile. CT abdomen with oral and IV contrast noted for "No definite cause for abdominal pain identified. No significant change in multiple complex perianal fistulas without associated abscess". S/p 1 dose zosyn, admitted to  for further care. (14 Jul 2024 02:57) c/w intermittent abd pain, has Fox now for urinary retention.    S:  7/16: Lying in bed, awake, alert, states he has intractable pain by his anus, with associated discharge.  + abd discomfort.  7/17: Pt still complaining of intractable rectal pain, states dilaudid only holds him over for 3 hours, asking for it more frequently.  States this pain is not due to crohns flare, but due to his perianal fistulas.    7/18: Pt states he slept better today but asking for sleep aid.  Discussed pain management and initiation of oxycontin.  Pt now states he is having rectal bleeding, asking to be re-evaluated by surgical team.    7/19: In bed, appears comfortable. States he spoke with a specialist late last night ~10:30PM however did not have a good understanding of that interaction, states he wants to further discuss with surgical team what he is feeling, feels something is being missed.    7/20: States oxycontin not helping, still with same level of pain. Pt still feels frustrated, believes he has a pocket of infection that needs surgical attention. Discussed plan of care to best of my ability and addressed all questions.  Pt also requesting uro follow to see if he can have inpatient void trial.    7/21: In bed, pt appears frustrated, states feels the same when asked.  Discussed plan of care for pain control, follow up of repeat CT testing.  In agreement to void trial tomororw.  7/22: Discussed plan of care, pt otherwise status quo.  Always states he has pain regardless of opioid dose/narcotic use.  Discussed plan for void trial today and surgical follow up for perianal fistula.    REVIEW OF SYSTEMS: All other review of systems is negative unless indicated above.    Vital Signs Last 24 Hrs  T(C): 36.6 (22 Jul 2024 07:44), Max: 36.8 (21 Jul 2024 21:06)  T(F): 97.8 (22 Jul 2024 07:44), Max: 98.3 (21 Jul 2024 21:06)  HR: 53 (22 Jul 2024 07:44) (53 - 73)  BP: 103/65 (22 Jul 2024 07:44) (103/65 - 112/68)  BP(mean): 78 (21 Jul 2024 21:06) (78 - 78)  RR: 19 (22 Jul 2024 07:44) (18 - 19)  SpO2: 98% (22 Jul 2024 07:44) (97% - 98%)    Parameters below as of 22 Jul 2024 07:44  Patient On (Oxygen Delivery Method): room air      PHYSICAL EXAM:    Constitutional: NAD, awake and alert  HEENT: PERR, EOMI, Normal Hearing, MMM  Neck: Soft and supple  Respiratory: Breath sounds are clear bilaterally, No wheezing, rales or rhonchi  Cardiovascular: S1 and S2, regular rate and rhythm, no Murmurs, gallops or rubs  Gastrointestinal: Bowel Sounds present, soft, nontender, nondistended, no guarding, no rebound, ostomy in place  : Fox in place  Extremities: No peripheral edema  Neurological: A/O x 3, no focal deficits in my limited exam      MEDICATIONS  (STANDING):  ciprofloxacin     Tablet 500 milliGRAM(s) Oral every 12 hours  dextrose 5%. 1000 milliLiter(s) (50 mL/Hr) IV Continuous <Continuous>  dextrose 5%. 1000 milliLiter(s) (100 mL/Hr) IV Continuous <Continuous>  dextrose 50% Injectable 25 Gram(s) IV Push once  dextrose 50% Injectable 12.5 Gram(s) IV Push once  dextrose 50% Injectable 25 Gram(s) IV Push once  dicyclomine 10 milliGRAM(s) Oral four times a day before meals  enoxaparin Injectable 40 milliGRAM(s) SubCutaneous every 24 hours  glucagon  Injectable 1 milliGRAM(s) IntraMuscular once  insulin glargine Injectable (LANTUS) 5 Unit(s) SubCutaneous at bedtime  insulin lispro (ADMELOG) corrective regimen sliding scale   SubCutaneous three times a day before meals  insulin lispro (ADMELOG) corrective regimen sliding scale   SubCutaneous at bedtime  insulin lispro Injectable (ADMELOG) 3 Unit(s) SubCutaneous three times a day before meals  metroNIDAZOLE    Tablet 500 milliGRAM(s) Oral every 12 hours  naloxone Injectable 0.4 milliGRAM(s) IV Push once  oxyCODONE  ER Tablet 15 milliGRAM(s) Oral every 12 hours  polyethylene glycol 3350 17 Gram(s) Oral daily  senna 2 Tablet(s) Oral at bedtime  tamsulosin 0.4 milliGRAM(s) Oral at bedtime    MEDICATIONS  (PRN):  acetaminophen     Tablet .. 650 milliGRAM(s) Oral every 6 hours PRN Mild Pain (1 - 3)  aluminum hydroxide/magnesium hydroxide/simethicone Suspension 30 milliLiter(s) Oral every 4 hours PRN Dyspepsia  bisacodyl 5 milliGRAM(s) Oral daily PRN Constipation  dextrose Oral Gel 15 Gram(s) Oral once PRN Blood Glucose LESS THAN 70 milliGRAM(s)/deciliter  HYDROmorphone  Injectable 2 milliGRAM(s) IV Push every 3 hours PRN Severe Pain (7 - 10)  melatonin 3 milliGRAM(s) Oral at bedtime PRN Insomnia  ondansetron Injectable 4 milliGRAM(s) IV Push every 6 hours PRN Nausea and/or Vomiting  zolpidem 5 milliGRAM(s) Oral at bedtime PRN Insomnia          CAPILLARY BLOOD GLUCOSE      POCT Blood Glucose.: 101 mg/dL (22 Jul 2024 12:05)  POCT Blood Glucose.: 130 mg/dL (22 Jul 2024 08:05)  POCT Blood Glucose.: 146 mg/dL (21 Jul 2024 21:26)  POCT Blood Glucose.: 221 mg/dL (21 Jul 2024 16:52)        A/P:  38M admitted for possible infected perianal fistula.      *Crohn's disease / acute on chronic  perianal pain / perianal fistula:   -vitals stable, no leukocytosis, does not appear to be in a flare  -inflammatory markers noted  -CT abdomen with oral and IV con neg for any etiology for patients pain.   -repeat CT scan 7/20: Multiple perianal fistulous tracts.  Small left-sided ischioanal abscess.  -s/p zosyn day #7 --> change to oral cipro and flagyl today  -surgery eval appreciated - no indication for surgical intervention at this time.   -surgery re-eval: again no recommendation for surgical intervention.   -ostomy in place, no issues  -dilaudid 1mg IV q4h --> q3h PRN severe pain  -stopped toradol, pt states "Does not do anything."  -started long acting oxycontin 10mg BID on 7/17 --> increased to 15mg BID on 7/20, I do suspect a certain degree of opioid dependence   -GI eval appreciated, apparently has poor compliance outpatient, would benefit from biologic therapy outpatient.       *Urinary retention:  -fox in place, POA  -c/w flomax  -per pt request for inpatient void trial today       * Type 2 diabetes mellitus:  -A1c 8.1  -Adjust insulin as needed.    * DVT px  -LMWH    Dispo:  -pain management  -void trial today

## 2024-07-22 NOTE — PROGRESS NOTE ADULT - ASSESSMENT
38M with history of Crohns disease with extensive anorectal involvement s/p recent seton placement at outside hospital, came for perianal pain with active discharges      Repeat CT pelvis done, pending read  Surgical Plan pending final report of CT scan     Plan discussed /w Dr Merino

## 2024-07-23 LAB
GLUCOSE BLDC GLUCOMTR-MCNC: 122 MG/DL — HIGH (ref 70–99)
GLUCOSE BLDC GLUCOMTR-MCNC: 153 MG/DL — HIGH (ref 70–99)
GLUCOSE BLDC GLUCOMTR-MCNC: 253 MG/DL — HIGH (ref 70–99)

## 2024-07-23 PROCEDURE — 99233 SBSQ HOSP IP/OBS HIGH 50: CPT

## 2024-07-23 RX ORDER — OXYCODONE HYDROCHLORIDE 30 MG/1
30 TABLET ORAL EVERY 12 HOURS
Refills: 0 | Status: DISCONTINUED | OUTPATIENT
Start: 2024-07-23 | End: 2024-07-30

## 2024-07-23 RX ORDER — HYDROMORPHONE HCL IN 0.9% NACL 0.2 MG/ML
2 PLASTIC BAG, INJECTION (ML) INTRAVENOUS ONCE
Refills: 0 | Status: DISCONTINUED | OUTPATIENT
Start: 2024-07-23 | End: 2024-07-23

## 2024-07-23 RX ORDER — BACTERIOSTATIC SODIUM CHLORIDE 0.9 %
1000 VIAL (ML) INJECTION ONCE
Refills: 0 | Status: DISCONTINUED | OUTPATIENT
Start: 2024-07-23 | End: 2024-08-01

## 2024-07-23 RX ORDER — OXYCODONE HYDROCHLORIDE 30 MG/1
30 TABLET ORAL EVERY 12 HOURS
Refills: 0 | Status: DISCONTINUED | OUTPATIENT
Start: 2024-07-23 | End: 2024-07-23

## 2024-07-23 RX ORDER — MIDODRINE HYDROCHLORIDE 2.5 MG/1
10 TABLET ORAL THREE TIMES A DAY
Refills: 0 | Status: DISCONTINUED | OUTPATIENT
Start: 2024-07-23 | End: 2024-08-01

## 2024-07-23 RX ORDER — ACETAMINOPHEN 500 MG
1000 TABLET ORAL ONCE
Refills: 0 | Status: COMPLETED | OUTPATIENT
Start: 2024-07-23 | End: 2024-07-23

## 2024-07-23 RX ADMIN — Medication 10 MILLIGRAM(S): at 17:49

## 2024-07-23 RX ADMIN — Medication 2: at 12:08

## 2024-07-23 RX ADMIN — Medication 2 MILLIGRAM(S): at 21:05

## 2024-07-23 RX ADMIN — Medication 2 MILLIGRAM(S): at 15:39

## 2024-07-23 RX ADMIN — Medication 1 TABLET(S): at 12:55

## 2024-07-23 RX ADMIN — INSULIN GLARGINE-YFGN 5 UNIT(S): 100 INJECTION, SOLUTION SUBCUTANEOUS at 20:36

## 2024-07-23 RX ADMIN — Medication 2 MILLIGRAM(S): at 20:35

## 2024-07-23 RX ADMIN — Medication 10 MILLIGRAM(S): at 20:36

## 2024-07-23 RX ADMIN — Medication 2 MILLIGRAM(S): at 10:13

## 2024-07-23 RX ADMIN — Medication 2 MILLIGRAM(S): at 23:16

## 2024-07-23 RX ADMIN — Medication 2 MILLIGRAM(S): at 01:13

## 2024-07-23 RX ADMIN — MIDODRINE HYDROCHLORIDE 10 MILLIGRAM(S): 2.5 TABLET ORAL at 10:15

## 2024-07-23 RX ADMIN — Medication 2 MILLIGRAM(S): at 02:14

## 2024-07-23 RX ADMIN — Medication 2 MILLIGRAM(S): at 04:22

## 2024-07-23 RX ADMIN — Medication 1 TABLET(S): at 20:36

## 2024-07-23 RX ADMIN — Medication 3 UNIT(S): at 12:47

## 2024-07-23 RX ADMIN — Medication 2 MILLIGRAM(S): at 06:28

## 2024-07-23 RX ADMIN — OXYCODONE HYDROCHLORIDE 30 MILLIGRAM(S): 30 TABLET ORAL at 21:46

## 2024-07-23 RX ADMIN — Medication 10 MILLIGRAM(S): at 05:19

## 2024-07-23 RX ADMIN — METRONIDAZOLE 500 MILLIGRAM(S): 500 TABLET ORAL at 10:15

## 2024-07-23 RX ADMIN — Medication 2 MILLIGRAM(S): at 13:18

## 2024-07-23 RX ADMIN — Medication 2 MILLIGRAM(S): at 17:33

## 2024-07-23 RX ADMIN — Medication 400 MILLIGRAM(S): at 10:23

## 2024-07-23 RX ADMIN — Medication 1: at 20:37

## 2024-07-23 RX ADMIN — Medication 3 UNIT(S): at 17:44

## 2024-07-23 RX ADMIN — Medication 10 MILLIGRAM(S): at 10:15

## 2024-07-23 RX ADMIN — Medication 2 MILLIGRAM(S): at 23:46

## 2024-07-23 RX ADMIN — OXYCODONE HYDROCHLORIDE 30 MILLIGRAM(S): 30 TABLET ORAL at 22:14

## 2024-07-23 RX ADMIN — Medication 2 MILLIGRAM(S): at 03:49

## 2024-07-23 NOTE — PROGRESS NOTE ADULT - SUBJECTIVE AND OBJECTIVE BOX
Chief complaint of Urinary obstruction (14 Jul 2024 15:36)      HPI:  38M w/ h/o DM, Crohns disease, s/p colostomy, hx of bilateral Seton placement for anal fistula drainage 6 wks ago at [place] presents with abdominal pain and anuria. Patient reports he used to be on biologics for chrons, but they were stopped due to recurrent fistulas and infections. Had seton placement for anal fistula 6 months ago, and reports abdominal pain worsening, especially with [facility] Has had green discharge from the rectum. For past 3 days, pain worsened and was unable to have any urine output and came to [facility]. Patient reports he does not want to go back to [place] due to having a bad experience last time and would like to switch care to [facility]. Reports chills, denies fevers. No other complaints and ROS otherwise negative.   Of note patient was in ED, fox was placed with good urine output, UA was negative, He was discharged with out-pt follow up with GI, colorectal, and . He went home and had severe pain and came back to ED.    In ED vitals stable. CBC noted for WBC 11, H/H 12/38, Plt 365. CMP noted for K of 3, otherwise unremarkable. UA done yesterday appears sterile. CT abdomen with oral and IV contrast noted for "No definite cause for abdominal pain identified. No significant change in multiple complex perianal fistulas without associated abscess". S/p 1 dose zosyn, admitted to  for further care. (14 Jul 2024 02:57) c/w intermittent abd pain, has Fox now for urinary retention.    Vital Signs Last 24 Hrs  T(C): 36.6 (23 Jul 2024 07:49), Max: 37 (22 Jul 2024 15:51)  T(F): 97.8 (23 Jul 2024 07:49), Max: 98.6 (22 Jul 2024 15:51)  HR: 70 (23 Jul 2024 07:49) (70 - 80)  BP: 84/54 (23 Jul 2024 07:49) (84/54 - 111/66)  BP(mean): 77 (22 Jul 2024 15:51) (77 - 77)  RR: 18 (23 Jul 2024 07:49) (18 - 18)  SpO2: 98% (23 Jul 2024 07:49) (98% - 99%)    Parameters below as of 23 Jul 2024 07:49  Patient On (Oxygen Delivery Method): room air        PHYSICAL EXAM:    Constitutional: NAD, awake and alert  HEENT: PERR, EOMI, Normal Hearing, MMM  Neck: Soft and supple  Respiratory: Breath sounds are clear bilaterally, No wheezing, rales or rhonchi  Cardiovascular: S1 and S2, regular rate and rhythm, no Murmurs, gallops or rubs  Gastrointestinal: Bowel Sounds present, soft, nontender, nondistended, no guarding, no rebound, ostomy in place  : Fox in place  Extremities: No peripheral edema  Neurological: A/O x 3, no focal deficits in my limited exam        CAPILLARY BLOOD GLUCOSE      POCT Blood Glucose.: 178 mg/dL (22 Jul 2024 20:50)  POCT Blood Glucose.: 178 mg/dL (22 Jul 2024 16:58)  POCT Blood Glucose.: 101 mg/dL (22 Jul 2024 12:05)        A/P:  38M admitted for possible infected perianal fistula.      *Crohn's disease / acute on chronic  perianal pain / perianal fistula:   -vitals stable, no leukocytosis, does not appear to be in a flare  -inflammatory markers noted  -CT abdomen with oral and IV con neg for any etiology for patients pain.   -repeat CT scan 7/20: Multiple perianal fistulous tracts.  Small left-sided ischioanal abscess.  -s/p zosyn day #7 --> change to oral cipro and flagyl today  -surgery eval appreciated - no indication for surgical intervention at this time.   -surgery re-eval: again no recommendation for surgical intervention.   -ostomy in place, no issues  -dilaudid 1mg IV q4h --> q3h PRN severe pain  -stopped toradol, pt states "Does not do anything."  -started long acting oxycontin 10mg BID on 7/17 --> increased to 15mg BID on 7/20, I do suspect a certain degree of opioid dependence   -GI eval appreciated, apparently has poor compliance outpatient, would benefit from biologic therapy outpatient.       *Urinary retention:  -fox in place, POA  -c/w flomax  -per pt request for inpatient void trial today       * Type 2 diabetes mellitus:  -A1c 8.1  -Adjust insulin as needed.    * DVT px  -LMWH    Dispo:  -pain management  -void trial today    time spent 50 min   Chief complaint of Urinary obstruction (14 Jul 2024 15:36)      HPI:  38M w/ h/o DM, Crohns disease, s/p colostomy, hx of bilateral Seton placement for anal fistula drainage 6 wks ago at [place] presents with abdominal pain and anuria. Patient reports he used to be on biologics for chrons, but they were stopped due to recurrent fistulas and infections. Had seton placement for anal fistula 6 months ago, and reports abdominal pain worsening, especially with [facility] Has had green discharge from the rectum. For past 3 days, pain worsened and was unable to have any urine output and came to [facility]. Patient reports he does not want to go back to [place] due to having a bad experience last time and would like to switch care to [facility]. Reports chills, denies fevers. No other complaints and ROS otherwise negative.   Of note patient was in ED, fox was placed with good urine output, UA was negative, He was discharged with out-pt follow up with GI, colorectal, and . He went home and had severe pain and came back to ED.    In ED vitals stable. CBC noted for WBC 11, H/H 12/38, Plt 365. CMP noted for K of 3, otherwise unremarkable. UA done yesterday appears sterile. CT abdomen with oral and IV contrast noted for "No definite cause for abdominal pain identified. No significant change in multiple complex perianal fistulas without associated abscess". S/p 1 dose zosyn, admitted to  for further care. (14 Jul 2024 02:57) c/w intermittent abd pain, has Fox now for urinary retention.    Vital Signs Last 24 Hrs  T(C): 36.6 (23 Jul 2024 07:49), Max: 37 (22 Jul 2024 15:51)  T(F): 97.8 (23 Jul 2024 07:49), Max: 98.6 (22 Jul 2024 15:51)  HR: 70 (23 Jul 2024 07:49) (70 - 80)  BP: 84/54 (23 Jul 2024 07:49) (84/54 - 111/66)  BP(mean): 77 (22 Jul 2024 15:51) (77 - 77)  RR: 18 (23 Jul 2024 07:49) (18 - 18)  SpO2: 98% (23 Jul 2024 07:49) (98% - 99%)    Parameters below as of 23 Jul 2024 07:49  Patient On (Oxygen Delivery Method): room air        PHYSICAL EXAM:    Constitutional: NAD, awake and alert  HEENT: PERR, EOMI, Normal Hearing, MMM  Neck: Soft and supple  Respiratory: Breath sounds are clear bilaterally, No wheezing, rales or rhonchi  Cardiovascular: S1 and S2, regular rate and rhythm, no Murmurs, gallops or rubs  Gastrointestinal: Bowel Sounds present, soft, nontender, nondistended, no guarding, no rebound, ostomy in place  : Fox in place  Extremities: No peripheral edema  Neurological: A/O x 3, no focal deficits in my limited exam        CAPILLARY BLOOD GLUCOSE      POCT Blood Glucose.: 178 mg/dL (22 Jul 2024 20:50)  POCT Blood Glucose.: 178 mg/dL (22 Jul 2024 16:58)  POCT Blood Glucose.: 101 mg/dL (22 Jul 2024 12:05)        A/P:  38M admitted for possible infected perianal fistula.      *Crohn's disease / acute on chronic  perianal pain / perianal fistula:   -vitals stable, no leukocytosis, does not appear to be in a flare  -inflammatory markers noted  -CT abdomen with oral and IV con neg for any etiology for patients pain.   -repeat CT scan 7/20: Multiple perianal fistulous tracts.  Small left-sided ischioanal abscess.  -s/p zosyn day #7 --> change to oral Augmentin, allergic to Cipro  -surgery eval appreciated - no indication for surgical intervention at this time.   -surgery re-eval: again no recommendation for surgical intervention.   -ostomy in place, no issues  -dilaudid 1mg IV q4h --> q3h PRN severe pain  -stopped toradol, pt states "Does not do anything."  -started long acting oxycontin 10mg BID on 7/17 --> increased to 15mg BID on 7/20, I do suspect a certain degree of opioid dependence   increase to 30 bid  -GI eval appreciated, apparently has poor compliance outpatient, would benefit from biologic therapy outpatient.       *Urinary retention:  resolved    * Type 2 diabetes mellitus:  -A1c 8.1  -Adjust insulin as needed.    * DVT px  -LMWH    Dispo:  -pain management  -void trial today    time spent 50 min

## 2024-07-23 NOTE — CONSULT NOTE ADULT - NS ATTEND OPT1 GEN_ALL_CORE
-2/2024 Vitamin D level 13.6 low.  -On Vitamin D3 1000 iu capsule and prenatal vitamin with D 2000 iu.  -Repeat D level.    I independently performed the documented:

## 2024-07-24 LAB
ANION GAP SERPL CALC-SCNC: 7 MMOL/L — SIGNIFICANT CHANGE UP (ref 5–17)
BUN SERPL-MCNC: 9 MG/DL — SIGNIFICANT CHANGE UP (ref 7–23)
CALCIUM SERPL-MCNC: 10 MG/DL — SIGNIFICANT CHANGE UP (ref 8.5–10.1)
CHLORIDE SERPL-SCNC: 103 MMOL/L — SIGNIFICANT CHANGE UP (ref 96–108)
CO2 SERPL-SCNC: 25 MMOL/L — SIGNIFICANT CHANGE UP (ref 22–31)
CREAT SERPL-MCNC: 0.88 MG/DL — SIGNIFICANT CHANGE UP (ref 0.5–1.3)
EGFR: 113 ML/MIN/1.73M2 — SIGNIFICANT CHANGE UP
GLUCOSE BLDC GLUCOMTR-MCNC: 141 MG/DL — HIGH (ref 70–99)
GLUCOSE BLDC GLUCOMTR-MCNC: 152 MG/DL — HIGH (ref 70–99)
GLUCOSE BLDC GLUCOMTR-MCNC: 224 MG/DL — HIGH (ref 70–99)
GLUCOSE SERPL-MCNC: 130 MG/DL — HIGH (ref 70–99)
HCT VFR BLD CALC: 39.7 % — SIGNIFICANT CHANGE UP (ref 39–50)
HGB BLD-MCNC: 13 G/DL — SIGNIFICANT CHANGE UP (ref 13–17)
MCHC RBC-ENTMCNC: 31.2 PG — SIGNIFICANT CHANGE UP (ref 27–34)
MCHC RBC-ENTMCNC: 32.7 GM/DL — SIGNIFICANT CHANGE UP (ref 32–36)
MCV RBC AUTO: 95.2 FL — SIGNIFICANT CHANGE UP (ref 80–100)
PLATELET # BLD AUTO: 462 K/UL — HIGH (ref 150–400)
POTASSIUM SERPL-MCNC: 4.5 MMOL/L — SIGNIFICANT CHANGE UP (ref 3.5–5.3)
POTASSIUM SERPL-SCNC: 4.5 MMOL/L — SIGNIFICANT CHANGE UP (ref 3.5–5.3)
RBC # BLD: 4.17 M/UL — LOW (ref 4.2–5.8)
RBC # FLD: 14.9 % — HIGH (ref 10.3–14.5)
SODIUM SERPL-SCNC: 135 MMOL/L — SIGNIFICANT CHANGE UP (ref 135–145)
WBC # BLD: 11.98 K/UL — HIGH (ref 3.8–10.5)
WBC # FLD AUTO: 11.98 K/UL — HIGH (ref 3.8–10.5)

## 2024-07-24 PROCEDURE — 74177 CT ABD & PELVIS W/CONTRAST: CPT | Mod: 26

## 2024-07-24 PROCEDURE — 99232 SBSQ HOSP IP/OBS MODERATE 35: CPT

## 2024-07-24 RX ORDER — HYDROMORPHONE HCL IN 0.9% NACL 0.2 MG/ML
4 PLASTIC BAG, INJECTION (ML) INTRAVENOUS EVERY 4 HOURS
Refills: 0 | Status: DISCONTINUED | OUTPATIENT
Start: 2024-07-24 | End: 2024-07-24

## 2024-07-24 RX ORDER — HYDROMORPHONE HCL IN 0.9% NACL 0.2 MG/ML
2 PLASTIC BAG, INJECTION (ML) INTRAVENOUS ONCE
Refills: 0 | Status: DISCONTINUED | OUTPATIENT
Start: 2024-07-24 | End: 2024-07-24

## 2024-07-24 RX ORDER — HYDROMORPHONE HCL IN 0.9% NACL 0.2 MG/ML
2 PLASTIC BAG, INJECTION (ML) INTRAVENOUS EVERY 4 HOURS
Refills: 0 | Status: DISCONTINUED | OUTPATIENT
Start: 2024-07-24 | End: 2024-07-28

## 2024-07-24 RX ADMIN — Medication 1 TABLET(S): at 09:49

## 2024-07-24 RX ADMIN — Medication 1 TABLET(S): at 21:41

## 2024-07-24 RX ADMIN — Medication 10 MILLIGRAM(S): at 11:56

## 2024-07-24 RX ADMIN — OXYCODONE HYDROCHLORIDE 30 MILLIGRAM(S): 30 TABLET ORAL at 11:56

## 2024-07-24 RX ADMIN — Medication 10 MILLIGRAM(S): at 21:41

## 2024-07-24 RX ADMIN — MIDODRINE HYDROCHLORIDE 10 MILLIGRAM(S): 2.5 TABLET ORAL at 11:55

## 2024-07-24 RX ADMIN — MIDODRINE HYDROCHLORIDE 10 MILLIGRAM(S): 2.5 TABLET ORAL at 05:52

## 2024-07-24 RX ADMIN — MIDODRINE HYDROCHLORIDE 10 MILLIGRAM(S): 2.5 TABLET ORAL at 18:39

## 2024-07-24 RX ADMIN — Medication 10 MILLIGRAM(S): at 05:52

## 2024-07-24 RX ADMIN — OXYCODONE HYDROCHLORIDE 30 MILLIGRAM(S): 30 TABLET ORAL at 22:59

## 2024-07-24 RX ADMIN — Medication 2 MILLIGRAM(S): at 05:52

## 2024-07-24 RX ADMIN — Medication 10 MILLIGRAM(S): at 18:40

## 2024-07-24 RX ADMIN — Medication 2 MILLIGRAM(S): at 22:11

## 2024-07-24 RX ADMIN — Medication 2 MILLIGRAM(S): at 03:00

## 2024-07-24 RX ADMIN — Medication 2 MILLIGRAM(S): at 06:22

## 2024-07-24 RX ADMIN — INSULIN GLARGINE-YFGN 5 UNIT(S): 100 INJECTION, SOLUTION SUBCUTANEOUS at 21:41

## 2024-07-24 RX ADMIN — Medication 3 UNIT(S): at 18:47

## 2024-07-24 RX ADMIN — Medication 2 MILLIGRAM(S): at 23:40

## 2024-07-24 RX ADMIN — Medication 2 MILLIGRAM(S): at 20:37

## 2024-07-24 RX ADMIN — Medication 2 MILLIGRAM(S): at 02:25

## 2024-07-24 RX ADMIN — Medication 2 MILLIGRAM(S): at 21:07

## 2024-07-24 RX ADMIN — Medication 2 MILLIGRAM(S): at 21:41

## 2024-07-24 RX ADMIN — Medication 2 MILLIGRAM(S): at 09:46

## 2024-07-24 RX ADMIN — OXYCODONE HYDROCHLORIDE 30 MILLIGRAM(S): 30 TABLET ORAL at 22:21

## 2024-07-24 RX ADMIN — Medication 2 MILLIGRAM(S): at 18:39

## 2024-07-24 RX ADMIN — Medication 2 MILLIGRAM(S): at 14:35

## 2024-07-24 RX ADMIN — Medication 2 MILLIGRAM(S): at 23:10

## 2024-07-24 NOTE — PROGRESS NOTE ADULT - SUBJECTIVE AND OBJECTIVE BOX
Chief complaint of Urinary obstruction (14 Jul 2024 15:36)      HPI:  38M w/ h/o DM, Crohns disease, s/p colostomy, hx of bilateral Seton placement for anal fistula drainage 6 wks ago at [place] presents with abdominal pain and anuria. Patient reports he used to be on biologics for chrons, but they were stopped due to recurrent fistulas and infections. Had seton placement for anal fistula 6 months ago, and reports abdominal pain worsening, especially with [facility] Has had green discharge from the rectum. For past 3 days, pain worsened and was unable to have any urine output and came to [facility]. Patient reports he does not want to go back to [place] due to having a bad experience last time and would like to switch care to [facility]. Reports chills, denies fevers. No other complaints and ROS otherwise negative.   Of note patient was in ED, fox was placed with good urine output, UA was negative, He was discharged with out-pt follow up with GI, colorectal, and . He went home and had severe pain and came back to ED.    In ED vitals stable. CBC noted for WBC 11, H/H 12/38, Plt 365. CMP noted for K of 3, otherwise unremarkable. UA done yesterday appears sterile. CT abdomen with oral and IV contrast noted for "No definite cause for abdominal pain identified. No significant change in multiple complex perianal fistulas without associated abscess". S/p 1 dose zosyn, admitted to  for further care. (14 Jul 2024 02:57) c/w intermittent abd pain,   c/o unchanged rectal pain    Vital Signs Last 24 Hrs  T(C): 36.4 (24 Jul 2024 07:46), Max: 36.6 (23 Jul 2024 15:45)  T(F): 97.6 (24 Jul 2024 07:46), Max: 97.9 (24 Jul 2024 04:56)  HR: 81 (24 Jul 2024 11:53) (45 - 81)  BP: 97/67 (24 Jul 2024 11:53) (97/67 - 114/68)  BP(mean): 75 (24 Jul 2024 04:56) (75 - 76)  RR: 16 (24 Jul 2024 07:46) (16 - 18)  SpO2: 96% (24 Jul 2024 11:53) (96% - 100%)    Parameters below as of 24 Jul 2024 11:53  Patient On (Oxygen Delivery Method): room air        PHYSICAL EXAM:    Constitutional: NAD, awake and alert  HEENT: PERR, EOMI, Normal Hearing, MMM  Neck: Soft and supple  Respiratory: Breath sounds are clear bilaterally, No wheezing, rales or rhonchi  Cardiovascular: S1 and S2, regular rate and rhythm, no Murmurs, gallops or rubs  Gastrointestinal: Bowel Sounds present, soft, nontender, nondistended, no guarding, no rebound, ostomy in place  : Fox in place  Extremities: No peripheral edema  Neurological: A/O x 3, no focal deficits in my limited exam        CAPILLARY BLOOD GLUCOSE      POCT Blood Glucose.: 178 mg/dL (22 Jul 2024 20:50)  POCT Blood Glucose.: 178 mg/dL (22 Jul 2024 16:58)  POCT Blood Glucose.: 101 mg/dL (22 Jul 2024 12:05)        A/P:  38M admitted for possible infected perianal fistula.      *Crohn's disease / acute on chronic  perianal pain / perianal fistula:   -vitals stable, no leukocytosis, does not appear to be in a flare  -inflammatory markers noted  -CT abdomen with oral and IV con neg for any etiology for patients pain.   -repeat CT scan 7/20: Multiple perianal fistulous tracts.  Small left-sided ischioanal abscess.  -s/p zosyn day #7 --> change to oral Augmentin, allergic to Cipro  -surgery eval appreciated - no indication for surgical intervention at this time.   -surgery re-eval: again no recommendation for surgical intervention.   -ostomy in place, no issues  -dilaudid 1mg IV q4h --> q3h PRN severe pain  -stopped toradol, pt states "Does not do anything."  -started long acting oxycontin 10mg BID on 7/17 --> increased to 15mg BID on 7/20, I do suspect a certain degree of opioid dependence   increase to 30 bid    Repeat CT abd seek opinion at tertiary center      *Urinary retention:  resolved    * Type 2 diabetes mellitus:  -A1c 8.1  -Adjust insulin as needed.    * DVT px  -LMWH    Dispo:  -pain management  -void trial today    time spent 45 min

## 2024-07-25 LAB
ERYTHROCYTE [SEDIMENTATION RATE] IN BLOOD: 84 MM/HR — HIGH (ref 0–15)
GLUCOSE BLDC GLUCOMTR-MCNC: 137 MG/DL — HIGH (ref 70–99)
GLUCOSE BLDC GLUCOMTR-MCNC: 157 MG/DL — HIGH (ref 70–99)
GLUCOSE BLDC GLUCOMTR-MCNC: 181 MG/DL — HIGH (ref 70–99)
GLUCOSE BLDC GLUCOMTR-MCNC: 189 MG/DL — HIGH (ref 70–99)

## 2024-07-25 PROCEDURE — 99232 SBSQ HOSP IP/OBS MODERATE 35: CPT

## 2024-07-25 RX ADMIN — Medication 2: at 12:14

## 2024-07-25 RX ADMIN — Medication 2 MILLIGRAM(S): at 20:58

## 2024-07-25 RX ADMIN — Medication 2 MILLIGRAM(S): at 01:02

## 2024-07-25 RX ADMIN — Medication 2 MILLIGRAM(S): at 12:14

## 2024-07-25 RX ADMIN — OXYCODONE HYDROCHLORIDE 30 MILLIGRAM(S): 30 TABLET ORAL at 21:39

## 2024-07-25 RX ADMIN — Medication 3 UNIT(S): at 08:21

## 2024-07-25 RX ADMIN — Medication 3 UNIT(S): at 17:14

## 2024-07-25 RX ADMIN — Medication 2: at 17:13

## 2024-07-25 RX ADMIN — Medication 2 MILLIGRAM(S): at 03:11

## 2024-07-25 RX ADMIN — OXYCODONE HYDROCHLORIDE 30 MILLIGRAM(S): 30 TABLET ORAL at 21:09

## 2024-07-25 RX ADMIN — Medication 2 MILLIGRAM(S): at 20:28

## 2024-07-25 RX ADMIN — Medication 1 TABLET(S): at 10:16

## 2024-07-25 RX ADMIN — Medication 2 MILLIGRAM(S): at 15:45

## 2024-07-25 RX ADMIN — Medication 3 UNIT(S): at 12:15

## 2024-07-25 RX ADMIN — Medication 10 MILLIGRAM(S): at 06:00

## 2024-07-25 RX ADMIN — Medication 2 MILLIGRAM(S): at 07:17

## 2024-07-25 RX ADMIN — Medication 2 MILLIGRAM(S): at 08:23

## 2024-07-25 RX ADMIN — Medication 1 TABLET(S): at 20:28

## 2024-07-25 RX ADMIN — Medication 10 MILLIGRAM(S): at 12:15

## 2024-07-25 RX ADMIN — Medication 10 MILLIGRAM(S): at 17:15

## 2024-07-25 RX ADMIN — MIDODRINE HYDROCHLORIDE 10 MILLIGRAM(S): 2.5 TABLET ORAL at 17:14

## 2024-07-25 RX ADMIN — Medication 2 MILLIGRAM(S): at 01:32

## 2024-07-25 RX ADMIN — MIDODRINE HYDROCHLORIDE 10 MILLIGRAM(S): 2.5 TABLET ORAL at 06:00

## 2024-07-25 RX ADMIN — Medication 10 MILLIGRAM(S): at 20:28

## 2024-07-25 RX ADMIN — OXYCODONE HYDROCHLORIDE 30 MILLIGRAM(S): 30 TABLET ORAL at 10:17

## 2024-07-25 RX ADMIN — MIDODRINE HYDROCHLORIDE 10 MILLIGRAM(S): 2.5 TABLET ORAL at 12:16

## 2024-07-25 RX ADMIN — INSULIN GLARGINE-YFGN 5 UNIT(S): 100 INJECTION, SOLUTION SUBCUTANEOUS at 21:09

## 2024-07-25 NOTE — CHART NOTE - NSCHARTNOTEFT_GEN_A_CORE
Spoke with Dr Almaraz and she said no need for surgery to see the patient and she already talked to the colorectal attending.

## 2024-07-25 NOTE — PROVIDER CONTACT NOTE (OTHER) - SITUATION
pt. admitted for abd pain, medicated with IV dilaudid and PO dilaudid. pt c/o of sever pain, No PRN medication due at the time.

## 2024-07-25 NOTE — PROGRESS NOTE ADULT - SUBJECTIVE AND OBJECTIVE BOX
Patient is a 33y old  Male who presents with a chief complaint of Abdominal Pain. (27 Aug 2018 12:04)        HPI:  33 year old man with PMHx of Crohn's (not on any meds at home), perianal fistula repair in , hx of pancreatitis presents with worsening abdominal pain since last night.  Pt states he was getting ready to go to work today but had persistent abdominal pain since last night, worsening so he came to ED for further evaluation.  Per pt he is not on any meds and has not made any dietary changes or took any new meds over last several days.   He admits to occasional alcohol use but no binge episodes recently.  He states he has associated chills but no fever, nausea or vomiting.  No new changes in bowel movements.      In ED: Found to have pancreatitis, IVFs and supportive care given. (27 Aug 2018 12:04)      Review of system- All 10 systems reviewed and is as per HPI otherwise negative.           T(C): 36.8 (18 @ 11:22), Max: 36.8 (18 @ 23:30)  HR: 102 (18 @ 11:22) (77 - 102)  BP: 113/73 (18 @ 11:22) (113/73 - 138/85)  RR: 19 (18 @ 11:22) (16 - 19)  SpO2: 99% (18 @ 11:22) (96% - 99%)  Wt(kg): --    PHYSICAL EXAM:    GENERAL: Comfortable, no acute distress  HEAD:  Atraumatic, Normocephalic  EYES: EOMI, PERRLA  HEENT: Moist mucous membranes  NECK: Supple, No JVD  NERVOUS SYSTEM:  Alert & Oriented X3, Motor Strength 5/5 B/L upper and lower extremities  CHEST/LUNG: Clear to auscultation bilaterally  HEART: Regular rate and rhythm; No murmurs, rubs, or gallops  ABDOMEN: Soft, Nontender, Nondistended; Bowel sounds present  GENITOURINARY- Voiding, no palpable bladder  EXTREMITIES:  No clubbing, cyanosis, or edema  MUSCULOSKELTAL- No muscle tenderness, No joint tenderness  SKIN-no rash        LABS:                        15.1   15.27 )-----------( 291      ( 28 Aug 2018 05:25 )             43.8         138  |  105  |  5<L>  ----------------------------<  59<L>  4.1   |  23  |  0.58    Ca    8.2<L>      28 Aug 2018 05:25    TPro  7.1  /  Alb  2.9<L>  /  TBili  0.7  /  DBili  x   /  AST  16  /  ALT  20  /  AlkPhos  95      PT/INR - ( 27 Aug 2018 07:00 )   PT: 11.2 sec;   INR: 1.04 ratio         PTT - ( 27 Aug 2018 07:00 )  PTT:26.3 sec  Urinalysis Basic - ( 27 Aug 2018 09:53 )    Color: Yellow / Appearance: Clear / S.005 / pH: x  Gluc: x / Ketone: Negative  / Bili: Negative / Urobili: Negative mg/dL   Blood: x / Protein: Negative mg/dL / Nitrite: Negative   Leuk Esterase: Negative / RBC: x / WBC x   Sq Epi: x / Non Sq Epi: x / Bacteria: x      MEDS  acetaminophen   Tablet. 650 milliGRAM(s) Oral every 6 hours PRN  HYDROmorphone  Injectable 1 milliGRAM(s) IV Push every 3 hours PRN  melatonin 3 milliGRAM(s) Oral at bedtime  ondansetron Injectable 8 milliGRAM(s) IV Push every 6 hours PRN  sodium chloride 0.9% 1000 milliLiter(s) IV Continuous <Continuous>    ASSESSMENT AND PLAN:  33 year old man with Crohn's p/w abdominal pain secondary to acute pancreatitis.     1. Acute pancreatitis  -Denies ETOH use/recreational drug use/prescription med use/otc med use.   -CT with no gb pathology  -f/u sono  -npo  -ivf  -pain control  -antiemetics.  -GI following.     2. Crohn's with chronic distal enteritis and right-sided perianal fistulae:  -will need outpt f/u   -no active symptoms at present.     3.DVT px
Patient is a 33y old  Male who presents with a chief complaint of Abdominal Pain. (27 Aug 2018 12:04)      HPI:  pt feeling much better today with significantly decreased pain  tolerated clears last pm    PAST MEDICAL & SURGICAL HISTORY:  Crohn's disease of large intestine without complication: (No current flare up)  Perianal fistula: repair in 2002  History of colonoscopy: (2014)  S/P ORIF (open reduction internal fixation) fracture: (Right ankle, 2014)    MEDICATIONS  (STANDING):  buDESOnide    EC Capsule 9 milliGRAM(s) Oral <User Schedule>  melatonin 3 milliGRAM(s) Oral at bedtime  sodium chloride 0.9% 1000 milliLiter(s) (150 mL/Hr) IV Continuous <Continuous>    MEDICATIONS  (PRN):  acetaminophen   Tablet. 650 milliGRAM(s) Oral every 6 hours PRN Moderate Pain (4 - 6)  HYDROmorphone  Injectable 1 milliGRAM(s) IV Push every 3 hours PRN Severe Pain (7 - 10)  ondansetron Injectable 8 milliGRAM(s) IV Push every 6 hours PRN Nausea and/or Vomiting    Allergies    No Known Allergies    Intolerances    REVIEW OF SYSTEMS:    CONSTITUTIONAL: No weakness, fevers or chills  RESPIRATORY: No cough, wheezing, hemoptysis; No shortness of breath  CARDIOVASCULAR: No chest pain or palpitations  GASTROINTESTINAL: as above , is hungry  All other review of systems is negative unless indicated above.    Vital Signs Last 24 Hrs  T(C): 36.8 (29 Aug 2018 21:58), Max: 36.8 (29 Aug 2018 17:17)  T(F): 98.3 (29 Aug 2018 21:58), Max: 98.3 (29 Aug 2018 17:17)  HR: 64 (29 Aug 2018 21:58) (64 - 77)  BP: 107/57 (29 Aug 2018 21:58) (107/57 - 123/75)  BP(mean): 86 (29 Aug 2018 11:19) (86 - 86)  RR: 16 (29 Aug 2018 21:58) (16 - 16)  SpO2: 95% (29 Aug 2018 21:58) (95% - 100%)    PHYSICAL EXAM:    Constitutional: NAD, well-developed  Respiratory: CTAB  Cardiovascular: S1 and S2, RRR, no M/G/R  Gastrointestinal: BS+, soft, minimal right-sided tenderness      LABS:                        13.6   9.02  )-----------( 282      ( 30 Aug 2018 06:09 )             39.2     08-30    139  |  105  |  3<L>  ----------------------------<  81  4.0   |  24  |  0.49<L>    Ca    8.5      30 Aug 2018 06:09  Phos  2.1     08-30  Mg     2.0     08-30    TPro  6.4  /  Alb  2.6<L>  /  TBili  0.5  /  DBili  x   /  AST  12<L>  /  ALT  15  /  AlkPhos  77  08-29      LIVER FUNCTIONS - ( 29 Aug 2018 06:24 )  Alb: 2.6 g/dL / Pro: 6.4 gm/dL / ALK PHOS: 77 U/L / ALT: 15 U/L / AST: 12 U/L / GGT: x             RADIOLOGY & ADDITIONAL STUDIES:
Patient is a 33y old  Male who presents with a chief complaint of Abdominal Pain. (27 Aug 2018 12:04)    HPI:  33 year old man with PMHx of Crohn's (not on any meds at home), perianal fistula repair in 2002, hx of pancreatitis presents with worsening abdominal pain since last night.  Pt states he was getting ready to go to work today but had persistent abdominal pain since last night, worsening so he came to ED for further evaluation.  Per pt he is not on any meds and has not made any dietary changes or took any new meds over last several days.   He admits to occasional alcohol use but no binge episodes recently.  He states he has associated chills but no fever, nausea or vomiting.  No new changes in bowel movements.      In ED: Found to have pancreatitis, IVFs and supportive care given. (27 Aug 2018 12:04)    8/28/2018-  Pt still in pain and nausea.  C/o right sided periumbilical pain with radiation to right abdominal wall.   NPO going for sonogram today.    PAST MEDICAL & SURGICAL HISTORY:  Crohn's disease of large intestine without complication: (No current flare up)  Perianal fistula: repair in 2002  History of colonoscopy: (2014)  S/P ORIF (open reduction internal fixation) fracture: (Right ankle, 2014)    MEDICATIONS  (STANDING):  melatonin 3 milliGRAM(s) Oral at bedtime  sodium chloride 0.9% 1000 milliLiter(s) (200 mL/Hr) IV Continuous <Continuous>    MEDICATIONS  (PRN):  acetaminophen   Tablet. 650 milliGRAM(s) Oral every 6 hours PRN Moderate Pain (4 - 6)  HYDROmorphone  Injectable 1 milliGRAM(s) IV Push every 4 hours PRN Severe Pain (7 - 10)  ondansetron Injectable 4 milliGRAM(s) IV Push every 6 hours PRN Nausea and/or Vomiting    Allergies  No Known Allergies    FAMILY HISTORY:  Diabetes mellitus (Father)    REVIEW OF SYSTEMS:  CONSTITUTIONAL: No weakness, fevers or chills  RESPIRATORY: No cough, wheezing, hemoptysis; No shortness of breath  CARDIOVASCULAR: No chest pain or palpitations  GASTROINTESTINAL: Per HPI.   GENITOURINARY: No dysuria, frequency or hematuria  NEUROLOGICAL: No numbness or weakness  SKIN: No itching, burning, rashes, or lesions   PSYCH: Normal mood and affect    All other review of systems is negative unless indicated above.  Vital Signs Last 24 Hrs  T(C): 36.7 (28 Aug 2018 05:31), Max: 36.8 (27 Aug 2018 23:30)  T(F): 98.1 (28 Aug 2018 05:31), Max: 98.2 (27 Aug 2018 23:30)  HR: 80 (28 Aug 2018 05:31) (77 - 91)  BP: 131/67 (28 Aug 2018 05:31) (130/81 - 138/85)  BP(mean): --  RR: 18 (28 Aug 2018 05:31) (16 - 18)  SpO2: 98% (28 Aug 2018 05:31) (96% - 99%)    PHYSICAL EXAM:  Constitutional: NAD, well-developed  Respiratory: CTAB  Cardiovascular: S1 and S2, RRR, no M/G/R  Gastrointestinal: Right sided periumbilical tenderness, mildly distended, +BS.   Extremities: No peripheral edema  Vascular: 2+ peripheral pulses  Neurological: A/O x 3, no focal deficits  Psychiatric: Normal mood, normal affect  Skin: No rashes    LABS:                        15.1   15.27 )-----------( 291      ( 28 Aug 2018 05:25 )             43.8     08-28    138  |  105  |  5<L>  ----------------------------<  59<L>  4.1   |  23  |  0.58    Ca    8.2<L>      28 Aug 2018 05:25    TPro  7.1  /  Alb  2.9<L>  /  TBili  0.7  /  DBili  x   /  AST  16  /  ALT  20  /  AlkPhos  95  08-28    PT/INR - ( 27 Aug 2018 07:00 )   PT: 11.2 sec;   INR: 1.04 ratio         PTT - ( 27 Aug 2018 07:00 )  PTT:26.3 sec  LIVER FUNCTIONS - ( 28 Aug 2018 05:25 )  Alb: 2.9 g/dL / Pro: 7.1 gm/dL / ALK PHOS: 95 U/L / ALT: 20 U/L / AST: 16 U/L / GGT: x             RADIOLOGY & ADDITIONAL STUDIES:
Patient is a 33y old  Male who presents with a chief complaint of Abdominal Pain. (27 Aug 2018 12:04)    HPI:  33 year old man with PMHx of Crohn's (not on any meds at home), perianal fistula repair in 2002, hx of pancreatitis presents with worsening abdominal pain since last night.  Pt states he was getting ready to go to work today but had persistent abdominal pain since last night, worsening so he came to ED for further evaluation.  Per pt he is not on any meds and has not made any dietary changes or took any new meds over last several days.   He admits to occasional alcohol use but no binge episodes recently.  He states he has associated chills but no fever, nausea or vomiting.  No new changes in bowel movements.      In ED: Found to have pancreatitis, IVFs and supportive care given. (27 Aug 2018 12:04)    8/29/2018-  Pt with 9/10 right sharp periumbilical pain with radiation.  + nausea, with no improvement from admission.  Last BM was Monday.  Denies any emesis, rectal bleeding, or melena.     PAST MEDICAL & SURGICAL HISTORY:  Crohn's disease of large intestine without complication: (No current flare up)  Perianal fistula: repair in 2002  History of colonoscopy: (2014)  S/P ORIF (open reduction internal fixation) fracture: (Right ankle, 2014)    MEDICATIONS  (STANDING):  melatonin 3 milliGRAM(s) Oral at bedtime  sodium chloride 0.9% 1000 milliLiter(s) (150 mL/Hr) IV Continuous <Continuous>    MEDICATIONS  (PRN):  acetaminophen   Tablet. 650 milliGRAM(s) Oral every 6 hours PRN Moderate Pain (4 - 6)  HYDROmorphone  Injectable 1 milliGRAM(s) IV Push every 3 hours PRN Severe Pain (7 - 10)  ondansetron Injectable 8 milliGRAM(s) IV Push every 6 hours PRN Nausea and/or Vomiting    Allergies  NKA    FAMILY HISTORY:  Diabetes mellitus (Father)    REVIEW OF SYSTEMS:  CONSTITUTIONAL: No weakness, fevers or chills  RESPIRATORY: No cough, wheezing, hemoptysis; No shortness of breath  CARDIOVASCULAR: No chest pain or palpitations  GASTROINTESTINAL: Per HPI. t  All other review of systems is negative unless indicated above.    Vital Signs Last 24 Hrs  T(C): 36.7 (29 Aug 2018 05:10), Max: 37.1 (28 Aug 2018 17:13)  T(F): 98 (29 Aug 2018 05:10), Max: 98.8 (28 Aug 2018 17:13)  HR: 69 (29 Aug 2018 05:10) (69 - 102)  BP: 101/57 (29 Aug 2018 05:10) (101/57 - 113/73)  BP(mean): 82 (28 Aug 2018 11:22) (82 - 82)  RR: 18 (29 Aug 2018 05:10) (18 - 19)  SpO2: 96% (29 Aug 2018 05:10) (96% - 99%)    PHYSICAL EXAM:  Constitutional: NAD, well-developed  Respiratory: CTAB  Cardiovascular: S1 and S2, RRR, no M/G/R  Gastrointestinal: Right periumbilical abdominal tenderness, BS+, soft    LABS:                        13.6   10.70 )-----------( 263      ( 29 Aug 2018 06:24 )             39.4     08-29    135  |  106  |  7   ----------------------------<  49<L>  4.8   |  22  |  0.65    Ca    8.2<L>      29 Aug 2018 06:24  Phos  2.4     08-29  Mg     2.1     08-29    TPro  6.4  /  Alb  2.6<L>  /  TBili  0.5  /  DBili  x   /  AST  12<L>  /  ALT  15  /  AlkPhos  77  08-29      LIVER FUNCTIONS - ( 29 Aug 2018 06:24 )  Alb: 2.6 g/dL / Pro: 6.4 gm/dL / ALK PHOS: 77 U/L / ALT: 15 U/L / AST: 12 U/L / GGT: x             RADIOLOGY & ADDITIONAL STUDIES:
Patient is a 33y old  Male who presents with a chief complaint of Abdominal Pain. (27 Aug 2018 12:04)    HPI:  33 year old man with PMHx of Crohn's (not on any meds at home), perianal fistula repair in 2002, hx of pancreatitis presents with worsening abdominal pain since last night.  Pt states he was getting ready to go to work today but had persistent abdominal pain since last night, worsening so he came to ED for further evaluation.  Per pt he is not on any meds and has not made any dietary changes or took any new meds over last several days.   He admits to occasional alcohol use but no binge episodes recently.  He states he has associated chills but no fever, nausea or vomiting.  No new changes in bowel movements.      In ED: Found to have pancreatitis, IVFs and supportive care given. (27 Aug 2018 12:04)    8/31/2018-  Pt improving.  Less abdominal pain.  x1 episode of emesis last night.  Denies any n/v, rectal bleeding, or melena.     PAST MEDICAL & SURGICAL HISTORY:  Crohn's disease of large intestine without complication: (No current flare up)  Perianal fistula: repair in 2002  History of colonoscopy: (2014)  S/P ORIF (open reduction internal fixation) fracture: (Right ankle, 2014)    MEDICATIONS  (STANDING):  buDESOnide    EC Capsule 9 milliGRAM(s) Oral <User Schedule>  melatonin 3 milliGRAM(s) Oral at bedtime  sodium chloride 0.9% 1000 milliLiter(s) (150 mL/Hr) IV Continuous <Continuous>    MEDICATIONS  (PRN):  acetaminophen   Tablet. 650 milliGRAM(s) Oral every 6 hours PRN Moderate Pain (4 - 6)  HYDROmorphone  Injectable 1 milliGRAM(s) IV Push every 3 hours PRN Severe Pain (7 - 10)  ondansetron Injectable 8 milliGRAM(s) IV Push every 6 hours PRN Nausea and/or Vomiting    Allergies  No Known Allergies    FAMILY HISTORY:  Diabetes mellitus (Father)    REVIEW OF SYSTEMS:  CONSTITUTIONAL: No weakness, fevers or chills  RESPIRATORY: No cough, wheezing, hemoptysis; No shortness of breath  CARDIOVASCULAR: No chest pain or palpitations  GASTROINTESTINAL: Per HPI.   All other review of systems is negative unless indicated above.    Vital Signs Last 24 Hrs  T(C): 36.7 (30 Aug 2018 23:26), Max: 37.1 (30 Aug 2018 17:03)  T(F): 98.1 (30 Aug 2018 23:26), Max: 98.8 (30 Aug 2018 17:03)  HR: 66 (30 Aug 2018 23:26) (66 - 67)  BP: 136/79 (30 Aug 2018 23:26) (125/81 - 136/79)  BP(mean): 85 (30 Aug 2018 11:31) (85 - 85)  RR: 16 (30 Aug 2018 23:26) (16 - 16)  SpO2: 96% (30 Aug 2018 23:26) (96% - 99%)    PHYSICAL EXAM:  Constitutional: NAD, well-developed  Respiratory: CTAB  Cardiovascular: S1 and S2, RRR, no M/G/R  Gastrointestinal: Improving right sided paraumbilical tenderness, ND, +BS.     LABS:                        13.1   9.59  )-----------( 278      ( 31 Aug 2018 06:07 )             37.8     08-31    138  |  105  |  3<L>  ----------------------------<  111<H>  3.9   |  27  |  0.64    Ca    8.7      31 Aug 2018 06:07  Phos  3.1     08-31  Mg     1.8     08-31            RADIOLOGY & ADDITIONAL STUDIES:
Pt's CT scan shows worsening ileitis with improving pancreatitis  Pt with persistent discomfort and will start oral budesonide 9mg total daily - d/w pharmacy as nonformulary - to start treatment of his Crohn's disease
c/c: abdominal pain    HPI:  33 year old man with PMHx of Crohn's (not on any meds at home), perianal fistula repair in 2002, hx of pancreatitis presents with worsening abdominal pain since last night.  Pt states he was getting ready to go to work today but had persistent abdominal pain since last night, worsening so he came to ED for further evaluation.  Per pt he is not on any meds and has not made any dietary changes or took any new meds over last several days.   He admits to occasional alcohol use but no binge episodes recently.  He states he has associated chills but no fever, nausea or vomiting.  No new changes in bowel movements.      He was admitted with acute pancreatitis. Kept NPO/Given IVF/pain medications     8/29: pt seen and examined this am. Still with 9/10 pain. No bm. +nausea, no vomiting.    ROS: all 10 systems reviewed and is as above otherwise negative.    Vital Signs Last 24 Hrs  T(C): 36.7 (29 Aug 2018 11:19), Max: 37.1 (28 Aug 2018 17:13)  T(F): 98.1 (29 Aug 2018 11:19), Max: 98.8 (28 Aug 2018 17:13)  HR: 75 (29 Aug 2018 11:19) (69 - 94)  BP: 120/78 (29 Aug 2018 11:19) (101/57 - 120/78)  BP(mean): 86 (29 Aug 2018 11:19) (86 - 86)  RR: 16 (29 Aug 2018 11:19) (16 - 18)  SpO2: 100% (29 Aug 2018 11:19) (96% - 100%)    PHYSICAL EXAM:    GENERAL: Comfortable, no acute distress  HEAD:  Atraumatic, Normocephalic  EYES: EOMI, PERRLA  HEENT: Moist mucous membranes  NECK: Supple, No JVD  NERVOUS SYSTEM:  Alert & Oriented X3, Motor Strength 5/5 B/L upper and lower extremities  CHEST/LUNG: Clear to auscultation bilaterally  HEART: Regular rate and rhythm; No murmurs, rubs, or gallops  ABDOMEN: Soft, +epigastric tenderness  GENITOURINARY- Voiding, no palpable bladder  EXTREMITIES:  No clubbing, cyanosis, or edema  MUSCULOSKELTAL- No muscle tenderness, No joint tenderness  SKIN-no rash      LABS:                        13.6   10.70 )-----------( 263      ( 29 Aug 2018 06:24 )             39.4     08-29    135  |  106  |  7   ----------------------------<  49<L>  4.8   |  22  |  0.65    Ca    8.2<L>      29 Aug 2018 06:24  Phos  2.4     08-29  Mg     2.1     08-29    TPro  6.4  /  Alb  2.6<L>  /  TBili  0.5  /  DBili  x   /  AST  12<L>  /  ALT  15  /  AlkPhos  77  08-29        MEDS  acetaminophen   Tablet. 650 milliGRAM(s) Oral every 6 hours PRN  HYDROmorphone  Injectable 1 milliGRAM(s) IV Push every 3 hours PRN  melatonin 3 milliGRAM(s) Oral at bedtime  ondansetron Injectable 8 milliGRAM(s) IV Push every 6 hours PRN  sodium chloride 0.9% 1000 milliLiter(s) IV Continuous <Continuous>    ASSESSMENT AND PLAN:  33 year old man with Crohn's p/w abdominal pain secondary to acute pancreatitis.     1. Acute pancreatitis  -Denies ETOH use/recreational drug use/prescription med use/otc med use.   -CT/sono with no gb pathology  -for repeat CT today per GI.  -npo  -ivf  -pain control  -antiemetics.    2. Crohn's with chronic distal enteritis and right-sided perianal fistulae:  -will need outpt f/u   -no active symptoms at present.     3.DVT px
c/c: abdominal pain    HPI:  33 year old man with PMHx of Crohn's (not on any meds at home), perianal fistula repair in 2002, hx of pancreatitis presents with worsening abdominal pain since last night.  Pt states he was getting ready to go to work today but had persistent abdominal pain since last night, worsening so he came to ED for further evaluation.  Per pt he is not on any meds and has not made any dietary changes or took any new meds over last several days.   He admits to occasional alcohol use but no binge episodes recently.  He states he has associated chills but no fever, nausea or vomiting.  No new changes in bowel movements.      He was admitted with acute pancreatitis. Kept NPO/Given IVF/pain medications. Due to persistent pain, underwent repeat CT a/p which showed worsening ileitis c/w crohn's.    8/30: pt seen and examined this am. Feeling much better. Hungry. Willing to try advanced diet. +liquid stools, no blood      ROS: all 10 systems reviewed and is as above otherwise negative.    Vital Signs Last 24 Hrs  T(C): 36.7 (30 Aug 2018 11:31), Max: 36.8 (29 Aug 2018 17:17)  T(F): 98 (30 Aug 2018 11:31), Max: 98.3 (29 Aug 2018 17:17)  HR: 67 (30 Aug 2018 11:31) (64 - 77)  BP: 133/69 (30 Aug 2018 11:31) (107/57 - 133/69)  BP(mean): 85 (30 Aug 2018 11:31) (85 - 85)  RR: 16 (30 Aug 2018 11:31) (16 - 16)  SpO2: 98% (30 Aug 2018 11:31) (95% - 100%)    PHYSICAL EXAM:    GENERAL: Comfortable, no acute distress  HEAD:  Atraumatic, Normocephalic  EYES: EOMI, PERRLA  HEENT: Moist mucous membranes  NECK: Supple, No JVD  NERVOUS SYSTEM:  Alert & Oriented X3, Motor Strength 5/5 B/L upper and lower extremities  CHEST/LUNG: Clear to auscultation bilaterally  HEART: Regular rate and rhythm; No murmurs, rubs, or gallops  ABDOMEN: Soft, RLQ tenderness, no guarding/rigidity, bs+  GENITOURINARY- Voiding, no palpable bladder  EXTREMITIES:  No clubbing, cyanosis, or edema  MUSCULOSKELTAL- No muscle tenderness, No joint tenderness  SKIN-no rash    LABS:                        13.6   9.02  )-----------( 282      ( 30 Aug 2018 06:09 )             39.2     08-30    139  |  105  |  3<L>  ----------------------------<  81  4.0   |  24  |  0.49<L>    Ca    8.5      30 Aug 2018 06:09  Phos  2.1     08-30  Mg     2.0     08-30    TPro  6.4  /  Alb  2.6<L>  /  TBili  0.5  /  DBili  x   /  AST  12<L>  /  ALT  15  /  AlkPhos  77  08-29    MEDICATIONS  (STANDING):  buDESOnide    EC Capsule 9 milliGRAM(s) Oral <User Schedule>  melatonin 3 milliGRAM(s) Oral at bedtime  potassium phosphate / sodium phosphate powder 2 Packet(s) Oral once  sodium chloride 0.9% 1000 milliLiter(s) (150 mL/Hr) IV Continuous <Continuous>    MEDICATIONS  (PRN):  acetaminophen   Tablet. 650 milliGRAM(s) Oral every 6 hours PRN Moderate Pain (4 - 6)  HYDROmorphone  Injectable 1 milliGRAM(s) IV Push every 3 hours PRN Severe Pain (7 - 10)  ondansetron Injectable 8 milliGRAM(s) IV Push every 6 hours PRN Nausea and/or Vomiting    ASSESSMENT AND PLAN:  33 year old man with Crohn's p/w abdominal pain secondary to acute pancreatitis.     1. Acute pancreatitis  -Denies ETOH use/recreational drug use/prescription med use/otc med use.   -CT/sono with no gb pathology  -improving  -advance diet  -Decrease ivf.    3. Crohn's with chronic distal enteritis and right-sided perianal fistulae:  -slight worsening of ileitis on CT scan  -started on entocort    4.DVT px
[FreeTextEntry3] : Large joint injection was performed of the left knee. The indication for this procedure was pain, inflammation and x-ray evidence of Osteoarthritis on this or prior visit. The site was prepped with alcohol. An injection of Betamethasone (Celestone) 1cc of 3mg, Lidocaine 7cc of 1%  was used. Patient tolerated procedure well. Patient was advised to call if redness, pain or fever occur and apply ice for 15 minutes out of every hour for the next 12-24 hours as tolerated. Patient has tried OTC's including aspirin, Ibuprofen, Aleve, etc or prescription NSAIDS, and/or exercises at home and/or physical therapy without satisfactory response, patient had decreased mobility in the joint and the risks benefits, and alternatives have been discussed, and verbal consent was obtained.

## 2024-07-25 NOTE — PROGRESS NOTE ADULT - ASSESSMENT
Severe Fistulizing Crohn's disease  ? Non compliance ,Adverse affects and Insurance problems with Remicaid,Stelara and Humira  Plan  Check if the Pts insurance covers Risankizumab/ Skyrizi  Will follow

## 2024-07-25 NOTE — PROGRESS NOTE ADULT - SUBJECTIVE AND OBJECTIVE BOX
Chief complaint of Urinary obstruction (14 Jul 2024 15:36)      HPI:  38M w/ h/o DM, Crohns disease, s/p colostomy, hx of bilateral Seton placement for anal fistula drainage 6 wks ago at [place] presents with abdominal pain and anuria. Patient reports he used to be on biologics for chrons, but they were stopped due to recurrent fistulas and infections. Had seton placement for anal fistula 6 months ago, and reports abdominal pain worsening, especially with [facility] Has had green discharge from the rectum. For past 3 days, pain worsened and was unable to have any urine output and came to [facility]. Patient reports he does not want to go back to [place] due to having a bad experience last time and would like to switch care to [facility]. Reports chills, denies fevers. No other complaints and ROS otherwise negative.   Of note patient was in ED, fox was placed with good urine output, UA was negative, He was discharged with out-pt follow up with GI, colorectal, and . He went home and had severe pain and came back to ED.    In ED vitals stable. CBC noted for WBC 11, H/H 12/38, Plt 365. CMP noted for K of 3, otherwise unremarkable. UA done yesterday appears sterile. CT abdomen with oral and IV contrast noted for "No definite cause for abdominal pain identified. No significant change in multiple complex perianal fistulas without associated abscess". S/p 1 dose zosyn, admitted to  for further care. (14 Jul 2024 02:57) c/w intermittent abd pain,   c/o unchanged rectal pain    Vital Signs Last 24 Hrs  T(C): 36.9 (25 Jul 2024 05:14), Max: 36.9 (24 Jul 2024 17:10)  T(F): 98.4 (25 Jul 2024 05:14), Max: 98.5 (24 Jul 2024 17:10)  HR: 66 (25 Jul 2024 05:14) (66 - 81)  BP: 91/57 (25 Jul 2024 05:14) (91/57 - 127/71)  BP(mean): 64 (25 Jul 2024 05:14) (64 - 64)  RR: 16 (25 Jul 2024 05:14) (16 - 18)  SpO2: 99% (25 Jul 2024 05:14) (95% - 100%)    Parameters below as of 25 Jul 2024 05:14  Patient On (Oxygen Delivery Method): room air    CT unchanged        PHYSICAL EXAM:    Constitutional: NAD, awake and alert  HEENT: PERR, EOMI, Normal Hearing, MMM  Neck: Soft and supple  Respiratory: Breath sounds are clear bilaterally, No wheezing, rales or rhonchi  Cardiovascular: S1 and S2, regular rate and rhythm, no Murmurs, gallops or rubs  Gastrointestinal: Bowel Sounds present, soft, nontender, nondistended, no guarding, no rebound, ostomy in place  : Fox in place  Extremities: No peripheral edema  Neurological: A/O x 3, no focal deficits in my limited exam        CAPILLARY BLOOD GLUCOSE      POCT Blood Glucose.: 178 mg/dL (22 Jul 2024 20:50)  POCT Blood Glucose.: 178 mg/dL (22 Jul 2024 16:58)  POCT Blood Glucose.: 101 mg/dL (22 Jul 2024 12:05)        A/P:  38M admitted for possible infected perianal fistula.      *Crohn's disease / acute on chronic  perianal pain / perianal fistula:   -vitals stable, no leukocytosis, does not appear to be in a flare  chronic findings- fistulae, no surgical intervention pt c/o constant pain      *Urinary retention:  resolved    * Type 2 diabetes mellitus:  -A1c 8.1  -Adjust insulin as needed.    * DVT px  -LMWH    Dispo:  -pain management  -void trial today    time spent 45 min   Chief complaint of Urinary obstruction (14 Jul 2024 15:36)      HPI:  38M w/ h/o DM, Crohns disease, s/p colostomy, hx of bilateral Seton placement for anal fistula drainage 6 wks ago at [place] presents with abdominal pain and anuria. Patient reports he used to be on biologics for chrons, but they were stopped due to recurrent fistulas and infections. Had seton placement for anal fistula 6 months ago, and reports abdominal pain worsening, especially with [facility] Has had green discharge from the rectum. For past 3 days, pain worsened and was unable to have any urine output and came to [facility]. Patient reports he does not want to go back to [place] due to having a bad experience last time and would like to switch care to [facility]. Reports chills, denies fevers. No other complaints and ROS otherwise negative.   Of note patient was in ED, fox was placed with good urine output, UA was negative, He was discharged with out-pt follow up with GI, colorectal, and . He went home and had severe pain and came back to ED.    In ED vitals stable. CBC noted for WBC 11, H/H 12/38, Plt 365. CMP noted for K of 3, otherwise unremarkable. UA done yesterday appears sterile. CT abdomen with oral and IV contrast noted for "No definite cause for abdominal pain identified. No significant change in multiple complex perianal fistulas without associated abscess". S/p 1 dose zosyn, admitted to  for further care. (14 Jul 2024 02:57) c/w intermittent abd pain,   c/o unchanged rectal pain and pus like dc from fistula, persistent induration    Vital Signs Last 24 Hrs  T(C): 36.9 (25 Jul 2024 05:14), Max: 36.9 (24 Jul 2024 17:10)  T(F): 98.4 (25 Jul 2024 05:14), Max: 98.5 (24 Jul 2024 17:10)  HR: 66 (25 Jul 2024 05:14) (66 - 81)  BP: 91/57 (25 Jul 2024 05:14) (91/57 - 127/71)  BP(mean): 64 (25 Jul 2024 05:14) (64 - 64)  RR: 16 (25 Jul 2024 05:14) (16 - 18)  SpO2: 99% (25 Jul 2024 05:14) (95% - 100%)    Parameters below as of 25 Jul 2024 05:14  Patient On (Oxygen Delivery Method): room air    CT unchanged        PHYSICAL EXAM:    Constitutional: NAD, awake and alert  HEENT: PERR, EOMI, Normal Hearing, MMM  Neck: Soft and supple  Respiratory: Breath sounds are clear bilaterally, No wheezing, rales or rhonchi  Cardiovascular: S1 and S2, regular rate and rhythm, no Murmurs, gallops or rubs  Gastrointestinal: Bowel Sounds present, soft, nontender, nondistended, no guarding, no rebound, ostomy in place arianal fitula with purulent dc  Extremities: No peripheral edema        CAPILLARY BLOOD GLUCOSE      POCT Blood Glucose.: 178 mg/dL (22 Jul 2024 20:50)  POCT Blood Glucose.: 178 mg/dL (22 Jul 2024 16:58)  POCT Blood Glucose.: 101 mg/dL (22 Jul 2024 12:05)        A/P:  38M admitted for possible infected perianal fistula.      *Crohn's disease / acute on chronic  perianal pain / perianal fistula:   -vitals stable, no leukocytosis, does not appear to be in a flare  chronic findings- fistulae,   reconsult surgery, to d/w GI is TNF is an option       *Urinary retention:  resolved    * Type 2 diabetes mellitus:  -A1c 8.1  -Adjust insulin as needed.    * DVT px  -LMWH    Dispo:  -pain management  -void trial today    time spent 45 min

## 2024-07-25 NOTE — PROGRESS NOTE ADULT - SUBJECTIVE AND OBJECTIVE BOX
Interval History:No change    MEDICATIONS  (STANDING):  amoxicillin  875 milliGRAM(s)/clavulanate 1 Tablet(s) Oral two times a day  dextrose 5%. 1000 milliLiter(s) (50 mL/Hr) IV Continuous <Continuous>  dextrose 5%. 1000 milliLiter(s) (100 mL/Hr) IV Continuous <Continuous>  dextrose 50% Injectable 25 Gram(s) IV Push once  dextrose 50% Injectable 25 Gram(s) IV Push once  dextrose 50% Injectable 12.5 Gram(s) IV Push once  dicyclomine 10 milliGRAM(s) Oral four times a day before meals  enoxaparin Injectable 40 milliGRAM(s) SubCutaneous every 24 hours  glucagon  Injectable 1 milliGRAM(s) IntraMuscular once  insulin glargine Injectable (LANTUS) 5 Unit(s) SubCutaneous at bedtime  insulin lispro (ADMELOG) corrective regimen sliding scale   SubCutaneous three times a day before meals  insulin lispro (ADMELOG) corrective regimen sliding scale   SubCutaneous at bedtime  insulin lispro Injectable (ADMELOG) 3 Unit(s) SubCutaneous three times a day before meals  midodrine. 10 milliGRAM(s) Oral three times a day  naloxone Injectable 0.4 milliGRAM(s) IV Push once  oxyCODONE  ER Tablet 30 milliGRAM(s) Oral every 12 hours  polyethylene glycol 3350 17 Gram(s) Oral daily  senna 2 Tablet(s) Oral at bedtime  sodium chloride 0.9% Bolus 1000 milliLiter(s) IV Bolus once  tamsulosin 0.4 milliGRAM(s) Oral at bedtime    MEDICATIONS  (PRN):  acetaminophen     Tablet .. 650 milliGRAM(s) Oral every 6 hours PRN Mild Pain (1 - 3)  aluminum hydroxide/magnesium hydroxide/simethicone Suspension 30 milliLiter(s) Oral every 4 hours PRN Dyspepsia  bisacodyl 5 milliGRAM(s) Oral daily PRN Constipation  dextrose Oral Gel 15 Gram(s) Oral once PRN Blood Glucose LESS THAN 70 milliGRAM(s)/deciliter  HYDROmorphone   Tablet 2 milliGRAM(s) Oral every 4 hours PRN Moderate Pain (4 - 6)  HYDROmorphone  Injectable 2 milliGRAM(s) IV Push every 4 hours PRN Severe Pain (7 - 10)  melatonin 3 milliGRAM(s) Oral at bedtime PRN Insomnia  ondansetron Injectable 4 milliGRAM(s) IV Push every 6 hours PRN Nausea and/or Vomiting  zolpidem 5 milliGRAM(s) Oral at bedtime PRN Insomnia      Daily     Daily   BMI: 24.2 (07-14 @ 03:05)  Change in Weight:  Vital Signs Last 24 Hrs  T(C): 36.8 (25 Jul 2024 15:59), Max: 36.9 (24 Jul 2024 17:10)  T(F): 98.2 (25 Jul 2024 15:59), Max: 98.5 (24 Jul 2024 17:10)  HR: 117 (25 Jul 2024 15:59) (66 - 117)  BP: 105/71 (25 Jul 2024 15:59) (91/57 - 127/71)  BP(mean): 64 (25 Jul 2024 05:14) (64 - 64)  RR: 18 (25 Jul 2024 15:59) (16 - 18)  SpO2: 100% (25 Jul 2024 15:59) (95% - 100%)    Parameters below as of 25 Jul 2024 15:59  Patient On (Oxygen Delivery Method): room air      I&O's Detail      PHYSICAL EXAM  Abd : Soft NT  Lab Results:                        13.0   11.98 )-----------( 462      ( 24 Jul 2024 08:39 )             39.7     07-24    135  |  103  |  9   ----------------------------<  130<H>  4.5   |  25  |  0.88    Ca    10.0      24 Jul 2024 08:39              Stool Results:          RADIOLOGY RESULTS:    SURGICAL PATHOLOGY:

## 2024-07-26 LAB
ANION GAP SERPL CALC-SCNC: 5 MMOL/L — SIGNIFICANT CHANGE UP (ref 5–17)
BUN SERPL-MCNC: 10 MG/DL — SIGNIFICANT CHANGE UP (ref 7–23)
CALCIUM SERPL-MCNC: 9.4 MG/DL — SIGNIFICANT CHANGE UP (ref 8.5–10.1)
CHLORIDE SERPL-SCNC: 105 MMOL/L — SIGNIFICANT CHANGE UP (ref 96–108)
CO2 SERPL-SCNC: 24 MMOL/L — SIGNIFICANT CHANGE UP (ref 22–31)
CREAT SERPL-MCNC: 0.75 MG/DL — SIGNIFICANT CHANGE UP (ref 0.5–1.3)
EGFR: 118 ML/MIN/1.73M2 — SIGNIFICANT CHANGE UP
GLUCOSE BLDC GLUCOMTR-MCNC: 152 MG/DL — HIGH (ref 70–99)
GLUCOSE BLDC GLUCOMTR-MCNC: 153 MG/DL — HIGH (ref 70–99)
GLUCOSE BLDC GLUCOMTR-MCNC: 166 MG/DL — HIGH (ref 70–99)
GLUCOSE BLDC GLUCOMTR-MCNC: 193 MG/DL — HIGH (ref 70–99)
GLUCOSE SERPL-MCNC: 159 MG/DL — HIGH (ref 70–99)
HCT VFR BLD CALC: 39 % — SIGNIFICANT CHANGE UP (ref 39–50)
HGB BLD-MCNC: 12.7 G/DL — LOW (ref 13–17)
MCHC RBC-ENTMCNC: 30.9 PG — SIGNIFICANT CHANGE UP (ref 27–34)
MCHC RBC-ENTMCNC: 32.6 GM/DL — SIGNIFICANT CHANGE UP (ref 32–36)
MCV RBC AUTO: 94.9 FL — SIGNIFICANT CHANGE UP (ref 80–100)
PLATELET # BLD AUTO: 499 K/UL — HIGH (ref 150–400)
POTASSIUM SERPL-MCNC: 4 MMOL/L — SIGNIFICANT CHANGE UP (ref 3.5–5.3)
POTASSIUM SERPL-SCNC: 4 MMOL/L — SIGNIFICANT CHANGE UP (ref 3.5–5.3)
RBC # BLD: 4.11 M/UL — LOW (ref 4.2–5.8)
RBC # FLD: 15 % — HIGH (ref 10.3–14.5)
SODIUM SERPL-SCNC: 134 MMOL/L — LOW (ref 135–145)
WBC # BLD: 15.68 K/UL — HIGH (ref 3.8–10.5)
WBC # FLD AUTO: 15.68 K/UL — HIGH (ref 3.8–10.5)

## 2024-07-26 PROCEDURE — 99232 SBSQ HOSP IP/OBS MODERATE 35: CPT

## 2024-07-26 RX ORDER — RISANKIZUMAB-RZAA 60 MG/ML
180 INJECTION INTRAVENOUS
Qty: 1.2 | Refills: 11
Start: 2024-07-26 | End: 2025-07-23

## 2024-07-26 RX ORDER — RISANKIZUMAB-RZAA 60 MG/ML
180 INJECTION INTRAVENOUS
Qty: 1.2 | Refills: 11
Start: 2024-07-26 | End: 2025-07-20

## 2024-07-26 RX ORDER — HYDROMORPHONE HCL IN 0.9% NACL 0.2 MG/ML
0.5 PLASTIC BAG, INJECTION (ML) INTRAVENOUS ONCE
Refills: 0 | Status: DISCONTINUED | OUTPATIENT
Start: 2024-07-26 | End: 2024-07-26

## 2024-07-26 RX ADMIN — Medication 2: at 11:56

## 2024-07-26 RX ADMIN — Medication 0.5 MILLIGRAM(S): at 21:47

## 2024-07-26 RX ADMIN — Medication 3 UNIT(S): at 08:23

## 2024-07-26 RX ADMIN — Medication 1 TABLET(S): at 20:39

## 2024-07-26 RX ADMIN — Medication 2 MILLIGRAM(S): at 06:40

## 2024-07-26 RX ADMIN — Medication 2 MILLIGRAM(S): at 05:55

## 2024-07-26 RX ADMIN — OXYCODONE HYDROCHLORIDE 30 MILLIGRAM(S): 30 TABLET ORAL at 20:38

## 2024-07-26 RX ADMIN — Medication 2 MILLIGRAM(S): at 21:23

## 2024-07-26 RX ADMIN — MIDODRINE HYDROCHLORIDE 10 MILLIGRAM(S): 2.5 TABLET ORAL at 05:45

## 2024-07-26 RX ADMIN — Medication 2 MILLIGRAM(S): at 00:33

## 2024-07-26 RX ADMIN — Medication 3 UNIT(S): at 11:56

## 2024-07-26 RX ADMIN — MIDODRINE HYDROCHLORIDE 10 MILLIGRAM(S): 2.5 TABLET ORAL at 16:46

## 2024-07-26 RX ADMIN — INSULIN GLARGINE-YFGN 5 UNIT(S): 100 INJECTION, SOLUTION SUBCUTANEOUS at 20:38

## 2024-07-26 RX ADMIN — OXYCODONE HYDROCHLORIDE 30 MILLIGRAM(S): 30 TABLET ORAL at 10:06

## 2024-07-26 RX ADMIN — Medication 1 TABLET(S): at 10:07

## 2024-07-26 RX ADMIN — Medication 3 UNIT(S): at 16:55

## 2024-07-26 RX ADMIN — Medication 2: at 16:55

## 2024-07-26 RX ADMIN — Medication 2 MILLIGRAM(S): at 18:52

## 2024-07-26 RX ADMIN — Medication 10 MILLIGRAM(S): at 11:57

## 2024-07-26 RX ADMIN — Medication 2: at 08:23

## 2024-07-26 RX ADMIN — Medication 2 MILLIGRAM(S): at 09:55

## 2024-07-26 RX ADMIN — Medication 2 MILLIGRAM(S): at 14:25

## 2024-07-26 RX ADMIN — Medication 2 MILLIGRAM(S): at 01:03

## 2024-07-26 RX ADMIN — Medication 10 MILLIGRAM(S): at 20:39

## 2024-07-26 RX ADMIN — Medication 10 MILLIGRAM(S): at 16:46

## 2024-07-26 RX ADMIN — Medication 0.5 MILLIGRAM(S): at 22:25

## 2024-07-26 RX ADMIN — MIDODRINE HYDROCHLORIDE 10 MILLIGRAM(S): 2.5 TABLET ORAL at 11:57

## 2024-07-26 RX ADMIN — Medication 2 MILLIGRAM(S): at 16:45

## 2024-07-26 RX ADMIN — Medication 2 MILLIGRAM(S): at 20:39

## 2024-07-26 RX ADMIN — Medication 10 MILLIGRAM(S): at 05:46

## 2024-07-26 NOTE — CDI QUERY NOTE - NSCDIOTHERTXTBX3_GEN_ALL_CORE_FT
Patient is documented  with both anal fistula with out abscess and left-sided ischioanal abscess. Could you please further clarify    -Perianal fistula and left-sided ischioanal abscess  -Perianal fistula and no left-sided ischioanal abscess  -Other please specify    Chart documentation and clinical evidence    Medicine 7/22, 7/23, 7, 24, 7, 25, 7, 26  PN --repeat CT scan 7/20: Multiple perianal fistulous tracts.  Small left-sided ischioanal abscess.-s/p zosyn day #7 --> change to oral cipro and flagyl today--inflammatory markers noted-    CT- 7/20-< from: CT Pelvis w/ IV Cont (07.20.24 @ 15:42) >Multiple perianal fistulous tracts.Small left-sided ischioanal abscess.    WBC Count: 15.68 K/uL (07.26.24 @ 08:05) WBC Count: 11.98 K/uL (07.24.24 @ 08:39) WBC Count: 10.26 K/uL (07.14.24 @ 07:39) WBC Count: 11.80 K/uL (07.13.24 @ 20:41) WBC Count: 12.44 K/uL (07.13.24 @ 09:29)     Sedimentation Rate, Erythrocyte: 84 mm/hr (07.25.24 @ 15:49) Sedimentation Rate, Erythrocyte: 87 mm/hr (07.17.24 @ 08:47)

## 2024-07-26 NOTE — CDI QUERY NOTE - NSCDIOTHERTXTBX2_GEN_ALL_CORE_FT
The patient is documented with Please document the relationship between the following conditions: perianal pain and discharges increased purulent drainage from the seton sites    -Perianal fistula associated with recent seton placement  -Perianal fistula not associated with recent seton placement  -Other please specify    SUPPORTING DOCUMENTATION AND/OR CLINICAL EVIDENCE:    Medicine 7/26-Gastrointestinal: Bowel Sounds present, soft, nontender, nondistended, no guarding, no rebound, ostomy in place arianal fitula with purulent dc    Colorectal Surgery- 7/13-Crohns disease with extensive anorectal  B/L seton placement at [place] 6 weeks ago. Patient states he has been in constant pain since the procedure and noticed increased purulent drainage from the seton sites    Colorectal PN -7/17- Reports continued drainage from the rectum and rectal pain. CT evidence of seton in place even though patient reports they fell out three weeks postop. Not visualized on exam , likely due to induration adn swelling in the area.     Colorectal PN- 7/20-rts abdominal pain, perirectal pain adn pain in his urethra.     CT- 7/20-< from: CT Pelvis w/ IV Cont (07.20.24 @ 15:42) >Multiple perianal fistulous tracts.Small left-sided ischioanal abscess.    WBC Count: 15.68 K/uL (07.26.24 @ 08:05) WBC Count: 11.98 K/uL (07.24.24 @ 08:39) WBC Count: 10.26 K/uL (07.14.24 @ 07:39) WBC Count: 11.80 K/uL (07.13.24 @ 20:41) WBC Count: 12.44 K/uL (07.13.24 @ 09:29)     Sedimentation Rate, Erythrocyte: 84 mm/hr (07.25.24 @ 15:49) Sedimentation Rate, Erythrocyte: 87 mm/hr (07.17.24 @ 08:47) The patient is documented with Perianal fistula and recent procedure seton placement. Please document the relationship between the following conditions: perianal pain and discharges increased purulent drainage from the seton sites    -Perianal fistula associated with recent seton placement  -Perianal fistula not associated with recent seton placement  -Other please specify    SUPPORTING DOCUMENTATION AND/OR CLINICAL EVIDENCE:    Medicine 7/26-Gastrointestinal: Bowel Sounds present, soft, nontender, nondistended, no guarding, no rebound, ostomy in place perianal fistula with purulent dc    Colorectal Surgery- 7/13-Crohns disease with extensive anorectal  B/L seton placement at [place] 6 weeks ago. Patient states he has been in constant pain since the procedure and noticed increased purulent drainage from the seton sites    Colorectal PN -7/17- Reports continued drainage from the rectum and rectal pain. CT evidence of seton in place even though patient reports they fell out three weeks postop. Not visualized on exam , likely due to induration and swelling in the area.     Colorectal PN- 7/20-rts abdominal pain, perirectal pain and pain in his urethra.     CT- 7/20-< from: CT Pelvis w/ IV Cont (07.20.24 @ 15:42) >Multiple perianal fistulous tracts.Small left-sided ischioanal abscess.    WBC Count: 15.68 K/uL (07.26.24 @ 08:05) WBC Count: 11.98 K/uL (07.24.24 @ 08:39) WBC Count: 10.26 K/uL (07.14.24 @ 07:39) WBC Count: 11.80 K/uL (07.13.24 @ 20:41) WBC Count: 12.44 K/uL (07.13.24 @ 09:29)     Sedimentation Rate, Erythrocyte: 84 mm/hr (07.25.24 @ 15:49) Sedimentation Rate, Erythrocyte: 87 mm/hr (07.17.24 @ 08:47)

## 2024-07-26 NOTE — PROGRESS NOTE ADULT - SUBJECTIVE AND OBJECTIVE BOX
Chief complaint of Urinary obstruction (14 Jul 2024 15:36)      HPI:  38M w/ h/o DM, Crohns disease, s/p colostomy, hx of bilateral Seton placement for anal fistula drainage 6 wks ago at [place] presents with abdominal pain and anuria. Patient reports he used to be on biologics for chrons, but they were stopped due to recurrent fistulas and infections. Had seton placement for anal fistula 6 months ago, and reports abdominal pain worsening, especially with Flatulance Has had green discharge from the rectum. For past 3 days, pain worsened and was unable to have any urine output and came to [facility]. Patient reports he does not want to go back to [place] due to having a bad experience last time and would like to switch care to [facility]. Reports chills, denies fevers. No other complaints and ROS otherwise negative.   Of note patient was in ED, fox was placed with good urine output, UA was negative, He was discharged with out-pt follow up with GI, colorectal, and . He went home and had severe pain and came back to ED.    In ED vitals stable. CBC noted for WBC 11, H/H 12/38, Plt 365. CMP noted for K of 3, otherwise unremarkable. UA done yesterday appears sterile. CT abdomen with oral and IV contrast noted for "No definite cause for abdominal pain identified. No significant change in multiple complex perianal fistulas without associated abscess". S/p 1 dose zosyn, admitted to  for further care. (14 Jul 2024 02:57) c/w intermittent abd pain,   c/o unchanged rectal pain and pus like dc from fistula, persistent induration    Vital Signs Last 24 Hrs  T(C): 36.8 (26 Jul 2024 08:02), Max: 36.8 (25 Jul 2024 15:59)  T(F): 98.2 (26 Jul 2024 08:02), Max: 98.2 (25 Jul 2024 15:59)  HR: 59 (26 Jul 2024 08:02) (59 - 117)  BP: 104/64 (26 Jul 2024 08:02) (97/66 - 111/69)  BP(mean): 74 (26 Jul 2024 05:45) (74 - 78)  RR: 18 (26 Jul 2024 08:02) (18 - 18)  SpO2: 98% (26 Jul 2024 08:02) (95% - 100%)    Parameters below as of 26 Jul 2024 08:02  Patient On (Oxygen Delivery Method): room air        CT unchanged        PHYSICAL EXAM:    Constitutional: NAD, awake and alert  HEENT: PERR, EOMI, Normal Hearing, MMM  Neck: Soft and supple  Respiratory: Breath sounds are clear bilaterally, No wheezing, rales or rhonchi  Cardiovascular: S1 and S2, regular rate and rhythm, no Murmurs, gallops or rubs  Gastrointestinal: Bowel Sounds present, soft, nontender, nondistended, no guarding, no rebound, ostomy in place arianal fitula with purulent dc  Extremities: No peripheral edema        CAPILLARY BLOOD GLUCOSE      POCT Blood Glucose.: 178 mg/dL (22 Jul 2024 20:50)  POCT Blood Glucose.: 178 mg/dL (22 Jul 2024 16:58)  POCT Blood Glucose.: 101 mg/dL (22 Jul 2024 12:05)        A/P:  38M admitted for possible infected perianal fistula.      *Crohn's disease / acute on chronic  perianal pain / perianal fistula:   -vitals stable, no leukocytosis, does not appear to be in a flare  chronic findings- fistulae,         *Urinary retention:  resolved    * Type 2 diabetes mellitus:  -A1c 8.1  -Adjust insulin as needed.    * DVT px  -LMWH    Dispo:  -pain management  -void trial today    time spent 45 min

## 2024-07-26 NOTE — PHARMACY COMMUNICATION NOTE - COMMENTS
Patient admitted with severe fistulizing Crohn's disease with non-compliance to previous treatments of Remicaid, Stelara and Humira due to adverse effects vs. insurance problems  Skyrizi maintenance sent to out patient pharmacy to confirm coverage prior to coordinating induction phase  risankizumab-rzaa 180 mg/1.2 mL subcutaneous solution every 8 weeks was sent to [facility]  Per outpatient pharmacy: Skyrizi requires a PA, patient has both [employer] [facility] and Medicaid insurance  Attempted PA with [employer]: Drug is not covered by plan  Attempted PA with Medicaid insurance: Question response extensive regarding prior treatments used and failed requiring further information from GI provider  Will attempt to send PA form to GI providers office for completion  Skyrizi subcutaneous injections, if approved, should be filled at a specialty pharmacy contracted with the approving insurance (i.e. [facility] specialty vs. [facility] if eligible and contracted) Patient admitted with severe fistulizing Crohn's disease with non-compliance to previous treatments of Remicaid, Stelara and Humira due to adverse effects vs. insurance problems  Skyrizi maintenance sent to out patient pharmacy to confirm coverage prior to coordinating induction phase  risankizumab-rzaa 180 mg/1.2 mL subcutaneous solution every 8 weeks was sent to [facility] by Dr. Almaraz  Per outpatient pharmacy: Skyrizi requires a PA, patient has both [employer]) and Medicaid prescription insurance, and needs to be resent to a specialty pharmacy  Attempted PA with [facility] [employer]: Drug is not covered by plan  Attempted PA with Medicaid insurance: Question response extensive regarding prior treatments used and failed requiring further information from GI provider  Called Dr. Calix office regarding PA attempt and spoke to Tracey KHALIL paperwork sent electronically via [employer] to Dr. Lubin ([employer] [id number], [phone]), physically faxed to office [phone], and left a copy in patients chart on 5E pending completion  Skyrizi subcutaneous injections, if approved, should be filled at a specialty pharmacy contracted with the approving insurance (i.e. [facility] specialty vs. [facility] if eligible and contracted) Patient admitted with severe fistulizing Crohn's disease with non-compliance to previous treatments of Remicaid, Stelara and Humira due to adverse effects vs. insurance problems  Skyrizi maintenance sent to out patient pharmacy to confirm coverage prior to coordinating induction phase  risankizumab-rzaa 180 mg/1.2 mL subcutaneous solution every 8 weeks was sent to [facility] by Dr. Almaraz  Per outpatient pharmacy: Skyrizi requires a PA, patient has both [employer]) and Medicaid prescription insurance, and needs to be resent to a specialty pharmacy  Attempted PA with [employer]: Drug is not covered by plan  Attempted PA with Medicaid insurance: Question response extensive regarding prior treatments used and failed requiring further information from GI provider  Called Dr. Calix office regarding PA attempt and spoke to Tracey Lubin to return to the office Sunday afternoon  PA paperwork sent electronically via [employer] to Dr. Lubin ([employer] [id number], [phone]), physically faxed to office [phone], and left a copy in patients chart on 5E pending completion  Skyrizi subcutaneous injections, if approved, should be filled at a specialty pharmacy contracted with the approving insurance (i.e. [facility] specialty vs. [facility] if eligible and contracted)

## 2024-07-27 LAB
GLUCOSE BLDC GLUCOMTR-MCNC: 132 MG/DL — HIGH (ref 70–99)
GLUCOSE BLDC GLUCOMTR-MCNC: 140 MG/DL — HIGH (ref 70–99)
GLUCOSE BLDC GLUCOMTR-MCNC: 151 MG/DL — HIGH (ref 70–99)
GLUCOSE BLDC GLUCOMTR-MCNC: 202 MG/DL — HIGH (ref 70–99)

## 2024-07-27 PROCEDURE — 99232 SBSQ HOSP IP/OBS MODERATE 35: CPT

## 2024-07-27 RX ORDER — HYDROMORPHONE HCL IN 0.9% NACL 0.2 MG/ML
1 PLASTIC BAG, INJECTION (ML) INTRAVENOUS ONCE
Refills: 0 | Status: DISCONTINUED | OUTPATIENT
Start: 2024-07-27 | End: 2024-07-27

## 2024-07-27 RX ADMIN — Medication 10 MILLIGRAM(S): at 21:12

## 2024-07-27 RX ADMIN — Medication 2 MILLIGRAM(S): at 06:06

## 2024-07-27 RX ADMIN — MIDODRINE HYDROCHLORIDE 10 MILLIGRAM(S): 2.5 TABLET ORAL at 13:55

## 2024-07-27 RX ADMIN — Medication 1 MILLIGRAM(S): at 21:38

## 2024-07-27 RX ADMIN — Medication 2 MILLIGRAM(S): at 23:17

## 2024-07-27 RX ADMIN — Medication 2 MILLIGRAM(S): at 22:47

## 2024-07-27 RX ADMIN — INSULIN GLARGINE-YFGN 5 UNIT(S): 100 INJECTION, SOLUTION SUBCUTANEOUS at 21:50

## 2024-07-27 RX ADMIN — Medication 2 MILLIGRAM(S): at 16:13

## 2024-07-27 RX ADMIN — Medication 2 MILLIGRAM(S): at 01:28

## 2024-07-27 RX ADMIN — OXYCODONE HYDROCHLORIDE 30 MILLIGRAM(S): 30 TABLET ORAL at 22:19

## 2024-07-27 RX ADMIN — Medication 1 MILLIGRAM(S): at 21:08

## 2024-07-27 RX ADMIN — Medication 3 UNIT(S): at 09:27

## 2024-07-27 RX ADMIN — Medication 1 TABLET(S): at 21:12

## 2024-07-27 RX ADMIN — OXYCODONE HYDROCHLORIDE 30 MILLIGRAM(S): 30 TABLET ORAL at 21:49

## 2024-07-27 RX ADMIN — Medication 1 TABLET(S): at 10:29

## 2024-07-27 RX ADMIN — Medication 2 MILLIGRAM(S): at 10:33

## 2024-07-27 RX ADMIN — Medication 10 MILLIGRAM(S): at 16:54

## 2024-07-27 RX ADMIN — MIDODRINE HYDROCHLORIDE 10 MILLIGRAM(S): 2.5 TABLET ORAL at 05:30

## 2024-07-27 RX ADMIN — Medication 2 MILLIGRAM(S): at 20:43

## 2024-07-27 RX ADMIN — Medication 2 MILLIGRAM(S): at 18:21

## 2024-07-27 RX ADMIN — Medication 4: at 16:53

## 2024-07-27 RX ADMIN — Medication 3 UNIT(S): at 16:54

## 2024-07-27 RX ADMIN — Medication 2 MILLIGRAM(S): at 02:26

## 2024-07-27 RX ADMIN — Medication 2 MILLIGRAM(S): at 05:30

## 2024-07-27 RX ADMIN — OXYCODONE HYDROCHLORIDE 30 MILLIGRAM(S): 30 TABLET ORAL at 10:29

## 2024-07-27 RX ADMIN — Medication 10 MILLIGRAM(S): at 05:31

## 2024-07-27 RX ADMIN — Medication 2 MILLIGRAM(S): at 14:35

## 2024-07-27 RX ADMIN — Medication 2 MILLIGRAM(S): at 20:13

## 2024-07-27 NOTE — PROGRESS NOTE ADULT - SUBJECTIVE AND OBJECTIVE BOX
Chief complaint of Urinary obstruction (14 Jul 2024 15:36)      HPI:  38M w/ h/o DM, Crohns disease, s/p colostomy, hx of bilateral Seton placement for anal fistula drainage 6 wks ago at [place] presents with abdominal pain and anuria. Patient reports he used to be on biologics for chrons, but they were stopped due to recurrent fistulas and infections. Had seton placement for anal fistula 6 months ago, and reports abdominal pain worsening, especially with Flatulance Has had green discharge from the rectum. For past 3 days, pain worsened and was unable to have any urine output and came to [facility]. Patient reports he does not want to go back to [place] due to having a bad experience last time and would like to switch care to [facility]. Reports chills, denies fevers. No other complaints and ROS otherwise negative.   Of note patient was in ED, fox was placed with good urine output, UA was negative, He was discharged with out-pt follow up with GI, colorectal, and . He went home and had severe pain and came back to ED.    In ED vitals stable. CBC noted for WBC 11, H/H 12/38, Plt 365. CMP noted for K of 3, otherwise unremarkable. UA done yesterday appears sterile. CT abdomen with oral and IV contrast noted for "No definite cause for abdominal pain identified. No significant change in multiple complex perianal fistulas without associated abscess". S/p 1 dose zosyn, admitted to  for further care. (14 Jul 2024 02:57) c/w intermittent abd pain,   c/o unchanged rectal pain and pus like dc from fistula, persistent induration    Vital Signs Last 24 Hrs  T(C): 36.7 (27 Jul 2024 08:06), Max: 36.7 (26 Jul 2024 16:08)  T(F): 98 (27 Jul 2024 08:06), Max: 98.1 (26 Jul 2024 16:08)  HR: 60 (27 Jul 2024 08:06) (60 - 85)  BP: 96/58 (27 Jul 2024 08:06) (96/58 - 108/64)  BP(mean): 75 (26 Jul 2024 16:08) (75 - 75)  RR: 19 (27 Jul 2024 08:06) (18 - 19)  SpO2: 97% (27 Jul 2024 08:06) (97% - 99%)    Parameters below as of 27 Jul 2024 08:06  Patient On (Oxygen Delivery Method): room air            CT unchanged        PHYSICAL EXAM:    Constitutional: NAD, awake and alert  HEENT: PERR, EOMI, Normal Hearing, MMM  Neck: Soft and supple  Respiratory: Breath sounds are clear bilaterally, No wheezing, rales or rhonchi  Cardiovascular: S1 and S2, regular rate and rhythm, no Murmurs, gallops or rubs  Gastrointestinal: Bowel Sounds present, soft, nontender, nondistended, no guarding, no rebound, ostomy in place arianal fitula with purulent dc  Extremities: No peripheral edema        CAPILLARY BLOOD GLUCOSE      POCT Blood Glucose.: 178 mg/dL (22 Jul 2024 20:50)  POCT Blood Glucose.: 178 mg/dL (22 Jul 2024 16:58)  POCT Blood Glucose.: 101 mg/dL (22 Jul 2024 12:05)        A/P:  38M admitted for possible infected perianal fistula.      *Crohn's disease / acute on chronic  perianal pain / perianal fistula:   -vitals stable, no leukocytosis, does not appear to be in a flare  chronic findings- fistulae,         *Urinary retention:  resolved    * Type 2 diabetes mellitus:  -A1c 8.1  -Adjust insulin as needed.    * DVT px  -LMWH    Dispo:  -pain management  -void trial today    time spent 45 min

## 2024-07-28 LAB
GLUCOSE BLDC GLUCOMTR-MCNC: 134 MG/DL — HIGH (ref 70–99)
GLUCOSE BLDC GLUCOMTR-MCNC: 169 MG/DL — HIGH (ref 70–99)
GLUCOSE BLDC GLUCOMTR-MCNC: 201 MG/DL — HIGH (ref 70–99)

## 2024-07-28 PROCEDURE — 99232 SBSQ HOSP IP/OBS MODERATE 35: CPT

## 2024-07-28 RX ORDER — GUAIFEN/P-PROPANOLAMIN/CODEINE
5 EXPECTORANT ORAL AT BEDTIME
Refills: 0 | Status: DISCONTINUED | OUTPATIENT
Start: 2024-07-28 | End: 2024-08-01

## 2024-07-28 RX ORDER — HYDROMORPHONE HCL IN 0.9% NACL 0.2 MG/ML
2 PLASTIC BAG, INJECTION (ML) INTRAVENOUS
Refills: 0 | Status: DISCONTINUED | OUTPATIENT
Start: 2024-07-28 | End: 2024-08-01

## 2024-07-28 RX ADMIN — Medication 2 MILLIGRAM(S): at 02:48

## 2024-07-28 RX ADMIN — Medication 10 MILLIGRAM(S): at 21:48

## 2024-07-28 RX ADMIN — MIDODRINE HYDROCHLORIDE 10 MILLIGRAM(S): 2.5 TABLET ORAL at 17:37

## 2024-07-28 RX ADMIN — Medication 3 UNIT(S): at 17:38

## 2024-07-28 RX ADMIN — Medication 2 MILLIGRAM(S): at 18:30

## 2024-07-28 RX ADMIN — OXYCODONE HYDROCHLORIDE 30 MILLIGRAM(S): 30 TABLET ORAL at 21:48

## 2024-07-28 RX ADMIN — OXYCODONE HYDROCHLORIDE 30 MILLIGRAM(S): 30 TABLET ORAL at 22:30

## 2024-07-28 RX ADMIN — Medication 4: at 17:38

## 2024-07-28 RX ADMIN — INSULIN GLARGINE-YFGN 5 UNIT(S): 100 INJECTION, SOLUTION SUBCUTANEOUS at 21:47

## 2024-07-28 RX ADMIN — Medication 10 MILLIGRAM(S): at 12:30

## 2024-07-28 RX ADMIN — MIDODRINE HYDROCHLORIDE 10 MILLIGRAM(S): 2.5 TABLET ORAL at 12:30

## 2024-07-28 RX ADMIN — Medication 2 MILLIGRAM(S): at 10:17

## 2024-07-28 RX ADMIN — Medication 1 TABLET(S): at 21:47

## 2024-07-28 RX ADMIN — Medication 1 TABLET(S): at 10:17

## 2024-07-28 RX ADMIN — Medication 2 MILLIGRAM(S): at 03:18

## 2024-07-28 RX ADMIN — Medication 2 MILLIGRAM(S): at 06:52

## 2024-07-28 RX ADMIN — MIDODRINE HYDROCHLORIDE 10 MILLIGRAM(S): 2.5 TABLET ORAL at 06:54

## 2024-07-28 RX ADMIN — Medication 10 MILLIGRAM(S): at 06:54

## 2024-07-28 RX ADMIN — Medication 2 MILLIGRAM(S): at 21:47

## 2024-07-28 RX ADMIN — Medication 2 MILLIGRAM(S): at 12:31

## 2024-07-28 RX ADMIN — Medication 2 MILLIGRAM(S): at 22:30

## 2024-07-28 RX ADMIN — Medication 2 MILLIGRAM(S): at 15:23

## 2024-07-28 RX ADMIN — Medication 10 MILLIGRAM(S): at 17:37

## 2024-07-28 RX ADMIN — Medication 3 UNIT(S): at 12:30

## 2024-07-28 RX ADMIN — OXYCODONE HYDROCHLORIDE 30 MILLIGRAM(S): 30 TABLET ORAL at 10:17

## 2024-07-28 NOTE — PROGRESS NOTE ADULT - SUBJECTIVE AND OBJECTIVE BOX
Chief complaint of Urinary obstruction (14 Jul 2024 15:36)      HPI:  38M w/ h/o DM, Crohns disease, s/p colostomy, hx of bilateral Seton placement for anal fistula drainage 6 wks ago at [place] presents with abdominal pain and anuria. Patient reports he used to be on biologics for chrons, but they were stopped due to recurrent fistulas and infections. Had seton placement for anal fistula 6 months ago, and reports abdominal pain worsening, especially with Flatulance Has had green discharge from the rectum. For past 3 days, pain worsened and was unable to have any urine output and came to [facility]. Patient reports he does not want to go back to [place] due to having a bad experience last time and would like to switch care to [facility]. Reports chills, denies fevers. No other complaints and ROS otherwise negative.   Of note patient was in ED, fox was placed with good urine output, UA was negative, He was discharged with out-pt follow up with GI, colorectal, and . He went home and had severe pain and came back to ED.    In ED vitals stable. CBC noted for WBC 11, H/H 12/38, Plt 365. CMP noted for K of 3, otherwise unremarkable. UA done yesterday appears sterile. CT abdomen with oral and IV contrast noted for "No definite cause for abdominal pain identified. No significant change in multiple complex perianal fistulas without associated abscess". S/p 1 dose zosyn, admitted to  for further care. (14 Jul 2024 02:57) c/w intermittent abd pain,   c/o unchanged rectal pain and pus like dc from fistula, persistent induration    Vital Signs Last 24 Hrs  T(C): 36.7 (27 Jul 2024 08:06), Max: 36.7 (26 Jul 2024 16:08)  T(F): 98 (27 Jul 2024 08:06), Max: 98.1 (26 Jul 2024 16:08)  HR: 60 (27 Jul 2024 08:06) (60 - 85)  BP: 96/58 (27 Jul 2024 08:06) (96/58 - 108/64)  BP(mean): 75 (26 Jul 2024 16:08) (75 - 75)  RR: 19 (27 Jul 2024 08:06) (18 - 19)  SpO2: 97% (27 Jul 2024 08:06) (97% - 99%)    Parameters below as of 27 Jul 2024 08:06  Patient On (Oxygen Delivery Method): room air            CT unchanged        PHYSICAL EXAM:    Constitutional: NAD, awake and alert  HEENT: PERR, EOMI, Normal Hearing, MMM  Neck: Soft and supple  Respiratory: Breath sounds are clear bilaterally, No wheezing, rales or rhonchi  Cardiovascular: S1 and S2, regular rate and rhythm, no Murmurs, gallops or rubs  Gastrointestinal: Bowel Sounds present, soft, nontender, nondistended, no guarding, no rebound, ostomy in place arianal fitula with purulent dc  Extremities: No peripheral edema        CAPILLARY BLOOD GLUCOSE      POCT Blood Glucose.: 178 mg/dL (22 Jul 2024 20:50)  POCT Blood Glucose.: 178 mg/dL (22 Jul 2024 16:58)  POCT Blood Glucose.: 101 mg/dL (22 Jul 2024 12:05)        A/P:  38M admitted for possible infected perianal fistula.      *Crohn's disease / acute on chronic  perianal pain / perianal fistula:   -vitals stable, no leukocytosis, does not appear to be in a flare  chronic findings- fistulae, plan for biological agent        *Urinary retention:  resolved    * Type 2 diabetes mellitus:  -A1c 8.1  -Adjust insulin as needed.    * DVT px  -LMWH    Dispo:  -pain management  -void trial today    time spent 45 min

## 2024-07-29 LAB
GLUCOSE BLDC GLUCOMTR-MCNC: 135 MG/DL — HIGH (ref 70–99)
GLUCOSE BLDC GLUCOMTR-MCNC: 136 MG/DL — HIGH (ref 70–99)
GLUCOSE BLDC GLUCOMTR-MCNC: 203 MG/DL — HIGH (ref 70–99)
GLUCOSE BLDC GLUCOMTR-MCNC: 249 MG/DL — HIGH (ref 70–99)

## 2024-07-29 PROCEDURE — 99232 SBSQ HOSP IP/OBS MODERATE 35: CPT

## 2024-07-29 RX ADMIN — OXYCODONE HYDROCHLORIDE 30 MILLIGRAM(S): 30 TABLET ORAL at 08:26

## 2024-07-29 RX ADMIN — Medication 1 TABLET(S): at 08:26

## 2024-07-29 RX ADMIN — Medication 2 MILLIGRAM(S): at 05:50

## 2024-07-29 RX ADMIN — Medication 2 MILLIGRAM(S): at 09:16

## 2024-07-29 RX ADMIN — Medication 2 MILLIGRAM(S): at 12:18

## 2024-07-29 RX ADMIN — Medication 3 UNIT(S): at 16:53

## 2024-07-29 RX ADMIN — Medication 10 MILLIGRAM(S): at 16:53

## 2024-07-29 RX ADMIN — Medication 3 UNIT(S): at 09:26

## 2024-07-29 RX ADMIN — MIDODRINE HYDROCHLORIDE 10 MILLIGRAM(S): 2.5 TABLET ORAL at 16:53

## 2024-07-29 RX ADMIN — Medication 4: at 12:24

## 2024-07-29 RX ADMIN — MIDODRINE HYDROCHLORIDE 10 MILLIGRAM(S): 2.5 TABLET ORAL at 05:26

## 2024-07-29 RX ADMIN — Medication 2 MILLIGRAM(S): at 05:26

## 2024-07-29 RX ADMIN — Medication 1 TABLET(S): at 21:38

## 2024-07-29 RX ADMIN — Medication 2 MILLIGRAM(S): at 00:41

## 2024-07-29 RX ADMIN — OXYCODONE HYDROCHLORIDE 30 MILLIGRAM(S): 30 TABLET ORAL at 22:50

## 2024-07-29 RX ADMIN — Medication 2 MILLIGRAM(S): at 18:27

## 2024-07-29 RX ADMIN — MIDODRINE HYDROCHLORIDE 10 MILLIGRAM(S): 2.5 TABLET ORAL at 12:45

## 2024-07-29 RX ADMIN — Medication 2 MILLIGRAM(S): at 01:06

## 2024-07-29 RX ADMIN — Medication 3 MILLIGRAM(S): at 21:36

## 2024-07-29 RX ADMIN — Medication 3 UNIT(S): at 12:25

## 2024-07-29 RX ADMIN — Medication 2 MILLIGRAM(S): at 15:22

## 2024-07-29 RX ADMIN — Medication 10 MILLIGRAM(S): at 21:37

## 2024-07-29 RX ADMIN — Medication 2 MILLIGRAM(S): at 21:36

## 2024-07-29 RX ADMIN — Medication 10 MILLIGRAM(S): at 06:37

## 2024-07-29 RX ADMIN — Medication 5 MILLIGRAM(S): at 00:41

## 2024-07-29 RX ADMIN — INSULIN GLARGINE-YFGN 5 UNIT(S): 100 INJECTION, SOLUTION SUBCUTANEOUS at 21:38

## 2024-07-29 RX ADMIN — Medication 10 MILLIGRAM(S): at 12:18

## 2024-07-29 NOTE — PHARMACY COMMUNICATION NOTE - COMMENTS
Faxed completed Prior Authorization Request Form to 1 [phone]  Per Medicaid, Skyrizi has been approved as of 7/29/2024 until 07/24/2025 for 180 mg/1.2 mL injected every 8 weeks x 11 refills  Rx sent to [facility] however due to being a specialty item, [facility] was unable to order item and requests being sent to a specialty pharmacy  Rx transferred from [facility] to [facility] [phone] in [place] [Must be transferred one refill at a time as per [facility] law]  Confirmed with [facility] that co-pay for subcutaneous Skyrizi is $0  First fill of subcutaneous Skyrizi to be 12 weeks from the start of the induction infusion phase  Induction infusion phase to be started inpatient vs. outpatient as per hospital administration

## 2024-07-29 NOTE — PROGRESS NOTE ADULT - SUBJECTIVE AND OBJECTIVE BOX
Chief complaint of Urinary obstruction (14 Jul 2024 15:36)      HPI:  38M w/ h/o DM, Crohns disease, s/p colostomy, hx of bilateral Seton placement for anal fistula drainage 6 wks ago at [place] presents with abdominal pain and anuria. Patient reports he used to be on biologics for chrons, but they were stopped due to recurrent fistulas and infections. Had seton placement for anal fistula 6 months ago, and reports abdominal pain worsening, especially with Flatulance Has had green discharge from the rectum. For past 3 days, pain worsened and was unable to have any urine output and came to [facility]. Patient reports he does not want to go back to [place] due to having a bad experience last time and would like to switch care to [facility]. Reports chills, denies fevers. No other complaints and ROS otherwise negative.   Of note patient was in ED, fox was placed with good urine output, UA was negative, He was discharged with out-pt follow up with GI, colorectal, and . He went home and had severe pain and came back to ED.    In ED vitals stable. CBC noted for WBC 11, H/H 12/38, Plt 365. CMP noted for K of 3, otherwise unremarkable. UA done yesterday appears sterile. CT abdomen with oral and IV contrast noted for "No definite cause for abdominal pain identified. No significant change in multiple complex perianal fistulas without associated abscess". S/p 1 dose zosyn, admitted to  for further care. (14 Jul 2024 02:57) c/w intermittent abd pain,   c/o unchanged rectal pain and pus like dc from fistula, persistent induration    Vital Signs Last 24 Hrs  T(C): 36.8 (29 Jul 2024 09:36), Max: 37.1 (28 Jul 2024 16:30)  T(F): 98.2 (29 Jul 2024 09:36), Max: 98.7 (28 Jul 2024 16:30)  HR: 65 (29 Jul 2024 09:36) (64 - 110)  BP: 97/53 (29 Jul 2024 09:36) (97/53 - 112/68)  BP(mean): 71 (29 Jul 2024 05:20) (69 - 78)  RR: 17 (29 Jul 2024 09:36) (17 - 19)  SpO2: 97% (29 Jul 2024 09:36) (95% - 98%)    Parameters below as of 29 Jul 2024 09:36  Patient On (Oxygen Delivery Method): room air                CT unchanged        PHYSICAL EXAM:    Constitutional: NAD, awake and alert  HEENT: PERR, EOMI, Normal Hearing, MMM  Neck: Soft and supple  Respiratory: Breath sounds are clear bilaterally, No wheezing, rales or rhonchi  Cardiovascular: S1 and S2, regular rate and rhythm, no Murmurs, gallops or rubs  Gastrointestinal: Bowel Sounds present, soft, nontender, nondistended, no guarding, no rebound, ostomy in place arianal fitula with purulent dc  Extremities: No peripheral edema      CAPILLARY BLOOD GLUCOSE      POCT Blood Glucose.: 203 mg/dL (29 Jul 2024 12:23)  POCT Blood Glucose.: 136 mg/dL (29 Jul 2024 09:06)  POCT Blood Glucose.: 169 mg/dL (28 Jul 2024 21:39)  POCT Blood Glucose.: 201 mg/dL (28 Jul 2024 16:45)        A/P:  38M admitted for possible infected perianal fistula.      *Crohn's disease / acute on chronic  perianal pain / perianal fistula:   -vitals stable, no leukocytosis, does not appear to be in a flare  chronic findings- fistulae, plan for biological agent- approved by Ins company        *Urinary retention:  resolved    * Type 2 diabetes mellitus:  -A1c 8.1  -Adjust insulin as needed.    * DVT px  -LMWH    Dispo:  -pain management  -void trial today    time spent 45 min

## 2024-07-30 ENCOUNTER — TRANSCRIPTION ENCOUNTER (OUTPATIENT)
Age: 39
End: 2024-07-30

## 2024-07-30 LAB
GLUCOSE BLDC GLUCOMTR-MCNC: 128 MG/DL — HIGH (ref 70–99)
GLUCOSE BLDC GLUCOMTR-MCNC: 149 MG/DL — HIGH (ref 70–99)
GLUCOSE BLDC GLUCOMTR-MCNC: 153 MG/DL — HIGH (ref 70–99)
GLUCOSE BLDC GLUCOMTR-MCNC: 153 MG/DL — HIGH (ref 70–99)

## 2024-07-30 PROCEDURE — 99232 SBSQ HOSP IP/OBS MODERATE 35: CPT

## 2024-07-30 RX ORDER — TAMSULOSIN HCL 0.4 MG
1 CAPSULE ORAL
Qty: 30 | Refills: 0
Start: 2024-07-30 | End: 2024-08-28

## 2024-07-30 RX ORDER — HYDROMORPHONE HCL IN 0.9% NACL 0.2 MG/ML
1 PLASTIC BAG, INJECTION (ML) INTRAVENOUS
Qty: 45 | Refills: 0
Start: 2024-07-30 | End: 2024-08-13

## 2024-07-30 RX ADMIN — Medication 10 MILLIGRAM(S): at 20:31

## 2024-07-30 RX ADMIN — Medication 3 UNIT(S): at 12:11

## 2024-07-30 RX ADMIN — Medication 2 MILLIGRAM(S): at 06:13

## 2024-07-30 RX ADMIN — Medication 2 MILLIGRAM(S): at 15:23

## 2024-07-30 RX ADMIN — Medication 10 MILLIGRAM(S): at 06:13

## 2024-07-30 RX ADMIN — Medication 2 MILLIGRAM(S): at 20:28

## 2024-07-30 RX ADMIN — Medication 2: at 08:30

## 2024-07-30 RX ADMIN — Medication 2 MILLIGRAM(S): at 12:10

## 2024-07-30 RX ADMIN — OXYCODONE HYDROCHLORIDE 30 MILLIGRAM(S): 30 TABLET ORAL at 09:07

## 2024-07-30 RX ADMIN — Medication 2 MILLIGRAM(S): at 22:48

## 2024-07-30 RX ADMIN — OXYCODONE HYDROCHLORIDE 30 MILLIGRAM(S): 30 TABLET ORAL at 20:30

## 2024-07-30 RX ADMIN — INSULIN GLARGINE-YFGN 5 UNIT(S): 100 INJECTION, SOLUTION SUBCUTANEOUS at 20:30

## 2024-07-30 RX ADMIN — Medication 2 MILLIGRAM(S): at 00:48

## 2024-07-30 RX ADMIN — MIDODRINE HYDROCHLORIDE 10 MILLIGRAM(S): 2.5 TABLET ORAL at 12:10

## 2024-07-30 RX ADMIN — Medication 10 MILLIGRAM(S): at 12:11

## 2024-07-30 RX ADMIN — Medication 2 MILLIGRAM(S): at 23:40

## 2024-07-30 RX ADMIN — Medication 1 TABLET(S): at 20:30

## 2024-07-30 RX ADMIN — Medication 3 UNIT(S): at 17:28

## 2024-07-30 RX ADMIN — Medication 1 TABLET(S): at 09:07

## 2024-07-30 RX ADMIN — Medication 3 UNIT(S): at 08:30

## 2024-07-30 RX ADMIN — Medication 2 MILLIGRAM(S): at 19:36

## 2024-07-30 RX ADMIN — Medication 2 MILLIGRAM(S): at 09:08

## 2024-07-30 RX ADMIN — Medication 10 MILLIGRAM(S): at 17:28

## 2024-07-30 NOTE — DISCHARGE NOTE PROVIDER - HOSPITAL COURSE
38M w/ h/o DM, Crohns disease, s/p colostomy, hx of bilateral Seton placement for anal fistula drainage 6 wks ago at [place] presents with abdominal pain and anuria. Patient reports he used to be on biologics for chrons, but they were stopped due to recurrent fistulas and infections. Had seton placement for anal fistula 6 months ago, and reports abdominal pain worsening, especially with Flatulance Has had green discharge from the rectum. For past 3 days, pain worsened and was unable to have any urine output and came to [facility]. Patient reports he does not want to go back to [place] due to having a bad experience last time and would like to switch care to [facility]. Reports chills, denies fevers. No other complaints and ROS otherwise negative.   Of note patient was in ED, fox was placed with good urine output, UA was negative, He was discharged with out-pt follow up with GI, colorectal, and . He went home and had severe pain and came back to ED.    In ED vitals stable. CBC noted for WBC 11, H/H 12/38, Plt 365. CMP noted for K of 3, otherwise unremarkable. UA done yesterday appears sterile. CT abdomen with oral and IV contrast noted for "No definite cause for abdominal pain identified. No significant change in multiple complex perianal fistulas without associated abscess". S/p 1 dose zosyn, admitted to  for further care. (14 Jul 2024 02:57) c/w intermittent abd pain,   c/o unchanged rectal pain and pus like dc from fistula, persistent induration  Insurance approved OP biological agent;    Crohn's disease / acute on chronic  perianal pain / perianal fistula:   -vitals stable, no leukocytosis, does not appear to be in a flare  chronic findings- fistulae, plan for biological agent- approved by Ins company  -Perianal fistula associated with recent seton placement  Perianal fistula and left-sided ischioanal abscess    * Type 2 diabetes mellitus with hyperglycemia  -A1c 8.1      * DVT px  -LMWH    dispo home after infusion and return to [facility] for the nxt 2; after that he will sahe sq inj every 8 weeks he can inject Rx sent to [facility]    Home Medications:  metFORMIN 500 mg oral tablet: 1 tab(s) orally once a day (14 Jul 2024 09:01)   38M w/ h/o DM, Crohns disease, s/p colostomy, hx of bilateral Seton placement for anal fistula drainage 6 wks ago at [place] presents with abdominal pain and anuria. Patient reports he used to be on biologics for chrons, but they were stopped due to recurrent fistulas and infections. Had seton placement for anal fistula 6 months ago, and reports abdominal pain worsening, especially with Flatulance Has had green discharge from the rectum. For past 3 days, pain worsened and was unable to have any urine output and came to [facility]. Patient reports he does not want to go back to [place] due to having a bad experience last time and would like to switch care to [facility]. Reports chills, denies fevers. No other complaints and ROS otherwise negative.   Of note patient was in ED, fox was placed with good urine output, UA was negative, He was discharged with out-pt follow up with GI, colorectal, and . He went home and had severe pain and came back to ED.    In ED vitals stable. CBC noted for WBC 11, H/H 12/38, Plt 365. CMP noted for K of 3, otherwise unremarkable. UA done yesterday appears sterile. CT abdomen with oral and IV contrast noted for "No definite cause for abdominal pain identified. No significant change in multiple complex perianal fistulas without associated abscess". S/p 1 dose zosyn, admitted to  for further care. (14 Jul 2024 02:57) c/w intermittent abd pain,   c/o unchanged rectal pain and pus like dc from fistula, persistent induration  Insurance approved OP biological agent;    Crohn's disease / acute on chronic  perianal pain / perianal fistula:   -vitals stable, no leukocytosis, does not appear to be in a flare  chronic findings- fistulae, plan for biological agent- approved by [employer]  -patient to f/u with Dr. Lubin  -f/u colorectal  -Perianal fistula associated with recent seton placement  Perianal fistula and left-sided ischioanal abscess    * Type 2 diabetes mellitus with hyperglycemia  -A1c 8.1      * DVT px  -LMWH    dispo home with pain management and close outpatient GI and colorectal f/u

## 2024-07-30 NOTE — DISCHARGE NOTE PROVIDER - NSDCCPCAREPLAN_GEN_ALL_CORE_FT
PRINCIPAL DISCHARGE DIAGNOSIS  Diagnosis: Intractable abdominal pain  Assessment and Plan of Treatment: fistula treatment to be started as outpatient      SECONDARY DISCHARGE DIAGNOSES  Diagnosis: Crohn's disease  Assessment and Plan of Treatment:     Diagnosis: Elevated lipase  Assessment and Plan of Treatment:     Diagnosis: Acute on chronic urinary retention  Assessment and Plan of Treatment:      PRINCIPAL DISCHARGE DIAGNOSIS  Diagnosis: Crohn's disease  Assessment and Plan of Treatment: pain management for chronic rectal pain  continue skyrizi treatment as prescribed  follow up with Dr. Lubin, and Dr. Merino

## 2024-07-30 NOTE — DISCHARGE NOTE PROVIDER - NSDCFUSCHEDAPPT_GEN_ALL_CORE_FT
[facility]  [facility]  HNT PreAdmits  Scheduled Appointment: 07/31/2024

## 2024-07-30 NOTE — PROGRESS NOTE ADULT - SUBJECTIVE AND OBJECTIVE BOX
Interval History:No change    MEDICATIONS  (STANDING):  amoxicillin  875 milliGRAM(s)/clavulanate 1 Tablet(s) Oral two times a day  dextrose 5%. 1000 milliLiter(s) (50 mL/Hr) IV Continuous <Continuous>  dextrose 5%. 1000 milliLiter(s) (100 mL/Hr) IV Continuous <Continuous>  dextrose 50% Injectable 12.5 Gram(s) IV Push once  dextrose 50% Injectable 25 Gram(s) IV Push once  dextrose 50% Injectable 25 Gram(s) IV Push once  dicyclomine 10 milliGRAM(s) Oral four times a day before meals  enoxaparin Injectable 40 milliGRAM(s) SubCutaneous every 24 hours  glucagon  Injectable 1 milliGRAM(s) IntraMuscular once  insulin glargine Injectable (LANTUS) 5 Unit(s) SubCutaneous at bedtime  insulin lispro (ADMELOG) corrective regimen sliding scale   SubCutaneous three times a day before meals  insulin lispro (ADMELOG) corrective regimen sliding scale   SubCutaneous at bedtime  insulin lispro Injectable (ADMELOG) 3 Unit(s) SubCutaneous three times a day before meals  midodrine. 10 milliGRAM(s) Oral three times a day  naloxone Injectable 0.4 milliGRAM(s) IV Push once  oxyCODONE  ER Tablet 30 milliGRAM(s) Oral every 12 hours  polyethylene glycol 3350 17 Gram(s) Oral daily  senna 2 Tablet(s) Oral at bedtime  sodium chloride 0.9% Bolus 1000 milliLiter(s) IV Bolus once  tamsulosin 0.4 milliGRAM(s) Oral at bedtime    MEDICATIONS  (PRN):  acetaminophen     Tablet .. 650 milliGRAM(s) Oral every 6 hours PRN Mild Pain (1 - 3)  aluminum hydroxide/magnesium hydroxide/simethicone Suspension 30 milliLiter(s) Oral every 4 hours PRN Dyspepsia  bisacodyl 5 milliGRAM(s) Oral daily PRN Constipation  dextrose Oral Gel 15 Gram(s) Oral once PRN Blood Glucose LESS THAN 70 milliGRAM(s)/deciliter  HYDROmorphone   Tablet 2 milliGRAM(s) Oral every 4 hours PRN Moderate Pain (4 - 6)  HYDROmorphone  Injectable 2 milliGRAM(s) IV Push every 3 hours PRN Severe Pain (7 - 10)  melatonin 3 milliGRAM(s) Oral at bedtime PRN Insomnia  ondansetron Injectable 4 milliGRAM(s) IV Push every 6 hours PRN Nausea and/or Vomiting  zolpidem 5 milliGRAM(s) Oral at bedtime PRN Insomnia      Daily     Daily   BMI: 24.2 (07-14 @ 03:05)  Change in Weight:  Vital Signs Last 24 Hrs  T(C): 36.7 (30 Jul 2024 07:17), Max: 37 (29 Jul 2024 21:35)  T(F): 98 (30 Jul 2024 07:17), Max: 98.6 (29 Jul 2024 21:35)  HR: 62 (30 Jul 2024 07:17) (62 - 71)  BP: 112/75 (30 Jul 2024 07:17) (96/57 - 112/75)  BP(mean): --  RR: 19 (30 Jul 2024 07:17) (18 - 19)  SpO2: 100% (30 Jul 2024 07:17) (98% - 100%)    Parameters below as of 30 Jul 2024 07:17  Patient On (Oxygen Delivery Method): room air      I&O's Detail      PHYSICAL EXAM  Abd Soft NT    Lab Results:                  Stool Results:          RADIOLOGY RESULTS:    SURGICAL PATHOLOGY:

## 2024-07-30 NOTE — PROGRESS NOTE ADULT - SUBJECTIVE AND OBJECTIVE BOX
Chief complaint of Urinary obstruction (14 Jul 2024 15:36)      HPI:  38M w/ h/o DM, Crohns disease, s/p colostomy, hx of bilateral Seton placement for anal fistula drainage 6 wks ago at [place] presents with abdominal pain and anuria. Patient reports he used to be on biologics for chrons, but they were stopped due to recurrent fistulas and infections. Had seton placement for anal fistula 6 months ago, and reports abdominal pain worsening, especially with Flatulance Has had green discharge from the rectum. For past 3 days, pain worsened and was unable to have any urine output and came to [facility]. Patient reports he does not want to go back to [place] due to having a bad experience last time and would like to switch care to [facility]. Reports chills, denies fevers. No other complaints and ROS otherwise negative.   Of note patient was in ED, fox was placed with good urine output, UA was negative, He was discharged with out-pt follow up with GI, colorectal, and . He went home and had severe pain and came back to ED.    In ED vitals stable. CBC noted for WBC 11, H/H 12/38, Plt 365. CMP noted for K of 3, otherwise unremarkable. UA done yesterday appears sterile. CT abdomen with oral and IV contrast noted for "No definite cause for abdominal pain identified. No significant change in multiple complex perianal fistulas without associated abscess". S/p 1 dose zosyn, admitted to  for further care. (14 Jul 2024 02:57) c/w intermittent abd pain,   c/o unchanged rectal pain and pus like dc from fistula, persistent induration  Insurance approved OP biological agent; waiting for hospital to approve infusion    Vital Signs Last 24 Hrs  T(C): 36.7 (30 Jul 2024 07:17), Max: 37 (29 Jul 2024 21:35)  T(F): 98 (30 Jul 2024 07:17), Max: 98.6 (29 Jul 2024 21:35)  HR: 62 (30 Jul 2024 07:17) (62 - 71)  BP: 112/75 (30 Jul 2024 07:17) (96/57 - 112/75)  BP(mean): --  RR: 19 (30 Jul 2024 07:17) (17 - 19)  SpO2: 100% (30 Jul 2024 07:17) (97% - 100%)    Parameters below as of 30 Jul 2024 07:17  Patient On (Oxygen Delivery Method): room air            CT unchanged        PHYSICAL EXAM:    Constitutional: NAD, awake and alert  HEENT: PERR, EOMI, Normal Hearing, MMM  Neck: Soft and supple  Respiratory: Breath sounds are clear bilaterally, No wheezing, rales or rhonchi  Cardiovascular: S1 and S2, regular rate and rhythm, no Murmurs, gallops or rubs  Gastrointestinal: Bowel Sounds present, soft, nontender, nondistended, no guarding, no rebound, ostomy in place arianal fitula with purulent dc  Extremities: No peripheral edema    CAPILLARY BLOOD GLUCOSE      POCT Blood Glucose.: 249 mg/dL (29 Jul 2024 20:55)  POCT Blood Glucose.: 135 mg/dL (29 Jul 2024 16:45)  POCT Blood Glucose.: 203 mg/dL (29 Jul 2024 12:23)  POCT Blood Glucose.: 136 mg/dL (29 Jul 2024 09:06)        A/P:  38M admitted for possible infected perianal fistula.      *Crohn's disease / acute on chronic  perianal pain / perianal fistula:   -vitals stable, no leukocytosis, does not appear to be in a flare  chronic findings- fistulae, plan for biological agent- approved by Ins company        * Type 2 diabetes mellitus:  -A1c 8.1  -Adjust insulin as needed.    * DVT px  -LMWH    dispo home after infusion and return to [facility] for the nxt 2; after that he will sahe sq inj every 8 weeks he can inject Rx sent to [facility]    time spent 45 min

## 2024-07-30 NOTE — DISCHARGE NOTE PROVIDER - CARE PROVIDERS DIRECT ADDRESSES
,DirectAddress_Unknown ,DirectAddress_Unknown,DirectAddress_Unknown,[email or web address]

## 2024-07-30 NOTE — DISCHARGE NOTE PROVIDER - NSDCMRMEDTOKEN_GEN_ALL_CORE_FT
amoxicillin-clavulanate 875 mg-125 mg oral tablet: 875 milligram(s) orally 2 times a day  Dilaudid 4 mg oral tablet: 1 tab(s) orally 3 times a day as needed for  severe pain MDD: 3  metFORMIN 500 mg oral tablet: 1 tab(s) orally once a day  risankizumab-rzaa 180 mg/1.2 mL subcutaneous solution: 180 milligram(s) subcutaneously every 8 weeks  tamsulosin 0.4 mg oral capsule: 1 cap(s) orally once a day (at bedtime)   amoxicillin-clavulanate 875 mg-125 mg oral tablet: 875 milligram(s) orally 2 times a day  Dilaudid 4 mg oral tablet: 1 tab(s) orally 3 times a day as needed for  severe pain MDD: 3  metFORMIN 500 mg oral tablet: 1 tab(s) orally once a day  oxyCODONE 30 mg oral tablet, extended release: 1 tab(s) orally every 12 hours MDD: 60mg  risankizumab-rzaa 180 mg/1.2 mL subcutaneous solution: 180 milligram(s) subcutaneously every 8 weeks  tamsulosin 0.4 mg oral capsule: 1 cap(s) orally once a day (at bedtime)

## 2024-07-30 NOTE — PROGRESS NOTE ADULT - TIME BILLING
30
40
CT pelvis images reviewed, no significantly large anorectal abscesses.  History reviewed.  Has had multiple surgeries by varying CRS at different health systems/institutions.  Is non compliant with GI for biologics.  At baseline, Crohns disease requires ongoing medical management and not surgery.  Despite multiple prior surgeries, inadequate control of disease secondary to noncompliance with medical management. This noncompliance should not continue to be treated by additional surgeries as no endpoint or good outcome.  Would recommend cipro/flagyl for Crohns proctitis with outpt followup with GI.  No further surgical intervention planned unless patient has proven he has failed medical management of Crohns.  Thanks

## 2024-07-30 NOTE — PROGRESS NOTE ADULT - ASSESSMENT
Fistulizing Crohn's disease  Pt started on Loading dose of Skyrizi(Risankizumab)  Follow up in the office after Loading dose completed

## 2024-07-30 NOTE — DISCHARGE NOTE PROVIDER - PROVIDER TOKENS
PROVIDER:[TOKEN:[[id number]],FOLLOWUP:[1 week]] FREE:[LAST:[Follow up with pcp 1 week post discharge],PHONE:[(   )    -],FAX:[(   )    -]],PROVIDER:[TOKEN:[[id number]],FOLLOWUP:[2 weeks]],PROVIDER:[TOKEN:[[id number]],FOLLOWUP:[1 week]]

## 2024-07-30 NOTE — DISCHARGE NOTE PROVIDER - CARE PROVIDER_API CALL
PHIL RUTHERFORD  Phone: [phone]  Fax: ()-  Follow Up Time: 1 week   Follow up with pcp 1 week post discharge,   Phone: (   )    -  Fax: (   )    -  Follow Up Time:     Tone Lubin  Gastroenterology  [address], [address]  [place] [address]  Phone: [phone]  Fax: [phone]  Follow Up Time: 2 weeks    Yasmeen Merino  Colon/Rectal Surgery  [address], [place] [address]  Phone: [phone]  Fax: [phone]  Follow Up Time: 1 week

## 2024-07-31 LAB
ANION GAP SERPL CALC-SCNC: 7 MMOL/L — SIGNIFICANT CHANGE UP (ref 5–17)
BUN SERPL-MCNC: 8 MG/DL — SIGNIFICANT CHANGE UP (ref 7–23)
CALCIUM SERPL-MCNC: 9.8 MG/DL — SIGNIFICANT CHANGE UP (ref 8.5–10.1)
CHLORIDE SERPL-SCNC: 101 MMOL/L — SIGNIFICANT CHANGE UP (ref 96–108)
CO2 SERPL-SCNC: 25 MMOL/L — SIGNIFICANT CHANGE UP (ref 22–31)
CREAT SERPL-MCNC: 0.83 MG/DL — SIGNIFICANT CHANGE UP (ref 0.5–1.3)
EGFR: 115 ML/MIN/1.73M2 — SIGNIFICANT CHANGE UP
GLUCOSE BLDC GLUCOMTR-MCNC: 101 MG/DL — HIGH (ref 70–99)
GLUCOSE BLDC GLUCOMTR-MCNC: 114 MG/DL — HIGH (ref 70–99)
GLUCOSE BLDC GLUCOMTR-MCNC: 181 MG/DL — HIGH (ref 70–99)
GLUCOSE BLDC GLUCOMTR-MCNC: 99 MG/DL — SIGNIFICANT CHANGE UP (ref 70–99)
GLUCOSE SERPL-MCNC: 122 MG/DL — HIGH (ref 70–99)
HCT VFR BLD CALC: 38 % — LOW (ref 39–50)
HGB BLD-MCNC: 12.6 G/DL — LOW (ref 13–17)
MCHC RBC-ENTMCNC: 30.8 PG — SIGNIFICANT CHANGE UP (ref 27–34)
MCHC RBC-ENTMCNC: 33.2 GM/DL — SIGNIFICANT CHANGE UP (ref 32–36)
MCV RBC AUTO: 92.9 FL — SIGNIFICANT CHANGE UP (ref 80–100)
PLATELET # BLD AUTO: 521 K/UL — HIGH (ref 150–400)
POTASSIUM SERPL-MCNC: 3.9 MMOL/L — SIGNIFICANT CHANGE UP (ref 3.5–5.3)
POTASSIUM SERPL-SCNC: 3.9 MMOL/L — SIGNIFICANT CHANGE UP (ref 3.5–5.3)
RBC # BLD: 4.09 M/UL — LOW (ref 4.2–5.8)
RBC # FLD: 14.4 % — SIGNIFICANT CHANGE UP (ref 10.3–14.5)
SODIUM SERPL-SCNC: 133 MMOL/L — LOW (ref 135–145)
WBC # BLD: 15.8 K/UL — HIGH (ref 3.8–10.5)
WBC # FLD AUTO: 15.8 K/UL — HIGH (ref 3.8–10.5)

## 2024-07-31 PROCEDURE — 99232 SBSQ HOSP IP/OBS MODERATE 35: CPT

## 2024-07-31 RX ORDER — TAMSULOSIN HCL 0.4 MG
1 CAPSULE ORAL
Qty: 30 | Refills: 0
Start: 2024-07-31 | End: 2024-08-29

## 2024-07-31 RX ORDER — OXYCODONE HYDROCHLORIDE 30 MG/1
1 TABLET ORAL
Qty: 28 | Refills: 0
Start: 2024-07-31 | End: 2024-08-13

## 2024-07-31 RX ORDER — OXYCODONE HYDROCHLORIDE 30 MG/1
30 TABLET ORAL EVERY 12 HOURS
Refills: 0 | Status: DISCONTINUED | OUTPATIENT
Start: 2024-07-31 | End: 2024-08-01

## 2024-07-31 RX ORDER — HYDROMORPHONE HCL IN 0.9% NACL 0.2 MG/ML
1 PLASTIC BAG, INJECTION (ML) INTRAVENOUS
Qty: 45 | Refills: 0
Start: 2024-07-31 | End: 2024-08-14

## 2024-07-31 RX ADMIN — Medication 2 MILLIGRAM(S): at 09:48

## 2024-07-31 RX ADMIN — Medication 2 MILLIGRAM(S): at 09:50

## 2024-07-31 RX ADMIN — INSULIN GLARGINE-YFGN 5 UNIT(S): 100 INJECTION, SOLUTION SUBCUTANEOUS at 20:59

## 2024-07-31 RX ADMIN — Medication 3 UNIT(S): at 17:34

## 2024-07-31 RX ADMIN — Medication 2 MILLIGRAM(S): at 12:45

## 2024-07-31 RX ADMIN — Medication 2 MILLIGRAM(S): at 14:15

## 2024-07-31 RX ADMIN — Medication 2 MILLIGRAM(S): at 18:42

## 2024-07-31 RX ADMIN — Medication 3 UNIT(S): at 11:38

## 2024-07-31 RX ADMIN — Medication 10 MILLIGRAM(S): at 11:39

## 2024-07-31 RX ADMIN — OXYCODONE HYDROCHLORIDE 30 MILLIGRAM(S): 30 TABLET ORAL at 11:39

## 2024-07-31 RX ADMIN — OXYCODONE HYDROCHLORIDE 30 MILLIGRAM(S): 30 TABLET ORAL at 20:59

## 2024-07-31 RX ADMIN — Medication 2 MILLIGRAM(S): at 17:36

## 2024-07-31 RX ADMIN — MIDODRINE HYDROCHLORIDE 10 MILLIGRAM(S): 2.5 TABLET ORAL at 05:12

## 2024-07-31 RX ADMIN — Medication 1 TABLET(S): at 09:48

## 2024-07-31 RX ADMIN — Medication 10 MILLIGRAM(S): at 21:00

## 2024-07-31 RX ADMIN — MIDODRINE HYDROCHLORIDE 10 MILLIGRAM(S): 2.5 TABLET ORAL at 11:39

## 2024-07-31 RX ADMIN — Medication 2 MILLIGRAM(S): at 00:18

## 2024-07-31 RX ADMIN — Medication 2 MILLIGRAM(S): at 01:08

## 2024-07-31 RX ADMIN — Medication 10 MILLIGRAM(S): at 17:33

## 2024-07-31 RX ADMIN — MIDODRINE HYDROCHLORIDE 10 MILLIGRAM(S): 2.5 TABLET ORAL at 17:33

## 2024-07-31 RX ADMIN — Medication 3 UNIT(S): at 07:58

## 2024-07-31 RX ADMIN — Medication 2 MILLIGRAM(S): at 15:47

## 2024-07-31 RX ADMIN — OXYCODONE HYDROCHLORIDE 30 MILLIGRAM(S): 30 TABLET ORAL at 14:15

## 2024-07-31 RX ADMIN — Medication 2 MILLIGRAM(S): at 22:05

## 2024-07-31 RX ADMIN — Medication 2 MILLIGRAM(S): at 07:19

## 2024-07-31 RX ADMIN — Medication 2 MILLIGRAM(S): at 03:58

## 2024-07-31 RX ADMIN — Medication 10 MILLIGRAM(S): at 05:11

## 2024-07-31 RX ADMIN — Medication 2 MILLIGRAM(S): at 06:53

## 2024-07-31 RX ADMIN — Medication 1 TABLET(S): at 20:59

## 2024-07-31 RX ADMIN — Medication 2 MILLIGRAM(S): at 21:42

## 2024-07-31 RX ADMIN — Medication 5 MILLIGRAM(S): at 00:19

## 2024-07-31 NOTE — CDI QUERY NOTE - NSCDIOTHERTXTBX2_GEN_ALL_CORE_FT
Clinical documentation indicates that this patient has decreased potasium levels without an associated diagnosis. In order to accurately capture this lab finding to the greatest degree of specificity reflecting the patient’s actual severity of illness please document the diagnosis, if known, associated with the below values & clinical evidence:    -Hypokalemia  -No clinical significance  -Other please specify    Supporting Documentation and/or Clinical Evidence:  Potassium: 3.9 mmol/L (07.31.24 @ 07:37)   Potassium: 4.0 mmol/L (07.26.24 @ 08:05)   Potassium: 4.5 mmol/L (07.24.24 @ 08:39)   Potassium: 3.0 mmol/L (07.14.24 @ 07:39)   Potassium: 3.0 mmol/L (07.13.24 @ 20:41)   Potassium: 3.6: Specimen slightly hemolyzed mmol/L (07.13.24 @ 09:29)     DC summary- ACrohn's disease / acute on chronic  perianal pain / perianal fistula: - fistulae, plan for biological agent- approved by Ins company-Perianal fistula associated with recent seton placementPerianal fistula and left-sided ischioanal abscess* Type 2 diabetes mellitus with hyperglycemia-A1c 8.1Pt started on Loading dose of Skyrizi(Risankizumab)    Treatment -potasium chloride tablet 7/14  potasium ER -7/13

## 2024-07-31 NOTE — PROGRESS NOTE ADULT - SUBJECTIVE AND OBJECTIVE BOX
CC: Pain      S:  7/31:  Seen pt at bedside, asleep but easily arousable.  Discussed plan for initiation of biological agents skyrizi pending insurance approval.  Discussed plan of care in detail that he will receive IV loading dose followed by outpatient injections.  He completed full course of IV to oral antibiotics.  He also was started and titrated on long acting opioids.   I discussed with him at length that he no longer requires inpatient hospitalization and that nothing more can be offered at this time.  He mentioned again that he needs surgical intervention, but again discussed that he has had multiple image studies and colorectal evaluations and at this time no surgery is indicated.  Plan is to follow up outpatient with colorectal non-urgently.   Plan is to discharge home with pain management and skyrizi therapy for fistulizing Crphns disease.  Pt was unhappy with this plan, states he needs something done about his "pockets", states there is infection there that nobody is addressing.       REVIEW OF SYSTEMS: All other review of systems is negative unless indicated above.    Vital Signs Last 24 Hrs  T(C): 36.7 (31 Jul 2024 07:55), Max: 37.1 (31 Jul 2024 05:09)  T(F): 98.1 (31 Jul 2024 07:55), Max: 98.7 (31 Jul 2024 05:09)  HR: 62 (31 Jul 2024 07:55) (62 - 78)  BP: 103/67 (31 Jul 2024 07:55) (103/67 - 115/72)  BP(mean): --  RR: 19 (31 Jul 2024 07:55) (18 - 19)  SpO2: 96% (31 Jul 2024 07:55) (96% - 99%)    Parameters below as of 31 Jul 2024 07:55  Patient On (Oxygen Delivery Method): room air      PHYSICAL EXAM:    Constitutional: NAD, awake and alert  HEENT: PERR, EOMI, Normal Hearing, MMM  Neck: Soft and supple  Respiratory: Breath sounds are clear bilaterally, No wheezing, rales or rhonchi  Cardiovascular: S1 and S2, regular rate and rhythm, no Murmurs, gallops or rubs  Gastrointestinal: Bowel Sounds present, soft, nontender, nondistended, no guarding, no rebound, ostomy in place  Extremities: No peripheral edema      MEDICATIONS  (STANDING):  amoxicillin  875 milliGRAM(s)/clavulanate 1 Tablet(s) Oral two times a day  dextrose 5%. 1000 milliLiter(s) (100 mL/Hr) IV Continuous <Continuous>  dextrose 5%. 1000 milliLiter(s) (50 mL/Hr) IV Continuous <Continuous>  dextrose 50% Injectable 25 Gram(s) IV Push once  dextrose 50% Injectable 12.5 Gram(s) IV Push once  dextrose 50% Injectable 25 Gram(s) IV Push once  dicyclomine 10 milliGRAM(s) Oral four times a day before meals  enoxaparin Injectable 40 milliGRAM(s) SubCutaneous every 24 hours  glucagon  Injectable 1 milliGRAM(s) IntraMuscular once  insulin glargine Injectable (LANTUS) 5 Unit(s) SubCutaneous at bedtime  insulin lispro (ADMELOG) corrective regimen sliding scale   SubCutaneous three times a day before meals  insulin lispro (ADMELOG) corrective regimen sliding scale   SubCutaneous at bedtime  insulin lispro Injectable (ADMELOG) 3 Unit(s) SubCutaneous three times a day before meals  midodrine. 10 milliGRAM(s) Oral three times a day  naloxone Injectable 0.4 milliGRAM(s) IV Push once  oxyCODONE  ER Tablet 30 milliGRAM(s) Oral every 12 hours  polyethylene glycol 3350 17 Gram(s) Oral daily  senna 2 Tablet(s) Oral at bedtime  sodium chloride 0.9% Bolus 1000 milliLiter(s) IV Bolus once  tamsulosin 0.4 milliGRAM(s) Oral at bedtime    MEDICATIONS  (PRN):  acetaminophen     Tablet .. 650 milliGRAM(s) Oral every 6 hours PRN Mild Pain (1 - 3)  aluminum hydroxide/magnesium hydroxide/simethicone Suspension 30 milliLiter(s) Oral every 4 hours PRN Dyspepsia  bisacodyl 5 milliGRAM(s) Oral daily PRN Constipation  dextrose Oral Gel 15 Gram(s) Oral once PRN Blood Glucose LESS THAN 70 milliGRAM(s)/deciliter  HYDROmorphone   Tablet 2 milliGRAM(s) Oral every 4 hours PRN Moderate Pain (4 - 6)  HYDROmorphone  Injectable 2 milliGRAM(s) IV Push every 3 hours PRN Severe Pain (7 - 10)  melatonin 3 milliGRAM(s) Oral at bedtime PRN Insomnia  ondansetron Injectable 4 milliGRAM(s) IV Push every 6 hours PRN Nausea and/or Vomiting  zolpidem 5 milliGRAM(s) Oral at bedtime PRN Insomnia                              12.6   15.80 )-----------( 521      ( 31 Jul 2024 07:37 )             38.0     07-31    133<L>  |  101  |  8   ----------------------------<  122<H>  3.9   |  25  |  0.83    Ca    9.8      31 Jul 2024 07:37      CAPILLARY BLOOD GLUCOSE      POCT Blood Glucose.: 101 mg/dL (31 Jul 2024 11:25)  POCT Blood Glucose.: 114 mg/dL (31 Jul 2024 07:20)  POCT Blood Glucose.: 153 mg/dL (30 Jul 2024 20:26)  POCT Blood Glucose.: 149 mg/dL (30 Jul 2024 16:54)        Urinalysis Basic - ( 31 Jul 2024 07:37 )    Color: x / Appearance: x / SG: x / pH: x  Gluc: 122 mg/dL / Ketone: x  / Bili: x / Urobili: x   Blood: x / Protein: x / Nitrite: x   Leuk Esterase: x / RBC: x / WBC x   Sq Epi: x / Non Sq Epi: x / Bacteria: x        A/P:  38M admitted for possible infected perianal fistula.      *severe Crohn's disease / acute on chronic  perianal pain / perianal fistula:   -vitals stable, afebrile  -s/p IV antibiotics  -persistent leukocytosis due to perianal fistulas  -continue pain management  -plan for biological agent Skyrizi which is being coordinated by case management  -outpatient f/u GI Dr. Lubin  -outpatient f/u colorectal surgery      * Type 2 diabetes mellitus:  -A1c 8.1  -Adjust insulin as needed.    * DVT px  -LMWH    dispo:  - d/c home with pain management and skyrizi therapy with close outpatient f/u.  He can receive loading dose at 1N followed by injections at home.         CC: Pain      S:  7/31:  Seen pt at bedside, asleep but easily arousable.  Discussed plan for initiation of biological agents skyrizi pending insurance approval.  Discussed plan of care in detail that he will receive IV loading dose followed by outpatient injections.  He completed full course of IV to oral antibiotics.  He also was started and titrated on long acting opioids.   I discussed with him at length that he no longer requires inpatient hospitalization and that nothing more can be offered at this time.  He mentioned again that he needs surgical intervention, but again discussed that he has had multiple image studies and colorectal evaluations and at this time no surgery is indicated.  Plan is to follow up outpatient with colorectal non-urgently.   Plan is to discharge home with pain management and skyrizi therapy for fistulizing Crphns disease.  Pt was unhappy with this plan, states he needs something done about his "pockets", states there is infection there that nobody is addressing.       REVIEW OF SYSTEMS: All other review of systems is negative unless indicated above.    Vital Signs Last 24 Hrs  T(C): 36.7 (31 Jul 2024 07:55), Max: 37.1 (31 Jul 2024 05:09)  T(F): 98.1 (31 Jul 2024 07:55), Max: 98.7 (31 Jul 2024 05:09)  HR: 62 (31 Jul 2024 07:55) (62 - 78)  BP: 103/67 (31 Jul 2024 07:55) (103/67 - 115/72)  BP(mean): --  RR: 19 (31 Jul 2024 07:55) (18 - 19)  SpO2: 96% (31 Jul 2024 07:55) (96% - 99%)    Parameters below as of 31 Jul 2024 07:55  Patient On (Oxygen Delivery Method): room air      PHYSICAL EXAM:    Constitutional: NAD, awake and alert  HEENT: PERR, EOMI, Normal Hearing, MMM  Neck: Soft and supple  Respiratory: Breath sounds are clear bilaterally, No wheezing, rales or rhonchi  Cardiovascular: S1 and S2, regular rate and rhythm, no Murmurs, gallops or rubs  Gastrointestinal: Bowel Sounds present, soft, nontender, nondistended, no guarding, no rebound, ostomy in place  Extremities: No peripheral edema      MEDICATIONS  (STANDING):  amoxicillin  875 milliGRAM(s)/clavulanate 1 Tablet(s) Oral two times a day  dextrose 5%. 1000 milliLiter(s) (100 mL/Hr) IV Continuous <Continuous>  dextrose 5%. 1000 milliLiter(s) (50 mL/Hr) IV Continuous <Continuous>  dextrose 50% Injectable 25 Gram(s) IV Push once  dextrose 50% Injectable 12.5 Gram(s) IV Push once  dextrose 50% Injectable 25 Gram(s) IV Push once  dicyclomine 10 milliGRAM(s) Oral four times a day before meals  enoxaparin Injectable 40 milliGRAM(s) SubCutaneous every 24 hours  glucagon  Injectable 1 milliGRAM(s) IntraMuscular once  insulin glargine Injectable (LANTUS) 5 Unit(s) SubCutaneous at bedtime  insulin lispro (ADMELOG) corrective regimen sliding scale   SubCutaneous three times a day before meals  insulin lispro (ADMELOG) corrective regimen sliding scale   SubCutaneous at bedtime  insulin lispro Injectable (ADMELOG) 3 Unit(s) SubCutaneous three times a day before meals  midodrine. 10 milliGRAM(s) Oral three times a day  naloxone Injectable 0.4 milliGRAM(s) IV Push once  oxyCODONE  ER Tablet 30 milliGRAM(s) Oral every 12 hours  polyethylene glycol 3350 17 Gram(s) Oral daily  senna 2 Tablet(s) Oral at bedtime  sodium chloride 0.9% Bolus 1000 milliLiter(s) IV Bolus once  tamsulosin 0.4 milliGRAM(s) Oral at bedtime    MEDICATIONS  (PRN):  acetaminophen     Tablet .. 650 milliGRAM(s) Oral every 6 hours PRN Mild Pain (1 - 3)  aluminum hydroxide/magnesium hydroxide/simethicone Suspension 30 milliLiter(s) Oral every 4 hours PRN Dyspepsia  bisacodyl 5 milliGRAM(s) Oral daily PRN Constipation  dextrose Oral Gel 15 Gram(s) Oral once PRN Blood Glucose LESS THAN 70 milliGRAM(s)/deciliter  HYDROmorphone   Tablet 2 milliGRAM(s) Oral every 4 hours PRN Moderate Pain (4 - 6)  HYDROmorphone  Injectable 2 milliGRAM(s) IV Push every 3 hours PRN Severe Pain (7 - 10)  melatonin 3 milliGRAM(s) Oral at bedtime PRN Insomnia  ondansetron Injectable 4 milliGRAM(s) IV Push every 6 hours PRN Nausea and/or Vomiting  zolpidem 5 milliGRAM(s) Oral at bedtime PRN Insomnia                              12.6   15.80 )-----------( 521      ( 31 Jul 2024 07:37 )             38.0     07-31    133<L>  |  101  |  8   ----------------------------<  122<H>  3.9   |  25  |  0.83    Ca    9.8      31 Jul 2024 07:37      CAPILLARY BLOOD GLUCOSE      POCT Blood Glucose.: 101 mg/dL (31 Jul 2024 11:25)  POCT Blood Glucose.: 114 mg/dL (31 Jul 2024 07:20)  POCT Blood Glucose.: 153 mg/dL (30 Jul 2024 20:26)  POCT Blood Glucose.: 149 mg/dL (30 Jul 2024 16:54)        Urinalysis Basic - ( 31 Jul 2024 07:37 )    Color: x / Appearance: x / SG: x / pH: x  Gluc: 122 mg/dL / Ketone: x  / Bili: x / Urobili: x   Blood: x / Protein: x / Nitrite: x   Leuk Esterase: x / RBC: x / WBC x   Sq Epi: x / Non Sq Epi: x / Bacteria: x        A/P:  38M admitted for possible infected perianal fistula.      *severe Crohn's disease / acute on chronic  perianal pain / perianal fistula:   -vitals stable, afebrile  -s/p IV antibiotics  -persistent leukocytosis due to perianal fistulas  -continue pain management  -plan for biological agent Skyrizi which is being coordinated by case management  -outpatient f/u GI Dr. Lubin  -outpatient f/u colorectal surgery    Hypoalbuminemia: Due to crohns disease:  -treat above  -supplementation protein upon discharge  -no hypokalemia at this time and no clinical significance       * Type 2 diabetes mellitus:  -A1c 8.1  -Adjust insulin as needed.    * DVT px  -LMWH    dispo:  - d/c home with pain management and skyrizi therapy with close outpatient f/u.  He can receive loading dose at 1N followed by injections at home.

## 2024-07-31 NOTE — CDI QUERY NOTE - NSCDIOTHERTXTBX_GEN_ALL_CORE_HH
Clinical documentation indicates that this patient has an elevated glucose levels with an associated diagnosis of diabetes 2 . In order to accurately capture this lab finding to the greatest degree of specificity reflecting the patient’s actual severity of illness please document the diagnosis, associated with the below glucose values & clinical evidence:    -Hyperglycemia with diabetes  -Hyperglycemia without diabetes  -Diabetes without hyperglycemia   -Other please specify      Supporting Documentation and/or Clinical Evidence:    Medicine PN 7/26, 7/25, 7/24, 7/23, -* Type 2 diabetes mellitus:-A1c 8.1-Adjust insulin as needed.    insulin glargine Injectable (LANTUS) 5 Unit(s) SubCutaneous at bedtimeinsulin lispro (ADMELOG) corrective regimen sliding scale   SubCutaneous three times a day before mealsinsulin lispro (ADMELOG) corrective regimen sliding scale   SubCutaneous at bedtimeinsulin lispro Injectable (ADMELOG) 3 Unit(s) SubCutaneous three times a day before meals    CAPILLARY BLOOD GLUCOSEPOCT Blood Glucose.: 116 mg/dL (18 Jul 2024 13:36)POCT Blood Glucose.: 274 mg/dL (18 Jul 2024 09:26)POCT Blood Glucose.: 104 mg/dL (17 Jul 2024 20:21)POCT Blood Glucose.: 109 mg/dL (17 Jul 2024 19:21)POCT Blood Glucose.: 212 mg/dL (17 Jul 2024 15:14)  POCT Blood Glucose.: 193 mg/dL (07.26.24 @ 11:51) POCT Blood Glucose.: 152 mg/dL (07.26.24 @ 08:11) POCT Blood Glucose.: 181 mg/dL (07.25.24 @ 20:49) POCT Blood Glucose.: 189 mg/dL (07.25.24 @ 16:39)     Glucose: 159 mg/dL (07.26.24 @ 08:05) Glucose: 130 mg/dL (07.24.24 @ 08:39) Glucose: 104 mg/dL (07.14.24 @ 07:39) Glucose: 251 mg/dL (07.13.24 @ 20:41) Glucose: 338 mg/dL (07.13.24 @ 09:29)     A1C with Estimated Average Glucose Result: 8.1 % (07-14-24 @ 07:39)A1C with Estimated Average Glucose Result: 8.5 % (03-30-24 @ 07:02)A1C with Estimated Average Glucose Result: 7.4 % (01-18-24 @ 09:21)
Clinical documentation indicates that this patient has decreased albumin levels without an associated diagnosis. In order to accurately capture this lab finding to the greatest degree of specificity reflecting the patient’s actual severity of illness please document the diagnosis, if known, associated with the below values & clinical evidence:    -Hypoalbuminemia  -No clinical significance  -Other please specify    Supporting Documentation and/or Clinical Evidence:    Albumin: 2.3 g/dL (07.14.24 @ 07:39)   Albumin: 2.5 g/dL (07.13.24 @ 20:41)   Albumin: 2.8 g/dL (07.13.24 @ 09:29)     DC summary- ACrohn's disease / acute on chronic  perianal pain / perianal fistula: - fistulae, plan for biological agent- approved by Ins company-Perianal fistula associated with recent seton placementPerianal fistula and left-sided ischioanal abscess* Type 2 diabetes mellitus with hyperglycemia-A1c 8.1Pt started on Loading dose of Skyrizi(Risankizumab)    Dietitian consult-Currently on consistent CHO diet. Labs reviewed: POCT x Lab (specify)POCT q6 hrsConsider to obtain vitamin D 25OH level to assess nutriture-2) Encourage protein-rich foods, maximize food preferences. Declined ONS at this time however if requested can add Premier protein shake BID to optimize nutritional needs (provides 160 kcal, 30 g protein/ shake) 5) Consider to obtain vitamin D 25OH level to assess nutriture -Nutrition Diagnositc Terminology #1  Altered Nutrition Related Lab ValuesEtiologyr/t endocrine dysfunction

## 2024-08-01 ENCOUNTER — TRANSCRIPTION ENCOUNTER (OUTPATIENT)
Age: 39
End: 2024-08-01

## 2024-08-01 VITALS
DIASTOLIC BLOOD PRESSURE: 77 MMHG | TEMPERATURE: 98 F | OXYGEN SATURATION: 98 % | RESPIRATION RATE: 18 BRPM | SYSTOLIC BLOOD PRESSURE: 104 MMHG | HEART RATE: 65 BPM

## 2024-08-01 LAB
ANION GAP SERPL CALC-SCNC: 6 MMOL/L — SIGNIFICANT CHANGE UP (ref 5–17)
BUN SERPL-MCNC: 11 MG/DL — SIGNIFICANT CHANGE UP (ref 7–23)
CALCIUM SERPL-MCNC: 9.8 MG/DL — SIGNIFICANT CHANGE UP (ref 8.5–10.1)
CHLORIDE SERPL-SCNC: 99 MMOL/L — SIGNIFICANT CHANGE UP (ref 96–108)
CO2 SERPL-SCNC: 29 MMOL/L — SIGNIFICANT CHANGE UP (ref 22–31)
CREAT SERPL-MCNC: 0.92 MG/DL — SIGNIFICANT CHANGE UP (ref 0.5–1.3)
EGFR: 109 ML/MIN/1.73M2 — SIGNIFICANT CHANGE UP
GLUCOSE BLDC GLUCOMTR-MCNC: 112 MG/DL — HIGH (ref 70–99)
GLUCOSE BLDC GLUCOMTR-MCNC: 122 MG/DL — HIGH (ref 70–99)
GLUCOSE BLDC GLUCOMTR-MCNC: 176 MG/DL — HIGH (ref 70–99)
GLUCOSE SERPL-MCNC: 121 MG/DL — HIGH (ref 70–99)
HCT VFR BLD CALC: 38.6 % — LOW (ref 39–50)
HGB BLD-MCNC: 12.7 G/DL — LOW (ref 13–17)
MCHC RBC-ENTMCNC: 30.7 PG — SIGNIFICANT CHANGE UP (ref 27–34)
MCHC RBC-ENTMCNC: 32.9 GM/DL — SIGNIFICANT CHANGE UP (ref 32–36)
MCV RBC AUTO: 93.2 FL — SIGNIFICANT CHANGE UP (ref 80–100)
PLATELET # BLD AUTO: 480 K/UL — HIGH (ref 150–400)
POTASSIUM SERPL-MCNC: 4.2 MMOL/L — SIGNIFICANT CHANGE UP (ref 3.5–5.3)
POTASSIUM SERPL-SCNC: 4.2 MMOL/L — SIGNIFICANT CHANGE UP (ref 3.5–5.3)
RBC # BLD: 4.14 M/UL — LOW (ref 4.2–5.8)
RBC # FLD: 14.3 % — SIGNIFICANT CHANGE UP (ref 10.3–14.5)
SODIUM SERPL-SCNC: 134 MMOL/L — LOW (ref 135–145)
WBC # BLD: 13.35 K/UL — HIGH (ref 3.8–10.5)
WBC # FLD AUTO: 13.35 K/UL — HIGH (ref 3.8–10.5)

## 2024-08-01 PROCEDURE — 99239 HOSP IP/OBS DSCHRG MGMT >30: CPT

## 2024-08-01 RX ORDER — OXYCODONE HYDROCHLORIDE 30 MG/1
1 TABLET ORAL
Qty: 28 | Refills: 0
Start: 2024-08-01 | End: 2024-08-14

## 2024-08-01 RX ORDER — HYDROMORPHONE HCL IN 0.9% NACL 0.2 MG/ML
1 PLASTIC BAG, INJECTION (ML) INTRAVENOUS
Qty: 45 | Refills: 0
Start: 2024-08-01 | End: 2024-08-15

## 2024-08-01 RX ADMIN — MIDODRINE HYDROCHLORIDE 10 MILLIGRAM(S): 2.5 TABLET ORAL at 11:37

## 2024-08-01 RX ADMIN — Medication 2 MILLIGRAM(S): at 04:08

## 2024-08-01 RX ADMIN — Medication 1 TABLET(S): at 08:48

## 2024-08-01 RX ADMIN — Medication 2 MILLIGRAM(S): at 11:36

## 2024-08-01 RX ADMIN — Medication 2: at 12:33

## 2024-08-01 RX ADMIN — Medication 2 MILLIGRAM(S): at 01:03

## 2024-08-01 RX ADMIN — Medication 3 UNIT(S): at 08:51

## 2024-08-01 RX ADMIN — Medication 2 MILLIGRAM(S): at 04:36

## 2024-08-01 RX ADMIN — Medication 2 MILLIGRAM(S): at 14:27

## 2024-08-01 RX ADMIN — Medication 10 MILLIGRAM(S): at 17:41

## 2024-08-01 RX ADMIN — MIDODRINE HYDROCHLORIDE 10 MILLIGRAM(S): 2.5 TABLET ORAL at 05:32

## 2024-08-01 RX ADMIN — MIDODRINE HYDROCHLORIDE 10 MILLIGRAM(S): 2.5 TABLET ORAL at 17:41

## 2024-08-01 RX ADMIN — Medication 10 MILLIGRAM(S): at 05:32

## 2024-08-01 RX ADMIN — Medication 2 MILLIGRAM(S): at 17:40

## 2024-08-01 RX ADMIN — Medication 3 UNIT(S): at 12:33

## 2024-08-01 RX ADMIN — Medication 10 MILLIGRAM(S): at 11:37

## 2024-08-01 RX ADMIN — Medication 2 MILLIGRAM(S): at 07:53

## 2024-08-01 RX ADMIN — OXYCODONE HYDROCHLORIDE 30 MILLIGRAM(S): 30 TABLET ORAL at 08:49

## 2024-08-01 NOTE — PHARMACY COMMUNICATION NOTE - COMMENTS
Contacted [facility] in [place] ([phone]) and [facility] in [place] ([phone]) to obtain cost of Oxycontin and hydromorphone - both pharmacies stated that they do not currently have either medication in stock and that due to medication shortages they can not guarantee medications coming in if they attempt to order.     Contacted [facility] pharmacy ([phone]) to obtain cost of Oxycontin and hydromorphone. As per pharmacist, both medications are in stock and patient has a copay of $15.

## 2024-08-01 NOTE — PROGRESS NOTE ADULT - SUBJECTIVE AND OBJECTIVE BOX
CC: Pain      S:  7/31:  Seen pt at bedside, asleep but easily arousable.  Discussed plan for initiation of biological agents skyrizi pending insurance approval.  Discussed plan of care in detail that he will receive IV loading dose followed by outpatient injections.  He completed full course of IV to oral antibiotics.  He also was started and titrated on long acting opioids.   I discussed with him at length that he no longer requires inpatient hospitalization and that nothing more can be offered at this time.  He mentioned again that he needs surgical intervention, but again discussed that he has had multiple image studies and colorectal evaluations and at this time no surgery is indicated.  Plan is to follow up outpatient with colorectal non-urgently.   Plan is to discharge home with pain management and skyrizi therapy for fistulizing Crphns disease.  Pt was unhappy with this plan, states he needs something done about his "pockets", states there is infection there that nobody is addressing.       S:  8/1: Spoke to patient, states he has pain.  I discussed with him plan of care, which is pain management and outpatient GI and colorectal follow up.  Discussed plan of care and addressed all questions and concerns to the best of my ability.    REVIEW OF SYSTEMS: All other review of systems is negative unless indicated above.    Vital Signs Last 24 Hrs  T(C): 36.7 (01 Aug 2024 07:27), Max: 36.8 (31 Jul 2024 16:22)  T(F): 98 (01 Aug 2024 07:27), Max: 98.2 (31 Jul 2024 16:22)  HR: 56 (01 Aug 2024 07:27) (56 - 77)  BP: 117/65 (01 Aug 2024 07:27) (93/53 - 117/65)  BP(mean): --  RR: 19 (01 Aug 2024 07:27) (16 - 19)  SpO2: 98% (01 Aug 2024 07:27) (95% - 100%)    Parameters below as of 01 Aug 2024 07:27  Patient On (Oxygen Delivery Method): room air      PHYSICAL EXAM:    Constitutional: NAD, awake and alert  HEENT: PERR, EOMI, Normal Hearing, MMM  Neck: Soft and supple  Respiratory: Breath sounds are clear bilaterally, No wheezing, rales or rhonchi  Cardiovascular: S1 and S2, regular rate and rhythm, no Murmurs, gallops or rubs  Gastrointestinal: Bowel Sounds present, soft, nontender, nondistended, no guarding, no rebound, ostomy in place  Extremities: No peripheral edema      med/labs: Reviewed and interpreted         A/P:  38M admitted for possible infected perianal fistula.      *severe Crohn's disease / acute on chronic  perianal pain / perianal fistula:   -vitals stable, afebrile  -s/p IV antibiotics  -persistent leukocytosis due to perianal fistulas though trending down, complete course of augmentin   -continue pain management  -plan for biological agent Skyrizi which is being coordinated by case management  -outpatient f/u GI Dr. Lubin  -outpatient f/u colorectal surgery    Hypoalbuminemia: Due to crohns disease:  -treat above  -supplementation protein upon discharge  -no hypokalemia at this time and no clinical significance       * Type 2 diabetes mellitus:  -A1c 8.1  -Adjust insulin as needed.    * DVT px  -LMWH    dispo:  - d/c home with pain management and skyrizi therapy with close outpatient f/u.                Electronic Signatures:  Elkin Amaya ()  (Signed 31-Jul-2024 13:54)  	Authored: Progress Note, Reason for Admission, Subjective and Objective      Last Updated: 31-Jul-2024 13:54 by Elkin Amaya ()

## 2024-08-01 NOTE — PROGRESS NOTE ADULT - PROVIDER SPECIALTY LIST ADULT
Colorectal Surgery
Colorectal Surgery
Hospitalist
Colorectal Surgery
Gastroenterology
Hospitalist
Colorectal Surgery
Colorectal Surgery
Gastroenterology
Hospitalist

## 2024-08-01 NOTE — DISCHARGE NOTE NURSING/CASE MANAGEMENT/SOCIAL WORK - NSDCFUADDAPPT_GEN_ALL_CORE_FT
APPTS ARE READY TO BE MADE: [ x ] YES    Best Family or Patient Contact (if needed):    Additional Information about above appointments (if needed):    1:   2:   3:     Other comments or requests:    Soft, nontender

## 2024-08-01 NOTE — PROGRESS NOTE ADULT - REASON FOR ADMISSION
Urinary retention
pain
Rectal pain
intractable pain
pain
pain
rectal pain
rectal pain
rectal pain.
Intractable rectal pain.
pain
rectal pain
Urinary obstruction

## 2024-08-01 NOTE — DISCHARGE NOTE NURSING/CASE MANAGEMENT/SOCIAL WORK - PATIENT PORTAL LINK FT
You can access the FollowMyHealth Patient Portal offered by [facility] by registering at the following website: [email or web address]. By joining [employer]’s FollowMyHealth portal, you will also be able to view your health information using other applications (apps) compatible with our system.

## 2024-08-02 LAB
GAMMA INTERFERON BACKGROUND BLD IA-ACNC: 0.04 IU/ML — SIGNIFICANT CHANGE UP
M TB IFN-G BLD-IMP: NEGATIVE — SIGNIFICANT CHANGE UP
M TB IFN-G CD4+ BCKGRND COR BLD-ACNC: 0 IU/ML — SIGNIFICANT CHANGE UP
M TB IFN-G CD4+CD8+ BCKGRND COR BLD-ACNC: 0 IU/ML — SIGNIFICANT CHANGE UP
QUANT TB PLUS MITOGEN MINUS NIL: >10 IU/ML — SIGNIFICANT CHANGE UP

## 2024-08-07 ENCOUNTER — OUTPATIENT (OUTPATIENT)
Dept: OUTPATIENT SERVICES | Facility: HOSPITAL | Age: 39
LOS: 1 days | Discharge: HOME | End: 2024-08-07
Payer: MEDICAID

## 2024-08-07 VITALS
SYSTOLIC BLOOD PRESSURE: 119 MMHG | TEMPERATURE: 97 F | OXYGEN SATURATION: 98 % | WEIGHT: 158.07 LBS | HEART RATE: 95 BPM | DIASTOLIC BLOOD PRESSURE: 73 MMHG | RESPIRATION RATE: 18 BRPM

## 2024-08-07 VITALS
SYSTOLIC BLOOD PRESSURE: 108 MMHG | RESPIRATION RATE: 18 BRPM | HEART RATE: 87 BPM | TEMPERATURE: 98 F | OXYGEN SATURATION: 99 % | DIASTOLIC BLOOD PRESSURE: 69 MMHG

## 2024-08-07 DIAGNOSIS — Z96.7 PRESENCE OF OTHER BONE AND TENDON IMPLANTS: Chronic | ICD-10-CM

## 2024-08-07 DIAGNOSIS — Z98.89 OTHER SPECIFIED POSTPROCEDURAL STATES: Chronic | ICD-10-CM

## 2024-08-07 DIAGNOSIS — Z93.3 COLOSTOMY STATUS: Chronic | ICD-10-CM

## 2024-08-07 DIAGNOSIS — K50.10 CROHN'S DISEASE OF LARGE INTESTINE WITHOUT COMPLICATIONS: ICD-10-CM

## 2024-08-07 DIAGNOSIS — K60.3 ANAL FISTULA: Chronic | ICD-10-CM

## 2024-08-07 DIAGNOSIS — Z87.81 PERSONAL HISTORY OF (HEALED) TRAUMATIC FRACTURE: Chronic | ICD-10-CM

## 2024-08-07 PROCEDURE — 96413 CHEMO IV INFUSION 1 HR: CPT

## 2024-08-07 RX ORDER — RISANKIZUMAB-RZAA 150 MG/ML
600 INJECTION SUBCUTANEOUS ONCE
Refills: 0 | Status: COMPLETED | OUTPATIENT
Start: 2024-08-07 | End: 2024-08-07

## 2024-08-07 RX ADMIN — RISANKIZUMAB-RZAA 600 MILLIGRAM(S): 150 INJECTION SUBCUTANEOUS at 12:11

## 2024-08-07 RX ADMIN — RISANKIZUMAB-RZAA 110 MILLIGRAM(S): 150 INJECTION SUBCUTANEOUS at 11:02

## 2024-08-07 NOTE — INFUSION NURSING HISTORY - NS PRO AMB ALCOHOL AMT
3-4 drinks Dutasteride Male Counseling: Dustasteride Counseling:  I discussed with the patient the risks of use of dutasteride including but not limited to decreased libido, decreased ejaculate volume, and gynecomastia. Women who can become pregnant should not handle medication.  All of the patient's questions and concerns were addressed. Dutasteride Counseling: Dustasteride Counseling:  I discussed with the patient the risks of use of dutasteride including but not limited to decreased libido, decreased ejaculate volume, and gynecomastia. Women who can become pregnant should not handle medication.  All of the patient's questions and concerns were addressed.

## 2024-08-07 NOTE — INFUSION NURSING HISTORY - RN HISTORY HPI
pt with hx of Crohns, s/p colostomy 2020. s/p B/L Seton placement for anal fistula 6/2024. Worsening abd pain/perirectal pain. pt to start Skyrizi infusions.

## 2024-08-08 DIAGNOSIS — T81.41XA INFECTION FOLLOWING A PROCEDURE, SUPERFICIAL INCISIONAL SURGICAL SITE, INITIAL ENCOUNTER: ICD-10-CM

## 2024-08-08 DIAGNOSIS — K50.10 CROHN'S DISEASE OF LARGE INTESTINE WITHOUT COMPLICATIONS: ICD-10-CM

## 2024-08-08 DIAGNOSIS — Z79.84 LONG TERM (CURRENT) USE OF ORAL HYPOGLYCEMIC DRUGS: ICD-10-CM

## 2024-08-08 DIAGNOSIS — R33.9 RETENTION OF URINE, UNSPECIFIED: ICD-10-CM

## 2024-08-08 DIAGNOSIS — E11.65 TYPE 2 DIABETES MELLITUS WITH HYPERGLYCEMIA: ICD-10-CM

## 2024-08-08 DIAGNOSIS — K50.113 CROHN'S DISEASE OF LARGE INTESTINE WITH FISTULA: ICD-10-CM

## 2024-08-08 DIAGNOSIS — E88.09 OTHER DISORDERS OF PLASMA-PROTEIN METABOLISM, NOT ELSEWHERE CLASSIFIED: ICD-10-CM

## 2024-08-08 DIAGNOSIS — Z93.2 ILEOSTOMY STATUS: ICD-10-CM

## 2024-08-08 DIAGNOSIS — K61.39 OTHER ISCHIORECTAL ABSCESS: ICD-10-CM

## 2024-08-08 DIAGNOSIS — Z96.0 PRESENCE OF UROGENITAL IMPLANTS: ICD-10-CM

## 2024-08-08 DIAGNOSIS — Z79.620 LONG TERM (CURRENT) USE OF IMMUNOSUPPRESSIVE BIOLOGIC: ICD-10-CM

## 2024-08-08 DIAGNOSIS — Z88.1 ALLERGY STATUS TO OTHER ANTIBIOTIC AGENTS: ICD-10-CM
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
## 2024-09-05 ENCOUNTER — OUTPATIENT (OUTPATIENT)
Dept: OUTPATIENT SERVICES | Facility: HOSPITAL | Age: 39
LOS: 1 days | Discharge: HOME | End: 2024-09-05
Payer: MEDICAID

## 2024-09-05 VITALS
OXYGEN SATURATION: 100 % | TEMPERATURE: 98 F | HEART RATE: 87 BPM | DIASTOLIC BLOOD PRESSURE: 84 MMHG | RESPIRATION RATE: 16 BRPM | SYSTOLIC BLOOD PRESSURE: 118 MMHG

## 2024-09-05 VITALS
TEMPERATURE: 98 F | RESPIRATION RATE: 16 BRPM | OXYGEN SATURATION: 100 % | SYSTOLIC BLOOD PRESSURE: 120 MMHG | HEART RATE: 91 BPM | DIASTOLIC BLOOD PRESSURE: 84 MMHG

## 2024-09-05 DIAGNOSIS — Z98.89 OTHER SPECIFIED POSTPROCEDURAL STATES: Chronic | ICD-10-CM

## 2024-09-05 DIAGNOSIS — Z87.81 PERSONAL HISTORY OF (HEALED) TRAUMATIC FRACTURE: Chronic | ICD-10-CM

## 2024-09-05 DIAGNOSIS — K50.10 CROHN'S DISEASE OF LARGE INTESTINE WITHOUT COMPLICATIONS: ICD-10-CM

## 2024-09-05 DIAGNOSIS — Z96.7 PRESENCE OF OTHER BONE AND TENDON IMPLANTS: Chronic | ICD-10-CM

## 2024-09-05 DIAGNOSIS — K60.3 ANAL FISTULA: Chronic | ICD-10-CM

## 2024-09-05 DIAGNOSIS — Z93.3 COLOSTOMY STATUS: Chronic | ICD-10-CM

## 2024-09-05 PROCEDURE — 96413 CHEMO IV INFUSION 1 HR: CPT

## 2024-09-05 RX ORDER — RISANKIZUMAB-RZAA 150 MG/ML
600 INJECTION SUBCUTANEOUS ONCE
Refills: 0 | Status: COMPLETED | OUTPATIENT
Start: 2024-09-05 | End: 2024-09-05

## 2024-09-05 RX ADMIN — RISANKIZUMAB-RZAA 110 MILLIGRAM(S): 150 INJECTION SUBCUTANEOUS at 10:47

## 2024-09-05 RX ADMIN — RISANKIZUMAB-RZAA 600 MILLIGRAM(S): 150 INJECTION SUBCUTANEOUS at 11:54

## 2024-09-11 ENCOUNTER — EMERGENCY (EMERGENCY)
Facility: HOSPITAL | Age: 39
LOS: 0 days | Discharge: HOME | End: 2024-09-11
Attending: EMERGENCY MEDICINE
Payer: MEDICAID

## 2024-09-11 VITALS
RESPIRATION RATE: 16 BRPM | DIASTOLIC BLOOD PRESSURE: 63 MMHG | TEMPERATURE: 98 F | HEART RATE: 69 BPM | OXYGEN SATURATION: 99 % | SYSTOLIC BLOOD PRESSURE: 98 MMHG

## 2024-09-11 VITALS
OXYGEN SATURATION: 100 % | DIASTOLIC BLOOD PRESSURE: 96 MMHG | HEART RATE: 90 BPM | RESPIRATION RATE: 17 BRPM | WEIGHT: 161.82 LBS | HEIGHT: 69 IN | TEMPERATURE: 98 F | SYSTOLIC BLOOD PRESSURE: 124 MMHG

## 2024-09-11 DIAGNOSIS — R10.9 UNSPECIFIED ABDOMINAL PAIN: ICD-10-CM

## 2024-09-11 DIAGNOSIS — Z98.89 OTHER SPECIFIED POSTPROCEDURAL STATES: Chronic | ICD-10-CM

## 2024-09-11 DIAGNOSIS — E11.9 TYPE 2 DIABETES MELLITUS WITHOUT COMPLICATIONS: ICD-10-CM

## 2024-09-11 DIAGNOSIS — Z87.81 PERSONAL HISTORY OF (HEALED) TRAUMATIC FRACTURE: Chronic | ICD-10-CM

## 2024-09-11 DIAGNOSIS — Z88.1 ALLERGY STATUS TO OTHER ANTIBIOTIC AGENTS: ICD-10-CM

## 2024-09-11 DIAGNOSIS — D72.829 ELEVATED WHITE BLOOD CELL COUNT, UNSPECIFIED: ICD-10-CM

## 2024-09-11 DIAGNOSIS — R19.7 DIARRHEA, UNSPECIFIED: ICD-10-CM

## 2024-09-11 DIAGNOSIS — K60.3 ANAL FISTULA: Chronic | ICD-10-CM

## 2024-09-11 DIAGNOSIS — K50.90 CROHN'S DISEASE, UNSPECIFIED, WITHOUT COMPLICATIONS: ICD-10-CM

## 2024-09-11 DIAGNOSIS — Z96.7 PRESENCE OF OTHER BONE AND TENDON IMPLANTS: Chronic | ICD-10-CM

## 2024-09-11 DIAGNOSIS — Z93.3 COLOSTOMY STATUS: Chronic | ICD-10-CM

## 2024-09-11 DIAGNOSIS — Z87.19 PERSONAL HISTORY OF OTHER DISEASES OF THE DIGESTIVE SYSTEM: ICD-10-CM

## 2024-09-11 LAB
ALBUMIN SERPL ELPH-MCNC: 3.3 G/DL — SIGNIFICANT CHANGE UP (ref 3.3–5)
ALP SERPL-CCNC: 102 U/L — SIGNIFICANT CHANGE UP (ref 40–120)
ALT FLD-CCNC: 31 U/L — SIGNIFICANT CHANGE UP (ref 12–78)
ANION GAP SERPL CALC-SCNC: 6 MMOL/L — SIGNIFICANT CHANGE UP (ref 5–17)
AST SERPL-CCNC: 29 U/L — SIGNIFICANT CHANGE UP (ref 15–37)
BASOPHILS # BLD AUTO: 0.06 K/UL — SIGNIFICANT CHANGE UP (ref 0–0.2)
BASOPHILS NFR BLD AUTO: 0.5 % — SIGNIFICANT CHANGE UP (ref 0–2)
BILIRUB SERPL-MCNC: 0.5 MG/DL — SIGNIFICANT CHANGE UP (ref 0.2–1.2)
BUN SERPL-MCNC: 7 MG/DL — SIGNIFICANT CHANGE UP (ref 7–23)
CALCIUM SERPL-MCNC: 10.1 MG/DL — SIGNIFICANT CHANGE UP (ref 8.5–10.1)
CHLORIDE SERPL-SCNC: 102 MMOL/L — SIGNIFICANT CHANGE UP (ref 96–108)
CO2 SERPL-SCNC: 22 MMOL/L — SIGNIFICANT CHANGE UP (ref 22–31)
CREAT SERPL-MCNC: 0.82 MG/DL — SIGNIFICANT CHANGE UP (ref 0.5–1.3)
EGFR: 115 ML/MIN/1.73M2 — SIGNIFICANT CHANGE UP
EOSINOPHIL # BLD AUTO: 0.08 K/UL — SIGNIFICANT CHANGE UP (ref 0–0.5)
EOSINOPHIL NFR BLD AUTO: 0.6 % — SIGNIFICANT CHANGE UP (ref 0–6)
GLUCOSE SERPL-MCNC: 170 MG/DL — HIGH (ref 70–99)
HCT VFR BLD CALC: 46.5 % — SIGNIFICANT CHANGE UP (ref 39–50)
HGB BLD-MCNC: 15.1 G/DL — SIGNIFICANT CHANGE UP (ref 13–17)
IMM GRANULOCYTES NFR BLD AUTO: 0.7 % — SIGNIFICANT CHANGE UP (ref 0–0.9)
LIDOCAIN IGE QN: 18 U/L — SIGNIFICANT CHANGE UP (ref 13–75)
LYMPHOCYTES # BLD AUTO: 19.5 % — SIGNIFICANT CHANGE UP (ref 13–44)
LYMPHOCYTES # BLD AUTO: 2.54 K/UL — SIGNIFICANT CHANGE UP (ref 1–3.3)
MCHC RBC-ENTMCNC: 31.9 PG — SIGNIFICANT CHANGE UP (ref 27–34)
MCHC RBC-ENTMCNC: 32.5 GM/DL — SIGNIFICANT CHANGE UP (ref 32–36)
MCV RBC AUTO: 98.3 FL — SIGNIFICANT CHANGE UP (ref 80–100)
MONOCYTES # BLD AUTO: 1.15 K/UL — HIGH (ref 0–0.9)
MONOCYTES NFR BLD AUTO: 8.8 % — SIGNIFICANT CHANGE UP (ref 2–14)
NEUTROPHILS # BLD AUTO: 9.12 K/UL — HIGH (ref 1.8–7.4)
NEUTROPHILS NFR BLD AUTO: 69.9 % — SIGNIFICANT CHANGE UP (ref 43–77)
PLATELET # BLD AUTO: 402 K/UL — HIGH (ref 150–400)
POTASSIUM SERPL-MCNC: 4.5 MMOL/L — SIGNIFICANT CHANGE UP (ref 3.5–5.3)
POTASSIUM SERPL-SCNC: 4.5 MMOL/L — SIGNIFICANT CHANGE UP (ref 3.5–5.3)
PROT SERPL-MCNC: 8.7 GM/DL — HIGH (ref 6–8.3)
RBC # BLD: 4.73 M/UL — SIGNIFICANT CHANGE UP (ref 4.2–5.8)
RBC # FLD: 17.2 % — HIGH (ref 10.3–14.5)
SODIUM SERPL-SCNC: 130 MMOL/L — LOW (ref 135–145)
WBC # BLD: 13.04 K/UL — HIGH (ref 3.8–10.5)
WBC # FLD AUTO: 13.04 K/UL — HIGH (ref 3.8–10.5)

## 2024-09-11 PROCEDURE — 96374 THER/PROPH/DIAG INJ IV PUSH: CPT | Mod: XU

## 2024-09-11 PROCEDURE — 80053 COMPREHEN METABOLIC PANEL: CPT

## 2024-09-11 PROCEDURE — 99285 EMERGENCY DEPT VISIT HI MDM: CPT

## 2024-09-11 PROCEDURE — 96375 TX/PRO/DX INJ NEW DRUG ADDON: CPT

## 2024-09-11 PROCEDURE — 74177 CT ABD & PELVIS W/CONTRAST: CPT | Mod: MC

## 2024-09-11 PROCEDURE — 99284 EMERGENCY DEPT VISIT MOD MDM: CPT | Mod: 25

## 2024-09-11 PROCEDURE — 83690 ASSAY OF LIPASE: CPT

## 2024-09-11 PROCEDURE — 36415 COLL VENOUS BLD VENIPUNCTURE: CPT

## 2024-09-11 PROCEDURE — 74177 CT ABD & PELVIS W/CONTRAST: CPT | Mod: 26,MC

## 2024-09-11 PROCEDURE — 85025 COMPLETE CBC W/AUTO DIFF WBC: CPT

## 2024-09-11 RX ORDER — OXYCODONE HYDROCHLORIDE 5 MG/1
1 TABLET ORAL
Qty: 16 | Refills: 0
Start: 2024-09-11 | End: 2024-09-14

## 2024-09-11 RX ORDER — SODIUM CHLORIDE 9 MG/ML
1000 INJECTION INTRAMUSCULAR; INTRAVENOUS; SUBCUTANEOUS ONCE
Refills: 0 | Status: COMPLETED | OUTPATIENT
Start: 2024-09-11 | End: 2024-09-11

## 2024-09-11 RX ORDER — ACETAMINOPHEN 325 MG/1
1000 TABLET ORAL ONCE
Refills: 0 | Status: COMPLETED | OUTPATIENT
Start: 2024-09-11 | End: 2024-09-11

## 2024-09-11 RX ORDER — ONDANSETRON 2 MG/ML
4 INJECTION, SOLUTION INTRAMUSCULAR; INTRAVENOUS ONCE
Refills: 0 | Status: COMPLETED | OUTPATIENT
Start: 2024-09-11 | End: 2024-09-11

## 2024-09-11 RX ADMIN — ACETAMINOPHEN 400 MILLIGRAM(S): 325 TABLET ORAL at 12:28

## 2024-09-11 RX ADMIN — Medication 4 MILLIGRAM(S): at 14:11

## 2024-09-11 RX ADMIN — SODIUM CHLORIDE 1000 MILLILITER(S): 9 INJECTION INTRAMUSCULAR; INTRAVENOUS; SUBCUTANEOUS at 12:28

## 2024-09-11 RX ADMIN — ONDANSETRON 4 MILLIGRAM(S): 2 INJECTION, SOLUTION INTRAMUSCULAR; INTRAVENOUS at 14:11

## 2024-09-11 NOTE — ED STATDOCS - NSICDXPASTSURGICALHX_GEN_ALL_CORE_FT
No answer No voicemail set up.     Patient referral is ready for  at .    PAST SURGICAL HISTORY:  H/O fracture of clavicle with surgical repair    History of colonoscopy (2014)    Perianal fistula repair in 2002    S/P colostomy     S/P ORIF (open reduction internal fixation) fracture (Right ankle, 2014)

## 2024-09-11 NOTE — ED STATDOCS - PATIENT PORTAL LINK FT
You can access the FollowMyHealth Patient Portal offered by [facility] by registering at the following website: [email or web address]. By joining [employer]’s FollowMyHealth portal, you will also be able to view your health information using other applications (apps) compatible with our system.

## 2024-09-11 NOTE — ED STATDOCS - ATTENDING APP SHARED VISIT CONTRIBUTION OF CARE
I,Kiko Serrato MD,  performed the initial face to face bedside interview with this patient regarding history of present illness, review of symptoms and relevant past medical, social and family history.  I completed an independent physical examination.  I was the initial provider who evaluated this patient. I have signed out the follow up of any pending tests (i.e. labs, radiological studies) to the ACP.  I have communicated the patient’s plan of care and disposition with the ACP.  The history, relevant review of systems, past medical and surgical history, medical decision making, and physical examination was documented by the scribe in my presence and I attest to the accuracy of the documentation.

## 2024-09-11 NOTE — ED STATDOCS - PROGRESS NOTE DETAILS
labs with mild leukocytosis, , CT a/p with no acute findings, ostomy site appears normal advised pt to f/u with his colorectal surgeon and GI, will dc home with outpt f/u strict return precautions pt agreeable to dc and plan of care  Jill Xiong PA-C Patient seen and evaluated, ED attending note and orders reviewed, will continue with patient follow up and care -Jill Xiong PA-C

## 2024-09-11 NOTE — ED ADULT TRIAGE NOTE - CHIEF COMPLAINT QUOTE
PT presents to er with complaints of abd pain at site of ileostomy bag with bleeding, denies use of blood thinners, states he has a history of Chron' s disease, denies fevers.

## 2024-09-11 NOTE — ED STATDOCS - CLINICAL SUMMARY MEDICAL DECISION MAKING FREE TEXT BOX
39 year-old with past medical history of Chron's and ostomy has increased abdominal pain and noticed blood in ostomy bag yesterday. Pt doesn't remmeber colorectal surgeon or GI doctor, will check lab work and CT. Hemodynamically stable at this time.

## 2024-09-11 NOTE — ED STATDOCS - OBJECTIVE STATEMENT
39 year-old male with past medical history of pancreatitis, Chron's and DM presents complaining of abdominal pain for 2 days. Pt says he was showering and noticed blood in ostomy bag. Pt also endorses "severe" diarrhea for 3 days. No other complaints at this time.

## 2024-09-11 NOTE — ED ADULT NURSE NOTE - OBJECTIVE STATEMENT
Pt presents to ED w/ abdominal pain, diarrhea and blood in ostomy bag x2 days. Hx of crohns and pancreatitis. Denies n/v or fevers,

## 2024-09-11 NOTE — ED STATDOCS - GASTROINTESTINAL, MLM
abdomen soft, mild diffuse abdominal tenderness, and non-distended. Bowel sounds present. ostomy to R abdominal wall with stool in bag, no blood visible in bag

## 2024-09-11 NOTE — ED ADULT NURSE NOTE - NS ED NURSE IV DC DT
Implemented All Fall with Harm Risk Interventions:  [place] to call system. Call bell, personal items and telephone within reach. Instruct patient to call for assistance. Room bathroom lighting operational. Non-slip footwear when patient is off stretcher. Physically safe environment: no spills, clutter or unnecessary equipment. Stretcher in lowest position, wheels locked, appropriate side rails in place. Provide visual cue, wrist band, yellow gown, etc. Monitor gait and stability. Monitor for mental status changes and reorient to person, place, and time. Review medications for side effects contributing to fall risk. Reinforce activity limits and safety measures with patient and family. Provide visual clues: red socks.
11-Sep-2024 15:29

## 2024-09-11 NOTE — ED STATDOCS - ATTENDING SHARED VISIT SELECTOR YES
What Is The Reason For Today's Visit?: Full Body Skin Examination Additional History: Pt in gown for fbse Yes

## 2024-10-04 ENCOUNTER — OUTPATIENT (OUTPATIENT)
Dept: OUTPATIENT SERVICES | Facility: HOSPITAL | Age: 39
LOS: 1 days | Discharge: HOME | End: 2024-10-04
Payer: MEDICAID

## 2024-10-04 VITALS
HEART RATE: 112 BPM | RESPIRATION RATE: 18 BRPM | SYSTOLIC BLOOD PRESSURE: 153 MMHG | DIASTOLIC BLOOD PRESSURE: 98 MMHG | OXYGEN SATURATION: 98 % | TEMPERATURE: 98 F

## 2024-10-04 VITALS
TEMPERATURE: 98 F | SYSTOLIC BLOOD PRESSURE: 121 MMHG | RESPIRATION RATE: 18 BRPM | DIASTOLIC BLOOD PRESSURE: 87 MMHG | OXYGEN SATURATION: 100 % | HEART RATE: 84 BPM

## 2024-10-04 DIAGNOSIS — K50.10 CROHN'S DISEASE OF LARGE INTESTINE WITHOUT COMPLICATIONS: ICD-10-CM

## 2024-10-04 DIAGNOSIS — Z87.81 PERSONAL HISTORY OF (HEALED) TRAUMATIC FRACTURE: Chronic | ICD-10-CM

## 2024-10-04 DIAGNOSIS — Z96.7 PRESENCE OF OTHER BONE AND TENDON IMPLANTS: Chronic | ICD-10-CM

## 2024-10-04 DIAGNOSIS — K60.3 ANAL FISTULA: Chronic | ICD-10-CM

## 2024-10-04 DIAGNOSIS — Z93.3 COLOSTOMY STATUS: Chronic | ICD-10-CM

## 2024-10-04 DIAGNOSIS — Z98.89 OTHER SPECIFIED POSTPROCEDURAL STATES: Chronic | ICD-10-CM

## 2024-10-04 PROCEDURE — 96413 CHEMO IV INFUSION 1 HR: CPT

## 2024-10-04 RX ORDER — RISANKIZUMAB-RZAA 150 MG/ML
600 INJECTION SUBCUTANEOUS ONCE
Refills: 0 | Status: COMPLETED | OUTPATIENT
Start: 2024-10-04 | End: 2024-10-04

## 2024-10-04 RX ADMIN — RISANKIZUMAB-RZAA 600 MILLIGRAM(S): 150 INJECTION SUBCUTANEOUS at 09:29

## 2024-10-04 RX ADMIN — RISANKIZUMAB-RZAA 110 MILLIGRAM(S): 150 INJECTION SUBCUTANEOUS at 08:29

## 2024-10-04 NOTE — ED ADULT NURSE NOTE - BEFAST ARM NUMBNESS
No

## 2024-10-07 ENCOUNTER — OUTPATIENT (OUTPATIENT)
Dept: OUTPATIENT SERVICES | Facility: HOSPITAL | Age: 39
LOS: 1 days | End: 2024-10-07

## 2024-10-07 ENCOUNTER — APPOINTMENT (OUTPATIENT)
Dept: INTERNAL MEDICINE | Facility: CLINIC | Age: 39
End: 2024-10-07

## 2024-10-07 DIAGNOSIS — Z93.3 COLOSTOMY STATUS: Chronic | ICD-10-CM

## 2024-10-07 DIAGNOSIS — Z98.89 OTHER SPECIFIED POSTPROCEDURAL STATES: Chronic | ICD-10-CM

## 2024-10-07 DIAGNOSIS — Z96.7 PRESENCE OF OTHER BONE AND TENDON IMPLANTS: Chronic | ICD-10-CM

## 2024-10-21 NOTE — ED ADULT NURSE NOTE - PAIN: BODY LOCATION
abdomen

## 2024-10-31 NOTE — ED PROVIDER NOTE - IV ALTEPLASE ADMIN OUTSIDE HIDDEN
Pt tolerated snack well ( also given refreshments at his request), pt denies pain or discomfort or itching in periarea.  Pt assisted with dressing and ambulated to the bathroom where she voided a large amount of yellow urine; pt was inc of small amount of urine and was assisted with cleaning herself.  Dc instructions reviewed with pt and her  in depth and all questions were answered; sbp under 170 on discharge and pt reminded to take bp meds when she gets home.  Pt dc'ed to home via wheelchair.  Pt's walker with pt as well as her oxygen tank from home and tubing   show

## 2025-01-07 NOTE — ED ADULT NURSE NOTE - EXTENSIONS OF SELF_ADULT
Pt is scheduled w/ Lissa for a follow up post Knee surgery on 1/22 and is wondering Lissa will want to do Bloodwork and if she needs to fast.    Please advise, thank you!   None

## 2025-01-25 ENCOUNTER — INPATIENT (INPATIENT)
Facility: HOSPITAL | Age: 40
LOS: 0 days | Discharge: HOME | DRG: 254 | End: 2025-01-26
Attending: SURGERY | Admitting: SURGERY
Payer: MEDICAID

## 2025-01-25 VITALS — WEIGHT: 164.91 LBS | HEIGHT: 69 IN

## 2025-01-25 DIAGNOSIS — Z87.81 PERSONAL HISTORY OF (HEALED) TRAUMATIC FRACTURE: Chronic | ICD-10-CM

## 2025-01-25 DIAGNOSIS — Z98.89 OTHER SPECIFIED POSTPROCEDURAL STATES: Chronic | ICD-10-CM

## 2025-01-25 DIAGNOSIS — Z93.3 COLOSTOMY STATUS: Chronic | ICD-10-CM

## 2025-01-25 DIAGNOSIS — K43.5 PARASTOMAL HERNIA WITHOUT OBSTRUCTION OR GANGRENE: ICD-10-CM

## 2025-01-25 DIAGNOSIS — K60.3 ANAL FISTULA: Chronic | ICD-10-CM

## 2025-01-25 DIAGNOSIS — Z96.7 PRESENCE OF OTHER BONE AND TENDON IMPLANTS: Chronic | ICD-10-CM

## 2025-01-25 LAB
ALBUMIN SERPL ELPH-MCNC: 3.6 G/DL — SIGNIFICANT CHANGE UP (ref 3.3–5)
ALP SERPL-CCNC: 103 U/L — SIGNIFICANT CHANGE UP (ref 40–120)
ALT FLD-CCNC: 21 U/L — SIGNIFICANT CHANGE UP (ref 12–78)
ANION GAP SERPL CALC-SCNC: 5 MMOL/L — SIGNIFICANT CHANGE UP (ref 5–17)
APTT BLD: 29.5 SEC — SIGNIFICANT CHANGE UP (ref 24.5–35.6)
AST SERPL-CCNC: 26 U/L — SIGNIFICANT CHANGE UP (ref 15–37)
BASOPHILS # BLD AUTO: 0.03 K/UL — SIGNIFICANT CHANGE UP (ref 0–0.2)
BASOPHILS NFR BLD AUTO: 0.3 % — SIGNIFICANT CHANGE UP (ref 0–2)
BILIRUB SERPL-MCNC: 0.5 MG/DL — SIGNIFICANT CHANGE UP (ref 0.2–1.2)
BUN SERPL-MCNC: 4 MG/DL — LOW (ref 7–23)
CALCIUM SERPL-MCNC: 10.3 MG/DL — HIGH (ref 8.5–10.1)
CHLORIDE SERPL-SCNC: 96 MMOL/L — SIGNIFICANT CHANGE UP (ref 96–108)
CO2 SERPL-SCNC: 29 MMOL/L — SIGNIFICANT CHANGE UP (ref 22–31)
CREAT SERPL-MCNC: 1.07 MG/DL — SIGNIFICANT CHANGE UP (ref 0.5–1.3)
D DIMER BLD IA.RAPID-MCNC: 234 NG/ML DDU — HIGH
EGFR: 91 ML/MIN/1.73M2 — SIGNIFICANT CHANGE UP
EGFR: 91 ML/MIN/1.73M2 — SIGNIFICANT CHANGE UP
EOSINOPHIL # BLD AUTO: 0.03 K/UL — SIGNIFICANT CHANGE UP (ref 0–0.5)
EOSINOPHIL NFR BLD AUTO: 0.3 % — SIGNIFICANT CHANGE UP (ref 0–6)
FLUAV AG NPH QL: DETECTED
FLUBV AG NPH QL: SIGNIFICANT CHANGE UP
GLUCOSE SERPL-MCNC: 197 MG/DL — HIGH (ref 70–99)
HCT VFR BLD CALC: 48.8 % — SIGNIFICANT CHANGE UP (ref 39–50)
HGB BLD-MCNC: 16.6 G/DL — SIGNIFICANT CHANGE UP (ref 13–17)
IMM GRANULOCYTES NFR BLD AUTO: 0.4 % — SIGNIFICANT CHANGE UP (ref 0–0.9)
INR BLD: 0.97 RATIO — SIGNIFICANT CHANGE UP (ref 0.85–1.16)
LYMPHOCYTES # BLD AUTO: 1.82 K/UL — SIGNIFICANT CHANGE UP (ref 1–3.3)
LYMPHOCYTES # BLD AUTO: 19.5 % — SIGNIFICANT CHANGE UP (ref 13–44)
MAGNESIUM SERPL-MCNC: 2.1 MG/DL — SIGNIFICANT CHANGE UP (ref 1.6–2.6)
MCHC RBC-ENTMCNC: 33.3 PG — SIGNIFICANT CHANGE UP (ref 27–34)
MCHC RBC-ENTMCNC: 34 G/DL — SIGNIFICANT CHANGE UP (ref 32–36)
MCV RBC AUTO: 98 FL — SIGNIFICANT CHANGE UP (ref 80–100)
MONOCYTES # BLD AUTO: 1.33 K/UL — HIGH (ref 0–0.9)
MONOCYTES NFR BLD AUTO: 14.2 % — HIGH (ref 2–14)
NEUTROPHILS # BLD AUTO: 6.09 K/UL — SIGNIFICANT CHANGE UP (ref 1.8–7.4)
NEUTROPHILS NFR BLD AUTO: 65.3 % — SIGNIFICANT CHANGE UP (ref 43–77)
PLATELET # BLD AUTO: 285 K/UL — SIGNIFICANT CHANGE UP (ref 150–400)
POTASSIUM SERPL-MCNC: 4.6 MMOL/L — SIGNIFICANT CHANGE UP (ref 3.5–5.3)
POTASSIUM SERPL-SCNC: 4.6 MMOL/L — SIGNIFICANT CHANGE UP (ref 3.5–5.3)
PROT SERPL-MCNC: 8.9 GM/DL — HIGH (ref 6–8.3)
PROTHROM AB SERPL-ACNC: 11.4 SEC — SIGNIFICANT CHANGE UP (ref 9.9–13.4)
RBC # BLD: 4.98 M/UL — SIGNIFICANT CHANGE UP (ref 4.2–5.8)
RBC # FLD: 16.2 % — HIGH (ref 10.3–14.5)
RSV RNA NPH QL NAA+NON-PROBE: SIGNIFICANT CHANGE UP
SARS-COV-2 RNA SPEC QL NAA+PROBE: SIGNIFICANT CHANGE UP
SODIUM SERPL-SCNC: 130 MMOL/L — LOW (ref 135–145)
TROPONIN I, HIGH SENSITIVITY RESULT: <3 NG/L — SIGNIFICANT CHANGE UP
WBC # BLD: 9.34 K/UL — SIGNIFICANT CHANGE UP (ref 3.8–10.5)
WBC # FLD AUTO: 9.34 K/UL — SIGNIFICANT CHANGE UP (ref 3.8–10.5)

## 2025-01-25 PROCEDURE — 86850 RBC ANTIBODY SCREEN: CPT

## 2025-01-25 PROCEDURE — 74177 CT ABD & PELVIS W/CONTRAST: CPT | Mod: 26

## 2025-01-25 PROCEDURE — 86900 BLOOD TYPING SEROLOGIC ABO: CPT

## 2025-01-25 PROCEDURE — 71275 CT ANGIOGRAPHY CHEST: CPT | Mod: 26

## 2025-01-25 PROCEDURE — 71045 X-RAY EXAM CHEST 1 VIEW: CPT | Mod: 26

## 2025-01-25 PROCEDURE — 99285 EMERGENCY DEPT VISIT HI MDM: CPT

## 2025-01-25 PROCEDURE — C9399: CPT

## 2025-01-25 PROCEDURE — 93010 ELECTROCARDIOGRAM REPORT: CPT

## 2025-01-25 PROCEDURE — 36415 COLL VENOUS BLD VENIPUNCTURE: CPT

## 2025-01-25 PROCEDURE — 86901 BLOOD TYPING SEROLOGIC RH(D): CPT

## 2025-01-25 PROCEDURE — 99222 1ST HOSP IP/OBS MODERATE 55: CPT | Mod: GC,57

## 2025-01-25 RX ORDER — SODIUM CHLORIDE 9 G/1000ML
1000 INJECTION, SOLUTION INTRAVENOUS
Refills: 0 | Status: DISCONTINUED | OUTPATIENT
Start: 2025-01-25 | End: 2025-01-26

## 2025-01-25 RX ORDER — ENOXAPARIN SODIUM 100 MG/ML
40 INJECTION SUBCUTANEOUS EVERY 24 HOURS
Refills: 0 | Status: DISCONTINUED | OUTPATIENT
Start: 2025-01-25 | End: 2025-01-26

## 2025-01-25 RX ORDER — AZITHROMYCIN 250 MG
1 CAPSULE ORAL
Qty: 4 | Refills: 0
Start: 2025-01-25 | End: 2025-01-28

## 2025-01-25 RX ORDER — LIDOCAINE HYDROCHLORIDE 20 MG/ML
1 JELLY TOPICAL ONCE
Refills: 0 | Status: COMPLETED | OUTPATIENT
Start: 2025-01-25 | End: 2025-01-25

## 2025-01-25 RX ORDER — AMPICILLIN SODIUM AND SULBACTAM SODIUM 1; .5 G/1; G/1
3 INJECTION, POWDER, FOR SOLUTION INTRAMUSCULAR; INTRAVENOUS ONCE
Refills: 0 | Status: COMPLETED | OUTPATIENT
Start: 2025-01-25 | End: 2025-01-25

## 2025-01-25 RX ORDER — AMOXICILLIN AND CLAVULANATE POTASSIUM 500; 125 MG/1; MG/1
1 TABLET, FILM COATED ORAL
Qty: 20 | Refills: 0
Start: 2025-01-25 | End: 2025-02-03

## 2025-01-25 RX ORDER — HYDROMORPHONE/SOD CHLOR,ISO/PF 2 MG/10 ML
1 SYRINGE (ML) INJECTION ONCE
Refills: 0 | Status: DISCONTINUED | OUTPATIENT
Start: 2025-01-25 | End: 2025-01-25

## 2025-01-25 RX ORDER — ACETAMINOPHEN 500 MG/5ML
1000 LIQUID (ML) ORAL ONCE
Refills: 0 | Status: COMPLETED | OUTPATIENT
Start: 2025-01-25 | End: 2025-01-25

## 2025-01-25 RX ORDER — KETOROLAC TROMETHAMINE 30 MG/ML
30 INJECTION, SOLUTION INTRAMUSCULAR; INTRAVENOUS ONCE
Refills: 0 | Status: DISCONTINUED | OUTPATIENT
Start: 2025-01-25 | End: 2025-01-25

## 2025-01-25 RX ORDER — ONDANSETRON HCL/PF 4 MG/2 ML
4 VIAL (ML) INJECTION EVERY 6 HOURS
Refills: 0 | Status: DISCONTINUED | OUTPATIENT
Start: 2025-01-25 | End: 2025-01-26

## 2025-01-25 RX ORDER — AZITHROMYCIN 250 MG
500 CAPSULE ORAL ONCE
Refills: 0 | Status: COMPLETED | OUTPATIENT
Start: 2025-01-25 | End: 2025-01-25

## 2025-01-25 RX ORDER — ACETAMINOPHEN 500 MG/5ML
1000 LIQUID (ML) ORAL ONCE
Refills: 0 | Status: COMPLETED | OUTPATIENT
Start: 2025-01-25 | End: 2025-01-26

## 2025-01-25 RX ADMIN — Medication 500 MILLIGRAM(S): at 19:17

## 2025-01-25 RX ADMIN — Medication 4 MILLIGRAM(S): at 20:13

## 2025-01-25 RX ADMIN — Medication 4 MILLIGRAM(S): at 16:28

## 2025-01-25 RX ADMIN — KETOROLAC TROMETHAMINE 30 MILLIGRAM(S): 30 INJECTION, SOLUTION INTRAMUSCULAR; INTRAVENOUS at 17:21

## 2025-01-25 RX ADMIN — Medication 1 MILLIGRAM(S): at 22:49

## 2025-01-25 RX ADMIN — Medication 400 MILLIGRAM(S): at 16:28

## 2025-01-25 RX ADMIN — LIDOCAINE HYDROCHLORIDE 1 PATCH: 20 JELLY TOPICAL at 16:29

## 2025-01-25 RX ADMIN — KETOROLAC TROMETHAMINE 30 MILLIGRAM(S): 30 INJECTION, SOLUTION INTRAMUSCULAR; INTRAVENOUS at 20:13

## 2025-01-25 RX ADMIN — Medication 1000 MILLIGRAM(S): at 20:13

## 2025-01-25 RX ADMIN — Medication 4 MILLIGRAM(S): at 18:21

## 2025-01-25 RX ADMIN — AMPICILLIN SODIUM AND SULBACTAM SODIUM 200 GRAM(S): 1; .5 INJECTION, POWDER, FOR SOLUTION INTRAMUSCULAR; INTRAVENOUS at 20:47

## 2025-01-25 RX ADMIN — Medication 1000 MILLILITER(S): at 16:28

## 2025-01-25 NOTE — ED PROVIDER NOTE - NS ED MD EM SELECTION
Doxycycline as prescribed take the entire prescription  Scrotal support, wear good supportive underwear  Cool compresses on the scrotum  Ibuprofen for pain 400 mg 3 times a day with food, alternate with Tylenol 500 mg twice daily for 3 to 5 days  Return if worse  On Monday follow-up with urology, call for an appointment   [phone] Comprehensive

## 2025-01-26 ENCOUNTER — EMERGENCY (EMERGENCY)
Facility: HOSPITAL | Age: 40
LOS: 0 days | Discharge: HOME | End: 2025-01-26
Attending: STUDENT IN AN ORGANIZED HEALTH CARE EDUCATION/TRAINING PROGRAM
Payer: MEDICAID

## 2025-01-26 VITALS — DIASTOLIC BLOOD PRESSURE: 87 MMHG | RESPIRATION RATE: 17 BRPM | HEART RATE: 89 BPM | SYSTOLIC BLOOD PRESSURE: 114 MMHG

## 2025-01-26 VITALS — HEIGHT: 69 IN | WEIGHT: 167.99 LBS

## 2025-01-26 VITALS
HEART RATE: 82 BPM | TEMPERATURE: 98 F | SYSTOLIC BLOOD PRESSURE: 102 MMHG | DIASTOLIC BLOOD PRESSURE: 68 MMHG | RESPIRATION RATE: 18 BRPM | OXYGEN SATURATION: 98 %

## 2025-01-26 DIAGNOSIS — K91.840 POSTPROCEDURAL HEMORRHAGE OF A DIGESTIVE SYSTEM ORGAN OR STRUCTURE FOLLOWING A DIGESTIVE SYSTEM PROCEDURE: ICD-10-CM

## 2025-01-26 DIAGNOSIS — Z93.3 COLOSTOMY STATUS: Chronic | ICD-10-CM

## 2025-01-26 DIAGNOSIS — R10.13 EPIGASTRIC PAIN: ICD-10-CM

## 2025-01-26 DIAGNOSIS — Z88.1 ALLERGY STATUS TO OTHER ANTIBIOTIC AGENTS: ICD-10-CM

## 2025-01-26 DIAGNOSIS — Z87.19 PERSONAL HISTORY OF OTHER DISEASES OF THE DIGESTIVE SYSTEM: ICD-10-CM

## 2025-01-26 DIAGNOSIS — Z93.3 COLOSTOMY STATUS: ICD-10-CM

## 2025-01-26 DIAGNOSIS — E11.65 TYPE 2 DIABETES MELLITUS WITH HYPERGLYCEMIA: ICD-10-CM

## 2025-01-26 DIAGNOSIS — G89.18 OTHER ACUTE POSTPROCEDURAL PAIN: ICD-10-CM

## 2025-01-26 DIAGNOSIS — Z87.81 PERSONAL HISTORY OF (HEALED) TRAUMATIC FRACTURE: Chronic | ICD-10-CM

## 2025-01-26 DIAGNOSIS — Z98.89 OTHER SPECIFIED POSTPROCEDURAL STATES: Chronic | ICD-10-CM

## 2025-01-26 DIAGNOSIS — K60.3 ANAL FISTULA: Chronic | ICD-10-CM

## 2025-01-26 DIAGNOSIS — Z98.890 OTHER SPECIFIED POSTPROCEDURAL STATES: ICD-10-CM

## 2025-01-26 DIAGNOSIS — R07.89 OTHER CHEST PAIN: ICD-10-CM

## 2025-01-26 DIAGNOSIS — Z96.7 PRESENCE OF OTHER BONE AND TENDON IMPLANTS: Chronic | ICD-10-CM

## 2025-01-26 DIAGNOSIS — K50.90 CROHN'S DISEASE, UNSPECIFIED, WITHOUT COMPLICATIONS: ICD-10-CM

## 2025-01-26 LAB
ADD ON TEST-SPECIMEN IN LAB: SIGNIFICANT CHANGE UP
ALBUMIN SERPL ELPH-MCNC: 2.9 G/DL — LOW (ref 3.3–5)
ALP SERPL-CCNC: 80 U/L — SIGNIFICANT CHANGE UP (ref 40–120)
ALT FLD-CCNC: 20 U/L — SIGNIFICANT CHANGE UP (ref 12–78)
ANION GAP SERPL CALC-SCNC: 4 MMOL/L — LOW (ref 5–17)
AST SERPL-CCNC: 25 U/L — SIGNIFICANT CHANGE UP (ref 15–37)
BILIRUB SERPL-MCNC: 0.3 MG/DL — SIGNIFICANT CHANGE UP (ref 0.2–1.2)
BUN SERPL-MCNC: 9 MG/DL — SIGNIFICANT CHANGE UP (ref 7–23)
CALCIUM SERPL-MCNC: 9.5 MG/DL — SIGNIFICANT CHANGE UP (ref 8.5–10.1)
CHLORIDE SERPL-SCNC: 103 MMOL/L — SIGNIFICANT CHANGE UP (ref 96–108)
CO2 SERPL-SCNC: 28 MMOL/L — SIGNIFICANT CHANGE UP (ref 22–31)
CREAT SERPL-MCNC: 0.93 MG/DL — SIGNIFICANT CHANGE UP (ref 0.5–1.3)
EGFR: 107 ML/MIN/1.73M2 — SIGNIFICANT CHANGE UP
GLUCOSE SERPL-MCNC: 255 MG/DL — HIGH (ref 70–99)
HCT VFR BLD CALC: 43.8 % — SIGNIFICANT CHANGE UP (ref 39–50)
HGB BLD-MCNC: 14.8 G/DL — SIGNIFICANT CHANGE UP (ref 13–17)
LIDOCAIN IGE QN: 12 U/L — LOW (ref 13–75)
MCHC RBC-ENTMCNC: 33.3 PG — SIGNIFICANT CHANGE UP (ref 27–34)
MCHC RBC-ENTMCNC: 33.8 G/DL — SIGNIFICANT CHANGE UP (ref 32–36)
MCV RBC AUTO: 98.6 FL — SIGNIFICANT CHANGE UP (ref 80–100)
PLATELET # BLD AUTO: 289 K/UL — SIGNIFICANT CHANGE UP (ref 150–400)
POTASSIUM SERPL-MCNC: 4.7 MMOL/L — SIGNIFICANT CHANGE UP (ref 3.5–5.3)
POTASSIUM SERPL-SCNC: 4.7 MMOL/L — SIGNIFICANT CHANGE UP (ref 3.5–5.3)
PROT SERPL-MCNC: 7.3 GM/DL — SIGNIFICANT CHANGE UP (ref 6–8.3)
RBC # BLD: 4.44 M/UL — SIGNIFICANT CHANGE UP (ref 4.2–5.8)
RBC # FLD: 15.7 % — HIGH (ref 10.3–14.5)
SODIUM SERPL-SCNC: 135 MMOL/L — SIGNIFICANT CHANGE UP (ref 135–145)
TROPONIN I, HIGH SENSITIVITY RESULT: <3 NG/L — SIGNIFICANT CHANGE UP
WBC # BLD: 11.17 K/UL — HIGH (ref 3.8–10.5)
WBC # FLD AUTO: 11.17 K/UL — HIGH (ref 3.8–10.5)

## 2025-01-26 PROCEDURE — 93010 ELECTROCARDIOGRAM REPORT: CPT

## 2025-01-26 PROCEDURE — 83690 ASSAY OF LIPASE: CPT

## 2025-01-26 PROCEDURE — 99285 EMERGENCY DEPT VISIT HI MDM: CPT

## 2025-01-26 PROCEDURE — 96374 THER/PROPH/DIAG INJ IV PUSH: CPT

## 2025-01-26 PROCEDURE — 71045 X-RAY EXAM CHEST 1 VIEW: CPT

## 2025-01-26 PROCEDURE — 99285 EMERGENCY DEPT VISIT HI MDM: CPT | Mod: 25

## 2025-01-26 PROCEDURE — 96375 TX/PRO/DX INJ NEW DRUG ADDON: CPT

## 2025-01-26 PROCEDURE — 36415 COLL VENOUS BLD VENIPUNCTURE: CPT

## 2025-01-26 PROCEDURE — 85027 COMPLETE CBC AUTOMATED: CPT

## 2025-01-26 PROCEDURE — 71045 X-RAY EXAM CHEST 1 VIEW: CPT | Mod: 26

## 2025-01-26 PROCEDURE — 80053 COMPREHEN METABOLIC PANEL: CPT

## 2025-01-26 PROCEDURE — 93005 ELECTROCARDIOGRAM TRACING: CPT

## 2025-01-26 PROCEDURE — 84484 ASSAY OF TROPONIN QUANT: CPT

## 2025-01-26 PROCEDURE — 49616 RPR AA HRN RCR 3-10 NCR/STRN: CPT | Mod: GC

## 2025-01-26 RX ORDER — KETOROLAC TROMETHAMINE 30 MG/ML
15 INJECTION INTRAMUSCULAR; INTRAVENOUS ONCE
Refills: 0 | Status: DISCONTINUED | OUTPATIENT
Start: 2025-01-26 | End: 2025-01-26

## 2025-01-26 RX ORDER — ONDANSETRON HCL/PF 4 MG/2 ML
4 VIAL (ML) INJECTION ONCE
Refills: 0 | Status: DISCONTINUED | OUTPATIENT
Start: 2025-01-26 | End: 2025-01-26

## 2025-01-26 RX ORDER — HYDROMORPHONE HCL 4 MG
1 TABLET ORAL
Qty: 8 | Refills: 0
Start: 2025-01-26 | End: 2025-01-27

## 2025-01-26 RX ORDER — OXYCODONE HYDROCHLORIDE 30 MG/1
5 TABLET ORAL ONCE
Refills: 0 | Status: DISCONTINUED | OUTPATIENT
Start: 2025-01-26 | End: 2025-01-26

## 2025-01-26 RX ORDER — BENZONATATE 100 MG
200 CAPSULE ORAL ONCE
Refills: 0 | Status: DISCONTINUED | OUTPATIENT
Start: 2025-01-26 | End: 2025-01-26

## 2025-01-26 RX ORDER — ACETAMINOPHEN 500 MG/5ML
2 LIQUID (ML) ORAL
Qty: 40 | Refills: 0
Start: 2025-01-26 | End: 2025-01-30

## 2025-01-26 RX ORDER — ACETAMINOPHEN 80 MG/.8ML
1000 SOLUTION/ DROPS ORAL ONCE
Refills: 0 | Status: COMPLETED | OUTPATIENT
Start: 2025-01-26 | End: 2025-01-26

## 2025-01-26 RX ORDER — HYDROMORPHONE HCL 4 MG
1 TABLET ORAL ONCE
Refills: 0 | Status: DISCONTINUED | OUTPATIENT
Start: 2025-01-26 | End: 2025-01-26

## 2025-01-26 RX ORDER — FENTANYL CITRATE-0.9 % NACL/PF 100MCG/2ML
50 SYRINGE (ML) INTRAVENOUS
Refills: 0 | Status: DISCONTINUED | OUTPATIENT
Start: 2025-01-26 | End: 2025-01-26

## 2025-01-26 RX ORDER — DIAZEPAM 5 MG
10 TABLET ORAL ONCE
Refills: 0 | Status: DISCONTINUED | OUTPATIENT
Start: 2025-01-26 | End: 2025-01-26

## 2025-01-26 RX ORDER — MORPHINE SULFATE 15 MG
4 TABLET, EXTENDED RELEASE ORAL ONCE
Refills: 0 | Status: DISCONTINUED | OUTPATIENT
Start: 2025-01-26 | End: 2025-01-26

## 2025-01-26 RX ORDER — BENZONATATE 100 MG
1 CAPSULE ORAL
Qty: 60 | Refills: 0
Start: 2025-01-26 | End: 2025-02-09

## 2025-01-26 RX ORDER — HYDROMORPHONE/SOD CHLOR,ISO/PF 2 MG/10 ML
1 SYRINGE (ML) INJECTION
Qty: 20 | Refills: 0
Start: 2025-01-26 | End: 2025-02-02

## 2025-01-26 RX ORDER — HYDROMORPHONE/SOD CHLOR,ISO/PF 2 MG/10 ML
1 SYRINGE (ML) INJECTION ONCE
Refills: 0 | Status: DISCONTINUED | OUTPATIENT
Start: 2025-01-26 | End: 2025-01-26

## 2025-01-26 RX ADMIN — Medication 50 MICROGRAM(S): at 01:43

## 2025-01-26 RX ADMIN — KETOROLAC TROMETHAMINE 15 MILLIGRAM(S): 30 INJECTION INTRAMUSCULAR; INTRAVENOUS at 21:23

## 2025-01-26 RX ADMIN — Medication 4 MILLIGRAM(S): at 21:23

## 2025-01-26 RX ADMIN — Medication 2 MILLIGRAM(S): at 05:42

## 2025-01-26 RX ADMIN — Medication 1 MILLIGRAM(S): at 04:49

## 2025-01-26 RX ADMIN — ACETAMINOPHEN 400 MILLIGRAM(S): 80 SOLUTION/ DROPS ORAL at 21:23

## 2025-01-26 RX ADMIN — Medication 2 MILLIGRAM(S): at 02:31

## 2025-01-26 RX ADMIN — Medication 1 MILLIGRAM(S): at 22:44

## 2025-01-26 RX ADMIN — LIDOCAINE HYDROCHLORIDE 1 PATCH: 20 JELLY TOPICAL at 06:54

## 2025-01-26 RX ADMIN — Medication 400 MILLIGRAM(S): at 00:16

## 2025-01-26 RX ADMIN — Medication 1 MILLIGRAM(S): at 03:20

## 2025-01-26 RX ADMIN — SODIUM CHLORIDE 100 MILLILITER(S): 9 INJECTION, SOLUTION INTRAVENOUS at 00:16

## 2025-01-26 RX ADMIN — Medication 50 MICROGRAM(S): at 02:05

## 2025-01-26 NOTE — CONSULT NOTE ADULT - SUBJECTIVE AND OBJECTIVE BOX
HPI:  40 y/o M with PMHx of DM2, pancreatitis, Crohn's disease, colostomy, incarcerated parastomal hernia repair this morning at  with Dr Hilario presents to the ED c/o pain at the incision site from his surgery this morning and new onset pain around his stoma. Endorses bleeding from one of his incision sites. States he is having pain with coughing. Pt tested + for Flu A yesterday.    PAST MEDICAL & SURGICAL HISTORY:  Crohn's disease of large intestine without complication  (No current flare up)      Pancreatitis      Type 2 diabetes mellitus      S/P ORIF (open reduction internal fixation) fracture  (Right ankle, 2014)      History of colonoscopy  (2014)      Perianal fistula  repair in 2002      H/O fracture of clavicle  with surgical repair      S/P colostomy          MEDICATIONS  (STANDING):  diazepam    Tablet 10 milliGRAM(s) Oral Once  HYDROmorphone  Injectable 1 milliGRAM(s) IV Push Once    MEDICATIONS  (PRN):      Allergies    ciprofloxacin (Other (Mild to Mod))    Intolerances        SOCIAL HISTORY:    FAMILY HISTORY:  Diabetes mellitus (Father)            Physical Exam:  General: NAD, resting comfortably  HEENT: NC/AT, EOMI, normal hearing, no oral lesions, no LAD, neck supple  Pulmonary: normal resp effort, CTA-B  Cardiovascular: NSR, no murmurs  Abdominal: soft, ND/NT,  stoma in situ and functioning. Incision c/d/i   Extremities: WWP, normal strength, no clubbing/cyanosis/edema  Neuro: A/O x 3, CNs II-XII grossly intact, normal sensation, no focal deficits  Pulses: palpable distal pulses    Vital Signs Last 24 Hrs  T(C): 36.5 (26 Jan 2025 20:30), Max: 36.7 (26 Jan 2025 02:30)  T(F): 97.7 (26 Jan 2025 20:30), Max: 98.1 (26 Jan 2025 02:30)  HR: 11 (26 Jan 2025 20:30) (11 - 89)  BP: 124/84 (26 Jan 2025 20:30) (94/69 - 139/97)  BP(mean): 96 (26 Jan 2025 20:30) (78 - 96)  RR: 19 (26 Jan 2025 20:30) (8 - 19)  SpO2: 98% (26 Jan 2025 20:30) (93% - 100%)    Parameters below as of 26 Jan 2025 20:30  Patient On (Oxygen Delivery Method): room air        I&O's Summary          LABS:                        14.8   11.17 )-----------( 289      ( 26 Jan 2025 21:05 )             43.8     01-26    135  |  103  |  9   ----------------------------<  255[H]  4.7   |  28  |  0.93    Ca    9.5      26 Jan 2025 21:05  Mg     2.1     01-25    TPro  7.3  /  Alb  2.9[L]  /  TBili  0.3  /  DBili  x   /  AST  25  /  ALT  20  /  AlkPhos  80  01-26    PT/INR - ( 25 Jan 2025 16:12 )   PT: 11.4 sec;   INR: 0.97 ratio         PTT - ( 25 Jan 2025 16:12 )  PTT:29.5 sec  Urinalysis Basic - ( 26 Jan 2025 21:05 )    Color: x / Appearance: x / SG: x / pH: x  Gluc: 255 mg/dL / Ketone: x  / Bili: x / Urobili: x   Blood: x / Protein: x / Nitrite: x   Leuk Esterase: x / RBC: x / WBC x   Sq Epi: x / Non Sq Epi: x / Bacteria: x      CAPILLARY BLOOD GLUCOSE        LIVER FUNCTIONS - ( 26 Jan 2025 21:05 )  Alb: 2.9 g/dL / Pro: 7.3 gm/dL / ALK PHOS: 80 U/L / ALT: 20 U/L / AST: 25 U/L / GGT: x             Cultures:      RADIOLOGY & ADDITIONAL STUDIES:      Plan:

## 2025-01-26 NOTE — ED STATDOCS - PHYSICAL EXAMINATION
Constitutional: well appearing, NAD AAOx3  Eyes: EOMI, PERRL  Head: Normocephalic atraumatic  Mouth: no airway obstruction, posterior oropharynx clear without erythema or exudate  Neck: supple  Cardiac: regular rate and rhythm, no MRG, L lower chest wall dressing clean, dry, and intact  Resp: Lungs CTAB  GI: Abd s/nd, epigastric ttp, ostomy with brown stool in place  Neuro: CN2-12 intact, strength 5/5x4, sensation grossly intact  Skin: No rashes

## 2025-01-26 NOTE — ED STATDOCS - CARE PROVIDER_API CALL
Sanket Hilario (DO)  Surgery  [address] [place] [id number]  Phone: [phone]  Fax: [phone]  Follow Up Time:

## 2025-01-26 NOTE — ED STATDOCS - PROGRESS NOTE DETAILS
Pt. stating it is extremely painful when he coughs.  Pt. taking ABX at home however no pain medications.  Anabel Lombardi PA-C 40 y/o M with PMHx of DM2, pancreatitis, Crohn's disease, colostomy, incarcerated parastomal hernia repair this morning at  with Dr Hilario presents to the ED c/o pain at the incision site from his surgery this morning and new onset pain around his stoma. Endorses bleeding from one of his incision sites. States he is having pain with coughing. Pt tested + for Flu A yesterday.  Anabel Lombardi PA-C I spoke with the surgical resident and he will be in to evaluate patient.  Anabel Lombardi PA-C Pt. stating it is extremely painful when he coughs.  Pt. taking ABX at home however no pain medications.    Pt. concerned that he has pain around his stoma.  Anabel Lombardi PA-C Labs, CXR, EKG unremarkable, Troponin negative, Noted elevated glucose.  Pt. denies taking medications for DM.  Surgical resident cleared patient for discharge and follow up outpatient with Dr. Hilario.  Estefani sent by surgeon.  Anabel Lombardi PA-C Labs, CXR, EKG unremarkable, Troponin negative, Noted elevated glucose.  Pt. denies taking medications for DM as he was "taken off them" at [facility].  Pt. previously on Metformin 100mg BID.  PT. aware of glucose 255 and he will follow with his new PMD. .  Surgical resident cleared patient for discharge and follow up outpatient with Dr. Hilario.  Estefani sent by surgeon.  Anabel Lombardi PA-C

## 2025-01-26 NOTE — ED ADULT TRIAGE NOTE - CHIEF COMPLAINT QUOTE
Pt ambulatory to ed c/o of post op complication. Pt had a hernia repair surgery this morning and was DC from  at 10:30am. Pt endorses abdominal pain at insertion site and new RUQ pain. Pt denies fever, chills. PMHX of colostomy. PTA  took unknown prescribed antibiotics and Advil at 1630.,

## 2025-01-26 NOTE — ED STATDOCS - CLINICAL SUMMARY MEDICAL DECISION MAKING FREE TEXT BOX
Presentation most concerning for post operative pain, post op complication. Plan for General Surgery consult, monitor, reassessment. Presentation most concerning for post operative pain, post op complication. Plan for General Surgery consult, monitor, reassessment.  EKG shows normal sinus rhythm, rate 79, normal axis, normal intervals, no ST elevations or depressions.  Chest x-ray shows no acute actionable findings.  Labs including troponin grossly unremarkable.  of note patient made aware of his hyperglycemia, no DKA, patient states that he is aware of his high blood sugar and is not always compliant with his diabetes medications. Patient was cleared by general surgery for discharge with pain medications.  Patient has low heart score, pain likely secondary to his surgical incision.  Patient advised to follow-up with his primary care doctor, general surgeon cardiologist, given strict return precautions DC home in stable condition

## 2025-01-26 NOTE — ED STATDOCS - OBJECTIVE STATEMENT
40 y/o M with PMHx of DM2, pancreatitis, Crohn's disease, colostomy, incarcerated parastomal hernia repair this morning at  with Dr Hilario presents to the ED c/o pain at the incision site from his surgery this morning and new onset pain around his stoma. Endorses bleeding from one of his incision sites. States he is having pain with coughing. Pt tested + for Flu A yesterday.

## 2025-01-26 NOTE — CONSULT NOTE ADULT - ASSESSMENT
38 yo M s/p repair of incarcerated parastomal hernia on 1/24 p/w abd pain    Clinical assessment not remarkable for acute abdomen     Plan   No acute surgical management   Keep abd binder on as needed  Take over the counter Tylenol and/or Ibuprofen for mild pain  Dilaudid 4mg q8 prn for severe pain control.    Follow up with Dr Hilario as scheduled

## 2025-01-26 NOTE — ED ADULT NURSE NOTE - SUICIDE SCREENING QUESTION 3
[facility]  Nephrology  Progress Note    Patient Name: Og Garcia  MRN: [id number]  Admission Date: 6/10/2022  Hospital Length of Stay: 0 days  Attending Provider: Veronica Hui MD   Primary Care Physician: Hector Arndt DNP, FNP-C  Principal Problem:Post-traumatic wound infection    Subjective:     HPI: No notes on file    Interval History: The patient is now s/p wound debridement on the feet this morning.  No other changes.    Review of patient's allergies indicates:  No Known Allergies  No current facility-administered medications for this encounter.     Facility-Administered Medications Ordered in Other Encounters   Medication Frequency    [MAR Hold - Suspended Admission] 0.9%  NaCl infusion (for blood administration) Q24H PRN    [MAR Hold - Suspended Admission] 0.9%  NaCl infusion PRN    [MAR Hold - Suspended Admission] acetaminophen tablet 500 mg Q6H PRN    [MAR Hold - Suspended Admission] albuterol nebulizer solution 2.5 mg Q4H PRN    [MAR Hold - Suspended Admission] albuterol-ipratropium 2.5 mg-0.5 mg/3 mL nebulizer solution 3 mL Q6H    [MAR Hold - Suspended Admission] bisacodyL EC tablet 10 mg Daily PRN    [MAR Hold - Suspended Admission] collagenase ointment Daily PRN    [MAR Hold - Suspended Admission] dextrose 50% injection 12.5 g PRN    [MAR Hold - Suspended Admission] diltiaZEM tablet 90 mg Q6H    [MAR Hold - Suspended Admission] epoetin beth-epbx injection 10,000 Units Every Mon, Wed, Fri    [MAR Hold - Suspended Admission] glucagon (human recombinant) injection 1 mg PRN    [MAR Hold - Suspended Admission] guaiFENesin 100 mg/5 ml syrup 200 mg Q6H    [MAR Hold - Suspended Admission] heparin (porcine) injection 4,000 Units PRN    [MAR Hold - Suspended Admission] heparin (porcine) injection 5,000 Units Q8H    [MAR Hold - Suspended Admission] insulin aspart U-100 injection 1-10 Units Q6H    [MAR Hold - Suspended Admission] insulin detemir U-100 injection  15 Units QHS    [MAR Hold - Suspended Admission] linezolid tablet 600 mg Q12H    [MAR Hold - Suspended Admission] meropenem (MERREM) 500 mg in sodium chloride 0.9 % 100 mL IVPB (MB+) Q24H    [MAR Hold - Suspended Admission] metoprolol tartrate (LOPRESSOR) split tablet 12.5 mg BID    [MAR Hold - Suspended Admission] pantoprazole suspension 40 mg Daily    [MAR Hold - Suspended Admission] sevelamer carbonate pwpk 0.8 g TID WM    [MAR Hold - Suspended Admission] simethicone chewable tablet 80 mg TID PRN    [MAR Hold - Suspended Admission] sodium chloride 0.9% bolus 250 mL PRN    [MAR Hold - Suspended Admission] tobramycin - pharmacy to dose pharmacy to manage frequency       Objective:     Vital Signs (Most Recent):  Pulse: 101 (06/10/22 1011)  Resp: (!) 34 (06/10/22 1011)  BP: 114/65 (06/10/22 1011)  SpO2: 97 % (06/10/22 1011)   Vital Signs (24h Range):  Temp:  [98.7 °F (37.1 °C)-99.8 °F (37.7 °C)] 98.9 °F (37.2 °C)  Pulse:  [] 101  Resp:  [15-37] 34  SpO2:  [94 %-100 %] 97 %  BP: ()/(50-76) 114/65        There is no height or weight on file to calculate BMI.  There is no height or weight on file to calculate BSA.    I/O last 3 completed shifts:  In: 850 [Other:240; NG/GT:510; IV Piggyback:100]  Out: 475 [Stool:475]    Physical Exam  Vitals reviewed.   HENT:      Head: Normocephalic.   Cardiovascular:      Rate and Rhythm: Regular rhythm.   Pulmonary:      Effort: Pulmonary effort is normal.   Abdominal:      Palpations: Abdomen is soft.   Neurological:      Mental Status: He is alert.       Significant Labs:  BMP:   Recent Labs   Lab 06/10/22  0731   GLU 87      K 4.2      CO2 27   BUN 45*   CREATININE 1.82*   CALCIUM 9.0     CBC:   Recent Labs   Lab 06/10/22  0731   WBC 13.65*   RBC 2.44*   HGB 7.4*   HCT 24.2*      MCV 99.2*   MCH 30.3   MCHC 30.6*        Significant Imaging:  Labs: Reviewed    Assessment/Plan:     Pressure ulcer of sacral region, stage 4  Continue with wound  care.    ESRD (end stage renal disease)  1.  Continue with dialysis as scheduled.        Thank you for your consult. I will follow-up with patient. Please contact us if you have any additional questions.    Edwin Pryor Jr, MD  Nephrology  [facility] - Peri [facility]   No

## 2025-01-26 NOTE — ED STATDOCS - PATIENT PORTAL LINK FT
You can access the FollowMyHealth Patient Portal offered by [facility] by registering at the following website: [email or web address]. By joining [employer]’s FollowMyHealth portal, you will also be able to view your health information using other applications (apps) compatible with our system.

## 2025-01-26 NOTE — ED ADULT NURSE NOTE - OBJECTIVE STATEMENT
Pt is 38 yo male ambulatory to ED c/o abdominal pain s/p hernia repair surgery. pt has operation yesterday and d/c this morning without pain medications. Pt reports feeling like something is "popping" when he coughs. Endorses bleeding near surgery incision PMHx chron's disease, pancreatitis.

## 2025-01-29 ENCOUNTER — APPOINTMENT (OUTPATIENT)
Dept: SURGERY | Facility: CLINIC | Age: 40
End: 2025-01-29
Payer: MEDICAID

## 2025-01-29 VITALS
WEIGHT: 155 LBS | SYSTOLIC BLOOD PRESSURE: 130 MMHG | BODY MASS INDEX: 21.7 KG/M2 | DIASTOLIC BLOOD PRESSURE: 80 MMHG | HEIGHT: 71 IN

## 2025-01-29 DIAGNOSIS — K43.9 VENTRAL HERNIA W/OUT OBSTRUCTION OR GANGRENE: ICD-10-CM

## 2025-01-29 PROCEDURE — 99215 OFFICE O/P EST HI 40 MIN: CPT

## 2025-01-31 LAB
CULTURE RESULTS: SIGNIFICANT CHANGE UP
SPECIMEN SOURCE: SIGNIFICANT CHANGE UP

## 2025-02-07 DIAGNOSIS — Z88.1 ALLERGY STATUS TO OTHER ANTIBIOTIC AGENTS: ICD-10-CM

## 2025-02-07 DIAGNOSIS — E11.9 TYPE 2 DIABETES MELLITUS WITHOUT COMPLICATIONS: ICD-10-CM

## 2025-02-07 DIAGNOSIS — K43.3 PARASTOMAL HERNIA WITH OBSTRUCTION, WITHOUT GANGRENE: ICD-10-CM

## 2025-02-07 DIAGNOSIS — J11.00 INFLUENZA DUE TO UNIDENTIFIED INFLUENZA VIRUS WITH UNSPECIFIED TYPE OF PNEUMONIA: ICD-10-CM

## 2025-02-07 DIAGNOSIS — K50.10 CROHN'S DISEASE OF LARGE INTESTINE WITHOUT COMPLICATIONS: ICD-10-CM

## 2025-02-07 DIAGNOSIS — Z79.84 LONG TERM (CURRENT) USE OF ORAL HYPOGLYCEMIC DRUGS: ICD-10-CM

## 2025-02-19 ENCOUNTER — INPATIENT (INPATIENT)
Facility: HOSPITAL | Age: 40
LOS: 3 days | Discharge: HOME | DRG: 251 | End: 2025-02-23
Attending: SURGERY | Admitting: SURGERY
Payer: MEDICAID

## 2025-02-19 VITALS
DIASTOLIC BLOOD PRESSURE: 85 MMHG | HEART RATE: 86 BPM | RESPIRATION RATE: 18 BRPM | OXYGEN SATURATION: 100 % | TEMPERATURE: 98 F | SYSTOLIC BLOOD PRESSURE: 124 MMHG

## 2025-02-19 DIAGNOSIS — Z96.7 PRESENCE OF OTHER BONE AND TENDON IMPLANTS: Chronic | ICD-10-CM

## 2025-02-19 DIAGNOSIS — Z98.89 OTHER SPECIFIED POSTPROCEDURAL STATES: Chronic | ICD-10-CM

## 2025-02-19 DIAGNOSIS — K60.3 ANAL FISTULA: Chronic | ICD-10-CM

## 2025-02-19 DIAGNOSIS — Z87.81 PERSONAL HISTORY OF (HEALED) TRAUMATIC FRACTURE: Chronic | ICD-10-CM

## 2025-02-19 DIAGNOSIS — Z93.3 COLOSTOMY STATUS: Chronic | ICD-10-CM

## 2025-02-19 LAB
ALBUMIN SERPL ELPH-MCNC: 3.4 G/DL — SIGNIFICANT CHANGE UP (ref 3.3–5)
ALP SERPL-CCNC: 89 U/L — SIGNIFICANT CHANGE UP (ref 40–120)
ALT FLD-CCNC: 31 U/L — SIGNIFICANT CHANGE UP (ref 12–78)
ANION GAP SERPL CALC-SCNC: 5 MMOL/L — SIGNIFICANT CHANGE UP (ref 5–17)
AST SERPL-CCNC: 15 U/L — SIGNIFICANT CHANGE UP (ref 15–37)
BASOPHILS # BLD AUTO: 0.03 K/UL — SIGNIFICANT CHANGE UP (ref 0–0.2)
BASOPHILS NFR BLD AUTO: 0.3 % — SIGNIFICANT CHANGE UP (ref 0–2)
BILIRUB SERPL-MCNC: 0.5 MG/DL — SIGNIFICANT CHANGE UP (ref 0.2–1.2)
BLD GP AB SCN SERPL QL: SIGNIFICANT CHANGE UP
BUN SERPL-MCNC: 5 MG/DL — LOW (ref 7–23)
CALCIUM SERPL-MCNC: 9.8 MG/DL — SIGNIFICANT CHANGE UP (ref 8.5–10.1)
CHLORIDE SERPL-SCNC: 104 MMOL/L — SIGNIFICANT CHANGE UP (ref 96–108)
CO2 SERPL-SCNC: 27 MMOL/L — SIGNIFICANT CHANGE UP (ref 22–31)
CREAT SERPL-MCNC: 0.88 MG/DL — SIGNIFICANT CHANGE UP (ref 0.5–1.3)
EGFR: 112 ML/MIN/1.73M2 — SIGNIFICANT CHANGE UP
EOSINOPHIL # BLD AUTO: 0.26 K/UL — SIGNIFICANT CHANGE UP (ref 0–0.5)
EOSINOPHIL NFR BLD AUTO: 2.7 % — SIGNIFICANT CHANGE UP (ref 0–6)
GLUCOSE SERPL-MCNC: 150 MG/DL — HIGH (ref 70–99)
HCT VFR BLD CALC: 47.2 % — SIGNIFICANT CHANGE UP (ref 39–50)
HGB BLD-MCNC: 15.9 G/DL — SIGNIFICANT CHANGE UP (ref 13–17)
IMM GRANULOCYTES # BLD AUTO: 0.04 K/UL — SIGNIFICANT CHANGE UP (ref 0–0.07)
IMM GRANULOCYTES NFR BLD AUTO: 0.4 % — SIGNIFICANT CHANGE UP (ref 0–0.9)
LACTATE SERPL-SCNC: 2.7 MMOL/L — HIGH (ref 0.7–2)
LIDOCAIN IGE QN: 23 U/L — SIGNIFICANT CHANGE UP (ref 13–75)
LYMPHOCYTES # BLD AUTO: 2.49 K/UL — SIGNIFICANT CHANGE UP (ref 1–3.3)
LYMPHOCYTES NFR BLD AUTO: 25.9 % — SIGNIFICANT CHANGE UP (ref 13–44)
MCHC RBC-ENTMCNC: 33.7 G/DL — SIGNIFICANT CHANGE UP (ref 32–36)
MCHC RBC-ENTMCNC: 33.8 PG — SIGNIFICANT CHANGE UP (ref 27–34)
MCV RBC AUTO: 100.2 FL — HIGH (ref 80–100)
MONOCYTES # BLD AUTO: 0.9 K/UL — SIGNIFICANT CHANGE UP (ref 0–0.9)
MONOCYTES NFR BLD AUTO: 9.3 % — SIGNIFICANT CHANGE UP (ref 2–14)
NEUTROPHILS # BLD AUTO: 5.91 K/UL — SIGNIFICANT CHANGE UP (ref 1.8–7.4)
NEUTROPHILS NFR BLD AUTO: 61.4 % — SIGNIFICANT CHANGE UP (ref 43–77)
NRBC # BLD AUTO: 0 K/UL — SIGNIFICANT CHANGE UP (ref 0–0)
NRBC # FLD: 0 K/UL — SIGNIFICANT CHANGE UP (ref 0–0)
NRBC BLD AUTO-RTO: 0 /100 WBCS — SIGNIFICANT CHANGE UP (ref 0–0)
PLATELET # BLD AUTO: 278 K/UL — SIGNIFICANT CHANGE UP (ref 150–400)
PMV BLD: 8.8 FL — SIGNIFICANT CHANGE UP (ref 7–13)
POTASSIUM SERPL-MCNC: 4.5 MMOL/L — SIGNIFICANT CHANGE UP (ref 3.5–5.3)
POTASSIUM SERPL-SCNC: 4.5 MMOL/L — SIGNIFICANT CHANGE UP (ref 3.5–5.3)
PROT SERPL-MCNC: 7.7 GM/DL — SIGNIFICANT CHANGE UP (ref 6–8.3)
RBC # BLD: 4.71 M/UL — SIGNIFICANT CHANGE UP (ref 4.2–5.8)
RBC # FLD: 14.8 % — HIGH (ref 10.3–14.5)
SODIUM SERPL-SCNC: 136 MMOL/L — SIGNIFICANT CHANGE UP (ref 135–145)
WBC # BLD: 9.63 K/UL — SIGNIFICANT CHANGE UP (ref 3.8–10.5)
WBC # FLD AUTO: 9.63 K/UL — SIGNIFICANT CHANGE UP (ref 3.8–10.5)

## 2025-02-19 PROCEDURE — 99285 EMERGENCY DEPT VISIT HI MDM: CPT

## 2025-02-19 PROCEDURE — 71045 X-RAY EXAM CHEST 1 VIEW: CPT | Mod: 26

## 2025-02-19 RX ORDER — HYDROMORPHONE/SOD CHLOR,ISO/PF 2 MG/10 ML
1 SYRINGE (ML) INJECTION ONCE
Refills: 0 | Status: DISCONTINUED | OUTPATIENT
Start: 2025-02-19 | End: 2025-02-19

## 2025-02-19 RX ORDER — BENZONATATE 100 MG
100 CAPSULE ORAL ONCE
Refills: 0 | Status: COMPLETED | OUTPATIENT
Start: 2025-02-19 | End: 2025-02-19

## 2025-02-19 RX ADMIN — Medication 1000 MILLILITER(S): at 23:00

## 2025-02-19 RX ADMIN — Medication 1 MILLIGRAM(S): at 22:34

## 2025-02-19 RX ADMIN — Medication 100 MILLIGRAM(S): at 22:34

## 2025-02-19 RX ADMIN — Medication 1000 MILLILITER(S): at 22:34

## 2025-02-19 NOTE — ED ADULT NURSE NOTE - OBJECTIVE STATEMENT
pt presents to er c/o abdominal pain from coughing. pt reports pt presents to er c/o abdominal pain from coughing. pt reports being hospitalized 3 weeks ago for flu and pneumonia. pt presents to er c/o abdominal pain from coughing. pt reports being hospitalized 3 weeks ago for flu and pneumonia. states he coughed so much he got a hernia, had hernia repair on 01/25, pt presents to er c/o abdominal pain from coughing. pt reports being hospitalized 3 weeks ago for flu and pneumonia. states he coughed so much he got a hernia, had hernia repair on 01/25, pt was scheduled for surgery to have mesh placed tomorrow due to hernias. today he coughed and "heard a pop" then felt extreme pain. pt called Dr. Hilario and referred to ER. pt has hx of Crohn's s/p colostomy.

## 2025-02-19 NOTE — ED STATDOCS - AVIAN FLU SYMPTOMS
Medication:    triamcinolone (ARISTOCORT) 0.1 % cream         Sig: Apply twice a day for 5-7 days to affected area.    Disp: 30 g    Refills: 0    Start: 1/23/2025    Class: Eprescribe    Non-formulary    Last ordered: 3 years ago (10/20/2021) by Rebekah Ramirse NP    Last dispensed: 10/20/2021    Rx #: [id number]    Adult Topical Steroids Refill Protocol - 12 Month Protocol [id number]/23/2025 09:39 PM   Protocol Details Seen by prescribing provider or same department within the last 12 months or has a future appt in 3 months - IF FAILED PLEASE LOOK AT CHART REVIEW FOR LAST VISIT AND PROCEED ACCORDINGLY    Patient is not pregnant    Medication (including dose and sig) on current meds list      To be filled at: [id number] [place] [place] [place] [address]    passed protocol.   Last office visit date: 10/22/2024  Next appointment scheduled?: yes 10/24/2025  Number of refills given: 0    
Yes

## 2025-02-19 NOTE — ED STATDOCS - PHYSICAL EXAMINATION
GENERAL: A&Ox4, non-toxic appearing, no acute distress  HEENT: NCAT, EOMI, oral mucosa moist, normal conjunctiva  RESP: no respiratory distress, CTAB, no wheezes/rhonchi/rales, speaking in full sentences  CV: RRR, no murmurs/rubs/gallops  ABDOMEN: soft, ostomy right lower quadrant, diffusely tender with voluntary guarding, no rebound tenderness, multiple previous surgical scars appreciated  MSK: no visible deformities  NEURO: no focal sensory or motor deficits, CN 2-12 grossly intact  SKIN: warm, normal color, well perfused, no rash  PSYCH: Appropriate affect

## 2025-02-19 NOTE — ED STATDOCS - OBJECTIVE STATEMENT
38 y/o M with PMHx of DM2, pancreatitis, Crohn's disease s/p colostomy, incarcerated parastomal hernia repair on 1/26/25 at  with Dr Hilario presents to the ED c/o abd wall pain. Coughed this evening and heard a "pop", which exacerbated his pain. States when he has coughed in the past he has caused hernias to pop out of his abd around his ostomy. States he thinks the same thing happened tonight. Pt has surgery scheduled tomorrow to put mesh in his abd due to hernias. Pt had PNA 3 weeks ago. +smoker. Notes that he typically gets Dilaudid for pain.

## 2025-02-19 NOTE — ED ADULT TRIAGE NOTE - CHIEF COMPLAINT QUOTE
Pt ambulatory to ed c/o of increased abdominal pain today. Pt states he was coughing and "heard a pop" and the abdominal pain increased. Pt states he has a hernia repair surgery at  schedule for tomorrow. Pmhx of right ostomy, and PNA x3 weeks ago. No medications taken PTA.

## 2025-02-19 NOTE — ED STATDOCS - CLINICAL SUMMARY MEDICAL DECISION MAKING FREE TEXT BOX
39-year-old male presenting with abdominal pain, recently diagnosed with pneumonia and completed antibiotics but has had persistent cough.  Felt something pop in his abdomen.  Scheduled for surgery tomorrow for hernia repair.  Patient appears uncomfortable, has diffuse abdominal tenderness with voluntary guarding concerning for hernia versus other serious intra-abdominal pathology.  Plan for labs, CT A/P with IV and p.o. contrast, chest x-ray to evaluate for residual pneumonia, symptomatic treatment, discussion with surgery.  Given his planned surgery for tomorrow, will likely admit for pain control and procedure in the morning.

## 2025-02-19 NOTE — ED ADULT NURSE NOTE - CHPI ED NUR SYMPTOMS NEG
[x] [facility]  Outpatient Rehabilitation &  Therapy  [address]  P: [phone]  F: [phone] [] [facility]  Outpatient Rehabilitation &  Therapy  [address]  P: [phone]  F: [phone]       Date:  2024    Patient: Isai Chapa  : 1962  MRN: [id number]      Notes:  -Now doing 3 eggs or [employer]ik[employer] w/ granola and egg   -FT sticks tried but did not like   -Chicken chunks at dinner (10-12 with no sides)  -Lunch: Nutrishake at lunch 30g protein 300kcal (No fruit in it)   -->Has not tried any of my recipes or recommendations for lunch  -->Did not send the couple of days he recorded of his intake: sent during session      Goals:  Continuation of previous goals   Discussed possibly stepping back to focus on one thing at a time--starting with breakfast and making sure has all of the components. Once does that for a few days, can move on to lunch and balancing that out.   Discussed easy ways to increase fiber and stressed the importance of adding color at each meal.     Next follow up: 2025 at 2 PM       no shortness of breath

## 2025-02-19 NOTE — ED STATDOCS - PROGRESS NOTE DETAILS
40 y/o M with PMH of DM2, pancreatitis, chron's dx s/p colostomy, recent incarcerated parastomal hernia 1/26/25 presents with abdominal pain. Pt states he coughed today, experienced a painful "pop" in his abdomen around his ostomy. Pt reports he's scheduled for surgery with Dr. Hilario tomorrow. Denies fever. PE: Well appearing. Cardiac: s1s2, RRR. Lungs: CTAB. Abdomen: Ostomy in RLQ. multiple healed scarts from prior abdominal surgeries. Diffusely tender abdomen. A?P: ?hernia ?SBO, plan for labs, CT, surgical consult. - Diogenes Larry PA-C Javier, DO: All labs personally reviewed, patient has mild leukocytosis with slight elevation in lactate to 2.7, CMP otherwise grossly unremarkable.  CT with unchanged right lower quadrant ileostomy and parastomal hernia without bowel obstruction.  Stable complex perirectal/perianal fistula without abscess.  Spoke with surgery resident, team will evaluate the patient and provide formal recommendations.  As needed order for Dilaudid placed.  Patient signed out to Dr. Loza pending surgery recommendation and disposition.

## 2025-02-20 LAB
APPEARANCE UR: CLEAR — SIGNIFICANT CHANGE UP
BILIRUB UR-MCNC: NEGATIVE — SIGNIFICANT CHANGE UP
BUN SERPL-MCNC: 3 MG/DL — LOW (ref 7–23)
CALCIUM SERPL-MCNC: 8.4 MG/DL — LOW (ref 8.5–10.1)
CHLORIDE SERPL-SCNC: 107 MMOL/L — SIGNIFICANT CHANGE UP (ref 96–108)
CO2 SERPL-SCNC: 28 MMOL/L — SIGNIFICANT CHANGE UP (ref 22–31)
COLOR SPEC: YELLOW — SIGNIFICANT CHANGE UP
CREAT SERPL-MCNC: 0.7 MG/DL — SIGNIFICANT CHANGE UP (ref 0.5–1.3)
DIFF PNL FLD: NEGATIVE — SIGNIFICANT CHANGE UP
EGFR: 120 ML/MIN/1.73M2 — SIGNIFICANT CHANGE UP
GLUCOSE BLDC GLUCOMTR-MCNC: 116 MG/DL — HIGH (ref 70–99)
GLUCOSE BLDC GLUCOMTR-MCNC: 141 MG/DL — HIGH (ref 70–99)
GLUCOSE BLDC GLUCOMTR-MCNC: 306 MG/DL — HIGH (ref 70–99)
GLUCOSE SERPL-MCNC: 129 MG/DL — HIGH (ref 70–99)
GLUCOSE UR QL: NEGATIVE MG/DL — SIGNIFICANT CHANGE UP
HCT VFR BLD CALC: 40.1 % — SIGNIFICANT CHANGE UP (ref 39–50)
HGB BLD-MCNC: 13.5 G/DL — SIGNIFICANT CHANGE UP (ref 13–17)
KETONES UR-MCNC: NEGATIVE MG/DL — SIGNIFICANT CHANGE UP
LACTATE SERPL-SCNC: 0.8 MMOL/L — SIGNIFICANT CHANGE UP (ref 0.7–2)
LEUKOCYTE ESTERASE UR-ACNC: NEGATIVE — SIGNIFICANT CHANGE UP
MCHC RBC-ENTMCNC: 33.7 G/DL — SIGNIFICANT CHANGE UP (ref 32–36)
MCHC RBC-ENTMCNC: 33.7 PG — SIGNIFICANT CHANGE UP (ref 27–34)
MCV RBC AUTO: 100 FL — SIGNIFICANT CHANGE UP (ref 80–100)
NITRITE UR-MCNC: NEGATIVE — SIGNIFICANT CHANGE UP
NRBC # BLD AUTO: 0 K/UL — SIGNIFICANT CHANGE UP (ref 0–0)
NRBC # FLD: 0 K/UL — SIGNIFICANT CHANGE UP (ref 0–0)
NRBC BLD AUTO-RTO: 0 /100 WBCS — SIGNIFICANT CHANGE UP (ref 0–0)
PH UR: 6.5 — SIGNIFICANT CHANGE UP (ref 5–8)
PLATELET # BLD AUTO: 224 K/UL — SIGNIFICANT CHANGE UP (ref 150–400)
PMV BLD: 8.8 FL — SIGNIFICANT CHANGE UP (ref 7–13)
POTASSIUM SERPL-MCNC: 4.3 MMOL/L — SIGNIFICANT CHANGE UP (ref 3.5–5.3)
POTASSIUM SERPL-SCNC: 4.3 MMOL/L — SIGNIFICANT CHANGE UP (ref 3.5–5.3)
PROT UR-MCNC: NEGATIVE MG/DL — SIGNIFICANT CHANGE UP
RBC # BLD: 4.01 M/UL — LOW (ref 4.2–5.8)
RBC # FLD: 14.8 % — HIGH (ref 10.3–14.5)
SODIUM SERPL-SCNC: 135 MMOL/L — SIGNIFICANT CHANGE UP (ref 135–145)
SP GR SPEC: >1.03 — HIGH (ref 1–1.03)
UROBILINOGEN FLD QL: 0.2 MG/DL — SIGNIFICANT CHANGE UP (ref 0.2–1)
WBC # BLD: 9.31 K/UL — SIGNIFICANT CHANGE UP (ref 3.8–10.5)
WBC # FLD AUTO: 9.31 K/UL — SIGNIFICANT CHANGE UP (ref 3.8–10.5)

## 2025-02-20 PROCEDURE — C1781: CPT

## 2025-02-20 PROCEDURE — 80048 BASIC METABOLIC PNL TOTAL CA: CPT

## 2025-02-20 PROCEDURE — 97163 PT EVAL HIGH COMPLEX 45 MIN: CPT | Mod: GP

## 2025-02-20 PROCEDURE — 85027 COMPLETE CBC AUTOMATED: CPT

## 2025-02-20 PROCEDURE — 36415 COLL VENOUS BLD VENIPUNCTURE: CPT

## 2025-02-20 PROCEDURE — 83036 HEMOGLOBIN GLYCOSYLATED A1C: CPT

## 2025-02-20 PROCEDURE — 49622 RPR PARASTOMAL HRNA NCR/STRN: CPT

## 2025-02-20 PROCEDURE — 83735 ASSAY OF MAGNESIUM: CPT

## 2025-02-20 PROCEDURE — 80053 COMPREHEN METABOLIC PANEL: CPT

## 2025-02-20 PROCEDURE — 74177 CT ABD & PELVIS W/CONTRAST: CPT | Mod: 26

## 2025-02-20 PROCEDURE — C1889: CPT

## 2025-02-20 PROCEDURE — 82962 GLUCOSE BLOOD TEST: CPT

## 2025-02-20 PROCEDURE — 81003 URINALYSIS AUTO W/O SCOPE: CPT

## 2025-02-20 PROCEDURE — 84100 ASSAY OF PHOSPHORUS: CPT

## 2025-02-20 RX ORDER — DEXTROSE 50 % IN WATER 50 %
25 SYRINGE (ML) INTRAVENOUS ONCE
Refills: 0 | Status: DISCONTINUED | OUTPATIENT
Start: 2025-02-20 | End: 2025-02-23

## 2025-02-20 RX ORDER — KETOROLAC TROMETHAMINE 30 MG/ML
30 INJECTION, SOLUTION INTRAMUSCULAR; INTRAVENOUS ONCE
Refills: 0 | Status: DISCONTINUED | OUTPATIENT
Start: 2025-02-20 | End: 2025-02-20

## 2025-02-20 RX ORDER — ACETAMINOPHEN 500 MG/5ML
1000 LIQUID (ML) ORAL ONCE
Refills: 0 | Status: DISCONTINUED | OUTPATIENT
Start: 2025-02-20 | End: 2025-02-21

## 2025-02-20 RX ORDER — HYDROMORPHONE/SOD CHLOR,ISO/PF 2 MG/10 ML
0.5 SYRINGE (ML) INJECTION ONCE
Refills: 0 | Status: DISCONTINUED | OUTPATIENT
Start: 2025-02-20 | End: 2025-02-20

## 2025-02-20 RX ORDER — FENTANYL CITRATE-0.9 % NACL/PF 100MCG/2ML
25 SYRINGE (ML) INTRAVENOUS
Refills: 0 | Status: DISCONTINUED | OUTPATIENT
Start: 2025-02-20 | End: 2025-02-20

## 2025-02-20 RX ORDER — DEXTROSE 50 % IN WATER 50 %
15 SYRINGE (ML) INTRAVENOUS ONCE
Refills: 0 | Status: DISCONTINUED | OUTPATIENT
Start: 2025-02-20 | End: 2025-02-23

## 2025-02-20 RX ORDER — DEXTROSE 50 % IN WATER 50 %
12.5 SYRINGE (ML) INTRAVENOUS ONCE
Refills: 0 | Status: DISCONTINUED | OUTPATIENT
Start: 2025-02-20 | End: 2025-02-23

## 2025-02-20 RX ORDER — HYDROMORPHONE/SOD CHLOR,ISO/PF 2 MG/10 ML
1 SYRINGE (ML) INJECTION ONCE
Refills: 0 | Status: DISCONTINUED | OUTPATIENT
Start: 2025-02-20 | End: 2025-02-20

## 2025-02-20 RX ORDER — SENNA 187 MG
2 TABLET ORAL AT BEDTIME
Refills: 0 | Status: DISCONTINUED | OUTPATIENT
Start: 2025-02-20 | End: 2025-02-23

## 2025-02-20 RX ORDER — SODIUM CHLORIDE 9 G/1000ML
1000 INJECTION, SOLUTION INTRAVENOUS
Refills: 0 | Status: DISCONTINUED | OUTPATIENT
Start: 2025-02-20 | End: 2025-02-23

## 2025-02-20 RX ORDER — SODIUM CHLORIDE 9 G/1000ML
1000 INJECTION, SOLUTION INTRAVENOUS
Refills: 0 | Status: DISCONTINUED | OUTPATIENT
Start: 2025-02-20 | End: 2025-02-21

## 2025-02-20 RX ORDER — INSULIN LISPRO 100 U/ML
INJECTION, SOLUTION INTRAVENOUS; SUBCUTANEOUS EVERY 6 HOURS
Refills: 0 | Status: DISCONTINUED | OUTPATIENT
Start: 2025-02-20 | End: 2025-02-23

## 2025-02-20 RX ORDER — ENOXAPARIN SODIUM 100 MG/ML
40 INJECTION SUBCUTANEOUS EVERY 24 HOURS
Refills: 0 | Status: DISCONTINUED | OUTPATIENT
Start: 2025-02-20 | End: 2025-02-20

## 2025-02-20 RX ORDER — KETOROLAC TROMETHAMINE 30 MG/ML
15 INJECTION, SOLUTION INTRAMUSCULAR; INTRAVENOUS EVERY 6 HOURS
Refills: 0 | Status: DISCONTINUED | OUTPATIENT
Start: 2025-02-20 | End: 2025-02-21

## 2025-02-20 RX ORDER — ONDANSETRON HCL/PF 4 MG/2 ML
4 VIAL (ML) INJECTION EVERY 6 HOURS
Refills: 0 | Status: DISCONTINUED | OUTPATIENT
Start: 2025-02-20 | End: 2025-02-23

## 2025-02-20 RX ORDER — ACETAMINOPHEN 500 MG/5ML
1000 LIQUID (ML) ORAL ONCE
Refills: 0 | Status: COMPLETED | OUTPATIENT
Start: 2025-02-20 | End: 2025-02-20

## 2025-02-20 RX ORDER — ONDANSETRON HCL/PF 4 MG/2 ML
4 VIAL (ML) INJECTION ONCE
Refills: 0 | Status: DISCONTINUED | OUTPATIENT
Start: 2025-02-20 | End: 2025-02-20

## 2025-02-20 RX ORDER — HYDROMORPHONE/SOD CHLOR,ISO/PF 2 MG/10 ML
0.5 SYRINGE (ML) INJECTION
Refills: 0 | Status: DISCONTINUED | OUTPATIENT
Start: 2025-02-20 | End: 2025-02-20

## 2025-02-20 RX ORDER — RISANKIZUMAB-RZAA 150 MG/ML
0 INJECTION SUBCUTANEOUS
Refills: 0 | DISCHARGE

## 2025-02-20 RX ORDER — GLUCAGON 3 MG/1
1 POWDER NASAL ONCE
Refills: 0 | Status: DISCONTINUED | OUTPATIENT
Start: 2025-02-20 | End: 2025-02-23

## 2025-02-20 RX ADMIN — Medication 1 MILLIGRAM(S): at 00:15

## 2025-02-20 RX ADMIN — Medication 1 MILLIGRAM(S): at 08:30

## 2025-02-20 RX ADMIN — Medication 0.5 MILLIGRAM(S): at 10:03

## 2025-02-20 RX ADMIN — SODIUM CHLORIDE 110 MILLILITER(S): 9 INJECTION, SOLUTION INTRAVENOUS at 03:18

## 2025-02-20 RX ADMIN — Medication 2 MILLIGRAM(S): at 05:25

## 2025-02-20 RX ADMIN — Medication 2 MILLIGRAM(S): at 21:54

## 2025-02-20 RX ADMIN — SODIUM CHLORIDE 110 MILLILITER(S): 9 INJECTION, SOLUTION INTRAVENOUS at 04:39

## 2025-02-20 RX ADMIN — Medication 1000 MILLIGRAM(S): at 16:53

## 2025-02-20 RX ADMIN — Medication 400 MILLIGRAM(S): at 16:38

## 2025-02-20 RX ADMIN — Medication 2 MILLIGRAM(S): at 17:33

## 2025-02-20 RX ADMIN — Medication 2 MILLIGRAM(S): at 06:10

## 2025-02-20 RX ADMIN — Medication 2 MILLIGRAM(S): at 22:30

## 2025-02-20 RX ADMIN — Medication 2 MILLIGRAM(S): at 17:52

## 2025-02-20 RX ADMIN — Medication 0.5 MILLIGRAM(S): at 16:31

## 2025-02-20 RX ADMIN — Medication 0.5 MILLIGRAM(S): at 09:31

## 2025-02-20 RX ADMIN — Medication 1 MILLIGRAM(S): at 02:42

## 2025-02-20 RX ADMIN — Medication 0.5 MILLIGRAM(S): at 16:16

## 2025-02-20 NOTE — H&P ADULT - NSHPPHYSICALEXAM_GEN_ALL_CORE
Physical Exam:  Pt is AAOx3  General: Well developed, in no acute distress.   Chest: Lungs clear, no rales, no rhonchi, no wheezes.   Heart: RR, no murmurs, no rubs, no gallops.   Abdomen: Soft, epigastric tenderness, no masses, BS normal, ileostomy in the RLQ with parastomal hernia around it   Back: Normal curvature, no tenderness.   Neuro: Physiological, no localizing findings.   Skin: Normal, no rashes, no lesions noted.   Extremities: Warm, well perfused, no edema, Pulses intact

## 2025-02-20 NOTE — PROGRESS NOTE ADULT - SUBJECTIVE AND OBJECTIVE BOX
CC:Patient is a 39y old  Male who presents with a chief complaint of     Subjective:  Pt seen and examined at bedside with chaperone. Pt is AAOx3, pt in no acute distress. Patient states the pain has been getting worse since 9am yesterday and came in for pain management; last meal 3pm yesterday.  Patient states the pain has improved with pain medications. Pt denied c/o fever, chills, chest pain, SOB,N/V/D, extremity pain or dysfunction, hemoptysis, hematemesis, hematuria, hematochexia, headache, diplopia, vertigo, dizzyness.    ROS:  otherwise as abovementioned ROS    Vital Signs Last 24 Hrs  T(C): 36.4 (20 Feb 2025 08:40), Max: 36.7 (20 Feb 2025 01:12)  T(F): 97.5 (20 Feb 2025 08:40), Max: 98 (20 Feb 2025 01:12)  HR: 65 (20 Feb 2025 08:40) (65 - 86)  BP: 106/71 (20 Feb 2025 08:40) (106/71 - 139/89)  BP(mean): 88 (20 Feb 2025 01:12) (88 - 97)  RR: 18 (20 Feb 2025 08:40) (16 - 18)  SpO2: 98% (20 Feb 2025 08:40) (96% - 100%)    Parameters below as of 20 Feb 2025 08:40  Patient On (Oxygen Delivery Method): room air        Labs:                        13.5   9.31  )-----------( 224      ( 20 Feb 2025 04:54 )             40.1     CBC Full  -  ( 20 Feb 2025 04:54 )  WBC Count : 9.31 K/uL  RBC Count : 4.01 M/uL  Hemoglobin : 13.5 g/dL  Hematocrit : 40.1 %  Platelet Count - Automated : 224 K/uL  Mean Cell Volume : 100.0 fl  Mean Cell Hemoglobin : 33.7 pg  Mean Cell Hemoglobin Concentration : 33.7 g/dL  Auto Neutrophil # : x  Auto Lymphocyte # : x  Auto Monocyte # : x  Auto Eosinophil # : x  Auto Basophil # : x  Auto Neutrophil % : x  Auto Lymphocyte % : x  Auto Monocyte % : x  Auto Eosinophil % : x  Auto Basophil % : x    02-20    135  |  107  |  3[L]  ----------------------------<  129[H]  4.3   |  28  |  0.70    Ca    8.4[L]      20 Feb 2025 04:54    TPro  7.7  /  Alb  3.4  /  TBili  0.5  /  DBili  x   /  AST  15  /  ALT  31  /  AlkPhos  89  02-19    LIVER FUNCTIONS - ( 19 Feb 2025 22:11 )  Alb: 3.4 g/dL / Pro: 7.7 gm/dL / ALK PHOS: 89 U/L / ALT: 31 U/L / AST: 15 U/L / GGT: x                 Meds:  acetaminophen   IVPB .. 1000 milliGRAM(s) IV Intermittent once PRN  dextrose 5%. 1000 milliLiter(s) IV Continuous <Continuous>  dextrose 5%. 1000 milliLiter(s) IV Continuous <Continuous>  dextrose 50% Injectable 25 Gram(s) IV Push once  dextrose 50% Injectable 12.5 Gram(s) IV Push once  dextrose 50% Injectable 25 Gram(s) IV Push once  dextrose Oral Gel 15 Gram(s) Oral once PRN  glucagon  Injectable 1 milliGRAM(s) IntraMuscular once  insulin lispro (ADMELOG) corrective regimen sliding scale   SubCutaneous every 6 hours  ketorolac   Injectable 15 milliGRAM(s) IV Push every 6 hours PRN  lactated ringers. 1000 milliLiter(s) IV Continuous <Continuous>  morphine  - Injectable 2 milliGRAM(s) IV Push every 4 hours PRN  ondansetron Injectable 4 milliGRAM(s) IV Push every 6 hours PRN  pantoprazole    Tablet 40 milliGRAM(s) Oral before breakfast  senna 2 Tablet(s) Oral at bedtime        Physical exam:  Pt is aaox3  Pt in no acute distress  Psych: normal affect  Resp: CTAB, saturating well on RA   CVS: S1S2(+)  ABD: bowel sounds (+), soft, epigastric pain  TTPnon distended, no rebound, no guarding, no rigidity, no skin changes to exam.         Stoma is edematous pink with moderate thin brown output in bag  EXT: no calf tenderness or edema to exam b/l, on VTE prophylaxis  Skin: no adverse skin changes to exam

## 2025-02-20 NOTE — H&P ADULT - ASSESSMENT
40 yo M with parastomal hernia s/p reduction of incarcerated parastomal hernia on 1.26.25  Patient is scheduled to undergo hernia repair with mesh today       Plan:   Admit under Dr Hilario  IV hydration   NPO   Pain control PRN  Monitor ostomy output   DVT ppx    Plan discussed with Dr. Hilario

## 2025-02-20 NOTE — ED ADULT NURSE REASSESSMENT NOTE - NS ED NURSE REASSESS COMMENT FT1
Report received from BYRON Natarajan. Pt resting comfortably in bed, complains of 10/10 pain, pain meds to be given, vital signs stable, awaiting bed.

## 2025-02-20 NOTE — H&P ADULT - HISTORY OF PRESENT ILLNESS
40 y/o M with PMHx of DM2, pancreatitis, Crohn's disease s/p colostomy, incarcerated parastomal hernia repair on 1/26/25 at  with Dr Hilario presents to the ED c/o abd wall pain. Coughed this evening and heard a "pop", which exacerbated his pain. States when he has coughed in the past he has caused hernias to pop out of his abd around his ostomy. States he thinks the same thing happened tonight. Pt has surgery scheduled tomorrow to put mesh in his abd due to hernias. Pt had PNA 3 weeks ago. +smoker. Notes that he typically gets Dilaudid for pain.

## 2025-02-20 NOTE — H&P ADULT - NSHPLABSRESULTS_GEN_ALL_CORE
15.9   9.63  )-----------( 278      ( 19 Feb 2025 22:11 )             47.2   02-19    136  |  104  |  5[L]  ----------------------------<  150[H]  4.5   |  27  |  0.88    Ca    9.8      19 Feb 2025 22:11    TPro  7.7  /  Alb  3.4  /  TBili  0.5  /  DBili  x   /  AST  15  /  ALT  31  /  AlkPhos  89  02-19           CT Abdomen and Pelvis w/ Oral Cont and w/ IV Cont (02.20.25 @ 00:28)       IMPRESSION:  Unchanged RLQ ileostomy and parastomal hernia. No bowel obstruction.    Stable complex perirectal/perianal fistula. No abscess.    Wall thickening of the urinary bladder, correlation for cystitis   recommended.    < end of copied text >

## 2025-02-20 NOTE — SBIRT NOTE ADULT - NSSBIRTALCPOSREINDET_GEN_A_CORE
N/A [At ___ Weeks Gestation] : at [unfilled] weeks gestation [Birth Weight:___] : [unfilled] weighed [unfilled] at birth. [Normal Vaginal Route] : by normal vaginal route [None] : No maternal complications

## 2025-02-21 ENCOUNTER — TRANSCRIPTION ENCOUNTER (OUTPATIENT)
Age: 40
End: 2025-02-21

## 2025-02-21 LAB
A1C WITH ESTIMATED AVERAGE GLUCOSE RESULT: 7.1 % — HIGH (ref 4–5.6)
ALBUMIN SERPL ELPH-MCNC: 2.6 G/DL — LOW (ref 3.3–5)
ALP SERPL-CCNC: 66 U/L — SIGNIFICANT CHANGE UP (ref 40–120)
ALT FLD-CCNC: 21 U/L — SIGNIFICANT CHANGE UP (ref 12–78)
ANION GAP SERPL CALC-SCNC: 4 MMOL/L — LOW (ref 5–17)
AST SERPL-CCNC: 12 U/L — LOW (ref 15–37)
BILIRUB SERPL-MCNC: 0.3 MG/DL — SIGNIFICANT CHANGE UP (ref 0.2–1.2)
BUN SERPL-MCNC: 6 MG/DL — LOW (ref 7–23)
CALCIUM SERPL-MCNC: 9.1 MG/DL — SIGNIFICANT CHANGE UP (ref 8.5–10.1)
CHLORIDE SERPL-SCNC: 102 MMOL/L — SIGNIFICANT CHANGE UP (ref 96–108)
CO2 SERPL-SCNC: 27 MMOL/L — SIGNIFICANT CHANGE UP (ref 22–31)
CREAT SERPL-MCNC: 0.74 MG/DL — SIGNIFICANT CHANGE UP (ref 0.5–1.3)
EGFR: 118 ML/MIN/1.73M2 — SIGNIFICANT CHANGE UP
ESTIMATED AVERAGE GLUCOSE: 157 MG/DL — HIGH (ref 68–114)
GLUCOSE BLDC GLUCOMTR-MCNC: 162 MG/DL — HIGH (ref 70–99)
GLUCOSE BLDC GLUCOMTR-MCNC: 182 MG/DL — HIGH (ref 70–99)
GLUCOSE BLDC GLUCOMTR-MCNC: 201 MG/DL — HIGH (ref 70–99)
GLUCOSE BLDC GLUCOMTR-MCNC: 227 MG/DL — HIGH (ref 70–99)
GLUCOSE SERPL-MCNC: 177 MG/DL — HIGH (ref 70–99)
HCT VFR BLD CALC: 40.8 % — SIGNIFICANT CHANGE UP (ref 39–50)
HGB BLD-MCNC: 13.5 G/DL — SIGNIFICANT CHANGE UP (ref 13–17)
MAGNESIUM SERPL-MCNC: 1.9 MG/DL — SIGNIFICANT CHANGE UP (ref 1.6–2.6)
MCHC RBC-ENTMCNC: 33.1 G/DL — SIGNIFICANT CHANGE UP (ref 32–36)
MCHC RBC-ENTMCNC: 33.6 PG — SIGNIFICANT CHANGE UP (ref 27–34)
MCV RBC AUTO: 101.5 FL — HIGH (ref 80–100)
NRBC # BLD AUTO: 0 K/UL — SIGNIFICANT CHANGE UP (ref 0–0)
NRBC # FLD: 0 K/UL — SIGNIFICANT CHANGE UP (ref 0–0)
NRBC BLD AUTO-RTO: 0 /100 WBCS — SIGNIFICANT CHANGE UP (ref 0–0)
PHOSPHATE SERPL-MCNC: 4.1 MG/DL — SIGNIFICANT CHANGE UP (ref 2.5–4.5)
PLATELET # BLD AUTO: 234 K/UL — SIGNIFICANT CHANGE UP (ref 150–400)
PMV BLD: 9 FL — SIGNIFICANT CHANGE UP (ref 7–13)
POTASSIUM SERPL-MCNC: 4.2 MMOL/L — SIGNIFICANT CHANGE UP (ref 3.5–5.3)
POTASSIUM SERPL-SCNC: 4.2 MMOL/L — SIGNIFICANT CHANGE UP (ref 3.5–5.3)
PROT SERPL-MCNC: 5.9 GM/DL — LOW (ref 6–8.3)
RBC # BLD: 4.02 M/UL — LOW (ref 4.2–5.8)
RBC # FLD: 14.7 % — HIGH (ref 10.3–14.5)
SODIUM SERPL-SCNC: 133 MMOL/L — LOW (ref 135–145)
WBC # BLD: 11.31 K/UL — HIGH (ref 3.8–10.5)
WBC # FLD AUTO: 11.31 K/UL — HIGH (ref 3.8–10.5)

## 2025-02-21 PROCEDURE — 99232 SBSQ HOSP IP/OBS MODERATE 35: CPT

## 2025-02-21 RX ORDER — ACETAMINOPHEN 500 MG/5ML
975 LIQUID (ML) ORAL EVERY 6 HOURS
Refills: 0 | Status: DISCONTINUED | OUTPATIENT
Start: 2025-02-21 | End: 2025-02-23

## 2025-02-21 RX ORDER — HYDROMORPHONE/SOD CHLOR,ISO/PF 2 MG/10 ML
2 SYRINGE (ML) INJECTION EVERY 4 HOURS
Refills: 0 | Status: DISCONTINUED | OUTPATIENT
Start: 2025-02-21 | End: 2025-02-21

## 2025-02-21 RX ORDER — HYDROMORPHONE/SOD CHLOR,ISO/PF 2 MG/10 ML
0.5 SYRINGE (ML) INJECTION
Qty: 5 | Refills: 0
Start: 2025-02-21

## 2025-02-21 RX ORDER — NALOXONE HYDROCHLORIDE 0.4 MG/ML
1 INJECTION, SOLUTION INTRAMUSCULAR; INTRAVENOUS; SUBCUTANEOUS
Qty: 1 | Refills: 0
Start: 2025-02-21

## 2025-02-21 RX ORDER — IBUPROFEN 200 MG
600 TABLET ORAL EVERY 6 HOURS
Refills: 0 | Status: DISCONTINUED | OUTPATIENT
Start: 2025-02-21 | End: 2025-02-23

## 2025-02-21 RX ORDER — HYDROMORPHONE/SOD CHLOR,ISO/PF 2 MG/10 ML
4 SYRINGE (ML) INJECTION EVERY 4 HOURS
Refills: 0 | Status: DISCONTINUED | OUTPATIENT
Start: 2025-02-21 | End: 2025-02-23

## 2025-02-21 RX ORDER — HYDROMORPHONE/SOD CHLOR,ISO/PF 2 MG/10 ML
0.25 SYRINGE (ML) INJECTION EVERY 4 HOURS
Refills: 0 | Status: DISCONTINUED | OUTPATIENT
Start: 2025-02-21 | End: 2025-02-22

## 2025-02-21 RX ADMIN — Medication 2 MILLIGRAM(S): at 15:02

## 2025-02-21 RX ADMIN — Medication 2 MILLIGRAM(S): at 14:23

## 2025-02-21 RX ADMIN — SODIUM CHLORIDE 110 MILLILITER(S): 9 INJECTION, SOLUTION INTRAVENOUS at 03:14

## 2025-02-21 RX ADMIN — Medication 40 MILLIGRAM(S): at 06:17

## 2025-02-21 RX ADMIN — Medication 2 MILLIGRAM(S): at 06:17

## 2025-02-21 RX ADMIN — Medication 600 MILLIGRAM(S): at 19:05

## 2025-02-21 RX ADMIN — Medication 600 MILLIGRAM(S): at 12:27

## 2025-02-21 RX ADMIN — Medication 2 MILLIGRAM(S): at 02:01

## 2025-02-21 RX ADMIN — Medication 0.25 MILLIGRAM(S): at 21:43

## 2025-02-21 RX ADMIN — Medication 975 MILLIGRAM(S): at 19:05

## 2025-02-21 RX ADMIN — Medication 2 MILLIGRAM(S): at 10:20

## 2025-02-21 RX ADMIN — Medication 4 MILLIGRAM(S): at 20:49

## 2025-02-21 RX ADMIN — Medication 2 MILLIGRAM(S): at 11:20

## 2025-02-21 RX ADMIN — Medication 975 MILLIGRAM(S): at 12:26

## 2025-02-21 RX ADMIN — Medication 0.25 MILLIGRAM(S): at 16:44

## 2025-02-21 RX ADMIN — Medication 2 MILLIGRAM(S): at 02:40

## 2025-02-21 NOTE — DISCHARGE NOTE PROVIDER - CARE PROVIDER_API CALL
Sanket Hilario (DO)  Surgery  [address] [place] [id number]  Phone: [phone]  Fax: [phone]  Follow Up Time: 2 weeks

## 2025-02-21 NOTE — DISCHARGE NOTE PROVIDER - NSDCMRMEDTOKEN_GEN_ALL_CORE_FT
Dilaudid 4 mg oral tablet: 0.5 tab(s) orally every 6 hours as needed for  severe pain cut pill in half MDD: 6mg  Narcan 4 mg/0.1 mL nasal spray: 1 spray(s) intranasally  risankizumab: As directed   Dilaudid 4 mg oral tablet: 0.5 tab(s) orally every 6 hours as needed for  severe pain cut pill in half MDD: 6mg  ibuprofen 600 mg oral tablet: 1 tab(s) orally every 6 hours  Narcan 4 mg/0.1 mL nasal spray: 1 spray(s) intranasally once a day as needed for withdrawal  risankizumab: As directed

## 2025-02-21 NOTE — PROGRESS NOTE ADULT - SUBJECTIVE AND OBJECTIVE BOX
CC:Patient is a 39y old  Male who presents with a chief complaint of parastomal hernia  (21 Feb 2025 06:52)      Subjective:  Pt seen and examined at bedside with chaperone. Pt is AAOx3, pt in no acute distress. Patient states he is remains in pain and has been having limited movement secondary to pain. Patient toelrating clears,  (+) void, (+) ambulation, (+) bowel function.  Pt denied c/o fever, chills, chest pain, SOB, abd pain, N/V/D, extremity pain or dysfunction, hemoptysis, hematemesis, hematuria, hematochexia, headache, diplopia, vertigo, dizzyness.     ROS:  otherwise as abovementioned ROS    Vital Signs Last 24 Hrs  T(C): 36.7 (21 Feb 2025 08:39), Max: 37.1 (20 Feb 2025 17:23)  T(F): 98 (21 Feb 2025 08:39), Max: 98.7 (20 Feb 2025 17:23)  HR: 88 (21 Feb 2025 08:39) (50 - 88)  BP: 139/76 (21 Feb 2025 08:39) (94/68 - 147/85)  BP(mean): --  RR: 18 (21 Feb 2025 08:39) (9 - 18)  SpO2: 100% (21 Feb 2025 08:39) (97% - 100%)    Parameters below as of 21 Feb 2025 08:39  Patient On (Oxygen Delivery Method): room air        Labs:               13.5   11.31 )-----------( 234      ( 21 Feb 2025 07:01 )             40.8     CBC Full  -  ( 21 Feb 2025 07:01 )  WBC Count : 11.31 K/uL  RBC Count : 4.02 M/uL  Hemoglobin : 13.5 g/dL  Hematocrit : 40.8 %  Platelet Count - Automated : 234 K/uL  Mean Cell Volume : 101.5 fl  Mean Cell Hemoglobin : 33.6 pg  Mean Cell Hemoglobin Concentration : 33.1 g/dL  Auto Neutrophil # : x  Auto Lymphocyte # : x  Auto Monocyte # : x  Auto Eosinophil # : x  Auto Basophil # : x  Auto Neutrophil % : x  Auto Lymphocyte % : x  Auto Monocyte % : x  Auto Eosinophil % : x  Auto Basophil % : x    02-21    133[L]  |  102  |  6[L]  ----------------------------<  177[H]  4.2   |  27  |  0.74    Ca    9.1      21 Feb 2025 07:01  Phos  4.1     02-21  Mg     1.9     02-21    TPro  5.9[L]  /  Alb  2.6[L]  /  TBili  0.3  /  DBili  x   /  AST  12[L]  /  ALT  21  /  AlkPhos  66  02-21    LIVER FUNCTIONS - ( 21 Feb 2025 07:01 )  Alb: 2.6 g/dL / Pro: 5.9 gm/dL / ALK PHOS: 66 U/L / ALT: 21 U/L / AST: 12 U/L / GGT: x                 Meds:  acetaminophen   IVPB .. 1000 milliGRAM(s) IV Intermittent once PRN  dextrose 5%. 1000 milliLiter(s) IV Continuous <Continuous>  dextrose 5%. 1000 milliLiter(s) IV Continuous <Continuous>  dextrose 50% Injectable 25 Gram(s) IV Push once  dextrose 50% Injectable 12.5 Gram(s) IV Push once  dextrose 50% Injectable 25 Gram(s) IV Push once  dextrose Oral Gel 15 Gram(s) Oral once PRN  glucagon  Injectable 1 milliGRAM(s) IntraMuscular once  insulin lispro (ADMELOG) corrective regimen sliding scale   SubCutaneous Before meals and at bedtime  ketorolac   Injectable 15 milliGRAM(s) IV Push every 6 hours PRN  lactated ringers. 1000 milliLiter(s) IV Continuous <Continuous>  morphine  - Injectable 2 milliGRAM(s) IV Push every 4 hours PRN  ondansetron Injectable 4 milliGRAM(s) IV Push every 6 hours PRN  pantoprazole    Tablet 40 milliGRAM(s) Oral before breakfast  senna 2 Tablet(s) Oral at bedtime      Radiology:      Physical exam:  Pt is aaox3  Pt in no acute distress  Psych: normal affect  Resp: CTAB  CVS: S1S2(+)  ABD:  soft, ostomy with gas, and thick moderate brown output in bag, stoma edematous and pink, non distended, no rebound, no guarding, no rigidity, no skin changes to exam. Incision site is clean, dry, intact, no cellulitis, no d/c, no purulence, no fluctuance. (+) appropriate incisional tenderness to exam  EXT: no calf tenderness or edema to exam b/l, on VTE prophylaxis  Skin: no adverse skin changes to exam       CC:Patient is a 39y old  Male who presents with a chief complaint of parastomal hernia  (21 Feb 2025 06:52)      Subjective:  Pt seen and examined at bedside with chaperone. Pt is AAOx3, pt in no acute distress. Patient states he is remains in pain and has been having limited movement secondary to pain. Patient tolerating clears,  (+) void, (+) ambulation, (+) bowel function.  Pt denied c/o fever, chills, chest pain, SOB, abd pain, N/V/D, extremity pain or dysfunction, hemoptysis, hematemesis, hematuria, hematochezia, headache, diplopia, vertigo, dizziness.     ROS:  otherwise as abovementioned ROS    Vital Signs Last 24 Hrs  T(C): 36.7 (21 Feb 2025 08:39), Max: 37.1 (20 Feb 2025 17:23)  T(F): 98 (21 Feb 2025 08:39), Max: 98.7 (20 Feb 2025 17:23)  HR: 88 (21 Feb 2025 08:39) (50 - 88)  BP: 139/76 (21 Feb 2025 08:39) (94/68 - 147/85)  BP(mean): --  RR: 18 (21 Feb 2025 08:39) (9 - 18)  SpO2: 100% (21 Feb 2025 08:39) (97% - 100%)    Parameters below as of 21 Feb 2025 08:39  Patient On (Oxygen Delivery Method): room air        Labs:               13.5   11.31 )-----------( 234      ( 21 Feb 2025 07:01 )             40.8     CBC Full  -  ( 21 Feb 2025 07:01 )  WBC Count : 11.31 K/uL  RBC Count : 4.02 M/uL  Hemoglobin : 13.5 g/dL  Hematocrit : 40.8 %  Platelet Count - Automated : 234 K/uL  Mean Cell Volume : 101.5 fl  Mean Cell Hemoglobin : 33.6 pg  Mean Cell Hemoglobin Concentration : 33.1 g/dL  Auto Neutrophil # : x  Auto Lymphocyte # : x  Auto Monocyte # : x  Auto Eosinophil # : x  Auto Basophil # : x  Auto Neutrophil % : x  Auto Lymphocyte % : x  Auto Monocyte % : x  Auto Eosinophil % : x  Auto Basophil % : x    02-21    133[L]  |  102  |  6[L]  ----------------------------<  177[H]  4.2   |  27  |  0.74    Ca    9.1      21 Feb 2025 07:01  Phos  4.1     02-21  Mg     1.9     02-21    TPro  5.9[L]  /  Alb  2.6[L]  /  TBili  0.3  /  DBili  x   /  AST  12[L]  /  ALT  21  /  AlkPhos  66  02-21    LIVER FUNCTIONS - ( 21 Feb 2025 07:01 )  Alb: 2.6 g/dL / Pro: 5.9 gm/dL / ALK PHOS: 66 U/L / ALT: 21 U/L / AST: 12 U/L / GGT: x                 Meds:  acetaminophen   IVPB .. 1000 milliGRAM(s) IV Intermittent once PRN  dextrose 5%. 1000 milliLiter(s) IV Continuous <Continuous>  dextrose 5%. 1000 milliLiter(s) IV Continuous <Continuous>  dextrose 50% Injectable 25 Gram(s) IV Push once  dextrose 50% Injectable 12.5 Gram(s) IV Push once  dextrose 50% Injectable 25 Gram(s) IV Push once  dextrose Oral Gel 15 Gram(s) Oral once PRN  glucagon  Injectable 1 milliGRAM(s) IntraMuscular once  insulin lispro (ADMELOG) corrective regimen sliding scale   SubCutaneous Before meals and at bedtime  ketorolac   Injectable 15 milliGRAM(s) IV Push every 6 hours PRN  lactated ringers. 1000 milliLiter(s) IV Continuous <Continuous>  morphine  - Injectable 2 milliGRAM(s) IV Push every 4 hours PRN  ondansetron Injectable 4 milliGRAM(s) IV Push every 6 hours PRN  pantoprazole    Tablet 40 milliGRAM(s) Oral before breakfast  senna 2 Tablet(s) Oral at bedtime      Radiology:      Physical exam:  Pt is aaox3  Pt in no acute distress  Psych: normal affect  Resp: CTAB  CVS: S1S2(+)  ABD:  soft, ostomy with gas, and thick moderate brown output in bag, stoma edematous and pink, non distended, no rebound, no guarding, no rigidity, no skin changes to exam. Incision site is clean, dry, intact, no cellulitis, no d/c, no purulence, no fluctuance. (+) appropriate incisional tenderness to exam  EXT: no calf tenderness or edema to exam b/l, on VTE prophylaxis  Skin: no adverse skin changes to exam

## 2025-02-21 NOTE — PROGRESS NOTE ADULT - NS ATTEND AMEND GEN_ALL_CORE FT
40 yo M with parastomal hernia s/p reduction of incarcerated parastomal hernia on 1.26.25  POD 1 laparoscopic parastomal hernia repair with mesh    Analgesia  Continue to monitor vitals  Encourage IS use  Regular diet  Continue to monitor I/Os  Daily labs, replete lytes  Lovenox dvt ppx  PT eval  SW /CM for dispo

## 2025-02-21 NOTE — PHYSICAL THERAPY INITIAL EVALUATION ADULT - CRITERIA FOR SKILLED THERAPEUTIC INTERVENTIONS
pt does not require PT intervention @ this time; currently I; recommend daily OOB / amb as dong on nursing floor care

## 2025-02-21 NOTE — DISCHARGE NOTE PROVIDER - NSDCCPTREATMENT_GEN_ALL_CORE_FT
PRINCIPAL PROCEDURE  Procedure: Laparoscopic repair of parastomal hernia  Findings and Treatment:

## 2025-02-21 NOTE — DISCHARGE NOTE PROVIDER - NSDCFUADDINST_GEN_ALL_CORE_FT
Do not scrub at wound sites, let warm soapy water run over and pat dry  Do not scrub at wound sites, let warm soapy water run over and pat dry   Please follow up with Dr Hilario your surgeon  And follow up with your colorectal surgeon and gastroenterologists at outside hospital for your anal fistula

## 2025-02-21 NOTE — DISCHARGE NOTE PROVIDER - HOSPITAL COURSE
40 y/o M PMHx  parastomal hernia s/p reduction of incarcerated parastomal hernia on 1.26.25  Patient is now POD 1 paratstomal hernia repair with mesh. patient tolerated procedure well. Patient is now tolerating diet, pain well controlled, ambulating without difficulty and voiding. Per surgical attending, patient to be prescribed home with dilaudid 4mg for intermittent severe pain control until follow-up.   Plan to discharge home with outpatient follow-up.

## 2025-02-21 NOTE — PHYSICAL THERAPY INITIAL EVALUATION ADULT - MODALITIES TREATMENT COMMENTS
pt left in bed supine post Eval; callbell in reach; dong well; severe pain persists; RN Kelly present / aware

## 2025-02-22 LAB
ALBUMIN SERPL ELPH-MCNC: 2.7 G/DL — LOW (ref 3.3–5)
ALP SERPL-CCNC: 47 U/L — SIGNIFICANT CHANGE UP (ref 40–120)
ALT FLD-CCNC: 18 U/L — SIGNIFICANT CHANGE UP (ref 12–78)
ANION GAP SERPL CALC-SCNC: 6 MMOL/L — SIGNIFICANT CHANGE UP (ref 5–17)
AST SERPL-CCNC: 8 U/L — LOW (ref 15–37)
BILIRUB SERPL-MCNC: 0.2 MG/DL — SIGNIFICANT CHANGE UP (ref 0.2–1.2)
BUN SERPL-MCNC: 13 MG/DL — SIGNIFICANT CHANGE UP (ref 7–23)
CALCIUM SERPL-MCNC: 8.9 MG/DL — SIGNIFICANT CHANGE UP (ref 8.5–10.1)
CHLORIDE SERPL-SCNC: 101 MMOL/L — SIGNIFICANT CHANGE UP (ref 96–108)
CO2 SERPL-SCNC: 29 MMOL/L — SIGNIFICANT CHANGE UP (ref 22–31)
CREAT SERPL-MCNC: 1.04 MG/DL — SIGNIFICANT CHANGE UP (ref 0.5–1.3)
EGFR: 94 ML/MIN/1.73M2 — SIGNIFICANT CHANGE UP
GLUCOSE BLDC GLUCOMTR-MCNC: 128 MG/DL — HIGH (ref 70–99)
GLUCOSE BLDC GLUCOMTR-MCNC: 155 MG/DL — HIGH (ref 70–99)
GLUCOSE BLDC GLUCOMTR-MCNC: 162 MG/DL — HIGH (ref 70–99)
GLUCOSE BLDC GLUCOMTR-MCNC: 170 MG/DL — HIGH (ref 70–99)
GLUCOSE SERPL-MCNC: 140 MG/DL — HIGH (ref 70–99)
HCT VFR BLD CALC: 39.4 % — SIGNIFICANT CHANGE UP (ref 39–50)
HGB BLD-MCNC: 13 G/DL — SIGNIFICANT CHANGE UP (ref 13–17)
MAGNESIUM SERPL-MCNC: 1.9 MG/DL — SIGNIFICANT CHANGE UP (ref 1.6–2.6)
MCHC RBC-ENTMCNC: 33 G/DL — SIGNIFICANT CHANGE UP (ref 32–36)
MCHC RBC-ENTMCNC: 33.6 PG — SIGNIFICANT CHANGE UP (ref 27–34)
MCV RBC AUTO: 101.8 FL — HIGH (ref 80–100)
NRBC # BLD AUTO: 0 K/UL — SIGNIFICANT CHANGE UP (ref 0–0)
NRBC # FLD: 0 K/UL — SIGNIFICANT CHANGE UP (ref 0–0)
NRBC BLD AUTO-RTO: 0 /100 WBCS — SIGNIFICANT CHANGE UP (ref 0–0)
PHOSPHATE SERPL-MCNC: 3.6 MG/DL — SIGNIFICANT CHANGE UP (ref 2.5–4.5)
PLATELET # BLD AUTO: 235 K/UL — SIGNIFICANT CHANGE UP (ref 150–400)
PMV BLD: 8.9 FL — SIGNIFICANT CHANGE UP (ref 7–13)
POTASSIUM SERPL-MCNC: 4 MMOL/L — SIGNIFICANT CHANGE UP (ref 3.5–5.3)
POTASSIUM SERPL-SCNC: 4 MMOL/L — SIGNIFICANT CHANGE UP (ref 3.5–5.3)
PROT SERPL-MCNC: 6 GM/DL — SIGNIFICANT CHANGE UP (ref 6–8.3)
RBC # BLD: 3.87 M/UL — LOW (ref 4.2–5.8)
RBC # FLD: 15 % — HIGH (ref 10.3–14.5)
SODIUM SERPL-SCNC: 136 MMOL/L — SIGNIFICANT CHANGE UP (ref 135–145)
WBC # BLD: 9.75 K/UL — SIGNIFICANT CHANGE UP (ref 3.8–10.5)
WBC # FLD AUTO: 9.75 K/UL — SIGNIFICANT CHANGE UP (ref 3.8–10.5)

## 2025-02-22 RX ORDER — LIDOCAINE HYDROCHLORIDE 20 MG/ML
1 JELLY TOPICAL EVERY 24 HOURS
Refills: 0 | Status: DISCONTINUED | OUTPATIENT
Start: 2025-02-22 | End: 2025-02-23

## 2025-02-22 RX ORDER — CYCLOBENZAPRINE HYDROCHLORIDE 15 MG/1
5 CAPSULE, EXTENDED RELEASE ORAL EVERY 8 HOURS
Refills: 0 | Status: DISCONTINUED | OUTPATIENT
Start: 2025-02-22 | End: 2025-02-23

## 2025-02-22 RX ORDER — GABAPENTIN 400 MG/1
200 CAPSULE ORAL
Refills: 0 | Status: DISCONTINUED | OUTPATIENT
Start: 2025-02-22 | End: 2025-02-23

## 2025-02-22 RX ADMIN — Medication 4 MILLIGRAM(S): at 00:43

## 2025-02-22 RX ADMIN — Medication 600 MILLIGRAM(S): at 12:54

## 2025-02-22 RX ADMIN — Medication 0.25 MILLIGRAM(S): at 06:28

## 2025-02-22 RX ADMIN — Medication 0.25 MILLIGRAM(S): at 02:05

## 2025-02-22 RX ADMIN — Medication 2 TABLET(S): at 20:29

## 2025-02-22 RX ADMIN — Medication 600 MILLIGRAM(S): at 17:02

## 2025-02-22 RX ADMIN — LIDOCAINE HYDROCHLORIDE 1 PATCH: 20 JELLY TOPICAL at 09:23

## 2025-02-22 RX ADMIN — Medication 0.25 MILLIGRAM(S): at 16:01

## 2025-02-22 RX ADMIN — GABAPENTIN 200 MILLIGRAM(S): 400 CAPSULE ORAL at 23:48

## 2025-02-22 RX ADMIN — CYCLOBENZAPRINE HYDROCHLORIDE 5 MILLIGRAM(S): 15 CAPSULE, EXTENDED RELEASE ORAL at 22:05

## 2025-02-22 RX ADMIN — Medication 4 MILLIGRAM(S): at 10:23

## 2025-02-22 RX ADMIN — Medication 4 MILLIGRAM(S): at 21:30

## 2025-02-22 RX ADMIN — Medication 600 MILLIGRAM(S): at 11:54

## 2025-02-22 RX ADMIN — LIDOCAINE HYDROCHLORIDE 1 PATCH: 20 JELLY TOPICAL at 22:05

## 2025-02-22 RX ADMIN — Medication 4 MILLIGRAM(S): at 14:31

## 2025-02-22 RX ADMIN — Medication 600 MILLIGRAM(S): at 23:13

## 2025-02-22 RX ADMIN — LIDOCAINE HYDROCHLORIDE 1 PATCH: 20 JELLY TOPICAL at 20:34

## 2025-02-22 RX ADMIN — Medication 4 MILLIGRAM(S): at 04:43

## 2025-02-22 RX ADMIN — Medication 600 MILLIGRAM(S): at 18:02

## 2025-02-22 RX ADMIN — Medication 4 MILLIGRAM(S): at 15:15

## 2025-02-22 RX ADMIN — Medication 4 MILLIGRAM(S): at 09:23

## 2025-02-22 RX ADMIN — Medication 0.25 MILLIGRAM(S): at 10:41

## 2025-02-22 RX ADMIN — Medication 975 MILLIGRAM(S): at 22:05

## 2025-02-22 RX ADMIN — CYCLOBENZAPRINE HYDROCHLORIDE 5 MILLIGRAM(S): 15 CAPSULE, EXTENDED RELEASE ORAL at 16:02

## 2025-02-22 RX ADMIN — Medication 0.25 MILLIGRAM(S): at 17:01

## 2025-02-22 RX ADMIN — Medication 40 MILLIGRAM(S): at 06:31

## 2025-02-22 RX ADMIN — Medication 4 MILLIGRAM(S): at 20:29

## 2025-02-22 RX ADMIN — Medication 0.25 MILLIGRAM(S): at 11:11

## 2025-02-22 NOTE — PROGRESS NOTE ADULT - SUBJECTIVE AND OBJECTIVE BOX
CC:Patient is a 39y old  Male who presents with a chief complaint of parastomal hernia  (2025 06:52)      Subjective:  Pt seen and examined at bedside with chaperone. Pt is AAOx3, pt in no acute distress.  Patient tolerating diet,  (+) void, (+) ambulation, ostomy is fucntioning   Pt denied c/o fever, chills, chest pain, SOB, abd pain, N/V/D, extremity pain or dysfunction, hemoptysis, hematemesis, hematuria, hematochezia, headache, diplopia, vertigo, dizziness.           Physical exam:  Pt is aaox3  Pt in no acute distress  Psych: normal affect  Resp: CTAB  CVS: S1S2(+)  ABD:  soft, ostomy with ga, stoma edematous and pink, non distended, no rebound, no guarding, no rigidity, no skin changes to exam. Incision site is clean, dry, intact, no cellulitis, no d/c, no purulence, no fluctuance. (+) appropriate incisional tenderness to exam  EXT: no calf tenderness or edema to exam b/l, on VTE prophylaxis  Skin: no adverse skin changes to exam      Vitals:  T(C): 36.4 ( @ 23:54), Max: 36.7 ( @ 08:39)  HR: 57 ( @ 02:02) (57 - 88)  BP: 127/73 ( @ 02:02) (117/81 - 139/76)  RR: 16 ( @ 23:54) (16 - 18)  SpO2: 98% ( @ 23:54) (98% - 100%)     @ 07:01  -   @ 07:00  --------------------------------------------------------  IN:    Lactated Ringers: 1288 mL    Other (mL): 1000 mL  Total IN: 2288 mL    OUT:  Total OUT: 0 mL    Total NET: 2288 mL       @ 07:01  -   @ 06:51  --------------------------------------------------------  IN:    Oral Fluid: 420 mL  Total IN: 420 mL    OUT:  Total OUT: 0 mL    Total NET: 420 mL             @ 07:01                    13.5  CBC: 11.31>)-------(<234                     40.8                 133 | 102 | 6    CMP:  ----------------------< 177               4.2 | 27 | 0.74                      Ca:9.1  Phos:4.1  M.9               0.3|      |12        LFTs:  ------|66|-----             -|      |-      Urinalysis with Rflx Culture (collected 25 @ 02:36)      Current Inpatient Medications:  acetaminophen     Tablet .. 975 milliGRAM(s) Oral every 6 hours  dextrose 5%. 1000 milliLiter(s) (50 mL/Hr) IV Continuous <Continuous>  dextrose 5%. 1000 milliLiter(s) (100 mL/Hr) IV Continuous <Continuous>  dextrose 50% Injectable 25 Gram(s) IV Push once  dextrose 50% Injectable 12.5 Gram(s) IV Push once  dextrose 50% Injectable 25 Gram(s) IV Push once  dextrose Oral Gel 15 Gram(s) Oral once PRN  glucagon  Injectable 1 milliGRAM(s) IntraMuscular once  HYDROmorphone   Tablet 4 milliGRAM(s) Oral every 4 hours PRN  HYDROmorphone  Injectable 0.25 milliGRAM(s) IV Push every 4 hours PRN  ibuprofen  Tablet. 600 milliGRAM(s) Oral every 6 hours  insulin lispro (ADMELOG) corrective regimen sliding scale   SubCutaneous Before meals and at bedtime  lidocaine   4% Patch 1 Patch Transdermal every 24 hours  ondansetron Injectable 4 milliGRAM(s) IV Push every 6 hours PRN  pantoprazole    Tablet 40 milliGRAM(s) Oral before breakfast  senna 2 Tablet(s) Oral at bedtime

## 2025-02-23 VITALS
DIASTOLIC BLOOD PRESSURE: 79 MMHG | SYSTOLIC BLOOD PRESSURE: 100 MMHG | OXYGEN SATURATION: 97 % | TEMPERATURE: 97 F | HEART RATE: 55 BPM | RESPIRATION RATE: 16 BRPM

## 2025-02-23 LAB
ALBUMIN SERPL ELPH-MCNC: 2.8 G/DL — LOW (ref 3.3–5)
ALP SERPL-CCNC: 66 U/L — SIGNIFICANT CHANGE UP (ref 40–120)
ALT FLD-CCNC: 19 U/L — SIGNIFICANT CHANGE UP (ref 12–78)
ANION GAP SERPL CALC-SCNC: 5 MMOL/L — SIGNIFICANT CHANGE UP (ref 5–17)
AST SERPL-CCNC: 12 U/L — LOW (ref 15–37)
BILIRUB SERPL-MCNC: 0.2 MG/DL — SIGNIFICANT CHANGE UP (ref 0.2–1.2)
BUN SERPL-MCNC: 17 MG/DL — SIGNIFICANT CHANGE UP (ref 7–23)
CALCIUM SERPL-MCNC: 9.2 MG/DL — SIGNIFICANT CHANGE UP (ref 8.5–10.1)
CHLORIDE SERPL-SCNC: 102 MMOL/L — SIGNIFICANT CHANGE UP (ref 96–108)
CO2 SERPL-SCNC: 25 MMOL/L — SIGNIFICANT CHANGE UP (ref 22–31)
CREAT SERPL-MCNC: 0.7 MG/DL — SIGNIFICANT CHANGE UP (ref 0.5–1.3)
EGFR: 120 ML/MIN/1.73M2 — SIGNIFICANT CHANGE UP
GLUCOSE BLDC GLUCOMTR-MCNC: 123 MG/DL — HIGH (ref 70–99)
GLUCOSE SERPL-MCNC: 121 MG/DL — HIGH (ref 70–99)
HCT VFR BLD CALC: 42.3 % — SIGNIFICANT CHANGE UP (ref 39–50)
HGB BLD-MCNC: 14 G/DL — SIGNIFICANT CHANGE UP (ref 13–17)
MAGNESIUM SERPL-MCNC: 2.1 MG/DL — SIGNIFICANT CHANGE UP (ref 1.6–2.6)
MCHC RBC-ENTMCNC: 33.1 G/DL — SIGNIFICANT CHANGE UP (ref 32–36)
MCHC RBC-ENTMCNC: 33.5 PG — SIGNIFICANT CHANGE UP (ref 27–34)
MCV RBC AUTO: 101.2 FL — HIGH (ref 80–100)
NRBC # BLD AUTO: 0 K/UL — SIGNIFICANT CHANGE UP (ref 0–0)
NRBC # FLD: 0 K/UL — SIGNIFICANT CHANGE UP (ref 0–0)
NRBC BLD AUTO-RTO: 0 /100 WBCS — SIGNIFICANT CHANGE UP (ref 0–0)
PHOSPHATE SERPL-MCNC: 3.8 MG/DL — SIGNIFICANT CHANGE UP (ref 2.5–4.5)
PLATELET # BLD AUTO: 249 K/UL — SIGNIFICANT CHANGE UP (ref 150–400)
PMV BLD: 8.9 FL — SIGNIFICANT CHANGE UP (ref 7–13)
POTASSIUM SERPL-MCNC: 4 MMOL/L — SIGNIFICANT CHANGE UP (ref 3.5–5.3)
POTASSIUM SERPL-SCNC: 4 MMOL/L — SIGNIFICANT CHANGE UP (ref 3.5–5.3)
PROT SERPL-MCNC: 6.7 GM/DL — SIGNIFICANT CHANGE UP (ref 6–8.3)
RBC # BLD: 4.18 M/UL — LOW (ref 4.2–5.8)
RBC # FLD: 15.3 % — HIGH (ref 10.3–14.5)
SODIUM SERPL-SCNC: 132 MMOL/L — LOW (ref 135–145)
WBC # BLD: 8.55 K/UL — SIGNIFICANT CHANGE UP (ref 3.8–10.5)
WBC # FLD AUTO: 8.55 K/UL — SIGNIFICANT CHANGE UP (ref 3.8–10.5)

## 2025-02-23 PROCEDURE — 99238 HOSP IP/OBS DSCHRG MGMT 30/<: CPT | Mod: GC

## 2025-02-23 RX ORDER — IBUPROFEN 200 MG
1 TABLET ORAL
Qty: 0 | Refills: 0 | DISCHARGE
Start: 2025-02-23

## 2025-02-23 RX ORDER — NALOXONE HYDROCHLORIDE 0.4 MG/ML
1 INJECTION, SOLUTION INTRAMUSCULAR; INTRAVENOUS; SUBCUTANEOUS
Qty: 1 | Refills: 0
Start: 2025-02-23

## 2025-02-23 RX ADMIN — Medication 4 MILLIGRAM(S): at 13:44

## 2025-02-23 RX ADMIN — Medication 4 MILLIGRAM(S): at 12:47

## 2025-02-23 RX ADMIN — GABAPENTIN 200 MILLIGRAM(S): 400 CAPSULE ORAL at 09:37

## 2025-02-23 RX ADMIN — Medication 975 MILLIGRAM(S): at 06:21

## 2025-02-23 RX ADMIN — CYCLOBENZAPRINE HYDROCHLORIDE 5 MILLIGRAM(S): 15 CAPSULE, EXTENDED RELEASE ORAL at 09:38

## 2025-02-23 RX ADMIN — Medication 4 MILLIGRAM(S): at 07:01

## 2025-02-23 RX ADMIN — Medication 4 MILLIGRAM(S): at 06:21

## 2025-02-23 RX ADMIN — Medication 975 MILLIGRAM(S): at 00:00

## 2025-02-23 RX ADMIN — Medication 600 MILLIGRAM(S): at 12:44

## 2025-02-23 RX ADMIN — Medication 600 MILLIGRAM(S): at 07:01

## 2025-02-23 RX ADMIN — Medication 40 MILLIGRAM(S): at 06:21

## 2025-02-23 RX ADMIN — Medication 600 MILLIGRAM(S): at 00:00

## 2025-02-23 RX ADMIN — Medication 600 MILLIGRAM(S): at 13:44

## 2025-02-23 RX ADMIN — Medication 600 MILLIGRAM(S): at 06:20

## 2025-02-23 RX ADMIN — Medication 975 MILLIGRAM(S): at 07:01

## 2025-02-23 RX ADMIN — LIDOCAINE HYDROCHLORIDE 1 PATCH: 20 JELLY TOPICAL at 06:21

## 2025-02-23 NOTE — CONSULT NOTE ADULT - SUBJECTIVE AND OBJECTIVE BOX
HPI:  39M with pmhx Crohns disease with extensive anorectal fistulization s/p multiple interventions including diverting ileostomy done at [facility], EUA, I&D and seton placement multiple times, most recently B/L seton placement at [place] now admitted to general surgery team after ostomy hernia repair.   Colorectal surgery is consulted to assess patient's fistula  He reports he has been following w different surgeon, still has pain and drainage in the area, Seton was place recently which he removed it.   no N/V, tolerating diet and ostomy is functioning  no fever no chills, no leukocytosis     Vitals:  T(C): 36.3 ( @ 08:43), Max: 36.6 ( @ 01:55)  HR: 55 ( @ 08:43) (55 - 89)  BP: 100/79 ( @ 08:43) (100/79 - 123/75)  RR: 16 ( @ 08:43) (16 - 18)  SpO2: 97% ( @ 08:43) (97% - 100%)     @ 07:01  -   @ 07:00  --------------------------------------------------------  IN:  Total IN: 0 mL    OUT:    Ileostomy (mL): 500 mL  Total OUT: 500 mL    Total NET: -500 mL       @ 07:01  -   @ 15:24  --------------------------------------------------------  IN:  Total IN: 0 mL    OUT:    Ileostomy (mL): 300 mL  Total OUT: 300 mL    Total NET: -300 mL          Physical Exam:  General: AAOx3, Well developed, NAD  Chest: Normal respiratory effort  Heart: RRR  Abdomen: Soft, NT ND osotmy w function  rectal exam: no active drainage no fluctuance multiple opening, no seton   Skin: Normal, no rashes, no lesions noted.   Extremities: Warm, well perfused, no edema, Pulses intact     @ 07:54                    14.0  CBC: 8.55>)-------(<249                     42.3                 132 | 102 | 17    CMP:  ----------------------< 121               4.0 | 25 | 0.70                      Ca:9.2  Phos:3.8  M.1               0.2|      |12        LFTs:  ------|66|-----             -|      |-   @ 06:38                    13.0  CBC: 9.75>)-------(<235                     39.4                 136 | 101 | 13    CMP:  ----------------------< 140               4.0 | 29 | 1.04                      Ca:8.9  Phos:3.6  M.9               0.2|      |8        LFTs:  ------|47|-----             -|      |-      Urinalysis with Rflx Culture (collected 25 @ 02:36)      Current Inpatient Medications:  acetaminophen     Tablet .. 975 milliGRAM(s) Oral every 6 hours  cyclobenzaprine 5 milliGRAM(s) Oral every 8 hours PRN  dextrose 5%. 1000 milliLiter(s) (50 mL/Hr) IV Continuous <Continuous>  dextrose 5%. 1000 milliLiter(s) (100 mL/Hr) IV Continuous <Continuous>  dextrose 50% Injectable 25 Gram(s) IV Push once  dextrose 50% Injectable 12.5 Gram(s) IV Push once  dextrose 50% Injectable 25 Gram(s) IV Push once  dextrose Oral Gel 15 Gram(s) Oral once PRN  gabapentin 200 milliGRAM(s) Oral two times a day  glucagon  Injectable 1 milliGRAM(s) IntraMuscular once  HYDROmorphone   Tablet 4 milliGRAM(s) Oral every 4 hours PRN  ibuprofen  Tablet. 600 milliGRAM(s) Oral every 6 hours  insulin lispro (ADMELOG) corrective regimen sliding scale   SubCutaneous Before meals and at bedtime  lidocaine   4% Patch 1 Patch Transdermal every 24 hours  ondansetron Injectable 4 milliGRAM(s) IV Push every 6 hours PRN  pantoprazole    Tablet 40 milliGRAM(s) Oral before breakfast  senna 2 Tablet(s) Oral at bedtime      < from: CT Abdomen and Pelvis w/ Oral Cont and w/ IV Cont (25 @ 00:28) >    ACC: [id number] EXAM:  CT ABDOMEN AND PELVIS OC IC   ORDERED BY: BUFFY GÓMEZ     PROCEDURE DATE:  2025          INTERPRETATION:  CLINICAL INFORMATION: Abdominal pain, history of Crohn's   disease with fistula    COMPARISON: CT abdomen pelvisperformed 2025.    CONTRAST/COMPLICATIONS:  IV Contrast: Omnipaque 350  90 cc administered   0 cc discarded  Oral Contrast: NONE    PROCEDURE:  CT of the Abdomen and Pelvis was performed.  Sagittal and coronal reformats were performed.    FINDINGS:  LOWER CHEST: Within normal limits.    LIVER: Within normal limits.  BILE DUCTS: Normal caliber.  GALLBLADDER: Within normal limits.  SPLEEN: Within normal limits.  PANCREAS: Within normal limits.  ADRENALS: Within normal limits.  KIDNEYS/URETERS:Within normal limits.    BLADDER: Wall thickening of the urinary bladder, correlation for cystitis   recommended.  REPRODUCTIVE ORGANS: Prostate within normal limits.    BOWEL: Stable appearance of complex, hyperenhancing tract of soft tissue   within the perianal and low posterior/rectal soft tissues. No drainable   fluid collection/abscess. Stable appearance of the RLQ ileostomy and   associated parastomal hernia. No bowel obstruction. Appendix is not   visualized. No evidence of inflammation in the pericecal region.  PERITONEUM/RETROPERITONEUM: Within normal limits.  VESSELS: Within normal limits.  LYMPH NODES: No lymphadenopathy.  ABDOMINAL WALL: See above.  BONES: Degenerative changes.    IMPRESSION:  Unchanged RLQ ileostomy and parastomal hernia. No bowel obstruction.    Stable complex perirectal/perianal fistula. No abscess.    Wall thickening of the urinary bladder, correlation for cystitis   recommended.    < end of copied text >   HPI:  39M with pmhx Crohns disease with extensive anorectal fistulization s/p multiple interventions including diverting ileostomy done at [facility], EUA, I&D and seton placement multiple times, most recently B/L seton placement at [place] now admitted to general surgery team after repiar of parastomal hernia   Colorectal surgery is consulted to assess patient's fistula  He reports he has been following w different surgeon, still has pain and drainage in the area, Seton was place recently which he removed it.   no N/V, tolerating diet and ostomy is functioning  no fever no chills, no leukocytosis     Vitals:  T(C): 36.3 ( @ 08:43), Max: 36.6 ( @ 01:55)  HR: 55 ( @ 08:43) (55 - 89)  BP: 100/79 ( @ 08:43) (100/79 - 123/75)  RR: 16 ( @ 08:43) (16 - 18)  SpO2: 97% ( @ 08:43) (97% - 100%)     @ 07:01  -   @ 07:00  --------------------------------------------------------  IN:  Total IN: 0 mL    OUT:    Ileostomy (mL): 500 mL  Total OUT: 500 mL    Total NET: -500 mL       @ 07:01  -   @ 15:24  --------------------------------------------------------  IN:  Total IN: 0 mL    OUT:    Ileostomy (mL): 300 mL  Total OUT: 300 mL    Total NET: -300 mL          Physical Exam:  General: AAOx3, Well developed, NAD  Chest: Normal respiratory effort  Heart: RRR  Abdomen: Soft, NT ND osotmy w function  rectal exam: no active drainage no fluctuance multiple opening, no seton   Skin: Normal, no rashes, no lesions noted.   Extremities: Warm, well perfused, no edema, Pulses intact     @ 07:54                    14.0  CBC: 8.55>)-------(<249                     42.3                 132 | 102 | 17    CMP:  ----------------------< 121               4.0 | 25 | 0.70                      Ca:9.2  Phos:3.8  M.1               0.2|      |12        LFTs:  ------|66|-----             -|      |-   @ 06:38                    13.0  CBC: 9.75>)-------(<235                     39.4                 136 | 101 | 13    CMP:  ----------------------< 140               4.0 | 29 | 1.04                      Ca:8.9  Phos:3.6  M.9               0.2|      |8        LFTs:  ------|47|-----             -|      |-      Urinalysis with Rflx Culture (collected 25 @ 02:36)      Current Inpatient Medications:  acetaminophen     Tablet .. 975 milliGRAM(s) Oral every 6 hours  cyclobenzaprine 5 milliGRAM(s) Oral every 8 hours PRN  dextrose 5%. 1000 milliLiter(s) (50 mL/Hr) IV Continuous <Continuous>  dextrose 5%. 1000 milliLiter(s) (100 mL/Hr) IV Continuous <Continuous>  dextrose 50% Injectable 25 Gram(s) IV Push once  dextrose 50% Injectable 12.5 Gram(s) IV Push once  dextrose 50% Injectable 25 Gram(s) IV Push once  dextrose Oral Gel 15 Gram(s) Oral once PRN  gabapentin 200 milliGRAM(s) Oral two times a day  glucagon  Injectable 1 milliGRAM(s) IntraMuscular once  HYDROmorphone   Tablet 4 milliGRAM(s) Oral every 4 hours PRN  ibuprofen  Tablet. 600 milliGRAM(s) Oral every 6 hours  insulin lispro (ADMELOG) corrective regimen sliding scale   SubCutaneous Before meals and at bedtime  lidocaine   4% Patch 1 Patch Transdermal every 24 hours  ondansetron Injectable 4 milliGRAM(s) IV Push every 6 hours PRN  pantoprazole    Tablet 40 milliGRAM(s) Oral before breakfast  senna 2 Tablet(s) Oral at bedtime      < from: CT Abdomen and Pelvis w/ Oral Cont and w/ IV Cont (25 @ 00:28) >    ACC: [id number] EXAM:  CT ABDOMEN AND PELVIS OC IC   ORDERED BY: BUFFY GÓMEZ     PROCEDURE DATE:  2025          INTERPRETATION:  CLINICAL INFORMATION: Abdominal pain, history of Crohn's   disease with fistula    COMPARISON: CT abdomen pelvisperformed 2025.    CONTRAST/COMPLICATIONS:  IV Contrast: Omnipaque 350  90 cc administered   0 cc discarded  Oral Contrast: NONE    PROCEDURE:  CT of the Abdomen and Pelvis was performed.  Sagittal and coronal reformats were performed.    FINDINGS:  LOWER CHEST: Within normal limits.    LIVER: Within normal limits.  BILE DUCTS: Normal caliber.  GALLBLADDER: Within normal limits.  SPLEEN: Within normal limits.  PANCREAS: Within normal limits.  ADRENALS: Within normal limits.  KIDNEYS/URETERS:Within normal limits.    BLADDER: Wall thickening of the urinary bladder, correlation for cystitis   recommended.  REPRODUCTIVE ORGANS: Prostate within normal limits.    BOWEL: Stable appearance of complex, hyperenhancing tract of soft tissue   within the perianal and low posterior/rectal soft tissues. No drainable   fluid collection/abscess. Stable appearance of the RLQ ileostomy and   associated parastomal hernia. No bowel obstruction. Appendix is not   visualized. No evidence of inflammation in the pericecal region.  PERITONEUM/RETROPERITONEUM: Within normal limits.  VESSELS: Within normal limits.  LYMPH NODES: No lymphadenopathy.  ABDOMINAL WALL: See above.  BONES: Degenerative changes.    IMPRESSION:  Unchanged RLQ ileostomy and parastomal hernia. No bowel obstruction.    Stable complex perirectal/perianal fistula. No abscess.    Wall thickening of the urinary bladder, correlation for cystitis   recommended.    < end of copied text >

## 2025-02-23 NOTE — CONSULT NOTE ADULT - ASSESSMENT
39M with pmhx Crohns disease with extensive anorectal fistulization s/p multiple interventions including diverting ileostomy done at [facility], EUA, I&D and seton placement multiple times, most recently B/L seton placement at [place] now admitted to general surgery team after ostomy hernia repair.   CT w Stable complex perirectal/perianal fistula. No abscess.    Recommendations:   - No acute colorectal surgery intervention indicated at this time   - Patient advised to follow up with primary colorectal surgeon and GI for management of Crohns Disease     39M with pmhx Crohns disease with extensive anorectal fistulization s/p multiple interventions including diverting ileostomy done at [facility], EUA, I&D and seton placement multiple times, most recently B/L seton placement at [place] now admitted to general surgery team after  repiar of parastomal hernia   CT w Stable complex perirectal/perianal fistula. No abscess.    Recommendations:   - No acute colorectal surgery intervention indicated at this time   - Patient advised to follow up with primary colorectal surgeon and GI for management of Crohns Disease

## 2025-02-23 NOTE — PROGRESS NOTE ADULT - SUBJECTIVE AND OBJECTIVE BOX
SURGERY DAILY PROGRESS NOTE:     Subjective:  Pt seen and examined at bedside with chaperone. Pt is AAOx3, pt in no acute distress.  Patient tolerating diet,  (+) void, (+) ambulation, ostomy is fucntioning   Pt denied c/o fever, chills, chest pain, SOB, abd pain, N/V/D, extremity pain or dysfunction, hemoptysis, hematemesis, hematuria, hematochezia, headache, diplopia, vertigo, dizziness.       Objective:    MEDICATIONS  (STANDING):  acetaminophen     Tablet .. 975 milliGRAM(s) Oral every 6 hours  dextrose 5%. 1000 milliLiter(s) (50 mL/Hr) IV Continuous <Continuous>  dextrose 5%. 1000 milliLiter(s) (100 mL/Hr) IV Continuous <Continuous>  dextrose 50% Injectable 25 Gram(s) IV Push once  dextrose 50% Injectable 12.5 Gram(s) IV Push once  dextrose 50% Injectable 25 Gram(s) IV Push once  gabapentin 200 milliGRAM(s) Oral two times a day  glucagon  Injectable 1 milliGRAM(s) IntraMuscular once  ibuprofen  Tablet. 600 milliGRAM(s) Oral every 6 hours  insulin lispro (ADMELOG) corrective regimen sliding scale   SubCutaneous Before meals and at bedtime  lidocaine   4% Patch 1 Patch Transdermal every 24 hours  pantoprazole    Tablet 40 milliGRAM(s) Oral before breakfast  senna 2 Tablet(s) Oral at bedtime    MEDICATIONS  (PRN):  cyclobenzaprine 5 milliGRAM(s) Oral every 8 hours PRN Muscle Spasm  dextrose Oral Gel 15 Gram(s) Oral once PRN Blood Glucose LESS THAN 70 milliGRAM(s)/deciliter  HYDROmorphone   Tablet 4 milliGRAM(s) Oral every 4 hours PRN Severe Pain (7 - 10)  ondansetron Injectable 4 milliGRAM(s) IV Push every 6 hours PRN Nausea      Vital Signs Last 24 Hrs  T(C): 36.6 (23 Feb 2025 01:55), Max: 36.7 (22 Feb 2025 14:24)  T(F): 97.8 (23 Feb 2025 01:55), Max: 98.1 (22 Feb 2025 14:24)  HR: 62 (23 Feb 2025 01:55) (61 - 89)  BP: 123/75 (23 Feb 2025 01:55) (106/72 - 123/75)  BP(mean): --  RR: 18 (23 Feb 2025 01:55) (16 - 18)  SpO2: 99% (23 Feb 2025 01:55) (97% - 100%)    Parameters below as of 23 Feb 2025 01:55  Patient On (Oxygen Delivery Method): room air          PHYSICAL EXAM   Pt is aaox3  Pt in no acute distress  Psych: normal affect  Resp: CTAB  CVS: S1S2(+)  ABD:  soft, ostomy with ga, stoma edematous and pink, non distended, no rebound, no guarding, no rigidity, no skin changes to exam. Incision site is clean, dry, intact, no cellulitis, no d/c, no purulence, no fluctuance. (+) appropriate incisional tenderness to exam  EXT: no calf tenderness or edema to exam b/l, on VTE prophylaxis  Skin: no adverse skin changes to exam    I&O's Detail    21 Feb 2025 07:01  -  22 Feb 2025 07:00  --------------------------------------------------------  IN:    Oral Fluid: 420 mL  Total IN: 420 mL    OUT:  Total OUT: 0 mL    Total NET: 420 mL      22 Feb 2025 07:01  -  23 Feb 2025 05:26  --------------------------------------------------------  IN:  Total IN: 0 mL    OUT:    Ileostomy (mL): 500 mL  Total OUT: 500 mL    Total NET: -500 mL          Daily     Daily     LABS:                        13.0   9.75  )-----------( 235      ( 22 Feb 2025 06:38 )             39.4     02-22    136  |  101  |  13  ----------------------------<  140[H]  4.0   |  29  |  1.04    Ca    8.9      22 Feb 2025 06:38  Phos  3.6     02-22  Mg     1.9     02-22    TPro  6.0  /  Alb  2.7[L]  /  TBili  0.2  /  DBili  x   /  AST  8[L]  /  ALT  18  /  AlkPhos  47  02-22      Urinalysis Basic - ( 22 Feb 2025 06:38 )    Color: x / Appearance: x / SG: x / pH: x  Gluc: 140 mg/dL / Ketone: x  / Bili: x / Urobili: x   Blood: x / Protein: x / Nitrite: x   Leuk Esterase: x / RBC: x / WBC x   Sq Epi: x / Non Sq Epi: x / Bacteria: x        RADIOLOGY & ADDITIONAL STUDIES:    ASSESSMENT/PLAN:

## 2025-02-23 NOTE — PROGRESS NOTE ADULT - ASSESSMENT
40 yo M with parastomal hernia s/p reduction of incarcerated parastomal hernia on 1.26.25  POD 1 laparoscopic parastomal hernia repair with mesh    - pain regimen prn  - continue to monitor vitals  - encourage IS use  - regular diet  - GI PPI ppx  - zofran prn   - continue to monitor I/Os  - daily labs, replete lytes  - Lovenox dvt ppx  - PT eval  - SW /CM for dispo  - dw attending 
40 yo M with parastomal hernia s/p reduction of incarcerated parastomal hernia on 1.26.25  POD 3 laparoscopic parastomal hernia repair with mesh    Plan:  - pain regimen non opioid standing, low dose PO opoid prn  - continue to monitor vitals  - encourage IS use  - regular diet  - GI PPI ppx  - zofran prn   - continue to monitor I/Os  - daily labs, replete lytes  - Lovenox dvt ppx  - PT eval  - dispo today 
40 yo M with parastomal hernia s/p reduction of incarcerated parastomal hernia on 1.26.25  Patient is scheduled to undergo hernia repair with mesh today     - pain regimen prn  - continue to monitor vitals  - encourage IS use  - npo for OR today   - GI PPI ppx  - zofran prn   - continue to monitor I/Os  - daily labs, replete lytes  - hold chem DVT ppx at this time   - SCDs  - continue IVF while NPO  - active T&S   - dw attending 
40 yo M with parastomal hernia s/p reduction of incarcerated parastomal hernia on 1.26.25  POD 2 laparoscopic parastomal hernia repair with mesh    Plan:  - pain regimen prn  - continue to monitor vitals  - encourage IS use  - regular diet  - GI PPI ppx  - zofran prn   - continue to monitor I/Os  - daily labs, replete lytes  - Lovenox dvt ppx  - PT eval  - dispo today

## 2025-02-23 NOTE — DISCHARGE NOTE NURSING/CASE MANAGEMENT/SOCIAL WORK - FINANCIAL ASSISTANCE
[facility] provides services at a reduced cost to those who are determined to be eligible through [facility]’s financial assistance program. Information regarding [facility]’s financial assistance program can be found by going to [email or web address] or by calling [phone].

## 2025-02-23 NOTE — PROGRESS NOTE ADULT - ATTENDING COMMENTS
A/P:  S/P parastomal hernia repair  Ostomy is viable/patent/functional with stool and gas  Known chronic perianal complex fistula, crohns disease  Pending colorectal eval  Cont currnet care and meds  Pt clinically stable from general surgical standpoint

## 2025-02-23 NOTE — DISCHARGE NOTE NURSING/CASE MANAGEMENT/SOCIAL WORK - PATIENT PORTAL LINK FT
You can access the FollowMyHealth Patient Portal offered by [facility] by registering at the following website: [email or web address]. By joining [employer]’s FollowMyHealth portal, you will also be able to view your health information using other applications (apps) compatible with our system.

## 2025-02-25 ENCOUNTER — EMERGENCY (EMERGENCY)
Facility: HOSPITAL | Age: 40
LOS: 0 days | Discharge: HOME | End: 2025-02-25
Attending: EMERGENCY MEDICINE
Payer: MEDICAID

## 2025-02-25 VITALS
OXYGEN SATURATION: 96 % | HEART RATE: 74 BPM | DIASTOLIC BLOOD PRESSURE: 76 MMHG | RESPIRATION RATE: 16 BRPM | SYSTOLIC BLOOD PRESSURE: 108 MMHG

## 2025-02-25 VITALS — HEIGHT: 69 IN

## 2025-02-25 DIAGNOSIS — Z87.81 PERSONAL HISTORY OF (HEALED) TRAUMATIC FRACTURE: Chronic | ICD-10-CM

## 2025-02-25 DIAGNOSIS — K60.3 ANAL FISTULA: Chronic | ICD-10-CM

## 2025-02-25 DIAGNOSIS — Z88.1 ALLERGY STATUS TO OTHER ANTIBIOTIC AGENTS: ICD-10-CM

## 2025-02-25 DIAGNOSIS — Z93.3 COLOSTOMY STATUS: ICD-10-CM

## 2025-02-25 DIAGNOSIS — K62.89 OTHER SPECIFIED DISEASES OF ANUS AND RECTUM: ICD-10-CM

## 2025-02-25 DIAGNOSIS — K50.90 CROHN'S DISEASE, UNSPECIFIED, WITHOUT COMPLICATIONS: ICD-10-CM

## 2025-02-25 DIAGNOSIS — K61.1 RECTAL ABSCESS: ICD-10-CM

## 2025-02-25 DIAGNOSIS — Z98.89 OTHER SPECIFIED POSTPROCEDURAL STATES: Chronic | ICD-10-CM

## 2025-02-25 DIAGNOSIS — E11.9 TYPE 2 DIABETES MELLITUS WITHOUT COMPLICATIONS: ICD-10-CM

## 2025-02-25 DIAGNOSIS — Z87.19 PERSONAL HISTORY OF OTHER DISEASES OF THE DIGESTIVE SYSTEM: ICD-10-CM

## 2025-02-25 DIAGNOSIS — Z93.3 COLOSTOMY STATUS: Chronic | ICD-10-CM

## 2025-02-25 DIAGNOSIS — Z96.7 PRESENCE OF OTHER BONE AND TENDON IMPLANTS: Chronic | ICD-10-CM

## 2025-02-25 LAB
ALBUMIN SERPL ELPH-MCNC: 3.6 G/DL — SIGNIFICANT CHANGE UP (ref 3.3–5)
ALP SERPL-CCNC: 88 U/L — SIGNIFICANT CHANGE UP (ref 40–120)
ALT FLD-CCNC: 33 U/L — SIGNIFICANT CHANGE UP (ref 12–78)
ANION GAP SERPL CALC-SCNC: 2 MMOL/L — LOW (ref 5–17)
APTT BLD: 30.6 SEC — SIGNIFICANT CHANGE UP (ref 24.5–35.6)
AST SERPL-CCNC: 21 U/L — SIGNIFICANT CHANGE UP (ref 15–37)
BASOPHILS # BLD AUTO: 0.05 K/UL — SIGNIFICANT CHANGE UP (ref 0–0.2)
BASOPHILS NFR BLD AUTO: 0.5 % — SIGNIFICANT CHANGE UP (ref 0–2)
BILIRUB SERPL-MCNC: 0.3 MG/DL — SIGNIFICANT CHANGE UP (ref 0.2–1.2)
BUN SERPL-MCNC: 13 MG/DL — SIGNIFICANT CHANGE UP (ref 7–23)
CALCIUM SERPL-MCNC: 10.3 MG/DL — HIGH (ref 8.5–10.1)
CHLORIDE SERPL-SCNC: 102 MMOL/L — SIGNIFICANT CHANGE UP (ref 96–108)
CO2 SERPL-SCNC: 28 MMOL/L — SIGNIFICANT CHANGE UP (ref 22–31)
CREAT SERPL-MCNC: 0.88 MG/DL — SIGNIFICANT CHANGE UP (ref 0.5–1.3)
EGFR: 112 ML/MIN/1.73M2 — SIGNIFICANT CHANGE UP
EOSINOPHIL # BLD AUTO: 0.1 K/UL — SIGNIFICANT CHANGE UP (ref 0–0.5)
EOSINOPHIL NFR BLD AUTO: 1 % — SIGNIFICANT CHANGE UP (ref 0–6)
GLUCOSE SERPL-MCNC: 164 MG/DL — HIGH (ref 70–99)
HCT VFR BLD CALC: 50.3 % — HIGH (ref 39–50)
HGB BLD-MCNC: 16.8 G/DL — SIGNIFICANT CHANGE UP (ref 13–17)
IMM GRANULOCYTES # BLD AUTO: 0.06 K/UL — SIGNIFICANT CHANGE UP (ref 0–0.07)
IMM GRANULOCYTES NFR BLD AUTO: 0.6 % — SIGNIFICANT CHANGE UP (ref 0–0.9)
INR BLD: 0.9 RATIO — SIGNIFICANT CHANGE UP (ref 0.85–1.16)
LACTATE SERPL-SCNC: 1 MMOL/L — SIGNIFICANT CHANGE UP (ref 0.7–2)
LIDOCAIN IGE QN: 16 U/L — SIGNIFICANT CHANGE UP (ref 13–75)
LYMPHOCYTES # BLD AUTO: 1.92 K/UL — SIGNIFICANT CHANGE UP (ref 1–3.3)
LYMPHOCYTES NFR BLD AUTO: 18.9 % — SIGNIFICANT CHANGE UP (ref 13–44)
MCHC RBC-ENTMCNC: 33.4 G/DL — SIGNIFICANT CHANGE UP (ref 32–36)
MCHC RBC-ENTMCNC: 33.5 PG — SIGNIFICANT CHANGE UP (ref 27–34)
MCV RBC AUTO: 100.4 FL — HIGH (ref 80–100)
MONOCYTES # BLD AUTO: 1.36 K/UL — HIGH (ref 0–0.9)
MONOCYTES NFR BLD AUTO: 13.4 % — SIGNIFICANT CHANGE UP (ref 2–14)
NEUTROPHILS # BLD AUTO: 6.69 K/UL — SIGNIFICANT CHANGE UP (ref 1.8–7.4)
NEUTROPHILS NFR BLD AUTO: 65.6 % — SIGNIFICANT CHANGE UP (ref 43–77)
NRBC # BLD AUTO: 0 K/UL — SIGNIFICANT CHANGE UP (ref 0–0)
NRBC # FLD: 0 K/UL — SIGNIFICANT CHANGE UP (ref 0–0)
NRBC BLD AUTO-RTO: 0 /100 WBCS — SIGNIFICANT CHANGE UP (ref 0–0)
PLATELET # BLD AUTO: 360 K/UL — SIGNIFICANT CHANGE UP (ref 150–400)
PMV BLD: 8.6 FL — SIGNIFICANT CHANGE UP (ref 7–13)
POTASSIUM SERPL-MCNC: 5.1 MMOL/L — SIGNIFICANT CHANGE UP (ref 3.5–5.3)
POTASSIUM SERPL-SCNC: 5.1 MMOL/L — SIGNIFICANT CHANGE UP (ref 3.5–5.3)
PROT SERPL-MCNC: 8.4 GM/DL — HIGH (ref 6–8.3)
PROTHROM AB SERPL-ACNC: 10.4 SEC — SIGNIFICANT CHANGE UP (ref 9.9–13.4)
RBC # BLD: 5.01 M/UL — SIGNIFICANT CHANGE UP (ref 4.2–5.8)
RBC # FLD: 15.2 % — HIGH (ref 10.3–14.5)
SODIUM SERPL-SCNC: 132 MMOL/L — LOW (ref 135–145)
WBC # BLD: 10.18 K/UL — SIGNIFICANT CHANGE UP (ref 3.8–10.5)
WBC # FLD AUTO: 10.18 K/UL — SIGNIFICANT CHANGE UP (ref 3.8–10.5)

## 2025-02-25 PROCEDURE — 85610 PROTHROMBIN TIME: CPT

## 2025-02-25 PROCEDURE — 96376 TX/PRO/DX INJ SAME DRUG ADON: CPT

## 2025-02-25 PROCEDURE — 85730 THROMBOPLASTIN TIME PARTIAL: CPT

## 2025-02-25 PROCEDURE — 74177 CT ABD & PELVIS W/CONTRAST: CPT | Mod: MC

## 2025-02-25 PROCEDURE — 96375 TX/PRO/DX INJ NEW DRUG ADDON: CPT

## 2025-02-25 PROCEDURE — 83605 ASSAY OF LACTIC ACID: CPT

## 2025-02-25 PROCEDURE — 96374 THER/PROPH/DIAG INJ IV PUSH: CPT | Mod: XU

## 2025-02-25 PROCEDURE — 74177 CT ABD & PELVIS W/CONTRAST: CPT | Mod: 26

## 2025-02-25 PROCEDURE — 87040 BLOOD CULTURE FOR BACTERIA: CPT

## 2025-02-25 PROCEDURE — 36415 COLL VENOUS BLD VENIPUNCTURE: CPT

## 2025-02-25 PROCEDURE — 80053 COMPREHEN METABOLIC PANEL: CPT

## 2025-02-25 PROCEDURE — 83690 ASSAY OF LIPASE: CPT

## 2025-02-25 PROCEDURE — 99285 EMERGENCY DEPT VISIT HI MDM: CPT

## 2025-02-25 PROCEDURE — 85025 COMPLETE CBC W/AUTO DIFF WBC: CPT

## 2025-02-25 PROCEDURE — 99284 EMERGENCY DEPT VISIT MOD MDM: CPT | Mod: 25

## 2025-02-25 RX ORDER — BACTERIOSTATIC SODIUM CHLORIDE 0.9 %
1000 VIAL (ML) INJECTION ONCE
Refills: 0 | Status: COMPLETED | OUTPATIENT
Start: 2025-02-25 | End: 2025-02-25

## 2025-02-25 RX ORDER — PIPERACILLIN SODIUM AND TAZOBACTAM SODIUM 2; 250 G/50ML; MG/50ML
3.38 INJECTION, POWDER, FOR SOLUTION INTRAVENOUS ONCE
Refills: 0 | Status: COMPLETED | OUTPATIENT
Start: 2025-02-25 | End: 2025-02-25

## 2025-02-25 RX ORDER — HYDROMORPHONE HYDROCHLORIDE 4 MG/ML
1 INJECTION, SOLUTION INTRAMUSCULAR; INTRAVENOUS; SUBCUTANEOUS ONCE
Refills: 0 | Status: DISCONTINUED | OUTPATIENT
Start: 2025-02-25 | End: 2025-02-25

## 2025-02-25 RX ORDER — AMOXICILLIN AND CLAVULANATE POTASSIUM 200; 28.5 MG/5ML; MG/5ML
875 POWDER, FOR SUSPENSION ORAL
Qty: 14 | Refills: 0
Start: 2025-02-25

## 2025-02-25 RX ORDER — MORPHINE SULFATE 60 MG/1
4 TABLET, FILM COATED, EXTENDED RELEASE ORAL ONCE
Refills: 0 | Status: DISCONTINUED | OUTPATIENT
Start: 2025-02-25 | End: 2025-02-25

## 2025-02-25 RX ORDER — ONDANSETRON 4 MG/1
4 TABLET, ORALLY DISINTEGRATING ORAL ONCE
Refills: 0 | Status: COMPLETED | OUTPATIENT
Start: 2025-02-25 | End: 2025-02-25

## 2025-02-25 RX ADMIN — HYDROMORPHONE HYDROCHLORIDE 1 MILLIGRAM(S): 4 INJECTION, SOLUTION INTRAMUSCULAR; INTRAVENOUS; SUBCUTANEOUS at 17:22

## 2025-02-25 RX ADMIN — MORPHINE SULFATE 4 MILLIGRAM(S): 60 TABLET, FILM COATED, EXTENDED RELEASE ORAL at 12:22

## 2025-02-25 RX ADMIN — ONDANSETRON 4 MILLIGRAM(S): 4 TABLET, ORALLY DISINTEGRATING ORAL at 14:14

## 2025-02-25 RX ADMIN — Medication 1000 MILLILITER(S): at 12:22

## 2025-02-25 RX ADMIN — MORPHINE SULFATE 4 MILLIGRAM(S): 60 TABLET, FILM COATED, EXTENDED RELEASE ORAL at 14:14

## 2025-02-25 RX ADMIN — ONDANSETRON 4 MILLIGRAM(S): 4 TABLET, ORALLY DISINTEGRATING ORAL at 12:22

## 2025-02-25 RX ADMIN — PIPERACILLIN SODIUM AND TAZOBACTAM SODIUM 200 GRAM(S): 2; 250 INJECTION, POWDER, FOR SOLUTION INTRAVENOUS at 17:22

## 2025-02-25 NOTE — ED STATDOCS - PATIENT PORTAL LINK FT
You can access the FollowMyHealth Patient Portal offered by [facility] by registering at the following website: [email or web address]. By joining [employer]’s FollowMyHealth portal, you will also be able to view your health information using other applications (apps) compatible with our system.

## 2025-02-25 NOTE — CONSULT NOTE ADULT - ASSESSMENT
39M with pmhx Crohns disease with extensive anorectal fistulization s/p multiple interventions including diverting ileostomy done at [facility], EUA, I&D and seton placement multiple times, most recently B/L seton placement at [place] recent admission to general surgery team for repair of parastomal hernia, discharged on Sunday, coming back to ED w abdominal pain and low ostomy output, has been tolerating diet, minimal N/V.   CT w No bowel obstruction. Appendix is not visualized. No evidence of inflammation in the pericecal region. Right lower quadrant ileostomy. Parastomal herniated small bowel noted on prior study of 01/25/2025 has been reduced.    Recommendations:   - no c/f obstruction, ostomy w adequate function> no acute surgical intervention indicated at this time   - follow up as outpatient with Dr Hilario for his postop follow up

## 2025-02-25 NOTE — CONSULT NOTE ADULT - ASSESSMENT
39M with pmhx Crohns disease with extensive anorectal fistulization s/p multiple interventions including diverting ileostomy done at [facility], EUA, I&D and seton placement multiple times, most recently B/L seton placement at [place] recent admission to general surgery team for repair of parastomal hernia, discharged on Sunday, coming back to ED w abdominal pain and low ostomy output, has been tolerating diet, minimal N/V.   CT w chronic finding of fistula and possible small perirectal collection. Patient reports no anal or rectal pain, minimal discharge from area. no fever no chills, no leukocytosis     Recommendations:   - No acute colorectal surgery intervention indicated at this time   - Patient advised to follow up with primary colorectal surgeon and GI for management of Crohns Disease

## 2025-02-25 NOTE — ED STATDOCS - PROVIDER TOKENS
PROVIDER:[TOKEN:[[id number]],FOLLOWUP:[Urgent]],PROVIDER:[TOKEN:[[id number]],FOLLOWUP:[Urgent]]

## 2025-02-25 NOTE — ED STATDOCS - OBJECTIVE STATEMENT
Pt is a 39y male w/ a PMHx of  DM2, pancreatitis, Crohn's disease s/p colostomy, incarcerated parastomal hernia repair on 1/26/25 at  with Dr Hilario who presents to the ED c/o abdominal pain focal to his ostomy site. Patient was recently discharged from [facility] on 02/23 after they repaired his hernia again. Patient presents back with similar symptoms from the 19th of February, states he has little output from his ostomy since being discharged. Endorses inability to sleep d/t pain.

## 2025-02-25 NOTE — ED ADULT TRIAGE NOTE - CHIEF COMPLAINT QUOTE
Pt presents to ED c/o abdominal pain in and around ostomy. Pt states "I had hernia surgery last Thursday, was discharged on Sunday and since yesterday have noticed a lot less output in my ostomy bag."

## 2025-02-25 NOTE — ED ADULT TRIAGE NOTE - HEIGHT IN CM
Consent: Written consent obtained. Risks include but not limited to lid/brow ptosis, bruising, swelling, diplopia, temporary effect, incomplete chemical denervation.
175.26
No

## 2025-02-25 NOTE — ED STATDOCS - CLINICAL SUMMARY MEDICAL DECISION MAKING FREE TEXT BOX
39y male w/ Crohn's disease s/p colostomy and incarcerated parastomal hernia repair who presents to the ED w/ diffuse abdominal pain and TTP. Plan for labs, CT, and likely surgical consult.

## 2025-02-25 NOTE — ED STATDOCS - CARE PROVIDERS DIRECT ADDRESSES
[email or web address]

## 2025-02-25 NOTE — ED STATDOCS - CARE PROVIDER_API CALL
Yasmeen Merino  Colon/Rectal Surgery  [address], [place] [address]  Phone: [phone]  Fax: [phone]  Follow Up Time: Urgent    Sanket Hilario (DO)  Surgery  [address] [place] [address]  Phone: [phone]  Fax: [phone]  Follow Up Time: Urgent

## 2025-02-25 NOTE — ED STATDOCS - CARE PLAN
1 Principal Discharge DX:	Abdominal pain  Secondary Diagnosis:	Crohn's disease  Secondary Diagnosis:	Perirectal inflammation  Secondary Diagnosis:	Perirectal abscess

## 2025-02-25 NOTE — CONSULT NOTE ADULT - SUBJECTIVE AND OBJECTIVE BOX
HPI:  39M with pmhx Crohns disease with extensive anorectal fistulization s/p multiple interventions including diverting ileostomy done at [facility], EUA, I&D and seton placement multiple times, most recently B/L seton placement at [facility] recent admission to general surgery team for repair of parastomal hernia, discharged on Sunday, coming back to ED w abdominal pain and low ostomy output, has been tolerating diet, minimal N/V.   CT w No bowel obstruction. Appendix is not visualized. No evidence of inflammation in the pericecal region. Right lower quadrant ileostomy. Parastomal herniated small bowel noted on prior study of 01/25/2025 has been reduced.          Vitals:  T(C): 36.6 (02-25 @ 15:20), Max: 36.6 (02-25 @ 15:20)  HR: 74 (02-25 @ 15:20) (74 - 124)  BP: 97/60 (02-25 @ 15:20) (97/60 - 114/76)  RR: 16 (02-25 @ 15:20) (14 - 16)  SpO2: 97% (02-25 @ 15:20) (97% - 100%)      Physical Exam:  General: NAD  Chest: Normal respiratory effort  Heart: RRR  Abdomen: Soft, ND, diffuse TTP, ostomy pink and healthy w normal function   Neuro/Psych: No localized deficits. Normal speech, normal tone  Skin: Normal, no rashes, no lesions noted.   Extremities: Warm, well perfused, no edema, Pulses intact    02-25 @ 12:01                    16.8  CBC: 10.18>)-------(<360                     50.3                 132 | 102 | 13    CMP:  ----------------------< 164               5.1 | 28 | 0.88                      Ca:10.3  Phos:-  Mg:-               0.3|      |21        LFTs:  ------|88|-----             -|      |-      Current Inpatient Medications:  HYDROmorphone  Injectable 1 milliGRAM(s) IV Push Once  piperacillin/tazobactam IVPB... 3.375 Gram(s) IV Intermittent once          < from: CT Abdomen and Pelvis w/ Oral Cont and w/ IV Cont (02.25.25 @ 13:13) >    PROCEDURE:  CT of the Abdomen and Pelvis was performed.  Sagittal and coronal reformats were performed.    FINDINGS:  LOWER CHEST: Suspect coronary artery calcification.    LIVER: Mildly enlarged measuring approximately 19 cm.  BILE DUCTS: Normal caliber.  GALLBLADDER: Within normal limits.  SPLEEN: Within normal limits.  PANCREAS: Within normal limits.  ADRENALS: Within normal limits.  KIDNEYS/URETERS: Within normal limits.    BLADDER: Mild mural thickening. Trace perivesicular stranding. Consider   possibility of cystitis in addition to chronic outlet obstruction.  REPRODUCTIVE ORGANS: Prostate is enlarged.    BOWEL: No bowel obstruction. Appendix is not visualized. No evidence of   inflammation in the pericecal region. Right lower quadrant ileostomy.   Parastomal herniated small bowel noted on prior study of 01/25/2025 has   been reduced.    Evidence of perirectal and perianal stranding, right more than left   suggesting perianal fistulae appears to be more pronounced. A small   approximately 1.2 cm sized collection is suspected on the right side.   Correlate clinically.  PERITONEUM/RETROPERITONEUM: Within normal limits.  VESSELS: Within normal limits.  LYMPH NODES: No lymphadenopathy.  ABDOMINAL WALL: Right lower quadrant ileostomy.  BONES: Within normal limits.    IMPRESSION:  Suspect increased perirectal/perianal opacities and development of a   small collection on the right side.  Interval reduction of parastomal small bowel herniation at the level of   right lower quadrant ileostomy in comparison to the prior study of   01/25/2025.    < end of copied text >  
HPI:  39M with pmhx Crohns disease with extensive anorectal fistulization s/p multiple interventions including diverting ileostomy done at [facility], EUA, I&D and seton placement multiple times, most recently B/L seton placement at [facility] recent admission to general surgery team for repair of parastomal hernia, discharged on Sunday, coming back to ED w abdominal pain and low ostomy output, has been tolerating diet, minimal N/V.   CT w chronic finding of fistula and possible small perirectal collection. Patient reports no anal or rectal pain, minimal discharge from area. no fever no chills, no leukocytosis         Vitals:  T(C): 36.6 (02-25 @ 15:20), Max: 36.6 (02-25 @ 15:20)  HR: 74 (02-25 @ 15:20) (74 - 124)  BP: 97/60 (02-25 @ 15:20) (97/60 - 114/76)  RR: 16 (02-25 @ 15:20) (14 - 16)  SpO2: 97% (02-25 @ 15:20) (97% - 100%)      Physical Exam:  General: NAD  Chest: Normal respiratory effort  Heart: RRR  Abdomen: Soft, ND, diffuse TTP, ostomy pink and healthy w normal function   rectal exam: no active drainage no fluctuance multiple opening, no seton   Neuro/Psych: No localized deficits. Normal speech, normal tone  Skin: Normal, no rashes, no lesions noted.   Extremities: Warm, well perfused, no edema, Pulses intact    02-25 @ 12:01                    16.8  CBC: 10.18>)-------(<360                     50.3                 132 | 102 | 13    CMP:  ----------------------< 164               5.1 | 28 | 0.88                      Ca:10.3  Phos:-  Mg:-               0.3|      |21        LFTs:  ------|88|-----             -|      |-      Current Inpatient Medications:  HYDROmorphone  Injectable 1 milliGRAM(s) IV Push Once  piperacillin/tazobactam IVPB... 3.375 Gram(s) IV Intermittent once          < from: CT Abdomen and Pelvis w/ Oral Cont and w/ IV Cont (02.25.25 @ 13:13) >    PROCEDURE:  CT of the Abdomen and Pelvis was performed.  Sagittal and coronal reformats were performed.    FINDINGS:  LOWER CHEST: Suspect coronary artery calcification.    LIVER: Mildly enlarged measuring approximately 19 cm.  BILE DUCTS: Normal caliber.  GALLBLADDER: Within normal limits.  SPLEEN: Within normal limits.  PANCREAS: Within normal limits.  ADRENALS: Within normal limits.  KIDNEYS/URETERS: Within normal limits.    BLADDER: Mild mural thickening. Trace perivesicular stranding. Consider   possibility of cystitis in addition to chronic outlet obstruction.  REPRODUCTIVE ORGANS: Prostate is enlarged.    BOWEL: No bowel obstruction. Appendix is not visualized. No evidence of   inflammation in the pericecal region. Right lower quadrant ileostomy.   Parastomal herniated small bowel noted on prior study of 01/25/2025 has   been reduced.    Evidence of perirectal and perianal stranding, right more than left   suggesting perianal fistulae appears to be more pronounced. A small   approximately 1.2 cm sized collection is suspected on the right side.   Correlate clinically.  PERITONEUM/RETROPERITONEUM: Within normal limits.  VESSELS: Within normal limits.  LYMPH NODES: No lymphadenopathy.  ABDOMINAL WALL: Right lower quadrant ileostomy.  BONES: Within normal limits.    IMPRESSION:  Suspect increased perirectal/perianal opacities and development of a   small collection on the right side.  Interval reduction of parastomal small bowel herniation at the level of   right lower quadrant ileostomy in comparison to the prior study of   01/25/2025.    < end of copied text >

## 2025-02-25 NOTE — ED STATDOCS - NSFOLLOWUPINSTRUCTIONS_ED_ALL_ED_FT
A rectal abscess is a pus-filled pocket that forms in your rectum. The abscess is caused by a bacterial infection.    WHILE YOU ARE HERE:    Informed consent is a legal document that explains the tests, treatments, or procedures that you may need. Informed consent means you understand what will be done and can make decisions about what you want. You give your permission when you sign the consent form. You can have someone sign this form for you if you are not able to sign it. You have the right to understand your medical care in words you know. Before you sign the consent form, understand the risks and benefits of what will be done. Make sure all your questions are answered.    Medicines:    Antibiotics may be given to fight a bacterial infection.    Pain medicine may be given. Do not wait until your pain is severe to ask for more medicine.    Bowel movement softeners may be given to help prevent straining when you have a bowel movement.  Tests:    Blood tests may show signs of a bacterial infection or other cause of the abscess.    A manual rectal exam is done so your provider can feel for lumps or other problems. Your provider will insert a gloved finger into your rectum through your anus.    Anoscopy is a procedure used to examine your rectum. Your provider will insert an anoscope (speculum) into your rectum. A tissue sample may be taken to be tested.    CT, MRI, or ultrasound pictures may show the abscess. The pictures may also show damage to the rectum.  Treatment:    Incision and drainage is a procedure used to help pus and fluid drain from the abscess. If the abscess is not severe, you may get local anesthesia to numb the area. You will be awake during the procedure, but you should not feel any pain. If your abscess is deeper into the lining, you may need general anesthesia to keep you asleep and free from pain.    Surgery may be needed to repair damage to your rectum or a fistula (tear in the lining of your rectum).    Sitz baths may help reduce pain after the abscess is drained or you have surgery. A sitz bath is a portable tub that fits into the toilet basin. You will stay in the sitz bath or tub for 15 to 20 minutes.  RISKS:    You may develop a fistula (tear in the lining of your rectum). In women, the fistula may create a tunnel between the rectum and vagina. You may have trouble controlling bowel movements. You may develop another abscess, even after treatment. You may also develop a life-threatening infection that leads to tissue death or sepsis (blood infection).    CARE AGREEMENT:    You have the right to help plan your care. Learn about your health condition and how it may be treated. Discuss treatment options with your healthcare providers to decide what care you want to receive. You always have the right to refuse treatment.

## 2025-02-25 NOTE — ED STATDOCS - PROGRESS NOTE DETAILS
PA note: Patient seen by colorectal surgery resident who also spoke with dr. Hilario. Patient has known ray-rectal inflammation due to Crohns and no I&D is indicated as per surg resident and ok to [place] home as per dr. Hilario. All labwork/radiology results discussed in detail with patient. Patient re-examined and re-evaluated. Patient feels much better at this time. ED evaluation, Diagnosis and management discussed with the patient in detail. Workup results discussed with ED attending, OK to [place] home. Close colorectal and dr. Hilario f/u encouraged, aftercare to assist with scheduling appointment ASAP. Strict ED return instructions discussed in detail and patient given the opportunity to ask any questions about their discharge diagnosis and instructions. Patient verbalized understanding. ~ Jackson Dillon PA-C PA: Patient is a 39y male with PMHx of  DM2, pancreatitis, Crohn's disease s/p colostomy, incarcerated parastomal hernia repair on 1/26/25 at  with Dr Hilario who presents to [facility] c/o abdominal pain around his ostomy site. Patient was recently discharged from [facility] on 02/23 after dr. Hilario repaired his hernia again. Patient presents back with similar symptoms from the 19th of February, states he has little output from his ostomy since being discharged. . ~Jackson Dillon PA-C ct results discussed with colorectal surgery resident. Will see patient in ED. ~Jackson Dillon PA-C

## 2025-02-27 ENCOUNTER — EMERGENCY (EMERGENCY)
Facility: HOSPITAL | Age: 40
LOS: 0 days | Discharge: HOME | End: 2025-02-27
Attending: EMERGENCY MEDICINE

## 2025-02-27 VITALS
OXYGEN SATURATION: 98 % | RESPIRATION RATE: 20 BRPM | HEART RATE: 100 BPM | SYSTOLIC BLOOD PRESSURE: 118 MMHG | DIASTOLIC BLOOD PRESSURE: 88 MMHG | TEMPERATURE: 99 F

## 2025-02-27 VITALS — HEIGHT: 69 IN

## 2025-02-27 DIAGNOSIS — K85.90 ACUTE PANCREATITIS WITHOUT NECROSIS OR INFECTION, UNSPECIFIED: ICD-10-CM

## 2025-02-27 DIAGNOSIS — K59.00 CONSTIPATION, UNSPECIFIED: ICD-10-CM

## 2025-02-27 DIAGNOSIS — Z93.3 COLOSTOMY STATUS: Chronic | ICD-10-CM

## 2025-02-27 DIAGNOSIS — Z98.89 OTHER SPECIFIED POSTPROCEDURAL STATES: Chronic | ICD-10-CM

## 2025-02-27 DIAGNOSIS — K60.40 RECTAL FISTULA, UNSPECIFIED: ICD-10-CM

## 2025-02-27 DIAGNOSIS — K60.3 ANAL FISTULA: Chronic | ICD-10-CM

## 2025-02-27 DIAGNOSIS — Z93.3 COLOSTOMY STATUS: ICD-10-CM

## 2025-02-27 DIAGNOSIS — D72.829 ELEVATED WHITE BLOOD CELL COUNT, UNSPECIFIED: ICD-10-CM

## 2025-02-27 DIAGNOSIS — K60.50 ANORECTAL FISTULA, UNSPECIFIED: ICD-10-CM

## 2025-02-27 DIAGNOSIS — F17.210 NICOTINE DEPENDENCE, CIGARETTES, UNCOMPLICATED: ICD-10-CM

## 2025-02-27 DIAGNOSIS — Z79.4 LONG TERM (CURRENT) USE OF INSULIN: ICD-10-CM

## 2025-02-27 DIAGNOSIS — Z88.1 ALLERGY STATUS TO OTHER ANTIBIOTIC AGENTS: ICD-10-CM

## 2025-02-27 DIAGNOSIS — Z87.01 PERSONAL HISTORY OF PNEUMONIA (RECURRENT): ICD-10-CM

## 2025-02-27 DIAGNOSIS — E11.9 TYPE 2 DIABETES MELLITUS WITHOUT COMPLICATIONS: ICD-10-CM

## 2025-02-27 DIAGNOSIS — Z96.7 PRESENCE OF OTHER BONE AND TENDON IMPLANTS: Chronic | ICD-10-CM

## 2025-02-27 DIAGNOSIS — K60.329 ANAL FISTULA, COMPLEX, UNSPECIFIED: ICD-10-CM

## 2025-02-27 DIAGNOSIS — K50.90 CROHN'S DISEASE, UNSPECIFIED, WITHOUT COMPLICATIONS: ICD-10-CM

## 2025-02-27 DIAGNOSIS — K43.5 PARASTOMAL HERNIA WITHOUT OBSTRUCTION OR GANGRENE: ICD-10-CM

## 2025-02-27 DIAGNOSIS — Z87.81 PERSONAL HISTORY OF (HEALED) TRAUMATIC FRACTURE: Chronic | ICD-10-CM

## 2025-02-27 PROCEDURE — 99284 EMERGENCY DEPT VISIT MOD MDM: CPT

## 2025-02-27 RX ORDER — OXYCODONE HYDROCHLORIDE 30 MG/1
10 TABLET ORAL ONCE
Refills: 0 | Status: DISCONTINUED | OUTPATIENT
Start: 2025-02-27 | End: 2025-02-27

## 2025-02-27 RX ORDER — OXYCODONE HYDROCHLORIDE 30 MG/1
1 TABLET ORAL
Qty: 12 | Refills: 0
Start: 2025-02-27 | End: 2025-03-01

## 2025-02-27 RX ADMIN — OXYCODONE HYDROCHLORIDE 10 MILLIGRAM(S): 30 TABLET ORAL at 21:04

## 2025-02-27 NOTE — CONSULT NOTE ADULT - SUBJECTIVE AND OBJECTIVE BOX
HPI:  39M with pmhx Crohns disease with extensive anorectal fistulization s/p multiple interventions including diverting ileostomy done at [facility], EUA, I&D and seton placement multiple times, most recently B/L seton placement at [place] recent admission to general surgery team for repair of parastomal hernia, discharged on Sunday, coming back to ED w abdominal pain and low ostomy output, has been tolerating diet, minimal N/V. He was seen in the Ed for similar symptoms 2 days prior  and discharged.   CT done at that time w No bowel obstruction. Appendix is not visualized. No evidence of inflammation in the pericecal region. Right lower quadrant ileostomy. Parastomal herniated small bowel noted on prior study of 01/25/2025 has been reduced.        PAST MEDICAL & SURGICAL HISTORY:  Crohn's disease of large intestine without complication  (No current flare up)      Pancreatitis      Type 2 diabetes mellitus      S/P ORIF (open reduction internal fixation) fracture  (Right ankle, 2014)      History of colonoscopy  (2014)      Perianal fistula  repair in 2002      H/O fracture of clavicle  with surgical repair      S/P colostomy          MEDICATIONS  (STANDING):    MEDICATIONS  (PRN):      Allergies    ciprofloxacin (Other (Mild to Mod); Hypotension)    Intolerances        SOCIAL HISTORY:    FAMILY HISTORY:  Diabetes mellitus (Father)            Physical Exam:  General: NAD  Chest: Normal respiratory effort  Heart: RRR  Abdomen: Soft, ND, diffuse TTP, ostomy pink and healthy w normal function   Neuro/Psych: No localized deficits. Normal speech, normal tone  Skin: Normal, no rashes, no lesions noted.   Extremities: Warm, well perfused, no edema, Pulses intact      Vital Signs Last 24 Hrs  T(C): 37.2 (27 Feb 2025 21:00), Max: 37.3 (27 Feb 2025 17:38)  T(F): 99 (27 Feb 2025 21:00), Max: 99.1 (27 Feb 2025 17:38)  HR: 100 (27 Feb 2025 21:00) (100 - 102)  BP: 118/88 (27 Feb 2025 21:00) (118/88 - 134/87)  BP(mean): 99 (27 Feb 2025 17:38) (99 - 99)  RR: 20 (27 Feb 2025 21:00) (20 - 20)  SpO2: 98% (27 Feb 2025 21:00) (98% - 100%)    Parameters below as of 27 Feb 2025 21:00  Patient On (Oxygen Delivery Method): room air        I&O's Summary          LABS:              CAPILLARY BLOOD GLUCOSE            Cultures:      RADIOLOGY & ADDITIONAL STUDIES:      Plan:

## 2025-02-27 NOTE — CONSULT NOTE ADULT - ASSESSMENT
39M with pmhx Crohns disease with extensive anorectal fistulization s/p multiple interventions including diverting ileostomy done at [facility], EUA, I&D and seton placement multiple times, most recently B/L seton placement at [place] recent admission to general surgery team for repair of parastomal hernia, discharged on Sunday, coming back to ED w abdominal pain and low ostomy output, has been tolerating diet, minimal N/V.   CT w No bowel obstruction. Appendix is not visualized. No evidence of inflammation in the pericecal region. Right lower quadrant ileostomy. Parastomal herniated small bowel noted on prior study of 01/25/2025 has been reduced.    Recommendations:   - no c/f obstruction, ostomy w adequate function> no acute surgical intervention indicated at this time   - patient has chronic pain, recommend referral to chronic pain management service will benefit  - follow up as outpatient with Dr Hilario for his postop follow up

## 2025-02-27 NOTE — ED PROVIDER NOTE - NS ED ROS FT
GEN: Denies fever, chills, sick contacts, recent travel  HEENT: Denies dizziness, blurry vision, HA  Cardiac: Denies CP, SOB, palpitations  Lungs: Denies cough, wheezing  PV: Denies LE swelling  GI: +abdominal pain Denies nausea, vomiting, diarrhea, constipation, flank pain  : Denies hematuria, dysuria, frequency, urgency  Skin: Denies rash, redness, open wound  MSK: Denies pain, decreased ROM  Neuro: Denies dizziness, difficulty speaking, difficulty swallowing, numbness/tingling in extremities, loss of bowel/bladder control, weakness in extremities

## 2025-02-27 NOTE — ED PROVIDER NOTE - PATIENT PORTAL LINK FT
You can access the FollowMyHealth Patient Portal offered by [facility] by registering at the following website: [email or web address]. By joining [employer]’s FollowMyHealth portal, you will also be able to view your health information using other applications (apps) compatible with our system.

## 2025-02-27 NOTE — ED PROVIDER NOTE - CARE PROVIDER_API CALL
Sanket Hilario (DO)  Surgery  [address] [place] [id number]  Phone: [phone]  Fax: [phone]  Follow Up Time:     Derek Salamanca  Pain Medicine  [facility] [place] [address]  Phone: [phone]  Fax: [phone]  Follow Up Time:

## 2025-02-27 NOTE — ED PROVIDER NOTE - PROGRESS NOTE DETAILS
Patient evaluated by surgical team, awaiting recommendations. - Diogenes Larry PA-C Patient evaluated by surgical team. Patient has recent negative evaluation in ED including labs and CT on 2/25/25. Patient recommended for outpatient pain management. Pt reports he has outpatient appointment in 3 days. - Diogenes Larry PA-C

## 2025-02-27 NOTE — ED PROVIDER NOTE - PHYSICAL EXAMINATION
Gen: Well appearing. A&O x3.   HEENT: NC/AT. Dentition in good repair. No oropharyngeal erythema, tonsilar enlargement or exudates. Uvula midline. No submandibular or anterior cervical lymphadenopathy. TM well visualized bilaterally. Nares patent.  Cardiac: s1s2, RRR.  Lungs: CTAB, no chest wall tenderness.  Abdomen: NBS x4, laproscopic surgical wounds appear clean, dry, intact with minimal surrounding bruising. Ostomy in RLQ with stool in bag. No erythema surrounding ostomy. Diffuse tenderness to palpation in right side of abdomen.   MSK: No obvious deformity to extremities.   Neuro: CN II-XII intact. Sensation intact to light touch in all extremities. 5/5 strength in all extremities.   PV: No LE edema or calf tenderness. Distal pulses 2+ in all extremities.

## 2025-02-27 NOTE — ED PROVIDER NOTE - CARE PROVIDERS DIRECT ADDRESSES
[email or web address]

## 2025-02-27 NOTE — ED PROVIDER NOTE - OBJECTIVE STATEMENT
38 y/o M with PMh of Crohn's dx, pancreatitis, DM, parastomal hernia repair with Dr. Hilario on 2/20/25 presents with abdominal pain, report of decreased ostomy output. pt states he's eating and drinking normally. Report no difficulty urinating. States he had been using oxycodone for pain, now using advil as he does not have any more oxycodone remaining. Denies fever, chills, discharge from surgical site. Was seen in the ED on 2/25/25 for similar. Was evaluated by surgical team at that time and discharged after CT.

## 2025-02-27 NOTE — ED ADULT TRIAGE NOTE - CHIEF COMPLAINT QUOTE
Pt is A&OX3, presenting to the ER with c/o abdominal pain. Pt reports having hernia surgery last Thursday. Was here on Tuesday for severe abdominal pain. Today the pain has gotten worse and has zero output in is colostomy bag.  Pt denies fevers, vomiting, urinary symptoms.

## 2025-02-27 NOTE — ED ADULT NURSE NOTE - NSFALLUNIVINTERV_ED_ALL_ED
Bed/Stretcher in lowest position, wheels locked, appropriate side rails in place/Call bell, personal items and telephone in reach/Instruct patient to call for assistance before getting out of bed/chair/stretcher/Non-slip footwear applied when patient is off stretcher/[place] to call system/Physically safe environment - no spills, clutter or unnecessary equipment/Purposeful proactive rounding/Room/bathroom lighting operational, light cord in reach

## 2025-02-27 NOTE — ED ADULT NURSE NOTE - OBJECTIVE STATEMENT
pt presents to ed c/o lower generalized abdominal pain and nausea x1 week. pt seen at  for hernia sx 1 week ago. pmhx crohns and diabetes. pt has right sided ostomy bag; reports "little to no" output over past few days. no vomiting. no fevers. no urinary symptoms. a&o x4 with no further complaints at this time. piv inserted and blood sent to lab.

## 2025-03-05 ENCOUNTER — APPOINTMENT (OUTPATIENT)
Dept: SURGERY | Facility: CLINIC | Age: 40
End: 2025-03-05

## 2025-03-12 ENCOUNTER — APPOINTMENT (OUTPATIENT)
Dept: SURGERY | Facility: CLINIC | Age: 40
End: 2025-03-12
Payer: MEDICAID

## 2025-03-12 VITALS
DIASTOLIC BLOOD PRESSURE: 82 MMHG | HEIGHT: 71 IN | BODY MASS INDEX: 21.7 KG/M2 | WEIGHT: 155 LBS | SYSTOLIC BLOOD PRESSURE: 130 MMHG

## 2025-03-12 DIAGNOSIS — Z87.898 PERSONAL HISTORY OF OTHER SPECIFIED CONDITIONS: ICD-10-CM

## 2025-03-12 DIAGNOSIS — K43.9 VENTRAL HERNIA W/OUT OBSTRUCTION OR GANGRENE: ICD-10-CM

## 2025-03-12 PROCEDURE — 99212 OFFICE O/P EST SF 10 MIN: CPT

## 2025-05-23 NOTE — ED ADULT NURSE NOTE - NS ED NOTE  TALK SOMEONE YN
Problem: Chronic Conditions and Co-morbidities  Goal: Patient's chronic conditions and co-morbidity symptoms are monitored and maintained or improved  Outcome: Progressing     Problem: Discharge Planning  Goal: Discharge to home or other facility with appropriate resources  Outcome: Progressing     Problem: Safety - Adult  Goal: Free from fall injury  Outcome: Progressing     Problem: ABCDS Injury Assessment  Goal: Absence of physical injury  Outcome: Progressing     Problem: Skin/Tissue Integrity  Goal: Skin integrity remains intact  Description: 1.  Monitor for areas of redness and/or skin breakdown2.  Assess vascular access sites hourly3.  Every 4-6 hours minimum:  Change oxygen saturation probe site4.  Every 4-6 hours:  If on nasal continuous positive airway pressure, respiratory therapy assess nares and determine need for appliance change or resting period  Outcome: Progressing     Problem: Pain  Goal: Verbalizes/displays adequate comfort level or baseline comfort level  Outcome: Progressing      No

## 2025-07-03 NOTE — ED ADULT NURSE NOTE - DRUG PRE-SCREENING (DAST -1)
[Right] : right foot and ankle
[] : patient ambulates without assistive device
[FreeTextEntry3] :  significant ecchymosis on lateral and medial side of foot  
Rheumatoid arthritis
[FreeTextEntry8] : tenderness at the distal fibula. 
[FreeTextEntry9] : Near full ROM. 
Statement Selected